# Patient Record
Sex: FEMALE | Race: WHITE | NOT HISPANIC OR LATINO | Employment: OTHER | URBAN - METROPOLITAN AREA
[De-identification: names, ages, dates, MRNs, and addresses within clinical notes are randomized per-mention and may not be internally consistent; named-entity substitution may affect disease eponyms.]

---

## 2017-06-14 ENCOUNTER — CONVERSION ENCOUNTER (OUTPATIENT)
Dept: FAMILY MEDICINE CLINIC | Facility: CLINIC | Age: 72
End: 2017-06-14

## 2017-06-21 ENCOUNTER — CONVERSION ENCOUNTER (OUTPATIENT)
Dept: FAMILY MEDICINE CLINIC | Facility: CLINIC | Age: 72
End: 2017-06-21

## 2017-06-28 ENCOUNTER — HOSPITAL ENCOUNTER (OUTPATIENT)
Dept: OTHER | Facility: HOSPITAL | Age: 72
Setting detail: SPECIMEN
Discharge: HOME | End: 2017-06-28
Attending: OBSTETRICS & GYNECOLOGY | Admitting: OBSTETRICS & GYNECOLOGY

## 2017-06-28 ENCOUNTER — HOSPITAL ENCOUNTER (OUTPATIENT)
Dept: PREADMISSION TESTING | Facility: HOSPITAL | Age: 72
Discharge: HOME | End: 2017-06-28
Attending: ORTHOPAEDIC SURGERY | Admitting: ORTHOPAEDIC SURGERY

## 2017-06-28 LAB
ALBUMIN SERPL-MCNC: 3.8 G/DL (ref 3.5–4.8)
ALBUMIN/GLOB SERPL: 1.3 {RATIO} (ref 1–1.7)
ALP SERPL-CCNC: 82 IU/L (ref 32–91)
ALT SERPL-CCNC: 22 IU/L (ref 14–54)
ANION GAP SERPL CALC-SCNC: 13.4 MMOL/L (ref 10–20)
AST SERPL-CCNC: 24 IU/L (ref 15–41)
BASOPHILS # BLD AUTO: 0.1 10*3/UL (ref 0–0.2)
BASOPHILS NFR BLD AUTO: 1 % (ref 0–2)
BILIRUB SERPL-MCNC: 0.8 MG/DL (ref 0.3–1.2)
BILIRUB UR QL STRIP: NEGATIVE MG/DL
BUN SERPL-MCNC: 26 MG/DL (ref 8–20)
BUN/CREAT SERPL: 23.6 (ref 5.4–26.2)
CALCIUM SERPL-MCNC: 10 MG/DL (ref 8.9–10.3)
CASTS URNS QL MICRO: NORMAL /[LPF]
CHLORIDE SERPL-SCNC: 102 MMOL/L (ref 101–111)
COLOR UR: YELLOW
CONV BACTERIA IN URINE MICRO: NEGATIVE
CONV CLARITY OF URINE: CLEAR
CONV CO2: 27 MMOL/L (ref 22–32)
CONV HYALINE CASTS IN URINE MICRO: 1 /[LPF] (ref 0–5)
CONV PROTEIN IN URINE BY AUTOMATED TEST STRIP: NEGATIVE MG/DL
CONV SMALL ROUND CELLS: NORMAL /[HPF]
CONV TOTAL PROTEIN: 6.8 G/DL (ref 6.1–7.9)
CONV UROBILINOGEN IN URINE BY AUTOMATED TEST STRIP: 0.2 MG/DL
CREAT UR-MCNC: 1.1 MG/DL (ref 0.4–1)
CULTURE INDICATED?: NORMAL
DIFFERENTIAL METHOD BLD: (no result)
EOSINOPHIL # BLD AUTO: 0.1 10*3/UL (ref 0–0.3)
EOSINOPHIL # BLD AUTO: 3 % (ref 0–3)
ERYTHROCYTE [DISTWIDTH] IN BLOOD BY AUTOMATED COUNT: 14 % (ref 11.5–14.5)
GLOBULIN UR ELPH-MCNC: 3 G/DL (ref 2.5–3.8)
GLUCOSE SERPL-MCNC: 101 MG/DL (ref 65–99)
GLUCOSE UR QL: NEGATIVE MG/DL
HCT VFR BLD AUTO: 36.2 % (ref 35–49)
HGB BLD-MCNC: 12.3 G/DL (ref 12–15)
HGB UR QL STRIP: NEGATIVE
KETONES UR QL STRIP: NEGATIVE MG/DL
LEUKOCYTE ESTERASE UR QL STRIP: NEGATIVE
LYMPHOCYTES # BLD AUTO: 1.7 10*3/UL (ref 0.8–4.8)
LYMPHOCYTES NFR BLD AUTO: 32 % (ref 18–42)
MCH RBC QN AUTO: 33.4 PG (ref 26–32)
MCHC RBC AUTO-ENTMCNC: 34 G/DL (ref 32–36)
MCV RBC AUTO: 98.2 FL (ref 80–94)
MONOCYTES # BLD AUTO: 0.4 10*3/UL (ref 0.1–1.3)
MONOCYTES NFR BLD AUTO: 8 % (ref 2–11)
NEUTROPHILS # BLD AUTO: 2.9 10*3/UL (ref 2.3–8.6)
NEUTROPHILS NFR BLD AUTO: 56 % (ref 50–75)
NITRITE UR QL STRIP: NEGATIVE
NRBC BLD AUTO-RTO: 0 /100{WBCS}
NRBC/RBC NFR BLD MANUAL: 0 10*3/UL
PH UR STRIP.AUTO: 5.5 [PH] (ref 4.5–8)
PLATELET # BLD AUTO: 217 10*3/UL (ref 150–450)
PMV BLD AUTO: 8 FL (ref 7.4–10.4)
POTASSIUM SERPL-SCNC: 4.4 MMOL/L (ref 3.6–5.1)
RBC # BLD AUTO: 3.68 10*6/UL (ref 4–5.4)
RBC #/AREA URNS HPF: 1 /[HPF] (ref 0–3)
SODIUM SERPL-SCNC: 138 MMOL/L (ref 136–144)
SP GR UR: 1.02 (ref 1–1.03)
SPERM URNS QL MICRO: NORMAL /[HPF]
SQUAMOUS SPT QL MICRO: 0 /[HPF] (ref 0–5)
UNIDENT CRYS URNS QL MICRO: NORMAL /[HPF]
WBC # BLD AUTO: 5.2 10*3/UL (ref 4.5–11.5)
WBC #/AREA URNS HPF: 1 /[HPF] (ref 0–5)
YEAST SPEC QL WET PREP: NORMAL /[HPF]

## 2017-08-23 ENCOUNTER — HOSPITAL ENCOUNTER (OUTPATIENT)
Dept: SLEEP MEDICINE | Facility: HOSPITAL | Age: 72
Discharge: HOME | End: 2017-08-23
Attending: INTERNAL MEDICINE | Admitting: INTERNAL MEDICINE

## 2017-12-21 ENCOUNTER — CONVERSION ENCOUNTER (OUTPATIENT)
Dept: FAMILY MEDICINE CLINIC | Facility: CLINIC | Age: 72
End: 2017-12-21

## 2017-12-21 ENCOUNTER — HOSPITAL ENCOUNTER (OUTPATIENT)
Dept: FAMILY MEDICINE CLINIC | Facility: CLINIC | Age: 72
Setting detail: SPECIMEN
Discharge: HOME | End: 2017-12-21
Attending: PREVENTIVE MEDICINE | Admitting: PREVENTIVE MEDICINE

## 2017-12-21 LAB
ALBUMIN SERPL-MCNC: 3.7 G/DL (ref 3.5–4.8)
ALBUMIN/GLOB SERPL: 1.4 {RATIO} (ref 1–1.7)
ALP SERPL-CCNC: 85 IU/L (ref 32–91)
ALT SERPL-CCNC: 19 IU/L (ref 14–54)
ANION GAP SERPL CALC-SCNC: 11.3 MMOL/L (ref 10–20)
AST SERPL-CCNC: 20 IU/L (ref 15–41)
BASOPHILS # BLD AUTO: 0.1 10*3/UL (ref 0–0.2)
BASOPHILS NFR BLD AUTO: 1 % (ref 0–2)
BILIRUB SERPL-MCNC: 0.5 MG/DL (ref 0.3–1.2)
BUN SERPL-MCNC: 20 MG/DL (ref 8–20)
BUN/CREAT SERPL: 18.2 (ref 5.4–26.2)
CALCIUM SERPL-MCNC: 9.5 MG/DL (ref 8.9–10.3)
CHLORIDE SERPL-SCNC: 102 MMOL/L (ref 101–111)
CHOLEST SERPL-MCNC: 170 MG/DL
CHOLEST/HDLC SERPL: 3.2 {RATIO}
CONV CO2: 28 MMOL/L (ref 22–32)
CONV LDL CHOLESTEROL DIRECT: 97 MG/DL (ref 0–100)
CONV TOTAL PROTEIN: 6.4 G/DL (ref 6.1–7.9)
CREAT UR-MCNC: 1.1 MG/DL (ref 0.4–1)
DIFFERENTIAL METHOD BLD: (no result)
EOSINOPHIL # BLD AUTO: 0.2 10*3/UL (ref 0–0.3)
EOSINOPHIL # BLD AUTO: 5 % (ref 0–3)
ERYTHROCYTE [DISTWIDTH] IN BLOOD BY AUTOMATED COUNT: 15.5 % (ref 11.5–14.5)
GLOBULIN UR ELPH-MCNC: 2.7 G/DL (ref 2.5–3.8)
GLUCOSE SERPL-MCNC: 85 MG/DL (ref 65–99)
HCT VFR BLD AUTO: 32.1 % (ref 35–49)
HDLC SERPL-MCNC: 53 MG/DL
HGB BLD-MCNC: 10.6 G/DL (ref 12–15)
IRON SATN MFR SERPL: 17 % (ref 15–50)
IRON SERPL-MCNC: 49 UG/DL (ref 28–170)
LDLC/HDLC SERPL: 1.8 {RATIO}
LIPID INTERPRETATION: NORMAL
LYMPHOCYTES # BLD AUTO: 1.4 10*3/UL (ref 0.8–4.8)
LYMPHOCYTES NFR BLD AUTO: 32 % (ref 18–42)
MAGNESIUM UR-MCNC: 1.75 % (ref 0.5–1.5)
MCH RBC QN AUTO: 32.2 PG (ref 26–32)
MCHC RBC AUTO-ENTMCNC: 33 G/DL (ref 32–36)
MCV RBC AUTO: 97.5 FL (ref 80–94)
MONOCYTES # BLD AUTO: 0.4 10*3/UL (ref 0.1–1.3)
MONOCYTES NFR BLD AUTO: 10 % (ref 2–11)
NEUTROPHILS # BLD AUTO: 2.3 10*3/UL (ref 2.3–8.6)
NEUTROPHILS NFR BLD AUTO: 52 % (ref 50–75)
NRBC BLD AUTO-RTO: 0 /100{WBCS}
NRBC/RBC NFR BLD MANUAL: 0 10*3/UL
PLATELET # BLD AUTO: 209 10*3/UL (ref 150–450)
PMV BLD AUTO: 7.9 FL (ref 7.4–10.4)
POTASSIUM SERPL-SCNC: 4.3 MMOL/L (ref 3.6–5.1)
RBC # BLD AUTO: 3.29 10*6/UL (ref 4–5.4)
RETICS/RBC NFR MANUAL: 0.06 10*6/UL
SODIUM SERPL-SCNC: 137 MMOL/L (ref 136–144)
TIBC SERPL-MCNC: 296 UG/DL (ref 228–428)
TRIGL SERPL-MCNC: 122 MG/DL
URATE SERPL-MCNC: 5.8 MG/DL (ref 2.6–8)
VLDLC SERPL CALC-MCNC: 19.8 MG/DL
WBC # BLD AUTO: 4.3 10*3/UL (ref 4.5–11.5)

## 2017-12-22 LAB
CONV HIV-1/ HIV-2: NORMAL
CONV HIV-1/ HIV-2: NORMAL
HCV AB SER DONR QL: NORMAL
HCV AB SER DONR QL: NORMAL

## 2018-01-22 ENCOUNTER — CONVERSION ENCOUNTER (OUTPATIENT)
Dept: FAMILY MEDICINE CLINIC | Facility: CLINIC | Age: 73
End: 2018-01-22

## 2018-01-22 ENCOUNTER — HOSPITAL ENCOUNTER (OUTPATIENT)
Dept: FAMILY MEDICINE CLINIC | Facility: CLINIC | Age: 73
Setting detail: SPECIMEN
Discharge: HOME | End: 2018-01-22
Attending: PREVENTIVE MEDICINE | Admitting: PREVENTIVE MEDICINE

## 2018-01-22 LAB
ALBUMIN SERPL-MCNC: 3.9 G/DL (ref 3.5–4.8)
ALBUMIN/GLOB SERPL: 1.3 {RATIO} (ref 1–1.7)
ALP SERPL-CCNC: 87 IU/L (ref 32–91)
ALT SERPL-CCNC: 18 IU/L (ref 14–54)
ANION GAP SERPL CALC-SCNC: 13.3 MMOL/L (ref 10–20)
AST SERPL-CCNC: 21 IU/L (ref 15–41)
BASOPHILS # BLD AUTO: 0 10*3/UL (ref 0–0.2)
BASOPHILS NFR BLD AUTO: 1 % (ref 0–2)
BILIRUB SERPL-MCNC: 0.6 MG/DL (ref 0.3–1.2)
BUN SERPL-MCNC: 22 MG/DL (ref 8–20)
BUN/CREAT SERPL: 22 (ref 5.4–26.2)
CALCIUM SERPL-MCNC: 9.9 MG/DL (ref 8.9–10.3)
CHLORIDE SERPL-SCNC: 101 MMOL/L (ref 101–111)
CONV CO2: 26 MMOL/L (ref 22–32)
CONV TOTAL PROTEIN: 6.9 G/DL (ref 6.1–7.9)
CREAT UR-MCNC: 1 MG/DL (ref 0.4–1)
DIFFERENTIAL METHOD BLD: (no result)
EOSINOPHIL # BLD AUTO: 0.2 10*3/UL (ref 0–0.3)
EOSINOPHIL # BLD AUTO: 4 % (ref 0–3)
ERYTHROCYTE [DISTWIDTH] IN BLOOD BY AUTOMATED COUNT: 14.9 % (ref 11.5–14.5)
GLOBULIN UR ELPH-MCNC: 3 G/DL (ref 2.5–3.8)
GLUCOSE SERPL-MCNC: 84 MG/DL (ref 65–99)
HCT VFR BLD AUTO: 32.7 % (ref 35–49)
HGB BLD-MCNC: 11 G/DL (ref 12–15)
LYMPHOCYTES # BLD AUTO: 1.7 10*3/UL (ref 0.8–4.8)
LYMPHOCYTES NFR BLD AUTO: 33 % (ref 18–42)
MCH RBC QN AUTO: 32.8 PG (ref 26–32)
MCHC RBC AUTO-ENTMCNC: 33.7 G/DL (ref 32–36)
MCV RBC AUTO: 97.3 FL (ref 80–94)
MONOCYTES # BLD AUTO: 0.4 10*3/UL (ref 0.1–1.3)
MONOCYTES NFR BLD AUTO: 8 % (ref 2–11)
NEUTROPHILS # BLD AUTO: 2.8 10*3/UL (ref 2.3–8.6)
NEUTROPHILS NFR BLD AUTO: 54 % (ref 50–75)
NRBC BLD AUTO-RTO: 0 /100{WBCS}
NRBC/RBC NFR BLD MANUAL: 0 10*3/UL
PLATELET # BLD AUTO: 241 10*3/UL (ref 150–450)
PMV BLD AUTO: 7.9 FL (ref 7.4–10.4)
POTASSIUM SERPL-SCNC: 4.3 MMOL/L (ref 3.6–5.1)
RBC # BLD AUTO: 3.36 10*6/UL (ref 4–5.4)
SODIUM SERPL-SCNC: 136 MMOL/L (ref 136–144)
WBC # BLD AUTO: 5.2 10*3/UL (ref 4.5–11.5)

## 2018-02-05 ENCOUNTER — CONVERSION ENCOUNTER (OUTPATIENT)
Dept: FAMILY MEDICINE CLINIC | Facility: CLINIC | Age: 73
End: 2018-02-05

## 2018-02-05 ENCOUNTER — HOSPITAL ENCOUNTER (OUTPATIENT)
Dept: FAMILY MEDICINE CLINIC | Facility: CLINIC | Age: 73
Setting detail: SPECIMEN
Discharge: HOME | End: 2018-02-05
Attending: PREVENTIVE MEDICINE | Admitting: PREVENTIVE MEDICINE

## 2018-02-05 LAB — POTASSIUM SERPL-SCNC: 4.9 MMOL/L (ref 3.6–5.1)

## 2018-02-13 ENCOUNTER — CONVERSION ENCOUNTER (OUTPATIENT)
Dept: FAMILY MEDICINE CLINIC | Facility: CLINIC | Age: 73
End: 2018-02-13

## 2018-02-14 ENCOUNTER — CONVERSION ENCOUNTER (OUTPATIENT)
Dept: FAMILY MEDICINE CLINIC | Facility: CLINIC | Age: 73
End: 2018-02-14

## 2018-02-23 ENCOUNTER — HOSPITAL ENCOUNTER (OUTPATIENT)
Dept: NUCLEAR MEDICINE | Facility: HOSPITAL | Age: 73
Discharge: HOME | End: 2018-02-23
Attending: INTERNAL MEDICINE | Admitting: INTERNAL MEDICINE

## 2018-02-27 ENCOUNTER — CONVERSION ENCOUNTER (OUTPATIENT)
Dept: FAMILY MEDICINE CLINIC | Facility: CLINIC | Age: 73
End: 2018-02-27

## 2018-03-14 ENCOUNTER — CONVERSION ENCOUNTER (OUTPATIENT)
Dept: FAMILY MEDICINE CLINIC | Facility: CLINIC | Age: 73
End: 2018-03-14

## 2018-03-30 ENCOUNTER — CONVERSION ENCOUNTER (OUTPATIENT)
Dept: FAMILY MEDICINE CLINIC | Facility: CLINIC | Age: 73
End: 2018-03-30

## 2018-03-30 ENCOUNTER — HOSPITAL ENCOUNTER (OUTPATIENT)
Dept: FAMILY MEDICINE CLINIC | Facility: CLINIC | Age: 73
Setting detail: SPECIMEN
Discharge: HOME | End: 2018-03-30
Attending: PREVENTIVE MEDICINE | Admitting: PREVENTIVE MEDICINE

## 2018-03-30 LAB
ALBUMIN SERPL-MCNC: 3.7 G/DL (ref 3.5–4.8)
ALBUMIN/GLOB SERPL: 1.2 {RATIO} (ref 1–1.7)
ALP SERPL-CCNC: 95 IU/L (ref 32–91)
ALT SERPL-CCNC: 21 IU/L (ref 14–54)
ANION GAP SERPL CALC-SCNC: 11 MMOL/L (ref 10–20)
AST SERPL-CCNC: 23 IU/L (ref 15–41)
BASOPHILS # BLD AUTO: 0.1 10*3/UL (ref 0–0.2)
BASOPHILS NFR BLD AUTO: 1 % (ref 0–2)
BILIRUB SERPL-MCNC: 0.4 MG/DL (ref 0.3–1.2)
BUN SERPL-MCNC: 20 MG/DL (ref 8–20)
BUN/CREAT SERPL: 18.2 (ref 5.4–26.2)
CALCIUM SERPL-MCNC: 9.9 MG/DL (ref 8.9–10.3)
CHLORIDE SERPL-SCNC: 103 MMOL/L (ref 101–111)
CHOLEST SERPL-MCNC: 184 MG/DL
CHOLEST/HDLC SERPL: 3.5 {RATIO}
CONV CO2: 28 MMOL/L (ref 22–32)
CONV LDL CHOLESTEROL DIRECT: 108 MG/DL (ref 0–100)
CONV TOTAL PROTEIN: 6.9 G/DL (ref 6.1–7.9)
CREAT UR-MCNC: 1.1 MG/DL (ref 0.4–1)
DIFFERENTIAL METHOD BLD: (no result)
EOSINOPHIL # BLD AUTO: 0.2 10*3/UL (ref 0–0.3)
EOSINOPHIL # BLD AUTO: 3 % (ref 0–3)
ERYTHROCYTE [DISTWIDTH] IN BLOOD BY AUTOMATED COUNT: 15.4 % (ref 11.5–14.5)
GLOBULIN UR ELPH-MCNC: 3.2 G/DL (ref 2.5–3.8)
GLUCOSE SERPL-MCNC: 112 MG/DL (ref 65–99)
HCT VFR BLD AUTO: 34.3 % (ref 35–49)
HDLC SERPL-MCNC: 52 MG/DL
HGB BLD-MCNC: 11.4 G/DL (ref 12–15)
LDLC/HDLC SERPL: 2.1 {RATIO}
LIPID INTERPRETATION: ABNORMAL
LYMPHOCYTES # BLD AUTO: 1.5 10*3/UL (ref 0.8–4.8)
LYMPHOCYTES NFR BLD AUTO: 31 % (ref 18–42)
MCH RBC QN AUTO: 32.8 PG (ref 26–32)
MCHC RBC AUTO-ENTMCNC: 33.3 G/DL (ref 32–36)
MCV RBC AUTO: 98.5 FL (ref 80–94)
MONOCYTES # BLD AUTO: 0.4 10*3/UL (ref 0.1–1.3)
MONOCYTES NFR BLD AUTO: 8 % (ref 2–11)
NEUTROPHILS # BLD AUTO: 2.7 10*3/UL (ref 2.3–8.6)
NEUTROPHILS NFR BLD AUTO: 57 % (ref 50–75)
NRBC BLD AUTO-RTO: 0 /100{WBCS}
NRBC/RBC NFR BLD MANUAL: 0 10*3/UL
PLATELET # BLD AUTO: 216 10*3/UL (ref 150–450)
PMV BLD AUTO: 7.5 FL (ref 7.4–10.4)
POTASSIUM SERPL-SCNC: 4 MMOL/L (ref 3.6–5.1)
RBC # BLD AUTO: 3.48 10*6/UL (ref 4–5.4)
SODIUM SERPL-SCNC: 138 MMOL/L (ref 136–144)
TRIGL SERPL-MCNC: 161 MG/DL
URATE SERPL-MCNC: 4.3 MG/DL (ref 2.6–8)
VLDLC SERPL CALC-MCNC: 23.9 MG/DL
WBC # BLD AUTO: 4.8 10*3/UL (ref 4.5–11.5)

## 2018-04-05 ENCOUNTER — HOSPITAL ENCOUNTER (OUTPATIENT)
Dept: OTHER | Facility: HOSPITAL | Age: 73
Discharge: HOME | End: 2018-04-05
Attending: INTERNAL MEDICINE | Admitting: INTERNAL MEDICINE

## 2018-04-05 LAB
ANION GAP SERPL CALC-SCNC: 10.3 MMOL/L (ref 10–20)
APTT BLD: 24.3 SEC (ref 24–31)
BASOPHILS # BLD AUTO: 0.1 10*3/UL (ref 0–0.2)
BASOPHILS NFR BLD AUTO: 1 % (ref 0–2)
BUN SERPL-MCNC: 19 MG/DL (ref 8–20)
BUN/CREAT SERPL: 19 (ref 5.4–26.2)
CALCIUM SERPL-MCNC: 9.6 MG/DL (ref 8.9–10.3)
CHLORIDE SERPL-SCNC: 103 MMOL/L (ref 101–111)
CONV CO2: 27 MMOL/L (ref 22–32)
CREAT UR-MCNC: 1 MG/DL (ref 0.4–1)
DIFFERENTIAL METHOD BLD: (no result)
EOSINOPHIL # BLD AUTO: 0.2 10*3/UL (ref 0–0.3)
EOSINOPHIL # BLD AUTO: 4 % (ref 0–3)
ERYTHROCYTE [DISTWIDTH] IN BLOOD BY AUTOMATED COUNT: 15.3 % (ref 11.5–14.5)
GLUCOSE SERPL-MCNC: 107 MG/DL (ref 65–99)
HCT VFR BLD AUTO: 34.8 % (ref 35–49)
HGB BLD-MCNC: 11.5 G/DL (ref 12–15)
INR PPP: 0.9
LYMPHOCYTES # BLD AUTO: 1.6 10*3/UL (ref 0.8–4.8)
LYMPHOCYTES NFR BLD AUTO: 31 % (ref 18–42)
MCH RBC QN AUTO: 32.6 PG (ref 26–32)
MCHC RBC AUTO-ENTMCNC: 33.2 G/DL (ref 32–36)
MCV RBC AUTO: 98.3 FL (ref 80–94)
MONOCYTES # BLD AUTO: 0.4 10*3/UL (ref 0.1–1.3)
MONOCYTES NFR BLD AUTO: 8 % (ref 2–11)
NEUTROPHILS # BLD AUTO: 2.9 10*3/UL (ref 2.3–8.6)
NEUTROPHILS NFR BLD AUTO: 56 % (ref 50–75)
NRBC BLD AUTO-RTO: 0 /100{WBCS}
NRBC/RBC NFR BLD MANUAL: 0 10*3/UL
PLATELET # BLD AUTO: 229 10*3/UL (ref 150–450)
PMV BLD AUTO: 7.7 FL (ref 7.4–10.4)
POTASSIUM SERPL-SCNC: 4.3 MMOL/L (ref 3.6–5.1)
PROTHROMBIN TIME: 10.1 SEC (ref 9.6–11.7)
RBC # BLD AUTO: 3.54 10*6/UL (ref 4–5.4)
SODIUM SERPL-SCNC: 136 MMOL/L (ref 136–144)
WBC # BLD AUTO: 5.2 10*3/UL (ref 4.5–11.5)

## 2018-04-25 ENCOUNTER — CONVERSION ENCOUNTER (OUTPATIENT)
Dept: FAMILY MEDICINE CLINIC | Facility: CLINIC | Age: 73
End: 2018-04-25

## 2018-05-23 ENCOUNTER — HOSPITAL ENCOUNTER (OUTPATIENT)
Dept: LAB | Facility: HOSPITAL | Age: 73
Discharge: HOME | End: 2018-05-23
Attending: OBSTETRICS & GYNECOLOGY | Admitting: OBSTETRICS & GYNECOLOGY

## 2018-05-23 LAB
ANION GAP SERPL CALC-SCNC: 11.9 MMOL/L (ref 10–20)
BUN SERPL-MCNC: 36 MG/DL (ref 8–20)
BUN/CREAT SERPL: 22.5 (ref 5.4–26.2)
CALCIUM SERPL-MCNC: 9.4 MG/DL (ref 8.9–10.3)
CHLORIDE SERPL-SCNC: 101 MMOL/L (ref 101–111)
CONV CO2: 27 MMOL/L (ref 22–32)
CREAT UR-MCNC: 1.6 MG/DL (ref 0.4–1)
GLUCOSE SERPL-MCNC: 85 MG/DL (ref 65–99)
POTASSIUM SERPL-SCNC: 4.9 MMOL/L (ref 3.6–5.1)
SODIUM SERPL-SCNC: 135 MMOL/L (ref 136–144)

## 2018-05-24 ENCOUNTER — HOSPITAL ENCOUNTER (OUTPATIENT)
Dept: PREOP | Facility: HOSPITAL | Age: 73
Setting detail: HOSPITAL OUTPATIENT SURGERY
Discharge: HOME | End: 2018-05-24
Attending: OBSTETRICS & GYNECOLOGY | Admitting: OBSTETRICS & GYNECOLOGY

## 2018-05-24 LAB
ANION GAP SERPL CALC-SCNC: 12.5 MMOL/L (ref 10–20)
BUN SERPL-MCNC: 28 MG/DL (ref 8–20)
BUN/CREAT SERPL: 28 (ref 5.4–26.2)
CALCIUM SERPL-MCNC: 9.6 MG/DL (ref 8.9–10.3)
CHLORIDE SERPL-SCNC: 107 MMOL/L (ref 101–111)
CONV CO2: 24 MMOL/L (ref 22–32)
CREAT UR-MCNC: 1 MG/DL (ref 0.4–1)
GLUCOSE SERPL-MCNC: 92 MG/DL (ref 65–99)
POTASSIUM SERPL-SCNC: 4.5 MMOL/L (ref 3.6–5.1)
SODIUM SERPL-SCNC: 139 MMOL/L (ref 136–144)

## 2018-06-19 ENCOUNTER — HOSPITAL ENCOUNTER (OUTPATIENT)
Dept: FAMILY MEDICINE CLINIC | Facility: CLINIC | Age: 73
Setting detail: SPECIMEN
Discharge: HOME | End: 2018-06-19
Attending: PREVENTIVE MEDICINE | Admitting: PREVENTIVE MEDICINE

## 2018-06-19 ENCOUNTER — CONVERSION ENCOUNTER (OUTPATIENT)
Dept: FAMILY MEDICINE CLINIC | Facility: CLINIC | Age: 73
End: 2018-06-19

## 2018-06-19 LAB — CREAT UR-MCNC: 1.1 MG/DL (ref 0.4–1)

## 2018-07-18 ENCOUNTER — HOSPITAL ENCOUNTER (OUTPATIENT)
Dept: ULTRASOUND IMAGING | Facility: HOSPITAL | Age: 73
Discharge: HOME | End: 2018-07-18
Attending: UROLOGY | Admitting: UROLOGY

## 2018-08-28 ENCOUNTER — HOSPITAL ENCOUNTER (OUTPATIENT)
Dept: URGENT CARE | Facility: CLINIC | Age: 73
Discharge: HOME | End: 2018-08-28
Attending: PREVENTIVE MEDICINE | Admitting: PREVENTIVE MEDICINE

## 2018-08-28 ENCOUNTER — CONVERSION ENCOUNTER (OUTPATIENT)
Dept: FAMILY MEDICINE CLINIC | Facility: CLINIC | Age: 73
End: 2018-08-28

## 2018-08-31 ENCOUNTER — HOSPITAL ENCOUNTER (OUTPATIENT)
Dept: CT IMAGING | Facility: HOSPITAL | Age: 73
Discharge: HOME | End: 2018-08-31
Attending: PREVENTIVE MEDICINE | Admitting: PREVENTIVE MEDICINE

## 2018-09-13 ENCOUNTER — CONVERSION ENCOUNTER (OUTPATIENT)
Dept: FAMILY MEDICINE CLINIC | Facility: CLINIC | Age: 73
End: 2018-09-13

## 2018-11-30 ENCOUNTER — CONVERSION ENCOUNTER (OUTPATIENT)
Dept: FAMILY MEDICINE CLINIC | Facility: CLINIC | Age: 73
End: 2018-11-30

## 2018-12-06 ENCOUNTER — HOSPITAL ENCOUNTER (OUTPATIENT)
Dept: CT IMAGING | Facility: HOSPITAL | Age: 73
Discharge: HOME | End: 2018-12-06
Attending: PREVENTIVE MEDICINE | Admitting: PREVENTIVE MEDICINE

## 2018-12-14 ENCOUNTER — CONVERSION ENCOUNTER (OUTPATIENT)
Dept: FAMILY MEDICINE CLINIC | Facility: CLINIC | Age: 73
End: 2018-12-14

## 2018-12-20 ENCOUNTER — HOSPITAL ENCOUNTER (OUTPATIENT)
Dept: LAB | Facility: HOSPITAL | Age: 73
Discharge: HOME | End: 2018-12-20
Attending: PREVENTIVE MEDICINE | Admitting: PREVENTIVE MEDICINE

## 2018-12-20 ENCOUNTER — CONVERSION ENCOUNTER (OUTPATIENT)
Dept: FAMILY MEDICINE CLINIC | Facility: CLINIC | Age: 73
End: 2018-12-20

## 2018-12-20 LAB
25(OH)D3 SERPL-MCNC: 34 NG/ML (ref 30–100)
ALBUMIN SERPL-MCNC: 3.8 G/DL (ref 3.5–4.8)
ALBUMIN/GLOB SERPL: 1.3 {RATIO} (ref 1–1.7)
ALP SERPL-CCNC: 113 IU/L (ref 32–91)
ALT SERPL-CCNC: 20 IU/L (ref 14–54)
ANION GAP SERPL CALC-SCNC: 14 MMOL/L (ref 10–20)
AST SERPL-CCNC: 24 IU/L (ref 15–41)
BASOPHILS # BLD AUTO: 0.1 10*3/UL (ref 0–0.2)
BASOPHILS NFR BLD AUTO: 1 % (ref 0–2)
BILIRUB SERPL-MCNC: 0.4 MG/DL (ref 0.3–1.2)
BUN SERPL-MCNC: 24 MG/DL (ref 8–20)
BUN/CREAT SERPL: 20 (ref 5.4–26.2)
CALCIUM SERPL-MCNC: 9.8 MG/DL (ref 8.9–10.3)
CHLORIDE SERPL-SCNC: 102 MMOL/L (ref 101–111)
CHOLEST SERPL-MCNC: 123 MG/DL
CHOLEST/HDLC SERPL: 2 {RATIO}
CONV CO2: 26 MMOL/L (ref 22–32)
CONV LDL CHOLESTEROL DIRECT: 54 MG/DL (ref 0–100)
CONV TOTAL PROTEIN: 6.8 G/DL (ref 6.1–7.9)
CREAT UR-MCNC: 1.2 MG/DL (ref 0.4–1)
DIFFERENTIAL METHOD BLD: (no result)
EOSINOPHIL # BLD AUTO: 0.2 10*3/UL (ref 0–0.3)
EOSINOPHIL # BLD AUTO: 4 % (ref 0–3)
ERYTHROCYTE [DISTWIDTH] IN BLOOD BY AUTOMATED COUNT: 14.9 % (ref 11.5–14.5)
GLOBULIN UR ELPH-MCNC: 3 G/DL (ref 2.5–3.8)
GLUCOSE SERPL-MCNC: 96 MG/DL (ref 65–99)
HCT VFR BLD AUTO: 35.4 % (ref 35–49)
HDLC SERPL-MCNC: 63 MG/DL
HGB BLD-MCNC: 11.3 G/DL (ref 12–15)
LDLC/HDLC SERPL: 0.9 {RATIO}
LIPID INTERPRETATION: NORMAL
LYMPHOCYTES # BLD AUTO: 1.5 10*3/UL (ref 0.8–4.8)
LYMPHOCYTES NFR BLD AUTO: 29 % (ref 18–42)
MCH RBC QN AUTO: 32.8 PG (ref 26–32)
MCHC RBC AUTO-ENTMCNC: 31.8 G/DL (ref 32–36)
MCV RBC AUTO: 103 FL (ref 80–94)
MONOCYTES # BLD AUTO: 0.4 10*3/UL (ref 0.1–1.3)
MONOCYTES NFR BLD AUTO: 8 % (ref 2–11)
NEUTROPHILS # BLD AUTO: 2.8 10*3/UL (ref 2.3–8.6)
NEUTROPHILS NFR BLD AUTO: 58 % (ref 50–75)
NRBC BLD AUTO-RTO: 0 /100{WBCS}
NRBC/RBC NFR BLD MANUAL: 0 10*3/UL
PLATELET # BLD AUTO: 230 10*3/UL (ref 150–450)
PMV BLD AUTO: 7.8 FL (ref 7.4–10.4)
POTASSIUM SERPL-SCNC: 4 MMOL/L (ref 3.6–5.1)
RBC # BLD AUTO: 3.43 10*6/UL (ref 4–5.4)
SODIUM SERPL-SCNC: 138 MMOL/L (ref 136–144)
TRIGL SERPL-MCNC: 98 MG/DL
TSH SERPL-ACNC: 4.39 UIU/ML (ref 0.34–5.6)
URATE SERPL-MCNC: 3.9 MG/DL (ref 2.6–8)
VIT B12 SERPL-MCNC: >1500 PG/ML (ref 180–914)
VLDLC SERPL CALC-MCNC: 6 MG/DL
WBC # BLD AUTO: 5 10*3/UL (ref 4.5–11.5)

## 2018-12-21 LAB — PTH-INTACT SERPL-MCNC: 72 PG/ML (ref 11–72)

## 2019-01-09 ENCOUNTER — CONVERSION ENCOUNTER (OUTPATIENT)
Dept: FAMILY MEDICINE CLINIC | Facility: CLINIC | Age: 74
End: 2019-01-09

## 2019-03-01 ENCOUNTER — CONVERSION ENCOUNTER (OUTPATIENT)
Dept: FAMILY MEDICINE CLINIC | Facility: CLINIC | Age: 74
End: 2019-03-01

## 2019-03-12 ENCOUNTER — CONVERSION ENCOUNTER (OUTPATIENT)
Dept: FAMILY MEDICINE CLINIC | Facility: CLINIC | Age: 74
End: 2019-03-12

## 2019-04-02 ENCOUNTER — CONVERSION ENCOUNTER (OUTPATIENT)
Dept: FAMILY MEDICINE CLINIC | Facility: CLINIC | Age: 74
End: 2019-04-02

## 2019-04-02 ENCOUNTER — HOSPITAL ENCOUNTER (OUTPATIENT)
Dept: FAMILY MEDICINE CLINIC | Facility: CLINIC | Age: 74
Setting detail: SPECIMEN
Discharge: HOME | End: 2019-04-02
Attending: PREVENTIVE MEDICINE | Admitting: PREVENTIVE MEDICINE

## 2019-04-02 LAB
ALBUMIN SERPL-MCNC: 3.6 G/DL (ref 3.5–4.8)
ALBUMIN/GLOB SERPL: 1.2 {RATIO} (ref 1–1.7)
ALP SERPL-CCNC: 103 IU/L (ref 32–91)
ALT SERPL-CCNC: 19 IU/L (ref 14–54)
ANION GAP SERPL CALC-SCNC: 12.3 MMOL/L (ref 10–20)
AST SERPL-CCNC: 24 IU/L (ref 15–41)
BASOPHILS # BLD AUTO: 0.1 10*3/UL (ref 0–0.2)
BASOPHILS NFR BLD AUTO: 1 % (ref 0–2)
BILIRUB SERPL-MCNC: 0.7 MG/DL (ref 0.3–1.2)
BUN SERPL-MCNC: 22 MG/DL (ref 8–20)
BUN/CREAT SERPL: 20 (ref 5.4–26.2)
CALCIUM SERPL-MCNC: 9.6 MG/DL (ref 8.9–10.3)
CHLORIDE SERPL-SCNC: 103 MMOL/L (ref 101–111)
CHOLEST SERPL-MCNC: 124 MG/DL
CHOLEST/HDLC SERPL: 2.3 {RATIO}
CONV CO2: 25 MMOL/L (ref 22–32)
CONV LDL CHOLESTEROL DIRECT: 55 MG/DL (ref 0–100)
CONV TOTAL PROTEIN: 6.6 G/DL (ref 6.1–7.9)
CREAT UR-MCNC: 1.1 MG/DL (ref 0.4–1)
DIFFERENTIAL METHOD BLD: (no result)
EOSINOPHIL # BLD AUTO: 0.3 10*3/UL (ref 0–0.3)
EOSINOPHIL # BLD AUTO: 5 % (ref 0–3)
ERYTHROCYTE [DISTWIDTH] IN BLOOD BY AUTOMATED COUNT: 15.3 % (ref 11.5–14.5)
GLOBULIN UR ELPH-MCNC: 3 G/DL (ref 2.5–3.8)
GLUCOSE SERPL-MCNC: 98 MG/DL (ref 65–99)
HCT VFR BLD AUTO: 33.2 % (ref 35–49)
HDLC SERPL-MCNC: 54 MG/DL
HGB BLD-MCNC: 11.1 G/DL (ref 12–15)
LDLC/HDLC SERPL: 1 {RATIO}
LIPID INTERPRETATION: NORMAL
LYMPHOCYTES # BLD AUTO: 1.5 10*3/UL (ref 0.8–4.8)
LYMPHOCYTES NFR BLD AUTO: 30 % (ref 18–42)
MCH RBC QN AUTO: 33.2 PG (ref 26–32)
MCHC RBC AUTO-ENTMCNC: 33.3 G/DL (ref 32–36)
MCV RBC AUTO: 99.7 FL (ref 80–94)
MONOCYTES # BLD AUTO: 0.4 10*3/UL (ref 0.1–1.3)
MONOCYTES NFR BLD AUTO: 8 % (ref 2–11)
NEUTROPHILS # BLD AUTO: 2.9 10*3/UL (ref 2.3–8.6)
NEUTROPHILS NFR BLD AUTO: 56 % (ref 50–75)
NRBC BLD AUTO-RTO: 0 /100{WBCS}
NRBC/RBC NFR BLD MANUAL: 0 10*3/UL
PLATELET # BLD AUTO: 211 10*3/UL (ref 150–450)
PMV BLD AUTO: 7.8 FL (ref 7.4–10.4)
POTASSIUM SERPL-SCNC: 4.3 MMOL/L (ref 3.6–5.1)
RBC # BLD AUTO: 3.33 10*6/UL (ref 4–5.4)
SODIUM SERPL-SCNC: 136 MMOL/L (ref 136–144)
TRIGL SERPL-MCNC: 90 MG/DL
VLDLC SERPL CALC-MCNC: 14.8 MG/DL
WBC # BLD AUTO: 5.2 10*3/UL (ref 4.5–11.5)

## 2019-04-06 LAB — VZV IGG SER IA-ACNC: NORMAL

## 2019-05-20 ENCOUNTER — ON CAMPUS - OUTPATIENT (AMBULATORY)
Dept: URBAN - METROPOLITAN AREA HOSPITAL 2 | Facility: HOSPITAL | Age: 74
End: 2019-05-20

## 2019-05-20 ENCOUNTER — OFFICE (AMBULATORY)
Dept: URBAN - METROPOLITAN AREA PATHOLOGY 4 | Facility: PATHOLOGY | Age: 74
End: 2019-05-20

## 2019-05-20 ENCOUNTER — HOSPITAL ENCOUNTER (OUTPATIENT)
Dept: OTHER | Facility: HOSPITAL | Age: 74
Setting detail: SPECIMEN
Discharge: HOME | End: 2019-05-20
Attending: INTERNAL MEDICINE | Admitting: INTERNAL MEDICINE

## 2019-05-20 VITALS
DIASTOLIC BLOOD PRESSURE: 63 MMHG | DIASTOLIC BLOOD PRESSURE: 76 MMHG | DIASTOLIC BLOOD PRESSURE: 57 MMHG | SYSTOLIC BLOOD PRESSURE: 144 MMHG | DIASTOLIC BLOOD PRESSURE: 95 MMHG | SYSTOLIC BLOOD PRESSURE: 135 MMHG | HEART RATE: 68 BPM | DIASTOLIC BLOOD PRESSURE: 73 MMHG | DIASTOLIC BLOOD PRESSURE: 78 MMHG | DIASTOLIC BLOOD PRESSURE: 72 MMHG | OXYGEN SATURATION: 100 % | SYSTOLIC BLOOD PRESSURE: 164 MMHG | HEART RATE: 67 BPM | SYSTOLIC BLOOD PRESSURE: 155 MMHG | WEIGHT: 275 LBS | SYSTOLIC BLOOD PRESSURE: 146 MMHG | HEART RATE: 53 BPM | OXYGEN SATURATION: 97 % | SYSTOLIC BLOOD PRESSURE: 161 MMHG | SYSTOLIC BLOOD PRESSURE: 141 MMHG | HEART RATE: 57 BPM | HEIGHT: 63 IN | OXYGEN SATURATION: 99 % | RESPIRATION RATE: 16 BRPM | HEART RATE: 62 BPM | TEMPERATURE: 97.6 F | DIASTOLIC BLOOD PRESSURE: 71 MMHG | SYSTOLIC BLOOD PRESSURE: 111 MMHG | HEART RATE: 60 BPM | SYSTOLIC BLOOD PRESSURE: 148 MMHG | SYSTOLIC BLOOD PRESSURE: 159 MMHG | DIASTOLIC BLOOD PRESSURE: 65 MMHG | HEART RATE: 65 BPM | DIASTOLIC BLOOD PRESSURE: 50 MMHG | HEART RATE: 61 BPM

## 2019-05-20 DIAGNOSIS — K62.1 RECTAL POLYP: ICD-10-CM

## 2019-05-20 DIAGNOSIS — K57.30 DIVERTICULOSIS OF LARGE INTESTINE WITHOUT PERFORATION OR ABS: ICD-10-CM

## 2019-05-20 DIAGNOSIS — D12.2 BENIGN NEOPLASM OF ASCENDING COLON: ICD-10-CM

## 2019-05-20 DIAGNOSIS — D12.5 BENIGN NEOPLASM OF SIGMOID COLON: ICD-10-CM

## 2019-05-20 DIAGNOSIS — D12.3 BENIGN NEOPLASM OF TRANSVERSE COLON: ICD-10-CM

## 2019-05-20 DIAGNOSIS — Z86.010 PERSONAL HISTORY OF COLONIC POLYPS: ICD-10-CM

## 2019-05-20 LAB
GI HISTOLOGY: A. UNSPECIFIED: (no result)
GI HISTOLOGY: B. UNSPECIFIED: (no result)
GI HISTOLOGY: C. UNSPECIFIED: (no result)
GI HISTOLOGY: D. UNSPECIFIED: (no result)
GI HISTOLOGY: PDF REPORT: (no result)

## 2019-05-20 PROCEDURE — 88305 TISSUE EXAM BY PATHOLOGIST: CPT | Mod: 26 | Performed by: INTERNAL MEDICINE

## 2019-05-20 PROCEDURE — 45385 COLONOSCOPY W/LESION REMOVAL: CPT | Mod: PT | Performed by: INTERNAL MEDICINE

## 2019-06-04 VITALS
HEART RATE: 62 BPM | DIASTOLIC BLOOD PRESSURE: 84 MMHG | DIASTOLIC BLOOD PRESSURE: 77 MMHG | HEART RATE: 59 BPM | HEIGHT: 63 IN | BODY MASS INDEX: 48.4 KG/M2 | SYSTOLIC BLOOD PRESSURE: 150 MMHG | HEIGHT: 63 IN | RESPIRATION RATE: 16 BRPM | WEIGHT: 275.8 LBS | OXYGEN SATURATION: 98 % | DIASTOLIC BLOOD PRESSURE: 86 MMHG | BODY MASS INDEX: 48.73 KG/M2 | OXYGEN SATURATION: 100 % | WEIGHT: 273.25 LBS | SYSTOLIC BLOOD PRESSURE: 135 MMHG | RESPIRATION RATE: 16 BRPM | SYSTOLIC BLOOD PRESSURE: 179 MMHG | SYSTOLIC BLOOD PRESSURE: 147 MMHG | HEIGHT: 63 IN | RESPIRATION RATE: 18 BRPM | HEART RATE: 92 BPM | RESPIRATION RATE: 18 BRPM | BODY MASS INDEX: 49.48 KG/M2 | BODY MASS INDEX: 49.79 KG/M2 | BODY MASS INDEX: 49.83 KG/M2 | OXYGEN SATURATION: 98 % | OXYGEN SATURATION: 97 % | RESPIRATION RATE: 16 BRPM | SYSTOLIC BLOOD PRESSURE: 148 MMHG | HEART RATE: 84 BPM | HEART RATE: 72 BPM | DIASTOLIC BLOOD PRESSURE: 60 MMHG | SYSTOLIC BLOOD PRESSURE: 107 MMHG | WEIGHT: 278.5 LBS | HEART RATE: 58 BPM | DIASTOLIC BLOOD PRESSURE: 77 MMHG | BODY MASS INDEX: 50.04 KG/M2 | HEIGHT: 63 IN | HEART RATE: 56 BPM | DIASTOLIC BLOOD PRESSURE: 71 MMHG | SYSTOLIC BLOOD PRESSURE: 145 MMHG | BODY MASS INDEX: 48.75 KG/M2 | DIASTOLIC BLOOD PRESSURE: 80 MMHG | WEIGHT: 274.38 LBS | BODY MASS INDEX: 51.3 KG/M2 | SYSTOLIC BLOOD PRESSURE: 173 MMHG | SYSTOLIC BLOOD PRESSURE: 130 MMHG | BODY MASS INDEX: 50.04 KG/M2 | OXYGEN SATURATION: 100 % | OXYGEN SATURATION: 100 % | BODY MASS INDEX: 49.43 KG/M2 | HEIGHT: 63 IN | DIASTOLIC BLOOD PRESSURE: 78 MMHG | HEIGHT: 63 IN | WEIGHT: 286.13 LBS | HEART RATE: 60 BPM | HEIGHT: 63 IN | BODY MASS INDEX: 48.2 KG/M2 | DIASTOLIC BLOOD PRESSURE: 63 MMHG | DIASTOLIC BLOOD PRESSURE: 90 MMHG | SYSTOLIC BLOOD PRESSURE: 106 MMHG | WEIGHT: 289.56 LBS | OXYGEN SATURATION: 100 % | HEART RATE: 58 BPM | WEIGHT: 274 LBS | HEIGHT: 63 IN | OXYGEN SATURATION: 99 % | DIASTOLIC BLOOD PRESSURE: 78 MMHG | WEIGHT: 277 LBS | SYSTOLIC BLOOD PRESSURE: 150 MMHG | OXYGEN SATURATION: 96 % | DIASTOLIC BLOOD PRESSURE: 82 MMHG | WEIGHT: 275 LBS | RESPIRATION RATE: 16 BRPM | HEART RATE: 90 BPM | HEIGHT: 63 IN | SYSTOLIC BLOOD PRESSURE: 155 MMHG | WEIGHT: 274 LBS | SYSTOLIC BLOOD PRESSURE: 144 MMHG | OXYGEN SATURATION: 97 % | DIASTOLIC BLOOD PRESSURE: 75 MMHG | OXYGEN SATURATION: 98 % | HEIGHT: 63 IN | SYSTOLIC BLOOD PRESSURE: 149 MMHG | WEIGHT: 269.5 LBS | HEIGHT: 63 IN | DIASTOLIC BLOOD PRESSURE: 83 MMHG | WEIGHT: 282.4 LBS | RESPIRATION RATE: 18 BRPM | DIASTOLIC BLOOD PRESSURE: 83 MMHG | HEART RATE: 54 BPM | HEIGHT: 63 IN | SYSTOLIC BLOOD PRESSURE: 146 MMHG | RESPIRATION RATE: 16 BRPM | OXYGEN SATURATION: 99 % | WEIGHT: 278 LBS | HEART RATE: 55 BPM | SYSTOLIC BLOOD PRESSURE: 135 MMHG | HEART RATE: 61 BPM | OXYGEN SATURATION: 96 % | DIASTOLIC BLOOD PRESSURE: 80 MMHG | HEART RATE: 59 BPM | HEART RATE: 62 BPM | HEIGHT: 63 IN | HEIGHT: 63 IN | WEIGHT: 281 LBS | BODY MASS INDEX: 47.75 KG/M2 | RESPIRATION RATE: 16 BRPM | SYSTOLIC BLOOD PRESSURE: 126 MMHG | HEART RATE: 74 BPM | SYSTOLIC BLOOD PRESSURE: 167 MMHG | HEART RATE: 73 BPM | BODY MASS INDEX: 49.4 KG/M2 | HEIGHT: 63 IN | SYSTOLIC BLOOD PRESSURE: 143 MMHG | WEIGHT: 282.4 LBS | RESPIRATION RATE: 18 BRPM | BODY MASS INDEX: 48.62 KG/M2 | HEIGHT: 63 IN | WEIGHT: 278.8 LBS | DIASTOLIC BLOOD PRESSURE: 87 MMHG | SYSTOLIC BLOOD PRESSURE: 160 MMHG | OXYGEN SATURATION: 99 % | HEIGHT: 63 IN | WEIGHT: 279.25 LBS | DIASTOLIC BLOOD PRESSURE: 77 MMHG | HEIGHT: 63 IN | HEART RATE: 77 BPM | DIASTOLIC BLOOD PRESSURE: 73 MMHG | RESPIRATION RATE: 16 BRPM | BODY MASS INDEX: 48.87 KG/M2 | WEIGHT: 281.25 LBS | WEIGHT: 279 LBS | HEART RATE: 55 BPM | OXYGEN SATURATION: 99 % | OXYGEN SATURATION: 98 % | OXYGEN SATURATION: 99 % | BODY MASS INDEX: 50.7 KG/M2 | BODY MASS INDEX: 49.08 KG/M2 | BODY MASS INDEX: 49.26 KG/M2 | BODY MASS INDEX: 48.55 KG/M2 | WEIGHT: 275.13 LBS | HEIGHT: 63 IN | OXYGEN SATURATION: 96 % | HEART RATE: 62 BPM | BODY MASS INDEX: 49.35 KG/M2 | RESPIRATION RATE: 16 BRPM | SYSTOLIC BLOOD PRESSURE: 134 MMHG | WEIGHT: 272 LBS | HEIGHT: 63 IN | DIASTOLIC BLOOD PRESSURE: 76 MMHG | HEART RATE: 64 BPM | BODY MASS INDEX: 48.55 KG/M2 | DIASTOLIC BLOOD PRESSURE: 78 MMHG

## 2019-06-19 RX ORDER — FUROSEMIDE 20 MG/1
1 TABLET ORAL DAILY
COMMUNITY
Start: 2018-04-16 | End: 2019-06-24 | Stop reason: SDUPTHER

## 2019-06-19 RX ORDER — ALLOPURINOL 300 MG/1
1 TABLET ORAL EVERY 24 HOURS
COMMUNITY
Start: 2018-01-15 | End: 2019-10-03 | Stop reason: SDUPTHER

## 2019-06-19 RX ORDER — ESCITALOPRAM OXALATE 20 MG/1
1 TABLET ORAL EVERY 24 HOURS
COMMUNITY
Start: 2017-10-14 | End: 2019-11-28 | Stop reason: SDUPTHER

## 2019-06-19 RX ORDER — CETIRIZINE HYDROCHLORIDE 10 MG/1
1 TABLET ORAL DAILY
COMMUNITY
Start: 2018-08-28

## 2019-06-19 RX ORDER — AMLODIPINE BESYLATE 5 MG/1
TABLET ORAL
Qty: 90 TABLET | Refills: 3 | Status: SHIPPED | OUTPATIENT
Start: 2019-06-19 | End: 2019-09-26 | Stop reason: SDUPTHER

## 2019-06-19 RX ORDER — LEVOTHYROXINE SODIUM 0.05 MG/1
TABLET ORAL
Qty: 90 TABLET | Refills: 3 | Status: SHIPPED | OUTPATIENT
Start: 2019-06-19 | End: 2019-10-01 | Stop reason: RX

## 2019-06-19 RX ORDER — ATORVASTATIN CALCIUM 20 MG/1
1 TABLET, FILM COATED ORAL EVERY 24 HOURS
COMMUNITY
Start: 2018-06-24 | End: 2020-08-31

## 2019-06-19 RX ORDER — MULTIVIT-MIN/FA/LYCOPEN/LUTEIN .4-300-25
1 TABLET ORAL DAILY
COMMUNITY
Start: 2016-06-16

## 2019-06-19 RX ORDER — MULTIVIT-MIN/IRON/FOLIC ACID/K 18-600-40
1 CAPSULE ORAL DAILY
COMMUNITY
Start: 2016-06-16

## 2019-06-19 RX ORDER — CHLORAL HYDRATE 500 MG
1 CAPSULE ORAL DAILY
COMMUNITY
Start: 2016-03-08 | End: 2022-08-12

## 2019-06-19 RX ORDER — AMLODIPINE BESYLATE 5 MG/1
5 TABLET ORAL EVERY 24 HOURS
Qty: 90 TABLET | Refills: 3 | Status: CANCELLED | OUTPATIENT
Start: 2019-06-19

## 2019-06-19 RX ORDER — AMLODIPINE BESYLATE 5 MG/1
1 TABLET ORAL EVERY 24 HOURS
COMMUNITY
Start: 2019-04-02 | End: 2020-06-16

## 2019-06-25 RX ORDER — FUROSEMIDE 20 MG/1
TABLET ORAL
Qty: 30 TABLET | Refills: 1 | Status: SHIPPED | OUTPATIENT
Start: 2019-06-25 | End: 2019-07-19 | Stop reason: SDUPTHER

## 2019-07-19 RX ORDER — FUROSEMIDE 20 MG/1
TABLET ORAL
Qty: 30 TABLET | Refills: 1 | Status: SHIPPED | OUTPATIENT
Start: 2019-07-19 | End: 2019-08-13 | Stop reason: SDUPTHER

## 2019-07-23 ENCOUNTER — TRANSCRIBE ORDERS (OUTPATIENT)
Dept: ADMINISTRATIVE | Facility: HOSPITAL | Age: 74
End: 2019-07-23

## 2019-07-23 DIAGNOSIS — R52 PAIN: Primary | ICD-10-CM

## 2019-07-24 ENCOUNTER — HOSPITAL ENCOUNTER (OUTPATIENT)
Dept: CT IMAGING | Facility: HOSPITAL | Age: 74
Discharge: HOME | End: 2019-07-24
Admitting: ORTHOPAEDIC SURGERY

## 2019-07-24 DIAGNOSIS — R52 PAIN: ICD-10-CM

## 2019-07-24 PROCEDURE — 73700 CT LOWER EXTREMITY W/O DYE: CPT

## 2019-08-13 RX ORDER — FUROSEMIDE 20 MG/1
TABLET ORAL
Qty: 30 TABLET | Refills: 3 | Status: SHIPPED | OUTPATIENT
Start: 2019-08-13 | End: 2020-04-01

## 2019-09-30 ENCOUNTER — OFFICE VISIT (OUTPATIENT)
Dept: FAMILY MEDICINE CLINIC | Facility: CLINIC | Age: 74
End: 2019-09-30

## 2019-09-30 VITALS
RESPIRATION RATE: 16 BRPM | HEART RATE: 96 BPM | OXYGEN SATURATION: 98 % | BODY MASS INDEX: 51.38 KG/M2 | DIASTOLIC BLOOD PRESSURE: 84 MMHG | HEIGHT: 62 IN | TEMPERATURE: 97.6 F | WEIGHT: 279.2 LBS | SYSTOLIC BLOOD PRESSURE: 122 MMHG

## 2019-09-30 DIAGNOSIS — K80.20 GALLSTONES: ICD-10-CM

## 2019-09-30 DIAGNOSIS — Z12.31 BREAST CANCER SCREENING BY MAMMOGRAM: ICD-10-CM

## 2019-09-30 DIAGNOSIS — M1A.9XX0 CHRONIC GOUT WITHOUT TOPHUS, UNSPECIFIED CAUSE, UNSPECIFIED SITE: ICD-10-CM

## 2019-09-30 DIAGNOSIS — E66.9 OBESITY WITH BODY MASS INDEX 30 OR GREATER: ICD-10-CM

## 2019-09-30 DIAGNOSIS — E55.9 VITAMIN D DEFICIENCY: ICD-10-CM

## 2019-09-30 DIAGNOSIS — F32.A DEPRESSION, UNSPECIFIED DEPRESSION TYPE: ICD-10-CM

## 2019-09-30 DIAGNOSIS — E03.9 HYPOTHYROIDISM, UNSPECIFIED TYPE: ICD-10-CM

## 2019-09-30 DIAGNOSIS — R79.89 ABNORMAL COMPLETE BLOOD COUNT: Primary | ICD-10-CM

## 2019-09-30 DIAGNOSIS — Z78.0 POSTMENOPAUSAL STATUS: ICD-10-CM

## 2019-09-30 DIAGNOSIS — R73.9 HYPERGLYCEMIA: ICD-10-CM

## 2019-09-30 DIAGNOSIS — E53.8 B12 DEFICIENCY: ICD-10-CM

## 2019-09-30 DIAGNOSIS — E78.5 HYPERLIPIDEMIA, UNSPECIFIED HYPERLIPIDEMIA TYPE: ICD-10-CM

## 2019-09-30 PROBLEM — M54.50 LOW BACK PAIN: Status: ACTIVE | Noted: 2018-01-22

## 2019-09-30 PROBLEM — Z74.09 MOBILITY POOR: Status: ACTIVE | Noted: 2019-03-01

## 2019-09-30 PROBLEM — E66.3 OVERWEIGHT: Status: ACTIVE | Noted: 2019-04-02

## 2019-09-30 PROBLEM — G47.30 SLEEP APNEA: Status: ACTIVE | Noted: 2019-09-30

## 2019-09-30 PROBLEM — M25.522 ARTHRALGIA OF LEFT ELBOW: Status: ACTIVE | Noted: 2019-03-01

## 2019-09-30 PROBLEM — I10 HYPERTENSION: Status: ACTIVE | Noted: 2019-09-30

## 2019-09-30 PROBLEM — I25.10 CORONARY ARTERY DISEASE: Status: ACTIVE | Noted: 2018-04-25

## 2019-09-30 PROBLEM — M19.90 ARTHRITIS: Status: ACTIVE | Noted: 2019-09-30

## 2019-09-30 LAB
ALBUMIN SERPL-MCNC: 3.7 G/DL (ref 3.5–4.8)
ALBUMIN/GLOB SERPL: 1.3 G/DL (ref 1–1.7)
ALP SERPL-CCNC: 96 U/L (ref 32–91)
ALT SERPL W P-5'-P-CCNC: 23 U/L (ref 14–54)
ANION GAP SERPL CALCULATED.3IONS-SCNC: 12.5 MMOL/L (ref 5–15)
ARTICHOKE IGE QN: 58 MG/DL (ref 0–100)
AST SERPL-CCNC: 24 U/L (ref 15–41)
BASOPHILS # BLD AUTO: 0.1 10*3/MM3 (ref 0–0.2)
BASOPHILS NFR BLD AUTO: 1 % (ref 0–1.5)
BILIRUB SERPL-MCNC: 0.8 MG/DL (ref 0.3–1.2)
BUN BLD-MCNC: 20 MG/DL (ref 8–20)
BUN/CREAT SERPL: 14.3 (ref 5.4–26.2)
CALCIUM SPEC-SCNC: 9.5 MG/DL (ref 8.9–10.3)
CHLORIDE SERPL-SCNC: 99 MMOL/L (ref 101–111)
CHOLEST SERPL-MCNC: 131 MG/DL
CO2 SERPL-SCNC: 29 MMOL/L (ref 22–32)
CREAT BLD-MCNC: 1.4 MG/DL (ref 0.4–1)
DEPRECATED RDW RBC AUTO: 53.4 FL (ref 37–54)
EOSINOPHIL # BLD AUTO: 0.2 10*3/MM3 (ref 0–0.4)
EOSINOPHIL NFR BLD AUTO: 3.8 % (ref 0.3–6.2)
ERYTHROCYTE [DISTWIDTH] IN BLOOD BY AUTOMATED COUNT: 15.3 % (ref 12.3–15.4)
GFR SERPL CREATININE-BSD FRML MDRD: 37 ML/MIN/1.73
GLOBULIN UR ELPH-MCNC: 2.8 GM/DL (ref 2.5–3.8)
GLUCOSE BLD-MCNC: 110 MG/DL (ref 65–99)
HCT VFR BLD AUTO: 37.1 % (ref 34–46.6)
HDLC SERPL QL: 2.26
HDLC SERPL-MCNC: 58 MG/DL
HGB BLD-MCNC: 12.6 G/DL (ref 12–15.9)
LDLC/HDLC SERPL: 0.84 {RATIO}
LYMPHOCYTES # BLD AUTO: 1.9 10*3/MM3 (ref 0.7–3.1)
LYMPHOCYTES NFR BLD AUTO: 30.5 % (ref 19.6–45.3)
MCH RBC QN AUTO: 33.9 PG (ref 26.6–33)
MCHC RBC AUTO-ENTMCNC: 33.8 G/DL (ref 31.5–35.7)
MCV RBC AUTO: 100.2 FL (ref 79–97)
MONOCYTES # BLD AUTO: 0.5 10*3/MM3 (ref 0.1–0.9)
MONOCYTES NFR BLD AUTO: 8 % (ref 5–12)
NEUTROPHILS # BLD AUTO: 3.6 10*3/MM3 (ref 1.7–7)
NEUTROPHILS NFR BLD AUTO: 56.7 % (ref 42.7–76)
NRBC BLD AUTO-RTO: 0 /100 WBC (ref 0–0.2)
PLATELET # BLD AUTO: 241 10*3/MM3 (ref 140–450)
PMV BLD AUTO: 7.4 FL (ref 6–12)
POTASSIUM BLD-SCNC: 4.5 MMOL/L (ref 3.6–5.1)
PROT SERPL-MCNC: 6.5 G/DL (ref 6.1–7.9)
RBC # BLD AUTO: 3.7 10*6/MM3 (ref 3.77–5.28)
SODIUM BLD-SCNC: 136 MMOL/L (ref 136–144)
TRIGL SERPL-MCNC: 121 MG/DL
TSH SERPL DL<=0.05 MIU/L-ACNC: 6.53 UIU/ML (ref 0.34–5.6)
URATE SERPL-MCNC: 5.1 MG/DL (ref 2.6–8)
VIT B12 BLD-MCNC: 1292 PG/ML (ref 180–914)
VLDLC SERPL-MCNC: 24.2 MG/DL
WBC NRBC COR # BLD: 6.3 10*3/MM3 (ref 3.4–10.8)

## 2019-09-30 PROCEDURE — 80053 COMPREHEN METABOLIC PANEL: CPT | Performed by: PREVENTIVE MEDICINE

## 2019-09-30 PROCEDURE — 80061 LIPID PANEL: CPT | Performed by: PREVENTIVE MEDICINE

## 2019-09-30 PROCEDURE — 99213 OFFICE O/P EST LOW 20 MIN: CPT | Performed by: PREVENTIVE MEDICINE

## 2019-09-30 PROCEDURE — 82306 VITAMIN D 25 HYDROXY: CPT | Performed by: PREVENTIVE MEDICINE

## 2019-09-30 PROCEDURE — 82607 VITAMIN B-12: CPT | Performed by: PREVENTIVE MEDICINE

## 2019-09-30 PROCEDURE — 85025 COMPLETE CBC W/AUTO DIFF WBC: CPT | Performed by: PREVENTIVE MEDICINE

## 2019-09-30 PROCEDURE — 84550 ASSAY OF BLOOD/URIC ACID: CPT | Performed by: PREVENTIVE MEDICINE

## 2019-09-30 PROCEDURE — 83036 HEMOGLOBIN GLYCOSYLATED A1C: CPT | Performed by: PREVENTIVE MEDICINE

## 2019-09-30 PROCEDURE — 84443 ASSAY THYROID STIM HORMONE: CPT | Performed by: PREVENTIVE MEDICINE

## 2019-09-30 NOTE — PROGRESS NOTES
"Subjective   Faby Le is a 74 y.o. female presents for   Chief Complaint   Patient presents with   • Hypertension     fasting    • Hyperlipidemia   L heel pain that didn't improve with injection out side.  Advise recheck ortho.  Weight up and can do pool exercise and will try calorie counting    Health Maintenance Due   Topic Date Due   • ZOSTER VACCINE (1 of 2) 01/23/1997   • MEDICARE ANNUAL WELLNESS  06/19/2019   • MAMMOGRAM  06/29/2019   • INFLUENZA VACCINE  08/01/2019   • DXA SCAN  09/30/2019       History of Present Illness     Vitals:    09/30/19 0812 09/30/19 0816   BP: 125/79 122/84   BP Location: Right arm Left arm   Patient Position: Sitting Sitting   Cuff Size: Thigh Adult Thigh Adult   Pulse: 96    Resp: 16    Temp: 97.6 °F (36.4 °C)    TempSrc: Oral    SpO2: 98%    Weight: 127 kg (279 lb 3.2 oz)    Height: 157.5 cm (62\")        Current Outpatient Medications on File Prior to Visit   Medication Sig Dispense Refill   • allopurinol (ZYLOPRIM) 300 MG tablet Take 1 tablet by mouth Daily.     • amLODIPine (NORVASC) 5 MG tablet Take 1 tablet by mouth Daily.     • APPLE CIDER VINEGAR PO Take 1 tablet by mouth 2 (Two) Times a Day. Take 2 tablets am and 1 pm     • atorvastatin (LIPITOR) 20 MG tablet Take 1 tablet by mouth Daily.     • cetirizine (zyrTEC) 10 MG tablet Take 1 tablet by mouth Daily.     • cyanocobalamin (VITAMIN B-12) 1000 MCG tablet Take 1 tablet by mouth Daily.     • escitalopram (LEXAPRO) 20 MG tablet Take 1 tablet by mouth Daily.     • furosemide (LASIX) 20 MG tablet TAKE 1 TABLET BY MOUTH AS NEEDED FOR SWELLING 30 tablet 3   • levothyroxine (SYNTHROID, LEVOTHROID) 50 MCG tablet TAKE 1 TABLET BY MOUTH DAILY 90 tablet 3   • metoprolol tartrate (LOPRESSOR) 25 MG tablet Take 1 tablet by mouth Daily.     • Multiple Vitamins-Minerals (CENTRUM SILVER ADULT 50+) tablet Take 1 tablet by mouth Daily.     • Omega-3 Fatty Acids (FISH OIL) 1000 MG capsule capsule Take 1 capsule by mouth Daily.     • " Vitamin D, Cholecalciferol, (CHOLECALCIFEROL) 400 units tablet Take 1 tablet by mouth Daily.       No current facility-administered medications on file prior to visit.        The following portions of the patient's history were reviewed and updated as appropriate: allergies, current medications, past family history, past medical history, past social history, past surgical history and problem list.    Review of Systems   Constitutional: Negative.    HENT: Negative.  Negative for sinus pressure and sore throat.    Eyes: Negative.    Respiratory: Negative.  Negative for cough.    Cardiovascular: Negative.    Gastrointestinal: Negative.    Endocrine: Negative.    Genitourinary: Negative.    Musculoskeletal: Negative.         L heel pain   Skin: Negative.    Allergic/Immunologic: Positive for environmental allergies.   Neurological: Negative.    Hematological: Negative.    Psychiatric/Behavioral: Negative.        Objective   Physical Exam   Constitutional: She is oriented to person, place, and time. She appears well-developed.   obese   HENT:   Head: Normocephalic and atraumatic.   Eyes: Conjunctivae and EOM are normal. Pupils are equal, round, and reactive to light.   Neck: Normal range of motion.   Cardiovascular: Normal rate and regular rhythm.   Pulmonary/Chest: Effort normal and breath sounds normal.   Abdominal: Soft. Bowel sounds are normal.   Musculoskeletal: Normal range of motion. She exhibits tenderness.   Plantar middle instep   Lymphadenopathy:     She has no cervical adenopathy.   Neurological: She is alert and oriented to person, place, and time.   Skin: Skin is warm and dry.   Psychiatric: She has a normal mood and affect.   Nursing note and vitals reviewed.    PHQ-9 Total Score: 0    Assessment/Plan   Faby was seen today for hypertension and hyperlipidemia.    Diagnoses and all orders for this visit:    Abnormal complete blood count  -     CBC Auto Differential    B12 deficiency  -     Vitamin  B12    Obesity with body mass index 30 or greater    Depression, unspecified depression type    Gallstones    Chronic gout without tophus, unspecified cause, unspecified site  -     Uric Acid    Hyperglycemia  -     Comprehensive Metabolic Panel  -     Hemoglobin A1c    Hyperlipidemia, unspecified hyperlipidemia type  -     Lipid Panel    Hypothyroidism, unspecified type  -     TSH    Postmenopausal status  -     DEXA Bone Density Axial; Future    Vitamin D deficiency  -     Vitamin D 25 Hydroxy    Breast cancer screening by mammogram  -     Mammo Screening Digital Tomosynthesis Bilateral With CAD; Future        There are no Patient Instructions on file for this visit.

## 2019-09-30 NOTE — PATIENT INSTRUCTIONS
Get high dose flu and 2nd SHINGRIX after 11/18/2019  Calorie Counting for Weight Loss  Calories are units of energy. Your body needs a certain amount of calories from food to keep you going throughout the day. When you eat more calories than your body needs, your body stores the extra calories as fat. When you eat fewer calories than your body needs, your body burns fat to get the energy it needs.  Calorie counting means keeping track of how many calories you eat and drink each day. Calorie counting can be helpful if you need to lose weight. If you make sure to eat fewer calories than your body needs, you should lose weight. Ask your health care provider what a healthy weight is for you.  For calorie counting to work, you will need to eat the right number of calories in a day in order to lose a healthy amount of weight per week. A dietitian can help you determine how many calories you need in a day and will give you suggestions on how to reach your calorie goal.  · A healthy amount of weight to lose per week is usually 1-2 lb (0.5-0.9 kg). This usually means that your daily calorie intake should be reduced by 500-750 calories.  · Eating 1,200 - 1,500 calories per day can help most women lose weight.  · Eating 1,500 - 1,800 calories per day can help most men lose weight.  What is my plan?  My goal is to have __________ calories per day.  If I have this many calories per day, I should lose around __________ pounds per week.  What do I need to know about calorie counting?  In order to meet your daily calorie goal, you will need to:  · Find out how many calories are in each food you would like to eat. Try to do this before you eat.  · Decide how much of the food you plan to eat.  · Write down what you ate and how many calories it had. Doing this is called keeping a food log.  To successfully lose weight, it is important to balance calorie counting with a healthy lifestyle that includes regular activity. Aim for 150  minutes of moderate exercise (such as walking) or 75 minutes of vigorous exercise (such as running) each week.  Where do I find calorie information?    The number of calories in a food can be found on a Nutrition Facts label. If a food does not have a Nutrition Facts label, try to look up the calories online or ask your dietitian for help.  Remember that calories are listed per serving. If you choose to have more than one serving of a food, you will have to multiply the calories per serving by the amount of servings you plan to eat. For example, the label on a package of bread might say that a serving size is 1 slice and that there are 90 calories in a serving. If you eat 1 slice, you will have eaten 90 calories. If you eat 2 slices, you will have eaten 180 calories.  How do I keep a food log?  Immediately after each meal, record the following information in your food log:  · What you ate. Don't forget to include toppings, sauces, and other extras on the food.  · How much you ate. This can be measured in cups, ounces, or number of items.  · How many calories each food and drink had.  · The total number of calories in the meal.  Keep your food log near you, such as in a small notebook in your pocket, or use a mobile mallorie or website. Some programs will calculate calories for you and show you how many calories you have left for the day to meet your goal.  What are some calorie counting tips?    · Use your calories on foods and drinks that will fill you up and not leave you hungry:  ? Some examples of foods that fill you up are nuts and nut butters, vegetables, lean proteins, and high-fiber foods like whole grains. High-fiber foods are foods with more than 5 g fiber per serving.  ? Drinks such as sodas, specialty coffee drinks, alcohol, and juices have a lot of calories, yet do not fill you up.  · Eat nutritious foods and avoid empty calories. Empty calories are calories you get from foods or beverages that do not have  "many vitamins or protein, such as candy, sweets, and soda. It is better to have a nutritious high-calorie food (such as an avocado) than a food with few nutrients (such as a bag of chips).  · Know how many calories are in the foods you eat most often. This will help you calculate calorie counts faster.  · Pay attention to calories in drinks. Low-calorie drinks include water and unsweetened drinks.  · Pay attention to nutrition labels for \"low fat\" or \"fat free\" foods. These foods sometimes have the same amount of calories or more calories than the full fat versions. They also often have added sugar, starch, or salt, to make up for flavor that was removed with the fat.  · Find a way of tracking calories that works for you. Get creative. Try different apps or programs if writing down calories does not work for you.  What are some portion control tips?  · Know how many calories are in a serving. This will help you know how many servings of a certain food you can have.  · Use a measuring cup to measure serving sizes. You could also try weighing out portions on a kitchen scale. With time, you will be able to estimate serving sizes for some foods.  · Take some time to put servings of different foods on your favorite plates, bowls, and cups so you know what a serving looks like.  · Try not to eat straight from a bag or box. Doing this can lead to overeating. Put the amount you would like to eat in a cup or on a plate to make sure you are eating the right portion.  · Use smaller plates, glasses, and bowls to prevent overeating.  · Try not to multitask (for example, watch TV or use your computer) while eating. If it is time to eat, sit down at a table and enjoy your food. This will help you to know when you are full. It will also help you to be aware of what you are eating and how much you are eating.  What are tips for following this plan?  Reading food labels  · Check the calorie count compared to the serving size. The " serving size may be smaller than what you are used to eating.  · Check the source of the calories. Make sure the food you are eating is high in vitamins and protein and low in saturated and trans fats.  Shopping  · Read nutrition labels while you shop. This will help you make healthy decisions before you decide to purchase your food.  · Make a grocery list and stick to it.  Cooking  · Try to cook your favorite foods in a healthier way. For example, try baking instead of frying.  · Use low-fat dairy products.  Meal planning  · Use more fruits and vegetables. Half of your plate should be fruits and vegetables.  · Include lean proteins like poultry and fish.  How do I count calories when eating out?  · Ask for smaller portion sizes.  · Consider sharing an entree and sides instead of getting your own entree.  · If you get your own entree, eat only half. Ask for a box at the beginning of your meal and put the rest of your entree in it so you are not tempted to eat it.  · If calories are listed on the menu, choose the lower calorie options.  · Choose dishes that include vegetables, fruits, whole grains, low-fat dairy products, and lean protein.  · Choose items that are boiled, broiled, grilled, or steamed. Stay away from items that are buttered, battered, fried, or served with cream sauce. Items labeled “crispy” are usually fried, unless stated otherwise.  · Choose water, low-fat milk, unsweetened iced tea, or other drinks without added sugar. If you want an alcoholic beverage, choose a lower calorie option such as a glass of wine or light beer.  · Ask for dressings, sauces, and syrups on the side. These are usually high in calories, so you should limit the amount you eat.  · If you want a salad, choose a garden salad and ask for grilled meats. Avoid extra toppings like carver, cheese, or fried items. Ask for the dressing on the side, or ask for olive oil and vinegar or lemon to use as dressing.  · Estimate how many  servings of a food you are given. For example, a serving of cooked rice is ½ cup or about the size of half a baseball. Knowing serving sizes will help you be aware of how much food you are eating at restaurants. The list below tells you how big or small some common portion sizes are based on everyday objects:  ? 1 oz--4 stacked dice.  ? 3 oz--1 deck of cards.  ? 1 tsp--1 die.  ? 1 Tbsp--½ a ping-pong ball.  ? 2 Tbsp--1 ping-pong ball.  ? ½ cup--½ baseball.  ? 1 cup--1 baseball.  Summary  · Calorie counting means keeping track of how many calories you eat and drink each day. If you eat fewer calories than your body needs, you should lose weight.  · A healthy amount of weight to lose per week is usually 1-2 lb (0.5-0.9 kg). This usually means reducing your daily calorie intake by 500-750 calories.  · The number of calories in a food can be found on a Nutrition Facts label. If a food does not have a Nutrition Facts label, try to look up the calories online or ask your dietitian for help.  · Use your calories on foods and drinks that will fill you up, and not on foods and drinks that will leave you hungry.  · Use smaller plates, glasses, and bowls to prevent overeating.  This information is not intended to replace advice given to you by your health care provider. Make sure you discuss any questions you have with your health care provider.  Document Released: 12/18/2006 Document Revised: 11/17/2017 Document Reviewed: 11/17/2017  [employer] Patient Education © 2019 [employer]    Calorie Counting for Weight Loss  Calories are units of energy. Your body needs a certain amount of calories from food to keep you going throughout the day. When you eat more calories than your body needs, your body stores the extra calories as fat. When you eat fewer calories than your body needs, your body burns fat to get the energy it needs.  Calorie counting means keeping track of how many calories you eat and drink each day.  Calorie counting can be helpful if you need to lose weight. If you make sure to eat fewer calories than your body needs, you should lose weight. Ask your health care provider what a healthy weight is for you.  For calorie counting to work, you will need to eat the right number of calories in a day in order to lose a healthy amount of weight per week. A dietitian can help you determine how many calories you need in a day and will give you suggestions on how to reach your calorie goal.  · A healthy amount of weight to lose per week is usually 1-2 lb (0.5-0.9 kg). This usually means that your daily calorie intake should be reduced by 500-750 calories.  · Eating 1,200 - 1,500 calories per day can help most women lose weight.  · Eating 1,500 - 1,800 calories per day can help most men lose weight.  What is my plan?  My goal is to have __________ calories per day.  If I have this many calories per day, I should lose around __________ pounds per week.  What do I need to know about calorie counting?  In order to meet your daily calorie goal, you will need to:  · Find out how many calories are in each food you would like to eat. Try to do this before you eat.  · Decide how much of the food you plan to eat.  · Write down what you ate and how many calories it had. Doing this is called keeping a food log.  To successfully lose weight, it is important to balance calorie counting with a healthy lifestyle that includes regular activity. Aim for 150 minutes of moderate exercise (such as walking) or 75 minutes of vigorous exercise (such as running) each week.  Where do I find calorie information?    The number of calories in a food can be found on a Nutrition Facts label. If a food does not have a Nutrition Facts label, try to look up the calories online or ask your dietitian for help.  Remember that calories are listed per serving. If you choose to have more than one serving of a food, you will have to multiply the calories per  "serving by the amount of servings you plan to eat. For example, the label on a package of bread might say that a serving size is 1 slice and that there are 90 calories in a serving. If you eat 1 slice, you will have eaten 90 calories. If you eat 2 slices, you will have eaten 180 calories.  How do I keep a food log?  Immediately after each meal, record the following information in your food log:  · What you ate. Don't forget to include toppings, sauces, and other extras on the food.  · How much you ate. This can be measured in cups, ounces, or number of items.  · How many calories each food and drink had.  · The total number of calories in the meal.  Keep your food log near you, such as in a small notebook in your pocket, or use a mobile mallorie or website. Some programs will calculate calories for you and show you how many calories you have left for the day to meet your goal.  What are some calorie counting tips?    · Use your calories on foods and drinks that will fill you up and not leave you hungry:  ? Some examples of foods that fill you up are nuts and nut butters, vegetables, lean proteins, and high-fiber foods like whole grains. High-fiber foods are foods with more than 5 g fiber per serving.  ? Drinks such as sodas, specialty coffee drinks, alcohol, and juices have a lot of calories, yet do not fill you up.  · Eat nutritious foods and avoid empty calories. Empty calories are calories you get from foods or beverages that do not have many vitamins or protein, such as candy, sweets, and soda. It is better to have a nutritious high-calorie food (such as an avocado) than a food with few nutrients (such as a bag of chips).  · Know how many calories are in the foods you eat most often. This will help you calculate calorie counts faster.  · Pay attention to calories in drinks. Low-calorie drinks include water and unsweetened drinks.  · Pay attention to nutrition labels for \"low fat\" or \"fat free\" foods. These foods " sometimes have the same amount of calories or more calories than the full fat versions. They also often have added sugar, starch, or salt, to make up for flavor that was removed with the fat.  · Find a way of tracking calories that works for you. Get creative. Try different apps or programs if writing down calories does not work for you.  What are some portion control tips?  · Know how many calories are in a serving. This will help you know how many servings of a certain food you can have.  · Use a measuring cup to measure serving sizes. You could also try weighing out portions on a kitchen scale. With time, you will be able to estimate serving sizes for some foods.  · Take some time to put servings of different foods on your favorite plates, bowls, and cups so you know what a serving looks like.  · Try not to eat straight from a bag or box. Doing this can lead to overeating. Put the amount you would like to eat in a cup or on a plate to make sure you are eating the right portion.  · Use smaller plates, glasses, and bowls to prevent overeating.  · Try not to multitask (for example, watch TV or use your computer) while eating. If it is time to eat, sit down at a table and enjoy your food. This will help you to know when you are full. It will also help you to be aware of what you are eating and how much you are eating.  What are tips for following this plan?  Reading food labels  · Check the calorie count compared to the serving size. The serving size may be smaller than what you are used to eating.  · Check the source of the calories. Make sure the food you are eating is high in vitamins and protein and low in saturated and trans fats.  Shopping  · Read nutrition labels while you shop. This will help you make healthy decisions before you decide to purchase your food.  · Make a grocery list and stick to it.  Cooking  · Try to cook your favorite foods in a healthier way. For example, try baking instead of  frying.  · Use low-fat dairy products.  Meal planning  · Use more fruits and vegetables. Half of your plate should be fruits and vegetables.  · Include lean proteins like poultry and fish.  How do I count calories when eating out?  · Ask for smaller portion sizes.  · Consider sharing an entree and sides instead of getting your own entree.  · If you get your own entree, eat only half. Ask for a box at the beginning of your meal and put the rest of your entree in it so you are not tempted to eat it.  · If calories are listed on the menu, choose the lower calorie options.  · Choose dishes that include vegetables, fruits, whole grains, low-fat dairy products, and lean protein.  · Choose items that are boiled, broiled, grilled, or steamed. Stay away from items that are buttered, battered, fried, or served with cream sauce. Items labeled “crispy” are usually fried, unless stated otherwise.  · Choose water, low-fat milk, unsweetened iced tea, or other drinks without added sugar. If you want an alcoholic beverage, choose a lower calorie option such as a glass of wine or light beer.  · Ask for dressings, sauces, and syrups on the side. These are usually high in calories, so you should limit the amount you eat.  · If you want a salad, choose a garden salad and ask for grilled meats. Avoid extra toppings like carver, cheese, or fried items. Ask for the dressing on the side, or ask for olive oil and vinegar or lemon to use as dressing.  · Estimate how many servings of a food you are given. For example, a serving of cooked rice is ½ cup or about the size of half a baseball. Knowing serving sizes will help you be aware of how much food you are eating at restaurants. The list below tells you how big or small some common portion sizes are based on everyday objects:  ? 1 oz--4 stacked dice.  ? 3 oz--1 deck of cards.  ? 1 tsp--1 die.  ? 1 Tbsp--½ a ping-pong ball.  ? 2 Tbsp--1 ping-pong ball.  ? ½ cup--½ baseball.  ? 1 cup--1  baseball.  Summary  · Calorie counting means keeping track of how many calories you eat and drink each day. If you eat fewer calories than your body needs, you should lose weight.  · A healthy amount of weight to lose per week is usually 1-2 lb (0.5-0.9 kg). This usually means reducing your daily calorie intake by 500-750 calories.  · The number of calories in a food can be found on a Nutrition Facts label. If a food does not have a Nutrition Facts label, try to look up the calories online or ask your dietitian for help.  · Use your calories on foods and drinks that will fill you up, and not on foods and drinks that will leave you hungry.  · Use smaller plates, glasses, and bowls to prevent overeating.  This information is not intended to replace advice given to you by your health care provider. Make sure you discuss any questions you have with your health care provider.  Document Released: 12/18/2006 Document Revised: 11/17/2017 Document Reviewed: 11/17/2017  [employer] Patient Education © 2019 [employer]    Advise pool exercise.

## 2019-10-01 DIAGNOSIS — E03.9 HYPOTHYROIDISM, UNSPECIFIED TYPE: Primary | ICD-10-CM

## 2019-10-01 DIAGNOSIS — N18.9 CHRONIC KIDNEY DISEASE, UNSPECIFIED CKD STAGE: ICD-10-CM

## 2019-10-01 LAB
25(OH)D3 SERPL-MCNC: 40.2 NG/ML (ref 30–100)
HBA1C MFR BLD: 5.3 % (ref 3.5–5.6)

## 2019-10-01 RX ORDER — LEVOTHYROXINE SODIUM 0.07 MG/1
75 TABLET ORAL DAILY
Qty: 90 TABLET | Refills: 3 | Status: SHIPPED | OUTPATIENT
Start: 2019-10-01 | End: 2020-09-18

## 2019-10-03 RX ORDER — ALLOPURINOL 300 MG/1
TABLET ORAL
Qty: 90 TABLET | Refills: 2 | Status: SHIPPED | OUTPATIENT
Start: 2019-10-03 | End: 2020-06-29

## 2019-10-23 ENCOUNTER — CLINICAL SUPPORT (OUTPATIENT)
Dept: FAMILY MEDICINE CLINIC | Facility: CLINIC | Age: 74
End: 2019-10-23

## 2019-10-23 DIAGNOSIS — E03.9 HYPOTHYROIDISM, UNSPECIFIED TYPE: ICD-10-CM

## 2019-10-23 DIAGNOSIS — N18.9 CHRONIC KIDNEY DISEASE, UNSPECIFIED CKD STAGE: ICD-10-CM

## 2019-10-23 PROCEDURE — 82565 ASSAY OF CREATININE: CPT | Performed by: PREVENTIVE MEDICINE

## 2019-10-23 PROCEDURE — 84443 ASSAY THYROID STIM HORMONE: CPT | Performed by: PREVENTIVE MEDICINE

## 2019-10-23 PROCEDURE — 36415 COLL VENOUS BLD VENIPUNCTURE: CPT | Performed by: PREVENTIVE MEDICINE

## 2019-10-24 LAB
CREAT BLD-MCNC: 1.22 MG/DL (ref 0.57–1)
GFR SERPL CREATININE-BSD FRML MDRD: 43 ML/MIN/1.73
TSH SERPL DL<=0.05 MIU/L-ACNC: 2.88 UIU/ML (ref 0.27–4.2)

## 2019-11-21 ENCOUNTER — OFFICE VISIT (OUTPATIENT)
Dept: FAMILY MEDICINE CLINIC | Facility: CLINIC | Age: 74
End: 2019-11-21

## 2019-11-21 VITALS
DIASTOLIC BLOOD PRESSURE: 75 MMHG | BODY MASS INDEX: 50.5 KG/M2 | RESPIRATION RATE: 16 BRPM | SYSTOLIC BLOOD PRESSURE: 136 MMHG | OXYGEN SATURATION: 98 % | HEIGHT: 62 IN | WEIGHT: 274.4 LBS | TEMPERATURE: 97.9 F | HEART RATE: 53 BPM

## 2019-11-21 DIAGNOSIS — Z00.01 ENCOUNTER FOR ANNUAL GENERAL MEDICAL EXAMINATION WITH ABNORMAL FINDINGS IN ADULT: Primary | ICD-10-CM

## 2019-11-21 DIAGNOSIS — R29.898 RIGHT ARM WEAKNESS: ICD-10-CM

## 2019-11-21 DIAGNOSIS — G89.29 CHRONIC LEFT SHOULDER PAIN: ICD-10-CM

## 2019-11-21 DIAGNOSIS — M25.512 CHRONIC LEFT SHOULDER PAIN: ICD-10-CM

## 2019-11-21 DIAGNOSIS — M54.2 NECK PAIN: ICD-10-CM

## 2019-11-21 PROCEDURE — G0439 PPPS, SUBSEQ VISIT: HCPCS | Performed by: PREVENTIVE MEDICINE

## 2019-11-21 PROCEDURE — 99213 OFFICE O/P EST LOW 20 MIN: CPT | Performed by: PREVENTIVE MEDICINE

## 2019-11-21 RX ORDER — ASPIRIN 81 MG/1
81 TABLET, CHEWABLE ORAL DAILY
COMMUNITY
End: 2020-01-16

## 2019-11-21 NOTE — PATIENT INSTRUCTIONS
[place] and wall walk exercises with 10 minutes ice 4X/day.  Get MRI c spine.  Get [employer] and high dose flu  Calorie Counting for Weight Loss  Calories are units of energy. Your body needs a certain amount of calories from food to keep you going throughout the day. When you eat more calories than your body needs, your body stores the extra calories as fat. When you eat fewer calories than your body needs, your body burns fat to get the energy it needs.  Calorie counting means keeping track of how many calories you eat and drink each day. Calorie counting can be helpful if you need to lose weight. If you make sure to eat fewer calories than your body needs, you should lose weight. Ask your health care provider what a healthy weight is for you.  For calorie counting to work, you will need to eat the right number of calories in a day in order to lose a healthy amount of weight per week. A dietitian can help you determine how many calories you need in a day and will give you suggestions on how to reach your calorie goal.  · A healthy amount of weight to lose per week is usually 1-2 lb (0.5-0.9 kg). This usually means that your daily calorie intake should be reduced by 500-750 calories.  · Eating 1,200 - 1,500 calories per day can help most women lose weight.  · Eating 1,500 - 1,800 calories per day can help most men lose weight.  What is my plan?  My goal is to have __________ calories per day.  If I have this many calories per day, I should lose around __________ pounds per week.  What do I need to know about calorie counting?  In order to meet your daily calorie goal, you will need to:  · Find out how many calories are in each food you would like to eat. Try to do this before you eat.  · Decide how much of the food you plan to eat.  · Write down what you ate and how many calories it had. Doing this is called keeping a food log.  To successfully lose weight, it is important to balance calorie counting with a  healthy lifestyle that includes regular activity. Aim for 150 minutes of moderate exercise (such as walking) or 75 minutes of vigorous exercise (such as running) each week.  Where do I find calorie information?    The number of calories in a food can be found on a Nutrition Facts label. If a food does not have a Nutrition Facts label, try to look up the calories online or ask your dietitian for help.  Remember that calories are listed per serving. If you choose to have more than one serving of a food, you will have to multiply the calories per serving by the amount of servings you plan to eat. For example, the label on a package of bread might say that a serving size is 1 slice and that there are 90 calories in a serving. If you eat 1 slice, you will have eaten 90 calories. If you eat 2 slices, you will have eaten 180 calories.  How do I keep a food log?  Immediately after each meal, record the following information in your food log:  · What you ate. Don't forget to include toppings, sauces, and other extras on the food.  · How much you ate. This can be measured in cups, ounces, or number of items.  · How many calories each food and drink had.  · The total number of calories in the meal.  Keep your food log near you, such as in a small notebook in your pocket, or use a mobile mallorie or website. Some programs will calculate calories for you and show you how many calories you have left for the day to meet your goal.  What are some calorie counting tips?    · Use your calories on foods and drinks that will fill you up and not leave you hungry:  ? Some examples of foods that fill you up are nuts and nut butters, vegetables, lean proteins, and high-fiber foods like whole grains. High-fiber foods are foods with more than 5 g fiber per serving.  ? Drinks such as sodas, specialty coffee drinks, alcohol, and juices have a lot of calories, yet do not fill you up.  · Eat nutritious foods and avoid empty calories. Empty calories  "are calories you get from foods or beverages that do not have many vitamins or protein, such as candy, sweets, and soda. It is better to have a nutritious high-calorie food (such as an avocado) than a food with few nutrients (such as a bag of chips).  · Know how many calories are in the foods you eat most often. This will help you calculate calorie counts faster.  · Pay attention to calories in drinks. Low-calorie drinks include water and unsweetened drinks.  · Pay attention to nutrition labels for \"low fat\" or \"fat free\" foods. These foods sometimes have the same amount of calories or more calories than the full fat versions. They also often have added sugar, starch, or salt, to make up for flavor that was removed with the fat.  · Find a way of tracking calories that works for you. Get creative. Try different apps or programs if writing down calories does not work for you.  What are some portion control tips?  · Know how many calories are in a serving. This will help you know how many servings of a certain food you can have.  · Use a measuring cup to measure serving sizes. You could also try weighing out portions on a kitchen scale. With time, you will be able to estimate serving sizes for some foods.  · Take some time to put servings of different foods on your favorite plates, bowls, and cups so you know what a serving looks like.  · Try not to eat straight from a bag or box. Doing this can lead to overeating. Put the amount you would like to eat in a cup or on a plate to make sure you are eating the right portion.  · Use smaller plates, glasses, and bowls to prevent overeating.  · Try not to multitask (for example, watch TV or use your computer) while eating. If it is time to eat, sit down at a table and enjoy your food. This will help you to know when you are full. It will also help you to be aware of what you are eating and how much you are eating.  What are tips for following this plan?  Reading food " "labels  · Check the calorie count compared to the serving size. The serving size may be smaller than what you are used to eating.  · Check the source of the calories. Make sure the food you are eating is high in vitamins and protein and low in saturated and trans fats.  Shopping  · Read nutrition labels while you shop. This will help you make healthy decisions before you decide to purchase your food.  · Make a grocery list and stick to it.  Cooking  · Try to cook your favorite foods in a healthier way. For example, try baking instead of frying.  · Use low-fat dairy products.  Meal planning  · Use more fruits and vegetables. Half of your plate should be fruits and vegetables.  · Include lean proteins like poultry and fish.  How do I count calories when eating out?  · Ask for smaller portion sizes.  · Consider sharing an entree and sides instead of getting your own entree.  · If you get your own entree, eat only half. Ask for a box at the beginning of your meal and put the rest of your entree in it so you are not tempted to eat it.  · If calories are listed on the menu, choose the lower calorie options.  · Choose dishes that include vegetables, fruits, whole grains, low-fat dairy products, and lean protein.  · Choose items that are boiled, broiled, grilled, or steamed. Stay away from items that are buttered, battered, fried, or served with cream sauce. Items labeled \"crispy\" are usually fried, unless stated otherwise.  · Choose water, low-fat milk, unsweetened iced tea, or other drinks without added sugar. If you want an alcoholic beverage, choose a lower calorie option such as a glass of wine or light beer.  · Ask for dressings, sauces, and syrups on the side. These are usually high in calories, so you should limit the amount you eat.  · If you want a salad, choose a garden salad and ask for grilled meats. Avoid extra toppings like carver, cheese, or fried items. Ask for the dressing on the side, or ask for olive oil " and vinegar or lemon to use as dressing.  · Estimate how many servings of a food you are given. For example, a serving of cooked rice is ½ cup or about the size of half a baseball. Knowing serving sizes will help you be aware of how much food you are eating at restaurants. The list below tells you how big or small some common portion sizes are based on everyday objects:  ? 1 oz--4 stacked dice.  ? 3 oz--1 deck of cards.  ? 1 tsp--1 die.  ? 1 Tbsp--½ a ping-pong ball.  ? 2 Tbsp--1 ping-pong ball.  ? ½ cup--½ baseball.  ? 1 cup--1 baseball.  Summary  · Calorie counting means keeping track of how many calories you eat and drink each day. If you eat fewer calories than your body needs, you should lose weight.  · A healthy amount of weight to lose per week is usually 1-2 lb (0.5-0.9 kg). This usually means reducing your daily calorie intake by 500-750 calories.  · The number of calories in a food can be found on a Nutrition Facts label. If a food does not have a Nutrition Facts label, try to look up the calories online or ask your dietitian for help.  · Use your calories on foods and drinks that will fill you up, and not on foods and drinks that will leave you hungry.  · Use smaller plates, glasses, and bowls to prevent overeating.  This information is not intended to replace advice given to you by your health care provider. Make sure you discuss any questions you have with your health care provider.  Document Released: 12/18/2006 Document Revised: 09/06/2019 Document Reviewed: 11/17/2017  [employer] Interactive Patient Education © 2019 [employer]      Patient to visit [employer] website (https://www.ibanet.org/) for information reguarding advanced directive and living will.

## 2019-11-21 NOTE — PROGRESS NOTES
The ABCs of the Annual Wellness Visit  Subsequent Medicare Wellness Visit    Chief Complaint   Patient presents with   • Medicare Wellness-subsequent       Subjective   History of Present Illness:  Faby Le is a 74 y.o. female who presents for a Subsequent Medicare Wellness Visit.    HEALTH RISK ASSESSMENT    Recent Hospitalizations:  No hospitalization(s) within the last year.    Current Medical Providers:  Patient Care Team:  Leonor Adam MD as PCP - General  Leonor Adam MD as PCP - Claims Attributed  Leonor Adam MD as PCP - Family Medicine    Smoking Status:  Social History     Tobacco Use   Smoking Status Never Smoker   Smokeless Tobacco Never Used       Alcohol Consumption:  Social History     Substance and Sexual Activity   Alcohol Use No   • Frequency: Never       Depression Screen:   PHQ-2/PHQ-9 Depression Screening 9/30/2019   Little interest or pleasure in doing things 0   Feeling down, depressed, or hopeless 0   Total Score 0       Fall Risk Screen:  SOFIYA Fall Risk Assessment was completed, and patient is at HIGH risk for falls. Assessment completed on:11/21/2019    Health Habits and Functional and Cognitive Screening:  Functional & Cognitive Status 11/21/2019   Do you have difficulty preparing food and eating? No   Do you have difficulty bathing yourself, getting dressed or grooming yourself? No   Do you have difficulty using the toilet? No   Do you have difficulty moving around from place to place? No   Do you have trouble with steps or getting out of a bed or a chair? No   Current Diet Well Balanced Diet   Dental Exam Up to date   Eye Exam Up to date   Exercise (times per week) 0 times per week   Current Exercise Activities Include None   Do you need help using the phone?  No   Are you deaf or do you have serious difficulty hearing?  No   Do you need help with transportation? No   Do you need help shopping? No   Do you need help preparing meals?  No   Do you need help  with housework?  No   Do you need help with laundry? No   Do you need help taking your medications? No   Do you need help managing money? No   Do you ever drive or ride in a car without wearing a seat belt? No   Have you felt unusual stress, anger or loneliness in the last month? No   Who do you live with? Alone   If you need help, do you have trouble finding someone available to you? No   Have you been bothered in the last four weeks by sexual problems? No   Do you have difficulty concentrating, remembering or making decisions? No         Does the patient have evidence of cognitive impairment? Yes    Asprin use counseling:Taking ASA appropriately as indicated    Age-appropriate Screening Schedule:  Refer to the list below for future screening recommendations based on patient's age, sex and/or medical conditions. Orders for these recommended tests are listed in the plan section. The patient has been provided with a written plan.    Health Maintenance   Topic Date Due   • ZOSTER VACCINE (1 of 2) 01/23/1997   • INFLUENZA VACCINE  08/01/2019   • DIABETIC FOOT EXAM  04/02/2020   • DXA SCAN  06/29/2020   • LIPID PANEL  09/30/2020   • MAMMOGRAM  10/07/2020   • COLONOSCOPY  05/20/2022   • TDAP/TD VACCINES (2 - Td) 01/23/2028   • PNEUMOCOCCAL VACCINES (65+ LOW/MEDIUM RISK)  Completed   • HEMOGLOBIN A1C  Discontinued   • DIABETIC EYE EXAM  Discontinued   • URINE MICROALBUMIN  Discontinued          The following portions of the patient's history were reviewed and updated as appropriate: allergies, current medications, past family history, past medical history, past social history, past surgical history and problem list.    Outpatient Medications Prior to Visit   Medication Sig Dispense Refill   • allopurinol (ZYLOPRIM) 300 MG tablet TAKE 1 TABLET BY MOUTH EVERY DAY 90 tablet 2   • amLODIPine (NORVASC) 5 MG tablet Take 1 tablet by mouth Daily.     • APPLE CIDER VINEGAR PO Take 1 tablet by mouth 2 (Two) Times a Day. Take 2  tablets am and 1 pm     • aspirin 81 MG chewable tablet Chew 81 mg Daily.     • atorvastatin (LIPITOR) 20 MG tablet Take 1 tablet by mouth Daily.     • cetirizine (zyrTEC) 10 MG tablet Take 1 tablet by mouth Daily.     • cyanocobalamin (VITAMIN B-12) 1000 MCG tablet Take 1 tablet by mouth Daily.     • escitalopram (LEXAPRO) 20 MG tablet Take 1 tablet by mouth Daily.     • furosemide (LASIX) 20 MG tablet TAKE 1 TABLET BY MOUTH AS NEEDED FOR SWELLING 30 tablet 3   • levothyroxine (SYNTHROID) 75 MCG tablet Take 1 tablet by mouth Daily. 90 tablet 3   • metoprolol tartrate (LOPRESSOR) 25 MG tablet Take 1 tablet by mouth Daily.     • Multiple Vitamins-Minerals (CENTRUM SILVER ADULT 50+) tablet Take 1 tablet by mouth Daily.     • Omega-3 Fatty Acids (FISH OIL) 1000 MG capsule capsule Take 1 capsule by mouth Daily.     • Vitamin D, Cholecalciferol, 25 MCG (1000 UT) tablet Take 1 tablet by mouth Daily.       No facility-administered medications prior to visit.        Patient Active Problem List   Diagnosis   • Abnormal complete blood count   • Ankle pain   • Arthralgia of left elbow   • Arthritis   • B12 deficiency   • Obesity with body mass index 30 or greater   • Coronary artery disease   • Depression   • Gallstones   • Gout   • Hyperglycemia   • Hyperlipidemia   • Hypertension   • Hypothyroidism   • Low back pain   • Mobility poor   • Nonspecific abnormal results of function study of kidney   • Overweight   • Postmenopausal status   • Sleep apnea   • Vitamin D deficiency       Advanced Care Planning:  Patient does not have an advance directive - information provided to the patient today    Review of Systems    Compared to one year ago, the patient feels her physical health is the same.  Compared to one year ago, the patient feels her mental health is the same.    Reviewed chart for potential of high risk medication in the elderly: yes  Reviewed chart for potential of harmful drug interactions in the  "elderly:yes    Objective         Vitals:    11/21/19 0956 11/21/19 1006   BP: 127/76 136/75   BP Location: Right arm Left arm   Patient Position: Sitting Sitting   Cuff Size: Thigh Adult Thigh Adult   Pulse: 53    Resp: 16    Temp: 97.9 °F (36.6 °C)    TempSrc: Oral    SpO2: 98%    Weight: 124 kg (274 lb 6.4 oz)    Height: 157.5 cm (62\")    PainSc:   5    PainLoc: Generalized        Body mass index is 50.19 kg/m².  Discussed the patient's BMI with her. The BMI is above average; BMI management plan is completed.    Physical Exam    Lab Results   Component Value Date    TRIG 121 09/30/2019    HDL 58 09/30/2019    LDL 58 09/30/2019    VLDL 24.2 09/30/2019    HGBA1C 5.3 09/30/2019        Assessment/Plan   Medicare Risks and Personalized Health Plan  CMS Preventative Services Quick Reference  Advance Directive Discussion  Obesity/Overweight     The above risks/problems have been discussed with the patient.  Pertinent information has been shared with the patient in the After Visit Summary.  Follow up plans and orders are seen below in the Assessment/Plan Section.    Diagnoses and all orders for this visit:    1. Encounter for annual general medical examination with abnormal findings in adult (Primary)    2. Right arm weakness    3. Chronic left shoulder pain      Follow Up:  Return for Next scheduled follow up.     An After Visit Summary and PPPS were given to the patient.             "

## 2019-11-21 NOTE — PROGRESS NOTES
"Subjective   Faby Le is a 74 y.o. female presents for   Chief Complaint   Patient presents with   • Medicare Wellness-subsequent   Patient having L shoulder pain and had injection 1 month ago and still pain at times.  Rue weak after CTS andCub tunnel release.  Some neck pain and constipation.  No LE symtpms.    Health Maintenance Due   Topic Date Due   • ZOSTER VACCINE (1 of 2) 01/23/1997   • MEDICARE ANNUAL WELLNESS  06/19/2019   • INFLUENZA VACCINE  08/01/2019       History of Present Illness     Vitals:    11/21/19 0956 11/21/19 1006   BP: 127/76 136/75   BP Location: Right arm Left arm   Patient Position: Sitting Sitting   Cuff Size: Thigh Adult Thigh Adult   Pulse: 53    Resp: 16    Temp: 97.9 °F (36.6 °C)    TempSrc: Oral    SpO2: 98%    Weight: 124 kg (274 lb 6.4 oz)    Height: 157.5 cm (62\")      Body mass index is 50.19 kg/m².    Current Outpatient Medications on File Prior to Visit   Medication Sig Dispense Refill   • allopurinol (ZYLOPRIM) 300 MG tablet TAKE 1 TABLET BY MOUTH EVERY DAY 90 tablet 2   • amLODIPine (NORVASC) 5 MG tablet Take 1 tablet by mouth Daily.     • APPLE CIDER VINEGAR PO Take 1 tablet by mouth 2 (Two) Times a Day. Take 2 tablets am and 1 pm     • aspirin 81 MG chewable tablet Chew 81 mg Daily.     • atorvastatin (LIPITOR) 20 MG tablet Take 1 tablet by mouth Daily.     • cetirizine (zyrTEC) 10 MG tablet Take 1 tablet by mouth Daily.     • cyanocobalamin (VITAMIN B-12) 1000 MCG tablet Take 1 tablet by mouth Daily.     • escitalopram (LEXAPRO) 20 MG tablet Take 1 tablet by mouth Daily.     • furosemide (LASIX) 20 MG tablet TAKE 1 TABLET BY MOUTH AS NEEDED FOR SWELLING 30 tablet 3   • levothyroxine (SYNTHROID) 75 MCG tablet Take 1 tablet by mouth Daily. 90 tablet 3   • metoprolol tartrate (LOPRESSOR) 25 MG tablet Take 1 tablet by mouth Daily.     • Multiple Vitamins-Minerals (CENTRUM SILVER ADULT 50+) tablet Take 1 tablet by mouth Daily.     • Omega-3 Fatty Acids (FISH OIL) 1000 MG " capsule capsule Take 1 capsule by mouth Daily.     • Vitamin D, Cholecalciferol, 25 MCG (1000 UT) tablet Take 1 tablet by mouth Daily.       No current facility-administered medications on file prior to visit.        The following portions of the patient's history were reviewed and updated as appropriate: allergies, current medications, past family history, past medical history, past social history, past surgical history and problem list.    Review of Systems   Constitutional: Negative.    HENT: Negative.  Negative for sinus pressure and sore throat.    Eyes: Negative.    Respiratory: Negative.  Negative for cough.    Cardiovascular: Negative.    Gastrointestinal: Negative.    Endocrine: Negative.    Genitourinary: Negative.    Musculoskeletal: Positive for arthralgias and neck pain.        RUE weakness     Skin: Negative.    Allergic/Immunologic: Positive for environmental allergies.   Neurological: Negative.    Hematological: Negative.    Psychiatric/Behavioral: Negative.        Objective   Physical Exam   Constitutional: She is oriented to person, place, and time. She appears well-developed.   HENT:   Head: Normocephalic and atraumatic.   Eyes: Conjunctivae and EOM are normal. Pupils are equal, round, and reactive to light.   Neck: Neck supple.   Spourling's negative.  85% ROM   Musculoskeletal:   85% ROM Lshoulder with pain at EROM IR ER.   strong R>L but is LHD.   Lymphadenopathy:     She has no cervical adenopathy.   Neurological: She is alert and oriented to person, place, and time.   Skin: Skin is warm and dry.   Psychiatric: She has a normal mood and affect.   Nursing note and vitals reviewed.    PHQ-9 Total Score:      Assessment/Plan   Faby was seen today for medicare wellness-subsequent.    Diagnoses and all orders for this visit:    Encounter for annual general medical examination with abnormal findings in adult    Right arm weakness  -     MRI Cervical Spine Without Contrast; Future    Chronic  left shoulder pain  -     MRI Cervical Spine Without Contrast; Future    Neck pain        Patient Instructions   Carter and wall walk exercises with 10 minutes ice 4X/day.  Get MRI c spine.  Get Second ShingRix and high dose flu  Calorie Counting for Weight Loss  Calories are units of energy. Your body needs a certain amount of calories from food to keep you going throughout the day. When you eat more calories than your body needs, your body stores the extra calories as fat. When you eat fewer calories than your body needs, your body burns fat to get the energy it needs.  Calorie counting means keeping track of how many calories you eat and drink each day. Calorie counting can be helpful if you need to lose weight. If you make sure to eat fewer calories than your body needs, you should lose weight. Ask your health care provider what a healthy weight is for you.  For calorie counting to work, you will need to eat the right number of calories in a day in order to lose a healthy amount of weight per week. A dietitian can help you determine how many calories you need in a day and will give you suggestions on how to reach your calorie goal.  · A healthy amount of weight to lose per week is usually 1-2 lb (0.5-0.9 kg). This usually means that your daily calorie intake should be reduced by 500-750 calories.  · Eating 1,200 - 1,500 calories per day can help most women lose weight.  · Eating 1,500 - 1,800 calories per day can help most men lose weight.  What is my plan?  My goal is to have __________ calories per day.  If I have this many calories per day, I should lose around __________ pounds per week.  What do I need to know about calorie counting?  In order to meet your daily calorie goal, you will need to:  · Find out how many calories are in each food you would like to eat. Try to do this before you eat.  · Decide how much of the food you plan to eat.  · Write down what you ate and how many calories it had. Doing this  is called keeping a food log.  To successfully lose weight, it is important to balance calorie counting with a healthy lifestyle that includes regular activity. Aim for 150 minutes of moderate exercise (such as walking) or 75 minutes of vigorous exercise (such as running) each week.  Where do I find calorie information?    The number of calories in a food can be found on a Nutrition Facts label. If a food does not have a Nutrition Facts label, try to look up the calories online or ask your dietitian for help.  Remember that calories are listed per serving. If you choose to have more than one serving of a food, you will have to multiply the calories per serving by the amount of servings you plan to eat. For example, the label on a package of bread might say that a serving size is 1 slice and that there are 90 calories in a serving. If you eat 1 slice, you will have eaten 90 calories. If you eat 2 slices, you will have eaten 180 calories.  How do I keep a food log?  Immediately after each meal, record the following information in your food log:  · What you ate. Don't forget to include toppings, sauces, and other extras on the food.  · How much you ate. This can be measured in cups, ounces, or number of items.  · How many calories each food and drink had.  · The total number of calories in the meal.  Keep your food log near you, such as in a small notebook in your pocket, or use a mobile mallorie or website. Some programs will calculate calories for you and show you how many calories you have left for the day to meet your goal.  What are some calorie counting tips?    · Use your calories on foods and drinks that will fill you up and not leave you hungry:  ? Some examples of foods that fill you up are nuts and nut butters, vegetables, lean proteins, and high-fiber foods like whole grains. High-fiber foods are foods with more than 5 g fiber per serving.  ? Drinks such as sodas, specialty coffee drinks, alcohol, and juices  "have a lot of calories, yet do not fill you up.  · Eat nutritious foods and avoid empty calories. Empty calories are calories you get from foods or beverages that do not have many vitamins or protein, such as candy, sweets, and soda. It is better to have a nutritious high-calorie food (such as an avocado) than a food with few nutrients (such as a bag of chips).  · Know how many calories are in the foods you eat most often. This will help you calculate calorie counts faster.  · Pay attention to calories in drinks. Low-calorie drinks include water and unsweetened drinks.  · Pay attention to nutrition labels for \"low fat\" or \"fat free\" foods. These foods sometimes have the same amount of calories or more calories than the full fat versions. They also often have added sugar, starch, or salt, to make up for flavor that was removed with the fat.  · Find a way of tracking calories that works for you. Get creative. Try different apps or programs if writing down calories does not work for you.  What are some portion control tips?  · Know how many calories are in a serving. This will help you know how many servings of a certain food you can have.  · Use a measuring cup to measure serving sizes. You could also try weighing out portions on a kitchen scale. With time, you will be able to estimate serving sizes for some foods.  · Take some time to put servings of different foods on your favorite plates, bowls, and cups so you know what a serving looks like.  · Try not to eat straight from a bag or box. Doing this can lead to overeating. Put the amount you would like to eat in a cup or on a plate to make sure you are eating the right portion.  · Use smaller plates, glasses, and bowls to prevent overeating.  · Try not to multitask (for example, watch TV or use your computer) while eating. If it is time to eat, sit down at a table and enjoy your food. This will help you to know when you are full. It will also help you to be aware of " "what you are eating and how much you are eating.  What are tips for following this plan?  Reading food labels  · Check the calorie count compared to the serving size. The serving size may be smaller than what you are used to eating.  · Check the source of the calories. Make sure the food you are eating is high in vitamins and protein and low in saturated and trans fats.  Shopping  · Read nutrition labels while you shop. This will help you make healthy decisions before you decide to purchase your food.  · Make a grocery list and stick to it.  Cooking  · Try to cook your favorite foods in a healthier way. For example, try baking instead of frying.  · Use low-fat dairy products.  Meal planning  · Use more fruits and vegetables. Half of your plate should be fruits and vegetables.  · Include lean proteins like poultry and fish.  How do I count calories when eating out?  · Ask for smaller portion sizes.  · Consider sharing an entree and sides instead of getting your own entree.  · If you get your own entree, eat only half. Ask for a box at the beginning of your meal and put the rest of your entree in it so you are not tempted to eat it.  · If calories are listed on the menu, choose the lower calorie options.  · Choose dishes that include vegetables, fruits, whole grains, low-fat dairy products, and lean protein.  · Choose items that are boiled, broiled, grilled, or steamed. Stay away from items that are buttered, battered, fried, or served with cream sauce. Items labeled \"crispy\" are usually fried, unless stated otherwise.  · Choose water, low-fat milk, unsweetened iced tea, or other drinks without added sugar. If you want an alcoholic beverage, choose a lower calorie option such as a glass of wine or light beer.  · Ask for dressings, sauces, and syrups on the side. These are usually high in calories, so you should limit the amount you eat.  · If you want a salad, choose a garden salad and ask for grilled meats. Avoid " extra toppings like carver, cheese, or fried items. Ask for the dressing on the side, or ask for olive oil and vinegar or lemon to use as dressing.  · Estimate how many servings of a food you are given. For example, a serving of cooked rice is ½ cup or about the size of half a baseball. Knowing serving sizes will help you be aware of how much food you are eating at restaurants. The list below tells you how big or small some common portion sizes are based on everyday objects:  ? 1 oz--4 stacked dice.  ? 3 oz--1 deck of cards.  ? 1 tsp--1 die.  ? 1 Tbsp--½ a ping-pong ball.  ? 2 Tbsp--1 ping-pong ball.  ? ½ cup--½ baseball.  ? 1 cup--1 baseball.  Summary  · Calorie counting means keeping track of how many calories you eat and drink each day. If you eat fewer calories than your body needs, you should lose weight.  · A healthy amount of weight to lose per week is usually 1-2 lb (0.5-0.9 kg). This usually means reducing your daily calorie intake by 500-750 calories.  · The number of calories in a food can be found on a Nutrition Facts label. If a food does not have a Nutrition Facts label, try to look up the calories online or ask your dietitian for help.  · Use your calories on foods and drinks that will fill you up, and not on foods and drinks that will leave you hungry.  · Use smaller plates, glasses, and bowls to prevent overeating.  This information is not intended to replace advice given to you by your health care provider. Make sure you discuss any questions you have with your health care provider.  Document Released: 12/18/2006 Document Revised: 09/06/2019 Document Reviewed: 11/17/2017  [employer] Patient Education © 2019 [employer]      Patient to visit [employer] website (https://www.ibanet.org/) for information reguarding advanced directive and living will.

## 2019-12-01 RX ORDER — ESCITALOPRAM OXALATE 20 MG/1
TABLET ORAL
Qty: 90 TABLET | Refills: 2 | Status: SHIPPED | OUTPATIENT
Start: 2019-12-01 | End: 2020-08-31

## 2019-12-03 ENCOUNTER — HOSPITAL ENCOUNTER (OUTPATIENT)
Dept: MRI IMAGING | Facility: HOSPITAL | Age: 74
Discharge: HOME | End: 2019-12-03
Admitting: PREVENTIVE MEDICINE

## 2019-12-03 DIAGNOSIS — G89.29 CHRONIC LEFT SHOULDER PAIN: ICD-10-CM

## 2019-12-03 DIAGNOSIS — M25.512 CHRONIC LEFT SHOULDER PAIN: ICD-10-CM

## 2019-12-03 DIAGNOSIS — R29.898 RIGHT ARM WEAKNESS: ICD-10-CM

## 2019-12-03 DIAGNOSIS — M48.02 CERVICAL STENOSIS OF SPINAL CANAL: Primary | ICD-10-CM

## 2019-12-03 PROCEDURE — 72141 MRI NECK SPINE W/O DYE: CPT

## 2020-01-16 ENCOUNTER — OFFICE VISIT (OUTPATIENT)
Dept: NEUROSURGERY | Facility: CLINIC | Age: 75
End: 2020-01-16

## 2020-01-16 VITALS
BODY MASS INDEX: 51.56 KG/M2 | DIASTOLIC BLOOD PRESSURE: 74 MMHG | HEART RATE: 58 BPM | HEIGHT: 62 IN | WEIGHT: 280.2 LBS | SYSTOLIC BLOOD PRESSURE: 134 MMHG

## 2020-01-16 DIAGNOSIS — M54.2 NECK PAIN: Primary | ICD-10-CM

## 2020-01-16 DIAGNOSIS — M47.812 CERVICAL SPONDYLOSIS WITHOUT MYELOPATHY: ICD-10-CM

## 2020-01-16 DIAGNOSIS — M48.02 SPINAL STENOSIS IN CERVICAL REGION: ICD-10-CM

## 2020-01-16 PROCEDURE — 99203 OFFICE O/P NEW LOW 30 MIN: CPT | Performed by: NEUROLOGICAL SURGERY

## 2020-01-16 RX ORDER — MELOXICAM 15 MG/1
15 TABLET ORAL DAILY
Qty: 60 TABLET | Refills: 2 | Status: SHIPPED | OUTPATIENT
Start: 2020-01-16 | End: 2020-02-10

## 2020-01-16 NOTE — PROGRESS NOTES
"Subjective   Faby Le is a 74 y.o. female.     Chief Complaint   Patient presents with   • Neck Pain     New Pt, neck and low back pain x 6- 8 wks, nki, ref by Dr Sykes     Visit Vitals  /74 (BP Location: Right arm, Patient Position: Sitting, Cuff Size: Large Adult)   Pulse 58   Ht 157.5 cm (62\")   Wt 127 kg (280 lb 3.2 oz)   LMP  (LMP Unknown)   BMI 51.25 kg/m²       History of Present Illness: Mrs. Le is a 74-year-old lady who presents today with a week history of neck pain with associated mild headache.  She denies any radicular pain.  Is predominately on the left side.  Is aggravated with excessive motion.  She denies any inciting event.  She denies any radicular pain, weakness, gait dysfunction, urinary urgency or incontinence.  She has not tried any physical therapy or oral pain management.  She states is gotten a little better on its own over time.  MRI the cervical spine was performed demonstrating severe spondylosis and stenosis and she is been sent here for evaluation.    The following portions of the patient's history were reviewed and updated as appropriate: allergies, current medications, past family history, past medical history, past social history, past surgical history and problem list.    Review of Systems   Constitutional: Negative.    HENT: Negative.    Eyes: Negative.    Respiratory: Negative.    Cardiovascular: Negative.    Gastrointestinal: Negative.    Genitourinary: Negative.  Negative for urgency and urinary incontinence.   Musculoskeletal: Positive for arthralgias, myalgias and neck pain. Negative for gait problem.   Skin: Negative.    Neurological: Positive for headache. Negative for numbness.   Psychiatric/Behavioral: Negative.            Past Surgical History:   Procedure Laterality Date   • CARDIAC CATHETERIZATION     • CARPAL TUNNEL RELEASE     • CATARACT EXTRACTION     • CUBITAL TUNNEL RELEASE     • HYSTERECTOMY     • ROTATOR CUFF REPAIR         Past Medical History: "   Diagnosis Date   • Ankle pain    • Arthritis    • Coronary artery disease    • Depression    • Gout    • Hyperglycemia    • Hyperlipidemia    • Low back pain    • Overweight    • Sleep apnea    • Vitamin B 12 deficiency    • Vitamin D deficiency      Social History     Social History Narrative   • Not on file     Objective   Physical Exam  Neurologic Exam  Ortho Exam  Alert and oriented by 3  Speech is intact and coherent articulate with good content and production  Cranial nerves III through XII are grossly intact with pupils symmetric and reactive and no gaze paresis or nystagmus  Sensation is intact to soft touch and pinprick  in both upper and lower extremities  Proprioception is intact in both upper and lower extremities to metric  Motor strength is 5/5 in both upper and lower extremities with no focal motor deficits  Reflexes are 2+ in both upper and lower extremities with no upper motor neuron signs  Gait is nonantalgic  Negative shoulder exam bilaterally  Cervical spine  +/-  palpable masses, trachea midline, no lesions, no deformity  mild TTP midline, paraspinal  Normal ROM,   - Spurling sign  -Lhermitte's sign    MRI CERVICAL SPINE WO CONTRAST-     Date of Exam: 12/3/2019 10:53 AM     Indication: R arm weakness and L shoulder pain; R29.898-Other symptoms  and signs involving the musculoskeletal system; M25.512-Pain in left  shoulder; G89.29-Other chronic pain  patient's a 74-year-old female with  upper back pain for one month. Neck and shoulder pain for one month.     Comparison: None available.     Technique: Multiplanar multisequence images of the cervical spine were  performed according to routine cervical spine MRI protocol.     FINDINGS:   Bone marrow signal intensity is normal.  Alignment is anatomic.     Paraspinal structures are normal.  Today's study demonstrates disc  desiccation at all cervical levels with moderate loss of disc height and  anterior spurring change particularly at the C4-5,  C5-6 and C6-7 levels  and a lesser extent at the C3-4 level. There appear to be multilevel  changes of spinal stenosis due to posterior spurring change. There is no  cord syrinx, mass or edema. There is no evidence of Arnold-Chiari  malformation.     C1-C2: Within normal limits.     C2-C3: No significant central canal or neural foraminal narrowing.  Uncovertebral joints are normal.     C3-C4: At the C3-4 level the AP diameter of the canal is approximately 8  mm due to spurring and/or hard disc change. There is only mild bilateral  neural foraminal narrowing.     C4-C5: The AP diameter of the canal at the C4-5 level is just under 8  mm, central canal stenosis this is due to large posterior spur and  degenerative changes of the facets and uncovertebral joints there  appears to be moderate to marked left-sided neural foraminal narrowing  and moderate right-sided neural foraminal narrowing at this level.     C5-C6: At the C5-6 level the AP diameter canal is approximately 7 mm  hard disc changes are seen posteriorly causing marked bilateral neural  foraminal narrowing. There appears to be effacement of the spinal cord  at this level due to posterior spurs      C6-C7: At the C6-7 level the AP diameter canal is approximately 8 mm,  central canal stenosis with moderate right-sided neural foraminal  narrowing and marked right-sided neural foraminal narrowing due to  degenerative change.     C7-T1: At the C7-T1 level the AP diameter canal is just above 10 mm,  there is no central canal stenosis there are bony degenerative changes  causing mild bilateral neural foraminal narrowing.     Today's study fails to demonstrate evidence of paraspinal mass it would  suggest adenopathy, infection or tumor.     IMPRESSION:     1. Abnormal MRI of the cervical spine  2. Multilevel central canal stenosis with moderate to marked neural  foraminal narrowing at multiple levels, changes are most significant  from the C3-4 through the C6-7  levels with changes of a lesser degree at  the C7-T1 level.  3. No evidence of abnormal cord signal that would suggest tumor, edema  or syrinx despite cord effacement at multiple levels.                  Assessment and Plan: Mrs. Le I feel suffers from cervical spondylosis.  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  On cervical flexion-extension films here today she moves approximately 3 mm at C3-4 but there is no abnormal motion in the other level.  She has decreased range of motion in the axial spine below the occipital cervical region.  The amount of stenosis on MRI at the C3-4 level was not significant.  She demonstrates no signs of upper motor neuron disease.  Though we did discuss that she might be at increased risk for spinal cord injury from more trivial insult I feel that surgical intervention at this time is not warranted.  I would like to obtain a CT scan of the cervical spine to evaluate level of autofusion and keep a close eye on the C3-4 level.  We will also start a course of meloxicam and physical therapy.  I will see her back in 1 month to see how she is doing and to review the CT scan.      Problems Addressed this Visit     None

## 2020-01-21 ENCOUNTER — TELEPHONE (OUTPATIENT)
Dept: FAMILY MEDICINE CLINIC | Facility: CLINIC | Age: 75
End: 2020-01-21

## 2020-01-21 NOTE — TELEPHONE ENCOUNTER
Patient called concerned because she seen Dr Velazco and he put her on Mobic. She states you took her off of it due to her kidney functions. He told her to take it for a month and than have the ct of her neck done. She follows up with him in February. She was concerned because of the kidney function. Please advise.

## 2020-01-23 ENCOUNTER — TELEPHONE (OUTPATIENT)
Dept: NEUROSURGERY | Facility: CLINIC | Age: 75
End: 2020-01-23

## 2020-02-04 ENCOUNTER — HOSPITAL ENCOUNTER (OUTPATIENT)
Dept: CT IMAGING | Facility: HOSPITAL | Age: 75
Discharge: HOME | End: 2020-02-04
Admitting: NEUROLOGICAL SURGERY

## 2020-02-04 DIAGNOSIS — M47.812 CERVICAL SPONDYLOSIS WITHOUT MYELOPATHY: ICD-10-CM

## 2020-02-04 DIAGNOSIS — M48.02 SPINAL STENOSIS IN CERVICAL REGION: ICD-10-CM

## 2020-02-04 PROCEDURE — 72125 CT NECK SPINE W/O DYE: CPT

## 2020-02-10 ENCOUNTER — OFFICE VISIT (OUTPATIENT)
Dept: NEUROSURGERY | Facility: CLINIC | Age: 75
End: 2020-02-10

## 2020-02-10 VITALS
SYSTOLIC BLOOD PRESSURE: 142 MMHG | BODY MASS INDEX: 51.41 KG/M2 | HEART RATE: 73 BPM | DIASTOLIC BLOOD PRESSURE: 71 MMHG | WEIGHT: 279.4 LBS | HEIGHT: 62 IN

## 2020-02-10 DIAGNOSIS — M47.812 CERVICAL SPONDYLOSIS WITHOUT MYELOPATHY: ICD-10-CM

## 2020-02-10 DIAGNOSIS — M48.02 SPINAL STENOSIS IN CERVICAL REGION: Primary | ICD-10-CM

## 2020-02-10 PROCEDURE — 99213 OFFICE O/P EST LOW 20 MIN: CPT | Performed by: NEUROLOGICAL SURGERY

## 2020-02-10 NOTE — PROGRESS NOTES
"Subjective   Faby Le is a 75 y.o. female.     Chief Complaint   Patient presents with   • Follow-up     CT Cspine     Visit Vitals  Ht 157.5 cm (62\")   LMP  (LMP Unknown)   BMI 51.25 kg/m²       History of Present Illness: Mrs. Le is here today for follow-up review of her CT scan of the neck.  She states she cannot take the anti-inflammatory secondary to her kidney disease.  She actually feels like that made her swell.  She states the pains a little bit better with the exercises.  Her CT scan of the neck demonstrates what appears to be an autofusion from C4-T1.  Further review of the flexion-extension films demonstrate no significant abnormal motion.  At this time I do not feel surgery is warranted and due to the fact she cannot tolerate the anti-inflammatories I will refer her to interventional pain management for dedicated facet blocks and possible rhizotomies.    The following portions of the patient's history were reviewed and updated as appropriate: allergies, current medications, past family history, past medical history, past social history, past surgical history and problem list.    Review of Systems      Past Surgical History:   Procedure Laterality Date   • CARDIAC CATHETERIZATION     • CARPAL TUNNEL RELEASE     • CATARACT EXTRACTION     • CUBITAL TUNNEL RELEASE     • HYSTERECTOMY     • ROTATOR CUFF REPAIR         Past Medical History:   Diagnosis Date   • Ankle pain    • Arthritis    • Coronary artery disease    • Depression    • Gout    • Hyperglycemia    • Hyperlipidemia    • Low back pain    • Overweight    • Sleep apnea    • Vitamin B 12 deficiency    • Vitamin D deficiency        Objective   Physical Exam  Neurologic Exam  Ortho Exam        Assessment and Plan: I spent 15 minutes with the patient in direct face-to-face time with greater than 50% of the time counseling and educating the patient.      Problems Addressed this Visit     None               "

## 2020-02-17 ENCOUNTER — OFFICE VISIT (OUTPATIENT)
Dept: FAMILY MEDICINE CLINIC | Facility: CLINIC | Age: 75
End: 2020-02-17

## 2020-02-17 VITALS
HEART RATE: 80 BPM | WEIGHT: 282.8 LBS | BODY MASS INDEX: 52.04 KG/M2 | HEIGHT: 62 IN | DIASTOLIC BLOOD PRESSURE: 68 MMHG | TEMPERATURE: 97.9 F | SYSTOLIC BLOOD PRESSURE: 131 MMHG | OXYGEN SATURATION: 96 % | RESPIRATION RATE: 16 BRPM

## 2020-02-17 DIAGNOSIS — B02.9 HERPES ZOSTER WITHOUT COMPLICATION: ICD-10-CM

## 2020-02-17 DIAGNOSIS — E66.01 MORBIDLY OBESE (HCC): ICD-10-CM

## 2020-02-17 DIAGNOSIS — M79.672 LEFT FOOT PAIN: Primary | ICD-10-CM

## 2020-02-17 PROCEDURE — 99214 OFFICE O/P EST MOD 30 MIN: CPT | Performed by: NURSE PRACTITIONER

## 2020-02-17 NOTE — PATIENT INSTRUCTIONS
Apply hydrocortisone cream to chin area to control itching, antibiotic cream to area to prevent secondary infection from itching    Foot pain-continue stretches, roll foot gently over a frozen water bottle 2-3 times daily.  Add shoe insert and monitor for improvement over 1-2 weeks.  Follow up with orthopedic surgeon if no improvement.    Shingles    Shingles is an infection. It gives you a painful skin rash and blisters that have fluid in them. Shingles is caused by the same germ (virus) that causes chickenpox.  Shingles only happens in people who:  · Have had chickenpox.  · Have been given a shot of medicine (vaccine) to protect against chickenpox. Shingles is rare in this group.  The first symptoms of shingles may be itching, tingling, or pain in an area on your skin. A rash will show on your skin a few days or weeks later. The rash is likely to be on one side of your body. The rash usually has a shape like a belt or a band. Over time, the rash turns into fluid-filled blisters. The blisters will break open, change into scabs, and dry up. Medicines may:  · Help with pain and itching.  · Help you get better sooner.  · Help to prevent long-term problems.  Follow these instructions at home:  Medicines  · Take over-the-counter and prescription medicines only as told by your doctor.  · Put on an anti-itch cream or numbing cream where you have a rash, blisters, or scabs. Do this as told by your doctor.  Helping with itching and discomfort    · Put cold, wet cloths (cold compresses) on the area of the rash or blisters as told by your doctor.  · Cool baths can help you feel better. Try adding baking soda or dry oatmeal to the water to lessen itching. Do not bathe in hot water.  Blister and rash care  · Keep your rash covered with a loose bandage (dressing).  · Wear loose clothing that does not rub on your rash.  · Keep your rash and blisters clean. To do this, wash the area with mild soap and cool water as told by your  doctor.  · Check your rash every day for signs of infection. Check for:  ? More redness, swelling, or pain.  ? Fluid or blood.  ? Warmth.  ? Pus or a bad smell.  · Do not scratch your rash. Do not pick at your blisters. To help you to not scratch:  ? Keep your fingernails clean and cut short.  ? Wear gloves or mittens when you sleep, if scratching is a problem.  General instructions  · Rest as told by your doctor.  · Keep all follow-up visits as told by your doctor. This is important.  · Wash your hands often with soap and water. If soap and water are not available, use hand . Doing this lowers your chance of getting a skin infection caused by germs (bacteria).  · Your infection can cause chickenpox in people who have never had chickenpox or never got a shot of chickenpox vaccine. If you have blisters that did not change into scabs yet, try not to touch other people or be around other people, especially:  ? Babies.  ? Pregnant women.  ? Children who have areas of red, itchy, or rough skin (eczema).  ? Very old people who have transplants.  ? People who have a long-term (chronic) sickness, like cancer or AIDS.  Contact a doctor if:  · Your pain does not get better with medicine.  · Your pain does not get better after the rash heals.  · You have any signs of infection in the rash area. These signs include:  ? More redness, swelling, or pain around the rash.  ? Fluid or blood coming from the rash.  ? The rash area feeling warm to the touch.  ? Pus or a bad smell coming from the rash.  Get help right away if:  · The rash is on your face or nose.  · You have pain in your face or pain by your eye.  · You lose feeling on one side of your face.  · You have trouble seeing.  · You have ear pain, or you have ringing in your ear.  · You have a loss of taste.  · Your condition gets worse.  Summary  · Shingles gives you a painful skin rash and blisters that have fluid in them.  · Shingles is an infection. It is caused by  the same germ (virus) that causes chickenpox.  · Keep your rash covered with a loose bandage (dressing). Wear loose clothing that does not rub on your rash.  · If you have blisters that did not change into scabs yet, try not to touch other people or be around people.  This information is not intended to replace advice given to you by your health care provider. Make sure you discuss any questions you have with your health care provider.  Document Released: 06/05/2009 Document Revised: 08/22/2018 Document Reviewed: 08/22/2018  [employer] Interactive Patient Education © 2019 [employer]    Plantar Fasciitis    Plantar fasciitis is a painful foot condition that affects the heel. It occurs when the band of tissue that connects the toes to the heel bone (plantar fascia) becomes irritated. This can happen as the result of exercising too much or doing other repetitive activities (overuse injury).  The pain from plantar fasciitis can range from mild irritation to severe pain that makes it difficult to walk or move. The pain is usually worse in the morning after sleeping, or after sitting or lying down for a while. Pain may also be worse after long periods of walking or standing.  What are the causes?  This condition may be caused by:  · Standing for long periods of time.  · Wearing shoes that do not have good arch support.  · Doing activities that put stress on joints (high-impact activities), including running, aerobics, and ballet.  · Being overweight.  · An abnormal way of walking (gait).  · Tight muscles in the back of your lower leg (calf).  · High arches in your feet.  · Starting a new athletic activity.  What are the signs or symptoms?  The main symptom of this condition is heel pain. Pain may:  · Be worse with first steps after a time of rest, especially in the morning after sleeping or after you have been sitting or lying down for a while.  · Be worse after long periods of standing still.  · Decrease after 30-45  minutes of activity, such as gentle walking.  How is this diagnosed?  This condition may be diagnosed based on your medical history and your symptoms. Your health care provider may ask questions about your activity level. Your health care provider will do a physical exam to check for:  · A tender area on the bottom of your foot.  · A high arch in your foot.  · Pain when you move your foot.  · Difficulty moving your foot.  You may have imaging tests to confirm the diagnosis, such as:  · X-rays.  · Ultrasound.  · MRI.  How is this treated?  Treatment for plantar fasciitis depends on how severe your condition is. Treatment may include:  · Rest, ice, applying pressure (compression), and raising the affected foot (elevation). This may be called RICE therapy. Your health care provider may recommend RICE therapy along with over-the-counter pain medicines to manage your pain.  · Exercises to stretch your calves and your plantar fascia.  · A splint that holds your foot in a stretched, upward position while you sleep (night splint).  · Physical therapy to relieve symptoms and prevent problems in the future.  · Injections of steroid medicine (cortisone) to relieve pain and inflammation.  · Stimulating your plantar fascia with electrical impulses (extracorporeal shock wave therapy). This is usually the last treatment option before surgery.  · Surgery, if other treatments have not worked after 12 months.  Follow these instructions at home:    Managing pain, stiffness, and swelling  · If directed, put ice on the painful area:  ? Put ice in a plastic bag, or use a frozen bottle of water.  ? Place a towel between your skin and the bag or bottle.  ? Roll the bottom of your foot over the bag or bottle.  ? Do this for 20 minutes, 2-3 times a day.  · Wear athletic shoes that have air-sole or gel-sole cushions, or try wearing soft shoe inserts that are designed for plantar fasciitis.  · Raise (elevate) your foot above the level of your  heart while you are sitting or lying down.  Activity  · Avoid activities that cause pain. Ask your health care provider what activities are safe for you.  · Do physical therapy exercises and stretches as told by your health care provider.  · Try activities and forms of exercise that are easier on your joints (low-impact). Examples include swimming, water aerobics, and biking.  General instructions  · Take over-the-counter and prescription medicines only as told by your health care provider.  · Wear a night splint while sleeping, if told by your health care provider. Loosen the splint if your toes tingle, become numb, or turn cold and blue.  · Maintain a healthy weight, or work with your health care provider to lose weight as needed.  · Keep all follow-up visits as told by your health care provider. This is important.  Contact a health care provider if you:  · Have symptoms that do not go away after caring for yourself at home.  · Have pain that gets worse.  · Have pain that affects your ability to move or do your daily activities.  Summary  · Plantar fasciitis is a painful foot condition that affects the heel. It occurs when the band of tissue that connects the toes to the heel bone (plantar fascia) becomes irritated.  · The main symptom of this condition is heel pain that may be worse after exercising too much or standing still for a long time.  · Treatment varies, but it usually starts with rest, ice, compression, and elevation (RICE therapy) and over-the-counter medicines to manage pain.  This information is not intended to replace advice given to you by your health care provider. Make sure you discuss any questions you have with your health care provider.  Document Released: 09/12/2002 Document Revised: 10/15/2018 Document Reviewed: 10/15/2018  [employer] Interactive Patient Education © 2019 [employer]

## 2020-02-17 NOTE — PROGRESS NOTES
"Subjective   Faby Le is a 75 y.o. female presents for   Chief Complaint   Patient presents with   • Mouth Lesions     about a week    • Foot Injury       Health Maintenance Due   Topic Date Due   • ZOSTER VACCINE (2 of 2) 01/31/2020       History of Present Illness   Patient reports rash on right side of lower lip for approximately 1 week.  Patient reports she has been applying Neosporin and Carmex to area with no worsening.  Patient states it does burn a little bit when Carmex is applied but denies any other problem.  Patient reports intermittent itching over area.  Patient denies fever or any other related symptoms.  Patient also reports left foot pain.  She had an ankle fusion in the past and has been following up with the orthopedic surgeon.  Patient recently had an MRI of her foot and the surgeon informed her there was no abnormalities on it, her fusion was intact and no loosening of the screws was present.  Patient reports recently her pain has increased the longer that she is ambulating, and it worsened after she bought shoes that were half a size larger.  Patient has had orthotics made in the past but has not worn them.  Vitals:    02/17/20 1454 02/17/20 1459   BP: 156/76 131/68   BP Location: Right arm Left arm   Patient Position: Sitting Sitting   Cuff Size: Large Adult Large Adult   Pulse: 82 80   Resp: 16    Temp: 97.9 °F (36.6 °C)    TempSrc: Oral    SpO2: 96%    Weight: 128 kg (282 lb 12.8 oz)    Height: 157.5 cm (62\")      Body mass index is 51.72 kg/m².    Current Outpatient Medications on File Prior to Visit   Medication Sig Dispense Refill   • allopurinol (ZYLOPRIM) 300 MG tablet TAKE 1 TABLET BY MOUTH EVERY DAY 90 tablet 2   • amLODIPine (NORVASC) 5 MG tablet Take 1 tablet by mouth Daily.     • APPLE CIDER VINEGAR PO Take 1 tablet by mouth 2 (Two) Times a Day. Take 2 tablets am and 1 pm     • atorvastatin (LIPITOR) 20 MG tablet Take 1 tablet by mouth Daily.     • cetirizine (zyrTEC) 10 MG " tablet Take 1 tablet by mouth Daily.     • cyanocobalamin (VITAMIN B-12) 1000 MCG tablet Take 1 tablet by mouth Daily.     • escitalopram (LEXAPRO) 20 MG tablet TAKE 1 TABLET BY MOUTH EVERY DAY 90 tablet 2   • furosemide (LASIX) 20 MG tablet TAKE 1 TABLET BY MOUTH AS NEEDED FOR SWELLING 30 tablet 3   • levothyroxine (SYNTHROID) 75 MCG tablet Take 1 tablet by mouth Daily. 90 tablet 3   • metoprolol tartrate (LOPRESSOR) 25 MG tablet TAKE 1 TABLET BY MOUTH DAILY AT BEDTIME 90 tablet 3   • Multiple Vitamins-Minerals (CENTRUM SILVER ADULT 50+) tablet Take 1 tablet by mouth Daily.     • Omega-3 Fatty Acids (FISH OIL) 1000 MG capsule capsule Take 1 capsule by mouth Daily.     • Vitamin D, Cholecalciferol, 25 MCG (1000 UT) tablet Take 1 tablet by mouth Daily.       No current facility-administered medications on file prior to visit.        The following portions of the patient's history were reviewed and updated as appropriate: allergies, current medications, past family history, past medical history, past social history, past surgical history and problem list.    Review of Systems   Constitutional: Negative for chills and fever.   HENT: Positive for mouth sores (outside, right chin). Negative for sinus pressure and sore throat.    Eyes: Negative for blurred vision.   Respiratory: Negative for cough and shortness of breath.    Cardiovascular: Negative for chest pain.   Gastrointestinal: Negative for abdominal pain.   Musculoskeletal: Positive for gait problem. Negative for arthralgias and joint swelling.        Left foot pain   Skin: Negative for color change.   Neurological: Negative for dizziness.   Psychiatric/Behavioral: Negative for behavioral problems.       Objective   Physical Exam   Constitutional: She is oriented to person, place, and time. She appears well-developed and well-nourished.   HENT:   Head: Normocephalic and atraumatic.   Right Ear: External ear normal.   Left Ear: External ear normal.   Nose: Nose  normal.   Eyes: Pupils are equal, round, and reactive to light. Conjunctivae and EOM are normal.   Neck: Normal range of motion.   Cardiovascular: Normal rate.   Pulmonary/Chest: Effort normal.   Abdominal: Soft.   Musculoskeletal: Normal range of motion.   Left foot charcot foot     Skin Integrity  - Her left foot skin is intact..     Neurological: She is alert and oriented to person, place, and time.   Skin: Skin is warm and dry. Rash (below right lower lip and chin) noted. Rash is vesicular.   Psychiatric: She has a normal mood and affect. Her behavior is normal. Judgment normal.   Nursing note and vitals reviewed.    PHQ-9 Total Score:      Assessment/Plan   Faby was seen today for mouth lesions and foot injury.    Diagnoses and all orders for this visit:    Left foot pain  Comments:  roll foot over frozen water bottle, gentle stretches daily as tolerated.  add shoe inserts previously made for pt.  follow up with orthopedic surgeon prn    Morbidly obese (CMS/HCC)  Comments:  weight loss encouraged    Herpes zoster without complication  Comments:  Hydrocortisone cream to area to limit itching and scratching.  it is too late for antiviral therpy        Patient Instructions   Apply hydrocortisone cream to chin area to control itching, antibiotic cream to area to prevent secondary infection from itching    Foot pain-continue stretches, roll foot gently over a frozen water bottle 2-3 times daily.  Add shoe insert and monitor for improvement over 1-2 weeks.  Follow up with orthopedic surgeon if no improvement.    Shingles    Shingles is an infection. It gives you a painful skin rash and blisters that have fluid in them. Shingles is caused by the same germ (virus) that causes chickenpox.  Shingles only happens in people who:  · Have had chickenpox.  · Have been given a shot of medicine (vaccine) to protect against chickenpox. Shingles is rare in this group.  The first symptoms of shingles may be itching, tingling, or  pain in an area on your skin. A rash will show on your skin a few days or weeks later. The rash is likely to be on one side of your body. The rash usually has a shape like a belt or a band. Over time, the rash turns into fluid-filled blisters. The blisters will break open, change into scabs, and dry up. Medicines may:  · Help with pain and itching.  · Help you get better sooner.  · Help to prevent long-term problems.  Follow these instructions at home:  Medicines  · Take over-the-counter and prescription medicines only as told by your doctor.  · Put on an anti-itch cream or numbing cream where you have a rash, blisters, or scabs. Do this as told by your doctor.  Helping with itching and discomfort    · Put cold, wet cloths (cold compresses) on the area of the rash or blisters as told by your doctor.  · Cool baths can help you feel better. Try adding baking soda or dry oatmeal to the water to lessen itching. Do not bathe in hot water.  Blister and rash care  · Keep your rash covered with a loose bandage (dressing).  · Wear loose clothing that does not rub on your rash.  · Keep your rash and blisters clean. To do this, wash the area with mild soap and cool water as told by your doctor.  · Check your rash every day for signs of infection. Check for:  ? More redness, swelling, or pain.  ? Fluid or blood.  ? Warmth.  ? Pus or a bad smell.  · Do not scratch your rash. Do not pick at your blisters. To help you to not scratch:  ? Keep your fingernails clean and cut short.  ? Wear gloves or mittens when you sleep, if scratching is a problem.  General instructions  · Rest as told by your doctor.  · Keep all follow-up visits as told by your doctor. This is important.  · Wash your hands often with soap and water. If soap and water are not available, use hand . Doing this lowers your chance of getting a skin infection caused by germs (bacteria).  · Your infection can cause chickenpox in people who have never had  chickenpox or never got a shot of chickenpox vaccine. If you have blisters that did not change into scabs yet, try not to touch other people or be around other people, especially:  ? Babies.  ? Pregnant women.  ? Children who have areas of red, itchy, or rough skin (eczema).  ? Very old people who have transplants.  ? People who have a long-term (chronic) sickness, like cancer or AIDS.  Contact a doctor if:  · Your pain does not get better with medicine.  · Your pain does not get better after the rash heals.  · You have any signs of infection in the rash area. These signs include:  ? More redness, swelling, or pain around the rash.  ? Fluid or blood coming from the rash.  ? The rash area feeling warm to the touch.  ? Pus or a bad smell coming from the rash.  Get help right away if:  · The rash is on your face or nose.  · You have pain in your face or pain by your eye.  · You lose feeling on one side of your face.  · You have trouble seeing.  · You have ear pain, or you have ringing in your ear.  · You have a loss of taste.  · Your condition gets worse.  Summary  · Shingles gives you a painful skin rash and blisters that have fluid in them.  · Shingles is an infection. It is caused by the same germ (virus) that causes chickenpox.  · Keep your rash covered with a loose bandage (dressing). Wear loose clothing that does not rub on your rash.  · If you have blisters that did not change into scabs yet, try not to touch other people or be around people.  This information is not intended to replace advice given to you by your health care provider. Make sure you discuss any questions you have with your health care provider.  Document Released: 06/05/2009 Document Revised: 08/22/2018 Document Reviewed: 08/22/2018  [employer] Interactive Patient Education © 2019 [employer]    Plantar Fasciitis    Plantar fasciitis is a painful foot condition that affects the heel. It occurs when the band of tissue that connects the toes to  the heel bone (plantar fascia) becomes irritated. This can happen as the result of exercising too much or doing other repetitive activities (overuse injury).  The pain from plantar fasciitis can range from mild irritation to severe pain that makes it difficult to walk or move. The pain is usually worse in the morning after sleeping, or after sitting or lying down for a while. Pain may also be worse after long periods of walking or standing.  What are the causes?  This condition may be caused by:  · Standing for long periods of time.  · Wearing shoes that do not have good arch support.  · Doing activities that put stress on joints (high-impact activities), including running, aerobics, and ballet.  · Being overweight.  · An abnormal way of walking (gait).  · Tight muscles in the back of your lower leg (calf).  · High arches in your feet.  · Starting a new athletic activity.  What are the signs or symptoms?  The main symptom of this condition is heel pain. Pain may:  · Be worse with first steps after a time of rest, especially in the morning after sleeping or after you have been sitting or lying down for a while.  · Be worse after long periods of standing still.  · Decrease after 30-45 minutes of activity, such as gentle walking.  How is this diagnosed?  This condition may be diagnosed based on your medical history and your symptoms. Your health care provider may ask questions about your activity level. Your health care provider will do a physical exam to check for:  · A tender area on the bottom of your foot.  · A high arch in your foot.  · Pain when you move your foot.  · Difficulty moving your foot.  You may have imaging tests to confirm the diagnosis, such as:  · X-rays.  · Ultrasound.  · MRI.  How is this treated?  Treatment for plantar fasciitis depends on how severe your condition is. Treatment may include:  · Rest, ice, applying pressure (compression), and raising the affected foot (elevation). This may be  called RICE therapy. Your health care provider may recommend RICE therapy along with over-the-counter pain medicines to manage your pain.  · Exercises to stretch your calves and your plantar fascia.  · A splint that holds your foot in a stretched, upward position while you sleep (night splint).  · Physical therapy to relieve symptoms and prevent problems in the future.  · Injections of steroid medicine (cortisone) to relieve pain and inflammation.  · Stimulating your plantar fascia with electrical impulses (extracorporeal shock wave therapy). This is usually the last treatment option before surgery.  · Surgery, if other treatments have not worked after 12 months.  Follow these instructions at home:    Managing pain, stiffness, and swelling  · If directed, put ice on the painful area:  ? Put ice in a plastic bag, or use a frozen bottle of water.  ? Place a towel between your skin and the bag or bottle.  ? Roll the bottom of your foot over the bag or bottle.  ? Do this for 20 minutes, 2-3 times a day.  · Wear athletic shoes that have air-sole or gel-sole cushions, or try wearing soft shoe inserts that are designed for plantar fasciitis.  · Raise (elevate) your foot above the level of your heart while you are sitting or lying down.  Activity  · Avoid activities that cause pain. Ask your health care provider what activities are safe for you.  · Do physical therapy exercises and stretches as told by your health care provider.  · Try activities and forms of exercise that are easier on your joints (low-impact). Examples include swimming, water aerobics, and biking.  General instructions  · Take over-the-counter and prescription medicines only as told by your health care provider.  · Wear a night splint while sleeping, if told by your health care provider. Loosen the splint if your toes tingle, become numb, or turn cold and blue.  · Maintain a healthy weight, or work with your health care provider to lose weight as  needed.  · Keep all follow-up visits as told by your health care provider. This is important.  Contact a health care provider if you:  · Have symptoms that do not go away after caring for yourself at home.  · Have pain that gets worse.  · Have pain that affects your ability to move or do your daily activities.  Summary  · Plantar fasciitis is a painful foot condition that affects the heel. It occurs when the band of tissue that connects the toes to the heel bone (plantar fascia) becomes irritated.  · The main symptom of this condition is heel pain that may be worse after exercising too much or standing still for a long time.  · Treatment varies, but it usually starts with rest, ice, compression, and elevation (RICE therapy) and over-the-counter medicines to manage pain.  This information is not intended to replace advice given to you by your health care provider. Make sure you discuss any questions you have with your health care provider.  Document Released: 09/12/2002 Document Revised: 10/15/2018 Document Reviewed: 10/15/2018  [employer] Interactive Patient Education © 2019 [employer]

## 2020-02-27 ENCOUNTER — HOSPITAL ENCOUNTER (OUTPATIENT)
Dept: GENERAL RADIOLOGY | Facility: HOSPITAL | Age: 75
Discharge: HOME | End: 2020-02-27
Admitting: ANESTHESIOLOGY

## 2020-02-27 ENCOUNTER — OFFICE VISIT (OUTPATIENT)
Dept: PAIN MEDICINE | Facility: CLINIC | Age: 75
End: 2020-02-27

## 2020-02-27 VITALS
WEIGHT: 277 LBS | OXYGEN SATURATION: 100 % | HEART RATE: 71 BPM | DIASTOLIC BLOOD PRESSURE: 68 MMHG | TEMPERATURE: 97.6 F | HEIGHT: 63 IN | BODY MASS INDEX: 49.08 KG/M2 | RESPIRATION RATE: 16 BRPM | SYSTOLIC BLOOD PRESSURE: 144 MMHG

## 2020-02-27 DIAGNOSIS — G89.4 CHRONIC PAIN SYNDROME: Primary | ICD-10-CM

## 2020-02-27 DIAGNOSIS — M47.812 CERVICAL SPONDYLOSIS WITHOUT MYELOPATHY: ICD-10-CM

## 2020-02-27 DIAGNOSIS — M47.817 LUMBOSACRAL SPONDYLOSIS WITHOUT MYELOPATHY: ICD-10-CM

## 2020-02-27 DIAGNOSIS — G44.86 CERVICOGENIC HEADACHE: ICD-10-CM

## 2020-02-27 DIAGNOSIS — M25.50 POLYARTHRALGIA: ICD-10-CM

## 2020-02-27 PROCEDURE — 99204 OFFICE O/P NEW MOD 45 MIN: CPT | Performed by: ANESTHESIOLOGY

## 2020-02-27 PROCEDURE — 72114 X-RAY EXAM L-S SPINE BENDING: CPT

## 2020-02-27 PROCEDURE — G0463 HOSPITAL OUTPT CLINIC VISIT: HCPCS | Performed by: ANESTHESIOLOGY

## 2020-02-27 NOTE — PROGRESS NOTES
Subjective    CC neck pain, headache, back pain, left hip pain  Faby Le is a 75 y.o. female with multiple comorbidities including CKD, chronic polyarthralgia S/p TKA , chronic back/ neck pain headache here for initial evaluation.  Referred by neurosurgery/Dr. Velazco. Complains of worsening low back pain mostly right-sided radiating to right hip worse with twisting bending, prolonged standing or prolonged sitting.  Denies radicular pain.  Complains of several weeks of worsening left neck pain rating to left shoulder and head associated with headaches constant worse with activity.  Denies balance issues, weakness, bladder bowel continence.  Continue chronic polyarthralgia, knee pain left foot pain.  Continued chronic axial back pain.  Chronic pain impairing daily activity and interfering with sleep.  Seen neurosurgery, no surgery recommended at this time.  Refer to try facet blocks/RFA..     C-spine CT 2020 multiple level degenerative changes facet arthropathy with osteophyte complex, mild to moderate.   The spine MRI 2019 Multilevel central canal stenosis with moderate to marked neural foraminal narrowing at multiple levels, changes are most significant from the C3-4 through the C6-7 levels with changes of a lesser degree at the C7-T1 level.  Normal cord    Pain Assessment   Location of Pain: Lower Back, neck pain, joints  Description of Pain: Dull/Aching, Throbbing, Stabbing  Previous Pain Rating :10  Current Pain Rating: 10  Aggravating Factors: Activity  Alleviating Factors: Rest, Medication    PEG Assessment   What number best describes your pain on average in the past week?10  What number best describes how, during the past week, pain has interfered with your enjoyment of life?10  What number best describes how, during the past week, pain has interfered with your general activity? 10     The following portions of the patient's history were reviewed and updated as appropriate: allergies, current medications,  past family history, past medical history, past social history, past surgical history and problem list.     has a past medical history of Ankle pain, Arthritis, Cancer (CMS/HCC), Coronary artery disease, Depression, Gout, Hyperglycemia, Hyperlipidemia, Low back pain, Overweight, Sleep apnea, Thyroid disease, Vitamin B 12 deficiency, and Vitamin D deficiency.   has a past surgical history that includes Hysterectomy; Rotator cuff repair; Cataract extraction; Carpal tunnel release; Cubital Tunnel Release; Cardiac catheterization; Replacement total knee; and Ankle Fusion.  family history includes Arthritis in her brother; Cancer in her brother; Hypertension in her mother; Stroke in her father.  Social History     Tobacco Use   • Smoking status: Never Smoker   • Smokeless tobacco: Never Used   Substance Use Topics   • Alcohol use: No     Frequency: Never     Review of Systems   Constitutional: Negative for chills, fatigue and fever.   HENT: Negative for swollen glands and trouble swallowing.    Respiratory: Negative.  Negative for shortness of breath.    Cardiovascular: Negative for chest pain, palpitations and leg swelling.   Gastrointestinal: Negative for nausea and vomiting.   Musculoskeletal: Positive for arthralgias, back pain, neck pain and neck stiffness. Negative for gait problem, joint swelling and myalgias.   Skin: Negative for color change, dry skin, rash and skin lesions.   Neurological: Positive for headache. Negative for dizziness, weakness and light-headedness.   Hematological: Negative for adenopathy. Does not bruise/bleed easily.   Psychiatric/Behavioral: Negative for behavioral problems, suicidal ideas and depressed mood.   All other systems reviewed and are negative.    Objective   Physical Exam   Constitutional: She is oriented to person, place, and time. She appears well-developed and well-nourished. No distress.   Eyes: Pupils are equal, round, and reactive to light. Conjunctivae are normal.   Neck:  "Muscular tenderness present. Decreased range of motion present. No Kernig's sign noted.   Cardiovascular: Normal heart sounds and intact distal pulses.   Pulmonary/Chest: Effort normal and breath sounds normal.   Musculoskeletal:        Left knee: Tenderness found.        Cervical back: She exhibits decreased range of motion, tenderness and spasm.        Lumbar back: She exhibits tenderness and spasm.     Vascular Status -  Her right foot exhibits no edema. Her left foot exhibits no edema.  Lymphadenopathy:     She has no cervical adenopathy.   Neurological: She is alert and oriented to person, place, and time. She has normal reflexes. No sensory deficit. Gait abnormal. Coordination normal.   Skin: Skin is warm and dry. Capillary refill takes less than 2 seconds.   Psychiatric: She has a normal mood and affect. Her behavior is normal.   Vitals reviewed.    /68   Pulse 71   Temp 97.6 °F (36.4 °C)   Resp 16   Ht 160 cm (63\")   Wt 126 kg (277 lb)   LMP  (LMP Unknown)   SpO2 100%   BMI 49.07 kg/m²      PHQ 9 on chart  Opioid risk tool low risk    Assessment/Plan   Faby was seen today for neck pain, headache, shoulder pain, foot pain, back pain, hip pain and initial evaluation.    Diagnoses and all orders for this visit:    Chronic pain syndrome    Cervical spondylosis without myelopathy  -     Ambulatory Referral to Physical Therapy Evaluate and treat; Strengthening, Stretching; Right, Left    Cervicogenic headache  -     Ambulatory Referral to Physical Therapy Evaluate and treat; Strengthening, Stretching; Right, Left    Lumbosacral spondylosis without myelopathy  -     XR Spine Lumbar Complete With Flex & Ext  -     Ambulatory Referral to Pain Management  -     Ambulatory Referral to Physical Therapy Evaluate and treat; Strengthening, Stretching; Right, Left    Polyarthralgia    Summary  Faby Le is a 75 y.o. female with multiple comorbidities including CKD, chronic polyarthralgia S/P TKA, chronic " back/neck pain headache here for initial evaluation.  Referred by neurosurgery/Dr. Velazco.  Worsening neck pain, headache from DDD spondylosis with cervicogenic headaches.  Impairing daily activity.  Marginal relief with physical therapy and medication.  Cannot take NSAIDs due to CKD.  Seen neurosurgery, no surgery recommended at this time.  Refer to try injections.  Also complaining of worsening axial back pain mostly right-sided without radicular pain.  Severely impairing her daily activity.  She requests injection for lower back before addressing cervical.    L-spine x-ray flexion-extension ordered for further evaluation.  We will schedule for bilateral lumbar facet injections.  Consider left  cervical MBB as needed.  Referral to physical therapy for cervical and lumbar spondylosis.    RTC for procedure

## 2020-03-09 ENCOUNTER — HOSPITAL ENCOUNTER (OUTPATIENT)
Dept: PAIN MEDICINE | Facility: HOSPITAL | Age: 75
Discharge: HOME | End: 2020-03-09

## 2020-03-09 ENCOUNTER — HOSPITAL ENCOUNTER (OUTPATIENT)
Dept: PAIN MEDICINE | Facility: HOSPITAL | Age: 75
Discharge: HOME | End: 2020-03-09
Admitting: ANESTHESIOLOGY

## 2020-03-09 VITALS
OXYGEN SATURATION: 100 % | DIASTOLIC BLOOD PRESSURE: 75 MMHG | TEMPERATURE: 97.5 F | BODY MASS INDEX: 49.96 KG/M2 | SYSTOLIC BLOOD PRESSURE: 129 MMHG | HEART RATE: 59 BPM | WEIGHT: 282 LBS | RESPIRATION RATE: 16 BRPM | HEIGHT: 63 IN

## 2020-03-09 DIAGNOSIS — M47.817 LUMBOSACRAL SPONDYLOSIS WITHOUT MYELOPATHY: Primary | ICD-10-CM

## 2020-03-09 DIAGNOSIS — M54.50 LOWER BACK PAIN: ICD-10-CM

## 2020-03-09 PROCEDURE — 64494 INJ PARAVERT F JNT L/S 2 LEV: CPT | Performed by: ANESTHESIOLOGY

## 2020-03-09 PROCEDURE — 64493 INJ PARAVERT F JNT L/S 1 LEV: CPT | Performed by: ANESTHESIOLOGY

## 2020-03-09 PROCEDURE — 77003 FLUOROGUIDE FOR SPINE INJECT: CPT

## 2020-03-09 PROCEDURE — 64495 INJ PARAVERT F JNT L/S 3 LEV: CPT | Performed by: ANESTHESIOLOGY

## 2020-03-09 PROCEDURE — 25010000002 METHYLPREDNISOLONE PER 40 MG

## 2020-03-09 RX ORDER — METHYLPREDNISOLONE ACETATE 40 MG/ML
INJECTION, SUSPENSION INTRA-ARTICULAR; INTRALESIONAL; INTRAMUSCULAR; SOFT TISSUE
Status: DISCONTINUED
Start: 2020-03-09 | End: 2020-03-10 | Stop reason: HOSPADM

## 2020-03-09 RX ORDER — BUPIVACAINE HYDROCHLORIDE 2.5 MG/ML
10 INJECTION, SOLUTION INFILTRATION; PERINEURAL ONCE
Status: COMPLETED | OUTPATIENT
Start: 2020-03-09 | End: 2020-03-09

## 2020-03-09 RX ORDER — BUPIVACAINE HYDROCHLORIDE 2.5 MG/ML
INJECTION, SOLUTION EPIDURAL; INFILTRATION; INTRACAUDAL
Status: DISCONTINUED
Start: 2020-03-09 | End: 2020-03-10 | Stop reason: HOSPADM

## 2020-03-09 RX ORDER — LIDOCAINE HYDROCHLORIDE 10 MG/ML
INJECTION, SOLUTION EPIDURAL; INFILTRATION; INTRACAUDAL; PERINEURAL
Status: DISCONTINUED
Start: 2020-03-09 | End: 2020-03-10 | Stop reason: HOSPADM

## 2020-03-09 RX ORDER — LIDOCAINE HYDROCHLORIDE 10 MG/ML
5 INJECTION, SOLUTION EPIDURAL; INFILTRATION; INTRACAUDAL; PERINEURAL ONCE
Status: COMPLETED | OUTPATIENT
Start: 2020-03-09 | End: 2020-03-09

## 2020-03-09 RX ORDER — METHYLPREDNISOLONE ACETATE 40 MG/ML
40 INJECTION, SUSPENSION INTRA-ARTICULAR; INTRALESIONAL; INTRAMUSCULAR; SOFT TISSUE ONCE
Status: COMPLETED | OUTPATIENT
Start: 2020-03-09 | End: 2020-03-09

## 2020-03-09 RX ADMIN — LIDOCAINE HYDROCHLORIDE 5 ML: 10 INJECTION, SOLUTION EPIDURAL; INFILTRATION; INTRACAUDAL; PERINEURAL at 13:37

## 2020-03-09 RX ADMIN — BUPIVACAINE HYDROCHLORIDE 10 ML: 2.5 INJECTION, SOLUTION INFILTRATION; PERINEURAL at 13:39

## 2020-03-09 RX ADMIN — METHYLPREDNISOLONE ACETATE 40 MG: 40 INJECTION, SUSPENSION INTRA-ARTICULAR; INTRALESIONAL; INTRAMUSCULAR; SOFT TISSUE at 13:39

## 2020-03-09 NOTE — PATIENT INSTRUCTIONS
Facet Joint Block, Care After  This sheet gives you information about how to care for yourself after your procedure. Your health care provider may also give you more specific instructions. If you have problems or questions, contact your health care provider.  What can I expect after the procedure?  After the procedure, it is common to have:  · Some tenderness over the injection sites for 2 days after the procedure.  · A temporary increase in blood sugar if you have diabetes.  Follow these instructions at home:  Medicines  · Take over-the-counter and prescription medicines only as told by your health care provider.  · You may need to limit pain medicine within the first 4-6 hours after the procedure.  Managing pain, stiffness, and swelling  If the injection site is tender, try putting ice on the area. To do this:  · Put ice in a plastic bag.  · Place a towel between your skin and the bag.  · Leave the ice on for 20 minutes, 2-3 times a day.    General instructions  · Keep track of the amount of pain relief you feel and how long it lasts.  · Remove your bandages (dressings) the morning after the procedure.  · Check your injection site every day for signs of infection. Check for:  ? Redness, swelling, or pain.  ? Fluid or blood.  ? Warmth.  ? Pus or a bad smell.  · For the first 24 hours after the procedure:  ? Do not apply heat near or over the injection sites.  ? Do not take a bath or soak in water, such as in a pool or lake.  ? Do not drive or use heavy machinery unless approved by your health care provider.  ? Avoid activities that need a lot of effort.  · Rest as told by your health care provider.  · Return to your normal activities as told by your health care provider. Ask your health care provider what activities are safe for you.  · If you have diabetes, monitor your blood sugar levels as told by your health care provider.  · Keep all follow-up visits as told by your health care provider. This is  important.  Contact a health care provider if:  · You have diabetes and your blood sugar is above 180 mg/dL.  · You have fluid or blood coming from your injection site.  · Your injection site feels warm to the touch.  Get help right away if:  · You have a fever or chills.  · You have worsening pain or swelling around an injection site.  · You have pus or a bad smell coming from your injection site.  · There are red streaks around your injection site.  · You have severe pain that is not controlled by your medicines.  · You have a headache, stiff neck, nausea, or vomiting.  · Your eyes become very sensitive to light.  · You have weakness, paralysis, or tingling in your arms or legs that was not there before the procedure.  · You have trouble urinating.  · You have trouble breathing.  These symptoms may represent a serious problem that is an emergency. Do not wait to see if the symptoms will go away. Get medical help right away. Call your local emergency services (911 in the [place] Do not drive yourself to the hospital.  Summary  · After this procedure, it is common to have tenderness over the injection site and a brief increase in blood sugar if you have diabetes.  · Take over-the-counter and prescription medicines only as told by your health care provider.  · Return to your normal activities as told by your health care provider. Ask your health care provider what activities are safe for you.  · Contact a health care provider if fluid is coming from an injection site or if there is significant bleeding or swelling at an injection site.  · Get help right away if you have weakness, paralysis, or tingling in your arms or legs that was not there before the procedure.  This information is not intended to replace advice given to you by your health care provider. Make sure you discuss any questions you have with your health care provider.  Document Released: 12/04/2013 Document Revised: 11/25/2019 Document Reviewed:  11/25/2019  Elsevier Interactive Patient Education © 2020 [employer]

## 2020-03-10 NOTE — PROCEDURES
"Subjective    CC back pain  Faby Le is a 75 y.o. female with lumbar spondylosis here for bilateral lumbar facet injections  No anticoagulation    Pain Assessment   Location of Pain: Lower Back, R Hip, L Hip,   Description of Pain: Dull/Aching, Throbbing, Stabbing  Previous Pain Rating :7  Current Pain Ratin   Aggravating Factors: Activity  Alleviating Factors: Rest, Medication    The following portions of the patient's history were reviewed and updated as appropriate: allergies, current medications, past family history, past medical history, past social history, past surgical history and problem list.      Review of Systems  As in HPI  Objective   Physical Exam   Constitutional: She is oriented to person, place, and time. She appears well-developed. No distress.   Cardiovascular: Normal rate.   Pulmonary/Chest: Effort normal.   Musculoskeletal:        Lumbar back: She exhibits decreased range of motion and tenderness.   Neurological: She is alert and oriented to person, place, and time.   Psychiatric: She has a normal mood and affect.     /75 (BP Location: Left arm, Patient Position: Sitting)   Pulse 59   Temp 97.5 °F (36.4 °C) (Oral)   Resp 16   Ht 160 cm (63\")   Wt 128 kg (282 lb)   LMP  (LMP Unknown)   SpO2 100%   BMI 49.95 kg/m²     Assessment/Plan    underwent sena lumbar facet injections    RTC 4-6 weeks or as needed for repeat    DATE OF PROCEDURE:  3/9/2020     PREOPERATIVE DIAGNOSIS: Lumbar spondylosis without myelopathy    POSTOPERATIVE DIAGNOSIS: same    PROCEDURE PERFORMED: Bilateral Lumbar Facet Block at L3/L4, L4/L5, L5/S1.     The patient presents with a history of lumbosacral spondylosis at level [ L3/L4] [ L4/L5 ] [ L5/S1].   The patient understands the risks and benefits of the procedure and wishes to proceed. The patient was seen in the preoperative area.  Patient's consent was obtained and updated.  Vitals were taken.  Patient was then brought to the procedure suite and " placed in a prone position. The appropriate anatomic area was widely prepped with Chloroprep and draped in a sterile fashion.  Noninvasive monitoring per routine anesthesia protocol was placed.  Under fluoroscopic guidance an AP view with caudad cephaled tilt was obtained. A 22 gauge curved tip spinal needle was passed through skin anesthetized with 1% Lidocaine without epinephrine. The needle tip was guided into the facet joint at [ L3/4 ] [ L4/5 ] [ L5/S1 ].  Next 0.25 mL of  preservative free contrast were injected.   At this point 0.5 mL of a solution  containing 1 mL of 40 mg Depo-Medrol and 4 mL of 0.25% bupivacaine was injected. A sterile dressing was placed over the puncture site. The patient tolerated the procedure with  no complications. They were then brought to the post procedure area where they recovered nicely.

## 2020-03-11 ENCOUNTER — TREATMENT (OUTPATIENT)
Dept: PHYSICAL THERAPY | Facility: CLINIC | Age: 75
End: 2020-03-11

## 2020-03-11 DIAGNOSIS — M47.817 SPONDYLOSIS OF LUMBOSACRAL SPINE WITHOUT MYELOPATHY: ICD-10-CM

## 2020-03-11 DIAGNOSIS — G44.86 CERVICOGENIC HEADACHE: Primary | ICD-10-CM

## 2020-03-11 DIAGNOSIS — M47.812 SPONDYLOSIS OF CERVICAL SPINE WITHOUT MYELOPATHY: ICD-10-CM

## 2020-03-11 PROCEDURE — 97110 THERAPEUTIC EXERCISES: CPT | Performed by: PHYSICAL THERAPIST

## 2020-03-11 PROCEDURE — 97140 MANUAL THERAPY 1/> REGIONS: CPT | Performed by: PHYSICAL THERAPIST

## 2020-03-11 PROCEDURE — 97162 PT EVAL MOD COMPLEX 30 MIN: CPT | Performed by: PHYSICAL THERAPIST

## 2020-03-11 NOTE — PROGRESS NOTES
"  Physical Therapy Initial Evaluation and Plan of Care    Patient: Faby Le   : 1945  Diagnosis/ICD-10 Code:  Cervicogenic headache [R51]  Referring practitioner: Maggi Horn MD  Date of Initial Visit: 3/11/2020  Today's Date: 3/11/2020  Patient seen for 1 sessions           Subjective Questionnaire: Oswestry: 40%; NDI: 28%       Subjective Evaluation    History of Present Illness  Mechanism of injury: Patient presents to physical therapy with chief complaint of neck pain.  Patient also reports lower back pain, however that she had injections in lumbar spine earlier this week and states that those have helped a lot.  Patient reports that her neck pain as been on and off for the past few months.  Reports that she has been diagnosed with arthritis.  Frequently feels \"grinding\" in her neck with movement.  Patient reports that primary limitation is pain and stiffness in cervical spine.  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      Pain  Current pain rating: 3  At worst pain ratin  Quality: dull ache  Relieving factors: rest and medications  Aggravating factors: standing, prolonged positioning, overhead activity, ambulation and movement  Progression: no change    Diagnostic Tests  X-ray: abnormal  MRI studies: abnormal    Treatments  Previous treatment: injection treatment  Patient Goals  Patient goals for therapy: decreased pain, increased motion, increased strength and independence with ADLs/IADLs             Objective       Neurological Testing     Additional Neurological Details  Dermatomes wnl   Dermatomes wnl     Active Range of Motion   Cervical/Thoracic Spine   Cervical    Flexion: 45 degrees   Extension: 17 degrees   Left lateral flexion: 20 degrees   Right lateral flexion: 10 degrees   Left rotation: 55 degrees   Right rotation: 45 degrees     Additional Active Range of Motion Details  Lumbar AROM:     Flexion  25% " limited  Extension  50% limited  R SB  25% limited  L SB  25% limited    Strength/Myotome Testing     Left Shoulder     Planes of Motion   Flexion: 4   Abduction: 5   External rotation at 0°: 5   Internal rotation at 0°: 5     Right Shoulder     Planes of Motion   Flexion: 5   Abduction: 4   External rotation at 0°: 5   Internal rotation at 0°: 5     Left Elbow   Flexion: 5  Extension: 5    Right Elbow   Flexion: 5  Extension: 5    Left Wrist/Hand   Wrist extension: 5  Wrist flexion: 5    Right Wrist/Hand   Wrist extension: 5  Wrist flexion: 5    Left Hip   Planes of Motion   Flexion: 5  Adduction: 5  External rotation: 5  Internal rotation: 5    Right Hip   Planes of Motion   Flexion: 5  Adduction: 5  External rotation: 5  Internal rotation: 5    Left Knee   Flexion: 5  Extension: 5    Right Knee   Flexion: 5  Extension: 5    Left Ankle/Foot   Dorsiflexion: 5    Right Ankle/Foot   Dorsiflexion: 5         Assessment & Plan     Assessment  Impairments: abnormal or restricted ROM, activity intolerance, impaired physical strength, lacks appropriate home exercise program and pain with function  Assessment details: Patient presents to physical therapy with s/s congruent with MD diagnosis of pain in cervical and lumbar spine.  Patient demonstrates restriction in AROM in both cervical and lumbar spine.  Patient also demonstrates weakness in BUE's.  TTP noted on both side of cervical spine at level of suboccipitals.  Patient would benefit from physical therapy intervention in order to address these deficits so that she may complete ADL's with less pain/limitation.   Prognosis: good  Functional Limitations: carrying objects, lifting, uncomfortable because of pain and reaching overhead  Goals  Plan Goals: In two weeks, patient will report at least 25% reduction in pain level.    In two weeks, patient will demonstrate at least 25% improvement in AROM in cervical spine and lumbar spine.     In four weeks, patient will  demonstrate at least 60 degrees of cervical rotation bilaterally.   In four weeks, patient will demonstrate lumbar AROM WFL without pain level over 2/10.  In four weeks, patient will demonstrate decreased perceived disability by decreasing score NDI and Oswestry by at least 12% each.    Plan  Therapy options: will be seen for skilled physical therapy services  Planned modality interventions: thermotherapy (hydrocollator packs), cryotherapy and traction  Planned therapy interventions: manual therapy, neuromuscular re-education, soft tissue mobilization, spinal/joint mobilization, strengthening, stretching, therapeutic activities, joint mobilization, home exercise program, functional ROM exercises and abdominal trunk stabilization  Plan details: 1-2 times per week, 30 visits or 90 day recertification        Manual Therapy:    15     mins  42474;  Therapeutic Exercise:    15     mins  52868;     Neuromuscular Mohan:        mins  39509;    Therapeutic Activity:          mins  65404;     Gait Training:           mins  36846;     Ultrasound:          mins  54434;    Electrical Stimulation:         mins  48359 ( );  Dry Needling          mins self-pay    Timed Treatment:   30   mins   Total Treatment:     55   mins    PT SIGNATURE: Octavio Hutchins PT   DATE TREATMENT INITIATED: 3/11/2020    Initial Certification  Certification Period: 6/9/2020  I certify that the therapy services are furnished while this patient is under my care.  The services outlined above are required by this patient, and will be reviewed every 90 days.     PHYSICIAN: Maggi Horn MD      DATE:     Please sign and return via fax to [phone].. Thank you, [facility].

## 2020-03-17 ENCOUNTER — TREATMENT (OUTPATIENT)
Dept: PHYSICAL THERAPY | Facility: CLINIC | Age: 75
End: 2020-03-17

## 2020-03-17 DIAGNOSIS — G44.86 CERVICOGENIC HEADACHE: Primary | ICD-10-CM

## 2020-03-17 PROCEDURE — 97140 MANUAL THERAPY 1/> REGIONS: CPT | Performed by: PHYSICAL THERAPIST

## 2020-03-17 PROCEDURE — 97110 THERAPEUTIC EXERCISES: CPT | Performed by: PHYSICAL THERAPIST

## 2020-03-17 NOTE — PROGRESS NOTES
Physical Therapy Daily Progress Note    Patient: Faby Le   : 1945  Diagnosis/ICD-10 Code:  Cervicogenic headache [R51]  Referring practitioner: Maggi Horn MD  Date of Initial Visit: Type: THERAPY  Noted: 3/11/2020  Today's Date: 3/17/2020  Patient seen for 2 sessions         Faby Le reports:  Continued soreness in cervical spine and lumbar spine.     Objective   See Exercise, Manual, and Modality Logs for complete treatment.       Assessment/Plan    Tolerates treatment well.  Added to HEP.  Patient advised that if she wishes, she may continue with I HEP due to coronavirus.  However will be up to patient's discretion as far as continuing physical therapy.      Progress per Plan of Care           Manual Therapy:    15     mins  34226;  Therapeutic Exercise:    15     mins  45146;     Neuromuscular Mohan:        mins  57740;    Therapeutic Activity:          mins  15341;     Gait Training:           mins  28842;     Ultrasound:          mins  00813;    Electrical Stimulation:         mins  22398 ( );  Dry Needling          mins self-pay    Timed Treatment:   30   mins   Total Treatment:     30   mins    Octavio Hutchins PT  Physical Therapist

## 2020-03-19 ENCOUNTER — TREATMENT (OUTPATIENT)
Dept: PHYSICAL THERAPY | Facility: CLINIC | Age: 75
End: 2020-03-19

## 2020-03-19 DIAGNOSIS — G44.86 CERVICOGENIC HEADACHE: Primary | ICD-10-CM

## 2020-03-19 PROCEDURE — 97140 MANUAL THERAPY 1/> REGIONS: CPT | Performed by: PHYSICAL THERAPIST

## 2020-04-01 RX ORDER — FUROSEMIDE 20 MG/1
TABLET ORAL
Qty: 30 TABLET | Refills: 1 | Status: SHIPPED | OUTPATIENT
Start: 2020-04-01 | End: 2020-05-20

## 2020-05-11 ENCOUNTER — OFFICE VISIT (OUTPATIENT)
Dept: PAIN MEDICINE | Facility: CLINIC | Age: 75
End: 2020-05-11

## 2020-05-11 VITALS — HEIGHT: 63 IN | WEIGHT: 275 LBS | BODY MASS INDEX: 48.73 KG/M2

## 2020-05-11 DIAGNOSIS — M47.812 CERVICAL SPONDYLOSIS WITHOUT MYELOPATHY: ICD-10-CM

## 2020-05-11 DIAGNOSIS — G89.4 CHRONIC PAIN SYNDROME: Primary | ICD-10-CM

## 2020-05-11 DIAGNOSIS — M25.50 POLYARTHRALGIA: ICD-10-CM

## 2020-05-11 DIAGNOSIS — M47.817 LUMBOSACRAL SPONDYLOSIS WITHOUT MYELOPATHY: ICD-10-CM

## 2020-05-11 PROCEDURE — 99443 PR PHYS/QHP TELEPHONE EVALUATION 21-30 MIN: CPT | Performed by: ANESTHESIOLOGY

## 2020-05-11 NOTE — PROGRESS NOTES
Subjective    CC neck pain, headache, back pain, left hip pain  Faby Le is a 75 y.o. female with multiple comorbidities including CKD, chronic polyarthralgia S/p TKA , chronic back/ neck pain headache here for follow-up.   Good relief with bilateral lumbar facet injections last visit lasted 2 to 3 weeks and symptoms have returned and interfering with ADL.  Chronic low back pain mostly right-sided radiating to right hip worse with twisting bending, prolonged standing or prolonged sitting.  Denies radicular pain.  Complains of several weeks of worsening left neck pain rating to left shoulder and head associated with headaches constant worse with activity.  Denies balance issues, weakness, bladder bowel continence.  Continue chronic polyarthralgia, knee pain left foot pain.  Continued chronic axial back pain.  Chronic pain impairing daily activity and interfering with sleep.  Seen neurosurgery, no surgery recommended at this time.  Refer to try facet blocks/RFA..     L-spine x-ray  advanced multilevel degenerative disc disease with disc space narrowing at all lumbar levels. There is grade 1 anterolisthesis of L4 on L5. There is marked reactive endplate sclerosis at all levels. There is advanced facet arthrosis throughout the lumbar spine. With flexion, there is 6 mm of anterolisthesis of L4 on 5. With extension, this measures approximately 5 mm   C-spine CT 2020 multiple level degenerative changes facet arthropathy with osteophyte complex, mild to moderate.   The spine MRI 2019 Multilevel central canal stenosis with moderate to marked neural foraminal narrowing at multiple levels, changes are most significant from the C3-4 through the C6-7 levels with changes of a lesser degree at the C7-T1 level.  Normal cord    Pain Assessment   Location of Pain: Lower Back, neck pain, joints  Description of Pain: Dull/Aching, Throbbing, Stabbing  Previous Pain Rating :10  Current Pain Ratin  Aggravating Factors:  Activity  Alleviating Factors: Rest, Medication    PEG Assessment   What number best describes your pain on average in the past week?4  What number best describes how, during the past week, pain has interfered with your enjoyment of life?8  What number best describes how, during the past week, pain has interfered with your general activity? 7     The following portions of the patient's history were reviewed and updated as appropriate: allergies, current medications, past family history, past medical history, past social history, past surgical history and problem list.     has a past medical history of Ankle pain, Arthritis, Cancer (CMS/HCC), Coronary artery disease, Depression, Gout, Hyperglycemia, Hyperlipidemia, Low back pain, Overweight, Sleep apnea, Thyroid disease, Vitamin B 12 deficiency, and Vitamin D deficiency.   has a past surgical history that includes Hysterectomy; Rotator cuff repair; Cataract extraction; Carpal tunnel release; Cubital Tunnel Release; Cardiac catheterization; Replacement total knee; and Ankle Fusion.  family history includes Arthritis in her brother; Cancer in her brother; Hypertension in her mother; Stroke in her father.  Social History     Tobacco Use   • Smoking status: Never Smoker   • Smokeless tobacco: Never Used   Substance Use Topics   • Alcohol use: No     Frequency: Never     Review of Systems   Constitutional: Negative for chills, fatigue and fever.   HENT: Negative for swollen glands and trouble swallowing.    Respiratory: Negative.  Negative for shortness of breath.    Cardiovascular: Negative for chest pain, palpitations and leg swelling.   Gastrointestinal: Negative for nausea and vomiting.   Musculoskeletal: Positive for arthralgias, back pain, neck pain and neck stiffness. Negative for gait problem, joint swelling and myalgias.   Skin: Negative for color change, dry skin, rash and skin lesions.   Neurological: Positive for headache. Negative for dizziness, weakness and  "light-headedness.   Hematological: Negative for adenopathy. Does not bruise/bleed easily.   Psychiatric/Behavioral: Negative for behavioral problems, suicidal ideas and depressed mood.   All other systems reviewed and are negative.    Objective   Physical Exam   Constitutional: She is oriented to person, place, and time. No distress.   Neurological: She is alert and oriented to person, place, and time.   Psychiatric: Her speech is normal and behavior is normal.     Ht 160 cm (63\")   Wt 125 kg (275 lb)   LMP  (LMP Unknown)   BMI 48.71 kg/m²      PHQ 9 on chart  Opioid risk tool low risk    Assessment/Plan   Faby was seen today for back pain.    Diagnoses and all orders for this visit:    Chronic pain syndrome    Lumbosacral spondylosis without myelopathy  -     Ambulatory Referral to Pain Management    Cervical spondylosis without myelopathy    Polyarthralgia    Summary  Faby Le is a 75 y.o. female with multiple comorbidities including CKD, chronic polyarthralgia S/P TKA, chronic back/neck pain headache here for follow-up by telephone.  Chronic neck pain, headache from DDD spondylosis with cervicogenic headaches.  Impairing daily activity.  Marginal relief with physical therapy and medication.  Cannot take NSAIDs due to CKD.  Seen neurosurgery, no surgery recommended at this time.  Refer to try injections.    Chronic axial back pain mostly right-sided without radicular pain.  Severely impairing her daily activity.  She requests injection for lower back before addressing cervical.  Good relief with bilateral lumbar facet injections last visit lasted 2 to 3 weeks and symptoms have returned and interfering with ADL.  Mild relief with PT/chiropractor.  We will schedule for repeat bilateral lumbar medial branch block.  If effective will plan RFA    Telemedicine t done to avoid coronavirus exposure.  Discussed CDC guidelines and precaution regarding current epidemic.  Unable to complete visit using a video " connection to the patient. A phone visit was used to complete visit. Total time of discussion was 21 minutes    RTC for procedure

## 2020-05-20 ENCOUNTER — TELEPHONE (OUTPATIENT)
Dept: PAIN MEDICINE | Facility: HOSPITAL | Age: 75
End: 2020-05-20

## 2020-05-20 ENCOUNTER — HOSPITAL ENCOUNTER (OUTPATIENT)
Dept: PAIN MEDICINE | Facility: HOSPITAL | Age: 75
Discharge: HOME | End: 2020-05-20

## 2020-05-20 ENCOUNTER — HOSPITAL ENCOUNTER (OUTPATIENT)
Dept: PAIN MEDICINE | Facility: HOSPITAL | Age: 75
Discharge: HOME | End: 2020-05-20
Admitting: ANESTHESIOLOGY

## 2020-05-20 VITALS
BODY MASS INDEX: 48.73 KG/M2 | RESPIRATION RATE: 16 BRPM | DIASTOLIC BLOOD PRESSURE: 77 MMHG | HEART RATE: 56 BPM | HEIGHT: 63 IN | OXYGEN SATURATION: 99 % | SYSTOLIC BLOOD PRESSURE: 122 MMHG | TEMPERATURE: 97.1 F | WEIGHT: 275 LBS

## 2020-05-20 DIAGNOSIS — M47.817 LUMBOSACRAL SPONDYLOSIS WITHOUT MYELOPATHY: Primary | ICD-10-CM

## 2020-05-20 DIAGNOSIS — M25.511 CHRONIC PAIN OF BOTH SHOULDERS: ICD-10-CM

## 2020-05-20 DIAGNOSIS — M47.812 CERVICAL SPONDYLOSIS WITHOUT MYELOPATHY: ICD-10-CM

## 2020-05-20 DIAGNOSIS — R52 PAIN: ICD-10-CM

## 2020-05-20 DIAGNOSIS — G89.29 CHRONIC PAIN OF BOTH SHOULDERS: ICD-10-CM

## 2020-05-20 DIAGNOSIS — M25.512 CHRONIC PAIN OF BOTH SHOULDERS: ICD-10-CM

## 2020-05-20 PROCEDURE — 0 IOPAMIDOL 41 % SOLUTION: Performed by: ANESTHESIOLOGY

## 2020-05-20 PROCEDURE — 64493 INJ PARAVERT F JNT L/S 1 LEV: CPT | Performed by: ANESTHESIOLOGY

## 2020-05-20 PROCEDURE — 77003 FLUOROGUIDE FOR SPINE INJECT: CPT

## 2020-05-20 PROCEDURE — 64495 INJ PARAVERT F JNT L/S 3 LEV: CPT | Performed by: ANESTHESIOLOGY

## 2020-05-20 PROCEDURE — 64494 INJ PARAVERT F JNT L/S 2 LEV: CPT | Performed by: ANESTHESIOLOGY

## 2020-05-20 PROCEDURE — 25010000002 METHYLPREDNISOLONE PER 40 MG

## 2020-05-20 RX ORDER — LIDOCAINE HYDROCHLORIDE 10 MG/ML
INJECTION, SOLUTION EPIDURAL; INFILTRATION; INTRACAUDAL; PERINEURAL
Status: DISPENSED
Start: 2020-05-20 | End: 2020-05-20

## 2020-05-20 RX ORDER — FUROSEMIDE 20 MG/1
TABLET ORAL
Qty: 30 TABLET | Refills: 1 | Status: SHIPPED | OUTPATIENT
Start: 2020-05-20 | End: 2020-06-12 | Stop reason: SDUPTHER

## 2020-05-20 RX ORDER — BUPIVACAINE HYDROCHLORIDE 2.5 MG/ML
10 INJECTION, SOLUTION INFILTRATION; PERINEURAL ONCE
Status: COMPLETED | OUTPATIENT
Start: 2020-05-20 | End: 2020-05-20

## 2020-05-20 RX ORDER — METHYLPREDNISOLONE ACETATE 40 MG/ML
INJECTION, SUSPENSION INTRA-ARTICULAR; INTRALESIONAL; INTRAMUSCULAR; SOFT TISSUE
Status: DISPENSED
Start: 2020-05-20 | End: 2020-05-20

## 2020-05-20 RX ORDER — METHYLPREDNISOLONE ACETATE 40 MG/ML
40 INJECTION, SUSPENSION INTRA-ARTICULAR; INTRALESIONAL; INTRAMUSCULAR; SOFT TISSUE ONCE
Status: COMPLETED | OUTPATIENT
Start: 2020-05-20 | End: 2020-05-20

## 2020-05-20 RX ORDER — LIDOCAINE HYDROCHLORIDE 10 MG/ML
5 INJECTION, SOLUTION EPIDURAL; INFILTRATION; INTRACAUDAL; PERINEURAL ONCE
Status: COMPLETED | OUTPATIENT
Start: 2020-05-20 | End: 2020-05-20

## 2020-05-20 RX ORDER — BUPIVACAINE HYDROCHLORIDE 2.5 MG/ML
INJECTION, SOLUTION EPIDURAL; INFILTRATION; INTRACAUDAL
Status: DISPENSED
Start: 2020-05-20 | End: 2020-05-20

## 2020-05-20 RX ADMIN — IOPAMIDOL 5 ML: 408 INJECTION, SOLUTION INTRATHECAL at 10:56

## 2020-05-20 RX ADMIN — METHYLPREDNISOLONE ACETATE 40 MG: 40 INJECTION, SUSPENSION INTRA-ARTICULAR; INTRALESIONAL; INTRAMUSCULAR; SOFT TISSUE at 10:57

## 2020-05-20 RX ADMIN — BUPIVACAINE HYDROCHLORIDE 10 ML: 2.5 INJECTION, SOLUTION INFILTRATION; PERINEURAL at 10:57

## 2020-05-20 RX ADMIN — LIDOCAINE HYDROCHLORIDE 5 ML: 10 INJECTION, SOLUTION EPIDURAL; INFILTRATION; INTRACAUDAL; PERINEURAL at 10:48

## 2020-05-20 NOTE — PROCEDURES
"Subjective    CC back pain  Faby Le is a 75 y.o. female with lumbar spondylosis here for bilateral lumbar medial branch block.  No anticoagulation    Pain Assessment   Location of Pain: Lower Back, R Hip, L Hip,   Description of Pain: Dull/Aching, Throbbing, Stabbing  Previous Pain Rating :7  Current Pain Ratin   Aggravating Factors: Activity  Alleviating Factors: Rest, Medication    The following portions of the patient's history were reviewed and updated as appropriate: allergies, current medications, past family history, past medical history, past social history, past surgical history and problem list.      Review of Systems  As in HPI  Objective   Physical Exam   Constitutional: She is oriented to person, place, and time. She appears well-developed. No distress.   Cardiovascular: Normal rate.   Pulmonary/Chest: Effort normal.   Musculoskeletal:        Lumbar back: She exhibits decreased range of motion and tenderness.   Neurological: She is alert and oriented to person, place, and time.   Psychiatric: She has a normal mood and affect.     /77 (BP Location: Right arm, Patient Position: Sitting)   Pulse 56   Temp 97.1 °F (36.2 °C) (Skin)   Resp 16   Ht 160 cm (63\")   Wt 125 kg (275 lb)   LMP  (LMP Unknown)   SpO2 99%   BMI 48.71 kg/m²     Assessment/Plan    underwent stan lumbar lumbar medial branch block reported 80% relief 15 minutes after the procedure.  Also had good relief with bilateral facet injection x2 weeks.    We will schedule for right and then left lumbar RFA without sedation.    She complains of worsening neck pain bilateral shoulder pain, referred to physical therapy.    RTC for procedure or as needed    DATE OF PROCEDURE: 2020    PREOPERATIVE DIAGNOSIS: Lumbar spondylosis without myelopathy    POSTOPERATIVE DIAGNOSIS: same    PROCEDURE PERFORMED: Stan Lumbar Medial Branch Block at L3/L4, L4/L5, L5/S1.     The patient presents with a history of lumbosacral spondylosis " bilaterally at level [ L3/L4] [ L4/L5 ] [ L5/S1].   The patient understands the risks and benefits of the procedure and wishes to proceed. The patient was seen in the preoperative area.  Patient's consent was obtained and updated.  Vitals were taken.  Patient was then brought to the procedure suite and placed in a prone position. The appropriate anatomic area was widely prepped with Chloroprep and draped in a sterile fashion.  Noninvasive monitoring per routine anesthesia protocol was placed.  Under fluoroscopic guidance an AP view with caudad cephaled tilt was obtained. A 22 gauge curved tip spinal needle was passed through skin anesthetized with 1% Lidocaine without epinephrine. The needle tip was guided into the superior medial aspect of the transverse process at  [ L3 ] [ L4 ] [ L5 ] [ sacral ala ].  Next 0.25 mL of  preservative free contrast were injected.   At this point 0.5 mL of a solution  containing 1 mL of 40 mg Depo-Medrol and 4 mL of 0.25% bupivacaine was injected. A sterile dressing was placed over the puncture site. The patient tolerated the procedure with  no complications. They were then brought to the post procedure area where they recovered nicely.

## 2020-05-20 NOTE — PATIENT INSTRUCTIONS
Medial Branch Nerve Block    Medial branch nerve block is a procedure to numb the nerves that supply the joints between your spinal bones (facet joints). The facet joints are located on the back of your spine. You may have the procedure on your neck or your upper, middle, or lower spine.  During this procedure, your health care provider will inject a numbing medicine (local anesthetic) around the medial nerves near the facet joint being treated. If more than one facet joint is causing pain, you may have more than one injection. In most cases, an anti-inflammatory medicine (steroid) will also be injected. You may need this procedure if:  · You have back pain from wear and tear (osteoarthritis) of your facet joint.  · You have an injury to a facet joint.  · Your health care provider wants to diagnose a facet joint as the cause of your pain. If the procedure relieves the pain, this indicates that the facet joint was the cause.  The local anesthetic will relieve pain for several days. The steroid may continue to relieve pain for several weeks. If your pain returns when the medicines wear off, this procedure may be repeated.  Tell a health care provider about:  · Any allergies you have.  · All medicines you are taking, including vitamins, herbs, eye drops, creams, and over-the-counter medicines.  · Any problems you or family members have had with anesthetic medicines.  · Any blood disorders you have.  · Any surgeries you have had.  · Any medical conditions you have.  · Whether you are pregnant or may be pregnant.  What are the risks?  Generally, this is a safe procedure. However, problems may occur, including:  · Infection.  · Bleeding.  · Allergic reactions to medicines or dyes.  · Damage to other structures or organs.  · Injection of the anesthetic into a blood vessel, which may decrease blood supply to your spinal cord and cause damage.  · Spread of the anesthetic to nearby nerves, which may cause temporary weakness  or numbness.  What happens before the procedure?  · Ask your health care provider about:  ? Changing or stopping your regular medicines. This is especially important if you are taking diabetes medicines or blood thinners.  ? Taking medicines such as aspirin and ibuprofen. These medicines can thin your blood. Do not take these medicines unless your health care provider tells you to take them.  ? Taking over-the-counter medicines, vitamins, herbs, and supplements.  · Plan to have someone take you home from the hospital or clinic.  · Follow instructions from your health care provider about any eating or drinking restrictions before the procedure.  · Ask your health care provider what steps will be taken to help prevent infection. These may include:  ? Removing hair at the injection site.  ? Washing skin with a germ-killing soap.  What happens during the procedure?  · An IV may be inserted into one of your veins.  · You will be given one or more of the following:  ? A medicine to help you relax (sedative).  ? A medicine to numb the area (local anesthetic). Your health care provider will feel for the facet joint or joints that are causing pain and inject a short-acting local anesthetic into the skin over the joint or joints.  · Your health care provider will then pass a needle into the area around the facet joint.  · Your health care provider may use a type of X-ray (fluoroscopy) to look at images of your spinal cord. If so, the health care provider will inject a small amount of dye into the facet joint area. The dye will show up on fluoroscopy and help locate the exact area to inject the long-acting anesthetic.  · The medicine will then be injected. Along with the long-acting anesthetic, a steroid medicine may also be injected.  · The needle will be removed, and a bandage will be placed over the injection site.  The procedure may vary among health care providers and hospitals.  What can I expect after the  procedure?  · Your blood pressure, heart rate, breathing rate, and blood oxygen level will be monitored until you leave the hospital or clinic.  · You should feel less pain in your back.  · You may have some soreness around the injection site.  Follow these instructions at home:  Injection site care  · Leave your bandage on for 24 hours.  · Do not take baths, swim, or use a hot tub until your health care provider approves.  · Check your injection site every day for signs of infection. Check for:  ? Redness, swelling, or pain.  ? Fluid or blood.  ? Warmth.  ? Pus or a bad smell.  · If directed, put ice on the injection area:  ? Put ice in a plastic bag.  ? Place a towel between your skin and the bag.  ? Leave the ice on for 20 minutes, 2-3 times a day.  General instructions  · Take over-the-counter and prescription medicines only as told by your health care provider.  · Do not drive for 24 hours if you were given a sedative during the procedure.  · Return to your normal activities as told by your health care provider. Ask your health care provider what activities are safe for you.  · Keep a log of your pain after the procedure. Keep track of how much pain you have and when you have it. This will help your health care provider plan your future treatment.  ? You should have relief of pain from the anesthetic for up to 3 days.  ? After that you may notice some pain again until the steroid starts to help. Pain relief from the steroid may last for a few weeks.  · Keep all follow-up visits as told by your health care provider. This is important.  Contact your health care provider if:  · Your pain is not relieved or gets worse at home.  · You have a fever or chills.  · You have any signs of infection.  · You develop any numbness or weakness.  Summary  · Medial branch nerve block is a procedure to numb the nerves that supply the joints between your spinal bones (facet joint).  · You may have the procedure on your neck or  your upper, middle, or lower spine.  · This procedure may be done both to diagnose and relieve facet joint pain.  · A long-acting local anesthetic is injected close to the nerve that supplies the facet joint. An anti-inflammatory medicine (steroid) will also be injected.  This information is not intended to replace advice given to you by your health care provider. Make sure you discuss any questions you have with your health care provider.  Document Released: 08/22/2019 Document Revised: 08/22/2019 Document Reviewed: 08/22/2019  [employer] Interactive Patient Education © 2020 [employer]

## 2020-06-12 RX ORDER — FUROSEMIDE 20 MG/1
20 TABLET ORAL DAILY
Qty: 90 TABLET | Refills: 1 | Status: SHIPPED | OUTPATIENT
Start: 2020-06-12 | End: 2020-10-28

## 2020-06-16 RX ORDER — AMLODIPINE BESYLATE 5 MG/1
TABLET ORAL
Qty: 90 TABLET | Refills: 3 | Status: SHIPPED | OUTPATIENT
Start: 2020-06-16 | End: 2020-10-20 | Stop reason: SINTOL

## 2020-06-29 RX ORDER — ALLOPURINOL 300 MG/1
TABLET ORAL
Qty: 90 TABLET | Refills: 2 | Status: SHIPPED | OUTPATIENT
Start: 2020-06-29 | End: 2021-03-31 | Stop reason: SDUPTHER

## 2020-07-02 ENCOUNTER — DOCUMENTATION (OUTPATIENT)
Dept: PHYSICAL THERAPY | Facility: CLINIC | Age: 75
End: 2020-07-02

## 2020-07-02 PROCEDURE — PTNOCHG PR CUSTOM PT NO CHARGE VISIT: Performed by: PHYSICAL THERAPIST

## 2020-07-02 NOTE — PROGRESS NOTES
Discharge Summary  Discharge Summary from Physical Therapy Report      Dates  PT visit: 3/11/20 - 3/19/20  Number of Visits: 3       Goals: Not Met    Discharge Plan: Future need for rehabilitation activities    Comments :  Patient d/c due to pandemic.       Octavio Hutchins, PT  Physical Therapist                       Patient notified via detailed message.

## 2020-07-06 ENCOUNTER — OFFICE VISIT (OUTPATIENT)
Dept: NEUROSURGERY | Facility: CLINIC | Age: 75
End: 2020-07-06

## 2020-07-06 DIAGNOSIS — M48.02 SPINAL STENOSIS IN CERVICAL REGION: Primary | ICD-10-CM

## 2020-07-06 DIAGNOSIS — M54.2 NECK PAIN: ICD-10-CM

## 2020-07-06 PROCEDURE — 99441 PR PHYS/QHP TELEPHONE EVALUATION 5-10 MIN: CPT | Performed by: NEUROLOGICAL SURGERY

## 2020-07-06 NOTE — PROGRESS NOTES
Subjective   Faby Le is a 75 y.o. female.     No chief complaint on file.    Visit Vitals  LMP  (LMP Unknown)     You have chosen to receive care through a telephone visit. Do you consent to use a telephone visit for your medical care today? Yes      History of Present Illness: Mrs. Le is here today for tele-visit.  She states that since starting the medication that actually helped a lot.  The facet blocks helped immensely and now she is preparing to undergo the rhizotomy.  She denies any new weakness or pain.  She actually feels like she is doing quite well.  Her CT scan does demonstrate that she appears to be autofused at the C4-6 segments.  Flexion-extension films do not demonstrate any significant motion at the C3-4 segment.  At this time we will just continue to follow her along on a 3-month as needed basis.        The following portions of the patient's history were reviewed and updated as appropriate: allergies, current medications, past family history, past medical history, past social history, past surgical history and problem list.    Review of Systems         Past Surgical History:   Procedure Laterality Date   • ANKLE FUSION      2017-left   • CARDIAC CATHETERIZATION     • CARPAL TUNNEL RELEASE     • CATARACT EXTRACTION     • CUBITAL TUNNEL RELEASE     • HYSTERECTOMY     • REPLACEMENT TOTAL KNEE      left 2008   • ROTATOR CUFF REPAIR         Past Medical History:   Diagnosis Date   • Ankle pain    • Arthritis    • Cancer (CMS/[facility])     uterine cancer 7/2018   • Coronary artery disease    • Depression    • Gout    • Hyperglycemia    • Hyperlipidemia    • Low back pain    • Overweight    • Sleep apnea    • Thyroid disease    • Vitamin B 12 deficiency    • Vitamin D deficiency      Social History     Socioeconomic History   • Marital status:      Spouse name: Not on file   • Number of children: Not on file   • Years of education: Not on file   • Highest education level: Not on file   Tobacco Use    • Smoking status: Never Smoker   • Smokeless tobacco: Never Used   Substance and Sexual Activity   • Alcohol use: No     Frequency: Never   • Drug use: No   • Sexual activity: Defer      Family History   Problem Relation Age of Onset   • Hypertension Mother    • Stroke Father    • Arthritis Brother    • Cancer Brother           Objective   Physical Exam  Neurologic Exam  Ortho Exam        Assessment and Plan: See above    I spent 5 minutes with the patient in direct communication obtaining history, reviewing the films, discussing the pathology and treatment plans.      Problems Addressed this Visit     None

## 2020-07-15 ENCOUNTER — HOSPITAL ENCOUNTER (OUTPATIENT)
Dept: PAIN MEDICINE | Facility: HOSPITAL | Age: 75
Discharge: HOME | End: 2020-07-15
Admitting: ANESTHESIOLOGY

## 2020-07-15 ENCOUNTER — HOSPITAL ENCOUNTER (OUTPATIENT)
Dept: PAIN MEDICINE | Facility: HOSPITAL | Age: 75
Discharge: HOME | End: 2020-07-15

## 2020-07-15 VITALS
HEART RATE: 54 BPM | TEMPERATURE: 98.6 F | WEIGHT: 275 LBS | HEIGHT: 63 IN | DIASTOLIC BLOOD PRESSURE: 74 MMHG | BODY MASS INDEX: 48.73 KG/M2 | OXYGEN SATURATION: 99 % | RESPIRATION RATE: 16 BRPM | SYSTOLIC BLOOD PRESSURE: 155 MMHG

## 2020-07-15 DIAGNOSIS — M54.50 LOWER BACK PAIN: ICD-10-CM

## 2020-07-15 DIAGNOSIS — M47.817 LUMBAR AND SACRAL ARTHRITIS: ICD-10-CM

## 2020-07-15 DIAGNOSIS — M47.817 LUMBOSACRAL SPONDYLOSIS WITHOUT MYELOPATHY: Primary | ICD-10-CM

## 2020-07-15 PROCEDURE — 25010000002 METHYLPREDNISOLONE PER 40 MG

## 2020-07-15 PROCEDURE — 77003 FLUOROGUIDE FOR SPINE INJECT: CPT

## 2020-07-15 PROCEDURE — 64636 DESTROY L/S FACET JNT ADDL: CPT | Performed by: ANESTHESIOLOGY

## 2020-07-15 PROCEDURE — 64635 DESTROY LUMB/SAC FACET JNT: CPT | Performed by: ANESTHESIOLOGY

## 2020-07-15 RX ORDER — METHYLPREDNISOLONE ACETATE 40 MG/ML
40 INJECTION, SUSPENSION INTRA-ARTICULAR; INTRALESIONAL; INTRAMUSCULAR; SOFT TISSUE ONCE
Status: COMPLETED | OUTPATIENT
Start: 2020-07-15 | End: 2020-07-15

## 2020-07-15 RX ORDER — LIDOCAINE HYDROCHLORIDE 10 MG/ML
5 INJECTION, SOLUTION EPIDURAL; INFILTRATION; INTRACAUDAL; PERINEURAL ONCE
Status: COMPLETED | OUTPATIENT
Start: 2020-07-15 | End: 2020-07-15

## 2020-07-15 RX ORDER — BUPIVACAINE HYDROCHLORIDE 2.5 MG/ML
10 INJECTION, SOLUTION INFILTRATION; PERINEURAL ONCE
Status: COMPLETED | OUTPATIENT
Start: 2020-07-15 | End: 2020-07-15

## 2020-07-15 RX ORDER — MESALAMINE 0.38 G/1
0.38 CAPSULE, EXTENDED RELEASE ORAL 4 TIMES DAILY
COMMUNITY
Start: 2020-05-26 | End: 2020-08-27

## 2020-07-15 RX ORDER — BUPIVACAINE HYDROCHLORIDE 2.5 MG/ML
INJECTION, SOLUTION EPIDURAL; INFILTRATION; INTRACAUDAL
Status: DISCONTINUED
Start: 2020-07-15 | End: 2020-07-16 | Stop reason: HOSPADM

## 2020-07-15 RX ORDER — METHYLPREDNISOLONE ACETATE 40 MG/ML
INJECTION, SUSPENSION INTRA-ARTICULAR; INTRALESIONAL; INTRAMUSCULAR; SOFT TISSUE
Status: DISCONTINUED
Start: 2020-07-15 | End: 2020-07-16 | Stop reason: HOSPADM

## 2020-07-15 RX ORDER — LIDOCAINE HYDROCHLORIDE 10 MG/ML
INJECTION, SOLUTION EPIDURAL; INFILTRATION; INTRACAUDAL; PERINEURAL
Status: DISCONTINUED
Start: 2020-07-15 | End: 2020-07-16 | Stop reason: HOSPADM

## 2020-07-15 RX ADMIN — BUPIVACAINE HYDROCHLORIDE 10 ML: 2.5 INJECTION, SOLUTION INFILTRATION; PERINEURAL at 14:16

## 2020-07-15 RX ADMIN — LIDOCAINE HYDROCHLORIDE 5 ML: 10 INJECTION, SOLUTION EPIDURAL; INFILTRATION; INTRACAUDAL; PERINEURAL at 13:58

## 2020-07-15 RX ADMIN — METHYLPREDNISOLONE ACETATE 40 MG: 40 INJECTION, SUSPENSION INTRA-ARTICULAR; INTRALESIONAL; INTRAMUSCULAR; SOFT TISSUE at 14:17

## 2020-07-15 RX ADMIN — LIDOCAINE HYDROCHLORIDE 5 ML: 10 INJECTION, SOLUTION EPIDURAL; INFILTRATION; INTRACAUDAL; PERINEURAL at 13:59

## 2020-07-20 NOTE — PROCEDURES
"Subjective    CC back pain  Faby Le is a 75 y.o. female with lumbar spondylosis here for a lumbar RFA.  No anticoagulation    Pain Assessment   Location of Pain: Lower Back, R Hip, L Hip,   Description of Pain: Dull/Aching, Throbbing, Stabbing  Previous Pain Rating :7  Current Pain Ratin   Aggravating Factors: Activity  Alleviating Factors: Rest, Medication    The following portions of the patient's history were reviewed and updated as appropriate: allergies, current medications, past family history, past medical history, past social history, past surgical history and problem list.      Review of Systems  As in HPI  Objective   Physical Exam   Constitutional: She is oriented to person, place, and time. She appears well-developed. No distress.   Cardiovascular: Normal rate.   Pulmonary/Chest: Effort normal.   Musculoskeletal:        Lumbar back: She exhibits decreased range of motion and tenderness.   Neurological: She is alert and oriented to person, place, and time.   Psychiatric: She has a normal mood and affect.     /74 (BP Location: Left arm, Patient Position: Sitting)   Pulse 54   Temp 98.6 °F (37 °C) (Skin)   Resp 16   Ht 160 cm (63\")   Wt 125 kg (275 lb)   LMP  (LMP Unknown)   SpO2 99%   BMI 48.71 kg/m²     Assessment/Plan    underwent sena lumbar lumbar medial branch block right lumbar RFA without sedation.    RTC for procedure or as needed    DATE OF PROCEDURE: 7/15/2020       PREOPERATIVE DIAGNOSIS:   Lumbar spondylosis without myelopathy    POSTOPERATIVE DIAGNOSIS: same    PROCEDURE PERFORMED:  Right  Lumbar Sacral RFTC at L3/L4, L4/L5, L5/S1.    The patient presents with a history of lumbosacral spondylosis on the right at level [  L3/L4 ][  L4/L5 ] [ L5/ S1 ].  The patient presents today for lumbosacral RFTC.  The patient understands the risks and benefits of the procedure and wishes to proceed.  The patient was seen in the preoperative area.  Patient's consent was obtained and " updated.  Vitals were taken.  Patient was then brought to the procedure suite and placed in a prone position.  The appropriate anatomic area was widely prepped with Chloraprep and draped in a sterile fashion.  Noninvasive monitoring per routine anesthesia protocol was placed.  Under fluoroscopic guidance an AP view with caudad cephaled tilt was obtained.  A 20 guage RFTC cannula was passed through skin anesthetized with 1% Lidocaine without epinephrine.  The needle tip was guided to the superior medial aspect of the transverse process at [  L3 ][  L4 ][  L5 and  sacral ala ].  Sensory stimulation was obtained at 0.4, 1.0, 0.4, 0.2 amps  respectively at 50 Htz.  Motor stimulation was undertaken at 2.0 mAmps at 2 Hertz. At no point was motor stimulation or sensory stimulation noted in a radicular fashion.  Impedence range 200's. Prior to ablation, each level was anesthetized with 2mL of 1% Lidocaine without epinephrine. The medial branch nerves were then ablated at 80* C for 90 seconds each .  Following ablation, each level was injected with 1 mL of  expiration containing 1 mL of 40 mg Depo-Medrol and 4 mL of 0.25% bupivacaine and the RFTC cannula removed.  A sterile dressing was placed over the puncture site.    The patient tolerated the procedure with no complications. They were then brought to the post procedure area where they recovered nicely.     Discharge  The patient will be discharged home in stable condition.  Patient understands to contact the Center with any post procedure questions or concerns.  Discharge instructions given by nursing staff.

## 2020-07-22 ENCOUNTER — HOSPITAL ENCOUNTER (OUTPATIENT)
Dept: PAIN MEDICINE | Facility: HOSPITAL | Age: 75
Discharge: HOME | End: 2020-07-22

## 2020-07-22 ENCOUNTER — HOSPITAL ENCOUNTER (OUTPATIENT)
Dept: PAIN MEDICINE | Facility: HOSPITAL | Age: 75
Discharge: HOME | End: 2020-07-22
Admitting: ANESTHESIOLOGY

## 2020-07-22 VITALS
OXYGEN SATURATION: 100 % | HEART RATE: 52 BPM | HEIGHT: 63 IN | SYSTOLIC BLOOD PRESSURE: 113 MMHG | TEMPERATURE: 98.3 F | DIASTOLIC BLOOD PRESSURE: 73 MMHG | WEIGHT: 273 LBS | BODY MASS INDEX: 48.37 KG/M2 | RESPIRATION RATE: 16 BRPM

## 2020-07-22 DIAGNOSIS — R52 PAIN: ICD-10-CM

## 2020-07-22 DIAGNOSIS — M47.817 LUMBOSACRAL SPONDYLOSIS WITHOUT MYELOPATHY: Primary | ICD-10-CM

## 2020-07-22 PROCEDURE — 64635 DESTROY LUMB/SAC FACET JNT: CPT | Performed by: ANESTHESIOLOGY

## 2020-07-22 PROCEDURE — 77003 FLUOROGUIDE FOR SPINE INJECT: CPT

## 2020-07-22 PROCEDURE — 64636 DESTROY L/S FACET JNT ADDL: CPT | Performed by: ANESTHESIOLOGY

## 2020-07-22 PROCEDURE — 25010000002 METHYLPREDNISOLONE PER 40 MG

## 2020-07-22 RX ORDER — METHYLPREDNISOLONE ACETATE 40 MG/ML
40 INJECTION, SUSPENSION INTRA-ARTICULAR; INTRALESIONAL; INTRAMUSCULAR; SOFT TISSUE ONCE
Status: COMPLETED | OUTPATIENT
Start: 2020-07-22 | End: 2020-07-22

## 2020-07-22 RX ORDER — METHYLPREDNISOLONE ACETATE 40 MG/ML
INJECTION, SUSPENSION INTRA-ARTICULAR; INTRALESIONAL; INTRAMUSCULAR; SOFT TISSUE
Status: DISCONTINUED
Start: 2020-07-22 | End: 2020-07-23 | Stop reason: HOSPADM

## 2020-07-22 RX ORDER — BUPIVACAINE HYDROCHLORIDE 2.5 MG/ML
10 INJECTION, SOLUTION INFILTRATION; PERINEURAL ONCE
Status: COMPLETED | OUTPATIENT
Start: 2020-07-22 | End: 2020-07-22

## 2020-07-22 RX ORDER — BUPIVACAINE HYDROCHLORIDE 2.5 MG/ML
INJECTION, SOLUTION EPIDURAL; INFILTRATION; INTRACAUDAL
Status: DISCONTINUED
Start: 2020-07-22 | End: 2020-07-23 | Stop reason: HOSPADM

## 2020-07-22 RX ORDER — LIDOCAINE HYDROCHLORIDE 10 MG/ML
INJECTION, SOLUTION EPIDURAL; INFILTRATION; INTRACAUDAL; PERINEURAL
Status: DISCONTINUED
Start: 2020-07-22 | End: 2020-07-23 | Stop reason: HOSPADM

## 2020-07-22 RX ORDER — LIDOCAINE HYDROCHLORIDE 10 MG/ML
5 INJECTION, SOLUTION EPIDURAL; INFILTRATION; INTRACAUDAL; PERINEURAL ONCE
Status: COMPLETED | OUTPATIENT
Start: 2020-07-22 | End: 2020-07-22

## 2020-07-22 RX ADMIN — METHYLPREDNISOLONE ACETATE 40 MG: 40 INJECTION, SUSPENSION INTRA-ARTICULAR; INTRALESIONAL; INTRAMUSCULAR; SOFT TISSUE at 14:23

## 2020-07-22 RX ADMIN — LIDOCAINE HYDROCHLORIDE 5 ML: 10 INJECTION, SOLUTION EPIDURAL; INFILTRATION; INTRACAUDAL; PERINEURAL at 14:17

## 2020-07-22 RX ADMIN — LIDOCAINE HYDROCHLORIDE 5 ML: 10 INJECTION, SOLUTION EPIDURAL; INFILTRATION; INTRACAUDAL; PERINEURAL at 14:08

## 2020-07-22 RX ADMIN — BUPIVACAINE HYDROCHLORIDE 10 ML: 2.5 INJECTION, SOLUTION INFILTRATION; PERINEURAL at 14:22

## 2020-07-23 NOTE — PROCEDURES
"Subjective    CC back pain  Faby Le is a 75 y.o. female with lumbar spondylosis here for left lumbar RFA.  No anticoagulation    Pain Assessment   Location of Pain: Lower Back, R Hip, L Hip,   Description of Pain: Dull/Aching, Throbbing, Stabbing  Previous Pain Rating :7  Current Pain Ratin   Aggravating Factors: Activity  Alleviating Factors: Rest, Medication    The following portions of the patient's history were reviewed and updated as appropriate: allergies, current medications, past family history, past medical history, past social history, past surgical history and problem list.      Review of Systems  As in HPI  Objective   Physical Exam   Constitutional: She is oriented to person, place, and time. She appears well-developed. No distress.   Cardiovascular: Normal rate.   Pulmonary/Chest: Effort normal.   Musculoskeletal:        Lumbar back: She exhibits decreased range of motion and tenderness.   Neurological: She is alert and oriented to person, place, and time.   Psychiatric: She has a normal mood and affect.     /73 (BP Location: Left arm, Patient Position: Sitting)   Pulse 52   Temp 98.3 °F (36.8 °C) (Oral)   Resp 16   Ht 160 cm (63\")   Wt 124 kg (273 lb)   LMP  (LMP Unknown)   SpO2 100%   BMI 48.36 kg/m²     Assessment/Plan    underwent left lumbar RFA without sedation.    RTC for procedure or as needed    DATE OF PROCEDURE: 2020       PREOPERATIVE DIAGNOSIS:   Lumbar spondylosis without myelopathy    POSTOPERATIVE DIAGNOSIS: same    PROCEDURE PERFORMED:  Left  Lumbar Sacral RFTC at L3/L4, L4/L5, L5/S1.    The patient presents with a history of lumbosacral spondylosis on the left at level [  L3/L4 ][  L4/L5 ] [ L5/ S1 ].  The patient presents today for lumbosacral RFTC.  The patient understands the risks and benefits of the procedure and wishes to proceed.  The patient was seen in the preoperative area.  Patient's consent was obtained and updated.  Vitals were taken.  Patient " was then brought to the procedure suite and placed in a prone position.  The appropriate anatomic area was widely prepped with Chloraprep and draped in a sterile fashion.  Noninvasive monitoring per routine anesthesia protocol was placed.  Under fluoroscopic guidance an AP view with caudad cephaled tilt was obtained.  A 20 guage RFTC cannula was passed through skin anesthetized with 1% Lidocaine without epinephrine.  The needle tip was guided to the superior medial aspect of the transverse process at [  L3 ][  L4 ][  L5 and  sacral ala ].    Motor stimulation was undertaken at 2.0 mAmps at 2 Hertz. At no point was motor stimulation or sensory stimulation noted in a radicular fashion.  Impedence range 200's. Prior to ablation, each level was anesthetized with 2mL of 1% Lidocaine without epinephrine. The medial branch nerves were then ablated at 80* C for 90 seconds each .  Following ablation, each level was injected with 1 mL of  expiration containing 1 mL of 40 mg Depo-Medrol and 4 mL of 0.25% bupivacaine and the RFTC cannula removed.  A sterile dressing was placed over the puncture site.    The patient tolerated the procedure with no complications. They were then brought to the post procedure area where they recovered nicely.     Discharge  The patient will be discharged home in stable condition.  Patient understands to contact the Center with any post procedure questions or concerns.  Discharge instructions given by nursing staff.

## 2020-08-19 ENCOUNTER — TELEPHONE (OUTPATIENT)
Dept: PAIN MEDICINE | Facility: HOSPITAL | Age: 75
End: 2020-08-19

## 2020-08-27 ENCOUNTER — OFFICE VISIT (OUTPATIENT)
Dept: FAMILY MEDICINE CLINIC | Facility: CLINIC | Age: 75
End: 2020-08-27

## 2020-08-27 VITALS
DIASTOLIC BLOOD PRESSURE: 67 MMHG | BODY MASS INDEX: 51.07 KG/M2 | HEIGHT: 63 IN | WEIGHT: 288.2 LBS | HEART RATE: 69 BPM | RESPIRATION RATE: 16 BRPM | OXYGEN SATURATION: 98 % | SYSTOLIC BLOOD PRESSURE: 114 MMHG | TEMPERATURE: 98.6 F

## 2020-08-27 DIAGNOSIS — M47.812 CERVICAL SPONDYLOSIS WITHOUT MYELOPATHY: ICD-10-CM

## 2020-08-27 DIAGNOSIS — N18.30 CHRONIC KIDNEY DISEASE, STAGE 3 (MODERATE): ICD-10-CM

## 2020-08-27 DIAGNOSIS — Z12.31 BREAST CANCER SCREENING BY MAMMOGRAM: ICD-10-CM

## 2020-08-27 DIAGNOSIS — R13.12 OROPHARYNGEAL DYSPHAGIA: ICD-10-CM

## 2020-08-27 DIAGNOSIS — R32 URINARY INCONTINENCE, UNSPECIFIED TYPE: ICD-10-CM

## 2020-08-27 DIAGNOSIS — E78.5 HYPERLIPIDEMIA, UNSPECIFIED HYPERLIPIDEMIA TYPE: ICD-10-CM

## 2020-08-27 DIAGNOSIS — E55.9 VITAMIN D DEFICIENCY: ICD-10-CM

## 2020-08-27 DIAGNOSIS — G47.30 SLEEP APNEA, UNSPECIFIED TYPE: ICD-10-CM

## 2020-08-27 DIAGNOSIS — R73.9 HYPERGLYCEMIA: ICD-10-CM

## 2020-08-27 DIAGNOSIS — I10 ESSENTIAL HYPERTENSION: Primary | ICD-10-CM

## 2020-08-27 DIAGNOSIS — G89.29 CHRONIC MIDLINE LOW BACK PAIN WITHOUT SCIATICA: ICD-10-CM

## 2020-08-27 DIAGNOSIS — E66.2 CLASS 3 OBESITY WITH ALVEOLAR HYPOVENTILATION, SERIOUS COMORBIDITY, AND BODY MASS INDEX (BMI) OF 50.0 TO 59.9 IN ADULT (HCC): ICD-10-CM

## 2020-08-27 DIAGNOSIS — R19.8 CHANGE IN BOWEL MOVEMENT: ICD-10-CM

## 2020-08-27 DIAGNOSIS — E03.9 HYPOTHYROIDISM, UNSPECIFIED TYPE: ICD-10-CM

## 2020-08-27 DIAGNOSIS — I25.10 CORONARY ARTERY DISEASE INVOLVING NATIVE HEART WITHOUT ANGINA PECTORIS, UNSPECIFIED VESSEL OR LESION TYPE: ICD-10-CM

## 2020-08-27 DIAGNOSIS — M1A.9XX0 CHRONIC GOUT WITHOUT TOPHUS, UNSPECIFIED CAUSE, UNSPECIFIED SITE: ICD-10-CM

## 2020-08-27 DIAGNOSIS — K80.20 GALLSTONES: ICD-10-CM

## 2020-08-27 DIAGNOSIS — F32.A DEPRESSION, UNSPECIFIED DEPRESSION TYPE: ICD-10-CM

## 2020-08-27 DIAGNOSIS — M54.50 CHRONIC MIDLINE LOW BACK PAIN WITHOUT SCIATICA: ICD-10-CM

## 2020-08-27 DIAGNOSIS — E53.8 B12 DEFICIENCY: ICD-10-CM

## 2020-08-27 DIAGNOSIS — C54.1 MALIGNANT NEOPLASM OF ENDOMETRIUM (HCC): ICD-10-CM

## 2020-08-27 PROBLEM — M25.552 PAIN IN LEFT HIP: Status: RESOLVED | Noted: 2018-01-22 | Resolved: 2020-08-27

## 2020-08-27 PROBLEM — C80.1 CANCER: Status: ACTIVE | Noted: 2020-08-27

## 2020-08-27 PROBLEM — M25.552 PAIN IN LEFT HIP: Status: ACTIVE | Noted: 2018-01-22

## 2020-08-27 PROBLEM — E66.813 CLASS 3 OBESITY WITH ALVEOLAR HYPOVENTILATION, SERIOUS COMORBIDITY, AND BODY MASS INDEX (BMI) OF 50.0 TO 59.9 IN ADULT: Status: ACTIVE | Noted: 2020-08-27

## 2020-08-27 PROCEDURE — 99214 OFFICE O/P EST MOD 30 MIN: CPT | Performed by: PREVENTIVE MEDICINE

## 2020-08-27 RX ORDER — MONTELUKAST SODIUM 10 MG/1
10 TABLET ORAL DAILY
COMMUNITY
Start: 2020-08-18

## 2020-08-27 NOTE — PROGRESS NOTES
Patient presents for follow-up on stable chronic medical problems including hypertension, hyperlipidemia, coronary artery disease, hypothyroidism, obstructive sleep apnea, depression, chronic back pain, arthritis and gout. Patient denies adverse effects of medications, headache, dizziness, chest pain, palpitations, nausea, vomiting, abdominal pain and fatigue. Patient complains of cough and dysphagia. Patient is here for monitoring of chronic issues due to need for monitoring of renal function, liver function, blood pressure effects of meds, adverse effects, side effects and interactions    Past Medical History:   Diagnosis Date   • Ankle pain    • Arthritis    • Coronary artery disease    • Depression    • Gout    • Hyperglycemia    • Hyperlipidemia    • Low back pain    • Sleep apnea    • Vitamin D deficiency      Past Surgical History:   Procedure Laterality Date   • ANKLE FUSION      2017-left   • CARDIAC CATHETERIZATION     • CARPAL TUNNEL RELEASE     • CATARACT EXTRACTION     • CUBITAL TUNNEL RELEASE     • HYSTERECTOMY     • REPLACEMENT TOTAL KNEE      left 2008   • ROTATOR CUFF REPAIR       Family History   Problem Relation Age of Onset   • Hypertension Mother    • Stroke Father    • Arthritis Brother    • Cancer Brother      Social History     Tobacco Use   • Smoking status: Never Smoker   • Smokeless tobacco: Never Used   Substance Use Topics   • Alcohol use: No     Frequency: Never       PHQ-2 Depression Screening     PHQ-9 Depression Screening       Review of Systems   Constitutional: Negative.    HENT: Positive for trouble swallowing. Negative for sinus pressure and sore throat.    Eyes: Negative.    Respiratory: Positive for cough.    Cardiovascular: Negative.    Gastrointestinal: Positive for constipation.        Swallowing problems-feels gets stuck/no choking   Endocrine: Negative.    Genitourinary: Negative.    Musculoskeletal: Positive for back pain, gait problem and joint swelling.   Skin:  "Negative.    Allergic/Immunologic: Positive for environmental allergies.   Hematological: Negative.    Psychiatric/Behavioral: Negative.  Positive for depressed mood.     Vitals:    08/27/20 1403 08/27/20 1413 08/27/20 1414   BP: 122/70 113/58 114/67   BP Location: Right arm Left arm Left arm   Patient Position: Sitting Sitting Standing   Cuff Size: Large Adult Large Adult Adult   Pulse: 63 63 69   Resp: 16     Temp: 98.6 °F (37 °C)     TempSrc: Infrared     SpO2: 98%     Weight: 131 kg (288 lb 3.2 oz)     Height: 160 cm (63\")       Body mass index is 51.05 kg/m².  Physical Exam   Constitutional: She is oriented to person, place, and time. She appears well-developed and well-nourished.   Grossly obese   HENT:   Head: Normocephalic.   Mouth/Throat: Oropharynx is clear and moist.   Eyes: Pupils are equal, round, and reactive to light. EOM are normal.   Neck: Neck supple.   Cardiovascular: Normal rate, regular rhythm and normal heart sounds.   Pulmonary/Chest: Effort normal and breath sounds normal.   Abdominal: Soft.   Musculoskeletal:   R ankle bracedd difficulty ambulating   Neurological: She is alert and oriented to person, place, and time.   Skin: Skin is warm.   Psychiatric: She has a normal mood and affect.   Vitals reviewed.    Lab Results   Component Value Date    HGBA1C 5.3 09/30/2019     Lab Results   Component Value Date    CHOL 131 09/30/2019    TRIG 121 09/30/2019    HDL 58 09/30/2019    LDL 58 09/30/2019         Diagnoses and all orders for this visit:    1. Essential hypertension (Primary)  Comments:  Controlled  Orders:  -     CBC Auto Differential    2. Breast cancer screening by mammogram  -     Mammo Screening Digital Tomosynthesis Bilateral With CAD; Future    3. Malignant neoplasm of endometrium (CMS/HCC)  Comments:  Video visit with GYN and will followup in 4 months    4. B12 deficiency  -     Vitamin B12    5. Coronary artery disease involving native heart without angina pectoris, unspecified " vessel or lesion type  Comments:  No chest pain or SOA but will refer cardiology for followup  Orders:  -     Ambulatory Referral to Cardiology    6. Depression, unspecified depression type  Comments:  mood controlled  Orders:  -     Uric Acid    7. Chronic gout without tophus, unspecified cause, unspecified site  Comments:  Controlled    8. Hyperglycemia  -     Comprehensive Metabolic Panel  -     Hemoglobin A1c    9. Hyperlipidemia, unspecified hyperlipidemia type  -     Lipid Panel    10. Hypothyroidism, unspecified type  -     TSH    11. Sleep apnea, unspecified type  Comments:  CPAP and recheck 2 weeks ago    12. Vitamin D deficiency  -     Vitamin D 25 Hydroxy    13. Cervical spondylosis without myelopathy  Comments:  Controlled    14. Chronic kidney disease, stage 3 (moderate) (CMS/HCC)    15. Urinary incontinence, unspecified type  Comments:  Has seen urologist  Control good    16. Gallstones  Comments:  No GI problems.    17. Chronic midline low back pain without sciatica  Comments:  Pain management anc dhiropractic.  Call if worsens and needs referral    18. Oropharyngeal dysphagia  -     Ambulatory Referral to Gastroenterology  -     Ambulatory referral for Screening EGD    19. Change in bowel movement  -     Ambulatory Referral to Gastroenterology    20. Class 3 obesity with alveolar hypoventilation, serious comorbidity, and body mass index (BMI) of 50.0 to 59.9 in adult (CMS/HCC)  Comments:  Referral to weight managment  Orders:  -     Ambulatory Referral to Weight Management Program

## 2020-08-27 NOTE — PATIENT INSTRUCTIONS
Consider flu and Shingrix shot as soon as available    Cardiology to call.  Patient to set up eye exam    12 hour fast for labs

## 2020-08-28 ENCOUNTER — CLINICAL SUPPORT (OUTPATIENT)
Dept: FAMILY MEDICINE CLINIC | Facility: CLINIC | Age: 75
End: 2020-08-28

## 2020-08-28 DIAGNOSIS — R73.9 HYPERGLYCEMIA: ICD-10-CM

## 2020-08-28 DIAGNOSIS — E53.8 B12 DEFICIENCY: ICD-10-CM

## 2020-08-28 DIAGNOSIS — E78.5 HYPERLIPIDEMIA, UNSPECIFIED HYPERLIPIDEMIA TYPE: ICD-10-CM

## 2020-08-28 DIAGNOSIS — I10 ESSENTIAL HYPERTENSION: ICD-10-CM

## 2020-08-28 DIAGNOSIS — E03.9 HYPOTHYROIDISM, UNSPECIFIED TYPE: ICD-10-CM

## 2020-08-28 LAB
25(OH)D3 SERPL-MCNC: 40.6 NG/ML (ref 30–100)
ALBUMIN SERPL-MCNC: 3.8 G/DL (ref 3.5–5.2)
ALBUMIN/GLOB SERPL: 1.4 G/DL
ALP SERPL-CCNC: 81 U/L (ref 39–117)
ALT SERPL W P-5'-P-CCNC: 23 U/L (ref 1–33)
ANION GAP SERPL CALCULATED.3IONS-SCNC: 6.9 MMOL/L (ref 5–15)
AST SERPL-CCNC: 18 U/L (ref 1–32)
BASOPHILS # BLD AUTO: 0.05 10*3/MM3 (ref 0–0.2)
BASOPHILS NFR BLD AUTO: 1 % (ref 0–1.5)
BILIRUB SERPL-MCNC: 0.6 MG/DL (ref 0–1.2)
BUN SERPL-MCNC: 30 MG/DL (ref 8–23)
BUN/CREAT SERPL: 25.4 (ref 7–25)
CALCIUM SPEC-SCNC: 9.9 MG/DL (ref 8.6–10.5)
CHLORIDE SERPL-SCNC: 103 MMOL/L (ref 98–107)
CHOLEST SERPL-MCNC: 129 MG/DL (ref 0–200)
CO2 SERPL-SCNC: 27.1 MMOL/L (ref 22–29)
CREAT SERPL-MCNC: 1.18 MG/DL (ref 0.57–1)
DEPRECATED RDW RBC AUTO: 48.7 FL (ref 37–54)
EOSINOPHIL # BLD AUTO: 0.11 10*3/MM3 (ref 0–0.4)
EOSINOPHIL NFR BLD AUTO: 2.1 % (ref 0.3–6.2)
ERYTHROCYTE [DISTWIDTH] IN BLOOD BY AUTOMATED COUNT: 13.1 % (ref 12.3–15.4)
GFR SERPL CREATININE-BSD FRML MDRD: 45 ML/MIN/1.73
GLOBULIN UR ELPH-MCNC: 2.8 GM/DL
GLUCOSE SERPL-MCNC: 90 MG/DL (ref 65–99)
HBA1C MFR BLD: 5.6 % (ref 3.5–5.6)
HCT VFR BLD AUTO: 33.8 % (ref 34–46.6)
HDLC SERPL-MCNC: 66 MG/DL (ref 40–60)
HGB BLD-MCNC: 11.4 G/DL (ref 12–15.9)
IMM GRANULOCYTES # BLD AUTO: 0.01 10*3/MM3 (ref 0–0.05)
IMM GRANULOCYTES NFR BLD AUTO: 0.2 % (ref 0–0.5)
LDLC SERPL CALC-MCNC: 49 MG/DL (ref 0–100)
LDLC/HDLC SERPL: 0.74 {RATIO}
LYMPHOCYTES # BLD AUTO: 1.75 10*3/MM3 (ref 0.7–3.1)
LYMPHOCYTES NFR BLD AUTO: 34 % (ref 19.6–45.3)
MCH RBC QN AUTO: 34 PG (ref 26.6–33)
MCHC RBC AUTO-ENTMCNC: 33.7 G/DL (ref 31.5–35.7)
MCV RBC AUTO: 100.9 FL (ref 79–97)
MONOCYTES # BLD AUTO: 0.5 10*3/MM3 (ref 0.1–0.9)
MONOCYTES NFR BLD AUTO: 9.7 % (ref 5–12)
NEUTROPHILS NFR BLD AUTO: 2.73 10*3/MM3 (ref 1.7–7)
NEUTROPHILS NFR BLD AUTO: 53 % (ref 42.7–76)
NRBC BLD AUTO-RTO: 0 /100 WBC (ref 0–0.2)
PLATELET # BLD AUTO: 209 10*3/MM3 (ref 140–450)
PMV BLD AUTO: 9.6 FL (ref 6–12)
POTASSIUM SERPL-SCNC: 4.2 MMOL/L (ref 3.5–5.2)
PROT SERPL-MCNC: 6.6 G/DL (ref 6–8.5)
RBC # BLD AUTO: 3.35 10*6/MM3 (ref 3.77–5.28)
SODIUM SERPL-SCNC: 137 MMOL/L (ref 136–145)
TRIGL SERPL-MCNC: 71 MG/DL (ref 0–150)
TSH SERPL DL<=0.05 MIU/L-ACNC: 4.08 UIU/ML (ref 0.27–4.2)
URATE SERPL-MCNC: 5.4 MG/DL (ref 2.4–5.7)
VIT B12 BLD-MCNC: 1353 PG/ML (ref 211–946)
VLDLC SERPL-MCNC: 14.2 MG/DL (ref 5–40)
WBC # BLD AUTO: 5.15 10*3/MM3 (ref 3.4–10.8)

## 2020-08-28 PROCEDURE — 84443 ASSAY THYROID STIM HORMONE: CPT | Performed by: PREVENTIVE MEDICINE

## 2020-08-28 PROCEDURE — 84550 ASSAY OF BLOOD/URIC ACID: CPT | Performed by: PREVENTIVE MEDICINE

## 2020-08-28 PROCEDURE — 80053 COMPREHEN METABOLIC PANEL: CPT | Performed by: PREVENTIVE MEDICINE

## 2020-08-28 PROCEDURE — 80061 LIPID PANEL: CPT | Performed by: PREVENTIVE MEDICINE

## 2020-08-28 PROCEDURE — 83036 HEMOGLOBIN GLYCOSYLATED A1C: CPT | Performed by: PREVENTIVE MEDICINE

## 2020-08-28 PROCEDURE — 82306 VITAMIN D 25 HYDROXY: CPT | Performed by: PREVENTIVE MEDICINE

## 2020-08-28 PROCEDURE — 85025 COMPLETE CBC W/AUTO DIFF WBC: CPT | Performed by: PREVENTIVE MEDICINE

## 2020-08-28 PROCEDURE — 36415 COLL VENOUS BLD VENIPUNCTURE: CPT | Performed by: PREVENTIVE MEDICINE

## 2020-08-28 PROCEDURE — 82607 VITAMIN B-12: CPT | Performed by: PREVENTIVE MEDICINE

## 2020-08-28 NOTE — PROGRESS NOTES
Venipuncture Blood Specimen Collection  Venipuncture performed in right arm by Leonor Adam MD with good hemostasis. Patient tolerated the procedure well without complications.   08/28/20   MD Leonor Angel MD

## 2020-08-30 NOTE — PROGRESS NOTES
Mild anemia again.  If she has needed more applecider vinegar to help with heartburn-should send 2 ifobs to make sure not bleeding.  If she notes blood in urine or elsewhere let us know.  Kidney function is slightly better.  B12 is elevated so can cut otc dose on week ends.

## 2020-08-31 RX ORDER — ATORVASTATIN CALCIUM 20 MG/1
TABLET, FILM COATED ORAL
Qty: 90 TABLET | Refills: 3 | Status: SHIPPED | OUTPATIENT
Start: 2020-08-31 | End: 2021-06-21

## 2020-08-31 RX ORDER — ESCITALOPRAM OXALATE 20 MG/1
TABLET ORAL
Qty: 90 TABLET | Refills: 2 | Status: SHIPPED | OUTPATIENT
Start: 2020-08-31 | End: 2021-06-04

## 2020-09-01 ENCOUNTER — OFFICE VISIT (OUTPATIENT)
Dept: PAIN MEDICINE | Facility: CLINIC | Age: 75
End: 2020-09-01

## 2020-09-01 VITALS — WEIGHT: 280 LBS | HEIGHT: 63 IN | BODY MASS INDEX: 49.61 KG/M2

## 2020-09-01 DIAGNOSIS — M54.16 LUMBAR RADICULITIS: ICD-10-CM

## 2020-09-01 DIAGNOSIS — G89.4 CHRONIC PAIN SYNDROME: Primary | ICD-10-CM

## 2020-09-01 DIAGNOSIS — Z79.899 HIGH RISK MEDICATION USE: ICD-10-CM

## 2020-09-01 DIAGNOSIS — M47.817 LUMBOSACRAL SPONDYLOSIS WITHOUT MYELOPATHY: ICD-10-CM

## 2020-09-01 PROCEDURE — 99443 PR PHYS/QHP TELEPHONE EVALUATION 21-30 MIN: CPT | Performed by: ANESTHESIOLOGY

## 2020-09-02 NOTE — PROGRESS NOTES
Subjective    CC neck pain, headache, back pain, left hip pain  Faby Le is a 75 y.o. female with multiple comorbidities including CKD, chronic polyarthralgia S/p TKA , chronic back/ neck pain headache here for follow-up telephone.   Lumbar RFA last visit mild relief of axial back pain.  Noted worsening right lower extremity radicular pain and back pain.  Denies bladder bowel incontinence or weakness.  Chronic low back pain mostly right-sided radiating to right hip worse with twisting bending, prolonged standing or prolonged sitting.  Denies radicular pain.  Chronic left neck pain rating to left shoulder and head associated with headaches constant worse with activity.  Denies balance issues, weakness, bladder bowel continence.  Chronic polyarthralgia, knee pain left foot pain.  Continued chronic axial back pain.  Chronic pain impairing daily activity and interfering with sleep.  Seen neurosurgery, no surgery recommended at this time.  Refer to try facet blocks/RFA..     L-spine x-ray 2020 advanced multilevel degenerative disc disease with disc space narrowing at all lumbar levels. There is grade 1 anterolisthesis of L4 on L5. There is marked reactive endplate sclerosis at all levels. There is advanced facet arthrosis throughout the lumbar spine. With flexion, there is 6 mm of anterolisthesis of L4 on 5. With extension, this measures approximately 5 mm   C-spine CT 2020 multiple level degenerative changes facet arthropathy with osteophyte complex, mild to moderate.   The spine MRI 2019 Multilevel central canal stenosis with moderate to marked neural foraminal narrowing at multiple levels, changes are most significant from the C3-4 through the C6-7 levels with changes of a lesser degree at the C7-T1 level.  Normal cord    Pain Assessment   Location of Pain: Lower Back, neck pain, joints  Description of Pain: Dull/Aching, Throbbing, Stabbing  Previous Pain Rating :4  Current Pain Ratin  Aggravating Factors:  Activity  Alleviating Factors: Rest, Medication    PEG Assessment   What number best describes your pain on average in the past week?4  What number best describes how, during the past week, pain has interfered with your enjoyment of life?8  What number best describes how, during the past week, pain has interfered with your general activity? 4     The following portions of the patient's history were reviewed and updated as appropriate: allergies, current medications, past family history, past medical history, past social history, past surgical history and problem list.     has a past medical history of Ankle pain, Arthritis, Coronary artery disease, Depression, Gout, Hyperglycemia, Hyperlipidemia, Low back pain, Sleep apnea, and Vitamin D deficiency.   has a past surgical history that includes Hysterectomy; Rotator cuff repair; Cataract extraction; Carpal tunnel release; Cubital Tunnel Release; Cardiac catheterization; Replacement total knee; and Ankle Fusion.  family history includes Arthritis in her brother; Cancer in her brother; Hypertension in her mother; Stroke in her father.  Social History     Tobacco Use   • Smoking status: Never Smoker   • Smokeless tobacco: Never Used   Substance Use Topics   • Alcohol use: No     Frequency: Never     Review of Systems   Constitutional: Negative for chills, fatigue and fever.   HENT: Negative for swollen glands and trouble swallowing.    Respiratory: Negative.  Negative for shortness of breath.    Cardiovascular: Negative for chest pain, palpitations and leg swelling.   Gastrointestinal: Negative for nausea and vomiting.   Musculoskeletal: Positive for arthralgias, back pain, neck pain and neck stiffness. Negative for gait problem, joint swelling and myalgias.   Skin: Negative for color change, dry skin, rash and skin lesions.   Neurological: Positive for headache. Negative for dizziness, weakness and light-headedness.   Hematological: Negative for adenopathy. Does not  "bruise/bleed easily.   Psychiatric/Behavioral: Negative for behavioral problems, suicidal ideas and depressed mood.   All other systems reviewed and are negative.    Objective   Physical Exam   Constitutional: No distress.   Psychiatric: Her speech is normal.     Ht 160 cm (63\")   Wt 127 kg (280 lb)   LMP  (LMP Unknown)   BMI 49.60 kg/m²      PHQ 9 on chart  Opioid risk tool low risk    Assessment/Plan   Faby was seen today for back pain, extremity pain and follow-up.    Diagnoses and all orders for this visit:    Chronic pain syndrome    Lumbosacral spondylosis without myelopathy    Lumbar radiculitis  -     Ambulatory Referral to Pain Management    High risk medication use    Summary  Faby Le is a 75 y.o. female with multiple comorbidities including CKD, chronic polyarthralgia S/P TKA, chronic back/neck pain headache here for follow-up .  Chronic neck pain, headache from DDD spondylosis with cervicogenic headaches.  Impairing daily activity.  Marginal relief with physical therapy and medication.  Cannot take NSAIDs due to CKD.  Seen neurosurgery, no surgery recommended at this time.  Refer to try injections.    Chronic axial back pain mostly right-sided without radicular pain.  Severely impairing her daily activity.  She requests injection for lower back before addressing cervical.  Good relief of lumbar RFA however continued back pain right lower extremity radicular pain.    Schedule for [facility].    Telemedicine t done to avoid coronavirus exposure.   A phone visit was used to complete visit. Total time of discussion was 21 minutes    RTC for procedure  "

## 2020-09-03 ENCOUNTER — TELEPHONE (OUTPATIENT)
Dept: BARIATRICS/WEIGHT MGMT | Facility: CLINIC | Age: 75
End: 2020-09-03

## 2020-09-04 ENCOUNTER — OFFICE (AMBULATORY)
Dept: URBAN - METROPOLITAN AREA CLINIC 64 | Facility: CLINIC | Age: 75
End: 2020-09-04

## 2020-09-04 VITALS
HEART RATE: 62 BPM | SYSTOLIC BLOOD PRESSURE: 134 MMHG | WEIGHT: 291 LBS | DIASTOLIC BLOOD PRESSURE: 64 MMHG | HEIGHT: 63 IN

## 2020-09-04 DIAGNOSIS — K59.00 CONSTIPATION, UNSPECIFIED: ICD-10-CM

## 2020-09-04 DIAGNOSIS — R13.10 DYSPHAGIA, UNSPECIFIED: ICD-10-CM

## 2020-09-04 PROCEDURE — 99214 OFFICE O/P EST MOD 30 MIN: CPT | Performed by: NURSE PRACTITIONER

## 2020-09-04 RX ORDER — LINACLOTIDE 72 UG/1
CAPSULE, GELATIN COATED ORAL
Qty: 90 | Refills: 3 | Status: COMPLETED
Start: 2020-09-04 | End: 2021-03-04

## 2020-09-08 ENCOUNTER — CLINICAL SUPPORT (OUTPATIENT)
Dept: FAMILY MEDICINE CLINIC | Facility: CLINIC | Age: 75
End: 2020-09-08

## 2020-09-08 DIAGNOSIS — D64.9 ANEMIA, UNSPECIFIED TYPE: ICD-10-CM

## 2020-09-08 DIAGNOSIS — R19.8 CHANGE IN BOWEL MOVEMENT: Primary | ICD-10-CM

## 2020-09-08 LAB
DEVELOPER EXPIRATION DATE: NORMAL
DEVELOPER LOT NUMBER: NORMAL
EXPIRATION DATE: NORMAL
FECAL OCCULT BLOOD SCREEN, POC: NEGATIVE
Lab: NORMAL
NEGATIVE CONTROL: NEGATIVE
POSITIVE CONTROL: POSITIVE

## 2020-09-08 PROCEDURE — 82270 OCCULT BLOOD FECES: CPT | Performed by: PREVENTIVE MEDICINE

## 2020-09-11 ENCOUNTER — CLINICAL SUPPORT (OUTPATIENT)
Dept: FAMILY MEDICINE CLINIC | Facility: CLINIC | Age: 75
End: 2020-09-11

## 2020-09-11 DIAGNOSIS — R19.8 CHANGE IN BOWEL MOVEMENT: ICD-10-CM

## 2020-09-11 DIAGNOSIS — D64.9 ANEMIA, UNSPECIFIED TYPE: Primary | ICD-10-CM

## 2020-09-11 DIAGNOSIS — K30 INDIGESTION: ICD-10-CM

## 2020-09-11 LAB
DEVELOPER EXPIRATION DATE: ABNORMAL
DEVELOPER LOT NUMBER: ABNORMAL
EXPIRATION DATE: ABNORMAL
FECAL OCCULT BLOOD SCREEN, POC: POSITIVE
Lab: ABNORMAL
NEGATIVE CONTROL: NEGATIVE
POSITIVE CONTROL: POSITIVE

## 2020-09-14 ENCOUNTER — OFFICE VISIT (OUTPATIENT)
Dept: FAMILY MEDICINE CLINIC | Facility: CLINIC | Age: 75
End: 2020-09-14

## 2020-09-14 VITALS
RESPIRATION RATE: 18 BRPM | HEART RATE: 79 BPM | DIASTOLIC BLOOD PRESSURE: 76 MMHG | WEIGHT: 290.2 LBS | OXYGEN SATURATION: 98 % | HEIGHT: 63 IN | BODY MASS INDEX: 51.42 KG/M2 | TEMPERATURE: 97.7 F | SYSTOLIC BLOOD PRESSURE: 129 MMHG

## 2020-09-14 DIAGNOSIS — L50.9 URTICARIA: ICD-10-CM

## 2020-09-14 DIAGNOSIS — L71.0 PERIORAL DERMATITIS: ICD-10-CM

## 2020-09-14 DIAGNOSIS — L30.9 DERMATITIS: Primary | ICD-10-CM

## 2020-09-14 PROCEDURE — 99213 OFFICE O/P EST LOW 20 MIN: CPT | Performed by: NURSE PRACTITIONER

## 2020-09-14 RX ORDER — LINACLOTIDE 72 UG/1
72 CAPSULE, GELATIN COATED ORAL DAILY
COMMUNITY
Start: 2020-09-04 | End: 2022-08-12

## 2020-09-14 NOTE — PATIENT INSTRUCTIONS
Benadryl at bedtime only      Eczema  Eczema is a broad term for a group of skin conditions that cause skin to become rough and inflamed. Each type of eczema has different triggers, symptoms, and treatments. Eczema of any type is usually itchy and symptoms range from mild to severe.  Eczema and its symptoms are not spread from person to person (are not contagious). It can appear on different parts of the body at different times. Your eczema may not look the same as someone else's eczema.  What are the types of eczema?  Atopic dermatitis  This is a long-term (chronic) skin disease that keeps coming back (recurring). Usual symptoms are dry skin and small, solid pimples that may swell and leak fluid (weep).  Contact dermatitis    This happens when something irritates the skin and causes a rash. The irritation can come from substances that you are allergic to (allergens), such as poison ivy, chemicals, or medicines that were applied to your skin.  Dyshidrotic eczema  This is a form of eczema on the hands and feet. It shows up as very itchy, fluid-filled blisters. It can affect people of any age, but is more common before age 40.  Hand eczema    This causes very itchy areas of skin on the palms and sides of the hands and fingers. This type of eczema is common in industrial jobs where you may be exposed to many different types of irritants.  Lichen simplex chronicus  This type of eczema occurs when a person constantly scratches one area of the body. Repeated scratching of the area leads to thickened skin (lichenification). Lichen simplex chronicus can occur along with other types of eczema. It is more common in adults, but may be seen in children as well.  Nummular eczema  This is a common type of eczema. It has no known cause. It typically causes a red, circular, crusty lesion (plaque) that may be itchy. Scratching may become a habit and can cause bleeding. Nummular eczema occurs most often in people of middle-age or  "older. It most often affects the hands.  Seborrheic dermatitis  This is a common skin disease that mainly affects the scalp. It may also affect any oily areas of the body, such as the face, sides of nose, eyebrows, ears, eyelids, and chest. It is marked by small scaling and redness of the skin (erythema). This can affect people of all ages. In infants, this condition is known as \"cradle cap.\"  Stasis dermatitis  This is a common skin disease that usually appears on the legs and feet. It most often occurs in people who have a condition that prevents blood from being pumped through the veins in the legs (chronic venous insufficiency). Stasis dermatitis is a chronic condition that needs long-term management.  How is eczema diagnosed?  Your health care provider will examine your skin and review your medical history. He or she may also give you skin patch tests. These tests involve taking patches that contain possible allergens and placing them on your back. He or she will then check in a few days to see if an allergic reaction occurred.  What are the common treatments?  Treatment for eczema is based on the type of eczema you have. Hydrocortisone steroid medicine can relieve itching quickly and help reduce inflammation. This medicine may be prescribed or obtained over-the-counter, depending on the strength of the medicine that is needed.  Follow these instructions at home:  · Take over-the-counter and prescription medicines only as told by your health care provider.  · Use creams or ointments to moisturize your skin. Do not use lotions.  · Learn what triggers or irritates your symptoms. Avoid these things.  · Treat symptom flare-ups quickly.  · Do not itch your skin. This can make your rash worse.  · Keep all follow-up visits as told by your health care provider. This is important.  Where to find more information  · The American Academy of Dermatology: www.aad.org  · The National Eczema Association: " www.nationaleczema.org  Contact a health care provider if:  · You have serious itching, even with treatment.  · You regularly scratch your skin until it bleeds.  · Your rash looks different than usual.  · Your skin is painful, swollen, or more red than usual.  · You have a fever.  Summary  · There are eight general types of eczema. Each type has different triggers.  · Eczema of any type causes itching that may range from mild to severe.  · Treatment varies based on the type of eczema you have. Hydrocortisone steroid medicine can help with itching and inflammation.  · Protecting your skin is the best way to prevent eczema. Use moisturizers and lotions. Avoid triggers and irritants, and treat flare-ups quickly.  This information is not intended to replace advice given to you by your health care provider. Make sure you discuss any questions you have with your health care provider.  Document Released: 05/03/2018 Document Revised: 11/30/2018 Document Reviewed: 05/03/2018  Elsevier Patient Education © 2020 [employer]

## 2020-09-14 NOTE — PROGRESS NOTES
"Subjective   Faby Le is a 75 y.o. female presents for   Chief Complaint   Patient presents with   • Rash     on face       Health Maintenance Due   Topic Date Due   • ZOSTER VACCINE (2 of 2) 01/31/2020   • DXA SCAN  06/29/2020   • INFLUENZA VACCINE  08/01/2020       History of Present Illness   Pt present for rash on face.  She states it has been present for approximately 3 days.  She has had in it in the past and is uncertain of the cause.  She states it is very itchy and has been applying hydrocortisone and neosporin to area with little relief of itching.  Pt denies any new products or foods.  She also wears a cpap machine but has not changed equipment either.  Pt also reports itchy place on right side.  It has been present x 2-3 days as well. She has not treated area on right side with anything.     Vitals:    09/14/20 0921   BP: 129/76   BP Location: Right arm   Patient Position: Sitting   Cuff Size: Large Adult   Pulse: 79   Resp: 18   Temp: 97.7 °F (36.5 °C)   TempSrc: Temporal   SpO2: 98%   Weight: 132 kg (290 lb 3.2 oz)   Height: 160 cm (63\")     Body mass index is 51.41 kg/m².    Current Outpatient Medications on File Prior to Visit   Medication Sig Dispense Refill   • allopurinol (ZYLOPRIM) 300 MG tablet TAKE 1 TABLET BY MOUTH EVERY DAY 90 tablet 2   • amLODIPine (NORVASC) 5 MG tablet TAKE 1 TABLET BY MOUTH EVERY DAY 90 tablet 3   • APPLE CIDER VINEGAR PO Take 1 tablet by mouth 2 (Two) Times a Day. Take 2 tablets am and 1 pm     • atorvastatin (LIPITOR) 20 MG tablet TAKE 1 TABLET BY MOUTH EVERYDAY AT BEDTIME 90 tablet 3   • cetirizine (zyrTEC) 10 MG tablet Take 1 tablet by mouth Daily.     • cyanocobalamin (VITAMIN B-12) 1000 MCG tablet Take 1 tablet by mouth Daily.     • escitalopram (LEXAPRO) 20 MG tablet TAKE 1 TABLET BY MOUTH EVERY DAY 90 tablet 2   • furosemide (LASIX) 20 MG tablet Take 1 tablet by mouth Daily. As needed for swelling 90 tablet 1   • levothyroxine (SYNTHROID) 75 MCG tablet Take 1 " tablet by mouth Daily. 90 tablet 3   • Linzess 72 MCG capsule capsule Take 72 mcg by mouth Daily.     • metoprolol tartrate (LOPRESSOR) 25 MG tablet TAKE 1 TABLET BY MOUTH DAILY AT BEDTIME 90 tablet 3   • montelukast (SINGULAIR) 10 MG tablet Take 10 mg by mouth Daily.     • Multiple Vitamins-Minerals (CENTRUM SILVER ADULT 50+) tablet Take 1 tablet by mouth Daily.     • Omega-3 Fatty Acids (FISH OIL) 1000 MG capsule capsule Take 1 capsule by mouth Daily.     • Vitamin D, Cholecalciferol, 25 MCG (1000 UT) tablet Take 1 tablet by mouth Daily.     • Enflurane (COMPOUND 347 IN)        No current facility-administered medications on file prior to visit.        The following portions of the patient's history were reviewed and updated as appropriate: allergies, current medications, past family history, past medical history, past social history, past surgical history and problem list.    Review of Systems   Constitutional: Negative for chills and fever.   HENT: Negative for sinus pressure and sore throat.    Eyes: Negative for blurred vision.   Respiratory: Negative for cough and shortness of breath.    Cardiovascular: Negative for chest pain.   Gastrointestinal: Negative for abdominal pain.   Endocrine: Negative.    Genitourinary: Negative.    Musculoskeletal: Negative for arthralgias and joint swelling.   Skin: Positive for rash (around face, right flank). Negative for color change.   Neurological: Negative for dizziness.   Psychiatric/Behavioral: Negative for behavioral problems.       Objective   Physical Exam  Vitals signs and nursing note reviewed.   Constitutional:       Appearance: She is well-developed.   HENT:      Head: Normocephalic and atraumatic.      Right Ear: External ear normal.      Left Ear: External ear normal.      Nose: Nose normal.   Eyes:      Extraocular Movements: Extraocular movements intact.      Conjunctiva/sclera: Conjunctivae normal.      Pupils: Pupils are equal, round, and reactive to light.    Neck:      Musculoskeletal: Normal range of motion.   Cardiovascular:      Rate and Rhythm: Normal rate.   Pulmonary:      Effort: Pulmonary effort is normal.   Abdominal:      Palpations: Abdomen is soft.   Genitourinary:     Vagina: Normal.   Musculoskeletal: Normal range of motion.   Skin:     General: Skin is warm and dry.      Findings: Rash (hives right flank, periorbital rahs) present. Rash is papular (periorbital) and urticarial (right flank).   Neurological:      Mental Status: She is alert and oriented to person, place, and time.   Psychiatric:         Behavior: Behavior normal.         Judgment: Judgment normal.       PHQ-9 Total Score:      Assessment/Plan   Faby was seen today for rash.    Diagnoses and all orders for this visit:    Dermatitis  -     Ambulatory Referral to Allergy    Perioral dermatitis  Comments:  stop using antibiotic ointment and steroid cream.  flagyl cream prescribed.  use gentyl cleanser and pat dry.  Orders:  -     metroNIDAZOLE (METROCREAM) 0.75 % cream; Apply  topically to the appropriate area as directed 2 (Two) Times a Day for 10 days.    Urticaria  Comments:  hydrocortisone prn, continue zyrtec daily, benadryl at hs prn.        Patient Instructions   Benadryl at bedtime only      Eczema  Eczema is a broad term for a group of skin conditions that cause skin to become rough and inflamed. Each type of eczema has different triggers, symptoms, and treatments. Eczema of any type is usually itchy and symptoms range from mild to severe.  Eczema and its symptoms are not spread from person to person (are not contagious). It can appear on different parts of the body at different times. Your eczema may not look the same as someone else's eczema.  What are the types of eczema?  Atopic dermatitis  This is a long-term (chronic) skin disease that keeps coming back (recurring). Usual symptoms are dry skin and small, solid pimples that may swell and leak fluid (weep).  Contact  "dermatitis    This happens when something irritates the skin and causes a rash. The irritation can come from substances that you are allergic to (allergens), such as poison ivy, chemicals, or medicines that were applied to your skin.  Dyshidrotic eczema  This is a form of eczema on the hands and feet. It shows up as very itchy, fluid-filled blisters. It can affect people of any age, but is more common before age 40.  Hand eczema    This causes very itchy areas of skin on the palms and sides of the hands and fingers. This type of eczema is common in industrial jobs where you may be exposed to many different types of irritants.  Lichen simplex chronicus  This type of eczema occurs when a person constantly scratches one area of the body. Repeated scratching of the area leads to thickened skin (lichenification). Lichen simplex chronicus can occur along with other types of eczema. It is more common in adults, but may be seen in children as well.  Nummular eczema  This is a common type of eczema. It has no known cause. It typically causes a red, circular, crusty lesion (plaque) that may be itchy. Scratching may become a habit and can cause bleeding. Nummular eczema occurs most often in people of middle-age or older. It most often affects the hands.  Seborrheic dermatitis  This is a common skin disease that mainly affects the scalp. It may also affect any oily areas of the body, such as the face, sides of nose, eyebrows, ears, eyelids, and chest. It is marked by small scaling and redness of the skin (erythema). This can affect people of all ages. In infants, this condition is known as \"cradle cap.\"  Stasis dermatitis  This is a common skin disease that usually appears on the legs and feet. It most often occurs in people who have a condition that prevents blood from being pumped through the veins in the legs (chronic venous insufficiency). Stasis dermatitis is a chronic condition that needs long-term management.  How is " eczema diagnosed?  Your health care provider will examine your skin and review your medical history. He or she may also give you skin patch tests. These tests involve taking patches that contain possible allergens and placing them on your back. He or she will then check in a few days to see if an allergic reaction occurred.  What are the common treatments?  Treatment for eczema is based on the type of eczema you have. Hydrocortisone steroid medicine can relieve itching quickly and help reduce inflammation. This medicine may be prescribed or obtained over-the-counter, depending on the strength of the medicine that is needed.  Follow these instructions at home:  · Take over-the-counter and prescription medicines only as told by your health care provider.  · Use creams or ointments to moisturize your skin. Do not use lotions.  · Learn what triggers or irritates your symptoms. Avoid these things.  · Treat symptom flare-ups quickly.  · Do not itch your skin. This can make your rash worse.  · Keep all follow-up visits as told by your health care provider. This is important.  Where to find more information  · The American Academy of Dermatology: www.aad.org  · The National Eczema Association: www.nationaleczema.org  Contact a health care provider if:  · You have serious itching, even with treatment.  · You regularly scratch your skin until it bleeds.  · Your rash looks different than usual.  · Your skin is painful, swollen, or more red than usual.  · You have a fever.  Summary  · There are eight general types of eczema. Each type has different triggers.  · Eczema of any type causes itching that may range from mild to severe.  · Treatment varies based on the type of eczema you have. Hydrocortisone steroid medicine can help with itching and inflammation.  · Protecting your skin is the best way to prevent eczema. Use moisturizers and lotions. Avoid triggers and irritants, and treat flare-ups quickly.  This information is not  intended to replace advice given to you by your health care provider. Make sure you discuss any questions you have with your health care provider.  Document Released: 05/03/2018 Document Revised: 11/30/2018 Document Reviewed: 05/03/2018  Elsevier Patient Education © 2020 Else[employer] Inc.

## 2020-09-16 ENCOUNTER — OFFICE (AMBULATORY)
Dept: URBAN - METROPOLITAN AREA PATHOLOGY 4 | Facility: PATHOLOGY | Age: 75
End: 2020-09-16

## 2020-09-16 ENCOUNTER — OFFICE (AMBULATORY)
Dept: URBAN - METROPOLITAN AREA PATHOLOGY 4 | Facility: PATHOLOGY | Age: 75
End: 2020-09-16
Payer: COMMERCIAL

## 2020-09-16 ENCOUNTER — ON CAMPUS - OUTPATIENT (AMBULATORY)
Dept: URBAN - METROPOLITAN AREA HOSPITAL 2 | Facility: HOSPITAL | Age: 75
End: 2020-09-16

## 2020-09-16 VITALS
HEART RATE: 64 BPM | SYSTOLIC BLOOD PRESSURE: 112 MMHG | DIASTOLIC BLOOD PRESSURE: 94 MMHG | TEMPERATURE: 96.8 F | HEART RATE: 68 BPM | DIASTOLIC BLOOD PRESSURE: 88 MMHG | RESPIRATION RATE: 18 BRPM | SYSTOLIC BLOOD PRESSURE: 179 MMHG | HEART RATE: 52 BPM | DIASTOLIC BLOOD PRESSURE: 65 MMHG | HEART RATE: 54 BPM | WEIGHT: 286 LBS | RESPIRATION RATE: 16 BRPM | SYSTOLIC BLOOD PRESSURE: 128 MMHG | OXYGEN SATURATION: 98 % | HEART RATE: 62 BPM | HEIGHT: 63 IN | DIASTOLIC BLOOD PRESSURE: 72 MMHG | SYSTOLIC BLOOD PRESSURE: 174 MMHG | OXYGEN SATURATION: 100 % | HEART RATE: 53 BPM | SYSTOLIC BLOOD PRESSURE: 123 MMHG | DIASTOLIC BLOOD PRESSURE: 68 MMHG

## 2020-09-16 DIAGNOSIS — K29.50 UNSPECIFIED CHRONIC GASTRITIS WITHOUT BLEEDING: ICD-10-CM

## 2020-09-16 DIAGNOSIS — R13.10 DYSPHAGIA, UNSPECIFIED: ICD-10-CM

## 2020-09-16 DIAGNOSIS — B96.81 HELICOBACTER PYLORI [H. PYLORI] AS THE CAUSE OF DISEASES CLA: ICD-10-CM

## 2020-09-16 DIAGNOSIS — K44.9 DIAPHRAGMATIC HERNIA WITHOUT OBSTRUCTION OR GANGRENE: ICD-10-CM

## 2020-09-16 PROBLEM — K31.89 OTHER DISEASES OF STOMACH AND DUODENUM: Status: ACTIVE | Noted: 2020-09-16

## 2020-09-16 LAB
GI HISTOLOGY: A. SELECT: (no result)
GI HISTOLOGY: PDF REPORT: (no result)

## 2020-09-16 PROCEDURE — 88305 TISSUE EXAM BY PATHOLOGIST: CPT | Mod: 26 | Performed by: INTERNAL MEDICINE

## 2020-09-16 PROCEDURE — 43450 DILATE ESOPHAGUS 1/MULT PASS: CPT | Performed by: INTERNAL MEDICINE

## 2020-09-16 PROCEDURE — 43239 EGD BIOPSY SINGLE/MULTIPLE: CPT | Performed by: INTERNAL MEDICINE

## 2020-09-16 RX ORDER — PANTOPRAZOLE SODIUM 20 MG/1
20 TABLET, DELAYED RELEASE ORAL
Qty: 90 | Refills: 3 | Status: COMPLETED
Start: 2020-09-16 | End: 2021-03-04

## 2020-09-18 RX ORDER — LEVOTHYROXINE SODIUM 0.07 MG/1
TABLET ORAL
Qty: 90 TABLET | Refills: 3 | Status: SHIPPED | OUTPATIENT
Start: 2020-09-18 | End: 2021-03-29 | Stop reason: DRUGHIGH

## 2020-09-21 ENCOUNTER — TELEPHONE (OUTPATIENT)
Dept: FAMILY MEDICINE CLINIC | Facility: CLINIC | Age: 75
End: 2020-09-21

## 2020-09-21 ENCOUNTER — HOSPITAL ENCOUNTER (OUTPATIENT)
Dept: PAIN MEDICINE | Facility: HOSPITAL | Age: 75
Discharge: HOME | End: 2020-09-21

## 2020-09-21 VITALS
WEIGHT: 280 LBS | BODY MASS INDEX: 49.61 KG/M2 | OXYGEN SATURATION: 99 % | TEMPERATURE: 98 F | DIASTOLIC BLOOD PRESSURE: 73 MMHG | SYSTOLIC BLOOD PRESSURE: 126 MMHG | HEIGHT: 63 IN | RESPIRATION RATE: 16 BRPM | HEART RATE: 57 BPM

## 2020-09-21 DIAGNOSIS — M46.1 SACROILIITIS (HCC): Primary | ICD-10-CM

## 2020-09-21 DIAGNOSIS — M54.50 LOWER BACK PAIN: ICD-10-CM

## 2020-09-21 PROCEDURE — 27096 INJECT SACROILIAC JOINT: CPT | Performed by: ANESTHESIOLOGY

## 2020-09-21 PROCEDURE — 25010000002 METHYLPREDNISOLONE PER 40 MG

## 2020-09-21 PROCEDURE — 77003 FLUOROGUIDE FOR SPINE INJECT: CPT

## 2020-09-21 PROCEDURE — 0 IOPAMIDOL 41 % SOLUTION: Performed by: ANESTHESIOLOGY

## 2020-09-21 RX ORDER — METHYLPREDNISOLONE ACETATE 40 MG/ML
INJECTION, SUSPENSION INTRA-ARTICULAR; INTRALESIONAL; INTRAMUSCULAR; SOFT TISSUE
Status: DISCONTINUED
Start: 2020-09-21 | End: 2020-09-22 | Stop reason: HOSPADM

## 2020-09-21 RX ORDER — METHYLPREDNISOLONE ACETATE 40 MG/ML
40 INJECTION, SUSPENSION INTRA-ARTICULAR; INTRALESIONAL; INTRAMUSCULAR; SOFT TISSUE ONCE
Status: COMPLETED | OUTPATIENT
Start: 2020-09-21 | End: 2020-09-21

## 2020-09-21 RX ORDER — BUPIVACAINE HYDROCHLORIDE 2.5 MG/ML
10 INJECTION, SOLUTION INFILTRATION; PERINEURAL ONCE
Status: COMPLETED | OUTPATIENT
Start: 2020-09-21 | End: 2020-09-21

## 2020-09-21 RX ORDER — BUPIVACAINE HYDROCHLORIDE 2.5 MG/ML
INJECTION, SOLUTION EPIDURAL; INFILTRATION; INTRACAUDAL
Status: DISCONTINUED
Start: 2020-09-21 | End: 2020-09-22 | Stop reason: HOSPADM

## 2020-09-21 RX ADMIN — BUPIVACAINE HYDROCHLORIDE 10 ML: 2.5 INJECTION, SOLUTION INFILTRATION; PERINEURAL at 11:32

## 2020-09-21 RX ADMIN — METHYLPREDNISOLONE ACETATE 40 MG: 40 INJECTION, SUSPENSION INTRA-ARTICULAR; INTRALESIONAL; INTRAMUSCULAR; SOFT TISSUE at 11:32

## 2020-09-21 RX ADMIN — IOPAMIDOL 3 ML: 408 INJECTION, SOLUTION INTRATHECAL at 11:31

## 2020-09-21 NOTE — PROCEDURES
"Subjective    CC back pain  Faby Le is a 75 y.o. female with sacroiliitis here for right SI injection.  No anticoagulation    Pain Assessment   Location of Pain: Lower Back, R Hip, L Hip,   Description of Pain: Dull/Aching, Throbbing, Stabbing  Previous Pain Rating :7  Current Pain Ratin   Aggravating Factors: Activity  Alleviating Factors: Rest, Medication    The following portions of the patient's history were reviewed and updated as appropriate: allergies, current medications, past family history, past medical history, past social history, past surgical history and problem list.      Review of Systems  As in HPI  Objective   Physical Exam   Constitutional: She is oriented to person, place, and time. She appears well-developed. No distress.   Cardiovascular: Normal rate.   Pulmonary/Chest: Effort normal.   Musculoskeletal:      Lumbar back: She exhibits decreased range of motion and tenderness.   Neurological: She is alert and oriented to person, place, and time.     /73 (BP Location: Right arm, Patient Position: Sitting)   Pulse 57   Temp 98 °F (36.7 °C) (Skin)   Resp 16   Ht 160 cm (63\")   Wt 127 kg (280 lb)   LMP  (LMP Unknown)   SpO2 99%   BMI 49.60 kg/m²     Assessment/Plan    underwent right SI injection.    RTC for procedure or as needed    DATE OF PROCEDURE:  2020    PREOPERATIVE DIAGNOSIS:  sacroiliitis    POSTOPERATIVE DIAGNOSIS: same    PROCEDURE PERFORMED:Right SACROILIAC JOINT INJECTION    The patient understands the risks and benefits of the procedure and wishes to proceed. The patient was seen in the preoperative area. Patient's consent was obtained and updated. Vitals were taken. Patient was then brought to the procedure suite and placed in prone position for bilateral sacroiliac joint injection. The appropriate anatomic area was widely prepped with Chloraprep and draped in a sterile fashion. Noninvasive monitoring per routine anesthesia protocol was placed. Under " fluoroscopic guidance using an AP view, a 22 gauge curved tip spinal needle was passed through skin anesthetized with 1% Lidocaine without epinephrine. The needle tip was guided to the lower pole of the joint using fluoroscopy. 1 mL of  preservative free contrast was injected into the joint to confirm location. A clear outline was obtained and 5 mL of steroid solution containing 4 mL 0.25% bupivacaine, and 1mL 40mg Depomedrol was injected. The patient tolerated with no lenny-procedural complications.  A sterile dressing was placed over the puncture sites.

## 2020-09-21 NOTE — TELEPHONE ENCOUNTER
----- Message from Leonor Adam MD sent at 9/18/2020  6:13 PM EDT -----  Make sure she contacts them on results of EGD

## 2020-09-21 NOTE — PATIENT INSTRUCTIONS
Sacroiliac Joint Injection    A sacroiliac (SI) joint injection is a procedure to inject a numbing medicine (anesthetic block)--and sometimes a strong anti-inflammatory medicine (steroid)--into the SI joint. The SI joint is the joint between two bones of the pelvis called the sacrum and the ilium. The sacrum is the bone at the base of the spine. The ilium is the large bone that forms the hip.  You may need this procedure if you have pain because of an inflamed or diseased SI joint. Various conditions can cause pain in the SI joint, including rheumatoid arthritis, gout, psoriatic arthritis, infection, or injury. SI joint pain is a common cause of low back pain. It may also cause pain in your buttock or leg.  SI joint injection may be done to:  · Find out if an anesthetic block relieves pain. This can confirm that the SI joint is the cause of pain (diagnostic use).  · Treat a painful SI joint with steroids, anesthetic medicine, or both (therapeutic use).  Tell a health care provider about:  · Any allergies you have.  · All medicines you are taking, including vitamins, herbs, eye drops, creams, and over-the-counter medicines.  · Any problems you or family members have had with anesthetic medicines.  · Any blood disorders you have.  · Any surgeries you have had.  · Any medical conditions you have.  · Whether you are pregnant or may be pregnant.  What are the risks?  Generally, this is a safe procedure. However, problems may occur, including:  · Infection.  · Bleeding.  · Nerve injury.  · Temporary increase in pain.  · Headache.  · Failure of the procedure to relieve pain.  · Bruising or soreness at the joint, in deep tissues, or at the injection site.  · Allergic reactions to medicines or dyes.  · Side effects from the steroid medicine. These may include facial flushing, increased appetite, diarrhea, and increased blood sugar.  What happens before the procedure?  · You may have a physical exam.  · You may have imaging  tests, such as an X-ray, CT scan, or MRI.  · Ask your health care provider about:  ? Changing or stopping your regular medicines. This is especially important if you are taking diabetes medicines or blood thinners.  ? Taking medicines such as aspirin and ibuprofen. These medicines can thin your blood. Do not take these medicines unless your health care provider tells you to take them.  ? Taking over-the-counter medicines, vitamins, herbs, and supplements.  · Plan to have someone take you home from the hospital or clinic.  What happens during the procedure?  · To lower your risk of infection:  ? Your health care team will wash or sanitize their hands.  ? Your skin will be washed with a germ-killing (antiseptic) solution.  · You may be given one or more of the following:  ? A medicine to help you relax (sedative).  ? A medicine to numb the area (local anesthetic). Your health care provider will inject a local anesthetic into the skin above your SI joint.  · You will be placed in the proper position on a procedure table to give the health care team the best access to your SI joint.  · An X-ray machine that produces moving X-ray images (fluoroscopy) will be placed above the procedure table.  · A long, thin needle will be inserted through your skin and down to your SI joint.  · The position of the needle will be checked with fluoroscopy imaging.  · An X-ray dye (contrast media) will be injected to make sure the needle enters the joint space. You may be asked if you feel any pain.  · Long-acting anesthetic medicine will be injected. Long-acting steroid medicine may also be injected.  · The needle will be removed, and a bandage will be placed over the injection site.  The procedure may vary among health care providers and hospitals.  What happens after the procedure?  · Your blood pressure, heart rate, breathing rate, and blood oxygen level will be monitored until the medicines you were given have worn off.  · If dye was  used, you will be told to drink plenty of water to wash (flush) the dye out of your body.  · You may be asked if you have pain relief from the injection.  · You will likely be able to go home shortly after the procedure.  · Your health care provider will give you instructions for taking care of yourself after the procedure. These may include instructions for doing physical therapy exercises.  · Do not drive for 24 hours if you were given a sedative during the procedure.  Summary  · A sacroiliac (SI) joint injection is an injection of a numbing medicine (anesthetic block)--and sometimes a strong anti-inflammatory medicine (steroid)--into the SI joint.  · You will be awake during the procedure, but the injection area will be made numb.  · If you were given a medicine to help you relax (sedative during the procedure, do not drive for at least 24 hours.  This information is not intended to replace advice given to you by your health care provider. Make sure you discuss any questions you have with your health care provider.  Document Released: 09/24/2018 Document Revised: 11/30/2018 Document Reviewed: 09/24/2018  Elsevier Patient Education © 2020 [employer]

## 2020-10-09 DIAGNOSIS — Z12.31 BREAST CANCER SCREENING BY MAMMOGRAM: ICD-10-CM

## 2020-10-12 ENCOUNTER — OFFICE VISIT (OUTPATIENT)
Dept: FAMILY MEDICINE CLINIC | Facility: CLINIC | Age: 75
End: 2020-10-12

## 2020-10-12 VITALS
TEMPERATURE: 98.4 F | SYSTOLIC BLOOD PRESSURE: 135 MMHG | DIASTOLIC BLOOD PRESSURE: 66 MMHG | WEIGHT: 293 LBS | BODY MASS INDEX: 51.91 KG/M2 | HEART RATE: 93 BPM | OXYGEN SATURATION: 98 % | RESPIRATION RATE: 16 BRPM | HEIGHT: 63 IN

## 2020-10-12 DIAGNOSIS — R60.9 EDEMA, UNSPECIFIED TYPE: Primary | ICD-10-CM

## 2020-10-12 DIAGNOSIS — N30.00 ACUTE CYSTITIS WITHOUT HEMATURIA: ICD-10-CM

## 2020-10-12 LAB
ALBUMIN SERPL-MCNC: 3.7 G/DL (ref 3.5–5.2)
ALBUMIN/GLOB SERPL: 1.2 G/DL
ALP SERPL-CCNC: 97 U/L (ref 39–117)
ALT SERPL W P-5'-P-CCNC: 23 U/L (ref 1–33)
ANION GAP SERPL CALCULATED.3IONS-SCNC: 7.7 MMOL/L (ref 5–15)
AST SERPL-CCNC: 21 U/L (ref 1–32)
BASOPHILS # BLD AUTO: 0.07 10*3/MM3 (ref 0–0.2)
BASOPHILS NFR BLD AUTO: 1.2 % (ref 0–1.5)
BILIRUB BLD-MCNC: NEGATIVE MG/DL
BILIRUB SERPL-MCNC: 0.3 MG/DL (ref 0–1.2)
BUN SERPL-MCNC: 22 MG/DL (ref 8–23)
BUN/CREAT SERPL: 20.8 (ref 7–25)
CALCIUM SPEC-SCNC: 9.8 MG/DL (ref 8.6–10.5)
CHLORIDE SERPL-SCNC: 103 MMOL/L (ref 98–107)
CLARITY, POC: CLEAR
CO2 SERPL-SCNC: 29.3 MMOL/L (ref 22–29)
COLOR UR: YELLOW
CREAT SERPL-MCNC: 1.06 MG/DL (ref 0.57–1)
DEPRECATED RDW RBC AUTO: 45.3 FL (ref 37–54)
EOSINOPHIL # BLD AUTO: 0.15 10*3/MM3 (ref 0–0.4)
EOSINOPHIL NFR BLD AUTO: 2.6 % (ref 0.3–6.2)
ERYTHROCYTE [DISTWIDTH] IN BLOOD BY AUTOMATED COUNT: 12.9 % (ref 12.3–15.4)
ERYTHROCYTE [SEDIMENTATION RATE] IN BLOOD: 39 MM/HR (ref 0–30)
GFR SERPL CREATININE-BSD FRML MDRD: 51 ML/MIN/1.73
GLOBULIN UR ELPH-MCNC: 3 GM/DL
GLUCOSE SERPL-MCNC: 110 MG/DL (ref 65–99)
GLUCOSE UR STRIP-MCNC: NEGATIVE MG/DL
HCT VFR BLD AUTO: 32.9 % (ref 34–46.6)
HGB BLD-MCNC: 11.3 G/DL (ref 12–15.9)
IMM GRANULOCYTES # BLD AUTO: 0.01 10*3/MM3 (ref 0–0.05)
IMM GRANULOCYTES NFR BLD AUTO: 0.2 % (ref 0–0.5)
KETONES UR QL: NEGATIVE
LEUKOCYTE EST, POC: ABNORMAL
LYMPHOCYTES # BLD AUTO: 1.45 10*3/MM3 (ref 0.7–3.1)
LYMPHOCYTES NFR BLD AUTO: 25.5 % (ref 19.6–45.3)
MCH RBC QN AUTO: 33.4 PG (ref 26.6–33)
MCHC RBC AUTO-ENTMCNC: 34.3 G/DL (ref 31.5–35.7)
MCV RBC AUTO: 97.3 FL (ref 79–97)
MONOCYTES # BLD AUTO: 0.56 10*3/MM3 (ref 0.1–0.9)
MONOCYTES NFR BLD AUTO: 9.8 % (ref 5–12)
NEUTROPHILS NFR BLD AUTO: 3.45 10*3/MM3 (ref 1.7–7)
NEUTROPHILS NFR BLD AUTO: 60.7 % (ref 42.7–76)
NITRITE UR-MCNC: NEGATIVE MG/ML
NRBC BLD AUTO-RTO: 0 /100 WBC (ref 0–0.2)
PH UR: 5 [PH] (ref 5–8)
PLATELET # BLD AUTO: 221 10*3/MM3 (ref 140–450)
PMV BLD AUTO: 9.8 FL (ref 6–12)
POTASSIUM SERPL-SCNC: 4.1 MMOL/L (ref 3.5–5.2)
PROT SERPL-MCNC: 6.7 G/DL (ref 6–8.5)
PROT UR STRIP-MCNC: NEGATIVE MG/DL
RBC # BLD AUTO: 3.38 10*6/MM3 (ref 3.77–5.28)
RBC # UR STRIP: NEGATIVE /UL
SODIUM SERPL-SCNC: 140 MMOL/L (ref 136–145)
SP GR UR: 1.02 (ref 1–1.03)
TSH SERPL DL<=0.05 MIU/L-ACNC: 3.56 UIU/ML (ref 0.27–4.2)
UROBILINOGEN UR QL: NORMAL
WBC # BLD AUTO: 5.69 10*3/MM3 (ref 3.4–10.8)

## 2020-10-12 PROCEDURE — 84443 ASSAY THYROID STIM HORMONE: CPT | Performed by: PREVENTIVE MEDICINE

## 2020-10-12 PROCEDURE — 99213 OFFICE O/P EST LOW 20 MIN: CPT | Performed by: PREVENTIVE MEDICINE

## 2020-10-12 PROCEDURE — 85025 COMPLETE CBC W/AUTO DIFF WBC: CPT | Performed by: PREVENTIVE MEDICINE

## 2020-10-12 PROCEDURE — 81003 URINALYSIS AUTO W/O SCOPE: CPT | Performed by: PREVENTIVE MEDICINE

## 2020-10-12 PROCEDURE — 87086 URINE CULTURE/COLONY COUNT: CPT | Performed by: PREVENTIVE MEDICINE

## 2020-10-12 PROCEDURE — 85652 RBC SED RATE AUTOMATED: CPT | Performed by: PREVENTIVE MEDICINE

## 2020-10-12 PROCEDURE — 80053 COMPREHEN METABOLIC PANEL: CPT | Performed by: PREVENTIVE MEDICINE

## 2020-10-12 RX ORDER — PANTOPRAZOLE SODIUM 40 MG/1
40 TABLET, DELAYED RELEASE ORAL 2 TIMES DAILY
COMMUNITY
Start: 2020-09-29 | End: 2021-01-06 | Stop reason: SDUPTHER

## 2020-10-12 NOTE — PROGRESS NOTES
"Subjective   Faby Le is a 75 y.o. female presents for   Chief Complaint   Patient presents with   • Leg Swelling     bilateral x 4 day       Health Maintenance Due   Topic Date Due   • DXA SCAN  06/29/2020       Both legs started swelling 4 days ago and could hardly get shoes on.  Started Pantoprazole and Pylera 9 days ago.  No fever, chest pain or dysuria.  Not eating more salt or other contact but weight increased 9#  Somr R flank pain     Leg Swelling  Pertinent negatives include no chest pain, coughing or sore throat.        Vitals:    10/12/20 1420 10/12/20 1428   BP: 137/79 135/66   BP Location: Right arm Left arm   Patient Position: Sitting Sitting   Cuff Size: Large Adult Large Adult   Pulse: 79 93   Resp: 16    Temp: 98.4 °F (36.9 °C)    TempSrc: Infrared    SpO2: 98%    Weight: 136 kg (299 lb 12.8 oz)    Height: 160 cm (63\")      Body mass index is 53.11 kg/m².    Current Outpatient Medications on File Prior to Visit   Medication Sig Dispense Refill   • allopurinol (ZYLOPRIM) 300 MG tablet TAKE 1 TABLET BY MOUTH EVERY DAY 90 tablet 2   • amLODIPine (NORVASC) 5 MG tablet TAKE 1 TABLET BY MOUTH EVERY DAY 90 tablet 3   • atorvastatin (LIPITOR) 20 MG tablet TAKE 1 TABLET BY MOUTH EVERYDAY AT BEDTIME 90 tablet 3   • cetirizine (zyrTEC) 10 MG tablet Take 1 tablet by mouth Daily.     • cyanocobalamin (VITAMIN B-12) 1000 MCG tablet Take 1 tablet by mouth Daily.     • Enflurane (COMPOUND 347 IN)      • escitalopram (LEXAPRO) 20 MG tablet TAKE 1 TABLET BY MOUTH EVERY DAY 90 tablet 2   • furosemide (LASIX) 20 MG tablet Take 1 tablet by mouth Daily. As needed for swelling 90 tablet 1   • levothyroxine (SYNTHROID, LEVOTHROID) 75 MCG tablet TAKE 1 TABLET BY MOUTH EVERY DAY 90 tablet 3   • Linzess 72 MCG capsule capsule Take 72 mcg by mouth Daily.     • metoprolol tartrate (LOPRESSOR) 25 MG tablet TAKE 1 TABLET BY MOUTH DAILY AT BEDTIME 90 tablet 3   • montelukast (SINGULAIR) 10 MG tablet Take 10 mg by mouth Daily.  "    • Multiple Vitamins-Minerals (CENTRUM SILVER ADULT 50+) tablet Take 1 tablet by mouth Daily.     • Omega-3 Fatty Acids (FISH OIL) 1000 MG capsule capsule Take 1 capsule by mouth Daily.     • pantoprazole (PROTONIX) 40 MG EC tablet Take 40 mg by mouth 2 (Two) Times a Day.     • Vitamin D, Cholecalciferol, 25 MCG (1000 UT) tablet Take 1 tablet by mouth Daily.     • APPLE CIDER VINEGAR PO Take 1 tablet by mouth 2 (Two) Times a Day. Take 2 tablets am and 1 pm       No current facility-administered medications on file prior to visit.        The following portions of the patient's history were reviewed and updated as appropriate: allergies, current medications, past family history, past medical history, past social history, past surgical history and problem list.    Review of Systems   Constitutional: Negative.    HENT: Negative.  Negative for sinus pressure and sore throat.    Eyes: Negative.    Respiratory: Negative.  Negative for cough and shortness of breath.    Cardiovascular: Positive for leg swelling. Negative for chest pain and palpitations.   Gastrointestinal: Negative.    Endocrine: Negative.    Genitourinary: Positive for flank pain.   Skin: Negative.    Allergic/Immunologic: Positive for environmental allergies.   Neurological: Negative.    Hematological: Negative.    Psychiatric/Behavioral: Negative.        Objective   Physical Exam  Vitals signs reviewed.   Constitutional:       General: She is not in acute distress.     Appearance: She is well-developed. She is obese. She is not ill-appearing or toxic-appearing.   HENT:      Head: Normocephalic and atraumatic.      Right Ear: Tympanic membrane, ear canal and external ear normal.      Left Ear: Tympanic membrane, ear canal and external ear normal.      Nose: Nose normal.   Eyes:      Extraocular Movements: Extraocular movements intact.      Conjunctiva/sclera: Conjunctivae normal.      Pupils: Pupils are equal, round, and reactive to light.   Neck:       Musculoskeletal: Neck supple.   Cardiovascular:      Rate and Rhythm: Normal rate and regular rhythm.      Heart sounds: Normal heart sounds. No murmur. No gallop.    Pulmonary:      Effort: Pulmonary effort is normal.      Breath sounds: Normal breath sounds.   Abdominal:      General: Bowel sounds are normal. There is no distension.      Palpations: Abdomen is soft. There is no mass.      Tenderness: There is no abdominal tenderness.   Musculoskeletal: Normal range of motion.      Right lower leg: Edema present.      Left lower leg: Edema present.      Comments: Negative Vesta's.  No ROSA   Skin:     General: Skin is warm.   Neurological:      General: No focal deficit present.      Mental Status: She is alert and oriented to person, place, and time.   Psychiatric:         Mood and Affect: Mood normal.         Behavior: Behavior normal.       PHQ-9 Total Score:      Assessment/Plan   Faby was seen today for leg swelling.    Diagnoses and all orders for this visit:    Edema, unspecified type  -     CBC Auto Differential  -     Comprehensive Metabolic Panel  -     Sedimentation Rate; Future  -     TSH  -     POCT urinalysis dipstick, automated  -     Sedimentation Rate    Acute cystitis without hematuria  -     Urine Culture - Urine, Urine, Clean Catch        Patient Instructions   Take additional Lasix tonight    Advise DXA scan    Weigh daily and callFriday with weights

## 2020-10-13 LAB — BACTERIA SPEC AEROBE CULT: NORMAL

## 2020-10-13 NOTE — PROGRESS NOTES
Urine wasn't infected and blood sugar slightly elevated at 110 and is slightly anemic still.  Sed rate which is inflammation marker is slightly elevated but nothing to explain swelling.  Take an extra lasix a total of 3 days and stay away from salt and if chest pain or shortness of breath call cardiologist or [facility].  Please contiunue to do daily weights and call Friday

## 2020-10-16 ENCOUNTER — OFFICE (AMBULATORY)
Dept: URBAN - METROPOLITAN AREA CLINIC 64 | Facility: CLINIC | Age: 75
End: 2020-10-16

## 2020-10-16 VITALS — HEIGHT: 63 IN

## 2020-10-16 DIAGNOSIS — B96.81 HELICOBACTER PYLORI [H. PYLORI] AS THE CAUSE OF DISEASES CLA: ICD-10-CM

## 2020-10-16 PROCEDURE — 99213 OFFICE O/P EST LOW 20 MIN: CPT | Mod: 95 | Performed by: NURSE PRACTITIONER

## 2020-10-19 ENCOUNTER — OFFICE VISIT (OUTPATIENT)
Dept: PAIN MEDICINE | Facility: CLINIC | Age: 75
End: 2020-10-19

## 2020-10-19 VITALS
TEMPERATURE: 97.3 F | RESPIRATION RATE: 16 BRPM | HEART RATE: 89 BPM | DIASTOLIC BLOOD PRESSURE: 70 MMHG | BODY MASS INDEX: 51.91 KG/M2 | WEIGHT: 293 LBS | HEIGHT: 63 IN | SYSTOLIC BLOOD PRESSURE: 143 MMHG

## 2020-10-19 DIAGNOSIS — G89.4 CHRONIC PAIN SYNDROME: Primary | ICD-10-CM

## 2020-10-19 DIAGNOSIS — M54.16 LUMBAR RADICULITIS: ICD-10-CM

## 2020-10-19 DIAGNOSIS — M46.1 SACROILIITIS (HCC): ICD-10-CM

## 2020-10-19 DIAGNOSIS — M47.817 LUMBOSACRAL SPONDYLOSIS WITHOUT MYELOPATHY: ICD-10-CM

## 2020-10-19 PROCEDURE — G0463 HOSPITAL OUTPT CLINIC VISIT: HCPCS | Performed by: ANESTHESIOLOGY

## 2020-10-19 PROCEDURE — 99214 OFFICE O/P EST MOD 30 MIN: CPT | Performed by: ANESTHESIOLOGY

## 2020-10-19 NOTE — PROGRESS NOTES
Subjective    CC neck pain, back pain, right hip pain  Faby Le is a 75 y.o. female with multiple comorbidities including CKD, chronic polyarthralgia S/p TKA , chronic back/ neck pain here for follow-up.  Had right SI injection last visit with reports of marginal relief.  Continued lower back pain radiating to right hip and leg worse with activity and worse in the morning.  Chronic low back pain mostly right-sided radiating to right hip worse with twisting bending, prolonged standing or prolonged sitting.  Denies radicular pain.  Chronic left neck pain rating to left shoulder and head associated with headaches constant worse with activity.  Denies balance issues, weakness, bladder bowel continence.  Chronic polyarthralgia, knee pain left foot pain.  Continued chronic axial back pain.  Chronic pain impairing daily activity and interfering with sleep.  Seen neurosurgery, no surgery recommended at this time.  Referred to try facet blocks/RFA..     L-spine x-ray  advanced multilevel degenerative disc disease with disc space narrowing at all lumbar levels. There is grade 1 anterolisthesis of L4 on L5. There is marked reactive endplate sclerosis at all levels. There is advanced facet arthrosis throughout the lumbar spine. With flexion, there is 6 mm of anterolisthesis of L4 on 5. With extension, this measures approximately 5 mm   C-spine CT 2020 multiple level degenerative changes facet arthropathy with osteophyte complex, mild to moderate.   The spine MRI 2019 Multilevel central canal stenosis with moderate to marked neural foraminal narrowing at multiple levels, changes are most significant from the C3-4 through the C6-7 levels with changes of a lesser degree at the C7-T1 level.  Normal cord    Pain Assessment   Location of Pain: Lower Back, neck pain, joints  Description of Pain: Dull/Aching, Throbbing, Stabbing  Previous Pain Rating :4  Current Pain Ratin  Aggravating Factors: Activity  Alleviating  Factors: Rest, Medication    PEG Assessment   What number best describes your pain on average in the past week?3  What number best describes how, during the past week, pain has interfered with your enjoyment of life?8  What number best describes how, during the past week, pain has interfered with your general activity? 4     The following portions of the patient's history were reviewed and updated as appropriate: allergies, current medications, past family history, past medical history, past social history, past surgical history and problem list.     has a past medical history of Ankle pain, Arthritis, Coronary artery disease, Depression, Gout, Hyperglycemia, Hyperlipidemia, Low back pain, Sleep apnea, and Vitamin D deficiency.   has a past surgical history that includes Hysterectomy; Rotator cuff repair; Cataract extraction; Carpal tunnel release; Cubital Tunnel Release; Cardiac catheterization; Replacement total knee; and Ankle Fusion.  family history includes Arthritis in her brother; Cancer in her brother; Hypertension in her mother; Stroke in her father.  Social History     Tobacco Use   • Smoking status: Never Smoker   • Smokeless tobacco: Never Used   Substance Use Topics   • Alcohol use: No     Frequency: Never     Review of Systems   Constitutional: Negative for chills, fatigue and fever.   HENT: Negative for swollen glands and trouble swallowing.    Respiratory: Negative.  Negative for shortness of breath.    Cardiovascular: Negative for chest pain, palpitations and leg swelling.   Gastrointestinal: Negative for nausea and vomiting.   Musculoskeletal: Positive for arthralgias, back pain, neck pain and neck stiffness. Negative for gait problem, joint swelling and myalgias.   Skin: Negative for color change, dry skin, rash and skin lesions.   Neurological: Positive for headache. Negative for dizziness, weakness and light-headedness.   Hematological: Negative for adenopathy. Does not bruise/bleed easily.  "  Psychiatric/Behavioral: Negative for behavioral problems, suicidal ideas and depressed mood.   All other systems reviewed and are negative.    Objective   Physical Exam   Constitutional: She is oriented to person, place, and time. She appears well-developed and well-nourished. No distress. She is obese.  Eyes: Pupils are equal, round, and reactive to light. Conjunctivae are normal.   Neck: Normal range of motion and full passive range of motion without pain. No Kernig's sign noted.   Cardiovascular: Normal heart sounds.   Pulmonary/Chest: Effort normal and breath sounds normal.   Musculoskeletal:      Lumbar back: She exhibits decreased range of motion and tenderness.      Comments: Back: Paraspinal tenderness, positive leg raise on the right.  Pain with extension/side rotation. Lumbar loading positive, pain on extension of low back past 5 degrees.  TTP on the lumbar facets noted.       Vascular Status -  Her right foot exhibits no edema. Her left foot exhibits no edema.  Lymphadenopathy:     She has no cervical adenopathy.   Neurological: She is alert and oriented to person, place, and time. She has normal reflexes. No sensory deficit. Gait abnormal. Coordination normal.   Antalgic gait   Skin: Skin is warm and dry. Capillary refill takes less than 2 seconds.   Psychiatric: Her speech is normal and behavior is normal.   Vitals reviewed.    /70   Pulse 89   Temp 97.3 °F (36.3 °C)   Resp 16   Ht 160 cm (63\")   Wt 136 kg (299 lb)   LMP  (LMP Unknown)   BMI 52.97 kg/m²      PHQ 9 on chart  Opioid risk tool low risk    Assessment/Plan   Diagnoses and all orders for this visit:    1. Chronic pain syndrome (Primary)    2. Lumbosacral spondylosis without myelopathy  -     MRI Lumbar Spine Without Contrast; Future    3. Lumbar radiculitis  -     MRI Lumbar Spine Without Contrast; Future  -     Ambulatory Referral to Pain Management    4. Sacroiliitis (CMS/[facility])    Summary  Faby Le is a 75 y.o. female with " multiple comorbidities including CKD, chronic polyarthralgia S/P TKA, chronic back/neck pain headache here for follow-up .  Chronic neck pain, headache from DDD spondylosis with cervicogenic headaches.  Impairing daily activity.  Marginal relief with physical therapy and medication.  Cannot take NSAIDs due to CKD.  Seen neurosurgery, no surgery recommended at this time.  Refer to try injections.    Chronic axial back pain mostly right-sided radiating to hip and leg.  Good relief of axial back pain with RFA however continues to have significant right hip and leg pain.  Spine x-ray with degenerative changes anterolisthesis L4-L5.    Interfering with ADLs.  L-spine MRI ordered for evaluation.  Schedule for LESI.    RTC for procedure

## 2020-10-20 ENCOUNTER — TELEPHONE (OUTPATIENT)
Dept: FAMILY MEDICINE CLINIC | Facility: CLINIC | Age: 75
End: 2020-10-20

## 2020-10-20 DIAGNOSIS — I10 ESSENTIAL HYPERTENSION: Primary | ICD-10-CM

## 2020-10-20 RX ORDER — DILTIAZEM HYDROCHLORIDE 120 MG/1
120 CAPSULE, EXTENDED RELEASE ORAL 2 TIMES DAILY
Qty: 60 CAPSULE | Refills: 3 | Status: SHIPPED | OUTPATIENT
Start: 2020-10-20 | End: 2020-12-09

## 2020-10-20 NOTE — TELEPHONE ENCOUNTER
Patient called in her weight since last Tuesday. Average of weight is 290    293  291  291  288  290  286    She states this morning her feet are still really swollen. She had difficulty putting her shoes on this morning.

## 2020-10-20 NOTE — TELEPHONE ENCOUNTER
Let's continue extra lasix daily and stop Amlodipine and start Diltiazem am and pm and keep followup with Dr. Preciado and see what he suggests next week.  If he doesn't order labs, I wshould get in 2 weeks to check kidney function

## 2020-10-21 ENCOUNTER — HOSPITAL ENCOUNTER (OUTPATIENT)
Dept: MRI IMAGING | Facility: HOSPITAL | Age: 75
Discharge: HOME | End: 2020-10-21
Admitting: ANESTHESIOLOGY

## 2020-10-21 DIAGNOSIS — M54.16 LUMBAR RADICULITIS: ICD-10-CM

## 2020-10-21 DIAGNOSIS — M47.817 LUMBOSACRAL SPONDYLOSIS WITHOUT MYELOPATHY: ICD-10-CM

## 2020-10-21 PROCEDURE — 72148 MRI LUMBAR SPINE W/O DYE: CPT

## 2020-10-28 ENCOUNTER — OFFICE VISIT (OUTPATIENT)
Dept: CARDIOLOGY | Facility: CLINIC | Age: 75
End: 2020-10-28

## 2020-10-28 VITALS
HEART RATE: 78 BPM | RESPIRATION RATE: 18 BRPM | WEIGHT: 293 LBS | SYSTOLIC BLOOD PRESSURE: 181 MMHG | BODY MASS INDEX: 51.91 KG/M2 | OXYGEN SATURATION: 98 % | HEIGHT: 63 IN | DIASTOLIC BLOOD PRESSURE: 81 MMHG

## 2020-10-28 DIAGNOSIS — M25.571 BILATERAL ANKLE PAIN, UNSPECIFIED CHRONICITY: ICD-10-CM

## 2020-10-28 DIAGNOSIS — E78.2 MIXED HYPERLIPIDEMIA: ICD-10-CM

## 2020-10-28 DIAGNOSIS — R60.0 EDEMA LEG: ICD-10-CM

## 2020-10-28 DIAGNOSIS — I10 ESSENTIAL HYPERTENSION: ICD-10-CM

## 2020-10-28 DIAGNOSIS — M25.572 BILATERAL ANKLE PAIN, UNSPECIFIED CHRONICITY: ICD-10-CM

## 2020-10-28 DIAGNOSIS — I25.10 CORONARY ARTERY DISEASE INVOLVING NATIVE CORONARY ARTERY OF NATIVE HEART WITHOUT ANGINA PECTORIS: Primary | ICD-10-CM

## 2020-10-28 DIAGNOSIS — I50.31 ACUTE DIASTOLIC CHF (CONGESTIVE HEART FAILURE) (HCC): ICD-10-CM

## 2020-10-28 DIAGNOSIS — G47.33 OBSTRUCTIVE SLEEP APNEA SYNDROME: ICD-10-CM

## 2020-10-28 PROCEDURE — 99214 OFFICE O/P EST MOD 30 MIN: CPT | Performed by: INTERNAL MEDICINE

## 2020-10-28 PROCEDURE — 93000 ELECTROCARDIOGRAM COMPLETE: CPT | Performed by: INTERNAL MEDICINE

## 2020-10-28 RX ORDER — BUMETANIDE 2 MG/1
2 TABLET ORAL DAILY
Qty: 30 TABLET | Refills: 2 | Status: SHIPPED | OUTPATIENT
Start: 2020-10-28 | End: 2020-12-09

## 2020-10-28 NOTE — PROGRESS NOTES
"Cardiology Office Visit      Encounter Date:  10/28/2020    Patient ID:   Faby Le is a 75 y.o. female.    Reason For Followup:  Lower extremity edema  Shortness of breath    Brief Clinical History:  Dear Dr. Adam, Leonor Ordaz MD    I had the pleasure of seeing Faby Le today. As you are well aware, this is a 75 y.o. female  presented to the office with symptoms of lower extremity edema and some shortness of breath        cardiac catheterization showed moderate disease involving the diagonal and mild-to-moderate disease involving circumflex      Patient is complaining of bilateral lower extremity edema which is progressively getting worse with no improvement even after increasing the dose of the Lasix by the primary care physician  Planing of some shortness of breath and dyspnea on exertion  Denies any chest pain  Denies any prior history of DVT or PE    Assessment & Plan    Impressions:  Morbid obesity  Bilateral lower extremity edema  Shortness of breath  Dyspnea on exertion  Obstructive sleep apnea  Coronary artery disease  Diastolic dysfunction  Hypertension    Recommendations:  Echocardiogram to assess the LV systolic function rule out any significant valve pathology or LV systolic dysfunction contributing to worsening lower extremity edema and shortness of breath  Discontinue Lasix  Start patient on Bumex 2 mg p.o. once a day  Check venous Doppler bilateral lower extremities to make sure there is no significant venous insufficiency or DVT  Low-salt diet  Consider stockings versus Ace wrap to help with the lower extremity edema  Follow-up in office in 1 month    Objective:    Vitals:  Vitals:    10/28/20 1454   BP: (!) 181/81   BP Location: Left arm   Pulse: 78   Resp: 18   SpO2: 98%   Weight: 136 kg (299 lb)   Height: 160 cm (63\")       Physical Exam:    General: Alert, cooperative, no distress, appears stated age/morbid obesity  Head:  Normocephalic, atraumatic, mucous membranes " moist  Eyes:  Conjunctiva/corneas clear, EOM's intact     Neck:  Supple,  no adenopathy;      Lungs: Clear to auscultation bilaterally, no wheezes rhonchi rales are noted  Chest wall: No tenderness  Heart::  Regular rate and rhythm, S1 and S2 normal, no murmur, rub or gallop  Abdomen: Soft, non-tender, nondistended bowel sounds active  Extremities: No cyanosis, clubbing, or 2+ pitting edema   Pulses: 2+ and symmetric all extremities  Skin:  No rashes or lesions  Neuro/psych: A&O x3. CN II through XII are grossly intact with appropriate affect      Allergies:  Allergies   Allergen Reactions   • Celecoxib Itching     swelling       Medication Review:     Current Outpatient Medications:   •  allopurinol (ZYLOPRIM) 300 MG tablet, TAKE 1 TABLET BY MOUTH EVERY DAY, Disp: 90 tablet, Rfl: 2  •  APPLE CIDER VINEGAR PO, Take 1 tablet by mouth 2 (Two) Times a Day. Take 2 tablets am and 1 pm, Disp: , Rfl:   •  atorvastatin (LIPITOR) 20 MG tablet, TAKE 1 TABLET BY MOUTH EVERYDAY AT BEDTIME, Disp: 90 tablet, Rfl: 3  •  cetirizine (zyrTEC) 10 MG tablet, Take 1 tablet by mouth Daily., Disp: , Rfl:   •  cyanocobalamin (VITAMIN B-12) 1000 MCG tablet, Take 1 tablet by mouth Daily., Disp: , Rfl:   •  dilTIAZem SR (CARDIZEM SR) 120 MG 12 hr capsule, Take 1 capsule by mouth 2 (Two) Times a Day., Disp: 60 capsule, Rfl: 3  •  Enflurane (COMPOUND 347 IN), , Disp: , Rfl:   •  escitalopram (LEXAPRO) 20 MG tablet, TAKE 1 TABLET BY MOUTH EVERY DAY, Disp: 90 tablet, Rfl: 2  •  furosemide (LASIX) 20 MG tablet, Take 1 tablet by mouth Daily. As needed for swelling (Patient taking differently: Take 20 mg by mouth 2 (Two) Times a Day.), Disp: 90 tablet, Rfl: 1  •  hydrocortisone 2.5 % ointment, APPLY A THIN LAYER TO THE AFFECTED AREA(S) BY TOPICAL ROUTE 2 TIMES PER DAY UNTIL HEALED., Disp: , Rfl:   •  levothyroxine (SYNTHROID, LEVOTHROID) 75 MCG tablet, TAKE 1 TABLET BY MOUTH EVERY DAY, Disp: 90 tablet, Rfl: 3  •  Linzess 72 MCG capsule capsule, Take  72 mcg by mouth Daily., Disp: , Rfl:   •  metoprolol tartrate (LOPRESSOR) 25 MG tablet, TAKE 1 TABLET BY MOUTH DAILY AT BEDTIME, Disp: 90 tablet, Rfl: 3  •  montelukast (SINGULAIR) 10 MG tablet, Take 10 mg by mouth Daily., Disp: , Rfl:   •  Multiple Vitamins-Minerals (CENTRUM SILVER ADULT 50+) tablet, Take 1 tablet by mouth Daily., Disp: , Rfl:   •  Omega-3 Fatty Acids (FISH OIL) 1000 MG capsule capsule, Take 1 capsule by mouth Daily., Disp: , Rfl:   •  pantoprazole (PROTONIX) 40 MG EC tablet, Take 40 mg by mouth 2 (Two) Times a Day., Disp: , Rfl:   •  Vitamin D, Cholecalciferol, 25 MCG (1000 UT) tablet, Take 1 tablet by mouth Daily., Disp: , Rfl:     Family History:  Family History   Problem Relation Age of Onset   • Hypertension Mother    • Stroke Father    • Arthritis Brother    • Cancer Brother        Past Medical History:  Past Medical History:   Diagnosis Date   • Ankle pain    • Arthritis    • Coronary artery disease    • Depression    • Gout    • Hyperglycemia    • Hyperlipidemia    • Low back pain    • Sleep apnea    • Vitamin D deficiency        Past surgical History:  Past Surgical History:   Procedure Laterality Date   • ANKLE FUSION      2017-left   • CARDIAC CATHETERIZATION     • CARPAL TUNNEL RELEASE     • CATARACT EXTRACTION     • CUBITAL TUNNEL RELEASE     • HYSTERECTOMY     • REPLACEMENT TOTAL KNEE      left 2008   • ROTATOR CUFF REPAIR         Social History:  Social History     Socioeconomic History   • Marital status:      Spouse name: Not on file   • Number of children: Not on file   • Years of education: Not on file   • Highest education level: Not on file   Tobacco Use   • Smoking status: Never Smoker   • Smokeless tobacco: Never Used   Substance and Sexual Activity   • Alcohol use: No     Frequency: Never   • Drug use: No   • Sexual activity: Not Currently       Review of Systems:  The following systems were reviewed as they relate to the cardiovascular system: Constitutional, Eyes,  ENT, Cardiovascular, Respiratory, Gastrointestinal, Integumentary, Neurological, Psychiatric, Hematologic, Endocrine, Musculoskeletal, and Genitourinary. The pertinent cardiovascular findings are reported above with all other cardiovascular points within those systems being negative.    Diagnostic Study Review:     Current Electrocardiogram:    ECG 12 Lead    Date/Time: 10/28/2020 3:58 PM  Performed by: Ratna Zavala MD  Authorized by: Ratna Zavala MD   Comparison: compared with previous ECG   Similar to previous ECG  Rhythm: sinus rhythm  Rate: normal  BPM: 78  Conduction: conduction normal  QRS axis: normal    Clinical impression: normal ECG              NOTE: The following portions of the patient's history were reviewed and updated this visit as appropriate: allergies, current medications, past family history, past medical history, past social history, past surgical history and problem list.

## 2020-11-02 ENCOUNTER — HOSPITAL ENCOUNTER (OUTPATIENT)
Dept: PAIN MEDICINE | Facility: HOSPITAL | Age: 75
Discharge: HOME | End: 2020-11-02

## 2020-11-02 VITALS
OXYGEN SATURATION: 99 % | HEART RATE: 76 BPM | BODY MASS INDEX: 49.61 KG/M2 | TEMPERATURE: 97.7 F | SYSTOLIC BLOOD PRESSURE: 160 MMHG | WEIGHT: 280 LBS | RESPIRATION RATE: 16 BRPM | HEIGHT: 63 IN | DIASTOLIC BLOOD PRESSURE: 83 MMHG

## 2020-11-02 DIAGNOSIS — R52 PAIN: ICD-10-CM

## 2020-11-02 DIAGNOSIS — M54.16 LUMBAR RADICULITIS: Primary | ICD-10-CM

## 2020-11-02 PROCEDURE — 62323 NJX INTERLAMINAR LMBR/SAC: CPT | Performed by: ANESTHESIOLOGY

## 2020-11-02 PROCEDURE — 0 IOPAMIDOL 41 % SOLUTION: Performed by: ANESTHESIOLOGY

## 2020-11-02 PROCEDURE — 25010000002 METHYLPREDNISOLONE PER 40 MG

## 2020-11-02 PROCEDURE — 77003 FLUOROGUIDE FOR SPINE INJECT: CPT

## 2020-11-02 RX ORDER — METHYLPREDNISOLONE ACETATE 40 MG/ML
INJECTION, SUSPENSION INTRA-ARTICULAR; INTRALESIONAL; INTRAMUSCULAR; SOFT TISSUE
Status: DISCONTINUED
Start: 2020-11-02 | End: 2020-11-03 | Stop reason: HOSPADM

## 2020-11-02 RX ORDER — METHYLPREDNISOLONE ACETATE 40 MG/ML
40 INJECTION, SUSPENSION INTRA-ARTICULAR; INTRALESIONAL; INTRAMUSCULAR; SOFT TISSUE ONCE
Status: COMPLETED | OUTPATIENT
Start: 2020-11-02 | End: 2020-11-02

## 2020-11-02 RX ADMIN — IOPAMIDOL 3 ML: 408 INJECTION, SOLUTION INTRATHECAL at 11:57

## 2020-11-02 RX ADMIN — METHYLPREDNISOLONE ACETATE 40 MG: 40 INJECTION, SUSPENSION INTRA-ARTICULAR; INTRALESIONAL; INTRAMUSCULAR; SOFT TISSUE at 11:57

## 2020-11-02 NOTE — PROCEDURES
"Subjective    CC back pain  Faby Le is a 75 y.o. female with lumbar radiculitis here for LESI no anticoagulation.  L-spine MRI reviewed showing multiple level degenerative changes with canal foraminal narrowing.   retrolisthesis of L4 on L5 L5 on S1.    Pain Assessment   Location of Pain: Lower Back, R Hip, L Hip,   Description of Pain: Dull/Aching, Throbbing, Stabbing  Previous Pain Rating :7  Current Pain Ratin   Aggravating Factors: Activity  Alleviating Factors: Rest, Medication    The following portions of the patient's history were reviewed and updated as appropriate: allergies, current medications, past family history, past medical history, past social history, past surgical history and problem list.      Review of Systems  As in HPI  Objective   Physical Exam   Constitutional: She is oriented to person, place, and time. She appears well-developed. No distress.   Cardiovascular: Normal rate.   Pulmonary/Chest: Effort normal.   Musculoskeletal:      Lumbar back: She exhibits decreased range of motion and tenderness.   Neurological: She is alert and oriented to person, place, and time.     /79 (BP Location: Left arm, Patient Position: Sitting)   Pulse 80   Temp 97.7 °F (36.5 °C) (Core)   Resp 16   Ht 160 cm (63\")   Wt 127 kg (280 lb)   LMP  (LMP Unknown)   SpO2 97%   BMI 49.60 kg/m²     Assessment/Plan    underwent LESI    RTC for procedure or as needed    DATE OF PROCEDURE: 2020    PREOPERATIVE DIAGNOSIS: lumbar DDD/radiculitis    POSTOPERATIVE DIAGNOSIS: same    PROCEDURE PERFORMED:  lumbar Epidural Steroid Injection    The patient presents with a history of   lumbar degenerative disc disease with  radiculitis. The patient presents today for a  lumbar epidural steroid injection at level  L5/S1.   The patient was seen in the preoperative area.  Patient's consent was obtained and updated.  Vitals were taken.  Patient was then brought to the procedure suite and placed in a prone " position. The appropriate anatomic area was widely prepped with Chloraprep and draped in a sterile fashion. Noninvasive monitoring per routine anesthesia protocol was placed.  Under fluoroscopic guidance using  AP view a 20 gauge styleted tuohy needle was passed through skin anesthetized with 1% Lidocaine without epinephrine.  The needle was advanced using the continuous loss of resistance to saline technique into the L5 epidural space. Needle tip placement in the epidural space was confirmed by loss of resistance and injection of 1 mL of  preservative free contrast.  Following this 8 mL of a solution containing  1 mL of 40 mg Depo-Medrol and 7 mL of preservative-free saline was carefully administered in the epidural space.  A sterile dressing was placed over the puncture site.    The patient tolerated the procedure with no complications . They were then brought to the post procedure area where they recovered nicely.

## 2020-11-09 ENCOUNTER — HOSPITAL ENCOUNTER (OUTPATIENT)
Dept: CARDIOLOGY | Facility: HOSPITAL | Age: 75
Discharge: HOME | End: 2020-11-09

## 2020-11-09 DIAGNOSIS — M25.572 BILATERAL ANKLE PAIN, UNSPECIFIED CHRONICITY: ICD-10-CM

## 2020-11-09 DIAGNOSIS — I25.10 CORONARY ARTERY DISEASE INVOLVING NATIVE CORONARY ARTERY OF NATIVE HEART WITHOUT ANGINA PECTORIS: ICD-10-CM

## 2020-11-09 DIAGNOSIS — I50.31 ACUTE DIASTOLIC CHF (CONGESTIVE HEART FAILURE) (HCC): ICD-10-CM

## 2020-11-09 DIAGNOSIS — R60.0 EDEMA LEG: ICD-10-CM

## 2020-11-09 DIAGNOSIS — M25.571 BILATERAL ANKLE PAIN, UNSPECIFIED CHRONICITY: ICD-10-CM

## 2020-11-09 DIAGNOSIS — I10 ESSENTIAL HYPERTENSION: ICD-10-CM

## 2020-11-09 LAB

## 2020-11-09 PROCEDURE — 93306 TTE W/DOPPLER COMPLETE: CPT

## 2020-11-09 PROCEDURE — 93306 TTE W/DOPPLER COMPLETE: CPT | Performed by: INTERNAL MEDICINE

## 2020-11-09 PROCEDURE — 93970 EXTREMITY STUDY: CPT

## 2020-11-10 LAB
BH CV ECHO MEAS - ACS: 2.2 CM
BH CV ECHO MEAS - AO MAX PG (FULL): 4.8 MMHG
BH CV ECHO MEAS - AO MAX PG: 7.5 MMHG
BH CV ECHO MEAS - AO MEAN PG (FULL): 2.1 MMHG
BH CV ECHO MEAS - AO MEAN PG: 3.5 MMHG
BH CV ECHO MEAS - AO ROOT AREA (BSA CORRECTED): 1.5
BH CV ECHO MEAS - AO ROOT AREA: 9 CM^2
BH CV ECHO MEAS - AO ROOT DIAM: 3.4 CM
BH CV ECHO MEAS - AO V2 MAX: 136.9 CM/SEC
BH CV ECHO MEAS - AO V2 MEAN: 89.2 CM/SEC
BH CV ECHO MEAS - AO V2 VTI: 28.5 CM
BH CV ECHO MEAS - AORTIC HR: 71 BPM
BH CV ECHO MEAS - AORTIC R-R: 0.84 SEC
BH CV ECHO MEAS - AVA(I,A): 1.7 CM^2
BH CV ECHO MEAS - AVA(I,D): 1.7 CM^2
BH CV ECHO MEAS - AVA(V,A): 1.7 CM^2
BH CV ECHO MEAS - AVA(V,D): 1.7 CM^2
BH CV ECHO MEAS - BSA(HAYCOCK): 2.5 M^2
BH CV ECHO MEAS - BSA: 2.2 M^2
BH CV ECHO MEAS - BZI_BMI: 49.6 KILOGRAMS/M^2
BH CV ECHO MEAS - BZI_METRIC_HEIGHT: 160 CM
BH CV ECHO MEAS - BZI_METRIC_WEIGHT: 127 KG
BH CV ECHO MEAS - CI(AO): 8.1 L/MIN/M^2
BH CV ECHO MEAS - CI(LVOT): 1.5 L/MIN/M^2
BH CV ECHO MEAS - CO(AO): 18.2 L/MIN
BH CV ECHO MEAS - CO(LVOT): 3.4 L/MIN
BH CV ECHO MEAS - EDV(CUBED): 103.5 ML
BH CV ECHO MEAS - EDV(MOD-SP4): 50.6 ML
BH CV ECHO MEAS - EDV(TEICH): 102.1 ML
BH CV ECHO MEAS - EF(CUBED): 58.4 %
BH CV ECHO MEAS - EF(MOD-BP): 38 %
BH CV ECHO MEAS - EF(MOD-SP4): 38 %
BH CV ECHO MEAS - EF(TEICH): 50 %
BH CV ECHO MEAS - ESV(CUBED): 43 ML
BH CV ECHO MEAS - ESV(MOD-SP4): 31.4 ML
BH CV ECHO MEAS - ESV(TEICH): 51 ML
BH CV ECHO MEAS - FS: 25.3 %
BH CV ECHO MEAS - IVS/LVPW: 0.95
BH CV ECHO MEAS - IVSD: 1.1 CM
BH CV ECHO MEAS - LA DIMENSION(2D): 4.8 CM
BH CV ECHO MEAS - LV DIASTOLIC VOL/BSA (35-75): 22.7 ML/M^2
BH CV ECHO MEAS - LV MASS(C)D: 190.1 GRAMS
BH CV ECHO MEAS - LV MASS(C)DI: 85.2 GRAMS/M^2
BH CV ECHO MEAS - LV MAX PG: 2.6 MMHG
BH CV ECHO MEAS - LV MEAN PG: 1.5 MMHG
BH CV ECHO MEAS - LV SYSTOLIC VOL/BSA (12-30): 14.1 ML/M^2
BH CV ECHO MEAS - LV V1 MAX: 81.4 CM/SEC
BH CV ECHO MEAS - LV V1 MEAN: 59.2 CM/SEC
BH CV ECHO MEAS - LV V1 VTI: 17.1 CM
BH CV ECHO MEAS - LVIDD: 4.7 CM
BH CV ECHO MEAS - LVIDS: 3.5 CM
BH CV ECHO MEAS - LVOT AREA: 2.8 CM^2
BH CV ECHO MEAS - LVOT DIAM: 1.9 CM
BH CV ECHO MEAS - LVPWD: 1.1 CM
BH CV ECHO MEAS - MR MAX PG: 60.5 MMHG
BH CV ECHO MEAS - MR MAX VEL: 388.4 CM/SEC
BH CV ECHO MEAS - MV DEC TIME: 0.16 SEC
BH CV ECHO MEAS - MV E MAX VEL: 100.8 CM/SEC
BH CV ECHO MEAS - MV MAX PG: 6.2 MMHG
BH CV ECHO MEAS - MV MEAN PG: 3.1 MMHG
BH CV ECHO MEAS - MV V2 MAX: 124.8 CM/SEC
BH CV ECHO MEAS - MV V2 MEAN: 81.4 CM/SEC
BH CV ECHO MEAS - MV V2 VTI: 16.1 CM
BH CV ECHO MEAS - MVA(VTI): 3 CM^2
BH CV ECHO MEAS - PA MAX PG (FULL): 1.4 MMHG
BH CV ECHO MEAS - PA MAX PG: 3.7 MMHG
BH CV ECHO MEAS - PA V2 MAX: 96.4 CM/SEC
BH CV ECHO MEAS - PVA(V,A): 2.3 CM^2
BH CV ECHO MEAS - PVA(V,D): 2.3 CM^2
BH CV ECHO MEAS - QP/QS: 0.97
BH CV ECHO MEAS - RV MAX PG: 2.4 MMHG
BH CV ECHO MEAS - RV MEAN PG: 1.2 MMHG
BH CV ECHO MEAS - RV V1 MAX: 77 CM/SEC
BH CV ECHO MEAS - RV V1 MEAN: 51.6 CM/SEC
BH CV ECHO MEAS - RV V1 VTI: 16.3 CM
BH CV ECHO MEAS - RVDD: 2.9 CM
BH CV ECHO MEAS - RVOT AREA: 2.8 CM^2
BH CV ECHO MEAS - RVOT DIAM: 1.9 CM
BH CV ECHO MEAS - SI(AO): 114.6 ML/M^2
BH CV ECHO MEAS - SI(CUBED): 27.1 ML/M^2
BH CV ECHO MEAS - SI(LVOT): 21.4 ML/M^2
BH CV ECHO MEAS - SI(MOD-SP4): 8.6 ML/M^2
BH CV ECHO MEAS - SI(TEICH): 22.9 ML/M^2
BH CV ECHO MEAS - SV(AO): 255.8 ML
BH CV ECHO MEAS - SV(CUBED): 60.4 ML
BH CV ECHO MEAS - SV(LVOT): 47.7 ML
BH CV ECHO MEAS - SV(MOD-SP4): 19.2 ML
BH CV ECHO MEAS - SV(RVOT): 46.2 ML
BH CV ECHO MEAS - SV(TEICH): 51 ML
LV EF 2D ECHO EST: 40 %

## 2020-11-11 ENCOUNTER — TELEPHONE (OUTPATIENT)
Dept: CARDIOLOGY | Facility: CLINIC | Age: 75
End: 2020-11-11

## 2020-11-11 NOTE — TELEPHONE ENCOUNTER
Called and informed the Pt per Dr. Aguirre that her echo was abnormal and showed LV dysfunction and to follow up as scheduled. Pt stated understanding and is agreeable to keeping her follow up.

## 2020-11-30 ENCOUNTER — HOSPITAL ENCOUNTER (OUTPATIENT)
Dept: PAIN MEDICINE | Facility: HOSPITAL | Age: 75
Discharge: HOME | End: 2020-11-30

## 2020-11-30 VITALS
DIASTOLIC BLOOD PRESSURE: 71 MMHG | OXYGEN SATURATION: 99 % | HEIGHT: 63 IN | TEMPERATURE: 96.8 F | RESPIRATION RATE: 16 BRPM | HEART RATE: 77 BPM | SYSTOLIC BLOOD PRESSURE: 123 MMHG | WEIGHT: 280 LBS | BODY MASS INDEX: 49.61 KG/M2

## 2020-11-30 DIAGNOSIS — R52 PAIN: ICD-10-CM

## 2020-11-30 DIAGNOSIS — M54.16 LUMBAR RADICULITIS: Primary | ICD-10-CM

## 2020-11-30 PROCEDURE — 62323 NJX INTERLAMINAR LMBR/SAC: CPT | Performed by: ANESTHESIOLOGY

## 2020-11-30 PROCEDURE — 77003 FLUOROGUIDE FOR SPINE INJECT: CPT

## 2020-11-30 PROCEDURE — 25010000002 METHYLPREDNISOLONE PER 40 MG

## 2020-11-30 PROCEDURE — 0 IOPAMIDOL 41 % SOLUTION: Performed by: ANESTHESIOLOGY

## 2020-11-30 RX ORDER — METHYLPREDNISOLONE ACETATE 40 MG/ML
40 INJECTION, SUSPENSION INTRA-ARTICULAR; INTRALESIONAL; INTRAMUSCULAR; SOFT TISSUE ONCE
Status: COMPLETED | OUTPATIENT
Start: 2020-11-30 | End: 2020-11-30

## 2020-11-30 RX ORDER — METHYLPREDNISOLONE ACETATE 40 MG/ML
INJECTION, SUSPENSION INTRA-ARTICULAR; INTRALESIONAL; INTRAMUSCULAR; SOFT TISSUE
Status: DISCONTINUED
Start: 2020-11-30 | End: 2020-12-01 | Stop reason: HOSPADM

## 2020-11-30 RX ADMIN — METHYLPREDNISOLONE ACETATE 40 MG: 40 INJECTION, SUSPENSION INTRA-ARTICULAR; INTRALESIONAL; INTRAMUSCULAR; SOFT TISSUE at 11:32

## 2020-11-30 RX ADMIN — IOPAMIDOL 1 ML: 408 INJECTION, SOLUTION INTRATHECAL at 11:32

## 2020-11-30 NOTE — DISCHARGE INSTRUCTIONS
Epidural Steroid Injection, Care After  Refer to this sheet in the next few weeks. These instructions provide you with information about caring for yourself after your procedure. Your health care provider may also give you more specific instructions. Your treatment has been planned according to current medical practices, but problems sometimes occur. Call your health care provider if you have any problems or questions after your procedure.  What can I expect after the procedure?  After your procedure, it is common to feel a little discomfort at the injection site.  Follow these instructions at home:  · For 24 hours after the procedure:  ? Avoid using heat on the injection site.  ? Do not take a tub bath, and do not soak in water.  ? Do not drive if you received a medicine to help you relax (sedative).  · If directed, put ice on the injection site:  ? Put ice in a plastic bag.  ? Place a towel between your skin and the bag.  ? Leave the ice on for 20 minutes, 2-3 times a day.  · Return to your normal activities as told by your health care provider. Ask your health care provider what activities are safe for you.  · You may remove the bandage (dressing) after 24 hours.  · Take over-the-counter and prescription medicines only as told by your health care provider.  · Keep all follow-up visits as told by your health care provider. This is important.  Contact a health care provider if:  · You have a fever.  · You continue to have pain and soreness around the injection site, even after taking over-the-counter pain medicine.  · You have severe, sudden, or lasting nausea or vomiting.  Get help right away if:  · You have severe pain at the injection site that is not relieved by medicines.  · You develop a severe headache or a stiff neck.  · You become sensitive to light.  · You have any new numbness or weakness in your legs or arms.  · You lose control of your bladder or bowel movements.  · You have trouble breathing.  This  information is not intended to replace advice given to you by your health care provider. Make sure you discuss any questions you have with your health care provider.  Document Revised: 11/30/2018 Document Reviewed: 04/04/2017  Elsevier Patient Education © 2020 [employer]

## 2020-11-30 NOTE — PROCEDURES
"Subjective    CC back pain  Faby Le is a 75 y.o. female with lumbar radiculitis here for repeat  LESI.  no anticoagulation.    Pain Assessment   Location of Pain: Lower Back, R Hip, L Hip,   Description of Pain: Dull/Aching, Throbbing, Stabbing  Previous Pain Rating :7  Current Pain Ratin   Aggravating Factors: Activity  Alleviating Factors: Rest, Medication    The following portions of the patient's history were reviewed and updated as appropriate: allergies, current medications, past family history, past medical history, past social history, past surgical history and problem list.    Review of Systems  As in HPI  Objective   Physical Exam   Constitutional: She is oriented to person, place, and time. She appears well-developed. No distress.   Cardiovascular: Normal rate.   Pulmonary/Chest: Effort normal.   Musculoskeletal:      Lumbar back: She exhibits decreased range of motion and tenderness.   Neurological: She is alert and oriented to person, place, and time.     /69 (BP Location: Left arm, Patient Position: Sitting)   Pulse 80   Temp 96.8 °F (36 °C) (Skin)   Resp 18   Ht 160 cm (63\")   Wt 127 kg (280 lb)   LMP  (LMP Unknown)   SpO2 94%   BMI 49.60 kg/m²     Assessment/Plan    underwent repeat LESI    RTC for procedure or as needed    DATE OF PROCEDURE: 2020    PREOPERATIVE DIAGNOSIS: lumbar DDD/radiculitis    POSTOPERATIVE DIAGNOSIS: same    PROCEDURE PERFORMED:  lumbar Epidural Steroid Injection    The patient presents with a history of   lumbar degenerative disc disease with  radiculitis. The patient presents today for a  lumbar epidural steroid injection at level  L5/S1.   The patient was seen in the preoperative area.  Patient's consent was obtained and updated.  Vitals were taken.  Patient was then brought to the procedure suite and placed in a prone position. The appropriate anatomic area was widely prepped with Chloraprep and draped in a sterile fashion. Noninvasive " monitoring per routine anesthesia protocol was placed.  Under fluoroscopic guidance using  AP view a 20 gauge styleted tuohy needle was passed through skin anesthetized with 1% Lidocaine without epinephrine.  The needle was advanced using the continuous loss of resistance to saline technique into the L5 epidural space. Needle tip placement in the epidural space was confirmed by loss of resistance and injection of 1 mL of  preservative free contrast.  Following this 8 mL of a solution containing  1 mL of 40 mg Depo-Medrol and 7 mL of preservative-free saline was carefully administered in the epidural space.  A sterile dressing was placed over the puncture site.    The patient tolerated the procedure with no complications . They were then brought to the post procedure area where they recovered nicely.

## 2020-12-01 ENCOUNTER — OFFICE VISIT (OUTPATIENT)
Dept: FAMILY MEDICINE CLINIC | Facility: CLINIC | Age: 75
End: 2020-12-01

## 2020-12-01 VITALS
DIASTOLIC BLOOD PRESSURE: 85 MMHG | HEIGHT: 63 IN | OXYGEN SATURATION: 97 % | RESPIRATION RATE: 16 BRPM | WEIGHT: 282.6 LBS | TEMPERATURE: 97.8 F | BODY MASS INDEX: 50.07 KG/M2 | SYSTOLIC BLOOD PRESSURE: 128 MMHG | HEART RATE: 89 BPM

## 2020-12-01 DIAGNOSIS — E66.2 CLASS 3 OBESITY WITH ALVEOLAR HYPOVENTILATION, SERIOUS COMORBIDITY, AND BODY MASS INDEX (BMI) OF 50.0 TO 59.9 IN ADULT (HCC): ICD-10-CM

## 2020-12-01 DIAGNOSIS — E55.9 VITAMIN D DEFICIENCY: ICD-10-CM

## 2020-12-01 DIAGNOSIS — G47.33 OBSTRUCTIVE SLEEP APNEA SYNDROME: ICD-10-CM

## 2020-12-01 DIAGNOSIS — R13.12 OROPHARYNGEAL DYSPHAGIA: ICD-10-CM

## 2020-12-01 DIAGNOSIS — F32.A DEPRESSION, UNSPECIFIED DEPRESSION TYPE: ICD-10-CM

## 2020-12-01 DIAGNOSIS — M1A.9XX0 CHRONIC GOUT WITHOUT TOPHUS, UNSPECIFIED CAUSE, UNSPECIFIED SITE: ICD-10-CM

## 2020-12-01 DIAGNOSIS — I10 ESSENTIAL HYPERTENSION: ICD-10-CM

## 2020-12-01 DIAGNOSIS — R73.9 HYPERGLYCEMIA: ICD-10-CM

## 2020-12-01 DIAGNOSIS — E03.9 HYPOTHYROIDISM, UNSPECIFIED TYPE: ICD-10-CM

## 2020-12-01 DIAGNOSIS — C54.1 MALIGNANT NEOPLASM OF ENDOMETRIUM (HCC): ICD-10-CM

## 2020-12-01 DIAGNOSIS — Z78.0 POST-MENOPAUSAL: ICD-10-CM

## 2020-12-01 DIAGNOSIS — E53.8 B12 DEFICIENCY: ICD-10-CM

## 2020-12-01 DIAGNOSIS — E78.2 MIXED HYPERLIPIDEMIA: ICD-10-CM

## 2020-12-01 DIAGNOSIS — R19.8 CHANGE IN BOWEL MOVEMENT: ICD-10-CM

## 2020-12-01 DIAGNOSIS — Z00.01 ENCOUNTER FOR ANNUAL GENERAL MEDICAL EXAMINATION WITH ABNORMAL FINDINGS IN ADULT: Primary | ICD-10-CM

## 2020-12-01 DIAGNOSIS — I25.10 CORONARY ARTERY DISEASE INVOLVING NATIVE HEART WITHOUT ANGINA PECTORIS, UNSPECIFIED VESSEL OR LESION TYPE: ICD-10-CM

## 2020-12-01 LAB
25(OH)D3 SERPL-MCNC: 39.1 NG/ML (ref 30–100)
ALBUMIN SERPL-MCNC: 4.3 G/DL (ref 3.5–5.2)
ALBUMIN/GLOB SERPL: 1.3 G/DL
ALP SERPL-CCNC: 128 U/L (ref 39–117)
ALT SERPL W P-5'-P-CCNC: 24 U/L (ref 1–33)
ANION GAP SERPL CALCULATED.3IONS-SCNC: 12.3 MMOL/L (ref 5–15)
AST SERPL-CCNC: 22 U/L (ref 1–32)
BASOPHILS # BLD AUTO: 0.05 10*3/MM3 (ref 0–0.2)
BASOPHILS NFR BLD AUTO: 0.6 % (ref 0–1.5)
BILIRUB SERPL-MCNC: 0.3 MG/DL (ref 0–1.2)
BUN SERPL-MCNC: 20 MG/DL (ref 8–23)
BUN/CREAT SERPL: 18.2 (ref 7–25)
CALCIUM SPEC-SCNC: 10.2 MG/DL (ref 8.6–10.5)
CHLORIDE SERPL-SCNC: 99 MMOL/L (ref 98–107)
CHOLEST SERPL-MCNC: 146 MG/DL (ref 0–200)
CO2 SERPL-SCNC: 28.7 MMOL/L (ref 22–29)
CREAT SERPL-MCNC: 1.1 MG/DL (ref 0.57–1)
DEPRECATED RDW RBC AUTO: 47.4 FL (ref 37–54)
EOSINOPHIL # BLD AUTO: 0.05 10*3/MM3 (ref 0–0.4)
EOSINOPHIL NFR BLD AUTO: 0.6 % (ref 0.3–6.2)
ERYTHROCYTE [DISTWIDTH] IN BLOOD BY AUTOMATED COUNT: 13 % (ref 12.3–15.4)
GFR SERPL CREATININE-BSD FRML MDRD: 49 ML/MIN/1.73
GLOBULIN UR ELPH-MCNC: 3.3 GM/DL
GLUCOSE SERPL-MCNC: 121 MG/DL (ref 65–99)
HBA1C MFR BLD: 5.8 % (ref 3.5–5.6)
HCT VFR BLD AUTO: 37.2 % (ref 34–46.6)
HDLC SERPL-MCNC: 65 MG/DL (ref 40–60)
HGB BLD-MCNC: 12.9 G/DL (ref 12–15.9)
IMM GRANULOCYTES # BLD AUTO: 0.02 10*3/MM3 (ref 0–0.05)
IMM GRANULOCYTES NFR BLD AUTO: 0.2 % (ref 0–0.5)
LDLC SERPL CALC-MCNC: 64 MG/DL (ref 0–100)
LDLC/HDLC SERPL: 0.97 {RATIO}
LYMPHOCYTES # BLD AUTO: 1.41 10*3/MM3 (ref 0.7–3.1)
LYMPHOCYTES NFR BLD AUTO: 17.6 % (ref 19.6–45.3)
MCH RBC QN AUTO: 34.7 PG (ref 26.6–33)
MCHC RBC AUTO-ENTMCNC: 34.7 G/DL (ref 31.5–35.7)
MCV RBC AUTO: 100 FL (ref 79–97)
MONOCYTES # BLD AUTO: 0.53 10*3/MM3 (ref 0.1–0.9)
MONOCYTES NFR BLD AUTO: 6.6 % (ref 5–12)
NEUTROPHILS NFR BLD AUTO: 5.95 10*3/MM3 (ref 1.7–7)
NEUTROPHILS NFR BLD AUTO: 74.4 % (ref 42.7–76)
NRBC BLD AUTO-RTO: 0 /100 WBC (ref 0–0.2)
PLATELET # BLD AUTO: 269 10*3/MM3 (ref 140–450)
PMV BLD AUTO: 9.7 FL (ref 6–12)
POTASSIUM SERPL-SCNC: 4 MMOL/L (ref 3.5–5.2)
PROT SERPL-MCNC: 7.6 G/DL (ref 6–8.5)
RBC # BLD AUTO: 3.72 10*6/MM3 (ref 3.77–5.28)
SODIUM SERPL-SCNC: 140 MMOL/L (ref 136–145)
TRIGL SERPL-MCNC: 91 MG/DL (ref 0–150)
TSH SERPL DL<=0.05 MIU/L-ACNC: 2.78 UIU/ML (ref 0.27–4.2)
URATE SERPL-MCNC: 6.4 MG/DL (ref 2.4–5.7)
VIT B12 BLD-MCNC: 1129 PG/ML (ref 211–946)
VLDLC SERPL-MCNC: 17 MG/DL (ref 5–40)
WBC # BLD AUTO: 8.01 10*3/MM3 (ref 3.4–10.8)

## 2020-12-01 PROCEDURE — 80061 LIPID PANEL: CPT | Performed by: PREVENTIVE MEDICINE

## 2020-12-01 PROCEDURE — G0439 PPPS, SUBSEQ VISIT: HCPCS | Performed by: PREVENTIVE MEDICINE

## 2020-12-01 PROCEDURE — 82306 VITAMIN D 25 HYDROXY: CPT | Performed by: PREVENTIVE MEDICINE

## 2020-12-01 PROCEDURE — 82607 VITAMIN B-12: CPT | Performed by: PREVENTIVE MEDICINE

## 2020-12-01 PROCEDURE — 84443 ASSAY THYROID STIM HORMONE: CPT | Performed by: PREVENTIVE MEDICINE

## 2020-12-01 PROCEDURE — 85025 COMPLETE CBC W/AUTO DIFF WBC: CPT | Performed by: PREVENTIVE MEDICINE

## 2020-12-01 PROCEDURE — 99214 OFFICE O/P EST MOD 30 MIN: CPT | Performed by: PREVENTIVE MEDICINE

## 2020-12-01 PROCEDURE — 80053 COMPREHEN METABOLIC PANEL: CPT | Performed by: PREVENTIVE MEDICINE

## 2020-12-01 PROCEDURE — 84550 ASSAY OF BLOOD/URIC ACID: CPT | Performed by: PREVENTIVE MEDICINE

## 2020-12-01 PROCEDURE — 83036 HEMOGLOBIN GLYCOSYLATED A1C: CPT | Performed by: PREVENTIVE MEDICINE

## 2020-12-01 NOTE — PROGRESS NOTES
The ABCs of the Annual Wellness Visit  Subsequent Medicare Wellness Visit    Chief Complaint   Patient presents with   • Medicare Wellness-subsequent     fasting   • Hyperlipidemia   • Hypertension   • Hypothyroidism       Subjective   History of Present Illness:  Faby Le is a 75 y.o. female who presents for a Subsequent Medicare Wellness Visit.    HEALTH RISK ASSESSMENT    Recent Hospitalizations:  No hospitalization(s) within the last year.    Current Medical Providers:  Patient Care Team:  Leonor Adam MD as PCP - General  Maggi Horn MD as Consulting Physician (Pain Medicine)  Ratna Zavala MD as Consulting Physician (Cardiology)    Smoking Status:  Social History     Tobacco Use   Smoking Status Never Smoker   Smokeless Tobacco Never Used       Alcohol Consumption:  Social History     Substance and Sexual Activity   Alcohol Use No   • Frequency: Never       Depression Screen:   PHQ-2/PHQ-9 Depression Screening 2/27/2020   Little interest or pleasure in doing things 1   Feeling down, depressed, or hopeless 1   Trouble falling or staying asleep, or sleeping too much 1   Feeling tired or having little energy 1   Poor appetite or overeating 1   Feeling bad about yourself - or that you are a failure or have let yourself or your family down 1   Trouble concentrating on things, such as reading the newspaper or watching television 0   Moving or speaking so slowly that other people could have noticed. Or the opposite - being so fidgety or restless that you have been moving around a lot more than usual 1   Thoughts that you would be better off dead, or of hurting yourself in some way 0   Total Score 7   If you checked off any problems, how difficult have these problems made it for you to do your work, take care of things at home, or get along with other people? Not difficult at all       Fall Risk Screen:  STEADI Fall Risk Assessment was completed, and patient is at MODERATE risk for  falls. Assessment completed on:12/1/2020    Health Habits and Functional and Cognitive Screening:  Functional & Cognitive Status 12/1/2020   Do you have difficulty preparing food and eating? No   Do you have difficulty bathing yourself, getting dressed or grooming yourself? No   Do you have difficulty using the toilet? No   Do you have difficulty moving around from place to place? Yes   Do you have trouble with steps or getting out of a bed or a chair? Yes   Current Diet Unhealthy Diet   Dental Exam Up to date   Eye Exam Up to date   Exercise (times per week) 0 times per week   Current Exercise Activities Include None   Do you need help using the phone?  No   Are you deaf or do you have serious difficulty hearing?  No   Do you need help with transportation? No   Do you need help shopping? No   Do you need help preparing meals?  No   Do you need help with housework?  No   Do you need help with laundry? No   Do you need help taking your medications? No   Do you need help managing money? No   Do you ever drive or ride in a car without wearing a seat belt? No   Have you felt unusual stress, anger or loneliness in the last month? No   Who do you live with? Alone   If you need help, do you have trouble finding someone available to you? No   Have you been bothered in the last four weeks by sexual problems? No   Do you have difficulty concentrating, remembering or making decisions? No         Does the patient have evidence of cognitive impairment? No    Asprin use counseling:Does not need ASA (and currently is not on it)    Age-appropriate Screening Schedule:  Refer to the list below for future screening recommendations based on patient's age, sex and/or medical conditions. Orders for these recommended tests are listed in the plan section. The patient has been provided with a written plan.    Health Maintenance   Topic Date Due   • DXA SCAN  06/29/2020   • LIPID PANEL  08/28/2021   • MAMMOGRAM  10/09/2021   • COLONOSCOPY   05/20/2022   • TDAP/TD VACCINES (2 - Td) 01/23/2028   • INFLUENZA VACCINE  Completed   • ZOSTER VACCINE  Completed          The following portions of the patient's history were reviewed and updated as appropriate: allergies, current medications, past family history, past medical history, past social history, past surgical history and problem list.    Outpatient Medications Prior to Visit   Medication Sig Dispense Refill   • allopurinol (ZYLOPRIM) 300 MG tablet TAKE 1 TABLET BY MOUTH EVERY DAY 90 tablet 2   • atorvastatin (LIPITOR) 20 MG tablet TAKE 1 TABLET BY MOUTH EVERYDAY AT BEDTIME 90 tablet 3   • bumetanide (BUMEX) 2 MG tablet Take 1 tablet by mouth Daily. 30 tablet 2   • cyanocobalamin (VITAMIN B-12) 1000 MCG tablet Take 1 tablet by mouth Daily.     • dilTIAZem SR (CARDIZEM SR) 120 MG 12 hr capsule Take 1 capsule by mouth 2 (Two) Times a Day. 60 capsule 3   • Enflurane (COMPOUND 347 IN)      • escitalopram (LEXAPRO) 20 MG tablet TAKE 1 TABLET BY MOUTH EVERY DAY 90 tablet 2   • hydrocortisone 2.5 % ointment APPLY A THIN LAYER TO THE AFFECTED AREA(S) BY TOPICAL ROUTE 2 TIMES PER DAY UNTIL HEALED.     • levothyroxine (SYNTHROID, LEVOTHROID) 75 MCG tablet TAKE 1 TABLET BY MOUTH EVERY DAY 90 tablet 3   • Linzess 72 MCG capsule capsule Take 72 mcg by mouth Daily.     • metoprolol tartrate (LOPRESSOR) 25 MG tablet TAKE 1 TABLET BY MOUTH DAILY AT BEDTIME 90 tablet 3   • Multiple Vitamins-Minerals (CENTRUM SILVER ADULT 50+) tablet Take 1 tablet by mouth Daily.     • NON FORMULARY Compounding cream     • pantoprazole (PROTONIX) 40 MG EC tablet Take 40 mg by mouth 2 (Two) Times a Day.     • Vitamin D, Cholecalciferol, 25 MCG (1000 UT) tablet Take 1 tablet by mouth Daily.     • APPLE CIDER VINEGAR PO Take 1 tablet by mouth 2 (Two) Times a Day. Take 2 tablets am and 1 pm     • cetirizine (zyrTEC) 10 MG tablet Take 1 tablet by mouth Daily.     • montelukast (SINGULAIR) 10 MG tablet Take 10 mg by mouth Daily.     • Omega-3  Fatty Acids (FISH OIL) 1000 MG capsule capsule Take 1 capsule by mouth Daily.       No facility-administered medications prior to visit.        Patient Active Problem List   Diagnosis   • Abnormal complete blood count   • Ankle pain   • Arthralgia of left elbow   • Arthritis   • B12 deficiency   • Coronary artery disease   • Depression   • Gallstones   • Gout   • Hyperglycemia   • Hyperlipidemia   • Hypertension   • Hypothyroidism   • Low back pain   • Mobility poor   • Postmenopausal status   • Sleep apnea   • Vitamin D deficiency   • Chronic kidney disease, stage 3 (moderate)   • Malignant neoplasm of endometrium (CMS/[facility])   • Oropharyngeal dysphagia   • Change in bowel movement   • Class 3 obesity with alveolar hypoventilation, serious comorbidity, and body mass index (BMI) of 50.0 to 59.9 in adult (CMS/[facility])       Advanced Care Planning:  ACP discussion was held with the patient during this visit. Patient does not have an advance directive, information provided.    Review of Systems   Constitutional: Positive for activity change.   HENT: Positive for tinnitus.    Eyes: Negative.    Respiratory: Negative.    Cardiovascular: Negative.  Negative for chest pain, palpitations and leg swelling.   Gastrointestinal: Positive for diarrhea.   Endocrine: Negative.    Genitourinary: Negative.    Musculoskeletal: Positive for arthralgias, gait problem, joint swelling and myalgias.   Skin: Negative.    Allergic/Immunologic: Negative.    Hematological: Negative.    Psychiatric/Behavioral: Positive for dysphoric mood.       Compared to one year ago, the patient feels her physical health is the same.  Compared to one year ago, the patient feels her mental health is the same.    Reviewed chart for potential of high risk medication in the elderly: yes  Reviewed chart for potential of harmful drug interactions in the elderly:yes    Objective         Vitals:    12/01/20 0859 12/01/20 0916   BP: 142/68 128/85   BP Location: Right arm  "Left arm   Patient Position: Sitting Sitting   Cuff Size: Large Adult Large Adult   Pulse: 78 89   Resp: 16    Temp: 97.8 °F (36.6 °C)    TempSrc: Infrared    SpO2: 97%    Weight: 128 kg (282 lb 9.6 oz)    Height: 160 cm (63\")    PainSc: 0-No pain        Body mass index is 50.06 kg/m².  Discussed the patient's BMI with her. The BMI is above average; BMI management plan is completed.    Physical Exam  Vitals signs reviewed.   Constitutional:       General: She is not in acute distress.     Appearance: She is well-developed. She is obese. She is not ill-appearing or toxic-appearing.   HENT:      Head: Normocephalic and atraumatic.      Right Ear: Tympanic membrane, ear canal and external ear normal.      Left Ear: Tympanic membrane, ear canal and external ear normal.      Nose: Nose normal.   Eyes:      Extraocular Movements: Extraocular movements intact.      Conjunctiva/sclera: Conjunctivae normal.      Pupils: Pupils are equal, round, and reactive to light.   Neck:      Musculoskeletal: Neck supple.   Cardiovascular:      Rate and Rhythm: Normal rate and regular rhythm.      Pulses:           Dorsalis pedis pulses are 1+ on the right side and 1+ on the left side.        Posterior tibial pulses are 1+ on the right side and 1+ on the left side.      Heart sounds: Normal heart sounds.   Pulmonary:      Effort: Pulmonary effort is normal.      Breath sounds: Normal breath sounds.   Abdominal:      General: Bowel sounds are normal. There is no distension.      Palpations: Abdomen is soft. There is no mass.      Tenderness: There is no abdominal tenderness.   Musculoskeletal:         General: Swelling and tenderness present.      Left foot: Bunion present.      Comments: Ankle pain with standing   Feet:      Right foot:      Protective Sensation: 10 sites tested. 8 sites sensed.      Skin integrity: Callus present.      Toenail Condition: Right toenails are normal.      Left foot:      Protective Sensation: 10 sites " tested. 9 sites sensed.      Skin integrity: Callus present.      Toenail Condition: Left toenails are normal.      Comments: Diabetic Foot Exam Performed and Monofilament Test Performed    Skin:     General: Skin is warm.   Neurological:      General: No focal deficit present.      Mental Status: She is alert and oriented to person, place, and time.   Psychiatric:         Mood and Affect: Mood normal.         Behavior: Behavior normal.               Assessment/Plan   Medicare Risks and Personalized Health Plan  CMS Preventative Services Quick Reference  Advance Directive Discussion  Obesity/Overweight     The above risks/problems have been discussed with the patient.  Pertinent information has been shared with the patient in the After Visit Summary.  Follow up plans and orders are seen below in the Assessment/Plan Section.    Diagnoses and all orders for this visit:    1. Encounter for annual general medical examination with abnormal findings in adult (Primary)    2. Post-menopausal  -     DEXA Bone Density Axial; Future    3. B12 deficiency  -     CBC Auto Differential  -     Vitamin B12    4. Coronary artery disease involving native heart without angina pectoris, unspecified vessel or lesion type  Comments:  Seeing cardiology 12/09/2020    5. Depression, unspecified depression type  Comments:  Controlled    6. Chronic gout without tophus, unspecified cause, unspecified site  Comments:  No attacks  Orders:  -     Uric Acid    7. Hyperglycemia  Comments:  watching diet somewhat  Orders:  -     Cancel: Comprehensive Metabolic Panel  -     Hemoglobin A1c    8. Mixed hyperlipidemia  Comments:  Trying to watch sat fats  Orders:  -     Lipid Panel    9. Essential hypertension  Comments:  Blood pressures good at PM and will home moniter  Orders:  -     Comprehensive Metabolic Panel    10. Hypothyroidism, unspecified type  Comments:  No more hair loss  Orders:  -     TSH    11. Obstructive sleep apnea  syndrome  Comments:  Wears CPAP    12. Vitamin D deficiency  Comments:  remembers to take most of the time  Orders:  -     Vitamin D 25 Hydroxy    13. Malignant neoplasm of endometrium (CMS/HCC)  Comments:  seeing GYN in 1/2021-no problems presently    14. Oropharyngeal dysphagia  Comments:  OK since GSI dilated    15. Change in bowel movement  Comments:  Ok with Lizenss    16. Class 3 obesity with alveolar hypoventilation, serious comorbidity, and body mass index (BMI) of 50.0 to 59.9 in adult (CMS/HCC)  Comments:  Advised to watch portion size and types f food    17. Essential hypertension  -     Comprehensive Metabolic Panel      Follow Up:  Return in about 3 months (around 3/1/2021).     An After Visit Summary and PPPS were given to the patient.

## 2020-12-02 ENCOUNTER — TELEPHONE (OUTPATIENT)
Dept: FAMILY MEDICINE CLINIC | Facility: CLINIC | Age: 75
End: 2020-12-02

## 2020-12-02 NOTE — TELEPHONE ENCOUNTER
Left message to return call    HUB TO READ:    Glucose 121 so watch carbs and walk as she is at excess risk for DM.  Kidney function is still low so avoid NSAIDS and limit xray dyes.  May be aggravated by Pantoprazole but she needs this to control GERD.  B12 is elevated so she can cut dose otc.  Uric acid is elevated slightly but hate to increase allopurinol if no symptoms as that may worsen kidney function

## 2020-12-02 NOTE — PROGRESS NOTES
Glucose 121 so watch carbs and walk as she is at excess risk for DM.  Kidney function is still low so avoid NSAIDS and limit xray dyes.  May be aggravated by Pantoprazole but she needs this to control GERD.  B12 is elevated so she can cut dose otc.  Uric acid is elevated slightly but hate to increase allopurinol if no symptoms as that may worsen kidney function

## 2020-12-03 NOTE — TELEPHONE ENCOUNTER
PT WOULD LIKE A CALL BACK FROM [facility] DIRECTLY REGARDING LAB RESULTS    PLEASE CALL BACK AND ADVISE    CB#: [phone]

## 2020-12-09 ENCOUNTER — OFFICE VISIT (OUTPATIENT)
Dept: CARDIOLOGY | Facility: CLINIC | Age: 75
End: 2020-12-09

## 2020-12-09 VITALS
WEIGHT: 282 LBS | SYSTOLIC BLOOD PRESSURE: 115 MMHG | BODY MASS INDEX: 49.96 KG/M2 | RESPIRATION RATE: 18 BRPM | HEART RATE: 63 BPM | DIASTOLIC BLOOD PRESSURE: 66 MMHG | HEIGHT: 63 IN | OXYGEN SATURATION: 97 %

## 2020-12-09 DIAGNOSIS — E78.2 MIXED HYPERLIPIDEMIA: ICD-10-CM

## 2020-12-09 DIAGNOSIS — I10 ESSENTIAL HYPERTENSION: ICD-10-CM

## 2020-12-09 DIAGNOSIS — I50.31 ACUTE DIASTOLIC CHF (CONGESTIVE HEART FAILURE) (HCC): ICD-10-CM

## 2020-12-09 DIAGNOSIS — I42.0 CARDIOMYOPATHY, DILATED (HCC): Primary | ICD-10-CM

## 2020-12-09 DIAGNOSIS — I25.10 CORONARY ARTERY DISEASE INVOLVING NATIVE CORONARY ARTERY OF NATIVE HEART WITHOUT ANGINA PECTORIS: ICD-10-CM

## 2020-12-09 PROCEDURE — 99214 OFFICE O/P EST MOD 30 MIN: CPT | Performed by: INTERNAL MEDICINE

## 2020-12-09 RX ORDER — LOSARTAN POTASSIUM 25 MG/1
25 TABLET ORAL DAILY
Qty: 30 TABLET | Refills: 2 | Status: SHIPPED | OUTPATIENT
Start: 2020-12-09 | End: 2021-03-09

## 2020-12-09 RX ORDER — SPIRONOLACTONE 25 MG/1
12.5 TABLET ORAL DAILY
Qty: 30 TABLET | Refills: 2 | Status: SHIPPED | OUTPATIENT
Start: 2020-12-09 | End: 2021-06-07

## 2020-12-09 RX ORDER — BUMETANIDE 2 MG/1
1 TABLET ORAL DAILY
Qty: 30 TABLET | Refills: 2 | Status: SHIPPED | OUTPATIENT
Start: 2020-12-09 | End: 2021-07-26

## 2020-12-09 NOTE — PROGRESS NOTES
Cardiology Office Visit      Encounter Date:  12/09/2020    Patient ID:   Faby Le is a 75 y.o. female.    Reason For Followup:  Lower extremity edema  Shortness of breath    Brief Clinical History:  Dear Dr. Adam, Loenor Ordaz MD    I had the pleasure of seeing Faby Le today. As you are well aware, this is a 75 y.o. female  presented to the office with symptoms of lower extremity edema and some shortness of breath        cardiac catheterization showed moderate disease involving the diagonal and mild-to-moderate disease involving circumflex      Patient is complaining of bilateral lower extremity edema which is progressively getting worse with no improvement even after increasing the dose of the Lasix by the primary care physician  Planing of some shortness of breath and dyspnea on exertion  Denies any chest pain  Denies any prior history of DVT or PE    Assessment & Plan    Impressions:  Morbid obesity  Bilateral lower extremity edema  Shortness of breath  Dyspnea on exertion  Obstructive sleep apnea  Coronary artery disease  Diastolic dysfunction  Hypertension  Newly diagnosed cardiomyopathy with LV ejection fraction of 40%  Normal LV systolic function in 2018  Moderate obstructive coronary artery disease involving the diagonal branch of the LAD and mild to moderate obstructive coronary artery disease involving the left circumflex artery in 2018      Recommendations:  Venous Doppler with no significant pathology  Echocardiogram with newly diagnosed cardiomyopathy with LV ejection fraction of 40%  Plan to manage male patient medically and see if these improve the cardiomyopathy  If there is no significant improvement in the cardiomyopathy in the next 3 months we might need to redo cardiac catheterization to rule out obstructive coronary artery disease  Last cath report from 2018 reviewed and discussed the patient  Decrease Bumex to 1 mg p.o. once a day  Continue Cardizem  Start Patient on losartan 25 mg  "p.o. once a day  Start patient on Aldactone 12.5 mg p.o. once a day  Recheck BMP in 2 weeks  Recheck echocardiogram to assess LV systolic function before the next office visit  Low-salt diet  Follow-up in office in 3 months    Objective:    Vitals:  Vitals:    12/09/20 1402   BP: 115/66   BP Location: Left arm   Pulse: 63   Resp: 18   SpO2: 97%   Weight: 128 kg (282 lb)   Height: 160 cm (63\")       Physical Exam:    General: Alert, cooperative, no distress, appears stated age  Head:  Normocephalic, atraumatic, mucous membranes moist  Eyes:  Conjunctiva/corneas clear, EOM's intact     Neck:  Supple,  no adenopathy;      Lungs: Clear to auscultation bilaterally, no wheezes rhonchi rales are noted  Chest wall: No tenderness  Heart::  Regular rate and rhythm, S1 and S2 normal, displaced LV apical impulse 2 x 6 ejection systolic murmur  Abdomen: Soft, non-tender, nondistended bowel sounds active  Extremities: No cyanosis, clubbing, or edema  Pulses: 2+ and symmetric all extremities  Skin:  No rashes or lesions  Neuro/psych: A&O x3. CN II through XII are grossly intact with appropriate affect      Allergies:  Allergies   Allergen Reactions   • Celecoxib Itching     swelling       Medication Review:     Current Outpatient Medications:   •  allopurinol (ZYLOPRIM) 300 MG tablet, TAKE 1 TABLET BY MOUTH EVERY DAY, Disp: 90 tablet, Rfl: 2  •  APPLE CIDER VINEGAR PO, Take 1 tablet by mouth 2 (Two) Times a Day. Take 2 tablets am and 1 pm, Disp: , Rfl:   •  atorvastatin (LIPITOR) 20 MG tablet, TAKE 1 TABLET BY MOUTH EVERYDAY AT BEDTIME, Disp: 90 tablet, Rfl: 3  •  bumetanide (BUMEX) 2 MG tablet, Take 1 tablet by mouth Daily., Disp: 30 tablet, Rfl: 2  •  cetirizine (zyrTEC) 10 MG tablet, Take 1 tablet by mouth Daily., Disp: , Rfl:   •  cyanocobalamin (VITAMIN B-12) 1000 MCG tablet, Take 1 tablet by mouth Daily., Disp: , Rfl:   •  dilTIAZem SR (CARDIZEM SR) 120 MG 12 hr capsule, Take 1 capsule by mouth 2 (Two) Times a Day., Disp: 60 " capsule, Rfl: 3  •  Enflurane (COMPOUND 347 IN), , Disp: , Rfl:   •  escitalopram (LEXAPRO) 20 MG tablet, TAKE 1 TABLET BY MOUTH EVERY DAY, Disp: 90 tablet, Rfl: 2  •  hydrocortisone 2.5 % ointment, APPLY A THIN LAYER TO THE AFFECTED AREA(S) BY TOPICAL ROUTE 2 TIMES PER DAY UNTIL HEALED., Disp: , Rfl:   •  levothyroxine (SYNTHROID, LEVOTHROID) 75 MCG tablet, TAKE 1 TABLET BY MOUTH EVERY DAY, Disp: 90 tablet, Rfl: 3  •  Linzess 72 MCG capsule capsule, Take 72 mcg by mouth Daily., Disp: , Rfl:   •  metoprolol tartrate (LOPRESSOR) 25 MG tablet, TAKE 1 TABLET BY MOUTH DAILY AT BEDTIME, Disp: 90 tablet, Rfl: 3  •  montelukast (SINGULAIR) 10 MG tablet, Take 10 mg by mouth Daily., Disp: , Rfl:   •  Multiple Vitamins-Minerals (CENTRUM SILVER ADULT 50+) tablet, Take 1 tablet by mouth Daily., Disp: , Rfl:   •  NON FORMULARY, Compounding cream, Disp: , Rfl:   •  Omega-3 Fatty Acids (FISH OIL) 1000 MG capsule capsule, Take 1 capsule by mouth Daily., Disp: , Rfl:   •  pantoprazole (PROTONIX) 40 MG EC tablet, Take 40 mg by mouth 2 (Two) Times a Day., Disp: , Rfl:   •  Vitamin D, Cholecalciferol, 25 MCG (1000 UT) tablet, Take 1 tablet by mouth Daily., Disp: , Rfl:     Family History:  Family History   Problem Relation Age of Onset   • Hypertension Mother    • Stroke Father    • Arthritis Brother    • Cancer Brother        Past Medical History:  Past Medical History:   Diagnosis Date   • Ankle pain    • Arthritis    • Coronary artery disease    • Depression    • Gout    • Hyperglycemia    • Hyperlipidemia    • Low back pain    • Sleep apnea    • Vitamin D deficiency        Past surgical History:  Past Surgical History:   Procedure Laterality Date   • ANKLE FUSION      2017-left   • CARDIAC CATHETERIZATION     • CARPAL TUNNEL RELEASE     • CATARACT EXTRACTION     • CUBITAL TUNNEL RELEASE     • HYSTERECTOMY     • REPLACEMENT TOTAL KNEE      left 2008   • ROTATOR CUFF REPAIR         Social History:  Social History     Socioeconomic  History   • Marital status:      Spouse name: Not on file   • Number of children: Not on file   • Years of education: Not on file   • Highest education level: Not on file   Tobacco Use   • Smoking status: Never Smoker   • Smokeless tobacco: Never Used   Substance and Sexual Activity   • Alcohol use: No     Frequency: Never   • Drug use: No   • Sexual activity: Not Currently       Review of Systems:  The following systems were reviewed as they relate to the cardiovascular system: Constitutional, Eyes, ENT, Cardiovascular, Respiratory, Gastrointestinal, Integumentary, Neurological, Psychiatric, Hematologic, Endocrine, Musculoskeletal, and Genitourinary. The pertinent cardiovascular findings are reported above with all other cardiovascular points within those systems being negative.    Diagnostic Study Review:     Current Electrocardiogram:  Procedures      NOTE: The following portions of the patient's history were reviewed and updated this visit as appropriate: allergies, current medications, past family history, past medical history, past social history, past surgical history and problem list.

## 2020-12-22 ENCOUNTER — CLINICAL SUPPORT (OUTPATIENT)
Dept: FAMILY MEDICINE CLINIC | Facility: CLINIC | Age: 75
End: 2020-12-22

## 2020-12-22 DIAGNOSIS — I50.31 ACUTE DIASTOLIC CHF (CONGESTIVE HEART FAILURE) (HCC): ICD-10-CM

## 2020-12-22 DIAGNOSIS — I25.10 CORONARY ARTERY DISEASE INVOLVING NATIVE CORONARY ARTERY OF NATIVE HEART WITHOUT ANGINA PECTORIS: ICD-10-CM

## 2020-12-22 DIAGNOSIS — I42.0 CARDIOMYOPATHY, DILATED (HCC): ICD-10-CM

## 2020-12-22 LAB
ANION GAP SERPL CALCULATED.3IONS-SCNC: 7.9 MMOL/L (ref 5–15)
BUN SERPL-MCNC: 26 MG/DL (ref 8–23)
BUN/CREAT SERPL: 20.6 (ref 7–25)
CALCIUM SPEC-SCNC: 9.9 MG/DL (ref 8.6–10.5)
CHLORIDE SERPL-SCNC: 99 MMOL/L (ref 98–107)
CO2 SERPL-SCNC: 29.1 MMOL/L (ref 22–29)
CREAT SERPL-MCNC: 1.26 MG/DL (ref 0.57–1)
GFR SERPL CREATININE-BSD FRML MDRD: 42 ML/MIN/1.73
GLUCOSE SERPL-MCNC: 104 MG/DL (ref 65–99)
POTASSIUM SERPL-SCNC: 3.8 MMOL/L (ref 3.5–5.2)
SODIUM SERPL-SCNC: 136 MMOL/L (ref 136–145)

## 2020-12-22 PROCEDURE — 36415 COLL VENOUS BLD VENIPUNCTURE: CPT | Performed by: PREVENTIVE MEDICINE

## 2020-12-22 PROCEDURE — 80048 BASIC METABOLIC PNL TOTAL CA: CPT | Performed by: INTERNAL MEDICINE

## 2020-12-22 NOTE — PROGRESS NOTES
Venipuncture Blood Specimen Collection  Venipuncture performed in right arm by Leonor Adam MD with good hemostasis. Patient tolerated the procedure well without complications.   12/22/20   MD Leonor Angel MD

## 2021-01-04 ENCOUNTER — APPOINTMENT (OUTPATIENT)
Dept: CARDIOLOGY | Facility: HOSPITAL | Age: 76
End: 2021-01-04

## 2021-01-06 ENCOUNTER — OFFICE VISIT (OUTPATIENT)
Dept: PAIN MEDICINE | Facility: CLINIC | Age: 76
End: 2021-01-06

## 2021-01-06 VITALS
SYSTOLIC BLOOD PRESSURE: 123 MMHG | HEART RATE: 82 BPM | WEIGHT: 282 LBS | BODY MASS INDEX: 49.96 KG/M2 | RESPIRATION RATE: 16 BRPM | DIASTOLIC BLOOD PRESSURE: 55 MMHG | OXYGEN SATURATION: 98 % | HEIGHT: 63 IN | TEMPERATURE: 97.3 F

## 2021-01-06 DIAGNOSIS — G89.4 CHRONIC PAIN SYNDROME: Primary | ICD-10-CM

## 2021-01-06 DIAGNOSIS — M47.817 LUMBOSACRAL SPONDYLOSIS WITHOUT MYELOPATHY: ICD-10-CM

## 2021-01-06 DIAGNOSIS — M46.1 SACROILIITIS (HCC): ICD-10-CM

## 2021-01-06 DIAGNOSIS — M25.50 POLYARTHRALGIA: ICD-10-CM

## 2021-01-06 DIAGNOSIS — Z79.899 HIGH RISK MEDICATION USE: ICD-10-CM

## 2021-01-06 PROCEDURE — 99214 OFFICE O/P EST MOD 30 MIN: CPT | Performed by: ANESTHESIOLOGY

## 2021-01-06 RX ORDER — AMLODIPINE BESYLATE 5 MG/1
5 TABLET ORAL DAILY
COMMUNITY
Start: 2020-12-12 | End: 2021-06-11

## 2021-01-06 RX ORDER — PANTOPRAZOLE SODIUM 20 MG/1
TABLET, DELAYED RELEASE ORAL
COMMUNITY
Start: 2020-12-12 | End: 2022-08-12

## 2021-01-06 NOTE — PROGRESS NOTES
Subjective    CC neck pain, back pain, right hip pain  Faby Le is a 75 y.o. female with multiple comorbidities including CKD, chronic polyarthralgia S/p TKA , chronic back/ neck pain here for follow-up.  Continued right SI tenderness.  Mild relief with first right SI injection.  Chronic low back pain mostly right-sided radiating to right hip worse with twisting bending, prolonged standing or prolonged sitting.  Denies radicular pain.  Chronic left neck pain rating to left shoulder and head associated with headaches constant worse with activity.  Denies balance issues, weakness, bladder bowel continence.  Chronic polyarthralgia, knee pain left foot pain.  Continued chronic axial back pain.  Chronic pain impairing daily activity and interfering with sleep.  Seen neurosurgery, no surgery recommended at this time.  Referred to try facet blocks/RFA..     L-spine MRI 2020 Degenerative changes. Grade 1 anterior spondylolisthesis of L4 on L5 and L5 on S1. also slight retrolisthesis of L1 on L2 and L2 on L3. This is felt to be secondary to facet degenerative changes.  Varying degrees of spinal stenosis and neural foraminal narrowing  L-spine x-ray 2020 advanced multilevel degenerative disc disease with disc space narrowing at all lumbar levels. There is grade 1 anterolisthesis of L4 on L5. There is marked reactive endplate sclerosis at all levels. There is advanced facet arthrosis throughout the lumbar spine. With flexion, there is 6 mm of anterolisthesis of L4 on 5. With extension, this measures approximately 5 mm   C-spine CT 2020 multiple level degenerative changes facet arthropathy with osteophyte complex, mild to moderate.   The spine MRI 2019 Multilevel central canal stenosis with moderate to marked neural foraminal narrowing at multiple levels, changes are most significant from the C3-4 through the C6-7 levels with changes of a lesser degree at the C7-T1 level.  Normal cord    Pain Assessment   Location of Pain:  Lower Back, neck pain, joints  Description of Pain: Dull/Aching, Throbbing, Stabbing  Previous Pain Rating :4  Current Pain Ratin  Aggravating Factors: Activity  Alleviating Factors: Rest, Medication    PEG Assessment   What number best describes your pain on average in the past week?5  What number best describes how, during the past week, pain has interfered with your enjoyment of life?8  What number best describes how, during the past week, pain has interfered with your general activity? 5     The following portions of the patient's history were reviewed and updated as appropriate: allergies, current medications, past family history, past medical history, past social history, past surgical history and problem list.     has a past medical history of Ankle pain, Arthritis, Coronary artery disease, Depression, Gout, Hyperglycemia, Hyperlipidemia, Low back pain, Sleep apnea, and Vitamin D deficiency.   has a past surgical history that includes Hysterectomy; Rotator cuff repair; Cataract extraction; Carpal tunnel release; Cubital Tunnel Release; Cardiac catheterization; Replacement total knee; and Ankle Fusion.  family history includes Arthritis in her brother; Cancer in her brother; Hypertension in her mother; Stroke in her father.  Social History     Tobacco Use   • Smoking status: Never Smoker   • Smokeless tobacco: Never Used   Substance Use Topics   • Alcohol use: No     Frequency: Never     Review of Systems   Constitutional: Negative for chills, fatigue and fever.   HENT: Negative for swollen glands and trouble swallowing.    Respiratory: Negative.  Negative for shortness of breath.    Cardiovascular: Negative for chest pain, palpitations and leg swelling.   Gastrointestinal: Negative for nausea and vomiting.   Musculoskeletal: Positive for arthralgias, back pain, neck pain and neck stiffness. Negative for gait problem, joint swelling and myalgias.   Skin: Negative for color change, dry skin, rash and skin  "lesions.   Neurological: Positive for headache. Negative for dizziness, weakness and light-headedness.   Hematological: Negative for adenopathy. Does not bruise/bleed easily.   Psychiatric/Behavioral: Negative for behavioral problems, suicidal ideas and depressed mood.   All other systems reviewed and are negative.    Objective   Physical Exam   Constitutional: No distress. She is obese.  Pulmonary/Chest: Effort normal.   Musculoskeletal:      Lumbar back: She exhibits decreased range of motion, tenderness and spasm.      Comments: Lumbar loading positive, pain on extension of low back past 5 degrees.  TTP on the lumbar facets noted.  Right SI tenderness, positive Rebeca   Neurological: She is alert.   Psychiatric: Her behavior is normal.     /55   Pulse 82   Temp 97.3 °F (36.3 °C)   Resp 16   Ht 160 cm (63\")   Wt 128 kg (282 lb)   LMP  (LMP Unknown)   SpO2 98%   BMI 49.95 kg/m²      PHQ 9 on chart  Opioid risk tool low risk    Assessment/Plan   Diagnoses and all orders for this visit:    1. Chronic pain syndrome (Primary)    2. Lumbosacral spondylosis without myelopathy    3. Sacroiliitis (CMS/HCC)  -     Ambulatory Referral to Pain Management    4. Polyarthralgia    5. High risk medication use    Summary  Faby Le is a 75 y.o. female with multiple comorbidities including CKD, chronic polyarthralgia S/P TKA, chronic back/neck pain headache here for follow-up .  Chronic neck pain, headache from DDD spondylosis with cervicogenic headaches.  Impairing daily activity.  Marginal relief with physical therapy and medication.  Cannot take NSAIDs due to CKD.  Seen neurosurgery, no surgery recommended at this time.  Refer to try injections.    Good relief of radicular pain with LESI.  L-spine MRI reviewed showing varying degrees of foraminal narrowing and facet degenerative changes.    Continued right SI tenderness.  Mild relief with first right SI injection.  We will schedule for repeat right SI " injection.    Inspect reviewed.    RTC for procedure

## 2021-01-14 ENCOUNTER — APPOINTMENT (OUTPATIENT)
Dept: CARDIOLOGY | Facility: HOSPITAL | Age: 76
End: 2021-01-14

## 2021-01-19 ENCOUNTER — HOSPITAL ENCOUNTER (OUTPATIENT)
Dept: CARDIOLOGY | Facility: HOSPITAL | Age: 76
Discharge: HOME | End: 2021-01-19
Admitting: INTERNAL MEDICINE

## 2021-01-19 VITALS
WEIGHT: 282.19 LBS | BODY MASS INDEX: 50 KG/M2 | DIASTOLIC BLOOD PRESSURE: 60 MMHG | HEIGHT: 63 IN | SYSTOLIC BLOOD PRESSURE: 163 MMHG

## 2021-01-19 DIAGNOSIS — I25.10 CORONARY ARTERY DISEASE INVOLVING NATIVE CORONARY ARTERY OF NATIVE HEART WITHOUT ANGINA PECTORIS: ICD-10-CM

## 2021-01-19 DIAGNOSIS — I42.0 CARDIOMYOPATHY, DILATED (HCC): ICD-10-CM

## 2021-01-19 DIAGNOSIS — I50.31 ACUTE DIASTOLIC CHF (CONGESTIVE HEART FAILURE) (HCC): ICD-10-CM

## 2021-01-19 PROCEDURE — 93306 TTE W/DOPPLER COMPLETE: CPT

## 2021-01-19 PROCEDURE — 93306 TTE W/DOPPLER COMPLETE: CPT | Performed by: INTERNAL MEDICINE

## 2021-01-24 LAB
BH CV ECHO MEAS - ACS: 1.9 CM
BH CV ECHO MEAS - AO MAX PG (FULL): 3.6 MMHG
BH CV ECHO MEAS - AO MAX PG: 10.7 MMHG
BH CV ECHO MEAS - AO MEAN PG (FULL): 1.9 MMHG
BH CV ECHO MEAS - AO MEAN PG: 5.2 MMHG
BH CV ECHO MEAS - AO ROOT AREA (BSA CORRECTED): 1.4
BH CV ECHO MEAS - AO ROOT AREA: 7.3 CM^2
BH CV ECHO MEAS - AO ROOT DIAM: 3.1 CM
BH CV ECHO MEAS - AO V2 MAX: 163.7 CM/SEC
BH CV ECHO MEAS - AO V2 MEAN: 104.2 CM/SEC
BH CV ECHO MEAS - AO V2 VTI: 39.3 CM
BH CV ECHO MEAS - ASC AORTA: 3.5 CM
BH CV ECHO MEAS - AVA(I,A): 2.6 CM^2
BH CV ECHO MEAS - AVA(I,D): 2.6 CM^2
BH CV ECHO MEAS - AVA(V,A): 2.5 CM^2
BH CV ECHO MEAS - AVA(V,D): 2.5 CM^2
BH CV ECHO MEAS - BSA(HAYCOCK): 2.5 M^2
BH CV ECHO MEAS - BSA: 2.2 M^2
BH CV ECHO MEAS - BZI_BMI: 50 KILOGRAMS/M^2
BH CV ECHO MEAS - BZI_METRIC_HEIGHT: 160 CM
BH CV ECHO MEAS - BZI_METRIC_WEIGHT: 127.9 KG
BH CV ECHO MEAS - EDV(CUBED): 224.9 ML
BH CV ECHO MEAS - EDV(MOD-SP4): 53.1 ML
BH CV ECHO MEAS - EDV(TEICH): 185.6 ML
BH CV ECHO MEAS - EF(CUBED): 60.2 %
BH CV ECHO MEAS - EF(MOD-BP): 50 %
BH CV ECHO MEAS - EF(MOD-SP4): 50.1 %
BH CV ECHO MEAS - EF(TEICH): 50.9 %
BH CV ECHO MEAS - ESV(CUBED): 89.5 ML
BH CV ECHO MEAS - ESV(MOD-SP4): 26.5 ML
BH CV ECHO MEAS - ESV(TEICH): 91.1 ML
BH CV ECHO MEAS - FS: 26.4 %
BH CV ECHO MEAS - IVS/LVPW: 1
BH CV ECHO MEAS - IVSD: 1.1 CM
BH CV ECHO MEAS - LA DIMENSION: 4.4 CM
BH CV ECHO MEAS - LA/AO: 1.5
BH CV ECHO MEAS - LV DIASTOLIC VOL/BSA (35-75): 23.7 ML/M^2
BH CV ECHO MEAS - LV MASS(C)D: 286.1 GRAMS
BH CV ECHO MEAS - LV MASS(C)DI: 127.9 GRAMS/M^2
BH CV ECHO MEAS - LV MAX PG: 7.1 MMHG
BH CV ECHO MEAS - LV MEAN PG: 3.3 MMHG
BH CV ECHO MEAS - LV SYSTOLIC VOL/BSA (12-30): 11.8 ML/M^2
BH CV ECHO MEAS - LV V1 MAX: 133.6 CM/SEC
BH CV ECHO MEAS - LV V1 MEAN: 82.1 CM/SEC
BH CV ECHO MEAS - LV V1 VTI: 32.8 CM
BH CV ECHO MEAS - LVIDD: 6.1 CM
BH CV ECHO MEAS - LVIDS: 4.5 CM
BH CV ECHO MEAS - LVOT AREA: 3.1 CM^2
BH CV ECHO MEAS - LVOT DIAM: 2 CM
BH CV ECHO MEAS - LVPWD: 1.1 CM
BH CV ECHO MEAS - MR MAX PG: 89.3 MMHG
BH CV ECHO MEAS - MR MAX VEL: 472.3 CM/SEC
BH CV ECHO MEAS - MV A MAX VEL: 66.9 CM/SEC
BH CV ECHO MEAS - MV DEC SLOPE: 560.8 CM/SEC^2
BH CV ECHO MEAS - MV DEC TIME: 0.21 SEC
BH CV ECHO MEAS - MV E MAX VEL: 119.4 CM/SEC
BH CV ECHO MEAS - MV E/A: 1.8
BH CV ECHO MEAS - MV MAX PG: 6.5 MMHG
BH CV ECHO MEAS - MV MEAN PG: 1.9 MMHG
BH CV ECHO MEAS - MV V2 MAX: 127.1 CM/SEC
BH CV ECHO MEAS - MV V2 MEAN: 62.2 CM/SEC
BH CV ECHO MEAS - MV V2 VTI: 39.2 CM
BH CV ECHO MEAS - MVA(VTI): 2.6 CM^2
BH CV ECHO MEAS - PA ACC TIME: 0.13 SEC
BH CV ECHO MEAS - PA MAX PG: 4.7 MMHG
BH CV ECHO MEAS - PA PR(ACCEL): 22.5 MMHG
BH CV ECHO MEAS - PA V2 MAX: 108.1 CM/SEC
BH CV ECHO MEAS - PI END-D VEL: 151.9 CM/SEC
BH CV ECHO MEAS - PULM A REVS DUR: 0.11 SEC
BH CV ECHO MEAS - PULM A REVS VEL: 24.4 CM/SEC
BH CV ECHO MEAS - PULM DIAS VEL: 31.1 CM/SEC
BH CV ECHO MEAS - PULM S/D: 1.8
BH CV ECHO MEAS - PULM SYS VEL: 55.9 CM/SEC
BH CV ECHO MEAS - RAP SYSTOLE: 10 MMHG
BH CV ECHO MEAS - RVSP: 47.8 MMHG
BH CV ECHO MEAS - SI(AO): 128.6 ML/M^2
BH CV ECHO MEAS - SI(CUBED): 60.5 ML/M^2
BH CV ECHO MEAS - SI(LVOT): 45.4 ML/M^2
BH CV ECHO MEAS - SI(MOD-SP4): 11.9 ML/M^2
BH CV ECHO MEAS - SI(TEICH): 42.2 ML/M^2
BH CV ECHO MEAS - SV(AO): 287.9 ML
BH CV ECHO MEAS - SV(CUBED): 135.4 ML
BH CV ECHO MEAS - SV(LVOT): 101.6 ML
BH CV ECHO MEAS - SV(MOD-SP4): 26.6 ML
BH CV ECHO MEAS - SV(TEICH): 94.5 ML
BH CV ECHO MEAS - TAPSE (>1.6): 2.3 CM
BH CV ECHO MEAS - TR MAX VEL: 301.7 CM/SEC
BH CV XLRA - RV BASE: 5.6 CM
BH CV XLRA - RV MID: 3.2 CM
LV EF 2D ECHO EST: 50 %
MAXIMAL PREDICTED HEART RATE: 147 BPM
STRESS TARGET HR: 125 BPM

## 2021-01-25 ENCOUNTER — TELEPHONE (OUTPATIENT)
Dept: CARDIOLOGY | Facility: CLINIC | Age: 76
End: 2021-01-25

## 2021-01-27 ENCOUNTER — APPOINTMENT (OUTPATIENT)
Dept: PAIN MEDICINE | Facility: HOSPITAL | Age: 76
End: 2021-01-27

## 2021-01-28 ENCOUNTER — TELEPHONE (OUTPATIENT)
Dept: FAMILY MEDICINE CLINIC | Facility: CLINIC | Age: 76
End: 2021-01-28

## 2021-01-28 NOTE — TELEPHONE ENCOUNTER
PATIENT CALLED AND STATES THAT SHE GOT HER FIRST COVID SHOT ON January 18 AND IS DUE TO GET THE SECOND ONE ON February 8. SHE NOW HAS COVID AND WANTS TO KNOW IF THAT IS TO CLOSE TO GET SECOND SHOT.

## 2021-02-01 ENCOUNTER — TELEPHONE (OUTPATIENT)
Dept: CARDIOLOGY | Facility: CLINIC | Age: 76
End: 2021-02-01

## 2021-02-01 NOTE — TELEPHONE ENCOUNTER
Called and informed the Pt per Dr. Aguirre that her echo showed that her LV function has improved compared to her prior echo. The Pt stated understanding. Pt was advised to keep her follow up with Dr. Aguirre on 3/17/21.

## 2021-02-22 RX ORDER — DILTIAZEM HYDROCHLORIDE 120 MG/1
CAPSULE, EXTENDED RELEASE ORAL
Qty: 180 CAPSULE | Refills: 1 | OUTPATIENT
Start: 2021-02-22

## 2021-03-04 ENCOUNTER — OFFICE (AMBULATORY)
Dept: URBAN - METROPOLITAN AREA CLINIC 64 | Facility: CLINIC | Age: 76
End: 2021-03-04

## 2021-03-04 VITALS
WEIGHT: 289 LBS | HEART RATE: 76 BPM | HEIGHT: 63 IN | DIASTOLIC BLOOD PRESSURE: 58 MMHG | SYSTOLIC BLOOD PRESSURE: 127 MMHG

## 2021-03-04 DIAGNOSIS — B96.81 HELICOBACTER PYLORI [H. PYLORI] AS THE CAUSE OF DISEASES CLA: ICD-10-CM

## 2021-03-04 DIAGNOSIS — K29.50 UNSPECIFIED CHRONIC GASTRITIS WITHOUT BLEEDING: ICD-10-CM

## 2021-03-04 PROCEDURE — 99212 OFFICE O/P EST SF 10 MIN: CPT | Performed by: NURSE PRACTITIONER

## 2021-03-09 RX ORDER — LOSARTAN POTASSIUM 25 MG/1
TABLET ORAL
Qty: 30 TABLET | Refills: 6 | Status: SHIPPED | OUTPATIENT
Start: 2021-03-09 | End: 2021-09-07

## 2021-03-17 ENCOUNTER — OFFICE VISIT (OUTPATIENT)
Dept: CARDIOLOGY | Facility: CLINIC | Age: 76
End: 2021-03-17

## 2021-03-17 VITALS
BODY MASS INDEX: 49.96 KG/M2 | RESPIRATION RATE: 18 BRPM | HEART RATE: 77 BPM | DIASTOLIC BLOOD PRESSURE: 72 MMHG | OXYGEN SATURATION: 99 % | SYSTOLIC BLOOD PRESSURE: 145 MMHG | WEIGHT: 282 LBS | HEIGHT: 63 IN

## 2021-03-17 DIAGNOSIS — I25.10 CORONARY ARTERY DISEASE INVOLVING NATIVE HEART WITHOUT ANGINA PECTORIS, UNSPECIFIED VESSEL OR LESION TYPE: Primary | ICD-10-CM

## 2021-03-17 DIAGNOSIS — I10 ESSENTIAL HYPERTENSION: ICD-10-CM

## 2021-03-17 DIAGNOSIS — E78.2 MIXED HYPERLIPIDEMIA: ICD-10-CM

## 2021-03-17 PROCEDURE — 99214 OFFICE O/P EST MOD 30 MIN: CPT | Performed by: INTERNAL MEDICINE

## 2021-03-17 NOTE — PROGRESS NOTES
Cardiology Office Visit      Encounter Date:  03/17/2021    Patient ID:   Faby Le is a 76 y.o. female.      Reason For Followup:  Lower extremity edema  Shortness of breath    Brief Clinical History:  Dear Dr. Adam, Leonor Ordaz MD    I had the pleasure of seeing Faby Le today. As you are well aware, this is a 76 y.o. female  presented to the office with symptoms of lower extremity edema and some shortness of breath        cardiac catheterization showed moderate disease involving the diagonal and mild-to-moderate disease involving circumflex      Patient is complaining of bilateral lower extremity edema which is progressively getting worse with no improvement even after increasing the dose of the Lasix by the primary care physician  Planing of some shortness of breath and dyspnea on exertion  Denies any chest pain  Denies any prior history of DVT or PE    Assessment & Plan    Impressions:  Morbid obesity  Bilateral lower extremity edema  Shortness of breath  Dyspnea on exertion  Obstructive sleep apnea  Coronary artery disease  Diastolic dysfunction  Hypertension  Newly diagnosed cardiomyopathy with LV ejection fraction of 40%/repeat echocardiogram now with LV ejection fraction of 50%  Normal LV systolic function in 2018  Moderate obstructive coronary artery disease involving the diagonal branch of the LAD and mild to moderate obstructive coronary artery disease involving the left circumflex artery in 2018      Recommendations:  Venous Doppler with no significant pathology  Echocardiogram with newly diagnosed cardiomyopathy with LV ejection fraction of 40%  Repeat echocardiogram with LV ejection fraction of 50%  Continue Bumex to 1 mg p.o. once a day  Continue losartan 25 mg p.o. once a day  Continue Aldactone 12.5 mg p.o. once a day  Labs with primary care physician office  Continue current medical management for cardiomyopathy  Need for regular exercise weight loss reviewed and discussed with  "patient  Low-salt diet  Follow-up in office in 6 months  Objective:    Vitals:  Vitals:    03/17/21 1327   BP: 145/72   BP Location: Left arm   Pulse: 77   Resp: 18   SpO2: 99%   Weight: 128 kg (282 lb)   Height: 160 cm (63\")       Physical Exam:    General: Alert, cooperative, no distress, appears stated age  Head:  Normocephalic, atraumatic, mucous membranes moist  Eyes:  Conjunctiva/corneas clear, EOM's intact     Neck:  Supple,  no adenopathy;      Lungs: Clear to auscultation bilaterally, no wheezes rhonchi rales are noted  Chest wall: No tenderness  Heart::  Regular rate and rhythm, S1 and S2 normal, no murmur, rub or gallop  Abdomen: Soft, non-tender, nondistended bowel sounds active  Extremities: No cyanosis, clubbing, or edema  Pulses: 2+ and symmetric all extremities  Skin:  No rashes or lesions  Neuro/psych: A&O x3. CN II through XII are grossly intact with appropriate affect      Allergies:  Allergies   Allergen Reactions   • Celecoxib Itching     swelling       Medication Review:     Current Outpatient Medications:   •  allopurinol (ZYLOPRIM) 300 MG tablet, TAKE 1 TABLET BY MOUTH EVERY DAY, Disp: 90 tablet, Rfl: 2  •  amLODIPine (NORVASC) 5 MG tablet, Take 5 mg by mouth Daily., Disp: , Rfl:   •  APPLE CIDER VINEGAR PO, Take 1 tablet by mouth 2 (Two) Times a Day. Take 2 tablets am and 1 pm, Disp: , Rfl:   •  atorvastatin (LIPITOR) 20 MG tablet, TAKE 1 TABLET BY MOUTH EVERYDAY AT BEDTIME, Disp: 90 tablet, Rfl: 3  •  bumetanide (BUMEX) 2 MG tablet, Take 0.5 tablets by mouth Daily., Disp: 30 tablet, Rfl: 2  •  cetirizine (zyrTEC) 10 MG tablet, Take 1 tablet by mouth Daily., Disp: , Rfl:   •  cyanocobalamin (VITAMIN B-12) 1000 MCG tablet, Take 1 tablet by mouth Daily., Disp: , Rfl:   •  Enflurane (COMPOUND 347 IN), , Disp: , Rfl:   •  escitalopram (LEXAPRO) 20 MG tablet, TAKE 1 TABLET BY MOUTH EVERY DAY, Disp: 90 tablet, Rfl: 2  •  hydrocortisone 2.5 % ointment, APPLY A THIN LAYER TO THE AFFECTED AREA(S) BY " TOPICAL ROUTE 2 TIMES PER DAY UNTIL HEALED., Disp: , Rfl:   •  levothyroxine (SYNTHROID, LEVOTHROID) 75 MCG tablet, TAKE 1 TABLET BY MOUTH EVERY DAY, Disp: 90 tablet, Rfl: 3  •  Linzess 72 MCG capsule capsule, Take 72 mcg by mouth Daily., Disp: , Rfl:   •  losartan (COZAAR) 25 MG tablet, TAKE 1 TABLET BY MOUTH EVERY DAY, Disp: 30 tablet, Rfl: 6  •  metoprolol tartrate (LOPRESSOR) 25 MG tablet, TAKE 1 TABLET BY MOUTH EVERYDAY AT BEDTIME, Disp: 90 tablet, Rfl: 3  •  montelukast (SINGULAIR) 10 MG tablet, Take 10 mg by mouth Daily., Disp: , Rfl:   •  Multiple Vitamins-Minerals (CENTRUM SILVER ADULT 50+) tablet, Take 1 tablet by mouth Daily., Disp: , Rfl:   •  NON FORMULARY, Compounding cream, Disp: , Rfl:   •  Omega-3 Fatty Acids (FISH OIL) 1000 MG capsule capsule, Take 1 capsule by mouth Daily., Disp: , Rfl:   •  pantoprazole (PROTONIX) 20 MG EC tablet, TAKE 1 TABLET BY MOUTH EVERY DAY IN THE MORNING 30 MINUTES BEFORE A MEAL, Disp: , Rfl:   •  spironolactone (ALDACTONE) 25 MG tablet, Take 0.5 tablets by mouth Daily., Disp: 30 tablet, Rfl: 2  •  Vitamin D, Cholecalciferol, 25 MCG (1000 UT) tablet, Take 1 tablet by mouth Daily., Disp: , Rfl:     Family History:  Family History   Problem Relation Age of Onset   • Hypertension Mother    • Stroke Father    • Arthritis Brother    • Cancer Brother        Past Medical History:  Past Medical History:   Diagnosis Date   • Ankle pain    • Arthritis    • Coronary artery disease    • Depression    • Gout    • Hyperglycemia    • Hyperlipidemia    • Low back pain    • Sleep apnea    • Vitamin D deficiency        Past surgical History:  Past Surgical History:   Procedure Laterality Date   • ANKLE FUSION      2017-left   • CARDIAC CATHETERIZATION     • CARPAL TUNNEL RELEASE     • CATARACT EXTRACTION     • CUBITAL TUNNEL RELEASE     • HYSTERECTOMY     • REPLACEMENT TOTAL KNEE      left 2008   • ROTATOR CUFF REPAIR         Social History:  Social History     Socioeconomic History   •  Marital status:      Spouse name: Not on file   • Number of children: Not on file   • Years of education: Not on file   • Highest education level: Not on file   Tobacco Use   • Smoking status: Never Smoker   • Smokeless tobacco: Never Used   Vaping Use   • Vaping Use: Never used   Substance and Sexual Activity   • Alcohol use: No   • Drug use: No   • Sexual activity: Not Currently       Review of Systems:  The following systems were reviewed as they relate to the cardiovascular system: Constitutional, Eyes, ENT, Cardiovascular, Respiratory, Gastrointestinal, Integumentary, Neurological, Psychiatric, Hematologic, Endocrine, Musculoskeletal, and Genitourinary. The pertinent cardiovascular findings are reported above with all other cardiovascular points within those systems being negative.    Diagnostic Study Review:     Current Electrocardiogram:  Procedures      NOTE: The following portions of the patient's history were reviewed and updated this visit as appropriate: allergies, current medications, past family history, past medical history, past social history, past surgical history and problem list.

## 2021-03-25 ENCOUNTER — OFFICE VISIT (OUTPATIENT)
Dept: FAMILY MEDICINE CLINIC | Facility: CLINIC | Age: 76
End: 2021-03-25

## 2021-03-25 VITALS
HEART RATE: 67 BPM | BODY MASS INDEX: 51.03 KG/M2 | OXYGEN SATURATION: 96 % | WEIGHT: 288 LBS | TEMPERATURE: 97.1 F | DIASTOLIC BLOOD PRESSURE: 58 MMHG | SYSTOLIC BLOOD PRESSURE: 130 MMHG | HEIGHT: 63 IN

## 2021-03-25 DIAGNOSIS — R73.9 HYPERGLYCEMIA: ICD-10-CM

## 2021-03-25 DIAGNOSIS — C54.1 MALIGNANT NEOPLASM OF ENDOMETRIUM (HCC): ICD-10-CM

## 2021-03-25 DIAGNOSIS — I10 ESSENTIAL HYPERTENSION: ICD-10-CM

## 2021-03-25 DIAGNOSIS — R19.8 CHANGE IN BOWEL MOVEMENT: ICD-10-CM

## 2021-03-25 DIAGNOSIS — Z78.0 POSTMENOPAUSAL STATUS: ICD-10-CM

## 2021-03-25 DIAGNOSIS — E78.2 MIXED HYPERLIPIDEMIA: ICD-10-CM

## 2021-03-25 DIAGNOSIS — E66.2 CLASS 3 OBESITY WITH ALVEOLAR HYPOVENTILATION, SERIOUS COMORBIDITY, AND BODY MASS INDEX (BMI) OF 50.0 TO 59.9 IN ADULT (HCC): ICD-10-CM

## 2021-03-25 DIAGNOSIS — I25.10 CORONARY ARTERY DISEASE INVOLVING NATIVE HEART WITHOUT ANGINA PECTORIS, UNSPECIFIED VESSEL OR LESION TYPE: Primary | ICD-10-CM

## 2021-03-25 DIAGNOSIS — D64.9 ANEMIA, UNSPECIFIED TYPE: ICD-10-CM

## 2021-03-25 DIAGNOSIS — M1A.9XX0 CHRONIC GOUT WITHOUT TOPHUS, UNSPECIFIED CAUSE, UNSPECIFIED SITE: ICD-10-CM

## 2021-03-25 DIAGNOSIS — E53.8 B12 DEFICIENCY: ICD-10-CM

## 2021-03-25 DIAGNOSIS — G47.33 OBSTRUCTIVE SLEEP APNEA SYNDROME: ICD-10-CM

## 2021-03-25 DIAGNOSIS — E03.9 HYPOTHYROIDISM, UNSPECIFIED TYPE: ICD-10-CM

## 2021-03-25 DIAGNOSIS — F32.A DEPRESSION, UNSPECIFIED DEPRESSION TYPE: ICD-10-CM

## 2021-03-25 PROCEDURE — 99213 OFFICE O/P EST LOW 20 MIN: CPT | Performed by: PREVENTIVE MEDICINE

## 2021-03-25 PROCEDURE — 85025 COMPLETE CBC W/AUTO DIFF WBC: CPT | Performed by: PREVENTIVE MEDICINE

## 2021-03-25 PROCEDURE — 80053 COMPREHEN METABOLIC PANEL: CPT | Performed by: PREVENTIVE MEDICINE

## 2021-03-25 PROCEDURE — 84443 ASSAY THYROID STIM HORMONE: CPT | Performed by: PREVENTIVE MEDICINE

## 2021-03-25 PROCEDURE — 84550 ASSAY OF BLOOD/URIC ACID: CPT | Performed by: PREVENTIVE MEDICINE

## 2021-03-25 PROCEDURE — 82607 VITAMIN B-12: CPT | Performed by: PREVENTIVE MEDICINE

## 2021-03-25 PROCEDURE — 83036 HEMOGLOBIN GLYCOSYLATED A1C: CPT | Performed by: PREVENTIVE MEDICINE

## 2021-03-25 PROCEDURE — 82746 ASSAY OF FOLIC ACID SERUM: CPT | Performed by: PREVENTIVE MEDICINE

## 2021-03-25 PROCEDURE — 80061 LIPID PANEL: CPT | Performed by: PREVENTIVE MEDICINE

## 2021-03-25 PROCEDURE — 83735 ASSAY OF MAGNESIUM: CPT | Performed by: PREVENTIVE MEDICINE

## 2021-03-25 NOTE — PROGRESS NOTES
"Subjective   Faby Le is a 76 y.o. female presents for   Chief Complaint   Patient presents with   • Hypertension     3 mth FU/ fasting        Health Maintenance Due   Topic Date Due   • DXA SCAN  06/29/2020       76-year-old white female presents for follow-up of multiple chronic health conditions all of which are stable.  We spent some time discussing how she could lose weight by cutting her portion size and making wiser choices and vegetables presentations.  Patient has had her Covid vaccination.  Patient has been to see the orthopedist and the foot doctor and is entertaining having fixation of her other foot and ankle done.       Vitals:    03/25/21 0911 03/25/21 0912 03/25/21 0914 03/25/21 0929   BP: 117/79 144/76 107/70 130/58   BP Location: Right arm Left arm Right arm Left arm   Patient Position: Sitting Sitting Standing Sitting   Cuff Size: Adult Adult Adult Large Adult   Pulse: 67      Temp: 97.1 °F (36.2 °C)      TempSrc: Temporal      SpO2: 96%      Weight: 131 kg (288 lb)      Height: 160 cm (62.99\")        Body mass index is 51.03 kg/m².    Current Outpatient Medications on File Prior to Visit   Medication Sig Dispense Refill   • allopurinol (ZYLOPRIM) 300 MG tablet TAKE 1 TABLET BY MOUTH EVERY DAY 90 tablet 2   • amLODIPine (NORVASC) 5 MG tablet Take 5 mg by mouth Daily.     • APPLE CIDER VINEGAR PO Take 1 tablet by mouth 2 (Two) Times a Day. Take 2 tablets am and 1 pm     • atorvastatin (LIPITOR) 20 MG tablet TAKE 1 TABLET BY MOUTH EVERYDAY AT BEDTIME 90 tablet 3   • bumetanide (BUMEX) 2 MG tablet Take 0.5 tablets by mouth Daily. 30 tablet 2   • cetirizine (zyrTEC) 10 MG tablet Take 1 tablet by mouth Daily.     • cyanocobalamin (VITAMIN B-12) 1000 MCG tablet Take 1 tablet by mouth Daily.     • Enflurane (COMPOUND 347 IN)      • escitalopram (LEXAPRO) 20 MG tablet TAKE 1 TABLET BY MOUTH EVERY DAY 90 tablet 2   • hydrocortisone 2.5 % ointment APPLY A THIN LAYER TO THE AFFECTED AREA(S) BY TOPICAL " ROUTE 2 TIMES PER DAY UNTIL HEALED.     • levothyroxine (SYNTHROID, LEVOTHROID) 75 MCG tablet TAKE 1 TABLET BY MOUTH EVERY DAY 90 tablet 3   • Linzess 72 MCG capsule capsule Take 72 mcg by mouth Daily.     • losartan (COZAAR) 25 MG tablet TAKE 1 TABLET BY MOUTH EVERY DAY 30 tablet 6   • metoprolol tartrate (LOPRESSOR) 25 MG tablet TAKE 1 TABLET BY MOUTH EVERYDAY AT BEDTIME 90 tablet 3   • montelukast (SINGULAIR) 10 MG tablet Take 10 mg by mouth Daily.     • Multiple Vitamins-Minerals (CENTRUM SILVER ADULT 50+) tablet Take 1 tablet by mouth Daily.     • NON FORMULARY Compounding cream     • Omega-3 Fatty Acids (FISH OIL) 1000 MG capsule capsule Take 1 capsule by mouth Daily.     • pantoprazole (PROTONIX) 20 MG EC tablet TAKE 1 TABLET BY MOUTH EVERY DAY IN THE MORNING 30 MINUTES BEFORE A MEAL     • spironolactone (ALDACTONE) 25 MG tablet Take 0.5 tablets by mouth Daily. 30 tablet 2   • Vitamin D, Cholecalciferol, 25 MCG (1000 UT) tablet Take 1 tablet by mouth Daily.     • [DISCONTINUED] metoprolol tartrate (LOPRESSOR) 25 MG tablet TAKE 1 TABLET BY MOUTH DAILY AT BEDTIME 90 tablet 3     No current facility-administered medications on file prior to visit.       The following portions of the patient's history were reviewed and updated as appropriate: allergies, current medications, past family history, past medical history, past social history, past surgical history and problem list.    Review of Systems   Musculoskeletal: Positive for arthralgias and gait problem.       Objective   Physical Exam  Vitals reviewed.   Constitutional:       General: She is not in acute distress.     Appearance: She is well-developed. She is obese. She is not ill-appearing or toxic-appearing.   HENT:      Head: Normocephalic and atraumatic.      Right Ear: Tympanic membrane, ear canal and external ear normal.      Left Ear: Tympanic membrane, ear canal and external ear normal.      Nose: Nose normal.   Eyes:      Extraocular Movements:  Extraocular movements intact.      Conjunctiva/sclera: Conjunctivae normal.      Pupils: Pupils are equal, round, and reactive to light.   Cardiovascular:      Rate and Rhythm: Normal rate and regular rhythm.      Heart sounds: Normal heart sounds.   Pulmonary:      Effort: Pulmonary effort is normal.      Breath sounds: Normal breath sounds.   Abdominal:      General: Bowel sounds are normal. There is no distension.      Palpations: Abdomen is soft. There is no mass.      Tenderness: There is no abdominal tenderness.   Musculoskeletal:         General: Swelling and tenderness present.      Cervical back: Neck supple.   Skin:     General: Skin is warm.   Neurological:      General: No focal deficit present.      Mental Status: She is alert and oriented to person, place, and time.      Gait: Gait abnormal.   Psychiatric:         Mood and Affect: Mood normal.         Behavior: Behavior normal.       PHQ-9 Total Score:      Assessment/Plan   Diagnoses and all orders for this visit:    1. Coronary artery disease involving native heart without angina pectoris, unspecified vessel or lesion type (Primary)  Comments:  No recent chest pain or shortness of breath did see cardiologist a couple of weeks ago.    2. B12 deficiency  Comments:  Patient takes B12 daily  Orders:  -     Vitamin B12    3. Depression, unspecified depression type  Comments:  Depression is controlled no HI or SI.  Orders:  -     Magnesium    4. Chronic gout without tophus, unspecified cause, unspecified site  Comments:  No gouty arthritis or kidney stones.  Orders:  -     Uric Acid    5. Hyperglycemia  Comments:  Patient trying to eat less carbohydrates.  Diet reviewed and discussed  Orders:  -     Comprehensive Metabolic Panel  -     Hemoglobin A1c    6. Mixed hyperlipidemia  Comments:  Patient trying to eat less saturated fat diet discussed  Orders:  -     Lipid Panel    7. Essential hypertension  Comments:  Blood pressure controlled no headache or  chest pain  Orders:  -     CBC Auto Differential    8. Hypothyroidism, unspecified type  -     TSH    9. Postmenopausal status  Comments:  Advise DXA    10. Obstructive sleep apnea syndrome  Comments:  Wears every night.  Dr. Walton 1/2021    11. 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)  Comments:  Sees Surgeon 8/2021    12. Change in bowel movement  Comments:  Stayin constipated 2-3X/week- Colonoscopy due next year-unchanged since last    13. Class 3 obesity with alveolar hypoventilation, serious comorbidity, and body mass index (BMI) of 50.0 to 59.9 in adult (CMS/HCC)  Comments:  Portion size discussed and making better choices in frying and carbs discussed        Patient Instructions     Health Maintenance Due   Topic Date Due   • DXA SCAN  06/29/2020

## 2021-03-26 DIAGNOSIS — D64.9 ANEMIA, UNSPECIFIED TYPE: Primary | ICD-10-CM

## 2021-03-26 LAB
ALBUMIN SERPL-MCNC: 4.1 G/DL (ref 3.5–5.2)
ALBUMIN/GLOB SERPL: 1.5 G/DL
ALP SERPL-CCNC: 118 U/L (ref 39–117)
ALT SERPL W P-5'-P-CCNC: 24 U/L (ref 1–33)
ANION GAP SERPL CALCULATED.3IONS-SCNC: 11.2 MMOL/L (ref 5–15)
AST SERPL-CCNC: 24 U/L (ref 1–32)
BASOPHILS # BLD AUTO: 0.06 10*3/MM3 (ref 0–0.2)
BASOPHILS NFR BLD AUTO: 1 % (ref 0–1.5)
BILIRUB SERPL-MCNC: 0.6 MG/DL (ref 0–1.2)
BUN SERPL-MCNC: 26 MG/DL (ref 8–23)
BUN/CREAT SERPL: 22 (ref 7–25)
CALCIUM SPEC-SCNC: 10.4 MG/DL (ref 8.6–10.5)
CHLORIDE SERPL-SCNC: 102 MMOL/L (ref 98–107)
CHOLEST SERPL-MCNC: 127 MG/DL (ref 0–200)
CO2 SERPL-SCNC: 26.8 MMOL/L (ref 22–29)
CREAT SERPL-MCNC: 1.18 MG/DL (ref 0.57–1)
DEPRECATED RDW RBC AUTO: 48 FL (ref 37–54)
EOSINOPHIL # BLD AUTO: 0.25 10*3/MM3 (ref 0–0.4)
EOSINOPHIL NFR BLD AUTO: 4 % (ref 0.3–6.2)
ERYTHROCYTE [DISTWIDTH] IN BLOOD BY AUTOMATED COUNT: 13.1 % (ref 12.3–15.4)
FOLATE SERPL-MCNC: >20 NG/ML (ref 4.78–24.2)
GFR SERPL CREATININE-BSD FRML MDRD: 45 ML/MIN/1.73
GLOBULIN UR ELPH-MCNC: 2.8 GM/DL
GLUCOSE SERPL-MCNC: 110 MG/DL (ref 65–99)
HBA1C MFR BLD: 5.7 % (ref 3.5–5.6)
HCT VFR BLD AUTO: 33.1 % (ref 34–46.6)
HDLC SERPL-MCNC: 53 MG/DL (ref 40–60)
HGB BLD-MCNC: 11.1 G/DL (ref 12–15.9)
IMM GRANULOCYTES # BLD AUTO: 0.03 10*3/MM3 (ref 0–0.05)
IMM GRANULOCYTES NFR BLD AUTO: 0.5 % (ref 0–0.5)
LDLC SERPL CALC-MCNC: 51 MG/DL (ref 0–100)
LDLC/HDLC SERPL: 0.9 {RATIO}
LYMPHOCYTES # BLD AUTO: 1.66 10*3/MM3 (ref 0.7–3.1)
LYMPHOCYTES NFR BLD AUTO: 26.7 % (ref 19.6–45.3)
MAGNESIUM SERPL-MCNC: 1.9 MG/DL (ref 1.6–2.4)
MCH RBC QN AUTO: 33.5 PG (ref 26.6–33)
MCHC RBC AUTO-ENTMCNC: 33.5 G/DL (ref 31.5–35.7)
MCV RBC AUTO: 100 FL (ref 79–97)
MONOCYTES # BLD AUTO: 0.63 10*3/MM3 (ref 0.1–0.9)
MONOCYTES NFR BLD AUTO: 10.1 % (ref 5–12)
NEUTROPHILS NFR BLD AUTO: 3.59 10*3/MM3 (ref 1.7–7)
NEUTROPHILS NFR BLD AUTO: 57.7 % (ref 42.7–76)
NRBC BLD AUTO-RTO: 0 /100 WBC (ref 0–0.2)
PLATELET # BLD AUTO: 259 10*3/MM3 (ref 140–450)
PMV BLD AUTO: 9.8 FL (ref 6–12)
POTASSIUM SERPL-SCNC: 4 MMOL/L (ref 3.5–5.2)
PROT SERPL-MCNC: 6.9 G/DL (ref 6–8.5)
RBC # BLD AUTO: 3.31 10*6/MM3 (ref 3.77–5.28)
SODIUM SERPL-SCNC: 140 MMOL/L (ref 136–145)
TRIGL SERPL-MCNC: 131 MG/DL (ref 0–150)
TSH SERPL DL<=0.05 MIU/L-ACNC: 5.35 UIU/ML (ref 0.27–4.2)
URATE SERPL-MCNC: 6.6 MG/DL (ref 2.4–5.7)
VIT B12 BLD-MCNC: 729 PG/ML (ref 211–946)
VLDLC SERPL-MCNC: 23 MG/DL (ref 5–40)
WBC # BLD AUTO: 6.22 10*3/MM3 (ref 3.4–10.8)

## 2021-03-26 NOTE — PROGRESS NOTES
Thyroid is slightly low if she has been taking regularly on empty stomach-we should increase dose-let me know.  Glucose 110 and A1C=5.7 so still increased risk for DM so watch carbs and exercise upper body if can't walk.Kidney function is still decreased but improving so avoid NSAIDS and limit xray dyes.  Slight anemia so staff will send 2 IFOBS for her to send back-has she noted any increase in heartburn or other bleeding?

## 2021-03-29 ENCOUNTER — TELEPHONE (OUTPATIENT)
Dept: FAMILY MEDICINE CLINIC | Facility: CLINIC | Age: 76
End: 2021-03-29

## 2021-03-29 DIAGNOSIS — E03.9 HYPOTHYROIDISM, UNSPECIFIED TYPE: Primary | ICD-10-CM

## 2021-03-29 RX ORDER — LEVOTHYROXINE SODIUM 88 UG/1
88 TABLET ORAL DAILY
Qty: 90 TABLET | Refills: 3 | Status: SHIPPED | OUTPATIENT
Start: 2021-03-29 | End: 2022-02-15

## 2021-03-29 NOTE — TELEPHONE ENCOUNTER
HUB TO READ    Thyroid is slightly low if she has been taking regularly on empty stomach-we should increase dose-let me know.  Glucose 110 and A1C=5.7 so still increased risk for DM so watch carbs and exercise upper body if can't walk.Kidney function is still decreased but improving so avoid NSAIDS and limit xray dyes.  Slight anemia so staff will send 2 IFOBS for her to send back-has she noted any increase in heartburn or other bleeding?

## 2021-03-31 RX ORDER — ALLOPURINOL 300 MG/1
TABLET ORAL
Qty: 90 TABLET | Refills: 2 | Status: SHIPPED | OUTPATIENT
Start: 2021-03-31 | End: 2021-10-10

## 2021-05-07 ENCOUNTER — CLINICAL SUPPORT (OUTPATIENT)
Dept: FAMILY MEDICINE CLINIC | Facility: CLINIC | Age: 76
End: 2021-05-07

## 2021-05-07 DIAGNOSIS — E03.9 HYPOTHYROIDISM, UNSPECIFIED TYPE: ICD-10-CM

## 2021-05-07 LAB — TSH SERPL DL<=0.05 MIU/L-ACNC: 1.19 UIU/ML (ref 0.27–4.2)

## 2021-05-07 PROCEDURE — 84443 ASSAY THYROID STIM HORMONE: CPT | Performed by: PREVENTIVE MEDICINE

## 2021-05-07 PROCEDURE — 36415 COLL VENOUS BLD VENIPUNCTURE: CPT | Performed by: PREVENTIVE MEDICINE

## 2021-05-07 NOTE — PROGRESS NOTES
Venipuncture Blood Specimen Collection  Venipuncture performed in right arm by SMA Katherin with good hemostasis. Patient tolerated the procedure well without complications.   05/07/21   SCOOTER Skelton MD

## 2021-05-10 ENCOUNTER — TELEPHONE (OUTPATIENT)
Dept: FAMILY MEDICINE CLINIC | Facility: CLINIC | Age: 76
End: 2021-05-10

## 2021-05-10 NOTE — TELEPHONE ENCOUNTER
----- Message from Leonor Adam MD sent at 5/9/2021 11:17 AM EDT -----  Thyroid function is now normal-continue same dose till recheck

## 2021-06-04 RX ORDER — ESCITALOPRAM OXALATE 20 MG/1
TABLET ORAL
Qty: 90 TABLET | Refills: 2 | Status: SHIPPED | OUTPATIENT
Start: 2021-06-04 | End: 2022-03-01

## 2021-06-07 RX ORDER — SPIRONOLACTONE 25 MG/1
TABLET ORAL
Qty: 45 TABLET | Refills: 1 | Status: SHIPPED | OUTPATIENT
Start: 2021-06-07 | End: 2021-11-29

## 2021-06-11 RX ORDER — AMLODIPINE BESYLATE 5 MG/1
TABLET ORAL
Qty: 90 TABLET | Refills: 3 | Status: SHIPPED | OUTPATIENT
Start: 2021-06-11 | End: 2021-07-02 | Stop reason: DRUGHIGH

## 2021-06-14 ENCOUNTER — TELEPHONE (OUTPATIENT)
Dept: FAMILY MEDICINE CLINIC | Facility: CLINIC | Age: 76
End: 2021-06-14

## 2021-06-14 NOTE — TELEPHONE ENCOUNTER
HUB TO READ  ----- Message from Leonor Adam MD sent at 6/14/2021  5:02 PM EDT -----  DXA was normal

## 2021-06-21 RX ORDER — ATORVASTATIN CALCIUM 20 MG/1
TABLET, FILM COATED ORAL
Qty: 90 TABLET | Refills: 3 | Status: SHIPPED | OUTPATIENT
Start: 2021-06-21 | End: 2022-07-25

## 2021-07-01 NOTE — PATIENT INSTRUCTIONS
There are no preventive care reminders to display for this patient.    Cut Amlodipine in 1/2.  Take Metoprolol 12.5 am and pm.    Call 2 weeks with 10 BP and pulse and let us know about dizziness.

## 2021-07-02 ENCOUNTER — OFFICE VISIT (OUTPATIENT)
Dept: FAMILY MEDICINE CLINIC | Facility: CLINIC | Age: 76
End: 2021-07-02

## 2021-07-02 VITALS
BODY MASS INDEX: 48.2 KG/M2 | TEMPERATURE: 97.8 F | OXYGEN SATURATION: 97 % | DIASTOLIC BLOOD PRESSURE: 68 MMHG | SYSTOLIC BLOOD PRESSURE: 107 MMHG | HEIGHT: 63 IN | WEIGHT: 272 LBS

## 2021-07-02 DIAGNOSIS — E53.8 B12 DEFICIENCY: ICD-10-CM

## 2021-07-02 DIAGNOSIS — C54.1 MALIGNANT NEOPLASM OF ENDOMETRIUM (HCC): ICD-10-CM

## 2021-07-02 DIAGNOSIS — G47.33 OBSTRUCTIVE SLEEP APNEA SYNDROME: ICD-10-CM

## 2021-07-02 DIAGNOSIS — E66.01 CLASS 3 SEVERE OBESITY DUE TO EXCESS CALORIES WITH SERIOUS COMORBIDITY AND BODY MASS INDEX (BMI) OF 45.0 TO 49.9 IN ADULT (HCC): ICD-10-CM

## 2021-07-02 DIAGNOSIS — E78.2 MIXED HYPERLIPIDEMIA: ICD-10-CM

## 2021-07-02 DIAGNOSIS — M1A.9XX0 CHRONIC GOUT WITHOUT TOPHUS, UNSPECIFIED CAUSE, UNSPECIFIED SITE: ICD-10-CM

## 2021-07-02 DIAGNOSIS — R42 LIGHT HEADEDNESS: ICD-10-CM

## 2021-07-02 DIAGNOSIS — I25.10 CORONARY ARTERY DISEASE INVOLVING NATIVE HEART WITHOUT ANGINA PECTORIS, UNSPECIFIED VESSEL OR LESION TYPE: ICD-10-CM

## 2021-07-02 DIAGNOSIS — F32.A DEPRESSION, UNSPECIFIED DEPRESSION TYPE: ICD-10-CM

## 2021-07-02 DIAGNOSIS — M25.561 CHRONIC PAIN OF RIGHT KNEE: ICD-10-CM

## 2021-07-02 DIAGNOSIS — R19.8 CHANGE IN BOWEL MOVEMENT: ICD-10-CM

## 2021-07-02 DIAGNOSIS — G89.29 CHRONIC PAIN OF RIGHT KNEE: ICD-10-CM

## 2021-07-02 DIAGNOSIS — E03.9 HYPOTHYROIDISM, UNSPECIFIED TYPE: ICD-10-CM

## 2021-07-02 DIAGNOSIS — R73.9 HYPERGLYCEMIA: Primary | ICD-10-CM

## 2021-07-02 DIAGNOSIS — I10 ESSENTIAL HYPERTENSION: ICD-10-CM

## 2021-07-02 LAB
ALBUMIN SERPL-MCNC: 4.2 G/DL (ref 3.5–5.2)
ALBUMIN/GLOB SERPL: 1.4 G/DL
ALP SERPL-CCNC: 129 U/L (ref 39–117)
ALT SERPL W P-5'-P-CCNC: 17 U/L (ref 1–33)
ANION GAP SERPL CALCULATED.3IONS-SCNC: 10.3 MMOL/L (ref 5–15)
AST SERPL-CCNC: 19 U/L (ref 1–32)
BASOPHILS # BLD AUTO: 0.05 10*3/MM3 (ref 0–0.2)
BASOPHILS NFR BLD AUTO: 0.9 % (ref 0–1.5)
BILIRUB SERPL-MCNC: 0.6 MG/DL (ref 0–1.2)
BUN SERPL-MCNC: 34 MG/DL (ref 8–23)
BUN/CREAT SERPL: 26.2 (ref 7–25)
CALCIUM SPEC-SCNC: 10.1 MG/DL (ref 8.6–10.5)
CHLORIDE SERPL-SCNC: 101 MMOL/L (ref 98–107)
CHOLEST SERPL-MCNC: 108 MG/DL (ref 0–200)
CO2 SERPL-SCNC: 25.7 MMOL/L (ref 22–29)
CREAT SERPL-MCNC: 1.3 MG/DL (ref 0.57–1)
DEPRECATED RDW RBC AUTO: 45.8 FL (ref 37–54)
EOSINOPHIL # BLD AUTO: 0.2 10*3/MM3 (ref 0–0.4)
EOSINOPHIL NFR BLD AUTO: 3.5 % (ref 0.3–6.2)
ERYTHROCYTE [DISTWIDTH] IN BLOOD BY AUTOMATED COUNT: 13 % (ref 12.3–15.4)
GFR SERPL CREATININE-BSD FRML MDRD: 40 ML/MIN/1.73
GLOBULIN UR ELPH-MCNC: 3.1 GM/DL
GLUCOSE SERPL-MCNC: 102 MG/DL (ref 65–99)
HBA1C MFR BLD: 5.9 % (ref 3.5–5.6)
HCT VFR BLD AUTO: 34.3 % (ref 34–46.6)
HDLC SERPL-MCNC: 52 MG/DL (ref 40–60)
HGB BLD-MCNC: 11.6 G/DL (ref 12–15.9)
IMM GRANULOCYTES # BLD AUTO: 0.03 10*3/MM3 (ref 0–0.05)
IMM GRANULOCYTES NFR BLD AUTO: 0.5 % (ref 0–0.5)
LDLC SERPL CALC-MCNC: 36 MG/DL (ref 0–100)
LDLC/HDLC SERPL: 0.65 {RATIO}
LYMPHOCYTES # BLD AUTO: 1.74 10*3/MM3 (ref 0.7–3.1)
LYMPHOCYTES NFR BLD AUTO: 30.8 % (ref 19.6–45.3)
MAGNESIUM SERPL-MCNC: 1.9 MG/DL (ref 1.6–2.4)
MCH RBC QN AUTO: 32.9 PG (ref 26.6–33)
MCHC RBC AUTO-ENTMCNC: 33.8 G/DL (ref 31.5–35.7)
MCV RBC AUTO: 97.2 FL (ref 79–97)
MONOCYTES # BLD AUTO: 0.39 10*3/MM3 (ref 0.1–0.9)
MONOCYTES NFR BLD AUTO: 6.9 % (ref 5–12)
NEUTROPHILS NFR BLD AUTO: 3.24 10*3/MM3 (ref 1.7–7)
NEUTROPHILS NFR BLD AUTO: 57.4 % (ref 42.7–76)
NRBC BLD AUTO-RTO: 0 /100 WBC (ref 0–0.2)
PLATELET # BLD AUTO: 233 10*3/MM3 (ref 140–450)
PMV BLD AUTO: 10 FL (ref 6–12)
POTASSIUM SERPL-SCNC: 4.2 MMOL/L (ref 3.5–5.2)
PROT SERPL-MCNC: 7.3 G/DL (ref 6–8.5)
RBC # BLD AUTO: 3.53 10*6/MM3 (ref 3.77–5.28)
SODIUM SERPL-SCNC: 137 MMOL/L (ref 136–145)
TRIGL SERPL-MCNC: 111 MG/DL (ref 0–150)
TSH SERPL DL<=0.05 MIU/L-ACNC: 2.32 UIU/ML (ref 0.27–4.2)
URATE SERPL-MCNC: 5.3 MG/DL (ref 2.4–5.7)
VIT B12 BLD-MCNC: 761 PG/ML (ref 211–946)
VLDLC SERPL-MCNC: 20 MG/DL (ref 5–40)
WBC # BLD AUTO: 5.65 10*3/MM3 (ref 3.4–10.8)

## 2021-07-02 PROCEDURE — 83036 HEMOGLOBIN GLYCOSYLATED A1C: CPT | Performed by: PREVENTIVE MEDICINE

## 2021-07-02 PROCEDURE — 83735 ASSAY OF MAGNESIUM: CPT | Performed by: PREVENTIVE MEDICINE

## 2021-07-02 PROCEDURE — 80061 LIPID PANEL: CPT | Performed by: PREVENTIVE MEDICINE

## 2021-07-02 PROCEDURE — 99214 OFFICE O/P EST MOD 30 MIN: CPT | Performed by: PREVENTIVE MEDICINE

## 2021-07-02 PROCEDURE — 80050 GENERAL HEALTH PANEL: CPT | Performed by: PREVENTIVE MEDICINE

## 2021-07-02 PROCEDURE — 82607 VITAMIN B-12: CPT | Performed by: PREVENTIVE MEDICINE

## 2021-07-02 PROCEDURE — 84550 ASSAY OF BLOOD/URIC ACID: CPT | Performed by: PREVENTIVE MEDICINE

## 2021-07-02 RX ORDER — AMLODIPINE BESYLATE 5 MG/1
5 TABLET ORAL DAILY
Qty: 90 TABLET | Refills: 3 | Status: SHIPPED | OUTPATIENT
Start: 2021-07-02 | End: 2022-08-15 | Stop reason: HOSPADM

## 2021-07-02 NOTE — PROGRESS NOTES
"Subjective   Faby Le is a 76 y.o. female presents for   Chief Complaint   Patient presents with   • Hypertension     3 month fu   • Hyperlipidemia   76-year-old white female is rechecking today multiple chronic health conditions.  She expresses the fact that she has been having episodes of feeling lightheaded and noticed that her pulse and blood pressure drop at these times.  She has had no chest pain shortness of breath or diaphoresis.  We did consult with Dr. Aguirre who agreed that we should split her metoprolol so that she is taking 12 point 5 AM and p.m. cut back on her Norvasc to 5 mg and keep a recheck with him. Patient has had her Covid vaccination her depression is controlled.  She did have her right knee evaluated by the orthopedist which neck and he does not feel as though she should have a replacement until she is able to lose more weight.  She has been consciously trying and has actually lost 10 pounds since her last visit.    There are no preventive care reminders to display for this patient.    History of Present Illness     Vitals:    07/02/21 0848 07/02/21 0852 07/02/21 0857   BP: 129/79 107/71 107/68   BP Location: Right arm Left arm Right arm   Patient Position: Sitting Sitting Sitting   Cuff Size: Adult Adult Adult   Temp: 97.8 °F (36.6 °C)     SpO2: 97%     Weight: 123 kg (272 lb)     Height: 160 cm (62.99\")       Body mass index is 48.19 kg/m².    Current Outpatient Medications on File Prior to Visit   Medication Sig Dispense Refill   • allopurinol (ZYLOPRIM) 300 MG tablet TAKE 1 TABLET BY MOUTH EVERY DAY 90 tablet 2   • atorvastatin (LIPITOR) 20 MG tablet TAKE 1 TABLET BY MOUTH EVERYDAY AT BEDTIME 90 tablet 3   • bumetanide (BUMEX) 2 MG tablet Take 0.5 tablets by mouth Daily. 30 tablet 2   • cetirizine (zyrTEC) 10 MG tablet Take 1 tablet by mouth Daily.     • cyanocobalamin (VITAMIN B-12) 1000 MCG tablet Take 1 tablet by mouth Daily.     • escitalopram (LEXAPRO) 20 MG tablet TAKE 1 TABLET BY " MOUTH EVERY DAY 90 tablet 2   • hydrocortisone 2.5 % ointment APPLY A THIN LAYER TO THE AFFECTED AREA(S) BY TOPICAL ROUTE 2 TIMES PER DAY UNTIL HEALED.     • levothyroxine (Synthroid) 88 MCG tablet Take 1 tablet by mouth Daily. 90 tablet 3   • Linzess 72 MCG capsule capsule Take 72 mcg by mouth Daily.     • losartan (COZAAR) 25 MG tablet TAKE 1 TABLET BY MOUTH EVERY DAY 30 tablet 6   • montelukast (SINGULAIR) 10 MG tablet Take 10 mg by mouth Daily.     • Multiple Vitamins-Minerals (CENTRUM SILVER ADULT 50+) tablet Take 1 tablet by mouth Daily.     • Omega-3 Fatty Acids (FISH OIL) 1000 MG capsule capsule Take 1 capsule by mouth Daily.     • pantoprazole (PROTONIX) 20 MG EC tablet TAKE 1 TABLET BY MOUTH EVERY DAY IN THE MORNING 30 MINUTES BEFORE A MEAL     • spironolactone (ALDACTONE) 25 MG tablet TAKE 1/2 TABLET BY MOUTH EVERY DAY 45 tablet 1   • Vitamin D, Cholecalciferol, 25 MCG (1000 UT) tablet Take 1 tablet by mouth Daily.     • [DISCONTINUED] amLODIPine (NORVASC) 5 MG tablet TAKE 1 TABLET BY MOUTH EVERY DAY 90 tablet 3   • [DISCONTINUED] metoprolol tartrate (LOPRESSOR) 25 MG tablet TAKE 1 TABLET BY MOUTH EVERYDAY AT BEDTIME 90 tablet 3   • APPLE CIDER VINEGAR PO Take 1 tablet by mouth 2 (Two) Times a Day. Take 2 tablets am and 1 pm     • Enflurane (COMPOUND 347 IN)      • NON FORMULARY Compounding cream     • [DISCONTINUED] allopurinol (ZYLOPRIM) 300 MG tablet TAKE 1 TABLET BY MOUTH EVERY DAY 90 tablet 2   • [DISCONTINUED] metoprolol tartrate (LOPRESSOR) 25 MG tablet TAKE 1 TABLET BY MOUTH DAILY AT BEDTIME 90 tablet 3     No current facility-administered medications on file prior to visit.       The following portions of the patient's history were reviewed and updated as appropriate: allergies, current medications, past family history, past medical history, past social history, past surgical history and problem list.    Review of Systems   Neurological: Positive for light-headedness.   Psychiatric/Behavioral:  Positive for dysphoric mood.       Objective   Physical Exam  Vitals reviewed.   Constitutional:       General: She is not in acute distress.     Appearance: She is well-developed. She is obese. She is not ill-appearing or toxic-appearing.   HENT:      Head: Normocephalic and atraumatic.      Right Ear: Tympanic membrane, ear canal and external ear normal.      Left Ear: Tympanic membrane, ear canal and external ear normal.      Nose: Nose normal.   Eyes:      Extraocular Movements: Extraocular movements intact.      Conjunctiva/sclera: Conjunctivae normal.      Pupils: Pupils are equal, round, and reactive to light.   Cardiovascular:      Rate and Rhythm: Normal rate and regular rhythm.      Pulses:           Dorsalis pedis pulses are 1+ on the right side and 1+ on the left side.        Posterior tibial pulses are 1+ on the right side and 1+ on the left side.      Heart sounds: Normal heart sounds.   Pulmonary:      Effort: Pulmonary effort is normal.      Breath sounds: Normal breath sounds.   Abdominal:      General: Bowel sounds are normal. There is no distension.      Palpations: Abdomen is soft. There is no mass.      Tenderness: There is no abdominal tenderness.   Musculoskeletal:         General: Normal range of motion.      Cervical back: Neck supple.   Feet:      Right foot:      Skin integrity: Skin integrity normal.      Toenail Condition: Right toenails are normal.      Left foot:      Skin integrity: Skin integrity normal.      Toenail Condition: Left toenails are normal.      Comments: Diabetic Foot Exam Performed    Skin:     General: Skin is warm.   Neurological:      General: No focal deficit present.      Mental Status: She is alert and oriented to person, place, and time.   Psychiatric:         Mood and Affect: Mood normal.         Behavior: Behavior normal.       PHQ-9 Total Score:      Assessment/Plan   Diagnoses and all orders for this visit:    1. Hyperglycemia (Primary)  Comments:  Trying to  eat less carbs  Orders:  -     Comprehensive Metabolic Panel  -     Hemoglobin A1c    2. Chronic gout without tophus, unspecified cause, unspecified site  Comments:  No problems  Orders:  -     Uric Acid    3. Depression, unspecified depression type  Comments:  Controlled noSI orHI    4. Coronary artery disease involving native heart without angina pectoris, unspecified vessel or lesion type  Comments:  No chest pain or SoB    5. B12 deficiency  -     Vitamin B12    6. Mixed hyperlipidemia  Comments:  Trying to eat less sat fats  Orders:  -     Lipid Panel    7. Essential hypertension  Comments:  Controlled here-low athome  Orders:  -     CBC Auto Differential    8. Hypothyroidism, unspecified type  Comments:  No increasein hair loss or dry skin  Orders:  -     TSH    9. Obstructive sleep apnea syndrome  Comments:  Uses CPAP    10. Malignant neoplasm of endometrium (CMS/[facility])  Comments:  Seeing  7/26/2021    11. Change in bowel movement  Comments:  Helped with change in eating  Orders:  -     Magnesium    12. Chronic pain of right knee  Comments:  saw Surgeon-no surgery till loses weight    13. Light headedness  Comments:  Cardiologist consulted and he advises we split Metroprolol to take 12 point 5 AM and p.m. decrease the dose of Norvasc and keep follow-up with him.    14. Class 3 severe obesity due to excess calories with serious comorbidity and body mass index (BMI) of 45.0 to 49.9 in adult (CMS/[facility])    Other orders  -     metoprolol tartrate (LOPRESSOR) 25 MG tablet; Taker 1/2 am and pm  Dispense: 90 tablet; Refill: 3  -     amLODIPine (Norvasc) 5 MG tablet; Take 1 tablet by mouth Daily.  Dispense: 90 tablet; Refill: 3        Patient Instructions   There are no preventive care reminders to display for this patient.    Cut Amlodipine in 1/2.  Take Metoprolol 12.5 am and pm.    Call 2 weeks with 10 BP and pulse and let us know about dizziness.

## 2021-07-02 NOTE — PROGRESS NOTES
Venipuncture Blood Specimen Collection  Venipuncture performed in right arm by Zoie Hoskins MA with good hemostasis. Patient tolerated the procedure well without complications.   07/02/21   Zoie Hoskins MA

## 2021-07-04 NOTE — PROGRESS NOTES
Kidney function has worsened again-make sure no NSAIDS and limits xray dyes.  Glucose 102 and A1C shows still excess risk for DM so watch carbs and walk.  Anemia is still present but improving-followup as planned unless notes blood in urine, bowels or elsewhere or increase in heart burn-let us know.

## 2021-07-06 ENCOUNTER — TELEPHONE (OUTPATIENT)
Dept: FAMILY MEDICINE CLINIC | Facility: CLINIC | Age: 76
End: 2021-07-06

## 2021-07-06 NOTE — TELEPHONE ENCOUNTER
HUB TO READ      ----- Message from Leonor Adam MD sent at 7/4/2021  2:22 PM EDT -----  Kidney function has worsened again-make sure no NSAIDS and limits xray dyes.  Glucose 102 and A1C shows still excess risk for DM so watch carbs and walk.  Anemia is still present but improving-followup as planned unless notes blood in urine, bowels or elsewhere or increase in heart burn-let us know.

## 2021-07-15 ENCOUNTER — TELEPHONE (OUTPATIENT)
Dept: FAMILY MEDICINE CLINIC | Facility: CLINIC | Age: 76
End: 2021-07-15

## 2021-07-15 NOTE — TELEPHONE ENCOUNTER
THE PATIENT WAS ASKED TO TAKE HER BLOOD PRESSURE READINGS FOR 10 DAYS. HER RESULTS WERE:     121/68, PULSE 58    96/53, PULSE 81    106/66, PULSE 54    90/59/, PULSE 72    110/62, PULSE 73    107/55. PULSE 54    86/54/, PULSE 74    108/62, PULSE 77    116/68, PULSE 77    117/67, PULSE 57    PLEASE ADVISE, IF NEEDED, BY CALLING [phone].

## 2021-07-16 NOTE — TELEPHONE ENCOUNTER
These blood pressures are ok by me but if feels dizzy or lightheaded with them, can cut Amlodipine in 1/2 and take 1/2 daily-let me know what she decides

## 2021-07-26 RX ORDER — BUMETANIDE 2 MG/1
TABLET ORAL
Qty: 45 TABLET | Refills: 1 | Status: SHIPPED | OUTPATIENT
Start: 2021-07-26 | End: 2022-01-21

## 2021-09-01 ENCOUNTER — OFFICE VISIT (OUTPATIENT)
Dept: CARDIOLOGY | Facility: CLINIC | Age: 76
End: 2021-09-01

## 2021-09-01 VITALS
HEART RATE: 81 BPM | DIASTOLIC BLOOD PRESSURE: 69 MMHG | OXYGEN SATURATION: 99 % | SYSTOLIC BLOOD PRESSURE: 121 MMHG | WEIGHT: 268 LBS | BODY MASS INDEX: 47.49 KG/M2

## 2021-09-01 DIAGNOSIS — I25.10 CORONARY ARTERY DISEASE INVOLVING NATIVE HEART WITHOUT ANGINA PECTORIS, UNSPECIFIED VESSEL OR LESION TYPE: Primary | ICD-10-CM

## 2021-09-01 DIAGNOSIS — E78.2 MIXED HYPERLIPIDEMIA: ICD-10-CM

## 2021-09-01 DIAGNOSIS — I10 ESSENTIAL HYPERTENSION: ICD-10-CM

## 2021-09-01 DIAGNOSIS — I42.0 DILATED CARDIOMYOPATHY (HCC): ICD-10-CM

## 2021-09-01 PROCEDURE — 99214 OFFICE O/P EST MOD 30 MIN: CPT | Performed by: INTERNAL MEDICINE

## 2021-09-01 PROCEDURE — 93000 ELECTROCARDIOGRAM COMPLETE: CPT | Performed by: INTERNAL MEDICINE

## 2021-09-01 NOTE — PROGRESS NOTES
Cardiology Office Visit      Encounter Date:  09/01/2021    Patient ID:   Faby Le is a 76 y.o. female.    Reason For Followup:  Lower extremity edema  Shortness of breath    Brief Clinical History:  Dear Dr. Adam, Leonor Ordaz MD    I had the pleasure of seeing Faby Le today. As you are well aware, this is a 76 y.o. female  presented to the office with symptoms of lower extremity edema and some shortness of breath        cardiac catheterization showed moderate disease involving the diagonal and mild-to-moderate disease involving circumflex      Patient is complaining of bilateral lower extremity edema which is progressively getting worse with no improvement even after increasing the dose of the Lasix by the primary care physician  Planing of some shortness of breath and dyspnea on exertion  Denies any chest pain  Denies any prior history of DVT or PE    Assessment & Plan    Impressions:  Morbid obesity  Bilateral lower extremity edema  Shortness of breath  Dyspnea on exertion  Obstructive sleep apnea  Coronary artery disease  Diastolic dysfunction  Hypertension  Newly diagnosed cardiomyopathy with LV ejection fraction of 40%/repeat echocardiogram now with LV ejection fraction of 50%  Normal LV systolic function in 2018  Moderate obstructive coronary artery disease involving the diagonal branch of the LAD and mild to moderate obstructive coronary artery disease involving the left circumflex artery in 2018      Recommendations:  Venous Doppler with no significant pathology  Echocardiogram with newly diagnosed cardiomyopathy with LV ejection fraction of 40%  Repeat echocardiogram with LV ejection fraction of 50%  Continue Bumex to 1 mg p.o. once a day  Continue losartan 25 mg p.o. once a day  Continue Aldactone 12.5 mg p.o. once a day  Labs reviewed and discussed with patient  Patient lost 20 pounds  Continue current medical management for cardiomyopathy  Need for regular exercise weight loss reviewed and  discussed with patient  Low-salt diet  Follow-up in office in 6 months    Objective:    Vitals:  Vitals:    09/01/21 1311   BP: 121/69   Pulse: 81   SpO2: 99%   Weight: 122 kg (268 lb)       Physical Exam:    General: Alert, cooperative, no distress, appears stated age  Head:  Normocephalic, atraumatic, mucous membranes moist  Eyes:  Conjunctiva/corneas clear, EOM's intact     Neck:  Supple,  no adenopathy;      Lungs: Clear to auscultation bilaterally, no wheezes rhonchi rales are noted  Chest wall: No tenderness  Heart::  Regular rate and rhythm, S1 and S2 normal, no murmur, rub or gallop  Abdomen: Soft, non-tender, nondistended bowel sounds active  Extremities: No cyanosis, clubbing, or edema  Pulses: 2+ and symmetric all extremities  Skin:  No rashes or lesions  Neuro/psych: A&O x3. CN II through XII are grossly intact with appropriate affect      Allergies:  Allergies   Allergen Reactions   • Celecoxib Itching     swelling       Medication Review:     Current Outpatient Medications:   •  allopurinol (ZYLOPRIM) 300 MG tablet, TAKE 1 TABLET BY MOUTH EVERY DAY, Disp: 90 tablet, Rfl: 2  •  amLODIPine (Norvasc) 5 MG tablet, Take 1 tablet by mouth Daily., Disp: 90 tablet, Rfl: 3  •  APPLE CIDER VINEGAR PO, Take 1 tablet by mouth 2 (Two) Times a Day. Take 2 tablets am and 1 pm, Disp: , Rfl:   •  atorvastatin (LIPITOR) 20 MG tablet, TAKE 1 TABLET BY MOUTH EVERYDAY AT BEDTIME, Disp: 90 tablet, Rfl: 3  •  bumetanide (BUMEX) 2 MG tablet, TAKE 1/2 TABLET BY MOUTH EVERY DAY, Disp: 45 tablet, Rfl: 1  •  cetirizine (zyrTEC) 10 MG tablet, Take 1 tablet by mouth Daily., Disp: , Rfl:   •  cyanocobalamin (VITAMIN B-12) 1000 MCG tablet, Take 1 tablet by mouth Daily., Disp: , Rfl:   •  Enflurane (COMPOUND 347 IN), , Disp: , Rfl:   •  escitalopram (LEXAPRO) 20 MG tablet, TAKE 1 TABLET BY MOUTH EVERY DAY, Disp: 90 tablet, Rfl: 2  •  hydrocortisone 2.5 % ointment, APPLY A THIN LAYER TO THE AFFECTED AREA(S) BY TOPICAL ROUTE 2 TIMES PER  DAY UNTIL HEALED., Disp: , Rfl:   •  levothyroxine (Synthroid) 88 MCG tablet, Take 1 tablet by mouth Daily., Disp: 90 tablet, Rfl: 3  •  Linzess 72 MCG capsule capsule, Take 72 mcg by mouth Daily., Disp: , Rfl:   •  losartan (COZAAR) 25 MG tablet, TAKE 1 TABLET BY MOUTH EVERY DAY, Disp: 30 tablet, Rfl: 6  •  metoprolol tartrate (LOPRESSOR) 25 MG tablet, Taker 1/2 am and pm, Disp: 90 tablet, Rfl: 3  •  montelukast (SINGULAIR) 10 MG tablet, Take 10 mg by mouth Daily., Disp: , Rfl:   •  Multiple Vitamins-Minerals (CENTRUM SILVER ADULT 50+) tablet, Take 1 tablet by mouth Daily., Disp: , Rfl:   •  NON FORMULARY, Compounding cream, Disp: , Rfl:   •  Omega-3 Fatty Acids (FISH OIL) 1000 MG capsule capsule, Take 1 capsule by mouth Daily., Disp: , Rfl:   •  pantoprazole (PROTONIX) 20 MG EC tablet, TAKE 1 TABLET BY MOUTH EVERY DAY IN THE MORNING 30 MINUTES BEFORE A MEAL, Disp: , Rfl:   •  spironolactone (ALDACTONE) 25 MG tablet, TAKE 1/2 TABLET BY MOUTH EVERY DAY, Disp: 45 tablet, Rfl: 1  •  Vitamin D, Cholecalciferol, 25 MCG (1000 UT) tablet, Take 1 tablet by mouth Daily., Disp: , Rfl:     Family History:  Family History   Problem Relation Age of Onset   • Hypertension Mother    • Stroke Father    • Arthritis Brother    • Cancer Brother        Past Medical History:  Past Medical History:   Diagnosis Date   • Ankle pain    • Arthritis    • Coronary artery disease    • Depression    • Gout    • Hyperglycemia    • Hyperlipidemia    • Low back pain    • Sleep apnea    • Vitamin D deficiency        Past surgical History:  Past Surgical History:   Procedure Laterality Date   • ANKLE FUSION      2017-left   • CARDIAC CATHETERIZATION     • CARPAL TUNNEL RELEASE     • CATARACT EXTRACTION     • CUBITAL TUNNEL RELEASE     • HYSTERECTOMY     • REPLACEMENT TOTAL KNEE      left 2008   • ROTATOR CUFF REPAIR         Social History:  Social History     Socioeconomic History   • Marital status:      Spouse name: Not on file   • Number  of children: Not on file   • Years of education: Not on file   • Highest education level: Not on file   Tobacco Use   • Smoking status: Never Smoker   • Smokeless tobacco: Never Used   Vaping Use   • Vaping Use: Never used   Substance and Sexual Activity   • Alcohol use: No   • Drug use: No   • Sexual activity: Not Currently       Review of Systems:  The following systems were reviewed as they relate to the cardiovascular system: Constitutional, Eyes, ENT, Cardiovascular, Respiratory, Gastrointestinal, Integumentary, Neurological, Psychiatric, Hematologic, Endocrine, Musculoskeletal, and Genitourinary. The pertinent cardiovascular findings are reported above with all other cardiovascular points within those systems being negative.    Diagnostic Study Review:     Current Electrocardiogram:    ECG 12 Lead    Date/Time: 9/1/2021 1:32 PM  Performed by: Ratna Zavala MD  Authorized by: Ratna Zavala MD   Comparison: compared with previous ECG   Similar to previous ECG  Rhythm: sinus rhythm  Rate: normal  BPM: 80  Conduction: conduction normal  ST Segments: ST segments normal  T Waves: T waves normal  QRS axis: normal    Clinical impression: normal ECG              NOTE: The following portions of the patient's history were reviewed and updated this visit as appropriate: allergies, current medications, past family history, past medical history, past social history, past surgical history and problem list.

## 2021-09-07 RX ORDER — LOSARTAN POTASSIUM 25 MG/1
TABLET ORAL
Qty: 90 TABLET | Refills: 2 | Status: SHIPPED | OUTPATIENT
Start: 2021-09-07 | End: 2022-05-03

## 2021-10-08 ENCOUNTER — OFFICE VISIT (OUTPATIENT)
Dept: FAMILY MEDICINE CLINIC | Facility: CLINIC | Age: 76
End: 2021-10-08

## 2021-10-08 VITALS
HEART RATE: 75 BPM | BODY MASS INDEX: 47.38 KG/M2 | SYSTOLIC BLOOD PRESSURE: 108 MMHG | WEIGHT: 267.4 LBS | OXYGEN SATURATION: 95 % | DIASTOLIC BLOOD PRESSURE: 65 MMHG | HEIGHT: 63 IN | TEMPERATURE: 97.3 F

## 2021-10-08 DIAGNOSIS — I10 PRIMARY HYPERTENSION: Primary | ICD-10-CM

## 2021-10-08 DIAGNOSIS — R73.9 HYPERGLYCEMIA: ICD-10-CM

## 2021-10-08 DIAGNOSIS — E03.9 HYPOTHYROIDISM, UNSPECIFIED TYPE: ICD-10-CM

## 2021-10-08 DIAGNOSIS — C54.1 MALIGNANT NEOPLASM OF ENDOMETRIUM (HCC): ICD-10-CM

## 2021-10-08 DIAGNOSIS — F32.A DEPRESSION, UNSPECIFIED DEPRESSION TYPE: ICD-10-CM

## 2021-10-08 DIAGNOSIS — M1A.9XX0 CHRONIC GOUT WITHOUT TOPHUS, UNSPECIFIED CAUSE, UNSPECIFIED SITE: ICD-10-CM

## 2021-10-08 DIAGNOSIS — H83.2X3 VESTIBULAR DYSFUNCTION OF BOTH EARS: ICD-10-CM

## 2021-10-08 DIAGNOSIS — E53.8 B12 DEFICIENCY: ICD-10-CM

## 2021-10-08 DIAGNOSIS — E78.2 MIXED HYPERLIPIDEMIA: ICD-10-CM

## 2021-10-08 DIAGNOSIS — R19.8 CHANGE IN BOWEL MOVEMENT: ICD-10-CM

## 2021-10-08 DIAGNOSIS — G47.33 OBSTRUCTIVE SLEEP APNEA SYNDROME: ICD-10-CM

## 2021-10-08 DIAGNOSIS — I25.10 CORONARY ARTERY DISEASE INVOLVING NATIVE HEART WITHOUT ANGINA PECTORIS, UNSPECIFIED VESSEL OR LESION TYPE: ICD-10-CM

## 2021-10-08 DIAGNOSIS — E66.01 CLASS 3 SEVERE OBESITY DUE TO EXCESS CALORIES WITH SERIOUS COMORBIDITY AND BODY MASS INDEX (BMI) OF 45.0 TO 49.9 IN ADULT (HCC): ICD-10-CM

## 2021-10-08 LAB
ALBUMIN SERPL-MCNC: 4.2 G/DL (ref 3.5–5.2)
ALBUMIN/GLOB SERPL: 1.5 G/DL
ALP SERPL-CCNC: 124 U/L (ref 39–117)
ALT SERPL W P-5'-P-CCNC: 15 U/L (ref 1–33)
ANION GAP SERPL CALCULATED.3IONS-SCNC: 11.1 MMOL/L (ref 5–15)
AST SERPL-CCNC: 20 U/L (ref 1–32)
BASOPHILS # BLD AUTO: 0.06 10*3/MM3 (ref 0–0.2)
BASOPHILS NFR BLD AUTO: 1.1 % (ref 0–1.5)
BILIRUB SERPL-MCNC: 0.6 MG/DL (ref 0–1.2)
BUN SERPL-MCNC: 26 MG/DL (ref 8–23)
BUN/CREAT SERPL: 24.1 (ref 7–25)
CALCIUM SPEC-SCNC: 10.2 MG/DL (ref 8.6–10.5)
CHLORIDE SERPL-SCNC: 104 MMOL/L (ref 98–107)
CHOLEST SERPL-MCNC: 114 MG/DL (ref 0–200)
CO2 SERPL-SCNC: 25.9 MMOL/L (ref 22–29)
CREAT SERPL-MCNC: 1.08 MG/DL (ref 0.57–1)
DEPRECATED RDW RBC AUTO: 49.2 FL (ref 37–54)
EOSINOPHIL # BLD AUTO: 0.23 10*3/MM3 (ref 0–0.4)
EOSINOPHIL NFR BLD AUTO: 4 % (ref 0.3–6.2)
ERYTHROCYTE [DISTWIDTH] IN BLOOD BY AUTOMATED COUNT: 13.2 % (ref 12.3–15.4)
GFR SERPL CREATININE-BSD FRML MDRD: 50 ML/MIN/1.73
GLOBULIN UR ELPH-MCNC: 2.8 GM/DL
GLUCOSE SERPL-MCNC: 95 MG/DL (ref 65–99)
HBA1C MFR BLD: 5.5 % (ref 3.5–5.6)
HCT VFR BLD AUTO: 34 % (ref 34–46.6)
HDLC SERPL-MCNC: 51 MG/DL (ref 40–60)
HGB BLD-MCNC: 11.4 G/DL (ref 12–15.9)
IMM GRANULOCYTES # BLD AUTO: 0.02 10*3/MM3 (ref 0–0.05)
IMM GRANULOCYTES NFR BLD AUTO: 0.4 % (ref 0–0.5)
LDLC SERPL CALC-MCNC: 45 MG/DL (ref 0–100)
LDLC/HDLC SERPL: 0.87 {RATIO}
LYMPHOCYTES # BLD AUTO: 1.52 10*3/MM3 (ref 0.7–3.1)
LYMPHOCYTES NFR BLD AUTO: 26.6 % (ref 19.6–45.3)
MAGNESIUM SERPL-MCNC: 1.9 MG/DL (ref 1.6–2.4)
MCH RBC QN AUTO: 34 PG (ref 26.6–33)
MCHC RBC AUTO-ENTMCNC: 33.5 G/DL (ref 31.5–35.7)
MCV RBC AUTO: 101.5 FL (ref 79–97)
MONOCYTES # BLD AUTO: 0.51 10*3/MM3 (ref 0.1–0.9)
MONOCYTES NFR BLD AUTO: 8.9 % (ref 5–12)
NEUTROPHILS NFR BLD AUTO: 3.37 10*3/MM3 (ref 1.7–7)
NEUTROPHILS NFR BLD AUTO: 59 % (ref 42.7–76)
NRBC BLD AUTO-RTO: 0 /100 WBC (ref 0–0.2)
PLATELET # BLD AUTO: 207 10*3/MM3 (ref 140–450)
PMV BLD AUTO: 10 FL (ref 6–12)
POTASSIUM SERPL-SCNC: 4.1 MMOL/L (ref 3.5–5.2)
PROT SERPL-MCNC: 7 G/DL (ref 6–8.5)
RBC # BLD AUTO: 3.35 10*6/MM3 (ref 3.77–5.28)
SODIUM SERPL-SCNC: 141 MMOL/L (ref 136–145)
TRIGL SERPL-MCNC: 94 MG/DL (ref 0–150)
TSH SERPL DL<=0.05 MIU/L-ACNC: 0.98 UIU/ML (ref 0.27–4.2)
URATE SERPL-MCNC: 4.7 MG/DL (ref 2.4–5.7)
VIT B12 BLD-MCNC: 980 PG/ML (ref 211–946)
VLDLC SERPL-MCNC: 18 MG/DL (ref 5–40)
WBC # BLD AUTO: 5.71 10*3/MM3 (ref 3.4–10.8)

## 2021-10-08 PROCEDURE — 82746 ASSAY OF FOLIC ACID SERUM: CPT | Performed by: PREVENTIVE MEDICINE

## 2021-10-08 PROCEDURE — 90662 IIV NO PRSV INCREASED AG IM: CPT | Performed by: PREVENTIVE MEDICINE

## 2021-10-08 PROCEDURE — 82607 VITAMIN B-12: CPT | Performed by: PREVENTIVE MEDICINE

## 2021-10-08 PROCEDURE — 36415 COLL VENOUS BLD VENIPUNCTURE: CPT | Performed by: PREVENTIVE MEDICINE

## 2021-10-08 PROCEDURE — 80053 COMPREHEN METABOLIC PANEL: CPT | Performed by: PREVENTIVE MEDICINE

## 2021-10-08 PROCEDURE — 83735 ASSAY OF MAGNESIUM: CPT | Performed by: PREVENTIVE MEDICINE

## 2021-10-08 PROCEDURE — 80061 LIPID PANEL: CPT | Performed by: PREVENTIVE MEDICINE

## 2021-10-08 PROCEDURE — 83036 HEMOGLOBIN GLYCOSYLATED A1C: CPT | Performed by: PREVENTIVE MEDICINE

## 2021-10-08 PROCEDURE — 99214 OFFICE O/P EST MOD 30 MIN: CPT | Performed by: PREVENTIVE MEDICINE

## 2021-10-08 PROCEDURE — 84443 ASSAY THYROID STIM HORMONE: CPT | Performed by: PREVENTIVE MEDICINE

## 2021-10-08 PROCEDURE — 85025 COMPLETE CBC W/AUTO DIFF WBC: CPT | Performed by: PREVENTIVE MEDICINE

## 2021-10-08 PROCEDURE — 84550 ASSAY OF BLOOD/URIC ACID: CPT | Performed by: PREVENTIVE MEDICINE

## 2021-10-08 PROCEDURE — G0008 ADMIN INFLUENZA VIRUS VAC: HCPCS | Performed by: PREVENTIVE MEDICINE

## 2021-10-08 NOTE — PROGRESS NOTES
Venipuncture Blood Specimen Collection  Venipuncture performed in right arm by Aurelia Mcmahon MA with good hemostasis. Patient tolerated the procedure well without complications.   10/08/21   Aurelia Mcmahon MA

## 2021-10-08 NOTE — PROGRESS NOTES
"Subjective   Faby Le is a 76 y.o. female presents for   Chief Complaint   Patient presents with   • Hypertension   • Hyperlipidemia     Patient presents today for follow-up of multiple chronic health conditions most of which are stable.  She is concerned with dizziness.  She states that it occurs when she gets up and down when she rolls over in worsening for the last month.  Today patient did show rotatory or vertical nystagmus with left gaze.  She will be sent to vestibular rehab for evaluation.  No change in hearing has been noted.  No imaging was obtained today.  Patient has lost 15 pounds over the last couple of visits and we have encouraged her to go down another 5 before we see her again next time.  She will do this by watching portion sizes and trying to increase her walking.  There are no preventive care reminders to display for this patient.    History of Present Illness     Vitals:    10/08/21 0925 10/08/21 0927 10/08/21 0928   BP: 118/70 118/68 108/65   BP Location: Right arm Left arm Left arm   Patient Position: Sitting Sitting Standing   Cuff Size: Adult Adult Adult   Pulse: 75     Temp: 97.3 °F (36.3 °C)     SpO2: 95%     Weight: 121 kg (267 lb 6.4 oz)     Height: 160 cm (62.99\")       Body mass index is 47.38 kg/m².    Current Outpatient Medications on File Prior to Visit   Medication Sig Dispense Refill   • allopurinol (ZYLOPRIM) 300 MG tablet TAKE 1 TABLET BY MOUTH EVERY DAY 90 tablet 2   • amLODIPine (Norvasc) 5 MG tablet Take 1 tablet by mouth Daily. 90 tablet 3   • APPLE CIDER VINEGAR PO Take 1 tablet by mouth 2 (Two) Times a Day. Take 2 tablets am and 1 pm     • atorvastatin (LIPITOR) 20 MG tablet TAKE 1 TABLET BY MOUTH EVERYDAY AT BEDTIME 90 tablet 3   • bumetanide (BUMEX) 2 MG tablet TAKE 1/2 TABLET BY MOUTH EVERY DAY 45 tablet 1   • cetirizine (zyrTEC) 10 MG tablet Take 1 tablet by mouth Daily.     • cyanocobalamin (VITAMIN B-12) 1000 MCG tablet Take 1 tablet by mouth Daily.     • " Enflurane (COMPOUND 347 IN)      • escitalopram (LEXAPRO) 20 MG tablet TAKE 1 TABLET BY MOUTH EVERY DAY 90 tablet 2   • hydrocortisone 2.5 % ointment APPLY A THIN LAYER TO THE AFFECTED AREA(S) BY TOPICAL ROUTE 2 TIMES PER DAY UNTIL HEALED.     • levothyroxine (Synthroid) 88 MCG tablet Take 1 tablet by mouth Daily. 90 tablet 3   • Linzess 72 MCG capsule capsule Take 72 mcg by mouth Daily.     • losartan (COZAAR) 25 MG tablet TAKE 1 TABLET BY MOUTH EVERY DAY 90 tablet 2   • metoprolol tartrate (LOPRESSOR) 25 MG tablet Taker 1/2 am and pm 90 tablet 3   • montelukast (SINGULAIR) 10 MG tablet Take 10 mg by mouth Daily.     • Multiple Vitamins-Minerals (CENTRUM SILVER ADULT 50+) tablet Take 1 tablet by mouth Daily.     • NON FORMULARY Compounding cream     • Omega-3 Fatty Acids (FISH OIL) 1000 MG capsule capsule Take 1 capsule by mouth Daily.     • pantoprazole (PROTONIX) 20 MG EC tablet TAKE 1 TABLET BY MOUTH EVERY DAY IN THE MORNING 30 MINUTES BEFORE A MEAL     • spironolactone (ALDACTONE) 25 MG tablet TAKE 1/2 TABLET BY MOUTH EVERY DAY 45 tablet 1   • Vitamin D, Cholecalciferol, 25 MCG (1000 UT) tablet Take 1 tablet by mouth Daily.     • [DISCONTINUED] allopurinol (ZYLOPRIM) 300 MG tablet TAKE 1 TABLET BY MOUTH EVERY DAY 90 tablet 2   • [DISCONTINUED] metoprolol tartrate (LOPRESSOR) 25 MG tablet TAKE 1 TABLET BY MOUTH DAILY AT BEDTIME 90 tablet 3     No current facility-administered medications on file prior to visit.       The following portions of the patient's history were reviewed and updated as appropriate: allergies, current medications, past family history, past medical history, past social history, past surgical history and problem list.    Review of Systems   Cardiovascular: Positive for leg swelling.   Musculoskeletal: Positive for gait problem, joint swelling and myalgias.   Neurological: Positive for dizziness.       Objective   Physical Exam  Vitals reviewed.   Constitutional:       General: She is not in  acute distress.     Appearance: She is well-developed. She is obese. She is not ill-appearing or toxic-appearing.   HENT:      Head: Normocephalic and atraumatic.      Right Ear: Tympanic membrane, ear canal and external ear normal.      Left Ear: Tympanic membrane, ear canal and external ear normal.      Nose: Nose normal.   Eyes:      Extraocular Movements: Extraocular movements intact.      Conjunctiva/sclera: Conjunctivae normal.      Pupils: Pupils are equal, round, and reactive to light.      Comments: Vertical versus rotatory nystagmus on the left lateral gaze   Cardiovascular:      Rate and Rhythm: Normal rate and regular rhythm.      Heart sounds: Normal heart sounds.   Pulmonary:      Effort: Pulmonary effort is normal.      Comments: Decreased breath sounds bilaterally  Abdominal:      General: Bowel sounds are normal. There is no distension.      Palpations: Abdomen is soft. There is no mass.      Tenderness: There is no abdominal tenderness.   Musculoskeletal:         General: Normal range of motion.      Cervical back: Neck supple.      Right lower leg: Edema present.      Left lower leg: Edema present.   Skin:     General: Skin is warm.   Neurological:      General: No focal deficit present.      Mental Status: She is alert and oriented to person, place, and time.   Psychiatric:         Mood and Affect: Mood normal.         Behavior: Behavior normal.       PHQ-9 Total Score:      Assessment/Plan   Diagnoses and all orders for this visit:    1. Primary hypertension (Primary)  Comments:  Controlled.  Orders:  -     CBC Auto Differential  -     Comprehensive Metabolic Panel    2. Hypothyroidism, unspecified type  Comments:  No increase in hair loss or dry skin  Orders:  -     TSH    3. Mixed hyperlipidemia  Comments:  Trying to eat less saturated fats  Orders:  -     Lipid Panel    4. Hyperglycemia  Comments:  Trying to eat less carbohydrates  Orders:  -     Hemoglobin A1c    5. Obstructive sleep apnea  syndrome  Comments:  Using CPAP    6. Vestibular dysfunction of both ears  Comments:  PT for vestibular rehab.  Rotatory vertical nystagmus with head tilt to the left.  Orders:  -     Ambulatory Referral to Physical Therapy Vestibular    7. Class 3 severe obesity due to excess calories with serious comorbidity and body mass index (BMI) of 45.0 to 49.9 in adult (HCC)  Comments:  5# more weightloss    8. Change in bowel movement  Comments:  Bowel movement is now normal for the patient.    9. 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)  Comments:  Seeing oncologist 7/2021    10. Chronic gout without tophus, unspecified cause, unspecified site  Comments:  No kidney stones regarding arthritis  Orders:  -     Uric Acid    11. Depression, unspecified depression type  Comments:  Improved  Orders:  -     Magnesium    12. Coronary artery disease involving native heart without angina pectoris, unspecified vessel or lesion type  Comments:  No increase in chest pain or shortness of breath    13. B12 deficiency  Comments:  Takes B12 regularly.  Orders:  -     Vitamin B12    Other orders  -     Fluzone High-Dose 65+yrs ([id number])        Patient Instructions     Health Maintenance Due   Topic Date Due   • INFLUENZA VACCINE  08/01/2021

## 2021-10-09 DIAGNOSIS — D64.9 ANEMIA, UNSPECIFIED TYPE: Primary | ICD-10-CM

## 2021-10-09 LAB — FOLATE SERPL-MCNC: 13.3 NG/ML (ref 4.78–24.2)

## 2021-10-10 ENCOUNTER — TELEPHONE (OUTPATIENT)
Dept: FAMILY MEDICINE CLINIC | Facility: CLINIC | Age: 76
End: 2021-10-10

## 2021-10-10 RX ORDER — ALLOPURINOL 300 MG/1
TABLET ORAL
Qty: 90 TABLET | Refills: 2 | Status: SHIPPED | OUTPATIENT
Start: 2021-10-10 | End: 2022-09-19

## 2021-10-10 NOTE — TELEPHONE ENCOUNTER
Hub to read  ----- Message from Leonor Adam MD sent at 10/10/2021  8:28 AM EDT -----  B12 is elevated so can cut otc dose 2 days/week.  Kidney function still decreased but improving-avoid NSAIDS and limit xray dyes.  Mild anemia so will continue to follow-has she noted any blood in stool?  Staff to send 2 IFOBS.

## 2021-10-10 NOTE — PROGRESS NOTES
B12 is elevated so can cut otc dose 2 days/week.  Kidney function still decreased but improving-avoid NSAIDS and limit xray dyes.  Mild anemia so will continue to follow-has she noted any blood in stool?  Staff to send 2 IFOBS.

## 2021-10-18 ENCOUNTER — TELEPHONE (OUTPATIENT)
Dept: FAMILY MEDICINE CLINIC | Facility: CLINIC | Age: 76
End: 2021-10-18

## 2021-10-18 DIAGNOSIS — Z12.31 SCREENING MAMMOGRAM, ENCOUNTER FOR: ICD-10-CM

## 2021-10-18 DIAGNOSIS — Z12.39 ENCOUNTER FOR BREAST CANCER SCREENING OTHER THAN MAMMOGRAM: Primary | ICD-10-CM

## 2021-10-18 NOTE — TELEPHONE ENCOUNTER
Caller: Faby Le    Relationship: Self    Best call back number: [phone]     What orders are you requesting (i.e. lab or imaging): MAMMOGRAM    In what timeframe would the patient need to come in: ASAP    Where will you receive your lab/imaging services: [facility]     Additional notes: PATIENT IS REQUESTING ORDERS FOR HER YEARLY MAMMOGRAM

## 2021-10-26 ENCOUNTER — TELEPHONE (OUTPATIENT)
Dept: FAMILY MEDICINE CLINIC | Facility: CLINIC | Age: 76
End: 2021-10-26

## 2021-10-26 NOTE — TELEPHONE ENCOUNTER
HUB TO READ:     Called patient to see if we could schedule her for an Annual Medicare Wellness exam next week with Sandra.

## 2021-11-02 ENCOUNTER — OFFICE VISIT (OUTPATIENT)
Dept: FAMILY MEDICINE CLINIC | Facility: CLINIC | Age: 76
End: 2021-11-02

## 2021-11-02 VITALS
RESPIRATION RATE: 16 BRPM | WEIGHT: 266 LBS | OXYGEN SATURATION: 97 % | HEIGHT: 63 IN | SYSTOLIC BLOOD PRESSURE: 106 MMHG | BODY MASS INDEX: 47.13 KG/M2 | DIASTOLIC BLOOD PRESSURE: 71 MMHG | TEMPERATURE: 97.8 F | HEART RATE: 74 BPM

## 2021-11-02 DIAGNOSIS — L98.9 SKIN LESION OF CHEST WALL: Primary | ICD-10-CM

## 2021-11-02 PROCEDURE — 1159F MED LIST DOCD IN RCRD: CPT | Performed by: NURSE PRACTITIONER

## 2021-11-02 PROCEDURE — G0439 PPPS, SUBSEQ VISIT: HCPCS | Performed by: NURSE PRACTITIONER

## 2021-11-02 PROCEDURE — 99212 OFFICE O/P EST SF 10 MIN: CPT | Performed by: NURSE PRACTITIONER

## 2021-11-02 RX ORDER — AZELASTINE 1 MG/ML
SPRAY, METERED NASAL
Status: ON HOLD | COMMUNITY
Start: 2021-10-07 | End: 2022-08-13

## 2021-11-02 NOTE — PROGRESS NOTES
The ABCs of the Annual Wellness Visit  Subsequent Medicare Wellness Visit    Chief Complaint   Patient presents with   • Medicare Wellness-subsequent      Subjective    History of Present Illness:  Faby Le is a 76 y.o. female who presents for a Subsequent Medicare Wellness Visit.    The following portions of the patient's history were reviewed and   updated as appropriate: allergies, current medications, past family history, past medical history, past social history, past surgical history and problem list.    Compared to one year ago, the patient feels her physical   health is the same.    Compared to one year ago, the patient feels her mental   health is the same.    Recent Hospitalizations:  She was not admitted to the hospital during the last year.       Current Medical Providers:  Patient Care Team:  Leonor Adam MD as PCP - General  Maggi Horn MD as Consulting Physician (Pain Medicine)  Ratna Zavala MD as Consulting Physician (Cardiology)  Nora Foley APRN as Nurse Practitioner (Gastroenterology)  Gianluca Walton MD as Consulting Physician (Pulmonary Disease)  Nathanael Murillo MD as Consulting Physician (Allergy and Immunology)    Outpatient Medications Prior to Visit   Medication Sig Dispense Refill   • allopurinol (ZYLOPRIM) 300 MG tablet TAKE 1 TABLET BY MOUTH EVERY DAY 90 tablet 2   • amLODIPine (Norvasc) 5 MG tablet Take 1 tablet by mouth Daily. 90 tablet 3   • APPLE CIDER VINEGAR PO Take 1 tablet by mouth 2 (Two) Times a Day. Take 2 tablets am and 1 pm     • atorvastatin (LIPITOR) 20 MG tablet TAKE 1 TABLET BY MOUTH EVERYDAY AT BEDTIME 90 tablet 3   • azelastine (ASTELIN) 0.1 % nasal spray      • bumetanide (BUMEX) 2 MG tablet TAKE 1/2 TABLET BY MOUTH EVERY DAY 45 tablet 1   • cetirizine (zyrTEC) 10 MG tablet Take 1 tablet by mouth Daily.     • cyanocobalamin (VITAMIN B-12) 1000 MCG tablet Take 1 tablet by mouth Daily.     • Enflurane (COMPOUND 347 IN)      •  escitalopram (LEXAPRO) 20 MG tablet TAKE 1 TABLET BY MOUTH EVERY DAY 90 tablet 2   • hydrocortisone 2.5 % ointment APPLY A THIN LAYER TO THE AFFECTED AREA(S) BY TOPICAL ROUTE 2 TIMES PER DAY UNTIL HEALED.     • levothyroxine (Synthroid) 88 MCG tablet Take 1 tablet by mouth Daily. 90 tablet 3   • Linzess 72 MCG capsule capsule Take 72 mcg by mouth Daily.     • losartan (COZAAR) 25 MG tablet TAKE 1 TABLET BY MOUTH EVERY DAY 90 tablet 2   • metoprolol tartrate (LOPRESSOR) 25 MG tablet Taker 1/2 am and pm 90 tablet 3   • montelukast (SINGULAIR) 10 MG tablet Take 10 mg by mouth Daily.     • Multiple Vitamins-Minerals (CENTRUM SILVER ADULT 50+) tablet Take 1 tablet by mouth Daily.     • NON FORMULARY Compounding cream     • Omega-3 Fatty Acids (FISH OIL) 1000 MG capsule capsule Take 1 capsule by mouth Daily.     • pantoprazole (PROTONIX) 20 MG EC tablet TAKE 1 TABLET BY MOUTH EVERY DAY IN THE MORNING 30 MINUTES BEFORE A MEAL     • spironolactone (ALDACTONE) 25 MG tablet TAKE 1/2 TABLET BY MOUTH EVERY DAY 45 tablet 1   • Vitamin D, Cholecalciferol, 25 MCG (1000 UT) tablet Take 1 tablet by mouth Daily.       No facility-administered medications prior to visit.       No opioid medication identified on active medication list. I have reviewed chart for other potential  high risk medication/s and harmful drug interactions in the elderly.          Aspirin is not on active medication list.  Aspirin use is not indicated based on review of current medical condition/s. Risk of harm outweighs potential benefits.  .    Patient Active Problem List   Diagnosis   • Abnormal complete blood count   • Ankle pain   • Arthralgia of left elbow   • Arthritis   • B12 deficiency   • Coronary artery disease   • Depression   • Gallstones   • Gout   • Hyperglycemia   • Hyperlipidemia   • Hypertension   • Hypothyroidism   • Low back pain   • Mobility poor   • Postmenopausal status   • Sleep apnea   • Vitamin D deficiency   • Chronic kidney disease,  "stage 3 (moderate)   • Malignant neoplasm of endometrium (HCC)   • Oropharyngeal dysphagia   • Class 3 severe obesity due to excess calories with serious comorbidity and body mass index (BMI) of 45.0 to 49.9 in adult (HCC)   • Chronic pain of right knee   • Light headedness     Advance Care Planning  Advance Directive is not on file.  ACP discussion was held with the patient during this visit. Patient does not have an advance directive, information provided.          Objective    Vitals:    11/02/21 1037   BP: 134/98   BP Location: Left arm   Patient Position: Sitting   Cuff Size: Large Adult   Pulse: 74   Resp: 16   Temp: 97.8 °F (36.6 °C)   TempSrc: Oral   SpO2: 97%   Weight: 121 kg (266 lb)   Height: 160 cm (63\")     BMI Readings from Last 1 Encounters:   11/02/21 47.12 kg/m²   BMI is above normal parameters. Recommendations include: educational material    Does the patient have evidence of cognitive impairment? No    Physical Exam  Lab Results   Component Value Date    TRIG 94 10/08/2021    HDL 51 10/08/2021    LDL 45 10/08/2021    VLDL 18 10/08/2021    HGBA1C 5.5 10/08/2021            HEALTH RISK ASSESSMENT    Smoking Status:  Social History     Tobacco Use   Smoking Status Never Smoker   Smokeless Tobacco Never Used     Alcohol Consumption:  Social History     Substance and Sexual Activity   Alcohol Use No     Fall Risk Screen:    SOFIYA Fall Risk Assessment was completed, and patient is at HIGH risk for falls. Assessment completed on:11/2/2021    Depression Screening:  PHQ-2/PHQ-9 Depression Screening 11/2/2021   Little interest or pleasure in doing things 0   Feeling down, depressed, or hopeless 0   Trouble falling or staying asleep, or sleeping too much -   Feeling tired or having little energy -   Poor appetite or overeating -   Feeling bad about yourself - or that you are a failure or have let yourself or your family down -   Trouble concentrating on things, such as reading the newspaper or watching " television -   Moving or speaking so slowly that other people could have noticed. Or the opposite - being so fidgety or restless that you have been moving around a lot more than usual -   Thoughts that you would be better off dead, or of hurting yourself in some way -   Total Score 0   If you checked off any problems, how difficult have these problems made it for you to do your work, take care of things at home, or get along with other people? -       Health Habits and Functional and Cognitive Screening:  Functional & Cognitive Status 11/2/2021   Do you have difficulty preparing food and eating? No   Do you have difficulty bathing yourself, getting dressed or grooming yourself? No   Do you have difficulty using the toilet? No   Do you have difficulty moving around from place to place? No   Do you have trouble with steps or getting out of a bed or a chair? Yes   Current Diet Well Balanced Diet   Dental Exam Up to date   Eye Exam Up to date   Exercise (times per week) 5 times per week   Current Exercises Include Walking   Current Exercise Activities Include -   Do you need help using the phone?  No   Are you deaf or do you have serious difficulty hearing?  No   Do you need help with transportation? No   Do you need help shopping? No   Do you need help preparing meals?  No   Do you need help with housework?  No   Do you need help with laundry? No   Do you need help taking your medications? No   Do you need help managing money? No   Do you ever drive or ride in a car without wearing a seat belt? Yes   Have you felt unusual stress, anger or loneliness in the last month? No   Who do you live with? Alone   If you need help, do you have trouble finding someone available to you? No   Have you been bothered in the last four weeks by sexual problems? No   Do you have difficulty concentrating, remembering or making decisions? No       Age-appropriate Screening Schedule:  Refer to the list below for future screening  recommendations based on patient's age, sex and/or medical conditions. Orders for these recommended tests are listed in the plan section. The patient has been provided with a written plan.    Health Maintenance   Topic Date Due   • MAMMOGRAM  10/09/2021   • LIPID PANEL  10/08/2022   • DXA SCAN  06/14/2023   • TDAP/TD VACCINES (2 - Td or Tdap) 01/23/2028   • INFLUENZA VACCINE  Completed   • ZOSTER VACCINE  Completed              Assessment/Plan   CMS Preventative Services Quick Reference  Risk Factors Identified During Encounter  Cardiovascular Disease  Immunizations Discussed/Encouraged (specific Immunizations; COVID19  Obesity/Overweight   The above risks/problems have been discussed with the patient.  Follow up actions/plans if indicated are seen below in the Assessment/Plan Section.  Pertinent information has been shared with the patient in the After Visit Summary.    Diagnoses and all orders for this visit:    1. Skin lesion of chest wall (Primary)        Follow Up:   No follow-ups on file.     An After Visit Summary and PPPS were made available to the patient.

## 2021-11-02 NOTE — PROGRESS NOTES
"Subjective   Faby Le is a 76 y.o. female presents for   Chief Complaint   Patient presents with   • Medicare Wellness-subsequent       Health Maintenance Due   Topic Date Due   • COVID-19 Vaccine (3 - Pfizer booster) 08/08/2021   • MAMMOGRAM  10/09/2021       History of Present Illness   Pt present for medicare wellness exam.  She also reports concern for a skin lesion on her chest.  It is mobile, and pt reports concern that it will get caught on her clothing or something will pull on it.  She denies recent change in color or size of lesion.   Discussed elevated BP with pt and recheck wnl.  Pt intermittently checks it at home and states it is normal then.      Vitals:    11/02/21 1037 11/02/21 1059   BP: 134/98 106/71   BP Location: Left arm Left arm   Patient Position: Sitting    Cuff Size: Large Adult    Pulse: 74    Resp: 16    Temp: 97.8 °F (36.6 °C)    TempSrc: Oral    SpO2: 97%    Weight: 121 kg (266 lb)    Height: 160 cm (63\")      Body mass index is 47.12 kg/m².    Current Outpatient Medications on File Prior to Visit   Medication Sig Dispense Refill   • allopurinol (ZYLOPRIM) 300 MG tablet TAKE 1 TABLET BY MOUTH EVERY DAY 90 tablet 2   • amLODIPine (Norvasc) 5 MG tablet Take 1 tablet by mouth Daily. 90 tablet 3   • APPLE CIDER VINEGAR PO Take 1 tablet by mouth 2 (Two) Times a Day. Take 2 tablets am and 1 pm     • atorvastatin (LIPITOR) 20 MG tablet TAKE 1 TABLET BY MOUTH EVERYDAY AT BEDTIME 90 tablet 3   • azelastine (ASTELIN) 0.1 % nasal spray      • bumetanide (BUMEX) 2 MG tablet TAKE 1/2 TABLET BY MOUTH EVERY DAY 45 tablet 1   • cetirizine (zyrTEC) 10 MG tablet Take 1 tablet by mouth Daily.     • cyanocobalamin (VITAMIN B-12) 1000 MCG tablet Take 1 tablet by mouth Daily.     • Enflurane (COMPOUND 347 IN)      • escitalopram (LEXAPRO) 20 MG tablet TAKE 1 TABLET BY MOUTH EVERY DAY 90 tablet 2   • hydrocortisone 2.5 % ointment APPLY A THIN LAYER TO THE AFFECTED AREA(S) BY TOPICAL ROUTE 2 TIMES PER DAY " UNTIL HEALED.     • levothyroxine (Synthroid) 88 MCG tablet Take 1 tablet by mouth Daily. 90 tablet 3   • Linzess 72 MCG capsule capsule Take 72 mcg by mouth Daily.     • losartan (COZAAR) 25 MG tablet TAKE 1 TABLET BY MOUTH EVERY DAY 90 tablet 2   • metoprolol tartrate (LOPRESSOR) 25 MG tablet Taker 1/2 am and pm 90 tablet 3   • montelukast (SINGULAIR) 10 MG tablet Take 10 mg by mouth Daily.     • Multiple Vitamins-Minerals (CENTRUM SILVER ADULT 50+) tablet Take 1 tablet by mouth Daily.     • NON FORMULARY Compounding cream     • Omega-3 Fatty Acids (FISH OIL) 1000 MG capsule capsule Take 1 capsule by mouth Daily.     • pantoprazole (PROTONIX) 20 MG EC tablet TAKE 1 TABLET BY MOUTH EVERY DAY IN THE MORNING 30 MINUTES BEFORE A MEAL     • spironolactone (ALDACTONE) 25 MG tablet TAKE 1/2 TABLET BY MOUTH EVERY DAY 45 tablet 1   • Vitamin D, Cholecalciferol, 25 MCG (1000 UT) tablet Take 1 tablet by mouth Daily.     • [DISCONTINUED] allopurinol (ZYLOPRIM) 300 MG tablet TAKE 1 TABLET BY MOUTH EVERY DAY 90 tablet 2   • [DISCONTINUED] metoprolol tartrate (LOPRESSOR) 25 MG tablet TAKE 1 TABLET BY MOUTH DAILY AT BEDTIME 90 tablet 3     No current facility-administered medications on file prior to visit.       The following portions of the patient's history were reviewed and updated as appropriate: allergies, current medications, past family history, past medical history, past social history, past surgical history, and problem list.    Review of Systems   Constitutional: Negative for chills and fever.   HENT: Negative for sinus pressure and sore throat.    Eyes: Negative for blurred vision.   Respiratory: Negative for cough and shortness of breath.    Cardiovascular: Negative for chest pain.   Gastrointestinal: Negative for abdominal pain.   Endocrine: Negative.    Genitourinary: Negative.    Musculoskeletal: Negative for arthralgias and joint swelling.   Skin: Positive for skin lesions (chest). Negative for color change.    Allergic/Immunologic: Negative.    Neurological: Negative for dizziness.   Psychiatric/Behavioral: Negative for behavioral problems.       Objective   Physical Exam  Vitals and nursing note reviewed.   Constitutional:       Appearance: Normal appearance. She is well-developed. She is obese.   HENT:      Head: Normocephalic and atraumatic.      Right Ear: External ear normal.      Left Ear: External ear normal.      Nose: Nose normal.   Eyes:      Extraocular Movements: Extraocular movements intact.      Pupils: Pupils are equal, round, and reactive to light.   Cardiovascular:      Rate and Rhythm: Normal rate and regular rhythm.      Heart sounds: Normal heart sounds.   Pulmonary:      Effort: Pulmonary effort is normal.      Breath sounds: Normal breath sounds.   Abdominal:      General: Bowel sounds are normal.      Palpations: Abdomen is soft.   Genitourinary:     Vagina: Normal.   Musculoskeletal:         General: Normal range of motion.      Cervical back: Normal range of motion and neck supple.   Skin:     General: Skin is warm and dry.      Findings: Lesion ( chest wall) present.   Neurological:      General: No focal deficit present.      Mental Status: She is alert and oriented to person, place, and time.   Psychiatric:         Mood and Affect: Mood normal.         Behavior: Behavior normal.         Judgment: Judgment normal.       PHQ-9 Total Score: 0    Assessment/Plan   Diagnoses and all orders for this visit:    1. Skin lesion of chest wall (Primary)  -     Ambulatory Referral to Dermatology        There are no Patient Instructions on file for this visit.

## 2021-11-04 ENCOUNTER — TREATMENT (OUTPATIENT)
Dept: PHYSICAL THERAPY | Facility: CLINIC | Age: 76
End: 2021-11-04

## 2021-11-04 DIAGNOSIS — H81.11 BENIGN PAROXYSMAL POSITIONAL VERTIGO OF RIGHT EAR: Primary | ICD-10-CM

## 2021-11-04 DIAGNOSIS — R42 VERTIGO: ICD-10-CM

## 2021-11-04 PROCEDURE — 97530 THERAPEUTIC ACTIVITIES: CPT | Performed by: PHYSICAL THERAPIST

## 2021-11-04 PROCEDURE — 95992 CANALITH REPOSITIONING PROC: CPT | Performed by: PHYSICAL THERAPIST

## 2021-11-04 PROCEDURE — 97161 PT EVAL LOW COMPLEX 20 MIN: CPT | Performed by: PHYSICAL THERAPIST

## 2021-11-04 NOTE — PROGRESS NOTES
Physical Therapy Initial Evaluation and Plan of Care    Patient: Faby Le   : 1945  Diagnosis/ICD-10 Code:  Benign paroxysmal positional vertigo of right ear [H81.11]  Referring practitioner: Leonor Adam MD  Date of Initial Visit: 2021  Today's Date: 2021  Patient seen for 1 sessions           Subjective Questionnaire: DHI: 10% impairment      Subjective Evaluation    History of Present Illness  Mechanism of injury: Pt reports having vertigo for at least 1.5 mo. Has dizziness with supine <> sit. Laying on R side is worse than laying on L. Difficulty rolling over in bed due to arthritic joints. Occasional dizziness with bending over. States she got her feet tangled up and hit top of her head on door facing shortly before onset of dizziness. States she is unsteady on her feet in general. Has had L TKR 2018 and states she needs R knee replaced also. Had L ankle fused 2017. Has chronic low back pain and sciatic pain. 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      Patient Occupation: retired Quality of life: good    Social Support  Lives in: apartment  Lives with: alone    Patient Goals  Patient goals for therapy: improved balance, increased motion, increased strength and independence with ADLs/IADLs             Objective          Strength/Myotome Testing     Left Hip   Planes of Motion   Flexion: 4  Abduction: 4    Right Hip   Planes of Motion   Flexion: 4  Abduction: 4    Left Knee   Flexion: 4+  Extension: 4+    Right Knee   Flexion: 4+  Extension: 4+    Right Ankle/Foot   Dorsiflexion: 4      Gait: abnormal gait pattern with short step length, decreased heel strike and toe off B, trunk lean over stance leg B.    Symptoms worsened with supine<>sit.  Symptoms improved with sitting still.  Associated symptoms: near falls.  DHI indicates 10% impairment with limitations in supine<>sit.  Cervical AROM is WNL without symptom aggravation.  Cervical  instability negative.    Negative central vestibular signs (smooth pursuit, saccades, resting nystagmus).  Positive peripheral vestibular signs (VOR, head thrust).  Modified CTSIB = 2/4 (30,5,30,4) indicating decreased sensory integration in balance with vestibular weakness and visual dependence.    Modified DGI= 5/12 (2,1, 1, 1) indicating fall risk.    SLS = not assessed this date    BPPV testing: Fiona Halpike L neg, R POS with nystagmus; Roll test - not assessed this date  Performed CRT Epley JACKELINE    Instructed pt on movements to avoid for the next 4 hr and encouraged to sleep on 2 pillows tonight. Educated pt on potential causes of BPPV and correction of.    Assessment & Plan     Assessment  Impairments: abnormal coordination, abnormal gait, activity intolerance, impaired balance, lacks appropriate home exercise program and safety issue  Assessment details: Pt is a 76 y.o. female with c/o unsteadiness and dizziness. Pt with positive R Fiona Hallpike. Pt with impaired sensory integration as evidenced by MCTSIB of 2/4. Pt is having difficulty with quick head movements. Difficulty with ambulation. Difficulty with bed mobility. DHI indicates 10% impairment.    Patient presents with the impairments listed above and based on the objective findings and the physical therapy evaluation, the patient’s condition has the potential to improve in response to therapy.   The patient’s condition and/or services required are at a level of complexity that necessitates the skill & supervision of a physical therapist.    Prognosis: good  Functional Limitations: moving in bed and stooping  Goals  Plan Goals: STG:  - Pt to report 50% improvement in frequency of dizziness in 2 weeks.  - Pt to be independent with HEP in 2 weeks.  LTG:  - Improve DHI to 0% or less impairment by discharge.  - Improve sensory integration as evidenced by MCTSIB of 3/4 by discharge.  - Improve DGI to 9/12 for improved stability with ambulation by discharge.  - Pt  to report no dizziness with activity by discharge.    Plan  Therapy options: will be seen for skilled physical therapy services  Planned therapy interventions: balance/weight-bearing training, body mechanics training, gait training, home exercise program, manual therapy, neuromuscular re-education, postural training, strengthening, therapeutic activities, transfer training and flexibility  Other planned therapy interventions: canalith repositioning  Frequency: 1x week  Duration in weeks: 6  Treatment plan discussed with: patient     :     Timed:         Manual Therapy:         mins  86388;     Therapeutic Exercise:         mins  50555;     Neuromuscular Mohan:        mins  18459;    Therapeutic Activity:    10      mins  29132;     Gait Training:           mins  61862;     Ultrasound:          mins  51858;    Ionto                                   mins   53096  Can Repos     15     mins 78361    Un-Timed:  Electrical Stimulation:         mins  84303 ( );  Dry Needling          mins self-pay  Traction          mins 36318  Low Eval     20     Mins  35672  Mod Eval          Mins  11587  High Eval                            Mins  82056  Self - Care                          mins  92263        Timed Treatment:   25   mins   Total Treatment:     45   mins    PT SIGNATURE: Aysha Duckworth, PT   DATE TREATMENT INITIATED: 11/4/2021    Medicare Initial Certification  Certification Period: 2/2/2022  I certify that the therapy services are furnished while this patient is under my care.  The services outlined above are required by this patient, and will be reviewed every 90 days.     PHYSICIAN: Leonor Adam MD ________________________________________________________________     DATE:  _______________________________________    Please sign and return via fax to [phone].. Thank you, [facility].

## 2021-11-08 ENCOUNTER — TREATMENT (OUTPATIENT)
Dept: PHYSICAL THERAPY | Facility: CLINIC | Age: 76
End: 2021-11-08

## 2021-11-08 ENCOUNTER — CLINICAL SUPPORT (OUTPATIENT)
Dept: FAMILY MEDICINE CLINIC | Facility: CLINIC | Age: 76
End: 2021-11-08

## 2021-11-08 DIAGNOSIS — R42 VERTIGO: ICD-10-CM

## 2021-11-08 DIAGNOSIS — H81.11 BENIGN PAROXYSMAL POSITIONAL VERTIGO OF RIGHT EAR: Primary | ICD-10-CM

## 2021-11-08 PROCEDURE — 95992 CANALITH REPOSITIONING PROC: CPT | Performed by: PHYSICAL THERAPIST

## 2021-11-08 PROCEDURE — 97530 THERAPEUTIC ACTIVITIES: CPT | Performed by: PHYSICAL THERAPIST

## 2021-11-08 NOTE — PROGRESS NOTES
Physical Therapy Daily Progress Note    Patient: Faby Le   : 1945  Diagnosis/ICD-10 Code:  Benign paroxysmal positional vertigo of right ear [H81.11]  Referring practitioner: Leonor Adam MD  Date of Initial Visit: Type: THERAPY  Noted: 2021  Today's Date: 2021  Patient seen for 2 sessions             Subjective Patient reports no dizziness since last visit.      Objective   See Exercise, Manual, and Modality Logs for complete treatment. Repeated R Epley with no c/o dizziness.  Only dizziness was with dynamic gait and looking up.  Patient was able to self-correct.  Progressed VOR to standing without difficulty.      Assessment/Plan  STG:  - Pt to report 50% improvement in frequency of dizziness in 2 weeks. - MET  - Pt to be independent with HEP in 2 weeks. - NOT MET  LTG:  - Improve DHI to 0% or less impairment by discharge. - PROGRESSING  - Improve sensory integration as evidenced by MCTSIB of 3/4 by discharge. - PROGRESSING  - Improve DGI to 9/12 for improved stability with ambulation by discharge. - PROGRESSING  - Pt to report no dizziness with activity by discharge - NOT MET    Progress per Plan of Care up to 6 visits if needed           Timed:         Manual Therapy:         mins  32136;     Therapeutic Exercise:         mins  22159;     Neuromuscular Mohan:        mins  06225;    Therapeutic Activity:     15     mins  61058;     Gait Training:           mins  70557;     Ultrasound:          mins  91509;    Ionto                                   mins   07339  Self Care                            mins   65500      Un-Timed:  Electrical Stimulation:         mins  82084 ( );  Dry Needling          mins self-pay  Traction          mins 33913  Low Eval          Mins  73710  Mod Eval          Mins  28572  High Eval                            Mins  10096  Canalith Repos               10    mins  63504    Timed Treatment:   15   mins   Total Treatment:     25   mins    Michael DURBIN  Sprigler, PT  Physical Therapist

## 2021-11-15 ENCOUNTER — TREATMENT (OUTPATIENT)
Dept: PHYSICAL THERAPY | Facility: CLINIC | Age: 76
End: 2021-11-15

## 2021-11-15 DIAGNOSIS — R42 VERTIGO: ICD-10-CM

## 2021-11-15 DIAGNOSIS — H81.11 BENIGN PAROXYSMAL POSITIONAL VERTIGO OF RIGHT EAR: Primary | ICD-10-CM

## 2021-11-15 PROCEDURE — 97530 THERAPEUTIC ACTIVITIES: CPT | Performed by: PHYSICAL THERAPIST

## 2021-11-15 NOTE — PROGRESS NOTES
Physical Therapy Daily Progress Note    Patient: Faby Le   : 1945  Diagnosis/ICD-10 Code:  Benign paroxysmal positional vertigo of right ear [H81.11]  Referring practitioner: Leonor Adam MD  Date of Initial Visit: Type: THERAPY  Noted: 2021  Today's Date: 11/15/2021  Patient seen for 3 sessions             Subjective No c/o dizziness since last treatment on  except this morning standing by table.    Objective   See Exercise, Manual, and Modality Logs for complete treatment. Patient tolerated vestibular rehab well today.  Gait with head turns is most difficult, especially horizontal.      Assessment/Plan  STG:  - Pt to report 50% improvement in frequency of dizziness in 2 weeks. - MET  - Pt to be independent with HEP in 2 weeks. - NOT MET  LTG:  - Improve DHI to 0% or less impairment by discharge. - PROGRESSING  - Improve sensory integration as evidenced by MCTSIB of 3/4 by discharge. - PROGRESSING  - Improve DGI to 9/12 for improved stability with ambulation by discharge. - PROGRESSING  - Pt to report no dizziness with activity by discharge - NOT MET     Progress per Plan of Care up to 6 visits if needed           Timed:         Manual Therapy:         mins  00580;     Therapeutic Exercise:         mins  37072;     Neuromuscular Mohan:        mins  35790;    Therapeutic Activity:     15     mins  93646;     Gait Training:           mins  22183;     Ultrasound:          mins  29857;    Ionto                                   mins   98656  Self Care                            mins   70198      Un-Timed:  Electrical Stimulation:         mins  21867 ( );  Dry Needling          mins self-pay  Traction          mins 93294  Low Eval          Mins  58559  Mod Eval          Mins  35172  High Eval                            Mins  02794  Canalith Repos                   mins  55443    Timed Treatment:   15   mins   Total Treatment:     15   mins    Robin A Sprigler, PT  Physical  Therapist

## 2021-11-22 ENCOUNTER — TREATMENT (OUTPATIENT)
Dept: PHYSICAL THERAPY | Facility: CLINIC | Age: 76
End: 2021-11-22

## 2021-11-22 DIAGNOSIS — H81.11 BENIGN PAROXYSMAL POSITIONAL VERTIGO OF RIGHT EAR: Primary | ICD-10-CM

## 2021-11-22 DIAGNOSIS — R42 VERTIGO: ICD-10-CM

## 2021-11-22 PROCEDURE — 97530 THERAPEUTIC ACTIVITIES: CPT | Performed by: PHYSICAL THERAPIST

## 2021-11-22 PROCEDURE — 95992 CANALITH REPOSITIONING PROC: CPT | Performed by: PHYSICAL THERAPIST

## 2021-11-22 NOTE — PROGRESS NOTES
Physical Therapy Daily Progress Note    Patient: Faby Le   : 1945  Diagnosis/ICD-10 Code:  Benign paroxysmal positional vertigo of right ear [H81.11]  Referring practitioner: Leonor Adam MD  Date of Initial Visit: Type: THERAPY  Noted: 2021  Today's Date: 2021  Patient seen for 4 sessions             Subjective Patient reports no significant change in symptoms, not sure if it's helping or not.  Discussed benefits of scooter for travel or longer walking for [employer].    Objective   See Exercise, Manual, and Modality Logs for complete treatment. Still significant difficulty with dynamic gait with head turns.  Repeated R Epley with no symptoms.  Performed R Appiani with no symptoms.  Assess response next visit.      Assessment/Plan  STG:  - Pt to report 50% improvement in frequency of dizziness in 2 weeks. - MET  - Pt to be independent with HEP in 2 weeks. - NOT MET  LTG:  - Improve DHI to 0% or less impairment by discharge. - PROGRESSING  - Improve sensory integration as evidenced by MCTSIB of 3/4 by discharge. - PROGRESSING  - Improve DGI to 9/12 for improved stability with ambulation by discharge. - PROGRESSING  - Pt to report no dizziness with activity by discharge - NOT MET    Progress per Plan of Care up to 21           Timed:         Manual Therapy:         mins  39868;     Therapeutic Exercise:         mins  52264;     Neuromuscular Mohan:        mins  37559;    Therapeutic Activity:     15     mins  82605;     Gait Training:           mins  31456;     Ultrasound:          mins  08632;    Ionto                                   mins   23632  Self Care                            mins   09915      Un-Timed:  Electrical Stimulation:         mins  29859 ( );  Dry Needling          mins self-pay  Traction          mins 37900  Low Eval          Mins  06311  Mod Eval          Mins  82651  High Eval                            Mins  04923  [facility]                15    mins  95739    Timed Treatment:  15    mins   Total Treatment:    30    mins    Robin A Sprigler, PT  Physical Therapist

## 2021-11-29 ENCOUNTER — TREATMENT (OUTPATIENT)
Dept: PHYSICAL THERAPY | Facility: CLINIC | Age: 76
End: 2021-11-29

## 2021-11-29 DIAGNOSIS — H81.11 BENIGN PAROXYSMAL POSITIONAL VERTIGO OF RIGHT EAR: Primary | ICD-10-CM

## 2021-11-29 DIAGNOSIS — R42 VERTIGO: ICD-10-CM

## 2021-11-29 PROCEDURE — 97530 THERAPEUTIC ACTIVITIES: CPT | Performed by: PHYSICAL THERAPIST

## 2021-11-29 PROCEDURE — 95992 CANALITH REPOSITIONING PROC: CPT | Performed by: PHYSICAL THERAPIST

## 2021-11-29 RX ORDER — SPIRONOLACTONE 25 MG/1
TABLET ORAL
Qty: 45 TABLET | Refills: 4 | Status: SHIPPED | OUTPATIENT
Start: 2021-11-29 | End: 2022-12-09

## 2021-11-29 NOTE — PROGRESS NOTES
Discharge Summary  Discharge Summary from Physical Therapy Report          Goals: Partially Met    Discharge Plan: Continue with current home exercise program as instructed    Comments See note below.  Patient voices readiness for discharge.    Date of Discharge 21        Robin A Sprigler, PT  Physical Therapist           Physical Therapy Daily Progress Note    Patient: Faby Le   : 1945  Diagnosis/ICD-10 Code:  Benign paroxysmal positional vertigo of right ear [H81.11]  Referring practitioner: Leonor Adam MD  Date of Initial Visit: Type: THERAPY  Noted: 2021  Today's Date: 2021  Patient seen for 5 sessions             Subjective Ms. Le reports she feels good today, had difficulty with balance yesterday but doesn't describe room spinning dizziness just unable to focus.    Objective   See Exercise, Manual, and Modality Logs for complete treatment. Ms. Le has completed 5 sessions of PT with excellent attendance and compliance.  She is limited by her lack of mobility but severe BPPV symptoms have subsided.  She continues to have occasional LOB with head turns.  Vestibular hypofunction continues to remain a problem.        Assessment/Plan  STG:  - Pt to report 50% improvement in frequency of dizziness in 2 weeks. - MET  - Pt to be independent with HEP in 2 weeks. - MET  LTG:  - Improve DHI to 0% or less impairment by discharge. - PROGRESSING  - Improve sensory integration as evidenced by MCTSIB of 3/4 by discharge. - MET  - Improve DGI to 9/12 for improved stability with ambulation by discharge. - MET  - Pt to report no dizziness with activity by discharge - MET    Discharge to HEP and self-management.  Recommended returning to MD for further testing, rule out Meniere's disease.           Timed:         Manual Therapy:         mins  95041;     Therapeutic Exercise:         mins  68770;     Neuromuscular Mohan:        mins  62855;    Therapeutic Activity:     15     mins  06102;      Gait Training:           mins  06467;     Ultrasound:          mins  50547;    Ionto                                   mins   11185  Self Care                            mins   33073      Un-Timed:  Electrical Stimulation:         mins  64359 ( );  Dry Needling          mins self-pay  Traction          mins 27321  Low Eval          Mins  41850  Mod Eval          Mins  81782  High Eval                            Mins  75688  Canalith Repos               15    mins  84099    Timed Treatment:   15   mins   Total Treatment:     30   mins    Robin A Sprigler, PT  Physical Therapist

## 2022-01-21 RX ORDER — BUMETANIDE 2 MG/1
TABLET ORAL
Qty: 45 TABLET | Refills: 2 | Status: SHIPPED | OUTPATIENT
Start: 2022-01-21 | End: 2022-09-19

## 2022-02-04 NOTE — PATIENT INSTRUCTIONS
There are no preventive care reminders to display for this patient.    Check blood pressure cuff for accuracy and send 10 blood pressures over 2 weeks.  Watch sodium, alcohol and weight

## 2022-02-07 ENCOUNTER — OFFICE VISIT (OUTPATIENT)
Dept: FAMILY MEDICINE CLINIC | Facility: CLINIC | Age: 77
End: 2022-02-07

## 2022-02-07 VITALS
SYSTOLIC BLOOD PRESSURE: 145 MMHG | OXYGEN SATURATION: 97 % | HEART RATE: 75 BPM | TEMPERATURE: 97.5 F | DIASTOLIC BLOOD PRESSURE: 83 MMHG | HEIGHT: 63 IN | BODY MASS INDEX: 47.73 KG/M2 | WEIGHT: 269.4 LBS

## 2022-02-07 DIAGNOSIS — M1A.9XX0 CHRONIC GOUT WITHOUT TOPHUS, UNSPECIFIED CAUSE, UNSPECIFIED SITE: ICD-10-CM

## 2022-02-07 DIAGNOSIS — E66.01 CLASS 3 SEVERE OBESITY DUE TO EXCESS CALORIES WITH SERIOUS COMORBIDITY AND BODY MASS INDEX (BMI) OF 45.0 TO 49.9 IN ADULT: ICD-10-CM

## 2022-02-07 DIAGNOSIS — M25.571 RIGHT ANKLE PAIN, UNSPECIFIED CHRONICITY: ICD-10-CM

## 2022-02-07 DIAGNOSIS — G47.33 OBSTRUCTIVE SLEEP APNEA SYNDROME: ICD-10-CM

## 2022-02-07 DIAGNOSIS — E03.9 HYPOTHYROIDISM, UNSPECIFIED TYPE: ICD-10-CM

## 2022-02-07 DIAGNOSIS — N18.32 STAGE 3B CHRONIC KIDNEY DISEASE: ICD-10-CM

## 2022-02-07 DIAGNOSIS — R13.12 OROPHARYNGEAL DYSPHAGIA: ICD-10-CM

## 2022-02-07 DIAGNOSIS — R73.9 HYPERGLYCEMIA: ICD-10-CM

## 2022-02-07 DIAGNOSIS — I25.10 CORONARY ARTERY DISEASE INVOLVING NATIVE HEART WITHOUT ANGINA PECTORIS, UNSPECIFIED VESSEL OR LESION TYPE: Primary | ICD-10-CM

## 2022-02-07 DIAGNOSIS — Z74.09 MOBILITY POOR: ICD-10-CM

## 2022-02-07 DIAGNOSIS — R42 LIGHT HEADEDNESS: ICD-10-CM

## 2022-02-07 DIAGNOSIS — E55.9 VITAMIN D DEFICIENCY: ICD-10-CM

## 2022-02-07 DIAGNOSIS — E53.8 B12 DEFICIENCY: ICD-10-CM

## 2022-02-07 DIAGNOSIS — I10 PRIMARY HYPERTENSION: ICD-10-CM

## 2022-02-07 DIAGNOSIS — C54.1 MALIGNANT NEOPLASM OF ENDOMETRIUM: ICD-10-CM

## 2022-02-07 DIAGNOSIS — E78.2 MIXED HYPERLIPIDEMIA: ICD-10-CM

## 2022-02-07 DIAGNOSIS — F32.A DEPRESSION, UNSPECIFIED DEPRESSION TYPE: ICD-10-CM

## 2022-02-07 LAB
25(OH)D3 SERPL-MCNC: 33.7 NG/ML
BASOPHILS # BLD AUTO: 0.06 10*3/MM3 (ref 0–0.2)
BASOPHILS NFR BLD AUTO: 1 % (ref 0–1.5)
CHOLEST SERPL-MCNC: 123 MG/DL (ref 0–200)
DEPRECATED RDW RBC AUTO: 46 FL (ref 37–54)
EOSINOPHIL # BLD AUTO: 0.19 10*3/MM3 (ref 0–0.4)
EOSINOPHIL NFR BLD AUTO: 3.2 % (ref 0.3–6.2)
ERYTHROCYTE [DISTWIDTH] IN BLOOD BY AUTOMATED COUNT: 12.8 % (ref 12.3–15.4)
HBA1C MFR BLD: 5.4 % (ref 3.5–5.6)
HCT VFR BLD AUTO: 33.6 % (ref 34–46.6)
HDLC SERPL-MCNC: 60 MG/DL (ref 40–60)
HGB BLD-MCNC: 11.5 G/DL (ref 12–15.9)
IMM GRANULOCYTES # BLD AUTO: 0.02 10*3/MM3 (ref 0–0.05)
IMM GRANULOCYTES NFR BLD AUTO: 0.3 % (ref 0–0.5)
LDLC SERPL CALC-MCNC: 43 MG/DL (ref 0–100)
LDLC/HDLC SERPL: 0.69 {RATIO}
LYMPHOCYTES # BLD AUTO: 1.61 10*3/MM3 (ref 0.7–3.1)
LYMPHOCYTES NFR BLD AUTO: 27.5 % (ref 19.6–45.3)
MAGNESIUM SERPL-MCNC: 2.1 MG/DL (ref 1.6–2.4)
MCH RBC QN AUTO: 34.1 PG (ref 26.6–33)
MCHC RBC AUTO-ENTMCNC: 34.2 G/DL (ref 31.5–35.7)
MCV RBC AUTO: 99.7 FL (ref 79–97)
MONOCYTES # BLD AUTO: 0.49 10*3/MM3 (ref 0.1–0.9)
MONOCYTES NFR BLD AUTO: 8.4 % (ref 5–12)
NEUTROPHILS NFR BLD AUTO: 3.49 10*3/MM3 (ref 1.7–7)
NEUTROPHILS NFR BLD AUTO: 59.6 % (ref 42.7–76)
NRBC BLD AUTO-RTO: 0 /100 WBC (ref 0–0.2)
PLATELET # BLD AUTO: 219 10*3/MM3 (ref 140–450)
PMV BLD AUTO: 9.9 FL (ref 6–12)
RBC # BLD AUTO: 3.37 10*6/MM3 (ref 3.77–5.28)
TRIGL SERPL-MCNC: 109 MG/DL (ref 0–150)
TSH SERPL DL<=0.05 MIU/L-ACNC: 4.29 UIU/ML (ref 0.27–4.2)
URATE SERPL-MCNC: 5.8 MG/DL (ref 2.4–5.7)
VIT B12 BLD-MCNC: 720 PG/ML (ref 211–946)
VLDLC SERPL-MCNC: 20 MG/DL (ref 5–40)
WBC NRBC COR # BLD: 5.86 10*3/MM3 (ref 3.4–10.8)

## 2022-02-07 PROCEDURE — 83735 ASSAY OF MAGNESIUM: CPT | Performed by: PREVENTIVE MEDICINE

## 2022-02-07 PROCEDURE — 84550 ASSAY OF BLOOD/URIC ACID: CPT | Performed by: PREVENTIVE MEDICINE

## 2022-02-07 PROCEDURE — 80061 LIPID PANEL: CPT | Performed by: PREVENTIVE MEDICINE

## 2022-02-07 PROCEDURE — 36415 COLL VENOUS BLD VENIPUNCTURE: CPT | Performed by: PREVENTIVE MEDICINE

## 2022-02-07 PROCEDURE — 83036 HEMOGLOBIN GLYCOSYLATED A1C: CPT | Performed by: PREVENTIVE MEDICINE

## 2022-02-07 PROCEDURE — 82607 VITAMIN B-12: CPT | Performed by: PREVENTIVE MEDICINE

## 2022-02-07 PROCEDURE — 84443 ASSAY THYROID STIM HORMONE: CPT | Performed by: PREVENTIVE MEDICINE

## 2022-02-07 PROCEDURE — 82306 VITAMIN D 25 HYDROXY: CPT | Performed by: PREVENTIVE MEDICINE

## 2022-02-07 PROCEDURE — 85025 COMPLETE CBC W/AUTO DIFF WBC: CPT | Performed by: PREVENTIVE MEDICINE

## 2022-02-07 PROCEDURE — 99214 OFFICE O/P EST MOD 30 MIN: CPT | Performed by: PREVENTIVE MEDICINE

## 2022-02-07 RX ORDER — HYALURONATE SODIUM 16.8MG/2ML
SYRINGE (ML) INTRAARTICULAR
COMMUNITY
Start: 2022-02-01 | End: 2022-08-12

## 2022-02-07 NOTE — PROGRESS NOTES
Venipuncture Blood Specimen Collection  Venipuncture performed in right arm by Jennifer Vaughn MA with good hemostasis. Patient tolerated the procedure well without complications.   02/07/22   Jennifer Vaughn MA

## 2022-02-07 NOTE — PROGRESS NOTES
"Subjective   Faby Le is a 77 y.o. female presents for   Chief Complaint   Patient presents with   • Follow-up   • Hypertension     Patient presents today for follow-up of multiple chronic health conditions.  Her blood pressure was elevated today she will bring her cuff in to check its calibration and home monitor she also is having more pain with her right ankle and does wish a second opinion from Dr. Kelley referral was placed.  She is not having any trouble with lightheadedness or dizziness since she has gone through vestibular rehab and does understand how to reposition the labyrinth.  Mobility is poor and she is asking for scooter.  We have referred her [facility] rehab center and then we will see her back for a face-to-face in order to order the scooter.  Should be noted that she is having severe left shoulder arthritis and is torn rotator cuff on the right so motorized wheelchair would not be very beneficial.  There are no preventive care reminders to display for this patient.    History of Present Illness     Vitals:    02/07/22 0959 02/07/22 1000   BP: 138/78 145/83   BP Location: Right arm Left arm   Patient Position: Sitting Sitting   Cuff Size: Adult Adult   Pulse: 75    Temp: 97.5 °F (36.4 °C)    TempSrc: Temporal    SpO2: 97%    Weight: 122 kg (269 lb 6.4 oz)    Height: 160 cm (63\")      Body mass index is 47.72 kg/m².    Current Outpatient Medications on File Prior to Visit   Medication Sig Dispense Refill   • allopurinol (ZYLOPRIM) 300 MG tablet TAKE 1 TABLET BY MOUTH EVERY DAY 90 tablet 2   • amLODIPine (Norvasc) 5 MG tablet Take 1 tablet by mouth Daily. 90 tablet 3   • APPLE CIDER VINEGAR PO Take 1 tablet by mouth 2 (Two) Times a Day. Take 2 tablets am and 1 pm     • atorvastatin (LIPITOR) 20 MG tablet TAKE 1 TABLET BY MOUTH EVERYDAY AT BEDTIME 90 tablet 3   • azelastine (ASTELIN) 0.1 % nasal spray      • bumetanide (BUMEX) 2 MG tablet TAKE 1/2 TABLET BY MOUTH EVERY DAY 45 tablet 2   • " cetirizine (zyrTEC) 10 MG tablet Take 1 tablet by mouth Daily.     • cyanocobalamin (VITAMIN B-12) 1000 MCG tablet Take 1 tablet by mouth Daily.     • Enflurane (COMPOUND 347 IN)      • escitalopram (LEXAPRO) 20 MG tablet TAKE 1 TABLET BY MOUTH EVERY DAY 90 tablet 2   • hydrocortisone 2.5 % ointment APPLY A THIN LAYER TO THE AFFECTED AREA(S) BY TOPICAL ROUTE 2 TIMES PER DAY UNTIL HEALED.     • levothyroxine (Synthroid) 88 MCG tablet Take 1 tablet by mouth Daily. 90 tablet 3   • Linzess 72 MCG capsule capsule Take 72 mcg by mouth Daily.     • losartan (COZAAR) 25 MG tablet TAKE 1 TABLET BY MOUTH EVERY DAY 90 tablet 2   • metoprolol tartrate (LOPRESSOR) 25 MG tablet Taker 1/2 am and pm 90 tablet 3   • montelukast (SINGULAIR) 10 MG tablet Take 10 mg by mouth Daily.     • Omega-3 Fatty Acids (FISH OIL) 1000 MG capsule capsule Take 1 capsule by mouth Daily.     • pantoprazole (PROTONIX) 20 MG EC tablet TAKE 1 TABLET BY MOUTH EVERY DAY IN THE MORNING 30 MINUTES BEFORE A MEAL     • Vitamin D, Cholecalciferol, 25 MCG (1000 UT) tablet Take 1 tablet by mouth Daily.     • Gelsyn-3 16.8 MG/2ML solution prefilled syringe injection      • Multiple Vitamins-Minerals (CENTRUM SILVER ADULT 50+) tablet Take 1 tablet by mouth Daily.     • spironolactone (ALDACTONE) 25 MG tablet TAKE 1/2 TABLET BY MOUTH EVERY DAY 45 tablet 4   • [DISCONTINUED] NON FORMULARY Compounding cream       No current facility-administered medications on file prior to visit.       The following portions of the patient's history were reviewed and updated as appropriate: allergies, current medications, past family history, past medical history, past social history, past surgical history and problem list.    Review of Systems   Gastrointestinal: Positive for constipation.   Musculoskeletal: Positive for arthralgias, gait problem, joint swelling and myalgias.   Neurological: Positive for dizziness.   Psychiatric/Behavioral: Positive for sleep disturbance and  depressed mood.       Objective   Physical Exam  Vitals reviewed.   Constitutional:       General: She is not in acute distress.     Appearance: She is well-developed. She is obese. She is not ill-appearing or toxic-appearing.   HENT:      Head: Normocephalic and atraumatic.      Right Ear: Tympanic membrane, ear canal and external ear normal.      Left Ear: Tympanic membrane, ear canal and external ear normal.      Nose: Nose normal.      Mouth/Throat:      Mouth: Mucous membranes are moist.      Pharynx: No oropharyngeal exudate or posterior oropharyngeal erythema.   Eyes:      Extraocular Movements: Extraocular movements intact.      Conjunctiva/sclera: Conjunctivae normal.      Pupils: Pupils are equal, round, and reactive to light.      Comments: No nystagmus   Cardiovascular:      Rate and Rhythm: Normal rate and regular rhythm.      Heart sounds: Normal heart sounds.   Pulmonary:      Effort: Pulmonary effort is normal.      Comments: Decreased breath sounds bilaterally  Abdominal:      General: Bowel sounds are normal. There is no distension.      Palpations: Abdomen is soft. There is no mass.      Tenderness: There is no abdominal tenderness.   Musculoskeletal:         General: Swelling, tenderness and deformity present.      Cervical back: Neck supple.   Skin:     General: Skin is warm.   Neurological:      General: No focal deficit present.      Mental Status: She is alert and oriented to person, place, and time.   Psychiatric:         Mood and Affect: Mood normal.         Behavior: Behavior normal.       PHQ-9 Total Score: 0    Assessment/Plan   Diagnoses and all orders for this visit:    1. Coronary artery disease involving native heart without angina pectoris, unspecified vessel or lesion type (Primary)  Comments:  To see Dr. Elliott next week no chest pain or palpitations.    2. B12 deficiency  Comments:  =Dose due to elevated level last check.  Orders:  -     Vitamin B12    3. Depression, unspecified  depression type  Comments:  Lexapro controls  Orders:  -     Magnesium    4. Chronic gout without tophus, unspecified cause, unspecified site  Comments:  No recent arthritis or kidney stones.  Orders:  -     Uric Acid    5. Hyperglycemia  Comments:  Trying to eat less carbs  Orders:  -     Comprehensive Metabolic Panel  -     Hemoglobin A1c    6. Mixed hyperlipidemia  Comments:  Trying to eat less saturated fats  Orders:  -     Lipid Panel    7. Primary hypertension  Comments:  Elevated today.  Will bring in cuff to check and monitor at home and send us 10 readings.  Orders:  -     CBC Auto Differential    8. Hypothyroidism, unspecified type  Comments:  No increase in hair loss or dry skin  Orders:  -     TSH    9. Obstructive sleep apnea syndrome  Comments:  Uses cpap regularly    10. Stage 3b chronic kidney disease (HCC)  Comments:  Avoids NSAIDs and x-ray dyes.    11. Vitamin D deficiency  Comments:  Takes regularly  Orders:  -     Vitamin D 25 Hydroxy    12. Malignant neoplasm of endometrium (HCC)  Comments:  Did phone visit last week and eval in 6 months    13. Oropharyngeal dysphagia  Comments:  Resolved.    14. Light headedness  Comments:  Improved and had vestibular rehab and is better    15. Right ankle pain, unspecified chronicity  Comments:  Left ankle is fused and she is having pain in the right ankle wishes second opinion from Dr. Kelley.  Orders:  -     Ambulatory Referral to Podiatry    16. Mobility poor  Comments:  Will refer to [facility] and patient will reappoint when she is done for her mobility face-to-face.  Left shoulder pain is persistent and has torn her right rotator c    17. Class 3 severe obesity due to excess calories with serious comorbidity and body mass index (BMI) of 45.0 to 49.9 in adult ([facility])        Patient Instructions   There are no preventive care reminders to display for this patient.    Check blood pressure cuff for accuracy and send 10 blood pressures over 2 weeks.  Watch sodium,  alcohol and weight

## 2022-02-08 ENCOUNTER — TELEPHONE (OUTPATIENT)
Dept: FAMILY MEDICINE CLINIC | Facility: CLINIC | Age: 77
End: 2022-02-08

## 2022-02-08 NOTE — PROGRESS NOTES
Patient still has mild anemia which has improved slightly.  Continue to eat foods that are rich in iron and continue the same level of vitamin B12.  Thyroid is slightly decreased so we will check again in 3 months and make sure that we do not need to increase that dose.

## 2022-02-08 NOTE — TELEPHONE ENCOUNTER
HUB TO READ  ----- Message from Leonor Adam MD sent at 2/8/2022  7:31 AM EST -----  Patient still has mild anemia which has improved slightly.  Continue to eat foods that are rich in iron and continue the same level of vitamin B12.  Thyroid is slightly decreased so we will check again in 3 months and make sure that we do not need to increase that dose.

## 2022-02-14 ENCOUNTER — OFFICE VISIT (OUTPATIENT)
Dept: PODIATRY | Facility: CLINIC | Age: 77
End: 2022-02-14

## 2022-02-14 VITALS
BODY MASS INDEX: 47.66 KG/M2 | DIASTOLIC BLOOD PRESSURE: 59 MMHG | HEIGHT: 63 IN | HEART RATE: 97 BPM | WEIGHT: 269 LBS | SYSTOLIC BLOOD PRESSURE: 93 MMHG

## 2022-02-14 DIAGNOSIS — M19.071 ARTHRITIS OF RIGHT ANKLE: ICD-10-CM

## 2022-02-14 DIAGNOSIS — G89.29 CHRONIC PAIN OF RIGHT ANKLE: Primary | ICD-10-CM

## 2022-02-14 DIAGNOSIS — M25.571 CHRONIC PAIN OF RIGHT ANKLE: Primary | ICD-10-CM

## 2022-02-14 PROCEDURE — 99202 OFFICE O/P NEW SF 15 MIN: CPT

## 2022-02-15 ENCOUNTER — TELEPHONE (OUTPATIENT)
Dept: FAMILY MEDICINE CLINIC | Facility: CLINIC | Age: 77
End: 2022-02-15

## 2022-02-15 DIAGNOSIS — E03.9 HYPOTHYROIDISM, UNSPECIFIED TYPE: ICD-10-CM

## 2022-02-15 DIAGNOSIS — Z74.09 MOBILITY IMPAIRED: Primary | ICD-10-CM

## 2022-02-15 RX ORDER — LEVOTHYROXINE SODIUM 88 UG/1
TABLET ORAL
Qty: 90 TABLET | Refills: 3 | Status: SHIPPED | OUTPATIENT
Start: 2022-02-15 | End: 2022-12-08

## 2022-02-15 NOTE — PROGRESS NOTES
02/14/2022  Foot and Ankle Surgery - New Patient   Provider: CAROL Estrella   Location: [facility]    Subjective:  Faby Le is a 77 y.o. female.     Chief Complaint   Patient presents with   • Right Ankle - Pain   • Initial Evaluation     last pcp appt with adwoa vick md   2/7/2022       HPI: Patient presents to office today with complaints of pain to right ankle.  Patient states that she has had ankle pain for years and that it has just progressively gotten worse.  Patient rates pain at approximately a 6 out of 10 when she is walking and reports that her pain significantly affects her ability to the active.  She reports a history of having her left ankle fused secondary to arthritis in 2017.  She is unable to tolerate NSAIDs secondary to kidney function and says that every once in a while she will take a Tylenol for pain relief.  Patient has been wearing a ankle brace, similar to an Ace wrap, which provides some relief as well.  Patient reports that she has gotten steroid injections in the past and states that they help a short time but then pain returns.  Patient is inquiring about ankle replacement or surgery.    Allergies   Allergen Reactions   • Celecoxib Itching     swelling       Past Medical History:   Diagnosis Date   • Ankle pain    • Arthritis    • Cancer (HCC)     uterine, had hysterectomy 2018   • Coronary artery disease    • Depression    • Gout    • Hyperglycemia    • Hyperlipidemia    • Low back pain    • Sleep apnea    • Vitamin D deficiency        Past Surgical History:   Procedure Laterality Date   • ANKLE FUSION      2017-left   • CARDIAC CATHETERIZATION     • CARPAL TUNNEL RELEASE     • CATARACT EXTRACTION     • CUBITAL TUNNEL RELEASE     • HYSTERECTOMY     • REPLACEMENT TOTAL KNEE      left 2008   • ROTATOR CUFF REPAIR         Family History   Problem Relation Age of Onset   • Hypertension Mother    • Stroke Father    • Arthritis Brother    • Cancer Brother         Social History     Socioeconomic History   • Marital status:    Tobacco Use   • Smoking status: Never Smoker   • Smokeless tobacco: Never Used   Vaping Use   • Vaping Use: Never used   Substance and Sexual Activity   • Alcohol use: No   • Drug use: No   • Sexual activity: Not Currently        Current Outpatient Medications on File Prior to Visit   Medication Sig Dispense Refill   • allopurinol (ZYLOPRIM) 300 MG tablet TAKE 1 TABLET BY MOUTH EVERY DAY 90 tablet 2   • amLODIPine (Norvasc) 5 MG tablet Take 1 tablet by mouth Daily. 90 tablet 3   • APPLE CIDER VINEGAR PO Take 1 tablet by mouth 2 (Two) Times a Day. Take 2 tablets am and 1 pm     • atorvastatin (LIPITOR) 20 MG tablet TAKE 1 TABLET BY MOUTH EVERYDAY AT BEDTIME 90 tablet 3   • azelastine (ASTELIN) 0.1 % nasal spray      • bumetanide (BUMEX) 2 MG tablet TAKE 1/2 TABLET BY MOUTH EVERY DAY 45 tablet 2   • cetirizine (zyrTEC) 10 MG tablet Take 1 tablet by mouth Daily.     • cyanocobalamin (VITAMIN B-12) 1000 MCG tablet Take 1 tablet by mouth Daily.     • Enflurane (COMPOUND 347 IN)      • escitalopram (LEXAPRO) 20 MG tablet TAKE 1 TABLET BY MOUTH EVERY DAY 90 tablet 2   • Gelsyn-3 16.8 MG/2ML solution prefilled syringe injection      • hydrocortisone 2.5 % ointment APPLY A THIN LAYER TO THE AFFECTED AREA(S) BY TOPICAL ROUTE 2 TIMES PER DAY UNTIL HEALED.     • levothyroxine (Synthroid) 88 MCG tablet Take 1 tablet by mouth Daily. 90 tablet 3   • Linzess 72 MCG capsule capsule Take 72 mcg by mouth Daily.     • losartan (COZAAR) 25 MG tablet TAKE 1 TABLET BY MOUTH EVERY DAY 90 tablet 2   • metoprolol tartrate (LOPRESSOR) 25 MG tablet Taker 1/2 am and pm 90 tablet 3   • montelukast (SINGULAIR) 10 MG tablet Take 10 mg by mouth Daily.     • Multiple Vitamins-Minerals (CENTRUM SILVER ADULT 50+) tablet Take 1 tablet by mouth Daily.     • Omega-3 Fatty Acids (FISH OIL) 1000 MG capsule capsule Take 1 capsule by mouth Daily.     • pantoprazole (PROTONIX) 20 MG  "EC tablet TAKE 1 TABLET BY MOUTH EVERY DAY IN THE MORNING 30 MINUTES BEFORE A MEAL     • spironolactone (ALDACTONE) 25 MG tablet TAKE 1/2 TABLET BY MOUTH EVERY DAY 45 tablet 4   • Vitamin D, Cholecalciferol, 25 MCG (1000 UT) tablet Take 1 tablet by mouth Daily.       No current facility-administered medications on file prior to visit.       Review of Systems:  General: Denies fever, chills, fatigue, and weakness.  Eyes: Denies vision loss, blurry vision, and excessive redness.  ENT: Denies hearing issues and difficulty swallowing.  Cardiovascular: Denies palpitations, chest pain, or syncopal episodes.  Respiratory: Denies shortness of breath, wheezing, and coughing.  GI: Denies abdominal pain, nausea, and vomiting.   : Denies frequency, hematuria, and urgency.  Musculoskeletal: Denies muscle cramps, joint pains, and stiffness.  Derm: Denies rash, open wounds, or suspicious lesions.  Neuro: Denies headaches, numbness, loss of coordination, and tremors.  Psych: Denies anxiety and depression.  Endocrine: Denies temperature intolerance and changes in appetite.  Heme: Denies bleeding disorders or abnormal bruising.     Objective   BP 93/59   Pulse 97   Ht 160 cm (63\")   Wt 122 kg (269 lb)   LMP  (LMP Unknown)   BMI 47.65 kg/m²     Foot/Ankle Exam:       General:   Appearance: appears stated age and healthy    Orientation: AAOx3    Affect: appropriate    Gait: antalgic    Assistance: independent    Shoe Gear:  Casual shoes and socks    VASCULAR      Right Foot Vascularity   Dorsalis pedis:  2+  Skin Temperature: warm    Edema Grading:  None  CFT:  < 3 seconds      NEUROLOGIC     Right Foot Neurologic   Light touch sensation:  Normal     MUSCULOSKELETAL      Right Foot Musculoskeletal   Ecchymosis:  None  Tenderness: medial malleolus       DERMATOLOGIC     Right Foot Dermatologic   Skin: skin intact        Assessment/Plan   Diagnoses and all orders for this visit:    1. Chronic pain of right ankle (Primary)  -     " XR Ankle 3+ View Right    2. Arthritis of right ankle      X-ray image was obtained today of right ankle and independently reviewed and discussed with patient.  Patient does appear to have arthritis to ankle.  At the physiology was discussed with patient.  We did discuss conservative treatment options.  would like for patient to proceed with lace up ankle brace and follow-up with Dr. Kelley in 2 weeks as patient is interested in surgical options.    Orders Placed This Encounter   Procedures   • XR Ankle 3+ View Right     Scheduling Instructions:      Room 13 wb     Order Specific Question:   Reason for Exam:     Answer:   right ankle pain     Order Specific Question:   Does this patient have a diabetic monitoring/medication delivering device on?     Answer:   No     Order Specific Question:   Release to patient     Answer:   Immediate        Note is dictated utilizing voice recognition software. Unfortunately this leads to occasional typographical errors. I apologize in advance if the situation occurs. If questions occur please do not hesitate to call our office.

## 2022-02-16 ENCOUNTER — OFFICE VISIT (OUTPATIENT)
Dept: CARDIOLOGY | Facility: CLINIC | Age: 77
End: 2022-02-16

## 2022-02-16 VITALS
BODY MASS INDEX: 48.55 KG/M2 | SYSTOLIC BLOOD PRESSURE: 108 MMHG | HEART RATE: 67 BPM | HEIGHT: 63 IN | OXYGEN SATURATION: 100 % | DIASTOLIC BLOOD PRESSURE: 67 MMHG | RESPIRATION RATE: 18 BRPM | WEIGHT: 274 LBS

## 2022-02-16 DIAGNOSIS — E78.2 MIXED HYPERLIPIDEMIA: ICD-10-CM

## 2022-02-16 DIAGNOSIS — N18.32 STAGE 3B CHRONIC KIDNEY DISEASE: ICD-10-CM

## 2022-02-16 DIAGNOSIS — I10 PRIMARY HYPERTENSION: ICD-10-CM

## 2022-02-16 DIAGNOSIS — I25.10 CORONARY ARTERY DISEASE INVOLVING NATIVE HEART WITHOUT ANGINA PECTORIS, UNSPECIFIED VESSEL OR LESION TYPE: Primary | ICD-10-CM

## 2022-02-16 DIAGNOSIS — E66.01 CLASS 3 SEVERE OBESITY DUE TO EXCESS CALORIES WITH SERIOUS COMORBIDITY AND BODY MASS INDEX (BMI) OF 45.0 TO 49.9 IN ADULT: ICD-10-CM

## 2022-02-16 PROCEDURE — 99214 OFFICE O/P EST MOD 30 MIN: CPT | Performed by: INTERNAL MEDICINE

## 2022-02-16 PROCEDURE — 93000 ELECTROCARDIOGRAM COMPLETE: CPT | Performed by: INTERNAL MEDICINE

## 2022-02-16 NOTE — PROGRESS NOTES
Cardiology Office Visit      Encounter Date:  02/16/2022    Patient ID:   Faby Le is a 77 y.o. female.    Reason For Followup:  Coronary artery disease  Shortness of breath    Brief Clinical History:  Dear Dr. Adam, Leonor Ordaz MD    I had the pleasure of seeing Faby Le today. As you are well aware, this is a 77 y.o. female  presented to the office with symptoms of lower extremity edema and some shortness of breath        cardiac catheterization showed moderate disease involving the diagonal and mild-to-moderate disease involving circumflex      Shortness of breath unchanged from baseline but otherwise no new complaints  Compliant with medical therapy  Denies any chest pain  Denies any prior history of DVT or PE    Assessment & Plan    Impressions:  Morbid obesity/Body mass index is 48.54 kg/m².  Bilateral lower extremity edema  Shortness of breath  Dyspnea on exertion  Obstructive sleep apnea  Coronary artery disease  Diastolic dysfunction  Hypertension  Newly diagnosed cardiomyopathy with LV ejection fraction of 40%/repeat echocardiogram now with LV ejection fraction of 50%  Normal LV systolic function in 2018  Moderate obstructive coronary artery disease involving the diagonal branch of the LAD and mild to moderate obstructive coronary artery disease involving the left circumflex artery in 2018      Recommendations:  Venous Doppler with no significant pathology  Echocardiogram with newly diagnosed cardiomyopathy with LV ejection fraction of 40%  Repeat echocardiogram with LV ejection fraction of 50%  Continue Bumex to 1 mg p.o. once a day  Continue losartan 25 mg p.o. once a day  Continue Aldactone 12.5 mg p.o. once a day  Labs reviewed and discussed with patient  Discussed with patient about need for regular exercise weight loss and continued aggressive risk factor modification  Continue current medical management for cardiomyopathy  Need for regular exercise weight loss reviewed and discussed with  "patient  Labs and medications reviewed and discussed with the patient  Low-salt diet  Follow-up in office in 6 months    Objective:    Vitals:  Vitals:    02/16/22 1511   BP: 108/67   BP Location: Left arm   Patient Position: Sitting   Cuff Size: Large Adult   Pulse: 67   Resp: 18   SpO2: 100%   Weight: 124 kg (274 lb)   Height: 160 cm (63\")       Physical Exam:    General: Alert, cooperative, no distress, appears stated age  Head:  Normocephalic, atraumatic, mucous membranes moist  Eyes:  Conjunctiva/corneas clear, EOM's intact     Neck:  Supple,  no adenopathy;      Lungs: Clear to auscultation bilaterally, no wheezes rhonchi rales are noted  Chest wall: No tenderness  Heart::  Regular rate and rhythm, S1 and S2 normal, displaced LV apical  Abdomen: Soft, non-tender, nondistended bowel sounds active  Extremities: No cyanosis, clubbing, or edema  Pulses: 2+ and symmetric all extremities  Skin:  No rashes or lesions  Neuro/psych: A&O x3. CN II through XII are grossly intact with appropriate affect      Allergies:  Allergies   Allergen Reactions   • Celecoxib Itching     swelling       Medication Review:     Current Outpatient Medications:   •  allopurinol (ZYLOPRIM) 300 MG tablet, TAKE 1 TABLET BY MOUTH EVERY DAY, Disp: 90 tablet, Rfl: 2  •  amLODIPine (Norvasc) 5 MG tablet, Take 1 tablet by mouth Daily., Disp: 90 tablet, Rfl: 3  •  APPLE CIDER VINEGAR PO, Take 1 tablet by mouth 2 (Two) Times a Day. Take 2 tablets am and 1 pm, Disp: , Rfl:   •  atorvastatin (LIPITOR) 20 MG tablet, TAKE 1 TABLET BY MOUTH EVERYDAY AT BEDTIME, Disp: 90 tablet, Rfl: 3  •  azelastine (ASTELIN) 0.1 % nasal spray, , Disp: , Rfl:   •  bumetanide (BUMEX) 2 MG tablet, TAKE 1/2 TABLET BY MOUTH EVERY DAY, Disp: 45 tablet, Rfl: 2  •  cetirizine (zyrTEC) 10 MG tablet, Take 1 tablet by mouth Daily., Disp: , Rfl:   •  cyanocobalamin (VITAMIN B-12) 1000 MCG tablet, Take 1 tablet by mouth Daily., Disp: , Rfl:   •  Enflurane (COMPOUND 347 IN), , Disp: " , Rfl:   •  escitalopram (LEXAPRO) 20 MG tablet, TAKE 1 TABLET BY MOUTH EVERY DAY, Disp: 90 tablet, Rfl: 2  •  Gelsyn-3 16.8 MG/2ML solution prefilled syringe injection, , Disp: , Rfl:   •  hydrocortisone 2.5 % ointment, APPLY A THIN LAYER TO THE AFFECTED AREA(S) BY TOPICAL ROUTE 2 TIMES PER DAY UNTIL HEALED., Disp: , Rfl:   •  levothyroxine (SYNTHROID, LEVOTHROID) 88 MCG tablet, TAKE 1 TABLET BY MOUTH EVERY DAY, Disp: 90 tablet, Rfl: 3  •  Linzess 72 MCG capsule capsule, Take 72 mcg by mouth Daily., Disp: , Rfl:   •  losartan (COZAAR) 25 MG tablet, TAKE 1 TABLET BY MOUTH EVERY DAY, Disp: 90 tablet, Rfl: 2  •  metoprolol tartrate (LOPRESSOR) 25 MG tablet, TAKE 1 TABLET BY MOUTH EVERYDAY AT BEDTIME, Disp: 90 tablet, Rfl: 3  •  montelukast (SINGULAIR) 10 MG tablet, Take 10 mg by mouth Daily., Disp: , Rfl:   •  Multiple Vitamins-Minerals (CENTRUM SILVER ADULT 50+) tablet, Take 1 tablet by mouth Daily., Disp: , Rfl:   •  Omega-3 Fatty Acids (FISH OIL) 1000 MG capsule capsule, Take 1 capsule by mouth Daily., Disp: , Rfl:   •  pantoprazole (PROTONIX) 20 MG EC tablet, TAKE 1 TABLET BY MOUTH EVERY DAY IN THE MORNING 30 MINUTES BEFORE A MEAL, Disp: , Rfl:   •  spironolactone (ALDACTONE) 25 MG tablet, TAKE 1/2 TABLET BY MOUTH EVERY DAY, Disp: 45 tablet, Rfl: 4  •  Vitamin D, Cholecalciferol, 25 MCG (1000 UT) tablet, Take 1 tablet by mouth Daily., Disp: , Rfl:     Family History:  Family History   Problem Relation Age of Onset   • Hypertension Mother    • Stroke Father    • Arthritis Brother    • Cancer Brother        Past Medical History:  Past Medical History:   Diagnosis Date   • Ankle pain    • Arthritis    • Cancer (HCC)     uterine, had hysterectomy 2018   • Coronary artery disease    • Depression    • Gout    • Hyperglycemia    • Hyperlipidemia    • Low back pain    • Sleep apnea    • Vitamin D deficiency        Past surgical History:  Past Surgical History:   Procedure Laterality Date   • ANKLE FUSION      2017-left    • CARDIAC CATHETERIZATION     • CARPAL TUNNEL RELEASE     • CATARACT EXTRACTION     • CUBITAL TUNNEL RELEASE     • HYSTERECTOMY     • REPLACEMENT TOTAL KNEE      left 2008   • ROTATOR CUFF REPAIR         Social History:  Social History     Socioeconomic History   • Marital status:    Tobacco Use   • Smoking status: Never Smoker   • Smokeless tobacco: Never Used   Vaping Use   • Vaping Use: Never used   Substance and Sexual Activity   • Alcohol use: No   • Drug use: No   • Sexual activity: Not Currently       Review of Systems:  The following systems were reviewed as they relate to the cardiovascular system: Constitutional, Eyes, ENT, Cardiovascular, Respiratory, Gastrointestinal, Integumentary, Neurological, Psychiatric, Hematologic, Endocrine, Musculoskeletal, and Genitourinary. The pertinent cardiovascular findings are reported above with all other cardiovascular points within those systems being negative.    Diagnostic Study Review:     Current Electrocardiogram:    ECG 12 Lead    Date/Time: 2/16/2022 5:36 PM  Performed by: Ratna Zavala MD  Authorized by: Ratna Zavala MD   Comparison: compared with previous ECG   Similar to previous ECG  Rhythm: sinus rhythm  Rate: normal  BPM: 69  Conduction: conduction normal  ST Segments: ST segments normal  T Waves: T waves normal  QRS axis: normal    Clinical impression: normal ECG              NOTE: The following portions of the patient's history were reviewed and updated this visit as appropriate: allergies, current medications, past family history, past medical history, past social history, past surgical history and problem list.

## 2022-02-25 ENCOUNTER — TELEPHONE (OUTPATIENT)
Dept: FAMILY MEDICINE CLINIC | Facility: CLINIC | Age: 77
End: 2022-02-25

## 2022-02-25 NOTE — TELEPHONE ENCOUNTER
PATIENT SAID SHE WAS SUPPOSED TO SEND IN 10 BLOOD PRESSURE READINGS. SHE SAID SHE STARTED ON THE 7TH BUT MISSED A COUPLE DAYS HERE AND THERE.     2-7-22: 130/60    128/54    119/61    89/48    106/59    109/63    128/66    96/56    114/68    113/66

## 2022-02-25 NOTE — TELEPHONE ENCOUNTER
HUB RELAYED MESSAGE TO PATIENT. PATIENT VOICED UNDERSTANDING. PATIENT HAD NO FURTHER QUESTIONS AT THE MOMENT

## 2022-03-01 ENCOUNTER — OFFICE VISIT (OUTPATIENT)
Dept: FAMILY MEDICINE CLINIC | Facility: CLINIC | Age: 77
End: 2022-03-01

## 2022-03-01 VITALS
TEMPERATURE: 97.8 F | OXYGEN SATURATION: 98 % | HEIGHT: 63 IN | WEIGHT: 272.2 LBS | SYSTOLIC BLOOD PRESSURE: 117 MMHG | HEART RATE: 86 BPM | DIASTOLIC BLOOD PRESSURE: 69 MMHG | BODY MASS INDEX: 48.23 KG/M2

## 2022-03-01 DIAGNOSIS — I10 PRIMARY HYPERTENSION: ICD-10-CM

## 2022-03-01 DIAGNOSIS — Z74.09 MOBILITY IMPAIRED: Primary | ICD-10-CM

## 2022-03-01 DIAGNOSIS — E66.01 CLASS 3 SEVERE OBESITY DUE TO EXCESS CALORIES WITH SERIOUS COMORBIDITY AND BODY MASS INDEX (BMI) OF 45.0 TO 49.9 IN ADULT: ICD-10-CM

## 2022-03-01 PROCEDURE — 99212 OFFICE O/P EST SF 10 MIN: CPT | Performed by: PREVENTIVE MEDICINE

## 2022-03-01 RX ORDER — ESCITALOPRAM OXALATE 20 MG/1
TABLET ORAL
Qty: 90 TABLET | Refills: 2 | Status: SHIPPED | OUTPATIENT
Start: 2022-03-01 | End: 2022-09-19

## 2022-03-01 NOTE — PROGRESS NOTES
Subjective   Faby Le is a 77 y.o. female presents for   Chief Complaint   Patient presents with   • Follow-up     power wheel chair face to face   Patient presents today for follow-up for power wheelchair she has undergone evaluation at [facility] and due to her lack of arm strength from prior shoulder injuries and poor mobility due to ankle fixation ankle pain and knee pain it is felt that a power wheelchair would be in her best interest.  She has difficulty walking to the toilet getting to the grocery store cooking and cleaning and the power wheelchair will help facilitate that patient is quite obese and has 1 ankle fixation as well as pain in the other ankle and knee and she would not be safe and have sufficient mobility with a cane or walker she has problems getting in between rooms to the bathroom and to fix her food.  Also has trouble answering the door and moving about in order to leave her home safely.  Her home is set up for wheelchair access she does have a ramp in order to get in and out of the house and she has been advised as to what sort of trailer or bumper attachment would allow her to use her motor wheelchair out of her home patient does have the mental and physical abilities to operate the mobility prescription equipment prescribed patient cannot use a scooter in the home because she has problems leaning forward and using her shoulders so I do not feel as though a scooter will allow her to safely maintain proper posture and stability.  Patient can safely transfer on and off of the power wheelchair the power wheelchair will allow her greater ability to participate in her usual home daily activities such as bathing cooking cleaning and toileting patient has approximately 75% upper extremity range of motion and strength is approximately 3-4 out of 5 in arms and legs.  Patient has left ankle fixation and right ankle pain along with right knee pain pain levels are greatest for  "the knee is presently patient has a difficult time with balance.    There are no preventive care reminders to display for this patient.    History of Present Illness     Vitals:    03/01/22 1502 03/01/22 1504   BP: 123/71 117/69   BP Location: Right arm Left arm   Patient Position: Sitting Sitting   Cuff Size: Large Adult Large Adult   Pulse: 86    Temp: 97.8 °F (36.6 °C)    TempSrc: Temporal    SpO2: 98%    Weight: 123 kg (272 lb 3.2 oz)    Height: 160 cm (63\")      Body mass index is 48.22 kg/m².    Current Outpatient Medications on File Prior to Visit   Medication Sig Dispense Refill   • allopurinol (ZYLOPRIM) 300 MG tablet TAKE 1 TABLET BY MOUTH EVERY DAY 90 tablet 2   • amLODIPine (Norvasc) 5 MG tablet Take 1 tablet by mouth Daily. 90 tablet 3   • APPLE CIDER VINEGAR PO Take 1 tablet by mouth 2 (Two) Times a Day. Take 2 tablets am and 1 pm     • atorvastatin (LIPITOR) 20 MG tablet TAKE 1 TABLET BY MOUTH EVERYDAY AT BEDTIME 90 tablet 3   • azelastine (ASTELIN) 0.1 % nasal spray      • bumetanide (BUMEX) 2 MG tablet TAKE 1/2 TABLET BY MOUTH EVERY DAY 45 tablet 2   • cetirizine (zyrTEC) 10 MG tablet Take 1 tablet by mouth Daily.     • cyanocobalamin (VITAMIN B-12) 1000 MCG tablet Take 1 tablet by mouth Daily.     • Enflurane (COMPOUND 347 IN)      • Gelsyn-3 16.8 MG/2ML solution prefilled syringe injection      • hydrocortisone 2.5 % ointment APPLY A THIN LAYER TO THE AFFECTED AREA(S) BY TOPICAL ROUTE 2 TIMES PER DAY UNTIL HEALED.     • levothyroxine (SYNTHROID, LEVOTHROID) 88 MCG tablet TAKE 1 TABLET BY MOUTH EVERY DAY 90 tablet 3   • Linzess 72 MCG capsule capsule Take 72 mcg by mouth Daily.     • losartan (COZAAR) 25 MG tablet TAKE 1 TABLET BY MOUTH EVERY DAY 90 tablet 2   • metoprolol tartrate (LOPRESSOR) 25 MG tablet TAKE 1 TABLET BY MOUTH EVERYDAY AT BEDTIME 90 tablet 3   • montelukast (SINGULAIR) 10 MG tablet Take 10 mg by mouth Daily.     • Multiple Vitamins-Minerals (CENTRUM SILVER ADULT 50+) tablet Take " 1 tablet by mouth Daily.     • Omega-3 Fatty Acids (FISH OIL) 1000 MG capsule capsule Take 1 capsule by mouth Daily.     • spironolactone (ALDACTONE) 25 MG tablet TAKE 1/2 TABLET BY MOUTH EVERY DAY 45 tablet 4   • Vitamin D, Cholecalciferol, 25 MCG (1000 UT) tablet Take 1 tablet by mouth Daily.     • pantoprazole (PROTONIX) 20 MG EC tablet TAKE 1 TABLET BY MOUTH EVERY DAY IN THE MORNING 30 MINUTES BEFORE A MEAL     • [DISCONTINUED] escitalopram (LEXAPRO) 20 MG tablet TAKE 1 TABLET BY MOUTH EVERY DAY 90 tablet 2     No current facility-administered medications on file prior to visit.       The following portions of the patient's history were reviewed and updated as appropriate: allergies, current medications, past family history, past medical history, past social history, past surgical history and problem list.    Review of Systems   Respiratory: Positive for shortness of breath.    Musculoskeletal: Positive for arthralgias, gait problem, joint swelling and myalgias.       Objective   Physical Exam  Vitals reviewed.   Constitutional:       General: She is not in acute distress.     Appearance: She is well-developed. She is obese. She is not ill-appearing or toxic-appearing.   HENT:      Head: Normocephalic and atraumatic.      Nose: Nose normal.   Eyes:      Extraocular Movements: Extraocular movements intact.      Conjunctiva/sclera: Conjunctivae normal.      Pupils: Pupils are equal, round, and reactive to light.   Cardiovascular:      Rate and Rhythm: Normal rate and regular rhythm.      Heart sounds: Normal heart sounds.   Pulmonary:      Effort: Pulmonary effort is normal.      Breath sounds: Normal breath sounds.   Abdominal:      General: Bowel sounds are normal. There is no distension.      Palpations: Abdomen is soft. There is no mass.      Tenderness: There is no abdominal tenderness.   Musculoskeletal:         General: Swelling, tenderness, deformity and signs of injury present.      Cervical back: Neck  supple.   Skin:     General: Skin is warm.   Neurological:      Mental Status: She is alert and oriented to person, place, and time.      Sensory: Sensory deficit present.      Motor: Weakness present.      Gait: Gait abnormal.   Psychiatric:         Mood and Affect: Mood normal.         Behavior: Behavior normal.       PHQ-9 Total Score:      Assessment/Plan   Diagnoses and all orders for this visit:    1. Mobility impaired (Primary)  Comments:  Patient has undergone mobility evaluation in a power wheelchair is felt to be in her best interest.    2. Class 3 severe obesity due to excess calories with serious comorbidity and body mass index (BMI) of 45.0 to 49.9 in adult (HCC)    3. Primary hypertension  Comments:  Blood pressure was under good control today.        There are no Patient Instructions on file for this visit.

## 2022-03-02 ENCOUNTER — OFFICE VISIT (OUTPATIENT)
Dept: PODIATRY | Facility: CLINIC | Age: 77
End: 2022-03-02

## 2022-03-02 VITALS
DIASTOLIC BLOOD PRESSURE: 79 MMHG | SYSTOLIC BLOOD PRESSURE: 137 MMHG | BODY MASS INDEX: 48.23 KG/M2 | HEART RATE: 74 BPM | HEIGHT: 63 IN | WEIGHT: 272.2 LBS

## 2022-03-02 DIAGNOSIS — G89.29 CHRONIC PAIN OF RIGHT ANKLE: Primary | ICD-10-CM

## 2022-03-02 DIAGNOSIS — M19.071 ARTHRITIS OF RIGHT ANKLE: ICD-10-CM

## 2022-03-02 DIAGNOSIS — M25.572 CHRONIC PAIN OF LEFT ANKLE: ICD-10-CM

## 2022-03-02 DIAGNOSIS — G89.29 CHRONIC PAIN OF LEFT ANKLE: ICD-10-CM

## 2022-03-02 DIAGNOSIS — M25.571 CHRONIC PAIN OF RIGHT ANKLE: Primary | ICD-10-CM

## 2022-03-02 PROCEDURE — 99203 OFFICE O/P NEW LOW 30 MIN: CPT | Performed by: PODIATRIST

## 2022-03-03 NOTE — PROGRESS NOTES
03/02/2022  Foot and Ankle Surgery - New Patient   Provider: Dr. Donta Kelley DPM  Location: [facility]    Subjective:  Faby Le is a 77 y.o. female.     Chief Complaint   Patient presents with   • Right Ankle - Pain   • Establish Care     Last pcp appt with DEBORA Adam MD 3/1/2022       HPI: Patient is a 77-year-old female that presents for issues involving her right ankle.  She complains of longstanding ankle pain and limitation.  She also complains of issues involving her right knee.  Patient does see Dr. Thorpe regarding her right knee and has started viscoelastic type injections.  She states that these issues have been present for years.  She has had similar problems involving her left lower extremity which did require knee replacement and fusion involving her rear foot and ankle.  The rear foot and ankle surgery was apparently performed in 2017.  She states that she is able to perform her normal daily activities just with moderate limitation.  She is unaware of any recent injuries but is concerned that the symptoms will progress and cause further limitation.  She has been wearing a lace up ankle brace to her right ankle at this time.    Allergies   Allergen Reactions   • Celecoxib Itching     swelling       Past Medical History:   Diagnosis Date   • Ankle pain    • Arthritis    • Cancer (HCC)     uterine, had hysterectomy 2018   • Coronary artery disease    • Depression    • Gout    • Hyperglycemia    • Hyperlipidemia    • Low back pain    • Sleep apnea    • Vitamin D deficiency        Past Surgical History:   Procedure Laterality Date   • ANKLE FUSION      2017-left   • CARDIAC CATHETERIZATION     • CARPAL TUNNEL RELEASE     • CATARACT EXTRACTION     • CUBITAL TUNNEL RELEASE     • HYSTERECTOMY     • REPLACEMENT TOTAL KNEE      left 2008   • ROTATOR CUFF REPAIR         Family History   Problem Relation Age of Onset   • Hypertension Mother    • Stroke Father    • Arthritis Brother    • Cancer  Brother        Social History     Socioeconomic History   • Marital status:    Tobacco Use   • Smoking status: Never Smoker   • Smokeless tobacco: Never Used   Vaping Use   • Vaping Use: Never used   Substance and Sexual Activity   • Alcohol use: No   • Drug use: No   • Sexual activity: Not Currently        Current Outpatient Medications on File Prior to Visit   Medication Sig Dispense Refill   • allopurinol (ZYLOPRIM) 300 MG tablet TAKE 1 TABLET BY MOUTH EVERY DAY 90 tablet 2   • amLODIPine (Norvasc) 5 MG tablet Take 1 tablet by mouth Daily. 90 tablet 3   • APPLE CIDER VINEGAR PO Take 1 tablet by mouth 2 (Two) Times a Day. Take 2 tablets am and 1 pm     • atorvastatin (LIPITOR) 20 MG tablet TAKE 1 TABLET BY MOUTH EVERYDAY AT BEDTIME 90 tablet 3   • azelastine (ASTELIN) 0.1 % nasal spray      • bumetanide (BUMEX) 2 MG tablet TAKE 1/2 TABLET BY MOUTH EVERY DAY 45 tablet 2   • cetirizine (zyrTEC) 10 MG tablet Take 1 tablet by mouth Daily.     • cyanocobalamin (VITAMIN B-12) 1000 MCG tablet Take 1 tablet by mouth Daily.     • Enflurane (COMPOUND 347 IN)      • escitalopram (LEXAPRO) 20 MG tablet TAKE 1 TABLET BY MOUTH EVERY DAY 90 tablet 2   • Gelsyn-3 16.8 MG/2ML solution prefilled syringe injection      • hydrocortisone 2.5 % ointment APPLY A THIN LAYER TO THE AFFECTED AREA(S) BY TOPICAL ROUTE 2 TIMES PER DAY UNTIL HEALED.     • levothyroxine (SYNTHROID, LEVOTHROID) 88 MCG tablet TAKE 1 TABLET BY MOUTH EVERY DAY 90 tablet 3   • Linzess 72 MCG capsule capsule Take 72 mcg by mouth Daily.     • losartan (COZAAR) 25 MG tablet TAKE 1 TABLET BY MOUTH EVERY DAY 90 tablet 2   • metoprolol tartrate (LOPRESSOR) 25 MG tablet TAKE 1 TABLET BY MOUTH EVERYDAY AT BEDTIME 90 tablet 3   • montelukast (SINGULAIR) 10 MG tablet Take 10 mg by mouth Daily.     • Multiple Vitamins-Minerals (CENTRUM SILVER ADULT 50+) tablet Take 1 tablet by mouth Daily.     • Omega-3 Fatty Acids (FISH OIL) 1000 MG capsule capsule Take 1 capsule by  "mouth Daily.     • pantoprazole (PROTONIX) 20 MG EC tablet TAKE 1 TABLET BY MOUTH EVERY DAY IN THE MORNING 30 MINUTES BEFORE A MEAL     • spironolactone (ALDACTONE) 25 MG tablet TAKE 1/2 TABLET BY MOUTH EVERY DAY 45 tablet 4   • Vitamin D, Cholecalciferol, 25 MCG (1000 UT) tablet Take 1 tablet by mouth Daily.       No current facility-administered medications on file prior to visit.       Review of Systems:  General: Denies fever, chills, fatigue, and weakness.  Eyes: Denies vision loss, blurry vision, and excessive redness.  ENT: Denies hearing issues and difficulty swallowing.  Cardiovascular: Denies palpitations, chest pain, or syncopal episodes.  Respiratory: Denies shortness of breath, wheezing, and coughing.  GI: Denies abdominal pain, nausea, and vomiting.   : Denies frequency, hematuria, and urgency.  Musculoskeletal: + Right ankle pain  Derm: Denies rash, open wounds, or suspicious lesions.  Neuro: Denies headaches, numbness, loss of coordination, and tremors.  Psych: Denies anxiety and depression.  Endocrine: Denies temperature intolerance and changes in appetite.  Heme: Denies bleeding disorders or abnormal bruising.     Objective   /79   Pulse 74   Ht 160 cm (63\")   Wt 123 kg (272 lb 3.2 oz)   LMP  (LMP Unknown)   BMI 48.22 kg/m²     Foot/Ankle Exam:       General:   Appearance: appears stated age and healthy and obesity    Orientation: AAOx3    Affect: appropriate    Gait: antalgic      VASCULAR      Right Foot Vascularity   Normal vascular exam    Dorsalis pedis:  2+  Posterior tibial:  2+  Skin Temperature: warm    Edema Gradin+  CFT:  < 3 seconds  Pedal Hair Growth:  Present  Varicosities: none        NEUROLOGIC     Right Foot Neurologic   Light touch sensation:  Normal  Hot/Cold sensation: normal    Achilles reflex:  2+     MUSCULOSKELETAL      Right Foot Musculoskeletal   Ecchymosis:  None  Tenderness: medial malleolus, lateral malleolus, posterior tibial tendon and anterior " tibiotalar joint line    Arch:  Pes planus     MUSCLE STRENGTH     Right Foot Muscle Strength   Normal strength    Foot dorsiflexion:  5  Foot plantar flexion:  5  Foot inversion:  5  Foot eversion:  5     DERMATOLOGIC     Right Foot Dermatologic   Skin: skin intact       TESTS     Right Foot Tests   Anterior drawer: negative    Varus tilt: negative        Right Foot Additional Comments Significant rigidity and crepitus with attempted range of motion of the right ankle joint as well as rear foot.  Significant genu valgum involving the right knee.      Assessment/Plan   Diagnoses and all orders for this visit:    1. Chronic pain of right ankle (Primary)  -     XR Ankle 3+ View Left    2. Arthritis of right ankle    3. Chronic pain of left ankle      Patient presents with issues involving her right lower extremity.  She is seeing me regarding her right ankle pain as she has had longstanding issues.  She has been told that she has significant arthritis involving the ankle.  She did have similar issues involving her left lower extremity which ultimately required left knee replacement as well as apparent tibial talocalcaneal fusion.  Imaging was obtained and an inflow reviewed from both lower extremities.  Findings do show stable internal fixation across the left lower extremity fusion sites.  She does have moderate degenerative changes involving the midfoot.  As for her right ankle, she has end-stage degenerative changes to the tibiotalar joint along with moderate degenerative changes involving the subtalar joint.  I did discuss the imaging, diagnoses, and treatment options at length.  She does have significant issues involving the right lower extremity.  She has performed conservative therapies for the right ankle with only minimal improvement.  In her current situation, I feel that she would only be a candidate for tibiotalo calcaneal fusion.  I feel that the most appropriate option would be to address her knee prior  to performing any type of surgery involving her right ankle.  I have discussed this with her at length.  I would like her to mention this to Dr. Thorpe.  At this time, I have asked that she remain in the lace up ankle brace.  We did discuss appropriate activity, RICE therapy, and appropriate use of OTC anti-inflammatories.  She is to call with any progressive issues or concerns.  I will see her in 3 months for reevaluation.  Greater than 30 minutes was spent before, during, and after evaluation for patient care.    Orders Placed This Encounter   Procedures   • XR Ankle 3+ View Left     Order Specific Question:   Reason for Exam:     Answer:   s/p left ankle fusion 2017, room 13 wb. may leave after xray     Order Specific Question:   Does this patient have a diabetic monitoring/medication delivering device on?     Answer:   No     Order Specific Question:   Release to patient     Answer:   Immediate        Note is dictated utilizing voice recognition software. Unfortunately this leads to occasional typographical errors. I apologize in advance if the situation occurs. If questions occur please do not hesitate to call our office.

## 2022-03-03 NOTE — PATIENT INSTRUCTIONS
Ankle Pain  The ankle joint holds your body weight and allows you to move around. Ankle pain can occur on either side or the back of one ankle or both ankles. Ankle pain may be sharp and burning or dull and aching. There may be tenderness, stiffness, redness, or warmth around the ankle. Many things can cause ankle pain, including an injury to the area and overuse of the ankle.  Follow these instructions at home:  Activity  · Rest your ankle as told by your health care provider. Avoid any activities that cause ankle pain.  · Do not use the injured limb to support your body weight until your health care provider says that you can. Use crutches as told by your health care provider.  · Do exercises as told by your health care provider.  · Ask your health care provider when it is safe to drive if you have a brace on your ankle.  If you have a brace:  · Wear the brace as told by your health care provider. Remove it only as told by your health care provider.  · Loosen the brace if your toes tingle, become numb, or turn cold and blue.  · Keep the brace clean.  · If the brace is not waterproof:  ? Do not let it get wet.  ? Cover it with a watertight covering when you take a bath or shower.  If you were given an elastic bandage:    · Remove it when you take a bath or a shower.  · Try not to move your ankle very much, but wiggle your toes from time to time. This helps to prevent swelling.  · Adjust the bandage to make it more comfortable if it feels too tight.  · Loosen the bandage if you have numbness or tingling in your foot or if your foot turns cold and blue.    Managing pain, stiffness, and swelling    · If directed, put ice on the painful area.  ? If you have a removable brace or elastic bandage, remove it as told by your health care provider.  ? Put ice in a plastic bag.  ? Place a towel between your skin and the bag.  ? Leave the ice on for 20 minutes, 2-3 times a day.  · Move your toes often to avoid stiffness and to  lessen swelling.  · Raise (elevate) your ankle above the level of your heart while you are sitting or lying down.    General instructions  · Record information about your pain. Writing down the following may be helpful for you and your health care provider:  ? How often you have ankle pain.  ? Where the pain is located.  ? What the pain feels like.  · If treatment involves wearing a prescribed shoe or insole, make sure you wear it correctly and for as long as told by your health care provider.  · Take over-the-counter and prescription medicines only as told by your health care provider.  · Keep all follow-up visits as told by your health care provider. This is important.  Contact a health care provider if:  · Your pain gets worse.  · Your pain is not relieved with medicines.  · You have a fever or chills.  · You are having more trouble with walking.  · You have new symptoms.  Get help right away if:  · Your foot, leg, toes, or ankle:  ? Tingles or becomes numb.  ? Becomes swollen.  ? Turns pale or blue.  Summary  · Ankle pain can occur on either side or the back of one ankle or both ankles.  · Ankle pain may be sharp and burning or dull and aching.  · Rest your ankle as told by your health care provider. If told, apply ice to the area.  · Take over-the-counter and prescription medicines only as told by your health care provider.  This information is not intended to replace advice given to you by your health care provider. Make sure you discuss any questions you have with your health care provider.  Document Revised: 04/07/2020 Document Reviewed: 06/26/2019  Elsevier Patient Education © 2021 [employer]

## 2022-04-25 ENCOUNTER — TELEPHONE (OUTPATIENT)
Dept: FAMILY MEDICINE CLINIC | Facility: CLINIC | Age: 77
End: 2022-04-25

## 2022-04-25 NOTE — TELEPHONE ENCOUNTER
If that is the company she was ordering from-may give them a call and see if they have corrected.  If we missed signing something-have them send back to us and we'll sign

## 2022-04-25 NOTE — TELEPHONE ENCOUNTER
PT RECEIVED a letter dated 4/13/22 saying request for chair has been denied, and needing signed documentation from [employer]. Please advise.

## 2022-04-26 ENCOUNTER — TELEPHONE (OUTPATIENT)
Dept: FAMILY MEDICINE CLINIC | Facility: CLINIC | Age: 77
End: 2022-04-26

## 2022-04-26 NOTE — TELEPHONE ENCOUNTER
Spoke with patient looks like was faxed in on 3/18 and 3/25.  Will refax and patient is going to call [employer] and follow up as well.

## 2022-04-26 NOTE — TELEPHONE ENCOUNTER
THE PATIENT STATES THAT IN ORDER TO RECEIVE HER WHEELCHAIR, HER DOCTOR MUST CALL THE [facility] PRIOR-AUTHORIZATION LINE AT  [phone].

## 2022-04-27 NOTE — TELEPHONE ENCOUNTER
Unable to reach at phone number provided.  Filled Out and submitted PA form for [facility] and faxed.

## 2022-04-29 ENCOUNTER — TELEPHONE (OUTPATIENT)
Dept: FAMILY MEDICINE CLINIC | Facility: CLINIC | Age: 77
End: 2022-04-29

## 2022-04-29 NOTE — TELEPHONE ENCOUNTER
He may want to see her to make sure not infected or other problem-call his office now-If can't get through may want to go to ER as we have no xray here

## 2022-04-29 NOTE — TELEPHONE ENCOUNTER
Caller: Faby Le    Relationship to patient: Self    Best call back number: [phone]    Patient is needing: PATIENT STATES HER RIGHT KNEE IS IN A LOT OF PAIN. SHE STATES SHE HAS BEEN GETTING GEL INJECTIONS TO HELP THE PAIN AT DR. UQINTERO OFFICE. PATIENT STATES SHE DOESN'T GO BACK TO SEE HIM UNTIL 6-1-22 AND SHE STATES SHE CANT STAND THE PAIN UNTIL THEN. PATIENT IS ASKING WHAT SHE SHOULD DO ABOUT THE PAIN. PLEASE ADVISE.

## 2022-05-03 RX ORDER — LOSARTAN POTASSIUM 25 MG/1
TABLET ORAL
Qty: 90 TABLET | Refills: 2 | Status: SHIPPED | OUTPATIENT
Start: 2022-05-03 | End: 2022-08-12

## 2022-05-13 ENCOUNTER — TELEPHONE (OUTPATIENT)
Dept: FAMILY MEDICINE CLINIC | Facility: CLINIC | Age: 77
End: 2022-05-13

## 2022-05-13 NOTE — TELEPHONE ENCOUNTER
Hub staff attempted to follow warm transfer process and was unsuccessful     Caller: Faby Le S    Relationship to patient: Self    Best call back number: [phone] (H)    Patient is needing: SOME OF THE PAPERWORK FOR POWER WHEELCHAIR IS NOT FILLED OUT COMPLETELY AND NEEDS TO BE FILLED OUT SO THAT THEY CAN GET HER WHEELCHAIR APPROVED BY HER MEDICAID.         THANKS

## 2022-06-01 ENCOUNTER — CLINICAL SUPPORT (OUTPATIENT)
Dept: FAMILY MEDICINE CLINIC | Facility: CLINIC | Age: 77
End: 2022-06-01

## 2022-06-01 DIAGNOSIS — E55.9 VITAMIN D DEFICIENCY: ICD-10-CM

## 2022-06-01 DIAGNOSIS — M1A.9XX0 CHRONIC GOUT WITHOUT TOPHUS, UNSPECIFIED CAUSE, UNSPECIFIED SITE: ICD-10-CM

## 2022-06-01 DIAGNOSIS — M25.571 RIGHT ANKLE PAIN, UNSPECIFIED CHRONICITY: ICD-10-CM

## 2022-06-01 DIAGNOSIS — N18.32 STAGE 3B CHRONIC KIDNEY DISEASE: ICD-10-CM

## 2022-06-01 DIAGNOSIS — E53.8 B12 DEFICIENCY: ICD-10-CM

## 2022-06-01 DIAGNOSIS — R42 LIGHT HEADEDNESS: ICD-10-CM

## 2022-06-01 DIAGNOSIS — Z74.09 MOBILITY POOR: ICD-10-CM

## 2022-06-01 DIAGNOSIS — I10 PRIMARY HYPERTENSION: ICD-10-CM

## 2022-06-01 DIAGNOSIS — C54.1 MALIGNANT NEOPLASM OF ENDOMETRIUM: ICD-10-CM

## 2022-06-01 DIAGNOSIS — G47.33 OBSTRUCTIVE SLEEP APNEA SYNDROME: ICD-10-CM

## 2022-06-01 DIAGNOSIS — R13.12 OROPHARYNGEAL DYSPHAGIA: ICD-10-CM

## 2022-06-01 DIAGNOSIS — I25.10 CORONARY ARTERY DISEASE INVOLVING NATIVE HEART WITHOUT ANGINA PECTORIS, UNSPECIFIED VESSEL OR LESION TYPE: ICD-10-CM

## 2022-06-01 DIAGNOSIS — E78.2 MIXED HYPERLIPIDEMIA: ICD-10-CM

## 2022-06-01 DIAGNOSIS — R73.9 HYPERGLYCEMIA: ICD-10-CM

## 2022-06-01 DIAGNOSIS — E03.9 HYPOTHYROIDISM, UNSPECIFIED TYPE: ICD-10-CM

## 2022-06-01 DIAGNOSIS — F32.A DEPRESSION, UNSPECIFIED DEPRESSION TYPE: ICD-10-CM

## 2022-06-01 DIAGNOSIS — E66.01 CLASS 3 SEVERE OBESITY DUE TO EXCESS CALORIES WITH SERIOUS COMORBIDITY AND BODY MASS INDEX (BMI) OF 45.0 TO 49.9 IN ADULT: ICD-10-CM

## 2022-06-01 LAB
ALBUMIN SERPL-MCNC: 4.3 G/DL (ref 3.5–5.2)
ALBUMIN/GLOB SERPL: 1.5 G/DL
ALP SERPL-CCNC: 126 U/L (ref 39–117)
ALT SERPL W P-5'-P-CCNC: 12 U/L (ref 1–33)
ANION GAP SERPL CALCULATED.3IONS-SCNC: 11 MMOL/L (ref 5–15)
AST SERPL-CCNC: 22 U/L (ref 1–32)
BILIRUB SERPL-MCNC: 0.5 MG/DL (ref 0–1.2)
BUN SERPL-MCNC: 31 MG/DL (ref 8–23)
BUN/CREAT SERPL: 28.4 (ref 7–25)
CALCIUM SPEC-SCNC: 10.3 MG/DL (ref 8.6–10.5)
CHLORIDE SERPL-SCNC: 102 MMOL/L (ref 98–107)
CO2 SERPL-SCNC: 24 MMOL/L (ref 22–29)
CREAT SERPL-MCNC: 1.09 MG/DL (ref 0.57–1)
EGFRCR SERPLBLD CKD-EPI 2021: 53.1 ML/MIN/1.73
GLOBULIN UR ELPH-MCNC: 2.8 GM/DL
GLUCOSE SERPL-MCNC: 93 MG/DL (ref 65–99)
POTASSIUM SERPL-SCNC: 4.5 MMOL/L (ref 3.5–5.2)
PROT SERPL-MCNC: 7.1 G/DL (ref 6–8.5)
SODIUM SERPL-SCNC: 137 MMOL/L (ref 136–145)

## 2022-06-01 PROCEDURE — 80053 COMPREHEN METABOLIC PANEL: CPT | Performed by: PREVENTIVE MEDICINE

## 2022-06-01 PROCEDURE — 36415 COLL VENOUS BLD VENIPUNCTURE: CPT | Performed by: PREVENTIVE MEDICINE

## 2022-06-01 NOTE — PROGRESS NOTES
Venipuncture Blood Specimen Collection  Venipuncture performed in right arm by Jennifer Vaughn MA with good hemostasis. Patient tolerated the procedure well without complications.   06/01/22   Jennifer Vaughn MA

## 2022-06-02 ENCOUNTER — OFFICE VISIT (OUTPATIENT)
Dept: PODIATRY | Facility: CLINIC | Age: 77
End: 2022-06-02

## 2022-06-02 ENCOUNTER — TELEPHONE (OUTPATIENT)
Dept: FAMILY MEDICINE CLINIC | Facility: CLINIC | Age: 77
End: 2022-06-02

## 2022-06-02 VITALS
HEART RATE: 83 BPM | SYSTOLIC BLOOD PRESSURE: 131 MMHG | DIASTOLIC BLOOD PRESSURE: 71 MMHG | WEIGHT: 272 LBS | BODY MASS INDEX: 48.2 KG/M2 | HEIGHT: 63 IN

## 2022-06-02 DIAGNOSIS — M25.571 CHRONIC PAIN OF RIGHT ANKLE: Primary | ICD-10-CM

## 2022-06-02 DIAGNOSIS — G89.29 CHRONIC PAIN OF RIGHT ANKLE: Primary | ICD-10-CM

## 2022-06-02 DIAGNOSIS — M19.071 ARTHRITIS OF RIGHT ANKLE: ICD-10-CM

## 2022-06-02 PROCEDURE — 99213 OFFICE O/P EST LOW 20 MIN: CPT | Performed by: PODIATRIST

## 2022-06-02 NOTE — TELEPHONE ENCOUNTER
Patient notified and voiced comprehension and understanding.    Jennifer Vaughn MA III    HUB TO READ:  ----- Message from Leonor Adam MD sent at 6/2/2022  9:02 AM EDT -----  Kidney function still decreased so make sure avoids NSAIDS and limits xray dyes.

## 2022-06-02 NOTE — PROGRESS NOTES
06/02/2022  Foot and Ankle Surgery - Established Patient/Follow-up  Provider: Dr. Donta Kelley DPM  Location: [facility]    Subjective:  Faby Le is a 77 y.o. female.     Chief Complaint   Patient presents with   • Right Ankle - Pain   • Follow-up     DEBORA Sykes 3/1/2022       HPI: Patient returns today for follow-up regarding her right ankle.     The patient reports that she is doing better while using the right ankle brace. She has been wearing her ankle brace daily. She states that her left ankle will turn while standing. The patient reports that she has an appointment with Dr. Thorpe next week. She previously had injections into her bilateral knees.     Allergies   Allergen Reactions   • Celecoxib Itching     swelling       Current Outpatient Medications on File Prior to Visit   Medication Sig Dispense Refill   • allopurinol (ZYLOPRIM) 300 MG tablet TAKE 1 TABLET BY MOUTH EVERY DAY 90 tablet 2   • amLODIPine (Norvasc) 5 MG tablet Take 1 tablet by mouth Daily. 90 tablet 3   • APPLE CIDER VINEGAR PO Take 1 tablet by mouth 2 (Two) Times a Day. Take 2 tablets am and 1 pm     • atorvastatin (LIPITOR) 20 MG tablet TAKE 1 TABLET BY MOUTH EVERYDAY AT BEDTIME 90 tablet 3   • azelastine (ASTELIN) 0.1 % nasal spray      • bumetanide (BUMEX) 2 MG tablet TAKE 1/2 TABLET BY MOUTH EVERY DAY 45 tablet 2   • cetirizine (zyrTEC) 10 MG tablet Take 1 tablet by mouth Daily.     • cyanocobalamin (VITAMIN B-12) 1000 MCG tablet Take 1 tablet by mouth Daily.     • Enflurane (COMPOUND 347 IN)      • escitalopram (LEXAPRO) 20 MG tablet TAKE 1 TABLET BY MOUTH EVERY DAY 90 tablet 2   • Gelsyn-3 16.8 MG/2ML solution prefilled syringe injection      • hydrocortisone 2.5 % ointment APPLY A THIN LAYER TO THE AFFECTED AREA(S) BY TOPICAL ROUTE 2 TIMES PER DAY UNTIL HEALED.     • levothyroxine (SYNTHROID, LEVOTHROID) 88 MCG tablet TAKE 1 TABLET BY MOUTH EVERY DAY 90 tablet 3   • Linzess 72 MCG capsule capsule Take 72 mcg by mouth  "Daily.     • losartan (COZAAR) 25 MG tablet TAKE 1 TABLET BY MOUTH EVERY DAY 90 tablet 2   • metoprolol tartrate (LOPRESSOR) 25 MG tablet TAKE 1 TABLET BY MOUTH EVERYDAY AT BEDTIME 90 tablet 3   • montelukast (SINGULAIR) 10 MG tablet Take 10 mg by mouth Daily.     • Multiple Vitamins-Minerals (CENTRUM SILVER ADULT 50+) tablet Take 1 tablet by mouth Daily.     • Omega-3 Fatty Acids (FISH OIL) 1000 MG capsule capsule Take 1 capsule by mouth Daily.     • pantoprazole (PROTONIX) 20 MG EC tablet TAKE 1 TABLET BY MOUTH EVERY DAY IN THE MORNING 30 MINUTES BEFORE A MEAL     • spironolactone (ALDACTONE) 25 MG tablet TAKE 1/2 TABLET BY MOUTH EVERY DAY 45 tablet 4   • Vitamin D, Cholecalciferol, 25 MCG (1000 UT) tablet Take 1 tablet by mouth Daily.       No current facility-administered medications on file prior to visit.       Objective   /71   Pulse 83   Ht 160 cm (63\")   Wt 123 kg (272 lb)   LMP  (LMP Unknown)   BMI 48.18 kg/m²     Foot/Ankle Exam:       General:   Appearance: appears stated age and healthy and obesity    Orientation: AAOx3    Affect: appropriate    Gait: antalgic      VASCULAR      Right Foot Vascularity   Normal vascular exam    Dorsalis pedis:  2+  Posterior tibial:  2+  Skin Temperature: warm    Edema Gradin+  CFT:  < 3 seconds  Pedal Hair Growth:  Present  Varicosities: none        NEUROLOGIC     Right Foot Neurologic   Light touch sensation:  Normal  Hot/Cold sensation: normal    Achilles reflex:  2+     MUSCULOSKELETAL      Right Foot Musculoskeletal   Ecchymosis:  None  Tenderness: medial malleolus, lateral malleolus, posterior tibial tendon and anterior tibiotalar joint line    Arch:  Pes planus     MUSCLE STRENGTH     Right Foot Muscle Strength   Normal strength    Foot dorsiflexion:  5  Foot plantar flexion:  5  Foot inversion:  5  Foot eversion:  5     DERMATOLOGIC     Right Foot Dermatologic   Skin: skin intact       TESTS     Right Foot Tests   Anterior drawer: negative  "   Varus tilt: negative        Right Foot Additional Comments Significant rigidity and crepitus with attempted range of motion of the right ankle joint as well as rear foot.  Significant genu valgum involving the right knee.      Assessment & Plan   Diagnoses and all orders for this visit:    1. Chronic pain of right ankle (Primary)    2. Arthritis of right ankle       Patient returns for evaluation of her right lower extremity.  She states that she has been doing quite well wearing the lace up ankle brace on a daily basis.  She does try to wear appropriate shoes and states that she has been able to manage her discomfort.  She is not have any prominent pain or limitation at this time.  I did review rice therapy and proper use of OTC anti-inflammatories if necessary.  We did discuss the possibility of a custom AFO.  Patient does not want to consider at this time.  I have dispensed an additional lace up ankle brace.  Patient is to monitor her feet closely and call with any additional issues or concerns.  She has opted to return in 6 months for reevaluation.    Greater than 20 minutes spent before, coming, after evaluation for patient care.    No orders of the defined types were placed in this encounter.         Note is dictated utilizing voice recognition software. Unfortunately this leads to occasional typographical errors. I apologize in advance if the situation occurs. If questions occur please do not hesitate to call our office.    Transcribed from ambient dictation for STEPHANIE Kelley DPM by Madison Toledo.  06/02/22   13:31 EDT    Patient verbalized consent to the visit recording.  I have personally performed the services described in this document as transcribed by the above individual, and it is both accurate and complete.  Madison Toledo.   6/2/2022  13:31 EDT

## 2022-07-25 RX ORDER — ATORVASTATIN CALCIUM 20 MG/1
TABLET, FILM COATED ORAL
Qty: 90 TABLET | Refills: 3 | Status: SHIPPED | OUTPATIENT
Start: 2022-07-25

## 2022-07-26 ENCOUNTER — OFFICE (AMBULATORY)
Dept: URBAN - METROPOLITAN AREA PATHOLOGY 4 | Facility: PATHOLOGY | Age: 77
End: 2022-07-26
Payer: COMMERCIAL

## 2022-07-26 ENCOUNTER — ON CAMPUS - OUTPATIENT (AMBULATORY)
Dept: URBAN - METROPOLITAN AREA HOSPITAL 2 | Facility: HOSPITAL | Age: 77
End: 2022-07-26
Payer: COMMERCIAL

## 2022-07-26 VITALS
DIASTOLIC BLOOD PRESSURE: 64 MMHG | DIASTOLIC BLOOD PRESSURE: 71 MMHG | SYSTOLIC BLOOD PRESSURE: 152 MMHG | HEART RATE: 70 BPM | WEIGHT: 267 LBS | HEART RATE: 66 BPM | HEART RATE: 73 BPM | RESPIRATION RATE: 18 BRPM | DIASTOLIC BLOOD PRESSURE: 73 MMHG | DIASTOLIC BLOOD PRESSURE: 69 MMHG | HEART RATE: 81 BPM | HEIGHT: 63 IN | SYSTOLIC BLOOD PRESSURE: 167 MMHG | OXYGEN SATURATION: 99 % | DIASTOLIC BLOOD PRESSURE: 75 MMHG | SYSTOLIC BLOOD PRESSURE: 137 MMHG | DIASTOLIC BLOOD PRESSURE: 83 MMHG | TEMPERATURE: 97.3 F | HEART RATE: 74 BPM | HEART RATE: 61 BPM | OXYGEN SATURATION: 100 % | RESPIRATION RATE: 16 BRPM | HEART RATE: 64 BPM | SYSTOLIC BLOOD PRESSURE: 146 MMHG | DIASTOLIC BLOOD PRESSURE: 72 MMHG | SYSTOLIC BLOOD PRESSURE: 145 MMHG | SYSTOLIC BLOOD PRESSURE: 139 MMHG | HEART RATE: 62 BPM

## 2022-07-26 DIAGNOSIS — D12.5 BENIGN NEOPLASM OF SIGMOID COLON: ICD-10-CM

## 2022-07-26 DIAGNOSIS — K57.30 DIVERTICULOSIS OF LARGE INTESTINE WITHOUT PERFORATION OR ABS: ICD-10-CM

## 2022-07-26 DIAGNOSIS — D12.3 BENIGN NEOPLASM OF TRANSVERSE COLON: ICD-10-CM

## 2022-07-26 DIAGNOSIS — Z86.010 PERSONAL HISTORY OF COLONIC POLYPS: ICD-10-CM

## 2022-07-26 DIAGNOSIS — K64.0 FIRST DEGREE HEMORRHOIDS: ICD-10-CM

## 2022-07-26 PROBLEM — K63.5 POLYP OF COLON: Status: ACTIVE | Noted: 2022-07-26

## 2022-07-26 LAB
GI HISTOLOGY: A. UNSPECIFIED: (no result)
GI HISTOLOGY: B. UNSPECIFIED: (no result)
GI HISTOLOGY: PDF REPORT: (no result)

## 2022-07-26 PROCEDURE — 88305 TISSUE EXAM BY PATHOLOGIST: CPT | Mod: 26 | Performed by: INTERNAL MEDICINE

## 2022-07-26 PROCEDURE — 45385 COLONOSCOPY W/LESION REMOVAL: CPT | Mod: PT | Performed by: INTERNAL MEDICINE

## 2022-08-12 ENCOUNTER — APPOINTMENT (OUTPATIENT)
Dept: CT IMAGING | Facility: HOSPITAL | Age: 77
End: 2022-08-12

## 2022-08-12 ENCOUNTER — APPOINTMENT (OUTPATIENT)
Dept: GENERAL RADIOLOGY | Facility: HOSPITAL | Age: 77
End: 2022-08-12

## 2022-08-12 ENCOUNTER — HOSPITAL ENCOUNTER (OUTPATIENT)
Facility: HOSPITAL | Age: 77
Setting detail: OBSERVATION
LOS: 3 days | Discharge: HOME | End: 2022-08-15
Attending: EMERGENCY MEDICINE | Admitting: INTERNAL MEDICINE

## 2022-08-12 ENCOUNTER — OFFICE VISIT (OUTPATIENT)
Dept: FAMILY MEDICINE CLINIC | Facility: CLINIC | Age: 77
End: 2022-08-12

## 2022-08-12 VITALS
HEIGHT: 63 IN | HEART RATE: 99 BPM | DIASTOLIC BLOOD PRESSURE: 51 MMHG | SYSTOLIC BLOOD PRESSURE: 88 MMHG | TEMPERATURE: 97.8 F | BODY MASS INDEX: 48.2 KG/M2 | OXYGEN SATURATION: 94 % | WEIGHT: 272 LBS

## 2022-08-12 DIAGNOSIS — R42 LIGHT HEADEDNESS: Primary | ICD-10-CM

## 2022-08-12 DIAGNOSIS — I95.9 HYPOTENSION, UNSPECIFIED HYPOTENSION TYPE: ICD-10-CM

## 2022-08-12 DIAGNOSIS — I48.0 PAROXYSMAL ATRIAL FIBRILLATION: ICD-10-CM

## 2022-08-12 DIAGNOSIS — E66.01 CLASS 3 SEVERE OBESITY DUE TO EXCESS CALORIES WITH SERIOUS COMORBIDITY AND BODY MASS INDEX (BMI) OF 45.0 TO 49.9 IN ADULT: ICD-10-CM

## 2022-08-12 DIAGNOSIS — I48.91 ATRIAL FIBRILLATION, UNSPECIFIED TYPE: Primary | ICD-10-CM

## 2022-08-12 LAB
ALBUMIN SERPL-MCNC: 4.1 G/DL (ref 3.5–5.2)
ALBUMIN/GLOB SERPL: 1.4 G/DL
ALP SERPL-CCNC: 138 U/L (ref 39–117)
ALT SERPL W P-5'-P-CCNC: 15 U/L (ref 1–33)
ANION GAP SERPL CALCULATED.3IONS-SCNC: 10 MMOL/L (ref 5–15)
APTT PPP: 29.3 SECONDS (ref 61–76.5)
AST SERPL-CCNC: 22 U/L (ref 1–32)
BASOPHILS # BLD AUTO: 0.1 10*3/MM3 (ref 0–0.2)
BASOPHILS NFR BLD AUTO: 1.1 % (ref 0–1.5)
BILIRUB SERPL-MCNC: 0.3 MG/DL (ref 0–1.2)
BUN SERPL-MCNC: 34 MG/DL (ref 8–23)
BUN/CREAT SERPL: 25.8 (ref 7–25)
CALCIUM SPEC-SCNC: 9.7 MG/DL (ref 8.6–10.5)
CHLORIDE SERPL-SCNC: 105 MMOL/L (ref 98–107)
CO2 SERPL-SCNC: 26 MMOL/L (ref 22–29)
CREAT SERPL-MCNC: 1.32 MG/DL (ref 0.57–1)
DEPRECATED RDW RBC AUTO: 54.3 FL (ref 37–54)
EGFRCR SERPLBLD CKD-EPI 2021: 42.2 ML/MIN/1.73
EOSINOPHIL # BLD AUTO: 0.2 10*3/MM3 (ref 0–0.4)
EOSINOPHIL NFR BLD AUTO: 2.9 % (ref 0.3–6.2)
ERYTHROCYTE [DISTWIDTH] IN BLOOD BY AUTOMATED COUNT: 15.6 % (ref 12.3–15.4)
GLOBULIN UR ELPH-MCNC: 2.9 GM/DL
GLUCOSE SERPL-MCNC: 106 MG/DL (ref 65–99)
HCT VFR BLD AUTO: 35.2 % (ref 34–46.6)
HGB BLD-MCNC: 11.9 G/DL (ref 12–15.9)
INR PPP: 1.02 (ref 0.93–1.1)
LYMPHOCYTES # BLD AUTO: 2.3 10*3/MM3 (ref 0.7–3.1)
LYMPHOCYTES NFR BLD AUTO: 31.1 % (ref 19.6–45.3)
MAGNESIUM SERPL-MCNC: 1.9 MG/DL (ref 1.6–2.4)
MCH RBC QN AUTO: 33.6 PG (ref 26.6–33)
MCHC RBC AUTO-ENTMCNC: 33.8 G/DL (ref 31.5–35.7)
MCV RBC AUTO: 99.6 FL (ref 79–97)
MONOCYTES # BLD AUTO: 0.5 10*3/MM3 (ref 0.1–0.9)
MONOCYTES NFR BLD AUTO: 7.1 % (ref 5–12)
NEUTROPHILS NFR BLD AUTO: 4.3 10*3/MM3 (ref 1.7–7)
NEUTROPHILS NFR BLD AUTO: 57.8 % (ref 42.7–76)
NRBC BLD AUTO-RTO: 0.1 /100 WBC (ref 0–0.2)
NT-PROBNP SERPL-MCNC: 743.4 PG/ML (ref 0–1800)
PLATELET # BLD AUTO: 227 10*3/MM3 (ref 140–450)
PMV BLD AUTO: 7.6 FL (ref 6–12)
POTASSIUM SERPL-SCNC: 4.6 MMOL/L (ref 3.5–5.2)
PROT SERPL-MCNC: 7 G/DL (ref 6–8.5)
PROTHROMBIN TIME: 10.5 SECONDS (ref 9.6–11.7)
RBC # BLD AUTO: 3.54 10*6/MM3 (ref 3.77–5.28)
SARS-COV-2 RNA PNL SPEC NAA+PROBE: NOT DETECTED
SODIUM SERPL-SCNC: 141 MMOL/L (ref 136–145)
TROPONIN T SERPL-MCNC: <0.01 NG/ML (ref 0–0.03)
TSH SERPL DL<=0.05 MIU/L-ACNC: 3.45 UIU/ML (ref 0.27–4.2)
WBC NRBC COR # BLD: 7.5 10*3/MM3 (ref 3.4–10.8)

## 2022-08-12 PROCEDURE — 99284 EMERGENCY DEPT VISIT MOD MDM: CPT

## 2022-08-12 PROCEDURE — 93000 ELECTROCARDIOGRAM COMPLETE: CPT | Performed by: PREVENTIVE MEDICINE

## 2022-08-12 PROCEDURE — C9803 HOPD COVID-19 SPEC COLLECT: HCPCS

## 2022-08-12 PROCEDURE — 70450 CT HEAD/BRAIN W/O DYE: CPT

## 2022-08-12 PROCEDURE — 83880 ASSAY OF NATRIURETIC PEPTIDE: CPT | Performed by: EMERGENCY MEDICINE

## 2022-08-12 PROCEDURE — 84443 ASSAY THYROID STIM HORMONE: CPT | Performed by: EMERGENCY MEDICINE

## 2022-08-12 PROCEDURE — 99222 1ST HOSP IP/OBS MODERATE 55: CPT | Performed by: HOSPITALIST

## 2022-08-12 PROCEDURE — 85730 THROMBOPLASTIN TIME PARTIAL: CPT | Performed by: EMERGENCY MEDICINE

## 2022-08-12 PROCEDURE — 93005 ELECTROCARDIOGRAM TRACING: CPT | Performed by: EMERGENCY MEDICINE

## 2022-08-12 PROCEDURE — 85610 PROTHROMBIN TIME: CPT | Performed by: EMERGENCY MEDICINE

## 2022-08-12 PROCEDURE — 71045 X-RAY EXAM CHEST 1 VIEW: CPT

## 2022-08-12 PROCEDURE — 93005 ELECTROCARDIOGRAM TRACING: CPT

## 2022-08-12 PROCEDURE — 36415 COLL VENOUS BLD VENIPUNCTURE: CPT | Performed by: EMERGENCY MEDICINE

## 2022-08-12 PROCEDURE — G0378 HOSPITAL OBSERVATION PER HR: HCPCS

## 2022-08-12 PROCEDURE — 99214 OFFICE O/P EST MOD 30 MIN: CPT | Performed by: PREVENTIVE MEDICINE

## 2022-08-12 PROCEDURE — 83735 ASSAY OF MAGNESIUM: CPT | Performed by: EMERGENCY MEDICINE

## 2022-08-12 PROCEDURE — 87635 SARS-COV-2 COVID-19 AMP PRB: CPT | Performed by: EMERGENCY MEDICINE

## 2022-08-12 PROCEDURE — 85025 COMPLETE CBC W/AUTO DIFF WBC: CPT | Performed by: EMERGENCY MEDICINE

## 2022-08-12 PROCEDURE — 82607 VITAMIN B-12: CPT | Performed by: NURSE PRACTITIONER

## 2022-08-12 PROCEDURE — 80053 COMPREHEN METABOLIC PANEL: CPT | Performed by: EMERGENCY MEDICINE

## 2022-08-12 PROCEDURE — 84484 ASSAY OF TROPONIN QUANT: CPT | Performed by: EMERGENCY MEDICINE

## 2022-08-12 RX ORDER — ACETAMINOPHEN 325 MG/1
650 TABLET ORAL EVERY 4 HOURS PRN
Status: DISCONTINUED | OUTPATIENT
Start: 2022-08-12 | End: 2022-08-15 | Stop reason: HOSPADM

## 2022-08-12 RX ORDER — SODIUM CHLORIDE 0.9 % (FLUSH) 0.9 %
10 SYRINGE (ML) INJECTION AS NEEDED
Status: DISCONTINUED | OUTPATIENT
Start: 2022-08-12 | End: 2022-08-15 | Stop reason: HOSPADM

## 2022-08-12 RX ORDER — ACETAMINOPHEN 650 MG/1
650 SUPPOSITORY RECTAL EVERY 4 HOURS PRN
Status: DISCONTINUED | OUTPATIENT
Start: 2022-08-12 | End: 2022-08-15 | Stop reason: HOSPADM

## 2022-08-12 RX ORDER — SODIUM CHLORIDE 0.9 % (FLUSH) 0.9 %
10 SYRINGE (ML) INJECTION EVERY 12 HOURS SCHEDULED
Status: DISCONTINUED | OUTPATIENT
Start: 2022-08-12 | End: 2022-08-15 | Stop reason: HOSPADM

## 2022-08-12 RX ORDER — ACETAMINOPHEN 160 MG/5ML
650 SOLUTION ORAL EVERY 4 HOURS PRN
Status: DISCONTINUED | OUTPATIENT
Start: 2022-08-12 | End: 2022-08-15 | Stop reason: HOSPADM

## 2022-08-12 RX ORDER — ONDANSETRON 4 MG/1
4 TABLET, FILM COATED ORAL EVERY 6 HOURS PRN
Status: DISCONTINUED | OUTPATIENT
Start: 2022-08-12 | End: 2022-08-15 | Stop reason: HOSPADM

## 2022-08-12 RX ORDER — ONDANSETRON 2 MG/ML
4 INJECTION INTRAMUSCULAR; INTRAVENOUS EVERY 6 HOURS PRN
Status: DISCONTINUED | OUTPATIENT
Start: 2022-08-12 | End: 2022-08-15 | Stop reason: HOSPADM

## 2022-08-12 RX ORDER — NITROGLYCERIN 0.4 MG/1
0.4 TABLET SUBLINGUAL
Status: DISCONTINUED | OUTPATIENT
Start: 2022-08-12 | End: 2022-08-15 | Stop reason: HOSPADM

## 2022-08-12 RX ORDER — HEPARIN SODIUM 5000 [USP'U]/ML
5000 INJECTION, SOLUTION INTRAVENOUS; SUBCUTANEOUS EVERY 12 HOURS SCHEDULED
Status: DISCONTINUED | OUTPATIENT
Start: 2022-08-13 | End: 2022-08-13

## 2022-08-12 RX ORDER — CHOLECALCIFEROL (VITAMIN D3) 125 MCG
5 CAPSULE ORAL NIGHTLY PRN
Status: DISCONTINUED | OUTPATIENT
Start: 2022-08-12 | End: 2022-08-15 | Stop reason: HOSPADM

## 2022-08-12 RX ADMIN — SODIUM CHLORIDE 500 ML: 9 INJECTION, SOLUTION INTRAVENOUS at 20:51

## 2022-08-12 NOTE — PATIENT INSTRUCTIONS
Health Maintenance Due   Topic Date Due    COVID-19 Vaccine (4 - Booster for Pfizer series) 04/29/2022    Advise Covid booster when improved.  Go to [facility] now. Go to ambulance bay and go in with Wheel chair

## 2022-08-12 NOTE — ED NOTES
Pt arrives via private car with c/o dizziness states she was seen by her pcp and told to come to ED for further evaluation

## 2022-08-12 NOTE — PROGRESS NOTES
"Subjective   Faby Le is a 77 y.o. female presents for   Chief Complaint   Patient presents with   • Dizziness     Low Blood Pressure, Patient is not fasting     Patient was brought in by her daughter today after experiencing an increasing dizziness lightheadedness without chest pain or shortness of breath.  She appeared to the daughters as being somewhat pale and having brain fog.  EKG today showed no atrial fibrillation and patient did not get her thyroid function repeated in June as advised.  She states that she has been taking the bumetanide 1 mg daily and the spironolactone 12.5 mg daily along with amlodipine 5 mgAnd metoprolol 25 mg.  She states that she has not been out in the heat any more so than usual feels as though she drinks a fair amount of water and has been urinating regularly.  It should be noted that her weight is the same today as it has always been no fever or chills dysuria diarrhea headache stiff neck cough or abdominal pain  Health Maintenance Due   Topic Date Due   • COVID-19 Vaccine (4 - Booster for Pfizer series) 04/29/2022       History of Present Illness     Vitals:    08/12/22 1324 08/12/22 1326 08/12/22 1327   BP: 117/69 145/74 (!) 88/51   BP Location: Right arm Left arm Left arm   Patient Position: Sitting Sitting Standing   Cuff Size: Adult Adult Adult   Pulse: 99     Temp: 97.8 °F (36.6 °C)     TempSrc: Temporal     SpO2: 94%     Weight: 123 kg (272 lb)     Height: 160 cm (62.99\")       Body mass index is 48.19 kg/m².    Current Outpatient Medications on File Prior to Visit   Medication Sig Dispense Refill   • allopurinol (ZYLOPRIM) 300 MG tablet TAKE 1 TABLET BY MOUTH EVERY DAY 90 tablet 2   • amLODIPine (Norvasc) 5 MG tablet Take 1 tablet by mouth Daily. 90 tablet 3   • atorvastatin (LIPITOR) 20 MG tablet TAKE 1 TABLET BY MOUTH EVERYDAY AT BEDTIME 90 tablet 3   • azelastine (ASTELIN) 0.1 % nasal spray      • bumetanide (BUMEX) 2 MG tablet TAKE 1/2 TABLET BY MOUTH EVERY DAY 45 " tablet 2   • cetirizine (zyrTEC) 10 MG tablet Take 1 tablet by mouth Daily.     • cyanocobalamin (VITAMIN B-12) 1000 MCG tablet Take 1 tablet by mouth Daily.     • escitalopram (LEXAPRO) 20 MG tablet TAKE 1 TABLET BY MOUTH EVERY DAY 90 tablet 2   • hydrocortisone 2.5 % ointment APPLY A THIN LAYER TO THE AFFECTED AREA(S) BY TOPICAL ROUTE 2 TIMES PER DAY UNTIL HEALED.     • levothyroxine (SYNTHROID, LEVOTHROID) 88 MCG tablet TAKE 1 TABLET BY MOUTH EVERY DAY 90 tablet 3   • metoprolol tartrate (LOPRESSOR) 25 MG tablet TAKE 1 TABLET BY MOUTH EVERYDAY AT BEDTIME 90 tablet 3   • montelukast (SINGULAIR) 10 MG tablet Take 10 mg by mouth Daily.     • Multiple Vitamins-Minerals (CENTRUM SILVER ADULT 50+) tablet Take 1 tablet by mouth Daily.     • spironolactone (ALDACTONE) 25 MG tablet TAKE 1/2 TABLET BY MOUTH EVERY DAY 45 tablet 4   • Vitamin D, Cholecalciferol, 25 MCG (1000 UT) tablet Take 1 tablet by mouth Daily.     • [DISCONTINUED] APPLE CIDER VINEGAR PO Take 1 tablet by mouth 2 (Two) Times a Day. Take 2 tablets am and 1 pm     • [DISCONTINUED] Gelsyn-3 16.8 MG/2ML solution prefilled syringe injection      • [DISCONTINUED] Omega-3 Fatty Acids (FISH OIL) 1000 MG capsule capsule Take 1 capsule by mouth Daily.     • [DISCONTINUED] Enflurane (COMPOUND 347 IN)      • [DISCONTINUED] Linzess 72 MCG capsule capsule Take 72 mcg by mouth Daily.     • [DISCONTINUED] losartan (COZAAR) 25 MG tablet TAKE 1 TABLET BY MOUTH EVERY DAY 90 tablet 2   • [DISCONTINUED] pantoprazole (PROTONIX) 20 MG EC tablet TAKE 1 TABLET BY MOUTH EVERY DAY IN THE MORNING 30 MINUTES BEFORE A MEAL       No current facility-administered medications on file prior to visit.       The following portions of the patient's history were reviewed and updated as appropriate: allergies, current medications, past family history, past medical history, past social history, past surgical history and problem list.    Review of Systems   Constitutional: Negative for chills,  diaphoresis, fatigue and fever.   HENT: Negative for congestion and sore throat.    Respiratory: Negative for cough, chest tightness and shortness of breath.    Cardiovascular: Negative for chest pain, palpitations and leg swelling.   Gastrointestinal: Negative for abdominal pain, diarrhea, nausea and vomiting.   Genitourinary: Negative for difficulty urinating.   Neurological: Positive for weakness. Negative for speech difficulty, numbness and headache.   Psychiatric/Behavioral: Negative for depressed mood.       Objective   Physical Exam  Vitals reviewed.   Constitutional:       General: She is not in acute distress.     Appearance: She is well-developed. She is obese. She is not ill-appearing or toxic-appearing.   HENT:      Head: Normocephalic and atraumatic.      Right Ear: Tympanic membrane, ear canal and external ear normal.      Left Ear: Tympanic membrane, ear canal and external ear normal.      Nose: Nose normal.      Mouth/Throat:      Mouth: Mucous membranes are moist.      Pharynx: No posterior oropharyngeal erythema.   Eyes:      Extraocular Movements: Extraocular movements intact.      Conjunctiva/sclera: Conjunctivae normal.      Pupils: Pupils are equal, round, and reactive to light.   Cardiovascular:      Rate and Rhythm: Tachycardia present. Rhythm irregular.      Heart sounds: Normal heart sounds.   Pulmonary:      Effort: Pulmonary effort is normal. No respiratory distress.      Breath sounds: Normal breath sounds. No wheezing or rhonchi.   Abdominal:      General: Bowel sounds are normal. There is no distension.      Palpations: Abdomen is soft. There is no mass.      Tenderness: There is no abdominal tenderness.   Musculoskeletal:      Cervical back: Neck supple.   Skin:     General: Skin is warm.   Neurological:      General: No focal deficit present.      Mental Status: She is alert and oriented to person, place, and time.      Gait: Gait abnormal.   Psychiatric:         Mood and Affect:  Mood normal.         Behavior: Behavior normal.       PHQ-9 Total Score:      Assessment & Plan   Diagnoses and all orders for this visit:    1. Light headedness (Primary)  Comments:  Intermittent lightheadedness but severe today.  Almost feels as though she is having brain fogginess.  No fever dysuria chest pain shortness of breath  Orders:  -     ECG 12 Lead    2. Hypotension, unspecified hypotension type  Comments:  Relatives took blood pressures and found that she was low.  This increased with standing.  Patient states not out in the heat and has been drinking and urinatin    3. Paroxysmal atrial fibrillation (HCC)  Comments:  EKG today showed new atrial fibrillation patient is having no chest pain shortness of breath we have advised that she get evaluated in the emergency room now sh  Orders:  -     ECG 12 Lead    4. Class 3 severe obesity due to excess calories with serious comorbidity and body mass index (BMI) of 45.0 to 49.9 in adult (HCC)  Comments:  Again patient advised to watch portion size and increase walking when able.    Other orders  -     SCANNED EKG        Patient Instructions     Health Maintenance Due   Topic Date Due   • COVID-19 Vaccine (4 - Booster for Pfizer series) 04/29/2022    Advise Covid booster when improved.  Go to [facility] now. Go to ambulance bay and go in with Wheel chair

## 2022-08-12 NOTE — PROGRESS NOTES
Procedure     ECG 12 Lead    Date/Time: 8/12/2022 2:29 PM  Performed by: Leonor Adam MD  Authorized by: Leonor Adam MD   Comparison: compared with previous ECG from 2/16/2022  Comparison to previous ECG: New atrial fibrillation  Rhythm: atrial fibrillation  Other: no other findings    Clinical impression: abnormal EKG

## 2022-08-12 NOTE — ED PROVIDER NOTES
Subjective   Chief complaint: Patient is a pleasant 77-year-old.  She was sent in by her primary physician for new onset A. fib.  She comes in rate controlled.  She was rate controlled in the office.  She has never had a.  She woke up this morning she was dizzy.  She is dizzy when she stands.  Her blood pressure actually dropped into the 70 systolic at home and it dropped the primary physician's office as well she was sent here.  Her dizziness is not a spinning.  Some lightheaded sensation.  No chest pain.  No difficulty thinking or speaking.  No shortness of breath.  She has had no swelling.  No weakness or numbness.  She has not had A. fib before.  She is not on any blood thinners.    Context: As above    Duration: Woke up dizzy this morning    Timing: Symptoms come and go wax and wane when she stands    Severity: No pain    Associated Symptoms: As above        PCP:  LMP:          Review of Systems   Constitutional: Negative.    HENT: Negative.    Respiratory: Negative.    Cardiovascular: Negative.    Gastrointestinal: Negative for abdominal pain.   Genitourinary: Negative.    Musculoskeletal: Negative.    Skin: Negative.    Neurological: Positive for dizziness and light-headedness.   Psychiatric/Behavioral: Negative for behavioral problems.       Past Medical History:   Diagnosis Date   • Ankle pain    • Arthritis    • Cancer (HCC)     uterine, had hysterectomy 2018   • Coronary artery disease    • Depression    • Gout    • Hyperglycemia    • Hyperlipidemia    • Low back pain    • Sleep apnea    • Vitamin D deficiency        Allergies   Allergen Reactions   • Celecoxib Itching     swelling       Past Surgical History:   Procedure Laterality Date   • ANKLE FUSION      2017-left   • CARDIAC CATHETERIZATION     • CARPAL TUNNEL RELEASE     • CATARACT EXTRACTION     • CUBITAL TUNNEL RELEASE     • HYSTERECTOMY     • REPLACEMENT TOTAL KNEE      left 2008   • ROTATOR CUFF REPAIR         Family History   Problem Relation  Age of Onset   • Hypertension Mother    • Stroke Father    • Arthritis Brother    • Cancer Brother        Social History     Socioeconomic History   • Marital status:    Tobacco Use   • Smoking status: Never Smoker   • Smokeless tobacco: Never Used   Vaping Use   • Vaping Use: Never used   Substance and Sexual Activity   • Alcohol use: No   • Drug use: No   • Sexual activity: Not Currently           Objective   Physical Exam  Vitals and nursing note reviewed.   Constitutional:       Appearance: Normal appearance.   HENT:      Head: Normocephalic and atraumatic.   Eyes:      Extraocular Movements: Extraocular movements intact.      Pupils: Pupils are equal, round, and reactive to light.   Cardiovascular:      Rate and Rhythm: Normal rate. Rhythm irregular.      Pulses: Normal pulses.   Pulmonary:      Effort: Pulmonary effort is normal.   Abdominal:      Tenderness: There is no abdominal tenderness.   Musculoskeletal:      Cervical back: Neck supple.   Skin:     General: Skin is warm and dry.   Neurological:      General: No focal deficit present.      Mental Status: She is alert and oriented to person, place, and time.   Psychiatric:         Mood and Affect: Mood normal.         Behavior: Behavior normal.         Thought Content: Thought content normal.         Judgment: Judgment normal.         Procedures           ED Course           Results for orders placed or performed during the hospital encounter of 08/12/22   Comprehensive Metabolic Panel    Specimen: Arm, Left; Blood   Result Value Ref Range    Glucose 106 (H) 65 - 99 mg/dL    BUN 34 (H) 8 - 23 mg/dL    Creatinine 1.32 (H) 0.57 - 1.00 mg/dL    Sodium 141 136 - 145 mmol/L    Potassium 4.6 3.5 - 5.2 mmol/L    Chloride 105 98 - 107 mmol/L    CO2 26.0 22.0 - 29.0 mmol/L    Calcium 9.7 8.6 - 10.5 mg/dL    Total Protein 7.0 6.0 - 8.5 g/dL    Albumin 4.10 3.50 - 5.20 g/dL    ALT (SGPT) 15 1 - 33 U/L    AST (SGOT) 22 1 - 32 U/L    Alkaline Phosphatase 138  (H) 39 - 117 U/L    Total Bilirubin 0.3 0.0 - 1.2 mg/dL    Globulin 2.9 gm/dL    A/G Ratio 1.4 g/dL    BUN/Creatinine Ratio 25.8 (H) 7.0 - 25.0    Anion Gap 10.0 5.0 - 15.0 mmol/L    eGFR 42.2 (L) >60.0 mL/min/1.73   Protime-INR    Specimen: Blood   Result Value Ref Range    Protime 10.5 9.6 - 11.7 Seconds    INR 1.02 0.93 - 1.10   aPTT    Specimen: Blood   Result Value Ref Range    PTT 29.3 (L) 61.0 - 76.5 seconds   Troponin    Specimen: Arm, Left; Blood   Result Value Ref Range    Troponin T <0.010 0.000 - 0.030 ng/mL   BNP    Specimen: Blood   Result Value Ref Range    proBNP 743.4 0.0 - 1,800.0 pg/mL   Magnesium    Specimen: Blood   Result Value Ref Range    Magnesium 1.9 1.6 - 2.4 mg/dL   TSH    Specimen: Blood   Result Value Ref Range    TSH 3.450 0.270 - 4.200 uIU/mL   CBC Auto Differential    Specimen: Blood   Result Value Ref Range    WBC 7.50 3.40 - 10.80 10*3/mm3    RBC 3.54 (L) 3.77 - 5.28 10*6/mm3    Hemoglobin 11.9 (L) 12.0 - 15.9 g/dL    Hematocrit 35.2 34.0 - 46.6 %    MCV 99.6 (H) 79.0 - 97.0 fL    MCH 33.6 (H) 26.6 - 33.0 pg    MCHC 33.8 31.5 - 35.7 g/dL    RDW 15.6 (H) 12.3 - 15.4 %    RDW-SD 54.3 (H) 37.0 - 54.0 fl    MPV 7.6 6.0 - 12.0 fL    Platelets 227 140 - 450 10*3/mm3    Neutrophil % 57.8 42.7 - 76.0 %    Lymphocyte % 31.1 19.6 - 45.3 %    Monocyte % 7.1 5.0 - 12.0 %    Eosinophil % 2.9 0.3 - 6.2 %    Basophil % 1.1 0.0 - 1.5 %    Neutrophils, Absolute 4.30 1.70 - 7.00 10*3/mm3    Lymphocytes, Absolute 2.30 0.70 - 3.10 10*3/mm3    Monocytes, Absolute 0.50 0.10 - 0.90 10*3/mm3    Eosinophils, Absolute 0.20 0.00 - 0.40 10*3/mm3    Basophils, Absolute 0.10 0.00 - 0.20 10*3/mm3    nRBC 0.1 0.0 - 0.2 /100 WBC   ECG 12 Lead   Result Value Ref Range    QT Interval 354 ms          CT Head Without Contrast    Result Date: 8/12/2022  No acute intracranial abnormality.  Chronic white matter changes compatible small vessel ischemic disease in this age group  Electronically Signed By-Tobi Tariq  On:8/12/2022 7:18 PM This report was finalized on [id number] by  Tobi Tariq, .    XR Chest 1 View    Result Date: 8/12/2022  IMPRESSION : Hazy and linear opacities at the right lung base which are likely chronic in nature. Acute superimposed upon chronic change cannot be excluded.  Electronically Signed By-Tobi Tariq On:8/12/2022 6:56 PM This report was finalized on [id number] by  Tobi Tariq, .    EKG shows A. fib rate of 102                           MDM  Number of Diagnoses or Management Options  Diagnosis management comments: Patient does seem to be worse on her feet with her dizziness.  When she stands.  She is orthostatic as well.  The A. fib is new onset.  We will admit her to the hospital for further cardiology evaluation.  Patient verbalized understanding is okay with this.  No mass lesion in her brain.  No abnormality on CT scan.  Likely dizziness not central but related to orthostasis.  Patient not having any active chest pain or shortness of breath.  Do not think PE.  Patient verbalized understanding of admission.       Amount and/or Complexity of Data Reviewed  Clinical lab tests: reviewed  Tests in the radiology section of CPT®: reviewed  Tests in the medicine section of CPT®: reviewed  Discussion of test results with the performing providers: yes  Discuss the patient with other providers: yes  Independent visualization of images, tracings, or specimens: yes    Patient Progress  Patient progress: stable      Final diagnoses:   None     Atrial fibrillation  Dizziness  Orthostasis  ED Disposition  ED Disposition     None          No follow-up provider specified.       Medication List      No changes were made to your prescriptions during this visit.          Marshall Stark DO  08/12/22 2027       Marshall Stark DO  08/12/22 2040

## 2022-08-13 ENCOUNTER — APPOINTMENT (OUTPATIENT)
Dept: CARDIOLOGY | Facility: HOSPITAL | Age: 77
End: 2022-08-13

## 2022-08-13 LAB
BH CV ECHO MEAS - ACS: 2.02 CM
BH CV ECHO MEAS - AO MAX PG: 5.8 MMHG
BH CV ECHO MEAS - AO MEAN PG: 3.3 MMHG
BH CV ECHO MEAS - AO ROOT DIAM: 3.4 CM
BH CV ECHO MEAS - AO V2 MAX: 120.6 CM/SEC
BH CV ECHO MEAS - AO V2 VTI: 31.6 CM
BH CV ECHO MEAS - AVA(I,D): 2.5 CM2
BH CV ECHO MEAS - EDV(CUBED): 116 ML
BH CV ECHO MEAS - EDV(MOD-SP4): 69.8 ML
BH CV ECHO MEAS - EF(MOD-BP): 51 %
BH CV ECHO MEAS - EF(MOD-SP4): 51.1 %
BH CV ECHO MEAS - ESV(CUBED): 43.3 ML
BH CV ECHO MEAS - ESV(MOD-SP4): 34.1 ML
BH CV ECHO MEAS - FS: 28 %
BH CV ECHO MEAS - IVS/LVPW: 1.07 CM
BH CV ECHO MEAS - IVSD: 1.08 CM
BH CV ECHO MEAS - LA DIMENSION: 4 CM
BH CV ECHO MEAS - LV DIASTOLIC VOL/BSA (35-75): 31.7 CM2
BH CV ECHO MEAS - LV MASS(C)D: 185 GRAMS
BH CV ECHO MEAS - LV MAX PG: 4.1 MMHG
BH CV ECHO MEAS - LV MEAN PG: 2.26 MMHG
BH CV ECHO MEAS - LV SYSTOLIC VOL/BSA (12-30): 15.5 CM2
BH CV ECHO MEAS - LV V1 MAX: 101.3 CM/SEC
BH CV ECHO MEAS - LV V1 VTI: 25.8 CM
BH CV ECHO MEAS - LVIDD: 4.9 CM
BH CV ECHO MEAS - LVIDS: 3.5 CM
BH CV ECHO MEAS - LVOT AREA: 3.1 CM2
BH CV ECHO MEAS - LVOT DIAM: 1.99 CM
BH CV ECHO MEAS - LVPWD: 1.01 CM
BH CV ECHO MEAS - MR MAX PG: 74.1 MMHG
BH CV ECHO MEAS - MR MAX VEL: 429.9 CM/SEC
BH CV ECHO MEAS - MV A MAX VEL: 68.3 CM/SEC
BH CV ECHO MEAS - MV DEC SLOPE: 442.6 CM/SEC2
BH CV ECHO MEAS - MV DEC TIME: 0.18 MSEC
BH CV ECHO MEAS - MV E MAX VEL: 81.2 CM/SEC
BH CV ECHO MEAS - MV E/A: 1.19
BH CV ECHO MEAS - MV MAX PG: 3.5 MMHG
BH CV ECHO MEAS - MV MEAN PG: 1.47 MMHG
BH CV ECHO MEAS - MV V2 VTI: 21.6 CM
BH CV ECHO MEAS - MVA(VTI): 3.7 CM2
BH CV ECHO MEAS - PA ACC TIME: 0.14 SEC
BH CV ECHO MEAS - PA PR(ACCEL): 15.6 MMHG
BH CV ECHO MEAS - PA V2 MAX: 92.1 CM/SEC
BH CV ECHO MEAS - PULM A REVS DUR: 0.13 SEC
BH CV ECHO MEAS - PULM A REVS VEL: 24 CM/SEC
BH CV ECHO MEAS - PULM DIAS VEL: 51.2 CM/SEC
BH CV ECHO MEAS - PULM S/D: 0.73
BH CV ECHO MEAS - PULM SYS VEL: 37.5 CM/SEC
BH CV ECHO MEAS - RV MAX PG: 1.26 MMHG
BH CV ECHO MEAS - RV V1 MAX: 56.2 CM/SEC
BH CV ECHO MEAS - RV V1 VTI: 14.7 CM
BH CV ECHO MEAS - RVDD: 3.1 CM
BH CV ECHO MEAS - SI(MOD-SP4): 16.2 ML/M2
BH CV ECHO MEAS - SV(LVOT): 79.9 ML
BH CV ECHO MEAS - SV(MOD-SP4): 35.7 ML
BH CV ECHO MEAS - TR MAX PG: 21.4 MMHG
BH CV ECHO MEAS - TR MAX VEL: 231.2 CM/SEC
MAXIMAL PREDICTED HEART RATE: 145 BPM
QT INTERVAL: 354 MS
STRESS TARGET HR: 123 BPM

## 2022-08-13 PROCEDURE — 93306 TTE W/DOPPLER COMPLETE: CPT

## 2022-08-13 PROCEDURE — 99232 SBSQ HOSP IP/OBS MODERATE 35: CPT | Performed by: INTERNAL MEDICINE

## 2022-08-13 PROCEDURE — 99214 OFFICE O/P EST MOD 30 MIN: CPT | Performed by: INTERNAL MEDICINE

## 2022-08-13 PROCEDURE — 93306 TTE W/DOPPLER COMPLETE: CPT | Performed by: INTERNAL MEDICINE

## 2022-08-13 PROCEDURE — 94799 UNLISTED PULMONARY SVC/PX: CPT

## 2022-08-13 PROCEDURE — 25010000002 HEPARIN (PORCINE) PER 1000 UNITS: Performed by: HOSPITALIST

## 2022-08-13 PROCEDURE — G0378 HOSPITAL OBSERVATION PER HR: HCPCS

## 2022-08-13 PROCEDURE — 96372 THER/PROPH/DIAG INJ SC/IM: CPT

## 2022-08-13 RX ORDER — DILTIAZEM HYDROCHLORIDE 120 MG/1
120 CAPSULE, COATED, EXTENDED RELEASE ORAL
Status: DISCONTINUED | OUTPATIENT
Start: 2022-08-14 | End: 2022-08-15 | Stop reason: HOSPADM

## 2022-08-13 RX ORDER — SPIRONOLACTONE 25 MG/1
12.5 TABLET ORAL DAILY
Status: DISCONTINUED | OUTPATIENT
Start: 2022-08-13 | End: 2022-08-15 | Stop reason: HOSPADM

## 2022-08-13 RX ORDER — MONTELUKAST SODIUM 10 MG/1
10 TABLET ORAL NIGHTLY
Status: DISCONTINUED | OUTPATIENT
Start: 2022-08-13 | End: 2022-08-15 | Stop reason: HOSPADM

## 2022-08-13 RX ORDER — ATORVASTATIN CALCIUM 20 MG/1
20 TABLET, FILM COATED ORAL NIGHTLY
Status: DISCONTINUED | OUTPATIENT
Start: 2022-08-13 | End: 2022-08-15 | Stop reason: HOSPADM

## 2022-08-13 RX ORDER — POTASSIUM CHLORIDE 20 MEQ/1
20 TABLET, EXTENDED RELEASE ORAL DAILY
COMMUNITY
End: 2022-08-15 | Stop reason: HOSPADM

## 2022-08-13 RX ORDER — MECLIZINE HYDROCHLORIDE 25 MG/1
12.5 TABLET ORAL 3 TIMES DAILY PRN
Status: DISCONTINUED | OUTPATIENT
Start: 2022-08-13 | End: 2022-08-15 | Stop reason: HOSPADM

## 2022-08-13 RX ORDER — LANOLIN ALCOHOL/MO/W.PET/CERES
1000 CREAM (GRAM) TOPICAL DAILY
Status: DISCONTINUED | OUTPATIENT
Start: 2022-08-13 | End: 2022-08-15 | Stop reason: HOSPADM

## 2022-08-13 RX ORDER — LEVOTHYROXINE SODIUM 88 UG/1
88 TABLET ORAL
Status: DISCONTINUED | OUTPATIENT
Start: 2022-08-13 | End: 2022-08-15 | Stop reason: HOSPADM

## 2022-08-13 RX ORDER — CETIRIZINE HYDROCHLORIDE 10 MG/1
10 TABLET ORAL DAILY
Status: DISCONTINUED | OUTPATIENT
Start: 2022-08-13 | End: 2022-08-15 | Stop reason: HOSPADM

## 2022-08-13 RX ORDER — ESCITALOPRAM OXALATE 10 MG/1
20 TABLET ORAL DAILY
Status: DISCONTINUED | OUTPATIENT
Start: 2022-08-13 | End: 2022-08-15 | Stop reason: HOSPADM

## 2022-08-13 RX ORDER — DILTIAZEM HYDROCHLORIDE 180 MG/1
180 CAPSULE, COATED, EXTENDED RELEASE ORAL
Status: DISCONTINUED | OUTPATIENT
Start: 2022-08-14 | End: 2022-08-13

## 2022-08-13 RX ORDER — BUMETANIDE 1 MG/1
1 TABLET ORAL DAILY
Status: DISCONTINUED | OUTPATIENT
Start: 2022-08-13 | End: 2022-08-13

## 2022-08-13 RX ORDER — ALLOPURINOL 300 MG/1
300 TABLET ORAL DAILY
Status: DISCONTINUED | OUTPATIENT
Start: 2022-08-13 | End: 2022-08-15 | Stop reason: HOSPADM

## 2022-08-13 RX ORDER — BUMETANIDE 1 MG/1
0.5 TABLET ORAL DAILY
Status: DISCONTINUED | OUTPATIENT
Start: 2022-08-14 | End: 2022-08-15 | Stop reason: HOSPADM

## 2022-08-13 RX ORDER — AMLODIPINE BESYLATE 5 MG/1
5 TABLET ORAL DAILY
Status: DISCONTINUED | OUTPATIENT
Start: 2022-08-13 | End: 2022-08-13

## 2022-08-13 RX ORDER — METOPROLOL SUCCINATE 25 MG/1
25 TABLET, EXTENDED RELEASE ORAL NIGHTLY
Status: DISCONTINUED | OUTPATIENT
Start: 2022-08-13 | End: 2022-08-15 | Stop reason: HOSPADM

## 2022-08-13 RX ADMIN — Medication 10 ML: at 20:28

## 2022-08-13 RX ADMIN — ATORVASTATIN CALCIUM 20 MG: 20 TABLET, FILM COATED ORAL at 20:28

## 2022-08-13 RX ADMIN — HEPARIN SODIUM 5000 UNITS: 5000 INJECTION INTRAVENOUS; SUBCUTANEOUS at 20:28

## 2022-08-13 RX ADMIN — ESCITALOPRAM OXALATE 20 MG: 10 TABLET ORAL at 09:04

## 2022-08-13 RX ADMIN — CETIRIZINE HYDROCHLORIDE TABLETS 10 MG: 10 TABLET, FILM COATED ORAL at 09:04

## 2022-08-13 RX ADMIN — Medication 10 ML: at 09:05

## 2022-08-13 RX ADMIN — LEVOTHYROXINE SODIUM 88 MCG: 0.09 TABLET ORAL at 05:25

## 2022-08-13 RX ADMIN — AMLODIPINE BESYLATE 5 MG: 5 TABLET ORAL at 09:04

## 2022-08-13 RX ADMIN — CYANOCOBALAMIN TAB 1000 MCG 1000 MCG: 1000 TAB at 09:04

## 2022-08-13 RX ADMIN — METOPROLOL SUCCINATE 25 MG: 25 TABLET, EXTENDED RELEASE ORAL at 20:28

## 2022-08-13 RX ADMIN — MONTELUKAST 10 MG: 10 TABLET, FILM COATED ORAL at 20:28

## 2022-08-13 RX ADMIN — HEPARIN SODIUM 5000 UNITS: 5000 INJECTION INTRAVENOUS; SUBCUTANEOUS at 09:05

## 2022-08-13 RX ADMIN — ALLOPURINOL 300 MG: 300 TABLET ORAL at 09:05

## 2022-08-13 RX ADMIN — BUMETANIDE 1 MG: 1 TABLET ORAL at 09:04

## 2022-08-13 RX ADMIN — SPIRONOLACTONE 12.5 MG: 25 TABLET ORAL at 09:04

## 2022-08-13 NOTE — PROGRESS NOTES
[facility] Medicine Services Daily Progress Note    Patient Name: Faby Le  : 1945  MRN: [id number]  Primary Care Physician:  Leonor Adam MD  Date of admission: 2022    Subjective      Chief Complaint: dizziness    History of Present Illness: Faby Le is a 77 y.o. female with a medical history notable for morbid obesity, ROSA, hypertension, hyperlipidemia, hypothyroidism, and other comorbidities who presented to [facility] on 2022 complaining of dizziness.  Earlier this morning, patient developed sudden onset of dizziness with continued dizzy spells throughout the day.  When the episodes occurred, she would have to hold onto something to maintain her balance.  Later on the day, she went to her daughter's restaurant where she experienced an episode of dizziness.  Her daughter decided to take her blood pressure at that time.  On multiple readings, her systolic blood pressure was less than 100, which was not typical for her.  Her daughter called her primary care provider, who told her to come in for evaluation in the clinic today.  While in the clinic, an EKG was performed and the patient was noted to have new onset atrial fibrillation.  She was then recommended to come to the emergency department for further evaluation and care    22 patient seen and examined in bed no acute distress, dizziness improved.  Added meclizine.    ROS *Constitutional: Negative for chills, decreased appetite, diaphoresis, fever and malaise/fatigue.   HENT: Negative.    Eyes: Negative.    Cardiovascular: Positive for irregular heartbeat and palpitations. Negative for dyspnea on exertion and leg swelling.   Respiratory: Negative for cough.    Endocrine: Negative.    Skin: Negative.    Musculoskeletal: Positive for arthritis.   Neurological: Positive for dizziness. Negative for focal weakness.   Psychiatric/Behavioral: Negative.    Objective      Vitals:   Temp:  [97.5 °F  (36.4 °C)-97.8 °F (36.6 °C)] 97.5 °F (36.4 °C)  Heart Rate:  [72-99] 87  Resp:  [13-17] 16  BP: ()/(52-74) 121/73    Physical Exam General:  Morbid obesity, Appears in no acute distress, non-toxic appearing  HEENT:  Normocephalic. Normal conjunctiva, EOM intact bilaterally, pupils equal and reactive to light. No mucosal pallor or cyanosis. No oral lesions.   Respiratory: Respirations regular and unlabored at rest. Bilateral breath sounds with good air entry in all fields. No crackles, rubs or wheezes auscultated  Cardiovascular: S1S2 Regular rate and irregular rhythm. No murmur, rub or gallop. No pretibial pitting edema  Gastrointestinal: Obese Abdomen with large pannus, soft, non tender. Bowel sounds present. No rebound or guarding.   Musculoskeletal: Moves all extremities voluntarily. No abnormal movements  Extremities: No digital clubbing or cyanosis  Skin: Color pink. Skin warm and dry to touch. No rashes    Neuro: AAO x3, CN II-XII grossly intact, comprehension intact, follows commands appropriately  Psych: Mood and affect normal, pleasant and cooperative    Result Review    Result Review:  I have personally reviewed the results from the time of this admission to 8/13/2022 17:46 EDT and agree with these findings:  []  Laboratory  []  Microbiology  []  Radiology  []  EKG/Telemetry   []  Cardiology/Vascular   []  Pathology  []  Old records  []  Other:  Most notable findings include:   CMP:        Lab 08/12/22  1934 08/12/22  1758   SODIUM 141  --    POTASSIUM 4.6  --    CHLORIDE 105  --    CO2 26.0  --    ANION GAP 10.0  --    BUN 34*  --    CREATININE 1.32*  --    EGFR 42.2*  --    GLUCOSE 106*  --    CALCIUM 9.7  --    MAGNESIUM  --  1.9   TOTAL PROTEIN 7.0  --    ALBUMIN 4.10  --    GLOBULIN 2.9  --    ALT (SGPT) 15  --    AST (SGOT) 22  --    BILIRUBIN 0.3  --    ALK PHOS 138*  --      CBC:      Lab 08/12/22  1758   WBC 7.50   HEMOGLOBIN 11.9*   HEMATOCRIT 35.2   PLATELETS 227   NEUTROS ABS 4.30    LYMPHS ABS 2.30   MONOS ABS 0.50   EOS ABS 0.20   MCV 99.6*         Assessment & Plan      Brief Patient Summary:  Faby Le is a 77 y.o. female who presented to [facility] on 8/12/2022 complaining of dizziness, found to have new onset atrial fibrillation.  Patient is hemodynamically stable with a controlled heart rate.  Exam unremarkable for signs of heart failure.  CT head negative for acute intracranial pathology as source dizziness      allopurinol, 300 mg, Oral, Daily  amLODIPine, 5 mg, Oral, Daily  atorvastatin, 20 mg, Oral, Nightly  bumetanide, 1 mg, Oral, Daily  cetirizine, 10 mg, Oral, Daily  escitalopram, 20 mg, Oral, Daily  heparin (porcine), 5,000 Units, Subcutaneous, Q12H  levothyroxine, 88 mcg, Oral, Q AM  metoprolol succinate XL, 25 mg, Oral, Nightly  montelukast, 10 mg, Oral, Nightly  sodium chloride, 10 mL, Intravenous, Q12H  spironolactone, 12.5 mg, Oral, Daily  cyanocobalamin, 1,000 mcg, Oral, Daily             Active Hospital Problems:  Active Hospital Problems    Diagnosis    • Atrial fibrillation, unspecified type (HCC)      New onset atrial fibrillation  Likely etiology for acute dizzy spells. CHADSVasc score at least 4, indicating patient likely to benefit from chronic AC for stroke prevention  - discussed with patient risk vs. Benefit of anticoagulation therapy   - obtain TTE to r/o valvular disease to assess if patient is a candidate for DOAC  - cardiology consult. Pt is followed as an outpatient by Dr. Zavala  -Continue home regimen of metoprolol succinate 25 mg nightly     Hypothyroidism  TSH within normal  - Continue home regimen continue home regimen of levothyroxine 88 mcg daily     ROSA  - Continue home CPAP at night, which is present at bedside     Hypertension  - Continue home regimen of amlodipine 5 mg daily     Hyperlipidemia  -Atorvastatin 20 mg nightly     Gout  - Allopurinol 300 mg daily     CKD stage III  -Continue home regimen of Bumex and spironolactone  daily  -Recommend outpatient referral to nephrology    Dizziness likely benign positional vertigo.  -Meclizine added.      DVT prophylaxis:  Medical DVT prophylaxis orders are present.    CODE STATUS:         Disposition:  I expect patient to be discharged home.    This patient has been examined wearing appropriate Personal Protective Equipment and discussed with rn. 08/13/22      Electronically signed by Dionisio Guerrero MD, 08/13/22, 17:46 EDT.  [facility] Hospitalist Team

## 2022-08-13 NOTE — H&P
[facility] Medicine Services      Patient Name: Faby Le  : 1945  MRN: [id number]  Primary Care Physician:  Leonor Adam MD  Date of admission: 2022      Subjective      Chief Complaint: dizziness    History of Present Illness: Faby Le is a 77 y.o. female with a medical history notable for morbid obesity, ROSA, hypertension, hyperlipidemia, hypothyroidism, and other comorbidities who presented to [facility] on 2022 complaining of dizziness.    Earlier this morning, patient developed sudden onset of dizziness with continued dizzy spells throughout the day.  When the episodes occurred, she would have to hold onto something to maintain her balance.  Later on the day, she went to her daughter's restaurant where she experienced an episode of dizziness.  Her daughter decided to take her blood pressure at that time.  On multiple readings, her systolic blood pressure was less than 100, which was not typical for her.  Her daughter called her primary care provider, who told her to come in for evaluation in the clinic today.  While in the clinic, an EKG was performed and the patient was noted to have new onset atrial fibrillation.  She was then recommended to come to the emergency department for further evaluation and care.      Review of Systems   Constitutional: Negative for chills, decreased appetite, diaphoresis, fever and malaise/fatigue.   HENT: Negative.    Eyes: Negative.    Cardiovascular: Positive for irregular heartbeat and palpitations. Negative for dyspnea on exertion and leg swelling.   Respiratory: Negative for cough.    Endocrine: Negative.    Skin: Negative.    Musculoskeletal: Positive for arthritis.   Neurological: Positive for dizziness. Negative for focal weakness.   Psychiatric/Behavioral: Negative.        Personal History     Past Medical History:   Diagnosis Date   • Ankle pain    • Arthritis    • Cancer (HCC)     uterine, had  hysterectomy 2018   • Coronary artery disease    • Depression    • Gout    • Hyperglycemia    • Hyperlipidemia    • Low back pain    • Morbid obesity (HCC)    • Sleep apnea    • Vitamin D deficiency        Past Surgical History:   Procedure Laterality Date   • ANKLE FUSION      2017-left   • CARDIAC CATHETERIZATION     • CARPAL TUNNEL RELEASE     • CATARACT EXTRACTION     • CUBITAL TUNNEL RELEASE     • HYSTERECTOMY     • REPLACEMENT TOTAL KNEE      left 2008   • ROTATOR CUFF REPAIR         Family History: family history includes Arthritis in her brother; Cancer in her brother; Hypertension in her mother; Stroke in her father.     Social History:  reports that she has never smoked. She has never used smokeless tobacco. She reports that she does not drink alcohol and does not use drugs.    Home Medications:  Prior to Admission Medications     Prescriptions Last Dose Informant Patient Reported? Taking?    allopurinol (ZYLOPRIM) 300 MG tablet   No No    TAKE 1 TABLET BY MOUTH EVERY DAY    amLODIPine (Norvasc) 5 MG tablet   No No    Take 1 tablet by mouth Daily.    atorvastatin (LIPITOR) 20 MG tablet   No No    TAKE 1 TABLET BY MOUTH EVERYDAY AT BEDTIME    azelastine (ASTELIN) 0.1 % nasal spray   Yes No    bumetanide (BUMEX) 2 MG tablet   No No    TAKE 1/2 TABLET BY MOUTH EVERY DAY    cetirizine (zyrTEC) 10 MG tablet   Yes No    Take 1 tablet by mouth Daily.    cyanocobalamin (VITAMIN B-12) 1000 MCG tablet   Yes No    Take 1 tablet by mouth Daily.    escitalopram (LEXAPRO) 20 MG tablet   No No    TAKE 1 TABLET BY MOUTH EVERY DAY    hydrocortisone 2.5 % ointment   Yes No    APPLY A THIN LAYER TO THE AFFECTED AREA(S) BY TOPICAL ROUTE 2 TIMES PER DAY UNTIL HEALED.    levothyroxine (SYNTHROID, LEVOTHROID) 88 MCG tablet   No No    TAKE 1 TABLET BY MOUTH EVERY DAY    metoprolol tartrate (LOPRESSOR) 25 MG tablet   No No    TAKE 1 TABLET BY MOUTH EVERYDAY AT BEDTIME    montelukast (SINGULAIR) 10 MG tablet   Yes No    Take 10 mg  by mouth Daily.    Multiple Vitamins-Minerals (CENTRUM SILVER ADULT 50+) tablet   Yes No    Take 1 tablet by mouth Daily.    spironolactone (ALDACTONE) 25 MG tablet   No No    TAKE 1/2 TABLET BY MOUTH EVERY DAY    Vitamin D, Cholecalciferol, 25 MCG (1000 UT) tablet   Yes No    Take 1 tablet by mouth Daily.            Allergies:  Allergies   Allergen Reactions   • Celecoxib Itching     swelling       Objective      Vitals:   Temp:  [97.8 °F (36.6 °C)-98 °F (36.7 °C)] 97.8 °F (36.6 °C)  Heart Rate:  [] 77  Resp:  [13-20] 16  BP: ()/(51-81) 116/55    Physical Exam   General:  Morbid obesity, Appears in no acute distress, non-toxic appearing  HEENT:  Normocephalic. Normal conjunctiva, EOM intact bilaterally, pupils equal and reactive to light. No mucosal pallor or cyanosis. No oral lesions.   Respiratory: Respirations regular and unlabored at rest. Bilateral breath sounds with good air entry in all fields. No crackles, rubs or wheezes auscultated  Cardiovascular: S1S2 Regular rate and irregular rhythm. No murmur, rub or gallop. No pretibial pitting edema  Gastrointestinal: Obese Abdomen with large pannus, soft, non tender. Bowel sounds present. No rebound or guarding.   Musculoskeletal: Moves all extremities voluntarily. No abnormal movements  Extremities: No digital clubbing or cyanosis  Skin: Color pink. Skin warm and dry to touch. No rashes    Neuro: AAO x3, CN II-XII grossly intact, comprehension intact, follows commands appropriately  Psych: Mood and affect normal, pleasant and cooperative      Result Review    Result Review:  I have personally reviewed the results from the time of this admission to 8/13/2022 02:47 EDT and agree with these findings:  [x]  Laboratory  []  Microbiology  [x]  Radiology  [x]  EKG/Telemetry   []  Cardiology/Vascular   []  Pathology  []  Old records  []  Other:    LAB RESULTS:      Lab 08/12/22  1758   WBC 7.50   HEMOGLOBIN 11.9*   HEMATOCRIT 35.2   PLATELETS 227   NEUTROS  ABS 4.30   LYMPHS ABS 2.30   MONOS ABS 0.50   EOS ABS 0.20   MCV 99.6*   PROTIME 10.5   APTT 29.3*         Lab 08/12/22 1934 08/12/22 1758   SODIUM 141  --    POTASSIUM 4.6  --    CHLORIDE 105  --    CO2 26.0  --    ANION GAP 10.0  --    BUN 34*  --    CREATININE 1.32*  --    EGFR 42.2*  --    GLUCOSE 106*  --    CALCIUM 9.7  --    MAGNESIUM  --  1.9   TSH  --  3.450         Lab 08/12/22 1934   TOTAL PROTEIN 7.0   ALBUMIN 4.10   GLOBULIN 2.9   ALT (SGPT) 15   AST (SGOT) 22   BILIRUBIN 0.3   ALK PHOS 138*         Lab 08/12/22 1934 08/12/22  1758   PROBNP  --  743.4   TROPONIN T <0.010  --    PROTIME  --  10.5   INR  --  1.02                 Brief Urine Lab Results     None        Microbiology Results (last 10 days)     Procedure Component Value - Date/Time    COVID PRE-OP / PRE-PROCEDURE SCREENING ORDER (NO ISOLATION) - Swab, Nasopharynx [[id number]]  (Normal) Collected: 08/12/22 2134    Lab Status: Final result Specimen: Swab from Nasopharynx Updated: 08/12/22 2216    Narrative:      The following orders were created for panel order COVID PRE-OP / PRE-PROCEDURE SCREENING ORDER (NO ISOLATION) - Swab, Nasopharynx.  Procedure                               Abnormality         Status                     ---------                               -----------         ------                     COVID-19,CEPHEID/ANA,CO...[[id number]]  Normal              Final result                 Please view results for these tests on the individual orders.    COVID-19,CEPHEID/ANA,COR/LAKESHA/PAD/MICHAEL IN-HOUSE(OR EMERGENT/ADD-ON),NP SWAB IN TRANSPORT MEDIA 3-4 HR TAT, RT-PCR - Swab, Nasopharynx [[id number]]  (Normal) Collected: 08/12/22 2134    Lab Status: Final result Specimen: Swab from Nasopharynx Updated: 08/12/22 2216     COVID19 Not Detected    Narrative:      Fact sheet for providers: https://www.fda.gov/media/609882/download     Fact sheet for patients: https://www.fda.gov/media/022483/download  Fact sheet for providers:  https://www.fda.gov/media/860337/download    Fact sheet for patients: https://www.fda.gov/media/636444/download    Test performed by PCR.          CT HEAD WO CONTRAST-   Date of Exam: 8/12/2022 6:17 PM         FINDINGS:   Brain/Ventricles: There is no acute intracranial hemorrhage, mass effect   or midline shift. No abnormal extra-axial fluid collection.  The   gray-white differentiation is maintained without evidence of an acute   infarct.  There is no evidence of hydrocephalus mild white matter   changes compatible small vessel ischemic disease noted.       Orbits: The visualized portion of the orbits demonstrate no acute   abnormality.       Sinuses:Minimal mastoid effusions and mucosal thickening of the   paranasal sinuses.       Soft Tissues/Skull: No acute abnormality of the visualized skull or soft   tissues.       Impression:     No acute intracranial abnormality.   Chronic white matter changes compatible small vessel ischemic disease in   this age group        DATE OF EXAM: 8/12/2022 6:31 PM   PROCEDURE: XR CHEST 1 VW-         FINDINGS:   Study is limited by overlying support and monitoring apparatus.       The heart size is within normal limits. Increased hazy opacity is noted   overlying chronic changes at the right lung base. Lungs are otherwise   grossly clear. Osseous structures are unremarkable       Impression:     IMPRESSION :   Hazy and linear opacities at the right lung base which are likely   chronic in nature. Acute superimposed upon chronic change cannot be excluded.          Assessment & Plan      Faby Le is a 77 y.o. female with a medical history notable for morbid obesity, ROSA, hypertension, hyperlipidemia, hypothyroidism, and other comorbidities who presented to [facility] on 8/12/2022 complaining of dizziness, found to have new onset atrial fibrillation.  Patient is hemodynamically stable with a controlled heart rate.  Exam unremarkable for signs of heart failure.  CT head  negative for acute intracranial pathology as source dizziness.    Plan:   New onset atrial fibrillation  Likely etiology for acute dizzy spells. CHADSVasc score at least 4, indicating patient likely to benefit from chronic AC for stroke prevention  - discussed with patient risk vs. Benefit of anticoagulation therapy   - obtain TTE to r/o valvular disease to assess if patient is a candidate for DOAC  - cardiology consult. Pt is followed as an outpatient by Dr. Zavala  -Continue home regimen of metoprolol succinate 25 mg nightly    Hypothyroidism  TSH within normal  - Continue home regimen continue home regimen of levothyroxine 88 mcg daily    ROSA  - Continue home CPAP at night, which is present at bedside    Hypertension  - Continue home regimen of amlodipine 5 mg daily    Hyperlipidemia  -Atorvastatin 20 mg nightly    Gout  - Allopurinol 300 mg daily    CKD stage III  -Continue home regimen of Bumex and spironolactone daily  -Recommend outpatient referral to nephrology    DVT prophylaxis:  Medical DVT prophylaxis orders are present.    CODE STATUS:  Full code     Admission Status:  I believe this patient meets inpatient observation status. Anticipate discharge home within the next 24 hours on anticoagulation therapy as long as patient remains hemodynamically stable.    I discussed the patient's findings and my recommendations with patient and nursing staff.    Signature: Electronically signed by Leola Bronson MD, 08/13/22, 2:50 AM EDT.

## 2022-08-13 NOTE — PLAN OF CARE
Continue to monitor and assess pain.  Pt is resting but awake when I entered the room.  She is complaining of no pain and is resting easy.   Problem: Dysrhythmia  Goal: Normalized Cardiac Rhythm  Outcome: Ongoing, Progressing     Problem: Adult Inpatient Plan of Care  Goal: Plan of Care Review  Outcome: Ongoing, Progressing  Flowsheets (Taken 8/13/2022 0717)  Progress: no change  Plan of Care Reviewed With: patient  Goal: Patient-Specific Goal (Individualized)  Outcome: Ongoing, Progressing  Goal: Absence of Hospital-Acquired Illness or Injury  Outcome: Ongoing, Progressing  Intervention: Identify and Manage Fall Risk  Flowsheets (Taken 8/13/2022 0717)  Safety Promotion/Fall Prevention:   assistive device/personal items within reach   clutter free environment maintained   safety round/check completed  Intervention: Prevent Skin Injury  Flowsheets (Taken 8/13/2022 0500 by Lyudmila Reis, PCT)  Body Position: position changed independently  Intervention: Prevent and Manage VTE (Venous Thromboembolism) Risk  Flowsheets (Taken 8/13/2022 0213 by Do Galvin, RN)  Activity Management:   activity adjusted per tolerance   activity encouraged   up ad silverio  Intervention: Prevent Infection  Flowsheets (Taken 8/13/2022 0717)  Infection Prevention:   hand hygiene promoted   personal protective equipment utilized   rest/sleep promoted   single patient room provided  Goal: Optimal Comfort and Wellbeing  Outcome: Ongoing, Progressing  Intervention: Monitor Pain and Promote Comfort  Flowsheets (Taken 8/13/2022 0213 by Do Galvin, RN)  Pain Management Interventions:   no interventions per patient request   care clustered  Intervention: Provide Person-Centered Care  Flowsheets (Taken 8/13/2022 0213 by Do Galvin, RN)  Trust Relationship/Rapport:   care explained   questions answered   thoughts/feelings acknowledged  Goal: Readiness for Transition of Care  Outcome: Ongoing, Progressing  Intervention: Mutually Develop Transition  Plan  Flowsheets  Taken 8/13/2022 0208 by Do Galvin, RN  Transportation Anticipated:   family or friend will provide   car, drives self  Patient/Family Anticipated Services at Transition: none  Patient/Family Anticipates Transition to: home with family  Taken 8/13/2022 0206 by Do Galvin, RN  Equipment Currently Used at Home:   shower chair   walker, rolling   cpap     Problem: Fall Injury Risk  Goal: Absence of Fall and Fall-Related Injury  Outcome: Ongoing, Progressing  Intervention: Identify and Manage Contributors  Flowsheets  Taken 8/13/2022 0402 by Do Galvin RN  Medication Review/Management: medications reviewed  Taken 8/13/2022 0213 by Do Galvin, RN  Self-Care Promotion:   independence encouraged   BADL personal objects within reach   BADL personal routines maintained  Intervention: Promote Injury-Free Environment  Flowsheets (Taken 8/13/2022 0717)  Safety Promotion/Fall Prevention:   assistive device/personal items within reach   clutter free environment maintained   safety round/check completed     Problem: Obstructive Sleep Apnea Risk or Actual Comorbidity Management  Goal: Unobstructed Breathing During Sleep  Outcome: Ongoing, Progressing   Goal Outcome Evaluation:  Plan of Care Reviewed With: patient        Progress: no change

## 2022-08-13 NOTE — PLAN OF CARE
Goal Outcome Evaluation:                   Patient admitted from ED for dizziness, new onset A. Fib and hypotension.

## 2022-08-14 ENCOUNTER — APPOINTMENT (OUTPATIENT)
Dept: CARDIOLOGY | Facility: HOSPITAL | Age: 77
End: 2022-08-14

## 2022-08-14 ENCOUNTER — APPOINTMENT (OUTPATIENT)
Dept: NUCLEAR MEDICINE | Facility: HOSPITAL | Age: 77
End: 2022-08-14

## 2022-08-14 LAB
BH CV REST NUCLEAR ISOTOPE DOSE: 10.9 MCI
BH CV STRESS BP STAGE 1: NORMAL
BH CV STRESS BP STAGE 2: NORMAL
BH CV STRESS COMMENTS STAGE 1: NORMAL
BH CV STRESS COMMENTS STAGE 2: NORMAL
BH CV STRESS DOSE REGADENOSON STAGE 1: 0.4
BH CV STRESS DURATION MIN STAGE 1: 0
BH CV STRESS DURATION MIN STAGE 2: 4
BH CV STRESS DURATION SEC STAGE 1: 10
BH CV STRESS DURATION SEC STAGE 2: 0
BH CV STRESS HR STAGE 1: 88
BH CV STRESS HR STAGE 2: 83
BH CV STRESS NUCLEAR ISOTOPE DOSE: 31.2 MCI
BH CV STRESS PROTOCOL 1: NORMAL
BH CV STRESS RECOVERY BP: NORMAL MMHG
BH CV STRESS RECOVERY HR: 83 BPM
BH CV STRESS STAGE 1: 1
BH CV STRESS STAGE 2: 2
BH CV XLRA MEAS LEFT DIST CCA EDV: -21 CM/SEC
BH CV XLRA MEAS LEFT DIST CCA PSV: -66.6 CM/SEC
BH CV XLRA MEAS LEFT DIST ICA EDV: -25.8 CM/SEC
BH CV XLRA MEAS LEFT DIST ICA PSV: -72.2 CM/SEC
BH CV XLRA MEAS LEFT ICA/CCA RATIO: 0.9
BH CV XLRA MEAS LEFT PROX CCA EDV: -15.4 CM/SEC
BH CV XLRA MEAS LEFT PROX CCA PSV: -79.9 CM/SEC
BH CV XLRA MEAS LEFT PROX ECA PSV: -63.8 CM/SEC
BH CV XLRA MEAS LEFT PROX ICA EDV: 17.9 CM/SEC
BH CV XLRA MEAS LEFT PROX ICA PSV: 44.8 CM/SEC
BH CV XLRA MEAS LEFT PROX SCLA PSV: 153 CM/SEC
BH CV XLRA MEAS LEFT VERTEBRAL A PSV: -77.9 CM/SEC
BH CV XLRA MEAS RIGHT DIST CCA EDV: -14.9 CM/SEC
BH CV XLRA MEAS RIGHT DIST CCA PSV: -50.9 CM/SEC
BH CV XLRA MEAS RIGHT DIST ICA EDV: -18.4 CM/SEC
BH CV XLRA MEAS RIGHT DIST ICA PSV: -71.1 CM/SEC
BH CV XLRA MEAS RIGHT ICA/CCA RATIO: -0.93
BH CV XLRA MEAS RIGHT PROX CCA EDV: 18.6 CM/SEC
BH CV XLRA MEAS RIGHT PROX CCA PSV: 80.8 CM/SEC
BH CV XLRA MEAS RIGHT PROX ECA PSV: -71.4 CM/SEC
BH CV XLRA MEAS RIGHT PROX ICA EDV: -25.9 CM/SEC
BH CV XLRA MEAS RIGHT PROX ICA PSV: -75.5 CM/SEC
BH CV XLRA MEAS RIGHT PROX SCLA PSV: 100 CM/SEC
BH CV XLRA MEAS RIGHT VERTEBRAL A PSV: -68.9 CM/SEC
CHOLEST SERPL-MCNC: 119 MG/DL (ref 0–200)
HDLC SERPL-MCNC: 59 MG/DL (ref 40–60)
LDLC SERPL CALC-MCNC: 44 MG/DL (ref 0–100)
LDLC/HDLC SERPL: 0.75 {RATIO}
LEFT ARM BP: NORMAL MMHG
LV EF NUC BP: 68 %
MAXIMAL PREDICTED HEART RATE: 145 BPM
MAXIMAL PREDICTED HEART RATE: 145 BPM
PERCENT MAX PREDICTED HR: 60.69 %
STRESS BASELINE BP: NORMAL MMHG
STRESS BASELINE HR: 72 BPM
STRESS PERCENT HR: 71 %
STRESS POST PEAK BP: NORMAL MMHG
STRESS POST PEAK HR: 88 BPM
STRESS TARGET HR: 123 BPM
STRESS TARGET HR: 123 BPM
TRIGL SERPL-MCNC: 78 MG/DL (ref 0–150)
TROPONIN T SERPL-MCNC: <0.01 NG/ML (ref 0–0.03)
VIT B12 BLD-MCNC: 1068 PG/ML (ref 211–946)
VLDLC SERPL-MCNC: 16 MG/DL (ref 5–40)

## 2022-08-14 PROCEDURE — 78452 HT MUSCLE IMAGE SPECT MULT: CPT

## 2022-08-14 PROCEDURE — 93017 CV STRESS TEST TRACING ONLY: CPT

## 2022-08-14 PROCEDURE — 78452 HT MUSCLE IMAGE SPECT MULT: CPT | Performed by: INTERNAL MEDICINE

## 2022-08-14 PROCEDURE — 0 TECHNETIUM TETROFOSMIN KIT: Performed by: INTERNAL MEDICINE

## 2022-08-14 PROCEDURE — G0378 HOSPITAL OBSERVATION PER HR: HCPCS

## 2022-08-14 PROCEDURE — 99222 1ST HOSP IP/OBS MODERATE 55: CPT | Performed by: NURSE PRACTITIONER

## 2022-08-14 PROCEDURE — 84484 ASSAY OF TROPONIN QUANT: CPT | Performed by: INTERNAL MEDICINE

## 2022-08-14 PROCEDURE — 25010000002 REGADENOSON 0.4 MG/5ML SOLUTION: Performed by: INTERNAL MEDICINE

## 2022-08-14 PROCEDURE — 83036 HEMOGLOBIN GLYCOSYLATED A1C: CPT | Performed by: NURSE PRACTITIONER

## 2022-08-14 PROCEDURE — 80061 LIPID PANEL: CPT | Performed by: NURSE PRACTITIONER

## 2022-08-14 PROCEDURE — A9502 TC99M TETROFOSMIN: HCPCS | Performed by: INTERNAL MEDICINE

## 2022-08-14 PROCEDURE — 93880 EXTRACRANIAL BILAT STUDY: CPT

## 2022-08-14 PROCEDURE — 93016 CV STRESS TEST SUPVJ ONLY: CPT | Performed by: NURSE PRACTITIONER

## 2022-08-14 PROCEDURE — 93018 CV STRESS TEST I&R ONLY: CPT | Performed by: INTERNAL MEDICINE

## 2022-08-14 PROCEDURE — 99232 SBSQ HOSP IP/OBS MODERATE 35: CPT | Performed by: INTERNAL MEDICINE

## 2022-08-14 RX ADMIN — CYANOCOBALAMIN TAB 1000 MCG 1000 MCG: 1000 TAB at 08:06

## 2022-08-14 RX ADMIN — RIVAROXABAN 15 MG: 15 TABLET, FILM COATED ORAL at 17:09

## 2022-08-14 RX ADMIN — SPIRONOLACTONE 12.5 MG: 25 TABLET ORAL at 08:05

## 2022-08-14 RX ADMIN — Medication 10 ML: at 22:14

## 2022-08-14 RX ADMIN — CETIRIZINE HYDROCHLORIDE TABLETS 10 MG: 10 TABLET, FILM COATED ORAL at 08:05

## 2022-08-14 RX ADMIN — MONTELUKAST 10 MG: 10 TABLET, FILM COATED ORAL at 22:13

## 2022-08-14 RX ADMIN — Medication 10 ML: at 08:07

## 2022-08-14 RX ADMIN — REGADENOSON 0.4 MG: 0.08 INJECTION, SOLUTION INTRAVENOUS at 12:42

## 2022-08-14 RX ADMIN — DILTIAZEM HYDROCHLORIDE 120 MG: 120 CAPSULE, COATED, EXTENDED RELEASE ORAL at 08:05

## 2022-08-14 RX ADMIN — ATORVASTATIN CALCIUM 20 MG: 20 TABLET, FILM COATED ORAL at 22:14

## 2022-08-14 RX ADMIN — BUMETANIDE 0.5 MG: 1 TABLET ORAL at 08:05

## 2022-08-14 RX ADMIN — METOPROLOL SUCCINATE 25 MG: 25 TABLET, EXTENDED RELEASE ORAL at 22:13

## 2022-08-14 RX ADMIN — LEVOTHYROXINE SODIUM 88 MCG: 0.09 TABLET ORAL at 05:20

## 2022-08-14 RX ADMIN — ALLOPURINOL 300 MG: 300 TABLET ORAL at 08:05

## 2022-08-14 RX ADMIN — TETROFOSMIN 1 DOSE: 1.38 INJECTION, POWDER, LYOPHILIZED, FOR SOLUTION INTRAVENOUS at 10:45

## 2022-08-14 RX ADMIN — ESCITALOPRAM OXALATE 20 MG: 10 TABLET ORAL at 08:06

## 2022-08-14 RX ADMIN — TETROFOSMIN 1 DOSE: 1.38 INJECTION, POWDER, LYOPHILIZED, FOR SOLUTION INTRAVENOUS at 12:42

## 2022-08-14 NOTE — PROGRESS NOTES
Cardiology [place]        LOS:  LOS: 2 days   Patient Name: Faby Le  Age/Sex: 77 y.o. female  : 1945  MRN: [id number]    Day of Service: 22   Length of Stay: 2  Encounter Provider: CAROL Juarez  Place of Service: [facility] CARDIOLOGY  Patient Care Team:  Leonor Adam MD as PCP - General  Maggi Horn MD as Consulting Physician (Pain Medicine)  Ratna Zavala MD as Consulting Physician (Cardiology)  Nora Foley APRN as Nurse Practitioner (Gastroenterology)  Gianluca Walton MD as Consulting Physician (Pulmonary Disease)  Nathanael Murillo MD as Consulting Physician (Allergy and Immunology)  Delfin Thorpe MD as Surgeon (Orthopedic Surgery)  Josh Loomis MD as Consulting Physician (Gastroenterology)    Subjective:     Chief Complaint: f/u Afib with RVR, palpitations, chest pain    Subjective: no acute events. Patient remains in SR. Patient seen and examined while down for stress test    Current Medications:   Scheduled Meds:allopurinol, 300 mg, Oral, Daily  atorvastatin, 20 mg, Oral, Nightly  bumetanide, 0.5 mg, Oral, Daily  cetirizine, 10 mg, Oral, Daily  dilTIAZem CD, 120 mg, Oral, Q24H  escitalopram, 20 mg, Oral, Daily  levothyroxine, 88 mcg, Oral, Q AM  metoprolol succinate XL, 25 mg, Oral, Nightly  montelukast, 10 mg, Oral, Nightly  rivaroxaban, 15 mg, Oral, Daily With Dinner  sodium chloride, 10 mL, Intravenous, Q12H  spironolactone, 12.5 mg, Oral, Daily  cyanocobalamin, 1,000 mcg, Oral, Daily      Continuous Infusions:     Allergies:  Allergies   Allergen Reactions   • Celecoxib Itching     swelling       Review of Systems   Constitutional: Negative for chills, diaphoresis and malaise/fatigue.   Cardiovascular: Negative for chest pain, dyspnea on exertion, irregular heartbeat, leg swelling, near-syncope, orthopnea, palpitations, paroxysmal nocturnal dyspnea and syncope.   Respiratory: Negative for cough,  shortness of breath, sleep disturbances due to breathing and sputum production.    Gastrointestinal: Negative for change in bowel habit.   Genitourinary: Negative for urgency.   Neurological: Negative for dizziness and headaches.   Psychiatric/Behavioral: Negative for altered mental status.         Objective:     Temp:  [97.8 °F (36.6 °C)-98.3 °F (36.8 °C)] 98.3 °F (36.8 °C)  Heart Rate:  [58-79] 79  Resp:  [16] 16  BP: (112-127)/(67-74) 127/74     Intake/Output Summary (Last 24 hours) at 8/14/2022 1309  Last data filed at 8/13/2022 1700  Gross per 24 hour   Intake 500 ml   Output --   Net 500 ml     Body mass index is 48.01 kg/m².      08/12/22  1504 08/13/22  1438   Weight: 123 kg (271 lb 2.7 oz) 123 kg (271 lb)         General Appearance:    Alert, cooperative, in no acute distress                                Head: Atraumatic, normocephalic, PERRLA               Neck:   supple, trachea midline, no thyromegaly, no carotid bruit, no JVD   Lungs:     Clear to auscultation,respirations regular, even and               unlabored    Heart:    Regular rhythm and normal rate, normal S1 and S2   Abdomen:     Normal bowel sounds, no masses, no organomegaly, soft  non-tender, non-distended, no guarding, no rebound  tenderness   Extremities:   Moves all extremities well, no edema, no cyanosis, no  redness   Pulses:   Pulses palpable and equal bilaterally   Skin:   No bleeding, bruising or rash   Neurologic:   Awake, alert, oriented x3         Lab Review:   Results from last 7 days   Lab Units 08/12/22  1934   SODIUM mmol/L 141   POTASSIUM mmol/L 4.6   CHLORIDE mmol/L 105   CO2 mmol/L 26.0   BUN mg/dL 34*   CREATININE mg/dL 1.32*   GLUCOSE mg/dL 106*   CALCIUM mg/dL 9.7   AST (SGOT) U/L 22   ALT (SGPT) U/L 15     Results from last 7 days   Lab Units 08/14/22  0757 08/12/22  1934   TROPONIN T ng/mL <0.010 <0.010     Results from last 7 days   Lab Units 08/12/22  1758   WBC 10*3/mm3 7.50   HEMOGLOBIN g/dL 11.9*   HEMATOCRIT %  35.2   PLATELETS 10*3/mm3 227     Results from last 7 days   Lab Units 08/12/22  1758   INR  1.02   APTT seconds 29.3*     Results from last 7 days   Lab Units 08/12/22  1758   MAGNESIUM mg/dL 1.9           Invalid input(s): LDLCALC  Results from last 7 days   Lab Units 08/12/22  1758   PROBNP pg/mL 743.4     Results from last 7 days   Lab Units 08/12/22  1758   TSH uIU/mL 3.450       Recent Radiology:  Imaging Results (Most Recent)     Procedure Component Value Units Date/Time    CT Head Without Contrast [[id number]] Collected: 08/12/22 1915     Updated: 08/12/22 1920    Narrative:      CT HEAD WO CONTRAST-     Date of Exam: 8/12/2022 6:17 PM     Indication: dizziness.     Comparison: None available.     Technique:  Contiguous axial CT images of the head were obtained without  contrast from skull base to vertex.  Coronal and sagittal  reconstructions were performed.  Automated exposure control and  iterative reconstruction methods were used.       FINDINGS:  Brain/Ventricles: There is no acute intracranial hemorrhage, mass effect  or midline shift. No abnormal extra-axial fluid collection.  The  gray-white differentiation is maintained without evidence of an acute  infarct.  There is no evidence of hydrocephalus mild white matter  changes compatible small vessel ischemic disease noted.     Orbits: The visualized portion of the orbits demonstrate no acute  abnormality.     Sinuses:Minimal mastoid effusions and mucosal thickening of the  paranasal sinuses.     Soft Tissues/Skull: No acute abnormality of the visualized skull or soft  tissues.       Impression:      No acute intracranial abnormality.     Chronic white matter changes compatible small vessel ischemic disease in  this age group     Electronically Signed By-Tobi Tariq On:8/12/2022 7:18 PM  This report was finalized on [id number] by  Tobi Tariq, .    XR Chest 1 View [[id number]] Collected: 08/12/22 1854     Updated: 08/12/22 1858    Narrative:          DATE OF EXAM:   8/12/2022 6:31 PM     PROCEDURE:   XR CHEST 1 VW-     INDICATIONS:   dizziness     COMPARISON:  8/28/2018 and prior     TECHNIQUE:   Portable Chest     FINDINGS:      Study is limited by overlying support and monitoring apparatus.     The heart size is within normal limits. Increased hazy opacity is noted  overlying chronic changes at the right lung base. Lungs are otherwise  grossly clear. Osseous structures are unremarkable       Impression:      IMPRESSION :   Hazy and linear opacities at the right lung base which are likely  chronic in nature. Acute superimposed upon chronic change cannot be  excluded.     Electronically Signed By-Tobi Tariq On:8/12/2022 6:56 PM  This report was finalized on [id number] by  Tobi Tariq, .          ECHOCARDIOGRAM:    Results for orders placed during the hospital encounter of 08/12/22    Adult Transthoracic Echo Complete W/ Cont if Necessary Per Protocol    Interpretation Summary  · Estimated left ventricular EF was in agreement with the calculated left ventricular EF. Left ventricular ejection fraction appears to be 51 - 55%. Left ventricular systolic function is low normal.  · Left ventricular diastolic function is consistent with (grade Ia w/high LAP) impaired relaxation.  · Estimated right ventricular systolic pressure from tricuspid regurgitation is normal (<35 mmHg).        I reviewed the patient's new clinical results.    EKG:      Assessment:       Atrial fibrillation, unspecified type (HCC)    1. New onset atrial fibrillation with RVR  - converted, now on Toprol XL  - changed to cardizem   - on Xarelto for a/c- CHADS Vasc at least 3  - stress pending    2. H/o NICM, systolic and diastolic HF, chronic  - LVEF 51-55%, grade 1 DD    3. HTN    4. Obesity    5. ROSA    Plan:   Patient will continue on metoprolol extended release with heart rates in the 60s to 70s  diltiazem 120 daily added, stopped amlodipine  Continue Aldactone with  history of cardiomyopathy  Not on losartan presently on her home medicines, her baseline creatinine is 1.3  Obstructive sleep apnea, recommended CPAP  Obesity diet weight loss recommended  Does not appear volume overloaded, normal IVC by echo, right atrial pressure 0-5  She is on 1 mg of Bumex daily without any edema     Further recommendation follow findings- stress pending     Continue to follow, Dr. Aguirre can see her on Monday if she remains in the hospital otherwise will discharge with heart monitor with outpatient follow-up to Dr. Aguirre within the next 2 weeks        CAROL Juarez  08/14/22  13:09 EDT

## 2022-08-14 NOTE — PLAN OF CARE
Goal Outcome Evaluation:  Plan of Care Reviewed With: patient        Progress: no change  Outcome Evaluation: Patient rested well over night. Myoview ordered for today.

## 2022-08-14 NOTE — CONSULTS
Primary Care Provider: Leonor Adam MD     Consult requested by:  Dr. Guerrero     Reason for Consultation: Neurological evaluation, dizziness     History taken from: patient chart RN    Chief complaint: lightheadedness        SUBJECTIVE:    History of present illness: Background per H&P:  Faby Le is a 77 y.o. female with a medical history notable for morbid obesity, ROSA, hypertension, hyperlipidemia, hypothyroidism, and other comorbidities who presented to [facility] on 8/12/2022 complaining of dizziness.     Earlier this morning, patient developed sudden onset of dizziness with continued dizzy spells throughout the day.  When the episodes occurred, she would have to hold onto something to maintain her balance.  Later on the day, she went to her daughter's restaurant where she experienced an episode of dizziness.  Her daughter decided to take her blood pressure at that time.  On multiple readings, her systolic blood pressure was less than 100, which was not typical for her.  Her daughter called her primary care provider, who told her to come in for evaluation in the clinic today.  While in the clinic, an EKG was performed and the patient was noted to have new onset atrial fibrillation.  She was then recommended to come to the emergency department for further evaluation and care.    - Portions of the above HPI were copied from previous encounters and edited as appropriate. PMH as detailed below.     Patient states she was at her daughter's family restaurant when she began noticing feeling lightheaded and feeling flushed.  Her family had her sit down and they took her blood pressure to find that it was less than 100 systolic on both arms.  There is no syncopal episode, no facial symmetry, speech changes, focal deficits.  Patient also states that this episode was not vertigo or any room spinning sensation but the past 2 or 3 months she was diagnosed with vertigo.  She has been going to  vestibular rehab outpatient.     Review of Systems   Constitutional: Negative.    HENT: Negative.    Eyes: Negative for visual disturbance.   Respiratory: Negative.    Cardiovascular: Negative.    Gastrointestinal: Negative for diarrhea, nausea and vomiting.   Endocrine: Negative.    Genitourinary: Negative.    Musculoskeletal: Negative.    Skin: Negative.    Allergic/Immunologic: Negative.    Neurological: Positive for dizziness and light-headedness. Negative for tremors, seizures, syncope, facial asymmetry, speech difficulty, weakness, numbness and headaches.   Hematological: Negative.    Psychiatric/Behavioral: Negative for confusion (  ).        PATIENT HISTORY:  Past Medical History:   Diagnosis Date   • Ankle pain    • Arthritis    • Cancer (HCC)     uterine, had hysterectomy 2018   • Coronary artery disease    • Depression    • Gout    • Hyperglycemia    • Hyperlipidemia    • Low back pain    • Morbid obesity (HCC)    • Sleep apnea    • Vitamin D deficiency    ,   Past Surgical History:   Procedure Laterality Date   • ANKLE FUSION      2017-left   • CARDIAC CATHETERIZATION     • CARPAL TUNNEL RELEASE     • CATARACT EXTRACTION     • CUBITAL TUNNEL RELEASE     • HYSTERECTOMY     • REPLACEMENT TOTAL KNEE      left 2008   • ROTATOR CUFF REPAIR     ,   Family History   Problem Relation Age of Onset   • Hypertension Mother    • Stroke Father    • Arthritis Brother    • Cancer Brother    ,   Social History     Tobacco Use   • Smoking status: Never Smoker   • Smokeless tobacco: Never Used   Vaping Use   • Vaping Use: Never used   Substance Use Topics   • Alcohol use: No   • Drug use: No   ,   Prior to Admission medications    Medication Sig Start Date End Date Taking? Authorizing Provider   allopurinol (ZYLOPRIM) 300 MG tablet TAKE 1 TABLET BY MOUTH EVERY DAY 10/10/21  Yes Leonor Adam MD   amLODIPine (Norvasc) 5 MG tablet Take 1 tablet by mouth Daily. 7/2/21  Yes Leonor Adam MD   atorvastatin  (LIPITOR) 20 MG tablet TAKE 1 TABLET BY MOUTH EVERYDAY AT BEDTIME 7/25/22  Yes Leonor Adam MD   bumetanide (BUMEX) 2 MG tablet TAKE 1/2 TABLET BY MOUTH EVERY DAY 1/21/22  Yes Ratna Zavala MD   cetirizine (zyrTEC) 10 MG tablet Take 1 tablet by mouth Daily. 8/28/18  Yes Franklin Cid MD   cyanocobalamin (VITAMIN B-12) 1000 MCG tablet Take 1 tablet by mouth Daily. 12/20/18  Yes Franklin Cid MD   escitalopram (LEXAPRO) 20 MG tablet TAKE 1 TABLET BY MOUTH EVERY DAY 3/1/22  Yes Leonor Adam MD   levothyroxine (SYNTHROID, LEVOTHROID) 88 MCG tablet TAKE 1 TABLET BY MOUTH EVERY DAY 2/15/22  Yes Leonor Adam MD   metoprolol tartrate (LOPRESSOR) 25 MG tablet TAKE 1 TABLET BY MOUTH EVERYDAY AT BEDTIME 2/15/22  Yes Leonor Adam MD   montelukast (SINGULAIR) 10 MG tablet Take 10 mg by mouth Daily. 8/18/20  Yes Franklin Cid MD   Multiple Vitamins-Minerals (CENTRUM SILVER ADULT 50+) tablet Take 1 tablet by mouth Daily. 6/16/16  Yes Franklin Cid MD   potassium chloride (K-DUR,KLOR-CON) 20 MEQ CR tablet Take 20 mEq by mouth Daily.   Yes Franklin Cid MD   spironolactone (ALDACTONE) 25 MG tablet TAKE 1/2 TABLET BY MOUTH EVERY DAY 11/29/21  Yes Ratna Zavala MD   Vitamin D, Cholecalciferol, 25 MCG (1000 UT) tablet Take 1 tablet by mouth Daily. 6/16/16  Yes Franklin Cid MD    Allergies:  Celecoxib    Current Facility-Administered Medications   Medication Dose Route Frequency Provider Last Rate Last Admin   • acetaminophen (TYLENOL) tablet 650 mg  650 mg Oral Q4H PRN Leola Bronson MD        Or   • acetaminophen (TYLENOL) 160 MG/5ML solution 650 mg  650 mg Oral Q4H PRN Leola Bronson MD        Or   • acetaminophen (TYLENOL) suppository 650 mg  650 mg Rectal Q4H PRN Leola Bronson MD       • allopurinol (ZYLOPRIM) tablet 300 mg  300 mg Oral Daily Leola Bronson MD   300 mg at 08/14/22 0805   • atorvastatin (LIPITOR) tablet  20 mg  20 mg Oral Nightly Leola Bronson MD   20 mg at 08/13/22 2028   • bumetanide (BUMEX) tablet 0.5 mg  0.5 mg Oral Daily Josh Galvin MD   0.5 mg at 08/14/22 0805   • cetirizine (zyrTEC) tablet 10 mg  10 mg Oral Daily Leola Bronson MD   10 mg at 08/14/22 0805   • dilTIAZem CD (CARDIZEM CD) 24 hr capsule 120 mg  120 mg Oral Q24H Josh Galvin MD   120 mg at 08/14/22 0805   • escitalopram (LEXAPRO) tablet 20 mg  20 mg Oral Daily Leola Bronson MD   20 mg at 08/14/22 0806   • levothyroxine (SYNTHROID, LEVOTHROID) tablet 88 mcg  88 mcg Oral Q AM Leola Bronson MD   88 mcg at 08/14/22 0520   • meclizine (ANTIVERT) tablet 12.5 mg  12.5 mg Oral TID PRN Dionisio Guerrero MD       • melatonin tablet 5 mg  5 mg Oral Nightly PRN Leola Bronson MD       • metoprolol succinate XL (TOPROL-XL) 24 hr tablet 25 mg  25 mg Oral Nightly Leola Bronson MD   25 mg at 08/13/22 2028   • montelukast (SINGULAIR) tablet 10 mg  10 mg Oral Nightly Leola Bronson MD   10 mg at 08/13/22 2028   • nitroglycerin (NITROSTAT) SL tablet 0.4 mg  0.4 mg Sublingual Q5 Min PRN Leola Bronson MD       • ondansetron (ZOFRAN) tablet 4 mg  4 mg Oral Q6H PRN Leola Bronson MD        Or   • ondansetron (ZOFRAN) injection 4 mg  4 mg Intravenous Q6H PRN Leola Bronson MD       • rivaroxaban (XARELTO) tablet 15 mg  15 mg Oral Daily With Dinner Josh Galvin MD       • sodium chloride 0.9 % flush 10 mL  10 mL Intravenous PRN Marshall Stark DO       • sodium chloride 0.9 % flush 10 mL  10 mL Intravenous Q12H Leola Bronson MD   10 mL at 08/14/22 0807   • sodium chloride 0.9 % flush 10 mL  10 mL Intravenous PRN Leola Bronson MD       • spironolactone (ALDACTONE) tablet 12.5 mg  12.5 mg Oral Daily Leola Bronson MD   12.5 mg at 08/14/22 0805   • vitamin B-12 (CYANOCOBALAMIN) tablet 1,000 mcg  1,000 mcg Oral Daily Leola Bronson MD   1,000 mcg at 08/14/22 0806         ________________________________________________________        OBJECTIVE:    PHYSICAL EXAM:    Constitutional: The patient is in no apparent distress, bright awake and alert. There is no shortness of breath.   PSYCHIATRIC: Mood/affect normal, judgement normal, appropriate  HEENT:. Normocephalic, atraumatic.   Chest: Breathing unlabored  Cardiac: Regular rate and rhythm.   Extremities:  No clubbing, cyanosis or edema.    NEUROLOGICAL:    Cognition:   Fully oriented.  Fund of knowledge decent.  Concentration and attention normal.   Language normal with normal comprehension, fluent speech, intact repetition and naming.      Cranial nerves;    II - pupils bilaterally equal reacting to light,  No new Visual field deficits;  Fundoscopic exam- Not able to be done, non-dilated exam  III,IV,VI: EOMI with no diplopia  V: Normal facial sensations  VII: No facial asymmetry,  VIII: No New hearing abnormality  IX, X, XI: normal gag and shoulder shrug;  XII: tongue is in the midline.    Sensory:  Intact to light touch in all extremities.     Motor: Strength 5/5 bilaterally upper and lower extremities. No involuntary movements present. Normal tone and bulk.     Cerebellar: Finger to nose and mirror movements normal bilaterally.    Gait and balance: Deferred.     Physical exam performed by JENN Ortez.  ________________________________________________________   RESULTS REVIEW:    VITAL SIGNS:   Temp:  [97.5 °F (36.4 °C)-98.3 °F (36.8 °C)] 98.3 °F (36.8 °C)  Heart Rate:  [58-87] 79  Resp:  [16] 16  BP: (112-127)/(67-74) 127/74     LABS:      Lab 08/12/22  1758   WBC 7.50   HEMOGLOBIN 11.9*   HEMATOCRIT 35.2   PLATELETS 227   NEUTROS ABS 4.30   LYMPHS ABS 2.30   MONOS ABS 0.50   EOS ABS 0.20   MCV 99.6*   PROTIME 10.5   APTT 29.3*         Lab 08/12/22  1934 08/12/22  1758   SODIUM 141  --    POTASSIUM 4.6  --    CHLORIDE 105  --    CO2 26.0  --    ANION GAP 10.0  --    BUN 34*  --    CREATININE 1.32*  --    EGFR 42.2*  --     GLUCOSE 106*  --    CALCIUM 9.7  --    MAGNESIUM  --  1.9   TSH  --  3.450         Lab 08/12/22 1934   TOTAL PROTEIN 7.0   ALBUMIN 4.10   GLOBULIN 2.9   ALT (SGPT) 15   AST (SGOT) 22   BILIRUBIN 0.3   ALK PHOS 138*         Lab 08/14/22  0757 08/12/22  1934 08/12/22  1758   PROBNP  --   --  743.4   TROPONIN T <0.010 <0.010  --    PROTIME  --   --  10.5   INR  --   --  1.02                     Lab Results   Component Value Date    TSH 3.450 08/12/2022    LDL 43 02/07/2022    HGBA1C 5.4 02/07/2022    [id number] 720 02/07/2022       IMAGING STUDIES:  CT Head Without Contrast    Result Date: 8/12/2022  No acute intracranial abnormality.  Chronic white matter changes compatible small vessel ischemic disease in this age group  Electronically Signed By-Tobi Tariq On:8/12/2022 7:18 PM This report was finalized on [id number] by  Tobi Tariq, .    XR Chest 1 View    Result Date: 8/12/2022  IMPRESSION : Hazy and linear opacities at the right lung base which are likely chronic in nature. Acute superimposed upon chronic change cannot be excluded.  Electronically Signed By-Tobi Tariq On:8/12/2022 6:56 PM This report was finalized on [id number] by  Tobi Tariq, .      I reviewed the patient's new clinical results.    ________________________________________________________     PROBLEM LIST:    Atrial fibrillation, unspecified type (HCC)            ASSESSMENT/PLAN:  1.  Dizziness/lightheadedness 2/2 hypotension and new onset atrial fibrillation   - CT head reviewed, no acute findings.   - Check carotid duplex    - Echo: EF is 51-55%  - EKG: Atrial fibrillation rate 102  - Labs: A1C: P, B12: P, LDL:  P, TSH: 3.450   -Myoview stress test today per cardiology   - Not primary neurologic, nothing further to add     2. New onset proxysmal atrial fibrillation  - Mgmt per Cardiology, pt started on Xarelto   - Follow up outpatient with Cardiology     3. Chronic kidney disease stage III  -Outpatient management  per nephrology    4. Hyperlipidemia  - Statin & dietary modifications    5. Hypothyroidism  -TSH normal, continue levothyroxine and follow-up outpatient for management    Stroke risk factors:   - Blood pressure should be less than 130/80 outpatient, HbA1c less than 6.5, LDL less than 70; b12>500 and smoking cessation if applicable. We would be grateful if the primary team / primary care physician would keep a close watch on the above targets.      There are no further recommendations. Will sign off, please call with any questions or concerns. I discussed the patient's findings and my recommendations with patient    CAROL ePñaloza  08/14/22  12:05 EDT

## 2022-08-14 NOTE — PROGRESS NOTES
[facility] Medicine Services Daily Progress Note    Patient Name: Faby Le  : 1945  MRN: [id number]  Primary Care Physician:  Leonor Adam MD  Date of admission: 2022    Subjective      Chief Complaint: dizziness    History of Present Illness: Faby Le is a 77 y.o. female with a medical history notable for morbid obesity, ROSA, hypertension, hyperlipidemia, hypothyroidism, and other comorbidities who presented to [facility] on 2022 complaining of dizziness.  Earlier this morning, patient developed sudden onset of dizziness with continued dizzy spells throughout the day.  When the episodes occurred, she would have to hold onto something to maintain her balance.  Later on the day, she went to her daughter's restaurant where she experienced an episode of dizziness.  Her daughter decided to take her blood pressure at that time.  On multiple readings, her systolic blood pressure was less than 100, which was not typical for her.  Her daughter called her primary care provider, who told her to come in for evaluation in the clinic today.  While in the clinic, an EKG was performed and the patient was noted to have new onset atrial fibrillation.  She was then recommended to come to the emergency department for further evaluation and care    22 patient seen and examined in bed no acute distress, dizziness improved.  Added meclizine.  22 patient seen and examined in bed no acute distress, no new complaints, vital signs stable, heart rate 57.  Underwent stress,  Discussed with RN.      ROS *Constitutional: Negative for chills, decreased appetite, diaphoresis, fever and malaise/fatigue.   HENT: Negative.    Eyes: Negative.    Cardiovascular: Positive for irregular heartbeat and palpitations. Negative for dyspnea on exertion and leg swelling.   Respiratory: Negative for cough.    Endocrine: Negative.    Skin: Negative.    Musculoskeletal: Positive for  arthritis.   Neurological: Positive for dizziness. Negative for focal weakness.   Psychiatric/Behavioral: Negative.    Objective      Vitals:   Temp:  [97.6 °F (36.4 °C)-98.3 °F (36.8 °C)] 97.6 °F (36.4 °C)  Heart Rate:  [57-79] 57  Resp:  [16-18] 18  BP: (112-136)/(65-74) 136/65    Physical Exam General:  Morbid obesity, Appears in no acute distress, non-toxic appearing  HEENT:  Normocephalic. Normal conjunctiva, EOM intact bilaterally, pupils equal and reactive to light. No mucosal pallor or cyanosis. No oral lesions.   Respiratory: Respirations regular and unlabored at rest. Bilateral breath sounds with good air entry in all fields. No crackles, rubs or wheezes auscultated  Cardiovascular: S1S2 Regular rate and irregular rhythm. No murmur, rub or gallop. No pretibial pitting edema  Gastrointestinal: Obese Abdomen with large pannus, soft, non tender. Bowel sounds present. No rebound or guarding.   Musculoskeletal: Moves all extremities voluntarily. No abnormal movements  Extremities: No digital clubbing or cyanosis  Skin: Color pink. Skin warm and dry to touch. No rashes    Neuro: AAO x3, CN II-XII grossly intact, comprehension intact, follows commands appropriately  Psych: Mood and affect normal, pleasant and cooperative    Result Review    Result Review:  I have personally reviewed the results from the time of this admission to 8/14/2022 16:31 EDT and agree with these findings:  []  Laboratory  []  Microbiology  []  Radiology  []  EKG/Telemetry   []  Cardiology/Vascular   []  Pathology  []  Old records  []  Other:  Most notable findings include:   CMP:        Lab 08/12/22  1934 08/12/22  1758   SODIUM 141  --    POTASSIUM 4.6  --    CHLORIDE 105  --    CO2 26.0  --    ANION GAP 10.0  --    BUN 34*  --    CREATININE 1.32*  --    EGFR 42.2*  --    GLUCOSE 106*  --    CALCIUM 9.7  --    MAGNESIUM  --  1.9   TOTAL PROTEIN 7.0  --    ALBUMIN 4.10  --    GLOBULIN 2.9  --    ALT (SGPT) 15  --    AST (SGOT) 22  --     BILIRUBIN 0.3  --    ALK PHOS 138*  --      CBC:      Lab 08/12/22  1758   WBC 7.50   HEMOGLOBIN 11.9*   HEMATOCRIT 35.2   PLATELETS 227   NEUTROS ABS 4.30   LYMPHS ABS 2.30   MONOS ABS 0.50   EOS ABS 0.20   MCV 99.6*         Assessment & Plan      Brief Patient Summary:  Faby Le is a 77 y.o. female who presented to [facility] on 8/12/2022 complaining of dizziness, found to have new onset atrial fibrillation.  Patient is hemodynamically stable with a controlled heart rate.  Exam unremarkable for signs of heart failure.  CT head negative for acute intracranial pathology as source dizziness      allopurinol, 300 mg, Oral, Daily  atorvastatin, 20 mg, Oral, Nightly  bumetanide, 0.5 mg, Oral, Daily  cetirizine, 10 mg, Oral, Daily  dilTIAZem CD, 120 mg, Oral, Q24H  escitalopram, 20 mg, Oral, Daily  levothyroxine, 88 mcg, Oral, Q AM  metoprolol succinate XL, 25 mg, Oral, Nightly  montelukast, 10 mg, Oral, Nightly  rivaroxaban, 15 mg, Oral, Daily With Dinner  sodium chloride, 10 mL, Intravenous, Q12H  spironolactone, 12.5 mg, Oral, Daily  cyanocobalamin, 1,000 mcg, Oral, Daily             Active Hospital Problems:  Active Hospital Problems    Diagnosis    • Atrial fibrillation, unspecified type (HCC)      New onset atrial fibrillation  Likely etiology for acute dizzy spells. CHADSVasc score at least 4, indicating patient likely to benefit from chronic AC for stroke prevention  - discussed with patient risk vs. Benefit of anticoagulation therapy   - obtain TTE to r/o valvular disease to assess if patient is a candidate for DOAC  - cardiology consult. Pt is followed as an outpatient by Dr. Zavala  -Continue home regimen of metoprolol succinate 25 mg nightly  -stress test>Left ventricular ejection fraction is normal. (Calculated EF = 68%).  · Findings consistent with an equivocal ECG stress test.      Hypothyroidism  TSH within normal  - Continue home regimen continue home regimen of levothyroxine 88 mcg  daily     ROSA  - Continue home CPAP at night, which is present at bedside     Hypertension  - Continue home regimen of amlodipine 5 mg daily     Hyperlipidemia  -Atorvastatin 20 mg nightly     Gout  - Allopurinol 300 mg daily     CKD stage III  -Continue home regimen of Bumex and spironolactone daily  -Recommend outpatient referral to nephrology    Dizziness likely benign positional vertigo.  -Meclizine added.      DVT prophylaxis:  Medical DVT prophylaxis orders are present.    CODE STATUS:         Disposition:  I expect patient to be discharged home.    This patient has been examined wearing appropriate Personal Protective Equipment and discussed with rn. 08/14/22      Electronically signed by Dionisio Guerrero MD, 08/14/22, 16:31 EDT.  [facility] Hospitalist Team

## 2022-08-14 NOTE — CONSULTS
Cardiology consult note  Josh Galvin MD, PhD      Patient Care Team:  Leonor Adam MD as PCP - General  Maggi Horn MD as Consulting Physician (Pain Medicine)  Ratna Zavala MD as Consulting Physician (Cardiology)  Nora Foley APRN as Nurse Practitioner (Gastroenterology)  Gianluca Walton MD as Consulting Physician (Pulmonary Disease)  Nathanael Murillo MD as Consulting Physician (Allergy and Immunology)  Delfin Thorpe MD as Surgeon (Orthopedic Surgery)  Josh Loomis MD as Consulting Physician (Gastroenterology)    CHIEF COMPLAINT: Palpitations, new onset A. fib RVR    HISTORY OF PRESENT ILLNESS:    At the pleasure to see this 77-year-old female who has been established with Dr. Aguirre, she had a cardiomyopathy with LVEF of 40 to 50%, moderate but nonobstructive CAD involving the diagonal branch of the LAD, mild to moderate disease of the circumflex back in 2018, no history of MI, he saw her for lower extremity edema, systolic diastolic CHF, obstructive sleep apnea dyspnea exertion and shortness of breath.  She was last seen in February of this year.  She had venous Dopplers which were unremarkable, she was placed on heart failure therapies with Bumex 1 mg a day and losartan and Aldactone with further recommendations for titration upwards.  She presented to the hospital yesterday with complaints of dizziness of sudden onset and recurrent throughout the day.  She had some hypotension as measured at home with systolics less than 100 EKG was performed she had new onset A. fib atrial fibrillation.  Her [id number] score is 4.  On my encounter she is back to sinus rhythm and regular denying any current symptoms.    Review of systems otherwise negative x14 point review of systems except was mentioned above  Historical data copied forward from previous encounters in EMR is unchanged    2D echo reviewed and interpreted me demonstrates low normal LV systolic function 50  to 55% with normal global and regional thickening  Normal RV size and function  Normal atrial sizes grossly  Trace to mild valvular regurgitation as documented with no valvular stenosis  No masses or effusions  Grade 1-2 diastolic dysfunction by criteria  Mild left atrial enlargement      Telemetry reviewed by me demonstrates sinus rhythm presently    EKG reviewed and interpreted me demonstrates A. fib    Past Medical History:   Diagnosis Date   • Ankle pain    • Arthritis    • Cancer (HCC)     uterine, had hysterectomy 2018   • Coronary artery disease    • Depression    • Gout    • Hyperglycemia    • Hyperlipidemia    • Low back pain    • Morbid obesity (HCC)    • Sleep apnea    • Vitamin D deficiency      Past Surgical History:   Procedure Laterality Date   • ANKLE FUSION      2017-left   • CARDIAC CATHETERIZATION     • CARPAL TUNNEL RELEASE     • CATARACT EXTRACTION     • CUBITAL TUNNEL RELEASE     • HYSTERECTOMY     • REPLACEMENT TOTAL KNEE      left 2008   • ROTATOR CUFF REPAIR       Family History   Problem Relation Age of Onset   • Hypertension Mother    • Stroke Father    • Arthritis Brother    • Cancer Brother      Social History     Tobacco Use   • Smoking status: Never Smoker   • Smokeless tobacco: Never Used   Vaping Use   • Vaping Use: Never used   Substance Use Topics   • Alcohol use: No   • Drug use: No     Medications Prior to Admission   Medication Sig Dispense Refill Last Dose   • allopurinol (ZYLOPRIM) 300 MG tablet TAKE 1 TABLET BY MOUTH EVERY DAY 90 tablet 2 8/12/2022 at Unknown time   • amLODIPine (Norvasc) 5 MG tablet Take 1 tablet by mouth Daily. 90 tablet 3 8/12/2022 at Unknown time   • atorvastatin (LIPITOR) 20 MG tablet TAKE 1 TABLET BY MOUTH EVERYDAY AT BEDTIME 90 tablet 3 8/12/2022 at Unknown time   • bumetanide (BUMEX) 2 MG tablet TAKE 1/2 TABLET BY MOUTH EVERY DAY 45 tablet 2 8/12/2022 at Unknown time   • cetirizine (zyrTEC) 10 MG tablet Take 1 tablet by mouth Daily.   8/12/2022 at  "Unknown time   • cyanocobalamin (VITAMIN B-12) 1000 MCG tablet Take 1 tablet by mouth Daily.   8/12/2022 at Unknown time   • escitalopram (LEXAPRO) 20 MG tablet TAKE 1 TABLET BY MOUTH EVERY DAY 90 tablet 2 8/12/2022 at Unknown time   • levothyroxine (SYNTHROID, LEVOTHROID) 88 MCG tablet TAKE 1 TABLET BY MOUTH EVERY DAY 90 tablet 3 8/12/2022 at Unknown time   • metoprolol tartrate (LOPRESSOR) 25 MG tablet TAKE 1 TABLET BY MOUTH EVERYDAY AT BEDTIME 90 tablet 3 8/12/2022 at Unknown time   • montelukast (SINGULAIR) 10 MG tablet Take 10 mg by mouth Daily.   8/12/2022 at Unknown time   • Multiple Vitamins-Minerals (CENTRUM SILVER ADULT 50+) tablet Take 1 tablet by mouth Daily.   8/12/2022 at Unknown time   • potassium chloride (K-DUR,KLOR-CON) 20 MEQ CR tablet Take 20 mEq by mouth Daily.   8/12/2022 at Unknown time   • spironolactone (ALDACTONE) 25 MG tablet TAKE 1/2 TABLET BY MOUTH EVERY DAY 45 tablet 4 8/12/2022 at Unknown time   • Vitamin D, Cholecalciferol, 25 MCG (1000 UT) tablet Take 1 tablet by mouth Daily.   8/12/2022 at Unknown time     Allergies:  Celecoxib    REVIEW OF SYSTEMS:  Please see the above history of present illness for pertinent positives and negatives.  The remainder of the patient's systems have been reviewed and are negative.     Vital Signs  Temp:  [97.5 °F (36.4 °C)-97.8 °F (36.6 °C)] 97.8 °F (36.6 °C)  Heart Rate:  [58-87] 58  Resp:  [16] 16  BP: (112-124)/(55-73) 112/68    Flowsheet Rows    Flowsheet Row First Filed Value   Admission Height 160 cm (63\") Documented at 08/12/2022 1504   Admission Weight 123 kg (271 lb 2.7 oz) Documented at 08/12/2022 1504           Physical Exam:  Physical Exam   Constitutional: Patient appears well-developed and well-nourished and in no acute distress   HEENT:   Head: Normocephalic and atraumatic.   Eyes:  Pupils are equal, round, and reactive to light. EOM are intact. Sclerae are anicteric and noninjected.  Mouth and Throat: Patient has moist mucous membranes. " Oropharynx is clear of any erythema or exudate.     Neck: Neck supple. No JVD present. No thyromegaly present. No lymphadenopathy present.  Cardiovascular: Regular rate, regular rhythm, S1 normal and S2 normal.  Exam reveals no gallop and no friction rub.  No murmur heard.  Pulmonary/Chest: Lungs are clear to auscultation bilaterally. No respiratory distress. No wheezes. No rhonchi. No rales.   Abdominal: Soft. Bowel sounds are normal. No distension and no mass. There is no hepatosplenomegaly. There is no tenderness.   Musculoskeletal: Normal muscle tone  Extremities: No edema. Pulses are palpable in all 4 extremities.  Neurological: Patient is alert and oriented to person, place, and time. Cranial nerves II-XII are grossly intact with no focal deficits.  Skin: Skin is warm. No rash noted. Nails show no clubbing.  No cyanosis or erythema.     Results Review:    I reviewed the patient's new clinical results.  Lab Results (most recent)     Procedure Component Value Units Date/Time    COVID PRE-OP / PRE-PROCEDURE SCREENING ORDER (NO ISOLATION) - Swab, Nasopharynx [[id number]]  (Normal) Collected: 08/12/22 2134    Specimen: Swab from Nasopharynx Updated: 08/12/22 2216    Narrative:      The following orders were created for panel order COVID PRE-OP / PRE-PROCEDURE SCREENING ORDER (NO ISOLATION) - Swab, Nasopharynx.  Procedure                               Abnormality         Status                     ---------                               -----------         ------                     COVID-19,CEPHEID/ANA,CO...[[id number]]  Normal              Final result                 Please view results for these tests on the individual orders.    COVID-19,CEPHEID/ANA,COR/LAKESHA/PAD/MICHAEL IN-HOUSE(OR EMERGENT/ADD-ON),NP SWAB IN TRANSPORT MEDIA 3-4 HR TAT, RT-PCR - Swab, Nasopharynx [[id number]]  (Normal) Collected: 08/12/22 2134    Specimen: Swab from Nasopharynx Updated: 08/12/22 2216     COVID19 Not Detected    Narrative:      Fact  sheet for providers: https://www.fda.gov/media/306418/download     Fact sheet for patients: https://www.fda.gov/media/536065/download  Fact sheet for providers: https://www.fda.gov/media/483246/download    Fact sheet for patients: https://www.fda.gov/media/803223/download    Test performed by PCR.    Extra Tubes [[id number]] Collected: 08/12/22 2143    Specimen: Blood, Venous Line Updated: 08/12/22 2144    Narrative:      The following orders were created for panel order Extra Tubes.  Procedure                               Abnormality         Status                     ---------                               -----------         ------                     Gold Top - SST[[id number]]                                   Final result                 Please view results for these tests on the individual orders.    Gold Top - SST [[id number]] Collected: 08/12/22 2143    Specimen: Blood Updated: 08/12/22 2144    Comprehensive Metabolic Panel [[id number]]  (Abnormal) Collected: 08/12/22 1934    Specimen: Blood from Arm, Left Updated: 08/12/22 2002     Glucose 106 mg/dL      BUN 34 mg/dL      Creatinine 1.32 mg/dL      Sodium 141 mmol/L      Potassium 4.6 mmol/L      Comment: Slight hemolysis detected by analyzer. Results may be affected.        Chloride 105 mmol/L      CO2 26.0 mmol/L      Calcium 9.7 mg/dL      Total Protein 7.0 g/dL      Albumin 4.10 g/dL      ALT (SGPT) 15 U/L      AST (SGOT) 22 U/L      Alkaline Phosphatase 138 U/L      Total Bilirubin 0.3 mg/dL      Globulin 2.9 gm/dL      A/G Ratio 1.4 g/dL      BUN/Creatinine Ratio 25.8     Anion Gap 10.0 mmol/L      eGFR 42.2 mL/min/1.73      Comment: National Kidney Foundation and American Society of Nephrology (ASN) Task Force recommended calculation based on the Chronic Kidney Disease Epidemiology Collaboration (CKD-EPI) equation refit without adjustment for race.       Narrative:      GFR Normal >60  Chronic Kidney Disease <60  Kidney Failure <15      Troponin  [[id number]]  (Normal) Collected: 08/12/22 1934    Specimen: Blood from Arm, Left Updated: 08/12/22 2002     Troponin T <0.010 ng/mL     Narrative:      Troponin T Reference Range:  <= 0.03 ng/mL-   Negative for AMI  >0.03 ng/mL-     Abnormal for myocardial necrosis.  Clinicians would have to utilize clinical acumen, EKG, Troponin and serial changes to determine if it is an Acute Myocardial Infarction or myocardial injury due to an underlying chronic condition.       Results may be falsely decreased if patient taking Biotin.      BNP [[id number]]  (Normal) Collected: 08/12/22 1758    Specimen: Blood Updated: 08/12/22 1840     proBNP 743.4 pg/mL     Narrative:      Among patients with dyspnea, NT-proBNP is highly sensitive for the detection of acute congestive heart failure. In addition NT-proBNP of <300 pg/ml effectively rules out acute congestive heart failure with 99% negative predictive value.    Results may be falsely decreased if patient taking Biotin.      TSH [[id number]]  (Normal) Collected: 08/12/22 1758    Specimen: Blood Updated: 08/12/22 1840     TSH 3.450 uIU/mL     Magnesium [[id number]]  (Normal) Collected: 08/12/22 1758    Specimen: Blood Updated: 08/12/22 1836     Magnesium 1.9 mg/dL     Protime-INR [[id number]]  (Normal) Collected: 08/12/22 1758    Specimen: Blood Updated: 08/12/22 1815     Protime 10.5 Seconds      INR 1.02    aPTT [[id number]]  (Abnormal) Collected: 08/12/22 1758    Specimen: Blood Updated: 08/12/22 1815     PTT 29.3 seconds     CBC & Differential [[id number]]  (Abnormal) Collected: 08/12/22 1758    Specimen: Blood Updated: 08/12/22 1805    Narrative:      The following orders were created for panel order CBC & Differential.  Procedure                               Abnormality         Status                     ---------                               -----------         ------                     CBC Auto Differential[[id number]]        Abnormal            Final result                  Please view results for these tests on the individual orders.    CBC Auto Differential [[id number]]  (Abnormal) Collected: 08/12/22 1758    Specimen: Blood Updated: 08/12/22 1805     WBC 7.50 10*3/mm3      RBC 3.54 10*6/mm3      Hemoglobin 11.9 g/dL      Hematocrit 35.2 %      MCV 99.6 fL      MCH 33.6 pg      MCHC 33.8 g/dL      RDW 15.6 %      RDW-SD 54.3 fl      MPV 7.6 fL      Platelets 227 10*3/mm3      Neutrophil % 57.8 %      Lymphocyte % 31.1 %      Monocyte % 7.1 %      Eosinophil % 2.9 %      Basophil % 1.1 %      Neutrophils, Absolute 4.30 10*3/mm3      Lymphocytes, Absolute 2.30 10*3/mm3      Monocytes, Absolute 0.50 10*3/mm3      Eosinophils, Absolute 0.20 10*3/mm3      Basophils, Absolute 0.10 10*3/mm3      nRBC 0.1 /100 WBC           Imaging Results (Most Recent)     Procedure Component Value Units Date/Time    CT Head Without Contrast [[id number]] Collected: 08/12/22 1915     Updated: 08/12/22 1920    Narrative:      CT HEAD WO CONTRAST-     Date of Exam: 8/12/2022 6:17 PM     Indication: dizziness.     Comparison: None available.     Technique:  Contiguous axial CT images of the head were obtained without  contrast from skull base to vertex.  Coronal and sagittal  reconstructions were performed.  Automated exposure control and  iterative reconstruction methods were used.       FINDINGS:  Brain/Ventricles: There is no acute intracranial hemorrhage, mass effect  or midline shift. No abnormal extra-axial fluid collection.  The  gray-white differentiation is maintained without evidence of an acute  infarct.  There is no evidence of hydrocephalus mild white matter  changes compatible small vessel ischemic disease noted.     Orbits: The visualized portion of the orbits demonstrate no acute  abnormality.     Sinuses:Minimal mastoid effusions and mucosal thickening of the  paranasal sinuses.     Soft Tissues/Skull: No acute abnormality of the visualized skull or soft  tissues.       Impression:      No  acute intracranial abnormality.     Chronic white matter changes compatible small vessel ischemic disease in  this age group     Electronically Signed By-Tobi Tariq On:8/12/2022 7:18 PM  This report was finalized on [id number] by  Tobi Tariq, .    XR Chest 1 View [[id number]] Collected: 08/12/22 1854     Updated: 08/12/22 1858    Narrative:         DATE OF EXAM:   8/12/2022 6:31 PM     PROCEDURE:   XR CHEST 1 VW-     INDICATIONS:   dizziness     COMPARISON:  8/28/2018 and prior     TECHNIQUE:   Portable Chest     FINDINGS:      Study is limited by overlying support and monitoring apparatus.     The heart size is within normal limits. Increased hazy opacity is noted  overlying chronic changes at the right lung base. Lungs are otherwise  grossly clear. Osseous structures are unremarkable       Impression:      IMPRESSION :   Hazy and linear opacities at the right lung base which are likely  chronic in nature. Acute superimposed upon chronic change cannot be  excluded.     Electronically Signed By-Tobi Tariq On:8/12/2022 6:56 PM  This report was finalized on [id number] by  Tobi Tariq, .        reviewed    ECG/EMG Results (most recent)     Procedure Component Value Units Date/Time    ECG 12 Lead [[id number]] Collected: 08/12/22 1523     Updated: 08/13/22 1234     QT Interval 354 ms     Narrative:      HEART RATE= 102  bpm  RR Interval= 588  ms  HI Interval=   ms  P Horizontal Axis=   deg  P Front Axis=   deg  QRSD Interval= 105  ms  QT Interval= 354  ms  QRS Axis= -15  deg  T Wave Axis= 66  deg  - ABNORMAL ECG -  Atrial fibrillation  When compared with ECG of 05-Apr-2018 10:47:56,  Significant change in rhythm  Electronically Signed By: Vern Gandhi (LAKESHA) 13-Aug-2022 12:33:59  Date and Time of Study: 2022-08-12 15:23:42    Adult Transthoracic Echo Complete W/ Cont if Necessary Per Protocol [[id number]] Resulted: 08/13/22 1439     Updated: 08/13/22 1440     Target HR (85%) 123 bpm       Max. Pred. HR (100%) 145 bpm      ACS 2.02 cm      Ao root diam 3.4 cm      Ao pk hussain 120.6 cm/sec      Ao V2 VTI 31.6 cm      LANDEN(I,D) 2.5 cm2      EDV(cubed) 116.0 ml      EDV(MOD-sp4) 69.8 ml      EF(MOD-bp) 51.0 %      EF(MOD-sp4) 51.1 %      ESV(cubed) 43.3 ml      ESV(MOD-sp4) 34.1 ml      IVS/LVPW 1.07 cm      LV mass(C)d 185.0 grams      LV V1 max PG 4.1 mmHg      LV V1 mean PG 2.26 mmHg      LV V1 max 101.3 cm/sec      LVPWd 1.01 cm      MR max PG 74.1 mmHg      MV dec slope 442.6 cm/sec2      MV dec time 0.18 msec      MV V2 VTI 21.6 cm      MVA(VTI) 3.7 cm2      PA acc time 0.14 sec      PA pr(Accel) 15.6 mmHg      PA V2 max 92.1 cm/sec      Pulm A Revs Hussain 24.0 cm/sec      RV V1 max PG 1.26 mmHg      RV V1 max 56.2 cm/sec      RV V1 VTI 14.7 cm      RVIDd 3.1 cm      SI(MOD-sp4) 16.2 ml/m2      SV(LVOT) 79.9 ml      SV(MOD-sp4) 35.7 ml      TR max PG 21.4 mmHg      Ao max PG 5.8 mmHg      Ao mean PG 3.3 mmHg      FS 28.0 %      IVSd 1.08 cm      LA dimension (2D)  4.0 cm      LV V1 VTI 25.8 cm      LVIDd 4.9 cm      LVIDs 3.5 cm      LVOT area 3.1 cm2      LVOT diam 1.99 cm      MV E/A 1.19     MV max PG 3.5 mmHg      MV mean PG 1.47 mmHg      Pulm S/D 0.73     MR max hussain 429.9 cm/sec      MV A max hussain 68.3 cm/sec      MV E max hussain 81.2 cm/sec      Pulm A Revs Dur 0.13 sec      Pulm Kumar Hussain 51.2 cm/sec      Pulm Sys Hussain 37.5 cm/sec      TR max hussain 231.2 cm/sec      LV Kumar Vol (BSA corrected) 31.7 cm2      LV Sys Vol (BSA corrected) 15.5 cm2         reviewed    Assessment & Plan      History nonischemic cardiomyopathy  Essential hypertension  New onset paroxysmal A. fib  Obesity  Obstructive sleep apnea  History of systolic and diastolic CHF      New onset paroxysmal A. fib  Anticoagulation be warranted with [id number] of scope for  Normal LVEF 50 to 55%  Mild left atrial enlargement  No masses or effusion seen    Patient will continue on metoprolol extended release with heart rates in the 60s to  70s  Will consider changing Norvasc to diltiazem 120 daily  Continue Aldactone with history of cardiomyopathy  Not on losartan presently on her home medicines, her baseline creatinine is 1.3  Obstructive sleep apnea, recommended CPAP  Obesity diet weight loss recommended  Does not appear volume overloaded, normal IVC by echo, right atrial pressure 0-5  She is on 1 mg of Bumex daily without any edema    Further recommendation follow findings    Continue to follow, Dr. Aguirre can see her on Monday if she remains in the hospital otherwise will discharge with heart monitor with outpatient follow-up to Dr. Aguirre within the next 2 weeks    I discussed the patient's findings and my recommendations with patient and staff    Josh Galvin MD  08/13/22  21:36 EDT

## 2022-08-14 NOTE — PLAN OF CARE
Continue to monitor and assess for pain.  Pt is calm and complains of no pain.   Problem: Dysrhythmia  Goal: Normalized Cardiac Rhythm  Outcome: Ongoing, Progressing  Intervention: Monitor and Manage Cardiac Rhythm Effect  Recent Flowsheet Documentation  Taken 8/14/2022 0808 by Gabriella Alarcon RN  VTE Prevention/Management: (see MAR) other (see comments)     Problem: Adult Inpatient Plan of Care  Goal: Plan of Care Review  Outcome: Ongoing, Progressing  Flowsheets (Taken 8/14/2022 0408 by Amanda Rosas RN)  Progress: no change  Plan of Care Reviewed With: patient  Outcome Evaluation: Patient rested well over night. Myoview ordered for today.  Goal: Patient-Specific Goal (Individualized)  Outcome: Ongoing, Progressing  Goal: Absence of Hospital-Acquired Illness or Injury  Outcome: Ongoing, Progressing  Intervention: Identify and Manage Fall Risk  Flowsheets (Taken 8/14/2022 0808)  Safety Promotion/Fall Prevention:   safety round/check completed   clutter free environment maintained   assistive device/personal items within reach  Intervention: Prevent Skin Injury  Flowsheets  Taken 8/14/2022 0808 by Gabriella Alarcon RN  Skin Protection:   adhesive use limited   skin-to-skin areas padded  Taken 8/13/2022 2030 by Amanda Rosas RN  Body Position:   supine   position changed independently  Intervention: Prevent and Manage VTE (Venous Thromboembolism) Risk  Flowsheets  Taken 8/14/2022 0808 by Gabriella Alarcon RN  VTE Prevention/Management: (see MAR) other (see comments)  Taken 8/13/2022 2030 by Amanda Rosas RN  Activity Management: activity adjusted per tolerance  Intervention: Prevent Infection  Flowsheets (Taken 8/14/2022 0808)  Infection Prevention:   rest/sleep promoted   personal protective equipment utilized   hand hygiene promoted  Goal: Optimal Comfort and Wellbeing  Outcome: Ongoing, Progressing  Intervention: Monitor Pain and Promote Comfort  Flowsheets (Taken 8/13/2022 0213 by Do Galvin, CHRISTOPHE)  Pain  Management Interventions:   no interventions per patient request   care clustered  Intervention: Provide Person-Centered Care  Flowsheets (Taken 8/13/2022 2030 by Amanda Rosas RN)  Trust Relationship/Rapport:   care explained   questions answered   reassurance provided   questions encouraged  Goal: Readiness for Transition of Care  Outcome: Ongoing, Progressing  Intervention: Mutually Develop Transition Plan  Flowsheets  Taken 8/13/2022 0208 by Do Galvin, RN  Transportation Anticipated:   family or friend will provide   car, drives self  Patient/Family Anticipated Services at Transition: none  Patient/Family Anticipates Transition to: home with family  Taken 8/13/2022 0206 by Do Galvin, RN  Equipment Currently Used at Home:   shower chair   walker, rolling   cpap     Problem: Fall Injury Risk  Goal: Absence of Fall and Fall-Related Injury  Outcome: Ongoing, Progressing  Intervention: Identify and Manage Contributors  Flowsheets  Taken 8/14/2022 0808 by Gabriella Alarcon, RN  Medication Review/Management: medications reviewed  Taken 8/13/2022 0213 by Do Galvin, RN  Self-Care Promotion:   independence encouraged   BADL personal objects within reach   BADL personal routines maintained  Intervention: Promote Injury-Free Environment  Flowsheets (Taken 8/14/2022 0808)  Safety Promotion/Fall Prevention:   safety round/check completed   clutter free environment maintained   assistive device/personal items within reach     Problem: Obstructive Sleep Apnea Risk or Actual Comorbidity Management  Goal: Unobstructed Breathing During Sleep  Outcome: Ongoing, Progressing   Goal Outcome Evaluation:  Plan of Care Reviewed With: patient        Progress: no change

## 2022-08-15 ENCOUNTER — READMISSION MANAGEMENT (OUTPATIENT)
Dept: CALL CENTER | Facility: HOSPITAL | Age: 77
End: 2022-08-15

## 2022-08-15 ENCOUNTER — APPOINTMENT (OUTPATIENT)
Dept: RESPIRATORY THERAPY | Facility: HOSPITAL | Age: 77
End: 2022-08-15

## 2022-08-15 VITALS
HEART RATE: 69 BPM | RESPIRATION RATE: 16 BRPM | DIASTOLIC BLOOD PRESSURE: 74 MMHG | TEMPERATURE: 97.8 F | OXYGEN SATURATION: 98 % | SYSTOLIC BLOOD PRESSURE: 124 MMHG | WEIGHT: 250 LBS | HEIGHT: 63 IN | BODY MASS INDEX: 44.3 KG/M2

## 2022-08-15 DIAGNOSIS — I25.10 CORONARY ARTERY DISEASE INVOLVING NATIVE HEART WITHOUT ANGINA PECTORIS, UNSPECIFIED VESSEL OR LESION TYPE: Primary | ICD-10-CM

## 2022-08-15 DIAGNOSIS — R94.30 ABNORMAL RESULTS OF CARDIOVASCULAR FUNCTION STUDIES: ICD-10-CM

## 2022-08-15 LAB — HBA1C MFR BLD: 5.7 % (ref 3.5–5.6)

## 2022-08-15 PROCEDURE — 99239 HOSP IP/OBS DSCHRG MGMT >30: CPT | Performed by: INTERNAL MEDICINE

## 2022-08-15 PROCEDURE — G0378 HOSPITAL OBSERVATION PER HR: HCPCS

## 2022-08-15 PROCEDURE — 99214 OFFICE O/P EST MOD 30 MIN: CPT | Performed by: INTERNAL MEDICINE

## 2022-08-15 RX ORDER — METOPROLOL SUCCINATE 25 MG/1
25 TABLET, EXTENDED RELEASE ORAL NIGHTLY
Qty: 30 TABLET | Refills: 0 | Status: SHIPPED | OUTPATIENT
Start: 2022-08-15 | End: 2023-02-10 | Stop reason: SDUPTHER

## 2022-08-15 RX ADMIN — SPIRONOLACTONE 12.5 MG: 25 TABLET ORAL at 09:10

## 2022-08-15 RX ADMIN — CETIRIZINE HYDROCHLORIDE TABLETS 10 MG: 10 TABLET, FILM COATED ORAL at 09:10

## 2022-08-15 RX ADMIN — CYANOCOBALAMIN TAB 1000 MCG 1000 MCG: 1000 TAB at 09:10

## 2022-08-15 RX ADMIN — LEVOTHYROXINE SODIUM 88 MCG: 0.09 TABLET ORAL at 06:32

## 2022-08-15 RX ADMIN — ALLOPURINOL 300 MG: 300 TABLET ORAL at 09:10

## 2022-08-15 RX ADMIN — DILTIAZEM HYDROCHLORIDE 120 MG: 120 CAPSULE, COATED, EXTENDED RELEASE ORAL at 09:10

## 2022-08-15 RX ADMIN — ESCITALOPRAM OXALATE 20 MG: 10 TABLET ORAL at 09:10

## 2022-08-15 RX ADMIN — BUMETANIDE 0.5 MG: 1 TABLET ORAL at 09:10

## 2022-08-15 RX ADMIN — Medication 10 ML: at 09:10

## 2022-08-15 NOTE — DISCHARGE PLACEMENT REQUEST
"Mara Le (77 y.o. Female)             Date of Birth   1945    Social Security Number       Address   [address] [place] [address]    Home Phone   [phone]    MRN   [id number]       Jew   [employer]ism    Marital Status                               Admission Date   8/12/22    Admission Type   Emergency    Admitting Provider   Leola Bronson MD    Attending Provider   Dionisio Guerrero MD    Department, Room/Bed   [facility] 3C MEDICAL INPATIENT, 379/1       Discharge Date       Discharge Disposition   Home or Self Care    Discharge Destination                               Attending Provider: Dionisio Guerrero MD    Allergies: Celecoxib    Isolation: None   Infection: None   Code Status: Not on file   Advance Care Planning Activity    Ht: 160 cm (63\")   Wt: 113 kg (250 lb)    Admission Cmt: None   Principal Problem: None                Active Insurance as of 8/12/2022     Primary Coverage     Payor Plan Insurance Group Employer/Plan Group    [employer] MEDICARE REPLACEMENT [employer] OPTIONS MEDICARE REPLACEMENT [id number]     Payor Plan Address Payor Plan Phone Number Payor Plan Fax Number Effective Dates    [facility] [phone]  9/1/2021 - None Entered    [address]       [facility] [address]       Subscriber Name Subscriber Birth Date Member ID       MARA LE 1945 [id number]           Secondary Coverage     Payor Plan Insurance Group Employer/Plan Group    [place]      Payor Plan Address Payor Plan Phone Number Payor Plan Fax Number Effective Dates    [address]   7/24/2019 - None Entered    [place] [address]       Subscriber Name Subscriber Birth Date Member ID       MARA LE 1945 [id number]                 Emergency Contacts      (Rel.) Home Phone Work Phone Mobile Phone    FRANCK MARTINEZ (Daughter) [phone] -- [phone]    JANICE VALERIO (Relative) " [phone] -- [phone]    ROSA MARRERO (Daughter) [phone] -- [phone]

## 2022-08-15 NOTE — CASE MANAGEMENT/SOCIAL WORK
Discharge Planning Assessment  [facility]     Patient Name: Faby Le  MRN: [id number]  Today's Date: 8/15/2022    Admit Date: 8/12/2022     Discharge Needs Assessment     Row Name 08/15/22 1313       Living Environment    People in Home alone    Current Living Arrangements home    Primary Care Provided by self    Provides Primary Care For no one    Family Caregiver if Needed child(marlon), adult    Family Caregiver Names Yvette Mcmullen    Quality of Family Relationships unable to assess  No family present during CM rounds    Able to Return to Prior Arrangements yes       Resource/Environmental Concerns    Resource/Environmental Concerns none    Transportation Concerns none       Transition Planning    Patient/Family Anticipates Transition to home    Patient/Family Anticipated Services at Transition none    Transportation Anticipated family or friend will provide       Discharge Needs Assessment    Readmission Within the Last 30 Days no previous admission in last 30 days    Equipment Currently Used at Home shower chair;cane, straight;cpap    Concerns to be Addressed no discharge needs identified;denies needs/concerns at this time    Anticipated Changes Related to Illness none    Equipment Needed After Discharge none    Provided Post Acute Provider List? N/A               Discharge Plan     Row Name 08/15/22 1322       Plan    Plan D/C plan: Anticipate routine home, current with [facility], DEJAN order requested 8/15 from Dr. Guerrero    Patient/Family in Agreement with Plan yes    Provided Post Acute Provider List? N/A    N/A Provider List Comment Pt current with [facility]    Plan Comments Met with patient at bedside. Pt lives at home alone, is mostly IADL including driving. Pt states her daughter Kemi will transport her at time of d/c. Pt has a shower chair, cane, and cpap she uses at home. PCP and pharmacy confirmed. No financial barriers to medications. Pt states she currently has services with [facility], they  come 6 days per week to help with cleaning and bathing and such. CM contacted [facility] at [phone] and confirmed pt is current (HH and waiver). CM requested DEJAN order from Dr. Guerrero today 8/15.                   Expected Discharge Date and Time     Expected Discharge Date Expected Discharge Time    Aug 15, 2022          Demographic Summary     Row Name 08/15/22 1312       General Information    Admission Type inpatient    Arrived From emergency department    Required Notices Provided Important Message from Medicare    Referral Source admission list    Reason for Consult discharge planning    Preferred Language English               Functional Status     Row Name 08/15/22 1313       Functional Status    Usual Activity Tolerance good    Current Activity Tolerance good       Functional Status, IADL    Medications independent    Meal Preparation independent    Housekeeping independent    Laundry independent    Shopping independent                      Patient Forms     Row Name 08/15/22 1312       Patient Forms    Important Message from Medicare (IMM) Delivered  IMM 8/15    Delivered to Patient    Method of delivery In person              Met with patient in room wearing PPE: mask  Maintained distance greater than six feet and spent less than 15 minutes in the room.          NORMAN JACKSON RN

## 2022-08-15 NOTE — CASE MANAGEMENT/SOCIAL WORK
Case Management Discharge Note      Final Note: Home, current with [facility]    Provided Post Acute Provider List?: N/A  N/A Provider List Comment: Pt current with [facility]    Selected Continued Care - Discharged on 8/15/2022 Admission date: 8/12/2022 - Discharge disposition: Home or Self Care        Home Medical Care Coordination complete.    Service Provider Selected Services Address Phone Fax Patient Preferred    [facility]  Home Health Services [address] [place] [address] [phone] -- --                  Transportation Services  Private: Car    Final Discharge Disposition Code: 06 - home with home health care

## 2022-08-15 NOTE — H&P (VIEW-ONLY)
Cardiology Progress  Note      Patient Care Team:  Leonor Adam MD as PCP - General  [facility] MD as Consulting Physician (Pain Medicine)  Ratna Zavala MD as Consulting Physician (Cardiology)  Nora Foley APRN as Nurse Practitioner (Gastroenterology)  Gianluca Walton MD as Consulting Physician (Pulmonary Disease)  Nathanael Murillo MD as Consulting Physician (Allergy and Immunology)  Delfin Thorpe MD as Surgeon (Orthopedic Surgery)  Josh Loomis MD as Consulting Physician (Gastroenterology)    PATIENT IDENTIFICATION  Name: Faby Le  Age: 77 y.o.  Sex: female  :  1945  MRN: [id number]               Cardiology assessment and plan    Newly diagnosed atrial fibrillation with a controlled ventricular response  Elevated Og vascular score  Coagulopathy currently on Xarelto  History of nonischemic cardiomyopathy  Hypertension  Hyperlipidemia    Normal LV systolic function diastolic dysfunction  Abnormal stress test  Plan to schedule patient for outpatient extended cardiac catheterization  Risk benefits and alternatives reviewed and discussed with patient  Further recommendations based on patient course        REASON FOR FOLLOW-UP:  77-year-old female with known history of cardiomyopathy with EF 40-50%, moderate nonobstructive CAD involving diagonal branch of the LAD and mild-mod circumflex per heart cath 2018.  History of systolic heart failure, obstructive sleep apnea, chronic renal insufficiency.    TTE with EF 50-55%, grade 1-2 DD    New onset atrial fibrillation    SUBJECTIVE    Patient seen and examined, chart and labs reviewed.  Patient denies any complaints of chest discomfort, palpitations or shortness of breath.  Sitting in bedside chair eating breakfast.  Rhythm atrial fibrillation controlled at 61.      REVIEW OF SYSTEMS:  Pertinent items are noted in HPI, all other systems reviewed and negative    OBJECTIVE   Diagnostics  "reviewed    ASSESSMENT  New onset A. 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  Nuclear stress testing  Nuclear stress testing with ischemia in the mid to apical inferior wall, normal LV systolic function.  There was significant GI and liver uptake-cannot rule out diaphragmatic attenuation.  Patient has been without symptoms of chest discomfort.  Discussed stress findings with the patient and options for follow-up outpatient to further evaluate need for additional ischemic work-up.  Patient prefers to follow-up outpatient.  Continue BB, Xarelto, statin and Aldactone  Vital signs stable  Follow-up in our office 3-4 weeks        Vital Signs  Visit Vitals  /72 (BP Location: Left arm, Patient Position: Lying)   Pulse 58   Temp 97.9 °F (36.6 °C) (Oral)   Resp 18   Ht 160 cm (63\")   Wt 113 kg (250 lb)   LMP  (LMP Unknown)   SpO2 97%   BMI 44.29 kg/m²     Oxygen Therapy  SpO2: 97 %  Pulse Oximetry Type: Intermittent  Device (Oxygen Therapy): room air  Flowsheet Rows    Flowsheet Row First Filed Value   Admission Height 160 cm (63\") Documented at 08/12/2022 1504   Admission Weight 123 kg (271 lb 2.7 oz) Documented at 08/12/2022 1504        Intake & Output (last 3 days)       08/12 0701  08/13 0700 08/13 0701  08/14 0700 08/14 0701  08/15 0700 08/15 0701  08/16 0700    P.O.  740 360     IV Piggyback 500       Total Intake(mL/kg) 500 (4.1) 740 (6) 360 (3.2)     Net +500 +740 +360             Urine Unmeasured Occurrence 1 x  6 x     Stool Unmeasured Occurrence 0 x  0 x         Lines, Drains & Airways     Active LDAs     Name Placement date Placement time Site Days    Peripheral IV 08/12/22 1757 Right Antecubital 08/12/22 1757  Antecubital  2                       /72 (BP " "Location: Left arm, Patient Position: Lying)   Pulse 58   Temp 97.9 °F (36.6 °C) (Oral)   Resp 18   Ht 160 cm (63\")   Wt 113 kg (250 lb)   LMP  (LMP Unknown)   SpO2 97%   BMI 44.29 kg/m²   Intake/Output last 3 shifts:  I/O last 3 completed shifts:  In: 360 [P.O.:360]  Out: -   Intake/Output this shift:  No intake/output data recorded.    PHYSICAL EXAM:    General: Alert, cooperative, no distress, appears stated age  Head:  Normocephalic, atraumatic, mucous membranes moist  Eyes:  Conjunctivae/corneas clear, EOM's intact     Neck:  Supple,  no adenopathy; no JVD or bruit  Lungs:   Clear to auscultation bilaterally, no wheezes, rhonchi or rales are noted  Chest wall: No tenderness  Heart::  Irregularly irregular rate and rhythm, S1 and S2 normal, no murmur, rub or gallop  Abdomen: Soft, nontender, nondistended, bowel sounds active  Extremities: No cyanosis, clubbing, or edema   Pulses: 2+ and symmetric all extremities  Skin:  No rashes or lesions  Neuro/psych: A&O x3. CN II through XII are grossly intact with appropriate affect      Scheduled Meds:      allopurinol, 300 mg, Oral, Daily  atorvastatin, 20 mg, Oral, Nightly  bumetanide, 0.5 mg, Oral, Daily  cetirizine, 10 mg, Oral, Daily  dilTIAZem CD, 120 mg, Oral, Q24H  escitalopram, 20 mg, Oral, Daily  levothyroxine, 88 mcg, Oral, Q AM  metoprolol succinate XL, 25 mg, Oral, Nightly  montelukast, 10 mg, Oral, Nightly  rivaroxaban, 15 mg, Oral, Daily With Dinner  sodium chloride, 10 mL, Intravenous, Q12H  spironolactone, 12.5 mg, Oral, Daily  cyanocobalamin, 1,000 mcg, Oral, Daily        Continuous Infusions:         PRN Meds:    •  acetaminophen **OR** acetaminophen **OR** acetaminophen  •  meclizine  •  melatonin  •  nitroglycerin  •  ondansetron **OR** ondansetron  •  [COMPLETED] Insert peripheral IV **AND** sodium chloride  •  sodium chloride        Results Review:     I reviewed the patient's new clinical results.    CBC    Results from last 7 days   Lab " Units 08/12/22  1758   WBC 10*3/mm3 7.50   HEMOGLOBIN g/dL 11.9*   PLATELETS 10*3/mm3 227     Cr Clearance Estimated Creatinine Clearance: 44.5 mL/min (A) (by C-G formula based on SCr of 1.32 mg/dL (H)).  Coag   Results from last 7 days   Lab Units 08/12/22  1758   INR  1.02   APTT seconds 29.3*     HbA1C   Lab Results   Component Value Date    HGBA1C 5.4 02/07/2022    HGBA1C 5.5 10/08/2021    HGBA1C 5.9 (H) 07/02/2021     Blood Glucose No results found for: POCGLU  Infection     CMP   Results from last 7 days   Lab Units 08/12/22 1934   SODIUM mmol/L 141   POTASSIUM mmol/L 4.6   CHLORIDE mmol/L 105   CO2 mmol/L 26.0   BUN mg/dL 34*   CREATININE mg/dL 1.32*   GLUCOSE mg/dL 106*   ALBUMIN g/dL 4.10   BILIRUBIN mg/dL 0.3   ALK PHOS U/L 138*   AST (SGOT) U/L 22   ALT (SGPT) U/L 15     ABG      UA      MIGUEL  No results found for: POCMETH, POCAMPHET, POCBARBITUR, POCBENZO, POCCOCAINE, POCOPIATES, POCOXYCODO, POCPHENCYC, POCPROPOXY, POCTHC, POCTRICYC  Lysis Labs   Results from last 7 days   Lab Units 08/12/22 1934 08/12/22 1758   INR   --  1.02   APTT seconds  --  29.3*   HEMOGLOBIN g/dL  --  11.9*   PLATELETS 10*3/mm3  --  227   CREATININE mg/dL 1.32*  --      Radiology(recent) No radiology results for the last day      Results from last 7 days   Lab Units 08/14/22  0757   TROPONIN T ng/mL <0.010       Xrays, labs reviewed personally by physician.    ECG/EMG Results (most recent)     Procedure Component Value Units Date/Time    ECG 12 Lead [[id number]] Collected: 08/12/22 1523     Updated: 08/13/22 1234     QT Interval 354 ms     Narrative:      HEART RATE= 102  bpm  RR Interval= 588  ms  CA Interval=   ms  P Horizontal Axis=   deg  P Front Axis=   deg  QRSD Interval= 105  ms  QT Interval= 354  ms  QRS Axis= -15  deg  T Wave Axis= 66  deg  - ABNORMAL ECG -  Atrial fibrillation  When compared with ECG of 05-Apr-2018 10:47:56,  Significant change in rhythm  Electronically Signed By: Vern Gandhi (LAKESHA) 13-Aug-2022  12:33:59  Date and Time of Study: 2022-08-12 15:23:42    Adult Transthoracic Echo Complete W/ Cont if Necessary Per Protocol [[id number]] Resulted: 08/13/22 2140     Updated: 08/13/22 2147     Target HR (85%) 123 bpm      Max. Pred. HR (100%) 145 bpm      ACS 2.02 cm      Ao root diam 3.4 cm      Ao pk hussain 120.6 cm/sec      Ao V2 VTI 31.6 cm      LANDEN(I,D) 2.5 cm2      EDV(cubed) 116.0 ml      EDV(MOD-sp4) 69.8 ml      EF(MOD-bp) 51.0 %      EF(MOD-sp4) 51.1 %      ESV(cubed) 43.3 ml      ESV(MOD-sp4) 34.1 ml      IVS/LVPW 1.07 cm      LV mass(C)d 185.0 grams      LV V1 max PG 4.1 mmHg      LV V1 mean PG 2.26 mmHg      LV V1 max 101.3 cm/sec      LVPWd 1.01 cm      MR max PG 74.1 mmHg      MV dec slope 442.6 cm/sec2      MV dec time 0.18 msec      MV V2 VTI 21.6 cm      MVA(VTI) 3.7 cm2      PA acc time 0.14 sec      PA pr(Accel) 15.6 mmHg      PA V2 max 92.1 cm/sec      Pulm A Revs Hussain 24.0 cm/sec      RV V1 max PG 1.26 mmHg      RV V1 max 56.2 cm/sec      RV V1 VTI 14.7 cm      RVIDd 3.1 cm      SI(MOD-sp4) 16.2 ml/m2      SV(LVOT) 79.9 ml      SV(MOD-sp4) 35.7 ml      TR max PG 21.4 mmHg      Ao max PG 5.8 mmHg      Ao mean PG 3.3 mmHg      FS 28.0 %      IVSd 1.08 cm      LA dimension (2D)  4.0 cm      LV V1 VTI 25.8 cm      LVIDd 4.9 cm      LVIDs 3.5 cm      LVOT area 3.1 cm2      LVOT diam 1.99 cm      MV E/A 1.19     MV max PG 3.5 mmHg      MV mean PG 1.47 mmHg      Pulm S/D 0.73     MR max hussain 429.9 cm/sec      MV A max hussain 68.3 cm/sec      MV E max hussain 81.2 cm/sec      Pulm A Revs Dur 0.13 sec      Pulm Kumar Hussain 51.2 cm/sec      Pulm Sys Hussain 37.5 cm/sec      TR max hussain 231.2 cm/sec      LV Kumar Vol (BSA corrected) 31.7 cm2      LV Sys Vol (BSA corrected) 15.5 cm2     Narrative:      · Estimated left ventricular EF was in agreement with the calculated left   ventricular EF. Left ventricular ejection fraction appears to be 51 - 55%.   Left ventricular systolic function is low normal.  · Left ventricular  diastolic function is consistent with (grade Ia w/high   LAP) impaired relaxation.  · Estimated right ventricular systolic pressure from tricuspid   regurgitation is normal (<35 mmHg).               Medication Review:   I have reviewed the patient's current medication list  Scheduled Meds:allopurinol, 300 mg, Oral, Daily  atorvastatin, 20 mg, Oral, Nightly  bumetanide, 0.5 mg, Oral, Daily  cetirizine, 10 mg, Oral, Daily  dilTIAZem CD, 120 mg, Oral, Q24H  escitalopram, 20 mg, Oral, Daily  levothyroxine, 88 mcg, Oral, Q AM  metoprolol succinate XL, 25 mg, Oral, Nightly  montelukast, 10 mg, Oral, Nightly  rivaroxaban, 15 mg, Oral, Daily With Dinner  sodium chloride, 10 mL, Intravenous, Q12H  spironolactone, 12.5 mg, Oral, Daily  cyanocobalamin, 1,000 mcg, Oral, Daily      Continuous Infusions:   PRN Meds:.•  acetaminophen **OR** acetaminophen **OR** acetaminophen  •  meclizine  •  melatonin  •  nitroglycerin  •  ondansetron **OR** ondansetron  •  [COMPLETED] Insert peripheral IV **AND** sodium chloride  •  sodium chloride    Imaging:  Imaging Results (Last 72 Hours)     Procedure Component Value Units Date/Time    CT Head Without Contrast [[id number]] Collected: 08/12/22 1915     Updated: 08/12/22 1920    Narrative:      CT HEAD WO CONTRAST-     Date of Exam: 8/12/2022 6:17 PM     Indication: dizziness.     Comparison: None available.     Technique:  Contiguous axial CT images of the head were obtained without  contrast from skull base to vertex.  Coronal and sagittal  reconstructions were performed.  Automated exposure control and  iterative reconstruction methods were used.       FINDINGS:  Brain/Ventricles: There is no acute intracranial hemorrhage, mass effect  or midline shift. No abnormal extra-axial fluid collection.  The  gray-white differentiation is maintained without evidence of an acute  infarct.  There is no evidence of hydrocephalus mild white matter  changes compatible small vessel ischemic disease  "noted.     Orbits: The visualized portion of the orbits demonstrate no acute  abnormality.     Sinuses:Minimal mastoid effusions and mucosal thickening of the  paranasal sinuses.     Soft Tissues/Skull: No acute abnormality of the visualized skull or soft  tissues.       Impression:      No acute intracranial abnormality.     Chronic white matter changes compatible small vessel ischemic disease in  this age group     Electronically Signed ByJanneth Tariq On:8/12/2022 7:18 PM  This report was finalized on [id number] by  Tobi Tariq, .    XR Chest 1 View [[id number]] Collected: 08/12/22 1854     Updated: 08/12/22 1858    Narrative:         DATE OF EXAM:   8/12/2022 6:31 PM     PROCEDURE:   XR CHEST 1 VW-     INDICATIONS:   dizziness     COMPARISON:  8/28/2018 and prior     TECHNIQUE:   Portable Chest     FINDINGS:      Study is limited by overlying support and monitoring apparatus.     The heart size is within normal limits. Increased hazy opacity is noted  overlying chronic changes at the right lung base. Lungs are otherwise  grossly clear. Osseous structures are unremarkable       Impression:      IMPRESSION :   Hazy and linear opacities at the right lung base which are likely  chronic in nature. Acute superimposed upon chronic change cannot be  excluded.     Electronically Signed ByJanneth Tariq On:8/12/2022 6:56 PM  This report was finalized on [id number] by  Tobi Tariq, .            CAROL Jimenez  08/15/22  08:05 EDT       EMR Dragon/Transcription:   \"Dictated utilizing Dragon dictation\".       Electronically signed by CAROL Jimenez, 08/15/22, 8:05 AM EDT.    "

## 2022-08-15 NOTE — PLAN OF CARE
Goal Outcome Evaluation:  Plan of Care Reviewed With: patient   Pt continues with plan of care and may possibly D/C on 8/15/22 with Holter monitor.

## 2022-08-15 NOTE — PHARMACY RECOMMENDATION
"Transitions-of-Care ([facility]) Pharmacy Future COST Assessment:     /Nurse requesting test claim: N/A - this patient is on the high-cost drug report list    Pharmacy ran test claim for the following:     Drug Sig Covered/PA required Patient Copay per month   Xarelto (rivaroxaban) 15 mg QD  Covered without PA $ 0     Patient Insurance Type: Medicaid + Medicare - this means they are NOT eligible for a monthly discount card in the future (see \"free month\" note below)    Deductible/Medicare Gap Issue? Unknown    Is patient signed up for M2B service? No    Is above drug(s) eligible for 1 month free through M2B service? Yes - free coupon is available   If eligible,  or [facility] pharmacy can provide coupon upon request.           For billing questions, reach out to [facility] at [phone]  For M2B questions, reach out to Retail Pharmacy at [phone]    Alea Gutierrez PharmD   8/15/2022 12:12 EDT    "

## 2022-08-15 NOTE — PROGRESS NOTES
Cardiology Progress  Note      Patient Care Team:  Leonor Adam MD as PCP - General  [facility] as Consulting Physician (Pain Medicine)  Ratna Zavala MD as Consulting Physician (Cardiology)  Nora Foley APRN as Nurse Practitioner (Gastroenterology)  Gianluca Walton MD as Consulting Physician (Pulmonary Disease)  Nathanael Murillo MD as Consulting Physician (Allergy and Immunology)  Delfin Thorpe MD as Surgeon (Orthopedic Surgery)  Josh Loomis MD as Consulting Physician (Gastroenterology)    PATIENT IDENTIFICATION  Name: Faby Le  Age: 77 y.o.  Sex: female  :  1945  MRN: [id number]               Cardiology assessment and plan    Newly diagnosed atrial fibrillation with a controlled ventricular response  Elevated Og vascular score  Coagulopathy currently on Xarelto  History of nonischemic cardiomyopathy  Hypertension  Hyperlipidemia    Normal LV systolic function diastolic dysfunction  Abnormal stress test  Plan to schedule patient for outpatient extended cardiac catheterization  Risk benefits and alternatives reviewed and discussed with patient  Further recommendations based on patient course        REASON FOR FOLLOW-UP:  77-year-old female with known history of cardiomyopathy with EF 40-50%, moderate nonobstructive CAD involving diagonal branch of the LAD and mild-mod circumflex per heart cath 2018.  History of systolic heart failure, obstructive sleep apnea, chronic renal insufficiency.    TTE with EF 50-55%, grade 1-2 DD    New onset atrial fibrillation    SUBJECTIVE    Patient seen and examined, chart and labs reviewed.  Patient denies any complaints of chest discomfort, palpitations or shortness of breath.  Sitting in bedside chair eating breakfast.  Rhythm atrial fibrillation controlled at 61.      REVIEW OF SYSTEMS:  Pertinent items are noted in HPI, all other systems reviewed and negative    OBJECTIVE   Diagnostics  "reviewed    ASSESSMENT  New onset A. 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  Nuclear stress testing  Nuclear stress testing with ischemia in the mid to apical inferior wall, normal LV systolic function.  There was significant GI and liver uptake-cannot rule out diaphragmatic attenuation.  Patient has been without symptoms of chest discomfort.  Discussed stress findings with the patient and options for follow-up outpatient to further evaluate need for additional ischemic work-up.  Patient prefers to follow-up outpatient.  Continue BB, Xarelto, statin and Aldactone  Vital signs stable  Follow-up in our office 3-4 weeks        Vital Signs  Visit Vitals  /72 (BP Location: Left arm, Patient Position: Lying)   Pulse 58   Temp 97.9 °F (36.6 °C) (Oral)   Resp 18   Ht 160 cm (63\")   Wt 113 kg (250 lb)   LMP  (LMP Unknown)   SpO2 97%   BMI 44.29 kg/m²     Oxygen Therapy  SpO2: 97 %  Pulse Oximetry Type: Intermittent  Device (Oxygen Therapy): room air  Flowsheet Rows    Flowsheet Row First Filed Value   Admission Height 160 cm (63\") Documented at 08/12/2022 1504   Admission Weight 123 kg (271 lb 2.7 oz) Documented at 08/12/2022 1504        Intake & Output (last 3 days)       08/12 0701  08/13 0700 08/13 0701  08/14 0700 08/14 0701  08/15 0700 08/15 0701  08/16 0700    P.O.  740 360     IV Piggyback 500       Total Intake(mL/kg) 500 (4.1) 740 (6) 360 (3.2)     Net +500 +740 +360             Urine Unmeasured Occurrence 1 x  6 x     Stool Unmeasured Occurrence 0 x  0 x         Lines, Drains & Airways     Active LDAs     Name Placement date Placement time Site Days    Peripheral IV 08/12/22 1757 Right Antecubital 08/12/22 1757  Antecubital  2                       /72 (BP " "Location: Left arm, Patient Position: Lying)   Pulse 58   Temp 97.9 °F (36.6 °C) (Oral)   Resp 18   Ht 160 cm (63\")   Wt 113 kg (250 lb)   LMP  (LMP Unknown)   SpO2 97%   BMI 44.29 kg/m²   Intake/Output last 3 shifts:  I/O last 3 completed shifts:  In: 360 [P.O.:360]  Out: -   Intake/Output this shift:  No intake/output data recorded.    PHYSICAL EXAM:    General: Alert, cooperative, no distress, appears stated age  Head:  Normocephalic, atraumatic, mucous membranes moist  Eyes:  Conjunctivae/corneas clear, EOM's intact     Neck:  Supple,  no adenopathy; no JVD or bruit  Lungs:   Clear to auscultation bilaterally, no wheezes, rhonchi or rales are noted  Chest wall: No tenderness  Heart::  Irregularly irregular rate and rhythm, S1 and S2 normal, no murmur, rub or gallop  Abdomen: Soft, nontender, nondistended, bowel sounds active  Extremities: No cyanosis, clubbing, or edema   Pulses: 2+ and symmetric all extremities  Skin:  No rashes or lesions  Neuro/psych: A&O x3. CN II through XII are grossly intact with appropriate affect      Scheduled Meds:      allopurinol, 300 mg, Oral, Daily  atorvastatin, 20 mg, Oral, Nightly  bumetanide, 0.5 mg, Oral, Daily  cetirizine, 10 mg, Oral, Daily  dilTIAZem CD, 120 mg, Oral, Q24H  escitalopram, 20 mg, Oral, Daily  levothyroxine, 88 mcg, Oral, Q AM  metoprolol succinate XL, 25 mg, Oral, Nightly  montelukast, 10 mg, Oral, Nightly  rivaroxaban, 15 mg, Oral, Daily With Dinner  sodium chloride, 10 mL, Intravenous, Q12H  spironolactone, 12.5 mg, Oral, Daily  cyanocobalamin, 1,000 mcg, Oral, Daily        Continuous Infusions:         PRN Meds:    •  acetaminophen **OR** acetaminophen **OR** acetaminophen  •  meclizine  •  melatonin  •  nitroglycerin  •  ondansetron **OR** ondansetron  •  [COMPLETED] Insert peripheral IV **AND** sodium chloride  •  sodium chloride        Results Review:     I reviewed the patient's new clinical results.    CBC    Results from last 7 days   Lab " Units 08/12/22  1758   WBC 10*3/mm3 7.50   HEMOGLOBIN g/dL 11.9*   PLATELETS 10*3/mm3 227     Cr Clearance Estimated Creatinine Clearance: 44.5 mL/min (A) (by C-G formula based on SCr of 1.32 mg/dL (H)).  Coag   Results from last 7 days   Lab Units 08/12/22  1758   INR  1.02   APTT seconds 29.3*     HbA1C   Lab Results   Component Value Date    HGBA1C 5.4 02/07/2022    HGBA1C 5.5 10/08/2021    HGBA1C 5.9 (H) 07/02/2021     Blood Glucose No results found for: POCGLU  Infection     CMP   Results from last 7 days   Lab Units 08/12/22 1934   SODIUM mmol/L 141   POTASSIUM mmol/L 4.6   CHLORIDE mmol/L 105   CO2 mmol/L 26.0   BUN mg/dL 34*   CREATININE mg/dL 1.32*   GLUCOSE mg/dL 106*   ALBUMIN g/dL 4.10   BILIRUBIN mg/dL 0.3   ALK PHOS U/L 138*   AST (SGOT) U/L 22   ALT (SGPT) U/L 15     ABG      UA      MIGUEL  No results found for: POCMETH, POCAMPHET, POCBARBITUR, POCBENZO, POCCOCAINE, POCOPIATES, POCOXYCODO, POCPHENCYC, POCPROPOXY, POCTHC, POCTRICYC  Lysis Labs   Results from last 7 days   Lab Units 08/12/22 1934 08/12/22 1758   INR   --  1.02   APTT seconds  --  29.3*   HEMOGLOBIN g/dL  --  11.9*   PLATELETS 10*3/mm3  --  227   CREATININE mg/dL 1.32*  --      Radiology(recent) No radiology results for the last day      Results from last 7 days   Lab Units 08/14/22  0757   TROPONIN T ng/mL <0.010       Xrays, labs reviewed personally by physician.    ECG/EMG Results (most recent)     Procedure Component Value Units Date/Time    ECG 12 Lead [[id number]] Collected: 08/12/22 1523     Updated: 08/13/22 1234     QT Interval 354 ms     Narrative:      HEART RATE= 102  bpm  RR Interval= 588  ms  ID Interval=   ms  P Horizontal Axis=   deg  P Front Axis=   deg  QRSD Interval= 105  ms  QT Interval= 354  ms  QRS Axis= -15  deg  T Wave Axis= 66  deg  - ABNORMAL ECG -  Atrial fibrillation  When compared with ECG of 05-Apr-2018 10:47:56,  Significant change in rhythm  Electronically Signed By: Vern Gandhi (LAKESHA) 13-Aug-2022  12:33:59  Date and Time of Study: 2022-08-12 15:23:42    Adult Transthoracic Echo Complete W/ Cont if Necessary Per Protocol [[id number]] Resulted: 08/13/22 2140     Updated: 08/13/22 2147     Target HR (85%) 123 bpm      Max. Pred. HR (100%) 145 bpm      ACS 2.02 cm      Ao root diam 3.4 cm      Ao pk hussain 120.6 cm/sec      Ao V2 VTI 31.6 cm      LANDEN(I,D) 2.5 cm2      EDV(cubed) 116.0 ml      EDV(MOD-sp4) 69.8 ml      EF(MOD-bp) 51.0 %      EF(MOD-sp4) 51.1 %      ESV(cubed) 43.3 ml      ESV(MOD-sp4) 34.1 ml      IVS/LVPW 1.07 cm      LV mass(C)d 185.0 grams      LV V1 max PG 4.1 mmHg      LV V1 mean PG 2.26 mmHg      LV V1 max 101.3 cm/sec      LVPWd 1.01 cm      MR max PG 74.1 mmHg      MV dec slope 442.6 cm/sec2      MV dec time 0.18 msec      MV V2 VTI 21.6 cm      MVA(VTI) 3.7 cm2      PA acc time 0.14 sec      PA pr(Accel) 15.6 mmHg      PA V2 max 92.1 cm/sec      Pulm A Revs Hussain 24.0 cm/sec      RV V1 max PG 1.26 mmHg      RV V1 max 56.2 cm/sec      RV V1 VTI 14.7 cm      RVIDd 3.1 cm      SI(MOD-sp4) 16.2 ml/m2      SV(LVOT) 79.9 ml      SV(MOD-sp4) 35.7 ml      TR max PG 21.4 mmHg      Ao max PG 5.8 mmHg      Ao mean PG 3.3 mmHg      FS 28.0 %      IVSd 1.08 cm      LA dimension (2D)  4.0 cm      LV V1 VTI 25.8 cm      LVIDd 4.9 cm      LVIDs 3.5 cm      LVOT area 3.1 cm2      LVOT diam 1.99 cm      MV E/A 1.19     MV max PG 3.5 mmHg      MV mean PG 1.47 mmHg      Pulm S/D 0.73     MR max hussain 429.9 cm/sec      MV A max hussain 68.3 cm/sec      MV E max hussain 81.2 cm/sec      Pulm A Revs Dur 0.13 sec      Pulm Kumar Hussain 51.2 cm/sec      Pulm Sys Hussain 37.5 cm/sec      TR max hussain 231.2 cm/sec      LV Kumar Vol (BSA corrected) 31.7 cm2      LV Sys Vol (BSA corrected) 15.5 cm2     Narrative:      · Estimated left ventricular EF was in agreement with the calculated left   ventricular EF. Left ventricular ejection fraction appears to be 51 - 55%.   Left ventricular systolic function is low normal.  · Left ventricular  diastolic function is consistent with (grade Ia w/high   LAP) impaired relaxation.  · Estimated right ventricular systolic pressure from tricuspid   regurgitation is normal (<35 mmHg).               Medication Review:   I have reviewed the patient's current medication list  Scheduled Meds:allopurinol, 300 mg, Oral, Daily  atorvastatin, 20 mg, Oral, Nightly  bumetanide, 0.5 mg, Oral, Daily  cetirizine, 10 mg, Oral, Daily  dilTIAZem CD, 120 mg, Oral, Q24H  escitalopram, 20 mg, Oral, Daily  levothyroxine, 88 mcg, Oral, Q AM  metoprolol succinate XL, 25 mg, Oral, Nightly  montelukast, 10 mg, Oral, Nightly  rivaroxaban, 15 mg, Oral, Daily With Dinner  sodium chloride, 10 mL, Intravenous, Q12H  spironolactone, 12.5 mg, Oral, Daily  cyanocobalamin, 1,000 mcg, Oral, Daily      Continuous Infusions:   PRN Meds:.•  acetaminophen **OR** acetaminophen **OR** acetaminophen  •  meclizine  •  melatonin  •  nitroglycerin  •  ondansetron **OR** ondansetron  •  [COMPLETED] Insert peripheral IV **AND** sodium chloride  •  sodium chloride    Imaging:  Imaging Results (Last 72 Hours)     Procedure Component Value Units Date/Time    CT Head Without Contrast [[id number]] Collected: 08/12/22 1915     Updated: 08/12/22 1920    Narrative:      CT HEAD WO CONTRAST-     Date of Exam: 8/12/2022 6:17 PM     Indication: dizziness.     Comparison: None available.     Technique:  Contiguous axial CT images of the head were obtained without  contrast from skull base to vertex.  Coronal and sagittal  reconstructions were performed.  Automated exposure control and  iterative reconstruction methods were used.       FINDINGS:  Brain/Ventricles: There is no acute intracranial hemorrhage, mass effect  or midline shift. No abnormal extra-axial fluid collection.  The  gray-white differentiation is maintained without evidence of an acute  infarct.  There is no evidence of hydrocephalus mild white matter  changes compatible small vessel ischemic disease  "noted.     Orbits: The visualized portion of the orbits demonstrate no acute  abnormality.     Sinuses:Minimal mastoid effusions and mucosal thickening of the  paranasal sinuses.     Soft Tissues/Skull: No acute abnormality of the visualized skull or soft  tissues.       Impression:      No acute intracranial abnormality.     Chronic white matter changes compatible small vessel ischemic disease in  this age group     Electronically Signed ByJanneth Tariq On:8/12/2022 7:18 PM  This report was finalized on [id number] by  Tobi Tariq, .    XR Chest 1 View [[id number]] Collected: 08/12/22 1854     Updated: 08/12/22 1858    Narrative:         DATE OF EXAM:   8/12/2022 6:31 PM     PROCEDURE:   XR CHEST 1 VW-     INDICATIONS:   dizziness     COMPARISON:  8/28/2018 and prior     TECHNIQUE:   Portable Chest     FINDINGS:      Study is limited by overlying support and monitoring apparatus.     The heart size is within normal limits. Increased hazy opacity is noted  overlying chronic changes at the right lung base. Lungs are otherwise  grossly clear. Osseous structures are unremarkable       Impression:      IMPRESSION :   Hazy and linear opacities at the right lung base which are likely  chronic in nature. Acute superimposed upon chronic change cannot be  excluded.     Electronically Signed ByJanneth Tariq On:8/12/2022 6:56 PM  This report was finalized on [id number] by  Tobi Tariq, .            CAROL Jimenez  08/15/22  08:05 EDT       EMR Dragon/Transcription:   \"Dictated utilizing Dragon dictation\".       Electronically signed by CAROL Jimenez, 08/15/22, 8:05 AM EDT.    "

## 2022-08-15 NOTE — DISCHARGE SUMMARY
[facility]  DISCHARGE SUMMARY    Patient Name: Faby Le  : 1945  MRN: [id number]    Date of Admission: 2022  Discharge Diagnosis: AF  Date of Discharge:  8/15/22  Primary Care Physician: Leonor Adam MD      Presenting Problem:   Hypotension, unspecified hypotension type [I95.9]  Atrial fibrillation, unspecified type (HCC) [I48.91]    Active and Resolved Hospital Problems:  Active Hospital Problems    Diagnosis POA   • Atrial fibrillation, unspecified type (HCC) [I48.91] Yes   • Hypertension [I10] Yes   • Sleep apnea [G47.30] Yes   • Mobility poor [Z74.09] Yes   • B12 deficiency [E53.8] Yes   • Chronic kidney disease, stage 3 (moderate) [N18.30] Yes   • Coronary artery disease [I25.10] Yes   • Low back pain [M54.50] Yes   • Gout [M10.9] Yes   • Hyperlipidemia [E78.5] Yes   • Vitamin D deficiency [E55.9] Yes   • Depression [F32.A] Yes   • Hypothyroidism [E03.9] Yes      Resolved Hospital Problems   No resolved problems to display.     New onset atrial fibrillation  Likely etiology for acute dizzy spells. CHADSVasc score at least 4, indicating patient likely to benefit from chronic AC for stroke prevention  - discussed with patient risk vs. Benefit of anticoagulation therapy   - obtain TTE to r/o valvular disease to assess if patient is a candidate for DOAC-xarelto added  - cardiology consult. Pt is followed as an outpatient by Dr. Zavala  -Continue home regimen of metoprolol succinate 25 mg nightly  -stress test>Left ventricular ejection fraction is normal. (Calculated EF = 68%).  · Findings consistent with an equivocal ECG stress test.  ·         Hypothyroidism  TSH within normal  - Continue home regimen continue home regimen of levothyroxine 88 mcg daily     ROSA  - Continue home CPAP at night, which is present at bedside     Hypertension  - Continue home regimen of amlodipine 5 mg daily     Hyperlipidemia  -Atorvastatin 20 mg  nightly     Gout  - Allopurinol 300 mg daily     CKD stage III  -Continue home regimen of Bumex and spironolactone daily  -Recommend outpatient referral to nephrology     Dizziness likely benign positional vertigo.  -Meclizine added.    Hospital Course     Hospital Course:  Faby Le is a 77 y.o. female with a medical history notable for morbid obesity, ROSA, hypertension, hyperlipidemia, hypothyroidism, and other comorbidities who presented to [facility] on 8/12/2022 complaining of dizziness.  Earlier this morning, patient developed sudden onset of dizziness with continued dizzy spells throughout the day.  When the episodes occurred, she would have to hold onto something to maintain her balance.  Later on the day, she went to her daughter's restaurant where she experienced an episode of dizziness.  Her daughter decided to take her blood pressure at that time.  On multiple readings, her systolic blood pressure was less than 100, which was not typical for her.  Her daughter called her primary care provider, who told her to come in for evaluation in the clinic today.  While in the clinic, an EKG was performed and the patient was noted to have new onset atrial fibrillation.  She was then recommended to come to the emergency department for further evaluation and care     8/13/22 patient seen and examined in bed no acute distress, dizziness improved.  Added meclizine.  8/14/22 patient seen and examined in bed no acute distress, no new complaints, vital signs stable, heart rate 57.  Underwent stress,  Discussed with RN.    DISCHARGE Follow Up Recommendations for labs and diagnostics: follow with pcp in one week  follow with cardiology 1 month      Reasons For Change In Medications and Indications for New Medications:    xarelto  Day of Discharge     Vital Signs:  Temp:  [97.6 °F (36.4 °C)-97.9 °F (36.6 °C)] 97.8 °F (36.6 °C)  Heart Rate:  [57-69] 69  Resp:  [16-18] 16  BP: (109-136)/(65-76) 124/74    Physical  Exam General:  Morbid obesity, Appears in no acute distress, non-toxic appearing  HEENT:  Normocephalic. Normal conjunctiva, EOM intact bilaterally, pupils equal and reactive to light. No mucosal pallor or cyanosis. No oral lesions.   Respiratory: Respirations regular and unlabored at rest. Bilateral breath sounds with good air entry in all fields. No crackles, rubs or wheezes auscultated  Cardiovascular: S1S2 irregular rhythm. No murmur, rub or gallop. No pretibial pitting edema  Gastrointestinal: Obese Abdomen with large pannus, soft, non tender. Bowel sounds present. No rebound or guarding.   Musculoskeletal: Moves all extremities voluntarily. No abnormal movements  Extremities: No digital clubbing or cyanosis  Skin: Color pink. Skin warm and dry to touch. No rashes    Neuro: AAO x3, CN II-XII grossly intact, comprehension intact, follows commands appropriately  Psych: Mood and affect normal, pleasant and cooperative     Pertinent  and/or Most Recent Results     LAB RESULTS:      Lab 08/12/22 1758   WBC 7.50   HEMOGLOBIN 11.9*   HEMATOCRIT 35.2   PLATELETS 227   NEUTROS ABS 4.30   LYMPHS ABS 2.30   MONOS ABS 0.50   EOS ABS 0.20   MCV 99.6*   PROTIME 10.5   APTT 29.3*         Lab 08/14/22  2311 08/12/22 1934 08/12/22 1758   SODIUM  --  141  --    POTASSIUM  --  4.6  --    CHLORIDE  --  105  --    CO2  --  26.0  --    ANION GAP  --  10.0  --    BUN  --  34*  --    CREATININE  --  1.32*  --    EGFR  --  42.2*  --    GLUCOSE  --  106*  --    CALCIUM  --  9.7  --    MAGNESIUM  --   --  1.9   HEMOGLOBIN A1C 5.7*  --   --    TSH  --   --  3.450         Lab 08/12/22 1934   TOTAL PROTEIN 7.0   ALBUMIN 4.10   GLOBULIN 2.9   ALT (SGPT) 15   AST (SGOT) 22   BILIRUBIN 0.3   ALK PHOS 138*         Lab 08/14/22  0757 08/12/22 1934 08/12/22 1758   PROBNP  --   --  743.4   TROPONIN T <0.010 <0.010  --    PROTIME  --   --  10.5   INR  --   --  1.02         Lab 08/14/22 0757   CHOLESTEROL 119   LDL CHOL 44   HDL CHOL 59    TRIGLYCERIDES 78         Lab 08/12/22 2143   VITAMIN B 12 1,068*         Brief Urine Lab Results     None        Microbiology Results (last 10 days)     Procedure Component Value - Date/Time    COVID PRE-OP / PRE-PROCEDURE SCREENING ORDER (NO ISOLATION) - Swab, Nasopharynx [[id number]]  (Normal) Collected: 08/12/22 2134    Lab Status: Final result Specimen: Swab from Nasopharynx Updated: 08/12/22 2216    Narrative:      The following orders were created for panel order COVID PRE-OP / PRE-PROCEDURE SCREENING ORDER (NO ISOLATION) - Swab, Nasopharynx.  Procedure                               Abnormality         Status                     ---------                               -----------         ------                     COVID-19,CEPHEID/ANA,CO...[[id number]]  Normal              Final result                 Please view results for these tests on the individual orders.    COVID-19,CEPHEID/ANA,COR/[facility]/PAD/MICHAEL IN-HOUSE(OR EMERGENT/ADD-ON),NP SWAB IN TRANSPORT MEDIA 3-4 HR TAT, RT-PCR - Swab, Nasopharynx [[id number]]  (Normal) Collected: 08/12/22 2134    Lab Status: Final result Specimen: Swab from Nasopharynx Updated: 08/12/22 2216     COVID19 Not Detected    Narrative:      Fact sheet for providers: https://www.fda.gov/media/770571/download     Fact sheet for patients: https://www.fda.gov/media/472926/download  Fact sheet for providers: https://www.fda.gov/media/681842/download    Fact sheet for patients: https://www.fda.gov/media/889145/download    Test performed by PCR.          CT Head Without Contrast    Result Date: 8/12/2022  Impression: No acute intracranial abnormality.  Chronic white matter changes compatible small vessel ischemic disease in this age group  Electronically Signed By-Tobi Tariq On:8/12/2022 7:18 PM This report was finalized on [id number] by  Tobi Tariq, .    XR Chest 1 View    Result Date: 8/12/2022  Impression: IMPRESSION : Hazy and linear opacities at the right lung base  which are likely chronic in nature. Acute superimposed upon chronic change cannot be excluded.  Electronically Signed By-Tobi Tariq On:8/12/2022 6:56 PM This report was finalized on [id number] by  Tobi Tariq, .      Results for orders placed during the hospital encounter of 08/12/22    Duplex Carotid Ultrasound CAR    Interpretation Summary  · Proximal right internal carotid artery mild stenosis.  · Proximal left internal carotid artery mild stenosis.      Results for orders placed during the hospital encounter of 08/12/22    Duplex Carotid Ultrasound CAR    Interpretation Summary  · Proximal right internal carotid artery mild stenosis.  · Proximal left internal carotid artery mild stenosis.      Results for orders placed during the hospital encounter of 08/12/22    Adult Transthoracic Echo Complete W/ Cont if Necessary Per Protocol    Interpretation Summary  · Estimated left ventricular EF was in agreement with the calculated left ventricular EF. Left ventricular ejection fraction appears to be 51 - 55%. Left ventricular systolic function is low normal.  · Left ventricular diastolic function is consistent with (grade Ia w/high LAP) impaired relaxation.  · Estimated right ventricular systolic pressure from tricuspid regurgitation is normal (<35 mmHg).      Labs Pending at Discharge:      Procedures Performed           Consults:   Consults     Date and Time Order Name Status Description    8/13/2022  1:08 PM Inpatient Neurology Consult General Completed     8/13/2022  2:43 AM Inpatient Cardiology Consult Completed     8/12/2022  8:27 PM Hospitalist (on-call MD unless specified)               Assessment:        Atrial fibrillation, unspecified type (HCC)     1. New onset atrial fibrillation with RVR  - converted, now on Toprol XL  - changed to cardizem   - on Xarelto for a/c- CHADS Vasc at least 3  - stress pending     2. H/o NICM, systolic and diastolic HF, chronic  - LVEF 51-55%, grade 1  DD     3. HTN     4. Obesity     5. ROSA     Plan:   Patient will continue on metoprolol extended release with heart rates in the 60s to 70s  diltiazem 120 daily added, stopped amlodipine  Continue Aldactone with history of cardiomyopathy  Not on losartan presently on her home medicines, her baseline creatinine is 1.3  Obstructive sleep apnea, recommended CPAP  Obesity diet weight loss recommended  Does not appear volume overloaded, normal IVC by echo, right atrial pressure 0-5  She is on 1 mg of Bumex daily without any edema     Further recommendation follow findings- stress pending     Continue to follow, Dr. Aguirre can see her on Monday if she remains in the hospital otherwise will discharge with heart monitor with outpatient follow-up to Dr. Aguirre within the next 2 weeks         Nay CAROL Fajardo  08/14/22  13:09 EDT      Discharge Details        Discharge Medications      New Medications      Instructions Start Date   metoprolol succinate XL 25 MG 24 hr tablet  Commonly known as: TOPROL-XL   25 mg, Oral, Nightly      rivaroxaban 15 MG tablet  Commonly known as: XARELTO   15 mg, Oral, Daily With Dinner         Continue These Medications      Instructions Start Date   allopurinol 300 MG tablet  Commonly known as: ZYLOPRIM   TAKE 1 TABLET BY MOUTH EVERY DAY      atorvastatin 20 MG tablet  Commonly known as: LIPITOR   TAKE 1 TABLET BY MOUTH EVERYDAY AT BEDTIME      bumetanide 2 MG tablet  Commonly known as: BUMEX   TAKE 1/2 TABLET BY MOUTH EVERY DAY      Centrum Silver Adult 50+ tablet tablet  Generic drug: multivitamin with minerals   1 tablet, Oral, Daily      cetirizine 10 MG tablet  Commonly known as: zyrTEC   1 tablet, Oral, Daily      cholecalciferol 25 MCG (1000 UT) tablet  Commonly known as: VITAMIN D3   1 tablet, Oral, Daily      cyanocobalamin 1000 MCG tablet  Commonly known as: VITAMIN B-12   1 tablet, Oral, Daily      escitalopram 20 MG tablet  Commonly known as: LEXAPRO   TAKE 1 TABLET BY MOUTH EVERY DAY       levothyroxine 88 MCG tablet  Commonly known as: SYNTHROID, LEVOTHROID   TAKE 1 TABLET BY MOUTH EVERY DAY      montelukast 10 MG tablet  Commonly known as: SINGULAIR   10 mg, Oral, Daily      spironolactone 25 MG tablet  Commonly known as: ALDACTONE   TAKE 1/2 TABLET BY MOUTH EVERY DAY         Stop These Medications    amLODIPine 5 MG tablet  Commonly known as: Norvasc     metoprolol tartrate 25 MG tablet  Commonly known as: LOPRESSOR     potassium chloride 20 MEQ CR tablet  Commonly known as: K-DUR,KLOR-CON            Allergies   Allergen Reactions   • Celecoxib Itching     swelling         Discharge Disposition:   Home or Self Care    Diet:  Hospital:  Diet Order   Procedures   • Diet Regular         Discharge Activity:   Activity Instructions     Gradually Increase Activity Until at Pre-Hospitalization Level              CODE STATUS:  There are no questions and answers to display.         Future Appointments   Date Time Provider Department Center   8/15/2022  3:15 PM Leonor Adam MD [facility] [place]   9/28/2022  1:30 PM Ratna Zavala MD [facility] [place]   12/5/2022 10:00 AM STEPHANIE Kelley DPM [facility]       Additional Instructions for the Follow-ups that You Need to Schedule     Discharge Follow-up with PCP   As directed       Currently Documented PCP:    Leonor Adam MD    PCP Phone Number:    [phone]     Follow Up Details: 1 week               Time spent on Discharge including face to face service:  34 minutes    This patient has been examined wearing appropriate Personal Protective Equipment and discussed with rn. 08/15/22      Signature: Electronically signed by Dionisio Guerrero MD, 08/15/22, 11:44 AM EDT.

## 2022-08-16 ENCOUNTER — TRANSITIONAL CARE MANAGEMENT TELEPHONE ENCOUNTER (OUTPATIENT)
Dept: CALL CENTER | Facility: HOSPITAL | Age: 77
End: 2022-08-16

## 2022-08-16 NOTE — OUTREACH NOTE
Call Center TCM Note    Flowsheet Row Responses   [facility] patient discharged from? [facility]   Does the patient have one of the following disease processes/diagnoses(primary or secondary)? Other   TCM attempt successful? Yes   Call start time 1654   Call end time 1701   Discharge diagnosis new onset A-fib, Hypothyroidism, CKD, dizziness   Meds reviewed with patient/caregiver? Yes   Is the patient having any side effects they believe may be caused by any medication additions or changes? No   Does the patient have all medications ordered at discharge? Yes   Is the patient taking all medications as directed (includes completed medication regime)? Yes   Does the patient have a primary care provider?  Yes   Does the patient have an appointment with their PCP within 7 days of discharge? Yes   Comments regarding PCP Patient declines a PCP appt at this time. She has a followup with cardiology on 9/28/2022   Has the patient kept scheduled appointments due by today? N/A   What is the Home health agency[facility]   Has home health visited the patient within 72 hours of discharge? Call prior to 72 hours   Psychosocial issues? No   Did the patient receive a copy of their discharge instructions? Yes   Nursing interventions Reviewed instructions with patient   What is the patient's perception of their health status since discharge? Improving   Is the patient/caregiver able to teach back signs and symptoms related to disease process for when to call PCP? Yes   Is the patient/caregiver able to teach back signs and symptoms related to disease process for when to call 911? Yes   Is the patient/caregiver able to teach back the hierarchy of who to call/visit for symptoms/problems? PCP, Specialist, Home health nurse, Urgent Care, ED, 911 Yes   If the patient is a current smoker, are they able to teach back resources for cessation? Not a smoker   TCM call completed? Yes          Dustin Leiva RN    8/16/2022, 17:01  EDT

## 2022-08-16 NOTE — OUTREACH NOTE
Prep Survey    Flowsheet Row Responses   [facility] patient discharged from? [facility]   Is LACE score < 7 ? No   Emergency Room discharge w/ pulse ox? No   Eligibility TCM   Hospital [facility]   Date of Admission 08/12/22   Date of Discharge 08/15/22   Discharge Disposition Home or Self Care   Discharge diagnosis new onset A-fib, Hypothyroidism, CKD, dizziness   Does the patient have one of the following disease processes/diagnoses(primary or secondary)? Other   Does the patient have Home health ordered? Yes   What is the Home health agency[facility]   Is there a DME ordered? No   Prep survey completed? Yes          DIMA GOMEZ - Registered Nurse

## 2022-08-18 PROBLEM — R94.30 ABNORMAL RESULTS OF CARDIOVASCULAR FUNCTION STUDIES: Status: ACTIVE | Noted: 2022-08-18

## 2022-08-23 ENCOUNTER — CLINICAL SUPPORT (OUTPATIENT)
Dept: FAMILY MEDICINE CLINIC | Facility: CLINIC | Age: 77
End: 2022-08-23

## 2022-08-23 DIAGNOSIS — I10 PRIMARY HYPERTENSION: Primary | ICD-10-CM

## 2022-08-23 DIAGNOSIS — I25.10 CORONARY ARTERY DISEASE INVOLVING NATIVE HEART WITHOUT ANGINA PECTORIS, UNSPECIFIED VESSEL OR LESION TYPE: ICD-10-CM

## 2022-08-23 DIAGNOSIS — N18.32 STAGE 3B CHRONIC KIDNEY DISEASE: ICD-10-CM

## 2022-08-23 PROCEDURE — 80053 COMPREHEN METABOLIC PANEL: CPT | Performed by: PREVENTIVE MEDICINE

## 2022-08-23 RX ORDER — ALLOPURINOL 300 MG/1
TABLET ORAL
Qty: 90 TABLET | Refills: 2 | OUTPATIENT
Start: 2022-08-23

## 2022-08-23 NOTE — PROGRESS NOTES
Venipuncture Blood Specimen Collection  Venipuncture performed in left arm by Jennifer Vaughn MA with good hemostasis. Patient tolerated the procedure well without complications.   08/23/22   SCOOTER Jacobs MD

## 2022-08-24 ENCOUNTER — TELEPHONE (OUTPATIENT)
Dept: FAMILY MEDICINE CLINIC | Facility: CLINIC | Age: 77
End: 2022-08-24

## 2022-08-24 LAB
ALBUMIN SERPL-MCNC: 4 G/DL (ref 3.5–5.2)
ALBUMIN/GLOB SERPL: 1.5 G/DL
ALP SERPL-CCNC: 123 U/L (ref 39–117)
ALT SERPL W P-5'-P-CCNC: 15 U/L (ref 1–33)
ANION GAP SERPL CALCULATED.3IONS-SCNC: 14.4 MMOL/L (ref 5–15)
AST SERPL-CCNC: 20 U/L (ref 1–32)
BILIRUB SERPL-MCNC: 0.4 MG/DL (ref 0–1.2)
BUN SERPL-MCNC: 30 MG/DL (ref 8–23)
BUN/CREAT SERPL: 24.6 (ref 7–25)
CALCIUM SPEC-SCNC: 9.6 MG/DL (ref 8.6–10.5)
CHLORIDE SERPL-SCNC: 98 MMOL/L (ref 98–107)
CO2 SERPL-SCNC: 25.6 MMOL/L (ref 22–29)
CREAT SERPL-MCNC: 1.22 MG/DL (ref 0.57–1)
EGFRCR SERPLBLD CKD-EPI 2021: 46.4 ML/MIN/1.73
GLOBULIN UR ELPH-MCNC: 2.7 GM/DL
GLUCOSE SERPL-MCNC: 102 MG/DL (ref 65–99)
POTASSIUM SERPL-SCNC: 4.2 MMOL/L (ref 3.5–5.2)
PROT SERPL-MCNC: 6.7 G/DL (ref 6–8.5)
SODIUM SERPL-SCNC: 138 MMOL/L (ref 136–145)

## 2022-08-24 NOTE — TELEPHONE ENCOUNTER
HUB TO READ:  ----- Message from Leonor Adam MD sent at 8/24/2022 10:48 AM EDT -----  Glucose was 102 so watch carbs and try to exercise when possible.  Kidney function is somewhat improved but is still abnormal so avoid ibuprofen Aleve Motrin and limit x-ray dyes.

## 2022-08-24 NOTE — PROGRESS NOTES
Glucose was 102 so watch carbs and try to exercise when possible.  Kidney function is somewhat improved but is still abnormal so avoid ibuprofen Aleve Motrin and limit x-ray dyes.

## 2022-08-25 ENCOUNTER — READMISSION MANAGEMENT (OUTPATIENT)
Dept: CALL CENTER | Facility: HOSPITAL | Age: 77
End: 2022-08-25

## 2022-08-25 NOTE — OUTREACH NOTE
Medical Week 2 Survey    Flowsheet Row Responses   [facility] patient discharged from? [facility]   Does the patient have one of the following disease processes/diagnoses(primary or secondary)? Other   Week 2 attempt successful? Yes   Call start time 1207   Discharge diagnosis new onset A-fib, Hypothyroidism, CKD, dizziness   Call end time 1209   Meds reviewed with patient/caregiver? Yes   Is the patient having any side effects they believe may be caused by any medication additions or changes? No   Does the patient have all medications ordered at discharge? Yes   Is the patient taking all medications as directed (includes completed medication regime)? Yes   Does the patient have a primary care provider?  Yes   Does the patient have an appointment with their PCP within 7 days of discharge? Yes   Has the patient kept scheduled appointments due by today? Yes   What is the [facility] health agency[facility]   Has home health visited the patient within 72 hours of discharge? Yes   Psychosocial issues? No   Did the patient receive a copy of their discharge instructions? Yes   Nursing interventions Reviewed instructions with patient   What is the patient's perception of their health status since discharge? Improving   Is the patient/caregiver able to teach back signs and symptoms related to disease process for when to call PCP? Yes   Is the patient/caregiver able to teach back signs and symptoms related to disease process for when to call 911? Yes   Is the patient/caregiver able to teach back the hierarchy of who to call/visit for symptoms/problems? PCP, Specialist, Home health nurse, Urgent Care, ED, 911 Yes   If the patient is a current smoker, are they able to teach back resources for cessation? Not a smoker   Week 2 Call Completed? Yes          ADELA GOMEZ - Licensed Nurse

## 2022-08-26 ENCOUNTER — LAB (OUTPATIENT)
Dept: LAB | Facility: HOSPITAL | Age: 77
End: 2022-08-26

## 2022-08-26 DIAGNOSIS — R94.30 ABNORMAL RESULTS OF CARDIOVASCULAR FUNCTION STUDIES: ICD-10-CM

## 2022-08-26 DIAGNOSIS — I25.10 CORONARY ARTERY DISEASE INVOLVING NATIVE HEART WITHOUT ANGINA PECTORIS, UNSPECIFIED VESSEL OR LESION TYPE: ICD-10-CM

## 2022-08-26 LAB
ANION GAP SERPL CALCULATED.3IONS-SCNC: 11 MMOL/L (ref 5–15)
BASOPHILS # BLD AUTO: 0.04 10*3/MM3 (ref 0–0.2)
BASOPHILS NFR BLD AUTO: 0.6 % (ref 0–1.5)
BUN SERPL-MCNC: 28 MG/DL (ref 8–23)
BUN/CREAT SERPL: 20.9 (ref 7–25)
CALCIUM SPEC-SCNC: 9.3 MG/DL (ref 8.6–10.5)
CHLORIDE SERPL-SCNC: 100 MMOL/L (ref 98–107)
CO2 SERPL-SCNC: 28 MMOL/L (ref 22–29)
CREAT SERPL-MCNC: 1.34 MG/DL (ref 0.57–1)
DEPRECATED RDW RBC AUTO: 48.3 FL (ref 37–54)
EGFRCR SERPLBLD CKD-EPI 2021: 41.4 ML/MIN/1.73
EOSINOPHIL # BLD AUTO: 0.17 10*3/MM3 (ref 0–0.4)
EOSINOPHIL NFR BLD AUTO: 2.4 % (ref 0.3–6.2)
ERYTHROCYTE [DISTWIDTH] IN BLOOD BY AUTOMATED COUNT: 13.5 % (ref 12.3–15.4)
GLUCOSE SERPL-MCNC: 81 MG/DL (ref 65–99)
HCT VFR BLD AUTO: 34.3 % (ref 34–46.6)
HGB BLD-MCNC: 11.5 G/DL (ref 12–15.9)
IMM GRANULOCYTES # BLD AUTO: 0.01 10*3/MM3 (ref 0–0.05)
IMM GRANULOCYTES NFR BLD AUTO: 0.1 % (ref 0–0.5)
LYMPHOCYTES # BLD AUTO: 1.99 10*3/MM3 (ref 0.7–3.1)
LYMPHOCYTES NFR BLD AUTO: 27.9 % (ref 19.6–45.3)
MCH RBC QN AUTO: 33.1 PG (ref 26.6–33)
MCHC RBC AUTO-ENTMCNC: 33.5 G/DL (ref 31.5–35.7)
MCV RBC AUTO: 98.8 FL (ref 79–97)
MONOCYTES # BLD AUTO: 0.44 10*3/MM3 (ref 0.1–0.9)
MONOCYTES NFR BLD AUTO: 6.2 % (ref 5–12)
NEUTROPHILS NFR BLD AUTO: 4.47 10*3/MM3 (ref 1.7–7)
NEUTROPHILS NFR BLD AUTO: 62.8 % (ref 42.7–76)
NRBC BLD AUTO-RTO: 0 /100 WBC (ref 0–0.2)
PLATELET # BLD AUTO: 211 10*3/MM3 (ref 140–450)
PMV BLD AUTO: 9.8 FL (ref 6–12)
POTASSIUM SERPL-SCNC: 4.6 MMOL/L (ref 3.5–5.2)
RBC # BLD AUTO: 3.47 10*6/MM3 (ref 3.77–5.28)
SARS-COV-2 ORF1AB RESP QL NAA+PROBE: NOT DETECTED
SODIUM SERPL-SCNC: 139 MMOL/L (ref 136–145)
WBC NRBC COR # BLD: 7.12 10*3/MM3 (ref 3.4–10.8)

## 2022-08-26 PROCEDURE — 85025 COMPLETE CBC W/AUTO DIFF WBC: CPT

## 2022-08-26 PROCEDURE — U0004 COV-19 TEST NON-CDC HGH THRU: HCPCS

## 2022-08-26 PROCEDURE — 80048 BASIC METABOLIC PNL TOTAL CA: CPT

## 2022-08-26 PROCEDURE — C9803 HOPD COVID-19 SPEC COLLECT: HCPCS

## 2022-08-26 PROCEDURE — 36415 COLL VENOUS BLD VENIPUNCTURE: CPT

## 2022-08-29 ENCOUNTER — READMISSION MANAGEMENT (OUTPATIENT)
Dept: CALL CENTER | Facility: HOSPITAL | Age: 77
End: 2022-08-29

## 2022-08-29 ENCOUNTER — HOSPITAL ENCOUNTER (OUTPATIENT)
Facility: HOSPITAL | Age: 77
Setting detail: HOSPITAL OUTPATIENT SURGERY
Discharge: HOME | End: 2022-08-29
Attending: INTERNAL MEDICINE | Admitting: INTERNAL MEDICINE

## 2022-08-29 VITALS
OXYGEN SATURATION: 100 % | HEART RATE: 91 BPM | DIASTOLIC BLOOD PRESSURE: 96 MMHG | TEMPERATURE: 97.7 F | SYSTOLIC BLOOD PRESSURE: 141 MMHG | BODY MASS INDEX: 49.58 KG/M2 | WEIGHT: 269.4 LBS | HEIGHT: 62 IN | RESPIRATION RATE: 15 BRPM

## 2022-08-29 DIAGNOSIS — R94.30 ABNORMAL RESULTS OF CARDIOVASCULAR FUNCTION STUDIES: ICD-10-CM

## 2022-08-29 DIAGNOSIS — I25.10 CORONARY ARTERY DISEASE INVOLVING NATIVE HEART WITHOUT ANGINA PECTORIS, UNSPECIFIED VESSEL OR LESION TYPE: ICD-10-CM

## 2022-08-29 PROCEDURE — 25010000002 MIDAZOLAM PER 1 MG: Performed by: INTERNAL MEDICINE

## 2022-08-29 PROCEDURE — 0 IOPAMIDOL PER 1 ML: Performed by: INTERNAL MEDICINE

## 2022-08-29 PROCEDURE — 93458 L HRT ARTERY/VENTRICLE ANGIO: CPT | Performed by: INTERNAL MEDICINE

## 2022-08-29 PROCEDURE — C1894 INTRO/SHEATH, NON-LASER: HCPCS | Performed by: INTERNAL MEDICINE

## 2022-08-29 PROCEDURE — 25010000002 FENTANYL CITRATE (PF) 100 MCG/2ML SOLUTION: Performed by: INTERNAL MEDICINE

## 2022-08-29 PROCEDURE — C1769 GUIDE WIRE: HCPCS | Performed by: INTERNAL MEDICINE

## 2022-08-29 PROCEDURE — 99152 MOD SED SAME PHYS/QHP 5/>YRS: CPT | Performed by: INTERNAL MEDICINE

## 2022-08-29 RX ORDER — MIDAZOLAM HYDROCHLORIDE 1 MG/ML
INJECTION INTRAMUSCULAR; INTRAVENOUS AS NEEDED
Status: DISCONTINUED | OUTPATIENT
Start: 2022-08-29 | End: 2022-08-29 | Stop reason: HOSPADM

## 2022-08-29 RX ORDER — LIDOCAINE HYDROCHLORIDE 20 MG/ML
INJECTION, SOLUTION INFILTRATION; PERINEURAL AS NEEDED
Status: DISCONTINUED | OUTPATIENT
Start: 2022-08-29 | End: 2022-08-29 | Stop reason: HOSPADM

## 2022-08-29 RX ORDER — ONDANSETRON 4 MG/1
4 TABLET, FILM COATED ORAL EVERY 6 HOURS PRN
Status: DISCONTINUED | OUTPATIENT
Start: 2022-08-29 | End: 2022-08-29 | Stop reason: HOSPADM

## 2022-08-29 RX ORDER — FENTANYL CITRATE 50 UG/ML
INJECTION, SOLUTION INTRAMUSCULAR; INTRAVENOUS AS NEEDED
Status: DISCONTINUED | OUTPATIENT
Start: 2022-08-29 | End: 2022-08-29 | Stop reason: HOSPADM

## 2022-08-29 RX ORDER — SODIUM CHLORIDE 9 MG/ML
250 INJECTION, SOLUTION INTRAVENOUS ONCE AS NEEDED
Status: DISCONTINUED | OUTPATIENT
Start: 2022-08-29 | End: 2022-08-29 | Stop reason: HOSPADM

## 2022-08-29 RX ORDER — ACETAMINOPHEN 325 MG/1
650 TABLET ORAL EVERY 4 HOURS PRN
Status: DISCONTINUED | OUTPATIENT
Start: 2022-08-29 | End: 2022-08-29 | Stop reason: HOSPADM

## 2022-08-29 RX ORDER — ONDANSETRON 2 MG/ML
4 INJECTION INTRAMUSCULAR; INTRAVENOUS EVERY 6 HOURS PRN
Status: DISCONTINUED | OUTPATIENT
Start: 2022-08-29 | End: 2022-08-29 | Stop reason: HOSPADM

## 2022-08-29 RX ORDER — SODIUM CHLORIDE 9 MG/ML
100 INJECTION, SOLUTION INTRAVENOUS CONTINUOUS
Status: DISCONTINUED | OUTPATIENT
Start: 2022-08-29 | End: 2022-08-29 | Stop reason: HOSPADM

## 2022-08-29 RX ADMIN — Medication 1200 MG: at 08:58

## 2022-08-29 RX ADMIN — SODIUM CHLORIDE 100 ML/HR: 9 INJECTION, SOLUTION INTRAVENOUS at 08:59

## 2022-08-29 NOTE — DISCHARGE INSTRUCTIONS
Post Cath Instructions    Drink plenty of fluids for the next 24 hours.  This helps to eliminate the dye used in your procedure through urination.  You may resume a normal diet; however, try to avoid foods that would cause gas or constipation.    Sedative medication given to you during your catheterization may decrease your judgement and reaction time for up to 24-48 hours.  Therefore:  DO NOT drive or operate hazardous machinery (48 hours)  DO NOT consume alcoholic beverages  DO NOT make any important/legal decisions  Have someone stay with you for at least 24 hours    To allow proper healing and prevent bleeding, the following activities are to be strictly avoided for the next 24-48 hours:  Excessive bending at wound site  Straining (anything that would tense up muscles around the affected puncture site)  Lifting objects greater than 5 pounds, pushing, or pulling for 5 days  For Groin Cases:  Refrain from sexual activity  Refrain from running or vigorous walking  No prolonged sitting or standing  Limit stair climbing as much as possible    Keep the puncture site clean and dry.  You may remove the dressing tomorrow and replace it with a band-aid for at least one additional day.  Gently clean the site with mild soap and water.  No scrubbing/rubbing and lightly pat the area dry.  Showers are acceptable; however, avoid submerging in water (tub baths, hot tubs, swimming pools, dishwater, etc…) for at least one week.  The site should be completely healed before resuming these activities to reduce the risk of infection.  Check the site often.  Watch for signs and symptoms of infection and notify your physician if any of the following occur:  Bleeding or an increase in swelling at the puncture site  Fever  Increased soreness around puncture site  Foul odor or significant drainage from the puncture site  Swelling, redness, or warmth at the puncture site    **A bruise or small “pea sized” lump under the skin at the puncture  site is not unusual.  This should disappear within 3-4 weeks.**  CONTACT YOUR PHYSICIAN OR CALL 911 IF YOU EXPERIENCE ANY OF THE FOLLOWING:  Increased angina (chest pain) or frequent sensations of pressure, burning, pain, or other discomfort in the chest, arm, jaws, or stomach  Lightheadedness, dizziness, faint feeling, sweating, or difficulty breathing  Odd sensation changes like numbness, tingling, coldness, or pain in the arm or leg in which the catheter was inserted  Limb in which the catheter was inserted becomes pale/bluish in color    IMPORTANT:  Although this occurs very rarely, if you should develop bright red or excessive bleeding, feel a “pop” inside at the insertion site, or notice a sudden increase in swelling larger than a walnut, you should call 911.  Hold continuous firm pressure to the access site until emergency personnel arrive.  It is best if someone else can do this for you.

## 2022-08-29 NOTE — SIGNIFICANT NOTE
Dr. Zavala was notified and came to bedside to see patient. Will continue to monitor at this time.

## 2022-08-29 NOTE — OUTREACH NOTE
Medical Week 3 Survey    Flowsheet Row Responses   [facility] facility patient discharged from? [facility]   Does the patient have one of the following disease processes/diagnoses(primary or secondary)? Other   Week 3 attempt successful? No   Revoke Readmitted          ALDAIR CRUM - Registered Nurse

## 2022-08-31 LAB
Lab: 13
TOAL ENROLLMENT DAYS: 14

## 2022-08-31 PROCEDURE — 93228 REMOTE 30 DAY ECG REV/REPORT: CPT | Performed by: INTERNAL MEDICINE

## 2022-09-12 RX ORDER — LOSARTAN POTASSIUM 25 MG/1
TABLET ORAL
COMMUNITY
Start: 2022-08-24 | End: 2023-02-24

## 2022-09-12 RX ORDER — SORBITOL SOLUTION 70 %
SOLUTION, ORAL MISCELLANEOUS SEE ADMIN INSTRUCTIONS
COMMUNITY
Start: 2022-07-23 | End: 2023-03-02

## 2022-09-12 NOTE — PATIENT INSTRUCTIONS
Health Maintenance Due   Topic Date Due    COVID-19 Vaccine (4 - Booster for Pfizer series) 04/29/2022

## 2022-09-13 ENCOUNTER — OFFICE VISIT (OUTPATIENT)
Dept: FAMILY MEDICINE CLINIC | Facility: CLINIC | Age: 77
End: 2022-09-13

## 2022-09-13 VITALS
HEIGHT: 62 IN | WEIGHT: 267 LBS | DIASTOLIC BLOOD PRESSURE: 72 MMHG | HEART RATE: 72 BPM | BODY MASS INDEX: 49.13 KG/M2 | OXYGEN SATURATION: 96 % | TEMPERATURE: 97.5 F | SYSTOLIC BLOOD PRESSURE: 108 MMHG

## 2022-09-13 DIAGNOSIS — G47.33 OBSTRUCTIVE SLEEP APNEA SYNDROME: ICD-10-CM

## 2022-09-13 DIAGNOSIS — E03.9 HYPOTHYROIDISM, UNSPECIFIED TYPE: ICD-10-CM

## 2022-09-13 DIAGNOSIS — R73.9 HYPERGLYCEMIA: ICD-10-CM

## 2022-09-13 DIAGNOSIS — F32.A DEPRESSION, UNSPECIFIED DEPRESSION TYPE: ICD-10-CM

## 2022-09-13 DIAGNOSIS — I48.0 PAROXYSMAL ATRIAL FIBRILLATION: ICD-10-CM

## 2022-09-13 DIAGNOSIS — I10 PRIMARY HYPERTENSION: ICD-10-CM

## 2022-09-13 DIAGNOSIS — E78.2 MIXED HYPERLIPIDEMIA: ICD-10-CM

## 2022-09-13 DIAGNOSIS — E66.01 CLASS 3 SEVERE OBESITY DUE TO EXCESS CALORIES WITH SERIOUS COMORBIDITY AND BODY MASS INDEX (BMI) OF 45.0 TO 49.9 IN ADULT: ICD-10-CM

## 2022-09-13 DIAGNOSIS — R42 LIGHT HEADEDNESS: ICD-10-CM

## 2022-09-13 DIAGNOSIS — I25.10 CORONARY ARTERY DISEASE INVOLVING NATIVE HEART WITHOUT ANGINA PECTORIS, UNSPECIFIED VESSEL OR LESION TYPE: Primary | ICD-10-CM

## 2022-09-13 PROCEDURE — 99212 OFFICE O/P EST SF 10 MIN: CPT | Performed by: PREVENTIVE MEDICINE

## 2022-09-13 NOTE — PROGRESS NOTES
"Subjective   Faby Le is a 77 y.o. female presents for   Chief Complaint   Patient presents with   • Hospital Follow Up Visit     A-Fib     Patient hospitalized for near syncope and A. fib.  Heart cath did not reveal any serious coronary blockage.  She will be following up with a cardiologist.  She has had 1 episode of lightheadedness since she has been home and again has been cautioned to make sure that she either uses a walker or assistive device so she does not fall.  Depression seems to be under good control with her Lexapro she is watching her carbs and saturated fats and blood pressure was under good control    Health Maintenance Due   Topic Date Due   • COVID-19 Vaccine (4 - Booster for Pfizer series) 04/29/2022       History of Present Illness     Vitals:    09/13/22 1031 09/13/22 1032 09/13/22 1033   BP: 112/69 129/77 108/72   BP Location: Right arm Left arm Right arm   Patient Position: Sitting Sitting Standing   Cuff Size: Adult Adult Adult   Pulse: 72     Temp: 97.5 °F (36.4 °C)     TempSrc: Temporal     SpO2: 96%     Weight: 121 kg (267 lb)     Height: 157.5 cm (62\")       Body mass index is 48.83 kg/m².    Current Outpatient Medications on File Prior to Visit   Medication Sig Dispense Refill   • allopurinol (ZYLOPRIM) 300 MG tablet TAKE 1 TABLET BY MOUTH EVERY DAY 90 tablet 2   • atorvastatin (LIPITOR) 20 MG tablet TAKE 1 TABLET BY MOUTH EVERYDAY AT BEDTIME 90 tablet 3   • bumetanide (BUMEX) 2 MG tablet TAKE 1/2 TABLET BY MOUTH EVERY DAY 45 tablet 2   • cetirizine (zyrTEC) 10 MG tablet Take 1 tablet by mouth Daily.     • cyanocobalamin (VITAMIN B-12) 1000 MCG tablet Take 1 tablet by mouth Daily.     • escitalopram (LEXAPRO) 20 MG tablet TAKE 1 TABLET BY MOUTH EVERY DAY 90 tablet 2   • levothyroxine (SYNTHROID, LEVOTHROID) 88 MCG tablet TAKE 1 TABLET BY MOUTH EVERY DAY 90 tablet 3   • metoprolol succinate XL (TOPROL-XL) 25 MG 24 hr tablet Take 1 tablet by mouth Every Night. 30 tablet 0   • " montelukast (SINGULAIR) 10 MG tablet Take 10 mg by mouth Daily.     • Multiple Vitamins-Minerals (CENTRUM SILVER ADULT 50+) tablet Take 1 tablet by mouth Daily.     • rivaroxaban (XARELTO) 15 MG tablet Take 1 tablet by mouth Daily With Dinner. Indications: Atrial Fibrillation 42 tablet 2   • spironolactone (ALDACTONE) 25 MG tablet TAKE 1/2 TABLET BY MOUTH EVERY DAY 45 tablet 4   • Vitamin D, Cholecalciferol, 25 MCG (1000 UT) tablet Take 1 tablet by mouth Daily.     • losartan (COZAAR) 25 MG tablet      • sorbitol 70 % solution solution See Admin Instructions.       No current facility-administered medications on file prior to visit.       The following portions of the patient's history were reviewed and updated as appropriate: allergies, current medications, past family history, past medical history, past social history, past surgical history and problem list.    Review of Systems   Cardiovascular: Negative for chest pain and palpitations.   Neurological: Positive for light-headedness.       Objective   Physical Exam  Vitals reviewed.   Constitutional:       General: She is not in acute distress.     Appearance: She is well-developed. She is obese. She is not ill-appearing or toxic-appearing.   HENT:      Head: Normocephalic and atraumatic.      Right Ear: Tympanic membrane, ear canal and external ear normal.      Left Ear: Tympanic membrane, ear canal and external ear normal.      Nose: Nose normal.      Mouth/Throat:      Mouth: Mucous membranes are moist.      Pharynx: No posterior oropharyngeal erythema.   Eyes:      Extraocular Movements: Extraocular movements intact.      Conjunctiva/sclera: Conjunctivae normal.      Pupils: Pupils are equal, round, and reactive to light.   Cardiovascular:      Rate and Rhythm: Normal rate and regular rhythm.      Heart sounds: Normal heart sounds.   Pulmonary:      Effort: Pulmonary effort is normal.      Comments: Decreased breath sounds bilaterally  Abdominal:      General:  Bowel sounds are normal. There is no distension.      Palpations: Abdomen is soft. There is no mass.      Tenderness: There is no abdominal tenderness.   Musculoskeletal:         General: Swelling, deformity and signs of injury present.      Cervical back: Neck supple.      Comments: Right ankle fixation in place.  Mild antalgia.   Skin:     General: Skin is warm.   Neurological:      General: No focal deficit present.      Mental Status: She is alert and oriented to person, place, and time.   Psychiatric:         Mood and Affect: Mood normal.         Behavior: Behavior normal.       PHQ-9 Total Score:      Assessment & Plan   Diagnoses and all orders for this visit:    1. Coronary artery disease involving native heart without angina pectoris, unspecified vessel or lesion type (Primary)  Comments:  Minimal blockages on heart cath.    2. Depression, unspecified depression type  Comments:  Controlled with Lexapro and no HI or SI    3. Hyperglycemia  Comments:  Watching carbs    4. Mixed hyperlipidemia  Comments:  Watching sat fats    5. Primary hypertension  Comments:  Under good control.    6. Light headedness  Comments:  1 episode since she got home we will alert cardiologist    7. Hypothyroidism, unspecified type  Comments:  No increase in dry skin or hair loss    8. Paroxysmal atrial fibrillation (HCC)  Comments:  Patient has not noted palpitations.    9. Obstructive sleep apnea syndrome  Comments:  Uses nightly and will see sleep doctor next week.    10. Class 3 severe obesity due to excess calories with serious comorbidity and body mass index (BMI) of 45.0 to 49.9 in adult (HCC)        Patient Instructions     Health Maintenance Due   Topic Date Due   • COVID-19 Vaccine (4 - Booster for Pfizer series) 04/29/2022

## 2022-09-19 RX ORDER — ESCITALOPRAM OXALATE 20 MG/1
TABLET ORAL
Qty: 90 TABLET | Refills: 2 | Status: SHIPPED | OUTPATIENT
Start: 2022-09-19

## 2022-09-19 RX ORDER — BUMETANIDE 2 MG/1
TABLET ORAL
Qty: 45 TABLET | Refills: 2 | Status: SHIPPED | OUTPATIENT
Start: 2022-09-19

## 2022-09-19 RX ORDER — ALLOPURINOL 300 MG/1
TABLET ORAL
Qty: 90 TABLET | Refills: 2 | Status: SHIPPED | OUTPATIENT
Start: 2022-09-19

## 2022-09-19 RX ORDER — AMLODIPINE BESYLATE 5 MG/1
TABLET ORAL
Qty: 90 TABLET | Refills: 3 | Status: SHIPPED | OUTPATIENT
Start: 2022-09-19

## 2022-09-19 NOTE — TELEPHONE ENCOUNTER
Please call patient and advise that the amlodipine was stopped according to our records and that the allopurinol should be stopped due to her kidney function.  I have refilled the Lexapro.  Call if any other questions or concerns.

## 2022-09-23 ENCOUNTER — TELEPHONE (OUTPATIENT)
Dept: FAMILY MEDICINE CLINIC | Facility: CLINIC | Age: 77
End: 2022-09-23

## 2022-09-28 ENCOUNTER — OFFICE VISIT (OUTPATIENT)
Dept: CARDIOLOGY | Facility: CLINIC | Age: 77
End: 2022-09-28

## 2022-09-28 VITALS
SYSTOLIC BLOOD PRESSURE: 105 MMHG | DIASTOLIC BLOOD PRESSURE: 68 MMHG | RESPIRATION RATE: 18 BRPM | HEIGHT: 62 IN | HEART RATE: 90 BPM | WEIGHT: 267 LBS | BODY MASS INDEX: 49.13 KG/M2 | OXYGEN SATURATION: 97 %

## 2022-09-28 DIAGNOSIS — E78.2 MIXED HYPERLIPIDEMIA: ICD-10-CM

## 2022-09-28 DIAGNOSIS — I48.0 PAROXYSMAL ATRIAL FIBRILLATION: ICD-10-CM

## 2022-09-28 DIAGNOSIS — I10 PRIMARY HYPERTENSION: ICD-10-CM

## 2022-09-28 DIAGNOSIS — I48.91 ATRIAL FIBRILLATION, UNSPECIFIED TYPE: ICD-10-CM

## 2022-09-28 DIAGNOSIS — R94.30 ABNORMAL RESULTS OF CARDIOVASCULAR FUNCTION STUDIES: ICD-10-CM

## 2022-09-28 DIAGNOSIS — I25.10 CORONARY ARTERY DISEASE INVOLVING NATIVE HEART WITHOUT ANGINA PECTORIS, UNSPECIFIED VESSEL OR LESION TYPE: Primary | ICD-10-CM

## 2022-09-28 PROCEDURE — 99214 OFFICE O/P EST MOD 30 MIN: CPT | Performed by: INTERNAL MEDICINE

## 2022-09-28 PROCEDURE — 93000 ELECTROCARDIOGRAM COMPLETE: CPT | Performed by: INTERNAL MEDICINE

## 2022-09-28 RX ORDER — CHLORHEXIDINE GLUCONATE 0.12 MG/ML
RINSE ORAL
COMMUNITY
Start: 2022-09-13 | End: 2023-03-02

## 2022-12-05 ENCOUNTER — OFFICE VISIT (OUTPATIENT)
Dept: PODIATRY | Facility: CLINIC | Age: 77
End: 2022-12-05

## 2022-12-05 VITALS — RESPIRATION RATE: 20 BRPM | WEIGHT: 267 LBS | BODY MASS INDEX: 49.13 KG/M2 | HEIGHT: 62 IN

## 2022-12-05 DIAGNOSIS — M19.071 ARTHRITIS OF RIGHT ANKLE: ICD-10-CM

## 2022-12-05 DIAGNOSIS — G89.29 CHRONIC PAIN OF RIGHT ANKLE: Primary | ICD-10-CM

## 2022-12-05 DIAGNOSIS — M25.571 CHRONIC PAIN OF RIGHT ANKLE: Primary | ICD-10-CM

## 2022-12-05 PROCEDURE — 99212 OFFICE O/P EST SF 10 MIN: CPT | Performed by: PODIATRIST

## 2022-12-05 RX ORDER — AZELASTINE HYDROCHLORIDE 137 UG/1
SPRAY, METERED NASAL
Status: ON HOLD | COMMUNITY
Start: 2022-10-05 | End: 2023-02-17

## 2022-12-05 NOTE — PROGRESS NOTES
12/05/2022  Foot and Ankle Surgery - Established Patient/Follow-up  Provider: Dr. Donta Kelley DPM  Location: [facility]    Subjective:  Faby Le is a 77 y.o. female.     Chief Complaint   Patient presents with   • Right Ankle - Follow-up, Pain   • Follow-up     DEBORA Sykes md   3/1/2022       HPI: The patient is a 77-year-old female who returns for right ankle pain.      The patient reports her right ankle is improving slowly. She does have a lace-up brace she wears but states she never received her custom brace. She states she has been wearing her brace daily and has noticed improvements. The patient states she is better than she was 6 months ago. She states she has improved by 70 percent. She states she has 2 lace-up braces.     Additionally, she complains of an ingrown toenail on the hallux and fifth metatarsal. She also states she is being followed by an orthopedic surgeon and is considering undergoing a right knee arthroplasty surgery. She states she cannot walk very far. She states her right knee pain is aggravated when moving to stand up.      Allergies   Allergen Reactions   • Celecoxib Itching     swelling       Current Outpatient Medications on File Prior to Visit   Medication Sig Dispense Refill   • allopurinol (ZYLOPRIM) 300 MG tablet TAKE 1 TABLET BY MOUTH EVERY DAY 90 tablet 2   • amLODIPine (NORVASC) 5 MG tablet TAKE 1 TABLET BY MOUTH EVERY DAY 90 tablet 3   • atorvastatin (LIPITOR) 20 MG tablet TAKE 1 TABLET BY MOUTH EVERYDAY AT BEDTIME 90 tablet 3   • Azelastine HCl 137 MCG/SPRAY solution SPRAY 2 SPRAYS (274 MCG) IN EACH NOSTRIL BY INTRANASAL ROUTE 2 TIMES PER DAY FOR 30 DAYS     • bumetanide (BUMEX) 2 MG tablet TAKE 1/2 TABLET BY MOUTH EVERY DAY 45 tablet 2   • cetirizine (zyrTEC) 10 MG tablet Take 1 tablet by mouth Daily.     • chlorhexidine (PERIDEX) 0.12 % solution SWISH 1/2 OZ FOR 30 SECONDS ONE TIME A DAY THEN SPIT     • cyanocobalamin (VITAMIN B-12) 1000 MCG tablet Take 1  "tablet by mouth Daily.     • escitalopram (LEXAPRO) 20 MG tablet TAKE 1 TABLET BY MOUTH EVERY DAY 90 tablet 2   • levothyroxine (SYNTHROID, LEVOTHROID) 88 MCG tablet TAKE 1 TABLET BY MOUTH EVERY DAY 90 tablet 3   • losartan (COZAAR) 25 MG tablet      • metoprolol succinate XL (TOPROL-XL) 25 MG 24 hr tablet Take 1 tablet by mouth Every Night. 30 tablet 0   • montelukast (SINGULAIR) 10 MG tablet Take 10 mg by mouth Daily.     • Multiple Vitamins-Minerals (CENTRUM SILVER ADULT 50+) tablet Take 1 tablet by mouth Daily.     • rivaroxaban (XARELTO) 15 MG tablet Take 1 tablet by mouth Daily With Dinner. Indications: Atrial Fibrillation 42 tablet 2   • sorbitol 70 % solution solution See Admin Instructions.     • spironolactone (ALDACTONE) 25 MG tablet TAKE 1/2 TABLET BY MOUTH EVERY DAY 45 tablet 4   • Vitamin D, Cholecalciferol, 25 MCG (1000 UT) tablet Take 1 tablet by mouth Daily.       No current facility-administered medications on file prior to visit.       Objective   Resp 20   Ht 157.5 cm (62\")   Wt 121 kg (267 lb)   LMP  (LMP Unknown)   BMI 48.83 kg/m²     Foot/Ankle Exam:       General:   Appearance: appears stated age and healthy and obesity    Orientation: AAOx3    Affect: appropriate    Gait: antalgic      VASCULAR      Right Foot Vascularity   Normal vascular exam    Dorsalis pedis:  2+  Posterior tibial:  2+  Skin Temperature: warm    Edema Gradin+  CFT:  < 3 seconds  Pedal Hair Growth:  Present  Varicosities: none        NEUROLOGIC     Right Foot Neurologic   Light touch sensation:  Normal  Hot/Cold sensation: normal    Achilles reflex:  2+     MUSCULOSKELETAL      Right Foot Musculoskeletal   Ecchymosis:  None  Tenderness: medial malleolus, lateral malleolus, posterior tibial tendon and anterior tibiotalar joint line    Arch:  Pes planus     MUSCLE STRENGTH     Right Foot Muscle Strength   Normal strength    Foot dorsiflexion:  5  Foot plantar flexion:  5  Foot inversion:  5  Foot eversion:  5     " DERMATOLOGIC     Right Foot Dermatologic   Skin: skin intact       TESTS     Right Foot Tests   Anterior drawer: negative    Varus tilt: negative        Right Foot Additional Comments Significant rigidity and crepitus with attempted range of motion of the right ankle joint as well as rear foot.  Significant genu valgum involving the right knee.    12/05/2022  No significant changes to the ankle. No pain with palpation today.      Assessment & Plan   Diagnoses and all orders for this visit:    1. Chronic pain of right ankle (Primary)    2. Arthritis of right ankle        Patient presents for right ankle pain. We discussed the diagnosis and treatment options at length. We discussed the etiology and biomechanics involved with her right foot and ankle pain. She has improved by 70 percent and is doing well with her lace-up brace. Since she is doing well, I do not think a custom brace is necessary. However, purchasing a third and fourth lace-up is recommended as these types of braces tend to break down.  Additionally in regard to her hallux and fifth metatarsal symptoms, she does not have an ingrown toenail, just a thicker toenail. Filing the nail down with an emery board would be her best treatment option at this time. These issues are most likely functional because of her ankle symptoms. Daily Epsom salt soaks have been recommended. She can also consider having a routine pedicure done. If her issues begin to worsen, she has been instructed to contact this office.  As she is considering undergoing a right knee arthroplasty, we discussed the possibility of it increasing or decreasing her ankle pain.    Greater than 20 minutes spent before coming during coming after evaluation for patient care.       No orders of the defined types were placed in this encounter.         Note is dictated utilizing voice recognition software. Unfortunately this leads to occasional typographical errors. I apologize in advance if the situation  occurs. If questions occur please do not hesitate to call our office.    Transcribed from ambient dictation for STEPHANIE Kelley DPM by Faviola Livingston.  12/05/22   13:45 EST    Patient or patient representative verbalized consent to the visit recording.  I have personally performed the services described in this document as transcribed by the above individual, and it is both accurate and complete.

## 2022-12-08 DIAGNOSIS — E03.9 HYPOTHYROIDISM, UNSPECIFIED TYPE: ICD-10-CM

## 2022-12-08 RX ORDER — LEVOTHYROXINE SODIUM 88 UG/1
TABLET ORAL
Qty: 90 TABLET | Refills: 3 | Status: SHIPPED | OUTPATIENT
Start: 2022-12-08

## 2022-12-09 RX ORDER — SPIRONOLACTONE 25 MG/1
TABLET ORAL
Qty: 45 TABLET | Refills: 4 | Status: SHIPPED | OUTPATIENT
Start: 2022-12-09 | End: 2023-02-10 | Stop reason: SDUPTHER

## 2022-12-12 NOTE — TELEPHONE ENCOUNTER
Caller: Rey Faby S    Relationship: Self    Best call back number: [phone]    Requested Prescriptions:   Requested Prescriptions     Pending Prescriptions Disp Refills   • rivaroxaban (XARELTO) 15 MG tablet 42 tablet 2     Sig: Take 1 tablet by mouth Daily With Dinner. Indications: Atrial Fibrillation        Pharmacy where request should be sent: [id number] - [place], [place] - [address] [facility] [phone] [facility] [phone] [facility]     Additional details provided by patient: PATIENT STATES SHE HAS 1 TABLET LEFT OF MEDICATION. PATIENT STATES THIS MEDICATION WAS PRESCRIBED BY ANOTHER PROVIDER     Does the patient have less than a 3 day supply:  [x] Yes  [] No     Would you like a call back once the refill request has been completed: [x] Yes [] No    If the office needs to give you a call back, can they leave a voicemail: [x] Yes [] No    Sofi George Rep   12/12/22 14:08 EST

## 2022-12-12 NOTE — TELEPHONE ENCOUNTER
Rx Refill Note  Requested Prescriptions     Pending Prescriptions Disp Refills   • rivaroxaban (XARELTO) 15 MG tablet 42 tablet 2     Sig: Take 1 tablet by mouth Daily With Dinner. Indications: Atrial Fibrillation      Last office visit with prescribing clinician: 9/13/2022      Next office visit with prescribing clinician: 12/16/2022   3}  Tonia Landry  12/12/22, 14:32 EST

## 2022-12-15 NOTE — PATIENT INSTRUCTIONS
Health Maintenance Due   Topic Date Due    COVID-19 Vaccine (4 - Booster for Pfizer series) 02/23/2022    ANNUAL WELLNESS VISIT  11/02/2022    Patient to visit [employer] website ([email or web address] for information reguarding advanced directive and living will.  Advance Directive       Advance directives are legal documents that let you make choices ahead of time about your health care and medical treatment in case you become unable to communicate for yourself. Advance directives are a way for you to communicate your wishes to family, friends, and health care providers. This can help convey your decisions about end-of-life care if you become unable to communicate.  Discussing and writing advance directives should happen over time rather than all at once. Advance directives can be changed depending on your situation and what you want, even after you have signed the advance directives.  If you do not have an advance directive, some states assign family decision makers to act on your behalf based on how closely you are related to them. Each state has its own laws regarding advance directives. You may want to check with your health care provider, , or state representative about the laws in your state. There are different types of advance directives, such as:  Medical power of .  Living will.  Do not resuscitate (DNR) or do not attempt resuscitation (DNAR) order.  Health care proxy and medical power of   A health care proxy, also called a health care agent, is a person who is appointed to make medical decisions for you in cases in which you are unable to make the decisions yourself. Generally, people choose someone they know well and trust to represent their preferences. Make sure to ask this person for an agreement to act as your proxy. A proxy may have to exercise judgment in the event of a medical decision for which your wishes are not known.  A medical power of  is a  legal document that names your health care proxy. Depending on the laws in your state, after the document is written, it may also need to be:  Signed.  Notarized.  Dated.  Copied.  Witnessed.  Incorporated into your medical record.  You may also want to appoint someone to manage your financial affairs in a situation in which you are unable to do so. This is called a durable power of  for finances. It is a separate legal document from the durable power of  for health care. You may choose the same person or someone different from your health care proxy to act as your agent in financial matters.  If you do not appoint a proxy, or if there is a concern that the proxy is not acting in your best interests, a court-appointed guardian may be designated to act on your behalf.  Living will  A living will is a set of instructions documenting your wishes about medical care when you cannot express them yourself. Health care providers should keep a copy of your living will in your medical record. You may want to give a copy to family members or friends. To alert caregivers in case of an emergency, you can place a card in your wallet to let them know that you have a living will and where they can find it. A living will is used if you become:  Terminally ill.  Incapacitated.  Unable to communicate or make decisions.  Items to consider in your living will include:  The use or non-use of life-sustaining equipment, such as dialysis machines and breathing machines (ventilators).  A DNR or DNAR order, which is the instruction not to use cardiopulmonary resuscitation (CPR) if breathing or heartbeat stops.  The use or non-use of tube feeding.  Withholding of food and fluids.  Comfort (palliative) care when the goal becomes comfort rather than a cure.  Organ and tissue donation.  A living will does not give instructions for distributing your money and property if you should pass away. It is recommended that you seek the  advice of a  when writing a will. Decisions about taxes, beneficiaries, and asset distribution will be legally binding. This process can relieve your family and friends of any concerns surrounding disputes or questions that may come up about the distribution of your assets.  DNR or DNAR  A DNR or DNAR order is a request not to have CPR in the event that your heart stops beating or you stop breathing. If a DNR or DNAR order has not been made and shared, a health care provider will try to help any patient whose heart has stopped or who has stopped breathing. If you plan to have surgery, talk with your health care provider about how your DNR or DNAR order will be followed if problems occur.  Summary  Advance directives are the legal documents that allow you to make choices ahead of time about your health care and medical treatment in case you become unable to communicate for yourself.  The process of discussing and writing advance directives should happen over time. You can change the advance directives, even after you have signed them.  Advance directives include DNR or DNAR orders, living aguayo, and designating an agent as your medical power of .  This information is not intended to replace advice given to you by your health care provider. Make sure you discuss any questions you have with your health care provider.  Document Released: 03/26/2009 Document Revised: 11/06/2017 Document Reviewed: 11/06/2017  [employer] Patient Education © 2019 [employer]    Calorie Counting for Weight Loss  Calories are units of energy. Your body needs a certain amount of calories from food to keep you going throughout the day. When you eat more calories than your body needs, your body stores the extra calories as fat. When you eat fewer calories than your body needs, your body burns fat to get the energy it needs.  Calorie counting means keeping track of how many calories you eat and drink each day. Calorie  counting can be helpful if you need to lose weight. If you make sure to eat fewer calories than your body needs, you should lose weight. Ask your health care provider what a healthy weight is for you.  For calorie counting to work, you will need to eat the right number of calories in a day in order to lose a healthy amount of weight per week. A dietitian can help you determine how many calories you need in a day and will give you suggestions on how to reach your calorie goal.  A healthy amount of weight to lose per week is usually 1-2 lb (0.5-0.9 kg). This usually means that your daily calorie intake should be reduced by 500-750 calories.  Eating 1,200 - 1,500 calories per day can help most women lose weight.  Eating 1,500 - 1,800 calories per day can help most men lose weight.  What is my plan?  My goal is to have __________ calories per day.  If I have this many calories per day, I should lose around __________ pounds per week.  What do I need to know about calorie counting?  In order to meet your daily calorie goal, you will need to:  Find out how many calories are in each food you would like to eat. Try to do this before you eat.  Decide how much of the food you plan to eat.  Write down what you ate and how many calories it had. Doing this is called keeping a food log.  To successfully lose weight, it is important to balance calorie counting with a healthy lifestyle that includes regular activity. Aim for 150 minutes of moderate exercise (such as walking) or 75 minutes of vigorous exercise (such as running) each week.  Where do I find calorie information?      The number of calories in a food can be found on a Nutrition Facts label. If a food does not have a Nutrition Facts label, try to look up the calories online or ask your dietitian for help.  Remember that calories are listed per serving. If you choose to have more than one serving of a food, you will have to multiply the calories per serving by the amount  "of servings you plan to eat. For example, the label on a package of bread might say that a serving size is 1 slice and that there are 90 calories in a serving. If you eat 1 slice, you will have eaten 90 calories. If you eat 2 slices, you will have eaten 180 calories.  How do I keep a food log?  Immediately after each meal, record the following information in your food log:  What you ate. Don't forget to include toppings, sauces, and other extras on the food.  How much you ate. This can be measured in cups, ounces, or number of items.  How many calories each food and drink had.  The total number of calories in the meal.  Keep your food log near you, such as in a small notebook in your pocket, or use a mobile mallorie or website. Some programs will calculate calories for you and show you how many calories you have left for the day to meet your goal.  What are some calorie counting tips?      Use your calories on foods and drinks that will fill you up and not leave you hungry:  Some examples of foods that fill you up are nuts and nut butters, vegetables, lean proteins, and high-fiber foods like whole grains. High-fiber foods are foods with more than 5 g fiber per serving.  Drinks such as sodas, specialty coffee drinks, alcohol, and juices have a lot of calories, yet do not fill you up.  Eat nutritious foods and avoid empty calories. Empty calories are calories you get from foods or beverages that do not have many vitamins or protein, such as candy, sweets, and soda. It is better to have a nutritious high-calorie food (such as an avocado) than a food with few nutrients (such as a bag of chips).  Know how many calories are in the foods you eat most often. This will help you calculate calorie counts faster.  Pay attention to calories in drinks. Low-calorie drinks include water and unsweetened drinks.  Pay attention to nutrition labels for \"low fat\" or \"fat free\" foods. These foods sometimes have the same amount of calories or " more calories than the full fat versions. They also often have added sugar, starch, or salt, to make up for flavor that was removed with the fat.  Find a way of tracking calories that works for you. Get creative. Try different apps or programs if writing down calories does not work for you.  What are some portion control tips?  Know how many calories are in a serving. This will help you know how many servings of a certain food you can have.  Use a measuring cup to measure serving sizes. You could also try weighing out portions on a kitchen scale. With time, you will be able to estimate serving sizes for some foods.  Take some time to put servings of different foods on your favorite plates, bowls, and cups so you know what a serving looks like.  Try not to eat straight from a bag or box. Doing this can lead to overeating. Put the amount you would like to eat in a cup or on a plate to make sure you are eating the right portion.  Use smaller plates, glasses, and bowls to prevent overeating.  Try not to multitask (for example, watch TV or use your computer) while eating. If it is time to eat, sit down at a table and enjoy your food. This will help you to know when you are full. It will also help you to be aware of what you are eating and how much you are eating.  What are tips for following this plan?  Reading food labels  Check the calorie count compared to the serving size. The serving size may be smaller than what you are used to eating.  Check the source of the calories. Make sure the food you are eating is high in vitamins and protein and low in saturated and trans fats.  Shopping  Read nutrition labels while you shop. This will help you make healthy decisions before you decide to purchase your food.  Make a grocery list and stick to it.  Cooking  Try to cook your favorite foods in a healthier way. For example, try baking instead of frying.  Use low-fat dairy products.  Meal planning  Use more fruits and  "vegetables. Half of your plate should be fruits and vegetables.  Include lean proteins like poultry and fish.  How do I count calories when eating out?  Ask for smaller portion sizes.  Consider sharing an entree and sides instead of getting your own entree.  If you get your own entree, eat only half. Ask for a box at the beginning of your meal and put the rest of your entree in it so you are not tempted to eat it.  If calories are listed on the menu, choose the lower calorie options.  Choose dishes that include vegetables, fruits, whole grains, low-fat dairy products, and lean protein.  Choose items that are boiled, broiled, grilled, or steamed. Stay away from items that are buttered, battered, fried, or served with cream sauce. Items labeled \"crispy\" are usually fried, unless stated otherwise.  Choose water, low-fat milk, unsweetened iced tea, or other drinks without added sugar. If you want an alcoholic beverage, choose a lower calorie option such as a glass of wine or light beer.  Ask for dressings, sauces, and syrups on the side. These are usually high in calories, so you should limit the amount you eat.  If you want a salad, choose a garden salad and ask for grilled meats. Avoid extra toppings like carver, cheese, or fried items. Ask for the dressing on the side, or ask for olive oil and vinegar or lemon to use as dressing.  Estimate how many servings of a food you are given. For example, a serving of cooked rice is ½ cup or about the size of half a baseball. Knowing serving sizes will help you be aware of how much food you are eating at restaurants. The list below tells you how big or small some common portion sizes are based on everyday objects:  1 oz--4 stacked dice.  3 oz--1 deck of cards.  1 tsp--1 die.  1 Tbsp--½ a ping-pong ball.  2 Tbsp--1 ping-pong ball.  ½ cup--½ baseball.  1 cup--1 baseball.  Summary  Calorie counting means keeping track of how many calories you eat and drink each day. If you eat " fewer calories than your body needs, you should lose weight.  A healthy amount of weight to lose per week is usually 1-2 lb (0.5-0.9 kg). This usually means reducing your daily calorie intake by 500-750 calories.  The number of calories in a food can be found on a Nutrition Facts label. If a food does not have a Nutrition Facts label, try to look up the calories online or ask your dietitian for help.  Use your calories on foods and drinks that will fill you up, and not on foods and drinks that will leave you hungry.  Use smaller plates, glasses, and bowls to prevent overeating.  This information is not intended to replace advice given to you by your health care provider. Make sure you discuss any questions you have with your health care provider.  Document Released: 12/18/2006 Document Revised: 09/06/2019 Document Reviewed: 11/17/2017  [employer] Patient Education © 2019 [employer]       Patient to ask Dr. Matos if needs to continue PAPS    Patient to call Dr. Preciado and get in to see him after Holter    [place] and wall walk exercises with 10 minutes ice 4X/day      Cut Metoprolol 25 in half and take nightly.  mONITOR PULSE AND bp AT HOME AND TAKE TO VISIT WITH dR. pEN

## 2022-12-15 NOTE — PROGRESS NOTES
The ABCs of the Annual Wellness Visit  Subsequent Medicare Wellness Visit    Subjective    History of Present Illness:  Faby Le is a 77 y.o. female who presents for a Subsequent Medicare Wellness Visit.    The following portions of the patient's history were reviewed and   updated as appropriate: allergies, current medications, past family history, past medical history, past social history, past surgical history and problem list.    Compared to one year ago, the patient feels her physical   health is the same.    Compared to one year ago, the patient feels her mental   health is the same.    Recent Hospitalizations:  This patient has had a [facility] admission record on file within the last 365 days.    Current Medical Providers:  Patient Care Team:  Leonor Adam MD as PCP - General  Maggi Horn MD as Consulting Physician (Pain Medicine)  Ratna Zavala MD as Consulting Physician (Cardiology)  Nora Foley APRN as Nurse Practitioner (Gastroenterology)  Gianluca Walton MD as Consulting Physician (Pulmonary Disease)  Nathanael Murillo MD as Consulting Physician (Allergy and Immunology)  Delfin Thorpe MD as Surgeon (Orthopedic Surgery)  Josh Loomis MD as Consulting Physician (Gastroenterology)    Outpatient Medications Prior to Visit   Medication Sig Dispense Refill   • allopurinol (ZYLOPRIM) 300 MG tablet TAKE 1 TABLET BY MOUTH EVERY DAY 90 tablet 2   • amLODIPine (NORVASC) 5 MG tablet TAKE 1 TABLET BY MOUTH EVERY DAY 90 tablet 3   • atorvastatin (LIPITOR) 20 MG tablet TAKE 1 TABLET BY MOUTH EVERYDAY AT BEDTIME 90 tablet 3   • Azelastine HCl 137 MCG/SPRAY solution SPRAY 2 SPRAYS (274 MCG) IN EACH NOSTRIL BY INTRANASAL ROUTE 2 TIMES PER DAY FOR 30 DAYS     • bumetanide (BUMEX) 2 MG tablet TAKE 1/2 TABLET BY MOUTH EVERY DAY 45 tablet 2   • cetirizine (zyrTEC) 10 MG tablet Take 1 tablet by mouth Daily.     • chlorhexidine (PERIDEX) 0.12 % solution SWISH 1/2 OZ  FOR 30 SECONDS ONE TIME A DAY THEN SPIT     • cyanocobalamin (VITAMIN B-12) 1000 MCG tablet Take 1 tablet by mouth Daily.     • escitalopram (LEXAPRO) 20 MG tablet TAKE 1 TABLET BY MOUTH EVERY DAY 90 tablet 2   • levothyroxine (SYNTHROID, LEVOTHROID) 88 MCG tablet TAKE 1 TABLET BY MOUTH EVERY DAY 90 tablet 3   • losartan (COZAAR) 25 MG tablet      • metoprolol succinate XL (TOPROL-XL) 25 MG 24 hr tablet Take 1 tablet by mouth Every Night. 30 tablet 0   • montelukast (SINGULAIR) 10 MG tablet Take 10 mg by mouth Daily.     • Multiple Vitamins-Minerals (CENTRUM SILVER ADULT 50+) tablet Take 1 tablet by mouth Daily.     • rivaroxaban (XARELTO) 15 MG tablet Take 1 tablet by mouth Daily With Dinner. Indications: Atrial Fibrillation 90 tablet 2   • sorbitol 70 % solution solution See Admin Instructions.     • spironolactone (ALDACTONE) 25 MG tablet TAKE 1/2 TABLET BY MOUTH EVERY DAY 45 tablet 4   • Vitamin D, Cholecalciferol, 25 MCG (1000 UT) tablet Take 1 tablet by mouth Daily.       No facility-administered medications prior to visit.       No opioid medication identified on active medication list. I have reviewed chart for other potential  high risk medication/s and harmful drug interactions in the elderly.          Aspirin is not on active medication list.  Aspirin use is not indicated based on review of current medical condition/s. Risk of harm outweighs potential benefits.  .  Patient Active Problem List   Diagnosis   • Abnormal complete blood count   • Ankle pain   • Arthralgia of left elbow   • Arthritis   • B12 deficiency   • Coronary artery disease   • Depression   • Gallstones   • Gout   • Hyperglycemia   • Hyperlipidemia   • Hypertension   • Hypothyroidism   • Low back pain   • Mobility poor   • Postmenopausal status   • Sleep apnea   • Vitamin D deficiency   • Chronic kidney disease, stage 3 (moderate)   • Malignant neoplasm of endometrium (HCC)   • Oropharyngeal dysphagia   • Class 3 severe obesity due to  "excess calories with serious comorbidity and body mass index (BMI) of 45.0 to 49.9 in adult (HCC)   • Chronic pain of right knee   • Light headedness   • Paroxysmal atrial fibrillation (HCC)   • Atrial fibrillation, unspecified type (HCC)   • Abnormal results of cardiovascular function studies   • Chronic left shoulder pain     Advance Care Planning  Advance Directive is not on file.  ACP discussion was held with the patient during this visit. Patient does not have an advance directive, information provided.     Objective    Vitals:    12/16/22 0827 12/16/22 0832 12/16/22 0833   BP: 145/74 149/76 132/74   BP Location: Right arm Left arm Left arm   Patient Position: Sitting Sitting Standing   Cuff Size: Adult Adult Adult   Pulse: 61     Temp: 96.4 °F (35.8 °C)     TempSrc: Temporal     SpO2: 99%     Weight: 121 kg (266 lb 12.8 oz)     Height: 157.5 cm (62\")       Estimated body mass index is 48.8 kg/m² as calculated from the following:    Height as of this encounter: 157.5 cm (62\").    Weight as of this encounter: 121 kg (266 lb 12.8 oz).    Class 3 Severe Obesity (BMI >=40). Obesity-related health conditions include the following: hypertension, dyslipidemias and osteoarthritis. Obesity is unchanged. BMI is is above average; BMI management plan is completed. We discussed portion control and increasing exercise.      Does the patient have evidence of cognitive impairment? No          HEALTH RISK ASSESSMENT    Smoking Status:  Social History     Tobacco Use   Smoking Status Never   Smokeless Tobacco Never     Alcohol Consumption:  Social History     Substance and Sexual Activity   Alcohol Use No     Fall Risk Screen:    STEADI Fall Risk Assessment was completed, and patient is at LOW risk for falls.Assessment completed on:12/16/2022    Depression Screening:  PHQ-2/PHQ-9 Depression Screening 12/16/2022   Retired PHQ-9 Total Score -   Retired Total Score -   Little Interest or Pleasure in Doing Things 0-->not at all "   Feeling Down, Depressed or Hopeless 0-->not at all   PHQ-9: Brief Depression Severity Measure Score 0       Health Habits and Functional and Cognitive Screening:  Functional & Cognitive Status 12/16/2022   Do you have difficulty preparing food and eating? No   Do you have difficulty bathing yourself, getting dressed or grooming yourself? No   Do you have difficulty using the toilet? No   Do you have difficulty moving around from place to place? No   Do you have trouble with steps or getting out of a bed or a chair? Yes   Current Diet Well Balanced Diet   Dental Exam Not up to date   Eye Exam Not up to date   Exercise (times per week) 0 times per week   Current Exercises Include No Regular Exercise   Current Exercise Activities Include -   Do you need help using the phone?  No   Are you deaf or do you have serious difficulty hearing?  No   Do you need help with transportation? No   Do you need help shopping? No   Do you need help preparing meals?  No   Do you need help with housework?  No   Do you need help with laundry? No   Do you need help taking your medications? No   Do you need help managing money? No   Do you ever drive or ride in a car without wearing a seat belt? Yes   Have you felt unusual stress, anger or loneliness in the last month? No   Who do you live with? Alone   If you need help, do you have trouble finding someone available to you? Yes   Have you been bothered in the last four weeks by sexual problems? No   Do you have difficulty concentrating, remembering or making decisions? No       Age-appropriate Screening Schedule:  Refer to the list below for future screening recommendations based on patient's age, sex and/or medical conditions. Orders for these recommended tests are listed in the plan section. The patient has been provided with a written plan.    Health Maintenance   Topic Date Due   • MAMMOGRAM  12/16/2022   • DXA SCAN  06/14/2023   • LIPID PANEL  08/14/2023   • TDAP/TD VACCINES (2 - Td  or Tdap) 01/23/2028   • INFLUENZA VACCINE  Completed   • ZOSTER VACCINE  Completed                CMS Preventative Services Quick Reference  Risk Factors Identified During Encounter  None Identified  The above risks/problems have been discussed with the patient.  Follow up actions/plans if indicated are seen below in the Assessment/Plan Section.  Pertinent information has been shared with the patient in the After Visit Summary.  An After Visit Summary and PPPS were made available to the patient.    Follow Up:   Next Medicare Wellness visit to be scheduled in 1 year.         Additional E&M Note during same encounter follows:  Patient has multiple medical problems which are significant and separately identifiable that require additional work above and beyond the Medicare Wellness Visit.        Chief Complaint  Medicare Wellness-subsequent (No concerns/Patient is fasting)  Patient is also here to check on multiple chronic health conditions.  She states that she is having decreased range of motion and pain in her left shoulder she depends upon her left arm to hold her cane to support her self and this is becoming quite sore to do things like brush her hair we will refer her to orthopedist and start her in [place] exercises with ice 10 minutes 4 times a day finally she admits that 2 weeks ago she was driving and pulled into the grocery parking lot and got a couple minutes of dizziness that went away after 2 minutes of leaning her head back.  She did not recall any headache chest pain weakness numbness or tingling has never had an episode like this before and has been recently diagnosed with atrial fibs.  EKG will be obtained today and then we will follow-up with Holter monitor carotid Doppler and CT of the head.  She will follow-up then with her cardiologist over the next couple of weeks if she notices any more of these spells she will call 911 or get to [facility].  Patient is also here for an age-specific physical and has  "been advised to wear sunscreen and a seatbelt.    Subjective        HPI  Faby Le also presents   Chief Complaint   Patient presents with   • Medicare Wellness-subsequent     No concerns  Patient is fasting   .    Review of Systems   Cardiovascular: Negative for chest pain and palpitations.   Musculoskeletal: Positive for arthralgias.   Neurological: Positive for dizziness and light-headedness.       Objective   Vital Signs:  /74 (BP Location: Left arm, Patient Position: Standing, Cuff Size: Adult)   Pulse 61   Temp 96.4 °F (35.8 °C) (Temporal)   Ht 157.5 cm (62\")   Wt 121 kg (266 lb 12.8 oz)   SpO2 99%   BMI 48.80 kg/m²     Physical Exam  Vitals reviewed.   Constitutional:       General: She is not in acute distress.     Appearance: She is well-developed. She is obese. She is not ill-appearing or toxic-appearing.   HENT:      Head: Normocephalic and atraumatic.      Right Ear: Tympanic membrane, ear canal and external ear normal.      Left Ear: Tympanic membrane, ear canal and external ear normal.      Nose: Nose normal.      Mouth/Throat:      Mouth: Mucous membranes are moist.      Pharynx: No posterior oropharyngeal erythema.   Eyes:      Extraocular Movements: Extraocular movements intact.      Conjunctiva/sclera: Conjunctivae normal.      Pupils: Pupils are equal, round, and reactive to light.   Cardiovascular:      Rate and Rhythm: Normal rate and regular rhythm.      Heart sounds: Normal heart sounds.   Pulmonary:      Effort: Pulmonary effort is normal.      Breath sounds: Normal breath sounds.   Abdominal:      General: Bowel sounds are normal. There is no distension.      Palpations: Abdomen is soft. There is no mass.      Tenderness: There is no abdominal tenderness.   Musculoskeletal:         General: Tenderness present.      Cervical back: Neck supple.   Skin:     General: Skin is warm.   Neurological:      General: No focal deficit present.      Mental Status: She is alert and oriented " to person, place, and time.   Psychiatric:         Mood and Affect: Mood normal.         Behavior: Behavior normal.                         Assessment and Plan   Diagnoses and all orders for this visit:    1. Encounter for annual general medical examination with abnormal findings in adult (Primary)    2. B12 deficiency  -     CBC Auto Differential; Future  -     Vitamin B12; Future  -     CBC Auto Differential  -     Vitamin B12    3. Coronary artery disease involving native heart without angina pectoris, unspecified vessel or lesion type  Comments:  No pain-one dizzy spell-see below    4. Depression, unspecified depression type  Comments:  Controlled    5. Chronic gout without tophus, unspecified cause, unspecified site  Comments:  No inflammatory arthritis or kidney stones  Orders:  -     Uric Acid; Future  -     Uric Acid    6. Hyperglycemia  -     Comprehensive Metabolic Panel; Future  -     Hemoglobin A1c; Future  -     Comprehensive Metabolic Panel  -     Hemoglobin A1c    7. Mixed hyperlipidemia  -     Lipid Panel; Future  -     Lipid Panel    8. Primary hypertension  Comments:  fair control.    9. Hypothyroidism, unspecified type  Comments:  Increase in hair loss and dry skin  Orders:  -     TSH; Future  -     TSH    10. Malignant neoplasm of endometrium (HCC)  Comments:  To see Dr. Martinez 1/23 [facility] final check    11. Oropharyngeal dysphagia  Comments:  Good    12. Paroxysmal atrial fibrillation (HCC)  -     Magnesium; Future  -     Magnesium    13. Chronic left shoulder pain  -     Ambulatory Referral to Orthopedic Surgery    14. Dizziness  -     CT Head Without Contrast; Future  -     Duplex Carotid Ultrasound CAR; Future  -     Holter Monitor - 72 Hour Up To 15 Days; Future  -     ECG 12 Lead    15. Class 3 severe obesity due to excess calories with serious comorbidity and body mass index (BMI) of 45.0 to 49.9 in adult (HCC)             Follow Up   Return in about 3 months (around 3/16/2023).  Patient  was given instructions and counseling regarding her condition or for health maintenance advice. Please see specific information pulled into the AVS if appropriate.

## 2022-12-16 ENCOUNTER — OFFICE VISIT (OUTPATIENT)
Dept: FAMILY MEDICINE CLINIC | Facility: CLINIC | Age: 77
End: 2022-12-16

## 2022-12-16 VITALS
SYSTOLIC BLOOD PRESSURE: 132 MMHG | TEMPERATURE: 96.4 F | BODY MASS INDEX: 49.1 KG/M2 | DIASTOLIC BLOOD PRESSURE: 74 MMHG | HEART RATE: 61 BPM | HEIGHT: 62 IN | OXYGEN SATURATION: 99 % | WEIGHT: 266.8 LBS

## 2022-12-16 DIAGNOSIS — E03.9 HYPOTHYROIDISM, UNSPECIFIED TYPE: ICD-10-CM

## 2022-12-16 DIAGNOSIS — I10 PRIMARY HYPERTENSION: ICD-10-CM

## 2022-12-16 DIAGNOSIS — R73.9 HYPERGLYCEMIA: ICD-10-CM

## 2022-12-16 DIAGNOSIS — I48.0 PAROXYSMAL ATRIAL FIBRILLATION: ICD-10-CM

## 2022-12-16 DIAGNOSIS — M25.512 CHRONIC LEFT SHOULDER PAIN: ICD-10-CM

## 2022-12-16 DIAGNOSIS — G89.29 CHRONIC LEFT SHOULDER PAIN: ICD-10-CM

## 2022-12-16 DIAGNOSIS — C54.1 MALIGNANT NEOPLASM OF ENDOMETRIUM: ICD-10-CM

## 2022-12-16 DIAGNOSIS — M1A.9XX0 CHRONIC GOUT WITHOUT TOPHUS, UNSPECIFIED CAUSE, UNSPECIFIED SITE: ICD-10-CM

## 2022-12-16 DIAGNOSIS — R13.12 OROPHARYNGEAL DYSPHAGIA: ICD-10-CM

## 2022-12-16 DIAGNOSIS — Z00.01 ENCOUNTER FOR ANNUAL GENERAL MEDICAL EXAMINATION WITH ABNORMAL FINDINGS IN ADULT: Primary | ICD-10-CM

## 2022-12-16 DIAGNOSIS — E66.01 CLASS 3 SEVERE OBESITY DUE TO EXCESS CALORIES WITH SERIOUS COMORBIDITY AND BODY MASS INDEX (BMI) OF 45.0 TO 49.9 IN ADULT: ICD-10-CM

## 2022-12-16 DIAGNOSIS — E78.2 MIXED HYPERLIPIDEMIA: ICD-10-CM

## 2022-12-16 DIAGNOSIS — R42 DIZZINESS: ICD-10-CM

## 2022-12-16 DIAGNOSIS — F32.A DEPRESSION, UNSPECIFIED DEPRESSION TYPE: ICD-10-CM

## 2022-12-16 DIAGNOSIS — I25.10 CORONARY ARTERY DISEASE INVOLVING NATIVE HEART WITHOUT ANGINA PECTORIS, UNSPECIFIED VESSEL OR LESION TYPE: ICD-10-CM

## 2022-12-16 DIAGNOSIS — E53.8 B12 DEFICIENCY: ICD-10-CM

## 2022-12-16 LAB
ALBUMIN SERPL-MCNC: 4.3 G/DL (ref 3.5–5.2)
ALBUMIN/GLOB SERPL: 1.3 G/DL
ALP SERPL-CCNC: 167 U/L (ref 39–117)
ALT SERPL W P-5'-P-CCNC: 18 U/L (ref 1–33)
ANION GAP SERPL CALCULATED.3IONS-SCNC: 7.1 MMOL/L (ref 5–15)
AST SERPL-CCNC: 19 U/L (ref 1–32)
BASOPHILS # BLD AUTO: 0.08 10*3/MM3 (ref 0–0.2)
BASOPHILS NFR BLD AUTO: 1.2 % (ref 0–1.5)
BILIRUB SERPL-MCNC: 0.6 MG/DL (ref 0–1.2)
BUN SERPL-MCNC: 29 MG/DL (ref 8–23)
BUN/CREAT SERPL: 25.2 (ref 7–25)
CALCIUM SPEC-SCNC: 10.2 MG/DL (ref 8.6–10.5)
CHLORIDE SERPL-SCNC: 100 MMOL/L (ref 98–107)
CHOLEST SERPL-MCNC: 130 MG/DL (ref 0–200)
CO2 SERPL-SCNC: 28.9 MMOL/L (ref 22–29)
CREAT SERPL-MCNC: 1.15 MG/DL (ref 0.57–1)
DEPRECATED RDW RBC AUTO: 44.7 FL (ref 37–54)
EGFRCR SERPLBLD CKD-EPI 2021: 49.8 ML/MIN/1.73
EOSINOPHIL # BLD AUTO: 0.22 10*3/MM3 (ref 0–0.4)
EOSINOPHIL NFR BLD AUTO: 3.4 % (ref 0.3–6.2)
ERYTHROCYTE [DISTWIDTH] IN BLOOD BY AUTOMATED COUNT: 12.7 % (ref 12.3–15.4)
GLOBULIN UR ELPH-MCNC: 3.2 GM/DL
GLUCOSE SERPL-MCNC: 103 MG/DL (ref 65–99)
HBA1C MFR BLD: 5.5 % (ref 3.5–5.6)
HCT VFR BLD AUTO: 35 % (ref 34–46.6)
HDLC SERPL-MCNC: 59 MG/DL (ref 40–60)
HGB BLD-MCNC: 11.8 G/DL (ref 12–15.9)
IMM GRANULOCYTES # BLD AUTO: 0.02 10*3/MM3 (ref 0–0.05)
IMM GRANULOCYTES NFR BLD AUTO: 0.3 % (ref 0–0.5)
LDLC SERPL CALC-MCNC: 52 MG/DL (ref 0–100)
LDLC/HDLC SERPL: 0.85 {RATIO}
LYMPHOCYTES # BLD AUTO: 2.18 10*3/MM3 (ref 0.7–3.1)
LYMPHOCYTES NFR BLD AUTO: 33.5 % (ref 19.6–45.3)
MAGNESIUM SERPL-MCNC: 1.9 MG/DL (ref 1.6–2.4)
MCH RBC QN AUTO: 32.1 PG (ref 26.6–33)
MCHC RBC AUTO-ENTMCNC: 33.7 G/DL (ref 31.5–35.7)
MCV RBC AUTO: 95.1 FL (ref 79–97)
MONOCYTES # BLD AUTO: 0.53 10*3/MM3 (ref 0.1–0.9)
MONOCYTES NFR BLD AUTO: 8.2 % (ref 5–12)
NEUTROPHILS NFR BLD AUTO: 3.47 10*3/MM3 (ref 1.7–7)
NEUTROPHILS NFR BLD AUTO: 53.4 % (ref 42.7–76)
NRBC BLD AUTO-RTO: 0 /100 WBC (ref 0–0.2)
PLATELET # BLD AUTO: 240 10*3/MM3 (ref 140–450)
PMV BLD AUTO: 10.3 FL (ref 6–12)
POTASSIUM SERPL-SCNC: 4.1 MMOL/L (ref 3.5–5.2)
PROT SERPL-MCNC: 7.5 G/DL (ref 6–8.5)
RBC # BLD AUTO: 3.68 10*6/MM3 (ref 3.77–5.28)
SODIUM SERPL-SCNC: 136 MMOL/L (ref 136–145)
TRIGL SERPL-MCNC: 104 MG/DL (ref 0–150)
TSH SERPL DL<=0.05 MIU/L-ACNC: 1.3 UIU/ML (ref 0.27–4.2)
URATE SERPL-MCNC: 4.1 MG/DL (ref 2.4–5.7)
VIT B12 BLD-MCNC: 916 PG/ML (ref 211–946)
VLDLC SERPL-MCNC: 19 MG/DL (ref 5–40)
WBC NRBC COR # BLD: 6.5 10*3/MM3 (ref 3.4–10.8)

## 2022-12-16 PROCEDURE — 84443 ASSAY THYROID STIM HORMONE: CPT | Performed by: PREVENTIVE MEDICINE

## 2022-12-16 PROCEDURE — 80053 COMPREHEN METABOLIC PANEL: CPT | Performed by: PREVENTIVE MEDICINE

## 2022-12-16 PROCEDURE — 1170F FXNL STATUS ASSESSED: CPT | Performed by: PREVENTIVE MEDICINE

## 2022-12-16 PROCEDURE — 82607 VITAMIN B-12: CPT | Performed by: PREVENTIVE MEDICINE

## 2022-12-16 PROCEDURE — 85025 COMPLETE CBC W/AUTO DIFF WBC: CPT | Performed by: PREVENTIVE MEDICINE

## 2022-12-16 PROCEDURE — 1159F MED LIST DOCD IN RCRD: CPT | Performed by: PREVENTIVE MEDICINE

## 2022-12-16 PROCEDURE — 83036 HEMOGLOBIN GLYCOSYLATED A1C: CPT | Performed by: PREVENTIVE MEDICINE

## 2022-12-16 PROCEDURE — 80061 LIPID PANEL: CPT | Performed by: PREVENTIVE MEDICINE

## 2022-12-16 PROCEDURE — 99397 PER PM REEVAL EST PAT 65+ YR: CPT | Performed by: PREVENTIVE MEDICINE

## 2022-12-16 PROCEDURE — 36415 COLL VENOUS BLD VENIPUNCTURE: CPT | Performed by: PREVENTIVE MEDICINE

## 2022-12-16 PROCEDURE — 83735 ASSAY OF MAGNESIUM: CPT | Performed by: PREVENTIVE MEDICINE

## 2022-12-16 PROCEDURE — 99213 OFFICE O/P EST LOW 20 MIN: CPT | Performed by: PREVENTIVE MEDICINE

## 2022-12-16 PROCEDURE — 84550 ASSAY OF BLOOD/URIC ACID: CPT | Performed by: PREVENTIVE MEDICINE

## 2022-12-16 PROCEDURE — 93000 ELECTROCARDIOGRAM COMPLETE: CPT | Performed by: PREVENTIVE MEDICINE

## 2022-12-16 PROCEDURE — G0439 PPPS, SUBSEQ VISIT: HCPCS | Performed by: PREVENTIVE MEDICINE

## 2022-12-16 NOTE — PROGRESS NOTES
Venipuncture Blood Specimen Collection  Venipuncture performed in left arm by Jennifer Vaughn MA with good hemostasis. Patient tolerated the procedure well without complications.     EKG Performed and results provided to ordering provider.   12/16/22   Jennifer Vaughn MA

## 2022-12-16 NOTE — PROGRESS NOTES
Procedure     ECG 12 Lead    Date/Time: 12/16/2022 9:07 AM  Performed by: Leonor Adam MD  Authorized by: Leonor Adam MD   Comparison: compared with previous ECG from 9/28/2022  Rhythm: sinus rhythm  QRS axis: left    Clinical impression: non-specific ECG

## 2022-12-17 NOTE — PROGRESS NOTES
Anemia is im[proving.  A1C is 5.5 and glucose 103 which is improved so continue to watch carbs.  Kidney function still decreased but improved so avoid ibuprofen, Aleve and Motrin and limit xray dyes.

## 2022-12-19 ENCOUNTER — TELEPHONE (OUTPATIENT)
Dept: FAMILY MEDICINE CLINIC | Facility: CLINIC | Age: 77
End: 2022-12-19

## 2022-12-19 NOTE — TELEPHONE ENCOUNTER
HUB TO READ:  ----- Message from Leonor Adam MD sent at 12/17/2022 10:02 AM EST -----  Anemia is im[proving.  A1C is 5.5 and glucose 103 which is improved so continue to watch carbs.  Kidney function still decreased but improved so avoid ibuprofen, Aleve and Motrin and limit xray dyes.

## 2022-12-21 ENCOUNTER — OFFICE VISIT (OUTPATIENT)
Dept: ORTHOPEDIC SURGERY | Facility: CLINIC | Age: 77
End: 2022-12-21

## 2022-12-21 VITALS — BODY MASS INDEX: 48.95 KG/M2 | HEART RATE: 81 BPM | HEIGHT: 62 IN | WEIGHT: 266 LBS | OXYGEN SATURATION: 98 %

## 2022-12-21 DIAGNOSIS — G89.29 CHRONIC LEFT SHOULDER PAIN: Primary | ICD-10-CM

## 2022-12-21 DIAGNOSIS — M25.512 CHRONIC LEFT SHOULDER PAIN: Primary | ICD-10-CM

## 2022-12-21 PROCEDURE — 20611 DRAIN/INJ JOINT/BURSA W/US: CPT | Performed by: FAMILY MEDICINE

## 2022-12-21 PROCEDURE — 99213 OFFICE O/P EST LOW 20 MIN: CPT | Performed by: FAMILY MEDICINE

## 2022-12-21 RX ADMIN — TRIAMCINOLONE ACETONIDE 80 MG: 40 INJECTION, SUSPENSION INTRA-ARTICULAR; INTRAMUSCULAR at 15:42

## 2022-12-21 NOTE — PROGRESS NOTES
Primary Care Sports Medicine Office Visit Note     Patient ID: Faby Le is a 77 y.o. female.    Chief Complaint:  Chief Complaint   Patient presents with   • Left Shoulder - Pain, Initial Evaluation     HPI:    Ms. Faby Le is a 77 y.o. female. The patient presents to the clinic today for left shoulder evaluation. She is accompanied by her daughter.    The patient reports that she is experiencing left shoulder stiffness and a limited range of motion. She adds that she is unable to brush her hair. She states that her shoulder grinds and is painful. She denies experiencing any previous injury to her left shoulder. She reports that she has used BenGay and it helped slightly. She states that she is not experiencing any issues with her bowels or dyspnea. She denies being ill or having a fever. She states that she received a left knee replacement, by Dr. Thorpe in 2008, and is in need of a right knee replacement currently. She adds that Dr. Thorpe advised her that he would not perform her right knee replacement due to her being overweight. She reports that she has previously received gel injections and they were not very helpful.     She states that she was recently diagnosed with atrial fibrillation.    Past Medical History:   Diagnosis Date   • Ankle pain    • Arthritis    • Cancer (HCC)     uterine, had hysterectomy 2018   • Coronary artery disease    • Depression    • Gout    • Hyperglycemia    • Hyperlipidemia    • Low back pain    • Morbid obesity (HCC)    • Sleep apnea    • Vitamin D deficiency        Past Surgical History:   Procedure Laterality Date   • ANKLE FUSION      2017-left   • CARDIAC CATHETERIZATION     • CARDIAC CATHETERIZATION Right 8/29/2022    Procedure: Left Heart Cath;  Surgeon: Ratna Zavala MD;  Location: [facility] CATH INVASIVE LOC[facility];  Service: Cardiovascular;  Laterality: Right;   • CARPAL TUNNEL RELEASE     • CATARACT EXTRACTION     • CUBITAL TUNNEL RELEASE     • HYSTERECTOMY    "  • REPLACEMENT TOTAL KNEE      left 2008   • ROTATOR CUFF REPAIR         Family History   Problem Relation Age of Onset   • Hypertension Mother    • Stroke Father    • Arthritis Brother    • Cancer Brother      Social History     Occupational History   • Not on file   Tobacco Use   • Smoking status: Never   • Smokeless tobacco: Never   Vaping Use   • Vaping Use: Never used   Substance and Sexual Activity   • Alcohol use: No   • Drug use: No   • Sexual activity: Not Currently      Review of Systems   Constitutional: Negative for activity change and fever.   Respiratory: Negative for cough and shortness of breath.    Cardiovascular: Negative for chest pain.   Gastrointestinal: Negative for constipation, diarrhea, nausea and vomiting.   Musculoskeletal: Positive for arthralgias.   Skin: Negative for color change and rash.   Neurological: Negative for weakness.   Hematological: Does not bruise/bleed easily.     Objective:    Pulse 81   Ht 157.5 cm (62\")   Wt 121 kg (266 lb)   LMP  (LMP Unknown)   SpO2 98%   BMI 48.65 kg/m²     Physical Examination:  Physical Exam  Vitals and nursing note reviewed.   Constitutional:       General: She is not in acute distress.     Appearance: She is well-developed. She is not diaphoretic.   HENT:      Head: Normocephalic and atraumatic.   Eyes:      Conjunctiva/sclera: Conjunctivae normal.   Pulmonary:      Effort: Pulmonary effort is normal. No respiratory distress.   Skin:     General: Skin is warm.      Capillary Refill: Capillary refill takes less than 2 seconds.   Neurological:      Mental Status: She is alert.       Left Shoulder Exam     Comments:   Left shoulder examination reveals moderately decreased range of motion actively to about 110 degrees lateral, 90 degrees of forward flexion. Emily test is frankly positive. Resisted external rotation and belly press tests are negative. Speed's and Yergason's are negative. There is cogwheel crepitus with passive range of motion " greater than 90 degrees of forward flexion, lateral abduction, both of which are painful.        Imaging and other tests:  Three view x-ray of the left shoulder today reveals severe joint space loss and osteoarthritis of the left shoulder. There is also loss rotator cuff interval superiorly with approximation of the acromion and the humeral head.    Assessment and Plan:    1. Chronic left shoulder pain  - XR Shoulder 2+ View Left    2. Primary osteoarthritis of the left shoulder    3. Complete supraspinatus tear of the left shoulder    I discussed the chronicity of her pathology with the patient and her daughter today. She has considerable pain and we discussed that likely with any or all treatment options, her range of motion will not improve due to bony limitation. With her amount of pain, she did elect to proceed with corticosteroid injection today. We did this under ultrasound guidance, and she tolerated it well without complaint or problems. I would like to see her back in 3 months for repeat evaluation, and could always consider repeat injection at that time if warranted.    Transcribed from ambient dictation for Woody Aranda II, DO by Laine Guzman.  12/21/22   13:05 EST    Patient or patient representative verbalized consent to the visit recording.  I have personally performed the services described in this document as transcribed by the above individual, and it is both accurate and complete.    Disclaimer: Please note that areas of this note were completed with computer voice recognition software.  Quite often unanticipated grammatical, syntax, homophones, and other interpretive errors are inadvertently transcribed by the computer software. Please excuse any errors that have escaped final proofreading.

## 2022-12-23 ENCOUNTER — HOSPITAL ENCOUNTER (OUTPATIENT)
Dept: RESPIRATORY THERAPY | Facility: HOSPITAL | Age: 77
End: 2022-12-23

## 2022-12-23 RX ORDER — TRIAMCINOLONE ACETONIDE 40 MG/ML
80 INJECTION, SUSPENSION INTRA-ARTICULAR; INTRAMUSCULAR
Status: COMPLETED | OUTPATIENT
Start: 2022-12-21 | End: 2022-12-21

## 2022-12-23 NOTE — PROGRESS NOTES
Procedure   Large Joint Arthrocentesis: L glenohumeral  Date/Time: 12/21/2022 3:42 PM  Consent given by: patient  Site marked: site marked  Timeout: Immediately prior to procedure a time out was called to verify the correct patient, procedure, equipment, support staff and site/side marked as required   Supporting Documentation  Indications: pain   Procedure Details  Location: shoulder - L glenohumeral  Preparation: Patient was prepped and draped in the usual sterile fashion  Needle size: 25 G  Approach: posterior (Ultrasound guidance was used to ensure intrarticular needle placement. Please see ultrasound machine for saved images.)  Medications administered: 80 mg triamcinolone acetonide 40 MG/ML  Patient tolerance: patient tolerated the procedure well with no immediate complications

## 2022-12-28 ENCOUNTER — HOSPITAL ENCOUNTER (OUTPATIENT)
Dept: RESPIRATORY THERAPY | Facility: HOSPITAL | Age: 77
Discharge: HOME | End: 2022-12-28
Admitting: PREVENTIVE MEDICINE

## 2022-12-28 DIAGNOSIS — R42 DIZZINESS: ICD-10-CM

## 2022-12-28 PROCEDURE — 93246 EXT ECG>7D<15D RECORDING: CPT

## 2023-01-09 ENCOUNTER — HOSPITAL ENCOUNTER (OUTPATIENT)
Dept: CARDIOLOGY | Facility: HOSPITAL | Age: 78
Discharge: HOME | End: 2023-01-09
Payer: MEDICARE

## 2023-01-09 ENCOUNTER — HOSPITAL ENCOUNTER (OUTPATIENT)
Dept: CT IMAGING | Facility: HOSPITAL | Age: 78
Discharge: HOME | End: 2023-01-09
Payer: MEDICARE

## 2023-01-09 DIAGNOSIS — R42 DIZZINESS: ICD-10-CM

## 2023-01-09 LAB
BH CV XLRA MEAS LEFT DIST CCA EDV: -15.2 CM/SEC
BH CV XLRA MEAS LEFT DIST CCA PSV: -58.5 CM/SEC
BH CV XLRA MEAS LEFT DIST ICA EDV: 23.6 CM/SEC
BH CV XLRA MEAS LEFT DIST ICA PSV: 79.6 CM/SEC
BH CV XLRA MEAS LEFT ICA/CCA RATIO: 0.7
BH CV XLRA MEAS LEFT PROX CCA EDV: 21.7 CM/SEC
BH CV XLRA MEAS LEFT PROX CCA PSV: 114 CM/SEC
BH CV XLRA MEAS LEFT PROX ECA PSV: 101 CM/SEC
BH CV XLRA MEAS LEFT PROX ICA EDV: 19.2 CM/SEC
BH CV XLRA MEAS LEFT PROX ICA PSV: 56.1 CM/SEC
BH CV XLRA MEAS LEFT PROX SCLA PSV: 155 CM/SEC
BH CV XLRA MEAS LEFT VERTEBRAL A PSV: 51.4 CM/SEC
BH CV XLRA MEAS RIGHT DIST CCA EDV: -11.2 CM/SEC
BH CV XLRA MEAS RIGHT DIST CCA PSV: -75.2 CM/SEC
BH CV XLRA MEAS RIGHT DIST ICA EDV: -26.7 CM/SEC
BH CV XLRA MEAS RIGHT DIST ICA PSV: -82.6 CM/SEC
BH CV XLRA MEAS RIGHT ICA/CCA RATIO: -0.9
BH CV XLRA MEAS RIGHT PROX CCA EDV: 18.6 CM/SEC
BH CV XLRA MEAS RIGHT PROX CCA PSV: 91.3 CM/SEC
BH CV XLRA MEAS RIGHT PROX ECA PSV: -137 CM/SEC
BH CV XLRA MEAS RIGHT PROX ICA EDV: -18.7 CM/SEC
BH CV XLRA MEAS RIGHT PROX ICA PSV: -63.9 CM/SEC
BH CV XLRA MEAS RIGHT PROX SCLA PSV: 144 CM/SEC
BH CV XLRA MEAS RIGHT VERTEBRAL A PSV: -59.5 CM/SEC
MAXIMAL PREDICTED HEART RATE: 145 BPM
STRESS TARGET HR: 123 BPM

## 2023-01-09 PROCEDURE — 93880 EXTRACRANIAL BILAT STUDY: CPT

## 2023-01-09 PROCEDURE — 70450 CT HEAD/BRAIN W/O DYE: CPT

## 2023-01-10 ENCOUNTER — TELEPHONE (OUTPATIENT)
Dept: FAMILY MEDICINE CLINIC | Facility: CLINIC | Age: 78
End: 2023-01-10
Payer: MEDICARE

## 2023-01-10 NOTE — TELEPHONE ENCOUNTER
CT of the head is normal if she is still having dizziness we can put in a request for MRI please let me know.

## 2023-01-14 ENCOUNTER — TELEPHONE (OUTPATIENT)
Dept: FAMILY MEDICINE CLINIC | Facility: CLINIC | Age: 78
End: 2023-01-14
Payer: MEDICARE

## 2023-01-14 NOTE — TELEPHONE ENCOUNTER
Please call patient and make sure that she is doing better after her fall at the restaurant that caused her to go to [facility].  If she is still having any dizziness confusion or any other concerns we should see her at the office

## 2023-02-08 ENCOUNTER — TELEPHONE (OUTPATIENT)
Dept: FAMILY MEDICINE CLINIC | Facility: CLINIC | Age: 78
End: 2023-02-08
Payer: MEDICARE

## 2023-02-08 PROCEDURE — 93248 EXT ECG>7D<15D REV&INTERPJ: CPT | Performed by: INTERNAL MEDICINE

## 2023-02-08 NOTE — TELEPHONE ENCOUNTER
HUB TO READ:  ----- Message from Leonor Adam MD sent at 2/8/2023  1:20 PM EST -----  Dr. Preciado will call patient  about Heart Monitor and if she has not heard from his office by Friday-please call us back

## 2023-02-10 ENCOUNTER — OFFICE VISIT (OUTPATIENT)
Dept: CARDIOLOGY | Facility: CLINIC | Age: 78
End: 2023-02-10
Payer: MEDICARE

## 2023-02-10 VITALS
HEIGHT: 62 IN | RESPIRATION RATE: 18 BRPM | WEIGHT: 272 LBS | OXYGEN SATURATION: 98 % | HEART RATE: 68 BPM | BODY MASS INDEX: 50.05 KG/M2 | SYSTOLIC BLOOD PRESSURE: 139 MMHG | DIASTOLIC BLOOD PRESSURE: 74 MMHG

## 2023-02-10 DIAGNOSIS — E78.2 MIXED HYPERLIPIDEMIA: ICD-10-CM

## 2023-02-10 DIAGNOSIS — I10 PRIMARY HYPERTENSION: ICD-10-CM

## 2023-02-10 DIAGNOSIS — I48.0 PAROXYSMAL ATRIAL FIBRILLATION: ICD-10-CM

## 2023-02-10 DIAGNOSIS — I25.10 CORONARY ARTERY DISEASE INVOLVING NATIVE HEART WITHOUT ANGINA PECTORIS, UNSPECIFIED VESSEL OR LESION TYPE: Primary | ICD-10-CM

## 2023-02-10 DIAGNOSIS — I48.91 ATRIAL FIBRILLATION, UNSPECIFIED TYPE: ICD-10-CM

## 2023-02-10 DIAGNOSIS — R42 DIZZINESS: ICD-10-CM

## 2023-02-10 PROCEDURE — 99214 OFFICE O/P EST MOD 30 MIN: CPT | Performed by: INTERNAL MEDICINE

## 2023-02-10 RX ORDER — METOPROLOL SUCCINATE 25 MG/1
25 TABLET, EXTENDED RELEASE ORAL NIGHTLY
Qty: 90 TABLET | Refills: 3 | Status: SHIPPED | OUTPATIENT
Start: 2023-02-10

## 2023-02-10 RX ORDER — SPIRONOLACTONE 25 MG/1
12.5 TABLET ORAL DAILY
Qty: 45 TABLET | Refills: 3 | Status: SHIPPED | OUTPATIENT
Start: 2023-02-10

## 2023-02-13 ENCOUNTER — OFFICE VISIT (OUTPATIENT)
Dept: PODIATRY | Facility: CLINIC | Age: 78
End: 2023-02-13
Payer: MEDICARE

## 2023-02-13 VITALS — WEIGHT: 272 LBS | RESPIRATION RATE: 20 BRPM | BODY MASS INDEX: 50.05 KG/M2 | HEIGHT: 62 IN

## 2023-02-13 DIAGNOSIS — Z98.1 HISTORY OF ANKLE FUSION: ICD-10-CM

## 2023-02-13 DIAGNOSIS — M21.42 ACQUIRED PES PLANUS OF LEFT FOOT: ICD-10-CM

## 2023-02-13 DIAGNOSIS — M77.42 METATARSALGIA, LEFT FOOT: ICD-10-CM

## 2023-02-13 DIAGNOSIS — M19.072 ARTHRITIS OF LEFT FOOT: ICD-10-CM

## 2023-02-13 DIAGNOSIS — M79.672 LEFT FOOT PAIN: Primary | ICD-10-CM

## 2023-02-13 PROCEDURE — 99213 OFFICE O/P EST LOW 20 MIN: CPT | Performed by: PODIATRIST

## 2023-02-13 NOTE — PROGRESS NOTES
"02/13/2023  Foot and Ankle Surgery - Established Patient/Follow-up  Provider: Dr. Donta Kelley DPM  Location: [facility]    Subjective:  Mara Le is a 78 y.o. female.     Chief Complaint   Patient presents with   • Left Foot - Follow-up, Pain     Feels like Im walking on a ball or rock   Been going on for 3 weeks now    • Follow-up     DEBORA Adam MD 12/16/2022        HPI: MARA LE is a 78-year-old female who presents to the office today for a new problem involving her left foot.    The patient was last seen in 12/2022 for her right foot. She reports experiencing pain in her left foot with ambulation. She states it feels as if she is stepping on a rock or a tennis ball. The patient notes occasionally when she steps a certain way, it feels as if someone is \"sticking a knife\" in her left foot. The patient denies taking x-rays today. She adds that she waited for approximately 1 week and believed she may have bruised her left foot. When she is \"up on the ground\" she experiences pain. The patient has arthritis \"everywhere\". She denies tenderness currently. The patient reports she has always been \"flat footed\". She states she removes her shoes when she is at home. She denies performing any stretching exercises when she can.     The patient reports Dr. Kyree Bill performed her left ankle surgery.    Allergies   Allergen Reactions   • Celecoxib Itching     swelling       Current Outpatient Medications on File Prior to Visit   Medication Sig Dispense Refill   • allopurinol (ZYLOPRIM) 300 MG tablet TAKE 1 TABLET BY MOUTH EVERY DAY 90 tablet 2   • amLODIPine (NORVASC) 5 MG tablet TAKE 1 TABLET BY MOUTH EVERY DAY 90 tablet 3   • atorvastatin (LIPITOR) 20 MG tablet TAKE 1 TABLET BY MOUTH EVERYDAY AT BEDTIME 90 tablet 3   • Azelastine HCl 137 MCG/SPRAY solution SPRAY 2 SPRAYS (274 MCG) IN EACH NOSTRIL BY INTRANASAL ROUTE 2 TIMES PER DAY FOR 30 DAYS     • bumetanide (BUMEX) 2 MG tablet TAKE 1/2 TABLET BY MOUTH " "EVERY DAY 45 tablet 2   • cetirizine (zyrTEC) 10 MG tablet Take 1 tablet by mouth Daily.     • chlorhexidine (PERIDEX) 0.12 % solution SWISH 1/2 OZ FOR 30 SECONDS ONE TIME A DAY THEN SPIT     • cyanocobalamin (VITAMIN B-12) 1000 MCG tablet Take 1 tablet by mouth Daily.     • escitalopram (LEXAPRO) 20 MG tablet TAKE 1 TABLET BY MOUTH EVERY DAY 90 tablet 2   • levothyroxine (SYNTHROID, LEVOTHROID) 88 MCG tablet TAKE 1 TABLET BY MOUTH EVERY DAY 90 tablet 3   • losartan (COZAAR) 25 MG tablet      • metoprolol succinate XL (TOPROL-XL) 25 MG 24 hr tablet Take 1 tablet by mouth Every Night. 90 tablet 3   • montelukast (SINGULAIR) 10 MG tablet Take 10 mg by mouth Daily.     • Multiple Vitamins-Minerals (CENTRUM SILVER ADULT 50+) tablet Take 1 tablet by mouth Daily.     • rivaroxaban (XARELTO) 15 MG tablet Take 1 tablet by mouth Daily With Dinner. Indications: Atrial Fibrillation 90 tablet 2   • sorbitol 70 % solution solution See Admin Instructions.     • spironolactone (ALDACTONE) 25 MG tablet Take 0.5 tablets by mouth Daily. 45 tablet 3   • Vitamin D, Cholecalciferol, 25 MCG (1000 UT) tablet Take 1 tablet by mouth Daily.       No current facility-administered medications on file prior to visit.       Objective   Resp 20   Ht 157.5 cm (62\")   Wt 123 kg (272 lb)   LMP  (LMP Unknown)   BMI 49.75 kg/m²     Foot/Ankle Exam:       General:   Appearance: appears stated age and healthy and obesity    Orientation: AAOx3    Affect: appropriate      VASCULAR      Right Foot Vascularity   Normal vascular exam    Dorsalis pedis:  2+  Posterior tibial:  2+  Skin Temperature: warm    Edema Grading:  None  CFT:  < 3 seconds  Pedal Hair Growth:  Present  Varicosities: none       Left Foot Vascularity   Normal vascular exam    Dorsalis pedis:  2+  Posterior tibial:  2+  Skin Temperature: warm    Edema Grading:  None  CFT:  < 3 seconds  Pedal Hair Growth:  Present  Varicosities: none        NEUROLOGIC     Right Foot Neurologic   Light " touch sensation:  Normal  Hot/Cold sensation: normal    Achilles reflex:  2+     Left Foot Neurologic   Light touch sensation:  Normal  Hot/cold sensation: normal    Achilles reflex:  2+     MUSCULOSKELETAL      Right Foot Musculoskeletal   Ecchymosis:  None  Arch:  Normal     Left Foot Musculoskeletal   Ecchymosis:  None  Arch:  Normal     MUSCLE STRENGTH     Right Foot Muscle Strength   Normal strength    Foot dorsiflexion:  5  Foot plantar flexion:  5  Foot inversion:  5  Foot eversion:  5     Left Foot Muscle Strength   Normal strength    Foot dorsiflexion:  5  Foot plantar flexion:  5  Foot inversion:  5  Foot eversion:  5     DERMATOLOGIC     Right Foot Dermatologic   Skin: skin intact    Nails comment:  Nails 1-5     Left Foot Dermatologic   Skin: skin intact    Nails comment:  Nails 1-5     TESTS     Right Foot Tests   Anterior drawer: negative    Varus tilt: negative       Left Foot Tests   Anterior drawer: negative    Varus tilt: negative        Left Foot Additional Comments: Prominent rigidity noted to the left rear foot secondary to previous tibiotalar calcaneal fusion. Moderate hypermobility involving the midfoot with mild crepitus. No isolated discomfort with palpation involving the forefoot or midfoot today.      Assessment & Plan   Diagnoses and all orders for this visit:    1. Left foot pain (Primary)    2. Arthritis of left foot    3. Metatarsalgia, left foot    4. Acquired pes planus of left foot    5. History of ankle fusion        The patient presents for a new problem involving her left foot. We discussed imaging, diagnosis, and treatment options at length.  We did review the biomechanics and etiology.  I have asked that she try a pair of inserts along with her shoes to add more support to her foot. The patient should avoid ambulating barefoot, wearing flip flops or sandals. I have asked her to utilize a massage ball to work out the soft tissues. The patient will return to the office in 4 weeks  for reevaluation and repeat x-rays.    Greater than 20 minutes was spent before, during, and after evaluation for patient care.    No orders of the defined types were placed in this encounter.         Note is dictated utilizing voice recognition software. Unfortunately this leads to occasional typographical errors. I apologize in advance if the situation occurs. If questions occur please do not hesitate to call our office.    Transcribed from ambient dictation for STEPHANIE Kelley DPM by Portia Strickland.  02/13/23   10:34 EST    Patient or patient representative verbalized consent to the visit recording.  I have personally performed the services described in this document as transcribed by the above individual, and it is both accurate and complete.

## 2023-02-16 ENCOUNTER — HOSPITAL ENCOUNTER (OUTPATIENT)
Facility: HOSPITAL | Age: 78
Setting detail: OBSERVATION
Discharge: HOME HEALTH | End: 2023-02-17
Attending: EMERGENCY MEDICINE | Admitting: ORTHOPAEDIC SURGERY
Payer: MEDICARE

## 2023-02-16 DIAGNOSIS — M25.561 ACUTE PAIN OF RIGHT KNEE: Primary | ICD-10-CM

## 2023-02-16 DIAGNOSIS — R13.12 OROPHARYNGEAL DYSPHAGIA: ICD-10-CM

## 2023-02-16 DIAGNOSIS — M25.469 SUPRAPATELLAR EFFUSION OF KNEE: ICD-10-CM

## 2023-02-16 DIAGNOSIS — Z74.09 MOBILITY POOR: ICD-10-CM

## 2023-02-16 PROBLEM — M25.569 KNEE PAIN: Status: ACTIVE | Noted: 2023-02-16

## 2023-02-16 PROCEDURE — 96374 THER/PROPH/DIAG INJ IV PUSH: CPT

## 2023-02-16 PROCEDURE — 25010000002 MORPHINE PER 10 MG: Performed by: PHYSICIAN ASSISTANT

## 2023-02-16 PROCEDURE — 25010000002 ONDANSETRON PER 1 MG: Performed by: PHYSICIAN ASSISTANT

## 2023-02-16 PROCEDURE — G0378 HOSPITAL OBSERVATION PER HR: HCPCS

## 2023-02-16 PROCEDURE — 99284 EMERGENCY DEPT VISIT MOD MDM: CPT

## 2023-02-16 PROCEDURE — 96375 TX/PRO/DX INJ NEW DRUG ADDON: CPT

## 2023-02-16 RX ORDER — ONDANSETRON 2 MG/ML
4 INJECTION INTRAMUSCULAR; INTRAVENOUS ONCE
Status: COMPLETED | OUTPATIENT
Start: 2023-02-16 | End: 2023-02-16

## 2023-02-16 RX ORDER — SODIUM CHLORIDE 0.9 % (FLUSH) 0.9 %
10 SYRINGE (ML) INJECTION AS NEEDED
Status: DISCONTINUED | OUTPATIENT
Start: 2023-02-16 | End: 2023-02-17 | Stop reason: HOSPADM

## 2023-02-16 RX ORDER — MORPHINE SULFATE 2 MG/ML
2 INJECTION, SOLUTION INTRAMUSCULAR; INTRAVENOUS ONCE
Status: COMPLETED | OUTPATIENT
Start: 2023-02-16 | End: 2023-02-16

## 2023-02-16 RX ADMIN — MORPHINE SULFATE 2 MG: 2 INJECTION, SOLUTION INTRAMUSCULAR; INTRAVENOUS at 22:57

## 2023-02-16 RX ADMIN — ONDANSETRON 4 MG: 2 INJECTION INTRAMUSCULAR; INTRAVENOUS at 22:58

## 2023-02-17 ENCOUNTER — READMISSION MANAGEMENT (OUTPATIENT)
Dept: CALL CENTER | Facility: HOSPITAL | Age: 78
End: 2023-02-17
Payer: MEDICARE

## 2023-02-17 ENCOUNTER — APPOINTMENT (OUTPATIENT)
Dept: MRI IMAGING | Facility: HOSPITAL | Age: 78
End: 2023-02-17
Payer: MEDICARE

## 2023-02-17 ENCOUNTER — APPOINTMENT (OUTPATIENT)
Dept: GENERAL RADIOLOGY | Facility: HOSPITAL | Age: 78
End: 2023-02-17
Payer: MEDICARE

## 2023-02-17 VITALS
HEIGHT: 63 IN | TEMPERATURE: 98.6 F | BODY MASS INDEX: 46.95 KG/M2 | DIASTOLIC BLOOD PRESSURE: 67 MMHG | WEIGHT: 265 LBS | OXYGEN SATURATION: 95 % | RESPIRATION RATE: 18 BRPM | HEART RATE: 87 BPM | SYSTOLIC BLOOD PRESSURE: 128 MMHG

## 2023-02-17 LAB
ANION GAP SERPL CALCULATED.3IONS-SCNC: 11 MMOL/L (ref 5–15)
BASOPHILS # BLD AUTO: 0 10*3/MM3 (ref 0–0.2)
BASOPHILS NFR BLD AUTO: 0.3 % (ref 0–1.5)
BUN SERPL-MCNC: 26 MG/DL (ref 8–23)
BUN/CREAT SERPL: 24.8 (ref 7–25)
CALCIUM SPEC-SCNC: 9.8 MG/DL (ref 8.6–10.5)
CHLORIDE SERPL-SCNC: 102 MMOL/L (ref 98–107)
CO2 SERPL-SCNC: 24 MMOL/L (ref 22–29)
CREAT SERPL-MCNC: 1.05 MG/DL (ref 0.57–1)
CRP SERPL-MCNC: 0.62 MG/DL (ref 0–0.5)
DEPRECATED RDW RBC AUTO: 55.1 FL (ref 37–54)
EGFRCR SERPLBLD CKD-EPI 2021: 55.2 ML/MIN/1.73
EOSINOPHIL # BLD AUTO: 0 10*3/MM3 (ref 0–0.4)
EOSINOPHIL NFR BLD AUTO: 0 % (ref 0.3–6.2)
ERYTHROCYTE [DISTWIDTH] IN BLOOD BY AUTOMATED COUNT: 15.8 % (ref 12.3–15.4)
GLUCOSE SERPL-MCNC: 148 MG/DL (ref 65–99)
HCT VFR BLD AUTO: 34.6 % (ref 34–46.6)
HGB BLD-MCNC: 11.3 G/DL (ref 12–15.9)
LYMPHOCYTES # BLD AUTO: 0.7 10*3/MM3 (ref 0.7–3.1)
LYMPHOCYTES NFR BLD AUTO: 9.3 % (ref 19.6–45.3)
MCH RBC QN AUTO: 32.7 PG (ref 26.6–33)
MCHC RBC AUTO-ENTMCNC: 32.6 G/DL (ref 31.5–35.7)
MCV RBC AUTO: 100.3 FL (ref 79–97)
MONOCYTES # BLD AUTO: 0.1 10*3/MM3 (ref 0.1–0.9)
MONOCYTES NFR BLD AUTO: 0.7 % (ref 5–12)
NEUTROPHILS NFR BLD AUTO: 6.9 10*3/MM3 (ref 1.7–7)
NEUTROPHILS NFR BLD AUTO: 89.7 % (ref 42.7–76)
NRBC BLD AUTO-RTO: 0.1 /100 WBC (ref 0–0.2)
NT-PROBNP SERPL-MCNC: 309.3 PG/ML (ref 0–1800)
PLATELET # BLD AUTO: 227 10*3/MM3 (ref 140–450)
PMV BLD AUTO: 7.5 FL (ref 6–12)
POTASSIUM SERPL-SCNC: 4.4 MMOL/L (ref 3.5–5.2)
RBC # BLD AUTO: 3.45 10*6/MM3 (ref 3.77–5.28)
SODIUM SERPL-SCNC: 137 MMOL/L (ref 136–145)
WBC NRBC COR # BLD: 7.7 10*3/MM3 (ref 3.4–10.8)

## 2023-02-17 PROCEDURE — 73560 X-RAY EXAM OF KNEE 1 OR 2: CPT

## 2023-02-17 PROCEDURE — 73721 MRI JNT OF LWR EXTRE W/O DYE: CPT

## 2023-02-17 PROCEDURE — 80048 BASIC METABOLIC PNL TOTAL CA: CPT | Performed by: PHYSICIAN ASSISTANT

## 2023-02-17 PROCEDURE — G0378 HOSPITAL OBSERVATION PER HR: HCPCS

## 2023-02-17 PROCEDURE — 96375 TX/PRO/DX INJ NEW DRUG ADDON: CPT

## 2023-02-17 PROCEDURE — 25010000002 TRIAMCINOLONE PER 10 MG: Performed by: ORTHOPAEDIC SURGERY

## 2023-02-17 PROCEDURE — 86140 C-REACTIVE PROTEIN: CPT | Performed by: NURSE PRACTITIONER

## 2023-02-17 PROCEDURE — 83880 ASSAY OF NATRIURETIC PEPTIDE: CPT | Performed by: NURSE PRACTITIONER

## 2023-02-17 PROCEDURE — 97161 PT EVAL LOW COMPLEX 20 MIN: CPT

## 2023-02-17 PROCEDURE — 25010000002 DEXAMETHASONE PER 1 MG: Performed by: NURSE PRACTITIONER

## 2023-02-17 PROCEDURE — 85025 COMPLETE CBC W/AUTO DIFF WBC: CPT | Performed by: PHYSICIAN ASSISTANT

## 2023-02-17 RX ORDER — SODIUM CHLORIDE 0.9 % (FLUSH) 0.9 %
10 SYRINGE (ML) INJECTION EVERY 12 HOURS SCHEDULED
Status: DISCONTINUED | OUTPATIENT
Start: 2023-02-17 | End: 2023-02-17 | Stop reason: HOSPADM

## 2023-02-17 RX ORDER — SODIUM CHLORIDE 0.9 % (FLUSH) 0.9 %
10 SYRINGE (ML) INJECTION AS NEEDED
Status: DISCONTINUED | OUTPATIENT
Start: 2023-02-17 | End: 2023-02-17 | Stop reason: HOSPADM

## 2023-02-17 RX ORDER — METOPROLOL SUCCINATE 25 MG/1
25 TABLET, EXTENDED RELEASE ORAL NIGHTLY
Status: DISCONTINUED | OUTPATIENT
Start: 2023-02-17 | End: 2023-02-17 | Stop reason: HOSPADM

## 2023-02-17 RX ORDER — ENOXAPARIN SODIUM 100 MG/ML
40 INJECTION SUBCUTANEOUS EVERY 24 HOURS
Status: DISCONTINUED | OUTPATIENT
Start: 2023-02-17 | End: 2023-02-17

## 2023-02-17 RX ORDER — ALLOPURINOL 300 MG/1
300 TABLET ORAL DAILY
Status: DISCONTINUED | OUTPATIENT
Start: 2023-02-17 | End: 2023-02-17 | Stop reason: HOSPADM

## 2023-02-17 RX ORDER — ATORVASTATIN CALCIUM 20 MG/1
20 TABLET, FILM COATED ORAL DAILY
Status: DISCONTINUED | OUTPATIENT
Start: 2023-02-17 | End: 2023-02-17 | Stop reason: HOSPADM

## 2023-02-17 RX ORDER — DEXAMETHASONE SODIUM PHOSPHATE 4 MG/ML
4 INJECTION, SOLUTION INTRA-ARTICULAR; INTRALESIONAL; INTRAMUSCULAR; INTRAVENOUS; SOFT TISSUE DAILY
Status: DISCONTINUED | OUTPATIENT
Start: 2023-02-17 | End: 2023-02-17 | Stop reason: HOSPADM

## 2023-02-17 RX ORDER — ONDANSETRON 2 MG/ML
4 INJECTION INTRAMUSCULAR; INTRAVENOUS EVERY 6 HOURS PRN
Status: DISCONTINUED | OUTPATIENT
Start: 2023-02-17 | End: 2023-02-17

## 2023-02-17 RX ORDER — POLYETHYLENE GLYCOL 3350 17 G/17G
17 POWDER, FOR SOLUTION ORAL DAILY PRN
Status: DISCONTINUED | OUTPATIENT
Start: 2023-02-17 | End: 2023-02-17 | Stop reason: HOSPADM

## 2023-02-17 RX ORDER — ONDANSETRON 2 MG/ML
4 INJECTION INTRAMUSCULAR; INTRAVENOUS EVERY 6 HOURS PRN
Status: DISCONTINUED | OUTPATIENT
Start: 2023-02-17 | End: 2023-02-17 | Stop reason: HOSPADM

## 2023-02-17 RX ORDER — BISACODYL 10 MG
10 SUPPOSITORY, RECTAL RECTAL DAILY PRN
Status: DISCONTINUED | OUTPATIENT
Start: 2023-02-17 | End: 2023-02-17 | Stop reason: HOSPADM

## 2023-02-17 RX ORDER — SODIUM CHLORIDE 9 MG/ML
40 INJECTION, SOLUTION INTRAVENOUS AS NEEDED
Status: DISCONTINUED | OUTPATIENT
Start: 2023-02-17 | End: 2023-02-17 | Stop reason: HOSPADM

## 2023-02-17 RX ORDER — AMLODIPINE BESYLATE 5 MG/1
5 TABLET ORAL DAILY
Status: DISCONTINUED | OUTPATIENT
Start: 2023-02-17 | End: 2023-02-17 | Stop reason: HOSPADM

## 2023-02-17 RX ORDER — ACETAMINOPHEN 325 MG/1
650 TABLET ORAL EVERY 4 HOURS PRN
Status: DISCONTINUED | OUTPATIENT
Start: 2023-02-17 | End: 2023-02-17 | Stop reason: HOSPADM

## 2023-02-17 RX ORDER — MORPHINE SULFATE 2 MG/ML
2 INJECTION, SOLUTION INTRAMUSCULAR; INTRAVENOUS EVERY 4 HOURS PRN
Status: DISCONTINUED | OUTPATIENT
Start: 2023-02-17 | End: 2023-02-17 | Stop reason: HOSPADM

## 2023-02-17 RX ORDER — BISACODYL 5 MG/1
5 TABLET, DELAYED RELEASE ORAL DAILY PRN
Status: DISCONTINUED | OUTPATIENT
Start: 2023-02-17 | End: 2023-02-17 | Stop reason: HOSPADM

## 2023-02-17 RX ORDER — MONTELUKAST SODIUM 10 MG/1
10 TABLET ORAL DAILY
Status: DISCONTINUED | OUTPATIENT
Start: 2023-02-17 | End: 2023-02-17 | Stop reason: HOSPADM

## 2023-02-17 RX ORDER — METHOCARBAMOL 750 MG/1
750 TABLET, FILM COATED ORAL 4 TIMES DAILY
Qty: 30 TABLET | Refills: 0 | Status: SHIPPED | OUTPATIENT
Start: 2023-02-17

## 2023-02-17 RX ORDER — ONDANSETRON 4 MG/1
4 TABLET, FILM COATED ORAL EVERY 6 HOURS PRN
Status: DISCONTINUED | OUTPATIENT
Start: 2023-02-17 | End: 2023-02-17 | Stop reason: HOSPADM

## 2023-02-17 RX ORDER — CHOLECALCIFEROL (VITAMIN D3) 125 MCG
5 CAPSULE ORAL NIGHTLY PRN
Status: DISCONTINUED | OUTPATIENT
Start: 2023-02-17 | End: 2023-02-17 | Stop reason: HOSPADM

## 2023-02-17 RX ORDER — LEVOTHYROXINE SODIUM 88 UG/1
88 TABLET ORAL
Status: DISCONTINUED | OUTPATIENT
Start: 2023-02-17 | End: 2023-02-17 | Stop reason: HOSPADM

## 2023-02-17 RX ORDER — BUMETANIDE 1 MG/1
1 TABLET ORAL DAILY
Status: DISCONTINUED | OUTPATIENT
Start: 2023-02-17 | End: 2023-02-17 | Stop reason: HOSPADM

## 2023-02-17 RX ORDER — CETIRIZINE HYDROCHLORIDE 10 MG/1
10 TABLET ORAL DAILY
Status: DISCONTINUED | OUTPATIENT
Start: 2023-02-17 | End: 2023-02-17 | Stop reason: HOSPADM

## 2023-02-17 RX ORDER — METHOCARBAMOL 500 MG/1
750 TABLET, FILM COATED ORAL 4 TIMES DAILY
Status: DISCONTINUED | OUTPATIENT
Start: 2023-02-17 | End: 2023-02-17 | Stop reason: HOSPADM

## 2023-02-17 RX ORDER — ESCITALOPRAM OXALATE 10 MG/1
20 TABLET ORAL DAILY
Status: DISCONTINUED | OUTPATIENT
Start: 2023-02-17 | End: 2023-02-17 | Stop reason: HOSPADM

## 2023-02-17 RX ADMIN — CETIRIZINE HYDROCHLORIDE 10 MG: 10 TABLET, FILM COATED ORAL at 09:24

## 2023-02-17 RX ADMIN — MONTELUKAST 10 MG: 10 TABLET, FILM COATED ORAL at 09:24

## 2023-02-17 RX ADMIN — ESCITALOPRAM OXALATE 20 MG: 10 TABLET, FILM COATED ORAL at 09:24

## 2023-02-17 RX ADMIN — Medication 10 ML: at 09:25

## 2023-02-17 RX ADMIN — DEXAMETHASONE SODIUM PHOSPHATE 4 MG: 4 INJECTION, SOLUTION INTRAMUSCULAR; INTRAVENOUS at 09:24

## 2023-02-17 RX ADMIN — LEVOTHYROXINE SODIUM 88 MCG: 0.09 TABLET ORAL at 09:24

## 2023-02-17 RX ADMIN — ATORVASTATIN CALCIUM 20 MG: 20 TABLET, FILM COATED ORAL at 09:24

## 2023-02-17 RX ADMIN — BUMETANIDE 1 MG: 1 TABLET ORAL at 09:24

## 2023-02-17 RX ADMIN — Medication 10 ML: at 10:10

## 2023-02-17 RX ADMIN — TRIAMCINOLONE ACETONIDE: 40 INJECTION, SUSPENSION INTRA-ARTICULAR; INTRAMUSCULAR at 12:18

## 2023-02-17 RX ADMIN — AMLODIPINE BESYLATE 5 MG: 5 TABLET ORAL at 09:24

## 2023-02-17 RX ADMIN — ALLOPURINOL 300 MG: 300 TABLET ORAL at 10:10

## 2023-02-17 RX ADMIN — METHOCARBAMOL 750 MG: 500 TABLET ORAL at 12:15

## 2023-02-17 RX ADMIN — METOPROLOL SUCCINATE 25 MG: 25 TABLET, EXTENDED RELEASE ORAL at 00:43

## 2023-02-17 NOTE — CASE MANAGEMENT/SOCIAL WORK
Discharge Planning Assessment  [facility]     Patient Name: Faby Le  MRN: [id number]  Today's Date: 2/17/2023    Admit Date: 2/16/2023    Plan: Home with daughter, pending PT evaluation.   Discharge Needs Assessment     Row Name 02/17/23 1304       Living Environment    People in Home alone  Will be staying with daughter    Current Living Arrangements home    Primary Care Provided by self    Provides Primary Care For no one    Family Caregiver if Needed child(marlon), adult    Family Caregiver Names Chandu-Kemi    Quality of Family Relationships helpful;involved;supportive    Able to Return to Prior Arrangements yes       Resource/Environmental Concerns    Resource/Environmental Concerns none    Transportation Concerns none       Transition Planning    Patient/Family Anticipates Transition to inpatient rehabilitation facility    Patient/Family Anticipated Services at Transition skilled nursing    Transportation Anticipated family or friend will provide       Discharge Needs Assessment    Readmission Within the Last 30 Days no previous admission in last 30 days    Equipment Currently Used at Home cane, straight;walker, rolling;wheelchair, motorized;wheelchair;shower chair;commode;cpap    Concerns to be Addressed discharge planning    Anticipated Changes Related to Illness none    Equipment Needed After Discharge none               Discharge Plan     Row Name 02/17/23 130       Plan    Plan Home with daughter, pending PT evaluation.    Patient/Family in Agreement with Plan yes    Plan Comments Patient lives at home alone, but will be staying with daughter. Patient drives, patient's daughter will provide transportation at discharge. Patient performs ADL. PCP and pharmacy confirmed. Denies financial concerns for medication and/or food. Patient aware rehab might be suggested, pending PT evaluation. If OP PT is suggested, patient would like to go to [facility] in [place].               Demographic Summary     Row Name 02/17/23  1304       General Information    Admission Type observation    Arrived From emergency department    Required Notices Provided Observation Status Notice    Referral Source admission list    Reason for Consult discharge planning    Preferred Language English               Functional Status     Row Name 02/17/23 1304       Functional Status    Usual Activity Tolerance good    Current Activity Tolerance fair       Functional Status, IADL    Medications independent    Meal Preparation independent    Housekeeping independent    Laundry independent    Shopping independent            Met with patient in room wearing PPE: mask.      Maintained distance greater than six feet and spent less than 15 minutes in the room.          Mandi Paulino RN

## 2023-02-17 NOTE — NURSING NOTE
triamcinolone acetonide (KENALOG-40) 80 mg, bupivacaine (MARCAINE) 4 mL injection: Administered by Dr. Thorpe at bedside.

## 2023-02-17 NOTE — CONSULTS
Referring Provider: Emergency room  Reason for Consultation: Right knee pain    Patient Care Team:  Leonor Adam MD as PCP - General  [facility] as [facility]ing Physician (Pain Medicine)  Ratna Zavala MD as Consulting Physician (Cardiology)  Nora Foley APRN as Nurse Practitioner (Gastroenterology)  Gianluca Walton MD as Consulting Physician (Pulmonary Disease)  Nathanael Murillo MD as Consulting Physician (Allergy and Immunology)  Delfin Thorpe MD as Surgeon (Orthopedic Surgery)  Josh Loomis MD as Consulting Physician (Gastroenterology)    Chief complaint pain in right knee    Subjective .     History of present illness: This patient is a 78-year-old female who is well-known to me.  She has had right knee pain for several years on and off.  However yesterday after she came home from the grocery store she developed severe pain was unable to bear weight.  Her daughter took her to the emergency room in [place] where she was evaluated and discharged.  Her daughter then called me and we had her come down to the emergency room here at [place] where she was evaluated and admitted because of severe pain in her right knee with inability to ambulate.  No recent injuries.      History  Past Medical History:   Diagnosis Date   • Ankle pain    • Arthritis    • Cancer (HCC)     uterine, had hysterectomy 2018   • Coronary artery disease    • Depression    • Gout    • Hyperglycemia    • Hyperlipidemia    • Low back pain    • Morbid obesity (HCC)    • Sleep apnea    • Vitamin D deficiency        Past Surgical History:   Procedure Laterality Date   • ANKLE FUSION      2017-left   • CARDIAC CATHETERIZATION     • CARDIAC CATHETERIZATION Right 8/29/2022    Procedure: Left Heart Cath;  Surgeon: Ratna Zavala MD;  Location: [facility] INVASIVE LOCATION;  Service: Cardiovascular;  Laterality: Right;   • CARPAL TUNNEL RELEASE     • CATARACT EXTRACTION     • CUBITAL TUNNEL  "RELEASE     • HYSTERECTOMY     • REPLACEMENT TOTAL KNEE      left 2008   • ROTATOR CUFF REPAIR            Scheduled Meds:    allopurinol, 300 mg, Oral, Daily  amLODIPine, 5 mg, Oral, Daily  atorvastatin, 20 mg, Oral, Daily  bumetanide, 1 mg, Oral, Daily  cetirizine, 10 mg, Oral, Daily  dexamethasone, 4 mg, Intravenous, Daily  enoxaparin, 40 mg, Subcutaneous, Q24H  escitalopram, 20 mg, Oral, Daily  levothyroxine, 88 mcg, Oral, Q AM  methocarbamol, 750 mg, Oral, 4x Daily  metoprolol succinate XL, 25 mg, Oral, Nightly  montelukast, 10 mg, Oral, Daily  sodium chloride, 10 mL, Intravenous, Q12H  sodium chloride, 10 mL, Intravenous, Q12H         PRN Meds:   •  acetaminophen  •  polyethylene glycol **AND** bisacodyl **AND** bisacodyl  •  melatonin  •  Morphine  •  ondansetron **OR** ondansetron  •  [COMPLETED] Insert Peripheral IV **AND** sodium chloride  •  sodium chloride  •  sodium chloride  •  sodium chloride  •  sodium chloride      Allergies:    Celecoxib        Objective     Vital Signs   /69 (BP Location: Left arm, Patient Position: Lying)   Pulse 84   Temp 97.6 °F (36.4 °C) (Axillary)   Resp 17   Ht 160 cm (63\")   Wt 120 kg (265 lb)   LMP  (LMP Unknown)   SpO2 96%   BMI 46.94 kg/m²     Physical Exam:     General Appearance:   She is by her self.  Supine in bed.  Oriented to time place and person.   Extremities:  Right leg she has no effusion in her right knee although her leg is very large.  There is no sign of infection no deformity.  Her active range of motion is 0/40.  She can straight leg raise to about 30 degrees.   Pulses:  Deferred   Skin:  Intact   Neurologic:  5/5 plantarflexion and dorsiflexion       Results Review:  CBC    Results from last 7 days   Lab Units 02/17/23  0547   WBC 10*3/mm3 7.70   HEMOGLOBIN g/dL 11.3*   PLATELETS 10*3/mm3 227     BMP   Results from last 7 days   Lab Units 02/17/23  0547   SODIUM mmol/L 137   POTASSIUM mmol/L 4.4   CHLORIDE mmol/L 102   CO2 mmol/L 24.0   BUN " mg/dL 26*   CREATININE mg/dL 1.05*   GLUCOSE mg/dL 148*     Infection     Radiology (recent) MRI Knee Right Without Contrast    Result Date: 2/17/2023  Impression: 1. Severe tricompartmental osteoarthritis. 2. Complete tear of the anterior cruciate ligament. 3. Complex tearing of the entire medial meniscus with extrusion. 4. Degeneration of the lateral meniscus with small complex tear of the body and the posterior horn. 5. Large joint effusion with a very large partially ruptured Baker's cyst. Electronically Signed: Elizabeth Patel  2/17/2023 9:17 AM EST  Workstation ID: [id number]        I reviewed the patient's new clinical results.  I reviewed the patient's new imaging results and agree with the interpretation.      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    Recommendations number 1 injection right knee #2 physical therapy #3 no surgery at this time.    I discussed the patient's findings and my recommendations with patient    Delfin Thorpe MD  02/17/23  09:38 EST

## 2023-02-17 NOTE — THERAPY EVALUATION
Patient Name: Faby Le  : 1945    MRN: [id number]                              Today's Date: 2023       Admit Date: 2023    Visit Dx:     ICD-10-CM ICD-9-CM   1. Acute pain of right knee  M25.561 719.46   2. Suprapatellar effusion of knee  M25.469 719.06     Patient Active Problem List   Diagnosis   • Abnormal complete blood count   • Ankle pain   • Arthralgia of left elbow   • Arthritis   • B12 deficiency   • Coronary artery disease   • Depression   • Gallstones   • Gout   • Hyperglycemia   • Hyperlipidemia   • Hypertension   • Hypothyroidism   • Low back pain   • Mobility poor   • Postmenopausal status   • Sleep apnea   • Vitamin D deficiency   • Chronic kidney disease, stage 3 (moderate)   • Malignant neoplasm of endometrium (HCC)   • Oropharyngeal dysphagia   • Class 3 severe obesity due to excess calories with serious comorbidity and body mass index (BMI) of 45.0 to 49.9 in adult (HCC)   • Chronic pain of right knee   • Light headedness   • Paroxysmal atrial fibrillation (HCC)   • Atrial fibrillation, unspecified type (HCC)   • Abnormal results of cardiovascular function studies   • Chronic left shoulder pain   • Knee pain     Past Medical History:   Diagnosis Date   • Ankle pain    • Arthritis    • Cancer (HCC)     uterine, had hysterectomy    • Coronary artery disease    • Depression    • Gout    • Hyperglycemia    • Hyperlipidemia    • Low back pain    • Morbid obesity (HCC)    • Sleep apnea    • Vitamin D deficiency      Past Surgical History:   Procedure Laterality Date   • ANKLE FUSION      2017-left   • CARDIAC CATHETERIZATION     • CARDIAC CATHETERIZATION Right 2022    Procedure: Left Heart Cath;  Surgeon: Ratna Zavala MD;  Location: [facility] INVASIVE LOCATION;  Service: Cardiovascular;  Laterality: Right;   • CARPAL TUNNEL RELEASE     • CATARACT EXTRACTION     • CUBITAL TUNNEL RELEASE     • HYSTERECTOMY     • REPLACEMENT TOTAL KNEE      left    • ROTATOR  CUFF REPAIR        General Information     Row Name 02/17/23 1441          Physical Therapy Time and Intention    Document Type evaluation  -     Mode of Treatment physical therapy  -     Row Name 02/17/23 1441          General Information    Prior Level of Function independent:;community mobility;gait;transfer;bed mobility;ADL's  Utilized SPC for community ambulation and no AD w/ household ambulation; has power w/c she utilized for community/family involvement at [facility]; DME: rollator, power w/c; Will be able to use power w/c at [facility]'s home  -     Existing Precautions/Restrictions other (see comments)  BLE WBAT for gait training and bed to chair transfers; AROM and PROM with no restrictions  -     Barriers to Rehab none identified  -     Row Name 02/17/23 1441          Living Environment    People in Home alone;other (see comments)  Will be staying with [facility]  -[facility] Name 02/17/23 1441          Home Main Entrance    Number of Stairs, Main Entrance none  -[facility] Name 02/17/23 1441          Stairs Within Home, Primary    Number of Stairs, Within Home, Primary none  -[facility] Name 02/17/23 1441          Cognition    Orientation Status (Cognition) oriented x 4  -[facility] Name 02/17/23 1441          Safety Issues, Functional Mobility    Impairments Affecting Function (Mobility) balance;endurance/activity tolerance;range of motion (ROM);pain;strength  -           User Key  (r) = Recorded By, (t) = Taken By, (c) = Cosigned By    Initials Name Provider Type    MAX Patito Yang PT Physical Therapist               Mobility     Row Name 02/17/23 1444          Bed Mobility    Bed Mobility supine-sit;sit-supine  -     Supine-Sit [place] (Bed Mobility) minimum assist (75% patient effort);verbal cues  -     Sit-Supine [place] (Bed Mobility) minimum assist (75% patient effort);verbal cues  -     Assistive Device (Bed Mobility) bed rails  -     Comment, (Bed Mobility) Pt requires only min  A at RLE upon returning to bed; min A at trunk for supine>sit  -[facility] Name 02/17/23 1444          Sit-Stand Transfer    Sit-Stand [place] (Transfers) contact guard  -     Assistive Device (Sit-Stand Transfers) walker, front-wheeled  -MAX     Comment, (Sit-Stand Transfer) STS from EOB, initially requiring min A, but improved to CGA upon second attempt  -[facility] Name 02/17/23 1444          Gait/Stairs (Locomotion)    [place] Level (Gait) contact guard  -     Assistive Device (Gait) walker, front-wheeled  -MAX     Distance in Feet (Gait) 35ft x2  -     Deviations/Abnormal Patterns (Gait) right sided deviations;antalgic;weight shifting decreased;gait speed decreased  -     Comment, (Gait/Stairs) Pt with no LOB or R knee buckle. Pt steady with turns. Decreased weight shift to the R  -MAX           User Key  (r) = Recorded By, (t) = Taken By, (c) = Cosigned By    Initials Name Provider Type    Patito Diaz PT Physical Therapist               Obj/Interventions     [facility] Name 02/17/23 1449          Range of Motion Comprehensive    Comment, General Range of Motion R knee pain with AROM, painful with PROM past ~75deg flex  -[facility] Name 02/17/23 1449          Strength Comprehensive (MMT)    General Manual Muscle Testing (MMT) Assessment lower extremity strength deficits identified  -     Comment, General Manual Muscle Testing (MMT) Assessment R knee ext 3/5, flex 3+/5, LLE grossly 4/5  -[facility] Name 02/17/23 1449          Balance    Balance Assessment sitting static balance;standing static balance  -     Static Sitting Balance independent  -     Static Standing Balance standby assist  -[facility] Name 02/17/23 1449          Sensory Assessment (Somatosensory)    Sensory Assessment (Somatosensory) sensation intact  -           User Key  (r) = Recorded By, (t) = Taken By, (c) = Cosigned By    Initials Name Provider Type    Patito Diaz, PT Physical Therapist                Goals/Plan     [facility] Name 02/17/23 1452          Bed Mobility Goal 1 (PT)    Activity/Assistive Device (Bed Mobility Goal 1, PT) bed mobility activities, all  -MAX     [place] Level/Cues Needed (Bed Mobility Goal 1, PT) independent  -MAX     Time Frame (Bed Mobility Goal 1, PT) long term goal (LTG);2 weeks  -[facility] Name 02/17/23 1452          Transfer Goal 1 (PT)    Activity/Assistive Device (Transfer Goal 1, PT) sit-to-stand/stand-to-sit;bed-to-chair/chair-to-bed  -MAX     [place] Level/Cues Needed (Transfer Goal 1, PT) modified independence  -MAX     Time Frame (Transfer Goal 1, PT) long term goal (LTG);2 weeks  -[facility] Name 02/17/23 1452          Gait Training Goal 1 (PT)    Activity/Assistive Device (Gait Training Goal 1, PT) gait (walking locomotion)  -     [place] Level (Gait Training Goal 1, PT) modified independence  -MAX     Distance (Gait Training Goal 1, PT) 50ft  -MAX     Time Frame (Gait Training Goal 1, PT) long term goal (LTG);2 weeks  -[facility] Name 02/17/23 1452          Therapy Assessment/Plan (PT)    Planned Therapy Interventions (PT) balance training;bed mobility training;gait training;home exercise program;patient/family education;ROM (range of motion);strengthening;transfer training  -           User Key  (r) = Recorded By, (t) = Taken By, (c) = Cosigned By    Initials Name Provider Type    Patito Diaz, PT Physical Therapist               Clinical Impression     Row Name 02/17/23 1451          Pain    Pretreatment Pain Rating 0/10 - no pain  -     Posttreatment Pain Rating 7/10  -     Pain Location - Side/Orientation Right  -     Pain Location - knee  -     Pre/Posttreatment Pain Comment Pt reports no pain in supine  -     Pain Intervention(s) Repositioned  -     Row Name 02/17/23 1451          Plan of Care Review    Plan of Care Reviewed With patient  -     Outcome Evaluation Pt is a 77 y/o female who presents to [facility] with reports of R knee pain.  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 Pt is s/p R knee injection 2/17. BLE WBAT. Pt reports no recent falls and that her R knee pain has just progressively worsened in the past few days. At [facility] pt lives alone, but is planing to return home with her dght. Her dght lives in a [place] and has no steps to enter her home. Pt was independent, previously and did not utilize an AD for household ambulation. DME available: Power w/c, rollator, cane. At this time pt requires min A for bed mobility, CGA for transfers, and CGA for ambulation of 35ft x2 w/ RW. Pt with no R knee buckle and steady with turns. She presents with functional deficits in gait w/ decreased ability for R weight shift, decreased standing balance, and decreased activity tolerance due to increased R knee pain. Discussed benefits of RW vs rollator and recommended use of RW upon return home with pt in agreeance. Pt will need RW at discharge. Dght will be home this weekend and pt reporting she has supportive family/friends. Recommend return home with 24/7 supervision, and HHPT, initially, progressing to OP PT as appropriate.  -     Row Name 02/17/23 1451          Therapy Assessment/Plan (PT)    Criteria for Skilled Interventions Met (PT) yes;skilled treatment is necessary  -     Therapy Frequency (PT) 5 times/wk  -     Predicted Duration of Therapy Intervention (PT) Until d/c  -     Row Name 02/17/23 1451          Vital Signs    O2 Delivery Pre Treatment room air  -     O2 Delivery Intra Treatment room air  -     O2 Delivery Post Treatment room air  -     Row Name 02/17/23 1451          Positioning and Restraints    Pre-Treatment Position in bed  -     Post Treatment Position bed  -MAX     In Bed notified nsg;exit alarm on;with family/caregiver;call light within reach;encouraged to call for assist  -MAX           User Key  (r) = Recorded By, (t) = Taken By,  (c) = Cosigned By    Initials Name Provider Type    Patito Diaz, PT Physical Therapist               Outcome Measures     Row Name 02/17/23 1453 02/17/23 0735       How much help from another person do you currently need...    Turning from your back to your side while in flat bed without using bedrails? 4  -MAX 3  -GK    Moving from lying on back to sitting on the side of a flat bed without bedrails? 3  -MAX 3  -GK    Moving to and from a bed to a chair (including a wheelchair)? 3  -MAX 2  -GK    Standing up from a chair using your arms (e.g., wheelchair, bedside chair)? 3  -MAX 2  -GK    Climbing 3-5 steps with a railing? 2  -MAX 2  -GK    To walk in hospital room? 3  -MAX 2  -GK    AM-PAC 6 Clicks Score (PT) 18  -MAX 14  -GK    Highest level of mobility 6 --> Walked 10 steps or more  -MAX 4 --> Transferred to chair/commode  -    Row Name 02/17/23 1453          Functional Assessment    Outcome Measure Options AM-PAC 6 Clicks Basic Mobility (PT)  -MAX           User Key  (r) = Recorded By, (t) = Taken By, (c) = Cosigned By    Initials Name Provider Type    Anny Santillan, RN Registered Nurse    Patito Diaz, MATEUS Physical Therapist                             Physical Therapy Education     Title: PT OT SLP Therapies (Done)     Topic: Physical Therapy (Done)     Point: Mobility training (Done)     Learning Progress Summary           Patient Acceptance, E,TB, VU by MAX at 2/17/2023 1453                   Point: Home exercise program (Done)     Learning Progress Summary           Patient Acceptance, E,TB, VU by MAX at 2/17/2023 1453                   Point: Body mechanics (Done)     Learning Progress Summary           Patient Acceptance, E,TB, VU by MAX at 2/17/2023 1453                   Point: Precautions (Done)     Learning Progress Summary           Patient Acceptance, E,TB, VU by MAX at 2/17/2023 1453                               User Key     Initials Effective Dates Name Provider Type Monica SANTANA  08/23/21 -  Patito Yang, PT Physical Therapist PT              PT Recommendation and Plan  Planned Therapy Interventions (PT): balance training, bed mobility training, gait training, home exercise program, patient/family education, ROM (range of motion), strengthening, transfer training  Plan of Care Reviewed With: patient  Outcome Evaluation: Pt is a 79 y/o female who presents to [facility] with reports of R knee pain. 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 Pt is s/p R knee injection 2/17. BLE WBAT. Pt reports no recent falls and that her R knee pain has just progressively worsened in the past few days. At [facility] pt lives alone, but is planing to return home with her dght. Her dght lives in a H and has no steps to enter her home. Pt was independent, previously and did not utilize an AD for household ambulation. DME available: Power w/c, rollator, cane. At this time pt requires min A for bed mobility, CGA for transfers, and CGA for ambulation of 35ft x2 w/ RW. Pt with no R knee buckle and steady with turns. She presents with functional deficits in gait w/ decreased ability for R weight shift, decreased standing balance, and decreased activity tolerance due to increased R knee pain. Discussed benefits of RW vs rollator and recommended use of RW upon return home with pt in agreeance. Pt will need RW at discharge. Dght will be home this weekend and pt reporting she has supportive family/friends. Recommend return home with 24/7 supervision, and HHPT, initially, progressing to OP PT as appropriate.     Time Calculation:    PT Charges     Row Name 02/17/23 7640             Time Calculation    Start Time 1345  -MAX      Stop Time 1417  -MAX      Time Calculation (min) 32 min  -MAX      PT Received On 02/17/23  -MAX      PT - Next Appointment 02/19/23  -MAX      PT Goal Re-Cert Due Date 03/03/23  -MAX            User Key  (r) = Recorded By,  (t) = Taken By, (c) = Cosigned By    Initials Name Provider Type    Patito Diaz, PT Physical Therapist              Therapy Charges for Today     Code Description Service Date Service Provider Modifiers Qty    [id number] HC PT EVAL LOW COMPLEXITY 4 2/17/2023 Patito Yang, PT GP 1          PT G-Codes  Outcome Measure Options: AM-PAC 6 Clicks Basic Mobility (PT)  AM-PAC 6 Clicks Score (PT): 18  PT Discharge Summary  Anticipated Discharge Disposition (PT): home with 24/7 care, home with home health (HHPT transitioning into OP PT as appropriate)    Patito Yang, PT  2/17/2023

## 2023-02-17 NOTE — DISCHARGE SUMMARY
[facility]    Leonor Adam MD    CHIEF COMPLAINT:     Knee Pain     HISTORY OF PRESENT ILLNESS:    HPI    Patient is a 78 year older female PMH significant for obesity, arthritis, CAD, depression, hyperlipidemia who presents to the ED with complaints of acute severe right knee pain.  Patient states the pain started around 1700 today when getting up from her chair she felt a sharp pain.  Patient states she cannot ambulate now secondary to the pain.  Patient states she typically ambulates unassisted but does use a cane every now and then.  Patient states she was reported for a right total knee replacement but was told she needed to lose weight prior to surgery.  Patient was seen at [facility] in [address] earlier today had x-ray obtained which showed a knee effusion but no fracture or dislocation.  Either family or facility contacted patient's orthopedist Dr. Thorpe who recommended the patient come to this facility so he could evaluate her if her pain continued.  Patient states her pain is continued and she has not been able to bear weight on her right leg ever since.  She states the pain is nonradiating from her knee.  She denies any significant chest pain or shortness of breath.  No redness or warmth of her right lower extremity no fever.    Past Medical History:   Diagnosis Date   • Ankle pain    • Arthritis    • Cancer (HCC)     uterine, had hysterectomy 2018   • Coronary artery disease    • Depression    • Gout    • Hyperglycemia    • Hyperlipidemia    • Low back pain    • Morbid obesity (HCC)    • Sleep apnea    • Vitamin D deficiency      Past Surgical History:   Procedure Laterality Date   • ANKLE FUSION      2017-left   • CARDIAC CATHETERIZATION     • CARDIAC CATHETERIZATION Right 8/29/2022    Procedure: Left Heart Cath;  Surgeon: Ratna Zavala MD;  Location:  INVASIVE LOCATION;  Service: Cardiovascular;  Laterality: Right;   • CARPAL TUNNEL RELEASE      • CATARACT EXTRACTION     • CUBITAL TUNNEL RELEASE     • HYSTERECTOMY     • REPLACEMENT TOTAL KNEE      left 2008   • ROTATOR CUFF REPAIR       Family History   Problem Relation Age of Onset   • Hypertension Mother    • Stroke Father    • Arthritis Brother    • Cancer Brother      Social History     Tobacco Use   • Smoking status: Never   • Smokeless tobacco: Never   Vaping Use   • Vaping Use: Never used   Substance Use Topics   • Alcohol use: No   • Drug use: No     Medications Prior to Admission   Medication Sig Dispense Refill Last Dose   • allopurinol (ZYLOPRIM) 300 MG tablet TAKE 1 TABLET BY MOUTH EVERY DAY 90 tablet 2 2/16/2023   • amLODIPine (NORVASC) 5 MG tablet TAKE 1 TABLET BY MOUTH EVERY DAY 90 tablet 3 2/16/2023   • atorvastatin (LIPITOR) 20 MG tablet TAKE 1 TABLET BY MOUTH EVERYDAY AT BEDTIME 90 tablet 3 2/16/2023   • bumetanide (BUMEX) 2 MG tablet TAKE 1/2 TABLET BY MOUTH EVERY DAY 45 tablet 2 2/16/2023   • cetirizine (zyrTEC) 10 MG tablet Take 1 tablet by mouth Daily.   2/16/2023   • chlorhexidine (PERIDEX) 0.12 % solution SWISH 1/2 OZ FOR 30 SECONDS ONE TIME A DAY THEN SPIT   2/16/2023   • cyanocobalamin (VITAMIN B-12) 1000 MCG tablet Take 1 tablet by mouth Daily.   2/16/2023   • escitalopram (LEXAPRO) 20 MG tablet TAKE 1 TABLET BY MOUTH EVERY DAY 90 tablet 2 2/16/2023   • levothyroxine (SYNTHROID, LEVOTHROID) 88 MCG tablet TAKE 1 TABLET BY MOUTH EVERY DAY 90 tablet 3 2/16/2023   • metoprolol succinate XL (TOPROL-XL) 25 MG 24 hr tablet Take 1 tablet by mouth Every Night. 90 tablet 3 2/16/2023   • montelukast (SINGULAIR) 10 MG tablet Take 10 mg by mouth Daily.   2/16/2023   • Multiple Vitamins-Minerals (CENTRUM SILVER ADULT 50+) tablet Take 1 tablet by mouth Daily.   2/16/2023   • rivaroxaban (XARELTO) 15 MG tablet Take 1 tablet by mouth Daily With Dinner. Indications: Atrial Fibrillation 90 tablet 2 2/16/2023   • sorbitol 70 % solution solution See Admin Instructions.   2/16/2023   • spironolactone  (ALDACTONE) 25 MG tablet Take 0.5 tablets by mouth Daily. 45 tablet 3 2/16/2023   • Vitamin D, Cholecalciferol, 25 MCG (1000 UT) tablet Take 1 tablet by mouth Daily.   2/16/2023   • losartan (COZAAR) 25 MG tablet  (Patient not taking: Reported on 2/17/2023)   Not Taking     Allergies:  Celecoxib    Immunization History   Administered Date(s) Administered   • COVID-19 (PFIZER) PURPLE CAP 01/18/2021, 02/08/2021, 12/29/2021   • FLUAD TRI 65YR+ 11/07/2013   • Flu Vaccine Intradermal Quad 18-64YR 11/07/2013   • Fluad Quad 65+ 09/17/2020, 11/18/2022   • Fluzone High Dose =>65 Years ([facility] ONLY) 01/13/2016, 10/17/2016, 11/21/2017, 09/13/2018, 12/06/2019   • Fluzone High-Dose 65+yrs 10/08/2021   • Fluzone Quad >6mos (Multi-dose) 12/02/2014   • Influenza Quad Vaccine (Inpatient) 12/02/2014   • Influenza, Unspecified 11/07/2013   • Pneumococcal Conjugate 13-Valent (PCV13) 06/16/2016   • Pneumococcal Polysaccharide (PPSV23) 01/23/2018   • Shingrix 09/18/2019, 12/06/2019   • Tdap 01/23/2018, 01/23/2018           REVIEW OF SYSTEMS:    Review of Systems   Constitutional: Positive for malaise/fatigue.   HENT: Negative.    Eyes: Negative.    Cardiovascular: Negative.    Respiratory: Negative.    Endocrine: Negative.    Hematologic/Lymphatic: Negative.    Skin: Negative.    Musculoskeletal: Positive for arthritis, joint pain and joint swelling.   Gastrointestinal: Negative.    Genitourinary: Negative.    Neurological: Negative.    Psychiatric/Behavioral: Negative.    Allergic/Immunologic: Negative.        Vital Signs  Temp:  [97.6 °F (36.4 °C)-99 °F (37.2 °C)] 98.6 °F (37 °C)  Heart Rate:  [75-87] 87  Resp:  [16-20] 18  BP: (119-160)/(54-79) 128/67          Physical Exam:  Physical Exam  Vitals and nursing note reviewed.   Constitutional:       Appearance: Normal appearance. She is obese.   HENT:      Head: Normocephalic and atraumatic.      Right Ear: External ear normal.      Left Ear: External ear normal.      Nose: Nose  normal.      Mouth/Throat:      Mouth: Mucous membranes are moist.      Pharynx: Oropharynx is clear.   Eyes:      Extraocular Movements: Extraocular movements intact.      Conjunctiva/sclera: Conjunctivae normal.      Pupils: Pupils are equal, round, and reactive to light.   Cardiovascular:      Rate and Rhythm: Normal rate and regular rhythm.      Pulses: Normal pulses.   Pulmonary:      Effort: Pulmonary effort is normal.      Breath sounds: Normal breath sounds.   Abdominal:      General: Bowel sounds are normal.      Palpations: Abdomen is soft.   Musculoskeletal:         General: Swelling and tenderness present.      Cervical back: Normal range of motion.   Skin:     General: Skin is warm.      Capillary Refill: Capillary refill takes less than 2 seconds.   Neurological:      Mental Status: She is alert and oriented to person, place, and time.      Motor: Weakness present.      Gait: Gait abnormal.   Psychiatric:         Mood and Affect: Mood normal.         Behavior: Behavior normal.         Thought Content: Thought content normal.         Judgment: Judgment normal.         Emotional Behavior:    WNL   Debilities:   walker  Results Review:    I reviewed the patient's new clinical results.  Lab Results (most recent)     Procedure Component Value Units Date/Time    C-reactive Protein [[id number]]  (Abnormal) Collected: 02/17/23 0547    Specimen: Blood Updated: 02/17/23 0828     C-Reactive Protein 0.62 mg/dL     BNP [[id number]]  (Normal) Collected: 02/17/23 0547    Specimen: Blood Updated: 02/17/23 0751     proBNP 309.3 pg/mL     Narrative:      Among patients with dyspnea, NT-proBNP is highly sensitive for the detection of acute congestive heart failure. In addition NT-proBNP of <300 pg/ml effectively rules out acute congestive heart failure with 99% negative predictive value.    Results may be falsely decreased if patient taking Biotin.      Basic Metabolic Panel [[id number]]  (Abnormal) Collected: 02/17/23 0547     Specimen: Blood Updated: 02/17/23 0703     Glucose 148 mg/dL      BUN 26 mg/dL      Creatinine 1.05 mg/dL      Sodium 137 mmol/L      Potassium 4.4 mmol/L      Chloride 102 mmol/L      CO2 24.0 mmol/L      Calcium 9.8 mg/dL      BUN/Creatinine Ratio 24.8     Anion Gap 11.0 mmol/L      eGFR 55.2 mL/min/1.73     Narrative:      GFR Normal >60  Chronic Kidney Disease <60  Kidney Failure <15    The GFR formula is only valid for adults with stable renal function between ages 18 and 70.    CBC Auto Differential [[id number]]  (Abnormal) Collected: 02/17/23 0547    Specimen: Blood Updated: 02/17/23 0641     WBC 7.70 10*3/mm3      RBC 3.45 10*6/mm3      Hemoglobin 11.3 g/dL      Hematocrit 34.6 %      .3 fL      MCH 32.7 pg      MCHC 32.6 g/dL      RDW 15.8 %      RDW-SD 55.1 fl      MPV 7.5 fL      Platelets 227 10*3/mm3      Neutrophil % 89.7 %      Lymphocyte % 9.3 %      Monocyte % 0.7 %      Eosinophil % 0.0 %      Basophil % 0.3 %      Neutrophils, Absolute 6.90 10*3/mm3      Lymphocytes, Absolute 0.70 10*3/mm3      Monocytes, Absolute 0.10 10*3/mm3      Eosinophils, Absolute 0.00 10*3/mm3      Basophils, Absolute 0.00 10*3/mm3      nRBC 0.1 /100 WBC           Imaging Results (Most Recent)     Procedure Component Value Units Date/Time    XR Knee 1 or 2 View Right [[id number]] Collected: 02/17/23 1047     Updated: 02/17/23 1054    Narrative:      XR KNEE 1 OR 2 VW RIGHT    Date of Exam: 2/17/2023 9:00 AM EST    Indication: acute pain.    Comparison: None available.    Findings:  There is severe tricompartmental arthritic change. There are calcific changes of both the medial and lateral menisci. Marginal osteophytes are seen of the medial and lateral femoral condyles and the medial and lateral portions of the tibial plateau.   There is a moderate-sized suprapatellar joint effusion. 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      Impression:      Impression:  Severe  arthritic change of the knee. No acute bony abnormality. Calcific changes of the bilateral menisci is likely secondary to CPPD arthropathy. 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    Electronically Signed: Pancho Jeffrey    2/17/2023 10:52 AM EST    Workstation ID: [id number]    MRI Knee Right Without Contrast [[id number]] Collected: 02/17/23 0914     Updated: 02/17/23 0919    Narrative:      MRI KNEE RIGHT  WO CONTRAST    Date of Exam: 2/17/2023 7:58 AM EST    Indication: Knee pain, chronic, degenerative disease on xray (Age >= 5y).     Comparison: 2/17/2023    Technique:  Routine multiplanar/multisequence images of the right knee were obtained without contrast administration.    Findings:  Soft Tissue:   There is no significant soft tissue swelling.    Effusion:  There is a large joint effusion.  There is a very large partially ruptured Baker's cyst.    Ligaments:  The iliotibial band, lateral collateral ligament, posterior cruciate ligament, and tendons of the extensor mechanism appear grossly intact. There is thickening of the morphology of the proximal medial collateral ligament consistent with remote   trauma. There is a complete tear of the anterior cruciate ligament.    Menisci:  There is complex tearing of the entire medial meniscus with a macerated appearance with extrusion. There is degeneration of the lateral meniscus with a complex tear of the posterior horn and body with oblique and horizontal components present. No   significant intra-articular body identified.    Cartilage:  Severe tricompartmental osteoarthritic changes are present. Diffuse full-thickness cartilage loss is present within the medial compartment with reactive edema seen within the medial tibial plateau and the medial femoral condyle. Patchy edema is present   within the lateral femoral condyle with mild flattening and irregularity of the articular surface (series 9  image 10). Full-thickness cartilage loss is seen along the lateral facet and medial facet of the patellar cartilage extending along the trochlea.    Bone:   Bone marrow signal intensity is otherwise within normal limits for age.     Muscles:  Muscles demonstrate normal morphology and signal intensity.        Impression:      Impression:    1. Severe tricompartmental osteoarthritis.  2. Complete tear of the anterior cruciate ligament.  3. Complex tearing of the entire medial meniscus with extrusion.  4. Degeneration of the lateral meniscus with small complex tear of the body and the posterior horn.  5. Large joint effusion with a very large partially ruptured Baker's cyst.    Electronically Signed: Elizabeth Patel    2/17/2023 9:17 AM EST    Workstation ID: [id number]        reviewed    ECG/EMG Results (most recent)     None        reviewed    Results for orders placed during the hospital encounter of 01/09/23    Duplex Carotid Ultrasound CAR    Interpretation Summary  •  Proximal right internal carotid artery plaque without significant stenosis.  •  Proximal left internal carotid artery plaque without significant stenosis.      Results for orders placed during the hospital encounter of 08/12/22    Adult Transthoracic Echo Complete W/ Cont if Necessary Per Protocol    Interpretation Summary  · Estimated left ventricular EF was in agreement with the calculated left ventricular EF. Left ventricular ejection fraction appears to be 51 - 55%. Left ventricular systolic function is low normal.  · Left ventricular diastolic function is consistent with (grade Ia w/high LAP) impaired relaxation.  · Estimated right ventricular systolic pressure from tricuspid regurgitation is normal (<35 mmHg).      Microbiology Results (last 10 days)     ** No results found for the last 240 hours. **          Assessment & Plan     Knee pain     Knee Pain   -X-ray of right knee obtained at outlying facility showed knee effusion but no fracture  dislocation  -Orthopedic consult  -Kenalog injection to right knee at bedside  -Physical therapy consult evaluation    Hypertension   -Continue amlodipine, Bumex, metoprolol    Generalized anxiety disorder  -Continue Lexapro    Dyslipidemia  -Continue atorvastatin    Hypothyroidism  -Continue levothyroxine    I discussed the patients findings and my recommendations with patient and family.     Discharge Diagnosis:      Knee pain      Hospital Course  Patient is a 78 y.o. female presented with right knee pain.  Patient stated after coming to the grocery store she developed increased right knee pain.  Patient has been seen by Dr. Thorpe outpatient and he advised patient to come to ED for evaluation.  X-ray outlying facility of right knee showed knee effusion but no fracture or dislocation.  Patient evaluated by Dr. Thorpe who administered Kenalog injection to right knee at bedside.  Patient given analgesics and muscle relaxers as needed for pain.  Patient evaluated by physical therapy and able to ambulate with walker.  Home health physical therapy initially and transition to outpatient physical therapy advised.  Patient to ice and elevate extremity.  Patient to take medication as prescribed.  Patient to follow-up with Dr. Thorpe in 6 weeks.  Patient to follow PCP in 1 week for continued care management.  Tests and recommendations reviewed with patient and family and they agree with treatment plan.  If symptoms worsen patient call 911 or go to nearest ED.    Past Medical History:     Past Medical History:   Diagnosis Date   • Ankle pain    • Arthritis    • Cancer (HCC)     uterine, had hysterectomy 2018   • Coronary artery disease    • Depression    • Gout    • Hyperglycemia    • Hyperlipidemia    • Low back pain    • Morbid obesity (HCC)    • Sleep apnea    • Vitamin D deficiency        Past Surgical History:     Past Surgical History:   Procedure Laterality Date   • ANKLE FUSION      2017-left   • CARDIAC CATHETERIZATION      • CARDIAC CATHETERIZATION Right 8/29/2022    Procedure: Left Heart Cath;  Surgeon: Ratna Zavala MD;  Location: [facility] CATH INVASIVE LOCATION;  Service: Cardiovascular;  Laterality: Right;   • CARPAL TUNNEL RELEASE     • CATARACT EXTRACTION     • CUBITAL TUNNEL RELEASE     • HYSTERECTOMY     • REPLACEMENT TOTAL KNEE      left 2008   • ROTATOR CUFF REPAIR         Social History:   Social History     Socioeconomic History   • Marital status:    Tobacco Use   • Smoking status: Never   • Smokeless tobacco: Never   Vaping Use   • Vaping Use: Never used   Substance and Sexual Activity   • Alcohol use: No   • Drug use: No   • Sexual activity: Not Currently       Procedures Performed         Consults:   Consults     Date and Time Order Name Status Description    2/17/2023 12:34 AM Inpatient Orthopedic Surgery Consult Completed     2/17/2023 12:34 AM Ortho (on-call MD unless specified)            Condition on Discharge:     Stable    Discharge Disposition  [facility]-Health [facility]    Discharge Medications     Discharge Medications      New Medications      Instructions Start Date   methocarbamol 750 MG tablet  Commonly known as: ROBAXIN   750 mg, Oral, 4 Times Daily         Continue These Medications      Instructions Start Date   allopurinol 300 MG tablet  Commonly known as: ZYLOPRIM   TAKE 1 TABLET BY MOUTH EVERY DAY      amLODIPine 5 MG tablet  Commonly known as: NORVASC   TAKE 1 TABLET BY MOUTH EVERY DAY      atorvastatin 20 MG tablet  Commonly known as: LIPITOR   TAKE 1 TABLET BY MOUTH EVERYDAY AT BEDTIME      bumetanide 2 MG tablet  Commonly known as: BUMEX   TAKE 1/2 TABLET BY MOUTH EVERY DAY      Centrum Silver Adult 50+ tablet tablet  Generic drug: multivitamin with minerals   1 tablet, Oral, Daily      cetirizine 10 MG tablet  Commonly known as: zyrTEC   1 tablet, Oral, Daily      chlorhexidine 0.12 % solution  Commonly known as: PERIDEX   SWISH 1/2 OZ FOR 30 SECONDS ONE TIME A DAY THEN SPIT       cholecalciferol 25 MCG (1000 UT) tablet  Commonly known as: VITAMIN D3   1 tablet, Oral, Daily      cyanocobalamin 1000 MCG tablet  Commonly known as: VITAMIN B-12   1 tablet, Oral, Daily      escitalopram 20 MG tablet  Commonly known as: LEXAPRO   TAKE 1 TABLET BY MOUTH EVERY DAY      levothyroxine 88 MCG tablet  Commonly known as: SYNTHROID, LEVOTHROID   TAKE 1 TABLET BY MOUTH EVERY DAY      metoprolol succinate XL 25 MG 24 hr tablet  Commonly known as: TOPROL-XL   25 mg, Oral, Nightly      montelukast 10 MG tablet  Commonly known as: SINGULAIR   10 mg, Oral, Daily      rivaroxaban 15 MG tablet  Commonly known as: XARELTO   15 mg, Oral, Daily With Dinner      sorbitol 70 % solution solution   See Admin Instructions      spironolactone 25 MG tablet  Commonly known as: ALDACTONE   12.5 mg, Oral, Daily         ASK your doctor about these medications      Instructions Start Date   losartan 25 MG tablet  Commonly known as: COZAAR   No dose, route, or frequency recorded.             Discharge Diet:   Diet Instructions     Diet: Cardiac      Discharge Diet: Cardiac          Activity at Discharge:   Activity Instructions     Activity as Tolerated      Measure Blood Pressure            Follow-up Appointments  Future Appointments   Date Time Provider Department Center   3/2/2023 11:00 AM [facility]   3/13/2023  9:15 AM STEPHANIE Kelley DPM [facility]   3/17/2023 10:30 AM Leonor Michel MD [facility]   4/12/2023  2:00 PM Ratna Zavala MD [facility]     Additional Instructions for the Follow-ups that You Need to Schedule     Ambulatory Referral to Home Health   As directed      Face to Face Visit Date: 2/17/2023    Follow-up provider for Plan of Care?: I treated the patient in an acute care facility and will not continue treatment after discharge.    Follow-up provider: LEONOR MICHEL [[id number]]    Reason/Clinical Findings: Right knee mobility and injury,  osteoarthritis    Describe mobility limitations that make leaving home difficult: right knee mobility    Nursing/Therapeutic Services Requested: Physical Therapy    PT orders: Strengthening    Frequency: 1 Week 1         Ambulatory Referral to Physical Therapy Evaluate and treat, Ortho   As directed      Specialty needed: Evaluate and treat Ortho    Follow-up needed: Yes         Discharge Follow-up with PCP   As directed       Currently Documented PCP:    Leonor Adam MD    PCP Phone Number:    [phone]     Follow Up Details: 7-10 days               Test Results Pending at Discharge       Risk for Readmission (LACE) Score: 5 (2/17/2023  6:00 AM)          CAROL Ann  02/17/23  15:26 EST

## 2023-02-17 NOTE — PLAN OF CARE
Goal Outcome Evaluation:     Pt is a 79 y/o female who presents to [facility] with reports of R knee pain. 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 Pt is s/p R knee injection 2/17. BLE WBAT. Pt reports no recent falls and that her R knee pain has just progressively worsened in the past few days. At [facility] pt lives alone, but is planing to return home with her dght. Her dght lives in a H and has no steps to enter her home. Pt was independent, previously and did not utilize an AD for household ambulation. DME available: Power w/c, rollator, cane. At this time pt requires min A for bed mobility, CGA for transfers, and CGA for ambulation of 35ft x2 w/ RW. Pt with no R knee buckle and steady with turns. She presents with functional deficits in gait w/ decreased ability for R weight shift, decreased standing balance, and decreased activity tolerance due to increased R knee pain. Discussed benefits of RW vs rollator and recommended use of RW upon return home with pt in agreeance. Pt will need RW at discharge. Dght will be home this weekend and pt reporting she has supportive family/friends. Recommend return home with 24/7 supervision, and HHPT, initially, progressing to OP PT as appropriate.

## 2023-02-17 NOTE — CASE MANAGEMENT/SOCIAL WORK
Continued Stay Note   Geovanni     Patient Name: Faby Le  MRN: [id number]  Today's Date: 2/17/2023    Admit Date: 2/16/2023    Plan: Home with daughter. [facility] pending acceptance, order placed. Rolling walker delivered via bedside from [facility].   Discharge Plan     Row Name 02/17/23 1604       Plan    Plan Home with daughter. [facility] pending acceptance, order placed. Rolling walker delivered via bedside from [facility].    Plan Comments CM asked ck April for update, per liaison, running patient's insurance. Liaison aware HH order in. CM will follow up Monday to make sure HH accepted. Rolling walker delivered to patient's bedside from [facility].    Row Name 02/17/23 1440       Plan    Plan Home with daughter. [facility] pending acceptance. [facility] referral for RW pending.    Provided Post Acute Provider List? Yes    Post Acute Provider List Home Health    Delivered To Patient;Support Person    Method of Delivery Telephone    Plan Comments Patient will need rolling walker. CM sent referral to [facility], message sent to rep Ochoa, pending order from NP. Per PT, recommending HH. CM asked patient who she would like, patient stated [facility]. CM sent referral and spoke to Sonja who stated they are not taking new referrals. CM called patient to inform, patient asked for CM to call daughter to discuss. CM called diana Chaidez, per daughter would like to try [facility]. CM sent referral, message sent to ck Darby, per liaison patient's insurance O[employer]. CM informed daughter, per daughter, CM can send referral to any HH agency. CM sent referral to [facility], message sent to ck Obrien, pending acceptance.              Phone communication or documentation only - no physical contact with patient or family.        Mandi Paulino, CHRISTOPHE

## 2023-02-17 NOTE — OP NOTE
Procedure Note    Faby Le  Preoperative diagnosis degenerative arthritis of right knee with inflammation  Postoperative diagnosis: The same plus  R knee pain, acute    Anesthesia: Bed side procedure with local Lidocaine infiltration.     Staff: Delfin Thorpe MD    Specimens:                None  Complications drains transfusions none.  Estimated blood loss.  0.        Procedure:   Verbal consent was obtained. Allergies verified.   Right knee was prepped and  using aseptic precautions, 0.25% Marcaine was infiltrated into the skin and subcutaneous tissue and capsule  to achieve local analgesia.  Kenalog 80 mg   was injected intraarticular.   Tolerated the procedure well, no complications.     Recommendations #1 physical therapy #2 we will see in the office in 6 weeks.    Delfin Thorpe MD     Date: 2/17/2023  Time: 12:24 EST

## 2023-02-17 NOTE — ED PROVIDER NOTES
Subjective   History of Present Illness  Patient is a 78 year older female PMH significant for obesity, arthritis, CAD, depression, hyperlipidemia who presents to the ED with complaints of acute severe right knee pain.  Patient states the pain started around 1700 today when getting up from her chair she felt a sharp pain.  Patient states she cannot ambulate now secondary to the pain.  Patient states she typically ambulates unassisted but does use a cane every now and then.  Patient states she was reported for a right total knee replacement but was told she needed to lose weight prior to surgery.  Patient was seen at [facility] in [place] earlier today had x-ray obtained which showed a knee effusion but no fracture or dislocation.  Either family or facility contacted patient's orthopedist Dr. Thorpe who recommended the patient come to this facility so he could evaluate her if her pain continued.  Patient states her pain is continued and she has not been able to bear weight on her right leg ever since.  She states the pain is nonradiating from her knee.  She denies any significant chest pain or shortness of breath.  No redness or warmth of her right lower extremity no fever.        Review of Systems   Constitutional: Negative.    Respiratory: Negative.    Cardiovascular: Negative.    Gastrointestinal: Negative for abdominal pain, nausea and vomiting.   Musculoskeletal: Positive for arthralgias, gait problem and joint swelling.   Skin: Negative.    Neurological: Negative for dizziness, seizures, syncope, light-headedness and numbness.       Past Medical History:   Diagnosis Date   • Ankle pain    • Arthritis    • Cancer (HCC)     uterine, had hysterectomy 2018   • Coronary artery disease    • Depression    • Gout    • Hyperglycemia    • Hyperlipidemia    • Low back pain    • Morbid obesity (HCC)    • Sleep apnea    • Vitamin D deficiency        Allergies   Allergen Reactions   • Celecoxib Itching     swelling        Past Surgical History:   Procedure Laterality Date   • ANKLE FUSION      2017-left   • CARDIAC CATHETERIZATION     • CARDIAC CATHETERIZATION Right 8/29/2022    Procedure: Left Heart Cath;  Surgeon: Ratna Zavala MD;  Location: [facility] CATH INVASIVE LOCATION;  Service: Cardiovascular;  Laterality: Right;   • CARPAL TUNNEL RELEASE     • CATARACT EXTRACTION     • CUBITAL TUNNEL RELEASE     • HYSTERECTOMY     • REPLACEMENT TOTAL KNEE      left 2008   • ROTATOR CUFF REPAIR         Family History   Problem Relation Age of Onset   • Hypertension Mother    • Stroke Father    • Arthritis Brother    • Cancer Brother        Social History     Socioeconomic History   • Marital status:    Tobacco Use   • Smoking status: Never   • Smokeless tobacco: Never   Vaping Use   • Vaping Use: Never used   Substance and Sexual Activity   • Alcohol use: No   • Drug use: No   • Sexual activity: Not Currently           Objective   Physical Exam  Vitals and nursing note reviewed.   Constitutional:       General: She is not in acute distress.     Appearance: She is well-developed. She is obese. She is not ill-appearing, toxic-appearing or diaphoretic.   HENT:      Head: Normocephalic and atraumatic.      Mouth/Throat:      Mouth: Mucous membranes are moist.      Pharynx: Oropharynx is clear.   Eyes:      General: No scleral icterus.     Extraocular Movements: Extraocular movements intact.      Pupils: Pupils are equal, round, and reactive to light.   Cardiovascular:      Rate and Rhythm: Normal rate and regular rhythm.      Heart sounds: No murmur heard.    No friction rub. No gallop.   Pulmonary:      Effort: Pulmonary effort is normal. No respiratory distress.      Breath sounds: Normal breath sounds. No stridor. No wheezing, rhonchi or rales.   Chest:      Chest wall: No tenderness.   Musculoskeletal:      Right hip: No tenderness or bony tenderness.      Right upper leg: No tenderness or bony tenderness.      Right  "knee: Swelling and bony tenderness present. No deformity, effusion, erythema or ecchymosis. Decreased range of motion. Tenderness present over the medial joint line.      Right lower leg: No tenderness or bony tenderness.      Right foot: Normal capillary refill. No tenderness. Normal pulse.      Comments: Peripheral pulses are intact compartments are soft of bilateral lower extremities.  Significant decreased range of motion about the right knee with flexion and extension secondary to pain.  Normal joint laxity with varus valgus stress.  Tenderness along the anterior and medial aspect of the right knee.  Patient unable to do straight leg raise secondary to knee pain   Skin:     General: Skin is warm.      Capillary Refill: Capillary refill takes less than 2 seconds.      Coloration: Skin is not cyanotic, jaundiced or pale.      Findings: No rash.   Neurological:      General: No focal deficit present.      Mental Status: She is alert and oriented to person, place, and time.   Psychiatric:         Mood and Affect: Mood normal.         Behavior: Behavior normal.         Procedures           ED Course      /59   Pulse 83   Temp 98.5 °F (36.9 °C)   Resp 18   Ht 160 cm (63\")   Wt 120 kg (265 lb)   LMP  (LMP Unknown)   SpO2 96%   BMI 46.94 kg/m²   Medications   sodium chloride 0.9 % flush 10 mL (has no administration in time range)   morphine injection 2 mg (2 mg Intravenous Given 2/16/23 2257)   ondansetron (ZOFRAN) injection 4 mg (4 mg Intravenous Given 2/16/23 2258)     Labs Reviewed - No data to display  No radiology results for the last day                                       Medical Decision Making  Chart Review: Patient's ED visit reviewed from [facility] earlier today for reports of right knee pain    Comorbidity: As per past medical history  Differentials: Osteoarthritis, joint effusion, septic arthritis, fracture, dislocation, sprain, strain     ;this list is not all inclusive and does not " constitute the entirety of considered causes    Disposition/Treatment:  Appropriate PPE was worn during exam and throughout all encounters with the patient.  While in the ED patient was given morphine and Zofran for her pain records were obtained from patient's ED visit at [facility] patient did have x-ray at [facility] which showed moderate effusion and osteoarthritis.  No signs of acute fracture or dislocation.  My colleague, GRAHAM Eid in the ED here spoke to orthopedist Dr. Thorpe today who recommended the patient come into the hospital so he can further evaluate her right knee pain tomorrow.  Upon reassessment patient is resting quietly does report improvement of her pain after morphine but states she is still not able to ambulate.  On physical exam there are no signs of secondary infection including cellulitis or signs of septic arthritis.  Due to patient not being able to ambulate she will be placed in observation unit for orthopedist evaluation tomorrow.  Patient and family were in agreement with plan.  Case was also discussed with attending    Acute pain of right knee: acute illness or injury  Suprapatellar effusion of knee: complicated acute illness or injury  Amount and/or Complexity of Data Reviewed  External Data Reviewed: radiology and notes.      Risk  Prescription drug management.  Decision regarding hospitalization.          Final diagnoses:   Acute pain of right knee   Suprapatellar effusion of knee       ED Disposition  ED Disposition     ED Disposition   Decision to Admit    Condition   --    Comment   --             No follow-up provider specified.       Medication List      No changes were made to your prescriptions during this visit.          Emiliano Gaspar PA  02/17/23 0003

## 2023-02-17 NOTE — DISCHARGE PLACEMENT REQUEST
"Mara Le (78 y.o. Female)     Date of Birth   1945    Social Security Number       Address   [address] [place] [address]    Home Phone   [phone]    MRN   [id number]       Sabianism   Gnosticist    Marital Status                               Admission Date   2/16/23    Admission Type   Emergency    Admitting Provider   Pancho Junior MD    Attending Provider   Pancho Junior MD    Department, Room/Bed   [facility] OBSERVATION, 109/1       Discharge Date       Discharge Disposition       Discharge Destination                               Attending Provider: Pancho Junior MD    Allergies: Celecoxib    Isolation: None   Infection: None   Code Status: CPR    Ht: 160 cm (63\")   Wt: 120 kg (265 lb)    Admission Cmt: None   Principal Problem: Knee pain [M25.569]                 Active Insurance as of 2/16/2023     Primary Coverage     Payor Plan Insurance Group Employer/Plan Group    [facility] MEDICARE REPLACEMENT [employer] OPTIONS MEDICARE REPLACEMENT [id number]     Payor Plan Address Payor Plan Phone Number Payor Plan Fax Number Effective Dates    [facility] [phone]  9/1/2021 - None Entered    [address]       [facility] [address]       Subscriber Name Subscriber Birth Date Member ID       MARA LE 1945 [id number]           Secondary Coverage     Payor Plan Insurance Group Employer/Plan Group    [place]      Payor Plan Address Payor Plan Phone Number Payor Plan Fax Number Effective Dates    [address]   7/24/2019 - None Entered    [place] [address]       Subscriber Name Subscriber Birth Date Member ID       MARA LE 1945 [id number]                 Emergency Contacts      (Rel.) Home Phone Work Phone Mobile Phone    FRANCK MARTINEZ (Daughter) [phone] -- [phone]    JANICE VALERIO (Relative) [phone] -- [phone]    ELIDAROSA (Daughter) [phone] -- [phone] "

## 2023-02-17 NOTE — DISCHARGE PLACEMENT REQUEST
"Mara Le (78 y.o. Female)     Date of Birth   1945    Social Security Number       Address   [address] [place] [address]    Home Phone   [phone]    MRN   [id number]       Pentecostal   [employer]    Marital Status                               Admission Date   2/16/23    Admission Type   Emergency    Admitting Provider   Pancho Junior MD    Attending Provider   Pancho Junior MD    Department, Room/Bed   [facility] OBSERVATION, 109/1       Discharge Date       Discharge Disposition       Discharge Destination                               Attending Provider: Pancho Junior MD    Allergies: Celecoxib    Isolation: None   Infection: None   Code Status: CPR    Ht: 160 cm (63\")   Wt: 120 kg (265 lb)    Admission Cmt: None   Principal Problem: Knee pain [M25.569]                 Active Insurance as of 2/16/2023     Primary Coverage     Payor Plan Insurance Group Employer/Plan Group    [facility] MEDICARE REPLACEMENT AARP HEALTH CARE OPTIONS MEDICARE REPLACEMENT [id number]     Payor Plan Address Payor Plan Phone Number Payor Plan Fax Number Effective Dates    [facility] [phone]  9/1/2021 - None Entered    [address]       [facility] [address]       Subscriber Name Subscriber Birth Date Member ID       MARA LE 1945 [id number]           Secondary Coverage     Payor Plan Insurance Group Employer/Plan Group    [place]      Payor Plan Address Payor Plan Phone Number Payor Plan Fax Number Effective Dates    [address]   7/24/2019 - None Entered    [place] [address]       Subscriber Name Subscriber Birth Date Member ID       MARA LE 1945 [id number]                 Emergency Contacts      (Rel.) Home Phone Work Phone Mobile Phone    FRANCK MARTINEZ (Daughter) [phone] -- [phone]    JANICE VALERIO (Relative) [phone] -- [phone]    ELIDAROSA (Daughter) [phone] -- [phone] "               Consult Notes (most recent note)      Delfin Thorpe MD at 02/17/23 0938      Consult Orders    1. Inpatient Orthopedic Surgery Consult [[id number]] ordered by Emiliano Gaspar PA at 02/17/23 0008                   Referring Provider: Emergency room  Reason for Consultation: Right knee pain    Patient Care Team:  Leonor Adam MD as PCP - General  Maggi Horn MD as Consulting Physician (Pain Medicine)  Ratna Zavala MD as Consulting Physician (Cardiology)  Nora Foley APRN as Nurse Practitioner (Gastroenterology)  Gianluca Walton MD as Consulting Physician (Pulmonary Disease)  Nathanael Murillo MD as Consulting Physician (Allergy and Immunology)  Delfin Thorpe MD as Surgeon (Orthopedic Surgery)  Josh Loomis MD as Consulting Physician (Gastroenterology)    Chief complaint pain in right knee    Subjective .     History of present illness: This patient is a 78-year-old female who is well-known to me.  She has had right knee pain for several years on and off.  However yesterday after she came home from the grocery store she developed severe pain was unable to bear weight.  Her daughter took her to the emergency room in [place] where she was evaluated and discharged.  Her daughter then called me and we had her come down to the emergency room here at [place] where she was evaluated and admitted because of severe pain in her right knee with inability to ambulate.  No recent injuries.      History  Past Medical History:   Diagnosis Date   • Ankle pain    • Arthritis    • Cancer (HCC)     uterine, had hysterectomy 2018   • Coronary artery disease    • Depression    • Gout    • Hyperglycemia    • Hyperlipidemia    • Low back pain    • Morbid obesity (HCC)    • Sleep apnea    • Vitamin D deficiency        Past Surgical History:   Procedure Laterality Date   • ANKLE FUSION      2017-left   • CARDIAC CATHETERIZATION     • CARDIAC CATHETERIZATION Right 8/29/2022     "Procedure: Left Heart Cath;  Surgeon: Ratna Zavala MD;  Location: [facility] CATH INVASIVE LOCATION;  Service: Cardiovascular;  Laterality: Right;   • CARPAL TUNNEL RELEASE     • CATARACT EXTRACTION     • CUBITAL TUNNEL RELEASE     • HYSTERECTOMY     • REPLACEMENT TOTAL KNEE      left 2008   • ROTATOR CUFF REPAIR            Scheduled Meds:    allopurinol, 300 mg, Oral, Daily  amLODIPine, 5 mg, Oral, Daily  atorvastatin, 20 mg, Oral, Daily  bumetanide, 1 mg, Oral, Daily  cetirizine, 10 mg, Oral, Daily  dexamethasone, 4 mg, Intravenous, Daily  enoxaparin, 40 mg, Subcutaneous, Q24H  escitalopram, 20 mg, Oral, Daily  levothyroxine, 88 mcg, Oral, Q AM  methocarbamol, 750 mg, Oral, 4x Daily  metoprolol succinate XL, 25 mg, Oral, Nightly  montelukast, 10 mg, Oral, Daily  sodium chloride, 10 mL, Intravenous, Q12H  sodium chloride, 10 mL, Intravenous, Q12H         PRN Meds:   •  acetaminophen  •  polyethylene glycol **AND** bisacodyl **AND** bisacodyl  •  melatonin  •  Morphine  •  ondansetron **OR** ondansetron  •  [COMPLETED] Insert Peripheral IV **AND** sodium chloride  •  sodium chloride  •  sodium chloride  •  sodium chloride  •  sodium chloride      Allergies:    Celecoxib        Objective     Vital Signs   /69 (BP Location: Left arm, Patient Position: Lying)   Pulse 84   Temp 97.6 °F (36.4 °C) (Axillary)   Resp 17   Ht 160 cm (63\")   Wt 120 kg (265 lb)   LMP  (LMP Unknown)   SpO2 96%   BMI 46.94 kg/m²     Physical Exam:     General Appearance:   She is by her self.  Supine in bed.  Oriented to time place and person.   Extremities:  Right leg she has no effusion in her right knee although her leg is very large.  There is no sign of infection no deformity.  Her active range of motion is 0/40.  She can straight leg raise to about 30 degrees.   Pulses:  Deferred   Skin:  Intact   Neurologic:  5/5 plantarflexion and dorsiflexion       Results Review:  CBC    Results from last 7 days   Lab Units " 23  0547   WBC 10*3/mm3 7.70   HEMOGLOBIN g/dL 11.3*   PLATELETS 10*3/mm3 227     BMP   Results from last 7 days   Lab Units 23  0547   SODIUM mmol/L 137   POTASSIUM mmol/L 4.4   CHLORIDE mmol/L 102   CO2 mmol/L 24.0   BUN mg/dL 26*   CREATININE mg/dL 1.05*   GLUCOSE mg/dL 148*     Infection     Radiology (recent) MRI Knee Right Without Contrast    Result Date: 2023  Impression: 1. Severe tricompartmental osteoarthritis. 2. Complete tear of the anterior cruciate ligament. 3. Complex tearing of the entire medial meniscus with extrusion. 4. Degeneration of the lateral meniscus with small complex tear of the body and the posterior horn. 5. Large joint effusion with a very large partially ruptured Baker's cyst. Electronically Signed: Elizabeth Patel  2023 9:17 AM [facility]  Workstation ID: [id number]        I reviewed the patient's new clinical results.  I reviewed the patient's new imaging results and agree with the interpretation.      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    Recommendations number 1 injection right knee #2 physical therapy #3 no surgery at this time.    I discussed the patient's findings and my recommendations with patient    Delfin Thorpe MD  23  09:38 [facility] OBSERVATION  [address] [facility] [place] [address]  Phone:  [phone]  Fax:  [phone] Date: 2023      Ambulatory Referral to Physical Therapy Evaluate and treat, Ortho     Patient:  Faby Le MRN:  [id number]   [address]  [place] [address] :  1945  SSN:    Phone: [phone] Sex:  F      INSURANCE PAYOR PLAN GROUP # SUBSCRIBER ID   Primary:  Secondary:    [facility] MEDICARE REPLACEMENT  [place] [id number]  [id number]    [id number]  [id number]      Referring Provider Information:  ZHAO WISEMAN Phone: [phone] Fax: [phone]        Referral Information:   # Visits:  1 Referral Type: Physical Therapy [AE1]   Urgency:  Routine Referral Reason: Patient Preference   Start Date: Feb 17, 2023 End Date:  To be determined by Insurer   Diagnosis: Acute pain of right knee (M25.561 [ICD-10-CM] 719.46 [ICD-9-CM])      Refer to Dept:   Refer to Provider:   Refer to Provider Phone:   Refer to Facility:       Specialty needed: Evaluate and treat  Specialty needed: Ortho  Follow-up needed: Yes     This document serves as a request of services and does not constitute Insurance authorization or approval of services.  To determine eligibility, please contact the members Insurance carrier to verify and review coverage.     If you have medical questions regarding this request for services. Please contact [facility] at [phone] during normal business hours.        Verbal Order Mode: Verbal with readback   Authorizing Provider: Betty Gaspar APRN  Authorizing Provider's NPI: [id number]     Order Entered By: Mandi Paulino RN 2/17/2023 12:19 PM     Electronically signed by:  Betty Gaspar APRN

## 2023-02-18 NOTE — OUTREACH NOTE
Prep Survey    Flowsheet Row Responses   [facility] facility patient discharged from? [facility]   Is LACE score < 7 ? Yes   Eligibility [facility]   Hospital [facility]   Date of Admission 02/16/23   Date of Discharge 02/17/23   Discharge Disposition Home-Health Care Sv   Discharge diagnosis knee pain   Does the patient have one of the following disease processes/diagnoses(primary or secondary)? Other   Does the patient have Home health ordered? Yes   What is the Home health agency?  [facility]   Is there a DME ordered? Yes   What DME was ordered? rolling walker from [facility]   Prep survey completed? Yes          Chapis GOMEZ - Registered Nurse

## 2023-02-20 ENCOUNTER — TRANSITIONAL CARE MANAGEMENT TELEPHONE ENCOUNTER (OUTPATIENT)
Dept: CALL CENTER | Facility: HOSPITAL | Age: 78
End: 2023-02-20
Payer: MEDICARE

## 2023-02-20 NOTE — OUTREACH NOTE
Call Center TCM Note    Flowsheet Row Responses   [facility] patient discharged from? [facility]   Does the patient have one of the following disease processes/diagnoses(primary or secondary)? Other   TCM attempt successful? Yes   Call start time 1347   Call end time 1353   Discharge diagnosis knee pain   Person spoke with today (if not patient) and relationship patient   Meds reviewed with patient/caregiver? Yes  [New: methocarbamol]   Does the patient have all medications ordered at discharge? Yes   Is the patient taking all medications as directed (includes completed medication regime)? Yes   Comments PCP Leonor Adam MD HOSPITAL FOLLOW UP scheduled for 2/24/2023 11:30 AM   Does the patient have an appointment with their PCP within 7 days of discharge? No   Nursing Interventions Assisted patient with making appointment per protocol   What is the Home health agency?  [facility]   Has home health visited the patient within 72 hours of discharge? No  [patient reports that she is planning to do outpt PT instead of having HH. ]   What DME was ordered? rolling walker from [facility]   Has all DME been delivered? Yes   Psychosocial issues? No   Did the patient receive a copy of their discharge instructions? Yes   Nursing interventions Reviewed instructions with patient   What is the patient's perception of their health status since discharge? Improving   Is the patient/caregiver able to teach back signs and symptoms related to disease process for when to call PCP? Yes   Is the patient/caregiver able to teach back the hierarchy of who to call/visit for symptoms/problems? PCP, Specialist, Home health nurse, Urgent Care, ED, 911 Yes   If the patient is a current smoker, are they able to teach back resources for cessation? Not a smoker   TCM call completed? Yes   Call end time 1353   Would this patient benefit from a Referral to Amb Social Work? No   Is the patient interested in additional calls from an ambulatory  ?  NOTE:  applies to high risk patients requiring additional follow-up. No          Linda Arreguin RN    2/20/2023, 13:54 EST

## 2023-02-20 NOTE — CASE MANAGEMENT/SOCIAL WORK
Continued Stay Note  [facility]     Patient Name: Faby Le  MRN: [id number]  Today's Date: 2/20/2023    Admit Date: 2/16/2023    Plan: Home with daughter.   Discharge Plan     Row Name 02/20/23 1424       Plan    Plan Home with daughter.    Plan Comments Per liaison April with [facility], patient unable to pay copay for  services. Liaison stated daughter is going to speak to PCP about rehab at [facility]. Patient discharge home with daughter. Walker delivered via bedside.    Final Discharge Disposition Code 01 - home or self-care    Final Note Home              Phone communication or documentation only - no physical contact with patient or family.      Mandi Paulino RN      ==================================================================    Case Management Discharge Note      Final Note: Home    Provided Post Acute Provider List?: Yes  Post Acute Provider List: Home Health  Delivered To: Patient, Support Person  Method of Delivery: Telephone     Durable Medical Equipment Coordination complete.    Service Provider Selected Services Address Phone Fax Patient Preferred    [facility] [employer] Medical [facility] [address] [address] [phone] --              Transportation Services  Private: Car    Final Discharge Disposition Code: 01 - home or self-care

## 2023-02-24 ENCOUNTER — OFFICE VISIT (OUTPATIENT)
Dept: FAMILY MEDICINE CLINIC | Facility: CLINIC | Age: 78
End: 2023-02-24
Payer: MEDICARE

## 2023-02-24 ENCOUNTER — HOME HEALTH ADMISSION (OUTPATIENT)
Dept: HOME HEALTH SERVICES | Facility: HOME HEALTHCARE | Age: 78
End: 2023-02-24
Payer: MEDICARE

## 2023-02-24 VITALS
HEART RATE: 78 BPM | HEIGHT: 63 IN | WEIGHT: 268.8 LBS | BODY MASS INDEX: 47.63 KG/M2 | OXYGEN SATURATION: 98 % | SYSTOLIC BLOOD PRESSURE: 118 MMHG | TEMPERATURE: 97.1 F | DIASTOLIC BLOOD PRESSURE: 75 MMHG

## 2023-02-24 DIAGNOSIS — Z12.31 ENCOUNTER FOR SCREENING MAMMOGRAM FOR MALIGNANT NEOPLASM OF BREAST: ICD-10-CM

## 2023-02-24 DIAGNOSIS — I10 PRIMARY HYPERTENSION: ICD-10-CM

## 2023-02-24 DIAGNOSIS — G89.29 CHRONIC PAIN OF RIGHT KNEE: Primary | ICD-10-CM

## 2023-02-24 DIAGNOSIS — F32.A DEPRESSION, UNSPECIFIED DEPRESSION TYPE: ICD-10-CM

## 2023-02-24 DIAGNOSIS — E66.01 CLASS 3 SEVERE OBESITY DUE TO EXCESS CALORIES WITH SERIOUS COMORBIDITY AND BODY MASS INDEX (BMI) OF 45.0 TO 49.9 IN ADULT: ICD-10-CM

## 2023-02-24 DIAGNOSIS — M25.561 CHRONIC PAIN OF RIGHT KNEE: Primary | ICD-10-CM

## 2023-02-24 PROCEDURE — 99495 TRANSJ CARE MGMT MOD F2F 14D: CPT | Performed by: PREVENTIVE MEDICINE

## 2023-02-24 PROCEDURE — 1111F DSCHRG MED/CURRENT MED MERGE: CPT | Performed by: PREVENTIVE MEDICINE

## 2023-02-24 RX ORDER — DAPAGLIFLOZIN 5 MG/1
5 TABLET, FILM COATED ORAL DAILY
Qty: 30 TABLET | Refills: 1 | Status: SHIPPED | OUTPATIENT
Start: 2023-02-24

## 2023-02-24 NOTE — PROGRESS NOTES
"Transitional Care Follow Up Visit  Subjective     Faby Le is a 78 y.o. female who presents for a transitional care management visit.    Within 48 business hours after discharge our office contacted her via telephone to coordinate her care and needs.      I reviewed and discussed the details of that call along with the discharge summary, hospital problems, inpatient lab results, inpatient diagnostic studies, and consultation reports with Faby.     Vitals:    02/24/23 1126 02/24/23 1132   BP: 136/83 118/75   BP Location: Right arm Left arm   Patient Position: Sitting Sitting   Cuff Size: Large Adult Large Adult   Pulse: 78    Temp: 97.1 °F (36.2 °C)    TempSrc: Tympanic    SpO2: 98%    Weight: 122 kg (268 lb 12.8 oz)    Height: 160 cm (62.99\")      Body mass index is 47.63 kg/m².    Current outpatient and discharge medications have been reconciled for the patient.    Date of TCM Phone Call 8/15/2022 2/17/2023   [facility]   Date of Admission 8/12/2022 2/16/2023   Date of Discharge 8/15/2022 2/17/2023   Discharge Disposition Home or Self Care Home-Health Care [facility]     Risk for Readmission (LACE) Score: 5 (2/17/2023  6:00 AM)      History of Present Illness   Course During Hospital Stay:  [facility]  Patient developed severe knee pain on the right side without any reason or cause.  She went to [facility] and they told her that she should just go into a nursing home.  She went instead to [facility] and was seen by Dr. Thorpe who admitted her for knee pain and did an MRI of the knee and then injected the knee she is able to bear weight on it using a walker and we will follow-up with him in 6 weeks.  Apparently the MRI showed that the knee was in need of replacement but she will need to lose several pounds prior to his doing surgery on her.  She would like to try Ozempic and once we make sure that she is stable we would consider prescribing that for her.   The following portions of the patient's history were " reviewed and updated as appropriate: allergies, current medications, past family history, past medical history, past social history, past surgical history and problem list.    Review of Systems   Musculoskeletal: Positive for arthralgias, gait problem and joint swelling.   Psychiatric/Behavioral: Positive for dysphoric mood.       Objective   Physical Exam  Vitals reviewed.   Constitutional:       General: She is not in acute distress.     Appearance: She is well-developed. She is obese. She is not ill-appearing or toxic-appearing.   HENT:      Head: Normocephalic and atraumatic.      Right Ear: Tympanic membrane, ear canal and external ear normal.      Left Ear: Tympanic membrane, ear canal and external ear normal.      Nose: Nose normal.      Mouth/Throat:      Mouth: Mucous membranes are moist.      Pharynx: No posterior oropharyngeal erythema.   Eyes:      Extraocular Movements: Extraocular movements intact.      Conjunctiva/sclera: Conjunctivae normal.      Pupils: Pupils are equal, round, and reactive to light.   Cardiovascular:      Rate and Rhythm: Normal rate and regular rhythm.      Heart sounds: Normal heart sounds.   Pulmonary:      Effort: Pulmonary effort is normal.      Comments: Breath sounds decreased bilaterally  Abdominal:      General: Bowel sounds are normal. There is no distension.      Palpations: Abdomen is soft. There is no mass.      Tenderness: There is no abdominal tenderness.   Musculoskeletal:         General: Swelling and tenderness present.      Cervical back: Neck supple.   Skin:     General: Skin is warm.   Neurological:      General: No focal deficit present.      Mental Status: She is alert and oriented to person, place, and time.   Psychiatric:         Mood and Affect: Mood normal.         Behavior: Behavior normal.         Assessment & Plan   Diagnoses and all orders for this visit:    1. Chronic pain of right knee (Primary)  Comments:  Recent hospitalization for right knee  pain.  No known injury patient just developed severe pain in the knee.    2. Depression, unspecified depression type  Comments:  No HI or SI depression seems to be under good control    3. Primary hypertension  Comments:  Controlled.    4. Encounter for screening mammogram for malignant neoplasm of breast  Comments:  Patient was encouraged to get her mammogram.  Orders:  -     Mammo Screening Digital Tomosynthesis Bilateral With CAD; Future    5. Class 3 severe obesity due to excess calories with serious comorbidity and body mass index (BMI) of 45.0 to 49.9 in adult (HCC)    Other orders  -     dapagliflozin (Farxiga) 5 MG tablet tablet; Take 1 tablet by mouth Daily.  Dispense: 30 tablet; Refill: 1        Patient Instructions     Health Maintenance Due   Topic Date Due   • COVID-19 Vaccine (4 - Booster for Pfizer series) 02/23/2022   • MAMMOGRAM  12/16/2022    PATIENT TO SEE Dr. Thorpe in 5 weeks from today

## 2023-02-24 NOTE — PATIENT INSTRUCTIONS
Health Maintenance Due   Topic Date Due    COVID-19 Vaccine (4 - Booster for Pfizer series) 02/23/2022    MAMMOGRAM  12/16/2022    PATIENT TO SEE Dr. Thorpe in 5 weeks from today

## 2023-03-02 ENCOUNTER — HOSPITAL ENCOUNTER (OUTPATIENT)
Dept: CARDIOLOGY | Facility: HOSPITAL | Age: 78
Discharge: HOME | End: 2023-03-02
Admitting: INTERNAL MEDICINE
Payer: MEDICARE

## 2023-03-02 ENCOUNTER — TELEPHONE (OUTPATIENT)
Dept: FAMILY MEDICINE CLINIC | Facility: CLINIC | Age: 78
End: 2023-03-02
Payer: MEDICARE

## 2023-03-02 VITALS
HEIGHT: 63 IN | BODY MASS INDEX: 46.99 KG/M2 | SYSTOLIC BLOOD PRESSURE: 130 MMHG | TEMPERATURE: 98.5 F | WEIGHT: 265.21 LBS | HEART RATE: 97 BPM | OXYGEN SATURATION: 100 % | RESPIRATION RATE: 18 BRPM | DIASTOLIC BLOOD PRESSURE: 65 MMHG

## 2023-03-02 DIAGNOSIS — I25.10 CORONARY ARTERY DISEASE INVOLVING NATIVE HEART WITHOUT ANGINA PECTORIS, UNSPECIFIED VESSEL OR LESION TYPE: ICD-10-CM

## 2023-03-02 DIAGNOSIS — R42 DIZZINESS: ICD-10-CM

## 2023-03-02 PROCEDURE — 33285 INSJ SUBQ CAR RHYTHM MNTR: CPT

## 2023-03-02 PROCEDURE — 33285 INSJ SUBQ CAR RHYTHM MNTR: CPT | Performed by: INTERNAL MEDICINE

## 2023-03-02 PROCEDURE — C1764 EVENT RECORDER, CARDIAC: HCPCS

## 2023-03-02 RX ORDER — LIDOCAINE HYDROCHLORIDE AND EPINEPHRINE BITARTRATE 20; .01 MG/ML; MG/ML
INJECTION, SOLUTION SUBCUTANEOUS
Status: COMPLETED | OUTPATIENT
Start: 2023-03-02 | End: 2023-03-02

## 2023-03-02 RX ADMIN — LIDOCAINE HYDROCHLORIDE,EPINEPHRINE BITARTRATE 10 ML: 20; .01 INJECTION, SOLUTION INFILTRATION; PERINEURAL at 11:54

## 2023-03-02 NOTE — TELEPHONE ENCOUNTER
HUB TO READ:  ----- Message from Leonor Adam MD sent at 3/2/2023  7:14 AM EST -----  mammogram normal.  Ordered for one year unless change in symptoms or physical

## 2023-03-02 NOTE — DISCHARGE INSTRUCTIONS
Post Loop Recorder implant instructions    Leave dressing in place for 3 days. While the dressing is in place, do not shower. Watch for signs of infection. If signs of infection are noticed, contact Dr. Zavala's office at [phone]. Follow up with Dr. Zavala at scheduled April 12th appointment. Make sure that loop recorder monitor is plugged in once you arrive home.

## 2023-03-03 LAB
MAXIMAL PREDICTED HEART RATE: 144 BPM
STRESS TARGET HR: 122 BPM

## 2023-03-13 ENCOUNTER — OFFICE VISIT (OUTPATIENT)
Dept: PODIATRY | Facility: CLINIC | Age: 78
End: 2023-03-13
Payer: MEDICARE

## 2023-03-13 VITALS — OXYGEN SATURATION: 98 % | WEIGHT: 265.21 LBS | HEIGHT: 63 IN | BODY MASS INDEX: 46.99 KG/M2

## 2023-03-13 DIAGNOSIS — M77.42 METATARSALGIA, LEFT FOOT: ICD-10-CM

## 2023-03-13 DIAGNOSIS — E66.01 MORBID OBESITY WITH BMI OF 45.0-49.9, ADULT: ICD-10-CM

## 2023-03-13 DIAGNOSIS — Z98.1 HISTORY OF ANKLE FUSION: ICD-10-CM

## 2023-03-13 DIAGNOSIS — M19.072 ARTHRITIS OF LEFT FOOT: ICD-10-CM

## 2023-03-13 DIAGNOSIS — M79.672 LEFT FOOT PAIN: Primary | ICD-10-CM

## 2023-03-13 PROCEDURE — 1159F MED LIST DOCD IN RCRD: CPT | Performed by: PODIATRIST

## 2023-03-13 PROCEDURE — 1160F RVW MEDS BY RX/DR IN RCRD: CPT | Performed by: PODIATRIST

## 2023-03-13 PROCEDURE — 99213 OFFICE O/P EST LOW 20 MIN: CPT | Performed by: PODIATRIST

## 2023-03-13 NOTE — PROGRESS NOTES
03/13/2023  Foot and Ankle Surgery - Established Patient/Follow-up  Provider: Dr. Donta Kelley DPM  Location: [facility]    Subjective:  Faby Le is a 78 y.o. female.     Chief Complaint   Patient presents with   • Left Foot - Pain     Pain 6/10   • Follow-up     LEE ANN Adam MD  02/24/2023       HPI: The patient presents to the office today for a follow-up regarding her left foot. She is accompanied by an adult female.    The patient reports mild improvement in her left foot pain since her previous visit in 02/2023. She notes continued pain in the middle of her left foot that radiates to her toes. She wears shoes and inserts when she is out of the house. She states she goes barefoot at home sometimes. The patient states she notices pain in her left foot all the time. The adult female states the patient usually goes out of the house for approximately 9:00 AM until 5:00 PM. The adult female states the patient's pain has improved, noting that the patient could barely ambulate previously. She denies performing any exercises at the [facility].    Allergies   Allergen Reactions   • Celecoxib Itching and Swelling     swelling       Current Outpatient Medications on File Prior to Visit   Medication Sig Dispense Refill   • allopurinol (ZYLOPRIM) 300 MG tablet TAKE 1 TABLET BY MOUTH EVERY DAY 90 tablet 2   • amLODIPine (NORVASC) 5 MG tablet TAKE 1 TABLET BY MOUTH EVERY DAY 90 tablet 3   • atorvastatin (LIPITOR) 20 MG tablet TAKE 1 TABLET BY MOUTH EVERYDAY AT BEDTIME 90 tablet 3   • bumetanide (BUMEX) 2 MG tablet TAKE 1/2 TABLET BY MOUTH EVERY DAY 45 tablet 2   • cetirizine (zyrTEC) 10 MG tablet Take 1 tablet by mouth Daily.     • cyanocobalamin (VITAMIN B-12) 1000 MCG tablet Take 1 tablet by mouth Daily.     • dapagliflozin (Farxiga) 5 MG tablet tablet Take 1 tablet by mouth Daily. 30 tablet 1   • escitalopram (LEXAPRO) 20 MG tablet TAKE 1 TABLET BY MOUTH EVERY DAY 90 tablet 2   • levothyroxine (SYNTHROID,  "LEVOTHROID) 88 MCG tablet TAKE 1 TABLET BY MOUTH EVERY DAY 90 tablet 3   • methocarbamol (ROBAXIN) 750 MG tablet Take 1 tablet by mouth 4 (Four) Times a Day. 30 tablet 0   • metoprolol succinate XL (TOPROL-XL) 25 MG 24 hr tablet Take 1 tablet by mouth Every Night. 90 tablet 3   • montelukast (SINGULAIR) 10 MG tablet Take 1 tablet by mouth Daily.     • Multiple Vitamins-Minerals (CENTRUM SILVER ADULT 50+) tablet Take 1 tablet by mouth Daily.     • rivaroxaban (XARELTO) 15 MG tablet Take 1 tablet by mouth Daily With Dinner. Indications: Atrial Fibrillation 90 tablet 2   • spironolactone (ALDACTONE) 25 MG tablet Take 0.5 tablets by mouth Daily. 45 tablet 3   • Vitamin D, Cholecalciferol, 25 MCG (1000 UT) tablet Take 1 tablet by mouth Daily.       No current facility-administered medications on file prior to visit.       Objective   Ht 158.8 cm (62.5\")   Wt 120 kg (265 lb 3.4 oz)   LMP  (LMP Unknown)   SpO2 98%   BMI 47.73 kg/m²     Foot/Ankle Exam:       General:   Appearance: appears stated age and healthy and obesity    Orientation: AAOx3    Affect: appropriate      VASCULAR      Right Foot Vascularity   Normal vascular exam    Dorsalis pedis:  2+  Posterior tibial:  2+  Skin Temperature: warm    Edema Grading:  None  CFT:  < 3 seconds  Pedal Hair Growth:  Present  Varicosities: none       Left Foot Vascularity   Normal vascular exam    Dorsalis pedis:  2+  Posterior tibial:  2+  Skin Temperature: warm    Edema Grading:  None  CFT:  < 3 seconds  Pedal Hair Growth:  Present  Varicosities: none        NEUROLOGIC     Right Foot Neurologic   Light touch sensation:  Normal  Hot/Cold sensation: normal    Achilles reflex:  2+     Left Foot Neurologic   Light touch sensation:  Normal  Hot/cold sensation: normal    Achilles reflex:  2+     MUSCULOSKELETAL      Right Foot Musculoskeletal   Ecchymosis:  None  Arch:  Normal     Left Foot Musculoskeletal   Ecchymosis:  None  Arch:  Normal     MUSCLE STRENGTH     Right Foot " Muscle Strength   Normal strength    Foot dorsiflexion:  5  Foot plantar flexion:  5  Foot inversion:  5  Foot eversion:  5     Left Foot Muscle Strength   Normal strength    Foot dorsiflexion:  5  Foot plantar flexion:  5  Foot inversion:  5  Foot eversion:  5     DERMATOLOGIC     Right Foot Dermatologic   Skin: skin intact    Nails comment:  Nails 1-5     Left Foot Dermatologic   Skin: skin intact    Nails comment:  Nails 1-5     TESTS     Right Foot Tests   Anterior drawer: negative    Varus tilt: negative       Left Foot Tests   Anterior drawer: negative    Varus tilt: negative        Left Foot Additional Comments: Prominent rigidity noted to the left rear foot secondary to previous tibiotalar calcaneal fusion. Moderate hypermobility involving the midfoot with mild crepitus. No isolated discomfort with palpation involving the forefoot or midfoot today.    03/13/2023  Continued discomfort involving the plantar lateral aspect of the forefoot. Moderate pes planus foot structure with soft tissue rigidity. Range of motion is limited to the rear foot secondary to previous fusion.      Assessment & Plan   Diagnoses and all orders for this visit:    1. Left foot pain (Primary)  -     XR Foot 3+ View Left    2. Metatarsalgia, left foot  -     XR Foot 3+ View Left    3. Arthritis of left foot    4. History of ankle fusion  -     XR Foot 3+ View Left    5. Morbid obesity with BMI of 45.0-49.9, adult (HCC)      Patient returns for follow-up regarding her left foot. Imaging was independently reviewed showing flatness to her foot. I discussed with the patient that the hardware in her ankle and the posterior aspect of her foot is rigid. She is developing arthritis in the midfoot due to stiffness. We discussed conservative treatment options such as wearing a brace in her shoe daily to offload any weight from the left foot. The patient was advised, however, that a brace is not the best option. I discussed with the patient that  the next step would be steroid injections and surgical intervention. The patient was educated that the steroid injection will not help anything long term and that it is not the best area for an injection, as it is an area where she is putting a lot of stress. Regular exercise at the [facility] or [employer]neakers for water aerobics, swimming, and low impact exercises to help strengthen the muscles and reduce a little bit of body weight had been recommended. The patient would like to proceed with a steroid injection today. She will return to the clinic in 2 to 3 months for reevaluation. If her symptoms worsen and persist, the patient has been instructed to call my office sooner.    Greater than 20 minutes was spent before, during, and after evaluation for patient care.      Orders Placed This Encounter   Procedures   • XR Foot 3+ View Left     Order Specific Question:   Reason for Exam:     Answer:   left foot pain plantar area rm 13 wb pt has walker     Order Specific Question:   Does this patient have a diabetic monitoring/medication delivering device on?     Answer:   No     Order Specific Question:   Release to patient     Answer:   Routine Release          Note is dictated utilizing voice recognition software. Unfortunately this leads to occasional typographical errors. I apologize in advance if the situation occurs. If questions occur please do not hesitate to call our office.    Transcribed from ambient dictation for STEPHANIE Kelley DPM by Faviola Livingston.  03/13/23   12:14 EDT    Patient or patient representative verbalized consent to the visit recording.  I have personally performed the services described in this document as transcribed by the above individual, and it is both accurate and complete.

## 2023-03-16 PROBLEM — Z85.42 HISTORY OF MALIGNANT NEOPLASM OF ENDOMETRIUM: Status: ACTIVE | Noted: 2020-08-27

## 2023-03-16 NOTE — PATIENT INSTRUCTIONS
Health Maintenance Due   Topic Date Due    COVID-19 Vaccine (4 - Booster for Pfizer series) 02/23/2022    Consider Ozempic, Rybelsus, Mounjaro, Trulicity

## 2023-03-17 ENCOUNTER — OFFICE VISIT (OUTPATIENT)
Dept: FAMILY MEDICINE CLINIC | Facility: CLINIC | Age: 78
End: 2023-03-17
Payer: MEDICARE

## 2023-03-17 VITALS
OXYGEN SATURATION: 96 % | HEART RATE: 84 BPM | BODY MASS INDEX: 47.59 KG/M2 | SYSTOLIC BLOOD PRESSURE: 114 MMHG | TEMPERATURE: 97.3 F | WEIGHT: 268.6 LBS | HEIGHT: 63 IN | DIASTOLIC BLOOD PRESSURE: 76 MMHG

## 2023-03-17 DIAGNOSIS — E78.2 MIXED HYPERLIPIDEMIA: ICD-10-CM

## 2023-03-17 DIAGNOSIS — F32.A DEPRESSION, UNSPECIFIED DEPRESSION TYPE: ICD-10-CM

## 2023-03-17 DIAGNOSIS — Z85.42 HISTORY OF MALIGNANT NEOPLASM OF ENDOMETRIUM: ICD-10-CM

## 2023-03-17 DIAGNOSIS — M1A.9XX0 CHRONIC GOUT WITHOUT TOPHUS, UNSPECIFIED CAUSE, UNSPECIFIED SITE: ICD-10-CM

## 2023-03-17 DIAGNOSIS — R73.9 HYPERGLYCEMIA: ICD-10-CM

## 2023-03-17 DIAGNOSIS — I25.10 CORONARY ARTERY DISEASE INVOLVING NATIVE HEART WITHOUT ANGINA PECTORIS, UNSPECIFIED VESSEL OR LESION TYPE: Primary | ICD-10-CM

## 2023-03-17 DIAGNOSIS — I10 PRIMARY HYPERTENSION: ICD-10-CM

## 2023-03-17 DIAGNOSIS — I48.91 ATRIAL FIBRILLATION, UNSPECIFIED TYPE: ICD-10-CM

## 2023-03-17 DIAGNOSIS — E66.01 CLASS 3 SEVERE OBESITY DUE TO EXCESS CALORIES WITH SERIOUS COMORBIDITY AND BODY MASS INDEX (BMI) OF 45.0 TO 49.9 IN ADULT: ICD-10-CM

## 2023-03-17 DIAGNOSIS — E03.9 HYPOTHYROIDISM, UNSPECIFIED TYPE: ICD-10-CM

## 2023-03-17 DIAGNOSIS — E55.9 VITAMIN D DEFICIENCY: ICD-10-CM

## 2023-03-17 LAB
BASOPHILS # BLD AUTO: 0.04 10*3/MM3 (ref 0–0.2)
BASOPHILS NFR BLD AUTO: 0.8 % (ref 0–1.5)
DEPRECATED RDW RBC AUTO: 51.3 FL (ref 37–54)
EOSINOPHIL # BLD AUTO: 0.12 10*3/MM3 (ref 0–0.4)
EOSINOPHIL NFR BLD AUTO: 2.4 % (ref 0.3–6.2)
ERYTHROCYTE [DISTWIDTH] IN BLOOD BY AUTOMATED COUNT: 14.1 % (ref 12.3–15.4)
HCT VFR BLD AUTO: 33.6 % (ref 34–46.6)
HGB BLD-MCNC: 11.3 G/DL (ref 12–15.9)
IMM GRANULOCYTES # BLD AUTO: 0 10*3/MM3 (ref 0–0.05)
IMM GRANULOCYTES NFR BLD AUTO: 0 % (ref 0–0.5)
LYMPHOCYTES # BLD AUTO: 1.64 10*3/MM3 (ref 0.7–3.1)
LYMPHOCYTES NFR BLD AUTO: 32.7 % (ref 19.6–45.3)
MCH RBC QN AUTO: 33.5 PG (ref 26.6–33)
MCHC RBC AUTO-ENTMCNC: 33.6 G/DL (ref 31.5–35.7)
MCV RBC AUTO: 99.7 FL (ref 79–97)
MONOCYTES # BLD AUTO: 0.52 10*3/MM3 (ref 0.1–0.9)
MONOCYTES NFR BLD AUTO: 10.4 % (ref 5–12)
NEUTROPHILS NFR BLD AUTO: 2.69 10*3/MM3 (ref 1.7–7)
NEUTROPHILS NFR BLD AUTO: 53.7 % (ref 42.7–76)
NRBC BLD AUTO-RTO: 0 /100 WBC (ref 0–0.2)
PLATELET # BLD AUTO: 284 10*3/MM3 (ref 140–450)
PMV BLD AUTO: 9.7 FL (ref 6–12)
RBC # BLD AUTO: 3.37 10*6/MM3 (ref 3.77–5.28)
WBC NRBC COR # BLD: 5.01 10*3/MM3 (ref 3.4–10.8)

## 2023-03-17 PROCEDURE — 80061 LIPID PANEL: CPT | Performed by: PREVENTIVE MEDICINE

## 2023-03-17 PROCEDURE — 83735 ASSAY OF MAGNESIUM: CPT | Performed by: PREVENTIVE MEDICINE

## 2023-03-17 PROCEDURE — 80053 COMPREHEN METABOLIC PANEL: CPT | Performed by: PREVENTIVE MEDICINE

## 2023-03-17 PROCEDURE — 99214 OFFICE O/P EST MOD 30 MIN: CPT | Performed by: PREVENTIVE MEDICINE

## 2023-03-17 PROCEDURE — 3074F SYST BP LT 130 MM HG: CPT | Performed by: PREVENTIVE MEDICINE

## 2023-03-17 PROCEDURE — 84550 ASSAY OF BLOOD/URIC ACID: CPT | Performed by: PREVENTIVE MEDICINE

## 2023-03-17 PROCEDURE — 82306 VITAMIN D 25 HYDROXY: CPT | Performed by: PREVENTIVE MEDICINE

## 2023-03-17 PROCEDURE — 84443 ASSAY THYROID STIM HORMONE: CPT | Performed by: PREVENTIVE MEDICINE

## 2023-03-17 PROCEDURE — 1159F MED LIST DOCD IN RCRD: CPT | Performed by: PREVENTIVE MEDICINE

## 2023-03-17 PROCEDURE — 3078F DIAST BP <80 MM HG: CPT | Performed by: PREVENTIVE MEDICINE

## 2023-03-17 PROCEDURE — 85025 COMPLETE CBC W/AUTO DIFF WBC: CPT | Performed by: PREVENTIVE MEDICINE

## 2023-03-17 PROCEDURE — 1160F RVW MEDS BY RX/DR IN RCRD: CPT | Performed by: PREVENTIVE MEDICINE

## 2023-03-17 PROCEDURE — 83036 HEMOGLOBIN GLYCOSYLATED A1C: CPT | Performed by: PREVENTIVE MEDICINE

## 2023-03-17 PROCEDURE — 82607 VITAMIN B-12: CPT | Performed by: PREVENTIVE MEDICINE

## 2023-03-17 NOTE — ASSESSMENT & PLAN NOTE
Ms. Le was encouraged to use support when walking to avoid falls. She was advised to contact her insurance company to determine what her out of pocket cost would be for weight loss medication.

## 2023-03-17 NOTE — PROGRESS NOTES
Subjective   Faby Le is a 78 y.o. female presents for   Chief Complaint   Patient presents with   • Follow-up     3 month  Fasting       Health Maintenance Due   Topic Date Due   • COVID-19 Vaccine (4 - Booster for Pfizer series) 02/23/2022       Faby Le is a 78-year-old female who presents today for follow up on coronary artery disease, depression, chronic gout, hyperglycemia, hyperlipidemia, primary hypertension, hypothyroidism, history of malignant neoplasm of the endometrium, vitamin D deficiency, atrial fibrillation, and class III obesity. She is accompanied by her daughter.    She consents to the use of CORY.    The patient recently saw Dr. Thorpe, who injected her knee. He told her he will not perform surgery on her knee. Yesterday she saw another doctor for a second opinion. Dr. Rodrigo Herrera will perform an arthroplasty in 05/2023 at [facility]. She will get clearance from her cardiologist prior to surgery.    She denies chest pressure or pain.    While at her urologist's office last week, there was sugar in her urine.    The patient was recently released from the physician she was following with for 5 years status post uterine cancer removal.    The patient's daughter asks if the patient is eligible for one of the injectable weight loss medications.    The patient's mood has been better. She denies homicidal or suicidal ideations. She has not had gout symptoms. The patient reports she has been watching her carbohydrates and saturated fats. She reports that the skin on her ankles and legs is always dry. She is taking vitamin D regularly. The patient has not experienced any more fluttering of her heart. She is due for an eye exam. She has no trouble with digestion. Occasionally she takes something for hard stools. She feels cold a lot.    She is fasting for lab specimens today.    She is allergic to CELEBREX.    Vitals:    03/17/23 1032 03/17/23 1038   BP: 121/65 114/76   BP Location: Right arm  "Left arm   Patient Position: Sitting Sitting   Cuff Size: Large Adult Large Adult   Pulse: 84    Temp: 97.3 °F (36.3 °C)    TempSrc: Tympanic    SpO2: 96%    Weight: 122 kg (268 lb 9.6 oz)    Height: 158.8 cm (62.52\")      Body mass index is 48.31 kg/m².    Current Outpatient Medications on File Prior to Visit   Medication Sig Dispense Refill   • allopurinol (ZYLOPRIM) 300 MG tablet TAKE 1 TABLET BY MOUTH EVERY DAY 90 tablet 2   • amLODIPine (NORVASC) 5 MG tablet TAKE 1 TABLET BY MOUTH EVERY DAY 90 tablet 3   • atorvastatin (LIPITOR) 20 MG tablet TAKE 1 TABLET BY MOUTH EVERYDAY AT BEDTIME 90 tablet 3   • bumetanide (BUMEX) 2 MG tablet TAKE 1/2 TABLET BY MOUTH EVERY DAY 45 tablet 2   • cetirizine (zyrTEC) 10 MG tablet Take 1 tablet by mouth Daily.     • cyanocobalamin (VITAMIN B-12) 1000 MCG tablet Take 1 tablet by mouth Daily.     • dapagliflozin (Farxiga) 5 MG tablet tablet Take 1 tablet by mouth Daily. 30 tablet 1   • escitalopram (LEXAPRO) 20 MG tablet TAKE 1 TABLET BY MOUTH EVERY DAY 90 tablet 2   • levothyroxine (SYNTHROID, LEVOTHROID) 88 MCG tablet TAKE 1 TABLET BY MOUTH EVERY DAY 90 tablet 3   • methocarbamol (ROBAXIN) 750 MG tablet Take 1 tablet by mouth 4 (Four) Times a Day. 30 tablet 0   • metoprolol succinate XL (TOPROL-XL) 25 MG 24 hr tablet Take 1 tablet by mouth Every Night. 90 tablet 3   • montelukast (SINGULAIR) 10 MG tablet Take 1 tablet by mouth Daily.     • Multiple Vitamins-Minerals (CENTRUM SILVER ADULT 50+) tablet Take 1 tablet by mouth Daily.     • rivaroxaban (XARELTO) 15 MG tablet Take 1 tablet by mouth Daily With Dinner. Indications: Atrial Fibrillation 90 tablet 2   • spironolactone (ALDACTONE) 25 MG tablet Take 0.5 tablets by mouth Daily. 45 tablet 3   • Vitamin D, Cholecalciferol, 25 MCG (1000 UT) tablet Take 1 tablet by mouth Daily.       No current facility-administered medications on file prior to visit.       The following portions of the patient's history were reviewed and updated " as appropriate: allergies, current medications, past family history, past medical history, past social history, past surgical history and problem list.    Review of Systems   Constitutional: Negative for fever.        Denies night sweats.   HENT: Negative for hearing loss and tinnitus.    Cardiovascular: Negative for chest pain and palpitations.        No chest pressure.   Gastrointestinal: Negative for abdominal pain, nausea, vomiting, GERD and indigestion.   Endocrine: Positive for cold intolerance.   Genitourinary: Negative for dysuria, hematuria and vaginal discharge.        Occasional hard stools.   Musculoskeletal: Positive for arthralgias.        Denies gout symptoms.   Skin: Positive for dry skin.   Psychiatric/Behavioral: Negative for suicidal ideas.        Denies homicidal ideations.       Objective   Physical Exam  Constitutional:       Appearance: She is obese.   HENT:      Ears:      Comments: Eardrums normal and intact.     Mouth/Throat:      Comments: Normal.  Eyes:      Pupils: Pupils are equal, round, and reactive to light.   Neck:      Comments: Carotids are free of bruit.  Cardiovascular:      Comments: Regular rate and rhythm.  No murmurs.  Pulmonary:      Comments: Lung sounds are equal in all 6 fields.  Abdominal:      Comments: Bowel sounds normal, active.  No masses or organomegaly.   Musculoskeletal:      Comments: No pretibial edema.   Neurological:      Mental Status: She is alert.         PHQ-9 Total Score:      Assessment & Plan   Diagnoses and all orders for this visit:    1. Coronary artery disease involving native heart without angina pectoris, unspecified vessel or lesion type (Primary)    2. Depression, unspecified depression type  -     Vitamin B12; Future  -     Vitamin B12    3. Chronic gout without tophus, unspecified cause, unspecified site  -     Uric Acid; Future  -     Uric Acid    4. Hyperglycemia  -     Comprehensive Metabolic Panel; Future  -     Hemoglobin A1c; Future  -      Comprehensive Metabolic Panel  -     Hemoglobin A1c    5. Mixed hyperlipidemia  -     Lipid Panel; Future  -     Lipid Panel    6. Primary hypertension  -     CBC Auto Differential; Future  -     CBC Auto Differential    7. Hypothyroidism, unspecified type  -     TSH; Future  -     TSH    8. History of malignant neoplasm of endometrium    9. Vitamin D deficiency  -     Vitamin D,25-Hydroxy; Future  -     Vitamin D,25-Hydroxy    10. Atrial fibrillation, unspecified type (HCC)  -     Magnesium; Future  -     Magnesium    11. Class 3 severe obesity due to excess calories with serious comorbidity and body mass index (BMI) of 45.0 to 49.9 in adult ([facility])  Assessment & Plan:  Ms. Le was encouraged to use support when walking to avoid falls. She was advised to contact her insurance company to determine what her out of pocket cost would be for weight loss medication.       With regard to:    Mild constipation  Recommended MiraLAX for occasional mild constipation.    Patient Instructions     Health Maintenance Due   Topic Date Due   • COVID-19 Vaccine (4 - Booster for Pfizer series) 02/23/2022    Consider Ozempic, Rybelsus, Mounjaro, Trulicity            Transcribed from ambient dictation for Leonor Adam MD by Meri Rajan.  03/17/23   13:02 EDT    Patient or patient representative verbalized consent to the visit recording.  I have personally performed the services described in this document as transcribed by the above individual, and it is both accurate and complete.

## 2023-03-18 DIAGNOSIS — R74.8 BLOOD ALKALINE PHOSPHATASE INCREASED COMPARED WITH PRIOR MEASUREMENT: Primary | ICD-10-CM

## 2023-03-18 LAB
25(OH)D3 SERPL-MCNC: 33 NG/ML (ref 30–100)
ALBUMIN SERPL-MCNC: 3.9 G/DL (ref 3.5–5.2)
ALBUMIN/GLOB SERPL: 1.3 G/DL
ALP SERPL-CCNC: 231 U/L (ref 39–117)
ALT SERPL W P-5'-P-CCNC: 19 U/L (ref 1–33)
ANION GAP SERPL CALCULATED.3IONS-SCNC: 10 MMOL/L (ref 5–15)
AST SERPL-CCNC: 23 U/L (ref 1–32)
BILIRUB SERPL-MCNC: 0.7 MG/DL (ref 0–1.2)
BUN SERPL-MCNC: 29 MG/DL (ref 8–23)
BUN/CREAT SERPL: 26.6 (ref 7–25)
CALCIUM SPEC-SCNC: 9.9 MG/DL (ref 8.6–10.5)
CHLORIDE SERPL-SCNC: 104 MMOL/L (ref 98–107)
CHOLEST SERPL-MCNC: 125 MG/DL (ref 0–200)
CO2 SERPL-SCNC: 26 MMOL/L (ref 22–29)
CREAT SERPL-MCNC: 1.09 MG/DL (ref 0.57–1)
EGFRCR SERPLBLD CKD-EPI 2021: 52.8 ML/MIN/1.73
GLOBULIN UR ELPH-MCNC: 3.1 GM/DL
GLUCOSE SERPL-MCNC: 95 MG/DL (ref 65–99)
HBA1C MFR BLD: 5.7 % (ref 4.8–5.6)
HDLC SERPL-MCNC: 58 MG/DL (ref 40–60)
LDLC SERPL CALC-MCNC: 49 MG/DL (ref 0–100)
LDLC/HDLC SERPL: 0.83 {RATIO}
MAGNESIUM SERPL-MCNC: 2.3 MG/DL (ref 1.6–2.4)
POTASSIUM SERPL-SCNC: 4.5 MMOL/L (ref 3.5–5.2)
PROT SERPL-MCNC: 7 G/DL (ref 6–8.5)
SODIUM SERPL-SCNC: 140 MMOL/L (ref 136–145)
TRIGL SERPL-MCNC: 93 MG/DL (ref 0–150)
TSH SERPL DL<=0.05 MIU/L-ACNC: 0.3 UIU/ML (ref 0.27–4.2)
URATE SERPL-MCNC: 4.3 MG/DL (ref 2.4–5.7)
VIT B12 BLD-MCNC: 626 PG/ML (ref 211–946)
VLDLC SERPL-MCNC: 18 MG/DL (ref 5–40)

## 2023-03-18 NOTE — PROGRESS NOTES
Kidney function has decreased slightly so make sure you avoid ibuprofen Aleve Motrin and limit x-ray dyes alkaline phos which is a liver bone blood enzyme has increased.  Are you having any increase in right upper quadrant pain or tenderness please let us know if you have been and we should repeat this lab again in 4 weeks with a 12-hour fast blood sugar was normal but A1c is up to 5.7 showing excess risk for diabetes so watch her carbohydrates and increase your exercise.  Anemia is about the same to make sure you are getting enough vitamin C and iron in your diet call if any other questions or concerns

## 2023-03-20 ENCOUNTER — TELEPHONE (OUTPATIENT)
Dept: FAMILY MEDICINE CLINIC | Facility: CLINIC | Age: 78
End: 2023-03-20
Payer: MEDICARE

## 2023-03-20 RX ORDER — SEMAGLUTIDE 1.34 MG/ML
0.25 INJECTION, SOLUTION SUBCUTANEOUS WEEKLY
Qty: 1.5 ML | Refills: 0 | Status: SHIPPED | OUTPATIENT
Start: 2023-03-20

## 2023-03-20 RX ORDER — SEMAGLUTIDE 1.34 MG/ML
1 INJECTION, SOLUTION SUBCUTANEOUS
Qty: 3 ML | Refills: 0 | Status: SHIPPED | OUTPATIENT
Start: 2023-05-15 | End: 2023-03-21 | Stop reason: SDUPTHER

## 2023-03-20 RX ORDER — SEMAGLUTIDE 0.68 MG/ML
0.5 INJECTION, SOLUTION SUBCUTANEOUS
Qty: 3 ML | Refills: 0 | Status: SHIPPED | OUTPATIENT
Start: 2023-04-17 | End: 2023-05-09

## 2023-03-20 NOTE — TELEPHONE ENCOUNTER
Caller: JANICE VALERIO    Relationship: Emergency Contact    Best call back number: [phone]    What medication are you requesting: OZEMPIC     If a prescription is needed, what is your preferred pharmacy and phone number: [id number] - [place], [place] - [address] - [phone] [facility] [phone]      Additional notes: INSURANCE STATES THE OZEMPIC MEDICATION WOULD  BE COVERED 100%    PLEASE CALL PATIENT WHEN PRESCRIPTION IS SENT

## 2023-03-21 RX ORDER — SEMAGLUTIDE 1.34 MG/ML
1 INJECTION, SOLUTION SUBCUTANEOUS
Qty: 3 ML | Refills: 0 | Status: SHIPPED | OUTPATIENT
Start: 2023-05-15 | End: 2023-06-06

## 2023-03-21 NOTE — TELEPHONE ENCOUNTER
[facility] is requesting an alternative to the Ozempic 0.25-0.5 MG/dose pen.  They state that it is no longer manufactured and would like you to send dose in 3 ML.

## 2023-04-03 PROCEDURE — G2066 INTER DEVC REMOTE 30D: HCPCS | Performed by: INTERNAL MEDICINE

## 2023-04-03 PROCEDURE — 93298 REM INTERROG DEV EVAL SCRMS: CPT | Performed by: INTERNAL MEDICINE

## 2023-04-12 ENCOUNTER — OFFICE VISIT (OUTPATIENT)
Dept: CARDIOLOGY | Facility: CLINIC | Age: 78
End: 2023-04-12
Payer: MEDICARE

## 2023-04-12 VITALS
DIASTOLIC BLOOD PRESSURE: 62 MMHG | BODY MASS INDEX: 47.48 KG/M2 | HEIGHT: 63 IN | HEART RATE: 102 BPM | SYSTOLIC BLOOD PRESSURE: 105 MMHG | RESPIRATION RATE: 18 BRPM | OXYGEN SATURATION: 96 % | WEIGHT: 268 LBS

## 2023-04-12 DIAGNOSIS — E78.2 MIXED HYPERLIPIDEMIA: ICD-10-CM

## 2023-04-12 DIAGNOSIS — I48.0 PAROXYSMAL ATRIAL FIBRILLATION: ICD-10-CM

## 2023-04-12 DIAGNOSIS — I25.10 CORONARY ARTERY DISEASE INVOLVING NATIVE HEART WITHOUT ANGINA PECTORIS, UNSPECIFIED VESSEL OR LESION TYPE: Primary | ICD-10-CM

## 2023-04-12 DIAGNOSIS — Z01.810 PREOP CARDIOVASCULAR EXAM: ICD-10-CM

## 2023-04-12 DIAGNOSIS — I10 PRIMARY HYPERTENSION: ICD-10-CM

## 2023-04-12 NOTE — PROGRESS NOTES
Cardiology Office Visit      Encounter Date:  04/12/2023    Patient ID:   Faby Le is a 78 y.o. female.      Reason For Followup:  Coronary artery disease  Shortness of breath  Preop evaluation for knee surgery    Brief Clinical History:  Dear Dr. Adam, Leonor Ordaz MD    I had the pleasure of seeing Faby Le today. As you are well aware, this is a 78 y.o. female  presented to the office with symptoms of lower extremity edema and some shortness of breath        cardiac catheterization showed moderate disease involving the diagonal and mild-to-moderate disease involving circumflex      Intermittent dizziness  No syncope  Compliant with medical therapy  Denies any chest pain  Denies any prior history of DVT or PE    Assessment & Plan    Impressions:  Morbid obesity/Body mass index is 48.54 kg/m².  Bilateral lower extremity edema  Shortness of breath  Dyspnea on exertion  Obstructive sleep apnea  Coronary artery disease  Diastolic dysfunction  Hypertension  Normalization of LV systolic function  Normal LV systolic function in 2018  Moderate obstructive coronary artery disease involving the diagonal branch of the LAD and mild to moderate obstructive coronary artery disease involving the left circumflex artery in 2018  Newly diagnosed atrial fibrillation  Patient is currently in sinus rhythm with intermittent PAC burden  Elevated chads vascular score-4  Coagulopathy on Xarelto  History of nonischemic cardiomyopathy  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  Primary hypertension  Obstructive sleep apnea  Recent cardiac catheterization in 2022 with a mild obstructive disease involving the LAD and a moderate obstructive disease involving the ostium of the diagonal branch with normal LV systolic function  Recent carotid Doppler with mild obstructive carotid stenosis  Cardiomyopathy with recovery of LV systolic function  Chronic renal insufficiency with a creatinine of 1.3  Sick sinus syndrome  "with pauses up to 3.3 seconds  No clear correlation of patient's symptoms of dizziness with these episodes of bradycardia or pauses  No clear syncope  Poor historian  Status post loop recorder placement  Preop evaluation for knee surgery    Recommendations:  Venous Doppler with no significant pathology  Recent echocardiogram with normal LV systolic function  Continue current medical therapy with Norvasc 5 mg p.o. once a day Bumex 2 mg p.o. once a day Lipitor 20 mg grams p.o. once a day Toprol-XL 25 mg p.o. once a day spironolactone 25 mg p.o. once a day losartan 25 mg p.o. once a day  Labs reviewed and discussed with patient  Discussed with patient about need for regular exercise weight loss and continued aggressive risk factor modification  Continue current medical management for cardiomyopathy  Need for regular exercise weight loss reviewed and discussed with patient  Labs and medications reviewed and discussed with the patient  Low-salt diet  Medical management for obstructive coronary artery disease  Recent cath findings and medical records and labs reviewed and discussed with patient  Plan to proceed with a loop recorder placement for the sick sinus syndrome  Risk benefits and alternatives reviewed and discussed with the patient  Further recommendations based on the results of the loop recorder  Recent interrogation from loop with no significant pathology  Continue current medical therapy  From cardiac standpoint patient is low risk to undergo the planned knee surgery  Okay to hold anticoagulation therapy 2 to 3 days before the planned knee surgery  Follow-up in office in 6 months      Objective:    Vitals:  Vitals:    04/12/23 1354   BP: 105/62   BP Location: Left arm   Patient Position: Sitting   Cuff Size: Large Adult   Pulse: 102   Resp: 18   SpO2: 96%   Weight: 122 kg (268 lb)   Height: 158.8 cm (62.5\")       Physical Exam:    General: Alert, cooperative, no distress, appears stated " age  Head:  Normocephalic, atraumatic, mucous membranes moist  Eyes:  Conjunctiva/corneas clear, EOM's intact     Neck:  Supple,  no adenopathy;      Lungs: Clear to auscultation bilaterally, no wheezes rhonchi rales are noted  Chest wall: No tenderness  Heart::  Regular rate and rhythm, S1 and S2 normal, no murmur, rub or gallop  Abdomen: Soft, non-tender, nondistended bowel sounds active  Extremities: No cyanosis, clubbing, or edema  Pulses: 2+ and symmetric all extremities  Skin:  No rashes or lesions  Neuro/psych: A&O x3. CN II through XII are grossly intact with appropriate affect      Allergies:  Allergies   Allergen Reactions   • Celecoxib Itching and Swelling     swelling       Medication Review:     Current Outpatient Medications:   •  allopurinol (ZYLOPRIM) 300 MG tablet, TAKE 1 TABLET BY MOUTH EVERY DAY, Disp: 90 tablet, Rfl: 2  •  amLODIPine (NORVASC) 5 MG tablet, TAKE 1 TABLET BY MOUTH EVERY DAY, Disp: 90 tablet, Rfl: 3  •  atorvastatin (LIPITOR) 20 MG tablet, TAKE 1 TABLET BY MOUTH EVERYDAY AT BEDTIME, Disp: 90 tablet, Rfl: 3  •  bumetanide (BUMEX) 2 MG tablet, TAKE 1/2 TABLET BY MOUTH EVERY DAY, Disp: 45 tablet, Rfl: 2  •  cetirizine (zyrTEC) 10 MG tablet, Take 1 tablet by mouth Daily., Disp: , Rfl:   •  cyanocobalamin (VITAMIN B-12) 1000 MCG tablet, Take 1 tablet by mouth Daily., Disp: , Rfl:   •  dapagliflozin (Farxiga) 5 MG tablet tablet, Take 1 tablet by mouth Daily., Disp: 30 tablet, Rfl: 1  •  escitalopram (LEXAPRO) 20 MG tablet, TAKE 1 TABLET BY MOUTH EVERY DAY, Disp: 90 tablet, Rfl: 2  •  hydrocortisone 2.5 % ointment, APPLY A THIN LAYER TO AFFECTED AREAS BY TOPICAL ROUTE TWICE DAILY. SAFE TO USE ON FACE., Disp: , Rfl:   •  levothyroxine (SYNTHROID, LEVOTHROID) 88 MCG tablet, TAKE 1 TABLET BY MOUTH EVERY DAY, Disp: 90 tablet, Rfl: 3  •  methocarbamol (ROBAXIN) 750 MG tablet, Take 1 tablet by mouth 4 (Four) Times a Day., Disp: 30 tablet, Rfl: 0  •  metoprolol succinate XL (TOPROL-XL) 25 MG 24  hr tablet, Take 1 tablet by mouth Every Night., Disp: 90 tablet, Rfl: 3  •  montelukast (SINGULAIR) 10 MG tablet, Take 1 tablet by mouth Daily., Disp: , Rfl:   •  Multiple Vitamins-Minerals (CENTRUM SILVER ADULT 50+) tablet, Take 1 tablet by mouth Daily., Disp: , Rfl:   •  rivaroxaban (XARELTO) 15 MG tablet, Take 1 tablet by mouth Daily With Dinner. Indications: Atrial Fibrillation, Disp: 90 tablet, Rfl: 2  •  [START ON 5/15/2023] Semaglutide, 1 MG/DOSE, (Ozempic, 1 MG/DOSE,) 4 MG/3ML solution pen-injector, Inject 1 mg under the skin into the appropriate area as directed Every 7 (Seven) Days for 4 doses. Continue for 4 weeks, Disp: 3 mL, Rfl: 0  •  [START ON 6/12/2023] Semaglutide, 2 MG/DOSE, 8 MG/3ML solution pen-injector, Inject 2 mg under the skin into the appropriate area as directed Every 7 (Seven) Days for 4 doses. Continue for 4 weeks, Disp: 3 mL, Rfl: 0  •  Semaglutide,0.25 or 0.5MG/DOS, (Ozempic, 0.25 or 0.5 MG/DOSE,) 2 MG/1.5ML solution pen-injector, Inject 0.25 mg under the skin into the appropriate area as directed 1 (One) Time Per Week. For four weeks, Disp: 1.5 mL, Rfl: 0  •  [START ON 4/17/2023] Semaglutide,0.25 or 0.5MG/DOS, (Ozempic, 0.25 or 0.5 MG/DOSE,) 2 MG/3ML solution pen-injector, Inject 0.5 mg under the skin into the appropriate area as directed Every 7 (Seven) Days for 4 doses. Continue 4 weeks, Disp: 3 mL, Rfl: 0  •  spironolactone (ALDACTONE) 25 MG tablet, Take 0.5 tablets by mouth Daily., Disp: 45 tablet, Rfl: 3  •  Vitamin D, Cholecalciferol, 25 MCG (1000 UT) tablet, Take 1 tablet by mouth Daily., Disp: , Rfl:     Family History:  Family History   Problem Relation Age of Onset   • Hypertension Mother    • Stroke Father    • Arthritis Brother    • Cancer Brother        Past Medical History:  Past Medical History:   Diagnosis Date   • Ankle pain    • Arthritis    • Coronary artery disease    • Depression    • Gout    • Hyperglycemia    • Hyperlipidemia    • Low back pain    • Morbid  obesity    • Sleep apnea    • Vitamin D deficiency        Past surgical History:  Past Surgical History:   Procedure Laterality Date   • ANKLE FUSION      2017-left   • CARDIAC CATHETERIZATION     • CARDIAC CATHETERIZATION Right 8/29/2022    Procedure: Left Heart Cath;  Surgeon: Ratna Zavala MD;  Location: [facility] INVASIVE LOC[facility];  Service: Cardiovascular;  Laterality: Right;   • CARPAL TUNNEL RELEASE     • CATARACT EXTRACTION     • CUBITAL TUNNEL RELEASE     • HYSTERECTOMY     • REPLACEMENT TOTAL KNEE      left 2008   • ROTATOR CUFF REPAIR         Social History:  Social History     Socioeconomic History   • Marital status:    Tobacco Use   • Smoking status: Never   • Smokeless tobacco: Never   Vaping Use   • Vaping Use: Never used   Substance and Sexual Activity   • Alcohol use: No   • Drug use: No   • Sexual activity: Not Currently       Review of Systems:  The following systems were reviewed as they relate to the cardiovascular system: Constitutional, Eyes, ENT, Cardiovascular, Respiratory, Gastrointestinal, Integumentary, Neurological, Psychiatric, Hematologic, Endocrine, Musculoskeletal, and Genitourinary. The pertinent cardiovascular findings are reported above with all other cardiovascular points within those systems being negative.    Diagnostic Study Review:     Current Electrocardiogram:    ECG 12 Lead    Date/Time: 4/12/2023 4:45 PM  Performed by: Ratna Zavala MD  Authorized by: Ratna Zavlaa MD   Comparison: compared with previous ECG   Similar to previous ECG  Rhythm: sinus rhythm  Rate: normal  BPM: 102  Conduction: conduction normal  QRS axis: normal  Other findings: non-specific ST-T wave changes    Clinical impression: abnormal EKG              NOTE: The following portions of the patient's history were reviewed and updated this visit as appropriate: allergies, current medications, past family history, past medical history, past social history, past  surgical history and problem list.

## 2023-04-15 RX ORDER — DAPAGLIFLOZIN 5 MG/1
TABLET, FILM COATED ORAL
Qty: 90 TABLET | Refills: 1 | Status: SHIPPED | OUTPATIENT
Start: 2023-04-15

## 2023-04-18 RX ORDER — SEMAGLUTIDE 2.68 MG/ML
INJECTION, SOLUTION SUBCUTANEOUS
Qty: 3 ML | Refills: 0 | Status: SHIPPED | OUTPATIENT
Start: 2023-04-18

## 2023-04-21 NOTE — TELEPHONE ENCOUNTER
Rx Refill Note  Requested Prescriptions     Pending Prescriptions Disp Refills   • allopurinol (ZYLOPRIM) 300 MG tablet [Pharmacy Med Name: ALLOPURINOL 300 MG TABLET] 90 tablet 2     Sig: TAKE 1 TABLET BY MOUTH EVERY DAY   • metoprolol tartrate (LOPRESSOR) 25 MG tablet [Pharmacy Med Name: METOPROLOL TARTRATE 25 MG TAB] 90 tablet 3     Sig: TAKE 1 TABLET BY MOUTH EVERYDAY AT BEDTIME      Last office visit with prescribing clinician: 3/17/2023      Next office visit with prescribing clinician: 6/23/2023   3}  Tonia Landry  04/21/23, 08:21 EDT

## 2023-04-25 ENCOUNTER — HOSPITAL ENCOUNTER (OUTPATIENT)
Dept: GENERAL RADIOLOGY | Facility: HOSPITAL | Age: 78
Discharge: HOME | End: 2023-04-25
Payer: MEDICARE

## 2023-04-25 ENCOUNTER — HOSPITAL ENCOUNTER (OUTPATIENT)
Dept: CARDIOLOGY | Facility: HOSPITAL | Age: 78
Discharge: HOME | End: 2023-04-25
Payer: MEDICARE

## 2023-04-25 ENCOUNTER — LAB (OUTPATIENT)
Dept: LAB | Facility: HOSPITAL | Age: 78
End: 2023-04-25
Payer: MEDICARE

## 2023-04-25 ENCOUNTER — PRE-ADMISSION TESTING (OUTPATIENT)
Dept: PREADMISSION TESTING | Facility: HOSPITAL | Age: 78
End: 2023-04-25
Payer: MEDICARE

## 2023-04-25 VITALS
OXYGEN SATURATION: 99 % | DIASTOLIC BLOOD PRESSURE: 76 MMHG | BODY MASS INDEX: 46.25 KG/M2 | SYSTOLIC BLOOD PRESSURE: 123 MMHG | HEIGHT: 63 IN | WEIGHT: 261 LBS | TEMPERATURE: 97.5 F | RESPIRATION RATE: 20 BRPM | HEART RATE: 69 BPM

## 2023-04-25 DIAGNOSIS — R74.8 BLOOD ALKALINE PHOSPHATASE INCREASED COMPARED WITH PRIOR MEASUREMENT: ICD-10-CM

## 2023-04-25 LAB
ABO GROUP BLD: NORMAL
ALBUMIN SERPL-MCNC: 3.9 G/DL (ref 3.5–5.2)
ALBUMIN/GLOB SERPL: 1.4 G/DL
ALP SERPL-CCNC: 120 U/L (ref 39–117)
ALT SERPL W P-5'-P-CCNC: 15 U/L (ref 1–33)
ANION GAP SERPL CALCULATED.3IONS-SCNC: 8.1 MMOL/L (ref 5–15)
APTT PPP: 38.7 SECONDS (ref 24–31)
AST SERPL-CCNC: 16 U/L (ref 1–32)
BACTERIA UR QL AUTO: ABNORMAL /HPF
BASOPHILS # BLD AUTO: 0.1 10*3/MM3 (ref 0–0.2)
BASOPHILS NFR BLD AUTO: 1 % (ref 0–1.5)
BILIRUB SERPL-MCNC: 0.5 MG/DL (ref 0–1.2)
BILIRUB UR QL STRIP: NEGATIVE
BLD GP AB SCN SERPL QL: NEGATIVE
BUN SERPL-MCNC: 20 MG/DL (ref 8–23)
BUN/CREAT SERPL: 14.3 (ref 7–25)
CALCIUM SPEC-SCNC: 10.4 MG/DL (ref 8.6–10.5)
CHLORIDE SERPL-SCNC: 104 MMOL/L (ref 98–107)
CLARITY UR: CLEAR
CO2 SERPL-SCNC: 25.9 MMOL/L (ref 22–29)
COLOR UR: YELLOW
CREAT SERPL-MCNC: 1.4 MG/DL (ref 0.57–1)
DEPRECATED RDW RBC AUTO: 52.5 FL (ref 37–54)
EGFRCR SERPLBLD CKD-EPI 2021: 39.1 ML/MIN/1.73
EOSINOPHIL # BLD AUTO: 0.2 10*3/MM3 (ref 0–0.4)
EOSINOPHIL NFR BLD AUTO: 2.3 % (ref 0.3–6.2)
ERYTHROCYTE [DISTWIDTH] IN BLOOD BY AUTOMATED COUNT: 15 % (ref 12.3–15.4)
GLOBULIN UR ELPH-MCNC: 2.7 GM/DL
GLUCOSE SERPL-MCNC: 89 MG/DL (ref 65–99)
GLUCOSE UR STRIP-MCNC: ABNORMAL MG/DL
HBA1C MFR BLD: 5.2 % (ref 4.8–5.6)
HCT VFR BLD AUTO: 34.6 % (ref 34–46.6)
HGB BLD-MCNC: 11.2 G/DL (ref 12–15.9)
HGB UR QL STRIP.AUTO: NEGATIVE
HYALINE CASTS UR QL AUTO: ABNORMAL /LPF
INR PPP: 1.16 (ref 0.93–1.1)
KETONES UR QL STRIP: NEGATIVE
LEUKOCYTE ESTERASE UR QL STRIP.AUTO: ABNORMAL
LYMPHOCYTES # BLD AUTO: 1.5 10*3/MM3 (ref 0.7–3.1)
LYMPHOCYTES NFR BLD AUTO: 22.2 % (ref 19.6–45.3)
MCH RBC QN AUTO: 32.6 PG (ref 26.6–33)
MCHC RBC AUTO-ENTMCNC: 32.3 G/DL (ref 31.5–35.7)
MCV RBC AUTO: 101 FL (ref 79–97)
MONOCYTES # BLD AUTO: 0.4 10*3/MM3 (ref 0.1–0.9)
MONOCYTES NFR BLD AUTO: 6.1 % (ref 5–12)
MRSA DNA SPEC QL NAA+PROBE: ABNORMAL
NEUTROPHILS NFR BLD AUTO: 4.6 10*3/MM3 (ref 1.7–7)
NEUTROPHILS NFR BLD AUTO: 68.4 % (ref 42.7–76)
NITRITE UR QL STRIP: NEGATIVE
NRBC BLD AUTO-RTO: 0 /100 WBC (ref 0–0.2)
PH UR STRIP.AUTO: 6 [PH] (ref 5–8)
PLATELET # BLD AUTO: 274 10*3/MM3 (ref 140–450)
PMV BLD AUTO: 7.7 FL (ref 6–12)
POTASSIUM SERPL-SCNC: 4.4 MMOL/L (ref 3.5–5.2)
PROT SERPL-MCNC: 6.6 G/DL (ref 6–8.5)
PROT UR QL STRIP: NEGATIVE
PROTHROMBIN TIME: 12.3 SECONDS (ref 9.6–11.7)
RBC # BLD AUTO: 3.42 10*6/MM3 (ref 3.77–5.28)
RBC # UR STRIP: ABNORMAL /HPF
REF LAB TEST METHOD: ABNORMAL
RH BLD: NEGATIVE
SODIUM SERPL-SCNC: 138 MMOL/L (ref 136–145)
SP GR UR STRIP: 1.01 (ref 1–1.03)
SQUAMOUS #/AREA URNS HPF: ABNORMAL /HPF
T&S EXPIRATION DATE: NORMAL
TRANS CELLS #/AREA URNS HPF: ABNORMAL /HPF
UROBILINOGEN UR QL STRIP: ABNORMAL
WBC # UR STRIP: ABNORMAL /HPF
WBC NRBC COR # BLD: 6.7 10*3/MM3 (ref 3.4–10.8)
YEAST URNS QL MICRO: ABNORMAL /HPF

## 2023-04-25 PROCEDURE — 71046 X-RAY EXAM CHEST 2 VIEWS: CPT

## 2023-04-25 PROCEDURE — 36415 COLL VENOUS BLD VENIPUNCTURE: CPT

## 2023-04-25 PROCEDURE — 87641 MR-STAPH DNA AMP PROBE: CPT

## 2023-04-25 PROCEDURE — 85610 PROTHROMBIN TIME: CPT

## 2023-04-25 PROCEDURE — 93005 ELECTROCARDIOGRAM TRACING: CPT | Performed by: ORTHOPAEDIC SURGERY

## 2023-04-25 PROCEDURE — 87086 URINE CULTURE/COLONY COUNT: CPT

## 2023-04-25 PROCEDURE — 83036 HEMOGLOBIN GLYCOSYLATED A1C: CPT

## 2023-04-25 PROCEDURE — 85025 COMPLETE CBC W/AUTO DIFF WBC: CPT

## 2023-04-25 PROCEDURE — 80053 COMPREHEN METABOLIC PANEL: CPT

## 2023-04-25 PROCEDURE — 86900 BLOOD TYPING SEROLOGIC ABO: CPT

## 2023-04-25 PROCEDURE — 97161 PT EVAL LOW COMPLEX 20 MIN: CPT

## 2023-04-25 PROCEDURE — 86850 RBC ANTIBODY SCREEN: CPT

## 2023-04-25 PROCEDURE — 85730 THROMBOPLASTIN TIME PARTIAL: CPT

## 2023-04-25 PROCEDURE — 86901 BLOOD TYPING SEROLOGIC RH(D): CPT

## 2023-04-25 PROCEDURE — 81001 URINALYSIS AUTO W/SCOPE: CPT

## 2023-04-25 NOTE — THERAPY EVALUATION
Patient Name: Faby Le  : 1945    MRN: [id number]                              Today's Date: 2023       Admit Date: (Not on file)    Visit Dx: No diagnosis found.  Patient Active Problem List   Diagnosis   • Abnormal complete blood count   • Ankle pain   • Arthralgia of left elbow   • Arthritis   • B12 deficiency   • Coronary artery disease   • Depression   • Gallstones   • Gout   • Hyperglycemia   • Hyperlipidemia   • Hypertension   • Hypothyroidism   • Low back pain   • Mobility poor   • Postmenopausal status   • Sleep apnea   • Vitamin D deficiency   • Chronic kidney disease, stage 3 (moderate)   • History of malignant neoplasm of endometrium   • Oropharyngeal dysphagia   • Class 3 severe obesity due to excess calories with serious comorbidity and body mass index (BMI) of 45.0 to 49.9 in adult   • Chronic pain of right knee   • Light headedness   • Paroxysmal atrial fibrillation   • Atrial fibrillation, unspecified type   • Abnormal results of cardiovascular function studies   • Chronic left shoulder pain     Past Medical History:   Diagnosis Date   • Afib    • Ankle pain    • Arthritis    • Coronary artery disease    • Depression    • Gout    • History of loop recorder 2023    current   • Hyperglycemia    • Hyperlipidemia    • Hypertension    • Low back pain    • Morbid obesity    • Sleep apnea    • Vitamin D deficiency      Past Surgical History:   Procedure Laterality Date   • ANKLE FUSION      2017-left   • CARDIAC CATHETERIZATION     • CARDIAC CATHETERIZATION Right 2022    Procedure: Left Heart Cath;  Surgeon: Ratna Zavala MD;  Location: [facility] INVASIVE LOCATION;  Service: Cardiovascular;  Laterality: Right;   • CARPAL TUNNEL RELEASE     • CATARACT EXTRACTION     • CUBITAL TUNNEL RELEASE     • HYSTERECTOMY     • REPLACEMENT TOTAL KNEE      left    • ROTATOR CUFF REPAIR        General Information     Row Name 23 1121          Physical Therapy Time and  Intention    Document Type evaluation  -CR     Mode of Treatment physical therapy  -CR     Row Name 04/25/23 1121          General Information    Patient Profile Reviewed yes  -CR     Prior Level of Function independent:;all household mobility  using cane , has wc for long distance gait  -CR     Barriers to Rehab --  -CR     Row Name 04/25/23 1121          Living Environment    People in Home alone  currently staying with daughter but normally resides in senior apartment  -CR     Row Name 04/25/23 1121          Home Main Entrance    Number of Stairs, Main Entrance none  -CR     Row Name 04/25/23 1121          Stairs Within Home, Primary    Number of Stairs, Within Home, Primary none  -CR     Row Name 04/25/23 1121          Cognition    Orientation Status (Cognition) oriented x 4  -CR     Row Name 04/25/23 1121          Safety Issues, Functional Mobility    Impairments Affecting Function (Mobility) pain;range of motion (ROM)  -CR           User Key  (r) = Recorded By, (t) = Taken By, (c) = Cosigned By    Initials Name Provider Type    CR Reyes, Carmela, MATEUS Physical Therapist               Mobility     Row Name 04/25/23 1124          Sit-Stand Transfer    Sit-Stand Grant (Transfers) minimum assist (75% patient effort)  -CR     Row Name 04/25/23 1124          Gait/Stairs (Locomotion)    Deviations/Abnormal Patterns (Gait) base of support, wide;stride length decreased;antalgic  -CR     Left Sided Gait Deviations weight shift ability decreased  -CR           User Key  (r) = Recorded By, (t) = Taken By, (c) = Cosigned By    Initials Name Provider Type    CR Reyes, Carmela, PT Physical Therapist               Obj/Interventions     Row Name 04/25/23 1125          Range of Motion Comprehensive    Comment, General Range of Motion AROM L knee 0-110 degrees, R knee -15 to 90 degrees in sitting with pain at both end range  -CR     Row Name 04/25/23 1125          Strength Comprehensive (MMT)    General Manual Muscle  Testing (MMT) Assessment no strength deficits identified  -CR     Row Name 04/25/23 1125          Balance    Balance Assessment sit to stand dynamic balance;standing static balance;standing dynamic balance  -CR     Sit to Stand Dynamic Balance minimal assist  -CR     Static Standing Balance modified independence  -CR     Dynamic Standing Balance standby assist  -CR     Position/Device Used, Standing Balance cane, straight  -CR     Row Name 04/25/23 1125          Sensory Assessment (Somatosensory)    Sensory Assessment (Somatosensory) sensation intact  -CR           User Key  (r) = Recorded By, (t) = Taken By, (c) = Cosigned By    Initials Name Provider Type    CR Reyes, Carmela, PT Physical Therapist               Goals/Plan     Row Name 04/25/23 1131          Transfer Goal 1 (PT)    Activity/Assistive Device (Transfer Goal 1, PT) sit-to-stand/stand-to-sit  -CR     [place] Level/Cues Needed (Transfer Goal 1, PT) standby assist  -CR     Time Frame (Transfer Goal 1, PT) long term goal (LTG);1 week  -CR     Row Name 04/25/23 1131          Gait Training Goal 1 (PT)    Activity/Assistive Device (Gait Training Goal 1, PT) gait (walking locomotion);assistive device use;diminish gait deviation;increase endurance/gait distance;walker, rolling  -CR     [place] Level (Gait Training Goal 1, PT) standby assist  -CR     Distance (Gait Training Goal 1, PT) 100 ft  -CR     Time Frame (Gait Training Goal 1, PT) long term goal (LTG);1 week  -CR     Row Name 04/25/23 1131          ROM Goal 1 (PT)    ROM Goal 1 (PT) AROM R knee 0-90 degrees  -CR     Time Frame (ROM Goal 1, PT) long-term goal (LTG);1 week  -CR     Row Name 04/25/23 1131          Patient Education Goal (PT)    Activity (Patient Education Goal, PT) HEP  -CR     [place]/Cues/Accuracy (Memory Goal 2, PT) verbalizes understanding  -CR     Progress/Outcome (Patient Education Goal, PT) goal met  -CR     Row Name 04/25/23 1131          Therapy Assessment/Plan  (PT)    Planned Therapy Interventions (PT) gait training;home exercise program;transfer training;patient/family education;ROM (range of motion)  -CR           User Key  (r) = Recorded By, (t) = Taken By, (c) = Cosigned By    Initials Name Provider Type    CR Reyes, Carmela, PT Physical Therapist               Clinical Impression     Row Name 04/25/23 1126          Pain    Posttreatment Pain Rating 5/10  -CR     Pain Location - Side/Orientation Right  -CR     Pain Location - knee  -CR     Row Name 04/25/23 1126          Plan of Care Review    Plan of Care Reviewed With patient;daughter  -CR     Outcome Evaluation 77 y/o female with end stage arthritis to R knee scheduled for R TKR on 5/4/23. PMH includes L TKR in 2008, obesity, depression, htn. Patient currently residing with daughter in [facility] but patient normally resides alone in a senior apartment. Patient currently using cane for short distance ambulation with marked limp, wide base of support and decreased pace. AROM R knee -15 to 90 degrees and L knee 0-110 degrees in sitting. Reports of pain at both end range R knee. Patient educated on HEP, use of a.d, icing, plan of care post op. Patient is wanting SNF at discharge following surgery.  -CR     Row Name 04/25/23 1126          Therapy Assessment/Plan (PT)    Patient/Family Therapy Goals Statement (PT) SNF after sx  -CR     Rehab Potential (PT) good, to achieve stated therapy goals  -CR     Criteria for Skilled Interventions Met (PT) yes;skilled treatment is necessary  -CR     Therapy Frequency (PT) daily  -CR     Predicted Duration of Therapy Intervention (PT) dc  -CR           User Key  (r) = Recorded By, (t) = Taken By, (c) = Cosigned By    Initials Name Provider Type    CR Reyes, Carmela, PT Physical Therapist               Outcome Measures    No documentation.                              Physical Therapy Education     Title: PT OT SLP Therapies (In Progress)     Topic: Physical Therapy (In Progress)      Point: Mobility training (Not Started)     Learner Progress:  Not documented in this visit.          Point: Home exercise program (Done)     Learning Progress Summary           Patient Acceptance, E,D,H, VU by CR at 4/25/2023 1132                               User Key     Initials Effective Dates Name Provider Type Discipline    CR 06/16/21 -  Reyes, Carmela, PT Physical Therapist PT              PT Recommendation and Plan  Planned Therapy Interventions (PT): gait training, home exercise program, transfer training, patient/family education, ROM (range of motion)  Plan of Care Reviewed With: patient, daughter  Outcome Evaluation: 79 y/o female with end stage arthritis to R knee scheduled for R TKR on 5/4/23. PMH includes L TKR in 2008, obesity, depression, htn. Patient currently residing with daughter in [facility] but patient normally resides alone in a senior apartment. Patient currently using cane for short distance ambulation with marked limp, wide base of support and decreased pace. AROM R knee -15 to 90 degrees and L knee 0-110 degrees in sitting. Reports of pain at both end range R knee. Patient educated on HEP, use of a.d, icing, plan of care post op. Patient is wanting SNF at discharge following surgery.     Time Calculation:    PT Charges     Row Name 04/25/23 1132             Time Calculation    Start Time 1050  -CR      Stop Time 1110  -CR      Time Calculation (min) 20 min  -CR      PT Received On 04/25/23  -CR      PT - Next Appointment 05/04/23  -CR         Time Calculation- PT    Total Timed Code Minutes- PT 0 minute(s)  -CR            User Key  (r) = Recorded By, (t) = Taken By, (c) = Cosigned By    Initials Name Provider Type    CR Reyes, Carmela, PT Physical Therapist              Therapy Charges for Today     Code Description Service Date Service Provider Modifiers Qty    52208079006 HC PT EVAL LOW COMPLEXITY 4 4/25/2023 Reyes, Carmela, PT GP 1             PT Discharge Summary  Anticipated Discharge  Disposition (PT): skilled nursing facility    Carmela Reyes, PT  4/25/2023

## 2023-04-25 NOTE — DISCHARGE PLACEMENT REQUEST
"Mara Le (78 y.o. Female)     Date of Birth   1945    Social Security Number       Address   [address] [place] [address]    Home Phone   [phone]    MRN   [id number]       Hinduism   Buddhist    Marital Status                               Admission Date       Admission Type   Elective    Admitting Provider   Chino Herrera II, MD    Attending Provider   Chino Herrera II, MD    Department, Room/Bed   [facility] MAIN OR, --/--       Discharge Date       Discharge Disposition       Discharge Destination                               Attending Provider: Chino Herrera II, MD    Allergies: Celecoxib    Isolation: None   Infection: MRSA (04/25/23)   Code Status: Prior    Ht: 160 cm (63\")   Wt: 118 kg (261 lb)    Admission Cmt: None   Principal Problem: None                Active Insurance as of 5/4/2023     Primary Coverage     Payor Plan Insurance Group Employer/Plan Group    [place] [employer] system MEDICARE REPLACEMENT [employer] MEDICARE REPLACEMENT [id number]     Payor Plan Address Payor Plan Phone Number Payor Plan Fax Number Effective Dates    [facility] [phone]  9/1/2021 - None Entered    [address]       [facility] [address]       Subscriber Name Subscriber Birth Date Member ID       MARA LE 1945 [id number]           Secondary Coverage     Payor Plan Insurance Group Employer/Plan Group    [place]      Payor Plan Address Payor Plan Phone Number Payor Plan Fax Number Effective Dates    [address]   7/24/2019 - None Entered    [place] [address]       Subscriber Name Subscriber Birth Date Member ID       MARA LE 1945 [id number]                 Emergency Contacts      (Rel.) Home Phone Work Phone Mobile Phone    FRANCK MARTINEZ (Daughter) [phone] -- [phone]    JANICE VALERIO (Relative) [phone] -- [phone]    ROSA MARRERO (Daughter) " [phone] -- [phone]

## 2023-04-25 NOTE — PLAN OF CARE
Goal Outcome Evaluation:  Plan of Care Reviewed With: patient, daughter           Outcome Evaluation: 79 y/o female with end stage arthritis to R knee scheduled for R TKR on 5/4/23. PMH includes L TKR in 2008, obesity, depression, htn. Patient currently residing with daughter in [facility] but patient normally resides alone in a senior apartment. Patient currently using cane for short distance ambulation with marked limp, wide base of support and decreased pace. AROM R knee -15 to 90 degrees and L knee 0-110 degrees in sitting. Reports of pain at both end range R knee. Patient educated on HEP, use of a.d, icing, plan of care post op. Patient is wanting SNF at discharge following surgery.

## 2023-04-25 NOTE — PAT
Patient denies symptoms of UTI, notified of +MRSA, and glucose in urine, instructed to shower 4 days prior to procedure and day of procedure, verbalized understanding.

## 2023-04-26 LAB — BACTERIA SPEC AEROBE CULT: NORMAL

## 2023-04-26 NOTE — PAT
Message to Dr. Herrera about PTT = 38.7.  Awaiting response.    He said ok to proceed with surgery on 5/4/2023.

## 2023-04-27 ENCOUNTER — TELEPHONE (OUTPATIENT)
Dept: FAMILY MEDICINE CLINIC | Facility: CLINIC | Age: 78
End: 2023-04-27
Payer: MEDICARE

## 2023-04-27 RX ORDER — METOPROLOL SUCCINATE 25 MG/1
25 TABLET, EXTENDED RELEASE ORAL NIGHTLY
Qty: 90 TABLET | Refills: 3 | Status: SHIPPED | OUTPATIENT
Start: 2023-04-27

## 2023-04-27 RX ORDER — ALLOPURINOL 300 MG/1
TABLET ORAL
Qty: 90 TABLET | Refills: 2 | OUTPATIENT
Start: 2023-04-27

## 2023-04-27 NOTE — TELEPHONE ENCOUNTER
HUB TO READ:  ----- Message from Leonor Adam MD sent at 4/27/2023  7:01 AM EDT -----  Kidney function has worsened so I would consider stopping the allopurinol and consulting one of the nephrologists before proceeding with your knee replacement.  Please let us know if you agree and we will set that appointment up

## 2023-04-27 NOTE — TELEPHONE ENCOUNTER
Called [facility] of [facility] to see if they can see PT sooner than 07/13 (original appt date) for presurgical eval and they dont have any openings until 06/01 at the [facility] location. I called PT and she said her surg is 05/04. Neph office said they can clear her for surg w/o seeing her if her knee surgeon wants to send her something for medical clearance.

## 2023-04-27 NOTE — PROGRESS NOTES
Kidney function has worsened so I would consider stopping the allopurinol and consulting one of the nephrologists before proceeding with your knee replacement.  Please let us know if you agree and we will set that appointment up

## 2023-04-28 LAB — QT INTERVAL: 397 MS

## 2023-05-04 ENCOUNTER — HOSPITAL ENCOUNTER (OUTPATIENT)
Facility: HOSPITAL | Age: 78
Setting detail: OBSERVATION
Discharge: NURSING, REHAB OR LONG-TERM CARE | End: 2023-05-05
Attending: ORTHOPAEDIC SURGERY | Admitting: ORTHOPAEDIC SURGERY
Payer: MEDICARE

## 2023-05-04 ENCOUNTER — ANESTHESIA EVENT (OUTPATIENT)
Dept: PERIOP | Facility: HOSPITAL | Age: 78
End: 2023-05-04
Payer: MEDICARE

## 2023-05-04 ENCOUNTER — ANESTHESIA (OUTPATIENT)
Dept: PERIOP | Facility: HOSPITAL | Age: 78
End: 2023-05-04
Payer: MEDICARE

## 2023-05-04 ENCOUNTER — APPOINTMENT (OUTPATIENT)
Dept: GENERAL RADIOLOGY | Facility: HOSPITAL | Age: 78
End: 2023-05-04
Payer: MEDICARE

## 2023-05-04 PROBLEM — Z96.659 STATUS POST KNEE REPLACEMENT: Status: ACTIVE | Noted: 2023-05-04

## 2023-05-04 PROBLEM — I10 HYPERTENSION: Status: ACTIVE | Noted: 2023-05-04

## 2023-05-04 PROCEDURE — G0378 HOSPITAL OBSERVATION PER HR: HCPCS

## 2023-05-04 PROCEDURE — A9270 NON-COVERED ITEM OR SERVICE: HCPCS | Performed by: ORTHOPAEDIC SURGERY

## 2023-05-04 PROCEDURE — 25010000002 DEXAMETHASONE PER 1 MG: Performed by: NURSE ANESTHETIST, CERTIFIED REGISTERED

## 2023-05-04 PROCEDURE — 25010000002 PROPOFOL 200 MG/20ML EMULSION: Performed by: NURSE ANESTHETIST, CERTIFIED REGISTERED

## 2023-05-04 PROCEDURE — 97164 PT RE-EVAL EST PLAN CARE: CPT

## 2023-05-04 PROCEDURE — C1776 JOINT DEVICE (IMPLANTABLE): HCPCS | Performed by: ORTHOPAEDIC SURGERY

## 2023-05-04 PROCEDURE — 73560 X-RAY EXAM OF KNEE 1 OR 2: CPT

## 2023-05-04 PROCEDURE — 63710000001 METHOCARBAMOL 750 MG TABLET: Performed by: ORTHOPAEDIC SURGERY

## 2023-05-04 PROCEDURE — 25010000002 EPINEPHRINE 1 MG/ML SOLUTION: Performed by: ORTHOPAEDIC SURGERY

## 2023-05-04 PROCEDURE — A9270 NON-COVERED ITEM OR SERVICE: HCPCS | Performed by: NURSE ANESTHETIST, CERTIFIED REGISTERED

## 2023-05-04 PROCEDURE — 25010000002 CLONIDINE PER 1 MG: Performed by: ORTHOPAEDIC SURGERY

## 2023-05-04 PROCEDURE — 63710000001 ESCITALOPRAM 10 MG TABLET: Performed by: ORTHOPAEDIC SURGERY

## 2023-05-04 PROCEDURE — C1713 ANCHOR/SCREW BN/BN,TIS/BN: HCPCS | Performed by: ORTHOPAEDIC SURGERY

## 2023-05-04 PROCEDURE — 63710000001 OXYCODONE 5 MG TABLET: Performed by: NURSE ANESTHETIST, CERTIFIED REGISTERED

## 2023-05-04 PROCEDURE — 63710000001 MELOXICAM 15 MG TABLET: Performed by: ORTHOPAEDIC SURGERY

## 2023-05-04 PROCEDURE — 25010000002 SUCCINYLCHOLINE PER 20 MG: Performed by: NURSE ANESTHETIST, CERTIFIED REGISTERED

## 2023-05-04 PROCEDURE — 25010000002 ROPIVACAINE PER 1 MG: Performed by: ORTHOPAEDIC SURGERY

## 2023-05-04 PROCEDURE — 25010000002 PHENYLEPHRINE 10 MG/ML SOLUTION 5 ML VIAL: Performed by: NURSE ANESTHETIST, CERTIFIED REGISTERED

## 2023-05-04 PROCEDURE — 93298 REM INTERROG DEV EVAL SCRMS: CPT | Performed by: INTERNAL MEDICINE

## 2023-05-04 PROCEDURE — 25010000002 HYDROMORPHONE 1 MG/ML SOLUTION: Performed by: NURSE ANESTHETIST, CERTIFIED REGISTERED

## 2023-05-04 PROCEDURE — 25010000002 SUGAMMADEX 200 MG/2ML SOLUTION: Performed by: NURSE ANESTHETIST, CERTIFIED REGISTERED

## 2023-05-04 PROCEDURE — 25010000002 FENTANYL CITRATE (PF) 100 MCG/2ML SOLUTION: Performed by: NURSE ANESTHETIST, CERTIFIED REGISTERED

## 2023-05-04 PROCEDURE — G2066 INTER DEVC REMOTE 30D: HCPCS | Performed by: INTERNAL MEDICINE

## 2023-05-04 PROCEDURE — 97166 OT EVAL MOD COMPLEX 45 MIN: CPT

## 2023-05-04 PROCEDURE — 25010000002 CEFAZOLIN PER 500 MG: Performed by: ORTHOPAEDIC SURGERY

## 2023-05-04 PROCEDURE — 27447 TOTAL KNEE ARTHROPLASTY: CPT | Performed by: SPECIALIST/TECHNOLOGIST, OTHER

## 2023-05-04 PROCEDURE — 25010000002 KETOROLAC TROMETHAMINE PER 15 MG: Performed by: ORTHOPAEDIC SURGERY

## 2023-05-04 PROCEDURE — 63710000001 LEVOTHYROXINE 88 MCG TABLET: Performed by: ORTHOPAEDIC SURGERY

## 2023-05-04 PROCEDURE — 25010000002 ONDANSETRON PER 1 MG: Performed by: NURSE ANESTHETIST, CERTIFIED REGISTERED

## 2023-05-04 DEVICE — IMPLANTABLE DEVICE: Type: IMPLANTABLE DEVICE | Site: KNEE | Status: FUNCTIONAL

## 2023-05-04 DEVICE — JOURNEY II BCS XLPE ARTICULAR                                    INSERT SZ 3-4 RIGHT 12MM
Type: IMPLANTABLE DEVICE | Site: KNEE | Status: FUNCTIONAL
Brand: JOURNEY

## 2023-05-04 DEVICE — JOURNEY TIBIAL BASEPLATE NONPOROUS                                    RIGHT SIZE 3
Type: IMPLANTABLE DEVICE | Site: KNEE | Status: FUNCTIONAL
Brand: JOURNEY

## 2023-05-04 DEVICE — JOURNEY II BCS FEMORAL OXINIUM                                    RIGHT SIZE 4
Type: IMPLANTABLE DEVICE | Site: KNEE | Status: FUNCTIONAL
Brand: JOURNEY

## 2023-05-04 DEVICE — JOURNEY 7.5 ROUND RESURF PAT 32MM STANDARD
Type: IMPLANTABLE DEVICE | Site: KNEE | Status: FUNCTIONAL
Brand: JOURNEY

## 2023-05-04 DEVICE — DEV CONTRL TISS STRATAFIX SPIRAL MNCRYL UD 3/0 PLS 30CM: Type: IMPLANTABLE DEVICE | Site: KNEE | Status: FUNCTIONAL

## 2023-05-04 DEVICE — DEV WND/CLS CONTRL TISS STRATAFIX SYMM PDS PLS CTX 60CM VIL: Type: IMPLANTABLE DEVICE | Site: KNEE | Status: FUNCTIONAL

## 2023-05-04 DEVICE — CMT BONE PALACOS R HI/VISC 1X40: Type: IMPLANTABLE DEVICE | Site: KNEE | Status: FUNCTIONAL

## 2023-05-04 RX ORDER — ONDANSETRON 2 MG/ML
INJECTION INTRAMUSCULAR; INTRAVENOUS AS NEEDED
Status: DISCONTINUED | OUTPATIENT
Start: 2023-05-04 | End: 2023-05-04 | Stop reason: SURG

## 2023-05-04 RX ORDER — ACETAMINOPHEN 500 MG
TABLET ORAL AS NEEDED
Status: DISCONTINUED | OUTPATIENT
Start: 2023-05-04 | End: 2023-05-04 | Stop reason: SURG

## 2023-05-04 RX ORDER — PHENYLEPHRINE HCL IN 0.9% NACL 1 MG/10 ML
SYRINGE (ML) INTRAVENOUS AS NEEDED
Status: DISCONTINUED | OUTPATIENT
Start: 2023-05-04 | End: 2023-05-04 | Stop reason: SURG

## 2023-05-04 RX ORDER — OXYCODONE HYDROCHLORIDE 5 MG/1
5 TABLET ORAL EVERY 4 HOURS PRN
Status: DISCONTINUED | OUTPATIENT
Start: 2023-05-04 | End: 2023-05-05 | Stop reason: HOSPADM

## 2023-05-04 RX ORDER — ESCITALOPRAM OXALATE 10 MG/1
20 TABLET ORAL DAILY
Status: DISCONTINUED | OUTPATIENT
Start: 2023-05-04 | End: 2023-05-05 | Stop reason: HOSPADM

## 2023-05-04 RX ORDER — LIDOCAINE HYDROCHLORIDE 10 MG/ML
INJECTION, SOLUTION EPIDURAL; INFILTRATION; INTRACAUDAL; PERINEURAL AS NEEDED
Status: DISCONTINUED | OUTPATIENT
Start: 2023-05-04 | End: 2023-05-04 | Stop reason: SURG

## 2023-05-04 RX ORDER — ACETAMINOPHEN 325 MG/1
650 TABLET ORAL EVERY 6 HOURS PRN
Status: DISCONTINUED | OUTPATIENT
Start: 2023-05-04 | End: 2023-05-05 | Stop reason: HOSPADM

## 2023-05-04 RX ORDER — OXYCODONE HYDROCHLORIDE 5 MG/1
5 TABLET ORAL ONCE AS NEEDED
Status: COMPLETED | OUTPATIENT
Start: 2023-05-04 | End: 2023-05-04

## 2023-05-04 RX ORDER — SODIUM CHLORIDE, SODIUM LACTATE, POTASSIUM CHLORIDE, CALCIUM CHLORIDE 600; 310; 30; 20 MG/100ML; MG/100ML; MG/100ML; MG/100ML
1000 INJECTION, SOLUTION INTRAVENOUS CONTINUOUS
Status: DISCONTINUED | OUTPATIENT
Start: 2023-05-04 | End: 2023-05-05 | Stop reason: HOSPADM

## 2023-05-04 RX ORDER — DEXAMETHASONE SODIUM PHOSPHATE 4 MG/ML
INJECTION, SOLUTION INTRA-ARTICULAR; INTRALESIONAL; INTRAMUSCULAR; INTRAVENOUS; SOFT TISSUE AS NEEDED
Status: DISCONTINUED | OUTPATIENT
Start: 2023-05-04 | End: 2023-05-04 | Stop reason: SURG

## 2023-05-04 RX ORDER — LIDOCAINE HYDROCHLORIDE 10 MG/ML
0.5 INJECTION, SOLUTION INFILTRATION; PERINEURAL ONCE AS NEEDED
Status: DISCONTINUED | OUTPATIENT
Start: 2023-05-04 | End: 2023-05-04 | Stop reason: HOSPADM

## 2023-05-04 RX ORDER — GABAPENTIN 300 MG/1
CAPSULE ORAL AS NEEDED
Status: DISCONTINUED | OUTPATIENT
Start: 2023-05-04 | End: 2023-05-04 | Stop reason: SURG

## 2023-05-04 RX ORDER — CEFAZOLIN SODIUM IN 0.9 % NACL 3 G/100 ML
3 INTRAVENOUS SOLUTION, PIGGYBACK (ML) INTRAVENOUS
Status: DISCONTINUED | OUTPATIENT
Start: 2023-05-05 | End: 2023-05-04

## 2023-05-04 RX ORDER — ROCURONIUM BROMIDE 10 MG/ML
INJECTION, SOLUTION INTRAVENOUS AS NEEDED
Status: DISCONTINUED | OUTPATIENT
Start: 2023-05-04 | End: 2023-05-04 | Stop reason: SURG

## 2023-05-04 RX ORDER — PROPOFOL 10 MG/ML
INJECTION, EMULSION INTRAVENOUS CONTINUOUS PRN
Status: DISCONTINUED | OUTPATIENT
Start: 2023-05-04 | End: 2023-05-04 | Stop reason: SURG

## 2023-05-04 RX ORDER — ONDANSETRON 4 MG/1
4 TABLET, FILM COATED ORAL EVERY 6 HOURS PRN
Status: DISCONTINUED | OUTPATIENT
Start: 2023-05-04 | End: 2023-05-05 | Stop reason: HOSPADM

## 2023-05-04 RX ORDER — DIPHENHYDRAMINE HYDROCHLORIDE 50 MG/ML
12.5 INJECTION INTRAMUSCULAR; INTRAVENOUS
Status: DISCONTINUED | OUTPATIENT
Start: 2023-05-04 | End: 2023-05-04 | Stop reason: HOSPADM

## 2023-05-04 RX ORDER — NALOXONE HCL 0.4 MG/ML
0.4 VIAL (ML) INJECTION AS NEEDED
Status: DISCONTINUED | OUTPATIENT
Start: 2023-05-04 | End: 2023-05-04 | Stop reason: HOSPADM

## 2023-05-04 RX ORDER — LABETALOL HYDROCHLORIDE 5 MG/ML
INJECTION, SOLUTION INTRAVENOUS AS NEEDED
Status: DISCONTINUED | OUTPATIENT
Start: 2023-05-04 | End: 2023-05-04 | Stop reason: SURG

## 2023-05-04 RX ORDER — AMLODIPINE BESYLATE 5 MG/1
5 TABLET ORAL DAILY
Status: DISCONTINUED | OUTPATIENT
Start: 2023-05-05 | End: 2023-05-04

## 2023-05-04 RX ORDER — TRANEXAMIC ACID 10 MG/ML
1000 INJECTION, SOLUTION INTRAVENOUS ONCE
Status: COMPLETED | OUTPATIENT
Start: 2023-05-04 | End: 2023-05-04

## 2023-05-04 RX ORDER — LABETALOL HYDROCHLORIDE 5 MG/ML
5 INJECTION, SOLUTION INTRAVENOUS
Status: DISCONTINUED | OUTPATIENT
Start: 2023-05-04 | End: 2023-05-04 | Stop reason: HOSPADM

## 2023-05-04 RX ORDER — SODIUM CHLORIDE 0.9 % (FLUSH) 0.9 %
10 SYRINGE (ML) INJECTION AS NEEDED
Status: DISCONTINUED | OUTPATIENT
Start: 2023-05-04 | End: 2023-05-04 | Stop reason: HOSPADM

## 2023-05-04 RX ORDER — ONDANSETRON 2 MG/ML
4 INJECTION INTRAMUSCULAR; INTRAVENOUS ONCE AS NEEDED
Status: DISCONTINUED | OUTPATIENT
Start: 2023-05-04 | End: 2023-05-04 | Stop reason: HOSPADM

## 2023-05-04 RX ORDER — PROPOFOL 10 MG/ML
INJECTION, EMULSION INTRAVENOUS AS NEEDED
Status: DISCONTINUED | OUTPATIENT
Start: 2023-05-04 | End: 2023-05-04 | Stop reason: SURG

## 2023-05-04 RX ORDER — SUCCINYLCHOLINE CHLORIDE 20 MG/ML
INJECTION INTRAMUSCULAR; INTRAVENOUS AS NEEDED
Status: DISCONTINUED | OUTPATIENT
Start: 2023-05-04 | End: 2023-05-04 | Stop reason: SURG

## 2023-05-04 RX ORDER — HYDRALAZINE HYDROCHLORIDE 20 MG/ML
5 INJECTION INTRAMUSCULAR; INTRAVENOUS
Status: DISCONTINUED | OUTPATIENT
Start: 2023-05-04 | End: 2023-05-04 | Stop reason: HOSPADM

## 2023-05-04 RX ORDER — FENTANYL CITRATE 50 UG/ML
INJECTION, SOLUTION INTRAMUSCULAR; INTRAVENOUS AS NEEDED
Status: DISCONTINUED | OUTPATIENT
Start: 2023-05-04 | End: 2023-05-04 | Stop reason: SURG

## 2023-05-04 RX ORDER — DIPHENHYDRAMINE HYDROCHLORIDE 50 MG/ML
12.5 INJECTION INTRAMUSCULAR; INTRAVENOUS ONCE AS NEEDED
Status: DISCONTINUED | OUTPATIENT
Start: 2023-05-04 | End: 2023-05-04 | Stop reason: HOSPADM

## 2023-05-04 RX ORDER — NALOXONE HCL 0.4 MG/ML
0.1 VIAL (ML) INJECTION
Status: DISCONTINUED | OUTPATIENT
Start: 2023-05-04 | End: 2023-05-05 | Stop reason: HOSPADM

## 2023-05-04 RX ORDER — MELOXICAM 15 MG/1
15 TABLET ORAL DAILY
Status: DISCONTINUED | OUTPATIENT
Start: 2023-05-04 | End: 2023-05-05

## 2023-05-04 RX ORDER — ONDANSETRON 2 MG/ML
4 INJECTION INTRAMUSCULAR; INTRAVENOUS EVERY 6 HOURS PRN
Status: DISCONTINUED | OUTPATIENT
Start: 2023-05-04 | End: 2023-05-05 | Stop reason: HOSPADM

## 2023-05-04 RX ORDER — METHOCARBAMOL 750 MG/1
750 TABLET, FILM COATED ORAL 4 TIMES DAILY
Status: DISCONTINUED | OUTPATIENT
Start: 2023-05-04 | End: 2023-05-05 | Stop reason: HOSPADM

## 2023-05-04 RX ORDER — EPHEDRINE SULFATE 5 MG/ML
5 INJECTION INTRAVENOUS ONCE AS NEEDED
Status: DISCONTINUED | OUTPATIENT
Start: 2023-05-04 | End: 2023-05-04 | Stop reason: HOSPADM

## 2023-05-04 RX ORDER — METOPROLOL TARTRATE 5 MG/5ML
INJECTION INTRAVENOUS AS NEEDED
Status: DISCONTINUED | OUTPATIENT
Start: 2023-05-04 | End: 2023-05-04 | Stop reason: SURG

## 2023-05-04 RX ORDER — DOCUSATE SODIUM 100 MG/1
100 CAPSULE, LIQUID FILLED ORAL 2 TIMES DAILY PRN
Status: DISCONTINUED | OUTPATIENT
Start: 2023-05-04 | End: 2023-05-05 | Stop reason: HOSPADM

## 2023-05-04 RX ORDER — LEVOTHYROXINE SODIUM 88 UG/1
88 TABLET ORAL DAILY
Status: DISCONTINUED | OUTPATIENT
Start: 2023-05-04 | End: 2023-05-05 | Stop reason: HOSPADM

## 2023-05-04 RX ORDER — ESMOLOL HYDROCHLORIDE 10 MG/ML
INJECTION INTRAVENOUS AS NEEDED
Status: DISCONTINUED | OUTPATIENT
Start: 2023-05-04 | End: 2023-05-04 | Stop reason: SURG

## 2023-05-04 RX ORDER — IPRATROPIUM BROMIDE AND ALBUTEROL SULFATE 2.5; .5 MG/3ML; MG/3ML
3 SOLUTION RESPIRATORY (INHALATION) ONCE AS NEEDED
Status: DISCONTINUED | OUTPATIENT
Start: 2023-05-04 | End: 2023-05-04 | Stop reason: HOSPADM

## 2023-05-04 RX ORDER — METOPROLOL SUCCINATE 25 MG/1
25 TABLET, EXTENDED RELEASE ORAL NIGHTLY
Status: DISCONTINUED | OUTPATIENT
Start: 2023-05-04 | End: 2023-05-04

## 2023-05-04 RX ORDER — OXYCODONE HYDROCHLORIDE 5 MG/1
10 TABLET ORAL EVERY 4 HOURS PRN
Status: DISCONTINUED | OUTPATIENT
Start: 2023-05-04 | End: 2023-05-04 | Stop reason: HOSPADM

## 2023-05-04 RX ORDER — SPIRONOLACTONE 25 MG/1
12.5 TABLET ORAL DAILY
Status: DISCONTINUED | OUTPATIENT
Start: 2023-05-04 | End: 2023-05-05 | Stop reason: HOSPADM

## 2023-05-04 RX ORDER — FLUMAZENIL 0.1 MG/ML
0.2 INJECTION INTRAVENOUS AS NEEDED
Status: DISCONTINUED | OUTPATIENT
Start: 2023-05-04 | End: 2023-05-04 | Stop reason: HOSPADM

## 2023-05-04 RX ORDER — SODIUM CHLORIDE 9 MG/ML
75 INJECTION, SOLUTION INTRAVENOUS CONTINUOUS
Status: DISCONTINUED | OUTPATIENT
Start: 2023-05-04 | End: 2023-05-05

## 2023-05-04 RX ORDER — CEFAZOLIN SODIUM IN 0.9 % NACL 3 G/100 ML
3 INTRAVENOUS SOLUTION, PIGGYBACK (ML) INTRAVENOUS EVERY 8 HOURS
Status: DISCONTINUED | OUTPATIENT
Start: 2023-05-04 | End: 2023-05-04

## 2023-05-04 RX ORDER — OXYCODONE HYDROCHLORIDE 5 MG/1
10 TABLET ORAL EVERY 4 HOURS PRN
Status: DISCONTINUED | OUTPATIENT
Start: 2023-05-04 | End: 2023-05-05 | Stop reason: HOSPADM

## 2023-05-04 RX ADMIN — LABETALOL HYDROCHLORIDE 2.5 MG: 5 INJECTION, SOLUTION INTRAVENOUS at 08:39

## 2023-05-04 RX ADMIN — PHENYLEPHRINE HYDROCHLORIDE 0.2 MCG/KG/MIN: 10 INJECTION INTRAVENOUS at 07:38

## 2023-05-04 RX ADMIN — METOPROLOL TARTRATE 1 MG: 1 INJECTION, SOLUTION INTRAVENOUS at 08:24

## 2023-05-04 RX ADMIN — ESMOLOL HYDROCHLORIDE 5 MG: 100 INJECTION, SOLUTION INTRAVENOUS at 08:00

## 2023-05-04 RX ADMIN — FENTANYL CITRATE 25 MCG: 50 INJECTION, SOLUTION INTRAMUSCULAR; INTRAVENOUS at 07:55

## 2023-05-04 RX ADMIN — OXYCODONE 5 MG: 5 TABLET ORAL at 09:38

## 2023-05-04 RX ADMIN — Medication 3 G: at 07:43

## 2023-05-04 RX ADMIN — FENTANYL CITRATE 50 MCG: 50 INJECTION, SOLUTION INTRAMUSCULAR; INTRAVENOUS at 07:50

## 2023-05-04 RX ADMIN — HYDROMORPHONE HYDROCHLORIDE 0.5 MG: 1 INJECTION, SOLUTION INTRAMUSCULAR; INTRAVENOUS; SUBCUTANEOUS at 10:24

## 2023-05-04 RX ADMIN — METHOCARBAMOL 750 MG: 750 TABLET, FILM COATED ORAL at 13:06

## 2023-05-04 RX ADMIN — ACETAMINOPHEN 1000 MG: 500 TABLET, FILM COATED ORAL at 07:00

## 2023-05-04 RX ADMIN — HYDROMORPHONE HYDROCHLORIDE 0.5 MG: 1 INJECTION, SOLUTION INTRAMUSCULAR; INTRAVENOUS; SUBCUTANEOUS at 08:02

## 2023-05-04 RX ADMIN — ESMOLOL HYDROCHLORIDE 10 MG: 100 INJECTION, SOLUTION INTRAVENOUS at 08:05

## 2023-05-04 RX ADMIN — SODIUM CHLORIDE 250 ML: 9 INJECTION, SOLUTION INTRAVENOUS at 14:06

## 2023-05-04 RX ADMIN — ROCURONIUM BROMIDE 45 MG: 10 INJECTION, SOLUTION INTRAVENOUS at 07:41

## 2023-05-04 RX ADMIN — GABAPENTIN 200 MG: 300 CAPSULE ORAL at 07:00

## 2023-05-04 RX ADMIN — FENTANYL CITRATE 25 MCG: 50 INJECTION, SOLUTION INTRAMUSCULAR; INTRAVENOUS at 07:37

## 2023-05-04 RX ADMIN — LABETALOL HYDROCHLORIDE 2.5 MG: 5 INJECTION, SOLUTION INTRAVENOUS at 08:35

## 2023-05-04 RX ADMIN — METOPROLOL TARTRATE 2 MG: 1 INJECTION, SOLUTION INTRAVENOUS at 08:31

## 2023-05-04 RX ADMIN — METOPROLOL TARTRATE 2 MG: 1 INJECTION, SOLUTION INTRAVENOUS at 08:28

## 2023-05-04 RX ADMIN — ESCITALOPRAM OXALATE 20 MG: 10 TABLET ORAL at 13:06

## 2023-05-04 RX ADMIN — ONDANSETRON 4 MG: 2 INJECTION INTRAMUSCULAR; INTRAVENOUS at 08:39

## 2023-05-04 RX ADMIN — Medication 100 MCG: at 07:40

## 2023-05-04 RX ADMIN — MELOXICAM 15 MG: 15 TABLET ORAL at 13:09

## 2023-05-04 RX ADMIN — TRANEXAMIC ACID 1000 MG: 10 INJECTION, SOLUTION INTRAVENOUS at 08:28

## 2023-05-04 RX ADMIN — SUGAMMADEX 225 MG: 100 INJECTION, SOLUTION INTRAVENOUS at 08:41

## 2023-05-04 RX ADMIN — PROPOFOL 125 MCG/KG/MIN: 10 INJECTION, EMULSION INTRAVENOUS at 07:37

## 2023-05-04 RX ADMIN — CEFAZOLIN 2 G: 2 INJECTION, POWDER, FOR SOLUTION INTRAMUSCULAR; INTRAVENOUS at 15:48

## 2023-05-04 RX ADMIN — OXYCODONE 5 MG: 5 TABLET ORAL at 07:00

## 2023-05-04 RX ADMIN — ESMOLOL HYDROCHLORIDE 5 MG: 100 INJECTION, SOLUTION INTRAVENOUS at 07:57

## 2023-05-04 RX ADMIN — DEXAMETHASONE SODIUM PHOSPHATE 4 MG: 4 INJECTION, SOLUTION INTRAMUSCULAR; INTRAVENOUS at 08:39

## 2023-05-04 RX ADMIN — SUCCINYLCHOLINE CHLORIDE 120 MG: 20 INJECTION, SOLUTION INTRAMUSCULAR; INTRAVENOUS at 07:37

## 2023-05-04 RX ADMIN — TRANEXAMIC ACID 1000 MG: 10 INJECTION, SOLUTION INTRAVENOUS at 07:51

## 2023-05-04 RX ADMIN — METHOCARBAMOL 750 MG: 750 TABLET, FILM COATED ORAL at 18:12

## 2023-05-04 RX ADMIN — SODIUM CHLORIDE, POTASSIUM CHLORIDE, SODIUM LACTATE AND CALCIUM CHLORIDE 1000 ML: 600; 310; 30; 20 INJECTION, SOLUTION INTRAVENOUS at 06:57

## 2023-05-04 RX ADMIN — HYDROMORPHONE HYDROCHLORIDE 0.5 MG: 1 INJECTION, SOLUTION INTRAMUSCULAR; INTRAVENOUS; SUBCUTANEOUS at 08:30

## 2023-05-04 RX ADMIN — LIDOCAINE HYDROCHLORIDE 50 MG: 10 INJECTION, SOLUTION EPIDURAL; INFILTRATION; INTRACAUDAL; PERINEURAL at 07:37

## 2023-05-04 RX ADMIN — PROPOFOL 120 MG: 10 INJECTION, EMULSION INTRAVENOUS at 07:37

## 2023-05-04 RX ADMIN — SODIUM CHLORIDE 75 ML/HR: 9 INJECTION, SOLUTION INTRAVENOUS at 15:50

## 2023-05-04 RX ADMIN — METHOCARBAMOL 750 MG: 750 TABLET, FILM COATED ORAL at 20:03

## 2023-05-04 RX ADMIN — LEVOTHYROXINE SODIUM 88 MCG: 0.09 TABLET ORAL at 13:05

## 2023-05-04 RX ADMIN — ROCURONIUM BROMIDE 5 MG: 10 INJECTION, SOLUTION INTRAVENOUS at 07:37

## 2023-05-04 NOTE — H&P
Orthopaedic Surgery  History & Physical For Elective Total Knee  Dr. JACKELINE Herrera II  [phone]    HPI:  Patient is a 78 y.o. Not  or  female who presents with End-stage arthritis of the right knee. They failed conservative treatment of their knee pain and a thorough discussion of the risks and benefits of surgery was had. The patient wishes to continue with elective total knee replacement, they were scheduled and are here for surgery. They did get medical clearance as well as a thorough preoperative workup.    MEDICAL HISTORY  Past Medical History:   Diagnosis Date   • Afib    • Ankle pain    • Arthritis    • Coronary artery disease    • Depression    • Gout    • History of loop recorder 04/25/2023    current   • Hyperglycemia    • Hyperlipidemia    • Hypertension    • Low back pain    • Morbid obesity    • Sleep apnea    • Vitamin D deficiency    ·   Past Surgical History:   Procedure Laterality Date   • ANKLE FUSION      2017-left   • CARDIAC CATHETERIZATION     • CARDIAC CATHETERIZATION Right 8/29/2022    Procedure: Left Heart Cath;  Surgeon: Ratna Zavala MD;  Location: [facility] CATH INVASIVE LOCATION;  Service: Cardiovascular;  Laterality: Right;   • CARPAL TUNNEL RELEASE     • CATARACT EXTRACTION     • CUBITAL TUNNEL RELEASE     • HYSTERECTOMY     • REPLACEMENT TOTAL KNEE      left 2008   • ROTATOR CUFF REPAIR     ·   Prior to Admission medications    Medication Sig Start Date End Date Taking? Authorizing Provider   allopurinol (ZYLOPRIM) 300 MG tablet TAKE 1 TABLET BY MOUTH EVERY DAY  Patient taking differently: Take 1 tablet by mouth Daily. Hold day of surgery 9/19/22   Leonor Adam MD   amLODIPine (NORVASC) 5 MG tablet TAKE 1 TABLET BY MOUTH EVERY DAY  Patient taking differently: Take 1 tablet by mouth Daily. 9/19/22   Leonor Adam MD   atorvastatin (LIPITOR) 20 MG tablet TAKE 1 TABLET BY MOUTH EVERYDAY AT BEDTIME  Patient taking differently: Take 1 tablet by  mouth Every Night. 7/25/22   Leonor Adam MD   bumetanide (BUMEX) 2 MG tablet TAKE 1/2 TABLET BY MOUTH EVERY DAY  Patient taking differently: Take 1 mg by mouth Daily. HOLD day of surgery 9/19/22   Ratna Zavala MD   cetirizine (zyrTEC) 10 MG tablet Take 1 tablet by mouth Daily. 8/28/18   Franklin Cid MD   cyanocobalamin (VITAMIN B-12) 1000 MCG tablet Take 1 tablet by mouth Daily. 12/20/18   Franklin Cid MD   escitalopram (LEXAPRO) 20 MG tablet TAKE 1 TABLET BY MOUTH EVERY DAY 9/19/22   Leonor Adam MD   Farxiga 5 MG tablet tablet TAKE 1 TABLET BY MOUTH EVERY DAY  Patient taking differently: Take 1 tablet by mouth Daily. HOLD Day of surgery 4/15/23   Leonor Adam MD   hydrocortisone 2.5 % ointment APPLY A THIN LAYER TO AFFECTED AREAS BY TOPICAL ROUTE TWICE DAILY. SAFE TO USE ON FACE. 3/9/23   Franklin Cid MD   levothyroxine (SYNTHROID, LEVOTHROID) 88 MCG tablet TAKE 1 TABLET BY MOUTH EVERY DAY  Patient taking differently: Take 1 tablet by mouth Daily. 12/8/22   Leonor Adam MD   methocarbamol (ROBAXIN) 750 MG tablet Take 1 tablet by mouth 4 (Four) Times a Day. 2/17/23   Betty Gaspar APRN   metoprolol succinate XL (TOPROL-XL) 25 MG 24 hr tablet Take 1 tablet by mouth Every Night. 4/27/23   Leonor Adam MD   montelukast (SINGULAIR) 10 MG tablet Take 1 tablet by mouth Every Night. 8/18/20   Franklin Cid MD   Multiple Vitamins-Minerals (CENTRUM SILVER ADULT 50+) tablet Take 1 tablet by mouth Daily. HOLD NOW 6/16/16   Franklin Cid MD   Ozempic, 2 MG/DOSE, 8 MG/3ML solution pen-injector INJECT 2 MG UNDER THE SKIN INTO THE APPROPRIATE AREA AS DIRECTED EVERY 7 (SEVEN) DAYS FOR 4 DOSES. CONTINUE FOR 4 WEEKS 4/18/23   Leonor Adam MD   rivaroxaban (XARELTO) 15 MG tablet Take 1 tablet by mouth Daily With Dinner. Indications: Atrial Fibrillation  Patient taking differently: Take 1 tablet by mouth Daily With Dinner.  HOLD 2-3 days per Dr. Preciado  Indications: Atrial Fibrillation 12/13/22   Ratna Zavala MD   Semaglutide, 1 MG/DOSE, (Ozempic, 1 MG/DOSE,) 4 MG/3ML solution pen-injector Inject 1 mg under the skin into the appropriate area as directed Every 7 (Seven) Days for 4 doses. Continue for 4 weeks 5/15/23 6/6/23  Leonor Adam MD   Semaglutide,0.25 or 0.5MG/DOS, (Ozempic, 0.25 or 0.5 MG/DOSE,) 2 MG/1.5ML solution pen-injector Inject 0.25 mg under the skin into the appropriate area as directed 1 (One) Time Per Week. For four weeks 3/20/23   Leonor Adam MD   Semaglutide,0.25 or 0.5MG/DOS, (Ozempic, 0.25 or 0.5 MG/DOSE,) 2 MG/3ML solution pen-injector Inject 0.5 mg under the skin into the appropriate area as directed Every 7 (Seven) Days for 4 doses. Continue 4 weeks 4/17/23 5/9/23  Leonor Adam MD   spironolactone (ALDACTONE) 25 MG tablet Take 0.5 tablets by mouth Daily.  Patient taking differently: Take 12.5 mg by mouth Daily. HOLD Day of surgery 2/10/23   Ratna Zavala MD   Vitamin D, Cholecalciferol, 25 MCG (1000 UT) tablet Take 1 tablet by mouth Daily. 6/16/16   Provider, MD Franklin   ·   Allergies   Allergen Reactions   • Celecoxib Itching and Swelling     swelling   ·   Most Recent Immunizations   Administered Date(s) Administered   • COVID-19 (PFIZER) Purple Cap Monovalent 12/29/2021   • FLUAD TRI 65YR+ 11/07/2013   • Flu Vaccine Intradermal Quad 18-64YR 11/07/2013   • Fluad Quad 65+ 11/18/2022   • Fluzone High Dose =>65 Years ([employer] ONLY) 12/06/2019   • Fluzone High-Dose 65+yrs 10/08/2021   • Fluzone Quad >6mos (Multi-dose) 12/02/2014   • Influenza Quad Vaccine (Inpatient) 12/02/2014   • Influenza, Unspecified 11/07/2013   • Pneumococcal Conjugate 13-Valent (PCV13) 06/16/2016   • Pneumococcal Polysaccharide (PPSV23) 01/23/2018   • Shingrix 12/06/2019   • Tdap 01/23/2018, 01/23/2018   ·   Social History     Tobacco Use   • Smoking status: Never   • Smokeless tobacco:  Never   Substance Use Topics   • Alcohol use: No   ·    Social History     Substance and Sexual Activity   Drug Use No   ·     REVIEW OF SYSTEMS:  · Head: negative for headache  · Respiratory: negative for shortness of breath.   · Cardiovascular: negative for chest pain.   · Gastrointestinal: negative abdominal pain.   · Neurological: negative for LOC  · Psychiatric/Behavioral: negative for memory loss.   · All other systems reviewed and are negative    VITALS: LMP  (LMP Unknown)  There is no height or weight on file to calculate BMI.    PHYSICAL EXAM:   · CONSTITUTIONAL: A&Ox3, No acute distress  · LUNGS: Equal chest rise, no shortness of air  · CARDIOVASCULAR: palpable peripheral pulses  · SKIN: no skin lesions in the area examined  · LYMPH: no lymphadenopathy in the area examined  · EXTREMITY: Knee  · Pulses:  Brisk Capillary Refill  · Sensation: Intact to Saphenous, Sural, Deep Peroneal, Superficial Peroneal, and Tibial Nerves and grossly throughout extremity  · Motor: 5/5 EHL/FHL/TA/GS motor complexes    RADIOLOGY REVIEW:   No radiology results for the last 7 days    LABS:   Results for the past 24 hours: No results found for this or any previous visit (from the past 24 hour(s)).    IMPRESSION:  Patient is a 78 y.o. Not  or  female with end-stage arthritis of the right knee    PLAN:   · Surgery: Elective total knee arthroplasty  · Consent: The risks and benefits of operative versus nonoperative treatment were discussed. The patient elected to undergo operative treatment of their knee arthritis. The risks discussed included but were not limited to blood clots, MI, stroke, other medical complications, infection, damage to neurovascular structures, continued pain, hardware prominence, loss of range of motion, need for further procedures, and and risk of anesthesia..  No guarantees were made   · Disposition: Elective right Total Knee Arthroplasty today.    Chino Herrera II, MD  Orthopaedic  Surgery  [facility]

## 2023-05-04 NOTE — ANESTHESIA PROCEDURE NOTES
Airway  Urgency: elective    Date/Time: 5/4/2023 7:38 AM  Airway not difficult    General Information and Staff    Patient location during procedure: OR  Anesthesiologist: Angela Moralez MD  CRNA/CAA: Camille Matos CRNA    Indications and Patient Condition  Indications for airway management: airway protection    Preoxygenated: yes  MILS not maintained throughout  Mask difficulty assessment: 2 - vent by mask + OA or adjuvant +/- NMBA    Final Airway Details  Final airway type: endotracheal airway      Successful airway: ETT  Cuffed: yes   Successful intubation technique: direct laryngoscopy  Facilitating devices/methods: intubating stylet  Endotracheal tube insertion site: oral  Blade: Diez  Blade size: 2  ETT size (mm): 7.0  Cormack-Lehane Classification: grade I - full view of glottis  Placement verified by: capnometry   Measured from: lips  ETT/EBT  to lips (cm): 21  Number of attempts at approach: 1  Assessment: lips, teeth, and gum same as pre-op and atraumatic intubation

## 2023-05-04 NOTE — CONSULTS
[facility] Medicine Services   Consult Note    Patient Name: Faby Le  : 1945  MRN: [id number]  Primary Care Physician:  Leonor Adam MD  Referring Physician: Chino Riley*  Date of admission: 2023  Date and Time of Care: 23 at 1200    Inpatient Hospitalist Consult  Consult performed by: Rach Reis APRN  Consult ordered by: Chino Riley II, MD  Assessment/Recommendations: Consulted for medical management .           Subjective      Reason for Consult/ Chief Complaint:  Knee pain     Consult Requested By:  Dr riley  History of Present Illness: Faby Le is a 78 y.o. female  To [facility] for elective total knee arthroplasty     Review of Systems   Constitutional: Negative for chills, decreased appetite, malaise/fatigue and night sweats.   HENT: Negative for sore throat.    Eyes: Negative for visual disturbance.   Cardiovascular: Negative for chest pain and dyspnea on exertion.   Respiratory: Negative for cough.    Musculoskeletal: Positive for arthritis (no pain at present - hx OA pain ).   Gastrointestinal: Negative for abdominal pain, diarrhea and vomiting.   Genitourinary: Negative for dysuria.   Neurological: Negative for headaches.   Psychiatric/Behavioral: Negative for altered mental status and depression.        Personal History     Past Medical History:   Diagnosis Date   • Afib    • Ankle pain    • Arthritis    • Coronary artery disease    • Depression    • Gout    • History of loop recorder 2023    current   • Hyperglycemia    • Hyperlipidemia    • Hypertension    • Low back pain    • Morbid obesity    • Sleep apnea    • Vitamin D deficiency        Past Surgical History:   Procedure Laterality Date   • ANKLE FUSION      2017-left   • CARDIAC CATHETERIZATION     • CARDIAC CATHETERIZATION Right 2022    Procedure: Left Heart Cath;  Surgeon: Ratna Zavala MD;  Location: [facility] CATH INVASIVE LOC[facility];  Service:  Cardiovascular;  Laterality: Right;   • CARPAL TUNNEL RELEASE     • CATARACT EXTRACTION     • CUBITAL TUNNEL RELEASE     • HYSTERECTOMY     • REPLACEMENT TOTAL KNEE      left 2008   • ROTATOR CUFF REPAIR         Family History: family history includes Arthritis in her brother; Cancer in her brother; Hypertension in her mother; Stroke in her father. Otherwise pertinent FHx was reviewed and not pertinent to current issue.    Social History:  reports that she has never smoked. She has never used smokeless tobacco. She reports that she does not drink alcohol and does not use drugs.    Home Medications:   Semaglutide (1 MG/DOSE), Semaglutide (2 MG/DOSE), Semaglutide(0.25 or 0.5MG/DOS), allopurinol, amLODIPine, atorvastatin, bumetanide, cetirizine, cholecalciferol, cyanocobalamin, dapagliflozin, escitalopram, hydrocortisone, levothyroxine, methocarbamol, metoprolol succinate XL, montelukast, multivitamin with minerals, rivaroxaban, and spironolactone    Allergies:  Allergies   Allergen Reactions   • Celecoxib Itching and Swelling     swelling         Objective      Vitals:  Temp:  [97.2 °F (36.2 °C)-98.3 °F (36.8 °C)] 97.3 °F (36.3 °C)  Heart Rate:  [] 105  Resp:  [12-18] 12  BP: ()/(33-63) 94/62  Flow (L/min):  [6] 6    Physical Exam  Constitutional:       Appearance: She is obese.   HENT:      Head: Normocephalic.      Right Ear: External ear normal.      Left Ear: External ear normal.      Mouth/Throat:      Pharynx: Oropharynx is clear.   Eyes:      Conjunctiva/sclera: Conjunctivae normal.   Cardiovascular:      Rate and Rhythm: Normal rate. Rhythm irregular.      Pulses: Normal pulses.      Heart sounds: Normal heart sounds.   Pulmonary:      Effort: Pulmonary effort is normal.      Breath sounds: Normal breath sounds.   Abdominal:      Palpations: Abdomen is soft.      Comments: obese   Musculoskeletal:      Cervical back: Neck supple.      Comments: R knee with dressing in place - n/v status intact  distal    Skin:     General: Skin is warm and dry.   Neurological:      General: No focal deficit present.      Mental Status: She is alert and oriented to person, place, and time.   Psychiatric:         Mood and Affect: Mood normal.         Thought Content: Thought content normal.         Judgment: Judgment normal.          Result Review    Result Review:  I have personally reviewed the results from the time of this admission to 5/4/2023 12:27 EDT and agree with these findings:  [x]  Laboratory  []  Microbiology  [x]  Radiology  []  EKG/Telemetry   []  Cardiology/Vascular   []  Pathology  []  Old records  []  Other:  Most notable findings include:   Xray R knee:Satisfactory postoperative appearance status post right knee replacement.       Assessment & Plan        Active Hospital Problems:  Active Hospital Problems    Diagnosis    • **Status post knee replacement    • Hypertension    • Atrial fibrillation    • Chronic kidney disease, stage 3 (moderate)    • Coronary artery disease    • Depression    • Hypothyroid      Plan:     S/P- knee replacement - OR today . IVF infusing. Pt feels ok. No pain at present.     Afib - continue xarelto, rate stable    HTN- bp low at present- hold norvasc for now. Will continue metoprolol for now with Afib hx and monitor bp.     Depression - continue lexapro    Hypothyroidism - continue replacement dose.     Cad- continue metoprolol- last echo aug- 2022 EF 51-55%. Pt did see her primary cardiologist prior to this surgery and was cleared for OR.    CKD stage 3- last cr  1.019- this am 1.4. will continue IVF and check BMP in the am- decrease IVF to 75 cc hr- pt is on diuretics with hx diastolic dysfunction. She  will be taking po today .           This patient has been examined wearing appropriate Personal Protective Equipment and discussed with hospital infection control department. 05/04/23      Signature: Electronically signed by CAROL Talbot, 05/04/23, 12:27 EDT.  [facility]ist [facility]

## 2023-05-04 NOTE — ANESTHESIA PREPROCEDURE EVALUATION
Anesthesia Evaluation     Patient summary reviewed and Nursing notes reviewed   no history of anesthetic complications:  NPO Solid Status: > 8 hours  NPO Liquid Status: > 8 hours           Airway   Mallampati: II  TM distance: >3 FB  Neck ROM: full  Dental    (+) upper dentures and partials    Pulmonary - normal exam   (+) sleep apnea on CPAP,   (-) not a smoker  Cardiovascular - normal exam  Exercise tolerance: poor (<4 METS)    ECG reviewed    (+) hypertension, dysrhythmias Atrial Fib, hyperlipidemia,   (-) angina, CHF    ROS comment: Cath 2022 showed mild non obstructive disease.  Normal EF    Cards note:  Continue current medical therapy  From cardiac standpoint patient is low risk to undergo the planned knee surgery  Okay to hold anticoagulation therapy 2 to 3 days before the planned knee surgery  Follow-up in office in 6 months      Neuro/Psych  (+) psychiatric history Depression,    (-) CVA  GI/Hepatic/Renal/Endo    (+) morbid obesity,  renal disease CRI, thyroid problem hypothyroidism    Musculoskeletal     Abdominal    Substance History      OB/GYN          Other   arthritis,                      Anesthesia Plan    ASA 3     general and ERAS Protocol   total IV anesthesia  intravenous induction     Anesthetic plan, risks, benefits, and alternatives have been provided, discussed and informed consent has been obtained with: patient.    Plan discussed with CRNA.        CODE STATUS:    Level Of Support Discussed With: Patient  Code Status (Patient has no pulse and is not breathing): CPR (Attempt to Resuscitate)  Medical Interventions (Patient has pulse or is breathing): Full

## 2023-05-04 NOTE — THERAPY EVALUATION
Patient Name: Faby Le  : 1945    MRN: [id number]                              Today's Date: 2023       Admit Date: 2023    Visit Dx: No diagnosis found.  Patient Active Problem List   Diagnosis   • Abnormal complete blood count   • Ankle pain   • Arthralgia of left elbow   • Arthritis   • B12 deficiency   • Coronary artery disease   • Depression   • Gallstones   • Gout   • Hyperglycemia   • Hyperlipidemia   • Hypertension   • Hypothyroid   • Low back pain   • Mobility poor   • Postmenopausal status   • Sleep apnea   • Vitamin D deficiency   • Chronic kidney disease, stage 3 (moderate)   • History of malignant neoplasm of endometrium   • Oropharyngeal dysphagia   • Class 3 severe obesity due to excess calories with serious comorbidity and body mass index (BMI) of 45.0 to 49.9 in adult   • Chronic pain of right knee   • Light headedness   • Paroxysmal atrial fibrillation   • Atrial fibrillation   • Abnormal results of cardiovascular function studies   • Chronic left shoulder pain   • Status post knee replacement   • Hypertension     Past Medical History:   Diagnosis Date   • Afib    • Ankle pain    • Arthritis    • Coronary artery disease    • Depression    • Gout    • History of loop recorder 2023    current   • Hyperglycemia    • Hyperlipidemia    • Hypertension    • Low back pain    • Morbid obesity    • Sleep apnea    • Vitamin D deficiency      Past Surgical History:   Procedure Laterality Date   • ANKLE FUSION      2017-left   • CARDIAC CATHETERIZATION     • CARDIAC CATHETERIZATION Right 2022    Procedure: Left Heart Cath;  Surgeon: Ratna Zavala MD;  Location: [facility] INVASIVE LOCATION;  Service: Cardiovascular;  Laterality: Right;   • CARPAL TUNNEL RELEASE     • CATARACT EXTRACTION     • CUBITAL TUNNEL RELEASE     • HYSTERECTOMY     • REPLACEMENT TOTAL KNEE      left    • ROTATOR CUFF REPAIR        General Information     Row Name 23 1638          OT Time  and Intention    Document Type evaluation  -ES     Row Name 05/04/23 1638          General Information    Patient Profile Reviewed yes  -ES     Row Name 05/04/23 1638          Occupational Profile    Reason for Services/Referral (Occupational Profile) Pt is a 79 y/o female who is being seen for re-evaluation s/p elective R TKA. At baseline, pt lives alone in a single level home and typically ambulates with use of cane, using a w/c for longer distances  -ES     Row Name 05/04/23 1638          Living Environment    People in Home alone  -ES     Row Name 05/04/23 1638          Home Main Entrance    Number of Stairs, Main Entrance none  -ES     Row Name 05/04/23 1638          Cognition    Orientation Status (Cognition) oriented x 4  -ES     Row Name 05/04/23 1638          Safety Issues, Functional Mobility    Impairments Affecting Function (Mobility) balance;endurance/activity tolerance;pain;range of motion (ROM);strength;coordination;postural/trunk control;sensation/sensory awareness  -ES           User Key  (r) = Recorded By, (t) = Taken By, (c) = Cosigned By    Initials Name Provider Type    ES Erin Aguirre OT Occupational Therapist                 Mobility/ADL's     Row Name 05/04/23 1639          Bed Mobility    Bed Mobility supine-sit  -ES     Supine-Sit Jay (Bed Mobility) standby assist  -ES     Assistive Device (Bed Mobility) bed rails;head of bed elevated  -ES     Row Name 05/04/23 1639          Sit-Stand Transfer    Sit-Stand Jay (Transfers) contact guard  -ES     Assistive Device (Sit-Stand Transfers) walker, front-wheeled  -ES     Row Name 05/04/23 1639          Activities of Daily Living    BADL Assessment/Intervention upper body dressing;lower body dressing;toileting  -ES     Row Name 05/04/23 1639          Mobility    Extremity Weight-bearing Status right lower extremity  -ES     Right Lower Extremity (Weight-bearing Status) weight-bearing as tolerated (WBAT)  -ES     Row Name  05/04/23 1639          Upper Body Dressing Assessment/Training    [place] Level (Upper Body Dressing) set up  -ES     Row Name 05/04/23 1639          Lower Body Dressing Assessment/Training    [place] Level (Lower Body Dressing) maximum assist (25% patient effort)  -ES     Row Name 05/04/23 1639          Toileting Assessment/Training    [place] Level (Toileting) moderate assist (50% patient effort)  -ES           User Key  (r) = Recorded By, (t) = Taken By, (c) = Cosigned By    Initials Name Provider Type    ES Erin Aguirre OT Occupational Therapist               Obj/Interventions     Row Name 05/04/23 1640          Sensory Assessment (Somatosensory)    Sensory Assessment (Somatosensory) sensation intact  -ES     Row Name 05/04/23 1640          Vision Assessment/Intervention    Visual Impairment/Limitations corrective lenses for reading  -ES     Row Name 05/04/23 1643          Range of Motion Comprehensive    General Range of Motion bilateral upper extremity ROM WNL  -ES     Comment, General Range of Motion LUE shoulder ROM impaired 0-100*  -ES     Row Name 05/04/23 1643 05/04/23 1640       Strength Comprehensive (MMT)    General Manual Muscle Testing (MMT) Assessment -- no strength deficits identified  -ES    Comment, General Manual Muscle Testing (MMT) Assessment LUE shoulder impairment. Otherwise, 4+/5  -ES --    Row Name 05/04/23 1640          Balance    Balance Assessment sitting static balance;sitting dynamic balance;standing static balance  -ES     Static Sitting Balance independent  -ES     Dynamic Sitting Balance independent  -ES     Static Standing Balance contact guard  -ES     Dynamic Standing Balance minimal assist  -ES     Position/Device Used, Standing Balance walker, rolling  -ES     Balance Interventions sitting;standing;static  -ES           User Key  (r) = Recorded By, (t) = Taken By, (c) = Cosigned By    Initials Name Provider Type    ES Erin Aguirre OT Occupational  Therapist               Goals/Plan     Row Name 05/04/23 1642          Bathing Goal 1 (OT)    Activity/Device (Bathing Goal 1, OT) bathing skills, all  -ES     Issue Level/Cues Needed (Bathing Goal 1, OT) modified independence  -ES     Time Frame (Bathing Goal 1, OT) 2 weeks  -ES     Row Name 05/04/23 1642          Dressing Goal 1 (OT)    Activity/Device (Dressing Goal 1, OT) dressing skills, all  -ES     Issue/Cues Needed (Dressing Goal 1, OT) modified independence  -ES     Time Frame (Dressing Goal 1, OT) 2 weeks  -ES     Row Name 05/04/23 1642          Toileting Goal 1 (OT)    Activity/Device (Toileting Goal 1, OT) toileting skills, all  -ES     Issue Level/Cues Needed (Toileting Goal 1, OT) modified independence  -ES     Time Frame (Toileting Goal 1, OT) 2 weeks  -ES           User Key  (r) = Recorded By, (t) = Taken By, (c) = Cosigned By    Initials Name Provider Type    ES Erin Aguirre OT Occupational Therapist               Clinical Impression     Row Name 05/04/23 1640          Pain Assessment    Pretreatment Pain Rating 0/10 - no pain  -ES     Posttreatment Pain Rating 0/10 - no pain  -ES     Row Name 05/04/23 1640          Plan of Care Review    Plan of Care Reviewed With patient;daughter  -ES     Outcome Evaluation Pt is a 77 y/o female who is being seen for re-evaluation s/p elective R TKA. At baseline, pt lives alone in a single level home and typically ambulates with use of cane, using a w/c for longer distances.  Patient supine upon OT arrival. Oriented x4 and scores 4/28 on SBT, indicating normal cognition. Bed mobility with SBA. Transfers CGA, but requires Max A for LB ADLs due to impared ROM and limited balance. Patient requires increased time for all mobility tasks, and is limited to 5 feet with functional mobility. She is far below stated baseline and will require SNF rehab at discharge.  -ES     Row Name 05/04/23 1640          Therapy Assessment/Plan (OT)    Rehab  Potential (OT) good, to achieve stated therapy goals  -ES     Criteria for Skilled Therapeutic Interventions Met (OT) yes;skilled treatment is necessary  -ES     Therapy Frequency (OT) 3 times/wk  -ES     Predicted Duration of Therapy Intervention (OT) until dc  -ES     Row Name 05/04/23 1640          Therapy Plan Review/Discharge Plan (OT)    Anticipated Discharge Disposition (OT) skilled nursing facility  -ES     Row Name 05/04/23 1640          Vital Signs    O2 Delivery Pre Treatment room air  -ES     O2 Delivery Intra Treatment room air  -ES     O2 Delivery Post Treatment room air  -ES     Pre Patient Position Supine  -ES     Intra Patient Position Standing  -ES     Post Patient Position Sitting  -ES     Row Name 05/04/23 1640          Positioning and Restraints    Pre-Treatment Position in bed  -ES     Post Treatment Position chair  -ES     In Chair notified nsg;with PT  -ES           User Key  (r) = Recorded By, (t) = Taken By, (c) = Cosigned By    Initials Name Provider Type    ES Erin Aguirre, OT Occupational Therapist               Outcome Measures     Row Name 05/04/23 1642          How much help from another is currently needed...    Putting on and taking off regular lower body clothing? 2  -ES     Bathing (including washing, rinsing, and drying) 2  -ES     Toileting (which includes using toilet bed pan or urinal) 2  -ES     Putting on and taking off regular upper body clothing 3  -ES     Taking care of personal grooming (such as brushing teeth) 4  -ES     Eating meals 4  -ES     AM-PAC 6 Clicks Score (OT) 17  -ES     Row Name 05/04/23 1357 05/04/23 1154       How much help from another person do you currently need...    Turning from your back to your side while in flat bed without using bedrails? 3  -NS 3  -AY    Moving from lying on back to sitting on the side of a flat bed without bedrails? 3  -NS 3  -AY    Moving to and from a bed to a chair (including a wheelchair)? 3  -NS 3  -AY    Standing up  from a chair using your arms (e.g., wheelchair, bedside chair)? 3  -NS 3  -AY    Climbing 3-5 steps with a railing? 2  -NS 3  -AY    To walk in hospital room? 3  -NS 3  -AY    AM-PAC 6 Clicks Score (PT) 17  -NS 18  -AY    Highest level of mobility 5 --> Static standing  -NS 6 --> Walked 10 steps or more  -AY    Row Name 05/04/23 1642 05/04/23 1357       Functional Assessment    Outcome Measure Options AM-PAC 6 Clicks Daily Activity (OT)  -ES AM-PAC 6 Clicks Basic Mobility (PT)  -NS          User Key  (r) = Recorded By, (t) = Taken By, (c) = Cosigned By    Initials Name Provider Type    Erin Simons, OT Occupational Therapist    Sharon Corley, RN Registered Nurse    Alondra Oscar, PT Physical Therapist                  OT Recommendation and Plan  Therapy Frequency (OT): 3 times/wk  Plan of Care Review  Plan of Care Reviewed With: patient, daughter  Outcome Evaluation: Pt is a 77 y/o female who is being seen for re-evaluation s/p elective R TKA. At baseline, pt lives alone in a single level home and typically ambulates with use of cane, using a w/c for longer distances.  Patient supine upon OT arrival. Oriented x4 and scores 4/28 on SBT, indicating normal cognition. Bed mobility with SBA. Transfers CGA, but requires Max A for LB ADLs due to impared ROM and limited balance. Patient requires increased time for all mobility tasks, and is limited to 5 feet with functional mobility. She is far below stated baseline and will require SNF rehab at discharge.     Time Calculation:    Time Calculation- OT     Row Name 05/04/23 1643             Time Calculation- OT    OT Start Time 1300  -ES      OT Stop Time 1330  -ES      OT Time Calculation (min) 30 min  -ES      Total Timed Code Minutes- OT 0 minute(s)  -ES      OT Received On 05/04/23  -ES      OT - Next Appointment 05/07/23  -ES      OT Goal Re-Cert Due Date 05/18/23  -ES            User Key  (r) = Recorded By, (t) = Taken By, (c) = Cosigned By     Initials Name Provider Type    ES Erin Aguirre, OT Occupational Therapist                       Erin Aguirre OT  5/4/2023

## 2023-05-04 NOTE — PLAN OF CARE
Goal Outcome Evaluation:  Plan of Care Reviewed With: patient, daughter           Outcome Evaluation: Pt is a 77 y/o female who is being seen for re-evaluation s/p elective R TKA. At baseline, pt lives alone in a single level home and typically ambulates with use of cane, using a w/c for longer distances.  Patient supine upon OT arrival. Oriented x4 and scores 4/28 on SBT, indicating normal cognition. Bed mobility with SBA. Transfers CGA, but requires Max A for LB ADLs due to impared ROM and limited balance. Patient requires increased time for all mobility tasks, and is limited to 5 feet with functional mobility. She is far below stated baseline and will require SNF rehab at discharge.

## 2023-05-04 NOTE — THERAPY RE-EVALUATION
Patient Name: Faby Le  : 1945    MRN: [id number]                              Today's Date: 2023       Admit Date: 2023    Visit Dx: No diagnosis found.  Patient Active Problem List   Diagnosis   • Abnormal complete blood count   • Ankle pain   • Arthralgia of left elbow   • Arthritis   • B12 deficiency   • Coronary artery disease   • Depression   • Gallstones   • Gout   • Hyperglycemia   • Hyperlipidemia   • Hypertension   • Hypothyroid   • Low back pain   • Mobility poor   • Postmenopausal status   • Sleep apnea   • Vitamin D deficiency   • Chronic kidney disease, stage 3 (moderate)   • History of malignant neoplasm of endometrium   • Oropharyngeal dysphagia   • Class 3 severe obesity due to excess calories with serious comorbidity and body mass index (BMI) of 45.0 to 49.9 in adult   • Chronic pain of right knee   • Light headedness   • Paroxysmal atrial fibrillation   • Atrial fibrillation   • Abnormal results of cardiovascular function studies   • Chronic left shoulder pain   • Status post knee replacement   • Hypertension     Past Medical History:   Diagnosis Date   • Afib    • Ankle pain    • Arthritis    • Coronary artery disease    • Depression    • Gout    • History of loop recorder 2023    current   • Hyperglycemia    • Hyperlipidemia    • Hypertension    • Low back pain    • Morbid obesity    • Sleep apnea    • Vitamin D deficiency      Past Surgical History:   Procedure Laterality Date   • ANKLE FUSION      2017-left   • CARDIAC CATHETERIZATION     • CARDIAC CATHETERIZATION Right 2022    Procedure: Left Heart Cath;  Surgeon: Ratna Zavala MD;  Location: [facility] INVASIVE LOCATION;  Service: Cardiovascular;  Laterality: Right;   • CARPAL TUNNEL RELEASE     • CATARACT EXTRACTION     • CUBITAL TUNNEL RELEASE     • HYSTERECTOMY     • REPLACEMENT TOTAL KNEE      left    • ROTATOR CUFF REPAIR        General Information     Row Name 23 1327           Physical Therapy Time and Intention    Document Type re-evaluation  -NS     Mode of Treatment co-treatment;physical therapy  -NS     [facility] Name 05/04/23 1320          General Information    Patient Profile Reviewed yes  -NS     Prior Level of Function independent:  using cane, has w/c for long distance mobility  -NS     Row Name 05/04/23 1320          Living Environment    People in Home alone  -NS     [facility] Name 05/04/23 1320          Home Main Entrance    Number of Stairs, Main Entrance none  -NS     [facility] Name 05/04/23 1320          Stairs Within Home, Primary    Number of Stairs, Within Home, Primary none  -NS     [facility] Name 05/04/23 1320          Cognition    Orientation Status (Cognition) oriented x 4  -NS     [facility] Name 05/04/23 1320          Safety Issues, Functional Mobility    Impairments Affecting Function (Mobility) balance;endurance/activity tolerance;pain;range of motion (ROM);strength  -NS           User Key  (r) = Recorded By, (t) = Taken By, (c) = Cosigned By    Initials Name Provider Type    NS Alondra Gary, PT Physical Therapist               Mobility     Row Name 05/04/23 1320          Bed Mobility    Bed Mobility supine-sit  -NS     Supine-Sit [place] (Bed Mobility) standby assist  -NS     Assistive Device (Bed Mobility) bed rails;head of bed elevated  -NS     [facility] Name 05/04/23 1320          Sit-Stand Transfer    Sit-Stand [place] (Transfers) contact guard  -NS     Assistive Device (Sit-Stand Transfers) walker, front-wheeled  -NS     [facility] Name 05/04/23 1320          Gait/Stairs (Locomotion)    [place] Level (Gait) contact guard  -NS     Assistive Device (Gait) walker, front-wheeled  -NS     Distance in Feet (Gait) 5  -NS     Deviations/Abnormal Patterns (Gait) antalgic;rajeev decreased;gait speed decreased  -NS     Right Sided Gait Deviations weight shift ability decreased  -NS     Row Name 05/04/23 1320          Mobility    Extremity Weight-bearing Status right lower extremity  -NS      Right Lower Extremity (Weight-bearing Status) weight-bearing as tolerated (WBAT)  -NS           User Key  (r) = Recorded By, (t) = Taken By, (c) = Cosigned By    Initials Name Provider Type    Alondra Oscar PT Physical Therapist               Obj/Interventions     Row Name 05/04/23 1321          Range of Motion Comprehensive    Comment, General Range of Motion R knee -5 to 100 degrees in sitting  -NS     Row Name 05/04/23 1321          Strength Comprehensive (MMT)    Comment, General Manual Muscle Testing (MMT) Assessment BLE grossly 4/5  -NS     Row Name 05/04/23 1321          Balance    Balance Assessment sitting dynamic balance;sit to stand dynamic balance;sitting static balance;standing static balance;standing dynamic balance  -NS     Static Sitting Balance independent  -NS     Dynamic Sitting Balance independent  -NS     Position, Sitting Balance unsupported;sitting edge of bed  -NS     Sit to Stand Dynamic Balance contact guard  -NS     Static Standing Balance contact guard  -NS     Dynamic Standing Balance contact guard  -NS     Position/Device Used, Standing Balance walker, rolling;supported  -NS           User Key  (r) = Recorded By, (t) = Taken By, (c) = Cosigned By    Initials Name Provider Type    Alondra Oscar PT Physical Therapist               Goals/Plan     Row Name 05/04/23 1356          Bed Mobility Goal 1 (PT)    Activity/Assistive Device (Bed Mobility Goal 1, PT) bed mobility activities, all  -NS     [place] Level/Cues Needed (Bed Mobility Goal 1, PT) modified independence  -NS     Time Frame (Bed Mobility Goal 1, PT) long term goal (LTG);2 weeks  -NS     Row Name 05/04/23 1359          Transfer Goal 1 (PT)    Activity/Assistive Device (Transfer Goal 1, PT) transfers, all;walker, rolling  -NS     [place] Level/Cues Needed (Transfer Goal 1, PT) modified independence  -NS     Time Frame (Transfer Goal 1, PT) long term goal (LTG);2 weeks  -NS     Row Name 05/04/23 0101           Gait Training Goal 1 (PT)    Activity/Assistive Device (Gait Training Goal 1, PT) gait (walking locomotion);walker, rolling  -NS     [place] Level (Gait Training Goal 1, PT) modified independence  -NS     Distance (Gait Training Goal 1, PT) 50  -NS     Time Frame (Gait Training Goal 1, PT) long term goal (LTG);2 weeks  -NS     Row Name 05/04/23 8096          Therapy Assessment/Plan (PT)    Planned Therapy Interventions (PT) balance training;bed mobility training;gait training;home exercise program;patient/family education;ROM (range of motion);strengthening;transfer training  -NS           User Key  (r) = Recorded By, (t) = Taken By, (c) = Cosigned By    Initials Name Provider Type    Alondra Oscar, PT Physical Therapist               Clinical Impression     Row Name 05/04/23 1322          Pain    Pretreatment Pain Rating 0/10 - no pain  -NS     Posttreatment Pain Rating 0/10 - no pain  -NS     Row Name 05/04/23 1322          Plan of Care Review    Plan of Care Reviewed With patient;daughter  -NS     Outcome Evaluation Pt is a 79 y/o female who is being seen for re-evaluation s/p elective R TKA. At baseline, pt lives alone in a single level home and typically ambulates with use of cane, using a w/c for longer distances. As of recently, pt's mobility has declined due to pain/decreased ROM in R knee. Pt currently without complaints of pain. She participates in bed mobility with SBA. She performs transfers with CGA and use of RW and ambulates approx 5 ft with RW and CGA. Pt demonstrating antalgic gait with slow rajeev. In sitting, AROM of R knee -5 to 100 degrees. Pt with impairments in ROM, strength, activity tolerance and balance. She is not currently safe for d/c home alone and will require SNF placement to maximize safety and independence.  -NS     Row Name 05/04/23 1322          Therapy Assessment/Plan (PT)    Criteria for Skilled Interventions Met (PT) yes;meets criteria;skilled treatment is  necessary  -NS     Therapy Frequency (PT) daily  -NS     Predicted Duration of Therapy Intervention (PT) until d/c  -NS     Row Name 05/04/23 1322          Vital Signs    Pre Patient Position Supine  -NS     Post Patient Position Sitting  -NS     Row Name 05/04/23 1322          Positioning and Restraints    Pre-Treatment Position in bed  -NS     Post Treatment Position chair  -NS     In Chair reclined;with nsg;sitting;legs elevated;call light within reach;encouraged to call for assist;exit alarm on;with family/caregiver  -NS           User Key  (r) = Recorded By, (t) = Taken By, (c) = Cosigned By    Initials Name Provider Type    Alondra Oscar PT Physical Therapist               Outcome Measures     Row Name 05/04/23 1357          How much help from another person do you currently need...    Turning from your back to your side while in flat bed without using bedrails? 3  -NS     Moving from lying on back to sitting on the side of a flat bed without bedrails? 3  -NS     Moving to and from a bed to a chair (including a wheelchair)? 3  -NS     Standing up from a chair using your arms (e.g., wheelchair, bedside chair)? 3  -NS     Climbing 3-5 steps with a railing? 2  -NS     To walk in hospital room? 3  -NS     AM-PAC 6 Clicks Score (PT) 17  -NS     Highest level of mobility 5 --> Static standing  -NS     Row Name 05/04/23 1357          Functional Assessment    Outcome Measure Options AM-PAC 6 Clicks Basic Mobility (PT)  -NS           User Key  (r) = Recorded By, (t) = Taken By, (c) = Cosigned By    Initials Name Provider Type    Alondra Oscar PT Physical Therapist                             Physical Therapy Education     Title: PT OT SLP Therapies (In Progress)     Topic: Physical Therapy (In Progress)     Point: Mobility training (Done)     Learning Progress Summary           Patient Acceptance, E, VU by NS at 5/4/2023 1357                   Point: Home exercise program (Done)     Learning Progress  Summary           Patient Acceptance, E, VU by NS at 5/4/2023 1357    Acceptance, E,D,H, VU by CR at 4/25/2023 1132                   Point: Body mechanics (Done)     Learning Progress Summary           Patient Acceptance, E, VU by NS at 5/4/2023 1357                   Point: Precautions (Not Started)     Learner Progress:  Not documented in this visit.                      User Key     Initials Effective Dates Name Provider Type Discipline    CR 06/16/21 -  Reyes, Carmela, PT Physical Therapist PT    NS 06/30/22 -  Alondra Gary PT Physical Therapist PT              PT Recommendation and Plan  Planned Therapy Interventions (PT): balance training, bed mobility training, gait training, home exercise program, patient/family education, ROM (range of motion), strengthening, transfer training  Plan of Care Reviewed With: patient, daughter  Outcome Evaluation: Pt is a 77 y/o female who is being seen for re-evaluation s/p elective R TKA. At baseline, pt lives alone in a single level home and typically ambulates with use of cane, using a w/c for longer distances. As of recently, pt's mobility has declined due to pain/decreased ROM in R knee. Pt currently without complaints of pain. She participates in bed mobility with SBA. She performs transfers with CGA and use of RW and ambulates approx 5 ft with RW and CGA. Pt demonstrating antalgic gait with slow rajeev. In sitting, AROM of R knee -5 to 100 degrees. Pt with impairments in ROM, strength, activity tolerance and balance. She is not currently safe for d/c home alone and will require SNF placement to maximize safety and independence.     Time Calculation:    PT Charges     Row Name 05/04/23 4888             Time Calculation    Start Time 1253  -NS      Stop Time 1316  -NS      Time Calculation (min) 23 min  -NS      PT Received On 05/04/23  -NS      PT - Next Appointment 05/05/23  -NS      PT Goal Re-Cert Due Date 05/18/23  -NS         Time Calculation- PT    Total  Timed Code Minutes- PT 0 minute(s)  -NS            User Key  (r) = Recorded By, (t) = Taken By, (c) = Cosigned By    Initials Name Provider Type    Alondra Oscar, PT Physical Therapist              Therapy Charges for Today     Code Description Service Date Service Provider Modifiers Qty    [id number]  PT RE-EVAL ESTABLISHED PLAN 2 5/4/2023 Alondra Gary PT GP 1          PT G-Codes  Outcome Measure Options: AM-PAC 6 Clicks Basic Mobility (PT)  AM-PAC 6 Clicks Score (PT): 17  PT Discharge Summary  Anticipated Discharge Disposition (PT): skilled nursing facility    Alondra Gary PT  5/4/2023

## 2023-05-04 NOTE — PLAN OF CARE
Goal Outcome Evaluation:  Plan of Care Reviewed With: patient, daughter           Outcome Evaluation: Pt is a 77 y/o female who is being seen for re-evaluation s/p elective R TKA. At baseline, pt lives alone in a single level home and typically ambulates with use of cane, using a w/c for longer distances. As of recently, pt's mobility has declined due to pain/decreased ROM in R knee. Pt currently without complaints of pain. She participates in bed mobility with SBA. She performs transfers with CGA and use of RW and ambulates approx 5 ft with RW and CGA. Pt demonstrating antalgic gait with slow rajeev. In sitting, AROM of R knee -5 to 100 degrees. Pt with impairments in ROM, strength, activity tolerance and balance. She is not currently safe for d/c home alone and will require SNF placement to maximize safety and independence.

## 2023-05-04 NOTE — PLAN OF CARE
Goal Outcome Evaluation:  Plan of Care Reviewed With: patient        Progress: no change  Outcome Evaluation: Pt admitted from PACU to floor, oriented to room, assessed, family at bedside. VSS, will continue to monitor.

## 2023-05-05 VITALS
RESPIRATION RATE: 15 BRPM | OXYGEN SATURATION: 98 % | WEIGHT: 253 LBS | HEIGHT: 63 IN | BODY MASS INDEX: 44.83 KG/M2 | HEART RATE: 86 BPM | SYSTOLIC BLOOD PRESSURE: 136 MMHG | TEMPERATURE: 98.3 F | DIASTOLIC BLOOD PRESSURE: 61 MMHG

## 2023-05-05 LAB
ANION GAP SERPL CALCULATED.3IONS-SCNC: 10 MMOL/L (ref 5–15)
BUN SERPL-MCNC: 29 MG/DL (ref 8–23)
BUN/CREAT SERPL: 19.3 (ref 7–25)
CALCIUM SPEC-SCNC: 9 MG/DL (ref 8.6–10.5)
CHLORIDE SERPL-SCNC: 102 MMOL/L (ref 98–107)
CO2 SERPL-SCNC: 22 MMOL/L (ref 22–29)
CREAT SERPL-MCNC: 1.5 MG/DL (ref 0.57–1)
DEPRECATED RDW RBC AUTO: 54.3 FL (ref 37–54)
EGFRCR SERPLBLD CKD-EPI 2021: 36 ML/MIN/1.73
ERYTHROCYTE [DISTWIDTH] IN BLOOD BY AUTOMATED COUNT: 15.3 % (ref 12.3–15.4)
GLUCOSE SERPL-MCNC: 118 MG/DL (ref 65–99)
HCT VFR BLD AUTO: 28.7 % (ref 34–46.6)
HGB BLD-MCNC: 9.2 G/DL (ref 12–15.9)
MCH RBC QN AUTO: 32.1 PG (ref 26.6–33)
MCHC RBC AUTO-ENTMCNC: 31.9 G/DL (ref 31.5–35.7)
MCV RBC AUTO: 100.6 FL (ref 79–97)
PLATELET # BLD AUTO: 199 10*3/MM3 (ref 140–450)
PMV BLD AUTO: 7.6 FL (ref 6–12)
POTASSIUM SERPL-SCNC: 4.2 MMOL/L (ref 3.5–5.2)
RBC # BLD AUTO: 2.85 10*6/MM3 (ref 3.77–5.28)
SODIUM SERPL-SCNC: 134 MMOL/L (ref 136–145)
WBC NRBC COR # BLD: 11.9 10*3/MM3 (ref 3.4–10.8)

## 2023-05-05 PROCEDURE — 97116 GAIT TRAINING THERAPY: CPT

## 2023-05-05 PROCEDURE — A9270 NON-COVERED ITEM OR SERVICE: HCPCS | Performed by: ORTHOPAEDIC SURGERY

## 2023-05-05 PROCEDURE — 63710000001 METHOCARBAMOL 750 MG TABLET: Performed by: ORTHOPAEDIC SURGERY

## 2023-05-05 PROCEDURE — 85027 COMPLETE CBC AUTOMATED: CPT | Performed by: ORTHOPAEDIC SURGERY

## 2023-05-05 PROCEDURE — A9270 NON-COVERED ITEM OR SERVICE: HCPCS | Performed by: NURSE PRACTITIONER

## 2023-05-05 PROCEDURE — G0378 HOSPITAL OBSERVATION PER HR: HCPCS

## 2023-05-05 PROCEDURE — 63710000001 ESCITALOPRAM 10 MG TABLET: Performed by: ORTHOPAEDIC SURGERY

## 2023-05-05 PROCEDURE — 63710000001 MIDODRINE 5 MG TABLET: Performed by: NURSE PRACTITIONER

## 2023-05-05 PROCEDURE — 63710000001 MELOXICAM 15 MG TABLET: Performed by: ORTHOPAEDIC SURGERY

## 2023-05-05 PROCEDURE — 25010000002 CEFAZOLIN PER 500 MG: Performed by: ORTHOPAEDIC SURGERY

## 2023-05-05 PROCEDURE — 80048 BASIC METABOLIC PNL TOTAL CA: CPT | Performed by: ORTHOPAEDIC SURGERY

## 2023-05-05 PROCEDURE — 63710000001 RIVAROXABAN 15 MG TABLET: Performed by: ORTHOPAEDIC SURGERY

## 2023-05-05 PROCEDURE — 25010000002 HYDROMORPHONE 1 MG/ML SOLUTION: Performed by: ORTHOPAEDIC SURGERY

## 2023-05-05 PROCEDURE — 63710000001 LEVOTHYROXINE 88 MCG TABLET: Performed by: ORTHOPAEDIC SURGERY

## 2023-05-05 PROCEDURE — 63710000001 ACETAMINOPHEN 325 MG TABLET: Performed by: NURSE PRACTITIONER

## 2023-05-05 PROCEDURE — 63710000001 SPIRONOLACTONE 25 MG TABLET: Performed by: ORTHOPAEDIC SURGERY

## 2023-05-05 PROCEDURE — 97150 GROUP THERAPEUTIC PROCEDURES: CPT

## 2023-05-05 PROCEDURE — A9270 NON-COVERED ITEM OR SERVICE: HCPCS | Performed by: STUDENT IN AN ORGANIZED HEALTH CARE EDUCATION/TRAINING PROGRAM

## 2023-05-05 PROCEDURE — 63710000001 METOPROLOL SUCCINATE XL 25 MG TABLET SUSTAINED-RELEASE 24 HOUR: Performed by: STUDENT IN AN ORGANIZED HEALTH CARE EDUCATION/TRAINING PROGRAM

## 2023-05-05 PROCEDURE — 63710000001 OXYCODONE 5 MG TABLET: Performed by: ORTHOPAEDIC SURGERY

## 2023-05-05 RX ORDER — ONDANSETRON 4 MG/1
4 TABLET, FILM COATED ORAL EVERY 6 HOURS PRN
Qty: 30 TABLET | Refills: 0 | Status: SHIPPED | OUTPATIENT
Start: 2023-05-05

## 2023-05-05 RX ORDER — OXYCODONE HYDROCHLORIDE AND ACETAMINOPHEN 5; 325 MG/1; MG/1
1 TABLET ORAL EVERY 4 HOURS PRN
Qty: 50 TABLET | Refills: 0 | Status: SHIPPED | OUTPATIENT
Start: 2023-05-05

## 2023-05-05 RX ORDER — MIDODRINE HYDROCHLORIDE 5 MG/1
5 TABLET ORAL ONCE
Status: COMPLETED | OUTPATIENT
Start: 2023-05-05 | End: 2023-05-05

## 2023-05-05 RX ORDER — METOPROLOL SUCCINATE 25 MG/1
25 TABLET, EXTENDED RELEASE ORAL
Status: DISCONTINUED | OUTPATIENT
Start: 2023-05-05 | End: 2023-05-05 | Stop reason: HOSPADM

## 2023-05-05 RX ADMIN — OXYCODONE 5 MG: 5 TABLET ORAL at 15:38

## 2023-05-05 RX ADMIN — LEVOTHYROXINE SODIUM 88 MCG: 0.09 TABLET ORAL at 05:55

## 2023-05-05 RX ADMIN — CEFAZOLIN 2 G: 2 INJECTION, POWDER, FOR SOLUTION INTRAMUSCULAR; INTRAVENOUS at 00:02

## 2023-05-05 RX ADMIN — ESCITALOPRAM OXALATE 20 MG: 10 TABLET ORAL at 09:56

## 2023-05-05 RX ADMIN — OXYCODONE 5 MG: 5 TABLET ORAL at 07:37

## 2023-05-05 RX ADMIN — MIDODRINE HYDROCHLORIDE 5 MG: 5 TABLET ORAL at 01:18

## 2023-05-05 RX ADMIN — METOPROLOL SUCCINATE 25 MG: 25 TABLET, EXTENDED RELEASE ORAL at 15:38

## 2023-05-05 RX ADMIN — ACETAMINOPHEN 650 MG: 325 TABLET, FILM COATED ORAL at 18:21

## 2023-05-05 RX ADMIN — RIVAROXABAN 15 MG: 15 TABLET, FILM COATED ORAL at 17:54

## 2023-05-05 RX ADMIN — MELOXICAM 15 MG: 15 TABLET ORAL at 09:57

## 2023-05-05 RX ADMIN — OXYCODONE 5 MG: 5 TABLET ORAL at 18:21

## 2023-05-05 RX ADMIN — METHOCARBAMOL 750 MG: 750 TABLET, FILM COATED ORAL at 12:52

## 2023-05-05 RX ADMIN — METHOCARBAMOL 750 MG: 750 TABLET, FILM COATED ORAL at 07:37

## 2023-05-05 RX ADMIN — SPIRONOLACTONE 12.5 MG: 25 TABLET ORAL at 09:57

## 2023-05-05 RX ADMIN — METHOCARBAMOL 750 MG: 750 TABLET, FILM COATED ORAL at 17:54

## 2023-05-05 RX ADMIN — HYDROMORPHONE HYDROCHLORIDE 1 MG: 1 INJECTION, SOLUTION INTRAMUSCULAR; INTRAVENOUS; SUBCUTANEOUS at 10:05

## 2023-05-05 NOTE — PLAN OF CARE
"Assessment: Faby Le presents with functional mobility impairments which indicate the need for skilled intervention. Pt's pain is well managed. Pt needs assist to lift RLE into the bed. Pt progressing well with mobility and tolerating the TKA protocol exercises very well. Pt plans to go to rehab at hospital D/C. Tolerating session today without incident. Will continue to follow and progress as tolerated.     Plan/Recommendations:   Moderate Intensity Therapy recommended post-acute care. This is recommended as therapy feels the patient would require 3-4 days per week and wouldn't tolerate \"3 hour daily\" rehab intensity. SNF would be the preferred choice. If the patient does not agree to SNF, arrange HH or OP depending on home bound status. If patient is medically complex, consider LTACH.. Pt requires no DME at discharge.     Pt desires Skilled Rehab placement at discharge. Pt cooperative; agreeable to therapeutic recommendations and plan of care.                    "

## 2023-05-05 NOTE — CASE MANAGEMENT/SOCIAL WORK
Discharge Planning Assessment  [facility]     Patient Name: Faby Le  MRN: [id number]  Today's Date: 5/5/2023    Admit Date: 5/4/2023    Plan: Precert approved for [facility], good from 05/05/23-05/09/2023.  Bed ready today 05/05/23   Discharge Needs Assessment     Row Name 05/05/23 1626       Living Environment    People in Home alone    Current Living Arrangements home    Primary Care Provided by self    Provides Primary Care For no one    Family Caregiver if Needed child(marlon), adult    Family Caregiver Names chiqui, Kemi    Quality of Family Relationships supportive;involved;helpful    Able to Return to Prior Arrangements yes       Resource/Environmental Concerns    Resource/Environmental Concerns none    Transportation Concerns none       Transition Planning    Patient/Family Anticipated Services at Transition skilled nursing    Transportation Anticipated family or friend will provide       Discharge Needs Assessment    Readmission Within the Last 30 Days no previous admission in last 30 days    Equipment Currently Used at Home cpap;walker, rolling;wheelchair;power chair,(recliner lift);rollator    Concerns to be Addressed care coordination/care conferences;discharge planning    Anticipated Changes Related to Illness none    Equipment Needed After Discharge none    Outpatient/Agency/Support Group Needs skilled nursing facility    Discharge Facility/Level of Care Needs nursing facility, skilled    Provided Post Acute Provider List? N/A    N/A Provider List Comment patient declined snf and requested [facility]               Discharge Plan     Row Name 05/05/23 1634       Plan    Plan Precert approved for [facility], good from 05/05/23-05/09/2023.  Bed ready today 05/05/23    Patient/Family in Agreement with Plan yes    Plan Comments Met with patient at bedside.  Confirmed pcp and pharmacy.  Lives at home alone.  Has rolling walker and wheelchair.  Will need snf at [place].  [facility],  They accepted and bed available today.  Denies other dc needs              Continued Care and Services - Admitted Since 5/4/2023     Destination Coordination complete.    Service Provider Request Status Selected Services Address Phone Fax Patient Preferred    [facility]  Selected Skilled Nursing [address] [place] [address] [place] [address] [phone] --                    Expected Discharge Date and Time     Expected Discharge Date Expected Discharge Time    May 6, 2023          Demographic Summary     Row Name 05/05/23 1626       General Information    Admission Type observation    Arrived From home    Referral Source admission list    Reason for Consult discharge planning    Preferred Language English       Contact Information    Permission Granted to Share Info With                Functional Status     Row Name 05/05/23 1626       Functional Status    Usual Activity Tolerance moderate    Current Activity Tolerance fair       Functional Status, IADL    Medications independent    Meal Preparation independent    Housekeeping independent    Laundry independent    Shopping independent       Mental Status    General Appearance WDL WDL       Mental Status Summary    Recent Changes in Mental Status/Cognitive Functioning no changes              rEin Velazquez RN   Met with patient in room wearing PPE: mask, face shield/goggles, gloves, gown.      Maintained distance greater than six feet and spent less than 15 minutes in the room.

## 2023-05-05 NOTE — SIGNIFICANT NOTE
Case Management/Social Work    Patient Name:  Faby Le  YOB: 1945  MRN: [id number]  Admit Date:  5/4/2023 05/05/23 1342   Post Acute Pre-Cert Documentation   Verification from Payer Yes   Date Post Acute Pre-Cert Completed 05/05/23   Response Accepted   Post Acute Pre-Cert Initiated Comment MARY JANE verified SNF precert approval via [employer] portal. Plan Auth ID [id number]. Valid 5/5-5/9. CM made aware.           Electronically signed by:  Dimitry León CMA  05/05/23 13:43 EDT    Dimitry León  Case Management Associate  [address] [address]  P: [phone]  F: [phone]

## 2023-05-05 NOTE — PLAN OF CARE
Goal Outcome Evaluation:               Patient alert and able to make needs known. Pt continues to be on maintenance fluids and was ordered a one time dose of midodrine d/t decreasing B/P this shift. Plan of care ongoing.

## 2023-05-05 NOTE — THERAPY TREATMENT NOTE
"Subjective: Pt agreeable to therapeutic plan of care.    Objective:     Bed mobility - Min-A to lift RLE in to the bed  Transfers - CGA and Min-A  Ambulation - 85 feet CGA and with rolling walker    Session completed in group setting,  WB'ing status: Weight Bearing As Tolerated    Therapeutic Exercise:     Completed with R Lower Extremity.    Total Knee Program  Quad sets/Glut sets (15 reps, hold 3 seconds)  Ankle Pumps (15 reps with theraband)  Gastroc stretch (10 reps, 10 sec holds)  AAROM heel slide (10 reps, 10 sec holds)  AROM heel slide (15 reps)  AROM SAQ (15 reps)  AROM Hip abduction (15 reps)  Remove board - SLR (15 reps)  Sit up - LAQ (15 reps)  Scoot out - knee flexion in sitting (10 reps, 10 sec holds)  Extension stretch (10 reps, 10 sec holds)    AROM right knee 0 to 85 degrees    Precautions: None    Vitals: WNL    Pain: 4 VAS   Location: right knee  Intervention for pain: Repositioned and RN provided medication    Education: Provided education on the importance of mobility in the acute care setting, Verbal/Tactile Cues, Transfer Training, Gait Training and Post-Op Precautions    Assessment: Faby Le presents with functional mobility impairments which indicate the need for skilled intervention. Pt's pain is well managed. Pt needs assist to lift RLE into the bed. Pt progressing well with mobility and tolerating the TKA protocol exercises very well. Pt plans to go to rehab at hospital D/C. Tolerating session today without incident. Will continue to follow and progress as tolerated.     Plan/Recommendations:   Moderate Intensity Therapy recommended post-acute care. This is recommended as therapy feels the patient would require 3-4 days per week and wouldn't tolerate \"3 hour daily\" rehab intensity. SNF would be the preferred choice. If the patient does not agree to SNF, arrange HH or OP depending on home bound status. If patient is medically complex, consider LTACH.. Pt requires no DME at discharge. "     Pt desires Skilled Rehab placement at discharge. Pt cooperative; agreeable to therapeutic recommendations and plan of care.         Basic Mobility 6-click:  Rollin = Total, A lot = 2, A little = 3; 4 = None  Supine>Sit:   1 = Total, A lot = 2, A little = 3; 4 = None   Sit>Stand with arms:  1 = Total, A lot = 2, A little = 3; 4 = None  Bed>Chair:   1 = Total, A lot = 2, A little = 3; 4 = None  Ambulate in room:  1 = Total, A lot = 2, A little = 3; 4 = None  3-5 Steps with railin = Total, A lot = 2, A little = 3; 4 = None  Score: 17    Modified [place]: N/A = No pre-op stroke/TIA    Post-Tx Position: Supine with HOB Elevated and Call light and personal items within reach  PPE: gloves

## 2023-05-05 NOTE — PROGRESS NOTES
[facility]     Progress Note    Patient Name: Faby Le  : 1945  MRN: [id number]  Primary Care Physician:  Leonor Adam MD  Date of admission: 2023  Service date and time: 23 13:14 EDT  Subjective   Subjective     Chief Complaint: Knee replacement    HPI:  Faby Le is a 78 y.o. female  To [facility] for elective total knee arthroplasty   Patient has history for A-fib on Xarelto.      Objective    Patient seen and examined this morning sitting on the chair comfortably patient has no complaint today.  Blood pressure is reasonable at this morning, will continue  Metoprolol and spironolactone, will hold amlodipine for now may restart tomorrow if blood pressure tolerated.    Denies chest pain, shortness of breath, nausea and vomiting.    Disposition awaiting placement:     Objective     Vitals:   Temp:  [97.6 °F (36.4 °C)-98.2 °F (36.8 °C)] 98.1 °F (36.7 °C)  Heart Rate:  [] 84  Resp:  [14-16] 15  BP: ()/(29-64) 136/64  Physical Exam   Constitutional: Negative for chills, decreased appetite, malaise/fatigue and night sweats.   HENT: Negative for sore throat.    Eyes: Negative for visual disturbance.   Cardiovascular: Negative for chest pain and dyspnea on exertion.   Respiratory: Negative for cough.    Musculoskeletal: Positive for arthritis (no pain at present - hx OA pain ).   Gastrointestinal: Negative for abdominal pain, diarrhea and vomiting.   Genitourinary: Negative for dysuria.   Neurological: Negative for headaches.   Psychiatric/Behavioral: Negative for altered mental status and depression.      Result Review    Result Review:  I have personally reviewed the results from the time of this admission to 2023 13:14 EDT and agree with these findings:  [x]  Laboratory list / accordion  []  Microbiology  [x]  Radiology  []  EKG/Telemetry   []  Cardiology/Vascular   []  Pathology  []  Old records  []  Other:  Most notable findings include:   Xray R knee:Satisfactory  postoperative appearance status post right knee replacement.        Assessment & Plan   Assessment / Plan       Active Hospital Problems:  Active Hospital Problems    Diagnosis    • **Status post knee replacement    • Hypertension    • Atrial fibrillation    • Chronic kidney disease, stage 3 (moderate)    • Coronary artery disease    • Depression    • Hypothyroid      Plan:     -S/P- knee replacement -follow Ortho recommendation      -HTN- bp low at present- hold norvasc for now. Will continue metoprolol and spironolactone for now with Afib hx and monitor bp.      -Depression - continue lexapro     -Hypothyroidism - continue replacement dose.      -Cad- continue metoprolol- last echo aug- 2022 EF 51-55%. Pt did see her primary cardiologist prior to this surgery and was cleared for OR.     -CKD stage 3-creatinine is elevated today, will hold Mobic, continue to monitor BMP        DVT prophylaxis:  Medical and mechanical DVT prophylaxis orders are present.    CODE STATUS:   Level Of Support Discussed With: Patient  Code Status (Patient has no pulse and is not breathing): CPR (Attempt to Resuscitate)  Medical Interventions (Patient has pulse or is breathing): Full    Disposition: Awaiting placement    Duran Bravo MD

## 2023-05-05 NOTE — DISCHARGE INSTRUCTIONS
Total Knee  Discharge Instructions  Dr. JACKELINE Pena” Javier II  [phone]    INCISION CARE  Wash your hands prior to dressing changes  JENSEN Wound VAC: Postoperatively you had a JENSEN Wound Vac placed on the incision. This was placed under sterile conditions in the operating room. It remains in place for 7 days postoperatively. After 7 days, the entire dressing must be removed, including all of the sticky adhesive. The dressing and battery pack provide gentle suction to the incision and provide several benefits over a traditional dressing:  It maintains the sterile environment of the OR and reduces the risk of infection  The suction removes unwanted buildup of blood/hematoma under the skin to reduce swelling  The suction also promotes fresh blood supply to the skin and soft tissue to speed up healing  The postoperative scar is reduced in size  Showering is permitted immediately after surgery, but the battery pack must be protected or removed during the shower.   After 7 days the JENSEN Wound Vac is removed. If there is no drainage, no dressing is required. If there is some scant drainage a dry bandage can be applied and changed daily until seen in the office or until the drainage stops.   No creams or ointments to the incisions until 4 weeks post op.  Do not touch or pick at the incision  Check incision every day and notify surgeon immediately if any of the following signs or symptoms are seen:  Increase in redness  Increase in swelling around the incision and of the entire extremity  Increase in pain  NEW drainage or oozing from the incision  Pulling apart of the edges of the incision  Increase in overall body temperature (greater than 100.4 degrees)  Zip-Line: your incision was closed with a state of the art device.   Is a non-invasive and easy to use wound closure device that replaces sutures, staples and glue for surgical incisions  It minimizes scarring and eliminating “railroad” marks that come with staples or  sutures  It makes removal as atraumatic as peeling off a bandage  Can be removed at home or by a physical therapist or nurse at 14 days postoperatively    ACTIVITIES  Exercises:  Physical therapy will begin immediately while in the hospital. Patients going to a nursing home will get therapy as part of their care at the SNU/SNF facility. Patients going home may also have a therapist come to the house to help them mobilize until they can safely get to an outpatient therapy facility.  Elevate the affected leg most of the day during the first week post operatively. Caution must be taken to avoid pillow placement directly under the heel of the leg, as this can cause pressure ulcers even with a soft pillow. All pillows and blankets should be placed underneath of the thigh and calf so that the heel is free-floating.  Use cold packs for 20-30 minutes approximately 5 times per day.  You should perform the daily stretching and strengthening exercises as taught by the therapist as often as possible. This can be done many times a day.  Full weight bearing is allowed after surgery. It will be sore/painful to put weight on the leg, but this will help the bone to heal and prevent complications such as pneumonia, bed sores and blood clots. Mobilization is vital to the recovery process.  Activities of Daily Living:  No tub baths, hot tubs, or swimming pools for 4 weeks.  May shower and let water run over the incision immediately after surgery. The battery pack of the JENSEN Wound Vac must be protected or removed while in the shower. After the JENSEN is removed 7 days after surgery showering is permitted as long as there is no drainage from the wound.     Restrictions  Weight: It is ok to allow full weight bearing after surgery. Weight on the leg actually quickens the recovery process. While it will be sore/painful to put weight on the leg, it is safe to do so. Hip replacement after hip fracture has a much slower recover process. It can  take months to heal fully from a hip fracture and patients even make some slow benefits up to a year afterwards.   Driving: Many patients have questions about when it is safe to return to driving. The answer is that this is extremely variable. It depends on the extent of the surgery, as well as how quickly you heal. Certainly left leg surgeries make returning to driving easier while right leg surgeries require more extensive rehabilitation before driving can be safe. Until you can press down on the brake hard, and are off of all narcotics, driving is not permitted. Your surgeon cannot “clear” you to return to driving, only you can make the decision when you feel it is safe.    Medications  Anticoagulants: After upper extremity surgery most patients do not require an anticoagulant unless you have another injury that will be keeping you from mobilizing. Lower extremity surgery typically does require use of an anticoagulation medicine.   IF YOU HAD LOWER EXTREMITY SURGERY AND ARE NOT DISCHARGED HOME WITH ANY ANTICOAGULANT MEDICINE YOU SHOULD TAKE ASPIRIN 325mg DAILY FOR 30 DAYS POSTOPERATIVELY.  If you are discharged home with an anticoagulant such as Aspirin, Xarelto, Eliquis, Coumadin, or Lovenox, follow these simple instructions:   Notify surgeon immediately if any yovana bleeding is noted in the urine, stool, emesis, or from the nose or the incision. Blood in the stool will often appear as black rather than red. Blood in urine may appear as pink. Blood in emesis may appear as brown/black like coffee grounds.  You will need to apply pressure for longer periods of time to any cuts or abrasions to stop bleeding  Avoid alcohol while taking anticoagulants  Most anticoagulants are to be taken for 30 days postoperatively. After this time, you may stop using them unless instructed otherwise.   If you were already taking an anticoagulant (commonly Aspirin, Coumadin, or Plavix) you will likely be resuming your normal dose  postoperatively and will be continuing that medication at the discretion of the prescribing physician.  Stool Softeners: You will be at greater risk of constipation after surgery due to being less mobile and the pain medications.  Take stool softeners as needed. Over the counter Colace 100 mg 1-2 capsules twice daily can be taken.  If stools become too loose or too frequent, please decreases the dosage or stop the stool softener.  If constipation occurs despite use of stool softeners, you are to continue the stool softeners and add a laxative (Milk of Magnesia 1 ounce daily as needed)  Drink plenty of fluids, and eat fruits and vegetables during your recovery time. Getting up and mobilizing will help the bowels to recover their regular function, as will weaning off of all narcotics when the pain becomes tolerable.  Pain Medications: Utilized after surgery are narcotics. This is some general information about these medications.  CLASSIFICATION: Pain medications are called Opioids and are narcotics  LEGALITIES: It is illegal to share narcotics with others  DRIVING: it is illegal to drive while under the influence of narcotics. Doing so is a DUI.  POTENTIAL SIDE EFFECTS: nausea, vomiting, itching, dizziness, drowsiness, dry mouth, constipation, and difficulty urinating.  POTENTIAL ADVERSE EFFECTS:  Opioid tolerance can develop with use of pain medications and this simply means that it requires more and more of the medication to control pain. However, this is seen more in patients that use opioids for longer periods of time.  Opioid dependence can develop with use of Opioids. People with opioid dependence will experience withdrawal symptoms upon cessation of the medication.  Opioid addiction can develop with use of Opioids. The incidence of this is very unlikely in patients who take the medications as ordered and stop the medications as instructed.  Opioid overdose can be dangerous, but is unlikely when the medication  is taken as ordered and stopped when ordered. It is important not to mix opioids with alcohol as this can lead to over sedation and respiratory difficulty.  DOSAGE:  After the initial surgical pain begins to resolve, you may begin to decrease the pain medication. By the end of a few weeks, you should be off of pain medications.  Refills will not be given by the office during evening hours, on weekends, or after 6 weeks post-op. You are responsible for weaning off of pain medication. You can increase the time between narcotic pills, taking one every 4 then 6 then 8 hours and so on.  To seek refills on pain medications during the initial 6-week post-operative period, you must call the office to request the refill. The office will then notify you when to  the prescription. DO NOT wait until you are out of the medication to request a refill. Prescriptions will not be filled over the weekend and depending on the schedule, it may take a couple days for the prescription to be available. Someone will have to pick the prescription in person at the office.    FOLLOW-UP VISITS  You will need to follow up in the office with your surgeon in 3 weeks, or as instructed elsewhere in your discharge paperwork. Please call this number [phone] to schedule this appointment. If you are going to an SNF/SNU facility, they will arrange for you to follow up in the office.  If you have any concerns or suspected complications prior to your follow up visit, please call the office. Do not wait until your appointment time if you suspect complications. These will need to be addressed in the office promptly.      Chino Herrera II, MD  Orthopaedic Surgery  [facility]

## 2023-05-05 NOTE — PLAN OF CARE
Goal Outcome Evaluation:               VSS on room air. Pain treated per PRN PO/IM orders. Pt ambulating x 1 assist with walker. Awaiting precert. Plan ongoing.

## 2023-05-05 NOTE — DISCHARGE PLACEMENT REQUEST
"Mara Le (78 y.o. Female)     Date of Birth   1945    Social Security Number       Address   [address] [place] [address]    Home Phone   [phone]    MRN   [id number]       Jew   Yazidism    Marital Status                               Admission Date   5/4/23    Admission Type   Elective    Admitting Provider   Chino Herrera II, MD    Attending Provider   Chino Herrera II, MD    Department, Room/Bed   [facility] SURGICAL INPATIENT, 4117/1       Discharge Date       Discharge Disposition       Discharge Destination                               Attending Provider: Chino Herrera II, MD    Allergies: Celecoxib    Isolation: None   Infection: MRSA No Isolation this Admit (05/04/23)   Code Status: CPR    Ht: 160 cm (63\")   Wt: 115 kg (253 lb)    Admission Cmt: None   Principal Problem: Status post knee replacement [Z96.659]                 Active Insurance as of 5/4/2023     Primary Coverage     Payor Plan Insurance Group Employer/Plan Group    [facility] MEDICARE REPLACEMENT [employer] OPTIONS MEDICARE REPLACEMENT [id number]     Payor Plan Address Payor Plan Phone Number Payor Plan Fax Number Effective Dates    [facility] [phone]  9/1/2021 - None Entered    [address]       [facility] [address]       Subscriber Name Subscriber Birth Date Member ID       MARA LE 1945 [id number]           Secondary Coverage     Payor Plan Insurance Group Employer/Plan Group    [place]      Payor Plan Address Payor Plan Phone Number Payor Plan Fax Number Effective Dates    [address]   7/24/2019 - None Entered    [place] [address]       Subscriber Name Subscriber Birth Date Member ID       MARA LE 1945 [id number]                 Emergency Contacts      (Rel.) Home Phone Work Phone Mobile Phone    FRANCK MARTINEZ (Daughter) [phone] -- [phone]    " IMANJANICE (Daughter) [phone] -- [phone]    ROSA MARRERO (Daughter) [phone] -- [phone]    RAMÓN HUANG (Daughter) -- -- [phone]               History & Physical      Chino Herrera II, MD at 05/04/23 0614            Orthopaedic Surgery  History & Physical For Elective Total Knee  Dr. JACKELINE Herrera II  [phone]    HPI:  Patient is a 78 y.o. Not  or  female who presents with End-stage arthritis of the right knee. They failed conservative treatment of their knee pain and a thorough discussion of the risks and benefits of surgery was had. The patient wishes to continue with elective total knee replacement, they were scheduled and are here for surgery. They did get medical clearance as well as a thorough preoperative workup.    MEDICAL HISTORY  Past Medical History:   Diagnosis Date   • Afib    • Ankle pain    • Arthritis    • Coronary artery disease    • Depression    • Gout    • History of loop recorder 04/25/2023    current   • Hyperglycemia    • Hyperlipidemia    • Hypertension    • Low back pain    • Morbid obesity    • Sleep apnea    • Vitamin D deficiency    ·   Past Surgical History:   Procedure Laterality Date   • ANKLE FUSION      2017-left   • CARDIAC CATHETERIZATION     • CARDIAC CATHETERIZATION Right 8/29/2022    Procedure: Left Heart Cath;  Surgeon: Ratna Zavala MD;  Location: [facility] CATH INVASIVE LOCATION;  Service: Cardiovascular;  Laterality: Right;   • CARPAL TUNNEL RELEASE     • CATARACT EXTRACTION     • CUBITAL TUNNEL RELEASE     • HYSTERECTOMY     • REPLACEMENT TOTAL KNEE      left 2008   • ROTATOR CUFF REPAIR     ·   Prior to Admission medications    Medication Sig Start Date End Date Taking? Authorizing Provider   allopurinol (ZYLOPRIM) 300 MG tablet TAKE 1 TABLET BY MOUTH EVERY DAY  Patient taking differently: Take 1 tablet by mouth Daily. Hold day of surgery 9/19/22   Leonor Adam MD   amLODIPine (NORVASC) 5 MG tablet TAKE 1  TABLET BY MOUTH EVERY DAY  Patient taking differently: Take 1 tablet by mouth Daily. 9/19/22   Leonor Adam MD   atorvastatin (LIPITOR) 20 MG tablet TAKE 1 TABLET BY MOUTH EVERYDAY AT BEDTIME  Patient taking differently: Take 1 tablet by mouth Every Night. 7/25/22   Leonor Adam MD   bumetanide (BUMEX) 2 MG tablet TAKE 1/2 TABLET BY MOUTH EVERY DAY  Patient taking differently: Take 1 mg by mouth Daily. HOLD day of surgery 9/19/22   Ratna Zavala MD   cetirizine (zyrTEC) 10 MG tablet Take 1 tablet by mouth Daily. 8/28/18   Franklin Cid MD   cyanocobalamin (VITAMIN B-12) 1000 MCG tablet Take 1 tablet by mouth Daily. 12/20/18   Franklin Cid MD   escitalopram (LEXAPRO) 20 MG tablet TAKE 1 TABLET BY MOUTH EVERY DAY 9/19/22   Leonor Adam MD   Farxiga 5 MG tablet tablet TAKE 1 TABLET BY MOUTH EVERY DAY  Patient taking differently: Take 1 tablet by mouth Daily. HOLD Day of surgery 4/15/23   Leonor Adam MD   hydrocortisone 2.5 % ointment APPLY A THIN LAYER TO AFFECTED AREAS BY TOPICAL ROUTE TWICE DAILY. SAFE TO USE ON FACE. 3/9/23   Franklin Cid MD   levothyroxine (SYNTHROID, LEVOTHROID) 88 MCG tablet TAKE 1 TABLET BY MOUTH EVERY DAY  Patient taking differently: Take 1 tablet by mouth Daily. 12/8/22   Leonor Adam MD   methocarbamol (ROBAXIN) 750 MG tablet Take 1 tablet by mouth 4 (Four) Times a Day. 2/17/23   Betty Gaspar APRN   metoprolol succinate XL (TOPROL-XL) 25 MG 24 hr tablet Take 1 tablet by mouth Every Night. 4/27/23   Leonor Adam MD   montelukast (SINGULAIR) 10 MG tablet Take 1 tablet by mouth Every Night. 8/18/20   Franklin Cid MD   Multiple Vitamins-Minerals (CENTRUM SILVER ADULT 50+) tablet Take 1 tablet by mouth Daily. HOLD NOW 6/16/16   Franklin Cid MD   Ozempic, 2 MG/DOSE, 8 MG/3ML solution pen-injector INJECT 2 MG UNDER THE SKIN INTO THE APPROPRIATE AREA AS DIRECTED EVERY 7 (SEVEN) DAYS FOR 4  DOSES. CONTINUE FOR 4 WEEKS 4/18/23   Leonor Adam MD   rivaroxaban (XARELTO) 15 MG tablet Take 1 tablet by mouth Daily With Dinner. Indications: Atrial Fibrillation  Patient taking differently: Take 1 tablet by mouth Daily With Dinner. HOLD 2-3 days per Dr. Preciado  Indications: Atrial Fibrillation 12/13/22   Ratna Zavala MD   Semaglutide, 1 MG/DOSE, (Ozempic, 1 MG/DOSE,) 4 MG/3ML solution pen-injector Inject 1 mg under the skin into the appropriate area as directed Every 7 (Seven) Days for 4 doses. Continue for 4 weeks 5/15/23 6/6/23  Leonor Adam MD   Semaglutide,0.25 or 0.5MG/DOS, (Ozempic, 0.25 or 0.5 MG/DOSE,) 2 MG/1.5ML solution pen-injector Inject 0.25 mg under the skin into the appropriate area as directed 1 (One) Time Per Week. For four weeks 3/20/23   Leonor Adam MD   Semaglutide,0.25 or 0.5MG/DOS, (Ozempic, 0.25 or 0.5 MG/DOSE,) 2 MG/3ML solution pen-injector Inject 0.5 mg under the skin into the appropriate area as directed Every 7 (Seven) Days for 4 doses. Continue 4 weeks 4/17/23 5/9/23  Leonor Adam MD   spironolactone (ALDACTONE) 25 MG tablet Take 0.5 tablets by mouth Daily.  Patient taking differently: Take 12.5 mg by mouth Daily. HOLD Day of surgery 2/10/23   Ratna Zavala MD   Vitamin D, Cholecalciferol, 25 MCG (1000 UT) tablet Take 1 tablet by mouth Daily. 6/16/16   Provider, MD Franklin   ·   Allergies   Allergen Reactions   • Celecoxib Itching and Swelling     swelling   ·   Most Recent Immunizations   Administered Date(s) Administered   • COVID-19 (PFIZER) Purple Cap Monovalent 12/29/2021   • FLUAD TRI 65YR+ 11/07/2013   • Flu Vaccine Intradermal Quad 18-64YR 11/07/2013   • Fluad Quad 65+ 11/18/2022   • Fluzone High Dose =>65 Years ([employer] ONLY) 12/06/2019   • Fluzone High-Dose 65+yrs 10/08/2021   • Fluzone Quad >6mos (Multi-dose) 12/02/2014   • Influenza Quad Vaccine (Inpatient) 12/02/2014   • Influenza, Unspecified 11/07/2013   •  Pneumococcal Conjugate 13-Valent (PCV13) 06/16/2016   • Pneumococcal Polysaccharide (PPSV23) 01/23/2018   • Shingrix 12/06/2019   • Tdap 01/23/2018, 01/23/2018   ·   Social History     Tobacco Use   • Smoking status: Never   • Smokeless tobacco: Never   Substance Use Topics   • Alcohol use: No   ·    Social History     Substance and Sexual Activity   Drug Use No   ·     REVIEW OF SYSTEMS:  · Head: negative for headache  · Respiratory: negative for shortness of breath.   · Cardiovascular: negative for chest pain.   · Gastrointestinal: negative abdominal pain.   · Neurological: negative for LOC  · Psychiatric/Behavioral: negative for memory loss.   · All other systems reviewed and are negative    VITALS: LMP  (LMP Unknown)  There is no height or weight on file to calculate BMI.    PHYSICAL EXAM:   · CONSTITUTIONAL: A&Ox3, No acute distress  · LUNGS: Equal chest rise, no shortness of air  · CARDIOVASCULAR: palpable peripheral pulses  · SKIN: no skin lesions in the area examined  · LYMPH: no lymphadenopathy in the area examined  · EXTREMITY: Knee  · Pulses:  Brisk Capillary Refill  · Sensation: Intact to Saphenous, Sural, Deep Peroneal, Superficial Peroneal, and Tibial Nerves and grossly throughout extremity  · Motor: 5/5 EHL/FHL/TA/GS motor complexes    RADIOLOGY REVIEW:   No radiology results for the last 7 days    LABS:   Results for the past 24 hours: No results found for this or any previous visit (from the past 24 hour(s)).    IMPRESSION:  Patient is a 78 y.o. Not  or  female with end-stage arthritis of the right knee    PLAN:   · Surgery: Elective total knee arthroplasty  · Consent: The risks and benefits of operative versus nonoperative treatment were discussed. The patient elected to undergo operative treatment of their knee arthritis. The risks discussed included but were not limited to blood clots, MI, stroke, other medical complications, infection, damage to neurovascular structures, continued  pain, hardware prominence, loss of range of motion, need for further procedures, and and risk of anesthesia..  No guarantees were made   · Disposition: Elective right Total Knee Arthroplasty today.    Chino Herrera II, MD  Orthopaedic Surgery  [facility]      Electronically signed by Chino Herrera II, MD at 23 0614          Operative/Procedure Notes (all)      Chino Herrera II, MD at 23 0751  Version 1 of 1         ROBOTIC Total Knee Replacement Operative Note  Dr. VIVEROS “Rodrigo” Javier LOCK  [phone]    PATIENT NAME: Faby Le  MRN: [id number]  : 1945 AGE: 78 y.o. GENDER: female  DATE OF OPERATION: 2023  PREOPERATIVE DIAGNOSIS: End Stage Arthritis  POSTOPERATIVE DIAGNOSIS: Same  OPERATION PERFORMED: Right Robotic Assisted Total Knee Arthroplasty  SURGEON: Chino Herrera MD  Circulator: Ana Shields RN; Madison Marsh RN  Scrub Person: Ward Proctor  Vendor Representative: Kyree Rosas  Assistant: Kaushal Morris CSA  ANESTHESIA: General  ASSISTANT: Kaushal Morris. This case would not have been possible without another set of skilled surgical hands for retraction, bone reduction, use of instrumentation, assistance with implants, and closure.  This assistance was vital to the success of the case.   ESTIMATED BLOOD LOSS: 100cc  SPONGE AND NEEDLE COUNT: Correct  INDICATIONS:   A discussion of operative versus nonoperative treatment was had with the patient and they failed conservative management. They elected to undergo total knee arthroplasty. The risks of surgery were discussed and included the risk of anesthesia, infection, damage to neurovascular structures, implant loosening/failure, fracture, hardware prominence, continued pain, early failure, the need for further procedures, medical complications, and others. No guarantees were made. The patient wished to proceed with surgery and a surgical consent was signed.    COMPONENTS:   · Journey II BCS  Oxinium Femoral Component: Size 4  · Journey II Tibial Baseplate: 3   · Posterior Stabilized Insert: 12  · Patella: 32mm      PERTINENT FINDINGS: Degenerative Arthritis    DETAILS OF PROCEDURE:  The patient was met in the preoperative area. The site was marked. The consent and H&P were reviewed. The patient was then wheeled back to the operative suite and transferred to the operative table. The patient underwent anesthesia. A tourniquet was placed on the upper thigh. Surgical alcohol was used to thoroughly clean the entire operative extremity.     The leg was then prepped in the normal sterile fashion and surgical space suits were used for the entire operative team. New outer gloves were used by all sterile surgical team members after final draping. After a surgical timeout, the tourniquet was inflated.     I began by inserting 2 pins into the tibial and femoral shafts and attaching the tibial robotic .    In flexion, a midline knee incision was utilized centered on the patella and ending medial to the tibial tubercle. Dissection was carried down to the knee capsule. A medial parapatellar ararthrotomy was completed. The patellar fat pad was excised. The MCL was minimally elevated to gain adequate exposure to the knee.  The suprapatellar fat pad was also excised.  The patella was subluxed laterally. The patella was held vertical using 2 clamps, and was then cut using a saw. The patella was then sized, and the lug holes were drilled. Excess patellar bone was removed using a saw. The patella was then protected during this case using the metal patella shield.    The tibial and femoral guides were then attached to the pins.  I did utilize a femoral button but not a tibial button.    The ACL, meniscus, and PCL were then resected.  Next I proceeded with a standard mapping of the knee including all relevant anatomic positions, range of motion, and stressing of ligaments.  I then planned out the knee including  implant sizes, rotation, and bony resection to appropriately balance the knee as needed.      After the mapping and planning was complete, I turned my attention to the distal femur.  Using the bur, I was able to remove the distal femoral bone and create the initial lug holes.  I then used the punch to create the pinholes for the 5-in-1 cutting block.  The 5-in-1 cutting block was then snapped into place and pinned.  I used a saw to resect the anterior posterior and chamfer cuts.  These were all removed using a rongeur.    I then turned my attention to the tibia.  Retractors were placed appropriately.  Using the robot for guidance, a cutting jig was attached to the tibia using 2 pins.  This was done just above the level of measured resection.  I then used a saw to cut the tibia and remove this bone.  At that point, I was able to use the bur to resect down to the actual intended target tibial resection line.  This was verified to be accurate afterwards on the robot screen.    At that point, the remaining meniscus and osteophytes were removed.  I then attached the femoral component which was well-seated. The femoral BCS box was then prepared for.  I then trialed the knee and was noted to be in excellent balance with good range of motion.    We next turned our attention back to the tibia to finish the tibial preparation. The tibia was measured and sized. The tibial plate was aligned with the rotation from the trialing process and verified to be positioned near the medial third of the tibial tubercle. The tibial surface was then prepared for the keel.     The knee was thoroughly irrigated with sterile saline using a pulse-lavage system while the final tibial baseplate, femoral component and patellar component were opened. Cement was prepared and mixed using standard techniques. Outer gloves were changed before implant handling to ensure no soft tissue or oily material was exposed to the surfaces of the final implants.  "The bony knee surfaces were dried and the implants were cemented in place, starting with the tibia, then the femur and finally the patella. Excess cement was removed at each step. A trial poly was utilized during cementation for compression. The tourniquet was taken down and adequate hemostasis was achieved. The knee was thoroughly irrigated once again.     The soft tissues about the knee were then injected with an anesthetic cocktail. Care was taken to avoid the peroneal nerve and the neurovascular bundle posteriorly. The cement was allowed to harden.  While the cement was hardening, I did remove all 4 pins and the and femoral button.  The tibial and femoral pin sites were closed.    After the cement was fully set, the knee was ranged with various thickness of polyethylene trials to ensure that the balance was appropriate after robotic resection. The knee was inspected for excess cement, which was removed. The real poly, of corresponding thickness was then opened and inserted into the knee. One final range of motion and stability test showed the knee to be in good condition with a well tracking patellar component.    The knee capsule was then closed with a running barbed suture as well as interrupted Ethibond sutures. The knee was then closed in layers.  A sterile dressing was applied.    The patient was awoken from anesthesia, moved to the [facility] and taken to the recovery room in stable condition. Sponge and needle count were correct. There were no complications. Patient tolerated the procedure well.    R \"Rodrigo\" Javier LOCK MD  Orthopaedic Surgery  [facility]  [phone]                    Electronically signed by Chino Herrera II, MD at 05/04/23 1036          Physician Progress Notes (all)      Do Green APRN at 05/05/23 0842          ..    Orthopaedic Surgery   Daily Progress Note  CAROL Herron  [phone]  DEMOGRAPHICS:   · Faby GOMEZ Rey   · Age:78 y.o. "   · MRN:[id number]  · Admitted: 5/4/2023    PROCEDURE: 1 Day Post-Op s/p Procedure(s):  TOTAL KNEE ARTHROPLASTY WITH CORI ROBOT     HOSPITAL PROGRESS  · Patient Issues: Patient states she is doing well postoperatively.  She states her pain has been controlled overnight.  · Ambulation/Activity: Minimal activity since surgery     VITALS:  Vitals:    05/04/23 2300 05/05/23 0054 05/05/23 0420 05/05/23 0730   BP:  93/41 102/64 120/61   BP Location:  Left leg Left leg Left arm   Patient Position:  Lying Lying Sitting   Pulse: 105 90 83 93   Resp: 14  16 16   Temp: 97.8 °F (36.6 °C)  98.1 °F (36.7 °C) 98.2 °F (36.8 °C)   TempSrc: Oral  Oral Oral   SpO2: 96%  96% 96%   Weight:       Height:           PHYSICAL EXAM:  · LUNGS: Equal chest rise, no shortness of air  · CARDIOVASCULAR: brisk capillary refill intact  · WOUND: JENSEN Wound Vac System working in good condition, minimal drainage  · EXTREMITY: Operative Leg  · Pulses: brisk capillary refill intact  · Sensation: Sensation intact to light touch to the saphenous/sural/tibial/deep peroneal/superficial peroneal nerves, and grossly throughout the extremity.  · Motor: 5/5 EHL/FHL/TA/GS motor complexes    LABS:   Lab Results   Component Value Date    HGB 9.2 (L) 05/05/2023     Lab Results   Component Value Date    WBC 11.90 (H) 05/05/2023     Lab Results   Component Value Date    GLUCOSE 118 (H) 05/05/2023    CALCIUM 9.0 05/05/2023     (L) 05/05/2023    K 4.2 05/05/2023    CO2 22.0 05/05/2023     05/05/2023    BUN 29 (H) 05/05/2023    CREATININE 1.50 (H) 05/05/2023    EGFR 36.0 (L) 05/05/2023    BCR 19.3 05/05/2023    ANIONGAP 10.0 05/05/2023       ASSESSMENT: Patient is a 78 y.o. female who is 1 Day Post-Op s/p Procedure(s):  TOTAL KNEE ARTHROPLASTY WITH CORI ROBOT     PLAN:   · Weight Bearing: Weight Bearing As Tolerated  · Labs: Above lab values review. Plan: No intervention necessary at this time.   · PT/OT: To Mobilize  · DVT PPX: Patient will continue Xarelto  as ordered  · Post-Op Xray: TKA implants in good alignment.   · Follow-Up: In office at 3 weeks postop  · Dispo: Patient is doing well postoperatively.  Plan is to discharge to a skilled nursing facility postoperatively.    CAROL Herron  Orthopaedic Surgery  [facility]  [phone] - [place] [facility]  [phone] - [place] [facility]      Electronically signed by Do Green APRN at 23 1391     Progress Notes signed by [employer] On[employer], [place] at 23 1309       [Media Unavailable] Scan on 2023 by [employer], [place]: RIGHT KNEE PAIN VISIT, [facility], 2023          Electronically signed by [employer] Onbase, [place] at 23 1308          Consult Notes (all)      Rach Reis APRN at 23 1215      Consult Orders    1. Inpatient Hospitalist Consult [[id number]] ordered by Chino Herrera II, MD          Attestation signed by Duran Bravo MD at 23 1820    I have reviewed this documentation and agree.    I have evaluated the patient independently.  Blood pressure is low, will give half a liter bolus I agree with 75 mill per hours IV fluid maintenance after the bolus.  We will hold all blood pressure medication today.  Per orthopedic surgeon okay to resume Xarelto tomorrow evening.    Additional physical exam    Patient sitting in the bed comfortably, awake alert oriented x3, lung clear to auscultation bilateral, heart IRR, S1/S2+ , abdomen: Soft , NT, ND , BS+                       [facility] Medicine Services   Consult Note    Patient Name: Faby Le  : 1945  MRN: [id number]  Primary Care Physician:  Leonro Adam MD  Referring Physician: Chino Herrera*  Date of admission: 2023  Date and Time of Care: 23 at 1200    Inpatient Hospitalist Consult  Consult performed by: Rach Reis APRN  Consult ordered by: Chino Herrera II, MD  Assessment/Recommendations: Consulted for  medical management .           Subjective       Reason for Consult/ Chief Complaint:  Knee pain     Consult Requested By:  Dr riley  History of Present Illness: Faby Le is a 78 y.o. female  To [facility] for elective total knee arthroplasty     Review of Systems   Constitutional: Negative for chills, decreased appetite, malaise/fatigue and night sweats.   HENT: Negative for sore throat.    Eyes: Negative for visual disturbance.   Cardiovascular: Negative for chest pain and dyspnea on exertion.   Respiratory: Negative for cough.    Musculoskeletal: Positive for arthritis (no pain at present - hx OA pain ).   Gastrointestinal: Negative for abdominal pain, diarrhea and vomiting.   Genitourinary: Negative for dysuria.   Neurological: Negative for headaches.   Psychiatric/Behavioral: Negative for altered mental status and depression.        Personal History     Past Medical History:   Diagnosis Date   • Afib    • Ankle pain    • Arthritis    • Coronary artery disease    • Depression    • Gout    • History of loop recorder 04/25/2023    current   • Hyperglycemia    • Hyperlipidemia    • Hypertension    • Low back pain    • Morbid obesity    • Sleep apnea    • Vitamin D deficiency        Past Surgical History:   Procedure Laterality Date   • ANKLE FUSION      2017-left   • CARDIAC CATHETERIZATION     • CARDIAC CATHETERIZATION Right 8/29/2022    Procedure: Left Heart Cath;  Surgeon: Ratna Zavala MD;  Location: [facility] CATH INVASIVE LOCATION;  Service: Cardiovascular;  Laterality: Right;   • CARPAL TUNNEL RELEASE     • CATARACT EXTRACTION     • CUBITAL TUNNEL RELEASE     • HYSTERECTOMY     • REPLACEMENT TOTAL KNEE      left 2008   • ROTATOR CUFF REPAIR         Family History: family history includes Arthritis in her brother; Cancer in her brother; Hypertension in her mother; Stroke in her father. Otherwise pertinent FHx was reviewed and not pertinent to current issue.    Social History:  reports that she has never  smoked. She has never used smokeless tobacco. She reports that she does not drink alcohol and does not use drugs.    Home Medications:   Semaglutide (1 MG/DOSE), Semaglutide (2 MG/DOSE), Semaglutide(0.25 or 0.5MG/DOS), allopurinol, amLODIPine, atorvastatin, bumetanide, cetirizine, cholecalciferol, cyanocobalamin, dapagliflozin, escitalopram, hydrocortisone, levothyroxine, methocarbamol, metoprolol succinate XL, montelukast, multivitamin with minerals, rivaroxaban, and spironolactone    Allergies:  Allergies   Allergen Reactions   • Celecoxib Itching and Swelling     swelling         Objective       Vitals:  Temp:  [97.2 °F (36.2 °C)-98.3 °F (36.8 °C)] 97.3 °F (36.3 °C)  Heart Rate:  [] 105  Resp:  [12-18] 12  BP: ()/(33-63) 94/62  Flow (L/min):  [6] 6    Physical Exam  Constitutional:       Appearance: She is obese.   HENT:      Head: Normocephalic.      Right Ear: External ear normal.      Left Ear: External ear normal.      Mouth/Throat:      Pharynx: Oropharynx is clear.   Eyes:      Conjunctiva/sclera: Conjunctivae normal.   Cardiovascular:      Rate and Rhythm: Normal rate. Rhythm irregular.      Pulses: Normal pulses.      Heart sounds: Normal heart sounds.   Pulmonary:      Effort: Pulmonary effort is normal.      Breath sounds: Normal breath sounds.   Abdominal:      Palpations: Abdomen is soft.      Comments: obese   Musculoskeletal:      Cervical back: Neck supple.      Comments: R knee with dressing in place - n/v status intact distal    Skin:     General: Skin is warm and dry.   Neurological:      General: No focal deficit present.      Mental Status: She is alert and oriented to person, place, and time.   Psychiatric:         Mood and Affect: Mood normal.         Thought Content: Thought content normal.         Judgment: Judgment normal.          Result Review    Result Review:  I have personally reviewed the results from the time of this admission to 5/4/2023 12:27 EDT and agree with these  findings:  [x]  Laboratory  []  Microbiology  [x]  Radiology  []  EKG/Telemetry   []  Cardiology/Vascular   []  Pathology  []  Old records  []  Other:  Most notable findings include:   Xray R knee:Satisfactory postoperative appearance status post right knee replacement.       Assessment & Plan        Active Hospital Problems:  Active Hospital Problems    Diagnosis    • **Status post knee replacement    • Hypertension    • Atrial fibrillation    • Chronic kidney disease, stage 3 (moderate)    • Coronary artery disease    • Depression    • Hypothyroid      Plan:     S/P- knee replacement - OR today . IVF infusing. Pt feels ok. No pain at present.     Afib - continue xarelto, rate stable    HTN- bp low at present- hold norvasc for now. Will continue metoprolol for now with Afib hx and monitor bp.     Depression - continue lexapro    Hypothyroidism - continue replacement dose.     Cad- continue metoprolol- last echo aug- 2022 EF 51-55%. Pt did see her primary cardiologist prior to this surgery and was cleared for OR.    CKD stage 3- last cr  1.019- this am 1.4. will continue IVF and check BMP in the am- decrease IVF to 75 cc hr- pt is on diuretics with hx diastolic dysfunction. She  will be taking po today .           This patient has been examined wearing appropriate Personal Protective Equipment and discussed with hospital infection control department. 05/04/23      Signature: Electronically signed by CAROL Talbot, 05/04/23, 12:27 EDT.  [facility] Hospitalist Team    Electronically signed by Duran Bravo MD at 05/04/23 1820          Physical Therapy Notes (all)      Reyes, Carmela, PT at 04/25/23 1132  Version 1 of 1       Goal Outcome Evaluation:  Plan of Care Reviewed With: patient, daughter           Outcome Evaluation: 79 y/o female with end stage arthritis to R knee scheduled for R TKR on 5/4/23. PMH includes L TKR in 2008, obesity, depression, htn. Patient currently residing with daughter in [facility]  but patient normally resides alone in a senior apartment. Patient currently using cane for short distance ambulation with marked limp, wide base of support and decreased pace. AROM R knee -15 to 90 degrees and L knee 0-110 degrees in sitting. Reports of pain at both end range R knee. Patient educated on HEP, use of a.d, icing, plan of care post op. Patient is wanting SNF at discharge following surgery.    Electronically signed by Reyes, Carmela, PT at 23 113     Reyes, Carmela, PT at 23 1133  Version 1 of 1         Patient Name: Faby Le  : 1945    MRN: [id number]                              Today's Date: 2023       Admit Date: (Not on file)    Visit Dx: No diagnosis found.  Patient Active Problem List   Diagnosis   • Abnormal complete blood count   • Ankle pain   • Arthralgia of left elbow   • Arthritis   • B12 deficiency   • Coronary artery disease   • Depression   • Gallstones   • Gout   • Hyperglycemia   • Hyperlipidemia   • Hypertension   • Hypothyroidism   • Low back pain   • Mobility poor   • Postmenopausal status   • Sleep apnea   • Vitamin D deficiency   • Chronic kidney disease, stage 3 (moderate)   • History of malignant neoplasm of endometrium   • Oropharyngeal dysphagia   • Class 3 severe obesity due to excess calories with serious comorbidity and body mass index (BMI) of 45.0 to 49.9 in adult   • Chronic pain of right knee   • Light headedness   • Paroxysmal atrial fibrillation   • Atrial fibrillation, unspecified type   • Abnormal results of cardiovascular function studies   • Chronic left shoulder pain     Past Medical History:   Diagnosis Date   • Afib    • Ankle pain    • Arthritis    • Coronary artery disease    • Depression    • Gout    • History of loop recorder 2023    current   • Hyperglycemia    • Hyperlipidemia    • Hypertension    • Low back pain    • Morbid obesity    • Sleep apnea    • Vitamin D deficiency      Past Surgical History:   Procedure  Laterality Date   • ANKLE FUSION      2017-left   • CARDIAC CATHETERIZATION     • CARDIAC CATHETERIZATION Right 8/29/2022    Procedure: Left Heart Cath;  Surgeon: Ratna Zavala MD;  Location: [facility] CATH INVASIVE LOCATION;  Service: Cardiovascular;  Laterality: Right;   • CARPAL TUNNEL RELEASE     • CATARACT EXTRACTION     • CUBITAL TUNNEL RELEASE     • HYSTERECTOMY     • REPLACEMENT TOTAL KNEE      left 2008   • ROTATOR CUFF REPAIR        General Information     Row Name 04/25/23 1121          Physical Therapy Time and Intention    Document Type evaluation  -CR     Mode of Treatment physical therapy  -CR     Row Name 04/25/23 1121          General Information    Patient Profile Reviewed yes  -CR     Prior Level of Function independent:;all household mobility  using cane , has wc for long distance gait  -CR     Barriers to Rehab --  -CR     Row Name 04/25/23 1121          Living Environment    People in Home alone  currently staying with daughter but normally resides in senior apartment  -CR     Row Name 04/25/23 1121          Home Main Entrance    Number of Stairs, Main Entrance none  -CR     Row Name 04/25/23 1121          Stairs Within Home, Primary    Number of Stairs, Within Home, Primary none  -CR     Row Name 04/25/23 1121          Cognition    Orientation Status (Cognition) oriented x 4  -CR     Row Name 04/25/23 1121          Safety Issues, Functional Mobility    Impairments Affecting Function (Mobility) pain;range of motion (ROM)  -CR           User Key  (r) = Recorded By, (t) = Taken By, (c) = Cosigned By    Initials Name Provider Type    CR Reyes, Carmela, PT Physical Therapist               Mobility     Row Name 04/25/23 1124          Sit-Stand Transfer    Sit-Stand [place] (Transfers) minimum assist (75% patient effort)  -CR     Row Name 04/25/23 1124          Gait/Stairs (Locomotion)    Deviations/Abnormal Patterns (Gait) base of support, wide;stride length decreased;antalgic  -CR      Left Sided Gait Deviations weight shift ability decreased  -CR           User Key  (r) = Recorded By, (t) = Taken By, (c) = Cosigned By    Initials Name Provider Type    CR Reyes, Carmela, PT Physical Therapist               Obj/Interventions     Row Name 04/25/23 1125          Range of Motion Comprehensive    Comment, General Range of Motion AROM L knee 0-110 degrees, R knee -15 to 90 degrees in sitting with pain at both end range  -CR     Row Name 04/25/23 1125          Strength Comprehensive (MMT)    General Manual Muscle Testing (MMT) Assessment no strength deficits identified  -CR     Row Name 04/25/23 1125          Balance    Balance Assessment sit to stand dynamic balance;standing static balance;standing dynamic balance  -CR     Sit to Stand Dynamic Balance minimal assist  -CR     Static Standing Balance modified independence  -CR     Dynamic Standing Balance standby assist  -CR     Position/Device Used, Standing Balance cane, straight  -CR     Row Name 04/25/23 1125          Sensory Assessment (Somatosensory)    Sensory Assessment (Somatosensory) sensation intact  -CR           User Key  (r) = Recorded By, (t) = Taken By, (c) = Cosigned By    Initials Name Provider Type    CR Reyes, Carmela, PT Physical Therapist               Goals/Plan     Row Name 04/25/23 1131          Transfer Goal 1 (PT)    Activity/Assistive Device (Transfer Goal 1, PT) sit-to-stand/stand-to-sit  -CR     [place] Level/Cues Needed (Transfer Goal 1, PT) standby assist  -CR     Time Frame (Transfer Goal 1, PT) long term goal (LTG);1 week  -CR     Row Name 04/25/23 1131          Gait Training Goal 1 (PT)    Activity/Assistive Device (Gait Training Goal 1, PT) gait (walking locomotion);assistive device use;diminish gait deviation;increase endurance/gait distance;walker, rolling  -CR     [place] Level (Gait Training Goal 1, PT) standby assist  -CR     Distance (Gait Training Goal 1, PT) 100 ft  -CR     Time Frame (Gait Training  Goal 1, PT) long term goal (LTG);1 week  -CR     Row Name 04/25/23 1131          ROM Goal 1 (PT)    ROM Goal 1 (PT) AROM R knee 0-90 degrees  -CR     Time Frame (ROM Goal 1, PT) long-term goal (LTG);1 week  -CR     Row Name 04/25/23 1131          Patient Education Goal (PT)    Activity (Patient Education Goal, PT) HEP  -CR     [place]/Cues/Accuracy (Memory Goal 2, PT) verbalizes understanding  -CR     Progress/Outcome (Patient Education Goal, PT) goal met  -CR     Row Name 04/25/23 1131          Therapy Assessment/Plan (PT)    Planned Therapy Interventions (PT) gait training;home exercise program;transfer training;patient/family education;ROM (range of motion)  -CR           User Key  (r) = Recorded By, (t) = Taken By, (c) = Cosigned By    Initials Name Provider Type    CR Reyes, Carmela, PT Physical Therapist               Clinical Impression     Row Name 04/25/23 1126          Pain    Posttreatment Pain Rating 5/10  -CR     Pain Location - Side/Orientation Right  -CR     Pain Location - knee  -CR     Row Name 04/25/23 1126          Plan of Care Review    Plan of Care Reviewed With patient;daughter  -CR     Outcome Evaluation 77 y/o female with end stage arthritis to R knee scheduled for R TKR on 5/4/23. PMH includes L TKR in 2008, obesity, depression, htn. Patient currently residing with daughter in [facility] but patient normally resides alone in a senior apartment. Patient currently using cane for short distance ambulation with marked limp, wide base of support and decreased pace. AROM R knee -15 to 90 degrees and L knee 0-110 degrees in sitting. Reports of pain at both end range R knee. Patient educated on HEP, use of a.d, icing, plan of care post op. Patient is wanting SNF at discharge following surgery.  -CR     Row Name 04/25/23 1126          Therapy Assessment/Plan (PT)    Patient/Family Therapy Goals Statement (PT) SNF after sx  -CR     Rehab Potential (PT) good, to achieve stated therapy goals  -CR      Criteria for Skilled Interventions Met (PT) yes;skilled treatment is necessary  -CR     Therapy Frequency (PT) daily  -CR     Predicted Duration of Therapy Intervention (PT) dc  -CR           User Key  (r) = Recorded By, (t) = Taken By, (c) = Cosigned By    Initials Name Provider Type    CR Reyes, Carmela, PT Physical Therapist               Outcome Measures    No documentation.                              Physical Therapy Education     Title: PT OT SLP Therapies (In Progress)     Topic: Physical Therapy (In Progress)     Point: Mobility training (Not Started)     Learner Progress:  Not documented in this visit.          Point: Home exercise program (Done)     Learning Progress Summary           Patient Acceptance, E,D,H, VU by CR at 4/25/2023 1132                               User Key     Initials Effective Dates Name Provider Type Discipline    CR 06/16/21 -  Reyes, Carmela, PT Physical Therapist PT              PT Recommendation and Plan  Planned Therapy Interventions (PT): gait training, home exercise program, transfer training, patient/family education, ROM (range of motion)  Plan of Care Reviewed With: patient, daughter  Outcome Evaluation: 79 y/o female with end stage arthritis to R knee scheduled for R TKR on 5/4/23. PMH includes L TKR in 2008, obesity, depression, htn. Patient currently residing with daughter in [facility] but patient normally resides alone in a senior apartment. Patient currently using cane for short distance ambulation with marked limp, wide base of support and decreased pace. AROM R knee -15 to 90 degrees and L knee 0-110 degrees in sitting. Reports of pain at both end range R knee. Patient educated on HEP, use of a.d, icing, plan of care post op. Patient is wanting SNF at discharge following surgery.     Time Calculation:    PT Charges     Row Name 04/25/23 1132             Time Calculation    Start Time 1050  -CR      Stop Time 1110  -CR      Time Calculation (min) 20 min  -CR      PT  Received On 23  -CR      PT - Next Appointment 23  -CR         Time Calculation- PT    Total Timed Code Minutes- PT 0 minute(s)  -CR            User Key  (r) = Recorded By, (t) = Taken By, (c) = Cosigned By    Initials Name Provider Type    CR Reyes, Carmela, PT Physical Therapist              Therapy Charges for Today     Code Description Service Date Service Provider Modifiers Qty    62238638417 HC PT EVAL LOW COMPLEXITY 4 2023 Reyes, Carmela, PT GP 1             PT Discharge Summary  Anticipated Discharge Disposition (PT): skilled nursing facility    Carmela Reyes, PT  2023      Electronically signed by Reyes, Carmela, PT at 23 1133     Alondra Gary PT at 23 131  Version 1 of        Goal Outcome Evaluation:  Plan of Care Reviewed With: patient, daughter           Outcome Evaluation: Pt is a 79 y/o female who is being seen for re-evaluation s/p elective R TKA. At baseline, pt lives alone in a single level home and typically ambulates with use of cane, using a w/c for longer distances. As of recently, pt's mobility has declined due to pain/decreased ROM in R knee. Pt currently without complaints of pain. She participates in bed mobility with SBA. She performs transfers with CGA and use of RW and ambulates approx 5 ft with RW and CGA. Pt demonstrating antalgic gait with slow rajeev. In sitting, AROM of R knee -5 to 100 degrees. Pt with impairments in ROM, strength, activity tolerance and balance. She is not currently safe for d/c home alone and will require SNF placement to maximize safety and independence.    Electronically signed by Alondra Gary PT at 23 [phone]     Alondra Gary PT at 23 131  Version 1 of          Patient Name: Faby Le  : 1945    MRN: [id number]                              Today's Date: 2023       Admit Date: 2023    Visit Dx: No diagnosis found.  Patient Active Problem List   Diagnosis   • Abnormal complete  blood count   • Ankle pain   • Arthralgia of left elbow   • Arthritis   • B12 deficiency   • Coronary artery disease   • Depression   • Gallstones   • Gout   • Hyperglycemia   • Hyperlipidemia   • Hypertension   • Hypothyroid   • Low back pain   • Mobility poor   • Postmenopausal status   • Sleep apnea   • Vitamin D deficiency   • Chronic kidney disease, stage 3 (moderate)   • History of malignant neoplasm of endometrium   • Oropharyngeal dysphagia   • Class 3 severe obesity due to excess calories with serious comorbidity and body mass index (BMI) of 45.0 to 49.9 in adult   • Chronic pain of right knee   • Light headedness   • Paroxysmal atrial fibrillation   • Atrial fibrillation   • Abnormal results of cardiovascular function studies   • Chronic left shoulder pain   • Status post knee replacement   • Hypertension     Past Medical History:   Diagnosis Date   • Afib    • Ankle pain    • Arthritis    • Coronary artery disease    • Depression    • Gout    • History of loop recorder 04/25/2023    current   • Hyperglycemia    • Hyperlipidemia    • Hypertension    • Low back pain    • Morbid obesity    • Sleep apnea    • Vitamin D deficiency      Past Surgical History:   Procedure Laterality Date   • ANKLE FUSION      2017-left   • CARDIAC CATHETERIZATION     • CARDIAC CATHETERIZATION Right 8/29/2022    Procedure: Left Heart Cath;  Surgeon: Ratna Zavala MD;  Location: [facility] INVASIVE LOCATION;  Service: Cardiovascular;  Laterality: Right;   • CARPAL TUNNEL RELEASE     • CATARACT EXTRACTION     • CUBITAL TUNNEL RELEASE     • HYSTERECTOMY     • REPLACEMENT TOTAL KNEE      left 2008   • ROTATOR CUFF REPAIR        General Information     Row Name 05/04/23 1320          Physical Therapy Time and Intention    Document Type re-evaluation  -NS     Mode of Treatment co-treatment;physical therapy  -NS     Row Name 05/04/23 1320          General Information    Patient Profile Reviewed yes  -NS     Prior Level of  Function independent:  using cane, has w/c for long distance mobility  -NS     [facility] Name 05/04/23 1320          Living Environment    People in Home alone  -NS     [facility] Name 05/04/23 1320          Home Main Entrance    Number of Stairs, Main Entrance none  -NS     Row Name 05/04/23 1320          Stairs Within Home, Primary    Number of Stairs, Within Home, Primary none  -NS     Row Name 05/04/23 1320          Cognition    Orientation Status (Cognition) oriented x 4  -NS     Row Name 05/04/23 1320          Safety Issues, Functional Mobility    Impairments Affecting Function (Mobility) balance;endurance/activity tolerance;pain;range of motion (ROM);strength  -NS           User Key  (r) = Recorded By, (t) = Taken By, (c) = Cosigned By    Initials Name Provider Type    Alondra Oscar PT Physical Therapist               Mobility     Row Name 05/04/23 1320          Bed Mobility    Bed Mobility supine-sit  -NS     Supine-Sit [place] (Bed Mobility) standby assist  -NS     Assistive Device (Bed Mobility) bed rails;head of bed elevated  -NS     Row Name 05/04/23 1320          Sit-Stand Transfer    Sit-Stand [place] (Transfers) contact guard  -NS     Assistive Device (Sit-Stand Transfers) walker, front-wheeled  -NS     Row Name 05/04/23 1320          Gait/Stairs (Locomotion)    [place] Level (Gait) contact guard  -NS     Assistive Device (Gait) walker, front-wheeled  -NS     Distance in Feet (Gait) 5  -NS     Deviations/Abnormal Patterns (Gait) antalgic;rajeev decreased;gait speed decreased  -NS     Right Sided Gait Deviations weight shift ability decreased  -NS     Row Name 05/04/23 1320          Mobility    Extremity Weight-bearing Status right lower extremity  -NS     Right Lower Extremity (Weight-bearing Status) weight-bearing as tolerated (WBAT)  -NS           User Key  (r) = Recorded By, (t) = Taken By, (c) = Cosigned By    Initials Name Provider Type    Alondra Oscar PT Physical Therapist                Obj/Interventions     Row Name 05/04/23 1321          Range of Motion Comprehensive    Comment, General Range of Motion R knee -5 to 100 degrees in sitting  -NS     Row Name 05/04/23 1321          Strength Comprehensive (MMT)    Comment, General Manual Muscle Testing (MMT) Assessment BLE grossly 4/5  -NS     Row Name 05/04/23 1321          Balance    Balance Assessment sitting dynamic balance;sit to stand dynamic balance;sitting static balance;standing static balance;standing dynamic balance  -NS     Static Sitting Balance independent  -NS     Dynamic Sitting Balance independent  -NS     Position, Sitting Balance unsupported;sitting edge of bed  -NS     Sit to Stand Dynamic Balance contact guard  -NS     Static Standing Balance contact guard  -NS     Dynamic Standing Balance contact guard  -NS     Position/Device Used, Standing Balance walker, rolling;supported  -NS           User Key  (r) = Recorded By, (t) = Taken By, (c) = Cosigned By    Initials Name Provider Type    Alondra Oscar, PT Physical Therapist               Goals/Plan     Row Name 05/04/23 1356          Bed Mobility Goal 1 (PT)    Activity/Assistive Device (Bed Mobility Goal 1, PT) bed mobility activities, all  -NS     [place] Level/Cues Needed (Bed Mobility Goal 1, PT) modified independence  -NS     Time Frame (Bed Mobility Goal 1, PT) long term goal (LTG);2 weeks  -NS     Row Name 05/04/23 1356          Transfer Goal 1 (PT)    Activity/Assistive Device (Transfer Goal 1, PT) transfers, all;walker, rolling  -NS     [place] Level/Cues Needed (Transfer Goal 1, PT) modified independence  -NS     Time Frame (Transfer Goal 1, PT) long term goal (LTG);2 weeks  -NS     [facility] Name 05/04/23 1356          Gait Training Goal 1 (PT)    Activity/Assistive Device (Gait Training Goal 1, PT) gait (walking locomotion);walker, rolling  -NS     [place] Level (Gait Training Goal 1, PT) modified independence  -NS     Distance (Gait Training  Goal 1, PT) 50  -NS     Time Frame (Gait Training Goal 1, PT) long term goal (LTG);2 weeks  -NS     Row Name 05/04/23 3046          Therapy Assessment/Plan (PT)    Planned Therapy Interventions (PT) balance training;bed mobility training;gait training;home exercise program;patient/family education;ROM (range of motion);strengthening;transfer training  -NS           User Key  (r) = Recorded By, (t) = Taken By, (c) = Cosigned By    Initials Name Provider Type    Alondra Oscar, PT Physical Therapist               Clinical Impression     Row Name 05/04/23 1322          Pain    Pretreatment Pain Rating 0/10 - no pain  -NS     Posttreatment Pain Rating 0/10 - no pain  -NS     Row Name 05/04/23 1322          Plan of Care Review    Plan of Care Reviewed With patient;daughter  -NS     Outcome Evaluation Pt is a 79 y/o female who is being seen for re-evaluation s/p elective R TKA. At baseline, pt lives alone in a single level home and typically ambulates with use of cane, using a w/c for longer distances. As of recently, pt's mobility has declined due to pain/decreased ROM in R knee. Pt currently without complaints of pain. She participates in bed mobility with SBA. She performs transfers with CGA and use of RW and ambulates approx 5 ft with RW and CGA. Pt demonstrating antalgic gait with slow rajeev. In sitting, AROM of R knee -5 to 100 degrees. Pt with impairments in ROM, strength, activity tolerance and balance. She is not currently safe for d/c home alone and will require SNF placement to maximize safety and independence.  -NS     Row Name 05/04/23 1322          Therapy Assessment/Plan (PT)    Criteria for Skilled Interventions Met (PT) yes;meets criteria;skilled treatment is necessary  -NS     Therapy Frequency (PT) daily  -NS     Predicted Duration of Therapy Intervention (PT) until d/c  -NS     Row Name 05/04/23 1322          Vital Signs    Pre Patient Position Supine  -NS     Post Patient Position Sitting  -NS      Row Name 05/04/23 1322          Positioning and Restraints    Pre-Treatment Position in bed  -NS     Post Treatment Position chair  -NS     In Chair reclined;with nsg;sitting;legs elevated;call light within reach;encouraged to call for assist;exit alarm on;with family/caregiver  -NS           User Key  (r) = Recorded By, (t) = Taken By, (c) = Cosigned By    Initials Name Provider Type    Alondra Oscar PT Physical Therapist               Outcome Measures     Row Name 05/04/23 1357          How much help from another person do you currently need...    Turning from your back to your side while in flat bed without using bedrails? 3  -NS     Moving from lying on back to sitting on the side of a flat bed without bedrails? 3  -NS     Moving to and from a bed to a chair (including a wheelchair)? 3  -NS     Standing up from a chair using your arms (e.g., wheelchair, bedside chair)? 3  -NS     Climbing 3-5 steps with a railing? 2  -NS     To walk in hospital room? 3  -NS     AM-PAC 6 Clicks Score (PT) 17  -NS     Highest level of mobility 5 --> Static standing  -NS     Row Name 05/04/23 1357          Functional Assessment    Outcome Measure Options AM-PAC 6 Clicks Basic Mobility (PT)  -NS           User Key  (r) = Recorded By, (t) = Taken By, (c) = Cosigned By    Initials Name Provider Type    Alondra Oscar PT Physical Therapist                             Physical Therapy Education     Title: PT OT SLP Therapies (In Progress)     Topic: Physical Therapy (In Progress)     Point: Mobility training (Done)     Learning Progress Summary           Patient Acceptance, E, VU by NS at 5/4/2023 1357                   Point: Home exercise program (Done)     Learning Progress Summary           Patient Acceptance, E, VU by NS at 5/4/2023 1357    Acceptance, E,D,H, VU by MELANIE at 4/25/2023 1132                   Point: Body mechanics (Done)     Learning Progress Summary           Patient Acceptance, E, VU by NS at  5/4/2023 1357                   Point: Precautions (Not Started)     Learner Progress:  Not documented in this visit.                      User Key     Initials Effective Dates Name Provider Type Discipline    CR 06/16/21 -  Reyes, Carmela, PT Physical Therapist PT    NS 06/30/22 -  Alondra Gary PT Physical Therapist PT              PT Recommendation and Plan  Planned Therapy Interventions (PT): balance training, bed mobility training, gait training, home exercise program, patient/family education, ROM (range of motion), strengthening, transfer training  Plan of Care Reviewed With: patient, daughter  Outcome Evaluation: Pt is a 77 y/o female who is being seen for re-evaluation s/p elective R TKA. At baseline, pt lives alone in a single level home and typically ambulates with use of cane, using a w/c for longer distances. As of recently, pt's mobility has declined due to pain/decreased ROM in R knee. Pt currently without complaints of pain. She participates in bed mobility with SBA. She performs transfers with CGA and use of RW and ambulates approx 5 ft with RW and CGA. Pt demonstrating antalgic gait with slow rajeev. In sitting, AROM of R knee -5 to 100 degrees. Pt with impairments in ROM, strength, activity tolerance and balance. She is not currently safe for d/c home alone and will require SNF placement to maximize safety and independence.     Time Calculation:    PT Charges     Row Name 05/04/23 1358             Time Calculation    Start Time 1253  -NS      Stop Time 1316  -NS      Time Calculation (min) 23 min  -NS      PT Received On 05/04/23  -NS      PT - Next Appointment 05/05/23  -NS      PT Goal Re-Cert Due Date 05/18/23  -NS         Time Calculation- PT    Total Timed Code Minutes- PT 0 minute(s)  -NS            User Key  (r) = Recorded By, (t) = Taken By, (c) = Cosigned By    Initials Name Provider Type    NS Alondra Gary PT Physical Therapist              Therapy Charges for Today     Code  Description Service Date Service Provider Modifiers Qty    [id number] HC PT RE-EVAL ESTABLISHED PLAN 2 2023 Alondra Gary, PT GP 1          PT G-Codes  Outcome Measure Options: AM-PAC 6 Clicks Basic Mobility (PT)  AM-PAC 6 Clicks Score (PT): 17  PT Discharge Summary  Anticipated Discharge Disposition (PT): skilled nursing facility    Alondra Gary, PT  2023      Electronically signed by Alondra Gary, PT at 23 0181          Occupational Therapy Notes (all)      Erin Aguirre OT at 23 1014          Patient Name: Faby Le  : 1945    MRN: [id number]                              Today's Date: 2023       Admit Date: 2023    Visit Dx: No diagnosis found.  Patient Active Problem List   Diagnosis   • Abnormal complete blood count   • Ankle pain   • Arthralgia of left elbow   • Arthritis   • B12 deficiency   • Coronary artery disease   • Depression   • Gallstones   • Gout   • Hyperglycemia   • Hyperlipidemia   • Hypertension   • Hypothyroid   • Low back pain   • Mobility poor   • Postmenopausal status   • Sleep apnea   • Vitamin D deficiency   • Chronic kidney disease, stage 3 (moderate)   • History of malignant neoplasm of endometrium   • Oropharyngeal dysphagia   • Class 3 severe obesity due to excess calories with serious comorbidity and body mass index (BMI) of 45.0 to 49.9 in adult   • Chronic pain of right knee   • Light headedness   • Paroxysmal atrial fibrillation   • Atrial fibrillation   • Abnormal results of cardiovascular function studies   • Chronic left shoulder pain   • Status post knee replacement   • Hypertension     Past Medical History:   Diagnosis Date   • Afib    • Ankle pain    • Arthritis    • Coronary artery disease    • Depression    • Gout    • History of loop recorder 2023    current   • Hyperglycemia    • Hyperlipidemia    • Hypertension    • Low back pain    • Morbid obesity    • Sleep apnea    • Vitamin D deficiency      Past  Surgical History:   Procedure Laterality Date   • ANKLE FUSION      2017-left   • CARDIAC CATHETERIZATION     • CARDIAC CATHETERIZATION Right 8/29/2022    Procedure: Left Heart Cath;  Surgeon: Ratna Zavala MD;  Location: [facility] CATH INVASIVE LOC[facility];  Service: Cardiovascular;  Laterality: Right;   • CARPAL TUNNEL RELEASE     • CATARACT EXTRACTION     • CUBITAL TUNNEL RELEASE     • HYSTERECTOMY     • REPLACEMENT TOTAL KNEE      left 2008   • ROTATOR CUFF REPAIR        General Information     Row Name 05/04/23 1638          OT Time and Intention    Document Type evaluation  -ES     Row Name 05/04/23 1638          General Information    Patient Profile Reviewed yes  -ES     Row Name 05/04/23 1638          Occupational Profile    Reason for Services/Referral (Occupational Profile) Pt is a 79 y/o female who is being seen for re-evaluation s/p elective R TKA. At baseline, pt lives alone in a single level home and typically ambulates with use of cane, using a w/c for longer distances  -ES     Row Name 05/04/23 1638          Living Environment    People in Home alone  -ES     Row Name 05/04/23 1638          Home Main Entrance    Number of Stairs, Main Entrance none  -ES     Row Name 05/04/23 1638          Cognition    Orientation Status (Cognition) oriented x 4  -ES     Row Name 05/04/23 1638          Safety Issues, Functional Mobility    Impairments Affecting Function (Mobility) balance;endurance/activity tolerance;pain;range of motion (ROM);strength;coordination;postural/trunk control;sensation/sensory awareness  -ES           User Key  (r) = Recorded By, (t) = Taken By, (c) = Cosigned By    Initials Name Provider Type    ES Erin Aguirre OT Occupational Therapist                 Mobility/ADL's     Row Name 05/04/23 1639          Bed Mobility    Bed Mobility supine-sit  -ES     Supine-Sit Boyd (Bed Mobility) standby assist  -ES     Assistive Device (Bed Mobility) bed rails;head of bed elevated   -ES     [facility] Name 05/04/23 1639          Sit-Stand Transfer    Sit-Stand Clay (Transfers) contact guard  -ES     Assistive Device (Sit-Stand Transfers) walker, front-wheeled  -ES     Row Name 05/04/23 1639          Activities of Daily Living    BADL Assessment/Intervention upper body dressing;lower body dressing;toileting  -ES     [facility] Name 05/04/23 1639          Mobility    Extremity Weight-bearing Status right lower extremity  -ES     Right Lower Extremity (Weight-bearing Status) weight-bearing as tolerated (WBAT)  -ES     [facility] Name 05/04/23 1639          Upper Body Dressing Assessment/Training    Clay Level (Upper Body Dressing) set up  -ES     [facility] Name 05/04/23 1639          Lower Body Dressing Assessment/Training    Clay Level (Lower Body Dressing) maximum assist (25% patient effort)  -ES     [facility] Name 05/04/23 1639          Toileting Assessment/Training    Clay Level (Toileting) moderate assist (50% patient effort)  -ES           User Key  (r) = Recorded By, (t) = Taken By, (c) = Cosigned By    Initials Name Provider Type    ES Erin Aguirre OT Occupational Therapist               Obj/Interventions     [facility] Name 05/04/23 1640          Sensory Assessment (Somatosensory)    Sensory Assessment (Somatosensory) sensation intact  -ES     [facility] Name 05/04/23 1640          Vision Assessment/Intervention    Visual Impairment/Limitations corrective lenses for reading  -ES     [facility] Name 05/04/23 1643          Range of Motion Comprehensive    General Range of Motion bilateral upper extremity ROM WNL  -ES     Comment, General Range of Motion LUE shoulder ROM impaired 0-100*  -ES     [facility] Name 05/04/23 1643 05/04/23 1640       Strength Comprehensive (MMT)    General Manual Muscle Testing (MMT) Assessment -- no strength deficits identified  -ES    Comment, General Manual Muscle Testing (MMT) Assessment LUE shoulder impairment. Otherwise, 4+/5  -ES --    [facility] Name 05/04/23 1640          Balance     Balance Assessment sitting static balance;sitting dynamic balance;standing static balance  -ES     Static Sitting Balance independent  -ES     Dynamic Sitting Balance independent  -ES     Static Standing Balance contact guard  -ES     Dynamic Standing Balance minimal assist  -ES     Position/Device Used, Standing Balance walker, rolling  -ES     Balance Interventions sitting;standing;static  -ES           User Key  (r) = Recorded By, (t) = Taken By, (c) = Cosigned By    Initials Name Provider Type    ES Erin Aguirre OT Occupational Therapist               Goals/Plan     Row Name 05/04/23 1642          Bathing Goal 1 (OT)    Activity/Device (Bathing Goal 1, OT) bathing skills, all  -ES     [place] Level/Cues Needed (Bathing Goal 1, OT) modified independence  -ES     Time Frame (Bathing Goal 1, OT) 2 weeks  -ES     Row Name 05/04/23 1642          Dressing Goal 1 (OT)    Activity/Device (Dressing Goal 1, OT) dressing skills, all  -ES     [place]/Cues Needed (Dressing Goal 1, OT) modified independence  -ES     Time Frame (Dressing Goal 1, OT) 2 weeks  -ES     Row Name 05/04/23 1642          Toileting Goal 1 (OT)    Activity/Device (Toileting Goal 1, OT) toileting skills, all  -ES     [place] Level/Cues Needed (Toileting Goal 1, OT) modified independence  -ES     Time Frame (Toileting Goal 1, OT) 2 weeks  -ES           User Key  (r) = Recorded By, (t) = Taken By, (c) = Cosigned By    Initials Name Provider Type    ES Erin Aguirre OT Occupational Therapist               Clinical Impression     Row Name 05/04/23 [facility]          Pain Assessment    Pretreatment Pain Rating 0/10 - no pain  -ES     Posttreatment Pain Rating 0/10 - no pain  -ES     Row Name 05/04/23 1640          Plan of Care Review    Plan of Care Reviewed With patient;daughter  -ES     Outcome Evaluation Pt is a 79 y/o female who is being seen for re-evaluation s/p elective R TKA. At baseline, pt lives alone in a single level home  and typically ambulates with use of cane, using a w/c for longer distances.  Patient supine upon OT arrival. Oriented x4 and scores 4/28 on SBT, indicating normal cognition. Bed mobility with SBA. Transfers CGA, but requires Max A for LB ADLs due to impared ROM and limited balance. Patient requires increased time for all mobility tasks, and is limited to 5 feet with functional mobility. She is far below stated baseline and will require SNF rehab at discharge.  -ES     Row Name 05/04/23 1640          Therapy Assessment/Plan (OT)    Rehab Potential (OT) good, to achieve stated therapy goals  -ES     Criteria for Skilled Therapeutic Interventions Met (OT) yes;skilled treatment is necessary  -ES     Therapy Frequency (OT) 3 times/wk  -ES     Predicted Duration of Therapy Intervention (OT) until dc  -ES     Row Name 05/04/23 1640          Therapy Plan Review/Discharge Plan (OT)    Anticipated Discharge Disposition (OT) skilled nursing facility  -ES     Row Name 05/04/23 1640          Vital Signs    O2 Delivery Pre Treatment room air  -ES     O2 Delivery Intra Treatment room air  -ES     O2 Delivery Post Treatment room air  -ES     Pre Patient Position Supine  -ES     Intra Patient Position Standing  -ES     Post Patient Position Sitting  -ES     Row Name 05/04/23 1640          Positioning and Restraints    Pre-Treatment Position in bed  -ES     Post Treatment Position chair  -ES     In Chair notified nsg;with PT  -ES           User Key  (r) = Recorded By, (t) = Taken By, (c) = Cosigned By    Initials Name Provider Type    Erin Simons, OT Occupational Therapist               Outcome Measures     Row Name 05/04/23 1646          How much help from another is currently needed...    Putting on and taking off regular lower body clothing? 2  -ES     Bathing (including washing, rinsing, and drying) 2  -ES     Toileting (which includes using toilet bed pan or urinal) 2  -ES     Putting on and taking off regular upper  body clothing 3  -ES     Taking care of personal grooming (such as brushing teeth) 4  -ES     Eating meals 4  -ES     AM-PAC 6 Clicks Score (OT) 17  -ES     Row Name 05/04/23 1357 05/04/23 1154       How much help from another person do you currently need...    Turning from your back to your side while in flat bed without using bedrails? 3  -NS 3  -AY    Moving from lying on back to sitting on the side of a flat bed without bedrails? 3  -NS 3  -AY    Moving to and from a bed to a chair (including a wheelchair)? 3  -NS 3  -AY    Standing up from a chair using your arms (e.g., wheelchair, bedside chair)? 3  -NS 3  -AY    Climbing 3-5 steps with a railing? 2  -NS 3  -AY    To walk in hospital room? 3  -NS 3  -AY    AM-PAC 6 Clicks Score (PT) 17  -NS 18  -AY    Highest level of mobility 5 --> Static standing  -NS 6 --> Walked 10 steps or more  -AY    Row Name 05/04/23 1642 05/04/23 1357       Functional Assessment    Outcome Measure Options AM-PAC 6 Clicks Daily Activity (OT)  -ES AM-PAC 6 Clicks Basic Mobility (PT)  -NS          User Key  (r) = Recorded By, (t) = Taken By, (c) = Cosigned By    Initials Name Provider Type    Erin Simons OT Occupational Therapist    Sharon Corley, RN Registered Nurse    Alondra Oscar, PT Physical Therapist                  OT Recommendation and Plan  Therapy Frequency (OT): 3 times/wk  Plan of Care Review  Plan of Care Reviewed With: patient, daughter  Outcome Evaluation: Pt is a 79 y/o female who is being seen for re-evaluation s/p elective R TKA. At baseline, pt lives alone in a single level home and typically ambulates with use of cane, using a w/c for longer distances.  Patient supine upon OT arrival. Oriented x4 and scores 4/28 on SBT, indicating normal cognition. Bed mobility with SBA. Transfers CGA, but requires Max A for LB ADLs due to impared ROM and limited balance. Patient requires increased time for all mobility tasks, and is limited to 5 feet with  functional mobility. She is far below stated baseline and will require SNF rehab at discharge.     Time Calculation:    Time Calculation- OT     Row Name 05/04/23 1643             Time Calculation- OT    OT Start Time 1300  -ES      OT Stop Time 1330  -ES      OT Time Calculation (min) 30 min  -ES      Total Timed Code Minutes- OT 0 minute(s)  -ES      OT Received On 05/04/23  -ES      OT - Next Appointment 05/07/23  -ES      OT Goal Re-Cert Due Date 05/18/23  -ES            User Key  (r) = Recorded By, (t) = Taken By, (c) = Cosigned By    Initials Name Provider Type    Erin Simons OT Occupational Therapist                       Erin Aguirre OT  5/4/2023    Electronically signed by Erin Aguirre OT at 05/04/23 1644     Erin Aguirre OT at 05/04/23 1644        Goal Outcome Evaluation:  Plan of Care Reviewed With: patient, daughter           Outcome Evaluation: Pt is a 77 y/o female who is being seen for re-evaluation s/p elective R TKA. At baseline, pt lives alone in a single level home and typically ambulates with use of cane, using a w/c for longer distances.  Patient supine upon OT arrival. Oriented x4 and scores 4/28 on SBT, indicating normal cognition. Bed mobility with SBA. Transfers CGA, but requires Max A for LB ADLs due to impared ROM and limited balance. Patient requires increased time for all mobility tasks, and is limited to 5 feet with functional mobility. She is far below stated baseline and will require SNF rehab at discharge.    Electronically signed by Erin Aguirre OT at 05/04/23 7966       Speech Language Pathology Notes (all)    No notes exist for this encounter.

## 2023-05-05 NOTE — DISCHARGE SUMMARY
Orthopaedic Discharge Summary  Dr. JACKELINE Pena” Corona II  [phone]    NAME: Faby Le PCP: Leonor Adam MD   :  MRN: [id number]  [id number] LOS:  ADMIT: 0 days  2023   AGE/SEX: 78 y.o. female DC:  today             · Admitting Diagnosis: Osteoarthritis of knee, unspecified [M17.9]  · Status post knee replacement [Z96.659]    · Surgery Performed: [place] ARTHRP KNE CONDYLE&PLATU MEDIAL&LAT COMPARTMENTS [86687] (TOTAL KNEE ARTHROPLASTY WITH CORI ROBOT)    · Discharge Medications:         Discharge Medications      New Medications      Instructions Start Date   ondansetron 4 MG tablet  Commonly known as: Zofran   4 mg, Oral, Every 6 Hours PRN      oxyCODONE-acetaminophen 5-325 MG per tablet  Commonly known as: PERCOCET   1 tablet, Oral, Every 4 Hours PRN         Changes to Medications      Instructions Start Date   allopurinol 300 MG tablet  Commonly known as: ZYLOPRIM  What changed: additional instructions   TAKE 1 TABLET BY MOUTH EVERY DAY      atorvastatin 20 MG tablet  Commonly known as: LIPITOR  What changed: when to take this   TAKE 1 TABLET BY MOUTH EVERYDAY AT BEDTIME      bumetanide 2 MG tablet  Commonly known as: BUMEX  What changed: additional instructions   TAKE 1/2 TABLET BY MOUTH EVERY DAY      Farxiga 5 MG tablet tablet  Generic drug: dapagliflozin  What changed:   · how much to take  · additional instructions   TAKE 1 TABLET BY MOUTH EVERY DAY         Continue These Medications      Instructions Start Date   amLODIPine 5 MG tablet  Commonly known as: NORVASC   TAKE 1 TABLET BY MOUTH EVERY DAY      Centrum Silver Adult 50+ tablet tablet  Generic drug: multivitamin with minerals   1 tablet, Oral, Daily, HOLD NOW      cetirizine 10 MG tablet  Commonly known as: zyrTEC   1 tablet, Oral, Daily      cholecalciferol 25 MCG (1000 UT) tablet  Commonly known as: VITAMIN D3   1 tablet, Oral, Daily      cyanocobalamin 1000 MCG tablet  Commonly known as: VITAMIN B-12   1 tablet, Oral, Daily       escitalopram 20 MG tablet  Commonly known as: LEXAPRO   TAKE 1 TABLET BY MOUTH EVERY DAY      hydrocortisone 2.5 % ointment   APPLY A THIN LAYER TO AFFECTED AREAS BY TOPICAL ROUTE TWICE DAILY. SAFE TO USE ON FACE.      levothyroxine 88 MCG tablet  Commonly known as: SYNTHROID, LEVOTHROID   TAKE 1 TABLET BY MOUTH EVERY DAY      methocarbamol 750 MG tablet  Commonly known as: ROBAXIN   750 mg, Oral, 4 Times Daily      metoprolol succinate XL 25 MG 24 hr tablet  Commonly known as: TOPROL-XL   25 mg, Oral, Nightly      montelukast 10 MG tablet  Commonly known as: SINGULAIR   10 mg, Oral, Nightly      Ozempic (0.25 or 0.5 MG/DOSE) 2 MG/1.5ML solution pen-injector  Generic drug: Semaglutide(0.25 or 0.5MG/DOS)   0.25 mg, Subcutaneous, Weekly, For four weeks      Ozempic (0.25 or 0.5 MG/DOSE) 2 MG/3ML solution pen-injector  Generic drug: Semaglutide(0.25 or 0.5MG/DOS)   0.5 mg, Subcutaneous, Every 7 Days, Continue 4 weeks      Ozempic (2 MG/DOSE) 8 MG/3ML solution pen-injector  Generic drug: Semaglutide (2 MG/DOSE)   INJECT 2 MG UNDER THE SKIN INTO THE APPROPRIATE AREA AS DIRECTED EVERY 7 (SEVEN) DAYS FOR 4 DOSES. CONTINUE FOR 4 WEEKS      Ozempic (1 MG/DOSE) 4 MG/3ML solution pen-injector  Generic drug: Semaglutide (1 MG/DOSE)   1 mg, Subcutaneous, Every 7 Days, Continue for 4 weeks   Start Date: May 15, 2023        ASK your doctor about these medications      Instructions Start Date   rivaroxaban 15 MG tablet  Commonly known as: XARELTO   15 mg, Oral, Daily With Dinner      spironolactone 25 MG tablet  Commonly known as: ALDACTONE   12.5 mg, Oral, Daily             · Vitals:     Vitals:    05/05/23 0420 05/05/23 0730 05/05/23 1100 05/05/23 1600   BP: 102/64 120/61 136/64 136/61   BP Location: Left leg Left arm Left arm Left arm   Patient Position: Lying Sitting Lying Lying   Pulse: 83 93 84 86   Resp: 16 16 15 15   Temp: 98.1 °F (36.7 °C) 98.2 °F (36.8 °C) 98.1 °F (36.7 °C) 98.3 °F (36.8 °C)   TempSrc: Oral Oral Oral  Oral   SpO2: 96% 96% 97% 98%   Weight:       Height:           · Labs:      Admission on 05/04/2023   Component Date Value Ref Range Status   • Glucose 05/05/2023 118 (H)  65 - 99 mg/dL Final   • BUN 05/05/2023 29 (H)  8 - 23 mg/dL Final   • Creatinine 05/05/2023 1.50 (H)  0.57 - 1.00 mg/dL Final   • Sodium 05/05/2023 134 (L)  136 - 145 mmol/L Final   • Potassium 05/05/2023 4.2  3.5 - 5.2 mmol/L Final   • Chloride 05/05/2023 102  98 - 107 mmol/L Final   • CO2 05/05/2023 22.0  22.0 - 29.0 mmol/L Final   • Calcium 05/05/2023 9.0  8.6 - 10.5 mg/dL Final   • BUN/Creatinine Ratio 05/05/2023 19.3  7.0 - 25.0 Final   • Anion Gap 05/05/2023 10.0  5.0 - 15.0 mmol/L Final   • eGFR 05/05/2023 36.0 (L)  >60.0 mL/min/1.73 Final   • WBC 05/05/2023 11.90 (H)  3.40 - 10.80 10*3/mm3 Final   • RBC 05/05/2023 2.85 (L)  3.77 - 5.28 10*6/mm3 Final   • Hemoglobin 05/05/2023 9.2 (L)  12.0 - 15.9 g/dL Final   • Hematocrit 05/05/2023 28.7 (L)  34.0 - 46.6 % Final   • MCV 05/05/2023 100.6 (H)  79.0 - 97.0 fL Final   • MCH 05/05/2023 32.1  26.6 - 33.0 pg Final   • MCHC 05/05/2023 31.9  31.5 - 35.7 g/dL Final   • RDW 05/05/2023 15.3  12.3 - 15.4 % Final   • RDW-SD 05/05/2023 54.3 (H)  37.0 - 54.0 fl Final   • MPV 05/05/2023 7.6  6.0 - 12.0 fL Final   • Platelets 05/05/2023 199  140 - 450 10*3/mm3 Final        No results found.    · Hospital Course:   78 y.o. female was admitted to [facility] to services of Chino Herrera II, MD with Osteoarthritis of knee, unspecified [M17.9]  Status post knee replacement [Z96.659] on 5/4/2023 and underwent [place] ARTHRP KNE CONDYLE&PLATU MEDIAL&LAT COMPARTMENTS [02454] (TOTAL KNEE ARTHROPLASTY WITH CORI ROBOT). Post-operatively the patient transferred to the floor where the patient underwent mobilization therapy. Opioids were titrated to achieve appropriate pain management to allow for participation in mobilization exercises. Vital signs and laboratory values are now within safe parameters for  "discharge. The dressings and/or incision is intact without signs or symptoms of active infection. Operative extremity neurovascular status remains intact as compared to the preoperative exam. Appropriate education re: incision care, activity levels, medications, and follow up visits was completed and all questions were answered. The patient is now deemed stable for discharge.    SNF: The patient had a difficult time mobilizing sufficiently with physical therapy. Further rehabilitation was recommended in a SNF facility. Once a bed was available, and the patient was cleared, there were discharged to the SNF in good condition}.       R \"Rodrigo\" Javier LOCK MD  Orthopaedic Surgery  [facility]  [phone]                                               "

## 2023-05-06 NOTE — PLAN OF CARE
Problem: Adult Inpatient Plan of Care  Goal: Plan of Care Review  Outcome: Met  Goal: Patient-Specific Goal (Individualized)  Outcome: Met  Goal: Absence of Hospital-Acquired Illness or Injury  Outcome: Met  Goal: Optimal Comfort and Wellbeing  Outcome: Met  Goal: Readiness for Transition of Care  Outcome: Met     Problem: Fall Injury Risk  Goal: Absence of Fall and Fall-Related Injury  Outcome: Met     Problem: Hypertension Comorbidity  Goal: Blood Pressure in Desired Range  Outcome: Met     Problem: Obstructive Sleep Apnea Risk or Actual Comorbidity Management  Goal: Unobstructed Breathing During Sleep  Outcome: Met   Goal Outcome Evaluation:

## 2023-05-06 NOTE — PLAN OF CARE
Goal Outcome Evaluation:      Patient stable. VSS. Room air. Patients family transferred patient to rehab facility.

## 2023-05-08 ENCOUNTER — TELEPHONE (OUTPATIENT)
Dept: FAMILY MEDICINE CLINIC | Facility: CLINIC | Age: 78
End: 2023-05-08
Payer: MEDICARE

## 2023-05-08 NOTE — TELEPHONE ENCOUNTER
Caller: FRANCK MARTINEZ    Relationship: Emergency Contact    Best call back number: [phone]    What medication are you requesting: DEPRESSION MEDICATION     What are your current symptoms: DEPRESSION     Have you had these symptoms before:    [x] Yes  [] No    Have you been treated for these symptoms before:   [x] Yes  [] No    If a prescription is needed, what is your preferred pharmacy and phone number: [facility] #[id number] - [place], [place] - [address] [facility] [phone] [facility] [phone] FX     Additional notes: PATIENT HAS BEEN MOVED INTO A NURSING HOME AND PT'S FAMILY STATES THEY CAN NOTICE THAT SHE HAS BEEN MORE DEPRESSED LATELY. PATIENT IS STAYING AT [facility] AND WASN'T SURE IF A PRESCRIPTION NEEDED TO BE SENT THERE INSTEAD     PLEASE CALL PATIENT WHEN PRESCRIPTION IS SENT

## 2023-05-08 NOTE — CASE MANAGEMENT/SOCIAL WORK
Case Management Discharge Note      Final Note: [facility]    Provided Post Acute Provider List?: N/A  N/A Provider List Comment: patient declined snf and requested [facility]    Selected Continued Care - Discharged on 5/5/2023 Admission date: 5/4/2023 - Discharge disposition: Skilled Nursing Facility (DC - External)    Destination Coordination complete.    Service Provider Selected Services Address Phone Fax Patient Preferred    [facility] [address] [place] [address] [place] [id number] [phone] --                            Transportation Services  Private: Car    Final Discharge Disposition Code: 03 - skilled nursing facility (SNF)

## 2023-05-15 RX ORDER — SEMAGLUTIDE 2.68 MG/ML
INJECTION, SOLUTION SUBCUTANEOUS
Qty: 3 ML | Refills: 0 | Status: SHIPPED | OUTPATIENT
Start: 2023-05-15

## 2023-06-05 ENCOUNTER — OFFICE VISIT (OUTPATIENT)
Dept: FAMILY MEDICINE CLINIC | Facility: CLINIC | Age: 78
End: 2023-06-05
Payer: MEDICARE

## 2023-06-05 VITALS
DIASTOLIC BLOOD PRESSURE: 72 MMHG | TEMPERATURE: 96.4 F | WEIGHT: 251 LBS | HEART RATE: 101 BPM | OXYGEN SATURATION: 96 % | HEIGHT: 63 IN | BODY MASS INDEX: 44.47 KG/M2 | SYSTOLIC BLOOD PRESSURE: 113 MMHG

## 2023-06-05 DIAGNOSIS — R42 DIZZINESS: Primary | ICD-10-CM

## 2023-06-05 DIAGNOSIS — E55.9 VITAMIN D DEFICIENCY: ICD-10-CM

## 2023-06-05 DIAGNOSIS — R53.83 FATIGUE, UNSPECIFIED TYPE: ICD-10-CM

## 2023-06-05 DIAGNOSIS — N18.32 STAGE 3B CHRONIC KIDNEY DISEASE: ICD-10-CM

## 2023-06-05 DIAGNOSIS — E03.9 HYPOTHYROIDISM, UNSPECIFIED TYPE: ICD-10-CM

## 2023-06-05 DIAGNOSIS — I10 PRIMARY HYPERTENSION: ICD-10-CM

## 2023-06-05 DIAGNOSIS — E53.8 B12 DEFICIENCY: ICD-10-CM

## 2023-06-05 RX ORDER — AMLODIPINE BESYLATE 2.5 MG/1
2.5 TABLET ORAL DAILY
Qty: 30 TABLET | Refills: 3 | Status: SHIPPED | OUTPATIENT
Start: 2023-06-05

## 2023-06-05 NOTE — PROGRESS NOTES
Subjective   Faby Le is a 78 y.o. female presents for   Chief Complaint   Patient presents with    Dizziness     Had surgery a month ago and has had dizziness since.    Anemia     Daughter in law is an ER nurse she looked in her eye and said she is anemic.       Health Maintenance Due   Topic Date Due    COVID-19 Vaccine (4 - Pfizer series) 02/23/2022     HPI    The patient is a 78-year-old female who presents for concerns for dizziness and anemia. She is accompanied by an adult female who contributes to patient history and care.     The patient reports that she had a knee replacement approximately one month ago and since that procedure she has been experiencing dizziness upon standing. She also reports that her daughter in law who works as a nurse looked in her eyes over the weekend and told her that she is anemic. She adds that she has a history of anemia. She is wondering if that could be contributing to her dizziness. The patients laboratory report from 05/05/2023, revealed some mild anemia at 9.2 g/dL. Her blood pressure is quite a bit lower at this visit than in the past. She is currently taking Norvasc 5 mg and metoprolol 25 mg daily. She adds that she monitors her blood pressure at home and has recently gotten a diastolic of 41 mmHg with a systolic of 95 mmHg. She is also prescribed Bumex. She has not been drinking as much as she used to throughout the day.      The adult female reports that the patient is taking Ozempic injections and has not been eating as much and she is wondering if that could be inhibiting her regaining her strength since her surgery. She has lost some weight since beginning the medication, but now the adult female is concerned that she is not getting enough to eat. She began Ozempic in 04/2023, and has lost 17 pounds. She has not noticed any negative side effects with the medication. She is regularly constipated which was present before beginning the medication.     The patient  "reports that she is still unable to return to her regular activity level since her knee surgery. She saw her orthopedic surgeon today who ordered outpatient physical therapy which she will be starting soon. She has not noticed much swelling in her lower extremity.     Vitals:    06/05/23 1601   BP: 113/72   BP Location: Right arm   Patient Position: Sitting   Cuff Size: Adult   Pulse: 101   Temp: 96.4 °F (35.8 °C)   TempSrc: Temporal   SpO2: 96%   Weight: 114 kg (251 lb)   Height: 160 cm (63\")     Body mass index is 44.46 kg/m².    Current Outpatient Medications on File Prior to Visit   Medication Sig Dispense Refill    allopurinol (ZYLOPRIM) 300 MG tablet TAKE 1 TABLET BY MOUTH EVERY DAY (Patient taking differently: Take 1 tablet by mouth Daily. Hold day of surgery) 90 tablet 2    atorvastatin (LIPITOR) 20 MG tablet TAKE 1 TABLET BY MOUTH EVERYDAY AT BEDTIME (Patient taking differently: Take 1 tablet by mouth Every Night.) 90 tablet 3    bumetanide (BUMEX) 2 MG tablet TAKE 1/2 TABLET BY MOUTH EVERY DAY (Patient taking differently: Take 1 mg by mouth Daily. HOLD day of surgery) 45 tablet 2    cetirizine (zyrTEC) 10 MG tablet Take 1 tablet by mouth Daily.      cyanocobalamin (VITAMIN B-12) 1000 MCG tablet Take 1 tablet by mouth Daily.      escitalopram (LEXAPRO) 20 MG tablet TAKE 1 TABLET BY MOUTH EVERY DAY 90 tablet 2    Farxiga 5 MG tablet tablet TAKE 1 TABLET BY MOUTH EVERY DAY (Patient taking differently: Take 1 tablet by mouth Daily. HOLD Day of surgery) 90 tablet 1    hydrocortisone 2.5 % ointment APPLY A THIN LAYER TO AFFECTED AREAS BY TOPICAL ROUTE TWICE DAILY. SAFE TO USE ON FACE.      levothyroxine (SYNTHROID, LEVOTHROID) 88 MCG tablet TAKE 1 TABLET BY MOUTH EVERY DAY (Patient taking differently: Take 1 tablet by mouth Daily.) 90 tablet 3    methocarbamol (ROBAXIN) 750 MG tablet Take 1 tablet by mouth 4 (Four) Times a Day. 30 tablet 0    metoprolol succinate XL (TOPROL-XL) 25 MG 24 hr tablet Take 1 tablet " by mouth Every Night. 90 tablet 3    montelukast (SINGULAIR) 10 MG tablet Take 1 tablet by mouth Every Night.      Multiple Vitamins-Minerals (CENTRUM SILVER ADULT 50+) tablet Take 1 tablet by mouth Daily. HOLD NOW      ondansetron (Zofran) 4 MG tablet Take 1 tablet by mouth Every 6 (Six) Hours As Needed for Nausea or Vomiting. 30 tablet 0    Ozempic, 2 MG/DOSE, 8 MG/3ML solution pen-injector INJECT 2 MG UNDER THE SKIN INTO THE APPROPRIATE AREA AS DIRECTED EVERY 7 (SEVEN) DAYS FOR 4 DOSES. CONTINUE FOR 4 WEEKS 3 mL 0    rivaroxaban (XARELTO) 15 MG tablet Take 1 tablet by mouth Daily With Dinner. Indications: Atrial Fibrillation (Patient taking differently: Take 1 tablet by mouth Daily With Dinner. HOLD 2-3 days per Dr. Preciado  Indications: Atrial Fibrillation) 90 tablet 2    Semaglutide,0.25 or 0.5MG/DOS, (Ozempic, 0.25 or 0.5 MG/DOSE,) 2 MG/1.5ML solution pen-injector Inject 0.25 mg under the skin into the appropriate area as directed 1 (One) Time Per Week. For four weeks 1.5 mL 0    spironolactone (ALDACTONE) 25 MG tablet Take 0.5 tablets by mouth Daily. (Patient taking differently: Take 12.5 mg by mouth Daily. HOLD Day of surgery) 45 tablet 3    Vitamin D, Cholecalciferol, 25 MCG (1000 UT) tablet Take 1 tablet by mouth Daily.      oxyCODONE-acetaminophen (PERCOCET) 5-325 MG per tablet Take 1 tablet by mouth Every 4 (Four) Hours As Needed for Severe Pain. (Patient not taking: Reported on 6/5/2023) 50 tablet 0    Semaglutide, 1 MG/DOSE, (Ozempic, 1 MG/DOSE,) 4 MG/3ML solution pen-injector Inject 1 mg under the skin into the appropriate area as directed Every 7 (Seven) Days for 4 doses. Continue for 4 weeks 3 mL 0     No current facility-administered medications on file prior to visit.       The following portions of the patient's history were reviewed and updated as appropriate: allergies, current medications, past family history, past medical history, past social history, past surgical history, and problem  list.    Review of Systems   Constitutional:  Positive for activity change and fatigue. Negative for chills and fever.   HENT:  Negative for sinus pressure and sore throat.    Eyes:  Negative for blurred vision.   Respiratory:  Negative for cough and shortness of breath.    Cardiovascular:  Negative for chest pain.   Gastrointestinal:  Positive for constipation. Negative for abdominal pain.   Musculoskeletal:  Positive for arthralgias. Negative for joint swelling.   Skin:  Negative for color change.   Neurological:  Positive for dizziness.   Psychiatric/Behavioral:  Negative for behavioral problems.      Objective   Physical Exam  Vitals and nursing note reviewed.   Constitutional:       Appearance: Normal appearance. She is well-developed. She is obese.   HENT:      Head: Normocephalic and atraumatic.      Right Ear: External ear normal.      Left Ear: External ear normal.      Nose: Nose normal.   Eyes:      Extraocular Movements: Extraocular movements intact.      Pupils: Pupils are equal, round, and reactive to light.   Cardiovascular:      Rate and Rhythm: Normal rate and regular rhythm.      Pulses: Normal pulses.      Heart sounds: Normal heart sounds.   Pulmonary:      Effort: Pulmonary effort is normal.      Breath sounds: Normal breath sounds.   Abdominal:      General: Bowel sounds are normal.      Palpations: Abdomen is soft.   Genitourinary:     Vagina: Normal.   Musculoskeletal:         General: Normal range of motion.      Cervical back: Normal range of motion and neck supple.   Skin:     General: Skin is warm and dry.      Coloration: Skin is pale.   Neurological:      General: No focal deficit present.      Mental Status: She is alert and oriented to person, place, and time.   Psychiatric:         Mood and Affect: Mood normal.         Behavior: Behavior normal.         Judgment: Judgment normal.   PHQ-9 Total Score:      Assessment & Plan   Diagnoses and all orders for this visit:    1. Dizziness  (Primary)  Comments:  Likely orthostatic hypotension-patient encouraged to drink plenty of fluids and call for worsening.  Orders:  -     CBC Auto Differential; Future  -     Comprehensive Metabolic Panel; Future    2. Fatigue, unspecified type  -     Ferritin; Future  -     Iron Profile; Future  -     Reticulocytes; Future  -     Folate; Future    3. Primary hypertension  Comments:  Continue to monitor blood pressure and call office with readings in 1 to 2 weeks  Orders:  -     amLODIPine (NORVASC) 2.5 MG tablet; Take 1 tablet by mouth Daily.  Dispense: 30 tablet; Refill: 3    4. B12 deficiency  -     Vitamin B12; Future    5. Vitamin D deficiency  -     Vitamin D,25-Hydroxy; Future    6. Hypothyroidism, unspecified type  -     TSH; Future    7. Stage 3b chronic kidney disease  -     Ferritin; Future  -     Iron Profile; Future  -     Reticulocytes; Future  -     Folate; Future    1. Fatigue  - Future laboratory tests ordered for patient to complete.    2. Hypertension  - Decrease amlodipine to 2.5 mg 1 tablet daily.   - Continue metoprolol and Bumex.  - Advised patient to increase her daily fluid intake.     3. Dizziness  - Future laboratory tests ordered for patient to complete.   - Advised patient to increase her daily fluid and food intake.     She is scheduled to see Dr. Adam on 06/23/2023.        There are no Patient Instructions on file for this visit.       Transcribed from ambient dictation for CAROL Ibrahim by Camilla Davis.  06/05/23   19:12 EDT    Patient or patient representative verbalized consent to the visit recording.  I have personally performed the services described in this document as transcribed by the above individual, and it is both accurate and complete.

## 2023-06-08 DIAGNOSIS — N18.32 STAGE 3B CHRONIC KIDNEY DISEASE: ICD-10-CM

## 2023-06-08 DIAGNOSIS — R53.83 FATIGUE, UNSPECIFIED TYPE: ICD-10-CM

## 2023-06-08 DIAGNOSIS — R42 DIZZINESS: ICD-10-CM

## 2023-06-08 DIAGNOSIS — E55.9 VITAMIN D DEFICIENCY: ICD-10-CM

## 2023-06-08 DIAGNOSIS — E03.9 HYPOTHYROIDISM, UNSPECIFIED TYPE: ICD-10-CM

## 2023-06-08 DIAGNOSIS — E53.8 B12 DEFICIENCY: ICD-10-CM

## 2023-06-13 ENCOUNTER — OFFICE VISIT (OUTPATIENT)
Dept: PODIATRY | Facility: CLINIC | Age: 78
End: 2023-06-13
Payer: MEDICARE

## 2023-06-13 VITALS — HEIGHT: 63 IN | RESPIRATION RATE: 20 BRPM | BODY MASS INDEX: 44.47 KG/M2 | WEIGHT: 251 LBS

## 2023-06-13 DIAGNOSIS — Z98.1 HISTORY OF ANKLE FUSION: ICD-10-CM

## 2023-06-13 DIAGNOSIS — M21.42 ACQUIRED PES PLANUS OF LEFT FOOT: ICD-10-CM

## 2023-06-13 DIAGNOSIS — E66.01 MORBID OBESITY WITH BMI OF 45.0-49.9, ADULT: ICD-10-CM

## 2023-06-13 DIAGNOSIS — M77.42 METATARSALGIA, LEFT FOOT: ICD-10-CM

## 2023-06-13 DIAGNOSIS — M79.672 LEFT FOOT PAIN: Primary | ICD-10-CM

## 2023-06-13 DIAGNOSIS — M19.072 ARTHRITIS OF LEFT FOOT: ICD-10-CM

## 2023-06-13 NOTE — PROGRESS NOTES
06/13/2023  Foot and Ankle Surgery - Established Patient/Follow-up  Provider: Dr. Donta Kelley DPM  Location: [facility]    Subjective:  Faby Le is a 78 y.o. female.     Chief Complaint   Patient presents with    Left Foot - Follow-up, Pain    Follow-up     DEBORA Sykes md 3/17/2023       HPI: Patient is a 78-year-old female who presents to the office today for a follow-up regarding her right ankle and left foot. She is accompanied by an adult female today.    The patient was last seen in 03/2023. She reports doing well. She has been wearing her shoes and inserts. The has not been using the bracing since undergoing a right knee arthroplasty on 05/04/2023. She adds there was a wall there after her surgery. She is still attending physical therapy. The patient states that she needs to obtain a new pair of shoes. She reports that she still feels as if she is walking on rocks or gravel.     The adult female states that the patient has lost 20 pounds.    The patient reports ingrown toenails on her bilateral great toes. She had them surgically repaired years ago.    Allergies   Allergen Reactions    Celecoxib Itching and Swelling     swelling       Current Outpatient Medications on File Prior to Visit   Medication Sig Dispense Refill    allopurinol (ZYLOPRIM) 300 MG tablet TAKE 1 TABLET BY MOUTH EVERY DAY (Patient taking differently: Take 1 tablet by mouth Daily. Hold day of surgery) 90 tablet 2    amLODIPine (NORVASC) 2.5 MG tablet Take 1 tablet by mouth Daily. 30 tablet 3    atorvastatin (LIPITOR) 20 MG tablet TAKE 1 TABLET BY MOUTH EVERYDAY AT BEDTIME (Patient taking differently: Take 1 tablet by mouth Every Night.) 90 tablet 3    bumetanide (BUMEX) 2 MG tablet TAKE 1/2 TABLET BY MOUTH EVERY DAY (Patient taking differently: Take 1 mg by mouth Daily. HOLD day of surgery) 45 tablet 2    cetirizine (zyrTEC) 10 MG tablet Take 1 tablet by mouth Daily.      cyanocobalamin (VITAMIN B-12) 1000 MCG tablet Take  1 tablet by mouth Daily.      escitalopram (LEXAPRO) 20 MG tablet TAKE 1 TABLET BY MOUTH EVERY DAY 90 tablet 2    Farxiga 5 MG tablet tablet TAKE 1 TABLET BY MOUTH EVERY DAY (Patient taking differently: Take 1 tablet by mouth Daily. HOLD Day of surgery) 90 tablet 1    hydrocortisone 2.5 % ointment APPLY A THIN LAYER TO AFFECTED AREAS BY TOPICAL ROUTE TWICE DAILY. SAFE TO USE ON FACE.      levothyroxine (SYNTHROID, LEVOTHROID) 88 MCG tablet TAKE 1 TABLET BY MOUTH EVERY DAY (Patient taking differently: Take 1 tablet by mouth Daily.) 90 tablet 3    methocarbamol (ROBAXIN) 750 MG tablet Take 1 tablet by mouth 4 (Four) Times a Day. 30 tablet 0    metoprolol succinate XL (TOPROL-XL) 25 MG 24 hr tablet Take 1 tablet by mouth Every Night. 90 tablet 3    montelukast (SINGULAIR) 10 MG tablet Take 1 tablet by mouth Every Night.      Multiple Vitamins-Minerals (CENTRUM SILVER ADULT 50+) tablet Take 1 tablet by mouth Daily. HOLD NOW      ondansetron (Zofran) 4 MG tablet Take 1 tablet by mouth Every 6 (Six) Hours As Needed for Nausea or Vomiting. 30 tablet 0    oxyCODONE-acetaminophen (PERCOCET) 5-325 MG per tablet Take 1 tablet by mouth Every 4 (Four) Hours As Needed for Severe Pain. 50 tablet 0    Ozempic, 2 MG/DOSE, 8 MG/3ML solution pen-injector INJECT 2 MG UNDER THE SKIN INTO THE APPROPRIATE AREA AS DIRECTED EVERY 7 (SEVEN) DAYS FOR 4 DOSES. CONTINUE FOR 4 WEEKS 3 mL 0    rivaroxaban (XARELTO) 15 MG tablet Take 1 tablet by mouth Daily With Dinner. Indications: Atrial Fibrillation (Patient taking differently: Take 1 tablet by mouth Daily With Dinner. HOLD 2-3 days per Dr. Preciado  Indications: Atrial Fibrillation) 90 tablet 2    Semaglutide,0.25 or 0.5MG/DOS, (Ozempic, 0.25 or 0.5 MG/DOSE,) 2 MG/1.5ML solution pen-injector Inject 0.25 mg under the skin into the appropriate area as directed 1 (One) Time Per Week. For four weeks 1.5 mL 0    spironolactone (ALDACTONE) 25 MG tablet Take 0.5 tablets by mouth Daily. (Patient taking  "differently: Take 12.5 mg by mouth Daily. HOLD Day of surgery) 45 tablet 3    Vitamin D, Cholecalciferol, 25 MCG (1000 UT) tablet Take 1 tablet by mouth Daily.       No current facility-administered medications on file prior to visit.       Objective   Resp 20   Ht 160 cm (63\")   Wt 114 kg (251 lb)   LMP  (LMP Unknown)   BMI 44.46 kg/m²     Foot/Ankle Exam    GENERAL  Appearance:  obese  Orientation:  AAOx3  Affect:  appropriate    VASCULAR     Right Foot Vascularity   Normal vascular exam    Dorsalis pedis:  2+  Posterior tibial:  2+  Skin temperature:  warm  Edema grading:  None  CFT:  < 3 seconds  Pedal hair growth:  Present  Varicosities:  none     Left Foot Vascularity   Normal vascular exam    Dorsalis pedis:  2+  Posterior tibial:  2+  Skin temperature:  warm  Edema grading:  None  CFT:  < 3 seconds  Pedal hair growth:  Present  Varicosities:  none     NEUROLOGIC     Right Foot Neurologic   Light touch sensation: normal  Hot/Cold sensation: normal  Achilles reflex:  2+     Left Foot Neurologic   Light touch sensation: normal  Hot/Cold sensation:  normal  Achilles reflex:  2+    MUSCULOSKELETAL     Right Foot Musculoskeletal   Arch:  Normal     Left Foot Musculoskeletal   Arch:  Normal    MUSCLE STRENGTH     Right Foot Muscle Strength   Normal strength    Foot dorsiflexion:  5  Foot plantar flexion:  5  Foot inversion:  5  Foot eversion:  5     Left Foot Muscle Strength   Normal strength    Foot dorsiflexion:  5  Foot plantar flexion:  5  Foot inversion:  5  Foot eversion:  5    DERMATOLOGIC      Right Foot Dermatologic   Skin  Right foot skin is intact.      Left Foot Dermatologic   Skin  Left foot skin is intact.     TESTS     Right Foot Tests   Anterior drawer: negative  Varus tilt: negative     Left Foot Tests   Anterior drawer: negative  Varus tilt: negative     Left foot additional comments: Prominent rigidity noted to the left rear foot secondary to previous tibiotalar calcaneal fusion. Moderate " hypermobility involving the midfoot with mild crepitus. No isolated discomfort with palpation involving the forefoot or midfoot today.    03/13/2023  Continued discomfort involving the plantar lateral aspect of the forefoot. Moderate pes planus foot structure with soft tissue rigidity. Range of motion is limited to the rear foot secondary to previous fusion.    06/13/2023  No progressive deformity or instability. Continued discomfort involving the medial aspect of the foot and ankles. Normal range of motion, muscle strength testing.    Assessment & Plan   Diagnoses and all orders for this visit:    1. Left foot pain (Primary)    2. Metatarsalgia, left foot    3. Arthritis of left foot    4. History of ankle fusion    5. Morbid obesity with BMI of 45.0-49.9, adult    6. Acquired pes planus of left foot        The patient is a 78-year-old female who returns for follow-up regarding her right ankle and left foot. She underwent a right knee arthroplasty on 05/04/2023. She continues to attend physical therapy. Her foot and ankle issues are biomechanics and how her body is compensating for the rest of her body. Gravity, weight, and activity are also contributing to stress to her knees, hips, and back. Her feet flatten out and change accordingly. I recommend obtaining a new pair of shoes and inserts for 4 to 6 months. The brace is a good option to give her more support for the foot and ankle. She was encouraged to continue her physical therapy exercises after completing formal physical therapy. We discussed avoidance of ambulating while barefoot, as well as avoidance of wearing flip flops or sandals. I recommend low-impact exercises such as aqua therapy or water aerobics. Regarding her ingrown great toenails, I explained that this is cosmetic and there is nothing that is worrisome unless the pain increases. I recommend wearing a toe spacer between the 1st and 2nd toes to help the sensation of an ingrown toenail. If her pain  persists, we can consider surgical intervention. She was congratulated on her weight loss and encouraged to continue. The patient will return to the office as needed. Greater than 20 minutes was spent before, during, and after evaluation for patient care.    No orders of the defined types were placed in this encounter.         Note is dictated utilizing voice recognition software. Unfortunately this leads to occasional typographical errors. I apologize in advance if the situation occurs. If questions occur please do not hesitate to call our office.    Transcribed from ambient dictation for STEPHANIE Kelley DPM by Meri Rajan.  06/13/23   11:09 EDT    Patient or patient representative verbalized consent to the visit recording.  I have personally performed the services described in this document as transcribed by the above individual, and it is both accurate and complete.

## 2023-06-19 RX ORDER — BUMETANIDE 2 MG/1
TABLET ORAL
Qty: 45 TABLET | Refills: 2 | Status: SHIPPED | OUTPATIENT
Start: 2023-06-19

## 2023-06-23 PROBLEM — E66.1 CLASS 3 DRUG-INDUCED OBESITY WITH SERIOUS COMORBIDITY AND BODY MASS INDEX (BMI) OF 40.0 TO 44.9 IN ADULT: Status: ACTIVE | Noted: 2020-08-27

## 2023-06-23 PROBLEM — Z00.01 ENCOUNTER FOR ANNUAL GENERAL MEDICAL EXAMINATION WITH ABNORMAL FINDINGS IN ADULT: Status: ACTIVE | Noted: 2023-06-23

## 2023-06-23 PROBLEM — Z85.42 HISTORY OF MALIGNANT NEOPLASM OF ENDOMETRIUM: Status: RESOLVED | Noted: 2020-08-27 | Resolved: 2023-06-23

## 2023-06-30 PROBLEM — Z98.890 HISTORY OF LOOP RECORDER: Status: ACTIVE | Noted: 2023-06-30

## 2023-06-30 PROBLEM — I49.5 SICK SINUS SYNDROME: Status: ACTIVE | Noted: 2023-06-30

## 2023-06-30 PROBLEM — R55 SYNCOPE: Status: ACTIVE | Noted: 2023-06-30

## 2023-07-05 PROBLEM — Z95.0 PRESENCE OF CARDIAC PACEMAKER: Status: ACTIVE | Noted: 2023-07-05

## 2023-07-12 PROBLEM — I50.32 CHRONIC DIASTOLIC HEART FAILURE: Status: ACTIVE | Noted: 2023-07-12

## 2023-07-27 DIAGNOSIS — I10 PRIMARY HYPERTENSION: ICD-10-CM

## 2023-07-27 NOTE — TELEPHONE ENCOUNTER
Rx Refill Note  Requested Prescriptions     Pending Prescriptions Disp Refills    amLODIPine (NORVASC) 2.5 MG tablet [Pharmacy Med Name: AMLODIPINE BESYLATE 2.5 MG TAB] 30 tablet 0     Sig: TAKE 1 TABLET BY MOUTH EVERY DAY      Last office visit with prescribing clinician: 6/27/2023   Last telemedicine visit with prescribing clinician: Visit date not found   Next office visit with prescribing clinician: Visit date not found                         Would you like a call back once the refill request has been completed: [] Yes [] No    If the office needs to give you a call back, can they leave a voicemail: [] Yes [] No    Jennifer Vaughn MA  07/27/23, 14:22 EDT

## 2023-07-27 NOTE — TELEPHONE ENCOUNTER
Please call patient and see if she is still taking amlodipine 2.5 and I have that that medicine was stopped the last time she was in the hospital please let me know

## 2023-07-28 RX ORDER — AMLODIPINE BESYLATE 2.5 MG/1
TABLET ORAL
Qty: 30 TABLET | Refills: 0 | Status: SHIPPED | OUTPATIENT
Start: 2023-07-28

## 2023-07-28 NOTE — TELEPHONE ENCOUNTER
These are the meds stopped at discharge:  Stop These Medications    amLODIPine 2.5 MG tablet  Commonly known as: NORVASC      atorvastatin 20 MG tablet  Commonly known as: LIPITOR      bumetanide 2 MG tablet  Commonly known as: BUMEX      rivaroxaban 15 MG tablet  Commonly known as: XARELTO      spironolactone 25 MG tablet  Commonly known as: ALDACTONE

## 2023-07-28 NOTE — TELEPHONE ENCOUNTER
Please call patient and make sure that this medicine was not restarted by one of the specialist that she has been seeing.  I did note that this medicine was stopped after she got out of the hospital but a lot of times it will be restarted again by a specialist please call the patient and see if it has been restarted.  If it has been restarted who restarted it.

## 2023-07-28 NOTE — TELEPHONE ENCOUNTER
When I spoke to the patient earlier this morning she stated that several were stopped in the hospital but she wasn't for sure which ones. She has been taking all of her medicine, including the amlodipine 2.5mg. since she got out of the hospital. She does not remember if they told her to start this one back or not but she has been taking it.

## 2023-08-09 ENCOUNTER — TELEPHONE (OUTPATIENT)
Dept: FAMILY MEDICINE CLINIC | Facility: CLINIC | Age: 78
End: 2023-08-09
Payer: MEDICARE

## 2023-08-09 RX ORDER — SEMAGLUTIDE 2.68 MG/ML
INJECTION, SOLUTION SUBCUTANEOUS
Qty: 3 ML | Refills: 0 | Status: CANCELLED | OUTPATIENT
Start: 2023-08-09

## 2023-08-09 NOTE — TELEPHONE ENCOUNTER
Caller: Faby Le    Relationship: Self    Best call back number: [phone]    What is the best time to reach you: ANYTIME     Who are you requesting to speak with (clinical staff, provider,  specific staff member): CLINICAL STAFF     Do you know the name of the person who called: SELF     What was the call regarding: PATIENT CALLED AND STATED TOP PLEASE SEND IN THE A 5 MG TO [facility]. THANKS      Is it okay if the provider responds through MyChart: SURE

## 2023-08-09 NOTE — TELEPHONE ENCOUNTER
Rx Refill Note  Requested Prescriptions     Pending Prescriptions Disp Refills    Semaglutide, 2 MG/DOSE, (Ozempic, 2 MG/DOSE,) 8 MG/3ML solution pen-injector 3 mL 0      Last office visit with prescribing clinician: 6/23/2023   Last telemedicine visit with prescribing clinician: Visit date not found   Next office visit with prescribing clinician: 10/2/2023                         Would you like a call back once the refill request has been completed: [] Yes [] No    If the office needs to give you a call back, can they leave a voicemail: [] Yes [] No    Cindy Del Angel MA  08/09/23, 14:17 EDT

## 2023-08-11 ENCOUNTER — OFFICE VISIT (OUTPATIENT)
Dept: CARDIOLOGY | Facility: CLINIC | Age: 78
End: 2023-08-11
Payer: MEDICARE

## 2023-08-11 VITALS — DIASTOLIC BLOOD PRESSURE: 68 MMHG | SYSTOLIC BLOOD PRESSURE: 115 MMHG

## 2023-08-11 DIAGNOSIS — Z95.0 PRESENCE OF CARDIAC PACEMAKER: Primary | ICD-10-CM

## 2023-08-11 RX ORDER — BUMETANIDE 2 MG/1
1 TABLET ORAL DAILY PRN
Qty: 15 TABLET | Refills: 1 | Status: SHIPPED | OUTPATIENT
Start: 2023-08-11

## 2023-08-11 NOTE — PROGRESS NOTES
Pacemaker site is clean and pacemaker function is normal interrogation attached to chart  Complains of orthostatic symptoms patient told to take Bumex only as needed basis.  Amlodipine to be stopped which she is not taking.  Plenty of oral hydration educated.  Follow-up in 3 months      Electronically signed by Cr Herrera MD, 08/11/23, 3:30 PM EDT.

## 2023-08-30 ENCOUNTER — EXTERNAL PBMM DATA (OUTPATIENT)
Dept: PHARMACY | Facility: OTHER | Age: 78
End: 2023-08-30
Payer: MEDICARE

## 2023-09-12 NOTE — TELEPHONE ENCOUNTER
Rx Refill Note  Requested Prescriptions     Pending Prescriptions Disp Refills    Semaglutide, 2 MG/DOSE, (OZEMPIC) 8 MG/3ML solution pen-injector 3 mL 0     Sig: Inject 2 mg under the skin into the appropriate area as directed Every 7 (Seven) Days. Continue for 4 weeks      Last office visit with prescribing clinician: 6/23/2023   Last telemedicine visit with prescribing clinician: Visit date not found   Next office visit with prescribing clinician: 10/2/2023                         Would you like a call back once the refill request has been completed: [] Yes [] No    If the office needs to give you a call back, can they leave a voicemail: [] Yes [] No    Jennifer Vaughn MA  09/12/23, 11:47 EDT

## 2023-09-12 NOTE — TELEPHONE ENCOUNTER
Caller: Faby Le    Relationship: Self    Best call back number: [phone]     Requested Prescriptions:   Requested Prescriptions     Pending Prescriptions Disp Refills    Semaglutide, 2 MG/DOSE, (OZEMPIC) 8 MG/3ML solution pen-injector 3 mL 0     Sig: Inject 2 mg under the skin into the appropriate area as directed Every 7 (Seven) Days. Continue for 4 weeks        Pharmacy where request should be sent: [facility] #[id number] - [place], [place] - [address] [facility] [phone] [facility] [phone] FX     Last office visit with prescribing clinician: 6/23/2023   Last telemedicine visit with prescribing clinician: Visit date not found   Next office visit with prescribing clinician: 10/2/2023     Additional details provided by patient: PATIENT STATES THE DOSAGE NEEDS TO BE UPPED. PATIENT IS OUT OF MEDICATION & WILL NEED HER NEXT INJECTION SUNDAY.     Does the patient have less than a 3 day supply:  [x] Yes  [] No    Would you like a call back once the refill request has been completed: [] Yes [x] No      Sofi Miles Rep   09/12/23 11:26 EDT

## 2023-09-20 ENCOUNTER — TELEPHONE (OUTPATIENT)
Dept: FAMILY MEDICINE CLINIC | Facility: CLINIC | Age: 78
End: 2023-09-20
Payer: MEDICARE

## 2023-09-20 NOTE — TELEPHONE ENCOUNTER
Caller: IKER    Relationship: Other    Best call back number: [phone]     What form or medical record are you requesting: FACE TO FACE VISIT NOTES    Who is requesting this form or medical record from you:     How would you like to receive the form or medical records (pick-up, mail, fax):   If fax, what is the fax number:   If mail, what is the address:   If pick-up, provide patient with address and location details    Timeframe paperwork needed: AS SOON AS POSSIBLE    Additional notes: IKER WITH [facility] SAYS THAT SHE IS FAXING A FORM OVER TODAY 9/20/23 REQUESTING PATIENT'S FACE TO FACE VISIT NOTES BETWEEN 5/26/23 AND 9/23/23 THAT NEEDS TO BE COMPLETED BY DR. SHAWN MICHEL AND RETURNED.

## 2023-09-20 NOTE — TELEPHONE ENCOUNTER
Caller: IKER/[facility]    Relationship: Other    Best call back number: [phone]     What is the best time to reach you: ANY    Who are you requesting to speak with (clinical staff, provider,  specific staff member): CLINICAL    Do you know the name of the person who called:     What was the call regarding: THEY NEED THE VISIT NOTES WITH PHYSICIANS SIGNATURE FROM 6/27/23-FAXED -[phone]    Is it okay if the provider responds through MyChart:

## 2023-09-29 RX ORDER — DAPAGLIFLOZIN 5 MG/1
TABLET, FILM COATED ORAL
Qty: 90 TABLET | Refills: 1 | Status: SHIPPED | OUTPATIENT
Start: 2023-09-29

## 2023-10-02 ENCOUNTER — OFFICE VISIT (OUTPATIENT)
Dept: FAMILY MEDICINE CLINIC | Facility: CLINIC | Age: 78
End: 2023-10-02
Payer: MEDICARE

## 2023-10-02 VITALS
TEMPERATURE: 97.8 F | SYSTOLIC BLOOD PRESSURE: 121 MMHG | BODY MASS INDEX: 43.34 KG/M2 | HEIGHT: 62 IN | DIASTOLIC BLOOD PRESSURE: 70 MMHG | WEIGHT: 235.5 LBS | HEART RATE: 65 BPM | OXYGEN SATURATION: 100 %

## 2023-10-02 DIAGNOSIS — F32.A DEPRESSION, UNSPECIFIED DEPRESSION TYPE: ICD-10-CM

## 2023-10-02 DIAGNOSIS — E53.8 B12 DEFICIENCY: ICD-10-CM

## 2023-10-02 DIAGNOSIS — E78.2 MIXED HYPERLIPIDEMIA: ICD-10-CM

## 2023-10-02 DIAGNOSIS — R20.2 PARESTHESIA OF LEFT FOOT: ICD-10-CM

## 2023-10-02 DIAGNOSIS — M1A.9XX0 CHRONIC GOUT WITHOUT TOPHUS, UNSPECIFIED CAUSE, UNSPECIFIED SITE: ICD-10-CM

## 2023-10-02 DIAGNOSIS — R13.12 OROPHARYNGEAL DYSPHAGIA: ICD-10-CM

## 2023-10-02 DIAGNOSIS — E66.1 CLASS 3 DRUG-INDUCED OBESITY WITH SERIOUS COMORBIDITY AND BODY MASS INDEX (BMI) OF 40.0 TO 44.9 IN ADULT: ICD-10-CM

## 2023-10-02 DIAGNOSIS — I10 PRIMARY HYPERTENSION: ICD-10-CM

## 2023-10-02 DIAGNOSIS — E03.9 HYPOTHYROIDISM, UNSPECIFIED TYPE: ICD-10-CM

## 2023-10-02 DIAGNOSIS — I25.10 CORONARY ARTERY DISEASE INVOLVING NATIVE HEART WITHOUT ANGINA PECTORIS, UNSPECIFIED VESSEL OR LESION TYPE: ICD-10-CM

## 2023-10-02 DIAGNOSIS — I48.0 PAROXYSMAL ATRIAL FIBRILLATION: ICD-10-CM

## 2023-10-02 DIAGNOSIS — G47.33 OBSTRUCTIVE SLEEP APNEA SYNDROME: ICD-10-CM

## 2023-10-02 DIAGNOSIS — R42 LIGHT HEADEDNESS: ICD-10-CM

## 2023-10-02 DIAGNOSIS — R73.9 HYPERGLYCEMIA: ICD-10-CM

## 2023-10-02 DIAGNOSIS — Z78.0 POST-MENOPAUSAL: Primary | ICD-10-CM

## 2023-10-02 LAB
ALBUMIN SERPL-MCNC: 3.8 G/DL (ref 3.5–5.2)
ALBUMIN/GLOB SERPL: 1.4 G/DL
ALP SERPL-CCNC: 116 U/L (ref 39–117)
ALT SERPL W P-5'-P-CCNC: 17 U/L (ref 1–33)
ANION GAP SERPL CALCULATED.3IONS-SCNC: 12 MMOL/L (ref 5–15)
AST SERPL-CCNC: 20 U/L (ref 1–32)
BASOPHILS # BLD AUTO: 0.06 10*3/MM3 (ref 0–0.2)
BASOPHILS NFR BLD AUTO: 1 % (ref 0–1.5)
BILIRUB SERPL-MCNC: 0.5 MG/DL (ref 0–1.2)
BUN SERPL-MCNC: 23 MG/DL (ref 8–23)
BUN/CREAT SERPL: 21.1 (ref 7–25)
CALCIUM SPEC-SCNC: 10.1 MG/DL (ref 8.6–10.5)
CHLORIDE SERPL-SCNC: 102 MMOL/L (ref 98–107)
CHOLEST SERPL-MCNC: 165 MG/DL (ref 0–200)
CO2 SERPL-SCNC: 23 MMOL/L (ref 22–29)
CREAT SERPL-MCNC: 1.09 MG/DL (ref 0.57–1)
DEPRECATED RDW RBC AUTO: 52 FL (ref 37–54)
EGFRCR SERPLBLD CKD-EPI 2021: 52.8 ML/MIN/1.73
EOSINOPHIL # BLD AUTO: 0.12 10*3/MM3 (ref 0–0.4)
EOSINOPHIL NFR BLD AUTO: 2.1 % (ref 0.3–6.2)
ERYTHROCYTE [DISTWIDTH] IN BLOOD BY AUTOMATED COUNT: 14.8 % (ref 12.3–15.4)
GLOBULIN UR ELPH-MCNC: 2.8 GM/DL
GLUCOSE SERPL-MCNC: 86 MG/DL (ref 65–99)
HBA1C MFR BLD: 5.1 % (ref 4.8–5.6)
HCT VFR BLD AUTO: 33.6 % (ref 34–46.6)
HDLC SERPL-MCNC: 55 MG/DL (ref 40–60)
HGB BLD-MCNC: 11.3 G/DL (ref 12–15.9)
IMM GRANULOCYTES # BLD AUTO: 0.01 10*3/MM3 (ref 0–0.05)
IMM GRANULOCYTES NFR BLD AUTO: 0.2 % (ref 0–0.5)
LDLC SERPL CALC-MCNC: 94 MG/DL (ref 0–100)
LDLC/HDLC SERPL: 1.68 {RATIO}
LYMPHOCYTES # BLD AUTO: 1.49 10*3/MM3 (ref 0.7–3.1)
LYMPHOCYTES NFR BLD AUTO: 25.5 % (ref 19.6–45.3)
MAGNESIUM SERPL-MCNC: 2.1 MG/DL (ref 1.6–2.4)
MCH RBC QN AUTO: 32.6 PG (ref 26.6–33)
MCHC RBC AUTO-ENTMCNC: 33.6 G/DL (ref 31.5–35.7)
MCV RBC AUTO: 96.8 FL (ref 79–97)
MONOCYTES # BLD AUTO: 0.4 10*3/MM3 (ref 0.1–0.9)
MONOCYTES NFR BLD AUTO: 6.8 % (ref 5–12)
NEUTROPHILS NFR BLD AUTO: 3.76 10*3/MM3 (ref 1.7–7)
NEUTROPHILS NFR BLD AUTO: 64.4 % (ref 42.7–76)
NRBC BLD AUTO-RTO: 0 /100 WBC (ref 0–0.2)
PLATELET # BLD AUTO: 217 10*3/MM3 (ref 140–450)
PMV BLD AUTO: 10.4 FL (ref 6–12)
POTASSIUM SERPL-SCNC: 4.1 MMOL/L (ref 3.5–5.2)
PROT SERPL-MCNC: 6.6 G/DL (ref 6–8.5)
RBC # BLD AUTO: 3.47 10*6/MM3 (ref 3.77–5.28)
SODIUM SERPL-SCNC: 137 MMOL/L (ref 136–145)
TRIGL SERPL-MCNC: 88 MG/DL (ref 0–150)
TSH SERPL DL<=0.05 MIU/L-ACNC: 2.99 UIU/ML (ref 0.27–4.2)
URATE SERPL-MCNC: 3.9 MG/DL (ref 2.4–5.7)
VIT B12 BLD-MCNC: 699 PG/ML (ref 211–946)
VLDLC SERPL-MCNC: 16 MG/DL (ref 5–40)
WBC NRBC COR # BLD: 5.84 10*3/MM3 (ref 3.4–10.8)

## 2023-10-02 PROCEDURE — 82607 VITAMIN B-12: CPT | Performed by: PREVENTIVE MEDICINE

## 2023-10-02 PROCEDURE — 83735 ASSAY OF MAGNESIUM: CPT | Performed by: PREVENTIVE MEDICINE

## 2023-10-02 PROCEDURE — 85025 COMPLETE CBC W/AUTO DIFF WBC: CPT | Performed by: PREVENTIVE MEDICINE

## 2023-10-02 PROCEDURE — 80061 LIPID PANEL: CPT | Performed by: PREVENTIVE MEDICINE

## 2023-10-02 PROCEDURE — 80053 COMPREHEN METABOLIC PANEL: CPT | Performed by: PREVENTIVE MEDICINE

## 2023-10-02 PROCEDURE — 83036 HEMOGLOBIN GLYCOSYLATED A1C: CPT | Performed by: PREVENTIVE MEDICINE

## 2023-10-02 PROCEDURE — 84443 ASSAY THYROID STIM HORMONE: CPT | Performed by: PREVENTIVE MEDICINE

## 2023-10-02 PROCEDURE — 84550 ASSAY OF BLOOD/URIC ACID: CPT | Performed by: PREVENTIVE MEDICINE

## 2023-10-02 RX ORDER — SPIRONOLACTONE 25 MG/1
12.5 TABLET ORAL DAILY
COMMUNITY

## 2023-10-02 NOTE — PROGRESS NOTES
Venipuncture performed on Right Arm by Tonia Landry  with good hemostasis. Patient tolerated well. 10/02/23   Tonia Landry  Injection  Injection performed in left  Deltoid by Tonia Landry. Patient tolerated the procedure well without complications.  10/02/23   Tonia Landry   Injection  Injection performed in left  Deltoid by Tonia Landry. Patient tolerated the procedure well without complications.  10/02/23   Tonia Landry

## 2023-10-02 NOTE — PATIENT INSTRUCTIONS
Health Maintenance Due   Topic Date Due    DXA SCAN  06/14/2023    INFLUENZA VACCINE  08/01/2023    COVID-19 Vaccine (4 - 2023-24 season) 09/01/2023    Try to have 3 daily meals on time and if light headedness returns call

## 2023-10-02 NOTE — PROGRESS NOTES
"Subjective   Faby Le is a 78 y.o. female presents for   Chief Complaint   Patient presents with    Hyperlipidemia     3 month     Hypertension     3 month     Foot Injury     Just feels like she is walking on something all the time- left foot mostly- sometimes the right foot as well    nerve pain     Sciatic nerve       Health Maintenance Due   Topic Date Due    DXA SCAN  06/14/2023    COVID-19 Vaccine (4 - 2023-24 season) 09/01/2023     Faby Le presents for follow up on her postmenopausal status, ROSA, paroxysmal atrial fib, dysphagia, hypothyroidism, lightheadedness, hypertension, hyperlipidemia, hyperglycemia, chronic gout without tophus, depression, coronary artery disease, B12 deficiency and class III, severe obesity with serious comorbidities. She has agreed to utilize [facility].     The patient denies trouble swallowing.     The patient denies an increase in dry skin or hair loss. She states she has always had dry skin ir hair loss.     The patient denies palpitations.     The patient's obstructive sleep apnea is currently well controlled.     The patient reports occasional lightheadedness and dizziness.     The patient denies gout symptoms.     The patient's depression is well controlled with her current medication regimen.     The patient denies myocardial infarction symptoms.     The patient is compliant with taking vitamin B12.     The patient states she feels as if she is \"walking on sharp stuff all the time\". She adds this occurs in her left foot. She notes symptoms began 6 months ago.     The patient reports back pain. She correlates this to sciatica.     The patient is UTD on retinal and dental examinations.     Vitals:    10/02/23 1024 10/02/23 1026   BP: 131/81 121/70   BP Location: Right arm Left arm   Patient Position: Sitting Sitting   Cuff Size: Adult Adult   Pulse: 65    Temp: 97.8 °F (36.6 °C)    TempSrc: Temporal    SpO2: 100%    Weight: 107 kg (235 lb 8 oz)    Height: 157.5 cm (62\")  "     Body mass index is 43.07 kg/m².    Current Outpatient Medications on File Prior to Visit   Medication Sig Dispense Refill    allopurinol (ZYLOPRIM) 300 MG tablet TAKE 1 TABLET BY MOUTH EVERY DAY 90 tablet 2    atorvastatin (LIPITOR) 20 MG tablet Take 1 tablet by mouth Daily. 30 tablet 11    Azelastine HCl 137 MCG/SPRAY solution 2 sprays by Alternating Nares route Daily As Needed.      bumetanide (Bumex) 2 MG tablet Take 0.5 tablets by mouth Daily As Needed (edema). 15 tablet 1    cetirizine (zyrTEC) 10 MG tablet Take 1 tablet by mouth Daily.      cyanocobalamin (VITAMIN B-12) 1000 MCG tablet Take 1 tablet by mouth Daily.      escitalopram (LEXAPRO) 20 MG tablet TAKE 1 TABLET BY MOUTH EVERY DAY 90 tablet 2    Farxiga 5 MG tablet tablet TAKE 1 TABLET BY MOUTH EVERY DAY 90 tablet 1    levothyroxine (SYNTHROID, LEVOTHROID) 88 MCG tablet TAKE 1 TABLET BY MOUTH EVERY DAY (Patient taking differently: Take 1 tablet by mouth Daily.) 90 tablet 3    metoprolol succinate XL (TOPROL-XL) 25 MG 24 hr tablet Take 1 tablet by mouth Every Night. 90 tablet 3    montelukast (SINGULAIR) 10 MG tablet Take 1 tablet by mouth Every Night.      Multiple Vitamins-Minerals (CENTRUM SILVER ADULT 50+) tablet Take 1 tablet by mouth Daily. HOLD NOW      rivaroxaban (XARELTO) 15 MG tablet Take 1 tablet by mouth Daily. 42 tablet     Semaglutide, 2 MG/DOSE, (OZEMPIC) 8 MG/3ML solution pen-injector Inject 2 mg under the skin into the appropriate area as directed Every 7 (Seven) Days. Continue for 4 weeks 3 mL 0    spironolactone (ALDACTONE) 25 MG tablet Take 0.5 tablets by mouth Daily. Patient reports as needed.      Vitamin D, Cholecalciferol, 25 MCG (1000 UT) tablet Take 1 tablet by mouth Daily.      [DISCONTINUED] Ozempic, 2 MG/DOSE, 8 MG/3ML solution pen-injector INJECT 2 MG UNDER THE SKIN INTO THE APPROPRIATE AREA AS DIRECTED EVERY 7 (SEVEN) DAYS FOR 4 DOSES. CONTINUE FOR 4 WEEKS 3 mL 0    [DISCONTINUED] hydrocortisone 2.5 % ointment APPLY A  THIN LAYER TO AFFECTED AREAS BY TOPICAL ROUTE TWICE DAILY. SAFE TO USE ON FACE.       No current facility-administered medications on file prior to visit.       The following portions of the patient's history were reviewed and updated as appropriate: allergies, current medications, past family history, past medical history, past social history, past surgical history, and problem list.    Review of Systems   Constitutional:  Negative for chills, diaphoresis, fever and unexpected weight loss.   HENT:  Negative for hearing loss, tinnitus and trouble swallowing.    Respiratory:  Negative for cough, chest tightness, shortness of breath and wheezing.    Cardiovascular:  Negative for chest pain and palpitations.   Gastrointestinal:  Negative for blood in stool, constipation, diarrhea, nausea, vomiting, GERD and indigestion.   Genitourinary:  Negative for dysuria, frequency and vaginal discharge.   Musculoskeletal:  Positive for back pain.   Skin:  Positive for dry skin.   Neurological:  Positive for dizziness and light-headedness.   Psychiatric/Behavioral:  Negative for self-injury, suicidal ideas and depressed mood. The patient is not nervous/anxious.      Objective   Physical Exam  HENT:      Right Ear: Tympanic membrane normal.      Left Ear: Tympanic membrane normal.      Mouth/Throat:      Mouth: Mucous membranes are moist.      Pharynx: Oropharynx is clear.   Eyes:      Pupils: Pupils are equal, round, and reactive to light.   Neck:      Thyroid: No thyroid mass, thyromegaly or thyroid tenderness.      Vascular: No carotid bruit.   Cardiovascular:      Rate and Rhythm: Normal rate and regular rhythm.      Chest Wall: No thrill.      Heart sounds: No murmur heard.    No gallop.   Pulmonary:      Breath sounds: Normal breath sounds.   Abdominal:      General: Bowel sounds are normal.      Palpations: There is no hepatomegaly, splenomegaly or mass.      Tenderness: There is no abdominal tenderness.   Musculoskeletal:       Cervical back: No tenderness.      Right lower leg: No edema.      Left lower leg: No edema.   Lymphadenopathy:      Cervical: No cervical adenopathy.     PHQ-9 Total Score:      Assessment & Plan   Diagnoses and all orders for this visit:    1. Post-menopausal (Primary)    2. Obstructive sleep apnea syndrome    3. Paroxysmal atrial fibrillation    4. Oropharyngeal dysphagia    5. Hypothyroidism, unspecified type  -     TSH; Future  -     TSH    6. Light headedness    7. Primary hypertension  -     CBC Auto Differential; Future  -     Comprehensive Metabolic Panel; Future  -     CBC Auto Differential  -     Comprehensive Metabolic Panel    8. Mixed hyperlipidemia  -     Lipid Panel; Future  -     Lipid Panel    9. Hyperglycemia  -     Hemoglobin A1c; Future  -     Hemoglobin A1c    10. Chronic gout without tophus, unspecified cause, unspecified site  -     Uric Acid; Future  -     Uric Acid    11. Depression, unspecified depression type  -     Magnesium; Future  -     Magnesium    12. Coronary artery disease involving native heart without angina pectoris, unspecified vessel or lesion type    13. B12 deficiency  -     Vitamin B12; Future  -     Vitamin B12    14. Class 3 drug-induced obesity with serious comorbidity and body mass index (BMI) of 40.0 to 44.9 in adult    15. Paresthesia of left foot  -     Ambulatory Referral to Podiatry    Other orders  -     Fluzone High-Dose 65+yrs ([id number])  -     Pneumococcal Conjugate Vaccine 20-Valent (PCV20)            Patient Instructions     Health Maintenance Due   Topic Date Due    DXA SCAN  06/14/2023    INFLUENZA VACCINE  08/01/2023    COVID-19 Vaccine (4 - 2023-24 season) 09/01/2023    Try to have 3 daily meals on time and if light headedness returns call- Patient's allergy and medication list was reviewed. Patient will have labs performed. Patient counseled on vaccinations. Advised patient to obtain 20 minutes of weightbearing exercise per day. Advised patient to  eat more regular, and let me know if dizziness/lightheadedness persists. Advised patient to monitor portion size and increase walking. Advised to monitor saturated fat and carbohydrate intake. I will refer the patient to podiatry.        Transcribed from ambient dictation for Leonor Adam MD by Laine Damon.  10/02/23   11:32 EDT    Patient or patient representative verbalized consent to the visit recording.  I have personally performed the services described in this document as transcribed by the above individual, and it is both accurate and complete.

## 2023-10-03 ENCOUNTER — TELEPHONE (OUTPATIENT)
Dept: FAMILY MEDICINE CLINIC | Facility: CLINIC | Age: 78
End: 2023-10-03
Payer: MEDICARE

## 2023-10-03 RX ORDER — ATORVASTATIN CALCIUM 20 MG/1
20 TABLET, FILM COATED ORAL DAILY
Qty: 30 TABLET | Refills: 11 | Status: SHIPPED | OUTPATIENT
Start: 2023-10-03

## 2023-10-03 NOTE — PROGRESS NOTES
Still mildly anemic but marked improvement since last checked 3 months ago continue the same diet.  Kidney function is still slightly decreased but has improved since the last check so avoid ibuprofen Aleve Motrin and limit x-ray dyes.  Finally the bad cholesterol is 96 and goal is below 70 so watch your saturated fats and increase your walking-if you have done all you can with diet and exercise we can increase the atorvastatin if you would like please let us know about the above and call if any other questions or concerns

## 2023-10-03 NOTE — TELEPHONE ENCOUNTER
HUB TO RELAY:  ----- Message from Leonor Adam MD sent at 10/3/2023  7:39 AM EDT -----  Still mildly anemic but marked improvement since last checked 3 months ago continue the same diet.  Kidney function is still slightly decreased but has improved since the last check so avoid ibuprofen Aleve Motrin and limit x-ray dyes.  Finally the bad cholesterol is 96 and goal is below 70 so watch your saturated fats and increase your walking-if you have done all you can with diet and exercise we can increase the atorvastatin if you would like please let us know about the above and call if any other questions or concerns

## 2023-10-11 RX ORDER — SEMAGLUTIDE 2.68 MG/ML
INJECTION, SOLUTION SUBCUTANEOUS
Qty: 3 ML | Refills: 0 | Status: SHIPPED | OUTPATIENT
Start: 2023-10-11

## 2023-10-25 ENCOUNTER — OFFICE VISIT (OUTPATIENT)
Dept: CARDIOLOGY | Facility: CLINIC | Age: 78
End: 2023-10-25
Payer: MEDICARE

## 2023-10-25 VITALS
HEART RATE: 69 BPM | OXYGEN SATURATION: 96 % | DIASTOLIC BLOOD PRESSURE: 72 MMHG | SYSTOLIC BLOOD PRESSURE: 140 MMHG | BODY MASS INDEX: 43.24 KG/M2 | HEIGHT: 62 IN | WEIGHT: 235 LBS

## 2023-10-25 DIAGNOSIS — Z95.0 PRESENCE OF CARDIAC PACEMAKER: ICD-10-CM

## 2023-10-25 DIAGNOSIS — I48.0 PAROXYSMAL ATRIAL FIBRILLATION: ICD-10-CM

## 2023-10-25 DIAGNOSIS — E78.2 MIXED HYPERLIPIDEMIA: ICD-10-CM

## 2023-10-25 DIAGNOSIS — I10 PRIMARY HYPERTENSION: ICD-10-CM

## 2023-10-25 DIAGNOSIS — I25.10 CORONARY ARTERY DISEASE INVOLVING NATIVE HEART WITHOUT ANGINA PECTORIS, UNSPECIFIED VESSEL OR LESION TYPE: ICD-10-CM

## 2023-10-25 DIAGNOSIS — I49.5 SICK SINUS SYNDROME: Primary | ICD-10-CM

## 2023-10-25 PROBLEM — R94.30 ABNORMAL RESULTS OF CARDIOVASCULAR FUNCTION STUDIES: Status: RESOLVED | Noted: 2022-08-18 | Resolved: 2023-10-25

## 2023-10-25 PROBLEM — D22.9 ATYPICAL MOLE: Status: ACTIVE | Noted: 2023-10-25

## 2023-10-25 PROBLEM — Z98.890 HISTORY OF LOOP RECORDER: Status: RESOLVED | Noted: 2023-06-30 | Resolved: 2023-10-25

## 2023-10-25 PROCEDURE — 1160F RVW MEDS BY RX/DR IN RCRD: CPT | Performed by: INTERNAL MEDICINE

## 2023-10-25 PROCEDURE — 93000 ELECTROCARDIOGRAM COMPLETE: CPT | Performed by: INTERNAL MEDICINE

## 2023-10-25 PROCEDURE — 3078F DIAST BP <80 MM HG: CPT | Performed by: INTERNAL MEDICINE

## 2023-10-25 PROCEDURE — 99214 OFFICE O/P EST MOD 30 MIN: CPT | Performed by: INTERNAL MEDICINE

## 2023-10-25 PROCEDURE — 3077F SYST BP >= 140 MM HG: CPT | Performed by: INTERNAL MEDICINE

## 2023-10-25 PROCEDURE — 1159F MED LIST DOCD IN RCRD: CPT | Performed by: INTERNAL MEDICINE

## 2023-10-25 NOTE — PROGRESS NOTES
Cardiology Office Visit      Encounter Date:  10/25/2023    Patient ID:   Faby Le is a 78 y.o. female.    Reason For Followup:  Coronary artery disease  Shortness of breath  Sick sinus syndrome    Brief Clinical History:  Dear Dr. Adam, Leonor Ordaz MD    I had the pleasure of seeing Faby Le today. As you are well aware, this is a 78 y.o. female  presented to the office with symptoms of lower extremity edema and some shortness of breath        cardiac catheterization showed moderate disease involving the diagonal and mild-to-moderate disease involving circumflex      Denies any further episodes of dizziness syncope presyncope  No exertional chest pain  Shortness of breath is improved  No new cardiac symptoms      Assessment & Plan    Impressions:  Morbid obesity/Body mass index is 48.54 kg/m².  Bilateral lower extremity edema  Shortness of breath  Dyspnea on exertion  Obstructive sleep apnea  Coronary artery disease  Diastolic dysfunction  Hypertension  Normalization of LV systolic function  Normal LV systolic function in 2018  Moderate obstructive coronary artery disease involving the diagonal branch of the LAD and mild to moderate obstructive coronary artery disease involving the left circumflex artery in 2018  Paroxysmal atrial fibrillation  Patient is currently in sinus rhythm with intermittent PAC burden  Elevated chads vascular score-4  Coagulopathy on Xarelto  History of nonischemic cardiomyopathy  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  Primary hypertension  Obstructive sleep apnea  Recent cardiac catheterization in 2022 with a mild obstructive disease involving the LAD and a moderate obstructive disease involving the ostium of the diagonal branch with normal LV systolic function  Recent carotid Doppler with mild obstructive carotid stenosis  Cardiomyopathy with recovery of LV systolic function  Chronic renal insufficiency   Sick sinus syndrome status post permanent pacemaker  placement  No clear correlation of patient's symptoms of dizziness with these episodes of bradycardia or pauses  Syncope and loss of consciousness  Loop recorder interrogation with episodes of pauses that were more than 4 seconds  Tachybradycardia syndrome  Status post permanent pacemaker placement    Recommendations:  Recent emergency room visit medical records reviewed and discussed with patient  Loop recorder data that is available for review reviewed and discussed with patient  Likely will benefit from a permanent pacemaker for recurrent episodes of loss of consciousness and dizziness with the process  Risk benefits and alternatives for permanent pacemaker placement reviewed and discussed with patient  Schedule for EP evaluation  Continue current medical therapy with rivaroxaban 15 mg p.o. once a day Toprol-XL 25 mg p.o. once a day Lipitor 20 mg p.o. once a day  Recent medical records reviewed and discussed with patient  Recommend continued weight loss  Close monitoring of renal function and hemoglobin A1c with primary care physician  Follow-up in office in 6 months              Lab Results   Component Value Date    GLUCOSE 86 10/02/2023    BUN 23 10/02/2023    CREATININE 1.09 (H) 10/02/2023    EGFR 52.8 (L) 10/02/2023    BCR 21.1 10/02/2023    K 4.1 10/02/2023    CO2 23.0 10/02/2023    CALCIUM 10.1 10/02/2023    ALBUMIN 3.8 10/02/2023    BILITOT 0.5 10/02/2023    AST 20 10/02/2023    ALT 17 10/02/2023     Results for orders placed during the hospital encounter of 07/03/23    Adult Transthoracic Echo Limited W/ Cont if Necessary Per Protocol    Interpretation Summary    Left ventricular systolic function is normal. Estimated left ventricular EF = 60%    Left ventricular wall thickness is consistent with mild concentric hypertrophy.    The left atrial cavity is mild to moderately dilated.    There is a small (<1cm) pericardial effusion.    Transthoracic echocardiography for follow-up of pericardial effusion--  Limited echo was done.  Trivial to small effusion noted and much smaller compared to prior echo.  LVEF is normal.      Electronically signed by Cr Herrera MD, 07/05/23, 11:31 AM EDT.     Results for orders placed during the hospital encounter of 08/29/22    Cardiac Catheterization/Vascular Study    Narrative  Table formatting from the original result was not included.  Cardiac Catheterization Operative Report    Faby Le  [id number]  8/29/2022  @PCP@    She underwent cardiac catheterization.    Indications for the procedure include: abnormal stress test.    Procedure Details:  The risks, benefits, complications, treatment options, and expected outcomes were discussed with the patient. The patient and/or family concurred with the proposed plan, giving informed consent.    After informed consent the patient was brought to the cath lab after appropriate IV hydration was begun and oral premedication was given. She was further sedated with fentanyl. She was prepped and draped in the usual manner. Using the modified Seldinger access technique, a 6 Welsh sheath was placed in the femoral artery. A left heart catheterization with coronary arteriography was performed. Findings are discussed below.    After the procedure was completed, sedation was stopped and the sheaths and catheters were all removed. Hemostasis was achieved per established hospital protocols.    Conscious sedation:  Conscious sedation was performed according to protocol.  I supervised and directed an independent trained observer with the assistance of monitoring the patient's level of consciousness and physiologic status throughout the procedure.  Intraoperative service time was 60 minutes.    Findings:    Hemodynamics Central arctic pressure systolic 150 and diastolic 56 with a mean pressure of 82 mmHg  LV end-diastolic pressure of 12 mmHg  There was no gradient across the aortic valve on the pullback of the pigtail catheter  Left Main  left  main is a large-caliber vessel angiographically normal bifurcates into left into descending and left circumflex arteries  RCA  large-caliber dominant vessel  Right coronary artery is angiographically normal right coronary artery bifurcates into a large caliber PDA branch and a moderate caliber PLV branch that are angiographically normal  LAD  large-caliber vessel  LAD has mid focal angiographic 30% stenosis that is heavily calcified at the bifurcation with the diagonal and septal branch  Moderate size diagonal branch that has ostial angiographic of 40% stenosis  Circ  large-caliber vessel angiographically normal  First marginal branch of the circumflex has proximal angiographic 30 to 40% stenosis    LV  normal LV systolic function normal wall motion estimate LV ejection fraction of 55%  Coronary Dominance  right coronary artery    Estimated Blood Loss:  Minimal    Specimens: None    Complications:  None; patient tolerated the procedure well.    Disposition: PACU - hemodynamically stable.    Condition: stable    Impressions:  Mild obstructive coronary artery disease involving the LAD mild to moderate obstructive coronary artery disease involving the ostium of the diagonal branch mild obstructive coronary artery disease involving the proximal first marginal branch  Normal LV systolic function  Normal wall motion  Estimate LV ejection fraction of 55%  Normal left-sided filling pressures    Recommendations:  Medical management for obstructive coronary artery disease  Test results reviewed and discussed with patient and family     Lab Results   Component Value Date    CHOL 165 10/02/2023    TRIG 88 10/02/2023    HDL 55 10/02/2023    LDL 94 10/02/2023      Results for orders placed during the hospital encounter of 08/12/22    Stress Test With Myocardial Perfusion One Day    Interpretation Summary  · Left ventricular ejection fraction is normal. (Calculated EF = 68%).  · Findings consistent with an equivocal ECG stress  "test.    Abnormal stress  Submaximal study, no changes at submaximal stress on stress ECG  No arrhythmias seen  Nuclear imaging demonstrates decreased counts at rest in the mid inferior to apical wall involving the apex, summed rest score of 16  Stress imaging demonstrates significant worsening perfusion in the mid to apical inferior wall, EF 68%  There is significant GI and liver uptake cannot rule out diaphragmatic attenuation    Abnormal study  Correlate with clinical risk factors  Suggestive of inferior reversibility and ischemia   Results for orders placed during the hospital encounter of 03/02/23    Loop Recorder Implant - Treatment Room    Interpretation Summary    Conclusion: Successful implantation.           Objective:    Vitals:  Vitals:    10/25/23 1320   BP: 140/72   BP Location: Left arm   Patient Position: Sitting   Pulse: 69   SpO2: 96%   Weight: 107 kg (235 lb)   Height: 157.5 cm (62\")       Physical Exam:    General: Alert, cooperative, no distress, appears stated age  Head:  Normocephalic, atraumatic, mucous membranes moist  Eyes:  Conjunctiva/corneas clear, EOM's intact     Neck:  Supple,  no adenopathy;      Lungs: Clear to auscultation bilaterally, no wheezes rhonchi rales are noted  Chest wall: No tenderness  Heart::  Regular rate and rhythm, S1 and S2 normal, no murmur, rub or gallop  Abdomen: Soft, non-tender, nondistended bowel sounds active  Extremities: No cyanosis, clubbing, or edema  Pulses: 2+ and symmetric all extremities  Skin:  No rashes or lesions  Neuro/psych: A&O x3. CN II through XII are grossly intact with appropriate affect      Allergies:  Allergies   Allergen Reactions    Celecoxib Itching and Swelling     swelling       Medication Review:     Current Outpatient Medications:     allopurinol (ZYLOPRIM) 300 MG tablet, TAKE 1 TABLET BY MOUTH EVERY DAY, Disp: 90 tablet, Rfl: 2    atorvastatin (LIPITOR) 20 MG tablet, Take 1 tablet by mouth Daily., Disp: 30 tablet, Rfl: 11    " Azelastine HCl 137 MCG/SPRAY solution, 2 sprays by Alternating Nares route Daily As Needed., Disp: , Rfl:     cetirizine (zyrTEC) 10 MG tablet, Take 1 tablet by mouth Daily., Disp: , Rfl:     cyanocobalamin (VITAMIN B-12) 1000 MCG tablet, Take 1 tablet by mouth Daily., Disp: , Rfl:     escitalopram (LEXAPRO) 20 MG tablet, TAKE 1 TABLET BY MOUTH EVERY DAY, Disp: 90 tablet, Rfl: 2    levothyroxine (SYNTHROID, LEVOTHROID) 88 MCG tablet, TAKE 1 TABLET BY MOUTH EVERY DAY (Patient taking differently: Take 1 tablet by mouth Daily.), Disp: 90 tablet, Rfl: 3    metoprolol succinate XL (TOPROL-XL) 25 MG 24 hr tablet, Take 1 tablet by mouth Every Night., Disp: 90 tablet, Rfl: 3    montelukast (SINGULAIR) 10 MG tablet, Take 1 tablet by mouth Every Night., Disp: , Rfl:     Multiple Vitamins-Minerals (CENTRUM SILVER ADULT 50+) tablet, Take 1 tablet by mouth Daily. HOLD NOW, Disp: , Rfl:     rivaroxaban (XARELTO) 15 MG tablet, Take 1 tablet by mouth Daily., Disp: 42 tablet, Rfl:     Vitamin D, Cholecalciferol, 25 MCG (1000 UT) tablet, Take 1 tablet by mouth Daily., Disp: , Rfl:     bumetanide (Bumex) 2 MG tablet, Take 0.5 tablets by mouth Daily As Needed (edema). (Patient not taking: Reported on 10/25/2023), Disp: 15 tablet, Rfl: 1    Semaglutide, 2 MG/DOSE, (Ozempic, 2 MG/DOSE,) 8 MG/3ML solution pen-injector, INJECT 2 MG UNDER THE SKIN INTO THE APPROPRIATE AREA AS DIRECTED EVERY 7 (SEVEN) DAYS. CONTINUE FOR 4 WEEKS (Patient not taking: Reported on 10/25/2023), Disp: 3 mL, Rfl: 0    Family History:  Family History   Problem Relation Age of Onset    Hypertension Mother     Stroke Father     Arthritis Brother     Cancer Brother        Past Medical History:  Past Medical History:   Diagnosis Date    Afib     Ankle pain     Arthritis     Chronic kidney disease     Coronary artery disease     Depression     Gout     History of loop recorder 04/25/2023    current    Hyperglycemia     Hyperlipidemia     Hypertension     Low back pain      Morbid obesity     Sleep apnea     Vitamin D deficiency        Past surgical History:  Past Surgical History:   Procedure Laterality Date    ANKLE FUSION      2017-left    CARDIAC CATHETERIZATION      CARDIAC CATHETERIZATION Right 8/29/2022    Procedure: Left Heart Cath;  Surgeon: Ratna Zavala MD;  Location: [facility] CATH INVASIVE LOCATION;  Service: Cardiovascular;  Laterality: Right;    CARDIAC ELECTROPHYSIOLOGY PROCEDURE Left 7/3/2023    Procedure: Pacemaker DC new [place] Notified;  Surgeon: Cr Herrera MD;  Location: [facility] CATH INVASIVE LOCATION;  Service: Cardiovascular;  Laterality: Left;    CARPAL TUNNEL RELEASE      CATARACT EXTRACTION      CUBITAL TUNNEL RELEASE      HYSTERECTOMY      REPLACEMENT TOTAL KNEE      left 2008    ROTATOR CUFF REPAIR      TOTAL KNEE ARTHROPLASTY Right 5/4/2023    Procedure: TOTAL KNEE ARTHROPLASTY WITH CORI ROBOT;  Surgeon: Chino Herrera II, MD;  Location: [facility] MAIN OR;  Service: Robotics - Ortho;  Laterality: Right;       Social History:  Social History     Socioeconomic History    Marital status:    Tobacco Use    Smoking status: Never    Smokeless tobacco: Never   Vaping Use    Vaping Use: Never used   Substance and Sexual Activity    Alcohol use: No    Drug use: No    Sexual activity: Not Currently       Review of Systems:  The following systems were reviewed as they relate to the cardiovascular system: Constitutional, Eyes, ENT, Cardiovascular, Respiratory, Gastrointestinal, Integumentary, Neurological, Psychiatric, Hematologic, Endocrine, Musculoskeletal, and Genitourinary. The pertinent cardiovascular findings are reported above with all other cardiovascular points within those systems being negative.    Diagnostic Study Review:     Current Electrocardiogram:    ECG 12 Lead    Date/Time: 10/25/2023 2:02 PM  Performed by: Ratna Zavala MD    Authorized by: Ratna Zavala MD  Comparison: compared with previous ECG    Similar to previous ECG  Rhythm: sinus rhythm  Rate: normal  BPM: 69  Conduction: conduction normal  QRS axis: normal  Other findings: non-specific ST-T wave changes    Clinical impression: abnormal EKG            Advance Care Planning   ACP discussion was held with the patient during this visit. Patient has an advance directive in EMR which is still valid.         NOTE: The following portions of the patient's history were reviewed and updated this visit as appropriate: allergies, current medications, past family history, past medical history, past social history, past surgical history and problem list.

## 2023-10-25 NOTE — PATIENT INSTRUCTIONS
Health Maintenance Due   Topic Date Due    DXA SCAN  06/14/2023    COVID-19 Vaccine (4 - 2023-24 season) 09/01/2023    Discontinue centrum silver while on   Doxycycline.    Warm soaks 10 minutes 4 X/dy and call 10 days to report progress

## 2023-10-26 ENCOUNTER — OFFICE VISIT (OUTPATIENT)
Dept: FAMILY MEDICINE CLINIC | Facility: CLINIC | Age: 78
End: 2023-10-26
Payer: MEDICARE

## 2023-10-26 VITALS
DIASTOLIC BLOOD PRESSURE: 75 MMHG | HEART RATE: 60 BPM | HEIGHT: 62 IN | OXYGEN SATURATION: 99 % | TEMPERATURE: 97.8 F | BODY MASS INDEX: 43.61 KG/M2 | SYSTOLIC BLOOD PRESSURE: 130 MMHG | WEIGHT: 237 LBS

## 2023-10-26 DIAGNOSIS — E66.1 CLASS 3 DRUG-INDUCED OBESITY WITH SERIOUS COMORBIDITY AND BODY MASS INDEX (BMI) OF 40.0 TO 44.9 IN ADULT: ICD-10-CM

## 2023-10-26 DIAGNOSIS — L08.9 SKIN INFECTION: ICD-10-CM

## 2023-10-26 DIAGNOSIS — Z78.0 POST-MENOPAUSAL: Primary | ICD-10-CM

## 2023-10-26 DIAGNOSIS — I10 PRIMARY HYPERTENSION: ICD-10-CM

## 2023-10-26 RX ORDER — SEMAGLUTIDE 2.68 MG/ML
2 INJECTION, SOLUTION SUBCUTANEOUS WEEKLY
Qty: 3 ML | Refills: 0 | Status: SHIPPED | OUTPATIENT
Start: 2023-10-26

## 2023-10-26 RX ORDER — DOXYCYCLINE 100 MG/1
100 CAPSULE ORAL 2 TIMES DAILY
Qty: 20 CAPSULE | Refills: 0 | Status: SHIPPED | OUTPATIENT
Start: 2023-10-26

## 2023-10-26 NOTE — PROGRESS NOTES
"Subjective   Faby Le is a 78 y.o. female presents for   Chief Complaint   Patient presents with    Mass     2 spots under left arm - started about a week ago     Med Refill     Ozympic        Health Maintenance Due   Topic Date Due    DXA SCAN  06/14/2023    COVID-19 Vaccine (4 - 2023-24 season) 09/01/2023       History of Present Illness     The patient consented to the use of CORY.    The patient presents to the clinic for follow-up on postmenopausal status, hypertension, atypical moles, class III severe obesity with serious comorbidities and a body mass index of 40-44.    Atypical moles.   Patient noticed 2 spots under her left arm last week.  Spots are tender to touch. She has not noticed lesions on other parts of her body such as her groin .Denies any other lesions in the past.    Hyperglycemia.   Patient is taking Ozempic 2 mg once a week. She is no longer taking Farxiga. Denies any episodes of hypoglycemia. Hemoglobin A1c last 10/02/2023 was 5.1 percent.     Presence of cardiac pacemaker.   Patient follows with cardiac electrophysiologist, Dr. Herrera. She was started on bumetanide as needed for her lower extremity edema. She also complains of dizziness, which her cardiologist is aware of.     Obesity.   Patient tries to do 20 minutes of weightbearing exercises daily.     Postmenopausal.   Patient is due for her DEXA scan.     Allergies.   Patient is allergic to Celebrex.       Vitals:    10/26/23 0954 10/26/23 0955   BP: 141/72 130/75   BP Location: Right arm Left arm   Patient Position: Sitting Sitting   Cuff Size: Adult Adult   Pulse: 60    Temp: 97.8 °F (36.6 °C)    TempSrc: Temporal    SpO2: 99%    Weight: 108 kg (237 lb)    Height: 157.5 cm (62\")      Body mass index is 43.35 kg/m².    Current Outpatient Medications on File Prior to Visit   Medication Sig Dispense Refill    allopurinol (ZYLOPRIM) 300 MG tablet TAKE 1 TABLET BY MOUTH EVERY DAY 90 tablet 2    atorvastatin (LIPITOR) 20 MG tablet Take " 1 tablet by mouth Daily. 30 tablet 11    Azelastine HCl 137 MCG/SPRAY solution 2 sprays by Alternating Nares route Daily As Needed.      cetirizine (zyrTEC) 10 MG tablet Take 1 tablet by mouth Daily.      cyanocobalamin (VITAMIN B-12) 1000 MCG tablet Take 1 tablet by mouth Daily.      escitalopram (LEXAPRO) 20 MG tablet TAKE 1 TABLET BY MOUTH EVERY DAY 90 tablet 2    levothyroxine (SYNTHROID, LEVOTHROID) 88 MCG tablet TAKE 1 TABLET BY MOUTH EVERY DAY (Patient taking differently: Take 1 tablet by mouth Daily.) 90 tablet 3    metoprolol succinate XL (TOPROL-XL) 25 MG 24 hr tablet Take 1 tablet by mouth Every Night. 90 tablet 3    montelukast (SINGULAIR) 10 MG tablet Take 1 tablet by mouth Every Night.      rivaroxaban (XARELTO) 15 MG tablet Take 1 tablet by mouth Daily. 42 tablet     Vitamin D, Cholecalciferol, 25 MCG (1000 UT) tablet Take 1 tablet by mouth Daily.      [DISCONTINUED] Multiple Vitamins-Minerals (CENTRUM SILVER ADULT 50+) tablet Take 1 tablet by mouth Daily. HOLD NOW      [DISCONTINUED] Semaglutide, 2 MG/DOSE, (Ozempic, 2 MG/DOSE,) 8 MG/3ML solution pen-injector INJECT 2 MG UNDER THE SKIN INTO THE APPROPRIATE AREA AS DIRECTED EVERY 7 (SEVEN) DAYS. CONTINUE FOR 4 WEEKS 3 mL 0    [DISCONTINUED] bumetanide (Bumex) 2 MG tablet Take 0.5 tablets by mouth Daily As Needed (edema). 15 tablet 1    [DISCONTINUED] Farxiga 5 MG tablet tablet TAKE 1 TABLET BY MOUTH EVERY DAY (Patient not taking: Reported on 10/25/2023) 90 tablet 1    [DISCONTINUED] spironolactone (ALDACTONE) 25 MG tablet Take 0.5 tablets by mouth Daily. Patient reports as needed. (Patient not taking: Reported on 10/25/2023)       No current facility-administered medications on file prior to visit.       The following portions of the patient's history were reviewed and updated as appropriate: allergies, current medications, past family history, past medical history, past social history, past surgical history, and problem list.    Review of  Systems    Objective   Physical Exam  HENT:      Ears:      Comments: Tympanic membranes are normal.      Mouth/Throat:      Comments: Throat is normal.   Eyes:      Comments: Pupils are equal and reactive to light,   Neck:      Comments: No thyromegaly, thyroid masses, cervical or suboccipital adenopathy. Carotids are free of bruit.   Cardiovascular:      Comments: Heart rate is normal, normal rhythm, no murmurs  Pulmonary:      Comments: Lungs are clear and normal in all six lung fields   Abdominal:      Comments: Bowel sounds were normal active and there were no masses, tenderness or organomegaly.        PHQ-9 Total Score:      Assessment & Plan   Diagnoses and all orders for this visit:    1. Post-menopausal (Primary)    2. Primary hypertension    3. Skin infection    4. Class 3 drug-induced obesity with serious comorbidity and body mass index (BMI) of 40.0 to 44.9 in adult    Other orders  -     Semaglutide, 2 MG/DOSE, (Ozempic, 2 MG/DOSE,) 8 MG/3ML solution pen-injector; Inject 2 mg under the skin into the appropriate area as directed 1 (One) Time Per Week.  Dispense: 3 mL; Refill: 0  -     doxycycline (MONODOX) 100 MG capsule; Take 1 capsule by mouth 2 (Two) Times a Day.  Dispense: 20 capsule; Refill: 0          Primary hypertension.   Blood pressure has been stable. She will continue with her current medication.     Skin infection.   Advised patient to apply a clean warm washcloth on the skin lesions 10 minutes 4 times a day. She will be started on doxycycline 100 mg 2 times a day for 10 days. Advised patient to continue Centrum silver while on the doxycycline. p    Class 3 drug-induced obesity with serious comorbidity and body mass index (BMI) of 40.0 to 44.9 in adult.  Advised patient to monitor food portion size and increase walking. Patient will continue with Ozempic once a week.         Patient Instructions     Health Maintenance Due   Topic Date Due    DXA SCAN  06/14/2023    COVID-19 Vaccine (4 -  2023-24 season) 09/01/2023    Discontinue centrum silver while on   Doxycycline.    Warm soaks 10 minutes 4 X/dy and call 10 days to report progress           Transcribed from ambient dictation for Leonor Adam MD by Nena Issa.  10/26/23   11:56 EDT    Patient or patient representative verbalized consent to the visit recording.  I have personally performed the services described in this document as transcribed by the above individual, and it is both accurate and complete.

## 2023-11-08 ENCOUNTER — OFFICE VISIT (OUTPATIENT)
Dept: PODIATRY | Facility: CLINIC | Age: 78
End: 2023-11-08
Payer: MEDICARE

## 2023-11-08 VITALS — WEIGHT: 237 LBS | RESPIRATION RATE: 20 BRPM | HEIGHT: 62 IN | BODY MASS INDEX: 43.61 KG/M2

## 2023-11-08 DIAGNOSIS — Z98.1 HISTORY OF ANKLE FUSION: ICD-10-CM

## 2023-11-08 DIAGNOSIS — E66.01 MORBID OBESITY WITH BMI OF 45.0-49.9, ADULT: ICD-10-CM

## 2023-11-08 DIAGNOSIS — M19.072 ARTHRITIS OF LEFT FOOT: ICD-10-CM

## 2023-11-08 DIAGNOSIS — M79.672 BILATERAL FOOT PAIN: Primary | ICD-10-CM

## 2023-11-08 DIAGNOSIS — M19.071 ARTHRITIS OF RIGHT ANKLE: ICD-10-CM

## 2023-11-08 DIAGNOSIS — M77.42 METATARSALGIA, LEFT FOOT: ICD-10-CM

## 2023-11-08 DIAGNOSIS — M79.671 BILATERAL FOOT PAIN: Primary | ICD-10-CM

## 2023-11-08 NOTE — PROGRESS NOTES
11/08/2023  Foot and Ankle Surgery - Established Patient/Follow-up  Provider: Dr. Donta Kelley DPM  Location: [facility]    Subjective:  Faby Le is a 78 y.o. female.     Chief Complaint   Patient presents with    Left Foot - Pain    Right Foot - Pain    Follow-up     LEE ANN Adam MD 10/26/2023       HPI: Patient is a 78 year-old female. She is seeing me for new issues involving her feet.    The patient was last seen in 06/2023 for the same issues. She feels like she is walking on tamia. She notes her left foot is worse then the right foot. She states the pebble sensation is all over her left foot mostly in her arch area. She reports she continues with the symptoms even when wearing tennis shoes and inserts. She notes she has had those inserts for awhile. She states she has lost some weight.    The patient reports she had a right knee replacement in 05/2023.    Allergies   Allergen Reactions    Celecoxib Itching and Swelling     swelling       Current Outpatient Medications on File Prior to Visit   Medication Sig Dispense Refill    allopurinol (ZYLOPRIM) 300 MG tablet TAKE 1 TABLET BY MOUTH EVERY DAY 90 tablet 2    atorvastatin (LIPITOR) 20 MG tablet Take 1 tablet by mouth Daily. 30 tablet 11    Azelastine HCl 137 MCG/SPRAY solution 2 sprays by Alternating Nares route Daily As Needed.      cetirizine (zyrTEC) 10 MG tablet Take 1 tablet by mouth Daily.      cyanocobalamin (VITAMIN B-12) 1000 MCG tablet Take 1 tablet by mouth Daily.      doxycycline (MONODOX) 100 MG capsule Take 1 capsule by mouth 2 (Two) Times a Day. 20 capsule 0    escitalopram (LEXAPRO) 20 MG tablet TAKE 1 TABLET BY MOUTH EVERY DAY 90 tablet 2    levothyroxine (SYNTHROID, LEVOTHROID) 88 MCG tablet TAKE 1 TABLET BY MOUTH EVERY DAY (Patient taking differently: Take 1 tablet by mouth Daily.) 90 tablet 3    metoprolol succinate XL (TOPROL-XL) 25 MG 24 hr tablet Take 1 tablet by mouth Every Night. 90 tablet 3    montelukast (SINGULAIR)  "10 MG tablet Take 1 tablet by mouth Every Night.      rivaroxaban (XARELTO) 15 MG tablet Take 1 tablet by mouth Daily. 42 tablet     Semaglutide, 2 MG/DOSE, (Ozempic, 2 MG/DOSE,) 8 MG/3ML solution pen-injector Inject 2 mg under the skin into the appropriate area as directed 1 (One) Time Per Week. 3 mL 0    Vitamin D, Cholecalciferol, 25 MCG (1000 UT) tablet Take 1 tablet by mouth Daily.       No current facility-administered medications on file prior to visit.       Objective   Resp 20   Ht 157.5 cm (62\")   Wt 108 kg (237 lb)   LMP  (LMP Unknown)   BMI 43.35 kg/m²     Foot/Ankle Exam    GENERAL  Appearance:  obese  Orientation:  AAOx3  Affect:  appropriate    VASCULAR     Right Foot Vascularity   Normal vascular exam    Dorsalis pedis:  2+  Posterior tibial:  2+  Skin temperature:  warm  Edema grading:  None  CFT:  < 3 seconds  Pedal hair growth:  Present  Varicosities:  none     Left Foot Vascularity   Normal vascular exam    Dorsalis pedis:  2+  Posterior tibial:  2+  Skin temperature:  warm  Edema grading:  None  CFT:  < 3 seconds  Pedal hair growth:  Present  Varicosities:  none     NEUROLOGIC     Right Foot Neurologic   Light touch sensation: normal  Hot/Cold sensation: normal  Achilles reflex:  2+     Left Foot Neurologic   Light touch sensation: normal  Hot/Cold sensation:  normal  Achilles reflex:  2+    MUSCULOSKELETAL     Right Foot Musculoskeletal   Arch:  Normal     Left Foot Musculoskeletal   Arch:  Normal    MUSCLE STRENGTH     Right Foot Muscle Strength   Normal strength    Foot dorsiflexion:  5  Foot plantar flexion:  5  Foot inversion:  5  Foot eversion:  5     Left Foot Muscle Strength   Normal strength    Foot dorsiflexion:  5  Foot plantar flexion:  5  Foot inversion:  5  Foot eversion:  5    DERMATOLOGIC      Right Foot Dermatologic   Skin  Right foot skin is intact.      Left Foot Dermatologic   Skin  Left foot skin is intact.     TESTS     Right Foot Tests   Anterior drawer: " negative  Varus tilt: negative     Left Foot Tests   Anterior drawer: negative  Varus tilt: negative     Right foot additional comments: 11/08/2023  Continued rigidity involving ankles and feet. Discomfort with palpation to the arch and forefoot regions of both feet, left greater than right.     Left foot additional comments: Prominent rigidity noted to the left rear foot secondary to previous tibiotalar calcaneal fusion. Moderate hypermobility involving the midfoot with mild crepitus. No isolated discomfort with palpation involving the forefoot or midfoot today.    03/13/2023  Continued discomfort involving the plantar lateral aspect of the forefoot. Moderate pes planus foot structure with soft tissue rigidity. Range of motion is limited to the rear foot secondary to previous fusion.    06/13/2023  No progressive deformity or instability. Continued discomfort involving the medial aspect of the foot and ankles. Normal range of motion, muscle strength testing.    11/08/2023  Continued rigidity involving ankles and feet. Discomfort with palpation to the arch and forefoot regions of both feet, left greater than right.      Assessment & Plan   Diagnoses and all orders for this visit:    1. Bilateral foot pain (Primary)  -     XR Foot 3+ View Bilateral    2. Arthritis of left foot    3. Metatarsalgia, left foot  -     Ambulatory Referral to Physical Therapy Evaluate and treat, Aquatics    4. Arthritis of right ankle    5. History of ankle fusion    6. Morbid obesity with BMI of 45.0-49.9, adult      Patient is here today for a follow up regarding her bilateral foot pain. We discussed the etiology and biomechanics involved with her bilateral feet pain. Images were independently reviewed. I explained since she had a couple primary joints fused up in the back of her ankle, she cannot flex it. I explained since she cannot flex her ankle that creates excessive amounts of tightness in the back of her leg. I explained the more  she is up and on her feet, the more she is stressing the soft tissues, creating pressure on the nerves, and those nerves are relaying back burning, numbness, tingling sensations, and altered sensations. I recommend new inserts. I explained they need to be changed every 6 months. I recommend massaging the bottom of her feet with a tennis ball a couple of times a day. I recommend low impact exercises such as biking, elliptical, swimming, and water aerobics. I recommend weight loss. I explained that physical therapy is an option. Follow up in 3 months for reevaluation. Greater than 20 minutes spent before coming during, coming after evaluation for patient care.    Orders Placed This Encounter   Procedures    XR Foot 3+ View Bilateral     Weight Bearing     Order Specific Question:   Reason for Exam:     Answer:   sena foot pain rm 9 wb     Order Specific Question:   Release to patient     Answer:   Routine Release [[id number]]    Ambulatory Referral to Physical Therapy Evaluate and treat, Aquatics     Referral Priority:   Routine     Referral Type:   Physical Therapy     Referral Reason:   Specialty Services Required     Requested Specialty:   Physical Therapy     Number of Visits Requested:   1          Note is dictated utilizing voice recognition software. Unfortunately this leads to occasional typographical errors. I apologize in advance if the situation occurs. If questions occur please do not hesitate to call our office.    Transcribed from ambient dictation for STEPHANIE Kelley DPM by Bin Santizo.  11/08/23   11:11 EST    Patient or patient representative verbalized consent to the visit recording.  I have personally performed the services described in this document as transcribed by the above individual, and it is both accurate and complete.

## 2023-11-10 ENCOUNTER — TELEPHONE (OUTPATIENT)
Dept: FAMILY MEDICINE CLINIC | Facility: CLINIC | Age: 78
End: 2023-11-10
Payer: MEDICARE

## 2023-11-10 NOTE — TELEPHONE ENCOUNTER
RELAY----- Message from Leonor Adam MD sent at 11/10/2023  4:42 PM EST -----  DEXA scan showed osteopenia or thinning of the bones.  The last calcium and vitamin D levels look normal so make sure that you are trying to get 20 minutes of weightbearing exercise daily.  If you have not had any blood clots you could consider a medication called Evista or raloxifene 60 mg once daily to help prevent osteo wu from developing into osteoporosis please let us know call if any other questions or concerns

## 2023-11-17 ENCOUNTER — OFFICE VISIT (OUTPATIENT)
Dept: CARDIOLOGY | Facility: CLINIC | Age: 78
End: 2023-11-17
Payer: MEDICARE

## 2023-11-17 VITALS
BODY MASS INDEX: 43.61 KG/M2 | DIASTOLIC BLOOD PRESSURE: 52 MMHG | SYSTOLIC BLOOD PRESSURE: 124 MMHG | WEIGHT: 237 LBS | HEART RATE: 73 BPM | OXYGEN SATURATION: 100 % | HEIGHT: 62 IN

## 2023-11-17 DIAGNOSIS — Z95.0 PRESENCE OF CARDIAC PACEMAKER: ICD-10-CM

## 2023-11-17 DIAGNOSIS — I49.5 SICK SINUS SYNDROME: Primary | ICD-10-CM

## 2023-11-17 DIAGNOSIS — D50.8 OTHER IRON DEFICIENCY ANEMIA: ICD-10-CM

## 2023-11-17 DIAGNOSIS — E78.2 MIXED HYPERLIPIDEMIA: ICD-10-CM

## 2023-11-17 DIAGNOSIS — I48.0 PAROXYSMAL ATRIAL FIBRILLATION: ICD-10-CM

## 2023-11-17 DIAGNOSIS — I25.10 CORONARY ARTERY DISEASE INVOLVING NATIVE HEART WITHOUT ANGINA PECTORIS, UNSPECIFIED VESSEL OR LESION TYPE: ICD-10-CM

## 2023-11-17 DIAGNOSIS — I50.32 CHRONIC DIASTOLIC HEART FAILURE: ICD-10-CM

## 2023-11-17 DIAGNOSIS — I10 PRIMARY HYPERTENSION: ICD-10-CM

## 2023-11-17 RX ORDER — DIPHENOXYLATE HYDROCHLORIDE AND ATROPINE SULFATE 2.5; .025 MG/1; MG/1
TABLET ORAL DAILY
COMMUNITY

## 2023-11-17 NOTE — LETTER
November 17, 2023       No Recipients    Patient: Faby Le   YOB: 1945   Date of Visit: 11/17/2023     Dear Leonor Adam MD:       Thank you for referring Faby Le to me for evaluation. Below are the relevant portions of my assessment and plan of care.    If you have questions, please do not hesitate to call me. I look forward to following Faby along with you.         Sincerely,        Cr Herrera MD        CC:   No Recipients    Cr Herrera MD  11/17/23 1208  Sign when Signing Visit  CC--sick sinus syndrome and pacemaker in situ      Sub  78-year-old pleasant patient has tachybradycardia syndrome with symptoms of dizziness and near syncope and underwent a pacemaker implantation done in July and comes in for follow-up.  She had a loop recorder in situ which has been removed.  Loop recorder removed during the time of pacemaker implantation.  Patient had intermittent atrial fibrillation and has normal LV systolic function with history of hypertension, obstructive sleep apnea and bilateral lower extremity edema.  She had moderate coronary artery disease involving the diagonal branch of the LAD and mild to moderate obstructive disease involving circumflex artery followed by interventional cardiology.  Cardiac catheterization performed in 2022 revealed mild obstructive disease left to medical management.  She had normal LV systolic function.  Patient was noted to be anemic and work-up revealed iron deficiency with normal B12 and patient was treated with iron supplementation      Past Medical History:   Diagnosis Date   • Afib    • Ankle pain    • Arthritis    • Chronic kidney disease    • Coronary artery disease    • Depression    • Gout    • History of loop recorder 04/25/2023    current   • Hyperglycemia    • Hyperlipidemia    • Hypertension    • Low back pain    • Morbid obesity    • Sleep apnea    • Vitamin D deficiency      Past Surgical History:   Procedure Laterality  Date   • ANKLE FUSION      2017-left   • CARDIAC CATHETERIZATION     • CARDIAC CATHETERIZATION Right 8/29/2022    Procedure: Left Heart Cath;  Surgeon: Ratna Zavala MD;  Location: [facility] CATH INVASIVE LOCATION;  Service: Cardiovascular;  Laterality: Right;   • CARDIAC ELECTROPHYSIOLOGY PROCEDURE Left 7/3/2023    Procedure: Pacemaker DC new [place] Notified;  Surgeon: Cr Herrera MD;  Location: [facility] CATH INVASIVE LOCATION;  Service: Cardiovascular;  Laterality: Left;   • CARPAL TUNNEL RELEASE     • CATARACT EXTRACTION     • CUBITAL TUNNEL RELEASE     • HYSTERECTOMY     • REPLACEMENT TOTAL KNEE      left 2008   • ROTATOR CUFF REPAIR     • TOTAL KNEE ARTHROPLASTY Right 5/4/2023    Procedure: TOTAL KNEE ARTHROPLASTY WITH CORI ROBOT;  Surgeon: Chino Herrera II, MD;  Location: [facility] MAIN OR;  Service: Robotics - Ortho;  Laterality: Right;         Physical Exam    General:      well developed, well nourished, in no acute distress.    Head:      normocephalic and atraumatic.    Eyes:      PERRL/EOM intact, conjunctivae and sclerae clear without nystagmus.    Neck:      no  thyromegaly, trachea central with normal respiratory effort  Lungs:      clear bilaterally to auscultation.    Heart:       regular rate and rhythm, S1, S2 without murmurs, rubs, or gallops  Skin:      intact without lesions or rashes.    Psych:      alert and cooperative; normal mood and affect; normal attention span and concentration.    Pacemaker site is clean      Assessment plan    Latest hemoglobin 11.3 with normal platelets  LDL of 94.  Potassium normal creatinine 1.09  Patient has iron deficiency anemia and started on iron supplementation with improvement of hemoglobin.  Patient educated about anemia and to be referred to gastroenterologist to exclude for any occult bleeding and referral made  Paroxysmal atrial fibrillation with 1% AF burden on pacemaker interrogation currently on rivaroxaban  Hypothyroidism  supplemented with levothyroxine and recent TSH is normal  Sick sinus syndrome with dual-chamber pacemaker in situ and pacemaker interrogation and reprogramming done attached to chart  Hyperlipidemia on atorvastatin  Coronary artery disease stable followed by interventional cardiology  Patient is on Ozempic followed by primary care  Medications reviewed and follow-up appointments made      Electronically signed by Cr Herrera MD, 11/17/23, 12:07 PM EST.

## 2023-11-17 NOTE — PROGRESS NOTES
CC--sick sinus syndrome and pacemaker in situ      Sub  78-year-old pleasant patient has tachybradycardia syndrome with symptoms of dizziness and near syncope and underwent a pacemaker implantation done in July and comes in for follow-up.  She had a loop recorder in situ which has been removed.  Loop recorder removed during the time of pacemaker implantation.  Patient had intermittent atrial fibrillation and has normal LV systolic function with history of hypertension, obstructive sleep apnea and bilateral lower extremity edema.  She had moderate coronary artery disease involving the diagonal branch of the LAD and mild to moderate obstructive disease involving circumflex artery followed by interventional cardiology.  Cardiac catheterization performed in 2022 revealed mild obstructive disease left to medical management.  She had normal LV systolic function.  Patient was noted to be anemic and work-up revealed iron deficiency with normal B12 and patient was treated with iron supplementation      Past Medical History:   Diagnosis Date    Afib     Ankle pain     Arthritis     Chronic kidney disease     Coronary artery disease     Depression     Gout     History of loop recorder 04/25/2023    current    Hyperglycemia     Hyperlipidemia     Hypertension     Low back pain     Morbid obesity     Sleep apnea     Vitamin D deficiency      Past Surgical History:   Procedure Laterality Date    ANKLE FUSION      2017-left    CARDIAC CATHETERIZATION      CARDIAC CATHETERIZATION Right 8/29/2022    Procedure: Left Heart Cath;  Surgeon: Ratna Zavala MD;  Location: [facility] CATH INVASIVE LOCATION;  Service: Cardiovascular;  Laterality: Right;    CARDIAC ELECTROPHYSIOLOGY PROCEDURE Left 7/3/2023    Procedure: Pacemaker DC new [place] Notified;  Surgeon: Cr Herrera MD;  Location: [facility] CATH INVASIVE LOCATION;  Service: Cardiovascular;  Laterality: Left;    CARPAL TUNNEL RELEASE      CATARACT EXTRACTION      CUBITAL  TUNNEL RELEASE      HYSTERECTOMY      REPLACEMENT TOTAL KNEE      left 2008    ROTATOR CUFF REPAIR      TOTAL KNEE ARTHROPLASTY Right 5/4/2023    Procedure: TOTAL KNEE ARTHROPLASTY WITH CORI ROBOT;  Surgeon: Chino Herrera II, MD;  Location: [facility];  Service: Robotics - Ortho;  Laterality: Right;         Physical Exam    General:      well developed, well nourished, in no acute distress.    Head:      normocephalic and atraumatic.    Eyes:      PERRL/EOM intact, conjunctivae and sclerae clear without nystagmus.    Neck:      no  thyromegaly, trachea central with normal respiratory effort  Lungs:      clear bilaterally to auscultation.    Heart:       regular rate and rhythm, S1, S2 without murmurs, rubs, or gallops  Skin:      intact without lesions or rashes.    Psych:      alert and cooperative; normal mood and affect; normal attention span and concentration.    Pacemaker site is clean      Assessment plan    Latest hemoglobin 11.3 with normal platelets  LDL of 94.  Potassium normal creatinine 1.09  Patient has iron deficiency anemia and started on iron supplementation with improvement of hemoglobin.  Patient educated about anemia and to be referred to gastroenterologist to exclude for any occult bleeding and referral made  Paroxysmal atrial fibrillation with 1% AF burden on pacemaker interrogation currently on rivaroxaban  Hypothyroidism supplemented with levothyroxine and recent TSH is normal  Sick sinus syndrome with dual-chamber pacemaker in situ and pacemaker interrogation and reprogramming done attached to chart  Hyperlipidemia on atorvastatin  Coronary artery disease stable followed by interventional cardiology  Patient is on Ozempic followed by primary care  Medications reviewed and follow-up appointments made      Electronically signed by Cr Herrera MD, 11/17/23, 12:07 PM EST.

## 2023-11-27 ENCOUNTER — TREATMENT (OUTPATIENT)
Dept: PHYSICAL THERAPY | Facility: CLINIC | Age: 78
End: 2023-11-27
Payer: MEDICARE

## 2023-11-27 ENCOUNTER — EXTERNAL PBMM DATA (OUTPATIENT)
Dept: PHARMACY | Facility: OTHER | Age: 78
End: 2023-11-27
Payer: MEDICARE

## 2023-11-27 DIAGNOSIS — G89.29 CHRONIC PAIN OF BOTH SHOULDERS: ICD-10-CM

## 2023-11-27 DIAGNOSIS — M77.42 METATARSALGIA, LEFT FOOT: Primary | ICD-10-CM

## 2023-11-27 DIAGNOSIS — R26.2 DIFFICULTY WALKING: ICD-10-CM

## 2023-11-27 DIAGNOSIS — M25.511 CHRONIC PAIN OF BOTH SHOULDERS: ICD-10-CM

## 2023-11-27 DIAGNOSIS — M25.512 CHRONIC PAIN OF BOTH SHOULDERS: ICD-10-CM

## 2023-11-27 PROCEDURE — 97162 PT EVAL MOD COMPLEX 30 MIN: CPT

## 2023-11-27 NOTE — PROGRESS NOTES
"Physical Therapy Initial Evaluation and Plan of Care      [address] [place]   [address]    Patient: Faby Le   : 1945  Diagnosis/ICD-10 Code:  Metatarsalgia, left foot [M77.42]d; difficulty in walking R26.2; chronic pain in (B) shuolders M25.511 and M25.512  Referring practitioner: STEPHANIE Kelley DPM  Date of Initial Visit: 2023  Today's Date: 2023  Patient seen for 1 sessions           Subjective Questionnaire: FAAM: indicates 60% functional impairment via scoring       Subjective Evaluation    History of Present Illness  Mechanism of injury: - Pt reported having (R) TKR in , pt reported it did not go as well as (L) TKR in .    - Pt reported she has been using cane since 2023.  Pt was using RWX after TKR until this time  - Pt reported it is difficult to reach up to put items into upper cabinets/shelving; limited in ambulation tolerance.   Patient reported she is able to ambulate for short duration of time in grocery/retail store  - Pt presents for therapy with referral for (L) foot pain but c/o pain at shoulders, back  and (R) knee.  \" All over\" noted on intake form.  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 Pain  Current pain ratin  At best pain rating: 3  At worst pain ratin  Location: \"ALL OVER\" per intake response  Relieving factors: relaxation and rest (sleeping on right side)  Aggravating factors: ambulation  Progression: no change    Social Support  Lives in: apartment (1st floor)  Lives with: alone (has 6 daughters that are able to assist)    Treatments  Previous treatment: injection treatment and physical therapy ((R) posterior hip)  Patient Goals  Patient goals for therapy: decreased edema and increased strength  Patient goal: increased physical activity         PMHx: a-fib; + PACEMAKER; CAD; CKD; arthritis; HTN; h/o low back pain; sleep apnea; morbid obesity " ; carpal tunnel release (R) ; cubital tunnel release (R); (B) TKR - left in 2007 and right in 2023    Objective          Observations     Additional Ankle/Foot Observation Details  Pt denied open wounds; bowel and  bladder issues    Neurological Testing     Sensation     Ankle/Foot   Left Ankle/Foot   Intact: light touch    Right Ankle/Foot   Intact: light touch     Active Range of Motion     Additional Active Range of Motion Details  Limited (L)  functional shoulder ER- can place hand to side of head only  Limited (L) shoulder flexion AROM by 25%    Strength/Myotome Testing     Left Shoulder     Planes of Motion   Flexion: 3+   Adduction: 3-   External rotation at 0°: 3-   Internal rotation at 0°: 5     Right Shoulder     Planes of Motion   Flexion: 4   Adduction: 3+   External rotation at 0°: 4-   Internal rotation at 0°: 5     Left Elbow   Flexion: 4  Extension: 4    Right Elbow   Flexion: 5  Extension: 5    Left Hip   Planes of Motion   Flexion: 5  Abduction: 3+    Right Hip   Planes of Motion   Flexion: 5  Abduction: 3+    Left Knee   Flexion: 5  Extension: 5    Right Knee   Flexion: 5  Extension: 5    Left Ankle/Foot   Dorsiflexion: 5  Inversion: 5  Eversion: 4    Right Ankle/Foot   Dorsiflexion: 5  Inversion: 5  Eversion: 5    Tests     Additional Tests Details  -Severely impaired (L) gastroc/soleus flexibility  - Mild impairment (B) hamstring flexibility       Ambulation     Ambulation: Level Surfaces   Ambulation with assistive device: independent    Additional Level Surfaces Ambulation Details  Using SPC on left side     Observational Gait   Decreased right stance time and left step length.   Left foot contact pattern: foot flat  Right foot contact pattern: foot flat    Comments   2 Minute walk test: 234 feet with (L) SPC           Assessment & Plan       Assessment  Impairments: abnormal coordination, abnormal gait, abnormal muscle firing, abnormal muscle tone, abnormal or restricted ROM, activity  intolerance, impaired balance, impaired physical strength, lacks appropriate home exercise program, pain with function and weight-bearing intolerance   Functional limitations: carrying objects, lifting, sleeping, walking, pulling, pushing, uncomfortable because of pain, moving in bed, standing, stooping and reaching overhead   Assessment details: Patient presents with significant impairments, including pain; decreased mobility; impaired gait; decreased flexibility,  ROM and strength. Impaired strength and function of upper and lower extremities    Due to pain and impairments, pt has difficulty ambulating community distances; navigating stairs and curbs, lifting objects into upper cabinets and shelving and performing household tasks.     Based on the above impairments and the examination, this patient has the potential to benefit from Physical Therapy. Will initiate principles of aquatic therapy to offload spine/joints, thermal effects to reduce pain, and hydrostatic pressure to facilitate exercise with transition to land as tolerated.    Barriers to therapy: chronic pain; OA; multi joint pain; limited activity tolerance  Prognosis: fair    Goals  Plan Goals: STG's (3 weeks)  1. Tolerate 45 minutes aquatic therapy with reduction in pain.   2. Able to ambulate x 10 min on Aquatic TM with improved gait pattern  for carry over to land based therapy  3. Able to tolerate initial HEP w/ progression for self management of impairments    LTG's (by d/c)- 6 weeks   1. Independent with HEP and able to manage condition independently.  2. Pt will demonstrate improved (L) elbow strength = 5/5 and (L) shoulder flexion and abd = 4-/5 or better for improved ability to lift objects into upper cabinets/shelving  3. FAAM will indicate improved function via scoring suggesting 25% impaired or less  4.  Pt will have ability to ambulate 20 minutes or greater without provocation of increased pain for improved ability to perform community  ambulation.   5.  Pt will demonstrate improved (L) ankle eversion strength = 5/5 and (B) hip flex/abd strength = 5/5 for improved postural support with standing and ambulatory based activities.       Plan  Therapy options: will be seen for skilled therapy services  Planned modality interventions: electrical stimulation/Russian stimulation, cryotherapy, thermotherapy (hydrocollator packs), ultrasound and TENS  Planned therapy interventions: joint mobilization, home exercise program, gait training, functional ROM exercises, flexibility, manual therapy, neuromuscular re-education, postural training, soft tissue mobilization, spinal/joint mobilization, strengthening, stretching, therapeutic activities, abdominal trunk stabilization and body mechanics training  Other planned therapy interventions: aquatic therapy  Frequency: 2x week  Duration in visits: 12  Duration in weeks: 12  Treatment plan discussed with: patient        History # of Personal Factors and/or Comorbidities: HIGH (3+)  Examination of Body System(s): # of elements: MODERATE (3)  Clinical Presentation: EVOLVING  Clinical Decision Making: MODERATE      Timed:         Manual Therapy:    0     mins  02621;     Therapeutic Exercise:    0     mins  58132;     Neuromuscular Mohan:    0    mins  82201;    Therapeutic Activity:     0     mins  64459;     Gait Trainin     mins  45873;     Ultrasound:     0     mins  91312;    Ionto                               0    mins   46712  Self Care                       0     mins   82905  Aquatic                          0     mins 45458      Un-Timed:  Electrical Stimulation:    0     mins  04443 ( );  Dry Needling     0     mins self-pay  Traction     0     mins 22553  Low Eval     0     Mins  77965  Mod Eval     40     Mins  04783  High Eval                       0     Mins  88167  Re-Eval                           0    mins  68497        Timed Treatment:   0   mins   Total Treatment:     40   mins    PT  SIGNATURE: Loni Seay PT   [place] License#: [id number]      Electronically signed by Loni Seay PT, 11/27/23, 10:04 AM EST      Medicare Initial Certification  Certification Period:  11/27/2023 thru 2/24/2024  I certify that the therapy services are furnished while this patient is under my care.  The services outlined above are required by this patient, and will be reviewed every 90 days.       Physician Signature:__________________________________________________    PHYSICIAN: STEPHANIE Kelley DPM      DATE:     Please sign and return via fax to [phone].. Thank you, [facility].

## 2023-11-29 ENCOUNTER — TREATMENT (OUTPATIENT)
Dept: PHYSICAL THERAPY | Facility: CLINIC | Age: 78
End: 2023-11-29
Payer: MEDICARE

## 2023-11-29 DIAGNOSIS — M25.512 CHRONIC PAIN OF BOTH SHOULDERS: ICD-10-CM

## 2023-11-29 DIAGNOSIS — G89.29 CHRONIC PAIN OF BOTH SHOULDERS: ICD-10-CM

## 2023-11-29 DIAGNOSIS — M77.42 METATARSALGIA, LEFT FOOT: Primary | ICD-10-CM

## 2023-11-29 DIAGNOSIS — M25.511 CHRONIC PAIN OF BOTH SHOULDERS: ICD-10-CM

## 2023-11-29 DIAGNOSIS — R26.2 DIFFICULTY WALKING: ICD-10-CM

## 2023-11-29 NOTE — PROGRESS NOTES
Physical Therapy Treatment  Note  [address] [facility] [place]   [address]     Diagnosis Plan   1. Metatarsalgia, left foot        2. Chronic pain of both shoulders        3. Difficulty walking            VISIT#: 2    Subjective   Faby Rey reports: 5/10 at knee upon arrival to therapy.       Objective   - Entered and exited the pool via chair   See Exercise, Manual, and Modality Logs for complete treatment.     Patient Education:    Goals  Plan Goals: STG's (3 weeks)  1. Tolerate 45 minutes aquatic therapy with reduction in pain.   2. Able to ambulate x 10 min on Aquatic TM with improved gait pattern  for carry over to land based therapy  3. Able to tolerate initial HEP w/ progression for self management of impairments     LTG's (by d/c)- 6 weeks   1. Independent with HEP and able to manage condition independently.  2. Pt will demonstrate improved (L) elbow strength = 5/5 and (L) shoulder flexion and abd = 4-/5 or better for improved ability to lift objects into upper cabinets/shelving  3. FAAM will indicate improved function via scoring suggesting 25% impaired or less  4.  Pt will have ability to ambulate 20 minutes or greater without provocation of increased pain for improved ability to perform community ambulation.   5.  Pt will demonstrate improved (L) ankle eversion strength = 5/5 and (B) hip flex/abd strength = 5/5 for improved postural support with standing and ambulatory based activities.     Assessment/Plan  - pt ambulates with cane.  Supervision in pool area and shower for safety  - pt reported mild fatigue at conclusion of treatment  - Treatment  incorporating  principles of aquatic therapy to offload spine/joints, thermal effects to reduce pain, and hydrostatic pressure to facilitate exercise with transition to land as tolerated.   Progress per Plan of Care and Progress strengthening /stabilization /functional activity            Timed:         Manual Therapy:    0     mins  71263;     Therapeutic  Exercise:    0     mins  88480;     Neuromuscular Mohan:    0    mins  87840;    Therapeutic Activity:     0     mins  11171;     Gait Trainin     mins  66396;     Ultrasound:     0     mins  06405;    Ionto                               0    mins   17160  Self Care                       0     mins   05526  Canalith Repos               0    mins  4209  Aquatic                          55     mins 89618    Un-Timed:  Electrical Stimulation:    0     mins  40806 ( );  Dry Needling     0     mins self-pay  Traction     0     mins 59657  Low Eval     0     Mins  24665  Mod Eval     0     Mins  96526  High Eval                       0     Mins  17429  Re-Eval                           0    mins  10638    Timed Treatment:   55   mins   Total Treatment:     55   mins    Loni Seay, PT PT, DPT, [id number]

## 2023-12-04 ENCOUNTER — TREATMENT (OUTPATIENT)
Dept: PHYSICAL THERAPY | Facility: CLINIC | Age: 78
End: 2023-12-04
Payer: MEDICARE

## 2023-12-04 DIAGNOSIS — M25.512 CHRONIC PAIN OF BOTH SHOULDERS: ICD-10-CM

## 2023-12-04 DIAGNOSIS — G89.29 CHRONIC PAIN OF BOTH SHOULDERS: ICD-10-CM

## 2023-12-04 DIAGNOSIS — M25.511 CHRONIC PAIN OF BOTH SHOULDERS: ICD-10-CM

## 2023-12-04 DIAGNOSIS — M77.42 METATARSALGIA, LEFT FOOT: Primary | ICD-10-CM

## 2023-12-04 DIAGNOSIS — R26.2 DIFFICULTY WALKING: ICD-10-CM

## 2023-12-04 PROCEDURE — 97113 AQUATIC THERAPY/EXERCISES: CPT

## 2023-12-04 NOTE — PROGRESS NOTES
"Physical Therapy Treatment  Note  [address] [place]   [address]     Diagnosis Plan   1. Metatarsalgia, left foot        2. Chronic pain of both shoulders        3. Difficulty walking            VISIT#: 3    Subjective   Faby Rey reports: 5/10 at (L) ankle and (R) knee; worse than opposite side. Patient reported (L) shoulder soreness following previous session but did not believe she did too much. Pt reported it is no more sore than usual upon arrival to therapy     Objective   - Entered and exited the pool via chair   See Exercise, Manual, and Modality Logs for complete treatment.     Patient Education:    Goals  Plan Goals: STG's (3 weeks)  1. Tolerate 45 minutes aquatic therapy with reduction in pain.   2. Able to ambulate x 10 min on Aquatic TM with improved gait pattern  for carry over to land based therapy  3. Able to tolerate initial HEP w/ progression for self management of impairments     LTG's (by d/c)- 6 weeks   1. Independent with HEP and able to manage condition independently.  2. Pt will demonstrate improved (L) elbow strength = 5/5 and (L) shoulder flexion and abd = 4-/5 or better for improved ability to lift objects into upper cabinets/shelving  3. FAAM will indicate improved function via scoring suggesting 25% impaired or less  4.  Pt will have ability to ambulate 20 minutes or greater without provocation of increased pain for improved ability to perform community ambulation.   5.  Pt will demonstrate improved (L) ankle eversion strength = 5/5 and (B) hip flex/abd strength = 5/5 for improved postural support with standing and ambulatory based activities.     Assessment/Plan  - CAYDEN  in pool area and shower for safety as pt's gait is unsteady and without AD this date.   - pt reported mild fatigue at conclusion of treatment  - Pt reported mild shoulder pain at end of session but \" muscle pain\".  Did not believe UE exercises needed to be reduced.   - Treatment  incorporating  principles " of aquatic therapy to offload spine/joints, thermal effects to reduce pain, and hydrostatic pressure to facilitate exercise with transition to land as tolerated.   Progress per Plan of Care and Progress strengthening /stabilization /functional activity       Addendum:  billing correct to 4 units of aquatic therapy     Timed:         Manual Therapy:    0     mins  52362;     Therapeutic Exercise:    0     mins  10692;     Neuromuscular Mohan:    0    mins  19543;    Therapeutic Activity:     0     mins  36474;     Gait Trainin     mins  55699;     Ultrasound:     0     mins  21200;    Ionto                               0    mins   39886  Self Care                       0     mins   92837  Canalith Repos               0    mins  4209  Aquatic                          53     mins 09424    Un-Timed:  Electrical Stimulation:    0     mins  99771 ( );  Dry Needling     0     mins self-pay  Traction     0     mins 35583  Low Eval     0     Mins  68459  Mod Eval     0     Mins  19089  High Eval                       0     Mins  02301  Re-Eval                           0    mins  33835    Timed Treatment:   53   mins   Total Treatment:     53  mins    Loni Seay, PT PT, DPT, [id number]

## 2023-12-05 ENCOUNTER — TELEPHONE (OUTPATIENT)
Dept: CARDIOLOGY | Facility: CLINIC | Age: 78
End: 2023-12-05
Payer: MEDICARE

## 2023-12-06 ENCOUNTER — TREATMENT (OUTPATIENT)
Dept: PHYSICAL THERAPY | Facility: CLINIC | Age: 78
End: 2023-12-06
Payer: MEDICARE

## 2023-12-06 DIAGNOSIS — M25.512 CHRONIC PAIN OF BOTH SHOULDERS: ICD-10-CM

## 2023-12-06 DIAGNOSIS — G89.29 CHRONIC PAIN OF BOTH SHOULDERS: ICD-10-CM

## 2023-12-06 DIAGNOSIS — M25.511 CHRONIC PAIN OF BOTH SHOULDERS: ICD-10-CM

## 2023-12-06 DIAGNOSIS — M77.42 METATARSALGIA, LEFT FOOT: Primary | ICD-10-CM

## 2023-12-06 DIAGNOSIS — R26.2 DIFFICULTY WALKING: ICD-10-CM

## 2023-12-06 NOTE — PROGRESS NOTES
Physical Therapy Treatment  Note  [address] [place]   [address]     Diagnosis Plan   1. Metatarsalgia, left foot        2. Chronic pain of both shoulders        3. Difficulty walking            VISIT#: 4    Subjective   Faby Le reports: 5/10 at (L) ankle and (R) knee; worse than opposite side. Patient reported (L) shoulder soreness following previous session but did not believe she did too much.  Patient reported no significant change in pain levels.     Objective   - Entered and exited the pool via chair   See Exercise, Manual, and Modality Logs for complete treatment.     Patient Education:    Goals  Plan Goals: STG's (3 weeks)  1. Tolerate 45 minutes aquatic therapy with reduction in pain.   2. Able to ambulate x 10 min on Aquatic TM with improved gait pattern  for carry over to land based therapy  3. Able to tolerate initial HEP w/ progression for self management of impairments     LTG's (by d/c)- 6 weeks   1. Independent with HEP and able to manage condition independently.  2. Pt will demonstrate improved (L) elbow strength = 5/5 and (L) shoulder flexion and abd = 4-/5 or better for improved ability to lift objects into upper cabinets/shelving  3. FAAM will indicate improved function via scoring suggesting 25% impaired or less  4.  Pt will have ability to ambulate 20 minutes or greater without provocation of increased pain for improved ability to perform community ambulation.   5.  Pt will demonstrate improved (L) ankle eversion strength = 5/5 and (B) hip flex/abd strength = 5/5 for improved postural support with standing and ambulatory based activities.     Assessment/Plan  - CAYDEN  in pool area and shower for safety as pt's gait is unsteady    - pt reported mild fatigue at conclusion of treatment.  No pain reported while in water    - Treatment  incorporating  principles of aquatic therapy to offload spine/joints, thermal effects to reduce pain, and hydrostatic pressure to facilitate exercise  with transition to land as tolerated.     Progress per Plan of Care and Progress strengthening /stabilization /functional activity         Timed:         Manual Therapy:    0     mins  63461;     Therapeutic Exercise:    0     mins  46257;     Neuromuscular Mohan:    0    mins  50007;    Therapeutic Activity:     0     mins  15621;     Gait Trainin     mins  61673;     Ultrasound:     0     mins  14603;    Ionto                               0    mins   82162  Self Care                       0     mins   44831  Canalith Repos               0    mins  4209  Aquatic                            25   mins 69441    Un-Timed:  Electrical Stimulation:    0     mins  27218 ( );  Dry Needling     0     mins self-pay  Traction     0     mins 61992  Low Eval     0     Mins  99454  Mod Eval     0     Mins  43149  High Eval                       0     Mins  06342  Re-Eval                           0    mins  25337    Timed Treatment: 25     mins   Total Treatment:    25   mins    Loni Seay, PT PT, DPT, [id number]

## 2023-12-11 ENCOUNTER — TREATMENT (OUTPATIENT)
Dept: PHYSICAL THERAPY | Facility: CLINIC | Age: 78
End: 2023-12-11
Payer: MEDICARE

## 2023-12-11 DIAGNOSIS — M25.512 CHRONIC PAIN OF BOTH SHOULDERS: ICD-10-CM

## 2023-12-11 DIAGNOSIS — R26.2 DIFFICULTY WALKING: ICD-10-CM

## 2023-12-11 DIAGNOSIS — M77.42 METATARSALGIA, LEFT FOOT: Primary | ICD-10-CM

## 2023-12-11 DIAGNOSIS — G89.29 CHRONIC PAIN OF BOTH SHOULDERS: ICD-10-CM

## 2023-12-11 DIAGNOSIS — M25.511 CHRONIC PAIN OF BOTH SHOULDERS: ICD-10-CM

## 2023-12-11 NOTE — PROGRESS NOTES
"Physical Therapy Treatment  Note  [address] [place]   [address]     Diagnosis Plan   1. Metatarsalgia, left foot        2. Difficulty walking        3. Chronic pain of both shoulders            VISIT#: 5    Subjective   Faby Le reports: \" not too bad\" when asked regarding pain.  Patient c/o pain at (R) knee and (L) shoulder.     Objective   - Entered and exited the pool via chair   See Exercise, Manual, and Modality Logs for complete treatment.     Patient Education:    Goals  Plan Goals: STG's (3 weeks)  1. Tolerate 45 minutes aquatic therapy with reduction in pain.   2. Able to ambulate x 10 min on Aquatic TM with improved gait pattern  for carry over to land based therapy  3. Able to tolerate initial HEP w/ progression for self management of impairments     LTG's (by d/c)- 6 weeks   1. Independent with HEP and able to manage condition independently.  2. Pt will demonstrate improved (L) elbow strength = 5/5 and (L) shoulder flexion and abd = 4-/5 or better for improved ability to lift objects into upper cabinets/shelving  3. FAAM will indicate improved function via scoring suggesting 25% impaired or less  4.  Pt will have ability to ambulate 20 minutes or greater without provocation of increased pain for improved ability to perform community ambulation.   5.  Pt will demonstrate improved (L) ankle eversion strength = 5/5 and (B) hip flex/abd strength = 5/5 for improved postural support with standing and ambulatory based activities.     Assessment/Plan  - Pt was approx 15 minutes late for treatment session due to traffic. Session abbreviated as a result    - CAYDEN  in pool area and shower for safety as pt's gait is unsteady        - Treatment  incorporating  principles of aquatic therapy to offload spine/joints, thermal effects to reduce pain, and hydrostatic pressure to facilitate exercise with transition to land as tolerated.     Progress per Plan of Care and Progress strengthening /stabilization " /functional activity         Timed:         Manual Therapy:    0     mins  74208;     Therapeutic Exercise:    0     mins  28013;     Neuromuscular Mohan:    0    mins  58724;    Therapeutic Activity:     0     mins  50061;     Gait Trainin     mins  43839;     Ultrasound:     0     mins  49168;    Ionto                               0    mins   51307  Self Care                       0     mins   48389  Canalith Repos               0    mins  4209  Aquatic                            45  mins 42977    Un-Timed:  Electrical Stimulation:    0     mins  34482 ( );  Dry Needling     0     mins self-pay  Traction     0     mins 19217  Low Eval     0     Mins  29095  Mod Eval     0     Mins  39293  High Eval                       0     Mins  42797  Re-Eval                           0    mins  80175    Timed Treatment: 45     mins   Total Treatment:    45  mins    Loni Seay, PT PT, DPT, [id number]

## 2023-12-13 ENCOUNTER — TREATMENT (OUTPATIENT)
Dept: PHYSICAL THERAPY | Facility: CLINIC | Age: 78
End: 2023-12-13
Payer: MEDICARE

## 2023-12-13 DIAGNOSIS — G89.29 CHRONIC PAIN OF BOTH SHOULDERS: ICD-10-CM

## 2023-12-13 DIAGNOSIS — M77.42 METATARSALGIA, LEFT FOOT: Primary | ICD-10-CM

## 2023-12-13 DIAGNOSIS — M25.512 CHRONIC PAIN OF BOTH SHOULDERS: ICD-10-CM

## 2023-12-13 DIAGNOSIS — R26.2 DIFFICULTY WALKING: ICD-10-CM

## 2023-12-13 DIAGNOSIS — M25.511 CHRONIC PAIN OF BOTH SHOULDERS: ICD-10-CM

## 2023-12-13 PROCEDURE — 97113 AQUATIC THERAPY/EXERCISES: CPT | Performed by: PHYSICAL THERAPIST

## 2023-12-13 NOTE — PROGRESS NOTES
Physical Therapy Treatment  Note  [address] [place]   [address]     Diagnosis Plan   1. Metatarsalgia, left foot        2. Difficulty walking        3. Chronic pain of both shoulders            VISIT#: 6    Subjective   Faby Le reports: difficulty quantifying progress. Nothing new to report.     Objective   - Entered and exited the pool via chair   See Exercise, Manual, and Modality Logs for complete treatment.     Patient Education:    Goals  Plan Goals: STG's (3 weeks)  1. Tolerate 45 minutes aquatic therapy with reduction in pain.   2. Able to ambulate x 10 min on Aquatic TM with improved gait pattern  for carry over to land based therapy  3. Able to tolerate initial HEP w/ progression for self management of impairments     LTG's (by d/c)- 6 weeks   1. Independent with HEP and able to manage condition independently.  2. Pt will demonstrate improved (L) elbow strength = 5/5 and (L) shoulder flexion and abd = 4-/5 or better for improved ability to lift objects into upper cabinets/shelving  3. FAAM will indicate improved function via scoring suggesting 25% impaired or less  4.  Pt will have ability to ambulate 20 minutes or greater without provocation of increased pain for improved ability to perform community ambulation.   5.  Pt will demonstrate improved (L) ankle eversion strength = 5/5 and (B) hip flex/abd strength = 5/5 for improved postural support with standing and ambulatory based activities.     Assessment/Plan  - Able to progress and resume prior treatment - no pain in pool.    - CAYDEN  in pool area and shower for safety as pt's gait is unsteady      - patient alone in pool    - Treatment  incorporating  principles of aquatic therapy to offload spine/joints, thermal effects to reduce pain, and hydrostatic pressure to facilitate exercise with transition to land as tolerated.     Progress per Plan of Care and Progress strengthening /stabilization /functional activity         Timed:          Manual Therapy:    0     mins  44824;     Therapeutic Exercise:    0     mins  53926;     Neuromuscular Mohan:    0    mins  01265;    Therapeutic Activity:     0     mins  29635;     Gait Trainin     mins  92194;     Ultrasound:     0     mins  38248;    Ionto                               0    mins   51795  Self Care                       0     mins   94446  Canalith Repos               0    mins  4209  Aquatic                            57  mins 42453    Un-Timed:  Electrical Stimulation:    0     mins  14426 ( );  Dry Needling     0     mins self-pay  Traction     0     mins 53675  Low Eval     0     Mins  41888  Mod Eval     0     Mins  69264  High Eval                       0     Mins  63357  Re-Eval                           0    mins  74351    Timed Treatment: 57     mins   Total Treatment:    57  mins    Chao Ray PT PT, DPT,   [place] License [id number]

## 2023-12-18 ENCOUNTER — HOSPITAL ENCOUNTER (EMERGENCY)
Facility: HOSPITAL | Age: 78
Discharge: HOME | End: 2023-12-18
Attending: EMERGENCY MEDICINE | Admitting: EMERGENCY MEDICINE
Payer: MEDICARE

## 2023-12-18 ENCOUNTER — APPOINTMENT (OUTPATIENT)
Dept: CT IMAGING | Facility: HOSPITAL | Age: 78
End: 2023-12-18
Payer: MEDICARE

## 2023-12-18 ENCOUNTER — TREATMENT (OUTPATIENT)
Dept: PHYSICAL THERAPY | Facility: CLINIC | Age: 78
End: 2023-12-18
Payer: MEDICARE

## 2023-12-18 VITALS
SYSTOLIC BLOOD PRESSURE: 142 MMHG | HEART RATE: 70 BPM | TEMPERATURE: 97.8 F | HEIGHT: 62 IN | WEIGHT: 240 LBS | RESPIRATION RATE: 17 BRPM | OXYGEN SATURATION: 99 % | DIASTOLIC BLOOD PRESSURE: 49 MMHG | BODY MASS INDEX: 44.16 KG/M2

## 2023-12-18 DIAGNOSIS — S09.90XA MINOR CLOSED HEAD INJURY: ICD-10-CM

## 2023-12-18 DIAGNOSIS — M25.511 CHRONIC PAIN OF BOTH SHOULDERS: ICD-10-CM

## 2023-12-18 DIAGNOSIS — S00.01XA ABRASION OF SCALP, INITIAL ENCOUNTER: ICD-10-CM

## 2023-12-18 DIAGNOSIS — G89.29 CHRONIC PAIN OF BOTH SHOULDERS: ICD-10-CM

## 2023-12-18 DIAGNOSIS — S00.03XA HEMATOMA OF SCALP, INITIAL ENCOUNTER: ICD-10-CM
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
## 2023-12-18 DIAGNOSIS — M25.512 CHRONIC PAIN OF BOTH SHOULDERS: ICD-10-CM

## 2023-12-18 DIAGNOSIS — R26.2 DIFFICULTY WALKING: ICD-10-CM

## 2023-12-18 DIAGNOSIS — M77.42 METATARSALGIA, LEFT FOOT: Primary | ICD-10-CM

## 2023-12-18 PROCEDURE — 99284 EMERGENCY DEPT VISIT MOD MDM: CPT

## 2023-12-18 PROCEDURE — 70450 CT HEAD/BRAIN W/O DYE: CPT

## 2023-12-18 PROCEDURE — 72125 CT NECK SPINE W/O DYE: CPT

## 2023-12-18 RX ORDER — DIAPER,BRIEF,INFANT-TODD,DISP
1 EACH MISCELLANEOUS EVERY 12 HOURS SCHEDULED
Status: DISCONTINUED | OUTPATIENT
Start: 2023-12-18 | End: 2023-12-19 | Stop reason: HOSPADM

## 2023-12-18 RX ADMIN — BACITRACIN 0.9 G: 500 OINTMENT TOPICAL at 23:04

## 2023-12-18 NOTE — PROGRESS NOTES
"Physical Therapy Treatment  Note  [address] [facility] [place]   [address]     Diagnosis Plan   1. Metatarsalgia, left foot        2. Difficulty walking        3. Chronic pain of both shoulders            VISIT#: 7    Subjective   Faby Le reports: \" doing pretty good today\"     Objective   - Entered and exited the pool via chair   See Exercise, Manual, and Modality Logs for complete treatment.     Patient Education:    Goals  Plan Goals: STG's (3 weeks)  1. Tolerate 45 minutes aquatic therapy with reduction in pain.   2. Able to ambulate x 10 min on Aquatic TM with improved gait pattern  for carry over to land based therapy  3. Able to tolerate initial HEP w/ progression for self management of impairments     LTG's (by d/c)- 6 weeks   1. Independent with HEP and able to manage condition independently.  2. Pt will demonstrate improved (L) elbow strength = 5/5 and (L) shoulder flexion and abd = 4-/5 or better for improved ability to lift objects into upper cabinets/shelving  3. FAAM will indicate improved function via scoring suggesting 25% impaired or less  4.  Pt will have ability to ambulate 20 minutes or greater without provocation of increased pain for improved ability to perform community ambulation.   5.  Pt will demonstrate improved (L) ankle eversion strength = 5/5 and (B) hip flex/abd strength = 5/5 for improved postural support with standing and ambulatory based activities.     Assessment/Plan  - Pt tolerated treatment well without complaint.     - CAYDEN  in pool area and shower for safety as pt's gait is unsteady        - Treatment  incorporating  principles of aquatic therapy to offload spine/joints, thermal effects to reduce pain, and hydrostatic pressure to facilitate exercise with transition to land as tolerated.     Progress per Plan of Care and Progress strengthening /stabilization /functional activity         Timed:         Manual Therapy:    0     mins  15740;     Therapeutic Exercise:    0   "   mins  59841;     Neuromuscular Mohan:    0    mins  12773;    Therapeutic Activity:     0     mins  72075;     Gait Trainin     mins  84976;     Ultrasound:     0     mins  19049;    Ionto                               0    mins   42830  Self Care                       0     mins   86829  Canalith Repos               0    mins  4209  Aquatic                            30  mins 27036    Un-Timed:  Electrical Stimulation:    0     mins  17230 ( );  Dry Needling     0     mins self-pay  Traction     0     mins 39782  Low Eval     0     Mins  27241  Mod Eval     0     Mins  46357  High Eval                       0     Mins  79120  Re-Eval                           0    mins  46908    Timed Treatment:  30    mins   Total Treatment:    30  mins    Loni Seay, PT PT, DPT, [phone]

## 2023-12-19 ENCOUNTER — TELEPHONE (OUTPATIENT)
Dept: FAMILY MEDICINE CLINIC | Facility: CLINIC | Age: 78
End: 2023-12-19
Payer: MEDICARE

## 2023-12-19 NOTE — ED PROVIDER NOTES
Subjective   History of Present Illness  Patient is a 78-year-old morbidly obese female who comes in after having a mechanical fall at home where she hit her forehead she had no nausea no vomiting she can recall the situation she gives good history she denies pain anywhere else      Review of Systems   Constitutional:  Negative for chills, fatigue and fever.   HENT:  Negative for congestion, tinnitus and trouble swallowing.    Eyes:  Negative for photophobia, discharge and redness.   Respiratory:  Negative for cough and shortness of breath.    Cardiovascular:  Negative for chest pain and palpitations.   Gastrointestinal:  Negative for abdominal pain, diarrhea, nausea and vomiting.   Genitourinary:  Negative for dysuria, frequency and urgency.   Musculoskeletal:  Negative for back pain, joint swelling and myalgias.   Skin:  Negative for rash.        Abrasion to the forehead   Neurological:  Negative for dizziness and headaches.   Psychiatric/Behavioral:  Negative for confusion.    All other systems reviewed and are negative.      Past Medical History:   Diagnosis Date    Afib     Ankle pain     Arthritis     Chronic kidney disease     Coronary artery disease     Depression     Gout     History of loop recorder 04/25/2023    current    Hyperglycemia     Hyperlipidemia     Hypertension     Low back pain     Morbid obesity     Sleep apnea     Vitamin D deficiency        Allergies   Allergen Reactions    Celecoxib Itching and Swelling     swelling       Past Surgical History:   Procedure Laterality Date    ANKLE FUSION      2017-left    CARDIAC CATHETERIZATION      CARDIAC CATHETERIZATION Right 8/29/2022    Procedure: Left Heart Cath;  Surgeon: Ratna Zavala MD;  Location: [facility] INVASIVE LOCATION;  Service: Cardiovascular;  Laterality: Right;    CARDIAC ELECTROPHYSIOLOGY PROCEDURE Left 7/3/2023    Procedure: Pacemaker DC new [place] Notified;  Surgeon: Cr Herrera MD;  Location: [facility]  INVASIVE LOCATION;  Service: Cardiovascular;  Laterality: Left;    CARPAL TUNNEL RELEASE      CATARACT EXTRACTION      CUBITAL TUNNEL RELEASE      HYSTERECTOMY      REPLACEMENT TOTAL KNEE      left 2008    ROTATOR CUFF REPAIR      TOTAL KNEE ARTHROPLASTY Right 5/4/2023    Procedure: TOTAL KNEE ARTHROPLASTY WITH CORI ROBOT;  Surgeon: Chino Herrera II, MD;  Location: [facility] MAIN OR;  Service: Robotics - Ortho;  Laterality: Right;       Family History   Problem Relation Age of Onset    Hypertension Mother     Stroke Father     Arthritis Brother     Cancer Brother        Social History     Socioeconomic History    Marital status:    Tobacco Use    Smoking status: Never    Smokeless tobacco: Never   Vaping Use    Vaping Use: Never used   Substance and Sexual Activity    Alcohol use: No    Drug use: No    Sexual activity: Not Currently           Objective   Physical Exam  Vitals reviewed.   Constitutional:       Appearance: She is well-developed. She is obese.   HENT:      Head: Normocephalic.      Jaw: No tenderness.        Comments: Abrasion to the forehead hematoma no bleeding     Right Ear: Tympanic membrane and external ear normal.      Left Ear: Tympanic membrane and external ear normal.   Eyes:      Conjunctiva/sclera: Conjunctivae normal.      Pupils: Pupils are equal, round, and reactive to light.   Cardiovascular:      Rate and Rhythm: Normal rate and regular rhythm.      Heart sounds: Normal heart sounds.   Pulmonary:      Effort: Pulmonary effort is normal. No respiratory distress.      Breath sounds: Normal breath sounds. No wheezing.   Abdominal:      General: Bowel sounds are normal. There is no distension.      Palpations: Abdomen is soft. There is no mass.      Tenderness: There is no abdominal tenderness. There is no guarding or rebound.   Musculoskeletal:         General: No deformity. Normal range of motion.      Cervical back: Normal range of motion and neck supple.   Skin:      "General: Skin is warm and dry.      Capillary Refill: Capillary refill takes less than 2 seconds.   Neurological:      General: No focal deficit present.      Mental Status: She is alert and oriented to person, place, and time.      GCS: GCS eye subscore is 4. GCS verbal subscore is 5. GCS motor subscore is 6.      Cranial Nerves: No cranial nerve deficit.      Sensory: No sensory deficit.      Deep Tendon Reflexes: Reflexes normal.   Psychiatric:         Mood and Affect: Mood normal.         Behavior: Behavior normal.         Procedures       Abrasion cleaned bacitracin applied    ED Course  ED Course as of 12/18/23 2249   Mon Dec 18, 2023   2135 Marked ready for CAT scan   [KW]      ED Course User Index  [KW] Farhad Marivel D, APRN      /49   Pulse 69   Temp 97.8 °F (36.6 °C) (Oral)   Resp 18   Ht 157.5 cm (62\")   Wt 109 kg (240 lb)   LMP  (LMP Unknown)   SpO2 99%   BMI 43.90 kg/m²   Labs Reviewed - No data to display  Medications   bacitracin ointment 0.9 g (has no administration in time range)     CT Cervical Spine Without Contrast    Result Date: 12/18/2023  Impression: No evidence of acute fracture or traumatic subluxation of the cervical spine. Advanced multilevel degenerative changes. Electronically Signed: Demetri Vaughn MD  12/18/2023 10:04 PM EST  Workstation ID: [id number]    CT Head Without Contrast    Result Date: 12/18/2023  Impression: No acute intracranial process evident. Left anterior scalp hematoma measures 1.1 cm in thickness. Electronically Signed: Demetri Vaughn MD  12/18/2023 10:01 PM EST  Workstation ID: [id number]                                          Medical Decision Making  Patient is a 78-year-old female who had a mechanical fall at home concerning for intercranial abnormality scalp abrasion skull fracture neck fracture this is not an all-inclusive list.  Patient had above exam and CT of the head and neck were interpreted by myself as well as radiologist as having no " acute findings.  The wound on the head was cleaned bacitracin was applied the patient was given head injury precautions she was walked by the nursing staff and will be taken home by her daughters at bedside.  She verbalized understood the need to follow-up with primary care follow the head injury precautions and return if worse    Problems Addressed:  Abrasion of scalp, initial encounter: complicated acute illness or injury  Fall, initial encounter: complicated acute illness or injury  Hematoma of scalp, initial encounter: complicated acute illness or injury  Minor closed head injury: complicated acute illness or injury    Amount and/or Complexity of Data Reviewed  Radiology: ordered and independent interpretation performed. Decision-making details documented in ED Course.  ECG/medicine tests: ordered and independent interpretation performed. Decision-making details documented in ED Course.    Risk  OTC drugs.        Final diagnoses:   Fall, initial encounter   Abrasion of scalp, initial encounter   Hematoma of scalp, initial encounter   Minor closed head injury       ED Disposition  ED Disposition       ED Disposition   Discharge    Condition   Stable    Comment   --               Leonor Adam MD  [address] [facility] [place] [address]  [phone]    In 3 days  If symptoms worsen, As needed         Medication List      No changes were made to your prescriptions during this visit.            Marivel Llamas, APRN  12/18/23 1782

## 2023-12-19 NOTE — TELEPHONE ENCOUNTER
Please call patient to make sure she is doing okay from the fall that caused her to go to [facility] emergency room.  We should check her if she is having problems still

## 2023-12-19 NOTE — ED NOTES
Pt reports falling this evening while getting something out of her car. Hit her head on the pavement but no LOC or blood thinners. Was on the ground for 20 min in the cold before someone got to her.

## 2023-12-19 NOTE — DISCHARGE INSTRUCTIONS
Follow head injury precautions    Keep the area clean dry and covered    Watch for signs of infection if you see any signs follow-up with primary care or return immediately    Return the patient to the emergency room for any change in behavior profuse vomiting

## 2023-12-20 NOTE — TELEPHONE ENCOUNTER
PATIENT FEELS FINE AFTER FALL, DOES HAVE A BLACK EYE BUT FEELS SHE DOES NOT NEED TO BE SEEN BEFORE HER ALREADY SCHEDULED 1/4/24 APPT.

## 2023-12-27 ENCOUNTER — TREATMENT (OUTPATIENT)
Dept: PHYSICAL THERAPY | Facility: CLINIC | Age: 78
End: 2023-12-27
Payer: MEDICARE

## 2023-12-27 DIAGNOSIS — R26.2 DIFFICULTY WALKING: ICD-10-CM

## 2023-12-27 DIAGNOSIS — M77.42 METATARSALGIA, LEFT FOOT: Primary | ICD-10-CM

## 2023-12-27 NOTE — LETTER
"Progress Note  2023  STEPHANIE Kelley DPM    Re: Faby Le  ________________________________________________________________    Physical Therapy  Progress Note/Reassessment  [address] [address], Phone: [phone]    Patient: Faby Le   : 1945  Diagnosis/ICD-10 Code:  Metatarsalgia, left foot [M77.42]  Referring practitioner: STEPHANIE Kelley DPM  Date of Initial Visit: Type: THERAPY  Noted: 2023  Today's Date: 2023  Patient seen for 8 sessions      Subjective:   Visit Diagnosis:    ICD-10-CM ICD-9-CM   1. Metatarsalgia, left foot  M77.42 726.70   2. Difficulty walking  R26.2 719.7       Faby Le reports: recent fall.  See notes below  Subjective Questionnaire: FAAM:  not re-assessed this date   (at initial evaluation :  FAAM: indicates 60% functional impairment )  Clinical Progress: slight improvement from 9/10 to 7/10 highest level of pain.  Continues to use lift chair to get in and out of pool to avoid stair negotiation.     Home Program Compliance: N/A  Treatment has included:  aquatic therapy    Subjective   Pt reported a fall following previous therapy session.  Pt reported she was standing  outside car reaching in back seat and fell when she turned around.  Patient presents with significant bruising at face and forehead.  Was assessed at hospital via CT scan.  Per EMR: No acute intracranial process evident. Left anterior scalp hematoma measures 1.1 cm in thickness.      Pain   Current pain ratin-7  (knees and left shoulder)   At best pain rating: 3  At worst pain ratin (9 at initial evaluation    Location: \" every joint.  I have arthritis everywhere\"    Objective   - Entered and exited the pool via chair   See Exercise, Manual, and Modality Logs for complete treatment.         Strength/Myotome Testing      Left Shoulder      Planes of Motion   Flexion: 4-  (3+/5)   Adduction: 3-   External rotation at 0°: 3-   Internal rotation at 0°: " 5      Right Shoulder      Planes of Motion   Flexion: 4   Adduction: 3+   External rotation at 0°: 4-   Internal rotation at 0°: 5      Left Elbow   Flexion: 5  Extension: 5     Right Elbow   Flexion: 5  Extension: 5       Assessment/Plan  - Continued impaired gait mechanics and imbalance observed.  Recent fall noted.     - Improved UE strength noted in some planes upon MMT    - Treatment has focused on  incorporating  principles of aquatic therapy to offload spine/joints, thermal effects to reduce pain, and hydrostatic pressure to facilitate exercise with transition to land as tolerated.     Progress toward previous goals: Not Met    Goals  Plan Goals: STG's (3 weeks)  1. Tolerate 45 minutes aquatic therapy with reduction in pain.  MET  2. Able to ambulate x 10 min on Aquatic TM with improved gait pattern  for carry over to land based therapy  PROGRESSING  3. Able to tolerate initial HEP w/ progression for self management of impairments     LTG's (by d/c)- 6 weeks   1. Independent with HEP and able to manage condition independently.: WILL ISSUE UPDATED HEP prior to D/C.  2. Pt will demonstrate improved (L) elbow strength = 5/5 and (L) shoulder flexion and abd = 4-/5 or better for improved ability to lift objects into upper cabinets/shelving: PROGRESSING  3. FAAM will indicate improved function via scoring suggesting 25% impaired or less  4.  Pt will have ability to ambulate 20 minutes or greater without provocation of increased pain for improved ability to perform community ambulation.   5.  Pt will demonstrate improved (L) ankle eversion strength = 5/5 and (B) hip flex/abd strength = 5/5 for improved postural support with standing and ambulatory based activities.      Short-term goals (STG):    1/ 3  Long-term goals (LTG):     / 5      Recommendations: Continue as planned -12 visits recommended on initial POC  Timeframe: 2 weeks  Prognosis to achieve goals: fair    Timed:         Manual Therapy:    0     mins   41692;     Therapeutic Exercise:    0     mins  15631;     Neuromuscular Mohan:    0    mins  35688;    Therapeutic Activity:     0     mins  69712;     Gait Trainin     mins  67206;     Ultrasound:     0     mins  54332;    Ionto                               0    mins   84182  Self Care                       0     mins   43345  Aquatic                          57     mins 14833      Un-Timed:  Electrical Stimulation:    0     mins  12578 ( );  Dry Needling     0     mins self-pay  Traction     0     mins 26793  Low Eval     0     Mins  25940  Mod Eval     0     Mins  59329  High Eval                       0     Mins  52563  Re-Eval                           0    mins  98140      Timed Treatment:   57   mins   Total Treatment:     57   mins      PT Signature: Loni Seay, PT  [place] License #: [id number]     Thank you for this referral.

## 2023-12-27 NOTE — PROGRESS NOTES
"Physical Therapy  Progress Note/Reassessment  [address] [place], [place] [address], Phone: [phone]    Patient: Faby Le   : 1945  Diagnosis/ICD-10 Code:  Metatarsalgia, left foot [M77.42]  Referring practitioner: STEPHANIE Kelley DPM  Date of Initial Visit: Type: THERAPY  Noted: 2023  Today's Date: 2023  Patient seen for 8 sessions      Subjective:   Visit Diagnosis:    ICD-10-CM ICD-9-CM   1. Metatarsalgia, left foot  M77.42 726.70   2. Difficulty walking  R26.2 719.7       Faby Le reports: recent fall.  See notes below  Subjective Questionnaire: FAAM:  not re-assessed this date   (at initial evaluation :  FAAM: indicates 60% functional impairment )  Clinical Progress: slight improvement from 9/10 to 7/10 highest level of pain.  Continues to use lift chair to get in and out of pool to avoid stair negotiation.     Home Program Compliance: N/A  Treatment has included:  aquatic therapy    Subjective   Pt reported a fall following previous therapy session.  Pt reported she was standing  outside car reaching in back seat and fell when she turned around.  Patient presents with significant bruising at face and forehead.  Was assessed at hospital via CT scan.  Per EMR: No acute intracranial process evident. Left anterior scalp hematoma measures 1.1 cm in thickness.      Pain   Current pain ratin-7  (knees and left shoulder)   At best pain rating: 3  At worst pain ratin (9 at initial evaluation    Location: \" every joint.  I have arthritis everywhere\"    Objective   - Entered and exited the pool via chair   See Exercise, Manual, and Modality Logs for complete treatment.         Strength/Myotome Testing      Left Shoulder      Planes of Motion   Flexion: 4-  (3+/5)   Adduction: 3-   External rotation at 0°: 3-   Internal rotation at 0°: 5      Right Shoulder      Planes of Motion   Flexion: 4   Adduction: 3+   External rotation at 0°: 4-   Internal rotation at 0°: 5    "   Left Elbow   Flexion: 5  Extension: 5     Right Elbow   Flexion: 5  Extension: 5       Assessment/Plan  - Continued impaired gait mechanics and imbalance observed.  Recent fall noted.     - Improved UE strength noted in some planes upon MMT    - Treatment has focused on  incorporating  principles of aquatic therapy to offload spine/joints, thermal effects to reduce pain, and hydrostatic pressure to facilitate exercise with transition to land as tolerated.     Progress toward previous goals: Not Met    Goals  Plan Goals: STG's (3 weeks)  1. Tolerate 45 minutes aquatic therapy with reduction in pain.  MET  2. Able to ambulate x 10 min on Aquatic TM with improved gait pattern  for carry over to land based therapy  PROGRESSING  3. Able to tolerate initial HEP w/ progression for self management of impairments     LTG's (by d/c)- 6 weeks   1. Independent with HEP and able to manage condition independently.: WILL ISSUE UPDATED HEP prior to D/C.  2. Pt will demonstrate improved (L) elbow strength = 5/5 and (L) shoulder flexion and abd = 4-/5 or better for improved ability to lift objects into upper cabinets/shelving: PROGRESSING  3. FAAM will indicate improved function via scoring suggesting 25% impaired or less  4.  Pt will have ability to ambulate 20 minutes or greater without provocation of increased pain for improved ability to perform community ambulation.   5.  Pt will demonstrate improved (L) ankle eversion strength = 5/5 and (B) hip flex/abd strength = 5/5 for improved postural support with standing and ambulatory based activities.      Short-term goals (STG):    1/ 3  Long-term goals (LTG):     / 5      Recommendations: Continue as planned -12 visits recommended on initial POC  Timeframe: 2 weeks  Prognosis to achieve goals: fair    Timed:         Manual Therapy:    0     mins  66084;     Therapeutic Exercise:    0     mins  02791;     Neuromuscular Mohan:    0    mins  02150;    Therapeutic Activity:     0      mins  59357;     Gait Trainin     mins  91439;     Ultrasound:     0     mins  36680;    Ionto                               0    mins   48920  Self Care                       0     mins   68560  Aquatic                          57     mins 95745      Un-Timed:  Electrical Stimulation:    0     mins  75606 ( );  Dry Needling     0     mins self-pay  Traction     0     mins 31152  Low Eval     0     Mins  17470  Mod Eval     0     Mins  80991  High Eval                       0     Mins  45486  Re-Eval                           0    mins  35141      Timed Treatment:   57   mins   Total Treatment:     57   mins      PT Signature: Loni Seay, PT  [place] License #: [id number]

## 2024-01-01 NOTE — PATIENT INSTRUCTIONS
Health Maintenance Due   Topic Date Due    COVID-19 Vaccine (4 - 2023-24 season) 09/01/2023    Check blood pressure cuff for accuracy and send 10 blood pressures over 2 weeks.  Watch sodium, alcohol and weight     Try Miralax and call if questions.    Call if wants to see another doctor about right knee

## 2024-01-03 ENCOUNTER — TREATMENT (OUTPATIENT)
Dept: PHYSICAL THERAPY | Facility: CLINIC | Age: 79
End: 2024-01-03
Payer: MEDICARE

## 2024-01-03 DIAGNOSIS — M77.42 METATARSALGIA, LEFT FOOT: Primary | ICD-10-CM

## 2024-01-03 DIAGNOSIS — M25.511 CHRONIC PAIN OF BOTH SHOULDERS: ICD-10-CM

## 2024-01-03 DIAGNOSIS — R26.2 DIFFICULTY WALKING: ICD-10-CM

## 2024-01-03 DIAGNOSIS — G89.29 CHRONIC PAIN OF BOTH SHOULDERS: ICD-10-CM

## 2024-01-03 DIAGNOSIS — M25.512 CHRONIC PAIN OF BOTH SHOULDERS: ICD-10-CM

## 2024-01-03 NOTE — PROGRESS NOTES
"Physical Therapy Treatment  Note  [address] [facility] [place]   [address]     Diagnosis Plan   1. Metatarsalgia, left foot        2. Chronic pain of both shoulders        3. Difficulty walking            VISIT#: 9    Subjective   Faby Le reports: \" feeling pretty good today\" .       Objective   - Entered and exited the pool via chair   See Exercise, Manual, and Modality Logs for complete treatment.     Patient Education:    Goals- Progress note completed on 12/27  Plan Goals: STG's (3 weeks)  1. Tolerate 45 minutes aquatic therapy with reduction in pain.  MET  2. Able to ambulate x 10 min on Aquatic TM with improved gait pattern  for carry over to land based therapy  PROGRESSING  3. Able to tolerate initial HEP w/ progression for self management of impairments     LTG's (by d/c)- 6 weeks   1. Independent with HEP and able to manage condition independently.: WILL ISSUE UPDATED HEP prior to D/C.  2. Pt will demonstrate improved (L) elbow strength = 5/5 and (L) shoulder flexion and abd = 4-/5 or better for improved ability to lift objects into upper cabinets/shelving: PROGRESSING  3. FAAM will indicate improved function via scoring suggesting 25% impaired or less  4.  Pt will have ability to ambulate 20 minutes or greater without provocation of increased pain for improved ability to perform community ambulation.   5.  Pt will demonstrate improved (L) ankle eversion strength = 5/5 and (B) hip flex/abd strength = 5/5 for improved postural support with standing and ambulatory based activities.   Assessment/Plan  - No complaints at conclusion of treatment.  Patient reported no issues/no pain while performing exercise in pool  - Cane and supervision for ambulating from changing room to pool and back   - Treatment  incorporating  principles of aquatic therapy to offload spine/joints, thermal effects to reduce pain, and hydrostatic pressure to facilitate exercise with transition to land as tolerated.       Progress " per Plan of Care            Timed:         Manual Therapy:    0     mins  73912;     Therapeutic Exercise:    0     mins  06089;     Neuromuscular Mohan:    0    mins  53656;    Therapeutic Activity:     0     mins  08594;     Gait Trainin     mins  23753;     Ultrasound:     0     mins  23743;    Ionto                               0    mins   05849  Self Care                       0     mins   23848  Canalith Repos               0    mins  4209  Aquatic                          35     mins 17885    Un-Timed:  Electrical Stimulation:    0     mins  58413 ( );  Dry Needling     0     mins self-pay  Traction     0     mins 50902  Low Eval     0     Mins  42723  Mod Eval     0     Mins  14452  High Eval                       0     Mins  12566  Re-Eval                           0    mins  49286    Timed Treatment:   35   mins   Total Treatment:     35   mins    Loni Seay, PT PT, DPT, [id number]

## 2024-01-04 ENCOUNTER — OFFICE VISIT (OUTPATIENT)
Dept: FAMILY MEDICINE CLINIC | Facility: CLINIC | Age: 79
End: 2024-01-04
Payer: MEDICAID

## 2024-01-04 ENCOUNTER — TELEPHONE (OUTPATIENT)
Dept: FAMILY MEDICINE CLINIC | Facility: CLINIC | Age: 79
End: 2024-01-04

## 2024-01-04 VITALS
HEIGHT: 62 IN | OXYGEN SATURATION: 98 % | HEART RATE: 62 BPM | BODY MASS INDEX: 45.45 KG/M2 | TEMPERATURE: 97.7 F | SYSTOLIC BLOOD PRESSURE: 116 MMHG | WEIGHT: 247 LBS | DIASTOLIC BLOOD PRESSURE: 73 MMHG

## 2024-01-04 DIAGNOSIS — I48.0 PAROXYSMAL ATRIAL FIBRILLATION: ICD-10-CM

## 2024-01-04 DIAGNOSIS — E03.9 HYPOTHYROIDISM, UNSPECIFIED TYPE: ICD-10-CM

## 2024-01-04 DIAGNOSIS — R55 SYNCOPE, UNSPECIFIED SYNCOPE TYPE: ICD-10-CM

## 2024-01-04 DIAGNOSIS — D22.9 ATYPICAL MOLE: ICD-10-CM

## 2024-01-04 DIAGNOSIS — E78.2 MIXED HYPERLIPIDEMIA: ICD-10-CM

## 2024-01-04 DIAGNOSIS — R73.9 HYPERGLYCEMIA: ICD-10-CM

## 2024-01-04 DIAGNOSIS — G89.29 CHRONIC LEFT SHOULDER PAIN: ICD-10-CM

## 2024-01-04 DIAGNOSIS — F32.A DEPRESSION, UNSPECIFIED DEPRESSION TYPE: ICD-10-CM

## 2024-01-04 DIAGNOSIS — Z78.0 POST-MENOPAUSAL: ICD-10-CM

## 2024-01-04 DIAGNOSIS — K80.20 GALLSTONES: ICD-10-CM

## 2024-01-04 DIAGNOSIS — E66.1 CLASS 3 DRUG-INDUCED OBESITY WITH SERIOUS COMORBIDITY AND BODY MASS INDEX (BMI) OF 40.0 TO 44.9 IN ADULT: ICD-10-CM

## 2024-01-04 DIAGNOSIS — M25.512 CHRONIC LEFT SHOULDER PAIN: ICD-10-CM

## 2024-01-04 DIAGNOSIS — M1A.9XX0 CHRONIC GOUT WITHOUT TOPHUS, UNSPECIFIED CAUSE, UNSPECIFIED SITE: ICD-10-CM

## 2024-01-04 DIAGNOSIS — R13.12 OROPHARYNGEAL DYSPHAGIA: ICD-10-CM

## 2024-01-04 DIAGNOSIS — E53.8 B12 DEFICIENCY: Primary | ICD-10-CM

## 2024-01-04 DIAGNOSIS — G47.33 OBSTRUCTIVE SLEEP APNEA SYNDROME: ICD-10-CM

## 2024-01-04 DIAGNOSIS — I25.10 CORONARY ARTERY DISEASE INVOLVING NATIVE HEART WITHOUT ANGINA PECTORIS, UNSPECIFIED VESSEL OR LESION TYPE: ICD-10-CM

## 2024-01-04 DIAGNOSIS — E61.1 LOW IRON: ICD-10-CM

## 2024-01-04 DIAGNOSIS — I10 PRIMARY HYPERTENSION: ICD-10-CM

## 2024-01-04 LAB
ALBUMIN SERPL-MCNC: 3.9 G/DL (ref 3.5–5.2)
ALBUMIN/GLOB SERPL: 1.3 G/DL
ALP SERPL-CCNC: 120 U/L (ref 39–117)
ALT SERPL W P-5'-P-CCNC: 17 U/L (ref 1–33)
ANION GAP SERPL CALCULATED.3IONS-SCNC: 10.6 MMOL/L (ref 5–15)
AST SERPL-CCNC: 19 U/L (ref 1–32)
BASOPHILS # BLD AUTO: 0.05 10*3/MM3 (ref 0–0.2)
BASOPHILS NFR BLD AUTO: 0.8 % (ref 0–1.5)
BILIRUB SERPL-MCNC: 0.5 MG/DL (ref 0–1.2)
BUN SERPL-MCNC: 24 MG/DL (ref 8–23)
BUN/CREAT SERPL: 20.9 (ref 7–25)
CALCIUM SPEC-SCNC: 10 MG/DL (ref 8.6–10.5)
CHLORIDE SERPL-SCNC: 104 MMOL/L (ref 98–107)
CHOLEST SERPL-MCNC: 126 MG/DL (ref 0–200)
CO2 SERPL-SCNC: 23.4 MMOL/L (ref 22–29)
CREAT SERPL-MCNC: 1.15 MG/DL (ref 0.57–1)
DEPRECATED RDW RBC AUTO: 46.1 FL (ref 37–54)
EGFRCR SERPLBLD CKD-EPI 2021: 49.5 ML/MIN/1.73
EOSINOPHIL # BLD AUTO: 0.16 10*3/MM3 (ref 0–0.4)
EOSINOPHIL NFR BLD AUTO: 2.4 % (ref 0.3–6.2)
ERYTHROCYTE [DISTWIDTH] IN BLOOD BY AUTOMATED COUNT: 13 % (ref 12.3–15.4)
GLOBULIN UR ELPH-MCNC: 2.9 GM/DL
GLUCOSE SERPL-MCNC: 88 MG/DL (ref 65–99)
HBA1C MFR BLD: 5.3 % (ref 4.8–5.6)
HCT VFR BLD AUTO: 34.4 % (ref 34–46.6)
HDLC SERPL-MCNC: 65 MG/DL (ref 40–60)
HGB BLD-MCNC: 11.1 G/DL (ref 12–15.9)
IMM GRANULOCYTES # BLD AUTO: 0.02 10*3/MM3 (ref 0–0.05)
IMM GRANULOCYTES NFR BLD AUTO: 0.3 % (ref 0–0.5)
IRON 24H UR-MRATE: 74 MCG/DL (ref 37–145)
IRON SATN MFR SERPL: 25 % (ref 20–50)
LDLC SERPL CALC-MCNC: 46 MG/DL (ref 0–100)
LDLC/HDLC SERPL: 0.71 {RATIO}
LYMPHOCYTES # BLD AUTO: 1.53 10*3/MM3 (ref 0.7–3.1)
LYMPHOCYTES NFR BLD AUTO: 23.2 % (ref 19.6–45.3)
MAGNESIUM SERPL-MCNC: 1.8 MG/DL (ref 1.6–2.4)
MCH RBC QN AUTO: 31.6 PG (ref 26.6–33)
MCHC RBC AUTO-ENTMCNC: 32.3 G/DL (ref 31.5–35.7)
MCV RBC AUTO: 98 FL (ref 79–97)
MONOCYTES # BLD AUTO: 0.5 10*3/MM3 (ref 0.1–0.9)
MONOCYTES NFR BLD AUTO: 7.6 % (ref 5–12)
NEUTROPHILS NFR BLD AUTO: 4.33 10*3/MM3 (ref 1.7–7)
NEUTROPHILS NFR BLD AUTO: 65.7 % (ref 42.7–76)
NRBC BLD AUTO-RTO: 0.2 /100 WBC (ref 0–0.2)
PLATELET # BLD AUTO: 219 10*3/MM3 (ref 140–450)
PMV BLD AUTO: 10.4 FL (ref 6–12)
POTASSIUM SERPL-SCNC: 4.3 MMOL/L (ref 3.5–5.2)
PROT SERPL-MCNC: 6.8 G/DL (ref 6–8.5)
RBC # BLD AUTO: 3.51 10*6/MM3 (ref 3.77–5.28)
SODIUM SERPL-SCNC: 138 MMOL/L (ref 136–145)
TIBC SERPL-MCNC: 301 MCG/DL (ref 298–536)
TRANSFERRIN SERPL-MCNC: 202 MG/DL (ref 200–360)
TRIGL SERPL-MCNC: 73 MG/DL (ref 0–150)
TSH SERPL DL<=0.05 MIU/L-ACNC: 3.45 UIU/ML (ref 0.27–4.2)
URATE SERPL-MCNC: 4.8 MG/DL (ref 2.4–5.7)
VIT B12 BLD-MCNC: 939 PG/ML (ref 211–946)
VLDLC SERPL-MCNC: 15 MG/DL (ref 5–40)
WBC NRBC COR # BLD AUTO: 6.59 10*3/MM3 (ref 3.4–10.8)

## 2024-01-04 PROCEDURE — 84550 ASSAY OF BLOOD/URIC ACID: CPT | Performed by: PREVENTIVE MEDICINE

## 2024-01-04 PROCEDURE — 83036 HEMOGLOBIN GLYCOSYLATED A1C: CPT | Performed by: PREVENTIVE MEDICINE

## 2024-01-04 PROCEDURE — 80061 LIPID PANEL: CPT | Performed by: PREVENTIVE MEDICINE

## 2024-01-04 PROCEDURE — 83540 ASSAY OF IRON: CPT | Performed by: PREVENTIVE MEDICINE

## 2024-01-04 PROCEDURE — 82607 VITAMIN B-12: CPT | Performed by: PREVENTIVE MEDICINE

## 2024-01-04 PROCEDURE — 84466 ASSAY OF TRANSFERRIN: CPT | Performed by: PREVENTIVE MEDICINE

## 2024-01-04 PROCEDURE — 80050 GENERAL HEALTH PANEL: CPT | Performed by: PREVENTIVE MEDICINE

## 2024-01-04 PROCEDURE — 83735 ASSAY OF MAGNESIUM: CPT | Performed by: PREVENTIVE MEDICINE

## 2024-01-04 RX ORDER — SEMAGLUTIDE 2.68 MG/ML
2 INJECTION, SOLUTION SUBCUTANEOUS WEEKLY
Qty: 3 ML | Refills: 0 | Status: SHIPPED | OUTPATIENT
Start: 2024-01-04

## 2024-01-04 NOTE — TELEPHONE ENCOUNTER
Pharmacy Name:  [facility]      Pharmacy representative phone number: [phone]    What medication are you calling in regards to: Semaglutide, 2 MG/DOSE, (Ozempic, 2 MG/DOSE,) 8 MG/3ML solution pen-injector     What question does the pharmacy have: THIS IS FOR THE 2 MG DOSE AND THEY NEED TO VERIFY THAT SHE HAS ALREADY HAD THE 1MG DOSE    Who is the provider that prescribed the medication: ANAND

## 2024-01-04 NOTE — PROGRESS NOTES
Subjective   Faby Le is a 78 y.o. female presents for   Chief Complaint   Patient presents with    Hypertension    Hyperlipidemia     Fasting        Health Maintenance Due   Topic Date Due    COVID-19 Vaccine (4 - 2023-24 season) 09/01/2023     The patient is a 78-year-old female who is here today for B12 deficiency, coronary artery disease without angina, depression, chronic gout, hyperglycemia, hyperlipidemia, hypertension, hypothyroidism, postmenopausal, obstructive sleep apnea, oropharyngeal dysphagia, atrial fibrillation, syncope, atypical mole, gallstone, class 3 severe obesity with serious comorbidities, and a body mass index of 43.9.    She hit the pavement 2 weeks ago last Monday. She stumbled over a rug. She did not lose consciousness. She went to [facility] and had a CT scan. She denies pain in her neck or head. She always has pain in her right side of her back. This has not changed since her fall. She denies nausea or vomiting. She was not given a tetanus vaccine. Radiology did order an ECG. She is clumsy and out of balance, but she attributes this to old age. She did not feel as though her blood glucose was low when she tripped over her feet. She had been to the dentist and had been to water aerobics. She was starting to take a step and her foot left like it did not want to move. She got caught on her other foot and was unable to catch herself and fell. She had her Life Alert on. She has chronic left shoulder pain. It bothers her. She did not have an x-ray after her fall.    She denies any chest pressure or heart flutters. She has an appointment with her cardiologist next month. The last time she saw her cardiologist, he told her that her arm was still down and he wanted her to see a gastroenterologist. He placed a referral, but she is waiting for a call. She sees Dr. Joe for her colonoscopies. She is on iron pills. She denies any increase in dry skin or hair loss. She denies any  "trouble swallowing. She is still using her CPAP. She is getting exercise in. She denies any passing out. She denies any change in her vision. She denies any coughing up blood, sputum, or wheezing. She does self-breast exams. She denies any issues with urination. She denies any unusual vaginal discharge. She denies any fever, chills, or night sweats.    Her depression has been alright. She denies feeling homicidal or suicidal.    She denies any gout. She has not been watching her carbs and saturated fats. She has been off Ozempic for 2.5 months. She is taking vitamin B12.    She does not check her blood pressure at home.    Her gallstones have been bothering her. She still has her gallbladder. She has a hard time when she has a bowel movement. She takes a laxative. She has not tried MiraLAX. She has been constipated her whole life.    She had to cancel her last appointment with the dermatologist for an atypical mole under her arm. She is unsure if it is still there.    She had her knee replaced in 05/2023. It is still sore. She had physical therapy in the nursing home and then in the hospital. She is now in water aerobics. Her knee feels warmer than the other knee. She denies any infection. It hurts when she drives long distances.    She is allergic to CELEBREX.    She has received her influenza vaccine. She received her RSV vaccine in 12/2023.    Vitals:    01/04/24 0859 01/04/24 0902 01/04/24 0903   BP: 129/73 143/70 116/73   BP Location: Left arm Right arm Right arm   Patient Position: Sitting Sitting Standing   Cuff Size: Adult Adult Adult   Pulse: 62     Temp: 97.7 °F (36.5 °C)     TempSrc: Tympanic     SpO2: 98%     Weight: 112 kg (247 lb)     Height: 157 cm (61.81\")       Body mass index is 45.45 kg/m².    Current Outpatient Medications on File Prior to Visit   Medication Sig Dispense Refill    allopurinol (ZYLOPRIM) 300 MG tablet TAKE 1 TABLET BY MOUTH EVERY DAY 90 tablet 2    atorvastatin (LIPITOR) 20 MG " tablet Take 1 tablet by mouth Daily. 30 tablet 11    Azelastine HCl 137 MCG/SPRAY solution 2 sprays by Alternating Nares route Daily As Needed.      cetirizine (zyrTEC) 10 MG tablet Take 1 tablet by mouth Daily.      cyanocobalamin (VITAMIN B-12) 1000 MCG tablet Take 1 tablet by mouth Daily.      IRON CR PO Take  by mouth.      levothyroxine (SYNTHROID, LEVOTHROID) 88 MCG tablet TAKE 1 TABLET BY MOUTH EVERY DAY (Patient taking differently: Take 1 tablet by mouth Daily.) 90 tablet 3    metoprolol succinate XL (TOPROL-XL) 25 MG 24 hr tablet Take 1 tablet by mouth Every Night. 90 tablet 3    montelukast (SINGULAIR) 10 MG tablet Take 1 tablet by mouth Every Night.      multivitamin (MULTI VITAMIN PO) Take  by mouth Daily.      rivaroxaban (XARELTO) 15 MG tablet Take 1 tablet by mouth Daily. 42 tablet     Vitamin D, Cholecalciferol, 25 MCG (1000 UT) tablet Take 1 tablet by mouth Daily.      [DISCONTINUED] Semaglutide, 2 MG/DOSE, (Ozempic, 2 MG/DOSE,) 8 MG/3ML solution pen-injector Inject 2 mg under the skin into the appropriate area as directed 1 (One) Time Per Week. 3 mL 0     No current facility-administered medications on file prior to visit.       The following portions of the patient's history were reviewed and updated as appropriate: allergies, current medications, past family history, past medical history, past social history, past surgical history, and problem list.    Review of Systems   Constitutional: Negative.  Negative for chills, diaphoresis and fever.   HENT: Negative.  Negative for trouble swallowing.    Eyes: Negative.  Negative for visual disturbance.   Respiratory:  Negative for cough, chest tightness and wheezing.    Cardiovascular: Negative.  Negative for chest pain and palpitations.   Gastrointestinal:  Positive for constipation. Negative for nausea and vomiting.   Endocrine: Negative.    Genitourinary: Negative.  Negative for dysuria and vaginal discharge.   Musculoskeletal:  Positive for  arthralgias and back pain. Negative for neck pain.   Skin:  Positive for bruise. Negative for dry skin.   Allergic/Immunologic: Negative.    Neurological: Negative.  Negative for syncope.   Hematological: Negative.    Psychiatric/Behavioral: Negative.  Positive for depressed mood. Negative for suicidal ideas.        Objective   Physical Exam  Vitals reviewed.   Constitutional:       General: She is not in acute distress.     Appearance: Normal appearance. She is well-developed. She is not ill-appearing or toxic-appearing.   HENT:      Head: Normocephalic and atraumatic.      Right Ear: Tympanic membrane, ear canal and external ear normal.      Left Ear: Tympanic membrane, ear canal and external ear normal.      Nose: Nose normal.   Eyes:      Extraocular Movements: Extraocular movements intact.      Conjunctiva/sclera: Conjunctivae normal.      Pupils: Pupils are equal, round, and reactive to light.   Cardiovascular:      Rate and Rhythm: Normal rate and regular rhythm.      Heart sounds: Normal heart sounds.   Pulmonary:      Effort: Pulmonary effort is normal.      Breath sounds: Normal breath sounds.   Abdominal:      General: Bowel sounds are normal. There is no distension.      Palpations: Abdomen is soft. There is no mass.      Tenderness: There is no abdominal tenderness.   Musculoskeletal:         General: Normal range of motion.      Cervical back: Neck supple.   Skin:     General: Skin is warm.      Findings: Bruising present.      Comments: She does have bruising over her left cheek and down under the left side of the chin and she is missing several teeth across the front, so her partial was bent during the fall.   Neurological:      General: No focal deficit present.      Mental Status: She is alert and oriented to person, place, and time.   Psychiatric:         Mood and Affect: Mood normal.         Behavior: Behavior normal.       PHQ-9 Total Score: 0    Assessment & Plan   Diagnoses and all orders for  this visit:    1. B12 deficiency (Primary)  -     CBC Auto Differential  -     Vitamin B12    2. Coronary artery disease involving native heart without angina pectoris, unspecified vessel or lesion type    3. Depression, unspecified depression type  -     Magnesium    4. Chronic gout without tophus, unspecified cause, unspecified site  -     Uric Acid    5. Hyperglycemia  -     Comprehensive Metabolic Panel  -     Hemoglobin A1c    6. Mixed hyperlipidemia  -     Lipid Panel    7. Primary hypertension    8. Hypothyroidism, unspecified type  -     TSH    9. Post-menopausal    10. Obstructive sleep apnea syndrome    11. Oropharyngeal dysphagia    12. Paroxysmal atrial fibrillation    13. Syncope, unspecified syncope type    14. Atypical mole    15. Gallstones    16. Class 3 drug-induced obesity with serious comorbidity and body mass index (BMI) of 40.0 to 44.9 in adult    17. Chronic left shoulder pain  -     Ambulatory Referral to Orthopedic Surgery    18. Low iron  -     Iron Profile    Other orders  -     Semaglutide, 2 MG/DOSE, (Ozempic, 2 MG/DOSE,) 8 MG/3ML solution pen-injector; Inject 2 mg under the skin into the appropriate area as directed 1 (One) Time Per Week.  Dispense: 3 mL; Refill: 0      1. B12 deficiency.  I will check her vitamin B12 level.    2. Coronary artery disease without angina.  Her blood pressure today is slightly elevated. She will check her blood pressure every other day for a couple of weeks and send me the readings.    3. Depression.  Her mood is stable.     4. Chronic gout.    5. Hyperglycemia.  She did not feel her blood glucose was low when she tripped over her feet. I will check her hemoglobin A1c. She will let us know if she is unable to get her Ozempic from the pharmacy.    6. Hyperlipidemia.  Her cholesterol levels are normal. She was advised to watch her carbs and saturated fats. I will check her lipid panel.    7. Hypertension.  Her blood pressure is well controlled at home. She  will check her blood pressure every other day for a couple of weeks and send me the readings.    8. Hypothyroidism.  I will check her TSH.    9. Postmenopausal.  Her mood is stable.    10. Atrial fibrillation.  She denies any chest pressure or heart flutters.    11. Constipation.   I advised the patient to try MiraLAX.     Patient Instructions     Health Maintenance Due   Topic Date Due    COVID-19 Vaccine (4 - 2023-24 season) 09/01/2023    Check blood pressure cuff for accuracy and send 10 blood pressures over 2 weeks.  Watch sodium, alcohol and weight     Try Miralax and call if questions.    Call if wants to see another doctor about right knee       Transcribed from ambient dictation for Leonor Adam MD by Laine Damon.  01/04/24   10:02 EST    Patient or patient representative verbalized consent to the visit recording.  I have personally performed the services described in this document as transcribed by the above individual, and it is both accurate and complete.

## 2024-01-04 NOTE — PROGRESS NOTES
Venipuncture performed on Right Arm by Tonia Landry  with good hemostasis. Patient tolerated well. 01/04/24   Tonia Landry

## 2024-01-05 ENCOUNTER — TELEPHONE (OUTPATIENT)
Dept: FAMILY MEDICINE CLINIC | Facility: CLINIC | Age: 79
End: 2024-01-05
Payer: MEDICARE

## 2024-01-05 NOTE — TELEPHONE ENCOUNTER
"Relay     \"Mild anemia is again present so please perform the 2 eye fobs that were given to you at the visit recently.  If you did not get those please let us know ASAP.  Your kidney function has decreased slightly so avoid ibuprofen Aleve Motrin and limit x-ray dyes.  Iron levels are now in the normal limit so as soon as we get get the eye fobs back we will make a decision about what to do next.  Call if any other questions or concerns and let us know about the above\"                "

## 2024-01-05 NOTE — PROGRESS NOTES
Mild anemia is again present so please perform the 2 eye fobs that were given to you at the visit recently.  If you did not get those please let us know ASAP.  Your kidney function has decreased slightly so avoid ibuprofen Aleve Motrin and limit x-ray dyes.  Iron levels are now in the normal limit so as soon as we get get the eye fobs back we will make a decision about what to do next.  Call if any other questions or concerns and let us know about the above

## 2024-01-08 ENCOUNTER — TREATMENT (OUTPATIENT)
Dept: PHYSICAL THERAPY | Facility: CLINIC | Age: 79
End: 2024-01-08
Payer: MEDICARE

## 2024-01-08 DIAGNOSIS — M25.511 CHRONIC PAIN OF BOTH SHOULDERS: ICD-10-CM

## 2024-01-08 DIAGNOSIS — M25.512 CHRONIC PAIN OF BOTH SHOULDERS: ICD-10-CM

## 2024-01-08 DIAGNOSIS — M77.42 METATARSALGIA, LEFT FOOT: Primary | ICD-10-CM

## 2024-01-08 DIAGNOSIS — G89.29 CHRONIC PAIN OF BOTH SHOULDERS: ICD-10-CM

## 2024-01-08 DIAGNOSIS — R26.2 DIFFICULTY WALKING: ICD-10-CM

## 2024-01-08 NOTE — PROGRESS NOTES
"Physical Therapy Treatment  Note  [address] [place]   [address]     Diagnosis Plan   1. Metatarsalgia, left foot        2. Chronic pain of both shoulders        3. Difficulty walking            VISIT#: 10    Harman Le reports: \" feeling pretty good today\" .  Rated pain level as 6-7/10 upon arrival to therapy.  Patient described pain as \" all my joints\"       Objective   - Entered and exited the pool via chair   See Exercise, Manual, and Modality Logs for complete treatment.     Patient Education:    Goals- Progress note completed on 12/27  Plan Goals: STG's (3 weeks)  1. Tolerate 45 minutes aquatic therapy with reduction in pain.  MET  2. Able to ambulate x 10 min on Aquatic TM with improved gait pattern  for carry over to land based therapy  PROGRESSING  3. Able to tolerate initial HEP w/ progression for self management of impairments     LTG's (by d/c)- 6 weeks   1. Independent with HEP and able to manage condition independently.: WILL ISSUE UPDATED HEP prior to D/C.  2. Pt will demonstrate improved (L) elbow strength = 5/5 and (L) shoulder flexion and abd = 4-/5 or better for improved ability to lift objects into upper cabinets/shelving: PROGRESSING  3. FAAM will indicate improved function via scoring suggesting 25% impaired or less  4.  Pt will have ability to ambulate 20 minutes or greater without provocation of increased pain for improved ability to perform community ambulation.   5.  Pt will demonstrate improved (L) ankle eversion strength = 5/5 and (B) hip flex/abd strength = 5/5 for improved postural support with standing and ambulatory based activities.   Assessment/Plan  - No complaints at conclusion of treatment.  Patient reported no issues/no pain while performing exercise in pool.  Activities easier and less painful than activity or mobility on land.   - Cane and supervision for ambulating from changing room to pool and back for safety  - Treatment  incorporating  " principles of aquatic therapy to offload spine/joints, thermal effects to reduce pain, and hydrostatic pressure to facilitate exercise with transition to land as tolerated.       Progress per Plan of Care            Timed:         Manual Therapy:    0     mins  46533;     Therapeutic Exercise:    0     mins  26945;     Neuromuscular Mohan:    0    mins  93733;    Therapeutic Activity:     0     mins  61691;     Gait Trainin     mins  69183;     Ultrasound:     0     mins  46442;    Ionto                               0    mins   57262  Self Care                       0     mins   62104  Canalith Repos               0    mins  4209  Aquatic                            53   mins 78760    Un-Timed:  Electrical Stimulation:    0     mins  17077 ( );  Dry Needling     0     mins self-pay  Traction     0     mins 69519  Low Eval     0     Mins  10665  Mod Eval     0     Mins  72973  High Eval                       0     Mins  40980  Re-Eval                           0    mins  28355    Timed Treatment:    53  mins   Total Treatment:      53  mins    Loni Seay, PT PT, DPT, [id number]

## 2024-01-10 ENCOUNTER — TREATMENT (OUTPATIENT)
Dept: PHYSICAL THERAPY | Facility: CLINIC | Age: 79
End: 2024-01-10
Payer: MEDICARE

## 2024-01-10 DIAGNOSIS — M25.512 CHRONIC PAIN OF BOTH SHOULDERS: ICD-10-CM

## 2024-01-10 DIAGNOSIS — M25.511 CHRONIC PAIN OF BOTH SHOULDERS: ICD-10-CM

## 2024-01-10 DIAGNOSIS — G89.29 CHRONIC PAIN OF BOTH SHOULDERS: ICD-10-CM

## 2024-01-10 DIAGNOSIS — R26.2 DIFFICULTY WALKING: ICD-10-CM

## 2024-01-10 DIAGNOSIS — M77.42 METATARSALGIA, LEFT FOOT: Primary | ICD-10-CM

## 2024-01-10 NOTE — PROGRESS NOTES
"Physical Therapy Treatment  Note  [address] [facility] [place]   [address]     Diagnosis Plan   1. Metatarsalgia, left foot        2. Chronic pain of both shoulders        3. Difficulty walking            VISIT#: 11    Subjective   Faby Le reports: \" feeling pretty good today\" . No new complaints. Global joint pain reported.     FAAM: not re-assessed this date    Objective   - Entered and exited the pool via chair   See Exercise, Manual, and Modality Logs for complete treatment.     Patient Education:    Goals- Progress note completed on 12/27  Plan Goals: STG's (3 weeks)  1. Tolerate 45 minutes aquatic therapy with reduction in pain.  MET  2. Able to ambulate x 10 min on Aquatic TM with improved gait pattern  for carry over to land based therapy  PROGRESSING  3. Able to tolerate initial HEP w/ progression for self management of impairments     LTG's (by d/c)- 6 weeks   1. Independent with HEP and able to manage condition independently.: WILL ISSUE UPDATED HEP prior to D/C.  2. Pt will demonstrate improved (L) elbow strength = 5/5 and (L) shoulder flexion and abd = 4-/5 or better for improved ability to lift objects into upper cabinets/shelving: PROGRESSING  3. FAAM will indicate improved function via scoring suggesting 25% impaired or less  4.  Pt will have ability to ambulate 20 minutes or greater without provocation of increased pain for improved ability to perform community ambulation.   5.  Pt will demonstrate improved (L) ankle eversion strength = 5/5 and (B) hip flex/abd strength = 5/5 for improved postural support with standing and ambulatory based activities.   Assessment/Plan  - No complaints at conclusion of treatment.  Patient reported no issues/no pain while performing exercise in pool.  Activities easier and less painful than activity or mobility on land.   - Cane and supervision for ambulating from changing room to pool and back for safety  - Treatment  incorporating  principles of aquatic " therapy to offload spine/joints, thermal effects to reduce pain, and hydrostatic pressure to facilitate exercise with transition to land as tolerated.   - Pt did not state that she would like to continue aquatic therapy at this time .  Will D/C at this time    Progress per Plan of Care            Timed:         Manual Therapy:    0     mins  82653;     Therapeutic Exercise:    0     mins  21158;     Neuromuscular Mohan:    0    mins  55677;    Therapeutic Activity:     0     mins  94050;     Gait Trainin     mins  05360;     Ultrasound:     0     mins  33563;    Ionto                               0    mins   21891  Self Care                       0     mins   05176  Canalith Repos               0    mins  4209  Aquatic                            30   mins 92337    Un-Timed:  Electrical Stimulation:    0     mins  72545 ( );  Dry Needling     0     mins self-pay  Traction     0     mins 35068  Low Eval     0     Mins  78110  Mod Eval     0     Mins  46663  High Eval                       0     Mins  06917  Re-Eval                           0    mins  50169    Timed Treatment:    30  mins   Total Treatment:      30  mins    Loni Seay, PT PT, DPT, [id number]

## 2024-01-12 ENCOUNTER — OFFICE VISIT (OUTPATIENT)
Dept: ORTHOPEDIC SURGERY | Facility: CLINIC | Age: 79
End: 2024-01-12
Payer: MEDICARE

## 2024-01-12 VITALS — HEART RATE: 61 BPM | BODY MASS INDEX: 45.45 KG/M2 | WEIGHT: 247 LBS | HEIGHT: 62 IN

## 2024-01-12 DIAGNOSIS — M19.012 PRIMARY OSTEOARTHRITIS OF LEFT SHOULDER: ICD-10-CM

## 2024-01-12 DIAGNOSIS — M25.512 ACUTE PAIN OF LEFT SHOULDER: Primary | ICD-10-CM

## 2024-01-12 RX ORDER — TRIAMCINOLONE ACETONIDE 40 MG/ML
80 INJECTION, SUSPENSION INTRA-ARTICULAR; INTRAMUSCULAR
Status: COMPLETED | OUTPATIENT
Start: 2024-01-12 | End: 2024-01-12

## 2024-01-12 RX ADMIN — TRIAMCINOLONE ACETONIDE 80 MG: 40 INJECTION, SUSPENSION INTRA-ARTICULAR; INTRAMUSCULAR at 15:35

## 2024-01-12 NOTE — PROGRESS NOTES
Procedure   Large Joint Arthrocentesis: L glenohumeral  Date/Time: 1/12/2024 3:35 PM  Consent given by: patient  Site marked: site marked  Timeout: Immediately prior to procedure a time out was called to verify the correct patient, procedure, equipment, support staff and site/side marked as required   Supporting Documentation  Indications: pain   Procedure Details  Location: shoulder - L glenohumeral  Preparation: Patient was prepped and draped in the usual sterile fashion  Needle size: 25 G  Approach: posterior  Medications administered: 80 mg triamcinolone acetonide 40 MG/ML  Patient tolerance: patient tolerated the procedure well with no immediate complications

## 2024-01-12 NOTE — PROGRESS NOTES
Primary Care Sports Medicine Office Visit Note     Patient ID: Faby Le is a 78 y.o. female.    Chief Complaint:  Chief Complaint   Patient presents with    Left Shoulder - Follow-up, Pain     Pain 7-8  DOI 12/11/2023     HPI:    Ms. Faby Le is a 78 y.o. female. The patient presents to the clinic today for follow up evaluation of left shoulder pain. She is accompanied by an adult male.    The patient was last seen 1 year ago. She received an injection in 12/2023. She fell 1 month ago and landed on her left shoulder. She went to her family doctor the other day, and she requested an x-ray of her head, spine, or neck. She was told to seek further evaluation. She knows she has arthritis in her bilateral shoulders. Her range of motion is about the same before she fell. Her left arm is weaker now.    Past Medical History:   Diagnosis Date    Afib     Ankle pain     Arthritis     Chronic kidney disease     Coronary artery disease     Depression     Gout     History of loop recorder 04/25/2023    current    Hyperglycemia     Hyperlipidemia     Hypertension     Low back pain     Morbid obesity     Sleep apnea     Vitamin D deficiency        Past Surgical History:   Procedure Laterality Date    ANKLE FUSION      2017-left    CARDIAC CATHETERIZATION      CARDIAC CATHETERIZATION Right 8/29/2022    Procedure: Left Heart Cath;  Surgeon: Ratna Zavala MD;  Location: [facility] CATH INVASIVE LOCATION;  Service: Cardiovascular;  Laterality: Right;    CARDIAC ELECTROPHYSIOLOGY PROCEDURE Left 7/3/2023    Procedure: Pacemaker DC new [place] Notified;  Surgeon: Cr Herrera MD;  Location: [facility] CATH INVASIVE LOCATION;  Service: Cardiovascular;  Laterality: Left;    CARPAL TUNNEL RELEASE      CATARACT EXTRACTION      CUBITAL TUNNEL RELEASE      HYSTERECTOMY      REPLACEMENT TOTAL KNEE      left 2008    ROTATOR CUFF REPAIR      TOTAL KNEE ARTHROPLASTY Right 5/4/2023    Procedure: TOTAL KNEE ARTHROPLASTY WITH CORI  "ROBOT;  Surgeon: Chino Herrera II, MD;  Location: [facility] OR;  Service: Robotics - Ortho;  Laterality: Right;       Family History   Problem Relation Age of Onset    Hypertension Mother     Stroke Father     Arthritis Brother     Cancer Brother      Social History     Occupational History    Not on file   Tobacco Use    Smoking status: Never    Smokeless tobacco: Never   Vaping Use    Vaping Use: Never used   Substance and Sexual Activity    Alcohol use: No    Drug use: No    Sexual activity: Not Currently      Review of Systems   Constitutional:  Negative for activity change and fever.   Musculoskeletal:  Positive for arthralgias.   Skin:  Negative for color change and rash.   Neurological:  Negative for weakness.     Objective:    Pulse 61   Ht 157 cm (61.81\")   Wt 112 kg (247 lb)   LMP  (LMP Unknown)   BMI 45.46 kg/m²     Physical Examination:  Physical Exam  Vitals and nursing note reviewed.   Constitutional:       General: She is not in acute distress.     Appearance: She is well-developed. She is not diaphoretic.   HENT:      Head: Normocephalic and atraumatic.   Eyes:      Conjunctiva/sclera: Conjunctivae normal.   Pulmonary:      Effort: Pulmonary effort is normal. No respiratory distress.   Skin:     General: Skin is warm.      Capillary Refill: Capillary refill takes less than 2 seconds.   Neurological:      Mental Status: She is alert.       Left Shoulder Exam     Range of Motion   Active abduction:  90 abnormal   External rotation:  normal   Forward flexion:  90 abnormal   Internal rotation 0 degrees:  normal   Internal rotation 90 degrees:  normal      Comments:  Mild resorptive ecchymosis to the left side of the forehead and face.  Emily is positive for pain. Resisted external rotation and belly press are negative; however, Speed's and Yergason's are negative.          Imaging and other tests:  Repeat three view x-rays of the left shoulder today reveal severe complete joint space " degradation of the glenohumeral joint itself with inferior spurring activity, and superior translation of the humeral head within the glenoid, to form a pseudo joint with the inferior aspects of the acromioclavicular joint. This supports likely rotator cuff pathology.    Assessment and Plan:    1. Acute pain of left shoulder  - XR Shoulder 2+ View Left    2.  Primary osteoarthritis of the left shoulder    I discussed pathology and treatment options with the patient today. We discussed treatment options and new injury/fall. Unfortunately, it does not seem she has done any new damage to the shoulder, above and beyond previous pathology that we already knew. At this point, I simply recommend intra-articular corticosteroid injection, which she elects to proceed with today. After discussing risks and benefits, she tolerated this injection well without complaints or problems. Return to clinic in 2 to 3 months for repeat evaluation.    Transcribed from ambient dictation for Woody Aranda II,  by Ewa Loaiza.  01/12/24   11:57 EST    Patient or patient representative verbalized consent to the visit recording.  I have personally performed the services described in this document as transcribed by the above individual, and it is both accurate and complete.    Disclaimer: Please note that areas of this note were completed with computer voice recognition software.  Quite often unanticipated grammatical, syntax, homophones, and other interpretive errors are inadvertently transcribed by the computer software. Please excuse any errors that have escaped final proofreading.

## 2024-01-16 ENCOUNTER — CLINICAL SUPPORT (OUTPATIENT)
Dept: FAMILY MEDICINE CLINIC | Facility: CLINIC | Age: 79
End: 2024-01-16
Payer: MEDICARE

## 2024-01-16 DIAGNOSIS — K92.1 BLOOD IN STOOL: Primary | ICD-10-CM

## 2024-01-16 LAB
DEVELOPER EXPIRATION DATE: NORMAL
DEVELOPER EXPIRATION DATE: NORMAL
DEVELOPER LOT NUMBER: NORMAL
DEVELOPER LOT NUMBER: NORMAL
EXPIRATION DATE: NORMAL
EXPIRATION DATE: NORMAL
FECAL OCCULT BLOOD SCREEN, POC: NEGATIVE
FECAL OCCULT BLOOD SCREEN, POC: NEGATIVE
Lab: NORMAL
Lab: NORMAL
NEGATIVE CONTROL: NEGATIVE
NEGATIVE CONTROL: NEGATIVE
POSITIVE CONTROL: POSITIVE
POSITIVE CONTROL: POSITIVE

## 2024-01-16 PROCEDURE — 82270 OCCULT BLOOD FECES: CPT | Performed by: PREVENTIVE MEDICINE

## 2024-01-16 NOTE — PROGRESS NOTES
No blood in the stool so we will continue to monitor with the next set of labs and if hemoglobin keeps decreasing we will refer to one of the hematologist.  Let us know if you have any other concerns   CC: uncontrolled DM 2 with hyperglycemia exacerbated by steroids     HPI: endocrine hx: see complete consult, patient was on basal insulin along with oral DM regimen at home with controlled a1c. Interval hx: Here on high dose IV steroids exacerbating diabetes with hyperglycemia. Spoke to patient's daughter, Leena () today, explained that her father will need basal/bolus insulin for discharge for duration of steroids for optimal glycemic control, she is in agreement. she was unaware of any known reason why the Lovaza dose was not higher (goal of 2 grams BID). Fasting lipid panel with triglycerides of 196 this AM.     PAST MEDICAL & SURGICAL HISTORY:    Weight loss    Smoking history    Urinary calculus    BPH (benign prostatic hypertrophy)    Type 2 diabetes mellitus    Hyperlipidemia    CAD (coronary artery disease)    Hypertension    Benign parotid tumor  s/p excision x 2  and  (left/right)    Renal calculus  s/p laser litho    Stented coronary artery   - BMS to RCA    History of tonsillectomy        FAMILY HISTORY:  FH: liver cancer    FH: HTN (hypertension)      Social History:  Smokinish pack years, quit 1.5 years ago  Alcohol: none  Recreational Drugs: none      MEDICATIONS  (STANDING):  aspirin enteric coated 81 milliGRAM(s) Oral daily  atorvastatin 40 milliGRAM(s) Oral at bedtime  budesonide 160 MICROgram(s)/formoterol 4.5 MICROgram(s) Inhaler 2 Puff(s) Inhalation two times a day  clobetasol 0.05% Ointment 1 Application(s) Topical two times a day  dextrose 5%. 1000 milliLiter(s) (50 mL/Hr) IV Continuous <Continuous>  dextrose 50% Injectable 12.5 Gram(s) IV Push once  dextrose 50% Injectable 25 Gram(s) IV Push once  dextrose 50% Injectable 25 Gram(s) IV Push once  enoxaparin Injectable 40 milliGRAM(s) SubCutaneous daily  famotidine    Tablet 20 milliGRAM(s) Oral two times a day  insulin glargine Injectable (LANTUS) 24 Unit(s) SubCutaneous at bedtime  insulin lispro (ADMELOG) corrective regimen sliding scale   SubCutaneous three times a day before meals  insulin lispro (ADMELOG) corrective regimen sliding scale   SubCutaneous at bedtime  insulin lispro Injectable (ADMELOG) 12 Unit(s) SubCutaneous three times a day before meals  magnesium oxide 400 milliGRAM(s) Oral three times a day with meals  methylPREDNISolone sodium succinate Injectable 75 milliGRAM(s) IV Push daily  metoprolol succinate ER 50 milliGRAM(s) Oral daily  niacin SR 1000 milliGRAM(s) Oral at bedtime  omega-3-Acid Ethyl Esters 1 Gram(s) Oral daily  trimethoprim  160 mG/sulfamethoxazole 800 mG 1 Tablet(s) Oral <User Schedule>            No Known Allergies          Review of Systems:  Constitutional: No fever  Eyes: No blurry vision  Neuro: No tremors  HEENT: No pain  Cardiovascular: No chest pain, palpitations  Respiratory: no SOB  GI: No nausea, vomiting, abdominal pain  : No dysuria  Skin: no rash  Psych: no depression  Endocrine: no polyuria, polydipsia      PHYSICAL EXAM:  VITALS: T(C): 36.8 (10-29-20 @ 11:56)  T(F): 98.3 (10-29-20 @ 11:56), Max: 98.3 (10-29-20 @ 11:56)  HR: 73 (10-29-20 @ 11:56) (60 - 73)  BP: 118/54 (10-29-20 @ 11:56) (108/61 - 129/68)  RR:  (18 - 18)  SpO2:  (93% - 98%)  Wt(kg): --  GENERAL: NAD, sitting up in chair   EYES: No proptosis, no lid lag, anicteric  HEENT:  Atraumatic, Normocephalic, moist mucous membranes  RESPIRATORY: non labored respirations   GI: Soft, nontender, non distended  PSYCH: Alert and oriented x 3, normal affect, normal mood      CAPILLARY BLOOD GLUCOSE      POCT Blood Glucose.: 368 mg/dL (30 Oct 2020 11:46)  POCT Blood Glucose.: 225 mg/dL (30 Oct 2020 07:26)  POCT Blood Glucose.: 221 mg/dL (29 Oct 2020 21:04)  POCT Blood Glucose.: 324 mg/dL (29 Oct 2020 17:06)      10-30    137  |  102  |  15  ----------------------------<  211<H>  3.8   |  24  |  0.76    Ca    9.2      30 Oct 2020 04:30  Phos  4.0     10-  Mg     1.6     10-30      10-30 Chol 158  HDL 39 Trig 196<H>    A1C with Estimated Average Glucose: 6.9 % (10-22-20 @ 05:30)  Hemoglobin A1C, Whole Blood: 9.4 % (19 @ 16:00)    Diet, Regular:   Consistent Carbohydrate No Snacks (CSTCHO)  DASH/TLC Sodium & Cholesterol Restricted (DASH) (10-23-20 @ 16:50)             CC: uncontrolled DM 2 with hyperglycemia exacerbated by steroids     HPI: endocrine hx: see complete consult, patient was on basal insulin along with oral DM regimen at home with controlled a1c. Interval hx: Here on high dose IV steroids exacerbating diabetes with hyperglycemia. Spoke to patient's daughter, Leena () today, explained that her father will need basal/bolus insulin for discharge for duration of steroids for optimal glycemic control, she is in agreement. Regarding lipid medication regimen: she was unaware of any known reason why the Lovaza dose was not higher (goal of 2 grams BID). Fasting lipid panel with triglycerides of 196 this AM.     PAST MEDICAL & SURGICAL HISTORY:    Weight loss    Smoking history    Urinary calculus    BPH (benign prostatic hypertrophy)    Type 2 diabetes mellitus    Hyperlipidemia    CAD (coronary artery disease)    Hypertension    Benign parotid tumor  s/p excision x 2  and  (left/right)    Renal calculus  s/p laser litho    Stented coronary artery   - BMS to RCA    History of tonsillectomy        FAMILY HISTORY:  FH: liver cancer    FH: HTN (hypertension)      Social History:  Smokinish pack years, quit 1.5 years ago  Alcohol: none  Recreational Drugs: none      MEDICATIONS  (STANDING):  aspirin enteric coated 81 milliGRAM(s) Oral daily  atorvastatin 40 milliGRAM(s) Oral at bedtime  budesonide 160 MICROgram(s)/formoterol 4.5 MICROgram(s) Inhaler 2 Puff(s) Inhalation two times a day  clobetasol 0.05% Ointment 1 Application(s) Topical two times a day  dextrose 5%. 1000 milliLiter(s) (50 mL/Hr) IV Continuous <Continuous>  dextrose 50% Injectable 12.5 Gram(s) IV Push once  dextrose 50% Injectable 25 Gram(s) IV Push once  dextrose 50% Injectable 25 Gram(s) IV Push once  enoxaparin Injectable 40 milliGRAM(s) SubCutaneous daily  famotidine    Tablet 20 milliGRAM(s) Oral two times a day  insulin glargine Injectable (LANTUS) 24 Unit(s) SubCutaneous at bedtime  insulin lispro (ADMELOG) corrective regimen sliding scale   SubCutaneous three times a day before meals  insulin lispro (ADMELOG) corrective regimen sliding scale   SubCutaneous at bedtime  insulin lispro Injectable (ADMELOG) 12 Unit(s) SubCutaneous three times a day before meals  magnesium oxide 400 milliGRAM(s) Oral three times a day with meals  methylPREDNISolone sodium succinate Injectable 75 milliGRAM(s) IV Push daily  metoprolol succinate ER 50 milliGRAM(s) Oral daily  niacin SR 1000 milliGRAM(s) Oral at bedtime  omega-3-Acid Ethyl Esters 1 Gram(s) Oral daily  trimethoprim  160 mG/sulfamethoxazole 800 mG 1 Tablet(s) Oral <User Schedule>            No Known Allergies          Review of Systems:  Constitutional: No fever  Eyes: No blurry vision  Neuro: No tremors  HEENT: No pain  Cardiovascular: No chest pain, palpitations  Respiratory: no SOB  GI: No nausea, vomiting, abdominal pain  : No dysuria  Skin: no rash  Psych: no depression  Endocrine: no polyuria, polydipsia      PHYSICAL EXAM:  VITALS: T(C): 36.8 (10-29-20 @ 11:56)  T(F): 98.3 (10-29-20 @ 11:56), Max: 98.3 (10-29-20 @ 11:56)  HR: 73 (10-29-20 @ 11:56) (60 - 73)  BP: 118/54 (10-29-20 @ 11:56) (108/61 - 129/68)  RR:  (18 - 18)  SpO2:  (93% - 98%)  Wt(kg): --  GENERAL: NAD, sitting up in chair   EYES: No proptosis, no lid lag, anicteric  HEENT:  Atraumatic, Normocephalic, moist mucous membranes  RESPIRATORY: non labored respirations   GI: Soft, nontender, non distended  PSYCH: Alert and oriented x 3, normal affect, normal mood      CAPILLARY BLOOD GLUCOSE      POCT Blood Glucose.: 368 mg/dL (30 Oct 2020 11:46)  POCT Blood Glucose.: 225 mg/dL (30 Oct 2020 07:26)  POCT Blood Glucose.: 221 mg/dL (29 Oct 2020 21:04)  POCT Blood Glucose.: 324 mg/dL (29 Oct 2020 17:06)      10-30    137  |  102  |  15  ----------------------------<  211<H>  3.8   |  24  |  0.76    Ca    9.2      30 Oct 2020 04:30  Phos  4.0     10-  Mg     1.6     10-30      10-30 Chol 158  HDL 39 Trig 196<H>    A1C with Estimated Average Glucose: 6.9 % (10-22-20 @ 05:30)  Hemoglobin A1C, Whole Blood: 9.4 % (19 @ 16:00)    Diet, Regular:   Consistent Carbohydrate No Snacks (CSTCHO)  DASH/TLC Sodium & Cholesterol Restricted (DASH) (10-23-20 @ 16:50)

## 2024-01-17 ENCOUNTER — TELEPHONE (OUTPATIENT)
Dept: FAMILY MEDICINE CLINIC | Facility: CLINIC | Age: 79
End: 2024-01-17
Payer: MEDICARE

## 2024-01-17 NOTE — TELEPHONE ENCOUNTER
"Relay     \"No blood in the stool so we will continue to monitor with the next set of labs and if hemoglobin keeps decreasing we will refer to one of the hematologist.  Let us know if you have any other concerns\"                "

## 2024-01-30 ENCOUNTER — READMISSION MANAGEMENT (OUTPATIENT)
Dept: CALL CENTER | Facility: HOSPITAL | Age: 79
End: 2024-01-30
Payer: MEDICARE

## 2024-01-30 ENCOUNTER — TELEPHONE (OUTPATIENT)
Dept: FAMILY MEDICINE CLINIC | Facility: CLINIC | Age: 79
End: 2024-01-30
Payer: MEDICARE

## 2024-01-30 NOTE — TELEPHONE ENCOUNTER
Caller: aFby Le    Relationship to patient: Self    Best call back number:     [phone] (Mobile)       New or established patient?  [] New  [x] Established    Date of discharge: 1-29    Facility discharged from: [facility]   SHE WASN'T SURE IF IT WAS  OR NOT       Diagnosis/Symptoms: STROKE      Length of stay (If applicable):

## 2024-01-30 NOTE — TELEPHONE ENCOUNTER
Please call patient to make sure that we see her for follow-up on the dizziness and slurring of words that caused her to go to [place]'s emergency room.  We should see her back sometime the end of this week or next.

## 2024-01-30 NOTE — OUTREACH NOTE
Prep Survey      Flowsheet Row Responses   [facility] facility patient discharged from? Non-BH   Is LACE score < 7 ? Non-BH Discharge   Eligibility [facility] --Pt unsure   Date of Discharge 01/29/24   Discharge Disposition Home or Self Care   Discharge diagnosis Stroke   Does the patient have one of the following disease processes/diagnoses(primary or secondary)? Stroke   Prep survey completed? Yes            Charlotte CRUM - Registered Nurse

## 2024-01-31 ENCOUNTER — TRANSITIONAL CARE MANAGEMENT TELEPHONE ENCOUNTER (OUTPATIENT)
Dept: CALL CENTER | Facility: HOSPITAL | Age: 79
End: 2024-01-31
Payer: MEDICARE

## 2024-01-31 PROBLEM — G45.9 TIA (TRANSIENT ISCHEMIC ATTACK): Status: ACTIVE | Noted: 2024-01-31

## 2024-01-31 NOTE — OUTREACH NOTE
Call Center TCM Note      Flowsheet Row Responses   [facility] facility patient discharged from? Non-BH   Does the patient have one of the following disease processes/diagnoses(primary or secondary)? Stroke   TCM attempt successful? No   Unsuccessful attempts Attempt 1            Georgia Bocanegra LPN    1/31/2024, 10:31 EST

## 2024-01-31 NOTE — OUTREACH NOTE
Call Center TCM Note      Flowsheet Row Responses   [facility] facility patient discharged from? Non-BH   Does the patient have one of the following disease processes/diagnoses(primary or secondary)? Stroke   TCM attempt successful? No   Unsuccessful attempts Attempt 2            Georgia Bocanegra LPN    1/31/2024, 15:19 EST

## 2024-02-01 ENCOUNTER — OFFICE VISIT (OUTPATIENT)
Dept: FAMILY MEDICINE CLINIC | Facility: CLINIC | Age: 79
End: 2024-02-01
Payer: MEDICARE

## 2024-02-01 ENCOUNTER — TRANSITIONAL CARE MANAGEMENT TELEPHONE ENCOUNTER (OUTPATIENT)
Dept: CALL CENTER | Facility: HOSPITAL | Age: 79
End: 2024-02-01
Payer: MEDICARE

## 2024-02-01 VITALS
BODY MASS INDEX: 43.98 KG/M2 | DIASTOLIC BLOOD PRESSURE: 80 MMHG | SYSTOLIC BLOOD PRESSURE: 110 MMHG | WEIGHT: 239 LBS | HEART RATE: 59 BPM | HEIGHT: 62 IN | OXYGEN SATURATION: 96 %

## 2024-02-01 DIAGNOSIS — I10 PRIMARY HYPERTENSION: ICD-10-CM

## 2024-02-01 DIAGNOSIS — E66.1 CLASS 3 DRUG-INDUCED OBESITY WITH SERIOUS COMORBIDITY AND BODY MASS INDEX (BMI) OF 45.0 TO 49.9 IN ADULT: ICD-10-CM

## 2024-02-01 DIAGNOSIS — F32.A DEPRESSION, UNSPECIFIED DEPRESSION TYPE: ICD-10-CM

## 2024-02-01 DIAGNOSIS — I67.841 REVERSIBLE CEREBROVASCULAR VASOCONSTRICTION SYNDROME: ICD-10-CM

## 2024-02-01 DIAGNOSIS — G45.9 TIA (TRANSIENT ISCHEMIC ATTACK): Primary | ICD-10-CM

## 2024-02-01 DIAGNOSIS — I48.0 PAROXYSMAL ATRIAL FIBRILLATION: ICD-10-CM

## 2024-02-01 DIAGNOSIS — I25.10 CORONARY ARTERY DISEASE INVOLVING NATIVE HEART WITHOUT ANGINA PECTORIS, UNSPECIFIED VESSEL OR LESION TYPE: ICD-10-CM

## 2024-02-01 LAB
ALBUMIN SERPL-MCNC: 4 G/DL (ref 3.5–5.2)
ALBUMIN/GLOB SERPL: 1.5 G/DL
ALP SERPL-CCNC: 105 U/L (ref 39–117)
ALT SERPL W P-5'-P-CCNC: 15 U/L (ref 1–33)
ANION GAP SERPL CALCULATED.3IONS-SCNC: 9 MMOL/L (ref 5–15)
AST SERPL-CCNC: 20 U/L (ref 1–32)
BACTERIA UR QL AUTO: NORMAL /HPF
BASOPHILS # BLD AUTO: 0.03 10*3/MM3 (ref 0–0.2)
BASOPHILS NFR BLD AUTO: 0.6 % (ref 0–1.5)
BILIRUB SERPL-MCNC: 0.8 MG/DL (ref 0–1.2)
BILIRUB UR QL STRIP: NEGATIVE
BUN SERPL-MCNC: 21 MG/DL (ref 8–23)
BUN/CREAT SERPL: 18.1 (ref 7–25)
CALCIUM SPEC-SCNC: 10 MG/DL (ref 8.6–10.5)
CHLORIDE SERPL-SCNC: 105 MMOL/L (ref 98–107)
CLARITY UR: CLEAR
CO2 SERPL-SCNC: 24 MMOL/L (ref 22–29)
COLOR UR: YELLOW
CREAT SERPL-MCNC: 1.16 MG/DL (ref 0.57–1)
DEPRECATED RDW RBC AUTO: 47.1 FL (ref 37–54)
EGFRCR SERPLBLD CKD-EPI 2021: 48.7 ML/MIN/1.73
EOSINOPHIL # BLD AUTO: 0.11 10*3/MM3 (ref 0–0.4)
EOSINOPHIL NFR BLD AUTO: 2.2 % (ref 0.3–6.2)
ERYTHROCYTE [DISTWIDTH] IN BLOOD BY AUTOMATED COUNT: 13.2 % (ref 12.3–15.4)
EXPIRATION DATE: NORMAL
FLUAV AG UPPER RESP QL IA.RAPID: NOT DETECTED
FLUBV AG UPPER RESP QL IA.RAPID: NOT DETECTED
GLOBULIN UR ELPH-MCNC: 2.6 GM/DL
GLUCOSE SERPL-MCNC: 81 MG/DL (ref 65–99)
GLUCOSE UR STRIP-MCNC: NEGATIVE MG/DL
HCT VFR BLD AUTO: 34.1 % (ref 34–46.6)
HGB BLD-MCNC: 11.7 G/DL (ref 12–15.9)
HGB UR QL STRIP.AUTO: NEGATIVE
HOLD SPECIMEN: NORMAL
HYALINE CASTS UR QL AUTO: NORMAL /LPF
IMM GRANULOCYTES # BLD AUTO: 0.01 10*3/MM3 (ref 0–0.05)
IMM GRANULOCYTES NFR BLD AUTO: 0.2 % (ref 0–0.5)
INTERNAL CONTROL: NORMAL
KETONES UR QL STRIP: NEGATIVE
LEUKOCYTE ESTERASE UR QL STRIP.AUTO: ABNORMAL
LYMPHOCYTES # BLD AUTO: 1.55 10*3/MM3 (ref 0.7–3.1)
LYMPHOCYTES NFR BLD AUTO: 31.4 % (ref 19.6–45.3)
Lab: NORMAL
MAGNESIUM SERPL-MCNC: 1.7 MG/DL (ref 1.6–2.4)
MCH RBC QN AUTO: 33.9 PG (ref 26.6–33)
MCHC RBC AUTO-ENTMCNC: 34.3 G/DL (ref 31.5–35.7)
MCV RBC AUTO: 98.8 FL (ref 79–97)
MONOCYTES # BLD AUTO: 0.37 10*3/MM3 (ref 0.1–0.9)
MONOCYTES NFR BLD AUTO: 7.5 % (ref 5–12)
NEUTROPHILS NFR BLD AUTO: 2.86 10*3/MM3 (ref 1.7–7)
NEUTROPHILS NFR BLD AUTO: 58.1 % (ref 42.7–76)
NITRITE UR QL STRIP: NEGATIVE
NRBC BLD AUTO-RTO: 0 /100 WBC (ref 0–0.2)
PH UR STRIP.AUTO: 5.5 [PH] (ref 5–8)
PLATELET # BLD AUTO: 168 10*3/MM3 (ref 140–450)
PMV BLD AUTO: 10.6 FL (ref 6–12)
POTASSIUM SERPL-SCNC: 4.3 MMOL/L (ref 3.5–5.2)
PROT SERPL-MCNC: 6.6 G/DL (ref 6–8.5)
PROT UR QL STRIP: NEGATIVE
RBC # BLD AUTO: 3.45 10*6/MM3 (ref 3.77–5.28)
RBC # UR STRIP: NORMAL /HPF
REF LAB TEST METHOD: NORMAL
SARS-COV-2 AG UPPER RESP QL IA.RAPID: NOT DETECTED
SODIUM SERPL-SCNC: 138 MMOL/L (ref 136–145)
SP GR UR STRIP: 1.02 (ref 1–1.03)
SQUAMOUS #/AREA URNS HPF: NORMAL /HPF
UROBILINOGEN UR QL STRIP: ABNORMAL
WBC # UR STRIP: NORMAL /HPF
WBC NRBC COR # BLD AUTO: 4.93 10*3/MM3 (ref 3.4–10.8)

## 2024-02-01 PROCEDURE — 83735 ASSAY OF MAGNESIUM: CPT | Performed by: PREVENTIVE MEDICINE

## 2024-02-01 PROCEDURE — 81001 URINALYSIS AUTO W/SCOPE: CPT | Performed by: PREVENTIVE MEDICINE

## 2024-02-01 PROCEDURE — 85025 COMPLETE CBC W/AUTO DIFF WBC: CPT | Performed by: PREVENTIVE MEDICINE

## 2024-02-01 PROCEDURE — 80053 COMPREHEN METABOLIC PANEL: CPT | Performed by: PREVENTIVE MEDICINE

## 2024-02-01 RX ORDER — ESCITALOPRAM OXALATE 20 MG/1
1 TABLET ORAL DAILY
COMMUNITY
Start: 2024-01-30

## 2024-02-01 RX ORDER — SPIRONOLACTONE 25 MG/1
0.5 TABLET ORAL DAILY
COMMUNITY
Start: 2024-01-30

## 2024-02-01 NOTE — ASSESSMENT & PLAN NOTE
Patient's (Body mass index is 43.98 kg/m².) indicates that they are morbidly/severely obese (BMI > 40 or > 35 with obesity - related health condition) with health conditions that include hypertension, dyslipidemias, and osteoarthritis . Weight is worsening. BMI  is above average; BMI management plan is completed. We discussed portion control and increasing exercise.

## 2024-02-01 NOTE — PROGRESS NOTES
Venipuncture performed on Left Arm by Jennifer Vaughn MA  with good hemostasis. Patient tolerated well. 02/01/24

## 2024-02-01 NOTE — OUTREACH NOTE
Call Center TCM Note      Flowsheet Row Responses   [facility] patient discharged from? Non-BH   Does the patient have one of the following disease processes/diagnoses(primary or secondary)? Stroke   TCM attempt successful? No   Unsuccessful attempts Attempt 1   Revoked Reason Other  [PCP appointment within 2 business days of DC fulfills the TCM requirement, no call necessary. TCM complete]   Comments 2/1/24   Does the patient have an appointment with their PCP within 7-14 days of discharge? Yes            Cheli Bell RN    2/1/2024, 12:20 EST

## 2024-02-01 NOTE — ASSESSMENT & PLAN NOTE
I will obtain an EKG today. I will obtain an influenza and COVID-19 testing. I will obtain magnesium, electrolytes, and regular blood count. I will obtain a urinalysis today. I will call the Holter monitor people and see if we can use with the pacemaker. If all the labs do not show where it is, then I think we should probably get an MRI of her brain.

## 2024-02-01 NOTE — PROGRESS NOTES
Chief Complaint  Hospital Follow Up Visit    Subjective        Faby Le presents to [facility] PRIMARY CARE  History of Present Illness    Faby Le is a 79-year-old female who is here today to follow up on a TIA, coronary artery disease, depression, hypertension, paroxysmal atrial fibrillation, and class III severe obesity with serious comorbidities and a body mass index of 45. She is accompanied by an adult female.  One of her daughters was accompanying her today this was the daughter that took her to the emergency room.    She woke up at a normal time. She had a mild headache. She went for breakfast at 11:00 am at a restaurant on Monday morning. She kept feeling worse, and the headache got a little more worse.  She felt a little dizzy. She was afraid to get up and move because she was afraid to fall. One of her daughters helped her get up and walk. She was talking to a lady, and she could not get anything to come out of her mouth. She was coherent, but she could not get the words out. She had eaten earlier in the morning. She did not check her blood sugar on Monday morning. She usually takes her injection on Sundays, but did not take her injection on Sunday. She went to the emergency room and had a CT scan of her head. She was told she had a TIA. On Tuesday, she could not bend too much. She had a headache like a sinus infection. She was not blowing anything out of her nose. She did not have a fever. She was not tested for COVID-19 or influenza. She was pretty good all day yesterday. She does not have a sore throat. She gets dizzy if she jumps really fast. She has not had any trouble getting words out since the episode first thing in the morning. She drinks about 2 - 5 bottles of water a day, 12 ounces each.. She denies any sick contacts.     Supplemental Information  Her mood has been okay.  She is supposed to be getting a new set of false teeth.  Patient has had a hysterectomy.  Loop  "recorder removed, pacemaker placed.    Objective   Vital Signs:  /80 (BP Location: Left arm, Patient Position: Standing, Cuff Size: Large Adult)   Pulse 59   Ht 157 cm (61.81\")   Wt 108 kg (239 lb)   SpO2 96%   BMI 43.98 kg/m²   Estimated body mass index is 43.98 kg/m² as calculated from the following:    Height as of this encounter: 157 cm (61.81\").    Weight as of this encounter: 108 kg (239 lb).     ROS:  Negative: hearing changes, vision changes, taste and smell changes, cough, wheezing, chest pain, palpitations, fever, chills, sore throat, diarrhea, constipation, dysuria, hematuria, sleep disturbances, unusual vaginal discharge  Positive: headache, decreased urine output          Physical Exam  Constitutional:       Appearance: Normal appearance.   HENT:      Right Ear: Tympanic membrane normal.      Mouth/Throat:      Mouth: Mucous membranes are moist.   Eyes:      Pupils: Pupils are equal, round, and reactive to light.   Neck:      Vascular: No carotid bruit.   Cardiovascular:      Rate and Rhythm: Normal rate and regular rhythm.      Pulses: Normal pulses.      Heart sounds: Normal heart sounds. No murmur heard.  Pulmonary:      Effort: Pulmonary effort is normal.      Breath sounds: Normal breath sounds.   Abdominal:      General: Bowel sounds are normal. There is no distension.      Palpations: There is no mass.      Tenderness: There is no abdominal tenderness.      Comments: Examined in the seated position   Musculoskeletal:      Right lower leg: No edema.      Left lower leg: No edema.   Skin:     General: Skin is warm and dry.   Neurological:      Mental Status: She is alert and oriented to person, place, and time.      Comments: Visual fields to confrontation is normal. Tandem walking is normal. Drift is normal. Romberg is normal.   Psychiatric:         Behavior: Behavior normal.        Result Review :                     Assessment and Plan     Diagnoses and all orders for this " visit:    1. TIA (transient ischemic attack) (Primary)  Assessment & Plan:   I will obtain an EKG today. I will obtain an influenza and COVID-19 testing. I will obtain magnesium, electrolytes, and regular blood count. I will obtain a urinalysis today. I will call the Holter monitor people and see if we can use with the pacemaker. If all the labs do not show where it is, then I think we should probably get an MRI of her brain.    Orders:  -     Urinalysis With Culture If Indicated - Urine, Clean Catch  -     POCT SARS-CoV-2 + Flu Antigen YARELIS  -     Duplex Carotid Ultrasound CAR; Future  -     Holter Monitor - 72 Hour Up To 15 Days; Future  -     [place] Urine Culture Tube - Urine, Clean Catch  -     ECG 12 Lead    2. Coronary artery disease involving native heart without angina pectoris, unspecified vessel or lesion type  -     Holter Monitor - 72 Hour Up To 15 Days; Future  -     ECG 12 Lead    3. Depression, unspecified depression type    4. Primary hypertension  -     CBC Auto Differential  -     Comprehensive Metabolic Panel    5. Paroxysmal atrial fibrillation  -     Magnesium  -     Holter Monitor - 72 Hour Up To 15 Days; Future    6. Class 3 drug-induced obesity with serious comorbidity and body mass index (BMI) of 45.0 to 49.9 in adult  Assessment & Plan:  Patient's (Body mass index is 43.98 kg/m².) indicates that they are morbidly/severely obese (BMI > 40 or > 35 with obesity - related health condition) with health conditions that include hypertension, dyslipidemias, and osteoarthritis . Weight is worsening. BMI  is above average; BMI management plan is completed. We discussed portion control and increasing exercise.       7. Reversible cerebrovascular vasoconstriction syndrome  -     Duplex Carotid Ultrasound CAR; Future    Other orders  -     rivaroxaban (XARELTO) 20 MG tablet; Take 1 tablet by mouth Daily.  Dispense: 90 tablet; Refill: 1      I texted Dr. Herrera after looking at patient's  electrocardiogram.  Heart rate did jump from 50-99.  And there were several atrial irregularities.  Dr. Herrera felt that we should increase her Xarelto to 20 mg and his office would call her for an earlier recheck.  All of this was expressed to the patient.       Follow Up     No follow-ups on file.  Patient was given instructions and counseling regarding her condition or for health maintenance advice. Please see specific information pulled into the AVS if appropriate.       Transcribed from ambient dictation for Leonor Adam MD by Meri Davidson.  02/01/24   12:57 EST    Patient or patient representative verbalized consent to the visit recording.  I have personally performed the services described in this document as transcribed by the above individual, and it is both accurate and complete.

## 2024-02-02 DIAGNOSIS — G45.9 TIA (TRANSIENT ISCHEMIC ATTACK): Primary | ICD-10-CM

## 2024-02-02 NOTE — PROGRESS NOTES
Nothing in the blood tests to point to cause for lightheadedness and near syncope.  There is mild anemia still and kidney function has gone from 49-48 test for COVID and flu were both negative continue follow-up as planned.  Have put an order in for an MRI of her brain.  Please have her check her card on her pacemaker to make sure it is compatible with MRI.

## 2024-02-02 NOTE — PROGRESS NOTES
Procedure     ECG 12 Lead    Date/Time: 2/1/2024 7:03 PM  Performed by: Leonor Adam MD    Authorized by: Leonor Adam MD  Comparison: compared with previous ECG from 10/25/2023  Comparison to previous ECG: More atrial irregular beats  Rhythm: sinus rhythm  Rate: normal  Conduction: conduction normal  ST Segments: ST segments normal  T Waves: T waves normal  QRS axis: normal  Other: no other findings    Clinical impression: abnormal EKG

## 2024-02-08 ENCOUNTER — OFFICE VISIT (OUTPATIENT)
Dept: PODIATRY | Facility: CLINIC | Age: 79
End: 2024-02-08
Payer: MEDICARE

## 2024-02-08 VITALS — HEIGHT: 62 IN | BODY MASS INDEX: 43.98 KG/M2 | RESPIRATION RATE: 20 BRPM | WEIGHT: 239 LBS

## 2024-02-08 DIAGNOSIS — E66.01 MORBID OBESITY WITH BMI OF 45.0-49.9, ADULT: ICD-10-CM

## 2024-02-08 DIAGNOSIS — M19.072 ARTHRITIS OF LEFT FOOT: ICD-10-CM

## 2024-02-08 DIAGNOSIS — Z98.1 HISTORY OF ANKLE FUSION: ICD-10-CM

## 2024-02-08 DIAGNOSIS — M79.671 BILATERAL FOOT PAIN: Primary | ICD-10-CM

## 2024-02-08 DIAGNOSIS — M79.672 BILATERAL FOOT PAIN: Primary | ICD-10-CM

## 2024-02-08 DIAGNOSIS — M77.42 METATARSALGIA, LEFT FOOT: ICD-10-CM

## 2024-02-08 DIAGNOSIS — M19.071 ARTHRITIS OF RIGHT ANKLE: ICD-10-CM

## 2024-02-08 NOTE — PROGRESS NOTES
02/08/2024  Foot and Ankle Surgery - Established Patient/Follow-up  Provider: Dr. Donta Kelley DPM  Location: [facility]    Subjective:  Faby Le is a 79 y.o. female.     Chief Complaint   Patient presents with    Left Foot - Follow-up, Pain    Right Foot - Follow-up, Pain    Follow-up     LEE ANN Adam MD 2/1/2024         HPI: The patient is a 79-year-old female who returns to the office today for lower extremity follow-up. She is accompanied by an adult female.    She reports she is improved, and she confirms she is able to be active and perform her activities of daily living. The patient states she is moisturizing and massaging her feet. The adult female notes the patient is signed up at the [facility], but she has not yet attended. She adds that the patient recently completed water therapy. The patient states she previously attended water therapy twice weekly for approximately 2 months. The adult female adds that the patient will be moving in with her and she has a pool.    Allergies   Allergen Reactions    Celecoxib Itching and Swelling     swelling       Current Outpatient Medications on File Prior to Visit   Medication Sig Dispense Refill    allopurinol (ZYLOPRIM) 300 MG tablet TAKE 1 TABLET BY MOUTH EVERY DAY 90 tablet 2    atorvastatin (LIPITOR) 20 MG tablet Take 1 tablet by mouth Daily. 30 tablet 11    Azelastine HCl 137 MCG/SPRAY solution 2 sprays by Alternating Nares route Daily As Needed.      cetirizine (zyrTEC) 10 MG tablet Take 1 tablet by mouth Daily.      cyanocobalamin (VITAMIN B-12) 1000 MCG tablet Take 1 tablet by mouth Daily.      escitalopram (LEXAPRO) 20 MG tablet Take 1 tablet by mouth Daily.      IRON CR PO Take  by mouth.      levothyroxine (SYNTHROID, LEVOTHROID) 88 MCG tablet TAKE 1 TABLET BY MOUTH EVERY DAY (Patient taking differently: Take 1 tablet by mouth Daily.) 90 tablet 3    metoprolol succinate XL (TOPROL-XL) 25 MG 24 hr tablet Take 1 tablet by mouth Every Night. 90  "tablet 3    montelukast (SINGULAIR) 10 MG tablet Take 1 tablet by mouth Every Night.      multivitamin (MULTI VITAMIN PO) Take  by mouth Daily.      rivaroxaban (XARELTO) 20 MG tablet Take 1 tablet by mouth Daily. 90 tablet 1    spironolactone (ALDACTONE) 25 MG tablet Take 0.5 tablets by mouth Daily.      Vitamin D, Cholecalciferol, 25 MCG (1000 UT) tablet Take 1 tablet by mouth Daily.       No current facility-administered medications on file prior to visit.       Objective   Resp 20   Ht 157 cm (61.81\")   Wt 108 kg (239 lb)   LMP  (LMP Unknown)   BMI 43.98 kg/m²     Foot/Ankle Exam    GENERAL  Appearance:  obese  Orientation:  AAOx3  Affect:  appropriate    VASCULAR     Right Foot Vascularity   Normal vascular exam    Dorsalis pedis:  2+  Posterior tibial:  2+  Skin temperature:  warm  Edema grading:  None  CFT:  < 3 seconds  Pedal hair growth:  Present  Varicosities:  none     Left Foot Vascularity   Normal vascular exam    Dorsalis pedis:  2+  Posterior tibial:  2+  Skin temperature:  warm  Edema grading:  None  CFT:  < 3 seconds  Pedal hair growth:  Present  Varicosities:  none     NEUROLOGIC     Right Foot Neurologic   Light touch sensation: normal  Hot/Cold sensation: normal  Achilles reflex:  2+     Left Foot Neurologic   Light touch sensation: normal  Hot/Cold sensation:  normal  Achilles reflex:  2+    MUSCULOSKELETAL     Right Foot Musculoskeletal   Arch:  Normal     Left Foot Musculoskeletal   Arch:  Normal    MUSCLE STRENGTH     Right Foot Muscle Strength   Normal strength    Foot dorsiflexion:  5  Foot plantar flexion:  5  Foot inversion:  5  Foot eversion:  5     Left Foot Muscle Strength   Normal strength    Foot dorsiflexion:  5  Foot plantar flexion:  5  Foot inversion:  5  Foot eversion:  5    DERMATOLOGIC      Right Foot Dermatologic   Skin  Right foot skin is intact.      Left Foot Dermatologic   Skin  Left foot skin is intact.     TESTS     Right Foot Tests   Anterior drawer: " negative  Varus tilt: negative     Left Foot Tests   Anterior drawer: negative  Varus tilt: negative     Right foot additional comments: 11/08/2023  Continued rigidity involving ankles and feet. Discomfort with palpation to the arch and forefoot regions of both feet, left greater than right.    02/08/2024  Continued discomfort involving the feet. Soft tissue rigidity. No progressive deformity or instability.     Left foot additional comments: Prominent rigidity noted to the left rear foot secondary to previous tibiotalar calcaneal fusion. Moderate hypermobility involving the midfoot with mild crepitus. No isolated discomfort with palpation involving the forefoot or midfoot today.    03/13/2023  Continued discomfort involving the plantar lateral aspect of the forefoot. Moderate pes planus foot structure with soft tissue rigidity. Range of motion is limited to the rear foot secondary to previous fusion.    06/13/2023  No progressive deformity or instability. Continued discomfort involving the medial aspect of the foot and ankles. Normal range of motion, muscle strength testing.    11/08/2023  Continued rigidity involving ankles and feet. Discomfort with palpation to the arch and forefoot regions of both feet, left greater than right.    02/08/2024  Continued discomfort involving the feet. Soft tissue rigidity. No progressive deformity or instability.      Assessment & Plan   Diagnoses and all orders for this visit:    1. Bilateral foot pain (Primary)    2. Metatarsalgia, left foot    3. Arthritis of left foot    4. Arthritis of right ankle    5. History of ankle fusion    6. Morbid obesity with BMI of 45.0-49.9, adult        The patient returns to the office today for follow-up. No results were obtained or interpreted today. She has continued discomfort involving the bilateral feet with soft tissue rigidity. Recommended continuing to stay supported with her shoes and inserts and to perform low-impact exercise such as  water therapy, elliptical, or a recumbent bike. Advised the next step in treatment, if her symptoms persist or worsen, is a steroid injection, but surgery is an option as well if necessary. The patient will return for re-evaluation in 3 months. Greater than 20 minutes was spent before, during, and after evaluation for patient care.     No orders of the defined types were placed in this encounter.         Note is dictated utilizing voice recognition software. Unfortunately this leads to occasional typographical errors. I apologize in advance if the situation occurs. If questions occur please do not hesitate to call our office.    Transcribed from ambient dictation for STEHPANIE Kelley DPM by Estephanie Gomez.  02/08/24   11:55 EST    Patient or patient representative verbalized consent to the visit recording.  I have personally performed the services described in this document as transcribed by the above individual, and it is both accurate and complete.

## 2024-02-09 ENCOUNTER — PREP FOR SURGERY (OUTPATIENT)
Dept: OTHER | Facility: HOSPITAL | Age: 79
End: 2024-02-09
Payer: MEDICARE

## 2024-02-09 ENCOUNTER — OFFICE VISIT (OUTPATIENT)
Dept: CARDIOLOGY | Facility: CLINIC | Age: 79
End: 2024-02-09
Payer: MEDICARE

## 2024-02-09 VITALS
WEIGHT: 245 LBS | BODY MASS INDEX: 46.26 KG/M2 | OXYGEN SATURATION: 99 % | HEIGHT: 61 IN | SYSTOLIC BLOOD PRESSURE: 127 MMHG | DIASTOLIC BLOOD PRESSURE: 64 MMHG | HEART RATE: 58 BPM

## 2024-02-09 DIAGNOSIS — I49.5 SICK SINUS SYNDROME: ICD-10-CM

## 2024-02-09 DIAGNOSIS — I49.5 SICK SINUS SYNDROME: Primary | ICD-10-CM

## 2024-02-09 DIAGNOSIS — E78.2 MIXED HYPERLIPIDEMIA: ICD-10-CM

## 2024-02-09 DIAGNOSIS — I48.0 PAROXYSMAL ATRIAL FIBRILLATION: ICD-10-CM

## 2024-02-09 DIAGNOSIS — I10 PRIMARY HYPERTENSION: ICD-10-CM

## 2024-02-09 DIAGNOSIS — Z95.0 PRESENCE OF CARDIAC PACEMAKER: ICD-10-CM

## 2024-02-09 DIAGNOSIS — I48.0 PAROXYSMAL ATRIAL FIBRILLATION: Primary | ICD-10-CM

## 2024-02-09 DIAGNOSIS — D50.8 OTHER IRON DEFICIENCY ANEMIA: ICD-10-CM

## 2024-02-09 DIAGNOSIS — G45.9 TIA (TRANSIENT ISCHEMIC ATTACK): ICD-10-CM

## 2024-02-09 RX ORDER — SODIUM CHLORIDE 9 MG/ML
80 INJECTION, SOLUTION INTRAVENOUS CONTINUOUS
OUTPATIENT
Start: 2024-02-09

## 2024-02-09 RX ORDER — SODIUM CHLORIDE 9 MG/ML
40 INJECTION, SOLUTION INTRAVENOUS AS NEEDED
OUTPATIENT
Start: 2024-02-09

## 2024-02-09 RX ORDER — SODIUM CHLORIDE 0.9 % (FLUSH) 0.9 %
3-10 SYRINGE (ML) INJECTION AS NEEDED
OUTPATIENT
Start: 2024-02-09

## 2024-02-09 RX ORDER — SODIUM CHLORIDE 0.9 % (FLUSH) 0.9 %
3 SYRINGE (ML) INJECTION EVERY 12 HOURS SCHEDULED
OUTPATIENT
Start: 2024-02-09

## 2024-02-09 NOTE — H&P (VIEW-ONLY)
CC--sick sinus syndrome and pacemaker in situ      Sub  79-year-old pleasant patient has dual-chamber pacemaker for tachybradycardia syndrome and sick sinus syndrome.  He recently had TIA type of symptoms and she was on rivaroxaban 15 mg and I discussed with primary care physician to increase rivaroxaban to 20 mg.  Patient has history of atrial fibrillation with history of hypertension and sleep apnea and lower extremity edema  She underwent cardiac catheterization in 2022 revealing mild obstructive disease left to medical management with normal LV systolic function.  She has history of anemia and workup revealed iron deficiency with normal B12 and patient was treated with iron supplementation    Patient had TIA type of symptoms 2 weeks ago with speech abnormality and CT head was within normal limits and I was notified and after that Xarelto has been increased to 20 mg    Patient also has intermittent symptoms of fatigue and weakness and dizziness      Past Medical History:   Diagnosis Date    Afib     Ankle pain     Arthritis     Chronic kidney disease     Coronary artery disease     Depression     Gout     History of loop recorder 04/25/2023    current    Hyperglycemia     Hyperlipidemia     Hypertension     Low back pain     Morbid obesity     Sleep apnea     Vitamin D deficiency      Past Surgical History:   Procedure Laterality Date    ANKLE FUSION      2017-left    CARDIAC CATHETERIZATION      CARDIAC CATHETERIZATION Right 8/29/2022    Procedure: Left Heart Cath;  Surgeon: Ratna Zavala MD;  Location: [facility] CATH INVASIVE LOCATION;  Service: Cardiovascular;  Laterality: Right;    CARDIAC ELECTROPHYSIOLOGY PROCEDURE Left 7/3/2023    Procedure: Pacemaker DC new [place] Notified;  Surgeon: Cr Herrera MD;  Location: [facility] CATH INVASIVE LOCATION;  Service: Cardiovascular;  Laterality: Left;    CARPAL TUNNEL RELEASE      CATARACT EXTRACTION      CUBITAL TUNNEL RELEASE      HYSTERECTOMY       REPLACEMENT TOTAL KNEE      left 2008    ROTATOR CUFF REPAIR      TOTAL KNEE ARTHROPLASTY Right 5/4/2023    Procedure: TOTAL KNEE ARTHROPLASTY WITH CORI ROBOT;  Surgeon: Chino Herrera II, MD;  Location: [facility];  Service: Robotics - Ortho;  Laterality: Right;         Physical Exam    General:      well developed, well nourished, in no acute distress.    Head:      normocephalic and atraumatic.    Eyes:      PERRL/EOM intact, conjunctivae and sclerae clear without nystagmus.    Neck:      no  thyromegaly, trachea central with normal respiratory effort  Lungs:      clear bilaterally to auscultation.    Heart:       regular rate and rhythm, S1, S2 without murmurs, rubs, or gallops  Skin:      intact without lesions or rashes.    Psych:      alert and cooperative; normal mood and affect; normal attention span and concentration.          Assessment plan    Recent labs include LDL of 46 , hemoglobin 11.7 and occult blood in the stool was negative  Platelets are normal  Recent TSH is normal  Recent CT head without abnormality  Iron deficiency anemia on iron supplementation followed by primary care  Dual-chamber pacemaker in situ with a low burden of AF and patient is taking appropriate amount of Xarelto  Hypothyroidism supplemented with levothyroxine  Hyperlipidemia treated with atorvastatin  Hypertension well-controlled with a combination of spironolactone/metoprolol    Medications reviewed and follow-up appointments made    Pacemaker interrogated today and patient had few days of long episodes of atrial arrhythmia sometimes lasting up to 6 to 10 hours and patient was symptomatic during those days.  Therapeutic options for atrial arrhythmia discussed and patient scheduled for AF ablation since patient is symptomatic.  Risks and benefits and outcomes educated and orders placed  Cardiac monitor is canceled since patient has pacemaker in situ which was discussed with the patient and family    Electronically  signed by Cr Herrera MD, 02/09/24, 9:37 AM EST.

## 2024-02-09 NOTE — PROGRESS NOTES
CC--sick sinus syndrome and pacemaker in situ      Sub  79-year-old pleasant patient has dual-chamber pacemaker for tachybradycardia syndrome and sick sinus syndrome.  He recently had TIA type of symptoms and she was on rivaroxaban 15 mg and I discussed with primary care physician to increase rivaroxaban to 20 mg.  Patient has history of atrial fibrillation with history of hypertension and sleep apnea and lower extremity edema  She underwent cardiac catheterization in 2022 revealing mild obstructive disease left to medical management with normal LV systolic function.  She has history of anemia and workup revealed iron deficiency with normal B12 and patient was treated with iron supplementation    Patient had TIA type of symptoms 2 weeks ago with speech abnormality and CT head was within normal limits and I was notified and after that Xarelto has been increased to 20 mg    Patient also has intermittent symptoms of fatigue and weakness and dizziness      Past Medical History:   Diagnosis Date    Afib     Ankle pain     Arthritis     Chronic kidney disease     Coronary artery disease     Depression     Gout     History of loop recorder 04/25/2023    current    Hyperglycemia     Hyperlipidemia     Hypertension     Low back pain     Morbid obesity     Sleep apnea     Vitamin D deficiency      Past Surgical History:   Procedure Laterality Date    ANKLE FUSION      2017-left    CARDIAC CATHETERIZATION      CARDIAC CATHETERIZATION Right 8/29/2022    Procedure: Left Heart Cath;  Surgeon: Ratna Zavala MD;  Location: [facility] CATH INVASIVE LOCATION;  Service: Cardiovascular;  Laterality: Right;    CARDIAC ELECTROPHYSIOLOGY PROCEDURE Left 7/3/2023    Procedure: Pacemaker DC new [place] Notified;  Surgeon: Cr Herrera MD;  Location: [facility] CATH INVASIVE LOCATION;  Service: Cardiovascular;  Laterality: Left;    CARPAL TUNNEL RELEASE      CATARACT EXTRACTION      CUBITAL TUNNEL RELEASE      HYSTERECTOMY       REPLACEMENT TOTAL KNEE      left 2008    ROTATOR CUFF REPAIR      TOTAL KNEE ARTHROPLASTY Right 5/4/2023    Procedure: TOTAL KNEE ARTHROPLASTY WITH CORI ROBOT;  Surgeon: Chino Herrera II, MD;  Location: [facility];  Service: Robotics - Ortho;  Laterality: Right;         Physical Exam    General:      well developed, well nourished, in no acute distress.    Head:      normocephalic and atraumatic.    Eyes:      PERRL/EOM intact, conjunctivae and sclerae clear without nystagmus.    Neck:      no  thyromegaly, trachea central with normal respiratory effort  Lungs:      clear bilaterally to auscultation.    Heart:       regular rate and rhythm, S1, S2 without murmurs, rubs, or gallops  Skin:      intact without lesions or rashes.    Psych:      alert and cooperative; normal mood and affect; normal attention span and concentration.          Assessment plan    Recent labs include LDL of 46 , hemoglobin 11.7 and occult blood in the stool was negative  Platelets are normal  Recent TSH is normal  Recent CT head without abnormality  Iron deficiency anemia on iron supplementation followed by primary care  Dual-chamber pacemaker in situ with a low burden of AF and patient is taking appropriate amount of Xarelto  Hypothyroidism supplemented with levothyroxine  Hyperlipidemia treated with atorvastatin  Hypertension well-controlled with a combination of spironolactone/metoprolol    Medications reviewed and follow-up appointments made    Pacemaker interrogated today and patient had few days of long episodes of atrial arrhythmia sometimes lasting up to 6 to 10 hours and patient was symptomatic during those days.  Therapeutic options for atrial arrhythmia discussed and patient scheduled for AF ablation since patient is symptomatic.  Risks and benefits and outcomes educated and orders placed  Cardiac monitor is canceled since patient has pacemaker in situ which was discussed with the patient and family    Electronically  signed by Cr Herrera MD, 02/09/24, 9:37 AM EST.

## 2024-02-15 ENCOUNTER — HOSPITAL ENCOUNTER (OUTPATIENT)
Dept: CARDIOLOGY | Facility: HOSPITAL | Age: 79
Discharge: HOME | End: 2024-02-15
Admitting: PREVENTIVE MEDICINE
Payer: MEDICARE

## 2024-02-15 ENCOUNTER — APPOINTMENT (OUTPATIENT)
Dept: RESPIRATORY THERAPY | Facility: HOSPITAL | Age: 79
End: 2024-02-15
Payer: MEDICARE

## 2024-02-15 DIAGNOSIS — G45.9 TIA (TRANSIENT ISCHEMIC ATTACK): ICD-10-CM

## 2024-02-15 DIAGNOSIS — I67.841 REVERSIBLE CEREBROVASCULAR VASOCONSTRICTION SYNDROME: ICD-10-CM

## 2024-02-15 LAB
BH CV XLRA MEAS LEFT DIST CCA EDV: 10.1 CM/SEC
BH CV XLRA MEAS LEFT DIST CCA PSV: 32.9 CM/SEC
BH CV XLRA MEAS LEFT DIST ICA EDV: -21.2 CM/SEC
BH CV XLRA MEAS LEFT DIST ICA PSV: -56.8 CM/SEC
BH CV XLRA MEAS LEFT ICA/CCA RATIO: -0.47
BH CV XLRA MEAS LEFT PROX CCA EDV: 22.3 CM/SEC
BH CV XLRA MEAS LEFT PROX CCA PSV: 128 CM/SEC
BH CV XLRA MEAS LEFT PROX ECA PSV: -73.5 CM/SEC
BH CV XLRA MEAS LEFT PROX ICA EDV: -21.4 CM/SEC
BH CV XLRA MEAS LEFT PROX ICA PSV: -60.4 CM/SEC
BH CV XLRA MEAS LEFT PROX SCLA PSV: 151 CM/SEC
BH CV XLRA MEAS LEFT VERTEBRAL A EDV: -18.1 CM/SEC
BH CV XLRA MEAS LEFT VERTEBRAL A PSV: -56 CM/SEC
BH CV XLRA MEAS RIGHT DIST CCA EDV: -14.3 CM/SEC
BH CV XLRA MEAS RIGHT DIST CCA PSV: -67.1 CM/SEC
BH CV XLRA MEAS RIGHT DIST ICA EDV: -15.8 CM/SEC
BH CV XLRA MEAS RIGHT DIST ICA PSV: -48.9 CM/SEC
BH CV XLRA MEAS RIGHT ICA/CCA RATIO: -0.53
BH CV XLRA MEAS RIGHT PROX CCA EDV: 18.6 CM/SEC
BH CV XLRA MEAS RIGHT PROX CCA PSV: 91.9 CM/SEC
BH CV XLRA MEAS RIGHT PROX ECA PSV: -89.4 CM/SEC
BH CV XLRA MEAS RIGHT PROX ICA EDV: -16.6 CM/SEC
BH CV XLRA MEAS RIGHT PROX ICA PSV: -48.4 CM/SEC
BH CV XLRA MEAS RIGHT PROX SCLA PSV: 196 CM/SEC
BH CV XLRA MEAS RIGHT VERTEBRAL A EDV: -13.3 CM/SEC
BH CV XLRA MEAS RIGHT VERTEBRAL A PSV: -47 CM/SEC

## 2024-02-15 PROCEDURE — 93880 EXTRACRANIAL BILAT STUDY: CPT

## 2024-02-22 ENCOUNTER — OFFICE (AMBULATORY)
Dept: URBAN - METROPOLITAN AREA CLINIC 64 | Facility: CLINIC | Age: 79
End: 2024-02-22
Payer: MEDICAID

## 2024-02-22 VITALS
HEIGHT: 63 IN | WEIGHT: 245 LBS | HEART RATE: 60 BPM | DIASTOLIC BLOOD PRESSURE: 68 MMHG | SYSTOLIC BLOOD PRESSURE: 134 MMHG

## 2024-02-22 DIAGNOSIS — K30 FUNCTIONAL DYSPEPSIA: ICD-10-CM

## 2024-02-22 DIAGNOSIS — Z79.01 LONG TERM (CURRENT) USE OF ANTICOAGULANTS: ICD-10-CM

## 2024-02-22 DIAGNOSIS — D64.9 ANEMIA, UNSPECIFIED: ICD-10-CM

## 2024-02-22 PROCEDURE — 99214 OFFICE O/P EST MOD 30 MIN: CPT | Performed by: NURSE PRACTITIONER

## 2024-02-28 DIAGNOSIS — E03.9 HYPOTHYROIDISM, UNSPECIFIED TYPE: ICD-10-CM

## 2024-02-28 RX ORDER — LEVOTHYROXINE SODIUM 88 UG/1
TABLET ORAL
Qty: 90 TABLET | Refills: 3 | Status: SHIPPED | OUTPATIENT
Start: 2024-02-28

## 2024-03-04 ENCOUNTER — ANESTHESIA EVENT (OUTPATIENT)
Dept: CARDIOLOGY | Facility: HOSPITAL | Age: 79
End: 2024-03-04
Payer: MEDICARE

## 2024-03-04 NOTE — TELEPHONE ENCOUNTER
It looks like she has been off the Ozempic for a while.  When did she last take it and how long has she been on it?  Has she had any stomach upset or swelling in her neck?  Any change in her vision?  Has she had any feelings of lightheadedness or actually fainted?

## 2024-03-04 NOTE — TELEPHONE ENCOUNTER
Rx Refill Note  Requested Prescriptions     Pending Prescriptions Disp Refills   • Ozempic, 2 MG/DOSE, 8 MG/3ML solution pen-injector [Pharmacy Med Name: Ozempic (2 MG/DOSE) 8 MG/3ML Subcutaneous Solution Pen-injector] 3 mL 0     Sig: INJECT 2 MG INTO THE SKIN EVERY 7 DAYS, CONTINUE FOR 4 WEEKS      Last office visit with prescribing clinician: 2/1/2024      Next office visit with prescribing clinician: 5/2/2024   3}  Tonia Landry  03/04/24, 07:58 EST

## 2024-03-05 ENCOUNTER — HOSPITAL ENCOUNTER (OUTPATIENT)
Dept: CARDIOLOGY | Facility: HOSPITAL | Age: 79
Discharge: HOME | End: 2024-03-05
Payer: MEDICARE

## 2024-03-05 ENCOUNTER — LAB (OUTPATIENT)
Dept: LAB | Facility: HOSPITAL | Age: 79
End: 2024-03-05
Payer: MEDICARE

## 2024-03-05 ENCOUNTER — ANESTHESIA (OUTPATIENT)
Dept: CARDIOLOGY | Facility: HOSPITAL | Age: 79
End: 2024-03-05
Payer: MEDICARE

## 2024-03-05 ENCOUNTER — HOSPITAL ENCOUNTER (OUTPATIENT)
Facility: HOSPITAL | Age: 79
Setting detail: HOSPITAL OUTPATIENT SURGERY
Discharge: HOME | End: 2024-03-05
Attending: INTERNAL MEDICINE | Admitting: INTERNAL MEDICINE
Payer: MEDICARE

## 2024-03-05 VITALS
HEART RATE: 60 BPM | HEIGHT: 63 IN | SYSTOLIC BLOOD PRESSURE: 124 MMHG | RESPIRATION RATE: 13 BRPM | DIASTOLIC BLOOD PRESSURE: 110 MMHG | WEIGHT: 248.02 LBS | OXYGEN SATURATION: 99 % | BODY MASS INDEX: 43.95 KG/M2 | TEMPERATURE: 97.4 F

## 2024-03-05 VITALS
SYSTOLIC BLOOD PRESSURE: 164 MMHG | BODY MASS INDEX: 43.94 KG/M2 | WEIGHT: 248 LBS | HEIGHT: 63 IN | HEART RATE: 64 BPM | DIASTOLIC BLOOD PRESSURE: 76 MMHG

## 2024-03-05 DIAGNOSIS — Z95.0 PRESENCE OF CARDIAC PACEMAKER: ICD-10-CM

## 2024-03-05 DIAGNOSIS — I48.0 PAROXYSMAL ATRIAL FIBRILLATION: ICD-10-CM

## 2024-03-05 DIAGNOSIS — I49.5 SICK SINUS SYNDROME: ICD-10-CM

## 2024-03-05 DIAGNOSIS — G45.9 TIA (TRANSIENT ISCHEMIC ATTACK): Primary | ICD-10-CM

## 2024-03-05 DIAGNOSIS — N18.30 STAGE 3 CHRONIC KIDNEY DISEASE, UNSPECIFIED WHETHER STAGE 3A OR 3B CKD: ICD-10-CM

## 2024-03-05 LAB
ACT BLD: 152 SECONDS (ref 89–137)
ACT BLD: 260 SECONDS (ref 89–137)
ACT BLD: 293 SECONDS (ref 89–137)
ACT BLD: 304 SECONDS (ref 89–137)
ACT BLD: 304 SECONDS (ref 89–137)
ACT BLD: 320 SECONDS (ref 89–137)
ACT BLD: 325 SECONDS (ref 89–137)
ALBUMIN SERPL-MCNC: 3.7 G/DL (ref 3.5–5.2)
ALBUMIN/GLOB SERPL: 1.5 G/DL
ALP SERPL-CCNC: 101 U/L (ref 39–117)
ALT SERPL W P-5'-P-CCNC: 15 U/L (ref 1–33)
ANION GAP SERPL CALCULATED.3IONS-SCNC: 8 MMOL/L (ref 5–15)
AST SERPL-CCNC: 18 U/L (ref 1–32)
BASOPHILS # BLD AUTO: 0 10*3/MM3 (ref 0–0.2)
BASOPHILS NFR BLD AUTO: 0.8 % (ref 0–1.5)
BH CV ECHO SHUNT ASSESSMENT PERFORMED (HIDDEN SCRIPTING): 1
BILIRUB SERPL-MCNC: 0.5 MG/DL (ref 0–1.2)
BUN SERPL-MCNC: 29 MG/DL (ref 8–23)
BUN/CREAT SERPL: 26.6 (ref 7–25)
CALCIUM SPEC-SCNC: 9.3 MG/DL (ref 8.6–10.5)
CHLORIDE SERPL-SCNC: 106 MMOL/L (ref 98–107)
CO2 SERPL-SCNC: 25 MMOL/L (ref 22–29)
CREAT SERPL-MCNC: 1.09 MG/DL (ref 0.57–1)
DEPRECATED RDW RBC AUTO: 56.4 FL (ref 37–54)
EGFRCR SERPLBLD CKD-EPI 2021: 52.4 ML/MIN/1.73
EOSINOPHIL # BLD AUTO: 0.2 10*3/MM3 (ref 0–0.4)
EOSINOPHIL NFR BLD AUTO: 2.8 % (ref 0.3–6.2)
ERYTHROCYTE [DISTWIDTH] IN BLOOD BY AUTOMATED COUNT: 15.1 % (ref 12.3–15.4)
GLOBULIN UR ELPH-MCNC: 2.4 GM/DL
GLUCOSE SERPL-MCNC: 109 MG/DL (ref 65–99)
HCT VFR BLD AUTO: 31.3 % (ref 34–46.6)
HGB BLD-MCNC: 10.5 G/DL (ref 12–15.9)
LV EF 2D ECHO EST: 60 %
LYMPHOCYTES # BLD AUTO: 1.4 10*3/MM3 (ref 0.7–3.1)
LYMPHOCYTES NFR BLD AUTO: 23.5 % (ref 19.6–45.3)
MAGNESIUM SERPL-MCNC: 1.7 MG/DL (ref 1.6–2.4)
MCH RBC QN AUTO: 34.1 PG (ref 26.6–33)
MCHC RBC AUTO-ENTMCNC: 33.6 G/DL (ref 31.5–35.7)
MCV RBC AUTO: 101.5 FL (ref 79–97)
MONOCYTES # BLD AUTO: 0.7 10*3/MM3 (ref 0.1–0.9)
MONOCYTES NFR BLD AUTO: 11.7 % (ref 5–12)
NEUTROPHILS NFR BLD AUTO: 3.6 10*3/MM3 (ref 1.7–7)
NEUTROPHILS NFR BLD AUTO: 61.2 % (ref 42.7–76)
NRBC BLD AUTO-RTO: 0 /100 WBC (ref 0–0.2)
PLATELET # BLD AUTO: 159 10*3/MM3 (ref 140–450)
PMV BLD AUTO: 7.6 FL (ref 6–12)
POTASSIUM SERPL-SCNC: 4.5 MMOL/L (ref 3.5–5.2)
PROT SERPL-MCNC: 6.1 G/DL (ref 6–8.5)
RBC # BLD AUTO: 3.09 10*6/MM3 (ref 3.77–5.28)
SODIUM SERPL-SCNC: 139 MMOL/L (ref 136–145)
WBC NRBC COR # BLD AUTO: 5.9 10*3/MM3 (ref 3.4–10.8)

## 2024-03-05 PROCEDURE — 93656 COMPRE EP EVAL ABLTJ ATR FIB: CPT | Performed by: INTERNAL MEDICINE

## 2024-03-05 PROCEDURE — 25010000002 ONDANSETRON PER 1 MG: Performed by: NURSE ANESTHETIST, CERTIFIED REGISTERED

## 2024-03-05 PROCEDURE — C1732 CATH, EP, DIAG/ABL, 3D/VECT: HCPCS | Performed by: INTERNAL MEDICINE

## 2024-03-05 PROCEDURE — C1894 INTRO/SHEATH, NON-LASER: HCPCS | Performed by: INTERNAL MEDICINE

## 2024-03-05 PROCEDURE — 93320 DOPPLER ECHO COMPLETE: CPT

## 2024-03-05 PROCEDURE — C1733 CATH, EP, OTHR THAN COOL-TIP: HCPCS | Performed by: INTERNAL MEDICINE

## 2024-03-05 PROCEDURE — C1766 INTRO/SHEATH,STRBLE,NON-PEEL: HCPCS | Performed by: INTERNAL MEDICINE

## 2024-03-05 PROCEDURE — 25010000002 LIDOCAINE 1 % SOLUTION: Performed by: INTERNAL MEDICINE

## 2024-03-05 PROCEDURE — 25510000001 IOPAMIDOL PER 1 ML: Performed by: INTERNAL MEDICINE

## 2024-03-05 PROCEDURE — 93320 DOPPLER ECHO COMPLETE: CPT | Performed by: INTERNAL MEDICINE

## 2024-03-05 PROCEDURE — 93325 DOPPLER ECHO COLOR FLOW MAPG: CPT | Performed by: INTERNAL MEDICINE

## 2024-03-05 PROCEDURE — 25010000002 DEXAMETHASONE PER 1 MG: Performed by: NURSE ANESTHETIST, CERTIFIED REGISTERED

## 2024-03-05 PROCEDURE — 25010000002 HEPARIN (PORCINE) PER 1000 UNITS: Performed by: NURSE ANESTHETIST, CERTIFIED REGISTERED

## 2024-03-05 PROCEDURE — C1894 INTRO/SHEATH, NON-LASER: HCPCS

## 2024-03-05 PROCEDURE — 25010000002 PROTAMINE SULFATE PER 10 MG: Performed by: NURSE ANESTHETIST, CERTIFIED REGISTERED

## 2024-03-05 PROCEDURE — 93312 ECHO TRANSESOPHAGEAL: CPT | Performed by: INTERNAL MEDICINE

## 2024-03-05 PROCEDURE — 36415 COLL VENOUS BLD VENIPUNCTURE: CPT

## 2024-03-05 PROCEDURE — 25010000002 CEFAZOLIN PER 500 MG: Performed by: NURSE ANESTHETIST, CERTIFIED REGISTERED

## 2024-03-05 PROCEDURE — 93657 TX L/R ATRIAL FIB ADDL: CPT | Performed by: INTERNAL MEDICINE

## 2024-03-05 PROCEDURE — 93312 ECHO TRANSESOPHAGEAL: CPT

## 2024-03-05 PROCEDURE — 85347 COAGULATION TIME ACTIVATED: CPT

## 2024-03-05 PROCEDURE — 85025 COMPLETE CBC W/AUTO DIFF WBC: CPT

## 2024-03-05 PROCEDURE — 25010000002 FENTANYL CITRATE (PF) 100 MCG/2ML SOLUTION: Performed by: NURSE ANESTHETIST, CERTIFIED REGISTERED

## 2024-03-05 PROCEDURE — C1759 CATH, INTRA ECHOCARDIOGRAPHY: HCPCS | Performed by: INTERNAL MEDICINE

## 2024-03-05 PROCEDURE — C1893 INTRO/SHEATH, FIXED,NON-PEEL: HCPCS | Performed by: INTERNAL MEDICINE

## 2024-03-05 PROCEDURE — 93325 DOPPLER ECHO COLOR FLOW MAPG: CPT

## 2024-03-05 PROCEDURE — 25010000002 PHENYLEPHRINE 10 MG/ML SOLUTION 5 ML VIAL: Performed by: NURSE ANESTHETIST, CERTIFIED REGISTERED

## 2024-03-05 PROCEDURE — 83735 ASSAY OF MAGNESIUM: CPT

## 2024-03-05 PROCEDURE — 25010000002 SUGAMMADEX 200 MG/2ML SOLUTION: Performed by: NURSE ANESTHETIST, CERTIFIED REGISTERED

## 2024-03-05 PROCEDURE — C1769 GUIDE WIRE: HCPCS | Performed by: INTERNAL MEDICINE

## 2024-03-05 PROCEDURE — 93655 ICAR CATH ABLTJ DSCRT ARRHYT: CPT | Performed by: INTERNAL MEDICINE

## 2024-03-05 PROCEDURE — 25810000003 SODIUM CHLORIDE 0.9 % SOLUTION: Performed by: INTERNAL MEDICINE

## 2024-03-05 PROCEDURE — 25010000002 PROPOFOL 200 MG/20ML EMULSION: Performed by: NURSE ANESTHETIST, CERTIFIED REGISTERED

## 2024-03-05 PROCEDURE — 25810000003 SODIUM CHLORIDE 0.9 % SOLUTION 250 ML FLEX CONT: Performed by: NURSE ANESTHETIST, CERTIFIED REGISTERED

## 2024-03-05 PROCEDURE — 80053 COMPREHEN METABOLIC PANEL: CPT

## 2024-03-05 PROCEDURE — C1730 CATH, EP, 19 OR FEW ELECT: HCPCS | Performed by: INTERNAL MEDICINE

## 2024-03-05 RX ORDER — SODIUM CHLORIDE 9 MG/ML
40 INJECTION, SOLUTION INTRAVENOUS AS NEEDED
Status: DISCONTINUED | OUTPATIENT
Start: 2024-03-05 | End: 2024-03-05 | Stop reason: HOSPADM

## 2024-03-05 RX ORDER — MORPHINE SULFATE 2 MG/ML
1 INJECTION, SOLUTION INTRAMUSCULAR; INTRAVENOUS EVERY 4 HOURS PRN
Status: DISCONTINUED | OUTPATIENT
Start: 2024-03-05 | End: 2024-03-05 | Stop reason: HOSPADM

## 2024-03-05 RX ORDER — PROPOFOL 10 MG/ML
INJECTION, EMULSION INTRAVENOUS AS NEEDED
Status: DISCONTINUED | OUTPATIENT
Start: 2024-03-05 | End: 2024-03-05 | Stop reason: SURG

## 2024-03-05 RX ORDER — PROTAMINE SULFATE 10 MG/ML
INJECTION, SOLUTION INTRAVENOUS AS NEEDED
Status: DISCONTINUED | OUTPATIENT
Start: 2024-03-05 | End: 2024-03-05 | Stop reason: SURG

## 2024-03-05 RX ORDER — HYDROCODONE BITARTRATE AND ACETAMINOPHEN 5; 325 MG/1; MG/1
1 TABLET ORAL EVERY 4 HOURS PRN
Status: DISCONTINUED | OUTPATIENT
Start: 2024-03-05 | End: 2024-03-05 | Stop reason: HOSPADM

## 2024-03-05 RX ORDER — FENTANYL CITRATE 50 UG/ML
INJECTION, SOLUTION INTRAMUSCULAR; INTRAVENOUS AS NEEDED
Status: DISCONTINUED | OUTPATIENT
Start: 2024-03-05 | End: 2024-03-05 | Stop reason: SURG

## 2024-03-05 RX ORDER — ONDANSETRON 2 MG/ML
INJECTION INTRAMUSCULAR; INTRAVENOUS AS NEEDED
Status: DISCONTINUED | OUTPATIENT
Start: 2024-03-05 | End: 2024-03-05 | Stop reason: SURG

## 2024-03-05 RX ORDER — HEPARIN SODIUM 1000 [USP'U]/ML
INJECTION, SOLUTION INTRAVENOUS; SUBCUTANEOUS AS NEEDED
Status: DISCONTINUED | OUTPATIENT
Start: 2024-03-05 | End: 2024-03-05 | Stop reason: SURG

## 2024-03-05 RX ORDER — FENTANYL CITRATE 50 UG/ML
50 INJECTION, SOLUTION INTRAMUSCULAR; INTRAVENOUS
Status: DISCONTINUED | OUTPATIENT
Start: 2024-03-05 | End: 2024-03-05 | Stop reason: HOSPADM

## 2024-03-05 RX ORDER — CEFAZOLIN SODIUM 1 G/3ML
INJECTION, POWDER, FOR SOLUTION INTRAMUSCULAR; INTRAVENOUS AS NEEDED
Status: DISCONTINUED | OUTPATIENT
Start: 2024-03-05 | End: 2024-03-05 | Stop reason: SURG

## 2024-03-05 RX ORDER — ONDANSETRON 2 MG/ML
4 INJECTION INTRAMUSCULAR; INTRAVENOUS EVERY 6 HOURS PRN
Status: DISCONTINUED | OUTPATIENT
Start: 2024-03-05 | End: 2024-03-05 | Stop reason: HOSPADM

## 2024-03-05 RX ORDER — IPRATROPIUM BROMIDE AND ALBUTEROL SULFATE 2.5; .5 MG/3ML; MG/3ML
3 SOLUTION RESPIRATORY (INHALATION) ONCE AS NEEDED
Status: DISCONTINUED | OUTPATIENT
Start: 2024-03-05 | End: 2024-03-05 | Stop reason: HOSPADM

## 2024-03-05 RX ORDER — PANTOPRAZOLE SODIUM 40 MG/10ML
INJECTION, POWDER, LYOPHILIZED, FOR SOLUTION INTRAVENOUS AS NEEDED
Status: DISCONTINUED | OUTPATIENT
Start: 2024-03-05 | End: 2024-03-05 | Stop reason: SURG

## 2024-03-05 RX ORDER — ONDANSETRON 2 MG/ML
4 INJECTION INTRAMUSCULAR; INTRAVENOUS ONCE AS NEEDED
Status: DISCONTINUED | OUTPATIENT
Start: 2024-03-05 | End: 2024-03-05 | Stop reason: HOSPADM

## 2024-03-05 RX ORDER — METOPROLOL SUCCINATE 100 MG/1
100 TABLET, EXTENDED RELEASE ORAL NIGHTLY
Qty: 90 TABLET | Refills: 1 | Status: SHIPPED | OUTPATIENT
Start: 2024-03-05

## 2024-03-05 RX ORDER — SODIUM CHLORIDE 9 MG/ML
80 INJECTION, SOLUTION INTRAVENOUS CONTINUOUS
Status: DISCONTINUED | OUTPATIENT
Start: 2024-03-05 | End: 2024-03-05 | Stop reason: HOSPADM

## 2024-03-05 RX ORDER — PANTOPRAZOLE SODIUM 40 MG/1
40 TABLET, DELAYED RELEASE ORAL DAILY
Qty: 30 TABLET | Refills: 0 | Status: SHIPPED | OUTPATIENT
Start: 2024-03-06 | End: 2024-04-05

## 2024-03-05 RX ORDER — FAMOTIDINE 10 MG/ML
INJECTION, SOLUTION INTRAVENOUS AS NEEDED
Status: DISCONTINUED | OUTPATIENT
Start: 2024-03-05 | End: 2024-03-05 | Stop reason: SURG

## 2024-03-05 RX ORDER — NALOXONE HCL 0.4 MG/ML
0.4 VIAL (ML) INJECTION
Status: DISCONTINUED | OUTPATIENT
Start: 2024-03-05 | End: 2024-03-05 | Stop reason: HOSPADM

## 2024-03-05 RX ORDER — SEMAGLUTIDE 2.68 MG/ML
INJECTION, SOLUTION SUBCUTANEOUS
Qty: 3 ML | Refills: 0 | OUTPATIENT
Start: 2024-03-05

## 2024-03-05 RX ORDER — ACETAMINOPHEN 325 MG/1
650 TABLET ORAL ONCE AS NEEDED
Status: DISCONTINUED | OUTPATIENT
Start: 2024-03-05 | End: 2024-03-05 | Stop reason: HOSPADM

## 2024-03-05 RX ORDER — DEXAMETHASONE SODIUM PHOSPHATE 4 MG/ML
INJECTION, SOLUTION INTRA-ARTICULAR; INTRALESIONAL; INTRAMUSCULAR; INTRAVENOUS; SOFT TISSUE AS NEEDED
Status: DISCONTINUED | OUTPATIENT
Start: 2024-03-05 | End: 2024-03-05 | Stop reason: SURG

## 2024-03-05 RX ORDER — FLUMAZENIL 0.1 MG/ML
0.2 INJECTION INTRAVENOUS AS NEEDED
Status: DISCONTINUED | OUTPATIENT
Start: 2024-03-05 | End: 2024-03-05 | Stop reason: HOSPADM

## 2024-03-05 RX ORDER — OXYCODONE HYDROCHLORIDE 5 MG/1
5 TABLET ORAL ONCE AS NEEDED
Status: DISCONTINUED | OUTPATIENT
Start: 2024-03-05 | End: 2024-03-05 | Stop reason: HOSPADM

## 2024-03-05 RX ORDER — DROPERIDOL 2.5 MG/ML
0.62 INJECTION, SOLUTION INTRAMUSCULAR; INTRAVENOUS ONCE AS NEEDED
Status: DISCONTINUED | OUTPATIENT
Start: 2024-03-05 | End: 2024-03-05 | Stop reason: HOSPADM

## 2024-03-05 RX ORDER — EPHEDRINE SULFATE 5 MG/ML
5 INJECTION INTRAVENOUS ONCE AS NEEDED
Status: DISCONTINUED | OUTPATIENT
Start: 2024-03-05 | End: 2024-03-05 | Stop reason: HOSPADM

## 2024-03-05 RX ORDER — HYDRALAZINE HYDROCHLORIDE 20 MG/ML
5 INJECTION INTRAMUSCULAR; INTRAVENOUS
Status: DISCONTINUED | OUTPATIENT
Start: 2024-03-05 | End: 2024-03-05 | Stop reason: HOSPADM

## 2024-03-05 RX ORDER — SODIUM CHLORIDE 0.9 % (FLUSH) 0.9 %
3-10 SYRINGE (ML) INJECTION AS NEEDED
Status: DISCONTINUED | OUTPATIENT
Start: 2024-03-05 | End: 2024-03-05 | Stop reason: HOSPADM

## 2024-03-05 RX ORDER — LABETALOL HYDROCHLORIDE 5 MG/ML
5 INJECTION, SOLUTION INTRAVENOUS
Status: DISCONTINUED | OUTPATIENT
Start: 2024-03-05 | End: 2024-03-05 | Stop reason: HOSPADM

## 2024-03-05 RX ORDER — LIDOCAINE HYDROCHLORIDE 10 MG/ML
INJECTION, SOLUTION INFILTRATION; PERINEURAL
Status: DISCONTINUED | OUTPATIENT
Start: 2024-03-05 | End: 2024-03-05 | Stop reason: HOSPADM

## 2024-03-05 RX ORDER — NALOXONE HCL 0.4 MG/ML
0.4 VIAL (ML) INJECTION AS NEEDED
Status: DISCONTINUED | OUTPATIENT
Start: 2024-03-05 | End: 2024-03-05 | Stop reason: HOSPADM

## 2024-03-05 RX ORDER — ROCURONIUM BROMIDE 10 MG/ML
INJECTION, SOLUTION INTRAVENOUS AS NEEDED
Status: DISCONTINUED | OUTPATIENT
Start: 2024-03-05 | End: 2024-03-05 | Stop reason: SURG

## 2024-03-05 RX ORDER — SODIUM CHLORIDE 0.9 % (FLUSH) 0.9 %
3 SYRINGE (ML) INJECTION EVERY 12 HOURS SCHEDULED
Status: DISCONTINUED | OUTPATIENT
Start: 2024-03-05 | End: 2024-03-05 | Stop reason: HOSPADM

## 2024-03-05 RX ADMIN — HEPARIN SODIUM 5000 UNITS: 1000 INJECTION INTRAVENOUS; SUBCUTANEOUS at 09:08

## 2024-03-05 RX ADMIN — DEXAMETHASONE SODIUM PHOSPHATE 6 MG: 4 INJECTION, SOLUTION INTRAMUSCULAR; INTRAVENOUS at 08:53

## 2024-03-05 RX ADMIN — ONDANSETRON 4 MG: 2 INJECTION INTRAMUSCULAR; INTRAVENOUS at 11:05

## 2024-03-05 RX ADMIN — PROPOFOL 120 MG: 10 INJECTION, EMULSION INTRAVENOUS at 08:44

## 2024-03-05 RX ADMIN — HEPARIN SODIUM 2000 UNITS: 1000 INJECTION INTRAVENOUS; SUBCUTANEOUS at 10:23

## 2024-03-05 RX ADMIN — SODIUM CHLORIDE 80 ML/HR: 9 INJECTION, SOLUTION INTRAVENOUS at 07:12

## 2024-03-05 RX ADMIN — HEPARIN SODIUM 2000 UNITS: 1000 INJECTION INTRAVENOUS; SUBCUTANEOUS at 10:01

## 2024-03-05 RX ADMIN — Medication 10 ML: at 07:12

## 2024-03-05 RX ADMIN — PROPOFOL 25 MCG/KG/MIN: 10 INJECTION, EMULSION INTRAVENOUS at 08:47

## 2024-03-05 RX ADMIN — ROCURONIUM BROMIDE 35 MG: 10 INJECTION, SOLUTION INTRAVENOUS at 08:44

## 2024-03-05 RX ADMIN — SUGAMMADEX 100 MG: 100 INJECTION, SOLUTION INTRAVENOUS at 11:10

## 2024-03-05 RX ADMIN — LIDOCAINE HYDROCHLORIDE 100 MG: 20 INJECTION, SOLUTION EPIDURAL; INFILTRATION; INTRACAUDAL; PERINEURAL at 08:35

## 2024-03-05 RX ADMIN — PHENYLEPHRINE HYDROCHLORIDE 0.2 MCG/KG/MIN: 10 INJECTION INTRAVENOUS at 08:47

## 2024-03-05 RX ADMIN — FAMOTIDINE 20 MG: 10 INJECTION INTRAVENOUS at 09:00

## 2024-03-05 RX ADMIN — PROTAMINE SULFATE 100 MG: 10 INJECTION, SOLUTION INTRAVENOUS at 11:06

## 2024-03-05 RX ADMIN — PROPOFOL 100 MG: 10 INJECTION, EMULSION INTRAVENOUS at 08:35

## 2024-03-05 RX ADMIN — FENTANYL CITRATE 50 MCG: 50 INJECTION, SOLUTION INTRAMUSCULAR; INTRAVENOUS at 08:53

## 2024-03-05 RX ADMIN — HEPARIN SODIUM 7000 UNITS: 1000 INJECTION INTRAVENOUS; SUBCUTANEOUS at 09:10

## 2024-03-05 RX ADMIN — PANTOPRAZOLE SODIUM 40 MG: 40 INJECTION, POWDER, FOR SOLUTION INTRAVENOUS at 09:34

## 2024-03-05 RX ADMIN — HEPARIN SODIUM 4000 UNITS: 1000 INJECTION INTRAVENOUS; SUBCUTANEOUS at 09:23

## 2024-03-05 RX ADMIN — FENTANYL CITRATE 50 MCG: 50 INJECTION, SOLUTION INTRAMUSCULAR; INTRAVENOUS at 09:31

## 2024-03-05 RX ADMIN — CEFAZOLIN 2 G: 1 INJECTION, POWDER, FOR SOLUTION INTRAMUSCULAR; INTRAVENOUS at 09:00

## 2024-03-05 NOTE — Clinical Note
Hemostasis started on the right femoral vein. Figure 8 suturing was used in achieving hemostasis. Closure device deployed in the vessel. Hemostasis achieved successfully. Closure device additional comment: both

## 2024-03-05 NOTE — ANESTHESIA POSTPROCEDURE EVALUATION
Patient: Faby Le    Procedure Summary       Date: 03/05/24 Room / Location: [facility] CATH LAB 3 / [facility] CATH INVASIVE LOCATION    Anesthesia Start: 0827 Anesthesia Stop: 1130    Procedures:       Ablation atrial fibrillation, Cryo Vickie Costa notified 02/09/24 (Right: Groin)      Cardioversion Diagnosis:       Paroxysmal atrial fibrillation      Sick sinus syndrome      Presence of cardiac pacemaker      (PAF)    Providers: Cr Herrera MD Provider: Ramon Le MD    Anesthesia Type: general ASA Status: 3            Anesthesia Type: general    Vitals  Vitals Value Taken Time   /54 03/05/24 1347   Temp 97.4 °F (36.3 °C) 03/05/24 1125   Pulse 61 03/05/24 1356   Resp     SpO2 100 % 03/05/24 1356   Vitals shown include unfiled device data.        Post Anesthesia Care and Evaluation    Patient location during evaluation: PACU  Patient participation: complete - patient participated  Level of consciousness: awake  Pain scale: See nurse's notes for pain score.  Pain management: adequate    Airway patency: patent  Anesthetic complications: No anesthetic complications  PONV Status: none  Cardiovascular status: acceptable  Respiratory status: acceptable and spontaneous ventilation  Hydration status: acceptable    Comments: Patient seen and examined postoperatively; vital signs stable; SpO2 greater than or equal to 90%; cardiopulmonary status stable; nausea/vomiting adequately controlled; pain adequately controlled; no apparent anesthesia complications; patient discharged from anesthesia care when discharge criteria were met

## 2024-03-05 NOTE — TELEPHONE ENCOUNTER
Faby Le notified and voiced comprehension and understanding. She no longer takes this medication du eto Dizziness    Tonia Landry

## 2024-03-05 NOTE — ANESTHESIA PROCEDURE NOTES
Airway  Urgency: elective    Date/Time: 3/5/2024 8:47 AM  Airway not difficult    General Information and Staff    Patient location during procedure: OR  Anesthesiologist: Ramon Le MD  CRNA/CAA: Mehnaz Gimenez CRNA    Indications and Patient Condition  Indications for airway management: airway protection    Preoxygenated: yes (pt pre-O2 with 100% O2)  Mask difficulty assessment: 2 - vent by mask + OA or adjuvant +/- NMBA (easy BMV )    Final Airway Details  Final airway type: endotracheal airway      Successful airway: ETT  Cuffed: yes   Successful intubation technique: direct laryngoscopy  Facilitating devices/methods: intubating stylet  Endotracheal tube insertion site: oral  Blade: Frances  Blade size: 4  ETT size (mm): 7.0  Cormack-Lehane Classification: grade I - full view of glottis  Placement verified by: chest auscultation and capnometry   Cuff volume (mL): 7  Measured from: lips  ETT/EBT  to lips (cm): 21  Number of attempts at approach: 1  Assessment: lips, teeth, and gum same as pre-op and atraumatic intubation    Additional Comments  ATOETx1. No change in dentition.

## 2024-03-05 NOTE — ANESTHESIA PREPROCEDURE EVALUATION
Anesthesia Evaluation     Patient summary reviewed and Nursing notes reviewed   NPO Solid Status: > 8 hours  NPO Liquid Status: > 8 hours           Airway   Mallampati: II  TM distance: >3 FB  Neck ROM: full  No difficulty expected  Dental - normal exam     Pulmonary    (+) ,sleep apnea  Cardiovascular     (+) hypertension, CAD, dysrhythmias Atrial Fib, hyperlipidemia      Neuro/Psych  (+) TIA, syncope, psychiatric history Depression  GI/Hepatic/Renal/Endo    (+) morbid obesity, renal disease-, thyroid problem     Musculoskeletal     Abdominal    Substance History      OB/GYN          Other   arthritis,     ROS/Med Hx Other:   Left ventricular systolic function is normal. Estimated left ventricular EF = 60%  ·  Left ventricular wall thickness is consistent with mild concentric hypertrophy.  ·  The left atrial cavity is mild to moderately dilated.  ·  There is a small (<1cm) pericardial effusion.                Anesthesia Plan    ASA 3     general     intravenous induction     Anesthetic plan, risks, benefits, and alternatives have been provided, discussed and informed consent has been obtained with: patient.    Plan discussed with CRNA.    CODE STATUS:

## 2024-03-14 DIAGNOSIS — E03.9 HYPOTHYROIDISM, UNSPECIFIED TYPE: ICD-10-CM

## 2024-03-14 RX ORDER — LEVOTHYROXINE SODIUM 88 UG/1
88 TABLET ORAL DAILY
Qty: 90 TABLET | Refills: 0 | Status: SHIPPED | OUTPATIENT
Start: 2024-03-14

## 2024-03-14 NOTE — TELEPHONE ENCOUNTER
Caller: Faby Le    Relationship: Self    Best call back number: [phone]     Requested Prescriptions:   Requested Prescriptions     Pending Prescriptions Disp Refills    levothyroxine (SYNTHROID, LEVOTHROID) 88 MCG tablet 90 tablet 3     Sig: Take 1 tablet by mouth Daily.        Pharmacy where request should be sent: [facility] [address] [place], [place] [id number] [place] [phone]      Last office visit with prescribing clinician: 2/1/2024   Last telemedicine visit with prescribing clinician: Visit date not found   Next office visit with prescribing clinician: 5/2/2024     Additional details provided by patient: PATIENT HAS 5 DAY SUPPLY OF MEDICATION     Does the patient have less than a 3 day supply:  [] Yes  [x] No    Would you like a call back once the refill request has been completed: [] Yes [x] No    If the office needs to give you a call back, can they leave a voicemail: [] Yes [x] No    Sofi George Rep   03/14/24 15:44 EDT

## 2024-03-18 ENCOUNTER — OFFICE VISIT (OUTPATIENT)
Dept: CARDIOLOGY | Facility: CLINIC | Age: 79
End: 2024-03-18
Payer: MEDICARE

## 2024-03-18 VITALS
WEIGHT: 248 LBS | BODY MASS INDEX: 43.94 KG/M2 | HEART RATE: 63 BPM | DIASTOLIC BLOOD PRESSURE: 85 MMHG | HEIGHT: 63 IN | SYSTOLIC BLOOD PRESSURE: 134 MMHG | OXYGEN SATURATION: 100 %

## 2024-03-18 DIAGNOSIS — I10 PRIMARY HYPERTENSION: ICD-10-CM

## 2024-03-18 DIAGNOSIS — I25.10 CORONARY ARTERY DISEASE INVOLVING NATIVE HEART WITHOUT ANGINA PECTORIS, UNSPECIFIED VESSEL OR LESION TYPE: ICD-10-CM

## 2024-03-18 DIAGNOSIS — I49.5 SICK SINUS SYNDROME: Primary | ICD-10-CM

## 2024-03-18 DIAGNOSIS — I50.32 CHRONIC DIASTOLIC HEART FAILURE: ICD-10-CM

## 2024-03-18 DIAGNOSIS — Z95.0 PRESENCE OF CARDIAC PACEMAKER: ICD-10-CM

## 2024-03-18 DIAGNOSIS — I48.0 PAROXYSMAL ATRIAL FIBRILLATION: ICD-10-CM

## 2024-03-18 DIAGNOSIS — E78.2 MIXED HYPERLIPIDEMIA: ICD-10-CM

## 2024-03-18 PROCEDURE — 93000 ELECTROCARDIOGRAM COMPLETE: CPT | Performed by: INTERNAL MEDICINE

## 2024-03-18 PROCEDURE — 1160F RVW MEDS BY RX/DR IN RCRD: CPT | Performed by: INTERNAL MEDICINE

## 2024-03-18 PROCEDURE — 3079F DIAST BP 80-89 MM HG: CPT | Performed by: INTERNAL MEDICINE

## 2024-03-18 PROCEDURE — 1159F MED LIST DOCD IN RCRD: CPT | Performed by: INTERNAL MEDICINE

## 2024-03-18 PROCEDURE — 99214 OFFICE O/P EST MOD 30 MIN: CPT | Performed by: INTERNAL MEDICINE

## 2024-03-18 PROCEDURE — 3075F SYST BP GE 130 - 139MM HG: CPT | Performed by: INTERNAL MEDICINE

## 2024-03-18 NOTE — PROGRESS NOTES
Cardiology Office Visit      Encounter Date:  03/18/2024    Patient ID:   Faby Le is a 79 y.o. female.    Reason For Followup:  Coronary artery disease  Shortness of breath  Sick sinus syndrome    Brief Clinical History:  Dear Dr. Adam, Leonor Ordaz MD    I had the pleasure of seeing Faby Le today. As you are well aware, this is a 79 y.o. female  presented to the office with symptoms of lower extremity edema and some shortness of breath        cardiac catheterization showed moderate disease involving the diagonal and mild-to-moderate disease involving circumflex      Denies any further episodes of dizziness syncope presyncope  No exertional chest pain  Shortness of breath is improved  No new cardiac symptoms  Recent A-fib ablation without any complications    Assessment & Plan    Impressions:  Morbid obesity/Body mass index is 48.54 kg/m².  Bilateral lower extremity edema  Shortness of breath  Dyspnea on exertion  Obstructive sleep apnea  Coronary artery disease  Diastolic dysfunction  Hypertension  Normalization of LV systolic function  Normal LV systolic function in 2018  Moderate obstructive coronary artery disease involving the diagonal branch of the LAD and mild to moderate obstructive coronary artery disease involving the left circumflex artery in 2018  Paroxysmal atrial fibrillation  Patient is currently in sinus rhythm with intermittent PAC burden  Elevated chads vascular score-4  Coagulopathy on Xarelto  History of nonischemic cardiomyopathy  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  Primary hypertension  Obstructive sleep apnea  Recent cardiac catheterization in 2022 with a mild obstructive disease involving the LAD and a moderate obstructive disease involving the ostium of the diagonal branch with normal LV systolic function  Recent carotid Doppler with mild obstructive carotid stenosis  Cardiomyopathy with recovery of LV systolic function  Chronic renal insufficiency   Sick  "sinus syndrome status post permanent pacemaker placement  No clear correlation of patient's symptoms of dizziness with these episodes of bradycardia or pauses  Syncope and loss of consciousness  Loop recorder interrogation with episodes of pauses that were more than 4 seconds  Tachybradycardia syndrome  Status post permanent pacemaker placement  Status post ablation therapy for atrial fibrillation  Recommendations:  Recent ablation for atrial fibrillation  Continue current medical therapy with rivaroxaban 20 mg p.o. once a day Toprol- mg p.o. once a day Lipitor 20 mg p.o. once a day aldactone 25 mg p.o. once a day  Recent medical records reviewed and discussed with patient  Recommend continued weight loss  Close monitoring of renal function and hemoglobin A1c with primary care physician  Follow-up in office in 6 months        Vitals:  Vitals:    03/18/24 1524   BP: 134/85   Pulse: 63   SpO2: 100%   Weight: 112 kg (248 lb)   Height: 160 cm (63\")       Physical Exam:    General: Alert, cooperative, no distress, appears stated age  Head:  Normocephalic, atraumatic, mucous membranes moist  Eyes:  Conjunctiva/corneas clear, EOM's intact     Neck:  Supple,  no adenopathy;      Lungs: Clear to auscultation bilaterally, no wheezes rhonchi rales are noted  Chest wall: No tenderness  Heart::  Regular rate and rhythm, S1 and S2 normal, no murmur, rub or gallop  Abdomen: Soft, non-tender, nondistended bowel sounds active  Extremities: No cyanosis, clubbing, or edema  Pulses: 2+ and symmetric all extremities  Skin:  No rashes or lesions  Neuro/psych: A&O x3. CN II through XII are grossly intact with appropriate affect              Lab Results   Component Value Date    GLUCOSE 109 (H) 03/05/2024    BUN 29 (H) 03/05/2024    CREATININE 1.09 (H) 03/05/2024    EGFR 52.4 (L) 03/05/2024    BCR 26.6 (H) 03/05/2024    K 4.5 03/05/2024    CO2 25.0 03/05/2024    CALCIUM 9.3 03/05/2024    ALBUMIN 3.7 03/05/2024    BILITOT 0.5 " 03/05/2024    AST 18 03/05/2024    ALT 15 03/05/2024     Results for orders placed during the hospital encounter of 03/05/24    Adult Transesophageal Echo (HERBERT) W/ Cont if Necessary Per Protocol    Interpretation Summary  •  Left ventricular systolic function is normal. Estimated left ventricular EF = 60% Left ventricular ejection fraction appears to be 56 - 60%.  •  Left ventricular wall thickness is consistent with mild concentric hypertrophy.  •  Left ventricular diastolic function is consistent with (grade Ia w/high LAP) impaired relaxation.  •  The left atrial cavity is moderate to severely dilated.  •  The right atrial cavity is mildly  dilated.  •  Estimated right ventricular systolic pressure from tricuspid regurgitation is normal (<35 mmHg).    Procedure indication  Recurrent symptomatic atrial fibrillation    conscious sedation administered by anesthesia  Timeout before procedure    consent obtained before procedure      Procedure note  after obtaining a valid consent patient was sedated by Anesthesia and a HERBERT probe was easily placed into esophagus with multiplane imaging with 2D, color and Doppler followed by bubble study with agitated saline without any complications    HERBERT  Findings    Normal LV systolic function with EF of 60% with mild LVH.  Moderate to severe left atrial enlargement without any shunt or clot.  Mild MR and trace TR noted.  No effusion      Plan  Proceed with ablation    Procedure done  Transesophageal echocardiography        Electronically signed by Cr Herrera MD, 03/05/24, 11:35 AM EST.     Results for orders placed during the hospital encounter of 08/29/22    Cardiac Catheterization/Vascular Study    Narrative  Table formatting from the original result was not included.  Cardiac Catheterization Operative Report    Faby Le  [id number]  8/29/2022  @PCP@    She underwent cardiac catheterization.    Indications for the procedure include: abnormal stress test.    Procedure  Details:  The risks, benefits, complications, treatment options, and expected outcomes were discussed with the patient. The patient and/or family concurred with the proposed plan, giving informed consent.    After informed consent the patient was brought to the cath lab after appropriate IV hydration was begun and oral premedication was given. She was further sedated with fentanyl. She was prepped and draped in the usual manner. Using the modified Seldinger access technique, a 6 Albanian sheath was placed in the femoral artery. A left heart catheterization with coronary arteriography was performed. Findings are discussed below.    After the procedure was completed, sedation was stopped and the sheaths and catheters were all removed. Hemostasis was achieved per established hospital protocols.    Conscious sedation:  Conscious sedation was performed according to protocol.  I supervised and directed an independent trained observer with the assistance of monitoring the patient's level of consciousness and physiologic status throughout the procedure.  Intraoperative service time was 60 minutes.    Findings:    Hemodynamics Central arctic pressure systolic 150 and diastolic 56 with a mean pressure of 82 mmHg  LV end-diastolic pressure of 12 mmHg  There was no gradient across the aortic valve on the pullback of the pigtail catheter  Left Main  left main is a large-caliber vessel angiographically normal bifurcates into left into descending and left circumflex arteries  RCA  large-caliber dominant vessel  Right coronary artery is angiographically normal right coronary artery bifurcates into a large caliber PDA branch and a moderate caliber PLV branch that are angiographically normal  LAD  large-caliber vessel  LAD has mid focal angiographic 30% stenosis that is heavily calcified at the bifurcation with the diagonal and septal branch  Moderate size diagonal branch that has ostial angiographic of 40% stenosis  Circ   large-caliber vessel angiographically normal  First marginal branch of the circumflex has proximal angiographic 30 to 40% stenosis    LV  normal LV systolic function normal wall motion estimate LV ejection fraction of 55%  Coronary Dominance  right coronary artery    Estimated Blood Loss:  Minimal    Specimens: None    Complications:  None; patient tolerated the procedure well.    Disposition: PACU - hemodynamically stable.    Condition: stable    Impressions:  Mild obstructive coronary artery disease involving the LAD mild to moderate obstructive coronary artery disease involving the ostium of the diagonal branch mild obstructive coronary artery disease involving the proximal first marginal branch  Normal LV systolic function  Normal wall motion  Estimate LV ejection fraction of 55%  Normal left-sided filling pressures    Recommendations:  Medical management for obstructive coronary artery disease  Test results reviewed and discussed with patient and family     Lab Results   Component Value Date    CHOL 126 01/04/2024    TRIG 73 01/04/2024    HDL 65 (H) 01/04/2024    LDL 46 01/04/2024      Results for orders placed during the hospital encounter of 08/12/22    Stress Test With Myocardial Perfusion One Day    Interpretation Summary  · Left ventricular ejection fraction is normal. (Calculated EF = 68%).  · Findings consistent with an equivocal ECG stress test.    Abnormal stress  Submaximal study, no changes at submaximal stress on stress ECG  No arrhythmias seen  Nuclear imaging demonstrates decreased counts at rest in the mid inferior to apical wall involving the apex, summed rest score of 16  Stress imaging demonstrates significant worsening perfusion in the mid to apical inferior wall, EF 68%  There is significant GI and liver uptake cannot rule out diaphragmatic attenuation    Abnormal study  Correlate with clinical risk factors  Suggestive of inferior reversibility and ischemia   Results for orders placed during  the hospital encounter of 03/02/23    Loop Recorder Implant - Treatment Room    Interpretation Summary  •  Conclusion: Successful implantation.           Objective:          Allergies:  Allergies   Allergen Reactions   • Celecoxib Itching and Swelling     swelling       Medication Review:     Current Outpatient Medications:   •  allopurinol (ZYLOPRIM) 300 MG tablet, TAKE 1 TABLET BY MOUTH EVERY DAY, Disp: 90 tablet, Rfl: 2  •  atorvastatin (LIPITOR) 20 MG tablet, Take 1 tablet by mouth Daily., Disp: 30 tablet, Rfl: 11  •  Azelastine HCl 137 MCG/SPRAY solution, 2 sprays by Alternating Nares route Daily As Needed., Disp: , Rfl:   •  cetirizine (zyrTEC) 10 MG tablet, Take 1 tablet by mouth Daily., Disp: , Rfl:   •  cyanocobalamin (VITAMIN B-12) 1000 MCG tablet, Take 1 tablet by mouth Daily., Disp: , Rfl:   •  escitalopram (LEXAPRO) 20 MG tablet, Take 1 tablet by mouth Daily., Disp: , Rfl:   •  IRON CR PO, Take  by mouth Daily., Disp: , Rfl:   •  levothyroxine (SYNTHROID, LEVOTHROID) 88 MCG tablet, Take 1 tablet by mouth Daily., Disp: 90 tablet, Rfl: 0  •  metoprolol succinate XL (TOPROL-XL) 100 MG 24 hr tablet, Take 1 tablet by mouth Every Night., Disp: 90 tablet, Rfl: 1  •  montelukast (SINGULAIR) 10 MG tablet, Take 1 tablet by mouth Every Night., Disp: , Rfl:   •  multivitamin (MULTI VITAMIN PO), Take  by mouth Daily., Disp: , Rfl:   •  pantoprazole (PROTONIX) 40 MG EC tablet, Take 1 tablet by mouth Daily for 30 days., Disp: 30 tablet, Rfl: 0  •  rivaroxaban (XARELTO) 20 MG tablet, Take 1 tablet by mouth Daily., Disp: 90 tablet, Rfl: 1  •  spironolactone (ALDACTONE) 25 MG tablet, Take 0.5 tablets by mouth Daily., Disp: , Rfl:   •  Vitamin D, Cholecalciferol, 25 MCG (1000 UT) tablet, Take 1 tablet by mouth Daily., Disp: , Rfl:     Family History:  Family History   Problem Relation Age of Onset   • Hypertension Mother    • Stroke Father    • Arthritis Brother    • Cancer Brother        Past Medical History:  Past  Medical History:   Diagnosis Date   • Afib    • Ankle pain    • Arthritis    • Chronic kidney disease    • Coronary artery disease    • Depression    • Gout    • History of loop recorder 04/25/2023    current   • Hyperglycemia    • Hyperlipidemia    • Hypertension    • Low back pain    • Morbid obesity    • Pacemaker    • Sleep apnea    • Vitamin D deficiency        Past surgical History:  Past Surgical History:   Procedure Laterality Date   • ANKLE FUSION      2017-left   • CARDIAC CATHETERIZATION     • CARDIAC CATHETERIZATION Right 8/29/2022    Procedure: Left Heart Cath;  Surgeon: Ratna Zavala MD;  Location: [facility] CATH INVASIVE LOCATION;  Service: Cardiovascular;  Laterality: Right;   • CARDIAC ELECTROPHYSIOLOGY PROCEDURE Left 7/3/2023    Procedure: Pacemaker DC new [place] Notified;  Surgeon: Cr Herrera MD;  Location: [facility] CATH INVASIVE LOCATION;  Service: Cardiovascular;  Laterality: Left;   • CARDIAC ELECTROPHYSIOLOGY PROCEDURE Right 3/5/2024    Procedure: Ablation atrial fibrillation, Cryo [place]alonde and Wayne Igor notified 02/09/24;  Surgeon: Cr Herrera MD;  Location: [facility] CATH INVASIVE LOCATION;  Service: Cardiovascular;  Laterality: Right;   • CARDIAC ELECTROPHYSIOLOGY PROCEDURE N/A 3/5/2024    Procedure: Cardioversion;  Surgeon: Cr Herrera MD;  Location: [facility] CATH INVASIVE LOCATION;  Service: Cardiovascular;  Laterality: N/A;   • CARPAL TUNNEL RELEASE     • CATARACT EXTRACTION     • CUBITAL TUNNEL RELEASE     • HYSTERECTOMY     • REPLACEMENT TOTAL KNEE      left 2008   • ROTATOR CUFF REPAIR     • TOTAL KNEE ARTHROPLASTY Right 5/4/2023    Procedure: TOTAL KNEE ARTHROPLASTY WITH CORI ROBOT;  Surgeon: Chino Herrera II, MD;  Location: [facility] MAIN OR;  Service: Robotics - Ortho;  Laterality: Right;       Social History:  Social History     Socioeconomic History   • Marital status:    Tobacco Use   • Smoking status: Never     Passive exposure: Never    • Smokeless tobacco: Never   Vaping Use   • Vaping status: Never Used   Substance and Sexual Activity   • Alcohol use: No   • Drug use: No   • Sexual activity: Not Currently       Review of Systems:  The following systems were reviewed as they relate to the cardiovascular system: Constitutional, Eyes, ENT, Cardiovascular, Respiratory, Gastrointestinal, Integumentary, Neurological, Psychiatric, Hematologic, Endocrine, Musculoskeletal, and Genitourinary. The pertinent cardiovascular findings are reported above with all other cardiovascular points within those systems being negative.    Diagnostic Study Review:     Current Electrocardiogram:    ECG 12 Lead    Date/Time: 3/18/2024 3:45 PM  Performed by: Ratna Zavala MD    Authorized by: Ratna Zavala MD  Comparison: compared with previous ECG   Similar to previous ECG  Rate: normal  BPM: 64  QRS axis: normal    Clinical impression: abnormal EKG  Comments: Atrial paced rhythm with a ventricular rate of 64 bpm              NOTE: The following portions of the patient's history were reviewed and updated this visit as appropriate: allergies, current medications, past family history, past medical history, past social history, past surgical history and problem list.

## 2024-04-01 RX ORDER — SPIRONOLACTONE 25 MG/1
12.5 TABLET ORAL DAILY
Qty: 45 TABLET | Refills: 0 | Status: SHIPPED | OUTPATIENT
Start: 2024-04-01

## 2024-04-01 NOTE — TELEPHONE ENCOUNTER
Caller: Faby Le    Relationship to patient: Self    Best call back number: [phone]    Patient is needing: PT IS CALLING IN REGARDS TO HER STATING THAT MD MICHEL [facility] BUT WHEN PT CALLED, THEY TOLD HER THAT THEY HADNT RECEIVED A REFERRAL FROM HER PCP AND THAT THEY WOULD LIKE TO HAVE IT SENT TO THEM SO THEY CAN EVAL HER TO SEE IF SHE NEEDS A HOVER ROUND.         
.pat  
Please let patient know this has been sent  
no

## 2024-04-02 RX ORDER — ALLOPURINOL 300 MG/1
300 TABLET ORAL DAILY
Qty: 90 TABLET | Refills: 0 | Status: SHIPPED | OUTPATIENT
Start: 2024-04-02

## 2024-04-04 ENCOUNTER — HOSPITAL ENCOUNTER (OUTPATIENT)
Dept: MRI IMAGING | Facility: HOSPITAL | Age: 79
Discharge: HOME | End: 2024-04-04
Admitting: PREVENTIVE MEDICINE
Payer: MEDICARE

## 2024-04-04 DIAGNOSIS — G45.9 TIA (TRANSIENT ISCHEMIC ATTACK): ICD-10-CM

## 2024-04-04 PROCEDURE — A9579 GAD-BASE MR CONTRAST NOS,1ML: HCPCS | Performed by: PREVENTIVE MEDICINE

## 2024-04-04 PROCEDURE — 25010000002 GADOTERIDOL PER 1 ML: Performed by: PREVENTIVE MEDICINE

## 2024-04-04 PROCEDURE — 70553 MRI BRAIN STEM W/O & W/DYE: CPT

## 2024-04-04 RX ADMIN — GADOTERIDOL 20 ML: 279.3 INJECTION, SOLUTION INTRAVENOUS at 14:38

## 2024-04-12 ENCOUNTER — TELEPHONE (OUTPATIENT)
Dept: FAMILY MEDICINE CLINIC | Facility: CLINIC | Age: 79
End: 2024-04-12
Payer: MEDICARE

## 2024-04-12 DIAGNOSIS — G89.29 CHRONIC MIDLINE LOW BACK PAIN WITHOUT SCIATICA: Primary | ICD-10-CM

## 2024-04-12 DIAGNOSIS — M54.50 CHRONIC MIDLINE LOW BACK PAIN WITHOUT SCIATICA: Primary | ICD-10-CM

## 2024-04-12 NOTE — TELEPHONE ENCOUNTER
Caller: Faby Le    Relationship: Self    Best call back number:     [phone]       What orders are you requesting (i.e. lab or imaging): HOSPITAL BED    In what timeframe would the patient need to come in: AS SOON AS POSSIBLE    Where will you receive your lab/imaging services: [facility] IN [place]    Additional notes:            Rhofade Counseling: Rhofade is a topical medication which can decrease superficial blood flow where applied. Side effects are uncommon and include stinging, redness and allergic reactions.

## 2024-04-23 RX ORDER — ESCITALOPRAM OXALATE 20 MG/1
20 TABLET ORAL DAILY
Qty: 90 TABLET | Refills: 0 | Status: SHIPPED | OUTPATIENT
Start: 2024-04-23

## 2024-04-29 RX ORDER — METOPROLOL SUCCINATE 25 MG/1
25 TABLET, EXTENDED RELEASE ORAL NIGHTLY
Qty: 90 TABLET | Refills: 0 | Status: SHIPPED | OUTPATIENT
Start: 2024-04-29

## 2024-04-29 NOTE — TELEPHONE ENCOUNTER
Rx Refill Note  Requested Prescriptions     Pending Prescriptions Disp Refills   • metoprolol succinate XL (TOPROL-XL) 25 MG 24 hr tablet [Pharmacy Med Name: Metoprolol Succinate ER 25 MG Oral Tablet Extended Release 24 Hour] 90 tablet 0     Sig: Take 1 tablet by mouth nightly      Last office visit with prescribing clinician: 2/1/2024      Next office visit with prescribing clinician: 5/2/2024   3}  Tonia Landry  04/28/24, 23:05 EDT

## 2024-05-01 PROBLEM — I49.5 SICK SINUS SYNDROME: Status: RESOLVED | Noted: 2024-02-09 | Resolved: 2024-05-01

## 2024-05-01 NOTE — PATIENT INSTRUCTIONS
Health Maintenance Due   Topic Date Due    COVID-19 Vaccine (4 - 2023-24 season) 09/01/2023    ANNUAL WELLNESS VISIT  06/23/2024    Patient to visit [employer] website (https://www.ibanet.org/) for information reguarding advanced directive and living will.  Advance Directive       Advance directives are legal documents that let you make choices ahead of time about your health care and medical treatment in case you become unable to communicate for yourself. Advance directives are a way for you to communicate your wishes to family, friends, and health care providers. This can help convey your decisions about end-of-life care if you become unable to communicate.  Discussing and writing advance directives should happen over time rather than all at once. Advance directives can be changed depending on your situation and what you want, even after you have signed the advance directives.  If you do not have an advance directive, some states assign family decision makers to act on your behalf based on how closely you are related to them. Each state has its own laws regarding advance directives. You may want to check with your health care provider, , or state representative about the laws in your state. There are different types of advance directives, such as:  Medical power of .  Living will.  Do not resuscitate (DNR) or do not attempt resuscitation (DNAR) order.  Health care proxy and medical power of   A health care proxy, also called a health care agent, is a person who is appointed to make medical decisions for you in cases in which you are unable to make the decisions yourself. Generally, people choose someone they know well and trust to represent their preferences. Make sure to ask this person for an agreement to act as your proxy. A proxy may have to exercise judgment in the event of a medical decision for which your wishes are not known.  A medical power of  is a legal  document that names your health care proxy. Depending on the laws in your state, after the document is written, it may also need to be:  Signed.  Notarized.  Dated.  Copied.  Witnessed.  Incorporated into your medical record.  You may also want to appoint someone to manage your financial affairs in a situation in which you are unable to do so. This is called a durable power of  for finances. It is a separate legal document from the durable power of  for health care. You may choose the same person or someone different from your health care proxy to act as your agent in financial matters.  If you do not appoint a proxy, or if there is a concern that the proxy is not acting in your best interests, a court-appointed guardian may be designated to act on your behalf.  Living will  A living will is a set of instructions documenting your wishes about medical care when you cannot express them yourself. Health care providers should keep a copy of your living will in your medical record. You may want to give a copy to family members or friends. To alert caregivers in case of an emergency, you can place a card in your wallet to let them know that you have a living will and where they can find it. A living will is used if you become:  Terminally ill.  Incapacitated.  Unable to communicate or make decisions.  Items to consider in your living will include:  The use or non-use of life-sustaining equipment, such as dialysis machines and breathing machines (ventilators).  A DNR or DNAR order, which is the instruction not to use cardiopulmonary resuscitation (CPR) if breathing or heartbeat stops.  The use or non-use of tube feeding.  Withholding of food and fluids.  Comfort (palliative) care when the goal becomes comfort rather than a cure.  Organ and tissue donation.  A living will does not give instructions for distributing your money and property if you should pass away. It is recommended that you seek the advice of  a  when writing a will. Decisions about taxes, beneficiaries, and asset distribution will be legally binding. This process can relieve your family and friends of any concerns surrounding disputes or questions that may come up about the distribution of your assets.  DNR or DNAR  A DNR or DNAR order is a request not to have CPR in the event that your heart stops beating or you stop breathing. If a DNR or DNAR order has not been made and shared, a health care provider will try to help any patient whose heart has stopped or who has stopped breathing. If you plan to have surgery, talk with your health care provider about how your DNR or DNAR order will be followed if problems occur.  Summary  Advance directives are the legal documents that allow you to make choices ahead of time about your health care and medical treatment in case you become unable to communicate for yourself.  The process of discussing and writing advance directives should happen over time. You can change the advance directives, even after you have signed them.  Advance directives include DNR or DNAR orders, living aguayo, and designating an agent as your medical power of .  This information is not intended to replace advice given to you by your health care provider. Make sure you discuss any questions you have with your health care provider.  Document Released: 03/26/2009 Document Revised: 11/06/2017 Document Reviewed: 11/06/2017  [employer] Patient Education © 2019 [employer]      Stop Ozempic if stomache  ache.    12 hour fast for labs

## 2024-05-01 NOTE — ASSESSMENT & PLAN NOTE
Patient's (There is no height or weight on file to calculate BMI.) indicates that they are morbidly/severely obese (BMI > 40 or > 35 with obesity - related health condition) with health conditions that include obstructive sleep apnea, hypertension, coronary heart disease, dyslipidemias, and osteoarthritis . Weight is unchanged. BMI  is above average; BMI management plan is completed. We discussed portion control and increasing exercise.

## 2024-05-01 NOTE — PROGRESS NOTES
The ABCs of the Annual Wellness Visit  Subsequent Medicare Wellness Visit    Subjective    History of Present Illness:  Faby Le is a 79 y.o. female who presents for a Subsequent Medicare Wellness Visit.    The following portions of the patient's history were reviewed and   updated as appropriate: allergies, current medications, past family history, past medical history, past social history, past surgical history, and problem list.    Compared to one year ago, the patient feels her physical   health is worse.    Compared to one year ago, the patient feels her mental   health is the same.    Recent Hospitalizations:  She was admitted within the past 365 days at [facility].       Current Medical Providers:  Patient Care Team:  Leonor Adam MD as PCP - General  [facility]anco [facility] as Consulting Physician (Pain Medicine)  Ratna Zavala MD as Consulting Physician (Cardiology)  Nora Foley APRN as Nurse Practitioner (Gastroenterology)  Gianluca Walton MD as Consulting Physician (Pulmonary Disease)  Nathanael Murillo MD as Consulting Physician (Allergy and Immunology)  Delfin Thorpe MD as Surgeon (Orthopedic Surgery)  Josh Loomis MD as Consulting Physician (Gastroenterology)  Bo Ruggiero MD as Consulting Physician (Urology)    Outpatient Medications Prior to Visit   Medication Sig Dispense Refill    allopurinol (ZYLOPRIM) 300 MG tablet Take 1 tablet by mouth once daily 90 tablet 0    atorvastatin (LIPITOR) 20 MG tablet Take 1 tablet by mouth Daily. 30 tablet 11    Azelastine HCl 137 MCG/SPRAY solution 2 sprays by Alternating Nares route Daily As Needed.      cetirizine (zyrTEC) 10 MG tablet Take 1 tablet by mouth Daily.      cyanocobalamin (VITAMIN B-12) 1000 MCG tablet Take 1 tablet by mouth Daily.      escitalopram (LEXAPRO) 20 MG tablet Take 1 tablet by mouth once daily 90 tablet 0    IRON CR PO Take  by mouth Daily.      levothyroxine (SYNTHROID,  LEVOTHROID) 88 MCG tablet Take 1 tablet by mouth Daily. 90 tablet 0    montelukast (SINGULAIR) 10 MG tablet Take 1 tablet by mouth Every Night.      multivitamin (MULTI VITAMIN PO) Take  by mouth Daily.      rivaroxaban (XARELTO) 20 MG tablet Take 1 tablet by mouth Daily. 90 tablet 1    Vitamin D, Cholecalciferol, 25 MCG (1000 UT) tablet Take 1 tablet by mouth Daily.      metoprolol succinate XL (TOPROL-XL) 100 MG 24 hr tablet Take 1 tablet by mouth Every Night. 90 tablet 1    metoprolol succinate XL (TOPROL-XL) 25 MG 24 hr tablet Take 1 tablet by mouth nightly 90 tablet 0    spironolactone (ALDACTONE) 25 MG tablet Take 1/2 (one-half) tablet by mouth once daily 45 tablet 0     No facility-administered medications prior to visit.       No opioid medication identified on active medication list. I have reviewed chart for other potential  high risk medication/s and harmful drug interactions in the elderly.        Aspirin is not on active medication list.  Aspirin use is not indicated based on review of current medical condition/s. Risk of harm outweighs potential benefits.  .  Patient Active Problem List   Diagnosis    Abnormal complete blood count    Ankle pain    Arthralgia of left elbow    Arthritis    B12 deficiency    Coronary artery disease    Depression    Gallstones    Gout    Hyperglycemia    Hyperlipidemia    Hypertension    Hypothyroid    Low back pain    Mobility poor    Post-menopausal    Sleep apnea    Vitamin D deficiency    Chronic kidney disease, stage 3 (moderate)    Oropharyngeal dysphagia    Class 3 drug-induced obesity with serious comorbidity and body mass index (BMI) of 45.0 to 49.9 in adult    Chronic pain of right knee    Light headedness    Paroxysmal atrial fibrillation    Chronic left shoulder pain    Status post knee replacement    Syncope    Presence of cardiac pacemaker    Chronic diastolic heart failure    Paresthesia of left foot    TIA (transient ischemic attack)     Advance Care  "Planning  Advance Directive is not on file.  ACP discussion was held with the patient during this visit. Patient does not have an advance directive, declines further assistance.     Objective    Vitals:    24 1247 24 1249   BP: 127/72 126/82   BP Location: Left arm Right arm   Patient Position: Sitting Sitting   Cuff Size: Adult Adult   Pulse: 61 60   Temp: 98 °F (36.7 °C)    SpO2: 92% 92%   Weight: 116 kg (256 lb 12.8 oz)    PainSc:   6      Estimated body mass index is 45.49 kg/m² as calculated from the following:    Height as of 3/18/24: 160 cm (63\").    Weight as of this encounter: 116 kg (256 lb 12.8 oz).           Does the patient have evidence of cognitive impairment? No          HEALTH RISK ASSESSMENT    Smoking Status:  Social History     Tobacco Use   Smoking Status Never    Passive exposure: Never   Smokeless Tobacco Never     Alcohol Consumption:  Social History     Substance and Sexual Activity   Alcohol Use No     Fall Risk Screen:    SOFIYA Fall Risk Assessment was completed, and patient is at HIGH risk for falls. Assessment completed on:2024    Depression Screenin/2/2024    12:57 PM   PHQ-2/PHQ-9 Depression Screening   Little Interest or Pleasure in Doing Things 0-->not at all   Feeling Down, Depressed or Hopeless 0-->not at all   PHQ-9: Brief Depression Severity Measure Score 0       Health Habits and Functional and Cognitive Screenin/2/2024    12:54 PM   Functional & Cognitive Status   Do you have difficulty preparing food and eating? No   Do you have difficulty bathing yourself, getting dressed or grooming yourself? No   Do you have difficulty using the toilet? No   Do you have difficulty moving around from place to place? No   Do you have trouble with steps or getting out of a bed or a chair? Yes   Current Diet Other   Dental Exam Up to date   Eye Exam Up to date   Exercise (times per week) 0 times per week   Current Exercises Include No Regular Exercise   Do you " need help using the phone?  No   Are you deaf or do you have serious difficulty hearing?  No   Do you need help to go to places out of walking distance? Yes   Do you need help shopping? No   Do you need help preparing meals?  No   Do you need help with housework?  No   Do you need help with laundry? No   Do you need help taking your medications? No   Do you need help managing money? No   Do you ever drive or ride in a car without wearing a seat belt? Yes   Have you felt unusual stress, anger or loneliness in the last month? No   Who do you live with? Child   If you need help, do you have trouble finding someone available to you? No   Have you been bothered in the last four weeks by sexual problems? No   Do you have difficulty concentrating, remembering or making decisions? Yes       Age-appropriate Screening Schedule:  Refer to the list below for future screening recommendations based on patient's age, sex and/or medical conditions. Orders for these recommended tests are listed in the plan section. The patient has been provided with a written plan.    Health Maintenance   Topic Date Due    COVID-19 Vaccine (4 - 2023-24 season) 09/01/2023    MAMMOGRAM  03/01/2024    INFLUENZA VACCINE  08/01/2024    LIPID PANEL  01/04/2025    ANNUAL WELLNESS VISIT  05/02/2025    BMI FOLLOWUP  05/02/2025    COLORECTAL CANCER SCREENING  07/26/2025    DXA SCAN  11/09/2025    TDAP/TD VACCINES (3 - Td or Tdap) 01/23/2028    HEPATITIS C SCREENING  Completed    RSV Vaccine - Adults  Completed    Pneumococcal Vaccine 65+  Completed    ZOSTER VACCINE  Completed                CMS Preventative Services Quick Reference  Risk Factors Identified During Encounter  None Identified  Chronic Pain: Chronic Pain Educational material Discussed and Shared in After Visit Summary for Patient.  Pain Management Referral Ordered  The above risks/problems have been discussed with the patient.  Follow up actions/plans if indicated are seen below in the  Assessment/Plan Section.  Pertinent information has been shared with the patient in the After Visit Summary.  An After Visit Summary and PPPS were made available to the patient.    Follow Up:   Next Medicare Wellness visit to be scheduled in 1 year.         Additional E&M Note during same encounter follows:  Patient has multiple medical problems which are significant and separately identifiable that require additional work above and beyond the Medicare Wellness Visit.        Chief Complaint  medicare wellness    Subjective        HPI  Faby Le also presents   Chief Complaint   Patient presents with    medicare wellness   .  Patient is here for an age-specific physical and he has been advised to wear sunscreen and a seatbelt.  History of Present Illness  The patient is a 79-year-old female who is here today for her annual wellness exam and an age-specific physical for Medicare, vitamin B12 deficiency, chronic diastolic heart failure, class 3 drug-induced obesity with serious comorbidities and a body mass index of 45, coronary artery disease, depression, chronic gout, hypertension, hypothyroidism, atrial fibrillation, obstructive sleep apnea, syncope, vitamin D deficiency, and mixed hyperlipidemia.    The patient adheres to safety measures such as wearing sunscreen and a seatbelt. Her physical health and mental health have remained relatively stable over the past year, with a slight deterioration in her physical condition. She was hospitalized overnight due to a fall at [place], but does not take aspirin regularly. She does not have an advance directive. She experiences urinary urgency, but it is not severe during the day. She has previously consulted a urologist. She denies chronic pain and has participated in water aerobics. She is considering pain management. She denies experiencing chest pressure or heart flutters. Her mood is stable, and she denies any increase in dry skin or hair loss. She does not use her  CPAP machine at night and has not experienced syncope. She takes vitamin D and attempts to monitor her saturated fat intake. She does not require any medication refills. She denies any changes in her hearing or vision, and wears glasses. She has visited the eye doctor and dentist within the past year. She denies chest pressure, coughing up blood or sputum, or wheezing.    The patient expresses interest in resuming Ozempic or lowering the dosage. She experienced dizziness upon increasing the dosage, but did not lose consciousness. She took a full dose of Ozempic for a month, which resulted in stomach cramps and severe pain. She tolerated the low dose of Ozempic well. She has lost weight on Ozempic, dropping from 150 to 235 pounds.    The patient underwent a hysterectomy due to endometrial carcinoma. She had a virtual consultation with Dr. Florez in 03/2024.    The patient experiences pain in the right hip, which radiates across her back. She has been experiencing sciatic nerve pain for approximately 3 to 4 years, which appears to be worsening. She denies any radiation of pain down her leg. She has not undergone any back surgery and has not had an x-ray of her back. She denies any injury. The pain is located in the right L3 area, with some days being worse than others.    The patient experienced stiffness in her right knee on Monday, which she attributes to incorrect twisting or sleeping. She underwent a left knee replacement in 2008 and a right knee replacement in 2023. .   She is allergic to CELEBREX.    Review of Systems   Constitutional:  Positive for activity change.   Respiratory:  Negative for shortness of breath.    Cardiovascular:  Negative for chest pain and palpitations.   Genitourinary:  Positive for urgency. Negative for vaginal discharge.   Musculoskeletal:  Positive for arthralgias, back pain, gait problem and myalgias.   Neurological:  Negative for dizziness, weakness and light-headedness.        Poor  balance.       Objective   Vital Signs:  /82 (BP Location: Right arm, Patient Position: Sitting, Cuff Size: Adult)   Pulse 60   Temp 98 °F (36.7 °C)   Wt 116 kg (256 lb 12.8 oz)   SpO2 92%   BMI 45.49 kg/m²     Physical Exam  Vitals reviewed.   Constitutional:       General: She is not in acute distress.     Appearance: She is well-developed. She is obese. She is not ill-appearing or toxic-appearing.   HENT:      Head: Normocephalic and atraumatic.      Right Ear: Tympanic membrane, ear canal and external ear normal.      Left Ear: Tympanic membrane, ear canal and external ear normal.      Nose: Nose normal.      Mouth/Throat:      Mouth: Mucous membranes are moist.      Pharynx: No posterior oropharyngeal erythema.   Eyes:      Extraocular Movements: Extraocular movements intact.      Conjunctiva/sclera: Conjunctivae normal.      Pupils: Pupils are equal, round, and reactive to light.   Cardiovascular:      Rate and Rhythm: Normal rate and regular rhythm.      Heart sounds: Normal heart sounds.   Pulmonary:      Effort: Pulmonary effort is normal.      Breath sounds: Normal breath sounds.   Abdominal:      General: Bowel sounds are normal. There is no distension.      Palpations: Abdomen is soft. There is no mass.      Tenderness: There is no abdominal tenderness.   Musculoskeletal:      Cervical back: Neck supple.      Comments: Bilateral knee swelling without erythema.    Tenderness over the L3 spinous process and over the right buttocks area straight leg raising was negative she does have about 25% range of motion of the thoracolumbar spine can stand toe walk and heel walk but is very unsteady on her feet straight leg raising was negative squat was 25% and strong.   Skin:     General: Skin is warm.   Neurological:      General: No focal deficit present.      Mental Status: She is alert and oriented to person, place, and time.   Psychiatric:         Mood and Affect: Mood normal.         Behavior:  Behavior normal.        Physical Exam  Mild swelling noted in the right leg.          Results               Assessment and Plan   Diagnoses and all orders for this visit:    1. Encounter for annual general medical examination with abnormal findings in adult (Primary)    2. B12 deficiency  -     CBC Auto Differential  -     Vitamin B12    3. Chronic diastolic heart failure    4. Class 3 drug-induced obesity with serious comorbidity and body mass index (BMI) of 45.0 to 49.9 in adult  Assessment & Plan:  Patient's (There is no height or weight on file to calculate BMI.) indicates that they are morbidly/severely obese (BMI > 40 or > 35 with obesity - related health condition) with health conditions that include obstructive sleep apnea, hypertension, coronary heart disease, dyslipidemias, and osteoarthritis . Weight is unchanged. BMI  is above average; BMI management plan is completed. We discussed portion control and increasing exercise.       5. Coronary artery disease involving native heart without angina pectoris, unspecified vessel or lesion type    6. Depression, unspecified depression type  -     Magnesium  -     TSH Rfx On Abnormal To Free T4    7. Chronic gout without tophus, unspecified cause, unspecified site  -     Uric Acid    8. Primary hypertension  -     Comprehensive Metabolic Panel    9. Hypothyroidism, unspecified type    10. Paroxysmal atrial fibrillation    11. Obstructive sleep apnea syndrome    12. Syncope, unspecified syncope type    13. Vitamin D deficiency    14. Mixed hyperlipidemia  -     Lipid Panel    15. Endometrial carcinoma  -     Ambulatory Referral to Gynecology    16. Chronic right-sided low back pain, unspecified whether sciatica present  -     XR Spine Lumbar Complete With Flex & Ext; Future  -     Ambulatory Referral to Physical Therapy    Other orders  -     Semaglutide,0.25 or 0.5MG/DOS, (Ozempic, 0.25 or 0.5 MG/DOSE,) 2 MG/1.5ML solution pen-injector; Inject 0.25 mg under the  skin into the appropriate area as directed 1 (One) Time Per Week. For four weeks  Dispense: 1.5 mL; Refill: 0      Assessment & Plan  1. Annual wellness examination.  The patient was provided with information regarding advance directives. A prescription for Celebrex was provided. A 12-hour fast for laboratory tests will be scheduled. A mammogram will be scheduled.    2. Endometrial carcinoma.  The patient's last Pap smear was conducted by Dr. Florez in 03/2024. A referral to Dr. Daley was provided.    3. Chronic right-sided low back pain.  X-rays of the patient's back will be ordered. A referral for physical therapy will be made.         Follow Up   No follow-ups on file.  Patient was given instructions and counseling regarding her condition or for health maintenance advice. Please see specific information pulled into the AVS if appropriate.   Patient or patient representative verbalized consent for the use of Ambient Listening during the visit with  Leonor Adam MD for chart documentation. 5/2/2024  19:32 EDT

## 2024-05-02 ENCOUNTER — OFFICE VISIT (OUTPATIENT)
Dept: FAMILY MEDICINE CLINIC | Facility: CLINIC | Age: 79
End: 2024-05-02
Payer: MEDICARE

## 2024-05-02 VITALS
OXYGEN SATURATION: 92 % | HEART RATE: 60 BPM | TEMPERATURE: 98 F | DIASTOLIC BLOOD PRESSURE: 82 MMHG | SYSTOLIC BLOOD PRESSURE: 126 MMHG | BODY MASS INDEX: 45.49 KG/M2 | WEIGHT: 256.8 LBS

## 2024-05-02 DIAGNOSIS — E03.9 HYPOTHYROIDISM, UNSPECIFIED TYPE: ICD-10-CM

## 2024-05-02 DIAGNOSIS — Z00.01 ENCOUNTER FOR ANNUAL GENERAL MEDICAL EXAMINATION WITH ABNORMAL FINDINGS IN ADULT: Primary | ICD-10-CM

## 2024-05-02 DIAGNOSIS — F32.A DEPRESSION, UNSPECIFIED DEPRESSION TYPE: ICD-10-CM

## 2024-05-02 DIAGNOSIS — E53.8 B12 DEFICIENCY: ICD-10-CM

## 2024-05-02 DIAGNOSIS — E66.1 CLASS 3 DRUG-INDUCED OBESITY WITH SERIOUS COMORBIDITY AND BODY MASS INDEX (BMI) OF 45.0 TO 49.9 IN ADULT: ICD-10-CM

## 2024-05-02 DIAGNOSIS — M1A.9XX0 CHRONIC GOUT WITHOUT TOPHUS, UNSPECIFIED CAUSE, UNSPECIFIED SITE: ICD-10-CM

## 2024-05-02 DIAGNOSIS — M54.50 CHRONIC RIGHT-SIDED LOW BACK PAIN, UNSPECIFIED WHETHER SCIATICA PRESENT: ICD-10-CM

## 2024-05-02 DIAGNOSIS — I10 PRIMARY HYPERTENSION: ICD-10-CM

## 2024-05-02 DIAGNOSIS — I25.10 CORONARY ARTERY DISEASE INVOLVING NATIVE HEART WITHOUT ANGINA PECTORIS, UNSPECIFIED VESSEL OR LESION TYPE: ICD-10-CM

## 2024-05-02 DIAGNOSIS — I48.0 PAROXYSMAL ATRIAL FIBRILLATION: ICD-10-CM

## 2024-05-02 DIAGNOSIS — I50.32 CHRONIC DIASTOLIC HEART FAILURE: ICD-10-CM

## 2024-05-02 DIAGNOSIS — E78.2 MIXED HYPERLIPIDEMIA: ICD-10-CM

## 2024-05-02 DIAGNOSIS — C54.1 ENDOMETRIAL CARCINOMA: ICD-10-CM

## 2024-05-02 DIAGNOSIS — E55.9 VITAMIN D DEFICIENCY: ICD-10-CM

## 2024-05-02 DIAGNOSIS — G47.33 OBSTRUCTIVE SLEEP APNEA SYNDROME: ICD-10-CM

## 2024-05-02 DIAGNOSIS — G89.29 CHRONIC RIGHT-SIDED LOW BACK PAIN, UNSPECIFIED WHETHER SCIATICA PRESENT: ICD-10-CM

## 2024-05-02 DIAGNOSIS — R55 SYNCOPE, UNSPECIFIED SYNCOPE TYPE: ICD-10-CM

## 2024-05-02 RX ORDER — SEMAGLUTIDE 1.34 MG/ML
0.25 INJECTION, SOLUTION SUBCUTANEOUS WEEKLY
Qty: 1.5 ML | Refills: 0 | Status: SHIPPED | OUTPATIENT
Start: 2024-05-02

## 2024-05-06 DIAGNOSIS — G89.29 CHRONIC RIGHT-SIDED LOW BACK PAIN, UNSPECIFIED WHETHER SCIATICA PRESENT: ICD-10-CM

## 2024-05-06 DIAGNOSIS — M54.50 CHRONIC RIGHT-SIDED LOW BACK PAIN, UNSPECIFIED WHETHER SCIATICA PRESENT: ICD-10-CM

## 2024-05-06 RX ORDER — SEMAGLUTIDE 0.68 MG/ML
INJECTION, SOLUTION SUBCUTANEOUS WEEKLY
COMMUNITY
Start: 2024-05-02 | End: 2024-05-10

## 2024-05-07 ENCOUNTER — CLINICAL SUPPORT (OUTPATIENT)
Dept: FAMILY MEDICINE CLINIC | Facility: CLINIC | Age: 79
End: 2024-05-07
Payer: MEDICARE

## 2024-05-07 DIAGNOSIS — E78.2 MIXED HYPERLIPIDEMIA: Primary | ICD-10-CM

## 2024-05-07 LAB
T4 FREE SERPL-MCNC: 1.09 NG/DL (ref 0.93–1.7)
TSH SERPL DL<=0.05 MIU/L-ACNC: 7.44 UIU/ML (ref 0.27–4.2)

## 2024-05-07 PROCEDURE — 84439 ASSAY OF FREE THYROXINE: CPT | Performed by: PREVENTIVE MEDICINE

## 2024-05-07 PROCEDURE — 36415 COLL VENOUS BLD VENIPUNCTURE: CPT | Performed by: PREVENTIVE MEDICINE

## 2024-05-07 PROCEDURE — 83735 ASSAY OF MAGNESIUM: CPT | Performed by: PREVENTIVE MEDICINE

## 2024-05-07 PROCEDURE — 84550 ASSAY OF BLOOD/URIC ACID: CPT | Performed by: PREVENTIVE MEDICINE

## 2024-05-07 PROCEDURE — 85025 COMPLETE CBC W/AUTO DIFF WBC: CPT | Performed by: PREVENTIVE MEDICINE

## 2024-05-07 PROCEDURE — 84443 ASSAY THYROID STIM HORMONE: CPT | Performed by: PREVENTIVE MEDICINE

## 2024-05-07 PROCEDURE — 80053 COMPREHEN METABOLIC PANEL: CPT | Performed by: PREVENTIVE MEDICINE

## 2024-05-07 PROCEDURE — 82607 VITAMIN B-12: CPT | Performed by: PREVENTIVE MEDICINE

## 2024-05-07 PROCEDURE — 80061 LIPID PANEL: CPT | Performed by: PREVENTIVE MEDICINE

## 2024-05-08 ENCOUNTER — TELEPHONE (OUTPATIENT)
Dept: FAMILY MEDICINE CLINIC | Facility: CLINIC | Age: 79
End: 2024-05-08
Payer: MEDICARE

## 2024-05-08 LAB
ALBUMIN SERPL-MCNC: 4.1 G/DL (ref 3.5–5.2)
ALBUMIN/GLOB SERPL: 1.6 G/DL
ALP SERPL-CCNC: 104 U/L (ref 39–117)
ALT SERPL W P-5'-P-CCNC: 16 U/L (ref 1–33)
ANION GAP SERPL CALCULATED.3IONS-SCNC: 9 MMOL/L (ref 5–15)
AST SERPL-CCNC: 18 U/L (ref 1–32)
BASOPHILS # BLD AUTO: 0.05 10*3/MM3 (ref 0–0.2)
BASOPHILS NFR BLD AUTO: 1.1 % (ref 0–1.5)
BILIRUB SERPL-MCNC: 0.5 MG/DL (ref 0–1.2)
BUN SERPL-MCNC: 34 MG/DL (ref 8–23)
BUN/CREAT SERPL: 30.6 (ref 7–25)
CALCIUM SPEC-SCNC: 9.7 MG/DL (ref 8.6–10.5)
CHLORIDE SERPL-SCNC: 106 MMOL/L (ref 98–107)
CHOLEST SERPL-MCNC: 125 MG/DL (ref 0–200)
CO2 SERPL-SCNC: 24 MMOL/L (ref 22–29)
CREAT SERPL-MCNC: 1.11 MG/DL (ref 0.57–1)
DEPRECATED RDW RBC AUTO: 49 FL (ref 37–54)
EGFRCR SERPLBLD CKD-EPI 2021: 51.3 ML/MIN/1.73
EOSINOPHIL # BLD AUTO: 0.18 10*3/MM3 (ref 0–0.4)
EOSINOPHIL NFR BLD AUTO: 3.8 % (ref 0.3–6.2)
ERYTHROCYTE [DISTWIDTH] IN BLOOD BY AUTOMATED COUNT: 13.4 % (ref 12.3–15.4)
GLOBULIN UR ELPH-MCNC: 2.5 GM/DL
GLUCOSE SERPL-MCNC: 86 MG/DL (ref 65–99)
HCT VFR BLD AUTO: 31.3 % (ref 34–46.6)
HDLC SERPL-MCNC: 67 MG/DL (ref 40–60)
HGB BLD-MCNC: 10.5 G/DL (ref 12–15.9)
IMM GRANULOCYTES # BLD AUTO: 0.01 10*3/MM3 (ref 0–0.05)
IMM GRANULOCYTES NFR BLD AUTO: 0.2 % (ref 0–0.5)
LDLC SERPL CALC-MCNC: 47 MG/DL (ref 0–100)
LDLC/HDLC SERPL: 0.72 {RATIO}
LYMPHOCYTES # BLD AUTO: 1.73 10*3/MM3 (ref 0.7–3.1)
LYMPHOCYTES NFR BLD AUTO: 36.3 % (ref 19.6–45.3)
MAGNESIUM SERPL-MCNC: 1.9 MG/DL (ref 1.6–2.4)
MCH RBC QN AUTO: 33.8 PG (ref 26.6–33)
MCHC RBC AUTO-ENTMCNC: 33.5 G/DL (ref 31.5–35.7)
MCV RBC AUTO: 100.6 FL (ref 79–97)
MONOCYTES # BLD AUTO: 0.54 10*3/MM3 (ref 0.1–0.9)
MONOCYTES NFR BLD AUTO: 11.3 % (ref 5–12)
NEUTROPHILS NFR BLD AUTO: 2.25 10*3/MM3 (ref 1.7–7)
NEUTROPHILS NFR BLD AUTO: 47.3 % (ref 42.7–76)
NRBC BLD AUTO-RTO: 0 /100 WBC (ref 0–0.2)
PLATELET # BLD AUTO: 194 10*3/MM3 (ref 140–450)
PMV BLD AUTO: 10.5 FL (ref 6–12)
POTASSIUM SERPL-SCNC: 4.9 MMOL/L (ref 3.5–5.2)
PROT SERPL-MCNC: 6.6 G/DL (ref 6–8.5)
RBC # BLD AUTO: 3.11 10*6/MM3 (ref 3.77–5.28)
SODIUM SERPL-SCNC: 139 MMOL/L (ref 136–145)
TRIGL SERPL-MCNC: 49 MG/DL (ref 0–150)
URATE SERPL-MCNC: 4.5 MG/DL (ref 2.4–5.7)
VIT B12 BLD-MCNC: 909 PG/ML (ref 211–946)
VLDLC SERPL-MCNC: 11 MG/DL (ref 5–40)
WBC NRBC COR # BLD AUTO: 4.76 10*3/MM3 (ref 3.4–10.8)

## 2024-05-10 ENCOUNTER — OFFICE VISIT (OUTPATIENT)
Dept: CARDIOLOGY | Facility: CLINIC | Age: 79
End: 2024-05-10
Payer: MEDICARE

## 2024-05-10 VITALS
SYSTOLIC BLOOD PRESSURE: 132 MMHG | WEIGHT: 254 LBS | BODY MASS INDEX: 45 KG/M2 | HEART RATE: 64 BPM | DIASTOLIC BLOOD PRESSURE: 80 MMHG | HEIGHT: 63 IN | OXYGEN SATURATION: 100 %

## 2024-05-10 DIAGNOSIS — I10 PRIMARY HYPERTENSION: ICD-10-CM

## 2024-05-10 DIAGNOSIS — I50.32 CHRONIC DIASTOLIC HEART FAILURE: ICD-10-CM

## 2024-05-10 DIAGNOSIS — I49.5 SICK SINUS SYNDROME: ICD-10-CM

## 2024-05-10 DIAGNOSIS — I48.0 PAROXYSMAL ATRIAL FIBRILLATION: Primary | ICD-10-CM

## 2024-05-10 DIAGNOSIS — E78.2 MIXED HYPERLIPIDEMIA: ICD-10-CM

## 2024-05-10 DIAGNOSIS — Z95.0 PRESENCE OF CARDIAC PACEMAKER: ICD-10-CM

## 2024-05-10 DIAGNOSIS — G45.9 TIA (TRANSIENT ISCHEMIC ATTACK): ICD-10-CM

## 2024-05-27 ENCOUNTER — ANESTHESIA EVENT (OUTPATIENT)
Dept: GASTROENTEROLOGY | Facility: HOSPITAL | Age: 79
End: 2024-05-27
Payer: MEDICARE

## 2024-05-27 RX ORDER — SODIUM CHLORIDE 0.9 % (FLUSH) 0.9 %
10 SYRINGE (ML) INJECTION AS NEEDED
Status: CANCELLED | OUTPATIENT
Start: 2024-05-27

## 2024-05-27 RX ORDER — SODIUM CHLORIDE 9 MG/ML
9 INJECTION, SOLUTION INTRAVENOUS CONTINUOUS PRN
Status: CANCELLED | OUTPATIENT
Start: 2024-05-27

## 2024-05-27 RX ORDER — SODIUM CHLORIDE 0.9 % (FLUSH) 0.9 %
10 SYRINGE (ML) INJECTION EVERY 12 HOURS SCHEDULED
Status: CANCELLED | OUTPATIENT
Start: 2024-05-27

## 2024-05-27 NOTE — ANESTHESIA PREPROCEDURE EVALUATION
Anesthesia Evaluation     Patient summary reviewed and Nursing notes reviewed   no history of anesthetic complications:   NPO Solid Status: > 8 hours  NPO Liquid Status: > 2 hours           Airway   Dental      Pulmonary    (+) ,sleep apnea  Cardiovascular     ECG reviewed  PT is on anticoagulation therapy  Patient on routine beta blocker    (+) pacemaker pacemaker, hypertension, valvular problems/murmurs MR, CAD, dysrhythmias Paroxysmal Atrial Fib, hyperlipidemia      Neuro/Psych  (+) TIA, syncope, numbness, psychiatric history Depression  GI/Hepatic/Renal/Endo    (+) morbid obesity, renal disease- CRI, thyroid problem hypothyroidism    Musculoskeletal     (+) back pain, chronic pain  Abdominal    Substance History      OB/GYN          Other   arthritis,     ROS/Med Hx Other: Additional History:  Allergies, gout, dysphagia, poor mobility    Afib/RVR    Echo:    Echocardiogram Findings    Left Ventricle Left ventricular systolic function is normal. Estimated left ventricular EF = 60% Left ventricular ejection fraction appears to be 56 - 60%.     Normal left ventricular cavity size noted. Left ventricular wall thickness is consistent with mild concentric hypertrophy. Left ventricular diastolic function is consistent with (grade Ia w/high LAP) impaired relaxation. No evidence of left ventricular thrombus or mass present.  Right Ventricle Normal right ventricular cavity size, wall thickness and systolic function noted.  Left Atrium The left atrial cavity is moderate to severely dilated. No evidence of a left atrial thrombus present. There is no spontaneous echo contrast present. No evidence of a patent foramen ovale.  Right Atrium The right atrial cavity is mildly dilated.  Aortic Valve No aortic valve regurgitation or stenosis is present. The aortic valve appears trileaflet.  Mitral Valve The mitral valve is normal in structure. Mild mitral valve regurgitation is present with a centrally-directed jet  noted.  Tricuspid Valve The tricuspid valve is normal in structure. Trace tricuspid valve regurgitation is present. Estimated right ventricular systolic pressure from tricuspid regurgitation is normal (<35 mmHg).  Pulmonic Valve The pulmonic valve is structurally normal.  Pericardium There is no evidence of pericardial effusion. .        Stress Test:  · Left ventricular ejection fraction is normal. (Calculated EF = 68%).  · Findings consistent with an equivocal ECG stress test.     Abnormal stress  Submaximal study, no changes at submaximal stress on stress ECG  No arrhythmias seen  Nuclear imaging demonstrates decreased counts at rest in the mid inferior to apical wall involving the apex, summed rest score of 16  Stress imaging demonstrates significant worsening perfusion in the mid to apical inferior wall, EF 68%  There is significant GI and liver uptake cannot rule out diaphragmatic attenuation     Abnormal study  Correlate with clinical risk factors  Suggestive of inferior reversibility and ischemia      Cath:  Findings:     Hemodynamics Central arctic pressure systolic 150 and diastolic 56 with a mean pressure of 82 mmHg  LV end-diastolic pressure of 12 mmHg  There was no gradient across the aortic valve on the pullback of the pigtail catheter  Left Main  left main is a large-caliber vessel angiographically normal bifurcates into left into descending and left circumflex arteries  RCA  large-caliber dominant vessel  Right coronary artery is angiographically normal right coronary artery bifurcates into a large caliber PDA branch and a moderate caliber PLV branch that are angiographically normal  LAD  large-caliber vessel  LAD has mid focal angiographic 30% stenosis that is heavily calcified at the bifurcation with the diagonal and septal branch  Moderate size diagonal branch that has ostial angiographic of 40% stenosis  Circ  large-caliber vessel angiographically normal  First marginal branch of the circumflex  has proximal angiographic 30 to 40% stenosis      LV  normal LV systolic function normal wall motion estimate LV ejection fraction of 55%  Coronary Dominance  right coronary artery     Estimated Blood Loss:  Minimal     Specimens: None         Complications:  None; patient tolerated the procedure well.           Disposition: PACU - hemodynamically stable.           Condition: stable     Impressions:  Mild obstructive coronary artery disease involving the LAD mild to moderate obstructive coronary artery disease involving the ostium of the diagonal branch mild obstructive coronary artery disease involving the proximal first marginal branch  Normal LV systolic function  Normal wall motion  Estimate LV ejection fraction of 55%  Normal left-sided filling pressures     Recommendations:  Medical management for obstructive coronary artery disease  Test results reviewed and discussed with patient and family      PSH:  HYSTERECTOMY ROTATOR CUFF REPAIR  CATARACT EXTRACTION CARPAL TUNNEL RELEASE  CUBITAL TUNNEL RELEASE CARDIAC CATHETERIZATION  REPLACEMENT TOTAL KNEE ANKLE FUSION  CARDIAC CATHETERIZATION TOTAL KNEE ARTHROPLASTY  CARDIAC ELECTROPHYSIOLOGY PROCEDURE CARDIAC ELECTROPHYSIOLOGY PROCEDURE  CARDIAC ELECTROPHYSIOLOGY PROCEDURE                 Anesthesia Plan    ASA 3     general   total IV anesthesia  (Patient identified; pre-operative vital signs, all relevant labs/studies, complete medical/surgical/anesthetic history, full medication list, full allergy list, and NPO status obtained/reviewed; physical assessment performed; anesthetic options, side effects, potential complications, risks, and benefits discussed; questions answered; written anesthesia consent obtained; patient cleared for procedure; anesthesia machine and equipment checked and functioning    Patient off metoprolol; IV metoprolol for rate control; will proceed when HR better controlled)  intravenous induction     Anesthetic plan, risks, benefits, and  alternatives have been provided, discussed and informed consent has been obtained with: patient.    Plan discussed with CRNA and CAA.    CODE STATUS:

## 2024-05-28 ENCOUNTER — ON CAMPUS - OUTPATIENT (AMBULATORY)
Dept: URBAN - METROPOLITAN AREA HOSPITAL 85 | Facility: HOSPITAL | Age: 79
End: 2024-05-28
Payer: MEDICAID

## 2024-05-28 ENCOUNTER — ANESTHESIA (OUTPATIENT)
Dept: GASTROENTEROLOGY | Facility: HOSPITAL | Age: 79
End: 2024-05-28
Payer: MEDICARE

## 2024-05-28 ENCOUNTER — HOSPITAL ENCOUNTER (OUTPATIENT)
Facility: HOSPITAL | Age: 79
Setting detail: HOSPITAL OUTPATIENT SURGERY
Discharge: HOME | End: 2024-05-28
Attending: INTERNAL MEDICINE | Admitting: INTERNAL MEDICINE
Payer: MEDICARE

## 2024-05-28 VITALS
HEART RATE: 163 BPM | DIASTOLIC BLOOD PRESSURE: 63 MMHG | BODY MASS INDEX: 46.76 KG/M2 | RESPIRATION RATE: 20 BRPM | WEIGHT: 263.89 LBS | OXYGEN SATURATION: 98 % | SYSTOLIC BLOOD PRESSURE: 129 MMHG | HEIGHT: 63 IN

## 2024-05-28 DIAGNOSIS — D64.9 ANEMIA, UNSPECIFIED: ICD-10-CM

## 2024-05-28 DIAGNOSIS — K44.9 DIAPHRAGMATIC HERNIA WITHOUT OBSTRUCTION OR GANGRENE: ICD-10-CM

## 2024-05-28 DIAGNOSIS — K31.89 OTHER DISEASES OF STOMACH AND DUODENUM: ICD-10-CM

## 2024-05-28 DIAGNOSIS — K20.80 OTHER ESOPHAGITIS WITHOUT BLEEDING: ICD-10-CM

## 2024-05-28 DIAGNOSIS — K30 FUNCTIONAL DYSPEPSIA: ICD-10-CM

## 2024-05-28 DIAGNOSIS — D64.9 ANEMIA: ICD-10-CM

## 2024-05-28 DIAGNOSIS — R13.10 DYSPHAGIA: ICD-10-CM

## 2024-05-28 LAB
QT INTERVAL: 335 MS
QTC INTERVAL: 477 MS

## 2024-05-28 PROCEDURE — 25010000002 PROPOFOL 10 MG/ML EMULSION: Performed by: ANESTHESIOLOGIST ASSISTANT

## 2024-05-28 PROCEDURE — 43239 EGD BIOPSY SINGLE/MULTIPLE: CPT | Performed by: INTERNAL MEDICINE

## 2024-05-28 PROCEDURE — 93005 ELECTROCARDIOGRAM TRACING: CPT | Performed by: ANESTHESIOLOGY

## 2024-05-28 PROCEDURE — 88305 TISSUE EXAM BY PATHOLOGIST: CPT | Performed by: INTERNAL MEDICINE

## 2024-05-28 PROCEDURE — 25810000003 LACTATED RINGERS PER 1000 ML: Performed by: ANESTHESIOLOGIST ASSISTANT

## 2024-05-28 RX ORDER — SODIUM CHLORIDE, SODIUM LACTATE, POTASSIUM CHLORIDE, CALCIUM CHLORIDE 600; 310; 30; 20 MG/100ML; MG/100ML; MG/100ML; MG/100ML
INJECTION, SOLUTION INTRAVENOUS CONTINUOUS PRN
Status: DISCONTINUED | OUTPATIENT
Start: 2024-05-28 | End: 2024-05-28 | Stop reason: SURG

## 2024-05-28 RX ORDER — METOPROLOL TARTRATE 1 MG/ML
5 INJECTION, SOLUTION INTRAVENOUS ONCE
Status: DISCONTINUED | OUTPATIENT
Start: 2024-05-28 | End: 2024-05-28 | Stop reason: HOSPADM

## 2024-05-28 RX ORDER — ONDANSETRON 2 MG/ML
4 INJECTION INTRAMUSCULAR; INTRAVENOUS ONCE AS NEEDED
Status: DISCONTINUED | OUTPATIENT
Start: 2024-05-28 | End: 2024-05-28 | Stop reason: HOSPADM

## 2024-05-28 RX ADMIN — PROPOFOL INJECTABLE EMULSION 100 MCG/KG/MIN: 10 INJECTION, EMULSION INTRAVENOUS at 10:33

## 2024-05-28 RX ADMIN — SODIUM CHLORIDE, SODIUM LACTATE, POTASSIUM CHLORIDE, AND CALCIUM CHLORIDE: .6; .31; .03; .02 INJECTION, SOLUTION INTRAVENOUS at 10:33

## 2024-05-28 NOTE — OP NOTE
ESOPHAGOGASTRODUODENOSCOPY Procedure Report    Patient Name:  Faby Le  YOB: 1945    Date of Surgery:  5/28/2024     Preoperative diagnosis:  Anemia  Dyspepsia    Postoperative diagnosis:  Grade a erosive esophagitis  Small hiatal hernia  Nonerosive gastritis        Procedure(s):  ESOPHAGOGASTRODUODENOSCOPY WITH COLD FORCEP BIOPSY X1 AREA    Staff:  Surgeon(s):  Josh Loomis MD      Anesthesia: Monitored Anesthesia Care    Implants:    Nothing was implanted during the procedure    Specimen:        See Below    Estimated blood loss: Minimal     Complications:  None    Description of Procedure:  Informed consent was obtained for the procedure, including sedation.  Risks of perforation, hemorrhage, adverse drug reaction and aspiration were discussed.  The patient was brought into the endoscopy suite. Continuous cardiopulmonary monitoring was performed. The patient was placed in the left lateral decubitus position.  The bite block was inserted into the patient's mouth. After adequate sedation was attained, the Olympus gastroscope was inserted into the patient's mouth and advanced to the second portion of the duodenum without difficulty.  Circumferential examination was performed. A retroflex exam was performed in the patient's stomach.  On completion of the exam, the bowel was decompressed, the scope was removed from the patient, the patient tolerated the procedure well, there were no immediate post-operative complications.     Examination of the esophagus: Mild grade a erosive esophagitis was noted at the GE junction with a small 2 cm hiatal hernia.  Examination of the stomach: Mild erythema without ulceration or erosion was noted diffusely in the stomach antrum and body.  Cold forceps biopsies were obtained for histology and to rule out recurrent H. pylori infection.  Examination of the duodenum: Normal to second duodenum.    Impression:  EGD shows mild esophagitis with a small hiatal  hernia, and chronic nonerosive gastritis.    Recommendations:  Follow-up on pathology  No recall on EGD  If H. pylori is positive, treat with Talicia or Voquezna Triple delma  Pantoprazole 40 mg daily  Okay to resume Xarelto    We appreciate the referral    Electronically signed by Josh Loomis MD, 05/28/24, 10:45 AM EDT.

## 2024-05-28 NOTE — H&P
GI CONSULT  NOTE:    Referring Provider:    Leonor Adam MD Matthew David McColloug*    Chief complaint: Anemia    History of present illness:      Faby Le is a 79 y.o. female who presents today for Procedure(s):  ESOPHAGOGASTRODUODENOSCOPY for the indications listed below.     The updated Patient Profile was reviewed prior to the procedure, in conjunction with the Physical Exam, including medical conditions, surgical procedures, medications, allergies, family history and social history.     Pre-operatively, I reviewed the indication(s) for the procedure, the risks of the procedure [including but not limited to: unexpected bleeding possibly requiring hospitalization and/or unplanned repeat procedures, perforation possibly requiring surgical treatment, missed lesions and complications of sedation/MAC (also explained by anesthesia staff)].     I have evaluated the patient for risks associated with the planned anesthesia and the procedure to be performed and find the patient an acceptable candidate for IV sedation.    Multiple opportunities were provided for any questions or concerns, and all questions were answered satisfactorily before any anesthesia was administered. We will proceed with the planned procedure.    Past Medical History:  Past Medical History:   Diagnosis Date    Ankle pain     Arthritis     Coronary artery disease     Depression     Gout     Hyperglycemia     Hyperlipidemia     Hypertension     Low back pain     Sleep apnea     Vitamin D deficiency        Past Surgical History:  Past Surgical History:   Procedure Laterality Date    ANKLE FUSION      2017-left    CARDIAC CATHETERIZATION      CARDIAC CATHETERIZATION Right 8/29/2022    Procedure: Left Heart Cath;  Surgeon: Ratna Zavala MD;  Location: [facility] CATH INVASIVE LOCATION;  Service: Cardiovascular;  Laterality: Right;    CARDIAC ELECTROPHYSIOLOGY PROCEDURE Left 7/3/2023    Procedure: Pacemaker DC new [place] Notified;   Surgeon: Cr Herrera MD;  Location: [facility] CATH INVASIVE LOCATION;  Service: Cardiovascular;  Laterality: Left;    CARDIAC ELECTROPHYSIOLOGY PROCEDURE Right 3/5/2024    Procedure: Ablation atrial fibrillation, [employer] Ibrahima and Wayne Costa notified 02/09/24;  Surgeon: Cr Herrera MD;  Location: [facility] CATH INVASIVE LOCATION;  Service: Cardiovascular;  Laterality: Right;    CARDIAC ELECTROPHYSIOLOGY PROCEDURE N/A 3/5/2024    Procedure: Cardioversion;  Surgeon: Cr Herrera MD;  Location: [facility] CATH INVASIVE LOCATION;  Service: Cardiovascular;  Laterality: N/A;    CARPAL TUNNEL RELEASE      CATARACT EXTRACTION      CUBITAL TUNNEL RELEASE      HYSTERECTOMY      REPLACEMENT TOTAL KNEE      left 2008    ROTATOR CUFF REPAIR      TOTAL KNEE ARTHROPLASTY Right 5/4/2023    Procedure: TOTAL KNEE ARTHROPLASTY WITH CORI ROBOT;  Surgeon: Chino Herrera II, MD;  Location: [facility] MAIN OR;  Service: Robotics - Ortho;  Laterality: Right;       Social History:  Social History     Tobacco Use    Smoking status: Never     Passive exposure: Never    Smokeless tobacco: Never   Vaping Use    Vaping status: Never Used   Substance Use Topics    Alcohol use: No    Drug use: No       Family History:  Family History   Problem Relation Age of Onset    Hypertension Mother     Stroke Father     Arthritis Brother     Cancer Brother        Medications:  Medications Prior to Admission   Medication Sig Dispense Refill Last Dose    allopurinol (ZYLOPRIM) 300 MG tablet Take 1 tablet by mouth once daily 90 tablet 0 5/25/2024    atorvastatin (LIPITOR) 20 MG tablet Take 1 tablet by mouth Daily. 30 tablet 11 5/25/2024    Azelastine HCl 137 MCG/SPRAY solution 2 sprays by Alternating Nares route Daily As Needed.       cetirizine (zyrTEC) 10 MG tablet Take 1 tablet by mouth Daily.   5/25/2024    cyanocobalamin (VITAMIN B-12) 1000 MCG tablet Take 1 tablet by mouth Daily.   Past Week    escitalopram (LEXAPRO) 20 MG tablet  "Take 1 tablet by mouth once daily 90 tablet 0 5/25/2024    IRON CR PO Take  by mouth Daily.   Past Week    levothyroxine (SYNTHROID, LEVOTHROID) 88 MCG tablet Take 1 tablet by mouth Daily. 90 tablet 0 5/25/2024    metoprolol succinate XL (TOPROL-XL) 100 MG 24 hr tablet Take 1 tablet by mouth Every Night. 90 tablet 1 5/25/2024    montelukast (SINGULAIR) 10 MG tablet Take 1 tablet by mouth Every Night.   5/25/2024    multivitamin (MULTI VITAMIN PO) Take  by mouth Daily.   Past Week    rivaroxaban (XARELTO) 20 MG tablet Take 1 tablet by mouth Daily. 90 tablet 1 5/23/2024    spironolactone (ALDACTONE) 25 MG tablet Take 1/2 (one-half) tablet by mouth once daily 45 tablet 0 5/25/2024    Vitamin D, Cholecalciferol, 25 MCG (1000 UT) tablet Take 1 tablet by mouth Daily.   Past Week       Scheduled Meds:metoprolol tartrate, 5 mg, Intravenous, Once      Continuous Infusions:   PRN Meds:.    ALLERGIES:  Celecoxib    ROS:  The following systems were reviewed and negative;   Constitution:  No fevers, chills, no unintentional weight loss  Skin: no rash, no jaundice  Eyes:  No blurry vision, no eye pain  HENT:  No change in hearing or smell  Resp:  No dyspnea or cough  CV:  No chest pain or palpitations  :  No dysuria, hematuria  Musculoskeletal:  No leg cramps or arthralgias  Neuro:  No tremor, no numbness  Psych:  No depression or confusion    Objective     Vital Signs:   Vitals:    05/28/24 0945   BP: 129/63   BP Location: Left arm   Patient Position: Lying   Pulse: (!) 163   Resp: 20   SpO2: 98%   Weight: 120 kg (263 lb 14.3 oz)   Height: 160 cm (63\")       Physical Exam:       General Appearance:    Awake and alert, in no acute distress   Head:    Normocephalic, without obvious abnormality, atraumatic   Throat:   No oral lesions, no thrush, oral mucosa moist   Lungs:     respirations regular, even and unlabored   Skin:   No rash, no jaundice       Results Review:  Lab Results (last 24 hours)       ** No results found for the " last 24 hours. **            Imaging Results (Last 24 Hours)       ** No results found for the last 24 hours. **             I reviewed the patient's labs and imaging.    ASSESSMENT AND PLAN:      Principal Problem:    Anemia       Procedure(s):  ESOPHAGOGASTRODUODENOSCOPY      I discussed the patients findings and my recommendations with the patient.    Electronically signed by Josh Loomis MD, 05/28/24, 10:29 AM EDT.

## 2024-05-28 NOTE — ANESTHESIA POSTPROCEDURE EVALUATION
Patient: Faby Le    Procedure Summary       Date: 05/28/24 Room / Location: [facility] ENDOSCOPY 4 / [facility] ENDOSCOPY    Anesthesia Start: 1030 Anesthesia Stop: 1043    Procedure: ESOPHAGOGASTRODUODENOSCOPY WITH COLD FORCEP BIOPSY X1 AREA Diagnosis:       Anemia      Dysphagia      (Anemia [D64.9])      (Dysphagia [R13.10])    Surgeons: Josh Loomis MD Provider: Maurice Packer MD    Anesthesia Type: general ASA Status: 3            Anesthesia Type: general    Vitals  Vitals Value Taken Time   /66 05/28/24 1102   Temp     Pulse 81 05/28/24 1103   Resp     SpO2 99 % 05/28/24 1103   Vitals shown include unfiled device data.        Post Anesthesia Care and Evaluation    Patient location during evaluation: PACU  Patient participation: complete - patient participated  Level of consciousness: awake  Pain scale: See nurse's notes for pain score.  Pain management: adequate    Airway patency: patent  Anesthetic complications: No anesthetic complications  PONV Status: none  Cardiovascular status: acceptable  Respiratory status: acceptable and spontaneous ventilation  Hydration status: acceptable    Comments: Patient seen and examined postoperatively; vital signs stable; SpO2 greater than or equal to 90%; cardiopulmonary status stable; nausea/vomiting adequately controlled; pain adequately controlled; no apparent anesthesia complications; patient discharged from anesthesia care when discharge criteria were met

## 2024-05-28 NOTE — DISCHARGE INSTRUCTIONS
A responsible adult should stay with you and you should rest quietly for the rest of the day.    Do not drink alcohol, drive, operate any heavy machinery or power tools or make any legal/important decisions for the next 24 hours.     Progress your diet as tolerated.  If you begin to experience severe pain, increased shortness of breath, racing heartbeat or a fever above 101 F, seek immediate medical attention.     Follow up with MD as instructed. Call office for results in 3 to 5 days if needed.    Impression:  EGD shows mild esophagitis with a small hiatal hernia, and chronic nonerosive gastritis.     Recommendations:  Follow-up on pathology  No recall on EGD  If H. pylori is positive, treat with Talicia or Voquezna Triple delma  Pantoprazole 40 mg daily  Okay to resume Xarelto

## 2024-05-29 LAB
LAB AP CASE REPORT: NORMAL
PATH REPORT.FINAL DX SPEC: NORMAL
PATH REPORT.GROSS SPEC: NORMAL

## 2024-06-03 ENCOUNTER — OFFICE (AMBULATORY)
Dept: URBAN - METROPOLITAN AREA CLINIC 64 | Facility: CLINIC | Age: 79
End: 2024-06-03
Payer: MEDICAID

## 2024-06-03 VITALS
HEART RATE: 61 BPM | HEIGHT: 63 IN | WEIGHT: 261 LBS | DIASTOLIC BLOOD PRESSURE: 80 MMHG | SYSTOLIC BLOOD PRESSURE: 142 MMHG

## 2024-06-03 DIAGNOSIS — K30 FUNCTIONAL DYSPEPSIA: ICD-10-CM

## 2024-06-03 DIAGNOSIS — D64.9 ANEMIA, UNSPECIFIED: ICD-10-CM

## 2024-06-03 DIAGNOSIS — K59.00 CONSTIPATION, UNSPECIFIED: ICD-10-CM

## 2024-06-03 PROCEDURE — 99212 OFFICE O/P EST SF 10 MIN: CPT | Performed by: NURSE PRACTITIONER

## 2024-06-08 DIAGNOSIS — E03.9 HYPOTHYROIDISM, UNSPECIFIED TYPE: ICD-10-CM

## 2024-06-10 RX ORDER — LEVOTHYROXINE SODIUM 88 UG/1
88 TABLET ORAL DAILY
Qty: 90 TABLET | Refills: 0 | Status: SHIPPED | OUTPATIENT
Start: 2024-06-10

## 2024-06-10 NOTE — TELEPHONE ENCOUNTER
Rx Refill Note  Requested Prescriptions     Pending Prescriptions Disp Refills    levothyroxine (SYNTHROID, LEVOTHROID) 88 MCG tablet [Pharmacy Med Name: Levothyroxine Sodium 88 MCG Oral Tablet] 90 tablet 0     Sig: Take 1 tablet by mouth once daily      Last office visit with prescribing clinician: 5/2/2024   Last telemedicine visit with prescribing clinician: Visit date not found   Next office visit with prescribing clinician: 8/2/2024                         Would you like a call back once the refill request has been completed: [] Yes [] No    If the office needs to give you a call back, can they leave a voicemail: [] Yes [] No    Jennifer Vaughn MA  06/10/24, 08:27 EDT

## 2024-06-11 ENCOUNTER — OFFICE VISIT (OUTPATIENT)
Dept: FAMILY MEDICINE CLINIC | Facility: CLINIC | Age: 79
End: 2024-06-11
Payer: MEDICARE

## 2024-06-11 ENCOUNTER — APPOINTMENT (OUTPATIENT)
Dept: CT IMAGING | Facility: HOSPITAL | Age: 79
DRG: 308 | End: 2024-06-11
Payer: MEDICARE

## 2024-06-11 ENCOUNTER — HOSPITAL ENCOUNTER (INPATIENT)
Facility: HOSPITAL | Age: 79
LOS: 3 days | Discharge: HOME HEALTH | DRG: 308 | End: 2024-06-15
Attending: STUDENT IN AN ORGANIZED HEALTH CARE EDUCATION/TRAINING PROGRAM | Admitting: STUDENT IN AN ORGANIZED HEALTH CARE EDUCATION/TRAINING PROGRAM
Payer: MEDICARE

## 2024-06-11 ENCOUNTER — APPOINTMENT (OUTPATIENT)
Dept: GENERAL RADIOLOGY | Facility: HOSPITAL | Age: 79
DRG: 308 | End: 2024-06-11
Payer: MEDICARE

## 2024-06-11 VITALS
WEIGHT: 263 LBS | SYSTOLIC BLOOD PRESSURE: 118 MMHG | HEIGHT: 63 IN | TEMPERATURE: 97 F | HEART RATE: 52 BPM | OXYGEN SATURATION: 97 % | DIASTOLIC BLOOD PRESSURE: 73 MMHG | BODY MASS INDEX: 46.6 KG/M2

## 2024-06-11 DIAGNOSIS — R42 DIZZINESS: Primary | ICD-10-CM

## 2024-06-11 DIAGNOSIS — R42 LIGHT HEADEDNESS: ICD-10-CM

## 2024-06-11 DIAGNOSIS — G45.9 TIA (TRANSIENT ISCHEMIC ATTACK): ICD-10-CM

## 2024-06-11 DIAGNOSIS — E66.1 CLASS 3 DRUG-INDUCED OBESITY WITH SERIOUS COMORBIDITY AND BODY MASS INDEX (BMI) OF 45.0 TO 49.9 IN ADULT: ICD-10-CM

## 2024-06-11 DIAGNOSIS — I95.1 ORTHOSTATIC HYPOTENSION: ICD-10-CM

## 2024-06-11 DIAGNOSIS — R55 SYNCOPE, UNSPECIFIED SYNCOPE TYPE: ICD-10-CM

## 2024-06-11 DIAGNOSIS — N17.9 AKI (ACUTE KIDNEY INJURY): ICD-10-CM

## 2024-06-11 PROBLEM — I50.9 ACUTE CHF: Status: ACTIVE | Noted: 2024-06-11

## 2024-06-11 LAB
ALBUMIN SERPL-MCNC: 3.7 G/DL (ref 3.5–5.2)
ALBUMIN/GLOB SERPL: 1.4 G/DL
ALP SERPL-CCNC: 113 U/L (ref 39–117)
ALT SERPL W P-5'-P-CCNC: 19 U/L (ref 1–33)
ANION GAP SERPL CALCULATED.3IONS-SCNC: 8.7 MMOL/L (ref 5–15)
AST SERPL-CCNC: 33 U/L (ref 1–32)
BACTERIA UR QL AUTO: ABNORMAL /HPF
BASOPHILS # BLD AUTO: 0.04 10*3/MM3 (ref 0–0.2)
BASOPHILS NFR BLD AUTO: 0.7 % (ref 0–1.5)
BILIRUB SERPL-MCNC: 0.4 MG/DL (ref 0–1.2)
BILIRUB UR QL STRIP: NEGATIVE
BUN SERPL-MCNC: 33 MG/DL (ref 8–23)
BUN/CREAT SERPL: 24.3 (ref 7–25)
CALCIUM SPEC-SCNC: 9.7 MG/DL (ref 8.6–10.5)
CHLORIDE SERPL-SCNC: 106 MMOL/L (ref 98–107)
CLARITY UR: ABNORMAL
CO2 SERPL-SCNC: 23.3 MMOL/L (ref 22–29)
COLOR UR: YELLOW
CREAT SERPL-MCNC: 1.36 MG/DL (ref 0.57–1)
DEPRECATED RDW RBC AUTO: 50.1 FL (ref 37–54)
EGFRCR SERPLBLD CKD-EPI 2021: 40.2 ML/MIN/1.73
EOSINOPHIL # BLD AUTO: 0.14 10*3/MM3 (ref 0–0.4)
EOSINOPHIL NFR BLD AUTO: 2.4 % (ref 0.3–6.2)
ERYTHROCYTE [DISTWIDTH] IN BLOOD BY AUTOMATED COUNT: 13.2 % (ref 12.3–15.4)
FLUAV RNA RESP QL NAA+PROBE: NOT DETECTED
FLUBV RNA RESP QL NAA+PROBE: NOT DETECTED
GEN 5 2HR TROPONIN T REFLEX: 38 NG/L
GLOBULIN UR ELPH-MCNC: 2.6 GM/DL
GLUCOSE SERPL-MCNC: 95 MG/DL (ref 65–99)
GLUCOSE UR STRIP-MCNC: NEGATIVE MG/DL
HCT VFR BLD AUTO: 33.6 % (ref 34–46.6)
HGB BLD-MCNC: 10.7 G/DL (ref 12–15.9)
HGB UR QL STRIP.AUTO: ABNORMAL
HOLD SPECIMEN: NORMAL
HYALINE CASTS UR QL AUTO: ABNORMAL /LPF
IMM GRANULOCYTES # BLD AUTO: 0.01 10*3/MM3 (ref 0–0.05)
IMM GRANULOCYTES NFR BLD AUTO: 0.2 % (ref 0–0.5)
KETONES UR QL STRIP: NEGATIVE
LEUKOCYTE ESTERASE UR QL STRIP.AUTO: ABNORMAL
LYMPHOCYTES # BLD AUTO: 1.9 10*3/MM3 (ref 0.7–3.1)
LYMPHOCYTES NFR BLD AUTO: 33 % (ref 19.6–45.3)
MAGNESIUM SERPL-MCNC: 1.8 MG/DL (ref 1.6–2.4)
MCH RBC QN AUTO: 32.9 PG (ref 26.6–33)
MCHC RBC AUTO-ENTMCNC: 31.8 G/DL (ref 31.5–35.7)
MCV RBC AUTO: 103.4 FL (ref 79–97)
MONOCYTES # BLD AUTO: 0.48 10*3/MM3 (ref 0.1–0.9)
MONOCYTES NFR BLD AUTO: 8.3 % (ref 5–12)
NEUTROPHILS NFR BLD AUTO: 3.19 10*3/MM3 (ref 1.7–7)
NEUTROPHILS NFR BLD AUTO: 55.4 % (ref 42.7–76)
NITRITE UR QL STRIP: NEGATIVE
NRBC BLD AUTO-RTO: 0 /100 WBC (ref 0–0.2)
NT-PROBNP SERPL-MCNC: 5211 PG/ML (ref 0–1800)
PH UR STRIP.AUTO: 6 [PH] (ref 5–8)
PLATELET # BLD AUTO: 162 10*3/MM3 (ref 140–450)
PMV BLD AUTO: 9.4 FL (ref 6–12)
POTASSIUM SERPL-SCNC: 5 MMOL/L (ref 3.5–5.2)
PROT SERPL-MCNC: 6.3 G/DL (ref 6–8.5)
PROT UR QL STRIP: NEGATIVE
RBC # BLD AUTO: 3.25 10*6/MM3 (ref 3.77–5.28)
RBC # UR STRIP: ABNORMAL /HPF
REF LAB TEST METHOD: ABNORMAL
RSV RNA RESP QL NAA+PROBE: NOT DETECTED
SARS-COV-2 RNA RESP QL NAA+PROBE: NOT DETECTED
SODIUM SERPL-SCNC: 138 MMOL/L (ref 136–145)
SP GR UR STRIP: 1.01 (ref 1–1.03)
SQUAMOUS #/AREA URNS HPF: ABNORMAL /HPF
TROPONIN T DELTA: 3 NG/L
TROPONIN T SERPL HS-MCNC: 35 NG/L
TSH SERPL DL<=0.05 MIU/L-ACNC: 5.67 UIU/ML (ref 0.27–4.2)
UROBILINOGEN UR QL STRIP: ABNORMAL
WBC # UR STRIP: ABNORMAL /HPF
WBC NRBC COR # BLD AUTO: 5.76 10*3/MM3 (ref 3.4–10.8)
WHOLE BLOOD HOLD COAG: NORMAL
WHOLE BLOOD HOLD SPECIMEN: NORMAL

## 2024-06-11 PROCEDURE — 1126F AMNT PAIN NOTED NONE PRSNT: CPT | Performed by: PREVENTIVE MEDICINE

## 2024-06-11 PROCEDURE — 84484 ASSAY OF TROPONIN QUANT: CPT | Performed by: PHYSICIAN ASSISTANT

## 2024-06-11 PROCEDURE — G0378 HOSPITAL OBSERVATION PER HR: HCPCS

## 2024-06-11 PROCEDURE — 93005 ELECTROCARDIOGRAM TRACING: CPT | Performed by: PHYSICIAN ASSISTANT

## 2024-06-11 PROCEDURE — 25810000003 SODIUM CHLORIDE 0.9 % SOLUTION: Performed by: PHYSICIAN ASSISTANT

## 2024-06-11 PROCEDURE — 81001 URINALYSIS AUTO W/SCOPE: CPT | Performed by: PHYSICIAN ASSISTANT

## 2024-06-11 PROCEDURE — 85025 COMPLETE CBC W/AUTO DIFF WBC: CPT | Performed by: PHYSICIAN ASSISTANT

## 2024-06-11 PROCEDURE — 83880 ASSAY OF NATRIURETIC PEPTIDE: CPT | Performed by: PHYSICIAN ASSISTANT

## 2024-06-11 PROCEDURE — 87637 SARSCOV2&INF A&B&RSV AMP PRB: CPT | Performed by: STUDENT IN AN ORGANIZED HEALTH CARE EDUCATION/TRAINING PROGRAM

## 2024-06-11 PROCEDURE — 99214 OFFICE O/P EST MOD 30 MIN: CPT | Performed by: PREVENTIVE MEDICINE

## 2024-06-11 PROCEDURE — 99285 EMERGENCY DEPT VISIT HI MDM: CPT

## 2024-06-11 PROCEDURE — 3074F SYST BP LT 130 MM HG: CPT | Performed by: PREVENTIVE MEDICINE

## 2024-06-11 PROCEDURE — 71045 X-RAY EXAM CHEST 1 VIEW: CPT

## 2024-06-11 PROCEDURE — 70450 CT HEAD/BRAIN W/O DYE: CPT

## 2024-06-11 PROCEDURE — 36415 COLL VENOUS BLD VENIPUNCTURE: CPT

## 2024-06-11 PROCEDURE — 80053 COMPREHEN METABOLIC PANEL: CPT | Performed by: PHYSICIAN ASSISTANT

## 2024-06-11 PROCEDURE — 83735 ASSAY OF MAGNESIUM: CPT | Performed by: PHYSICIAN ASSISTANT

## 2024-06-11 PROCEDURE — 84443 ASSAY THYROID STIM HORMONE: CPT | Performed by: PHYSICIAN ASSISTANT

## 2024-06-11 PROCEDURE — 3078F DIAST BP <80 MM HG: CPT | Performed by: PREVENTIVE MEDICINE

## 2024-06-11 RX ORDER — SPIRONOLACTONE 25 MG/1
12.5 TABLET ORAL DAILY
Status: DISCONTINUED | OUTPATIENT
Start: 2024-06-12 | End: 2024-06-15 | Stop reason: HOSPADM

## 2024-06-11 RX ORDER — PANTOPRAZOLE SODIUM 40 MG/1
40 TABLET, DELAYED RELEASE ORAL EVERY MORNING
COMMUNITY
Start: 2024-05-28

## 2024-06-11 RX ORDER — SODIUM CHLORIDE 0.9 % (FLUSH) 0.9 %
10 SYRINGE (ML) INJECTION AS NEEDED
Status: DISCONTINUED | OUTPATIENT
Start: 2024-06-11 | End: 2024-06-15 | Stop reason: HOSPADM

## 2024-06-11 RX ORDER — FUROSEMIDE 10 MG/ML
40 INJECTION INTRAMUSCULAR; INTRAVENOUS 2 TIMES DAILY
Status: DISCONTINUED | OUTPATIENT
Start: 2024-06-11 | End: 2024-06-12

## 2024-06-11 RX ORDER — BISACODYL 5 MG/1
5 TABLET, DELAYED RELEASE ORAL DAILY PRN
Status: DISCONTINUED | OUTPATIENT
Start: 2024-06-11 | End: 2024-06-15 | Stop reason: HOSPADM

## 2024-06-11 RX ORDER — CETIRIZINE HYDROCHLORIDE 10 MG/1
10 TABLET ORAL DAILY
Status: DISCONTINUED | OUTPATIENT
Start: 2024-06-12 | End: 2024-06-15 | Stop reason: HOSPADM

## 2024-06-11 RX ORDER — ESCITALOPRAM OXALATE 10 MG/1
20 TABLET ORAL DAILY
Status: DISCONTINUED | OUTPATIENT
Start: 2024-06-12 | End: 2024-06-12

## 2024-06-11 RX ORDER — POLYETHYLENE GLYCOL 3350 17 G/17G
17 POWDER, FOR SOLUTION ORAL DAILY PRN
Status: DISCONTINUED | OUTPATIENT
Start: 2024-06-11 | End: 2024-06-15 | Stop reason: HOSPADM

## 2024-06-11 RX ORDER — AZELASTINE HYDROCHLORIDE 137 UG/1
2 SPRAY, METERED NASAL DAILY PRN
Status: DISCONTINUED | OUTPATIENT
Start: 2024-06-11 | End: 2024-06-15 | Stop reason: HOSPADM

## 2024-06-11 RX ORDER — NITROGLYCERIN 0.4 MG/1
0.4 TABLET SUBLINGUAL
Status: DISCONTINUED | OUTPATIENT
Start: 2024-06-11 | End: 2024-06-15 | Stop reason: HOSPADM

## 2024-06-11 RX ORDER — DIPHENOXYLATE HYDROCHLORIDE AND ATROPINE SULFATE 2.5; .025 MG/1; MG/1
1 TABLET ORAL DAILY
Status: DISCONTINUED | OUTPATIENT
Start: 2024-06-12 | End: 2024-06-15 | Stop reason: HOSPADM

## 2024-06-11 RX ORDER — AMOXICILLIN 250 MG
2 CAPSULE ORAL 2 TIMES DAILY
Status: DISCONTINUED | OUTPATIENT
Start: 2024-06-11 | End: 2024-06-15 | Stop reason: HOSPADM

## 2024-06-11 RX ORDER — METOPROLOL SUCCINATE 50 MG/1
100 TABLET, EXTENDED RELEASE ORAL NIGHTLY
Status: DISCONTINUED | OUTPATIENT
Start: 2024-06-12 | End: 2024-06-12

## 2024-06-11 RX ORDER — BISACODYL 10 MG
10 SUPPOSITORY, RECTAL RECTAL DAILY PRN
Status: DISCONTINUED | OUTPATIENT
Start: 2024-06-11 | End: 2024-06-15 | Stop reason: HOSPADM

## 2024-06-11 RX ORDER — LEVOTHYROXINE SODIUM 88 UG/1
88 TABLET ORAL
Status: DISCONTINUED | OUTPATIENT
Start: 2024-06-12 | End: 2024-06-15 | Stop reason: HOSPADM

## 2024-06-11 RX ORDER — PANTOPRAZOLE SODIUM 40 MG/1
40 TABLET, DELAYED RELEASE ORAL DAILY
Status: DISCONTINUED | OUTPATIENT
Start: 2024-06-12 | End: 2024-06-15 | Stop reason: HOSPADM

## 2024-06-11 RX ORDER — ATORVASTATIN CALCIUM 20 MG/1
20 TABLET, FILM COATED ORAL DAILY
Status: DISCONTINUED | OUTPATIENT
Start: 2024-06-12 | End: 2024-06-15 | Stop reason: HOSPADM

## 2024-06-11 RX ORDER — LANOLIN ALCOHOL/MO/W.PET/CERES
1000 CREAM (GRAM) TOPICAL DAILY
Status: DISCONTINUED | OUTPATIENT
Start: 2024-06-12 | End: 2024-06-15 | Stop reason: HOSPADM

## 2024-06-11 RX ORDER — ALLOPURINOL 300 MG/1
300 TABLET ORAL DAILY
Status: DISCONTINUED | OUTPATIENT
Start: 2024-06-12 | End: 2024-06-15 | Stop reason: HOSPADM

## 2024-06-11 RX ORDER — HEPARIN SODIUM 5000 [USP'U]/ML
5000 INJECTION, SOLUTION INTRAVENOUS; SUBCUTANEOUS EVERY 8 HOURS SCHEDULED
Status: DISCONTINUED | OUTPATIENT
Start: 2024-06-11 | End: 2024-06-11

## 2024-06-11 RX ORDER — MONTELUKAST SODIUM 10 MG/1
10 TABLET ORAL NIGHTLY
Status: DISCONTINUED | OUTPATIENT
Start: 2024-06-11 | End: 2024-06-15 | Stop reason: HOSPADM

## 2024-06-11 RX ORDER — SODIUM CHLORIDE 9 MG/ML
40 INJECTION, SOLUTION INTRAVENOUS AS NEEDED
Status: DISCONTINUED | OUTPATIENT
Start: 2024-06-11 | End: 2024-06-15 | Stop reason: HOSPADM

## 2024-06-11 RX ORDER — SODIUM CHLORIDE 0.9 % (FLUSH) 0.9 %
10 SYRINGE (ML) INJECTION EVERY 12 HOURS SCHEDULED
Status: DISCONTINUED | OUTPATIENT
Start: 2024-06-11 | End: 2024-06-15 | Stop reason: HOSPADM

## 2024-06-11 RX ORDER — MELATONIN
1000 DAILY
Status: DISCONTINUED | OUTPATIENT
Start: 2024-06-12 | End: 2024-06-15 | Stop reason: HOSPADM

## 2024-06-11 RX ADMIN — SODIUM CHLORIDE 1000 ML: 0.9 INJECTION, SOLUTION INTRAVENOUS at 18:06

## 2024-06-11 NOTE — ED PROVIDER NOTES
Subjective   History of Present Illness  Patient is a 79-year-old female presents to the ED with reports of intermittent dizziness started earlier today.  She describes it as lightheadedness but sometimes room spinning.  She does report it sometimes worse with position change.  Reports history of vertigo in the past but does not remember if this feels similar as she has not had it in several years.  No reports of chest pain, shortness of breath, syncopal episodes, cough, congestion, recent illness.  No unilateral weakness or numbness.  Also denies any leg swelling.  Does report history of pacemaker placed about a year ago. She was seen by her PCP today had EKG in the office she states that her PCP consulted Dr. Herrera and they advised her to come to the ED for further evaluation.        Review of Systems   Constitutional: Negative.    Respiratory: Negative.     Cardiovascular: Negative.    Gastrointestinal:  Negative for abdominal distention, abdominal pain, diarrhea, nausea and vomiting.   Genitourinary:  Negative for difficulty urinating, dysuria, flank pain and frequency.   Musculoskeletal:  Negative for back pain, neck pain and neck stiffness.   Skin: Negative.    Neurological:  Positive for dizziness and light-headedness. Negative for seizures, syncope, facial asymmetry, speech difficulty, weakness, numbness and headaches.       Past Medical History:   Diagnosis Date    Ankle pain     Arthritis     Coronary artery disease     Depression     Gout     Hyperglycemia     Hyperlipidemia     Hypertension     Low back pain     Sleep apnea     Vitamin D deficiency        Allergies   Allergen Reactions    Celecoxib Itching and Swelling     swelling       Past Surgical History:   Procedure Laterality Date    ANKLE FUSION      2017-left    CARDIAC CATHETERIZATION      CARDIAC CATHETERIZATION Right 8/29/2022    Procedure: Left Heart Cath;  Surgeon: Ratna Zavala MD;  Location: [facility] CATH INVASIVE LOCATION;   Service: Cardiovascular;  Laterality: Right;    CARDIAC ELECTROPHYSIOLOGY PROCEDURE Left 7/3/2023    Procedure: Pacemaker DC new [place] Notified;  Surgeon: Cr Herrera MD;  Location: [facility] CATH INVASIVE LOCATION;  Service: Cardiovascular;  Laterality: Left;    CARDIAC ELECTROPHYSIOLOGY PROCEDURE Right 3/5/2024    Procedure: Ablation atrial fibrillation, Cryo Ibrahima and Wayne Costa notified 02/09/24;  Surgeon: Cr Herrera MD;  Location: [facility] CATH INVASIVE LOCATION;  Service: Cardiovascular;  Laterality: Right;    CARDIAC ELECTROPHYSIOLOGY PROCEDURE N/A 3/5/2024    Procedure: Cardioversion;  Surgeon: Cr Herrera MD;  Location: [facility] CATH INVASIVE LOCATION;  Service: Cardiovascular;  Laterality: N/A;    CARPAL TUNNEL RELEASE      CATARACT EXTRACTION      CUBITAL TUNNEL RELEASE      ENDOSCOPY N/A 5/28/2024    Procedure: ESOPHAGOGASTRODUODENOSCOPY WITH COLD FORCEP BIOPSY X1 AREA;  Surgeon: Josh Loomis MD;  Location: [facility] ENDOSCOPY;  Service: Gastroenterology;  Laterality: N/A;  Post- ESOPHAGITIS, GASTRITIS, HIATAL HERNIA    HYSTERECTOMY      REPLACEMENT TOTAL KNEE      left 2008    ROTATOR CUFF REPAIR      TOTAL KNEE ARTHROPLASTY Right 5/4/2023    Procedure: TOTAL KNEE ARTHROPLASTY WITH CORI ROBOT;  Surgeon: Chino Herrera II, MD;  Location: [facility] MAIN OR;  Service: Robotics - Ortho;  Laterality: Right;       Family History   Problem Relation Age of Onset    Hypertension Mother     Stroke Father     Arthritis Brother     Cancer Brother        Social History     Socioeconomic History    Marital status:    Tobacco Use    Smoking status: Never     Passive exposure: Never    Smokeless tobacco: Never   Vaping Use    Vaping status: Never Used   Substance and Sexual Activity    Alcohol use: No    Drug use: No    Sexual activity: Not Currently           Objective   Physical Exam  Vitals and nursing note reviewed.   Constitutional:       General: She is not in  "acute distress.     Appearance: Normal appearance. She is well-developed. She is not ill-appearing, toxic-appearing or diaphoretic.   HENT:      Head: Normocephalic and atraumatic.      Mouth/Throat:      Mouth: Mucous membranes are moist.      Pharynx: Oropharynx is clear.   Eyes:      General: No scleral icterus.     Extraocular Movements: Extraocular movements intact.      Pupils: Pupils are equal, round, and reactive to light.   Cardiovascular:      Rate and Rhythm: Normal rate and regular rhythm.      Pulses: Normal pulses.      Heart sounds: No murmur heard.     No friction rub. No gallop.   Pulmonary:      Effort: Pulmonary effort is normal. No respiratory distress.      Breath sounds: Normal breath sounds. No stridor. No wheezing, rhonchi or rales.   Chest:      Chest wall: No tenderness.   Abdominal:      General: Bowel sounds are normal. There is no distension. There are no signs of injury.      Palpations: Abdomen is soft.      Tenderness: There is no abdominal tenderness. There is no guarding or rebound.   Skin:     General: Skin is warm.      Capillary Refill: Capillary refill takes less than 2 seconds.      Coloration: Skin is not cyanotic, jaundiced or pale.      Findings: No rash.   Neurological:      General: No focal deficit present.      Mental Status: She is alert and oriented to person, place, and time.      GCS: GCS eye subscore is 4. GCS verbal subscore is 5. GCS motor subscore is 6.      Comments: Moving all extremities freely without significant difficulty or pain.  Equal  strength.   Psychiatric:         Mood and Affect: Mood normal.         Behavior: Behavior normal.         Procedures           ED Course  ED Course as of 06/11/24 2048 Tue Jun 11, 2024 1813 Positive for orthostatic hypotensive [AA]   2000 CT Head Without Contrast [AA]      ED Course User Index  [AA] Emiliano Gaspar PA    /65   Pulse 75   Temp 97.9 °F (36.6 °C) (Oral)   Resp 19   Ht 160 cm (63\")   Wt " 120 kg (263 lb 10.7 oz)   LMP  (LMP Unknown)   SpO2 99%   BMI 46.71 kg/m²   Medications   sodium chloride 0.9 % flush 10 mL (has no administration in time range)   sodium chloride 0.9 % flush 10 mL (has no administration in time range)   sodium chloride 0.9 % flush 10 mL (has no administration in time range)   sodium chloride 0.9 % bolus 1,000 mL (1,000 mL Intravenous New Bag 6/11/24 1806)     Labs Reviewed   COMPREHENSIVE METABOLIC PANEL - Abnormal; Notable for the following components:       Result Value    BUN 33 (*)     Creatinine 1.36 (*)     AST (SGOT) 33 (*)     eGFR 40.2 (*)     All other components within normal limits    Narrative:     GFR Normal >60  Chronic Kidney Disease <60  Kidney Failure <15    The GFR formula is only valid for adults with stable renal function between ages 18 and 70.   URINALYSIS W/ MICROSCOPIC IF INDICATED (NO CULTURE) - Abnormal; Notable for the following components:    Appearance, UA Slightly Cloudy (*)     Blood, UA Trace (*)     Leuk Esterase, UA Large (3+) (*)     All other components within normal limits   TSH - Abnormal; Notable for the following components:    TSH 5.670 (*)     All other components within normal limits   BNP (IN-HOUSE) - Abnormal; Notable for the following components:    proBNP 5,211.0 (*)     All other components within normal limits    Narrative:     This assay is used as an aid in the diagnosis of individuals suspected of having heart failure. It can be used as an aid in the diagnosis of acute decompensated heart failure (ADHF) in patients presenting with signs and symptoms of ADHF to the emergency department (ED). In addition, NT-proBNP of <300 pg/mL indicates ADHF is not likely.    Age Range Result Interpretation  NT-proBNP Concentration (pg/mL:      <50             Positive            >450                   Gray                 300-450                    Negative             <300    50-75           Positive            >900                  [place]                 300-900                  Negative            <300      >75             Positive            >1800                  Gray                300-1800                  Negative            <300   TROPONIN - Abnormal; Notable for the following components:    HS Troponin T 35 (*)     All other components within normal limits    Narrative:     High Sensitive Troponin T Reference Range:  <14.0 ng/L- Negative Female for AMI  <22.0 ng/L- Negative Male for AMI  >=14 - Abnormal Female indicating possible myocardial injury.  >=22 - Abnormal Male indicating possible myocardial injury.   Clinicians would have to utilize clinical acumen, EKG, Troponin, and serial changes to determine if it is an Acute Myocardial Infarction or myocardial injury due to an underlying chronic condition.        CBC WITH AUTO DIFFERENTIAL - Abnormal; Notable for the following components:    RBC 3.25 (*)     Hemoglobin 10.7 (*)     Hematocrit 33.6 (*)     .4 (*)     All other components within normal limits   URINALYSIS, MICROSCOPIC ONLY - Abnormal; Notable for the following components:    WBC, UA 11-20 (*)     Bacteria, UA Trace (*)     Squamous Epithelial Cells, UA 3-6 (*)     All other components within normal limits   HIGH SENSITIVITIY TROPONIN T 2HR - Abnormal; Notable for the following components:    HS Troponin T 38 (*)     All other components within normal limits    Narrative:     High Sensitive Troponin T Reference Range:  <14.0 ng/L- Negative Female for AMI  <22.0 ng/L- Negative Male for AMI  >=14 - Abnormal Female indicating possible myocardial injury.  >=22 - Abnormal Male indicating possible myocardial injury.   Clinicians would have to utilize clinical acumen, EKG, Troponin, and serial changes to determine if it is an Acute Myocardial Infarction or myocardial injury due to an underlying chronic condition.        MAGNESIUM - Normal   RAINBOW DRAW    Narrative:     The following orders were created for panel order Roe  Draw.  Procedure                               Abnormality         Status                     ---------                               -----------         ------                     Green Top (Gel)[[id number]]                                  Final result               Lavender Top[[id number]]                                     Final result               Light Blue Top[[id number]]                                   Final result                 Please view results for these tests on the individual orders.   GREEN TOP   LAVENDER TOP   LIGHT BLUE TOP   CBC AND DIFFERENTIAL    Narrative:     The following orders were created for panel order CBC & Differential.  Procedure                               Abnormality         Status                     ---------                               -----------         ------                     CBC Auto Differential[[id number]]        Abnormal            Final result                 Please view results for these tests on the individual orders.     CT Head Without Contrast   Final Result   Impression:   No acute intracranial findings by CT.         Electronically Signed: Donta Jaramillo MD     6/11/2024 7:54 PM EDT     Workstation ID: [id number]      XR Chest 1 View   Final Result   Impression: No acute cardiopulmonary disease.         Electronically Signed: Miki Chan MD     6/11/2024 7:01 PM EDT     Workstation ID: [id number]                                                 Medical Decision Making  Chart Review: PCP note reviewed from today being seen for dizziness advised to the ED for further evaluation    Differentials: Arrhythmia, orthostatic hypotension, ACS, dehydration     ;this list is not all inclusive and does not constitute the entirety of considered causes  EKG: Interpreted by myself and Dr. Sarmiento sinus rhythm rate 74 prolonged ID interval previous EKG reviewed from 5/20/2024 show A-fib rate 121  Labs: as above   Radiology:   CT Head Without Contrast   Final Result     Impression:    No acute intracranial findings by CT.            Electronically Signed: Donta Jaramillo MD      6/11/2024 7:54 PM EDT      Workstation ID: [id number]     XR Chest 1 View   Final Result    Impression: No acute cardiopulmonary disease.        Electronically Signed: Miki Chan MD      6/11/2024 7:01 PM EDT      Workstation ID: [id number]    Disposition/Treatment:  Appropriate PPE was worn during exam and throughout all encounters with the patient.  Due to significant overcrowding in the emergency department patient was evaluated by myself in a staging bed.  This exam may be limited by privacy, noise, and the patient not wearing a hospital gown.  Explained to the patient our limitations in overcrowding.  They were in agreement to continue the exam and treatment at this time.    Patient reports with dizziness that started earlier today.  She states it is somewhat positional.  Orthostatics were positive for orthostatic hypotension when going from sitting to standing.  She was given fluids while in the ED. will reassess after fluid bolus is done.  EKG showed sinus rhythm no acute STEMI.  Labs and imaging were also obtained  CBC shows no leukocytosis hemoglobin 10.7 hematocrit 33.6 platelets 162 on chart review anemia is chronic and stable.  Metabolic panel showed BUN 33 creatinine 1.36 otherwise fairly unremarkable on chart review does have slight elevation in creatinine from 1 month ago and creatinine was 1.11 patient was lightly hydrated while in the ED.  TSH elevated at 5.67.  She is currently on levothyroxine.  Magnesium normal.  Urinalysis showed trace bacteria with negative nitrites likely contaminated specimen with 3-6 g epithelial cells.  She has no urinary symptoms therefore will not treat as acute UTI.  Initial troponin 35 pending repeat troponin.  No old charts to review.  BNP abnormal at 5,211.  No signs of fluid overload on physical exam or on chest x-ray. She currently denies any dyspnea.   Unknown clinical significance of elevated BNP at this time she does not overtly seem fluid overloaded.    On reassessment patient is resting quietly findings were discussed with the patient and family at bedside voiced understand admission for further cardiac consult as they were the ones that recommended her coming to the ED and reassessment of orthostatic hypotension.  Patient was in agreement with plan.  Spoke to Dr. Stauffer who agreed for admission with hospitalist group    Amount and/or Complexity of Data Reviewed  Labs: ordered. Decision-making details documented in ED Course.  Radiology: ordered. Decision-making details documented in ED Course.  ECG/medicine tests: ordered.    Risk  Prescription drug management.        Final diagnoses:   Dizziness   Orthostatic hypotension   MARIAM (acute kidney injury)       ED Disposition  ED Disposition       ED Disposition   Decision to Admit    Condition   --    Comment   Level of Care: Telemetry [5]   Admitting Physician: LLANES ALVAREZ, CARLOS [[id number]]   Attending Physician: LLANES ALVAREZ, CARLOS [[id number]]                 No follow-up provider specified.       Medication List      No changes were made to your prescriptions during this visit.            Emiliano Gaspar PA  06/11/24 [id number]

## 2024-06-11 NOTE — Clinical Note
Level of Care: Telemetry [5]   Admitting Physician: LLANES ALVAREZ, CARLOS [[id number]]   Attending Physician: LLANES ALVAREZ, CARLOS [[id number]]

## 2024-06-12 PROBLEM — I50.9 ACUTE EXACERBATION OF CHF (CONGESTIVE HEART FAILURE): Status: ACTIVE | Noted: 2024-06-12

## 2024-06-12 LAB
ALBUMIN SERPL-MCNC: 3.5 G/DL (ref 3.5–5.2)
ALBUMIN/GLOB SERPL: 1.5 G/DL
ALP SERPL-CCNC: 107 U/L (ref 39–117)
ALT SERPL W P-5'-P-CCNC: 15 U/L (ref 1–33)
ANION GAP SERPL CALCULATED.3IONS-SCNC: 11.9 MMOL/L (ref 5–15)
ANION GAP SERPL CALCULATED.3IONS-SCNC: 8.4 MMOL/L (ref 5–15)
AST SERPL-CCNC: 25 U/L (ref 1–32)
BASOPHILS # BLD AUTO: 0.04 10*3/MM3 (ref 0–0.2)
BASOPHILS NFR BLD AUTO: 0.8 % (ref 0–1.5)
BILIRUB SERPL-MCNC: 0.4 MG/DL (ref 0–1.2)
BUN SERPL-MCNC: 28 MG/DL (ref 8–23)
BUN SERPL-MCNC: 29 MG/DL (ref 8–23)
BUN/CREAT SERPL: 22 (ref 7–25)
BUN/CREAT SERPL: 23.5 (ref 7–25)
CALCIUM SPEC-SCNC: 10.2 MG/DL (ref 8.6–10.5)
CALCIUM SPEC-SCNC: 9.4 MG/DL (ref 8.6–10.5)
CHLORIDE SERPL-SCNC: 100 MMOL/L (ref 98–107)
CHLORIDE SERPL-SCNC: 108 MMOL/L (ref 98–107)
CO2 SERPL-SCNC: 21.6 MMOL/L (ref 22–29)
CO2 SERPL-SCNC: 23.1 MMOL/L (ref 22–29)
CREAT SERPL-MCNC: 1.19 MG/DL (ref 0.57–1)
CREAT SERPL-MCNC: 1.32 MG/DL (ref 0.57–1)
DEPRECATED RDW RBC AUTO: 51.5 FL (ref 37–54)
EGFRCR SERPLBLD CKD-EPI 2021: 41.7 ML/MIN/1.73
EGFRCR SERPLBLD CKD-EPI 2021: 47.2 ML/MIN/1.73
EOSINOPHIL # BLD AUTO: 0.12 10*3/MM3 (ref 0–0.4)
EOSINOPHIL NFR BLD AUTO: 2.5 % (ref 0.3–6.2)
ERYTHROCYTE [DISTWIDTH] IN BLOOD BY AUTOMATED COUNT: 13.2 % (ref 12.3–15.4)
GLOBULIN UR ELPH-MCNC: 2.4 GM/DL
GLUCOSE SERPL-MCNC: 122 MG/DL (ref 65–99)
GLUCOSE SERPL-MCNC: 93 MG/DL (ref 65–99)
HCT VFR BLD AUTO: 31.8 % (ref 34–46.6)
HGB BLD-MCNC: 10.2 G/DL (ref 12–15.9)
IMM GRANULOCYTES # BLD AUTO: 0.01 10*3/MM3 (ref 0–0.05)
IMM GRANULOCYTES NFR BLD AUTO: 0.2 % (ref 0–0.5)
INR PPP: 1.08 (ref 0.93–1.1)
IRON 24H UR-MRATE: 48 MCG/DL (ref 37–145)
IRON SATN MFR SERPL: 18 % (ref 20–50)
LYMPHOCYTES # BLD AUTO: 2.06 10*3/MM3 (ref 0.7–3.1)
LYMPHOCYTES NFR BLD AUTO: 42.7 % (ref 19.6–45.3)
MAGNESIUM SERPL-MCNC: 1.7 MG/DL (ref 1.6–2.4)
MCH RBC QN AUTO: 33.4 PG (ref 26.6–33)
MCHC RBC AUTO-ENTMCNC: 32.1 G/DL (ref 31.5–35.7)
MCV RBC AUTO: 104.3 FL (ref 79–97)
MONOCYTES # BLD AUTO: 0.34 10*3/MM3 (ref 0.1–0.9)
MONOCYTES NFR BLD AUTO: 7 % (ref 5–12)
NEUTROPHILS NFR BLD AUTO: 2.26 10*3/MM3 (ref 1.7–7)
NEUTROPHILS NFR BLD AUTO: 46.8 % (ref 42.7–76)
NRBC BLD AUTO-RTO: 0 /100 WBC (ref 0–0.2)
PLATELET # BLD AUTO: 170 10*3/MM3 (ref 140–450)
PMV BLD AUTO: 9.9 FL (ref 6–12)
POTASSIUM SERPL-SCNC: 4.2 MMOL/L (ref 3.5–5.2)
POTASSIUM SERPL-SCNC: 4.7 MMOL/L (ref 3.5–5.2)
PROT SERPL-MCNC: 5.9 G/DL (ref 6–8.5)
PROTHROMBIN TIME: 11.7 SECONDS (ref 9.6–11.7)
QT INTERVAL: 349 MS
QT INTERVAL: 401 MS
QTC INTERVAL: 444 MS
QTC INTERVAL: 484 MS
RBC # BLD AUTO: 3.05 10*6/MM3 (ref 3.77–5.28)
SODIUM SERPL-SCNC: 135 MMOL/L (ref 136–145)
SODIUM SERPL-SCNC: 138 MMOL/L (ref 136–145)
TIBC SERPL-MCNC: 264 MCG/DL (ref 298–536)
TRANSFERRIN SERPL-MCNC: 177 MG/DL (ref 200–360)
WBC NRBC COR # BLD AUTO: 4.83 10*3/MM3 (ref 3.4–10.8)

## 2024-06-12 PROCEDURE — 25010000002 FUROSEMIDE PER 20 MG: Performed by: STUDENT IN AN ORGANIZED HEALTH CARE EDUCATION/TRAINING PROGRAM

## 2024-06-12 PROCEDURE — 85025 COMPLETE CBC W/AUTO DIFF WBC: CPT | Performed by: STUDENT IN AN ORGANIZED HEALTH CARE EDUCATION/TRAINING PROGRAM

## 2024-06-12 PROCEDURE — 83735 ASSAY OF MAGNESIUM: CPT | Performed by: INTERNAL MEDICINE

## 2024-06-12 PROCEDURE — 93005 ELECTROCARDIOGRAM TRACING: CPT | Performed by: HOSPITALIST

## 2024-06-12 PROCEDURE — 83540 ASSAY OF IRON: CPT | Performed by: STUDENT IN AN ORGANIZED HEALTH CARE EDUCATION/TRAINING PROGRAM

## 2024-06-12 PROCEDURE — 25010000002 MAGNESIUM SULFATE 4 GM/100ML SOLUTION: Performed by: HOSPITALIST

## 2024-06-12 PROCEDURE — 80053 COMPREHEN METABOLIC PANEL: CPT | Performed by: STUDENT IN AN ORGANIZED HEALTH CARE EDUCATION/TRAINING PROGRAM

## 2024-06-12 PROCEDURE — 85610 PROTHROMBIN TIME: CPT | Performed by: STUDENT IN AN ORGANIZED HEALTH CARE EDUCATION/TRAINING PROGRAM

## 2024-06-12 PROCEDURE — 93005 ELECTROCARDIOGRAM TRACING: CPT | Performed by: INTERNAL MEDICINE

## 2024-06-12 PROCEDURE — 99222 1ST HOSP IP/OBS MODERATE 55: CPT | Performed by: NURSE PRACTITIONER

## 2024-06-12 PROCEDURE — 93000 ELECTROCARDIOGRAM COMPLETE: CPT | Performed by: PREVENTIVE MEDICINE

## 2024-06-12 PROCEDURE — 93010 ELECTROCARDIOGRAM REPORT: CPT | Performed by: INTERNAL MEDICINE

## 2024-06-12 PROCEDURE — 84466 ASSAY OF TRANSFERRIN: CPT | Performed by: STUDENT IN AN ORGANIZED HEALTH CARE EDUCATION/TRAINING PROGRAM

## 2024-06-12 RX ORDER — FUROSEMIDE 10 MG/ML
40 INJECTION INTRAMUSCULAR; INTRAVENOUS DAILY
Status: DISCONTINUED | OUTPATIENT
Start: 2024-06-13 | End: 2024-06-15 | Stop reason: HOSPADM

## 2024-06-12 RX ORDER — MAGNESIUM SULFATE HEPTAHYDRATE 40 MG/ML
4 INJECTION, SOLUTION INTRAVENOUS ONCE
Status: COMPLETED | OUTPATIENT
Start: 2024-06-12 | End: 2024-06-12

## 2024-06-12 RX ORDER — FERROUS SULFATE 324(65)MG
324 TABLET, DELAYED RELEASE (ENTERIC COATED) ORAL
Status: DISCONTINUED | OUTPATIENT
Start: 2024-06-12 | End: 2024-06-15 | Stop reason: HOSPADM

## 2024-06-12 RX ORDER — DOFETILIDE 0.25 MG/1
250 CAPSULE ORAL EVERY 12 HOURS
Status: DISCONTINUED | OUTPATIENT
Start: 2024-06-12 | End: 2024-06-15 | Stop reason: HOSPADM

## 2024-06-12 RX ADMIN — FERROUS SULFATE TAB EC 324 MG (65 MG FE EQUIVALENT) 324 MG: 324 (65 FE) TABLET DELAYED RESPONSE at 10:00

## 2024-06-12 RX ADMIN — Medication 10 ML: at 20:40

## 2024-06-12 RX ADMIN — Medication 10 ML: at 00:14

## 2024-06-12 RX ADMIN — FUROSEMIDE 40 MG: 10 INJECTION, SOLUTION INTRAMUSCULAR; INTRAVENOUS at 00:13

## 2024-06-12 RX ADMIN — DOFETILIDE 250 MCG: 0.25 CAPSULE ORAL at 17:56

## 2024-06-12 RX ADMIN — THERA TABS 1 TABLET: TAB at 10:00

## 2024-06-12 RX ADMIN — Medication 1000 UNITS: at 10:00

## 2024-06-12 RX ADMIN — PANTOPRAZOLE SODIUM 40 MG: 40 TABLET, DELAYED RELEASE ORAL at 10:00

## 2024-06-12 RX ADMIN — CYANOCOBALAMIN TAB 1000 MCG 1000 MCG: 1000 TAB at 10:00

## 2024-06-12 RX ADMIN — SPIRONOLACTONE 12.5 MG: 25 TABLET ORAL at 10:00

## 2024-06-12 RX ADMIN — SENNOSIDES AND DOCUSATE SODIUM 2 TABLET: 50; 8.6 TABLET ORAL at 10:00

## 2024-06-12 RX ADMIN — ALLOPURINOL 300 MG: 300 TABLET ORAL at 10:00

## 2024-06-12 RX ADMIN — RIVAROXABAN 15 MG: 15 TABLET, FILM COATED ORAL at 00:13

## 2024-06-12 RX ADMIN — MAGNESIUM SULFATE HEPTAHYDRATE 4 G: 40 INJECTION, SOLUTION INTRAVENOUS at 15:31

## 2024-06-12 RX ADMIN — Medication 10 ML: at 11:00

## 2024-06-12 RX ADMIN — LEVOTHYROXINE SODIUM 88 MCG: 0.09 TABLET ORAL at 04:17

## 2024-06-12 RX ADMIN — MONTELUKAST 10 MG: 10 TABLET, FILM COATED ORAL at 20:40

## 2024-06-12 RX ADMIN — ATORVASTATIN CALCIUM 20 MG: 20 TABLET, FILM COATED ORAL at 10:00

## 2024-06-12 RX ADMIN — FUROSEMIDE 40 MG: 10 INJECTION, SOLUTION INTRAMUSCULAR; INTRAVENOUS at 10:00

## 2024-06-12 RX ADMIN — CETIRIZINE HYDROCHLORIDE 10 MG: 10 TABLET, FILM COATED ORAL at 10:00

## 2024-06-12 RX ADMIN — MONTELUKAST 10 MG: 10 TABLET, FILM COATED ORAL at 00:13

## 2024-06-12 RX ADMIN — RIVAROXABAN 15 MG: 15 TABLET, FILM COATED ORAL at 17:56

## 2024-06-12 NOTE — PROGRESS NOTES
Subjective   Faby Le is a 79 y.o. female presents for   Chief Complaint   Patient presents with    Dizziness     Woke up dizzy.      Fatigue       Health Maintenance Due   Topic Date Due    COVID-19 Vaccine (4 - 2023-24 season) 09/01/2023    MAMMOGRAM  03/01/2024       Dizziness  Associated symptoms include chest pain and fatigue. Pertinent negatives include no abdominal pain, coughing or nausea.   Fatigue  Associated symptoms include chest pain and fatigue. Pertinent negatives include no abdominal pain, coughing or nausea.      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 She is accompanied by her daughter who was allowed to stay.    The patient experienced an episode of dizziness upon awakening this morning, necessitating support. She did not experience dizziness upon rolling over in bed. A few weeks ago, she consulted with Dr. Herrera, who found her condition to be stable. She has a pacemaker, which has been in place for a year. She experienced right-sided chest pain at home yesterday, described as a pressure-like sensation, which has since resolved. She has no history of myocardial infarction. The pain did not radiate. Post-dizziness, she experienced shortness of breath and weakness, which subsides. The dizziness primarily occurs upon standing and movement. Currently, she is not experiencing dizziness. She experienced chest pain yesterday, but not today. She denies perspiration, dysuria, hematuria, diarrhea, or nausea. Her appetite remains unaffected. She adheres to her medication regimen and has not missed any doses. Two weeks prior, she experienced dizzy spells the morning of her scope procedure. During her pre-procedure visit, her heart rate was recorded as 160 or 180, which normalized without medication. She underwent an ablation in  "03/2024. She typically sleeps in a recliner. She denies sore throat. She takes Zyrtec in the morning and evening. She has a history of vertigo, which occurred several months ago.  Patient feels like she is experiencing this again.  She did have no symptoms when rolling over in bed and it seemed to occur when she was sitting or trying to get up and down there was some orthostasis evidenced in today's vital signs.  Patient states that she has been urinating less than usual but does drink plenty of fluids.    Supplemental Information  She has heartburn and takes medication for that. She had an endoscopy, which was normal.    Dr. Herrera cardiologist was consulted by telephone and he agreed that patient should be evaluated in the emergency room.  Patient was deemed to be stable presently so no ambulance was called the patient was transported to the emergency room by her daughter and arrived safely.    Vitals:    06/11/24 1537 06/11/24 1538 06/11/24 1539   BP: 138/83 135/69 118/73   BP Location: Left arm Right arm Right arm   Patient Position: Sitting Sitting Standing   Cuff Size: Adult Adult Adult   Pulse: 75 74 52   Temp: 97 °F (36.1 °C)     TempSrc: Temporal     SpO2: 95% 95% 97%   Weight: 119 kg (263 lb)     Height: 160 cm (63\")       Body mass index is 46.59 kg/m².    No current facility-administered medications on file prior to visit.     Current Outpatient Medications on File Prior to Visit   Medication Sig Dispense Refill    allopurinol (ZYLOPRIM) 300 MG tablet Take 1 tablet by mouth once daily 90 tablet 0    atorvastatin (LIPITOR) 20 MG tablet Take 1 tablet by mouth Daily. 30 tablet 11    Azelastine HCl 137 MCG/SPRAY solution 2 sprays by Alternating Nares route Daily As Needed.      cetirizine (zyrTEC) 10 MG tablet Take 1 tablet by mouth Daily.      cyanocobalamin (VITAMIN B-12) 1000 MCG tablet Take 1 tablet by mouth Daily.      escitalopram (LEXAPRO) 20 MG tablet Take 1 tablet by mouth once daily 90 tablet 0 "    IRON CR PO Take  by mouth Daily.      levothyroxine (SYNTHROID, LEVOTHROID) 88 MCG tablet Take 1 tablet by mouth once daily 90 tablet 0    metoprolol succinate XL (TOPROL-XL) 100 MG 24 hr tablet Take 1 tablet by mouth Every Night. 90 tablet 1    montelukast (SINGULAIR) 10 MG tablet Take 1 tablet by mouth Every Night.      multivitamin (MULTI VITAMIN PO) Take  by mouth Daily.      pantoprazole (PROTONIX) 40 MG EC tablet Take 1 tablet by mouth Every Morning.      rivaroxaban (XARELTO) 20 MG tablet Take 1 tablet by mouth Daily. 90 tablet 1    spironolactone (ALDACTONE) 25 MG tablet Take 1/2 (one-half) tablet by mouth once daily 45 tablet 0    Vitamin D, Cholecalciferol, 25 MCG (1000 UT) tablet Take 1 tablet by mouth Daily.         The following portions of the patient's history were reviewed and updated as appropriate: allergies, current medications, past family history, past medical history, past social history, past surgical history, and problem list.    Review of Systems   Constitutional:  Positive for fatigue.   HENT:  Negative for hearing loss.    Respiratory:  Negative for cough.    Cardiovascular:  Positive for chest pain. Negative for palpitations and leg swelling.   Gastrointestinal:  Negative for abdominal pain, diarrhea and nausea.   Genitourinary:  Negative for dysuria.   Neurological:  Positive for dizziness.       Objective   Physical Exam  Vitals reviewed.   Constitutional:       General: She is not in acute distress.     Appearance: She is well-developed. She is obese. She is not ill-appearing or toxic-appearing.   HENT:      Head: Normocephalic and atraumatic.      Right Ear: Tympanic membrane, ear canal and external ear normal.      Left Ear: Tympanic membrane, ear canal and external ear normal.      Nose: Nose normal.      Mouth/Throat:      Mouth: Mucous membranes are moist.      Pharynx: No posterior oropharyngeal erythema.   Eyes:      Extraocular Movements: Extraocular movements intact.       Conjunctiva/sclera: Conjunctivae normal.      Pupils: Pupils are equal, round, and reactive to light.      Comments: Minimal nystagmus when head was chipped to the right and she laid down.   Neck:      Vascular: No carotid bruit.   Cardiovascular:      Rate and Rhythm: Normal rate. Rhythm irregular.      Heart sounds: Normal heart sounds.   Pulmonary:      Effort: Pulmonary effort is normal.      Breath sounds: Normal breath sounds.   Abdominal:      General: Bowel sounds are normal. There is no distension.      Palpations: Abdomen is soft. There is no mass.      Tenderness: There is no abdominal tenderness.   Musculoskeletal:         General: Normal range of motion.      Cervical back: Neck supple. No tenderness.      Right lower leg: No edema.      Left lower leg: No edema.   Lymphadenopathy:      Cervical: No cervical adenopathy.   Skin:     General: Skin is warm.   Neurological:      General: No focal deficit present.      Mental Status: She is alert and oriented to person, place, and time.   Psychiatric:         Mood and Affect: Mood normal.         Behavior: Behavior normal.       Physical Exam  Vital Signs  Upon standing, her blood pressure decreased from 135/69 to 118/73.    PHQ-9 Total Score:    Results           Assessment & Plan   Diagnoses and all orders for this visit:    1. Dizziness (Primary)  -     ECG 12 Lead    2. Class 3 drug-induced obesity with serious comorbidity and body mass index (BMI) of 45.0 to 49.9 in adult    3. Light headedness    4. TIA (transient ischemic attack)    5. Syncope, unspecified syncope type    Other orders  -     ECG Scan      Assessment & Plan  1. Dizziness.  Upon reviewing the patient's EKG, it was noted that her pacemaker was not functioning optimally. I will communicate with Dr. Herrera regarding the patient's EKG results.    Patient Instructions   Daughter to take to ER now at [place] and ER doctors to see and call Dr. ramos       Patient or patient representative  verbalized consent for the use of Ambient Listening during the visit with  Leonor Adam MD for chart documentation. 6/12/2024  08:15 EDT

## 2024-06-12 NOTE — PLAN OF CARE
Goal Outcome Evaluation: Plan is for patient to return home when medically stable. Transferring to PCU at this time for cardiac monitoring with new orders for Tikosyn.

## 2024-06-12 NOTE — PROGRESS NOTES
Procedure     ECG 12 Lead    Date/Time: 6/12/2024 8:10 AM  Performed by: Leonor Adam MD    Authorized by: Leonor Adam MD  Comparison: compared with previous ECG from 5/10/2024  Comparison to previous ECG: No evidence of pacemaker spikes.    Rhythm: atrial fibrillation  Rate: normal  Conduction: 1st degree AV block  T Waves: T waves normal  QRS axis: normal  Pacing: malfunctioning pacemaker  Clinical impression: abnormal EKG

## 2024-06-12 NOTE — PLAN OF CARE
Goal Outcome Evaluation:      Patient on IV Lasix. Patient had no complaints of pain. Patient stable at this time.

## 2024-06-12 NOTE — CONSULTS
Cardiology Consult-EP      REQUESTING PROVIDER  Trey Mishra MD    PATIENT IDENTIFICATION  Name: Faby Le  Age: 79 y.o.  Sex: female  :  1945  MRN: [id number]             REASON  FOR  CONSULTATION  79-year-old female patient of Dr. Herrera's with past medical history of sick sinus syndrome status post dual-chamber permanent pacemaker-[employer]-implant 7/3/2023, history of TIA, mild obstructive coronary disease per heart cath  being managed medically, history of hyperglycemia, hyperlipidemia, hypertension, vitamin D deficiency.    TTE 3/5/2024: EF 56/60%, mild concentric LVH, grade 1 DD, mod-severe left atrial cavity dilation.  Mild MR    CC:  Dizziness    HPI:  Patient presented to the emergency department from her primary care office on 2024 for further evaluation of significant dizziness without syncopal episodes.  She reports some right-sided chest discomfort described as a pressure-like sensation which has resolved since admission.  EKG in primary care office showed sinus rhythm at 74 with first-degree AV block.  There were concerns that her pacemaker was not functioning properly.  Primary care practitioner spoke with Dr. Herrera who suggested she be evaluated in the ER.  Upon my evaluation today, she is resting comfortably in bed.  Rhythm is sinus at 76, blood pressure 137/54.  TTE has been ordered.  She was noted to have some orthostatic hypotension with heart rate increase from  (lying-standing) and 1 L normal saline bolus was ordered.      REVIEW OF SYSTEMS:  Pertinent items are noted in HPI, all other systems reviewed and negative    OBJECTIVE   Hemoglobin 10.2  High-sensitivity troponin 35, 38  proBNP 5211    ASSESSMENT  Dizziness  Mildly elevated, flat high-sensitivity troponin  History of paroxysmal atrial fibrillation  History of sick sinus syndrome  Dual-chamber permanent pacemaker in situ  History of TIA  Coronary artery  "disease-mild  Dyslipidemia  Primary hypertension with hypertensive heart disease  Valvular heart disease-mild MR      RECOMMENDATIONS  TTE ikwoxig-apdreo-tv read  Request full interrogation of her device  Dr. Herrera is currently out of town and Dr. Guerrero will assume care for this patient during this admission.  Plan for initiation of Tikosyn protocol at 250 mg twice daily.  Monitor daily EKG and as needed for nurses discretion.  Monitor and replete electrolytes to maintain potassium > 4, magnesium > 2.  SSRI discontinued due to potential for QT prolongation  Further recommendations as patient's hospital course progresses              CHF Guideline Directed Medical Therapy  Beta Blocker:   ARNI/ACE/ARB:   SGLT 2 inhibitors:   Diuretics:   Aldosterone Antagonist:   Vasodilators & Nitrates:       Vital Signs  Visit Vitals  /69 (BP Location: Left arm, Patient Position: Lying)   Pulse 78   Temp 97.6 °F (36.4 °C) (Oral)   Resp 13   Ht 160 cm (63\")   Wt 118 kg (259 lb 11.2 oz)   LMP  (LMP Unknown)   SpO2 100%   BMI 46.00 kg/m²     Oxygen Therapy  SpO2: 100 %  Pulse Oximetry Type: Continuous  Device (Oxygen Therapy): room air  Flowsheet Rows      Flowsheet Row First Filed Value   Admission Height 160 cm (63\") Documented at 06/11/2024 1702   Admission Weight 120 kg (263 lb 10.7 oz) Documented at 06/11/2024 1702          Intake & Output (last 3 days)         06/09 0701  06/10 0700 06/10 0701  06/11 0700 06/11 0701  06/12 0700 06/12 0701  06/13 0700    P.O.    240    Total Intake(mL/kg)    240 (2)    Urine (mL/kg/hr)   3400 700 (1.1)    Total Output   3400 700    Net   -3400 -460                  Lines, Drains & Airways       Active LDAs       Name Placement date Placement time Site Days    Peripheral IV 06/11/24 1805 Right Antecubital 06/11/24  1805  Antecubital  less than 1    External Urinary Catheter --  --  --  --                    MEDICAL HISTORY    Past Medical History:   Diagnosis Date    Ankle pain     " Arthritis     Coronary artery disease     Depression     Gout     Hyperglycemia     Hyperlipidemia     Hypertension     Low back pain     Sleep apnea     Vitamin D deficiency         SURGICAL HISTORY    Past Surgical History:   Procedure Laterality Date    ANKLE FUSION      2017-left    CARDIAC CATHETERIZATION      CARDIAC CATHETERIZATION Right 8/29/2022    Procedure: Left Heart Cath;  Surgeon: Ratna Zavala MD;  Location: [facility] CATH INVASIVE LOCATION;  Service: Cardiovascular;  Laterality: Right;    CARDIAC ELECTROPHYSIOLOGY PROCEDURE Left 7/3/2023    Procedure: Pacemaker DC new Rand Notified;  Surgeon: Cr Herrera MD;  Location: [facility] CATH INVASIVE LOCATION;  Service: Cardiovascular;  Laterality: Left;    CARDIAC ELECTROPHYSIOLOGY PROCEDURE Right 3/5/2024    Procedure: Ablation atrial fibrillation, Cryo Slalonde and Wayne Igor notified 02/09/24;  Surgeon: Cr Herrera MD;  Location: [facility] CATH INVASIVE LOCATION;  Service: Cardiovascular;  Laterality: Right;    CARDIAC ELECTROPHYSIOLOGY PROCEDURE N/A 3/5/2024    Procedure: Cardioversion;  Surgeon: Cr Herrera MD;  Location: [facility] CATH INVASIVE LOCATION;  Service: Cardiovascular;  Laterality: N/A;    CARPAL TUNNEL RELEASE      CATARACT EXTRACTION      CUBITAL TUNNEL RELEASE      ENDOSCOPY N/A 5/28/2024    Procedure: ESOPHAGOGASTRODUODENOSCOPY WITH COLD FORCEP BIOPSY X1 AREA;  Surgeon: Josh Loomis MD;  Location: [facility] ENDOSCOPY;  Service: Gastroenterology;  Laterality: N/A;  Post- ESOPHAGITIS, GASTRITIS, HIATAL HERNIA    HYSTERECTOMY      REPLACEMENT TOTAL KNEE      left 2008    ROTATOR CUFF REPAIR      TOTAL KNEE ARTHROPLASTY Right 5/4/2023    Procedure: TOTAL KNEE ARTHROPLASTY WITH CORI ROBOT;  Surgeon: Chino Herrera II, MD;  Location: [facility] MAIN OR;  Service: Robotics - Ortho;  Laterality: Right;        FAMILY HISTORY    Family History   Problem Relation Age of Onset    Hypertension Mother      "Stroke Father     Arthritis Brother     Cancer Brother        SOCIAL HISTORY    Social History     Tobacco Use    Smoking status: Never     Passive exposure: Never    Smokeless tobacco: Never   Substance Use Topics    Alcohol use: No        ALLERGIES    Allergies   Allergen Reactions    Celecoxib Itching and Swelling     swelling              /69 (BP Location: Left arm, Patient Position: Lying)   Pulse 78   Temp 97.6 °F (36.4 °C) (Oral)   Resp 13   Ht 160 cm (63\")   Wt 118 kg (259 lb 11.2 oz)   LMP  (LMP Unknown)   SpO2 100%   BMI 46.00 kg/m²   Intake/Output last 3 shifts:  I/O last 3 completed shifts:  In: -   Out: 3400 [Urine:3400]  Intake/Output this shift:  I/O this shift:  In: 240 [P.O.:240]  Out: 700 [Urine:700]    PHYSICAL EXAM:    General: Alert, cooperative, no distress, appears stated age  Head:  Normocephalic, atraumatic, mucous membranes moist  Eyes:  Conjunctivae/corneas clear, EOM's intact     Neck:  Supple,  no adenopathy; no JVD or bruit  Lungs:  Clear to auscultation bilaterally, no wheezes, rhonchi or rales are noted  Chest wall: No tenderness  Heart::  Regular rate and rhythm, S1 and S2 normal, no murmur, rub or gallop  Abdomen: Soft, nontender, nondistended, bowel sounds active  Extremities: No cyanosis, clubbing, or edema   Pulses: 2+ and symmetric all extremities  Skin:  No rashes or lesions  Neuro/psych: A&O x3. CN II through XII are grossly intact with appropriate affect    Scheduled Meds:      allopurinol, 300 mg, Oral, Daily  atorvastatin, 20 mg, Oral, Daily  cetirizine, 10 mg, Oral, Daily  cholecalciferol, 1,000 Units, Oral, Daily  ferrous sulfate, 324 mg, Oral, Daily With Breakfast  [START ON 6/13/2024] furosemide, 40 mg, Intravenous, Daily  levothyroxine, 88 mcg, Oral, Q AM  montelukast, 10 mg, Oral, Nightly  multivitamin, 1 tablet, Oral, Daily  pantoprazole, 40 mg, Oral, Daily  rivaroxaban, 15 mg, Oral, Daily With Dinner  senna-docusate sodium, 2 tablet, Oral, BID  sodium " "chloride, 10 mL, Intravenous, Q12H  spironolactone, 12.5 mg, Oral, Daily  cyanocobalamin, 1,000 mcg, Oral, Daily        Continuous Infusions:         PRN Meds:      Azelastine HCl    senna-docusate sodium **AND** polyethylene glycol **AND** bisacodyl **AND** bisacodyl    Calcium Replacement - Follow Nurse / BPA Driven Protocol    Magnesium Standard Dose Replacement - Follow Nurse / BPA Driven Protocol    nitroglycerin    Phosphorus Replacement - Follow Nurse / BPA Driven Protocol    Potassium Replacement - Follow Nurse / BPA Driven Protocol    [COMPLETED] Insert Peripheral IV **AND** sodium chloride    sodium chloride    [COMPLETED] Insert Peripheral IV **AND** sodium chloride    sodium chloride    sodium chloride     Data Review:     Results Review:     I reviewed the patient's new clinical results.    CBC    Results from last 7 days   Lab Units 06/12/24  0007 06/11/24  1804   WBC 10*3/mm3 4.83 5.76   HEMOGLOBIN g/dL 10.2* 10.7*   PLATELETS 10*3/mm3 170 162     Cr Clearance Estimated Creatinine Clearance: 49.1 mL/min (A) (by C-G formula based on SCr of 1.19 mg/dL (H)).  Coag   Results from last 7 days   Lab Units 06/12/24  0007   INR  1.08     HbA1C   Lab Results   Component Value Date    HGBA1C 5.30 01/04/2024    HGBA1C 5.10 10/02/2023    HGBA1C 5.20 04/25/2023     Blood Glucose No results found for: \"POCGLU\"  Infection     CMP   Results from last 7 days   Lab Units 06/12/24  0007 06/11/24  1804   SODIUM mmol/L 138 138   POTASSIUM mmol/L 4.2 5.0   CHLORIDE mmol/L 108* 106   CO2 mmol/L 21.6* 23.3   BUN mg/dL 28* 33*   CREATININE mg/dL 1.19* 1.36*   GLUCOSE mg/dL 93 95   ALBUMIN g/dL 3.5 3.7   BILIRUBIN mg/dL 0.4 0.4   ALK PHOS U/L 107 113   AST (SGOT) U/L 25 33*   ALT (SGPT) U/L 15 19     ABG      UA    Results from last 7 days   Lab Units 06/11/24  1839   NITRITE UA  Negative   WBC UA /HPF 11-20*   BACTERIA UA /HPF Trace*   SQUAM EPITHEL UA /HPF 3-6*     MIGUEL  No results found for: \"POCMETH\", \"POCAMPHET\", " "\"POCBARBITUR\", \"POCBENZO\", \"POCCOCAINE\", \"POCOPIATES\", \"POCOXYCODO\", \"POCPHENCYC\", \"POCPROPOXY\", \"POCTHC\", \"POCTRICYC\"  Lysis Labs   Results from last 7 days   Lab Units 06/12/24  0007 06/11/24  1804   INR  1.08  --    HEMOGLOBIN g/dL 10.2* 10.7*   PLATELETS 10*3/mm3 170 162   CREATININE mg/dL 1.19* 1.36*     Radiology(recent) CT Head Without Contrast    Result Date: 6/11/2024  Impression: No acute intracranial findings by CT. Electronically Signed: Donta Jaramillo MD  6/11/2024 7:54 PM EDT  Workstation ID: [id number]    XR Chest 1 View    Result Date: 6/11/2024  Impression: No acute cardiopulmonary disease. Electronically Signed: Miki Chan MD  6/11/2024 7:01 PM EDT  Workstation ID: [id number]       Results from last 7 days   Lab Units 06/11/24 2021   HSTROP T ng/L 38*       X-rays, labs reviewed personally by physician.    ECG/EMG Results (most recent)       Procedure Component Value Units Date/Time    Telemetry Scan [[id number]] Resulted: 06/11/24     Updated: 06/12/24 0043    Telemetry Scan [[id number]] Resulted: 06/11/24     Updated: 06/12/24 0419    Telemetry Scan [[id number]] Resulted: 06/11/24     Updated: 06/12/24 0424    Telemetry Scan [[id number]] Resulted: 06/11/24     Updated: 06/12/24 1050    ECG 12 Lead Other; dizziness [[id number]] Collected: 06/11/24 1738     Updated: 06/12/24 1204     QT Interval 401 ms      QTC Interval 444 ms     Narrative:      HEART RATE= 74  bpm  RR Interval= 816  ms  AK Interval= 295  ms  P Horizontal Axis= 82  deg  P Front Axis= 22  deg  QRSD Interval= 102  ms  QT Interval= 401  ms  QTcB= 444  ms  QRS Axis= -4  deg  T Wave Axis= 26  deg  - ABNORMAL ECG -  Sinus rhythm  Prolonged AK interval  When compared with ECG of 28-May-2024 9:57:32,  Significant change in rhythm: previously atrial fibrillation  New conduction abnormality  Significant axis, voltage or hypertrophy change  Electronically Signed By: Maikel Sarmiento ([facility]) 12-Jun-2024 12:04:21  Date and Time of Study: 2024-06-11 " 17:38:53              Medication Review:   I have reviewed the patient's current medication list  Scheduled Meds:allopurinol, 300 mg, Oral, Daily  atorvastatin, 20 mg, Oral, Daily  cetirizine, 10 mg, Oral, Daily  cholecalciferol, 1,000 Units, Oral, Daily  ferrous sulfate, 324 mg, Oral, Daily With Breakfast  [START ON 6/13/2024] furosemide, 40 mg, Intravenous, Daily  levothyroxine, 88 mcg, Oral, Q AM  montelukast, 10 mg, Oral, Nightly  multivitamin, 1 tablet, Oral, Daily  pantoprazole, 40 mg, Oral, Daily  rivaroxaban, 15 mg, Oral, Daily With Dinner  senna-docusate sodium, 2 tablet, Oral, BID  sodium chloride, 10 mL, Intravenous, Q12H  spironolactone, 12.5 mg, Oral, Daily  cyanocobalamin, 1,000 mcg, Oral, Daily      Continuous Infusions:   PRN Meds:.  Azelastine HCl    senna-docusate sodium **AND** polyethylene glycol **AND** bisacodyl **AND** bisacodyl    Calcium Replacement - Follow Nurse / BPA Driven Protocol    Magnesium Standard Dose Replacement - Follow Nurse / BPA Driven Protocol    nitroglycerin    Phosphorus Replacement - Follow Nurse / BPA Driven Protocol    Potassium Replacement - Follow Nurse / BPA Driven Protocol    [COMPLETED] Insert Peripheral IV **AND** sodium chloride    sodium chloride    [COMPLETED] Insert Peripheral IV **AND** sodium chloride    sodium chloride    sodium chloride    Imaging:  Imaging Results (Last 72 Hours)       Procedure Component Value Units Date/Time    CT Head Without Contrast [[id number]] Collected: 06/11/24 1953     Updated: 06/11/24 1956    Narrative:      CT HEAD WO CONTRAST    Date of Exam: 6/11/2024 7:27 PM EDT    Indication: dizziness.    Comparison: MRI brain 4/4/2024, head CT 12/18/2023    Technique: Axial CT images were obtained of the head without contrast administration.  Coronal reconstructions were performed.  Automated exposure control and iterative reconstruction methods were used.      Findings:  Negative for large territory loss of gray-white differentiation,  "acute intracranial hemorrhage, large mass lesion, midline shift or hydrocephalus. Parenchymal volume and density appear normal for age. No large extra-axial fluid collection. Mild   nonobstructing sinus disease. No air-fluid levels. No mastoid effusion. Symmetric globes. Distal carotid atherosclerotic disease. Negative for depressed skull fracture.      Impression:      Impression:  No acute intracranial findings by CT.      Electronically Signed: Donta Jaramillo MD    6/11/2024 7:54 PM EDT    Workstation ID: [id number]    XR Chest 1 View [[id number]] Collected: 06/11/24 1900     Updated: 06/11/24 1904    Narrative:      XR CHEST 1 VW    Date of Exam: 6/11/2024 6:45 PM EDT    Indication: dizziness    Comparison: 7/3/2023    Findings: No focal consolidation. No pneumothorax or pleural effusion. Dual-lead cardiac pacemaker. Cardiac size is normal. The visualized clavicles appear intact. No displaced rib fractures. The visualized upper abdomen is normal. Complete loss of the   right acromiohumeral interval suggesting a full-thickness rotator cuff tear.      Impression:      Impression: No acute cardiopulmonary disease.      Electronically Signed: Miki Chan MD    6/11/2024 7:01 PM EDT    Workstation ID: [id number]              CAROL Jimenez  06/12/24  12:36 EDT      EMR Dragon/Transcription:   \"Dictated utilizing Dragon dictation\".              Electronically signed by CAROL Jimenez, 06/12/24, 12:36 PM EDT.  Copied text in this note has been reviewed by me and is accurate as of 06/12/24.    "

## 2024-06-12 NOTE — CASE MANAGEMENT/SOCIAL WORK
Discharge Planning Assessment   Geovanni     Patient Name: Faby Le  MRN: [id number]  Today's Date: 6/12/2024    Admit Date: 6/11/2024    Plan: Return home with family- pt lives with daughter and her family   Discharge Needs Assessment       Row Name 06/12/24 1322       Living Environment    People in Home child(marlon), adult;grandchild(marlon)    Name(s) of People in Home dtr Kemi, MONTY and 4 grandchildren    Current Living Arrangements home    Potentially Unsafe Housing Conditions none    In the past 12 months has the electric, gas, oil, or water company threatened to shut off services in your home? No    Primary Care Provided by self    Provides Primary Care For no one    Family Caregiver if Needed child(marlon), adult    Family Caregiver Names nain Mcmullen    Quality of Family Relationships helpful;involved;supportive    Able to Return to Prior Arrangements yes       Resource/Environmental Concerns    Resource/Environmental Concerns none    Transportation Concerns none       Transportation Needs    In the past 12 months, has lack of transportation kept you from medical appointments or from getting medications? no    In the past 12 months, has lack of transportation kept you from meetings, work, or from getting things needed for daily living? No       Food Insecurity    Within the past 12 months, you worried that your food would run out before you got the money to buy more. Never true    Within the past 12 months, the food you bought just didn't last and you didn't have money to get more. Never true       Transition Planning    Patient/Family Anticipates Transition to home with family    Patient/Family Anticipated Services at Transition none    Transportation Anticipated car, drives self;family or friend will provide       Discharge Needs Assessment    Readmission Within the Last 30 Days no previous admission in last 30 days    Equipment Currently Used at Home wheelchair, motorized;cane, straight;shower  chair;walker, rolling;hospital bed;cpap;wheelchair  [facility]    Concerns to be Addressed denies needs/concerns at this time;no discharge needs identified    Anticipated Changes Related to Illness none    Equipment Needed After Discharge none                   Discharge Plan       Row Name 06/12/24 1323       Plan    Plan Return home with family- pt lives with daughter and her family    Patient/Family in Agreement with Plan yes    Plan Comments CM met with pt at bedside to discuss discharge needs. Pt lives at home with daughter MONTY Mcmullen and 4 grandchildren, drives and is IADLs. PCP and pharmacy verified- pt enrolled in [facility] as requested. No issues stated affording food/ medications or transport, and home environment is safe. Current DME: hospital bed, electric wheelchair, walker, regular wheelchair, cane, shower chair and CPAP ([facility] No current HHC and none anticipated on discharge. Family will provide transportation home.                  Demographic Summary       Row Name 06/12/24 1321       General Information    Admission Type observation    Arrived From emergency department    Referral Source admission list    Reason for Consult discharge planning    Preferred Language English       Contact Information    Permission Granted to Share Info With                    Functional Status       Row Name 06/12/24 1321       Functional Status    Usual Activity Tolerance moderate    Current Activity Tolerance fair       Functional Status, IADL    Medications independent    Meal Preparation independent    Housekeeping independent    Laundry independent    Shopping independent       Mental Status    General Appearance WDL WDL       Mental Status Summary    Recent Changes in Mental Status/Cognitive Functioning no changes       Employment/    Current or Previous Occupation not applicable                    Leeann Mendez RN      Office phone: [phone]  Office fax:  [phone]

## 2024-06-12 NOTE — SIGNIFICANT NOTE
06/12/24 1524   OTHER   Discipline physical therapist   Rehab Time/Intention   Session Not Performed patient unavailable for evaluation  (not medically appropriate to mobilize today, starting tikosyn for a fib. Will follow up 6/13)   Recommendation   PT - Next Appointment 06/13/24

## 2024-06-12 NOTE — PROGRESS NOTES
[facility] MEDICINE SERVICE  DAILY PROGRESS NOTE    NAME: Faby Le  : 1945  MRN: [id number]      LOS: 0 days     PROVIDER OF SERVICE: Trey Mishra MD    Chief Complaint: Acute CHF    Subjective:     Interval History:  History taken from: patient chart RN  Miracle millard or cp      Review of Systems:   Review of Systems  All negative except as above  Objective:     Vital Signs  Temp:  [97 °F (36.1 °C)-97.9 °F (36.6 °C)] 97.6 °F (36.4 °C)  Heart Rate:  [] 78  Resp:  [13-19] 13  BP: (118-167)/(54-83) 118/69   Body mass index is 46 kg/m².    Physical Exam  Physical Exam  AOx3 NAD  RRR S1-S2 audible  Lungs CTA  Abdomen soft nontender nondistended  Scheduled Meds   allopurinol, 300 mg, Oral, Daily  atorvastatin, 20 mg, Oral, Daily  cetirizine, 10 mg, Oral, Daily  cholecalciferol, 1,000 Units, Oral, Daily  ferrous sulfate, 324 mg, Oral, Daily With Breakfast  furosemide, 40 mg, Intravenous, BID  levothyroxine, 88 mcg, Oral, Q AM  montelukast, 10 mg, Oral, Nightly  multivitamin, 1 tablet, Oral, Daily  pantoprazole, 40 mg, Oral, Daily  rivaroxaban, 15 mg, Oral, Daily With Dinner  senna-docusate sodium, 2 tablet, Oral, BID  sodium chloride, 10 mL, Intravenous, Q12H  spironolactone, 12.5 mg, Oral, Daily  cyanocobalamin, 1,000 mcg, Oral, Daily       PRN Meds     Azelastine HCl    senna-docusate sodium **AND** polyethylene glycol **AND** bisacodyl **AND** bisacodyl    Calcium Replacement - Follow Nurse / BPA Driven Protocol    Magnesium Standard Dose Replacement - Follow Nurse / BPA Driven Protocol    nitroglycerin    Phosphorus Replacement - Follow Nurse / BPA Driven Protocol    Potassium Replacement - Follow Nurse / BPA Driven Protocol    [COMPLETED] Insert Peripheral IV **AND** sodium chloride    sodium chloride    [COMPLETED] Insert Peripheral IV **AND** sodium chloride    sodium chloride    sodium chloride   Infusions         Diagnostic Data    Results from last 7 days   Lab Units 24  0007    WBC 10*3/mm3 4.83   HEMOGLOBIN g/dL 10.2*   HEMATOCRIT % 31.8*   PLATELETS 10*3/mm3 170   GLUCOSE mg/dL 93   CREATININE mg/dL 1.19*   BUN mg/dL 28*   SODIUM mmol/L 138   POTASSIUM mmol/L 4.2   AST (SGOT) U/L 25   ALT (SGPT) U/L 15   ALK PHOS U/L 107   BILIRUBIN mg/dL 0.4   ANION GAP mmol/L 8.4       CT Head Without Contrast    Result Date: 6/11/2024  Impression: No acute intracranial findings by CT. Electronically Signed: Donta Jaramillo MD  6/11/2024 7:54 PM EDT  Workstation ID: [id number]    XR Chest 1 View    Result Date: 6/11/2024  Impression: No acute cardiopulmonary disease. Electronically Signed: Miki Chan MD  6/11/2024 7:01 PM EDT  Workstation ID: [id number]       I reviewed the patient's new clinical results.  I reviewed the patient's new imaging results and agree with the interpretation.    Assessment/Plan:     Active and Resolved Problems  Active Hospital Problems    Diagnosis  POA    **Acute CHF [I50.9]  Yes      Resolved Hospital Problems   No resolved problems to display.       79-year-old female with a history of A-fib s/p ablation on Xarelto, tachybradycardia syndrome status post PPM, TIA, hypertension, ROSA CAD, SHEREE, hypothyroidism, hypertension, HFpEF admitted to [facility] 6/11 after being sent from PCPs office with lightheadedness and concern for pacemaker not functioning correctly       #Lightheadedness  #History of tachybradycardia syndrome status post PPM  #History of atrial fibrillation status post ablation  EKG on admission with sinus rhythm with prolonged SD interval  Cardiology consulted  Defer pacemaker interrogation to cards  Continue telemonitoring  Continue Xarelto   continue metoprolol  Avoid hypotension.  Check orthostatic vitals  PT/OT      #Acute on chronic HFpEF  Continue IV Lasix decrease dose to 40 daily.  Net -3.4 L last 24 hours  Follow TTE  Monitor I's and O's  Continue Aldactone  Continue metoprolol  Monitor renal function    #CKD stage III  Creatinine at  baseline  CTM    #Hypothyroidism  Continue home dose of levothyroxine 88 mcg    #Gout  Continue home dose of allopurinol      VTE Prophylaxis:  Pharmacologic VTE prophylaxis orders are present.         Code status is   There are no questions and answers to display.       Plan for disposition:1 to 2 days    Time: 30 minutes    Signature: Electronically signed by Trey Mishra MD, 06/12/24, 12:07 EDT.  [facility] Hospitalist Team

## 2024-06-12 NOTE — H&P
"[facility]  History & Physical    Patient Name: Faby Le  : 1945  MRN: [id number]  Primary Care Physician:  Leonor Adam MD  Date of admission: 2024  Date and Time of Service: 2024 at 9:45 pm    Subjective      Chief Complaint: \"Dizziness\"    History of Present Illness: Faby Le is a 79 y.o. female with a PMH of AF S/P ablation, tachybradycardia syndrome S/P PPM, TIA, Hypertension, ROSA, Cardiac catheterization in  revealing mild obstructive disease left to medical management normal LV ejection fraction, SHEREE, Hypothyroidism, HTN, chronic diastolic CHF, who presented to [facility] on 2024 with intermittent dizziness since today in am. Patient reports dizziness with standing and walking. Associated with generalized weakness, having to feeling of passing out. She reports a similar episode 2 weeks ago. She denies LOC, palpitations, hearing loss, recent URI symptoms, fever, chills, dysuria, hematuria. She does endorse mild exertional dyspnea and intermittent pedal edema. Denies chest pain or palpitations. Patient was evaluated by her PCP who sent her to ED for evaluation. Workup in ER her initial trop showed mildly elevated trop HS 35 peak at 38. BNP elevated 5,211, chemistry notable for mildly elevated creat 1.36, otherwise unremarkable. CBC is notable for Hb 10.7 which appears at baseline. EKG shows sinus rhythm, first degree AV block. The decision to admit was made.       Review of Systems   Constitutional:  Positive for fatigue. Negative for chills, diaphoresis and fever.   HENT:  Negative for dental problem, ear discharge, facial swelling, hearing loss, nosebleeds, postnasal drip, rhinorrhea, sinus pressure, sore throat and trouble swallowing.    Eyes:  Negative for redness and itching.   Respiratory:  Negative for cough, chest tightness and stridor.    Cardiovascular:  Negative for leg swelling.   Gastrointestinal:  Negative for " abdominal pain, blood in stool, diarrhea and rectal pain.   Endocrine: Negative for heat intolerance and polyphagia.   Genitourinary:  Negative for dyspareunia, dysuria, flank pain and menstrual problem.   Musculoskeletal:  Negative for back pain and gait problem.   Skin:  Positive for pallor.   Neurological:  Positive for dizziness and light-headedness. Negative for tremors, seizures, syncope, facial asymmetry, speech difficulty, numbness and headaches.   Psychiatric/Behavioral:  Negative for behavioral problems, confusion and dysphoric mood.        Personal History     Past Medical History:   Diagnosis Date    Ankle pain     Arthritis     Coronary artery disease     Depression     Gout     Hyperglycemia     Hyperlipidemia     Hypertension     Low back pain     Sleep apnea     Vitamin D deficiency        Past Surgical History:   Procedure Laterality Date    ANKLE FUSION      2017-left    CARDIAC CATHETERIZATION      CARDIAC CATHETERIZATION Right 8/29/2022    Procedure: Left Heart Cath;  Surgeon: Ratna Zavala MD;  Location: [facility] CATH INVASIVE LOCATION;  Service: Cardiovascular;  Laterality: Right;    CARDIAC ELECTROPHYSIOLOGY PROCEDURE Left 7/3/2023    Procedure: Pacemaker DC new [place] Notified;  Surgeon: Cr Herrera MD;  Location: [facility] CATH INVASIVE LOCATION;  Service: Cardiovascular;  Laterality: Left;    CARDIAC ELECTROPHYSIOLOGY PROCEDURE Right 3/5/2024    Procedure: Ablation atrial fibrillation, Cryo Ibrahima and Wayne Costa notified 02/09/24;  Surgeon: Cr Herrera MD;  Location: [facility] CATH INVASIVE LOCATION;  Service: Cardiovascular;  Laterality: Right;    CARDIAC ELECTROPHYSIOLOGY PROCEDURE N/A 3/5/2024    Procedure: Cardioversion;  Surgeon: Cr Herrera MD;  Location: [facility] CATH INVASIVE LOCATION;  Service: Cardiovascular;  Laterality: N/A;    CARPAL TUNNEL RELEASE      CATARACT EXTRACTION      CUBITAL TUNNEL RELEASE      ENDOSCOPY N/A 5/28/2024    Procedure:  ESOPHAGOGASTRODUODENOSCOPY WITH COLD FORCEP BIOPSY X1 AREA;  Surgeon: Josh Loomis MD;  Location: [facility] ENDOSCOPY;  Service: Gastroenterology;  Laterality: N/A;  Post- ESOPHAGITIS, GASTRITIS, HIATAL HERNIA    HYSTERECTOMY      REPLACEMENT TOTAL KNEE      left 2008    ROTATOR CUFF REPAIR      TOTAL KNEE ARTHROPLASTY Right 5/4/2023    Procedure: TOTAL KNEE ARTHROPLASTY WITH CORI ROBOT;  Surgeon: Chino Herrera II, MD;  Location: [facility] MAIN OR;  Service: Robotics - Ortho;  Laterality: Right;       Family History: family history includes Arthritis in her brother; Cancer in her brother; Hypertension in her mother; Stroke in her father. Otherwise pertinent FHx was reviewed and not pertinent to current issue.    Social History:  reports that she has never smoked. She has never been exposed to tobacco smoke. She has never used smokeless tobacco. She reports that she does not drink alcohol and does not use drugs.    Home Medications:  Prior to Admission Medications       Prescriptions Last Dose Informant Patient Reported? Taking?    allopurinol (ZYLOPRIM) 300 MG tablet 6/11/2024  No Yes    Take 1 tablet by mouth once daily    atorvastatin (LIPITOR) 20 MG tablet 6/10/2024 Self No Yes    Take 1 tablet by mouth Daily.    cetirizine (zyrTEC) 10 MG tablet 6/11/2024 Self Yes Yes    Take 1 tablet by mouth Daily.    cyanocobalamin (VITAMIN B-12) 1000 MCG tablet 6/11/2024 Self Yes Yes    Take 1 tablet by mouth Daily.    escitalopram (LEXAPRO) 20 MG tablet 6/11/2024  No Yes    Take 1 tablet by mouth once daily    IRON CR PO 6/11/2024  Yes Yes    Take  by mouth Daily.    levothyroxine (SYNTHROID, LEVOTHROID) 88 MCG tablet 6/11/2024  No Yes    Take 1 tablet by mouth once daily    metoprolol succinate XL (TOPROL-XL) 100 MG 24 hr tablet 6/10/2024  No Yes    Take 1 tablet by mouth Every Night.    montelukast (SINGULAIR) 10 MG tablet 6/10/2024 Self Yes Yes    Take 1 tablet by mouth Every Night.    multivitamin  (MULTI VITAMIN PO) 6/11/2024  Yes Yes    Take  by mouth Daily.    pantoprazole (PROTONIX) 40 MG EC tablet 6/11/2024  Yes Yes    Take 1 tablet by mouth Every Morning.    rivaroxaban (XARELTO) 20 MG tablet 6/10/2024  No Yes    Take 1 tablet by mouth Daily.    spironolactone (ALDACTONE) 25 MG tablet 6/11/2024  No Yes    Take 1/2 (one-half) tablet by mouth once daily    Vitamin D, Cholecalciferol, 25 MCG (1000 UT) tablet 6/11/2024 Self Yes Yes    Take 1 tablet by mouth Daily.    Azelastine HCl 137 MCG/SPRAY solution  Self Yes No    2 sprays by Alternating Nares route Daily As Needed.              Allergies:  Allergies   Allergen Reactions    Celecoxib Itching and Swelling     swelling       Objective      Vitals:   Temp:  [97 °F (36.1 °C)-97.9 °F (36.6 °C)] 97.9 °F (36.6 °C)  Heart Rate:  [] 75  Resp:  [16-19] 19  BP: (118-165)/(65-83) 149/65  Body mass index is 46.71 kg/m².  Physical Exam  Constitutional:       General: She is not in acute distress.     Appearance: She is obese. She is not toxic-appearing.   HENT:      Head: Atraumatic.      Comments: No nystagmus.      Nose: Nose normal.      Mouth/Throat:      Mouth: Mucous membranes are dry.      Pharynx: No oropharyngeal exudate or posterior oropharyngeal erythema.   Eyes:      Extraocular Movements: Extraocular movements intact.      Pupils: Pupils are equal, round, and reactive to light.   Cardiovascular:      Rate and Rhythm: Normal rate and regular rhythm.      Heart sounds: No murmur heard.  Pulmonary:      Effort: Pulmonary effort is normal.      Breath sounds: Normal breath sounds. No wheezing or rales.   Abdominal:      General: There is no distension.      Tenderness: There is no abdominal tenderness. There is no guarding.   Musculoskeletal:         General: Normal range of motion.      Cervical back: Neck supple. No rigidity or tenderness.      Right lower leg: Edema present.      Left lower leg: Edema present.      Comments: Bilateral LE trace  pedal edema.    Skin:     General: Skin is dry.      Capillary Refill: Capillary refill takes less than 2 seconds.   Neurological:      General: No focal deficit present.      Mental Status: She is alert and oriented to person, place, and time.   Psychiatric:         Mood and Affect: Mood normal.         Behavior: Behavior normal.         Diagnostic Data:  Lab Results (last 24 hours)       Procedure Component Value Units Date/Time    High Sensitivity Troponin T 2Hr [[id number]]  (Abnormal) Collected: 06/11/24 2021    Specimen: Blood from Arm, Right Updated: 06/11/24 2046     HS Troponin T 38 ng/L      Troponin T Delta 3 ng/L     Narrative:      High Sensitive Troponin T Reference Range:  <14.0 ng/L- Negative Female for AMI  <22.0 ng/L- Negative Male for AMI  >=14 - Abnormal Female indicating possible myocardial injury.  >=22 - Abnormal Male indicating possible myocardial injury.   Clinicians would have to utilize clinical acumen, EKG, Troponin, and serial changes to determine if it is an Acute Myocardial Infarction or myocardial injury due to an underlying chronic condition.         Urinalysis, Microscopic Only - Urine, Clean Catch [[id number]]  (Abnormal) Collected: 06/11/24 1839    Specimen: Urine, Clean Catch Updated: 06/1945     RBC, UA None Seen /HPF      WBC, UA 11-20 /HPF      Bacteria, UA Trace /HPF      Squamous Epithelial Cells, UA 3-6 /HPF      Hyaline Casts, UA None Seen /LPF      Methodology Manual Light Microscopy    Urinalysis With Microscopic If Indicated (No Culture) - Urine, Clean Catch [[id number]]  (Abnormal) Collected: 06/11/24 1839    Specimen: Urine, Clean Catch Updated: 06/11/24 1942     Color, UA Yellow     Appearance, UA Slightly Cloudy     pH, UA 6.0     Specific Gravity, UA 1.010     Glucose, UA Negative     Ketones, UA Negative     Bilirubin, UA Negative     Blood, UA Trace     Protein, UA Negative     Leuk Esterase, UA Large (3+)     Nitrite, UA Negative     Urobilinogen, UA 0.2  E.U./dL    Comprehensive Metabolic Panel [[id number]]  (Abnormal) Collected: 06/11/24 1804    Specimen: Blood from Arm, Right Updated: 06/11/24 1924     Glucose 95 mg/dL      BUN 33 mg/dL      Creatinine 1.36 mg/dL      Sodium 138 mmol/L      Potassium 5.0 mmol/L      Comment: Slight hemolysis detected by analyzer. Result may be falsely elevated.        Chloride 106 mmol/L      CO2 23.3 mmol/L      Calcium 9.7 mg/dL      Total Protein 6.3 g/dL      Albumin 3.7 g/dL      ALT (SGPT) 19 U/L      AST (SGOT) 33 U/L      Comment: Slight hemolysis detected by analyzer. Result may be falsely elevated.        Alkaline Phosphatase 113 U/L      Total Bilirubin 0.4 mg/dL      Globulin 2.6 gm/dL      A/G Ratio 1.4 g/dL      BUN/Creatinine Ratio 24.3     Anion Gap 8.7 mmol/L      eGFR 40.2 mL/min/1.73     Narrative:      GFR Normal >60  Chronic Kidney Disease <60  Kidney Failure <15    The GFR formula is only valid for adults with stable renal function between ages 18 and 70.    Magnesium [[id number]]  (Normal) Collected: 06/11/24 1804    Specimen: Blood from Arm, Right Updated: 06/11/24 1924     Magnesium 1.8 mg/dL     TSH [[id number]]  (Abnormal) Collected: 06/11/24 1804    Specimen: Blood from Arm, Right Updated: 06/11/24 1920     TSH 5.670 uIU/mL     BNP [[id number]]  (Abnormal) Collected: 06/11/24 1804    Specimen: Blood from Arm, Right Updated: 06/11/24 1920     proBNP 5,211.0 pg/mL     Narrative:      This assay is used as an aid in the diagnosis of individuals suspected of having heart failure. It can be used as an aid in the diagnosis of acute decompensated heart failure (ADHF) in patients presenting with signs and symptoms of ADHF to the emergency department (ED). In addition, NT-proBNP of <300 pg/mL indicates ADHF is not likely.    Age Range Result Interpretation  NT-proBNP Concentration (pg/mL:      <50             Positive            >450                   Gray                 300-450                    Negative              <300    50-75           Positive            >900                  Gray                300-900                  Negative            <300      >75             Positive            >1800                  Gray                300-1800                  Negative            <300    High Sensitivity Troponin T [[id number]]  (Abnormal) Collected: 06/11/24 1804    Specimen: Blood from Arm, Right Updated: 06/11/24 1920     HS Troponin T 35 ng/L     Narrative:      High Sensitive Troponin T Reference Range:  <14.0 ng/L- Negative Female for AMI  <22.0 ng/L- Negative Male for AMI  >=14 - Abnormal Female indicating possible myocardial injury.  >=22 - Abnormal Male indicating possible myocardial injury.   Clinicians would have to utilize clinical acumen, EKG, Troponin, and serial changes to determine if it is an Acute Myocardial Infarction or myocardial injury due to an underlying chronic condition.         CBC & Differential [[id number]]  (Abnormal) Collected: 06/11/24 1804    Specimen: Blood from Arm, Right Updated: 06/11/24 1843    Narrative:      The following orders were created for panel order CBC & Differential.  Procedure                               Abnormality         Status                     ---------                               -----------         ------                     CBC Auto Differential[[id number]]        Abnormal            Final result                 Please view results for these tests on the individual orders.    CBC Auto Differential [[id number]]  (Abnormal) Collected: 06/11/24 1804    Specimen: Blood from Arm, Right Updated: 06/11/24 1843     WBC 5.76 10*3/mm3      RBC 3.25 10*6/mm3      Hemoglobin 10.7 g/dL      Hematocrit 33.6 %      .4 fL      MCH 32.9 pg      MCHC 31.8 g/dL      RDW 13.2 %      RDW-SD 50.1 fl      MPV 9.4 fL      Platelets 162 10*3/mm3      Neutrophil % 55.4 %      Lymphocyte % 33.0 %      Monocyte % 8.3 %      Eosinophil % 2.4 %      Basophil % 0.7 %      Immature Grans %  0.2 %      Neutrophils, Absolute 3.19 10*3/mm3      Lymphocytes, Absolute 1.90 10*3/mm3      Monocytes, Absolute 0.48 10*3/mm3      Eosinophils, Absolute 0.14 10*3/mm3      Basophils, Absolute 0.04 10*3/mm3      Immature Grans, Absolute 0.01 10*3/mm3      nRBC 0.0 /100 WBC     [place] Draw [[id number]] Collected: 06/11/24 1804    Specimen: Blood from Arm, Right Updated: 06/11/24 1815    Narrative:      The following orders were created for panel order [place] Draw.  Procedure                               Abnormality         Status                     ---------                               -----------         ------                     Green Top (Gel)[[id number]]                                  Final result               Lavender Top[[id number]]                                     Final result               Light Blue Top[[id number]]                                   Final result                 Please view results for these tests on the individual orders.    Green Top (Gel) [[id number]] Collected: 06/11/24 1804    Specimen: Blood from Arm, Right Updated: 06/11/24 1815     Extra Tube Hold for add-ons.     Comment: Auto resulted.       Lavender Top [[id number]] Collected: 06/11/24 1804    Specimen: Blood from Arm, Right Updated: 06/11/24 1815     Extra Tube hold for add-on     Comment: Auto resulted       Light Blue Top [[id number]] Collected: 06/11/24 1804    Specimen: Blood from Arm, Right Updated: 06/11/24 1815     Extra Tube Hold for add-ons.     Comment: Auto resulted                Imaging Results (Last 24 Hours)       Procedure Component Value Units Date/Time    CT Head Without Contrast [[id number]] Collected: 06/11/24 1953     Updated: 06/11/24 1956    Narrative:      CT HEAD WO CONTRAST    Date of Exam: 6/11/2024 7:27 PM EDT    Indication: dizziness.    Comparison: MRI brain 4/4/2024, head CT 12/18/2023    Technique: Axial CT images were obtained of the head without contrast administration.  Coronal  reconstructions were performed.  Automated exposure control and iterative reconstruction methods were used.      Findings:  Negative for large territory loss of gray-white differentiation, acute intracranial hemorrhage, large mass lesion, midline shift or hydrocephalus. Parenchymal volume and density appear normal for age. No large extra-axial fluid collection. Mild   nonobstructing sinus disease. No air-fluid levels. No mastoid effusion. Symmetric globes. Distal carotid atherosclerotic disease. Negative for depressed skull fracture.      Impression:      Impression:  No acute intracranial findings by CT.      Electronically Signed: Donta Jaramillo MD    6/11/2024 7:54 PM EDT    Workstation ID: [id number]    XR Chest 1 View [[id number]] Collected: 06/11/24 1900     Updated: 06/11/24 1904    Narrative:      XR CHEST 1 VW    Date of Exam: 6/11/2024 6:45 PM EDT    Indication: dizziness    Comparison: 7/3/2023    Findings: No focal consolidation. No pneumothorax or pleural effusion. Dual-lead cardiac pacemaker. Cardiac size is normal. The visualized clavicles appear intact. No displaced rib fractures. The visualized upper abdomen is normal. Complete loss of the   right acromiohumeral interval suggesting a full-thickness rotator cuff tear.      Impression:      Impression: No acute cardiopulmonary disease.      Electronically Signed: Miki Chan MD    6/11/2024 7:01 PM EDT    Workstation ID: [id number]              Assessment & Plan        This is a 79 y.o. female with:    Active and Resolved Problems  Active Hospital Problems    Diagnosis  POA    **Acute CHF [I50.9]  Yes      Resolved Hospital Problems   No resolved problems to display.       Dizziness  Orthostatic hypotension  History of tachycardia bradycardia syndrome S/P PPM  History of A fib S/P ablation  CT head on admission does not show intracranial pathology  Continuous cardiac monitor  Pacemaker interrogation obtained in ED, will follow report  Continue xarelto  for AC  S/P 1 L NS bolus in ER  Cautious use of fluids due to CHF  Continue metoprolol 100 mg once daily    Acute on chronic diastolic CHF  Pro BNP on admission elevated 5211  Patient does have some trace pedal edema bilateral LE  Daily weights  Intake and output  Start lasix 40 mg IV bid  Continue Aldactone 25 mg once daily  Repeat ECHO  Cardiology consultation      Mildly elevated troponin, likely non-ischemic   Patient is chest pain free  EKG does not show ischemic changes    HTN  Continue aldactone, metoprolol    History of Hypothyroidism  TSH on admission 5.6  Continue home dose Synthroid 88 mcg once daily     CKD stage III  Creat on admission 1.36, appears near baseline.   Avoid nephrotoxic medications      Morbid Obesity  Lifestyle modifications      Chronic macrocytic anemia  Continue Vit B12 supplementation      VTE Prophylaxis:  Pharmacologic VTE prophylaxis orders are present.        The patient desires to be as follows:    CODE STATUS:       Full Code    Linda Kemi, who can be contacted at , is the designated person to make medical decisions on the patient's behalf if She is incapable of doing so. This was clarified with patient and/or next of kin on 6/11/2024 during the course of this H&P.    Admission Status:  I believe this patient meets observation status.    Expected Length of Stay: 1-2    PDMP and Medication Dispenses via Sidebar reviewed and consistent with patient reported medications.    I discussed the patient's findings and my recommendations with patient and family.      Signature:     This document has been electronically signed by Carlos Llanes Alvarez, MD on June 11, 2024 21:43 EDT   [facility] Hospitalist Team

## 2024-06-13 ENCOUNTER — PREP FOR SURGERY (OUTPATIENT)
Dept: OTHER | Facility: HOSPITAL | Age: 79
End: 2024-06-13
Payer: MEDICARE

## 2024-06-13 DIAGNOSIS — I48.0 PAROXYSMAL ATRIAL FIBRILLATION: Primary | ICD-10-CM

## 2024-06-13 LAB
ALBUMIN SERPL-MCNC: 3.7 G/DL (ref 3.5–5.2)
ALBUMIN/GLOB SERPL: 1.3 G/DL
ALP SERPL-CCNC: 115 U/L (ref 39–117)
ALT SERPL W P-5'-P-CCNC: 17 U/L (ref 1–33)
ANION GAP SERPL CALCULATED.3IONS-SCNC: 10.6 MMOL/L (ref 5–15)
AST SERPL-CCNC: 24 U/L (ref 1–32)
BASOPHILS # BLD AUTO: 0.06 10*3/MM3 (ref 0–0.2)
BASOPHILS NFR BLD AUTO: 1 % (ref 0–1.5)
BILIRUB SERPL-MCNC: 0.5 MG/DL (ref 0–1.2)
BUN SERPL-MCNC: 45 MG/DL (ref 8–23)
BUN/CREAT SERPL: 28.8 (ref 7–25)
CALCIUM SPEC-SCNC: 9.9 MG/DL (ref 8.6–10.5)
CHLORIDE SERPL-SCNC: 98 MMOL/L (ref 98–107)
CO2 SERPL-SCNC: 26.4 MMOL/L (ref 22–29)
CREAT SERPL-MCNC: 1.56 MG/DL (ref 0.57–1)
DEPRECATED RDW RBC AUTO: 49.8 FL (ref 37–54)
EGFRCR SERPLBLD CKD-EPI 2021: 34.1 ML/MIN/1.73
EOSINOPHIL # BLD AUTO: 0.16 10*3/MM3 (ref 0–0.4)
EOSINOPHIL NFR BLD AUTO: 2.7 % (ref 0.3–6.2)
ERYTHROCYTE [DISTWIDTH] IN BLOOD BY AUTOMATED COUNT: 13.1 % (ref 12.3–15.4)
GLOBULIN UR ELPH-MCNC: 2.8 GM/DL
GLUCOSE SERPL-MCNC: 112 MG/DL (ref 65–99)
HCT VFR BLD AUTO: 36.3 % (ref 34–46.6)
HGB BLD-MCNC: 11.6 G/DL (ref 12–15.9)
IMM GRANULOCYTES # BLD AUTO: 0 10*3/MM3 (ref 0–0.05)
IMM GRANULOCYTES NFR BLD AUTO: 0 % (ref 0–0.5)
LYMPHOCYTES # BLD AUTO: 2.18 10*3/MM3 (ref 0.7–3.1)
LYMPHOCYTES NFR BLD AUTO: 36.2 % (ref 19.6–45.3)
MAGNESIUM SERPL-MCNC: 2.8 MG/DL (ref 1.6–2.4)
MCH RBC QN AUTO: 32.8 PG (ref 26.6–33)
MCHC RBC AUTO-ENTMCNC: 32 G/DL (ref 31.5–35.7)
MCV RBC AUTO: 102.5 FL (ref 79–97)
MONOCYTES # BLD AUTO: 0.52 10*3/MM3 (ref 0.1–0.9)
MONOCYTES NFR BLD AUTO: 8.6 % (ref 5–12)
NEUTROPHILS NFR BLD AUTO: 3.11 10*3/MM3 (ref 1.7–7)
NEUTROPHILS NFR BLD AUTO: 51.5 % (ref 42.7–76)
NRBC BLD AUTO-RTO: 0 /100 WBC (ref 0–0.2)
PHOSPHATE SERPL-MCNC: 3.9 MG/DL (ref 2.5–4.5)
PLATELET # BLD AUTO: 189 10*3/MM3 (ref 140–450)
PMV BLD AUTO: 10 FL (ref 6–12)
POTASSIUM SERPL-SCNC: 4.1 MMOL/L (ref 3.5–5.2)
PROT SERPL-MCNC: 6.5 G/DL (ref 6–8.5)
QT INTERVAL: 377 MS
QT INTERVAL: 412 MS
QTC INTERVAL: 461 MS
QTC INTERVAL: 470 MS
RBC # BLD AUTO: 3.54 10*6/MM3 (ref 3.77–5.28)
SODIUM SERPL-SCNC: 135 MMOL/L (ref 136–145)
WBC NRBC COR # BLD AUTO: 6.03 10*3/MM3 (ref 3.4–10.8)

## 2024-06-13 PROCEDURE — 84100 ASSAY OF PHOSPHORUS: CPT | Performed by: HOSPITALIST

## 2024-06-13 PROCEDURE — 93005 ELECTROCARDIOGRAM TRACING: CPT | Performed by: INTERNAL MEDICINE

## 2024-06-13 PROCEDURE — 80053 COMPREHEN METABOLIC PANEL: CPT | Performed by: HOSPITALIST

## 2024-06-13 PROCEDURE — 85025 COMPLETE CBC W/AUTO DIFF WBC: CPT | Performed by: HOSPITALIST

## 2024-06-13 PROCEDURE — 97166 OT EVAL MOD COMPLEX 45 MIN: CPT

## 2024-06-13 PROCEDURE — 99232 SBSQ HOSP IP/OBS MODERATE 35: CPT | Performed by: INTERNAL MEDICINE

## 2024-06-13 PROCEDURE — 93010 ELECTROCARDIOGRAM REPORT: CPT | Performed by: INTERNAL MEDICINE

## 2024-06-13 PROCEDURE — 83735 ASSAY OF MAGNESIUM: CPT | Performed by: HOSPITALIST

## 2024-06-13 PROCEDURE — 25810000003 SODIUM CHLORIDE 0.9 % SOLUTION: Performed by: INTERNAL MEDICINE

## 2024-06-13 PROCEDURE — 25010000002 DIGOXIN PER 500 MCG: Performed by: INTERNAL MEDICINE

## 2024-06-13 PROCEDURE — 97162 PT EVAL MOD COMPLEX 30 MIN: CPT

## 2024-06-13 RX ORDER — DIGOXIN 0.25 MG/ML
250 INJECTION INTRAMUSCULAR; INTRAVENOUS
Qty: 2 ML | Refills: 0 | Status: COMPLETED | OUTPATIENT
Start: 2024-06-13 | End: 2024-06-13

## 2024-06-13 RX ADMIN — DOFETILIDE 250 MCG: 0.25 CAPSULE ORAL at 18:21

## 2024-06-13 RX ADMIN — DOFETILIDE 250 MCG: 0.25 CAPSULE ORAL at 05:52

## 2024-06-13 RX ADMIN — CYANOCOBALAMIN TAB 1000 MCG 1000 MCG: 1000 TAB at 09:18

## 2024-06-13 RX ADMIN — PANTOPRAZOLE SODIUM 40 MG: 40 TABLET, DELAYED RELEASE ORAL at 09:17

## 2024-06-13 RX ADMIN — CETIRIZINE HYDROCHLORIDE 10 MG: 10 TABLET, FILM COATED ORAL at 09:17

## 2024-06-13 RX ADMIN — MONTELUKAST 10 MG: 10 TABLET, FILM COATED ORAL at 20:03

## 2024-06-13 RX ADMIN — Medication 10 ML: at 09:19

## 2024-06-13 RX ADMIN — THERA TABS 1 TABLET: TAB at 09:17

## 2024-06-13 RX ADMIN — Medication 10 ML: at 20:03

## 2024-06-13 RX ADMIN — ALLOPURINOL 300 MG: 300 TABLET ORAL at 09:17

## 2024-06-13 RX ADMIN — FERROUS SULFATE TAB EC 324 MG (65 MG FE EQUIVALENT) 324 MG: 324 (65 FE) TABLET DELAYED RESPONSE at 09:18

## 2024-06-13 RX ADMIN — SENNOSIDES AND DOCUSATE SODIUM 2 TABLET: 50; 8.6 TABLET ORAL at 09:18

## 2024-06-13 RX ADMIN — LEVOTHYROXINE SODIUM 88 MCG: 0.09 TABLET ORAL at 05:55

## 2024-06-13 RX ADMIN — RIVAROXABAN 15 MG: 15 TABLET, FILM COATED ORAL at 18:21

## 2024-06-13 RX ADMIN — SODIUM CHLORIDE 500 ML: 9 INJECTION, SOLUTION INTRAVENOUS at 12:27

## 2024-06-13 RX ADMIN — ATORVASTATIN CALCIUM 20 MG: 20 TABLET, FILM COATED ORAL at 09:18

## 2024-06-13 RX ADMIN — Medication 1000 UNITS: at 09:18

## 2024-06-13 RX ADMIN — DIGOXIN 250 MCG: 0.25 INJECTION INTRAMUSCULAR; INTRAVENOUS at 14:05

## 2024-06-13 RX ADMIN — SPIRONOLACTONE 12.5 MG: 25 TABLET ORAL at 09:17

## 2024-06-13 NOTE — PROGRESS NOTES
Reason for follow-up: Atrial fibrillation     Patient Care Team:  Leonor Adam MD as PCP - General  Maggi Horn MD as Consulting Physician (Pain Medicine)  Ratna Zavala MD as Consulting Physician (Cardiology)  Nora Foley APRN as Nurse Practitioner (Gastroenterology)  Gianluca Walton MD as Consulting Physician (Pulmonary Disease)  Nathanael Murillo MD as Consulting Physician (Allergy and Immunology)  Delfin Thorpe MD as Surgeon (Orthopedic Surgery)  Josh Loomis MD as Consulting Physician (Gastroenterology)  Bo Ruggiero MD as Consulting Physician (Urology)    Subjective .   Faby GOMEZ Rey is doing fair today     ROS    Celecoxib    Scheduled Meds:allopurinol, 300 mg, Oral, Daily  atorvastatin, 20 mg, Oral, Daily  cetirizine, 10 mg, Oral, Daily  cholecalciferol, 1,000 Units, Oral, Daily  dofetilide, 250 mcg, Oral, Q12H  ferrous sulfate, 324 mg, Oral, Daily With Breakfast  [Held by provider] furosemide, 40 mg, Intravenous, Daily  levothyroxine, 88 mcg, Oral, Q AM  montelukast, 10 mg, Oral, Nightly  multivitamin, 1 tablet, Oral, Daily  pantoprazole, 40 mg, Oral, Daily  rivaroxaban, 15 mg, Oral, Daily With Dinner  senna-docusate sodium, 2 tablet, Oral, BID  sodium chloride, 10 mL, Intravenous, Q12H  spironolactone, 12.5 mg, Oral, Daily  cyanocobalamin, 1,000 mcg, Oral, Daily      Continuous Infusions:   PRN Meds:.  Azelastine HCl    senna-docusate sodium **AND** polyethylene glycol **AND** bisacodyl **AND** bisacodyl    Calcium Replacement - Follow Nurse / BPA Driven Protocol    Magnesium Cardiology Dose Replacement - Follow Nurse / BPA Driven Protocol    nitroglycerin    Pharmacy Consult    Phosphorus Replacement - Follow Nurse / BPA Driven Protocol    Potassium Replacement - Follow Nurse / BPA Driven Protocol    [COMPLETED] Insert Peripheral IV **AND** sodium chloride    sodium chloride    [COMPLETED] Insert Peripheral IV **AND** sodium chloride    sodium  "chloride    sodium chloride      VITAL SIGNS  Vitals:    06/12/24 2132 06/13/24 0115 06/13/24 0521 06/13/24 0552   BP: 103/60 97/62 108/50 108/50   BP Location: Left arm Left arm Left arm    Patient Position: Lying Lying Lying    Pulse: 91 108 110 94   Resp: 14 14 12 13   Temp: 97.6 °F (36.4 °C) 97.7 °F (36.5 °C) 97.6 °F (36.4 °C)    TempSrc: Oral Oral Oral    SpO2: 100% 95% 97%    Weight:   115 kg (252 lb 6.8 oz)    Height:           Flowsheet Rows      Flowsheet Row First Filed Value   Admission Height 160 cm (63\") Documented at 06/11/2024 1702   Admission Weight 120 kg (263 lb 10.7 oz) Documented at 06/11/2024 1702               Physical Exam  VITALS REVIEWED    General:      well developed, in no acute distress.    Head:      normocephalic and atraumatic.    Eyes:      PERRL/EOM intact, conjunctiva and sclera clear with out nystagmus.    Neck:      no masses, thyromegaly,  trachea central with normal respiratory effort and PMI displaced laterally  Lungs:      Clear  Heart:       Irregular rhythm  Msk:      no deformity or scoliosis noted of thoracic or lumbar spine.    Pulses:      pulses normal in all 4 extremities.    Extremities:       No lower extremity edema  Neurologic:      no focal deficits.   alert oriented x3  Skin:      intact without lesions or rashes.    Psych:      alert and cooperative; normal mood and affect; normal attention span and concentration.          LAB RESULTS (LAST 7 DAYS)    CBC  Results from last 7 days   Lab Units 06/13/24  0316 06/12/24  0007 06/11/24  1804   WBC 10*3/mm3 6.03 4.83 5.76   RBC 10*6/mm3 3.54* 3.05* 3.25*   HEMOGLOBIN g/dL 11.6* 10.2* 10.7*   HEMATOCRIT % 36.3 31.8* 33.6*   MCV fL 102.5* 104.3* 103.4*   PLATELETS 10*3/mm3 189 170 162       BMP  Results from last 7 days   Lab Units 06/13/24  0316 06/12/24  1327 06/12/24  0007 06/11/24  1804   SODIUM mmol/L 135* 135* 138 138   POTASSIUM mmol/L 4.1 4.7 4.2 5.0   CHLORIDE mmol/L 98 100 108* 106   CO2 mmol/L 26.4 23.1 " 21.6* 23.3   BUN mg/dL 45* 29* 28* 33*   CREATININE mg/dL 1.56* 1.32* 1.19* 1.36*   GLUCOSE mg/dL 112* 122* 93 95   MAGNESIUM mg/dL 2.8* 1.7  --  1.8   PHOSPHORUS mg/dL 3.9  --   --   --        CMP   Results from last 7 days   Lab Units 06/13/24  0316 06/12/24  1327 06/12/24  0007 06/11/24  1804   SODIUM mmol/L 135* 135* 138 138   POTASSIUM mmol/L 4.1 4.7 4.2 5.0   CHLORIDE mmol/L 98 100 108* 106   CO2 mmol/L 26.4 23.1 21.6* 23.3   BUN mg/dL 45* 29* 28* 33*   CREATININE mg/dL 1.56* 1.32* 1.19* 1.36*   GLUCOSE mg/dL 112* 122* 93 95   ALBUMIN g/dL 3.7  --  3.5 3.7   BILIRUBIN mg/dL 0.5  --  0.4 0.4   ALK PHOS U/L 115  --  107 113   AST (SGOT) U/L 24  --  25 33*   ALT (SGPT) U/L 17  --  15 19         BNP        TROPONIN  Results from last 7 days   Lab Units 06/11/24 2021   HSTROP T ng/L 38*       CoAg  Results from last 7 days   Lab Units 06/12/24  0007   INR  1.08       Creatinine Clearance  Estimated Creatinine Clearance: 36.9 mL/min (A) (by C-G formula based on SCr of 1.56 mg/dL (H)).    ABG          EKG    I personally reviewed the patient's EKG/Telemetry data: Atrial fibrillation      Assessment & Plan       Acute CHF    Acute exacerbation of CHF (congestive heart failure)      Faby Le is a 79-year-old female patient who has atrial fibrillation and atypical atrial flutter, she does have a dual-chamber pacemaker, she underwent complex arrhythmia ablation on 3/5/2024 by Dr. Herrera.  Patient presented to the hospital with A-fib with rapid ventricular rates.  Tikosyn therapy was initiated on 6/12/2024 at 250 mcg every 12 hours.  Plan for cardioversion tomorrow unless she self converts    I discussed the patients findings and my recommendations with patient and agrees with outlined plan.    Pacheco Aleman MD  06/13/24  09:06 EDT

## 2024-06-13 NOTE — THERAPY EVALUATION
Patient Name: Faby Le  : 1945    MRN: [id number]                              Today's Date: 2024       Admit Date: 2024    Visit Dx:     ICD-10-CM ICD-9-CM   1. Dizziness  R42 780.4   2. Orthostatic hypotension  I95.1 458.0   3. MARIAM (acute kidney injury)  N17.9 584.9     Patient Active Problem List   Diagnosis    Abnormal complete blood count    Ankle pain    Arthralgia of left elbow    Arthritis    B12 deficiency    Coronary artery disease    Depression    Gallstones    Gout    Hyperglycemia    Hyperlipidemia    Hypertension    Hypothyroid    Low back pain    Mobility poor    Post-menopausal    Sleep apnea    Vitamin D deficiency    Chronic kidney disease, stage 3 (moderate)    Oropharyngeal dysphagia    Class 3 drug-induced obesity with serious comorbidity and body mass index (BMI) of 45.0 to 49.9 in adult    Chronic pain of right knee    Light headedness    Paroxysmal atrial fibrillation    Chronic left shoulder pain    Status post knee replacement    Syncope    Presence of cardiac pacemaker    Chronic diastolic heart failure    Paresthesia of left foot    TIA (transient ischemic attack)    Anemia    Dizziness    Acute CHF    Acute exacerbation of CHF (congestive heart failure)     Past Medical History:   Diagnosis Date    Ankle pain     Arthritis     Coronary artery disease     Depression     Gout     Hyperglycemia     Hyperlipidemia     Hypertension     Low back pain     Sleep apnea     Vitamin D deficiency      Past Surgical History:   Procedure Laterality Date    ANKLE FUSION      2017-left    CARDIAC CATHETERIZATION      CARDIAC CATHETERIZATION Right 2022    Procedure: Left Heart Cath;  Surgeon: Ratna Zavala MD;  Location: [facility] CATH INVASIVE LOCATION;  Service: Cardiovascular;  Laterality: Right;    CARDIAC ELECTROPHYSIOLOGY PROCEDURE Left 7/3/2023    Procedure: Pacemaker DC new [place] Notified;  Surgeon: Cr Herrera MD;  Location: [facility] CATH INVASIVE  LOCATION;  Service: Cardiovascular;  Laterality: Left;    CARDIAC ELECTROPHYSIOLOGY PROCEDURE Right 3/5/2024    Procedure: Ablation atrial fibrillation, Cry[employer] Ibrahima and Wayne Costa notified 02/09/24;  Surgeon: Cr Herrera MD;  Location: [facility] CATH INVASIVE LOCATION;  Service: Cardiovascular;  Laterality: Right;    CARDIAC ELECTROPHYSIOLOGY PROCEDURE N/A 3/5/2024    Procedure: Cardioversion;  Surgeon: Cr Herrera MD;  Location: [facility] CATH INVASIVE LOCATION;  Service: Cardiovascular;  Laterality: N/A;    CARPAL TUNNEL RELEASE      CATARACT EXTRACTION      CUBITAL TUNNEL RELEASE      ENDOSCOPY N/A 5/28/2024    Procedure: ESOPHAGOGASTRODUODENOSCOPY WITH COLD FORCEP BIOPSY X1 AREA;  Surgeon: Josh Loomis MD;  Location: [facility] ENDOSCOPY;  Service: Gastroenterology;  Laterality: N/A;  Post- ESOPHAGITIS, GASTRITIS, HIATAL HERNIA    HYSTERECTOMY      REPLACEMENT TOTAL KNEE      left 2008    ROTATOR CUFF REPAIR      TOTAL KNEE ARTHROPLASTY Right 5/4/2023    Procedure: TOTAL KNEE ARTHROPLASTY WITH CORI ROBOT;  Surgeon: Chino Herrera II, MD;  Location: [facility] MAIN OR;  Service: Robotics - Ortho;  Laterality: Right;      General Information       Row Name 06/13/24 1402          Physical Therapy Time and Intention    Document Type evaluation  -CL     Mode of Treatment physical therapy  -CL       Row Name 06/13/24 1402          General Information    Patient Profile Reviewed yes  -CL     Prior Level of Function independent:;driving;ADL's;all household mobility;community mobility  -CL     Barriers to Rehab medically complex  -CL       Row Name 06/13/24 1402          Living Environment    People in Home child(mralon), adult  -CL       Row Name 06/13/24 1402          Home Main Entrance    Number of Stairs, Main Entrance none;other (see comments)  ramp  -CL       Row Name 06/13/24 1402          Stairs Within Home, Primary    Number of Stairs, Within Home, Primary none  -CL       Row Name  06/13/24 1402          Cognition    Orientation Status (Cognition) oriented x 4  -CL       Row Name 06/13/24 1402          Safety Issues, Functional Mobility    Impairments Affecting Function (Mobility) other (see comments)  -CL     Comment, Safety Issues/Impairments (Mobility) Orthostatic hypotension  -CL               User Key  (r) = Recorded By, (t) = Taken By, (c) = Cosigned By      Initials Name Provider Type    Ashlie Torres PT Physical Therapist                   Mobility       Row Name 06/13/24 1408          Bed Mobility    Bed Mobility supine-sit;sit-supine  -CL     Supine-Sit [place] (Bed Mobility) moderate assist (50% patient effort)  -CL     Sit-Supine [place] (Bed Mobility) maximum assist (25% patient effort);2 person assist  -CL     Comment, (Bed Mobility) Pt became orthostatic and needed A x 2 to return to supine  -CL       Row Name 06/13/24 1408          Sit-Stand Transfer    Sit-Stand [place] (Transfers) minimum assist (75% patient effort)  -CL     Assistive Device (Sit-Stand Transfers) cane, straight  -CL       Row Name 06/13/24 1408          Gait/Stairs (Locomotion)    Patient was able to Ambulate no, other medical factors prevent ambulation  -CL     Reason Patient was unable to Ambulate Hypotension  -CL               User Key  (r) = Recorded By, (t) = Taken By, (c) = Cosigned By      Initials Name Provider Type    Ashlie Torres PT Physical Therapist                   Obj/Interventions       Row Name 06/13/24 1414          Range of Motion Comprehensive    General Range of Motion bilateral lower extremity ROM WFL  -CL     Comment, General Range of Motion recent R TKA  -CL       Row Name 06/13/24 1414          Strength Comprehensive (MMT)    Comment, General Manual Muscle Testing (MMT) Assessment BLEs grossly 4+/5  -CL       Row Name 06/13/24 1414          Balance    Balance Assessment sitting static balance;sitting dynamic balance;standing static balance;standing  dynamic balance  -CL     Static Sitting Balance supervision  -CL     Dynamic Sitting Balance supervision  -CL     Position, Sitting Balance sitting edge of bed  -CL     Static Standing Balance minimal assist  -CL     Dynamic Standing Balance not tested  -CL     Comment, Balance Pt became orthostatic and non-verbal while standing and was assisted to sit then max A x 2 to supine  -CL       Row Name 06/13/24 1414          Sensory Assessment (Somatosensory)    Sensory Assessment (Somatosensory) sensation intact  -CL               User Key  (r) = Recorded By, (t) = Taken By, (c) = Cosigned By      Initials Name Provider Type    CL Ashlie Love, PT Physical Therapist                   Goals/Plan       Row Name 06/13/24 1431          Bed Mobility Goal 1 (PT)    Activity/Assistive Device (Bed Mobility Goal 1, PT) bed mobility activities, all  -CL     [place] Level/Cues Needed (Bed Mobility Goal 1, PT) independent  -CL     Time Frame (Bed Mobility Goal 1, PT) long term goal (LTG);2 weeks  -CL       Row Name 06/13/24 1431          Transfer Goal 1 (PT)    Activity/Assistive Device (Transfer Goal 1, PT) sit-to-stand/stand-to-sit;bed-to-chair/chair-to-bed  -CL     [place] Level/Cues Needed (Transfer Goal 1, PT) modified independence  -CL     Time Frame (Transfer Goal 1, PT) long term goal (LTG);2 weeks  -CL       Row Name 06/13/24 1431          Gait Training Goal 1 (PT)    Activity/Assistive Device (Gait Training Goal 1, PT) gait (walking locomotion);assistive device use  -CL     [place] Level (Gait Training Goal 1, PT) modified independence  -CL     Distance (Gait Training Goal 1, PT) 100'  -CL     Time Frame (Gait Training Goal 1, PT) long term goal (LTG);2 weeks  -CL       Row Name 06/13/24 1431          Therapy Assessment/Plan (PT)    Planned Therapy Interventions (PT) bed mobility training;balance training;gait training;patient/family education;neuromuscular re-education;strengthening;transfer training   -CL               User Key  (r) = Recorded By, (t) = Taken By, (c) = Cosigned By      Initials Name Provider Type    Ashlie Torres, PT Physical Therapist                   Clinical Impression       Row Name 06/13/24 1427          Pain    Pretreatment Pain Rating 0/10 - no pain  -CL     Posttreatment Pain Rating 0/10 - no pain  -CL       Row Name 06/13/24 1422          Plan of Care Review    Plan of Care Reviewed With patient  -CL     Outcome Evaluation Faby Le is a 79 y.o. female with PMH of Afib, s/p Ablation, s/p Pacemaker, TIA, HTN, ROSA, CAD, Chronic Anemia, Morbid obesity, Diastolic CHF who presents with dizziness and generalized weakness. Found to have acute on chronic CHF. Pt lives in apt attached to her daughter's home.  She has a ramped entrance. She currently uses a SPC due to recent TKA. She has a RW from previous surgeries.  She is typically independent with all mobility, ADLs and driving. She had 1 fall in the last 6 months when she tripped.  She wears a life alert at all times.  At time of PT evaluation, she is A&O x 4. Her BP is low initially and she had orthostatic symptoms upon standing becoming less verbally responsive but we were unable to capture BP before returning her to sit then supine. Pt has good support system and DME in her accessible home. She would be safe to return home with  PT.  -CL       Row Name 06/13/24 1429          Therapy Assessment/Plan (PT)    Rehab Potential (PT) good, to achieve stated therapy goals  -CL     Criteria for Skilled Interventions Met (PT) yes;skilled treatment is necessary  -CL     Therapy Frequency (PT) 3 times/wk  -CL     Predicted Duration of Therapy Intervention (PT) Until discharge  -CL       Row Name 06/13/24 1420          Vital Signs    Pre Systolic BP Rehab 102  -CL     Pre Treatment Diastolic BP 50  -CL     Intra Systolic BP Rehab 82  -CL     Intra Treatment Diastolic BP 51  -CL     Post Systolic BP Rehab 90  -CL     Post Treatment  Diastolic BP 58  -CL     Pretreatment Heart Rate (beats/min) 81  -CL     Intratreatment Heart Rate (beats/min) 110  -CL     Pretreatment Resp Rate (breaths/min) 13  -CL     Intratreatment Resp Rate (breaths/min) 17  -CL     Posttreatment Resp Rate (breaths/min) 17  -CL     Pre SpO2 (%) 99  -CL     O2 Delivery Pre Treatment room air  -CL     Post SpO2 (%) 99  -CL     O2 Delivery Post Treatment room air  -CL     Pre Patient Position Supine  -CL     Intra Patient Position Sitting  -CL     Post Patient Position Sitting  -CL       Row Name 06/13/24 1425          Positioning and Restraints    Pre-Treatment Position in bed  -CL     Post Treatment Position bed  -CL     In Bed notified nsg;fowlers;call light within reach;exit alarm on  -CL               User Key  (r) = Recorded By, (t) = Taken By, (c) = Cosigned By      Initials Name Provider Type    Ashlie Torres, PT Physical Therapist                   Outcome Measures       Row Name 06/13/24 1431 06/13/24 0800       How much help from another person do you currently need...    Turning from your back to your side while in flat bed without using bedrails? 4  -CL 4  -SM    Moving from lying on back to sitting on the side of a flat bed without bedrails? 2  -CL 4  -SM    Moving to and from a bed to a chair (including a wheelchair)? 3  -CL 3  -SM    Standing up from a chair using your arms (e.g., wheelchair, bedside chair)? 3  -CL 3  -SM    Climbing 3-5 steps with a railing? 2  -CL 3  -SM    To walk in hospital room? 2  -CL 3  -SM    AM-PAC 6 Clicks Score (PT) 16  -CL 20  -SM    Highest Level of Mobility Goal 5 --> Static standing  -CL 6 --> Walk 10 steps or more  -SM              User Key  (r) = Recorded By, (t) = Taken By, (c) = Cosigned By      Initials Name Provider Type    Vale Quinonez RN Registered Nurse    Ashlie Torres, PT Physical Therapist                                 Physical Therapy Education       Title: PT OT SLP Therapies (Done)        Topic: Physical Therapy (Done)       Point: Mobility training (Done)       Learning Progress Summary             Patient Acceptance, E,TB, VU by CL at 6/13/2024 1432                         Point: Precautions (Done)       Learning Progress Summary             Patient Acceptance, E,TB, VU by CL at 6/13/2024 1432                                         User Key       Initials Effective Dates Name Provider Type Discipline     03/02/22 -  Ashlie Love, PT Physical Therapist PT                  PT Recommendation and Plan  Planned Therapy Interventions (PT): bed mobility training, balance training, gait training, patient/family education, neuromuscular re-education, strengthening, transfer training  Plan of Care Reviewed With: patient  Outcome Evaluation: Faby Le is a 79 y.o. female with PMH of Afib, s/p Ablation, s/p Pacemaker, TIA, HTN, ROSA, CAD, Chronic Anemia, Morbid obesity, Diastolic CHF who presents with dizziness and generalized weakness. Found to have acute on chronic CHF. Pt lives in apt attached to her daughter's home.  She has a ramped entrance. She currently uses a SPC due to recent TKA. She has a RW from previous surgeries.  She is typically independent with all mobility, ADLs and driving. She had 1 fall in the last 6 months when she tripped.  She wears a life alert at all times.  At time of PT evaluation, she is A&O x 4. Her BP is low initially and she had orthostatic symptoms upon standing becoming less verbally responsive but we were unable to capture BP before returning her to sit then supine. Pt has good support system and DME in her accessible home. She would be safe to return home with HH PT.     Time Calculation:   PT Evaluation Complexity  History, PT Evaluation Complexity: 3 or more personal factors and/or comorbidities  Examination of Body Systems (PT Eval Complexity): total of 3 or more elements  Clinical Presentation (PT Evaluation Complexity): evolving  Clinical Decision Making (PT  Evaluation Complexity): moderate complexity  Overall Complexity (PT Evaluation Complexity): moderate complexity     PT Charges       Row Name 06/13/24 1432             Time Calculation    Start Time 0921  -CL      Stop Time 0942  -CL      Time Calculation (min) 21 min  -CL      PT Received On 06/13/24  -CL      PT - Next Appointment 06/15/24  -CL      PT Goal Re-Cert Due Date 06/27/24  -CL         Time Calculation- PT    Total Timed Code Minutes- PT 0 minute(s)  -CL                User Key  (r) = Recorded By, (t) = Taken By, (c) = Cosigned By      Initials Name Provider Type    CL Ashlie Love, PT Physical Therapist                  Therapy Charges for Today       Code Description Service Date Service Provider Modifiers Qty    20314625814 HC PT EVAL MOD COMPLEXITY 4 6/13/2024 Ashlie Love, PT GP 1            PT G-Codes  AM-PAC 6 Clicks Score (PT): 16  PT Discharge Summary  Anticipated Discharge Disposition (PT): home with home health    Ashlie Love, MATEUS  6/13/2024

## 2024-06-13 NOTE — CASE MANAGEMENT/SOCIAL WORK
Continued Stay Note  KOFI Galarza     Patient Name: Faby Le  MRN: [id number]  Today's Date: 6/13/2024    Admit Date: 6/11/2024    Plan: Return home with family- pt lives with daughter and her family.  No cost for Tikosyn   Discharge Plan       Row Name 06/13/24 1316       Plan    Plan Return home with family- pt lives with daughter and her family.  No cost for Tikosyn    Patient/Family in Agreement with Plan yes    Plan [facility] Cost check done for Tikosyn 250 mcg Q 12 hours. No cost to patient. Discussed in MDR.  Barriers to discharge: Initiation of Tikosyn.  If does not convert, may do Cardioversion 6/14/24.  Cardio following             Expected Discharge Date and Time       Expected Discharge Date Expected Discharge Time    [place] 15, 2024           Marivel Vyas RN     Office Phone [phone]  Office Cell [phone]

## 2024-06-13 NOTE — THERAPY EVALUATION
Patient Name: Faby Le  : 1945    MRN: [id number]                              Today's Date: 2024       Admit Date: 2024    Visit Dx:     ICD-10-CM ICD-9-CM   1. Dizziness  R42 780.4   2. Orthostatic hypotension  I95.1 458.0   3. MARIAM (acute kidney injury)  N17.9 584.9     Patient Active Problem List   Diagnosis    Abnormal complete blood count    Ankle pain    Arthralgia of left elbow    Arthritis    B12 deficiency    Coronary artery disease    Depression    Gallstones    Gout    Hyperglycemia    Hyperlipidemia    Hypertension    Hypothyroid    Low back pain    Mobility poor    Post-menopausal    Sleep apnea    Vitamin D deficiency    Chronic kidney disease, stage 3 (moderate)    Oropharyngeal dysphagia    Class 3 drug-induced obesity with serious comorbidity and body mass index (BMI) of 45.0 to 49.9 in adult    Chronic pain of right knee    Light headedness    Paroxysmal atrial fibrillation    Chronic left shoulder pain    Status post knee replacement    Syncope    Presence of cardiac pacemaker    Chronic diastolic heart failure    Paresthesia of left foot    TIA (transient ischemic attack)    Anemia    Dizziness    Acute CHF    Acute exacerbation of CHF (congestive heart failure)     Past Medical History:   Diagnosis Date    Ankle pain     Arthritis     Coronary artery disease     Depression     Gout     Hyperglycemia     Hyperlipidemia     Hypertension     Low back pain     Sleep apnea     Vitamin D deficiency      Past Surgical History:   Procedure Laterality Date    ANKLE FUSION      2017-left    CARDIAC CATHETERIZATION      CARDIAC CATHETERIZATION Right 2022    Procedure: Left Heart Cath;  Surgeon: Ratna Zavala MD;  Location: [facility] CATH INVASIVE LOCATION;  Service: Cardiovascular;  Laterality: Right;    CARDIAC ELECTROPHYSIOLOGY PROCEDURE Left 7/3/2023    Procedure: Pacemaker DC new [place] Notified;  Surgeon: Cr Herrera MD;  Location: [facility] CATH INVASIVE  LOCATION;  Service: Cardiovascular;  Laterality: Left;    CARDIAC ELECTROPHYSIOLOGY PROCEDURE Right 3/5/2024    Procedure: Ablation atrial fibrillation, [employer] and Wayne [place] notified 02/09/24;  Surgeon: Cr Herrera MD;  Location: [facility] CATH INVASIVE LOCATION;  Service: Cardiovascular;  Laterality: Right;    CARDIAC ELECTROPHYSIOLOGY PROCEDURE N/A 3/5/2024    Procedure: Cardioversion;  Surgeon: Cr Herrera MD;  Location: [facility] CATH INVASIVE LOCATION;  Service: Cardiovascular;  Laterality: N/A;    CARPAL TUNNEL RELEASE      CATARACT EXTRACTION      CUBITAL TUNNEL RELEASE      ENDOSCOPY N/A 5/28/2024    Procedure: ESOPHAGOGASTRODUODENOSCOPY WITH COLD FORCEP BIOPSY X1 AREA;  Surgeon: Josh Loomis MD;  Location: [facility] ENDOSCOPY;  Service: Gastroenterology;  Laterality: N/A;  Post- ESOPHAGITIS, GASTRITIS, HIATAL HERNIA    HYSTERECTOMY      REPLACEMENT TOTAL KNEE      left 2008    ROTATOR CUFF REPAIR      TOTAL KNEE ARTHROPLASTY Right 5/4/2023    Procedure: TOTAL KNEE ARTHROPLASTY WITH CORI ROBOT;  Surgeon: Chino Herrera II, MD;  Location: [facility] MAIN OR;  Service: Robotics - Ortho;  Laterality: Right;      General Information       Row Name 06/13/24 1449          OT Time and Intention    Document Type evaluation  -BL     Mode of Treatment occupational therapy  -BL       Row Name 06/13/24 1449          General Information    Patient Profile Reviewed yes  -BL     Prior Level of Function independent:;all household mobility;ADL's;driving  -BL     Existing Precautions/Restrictions fall  -BL     Barriers to Rehab medically complex  -BL       Row Name 06/13/24 1449          Living Environment    People in Home child(marlon), adult  -BL       Row Name 06/13/24 1449          Home Main Entrance    Number of Stairs, Main Entrance none  ramp to enter  -BL       Row Name 06/13/24 1449          Stairs Within Home, Primary    Number of Stairs, Within Home, Primary none  -BL       Row  Name 06/13/24 1449          Cognition    Orientation Status (Cognition) oriented x 4  -BL       Row Name 06/13/24 1449          Safety Issues, Functional Mobility    Comment, Safety Issues/Impairments (Mobility) Orthostatic hypotension  -BL               User Key  (r) = Recorded By, (t) = Taken By, (c) = Cosigned By      Initials Name Provider Type    BL Azalea Bolanos OT Occupational Therapist                     Mobility/ADL's       Row Name 06/13/24 1451          Bed Mobility    Bed Mobility supine-sit;sit-supine  -BL     Supine-Sit [place] (Bed Mobility) moderate assist (50% patient effort)  -     Sit-Supine [place] (Bed Mobility) maximum assist (25% patient effort);2 person assist  -     Comment, (Bed Mobility) Pt became orthostatic and needed A x 2 to return to supine  -BL       Row Name 06/13/24 1451          Sit-Stand Transfer    Sit-Stand [place] (Transfers) minimum assist (75% patient effort)  -     Assistive Device (Sit-Stand Transfers) cane, straight  -       Row Name 06/13/24 1451          Functional Mobility    Reason Patient was unable to Ambulate Hypotension  -BL               User Key  (r) = Recorded By, (t) = Taken By, (c) = Cosigned By      Initials Name Provider Type    BL Azalea Bolanos OT Occupational Therapist                   Obj/Interventions       Row Name 06/13/24 1452          Sensory Assessment (Somatosensory)    Sensory Assessment (Somatosensory) sensation intact  -       Row Name 06/13/24 1452          Vision Assessment/Intervention    Visual Impairment/Limitations WFL  -       Row Name 06/13/24 1452          Range of Motion Comprehensive    General Range of Motion bilateral upper extremity ROM L  -       Row Name 06/13/24 1452          Strength Comprehensive (MMT)    Comment, General Manual Muscle Testing (MMT) Assessment BUEs functionally WFL  -       Row Name 06/13/24 1452          Balance    Static Sitting Balance supervision  -     Dynamic  Sitting Balance supervision  -BL     Position, Sitting Balance sitting edge of bed  -BL     Static Standing Balance minimal assist  -BL               User Key  (r) = Recorded By, (t) = Taken By, (c) = Cosigned By      Initials Name Provider Type    Azalea Manuel OT Occupational Therapist                   Goals/Plan       Row Name 06/13/24 1505          Transfer Goal 1 (OT)    Activity/Assistive Device (Transfer Goal 1, OT) sit-to-stand/stand-to-sit  -BL     Gordon Level/Cues Needed (Transfer Goal 1, OT) standby assist  -BL     Time Frame (Transfer Goal 1, OT) 2 weeks  -BL       Row Name 06/13/24 1504          Dressing Goal 1 (OT)    Activity/Device (Dressing Goal 1, OT) dressing skills, all  -BL     Gordon/Cues Needed (Dressing Goal 1, OT) modified independence  -BL     Time Frame (Dressing Goal 1, OT) 2 weeks  -BL       Row Name 06/13/24 1504          Toileting Goal 1 (OT)    Activity/Device (Toileting Goal 1, OT) toileting skills, all  -BL     Gordon Level/Cues Needed (Toileting Goal 1, OT) modified independence  -BL     Time Frame (Toileting Goal 1, OT) 2 weeks  -BL       Row Name 06/13/24 1504          Therapy Assessment/Plan (OT)    Planned Therapy Interventions (OT) activity tolerance training;adaptive equipment training;BADL retraining;edema control/reduction;functional balance retraining;IADL retraining;occupation/activity based interventions;passive ROM/stretching;patient/caregiver education/training;neuromuscular control/coordination retraining;strengthening exercise;transfer/mobility retraining  -BL               User Key  (r) = Recorded By, (t) = Taken By, (c) = Cosigned By      Initials Name Provider Type    Azalea Manuel OT Occupational Therapist                   Clinical Impression       Row Name 06/13/24 1450          Pain Assessment    Pretreatment Pain Rating 0/10 - no pain  -BL     Posttreatment Pain Rating 0/10 - no pain  -BL     Pain Intervention(s) Repositioned   -       Row Name 06/13/24 1454          Plan of Care Review    Plan of Care Reviewed With patient  -     Outcome Evaluation 79 y.o. female with PMH of Afib, s/p Ablation, s/p Pacemaker, TIA, HTN, ROSA, CAD, Chronic Anemia, Morbid obesity, Diastolic CHF who presents with dizziness and generalized weakness. Found to have acute on chronic CHF. Pt lives in apt attached to her daughter's home.  She has a ramped entrance. She currently uses a SPC due to recent TKA. She has a RW from previous surgeries. Pt is typically independent with all ADLs and drives. Pt wears a life alert at all times. At time of evaluation, pt was A&OX4. She was able to get OOB and stand, however with orthostatic BP and became unresponsive. Pt returned to supine in bed and reported she was able to hear therapist, just got really dizzy. Pt BP returned to WFL while supine in bed with legs elevated. Pt educated on orthostatic hypotension and activity recommendations. Pt receptive. OT anticipates pt will be good to d/c home with assist and HHOT at d/c. OT will follow.  -[facility] [place] Name 06/13/24 1454          Therapy Assessment/Plan (OT)    Criteria for Skilled Therapeutic Interventions Met (OT) yes;skilled treatment is necessary  -     Therapy Frequency (OT) 5 times/wk  -[facility] [place] Name 06/13/24 1454          Therapy Plan Review/Discharge Plan (OT)    Anticipated Discharge Disposition (OT) home with assist;home with home health  -[facility] [place] Name 06/13/24 1454          Vital Signs    Pre Systolic BP Rehab 102  -BL     Pre Treatment Diastolic BP 50  -BL     Intra Systolic BP Rehab 82  -BL     Intra Treatment Diastolic BP 51  -BL     Post Systolic BP Rehab 90  -BL     Post Treatment Diastolic BP 58  -BL     Pretreatment Heart Rate (beats/min) 81  -BL     Intratreatment Heart Rate (beats/min) 110  -BL     Pretreatment Resp Rate (breaths/min) 13  -BL     Intratreatment Resp Rate (breaths/min) 17  -BL     Posttreatment Resp Rate (breaths/min) 17   -BL     Pre SpO2 (%) 99  -BL     O2 Delivery Pre Treatment room air  -BL     O2 Delivery Intra Treatment room air  -BL     Post SpO2 (%) 99  -BL     O2 Delivery Post Treatment room air  -BL     Pre Patient Position Supine  -BL     Intra Patient Position Sitting  -BL     Post Patient Position Sitting  -BL       Row Name 06/13/24 1454          Positioning and Restraints    Pre-Treatment Position in bed  -BL     Post Treatment Position bed  -BL     In Bed notified nsg;exit alarm on;encouraged to call for assist;call light within reach  -BL               User Key  (r) = Recorded By, (t) = Taken By, (c) = Cosigned By      Initials Name Provider Type    Azalea Manuel OT Occupational Therapist                   Outcome Measures       Row Name 06/13/24 1431 06/13/24 0800       How much help from another person do you currently need...    Turning from your back to your side while in flat bed without using bedrails? 4  -CL 4  -SM    Moving from lying on back to sitting on the side of a flat bed without bedrails? 2  -CL 4  -SM    Moving to and from a bed to a chair (including a wheelchair)? 3  -CL 3  -SM    Standing up from a chair using your arms (e.g., wheelchair, bedside chair)? 3  -CL 3  -SM    Climbing 3-5 steps with a railing? 2  -CL 3  -SM    To walk in hospital room? 2  -CL 3  -SM    AM-PAC 6 Clicks Score (PT) 16  -CL 20  -SM    Highest Level of Mobility Goal 5 --> Static standing  -CL 6 --> Walk 10 steps or more  -SM              User Key  (r) = Recorded By, (t) = Taken By, (c) = Cosigned By      Initials Name Provider Type    Vale Quinonez, RN Registered Nurse    Ashlie Torres, PT Physical Therapist                    Occupational Therapy Education       Title: PT OT SLP Therapies (Done)       Topic: Occupational Therapy (Done)       Point: ADL training (Done)       Description:   Instruct learner(s) on proper safety adaptation and remediation techniques during self care or transfers.   Instruct  in proper use of assistive devices.                  Learning Progress Summary             Patient Acceptance, E,TB, VU by  at 6/13/2024 1504                                         User Key       Initials Effective Dates Name Provider Type Discipline     09/22/22 -  Azalea Bolanos OT Occupational Therapist OT                  OT Recommendation and Plan  Planned Therapy Interventions (OT): activity tolerance training, adaptive equipment training, BADL retraining, edema control/reduction, functional balance retraining, IADL retraining, occupation/activity based interventions, passive ROM/stretching, patient/caregiver education/training, neuromuscular control/coordination retraining, strengthening exercise, transfer/mobility retraining  Therapy Frequency (OT): 5 times/wk  Plan of Care Review  Plan of Care Reviewed With: patient  Outcome Evaluation: 79 y.o. female with PMH of Afib, s/p Ablation, s/p Pacemaker, TIA, HTN, ROSA, CAD, Chronic Anemia, Morbid obesity, Diastolic CHF who presents with dizziness and generalized weakness. Found to have acute on chronic CHF. Pt lives in [facility] attached to her daughter's home.  She has a ramped entrance. She currently uses a SPC due to recent TKA. She has a RW from previous surgeries. Pt is typically independent with all ADLs and drives. Pt wears a life alert at all times. At time of evaluation, pt was A&OX4. She was able to get OOB and stand, however with orthostatic BP and became unresponsive. Pt returned to supine in bed and reported she was able to hear therapist, just got really dizzy. Pt BP returned to WFL while supine in bed with legs elevated. Pt educated on orthostatic hypotension and activity recommendations. Pt receptive. OT anticipates pt will be good to d/c home with assist and HHOT at d/c. OT will follow.     Time Calculation:         Time Calculation- OT       Row Name 06/13/24 1508             Time Calculation- OT    OT Start Time 0922  -      OT Stop Time  0943  -      OT Time Calculation (min) 21 min  -BL      OT Received On 06/13/24  -BL      OT - Next Appointment 06/14/24  -BL      OT Goal Re-Cert Due Date 06/27/24  -BL                User Key  (r) = Recorded By, (t) = Taken By, (c) = Cosigned By      Initials Name Provider Type     Azalea Bolanos OT Occupational Therapist                  Therapy Charges for Today       Code Description Service Date Service Provider Modifiers Qty    [id number] HC OT EVAL MOD COMPLEXITY 4 6/13/2024 Azalea Bolanos OT GO 1                 Azalea Bolanos OT  6/13/2024

## 2024-06-13 NOTE — PLAN OF CARE
Goal Outcome Evaluation:  Plan of Care Reviewed With: patient           Outcome Evaluation: 79 y.o. female with PMH of Afib, s/p Ablation, s/p Pacemaker, TIA, HTN, ROSA, CAD, Chronic Anemia, Morbid obesity, Diastolic CHF who presents with dizziness and generalized weakness. Found to have acute on chronic CHF. Pt lives in apt attached to her daughter's home.  She has a ramped entrance. She currently uses a SPC due to recent TKA. She has a RW from previous surgeries. Pt is typically independent with all ADLs and drives. Pt wears a life alert at all times. At time of evaluation, pt was A&OX4. She was able to get OOB and stand, however with orthostatic BP and became unresponsive. Pt returned to supine in bed and reported she was able to hear therapist, just got really dizzy. Pt BP returned to WFL while supine in bed with legs elevated. Pt educated on orthostatic hypotension and activity recommendations. Pt receptive. OT anticipates pt will be good to d/c home with assist and HHOT at d/c. OT will follow.      Anticipated Discharge Disposition (OT): home with assist, home with home health

## 2024-06-13 NOTE — PLAN OF CARE
Goal Outcome Evaluation:  Plan of Care Reviewed With: patient      Pt rested throughout shift. No complaints of pain. Patient received first dose of tikosyn on previous shift. Obtained EKG, QTC was 484. Placed a call to on call cardiology to relay, and inform about patients heart rate sustaining from 130-150s, no new orders. Eventually came down to 90s-100s. Remains on room air. Will continue to observe.   Progress: improving

## 2024-06-13 NOTE — PLAN OF CARE
Problem: Adult Inpatient Plan of Care  Goal: Plan of Care Review  Outcome: Ongoing, Progressing  Flowsheets (Taken 6/13/2024 1427)  Outcome Evaluation: Pt HR up 150,s after PT eval  BP 88/50  see PT notes. new orders for 500cc bolus and dig.  Pt states she could not feel the fast HR but felt dizzy if she moved her head.  Pt to have cardioversion tomorrow. NPO after MN  will cont to monitor   Goal Outcome Evaluation:              Outcome Evaluation: Pt HR up 150,s after PT eval  BP 88/50  see PT notes. new orders for 500cc bolus and dig.  Pt states she could not feel the fast HR but felt dizzy if she moved her head.  Pt to have cardioversion tomorrow. NPO after MN  will cont to monitor

## 2024-06-13 NOTE — PROGRESS NOTES
[facility] MEDICINE SERVICE  DAILY PROGRESS NOTE    NAME: Faby Le  : 1945  MRN: [id number]      LOS: 1 day     PROVIDER OF SERVICE: Roland Cast DO    Chief Complaint: Acute CHF    Subjective:     Interval History:  History taken from: patient chart RN    Patient seen and examined this morning.  Taking over care today.  Doing well, denies any complaints this morning.  No further dizziness noted however she has not moved around today yet.  On Tikosyn protocol per EP.  Possible plan for DCCV cardioversion tomorrow if she does not convert on Tikosyn.      Review of Systems:     Pertinent positives as noted in HPI/subjective.  All other systems were reviewed and are negative.    Objective:     Vital Signs  Temp:  [97.2 °F (36.2 °C)-97.7 °F (36.5 °C)] 97.2 °F (36.2 °C)  Heart Rate:  [] 84  Resp:  [12-15] 15  BP: ()/(50-89) 107/58   Body mass index is 44.72 kg/m².    Physical Exam  General: Awake, alert, NAD  Eyes: PERRL, EOMI, conjunctivae are clear  Cardiovascular: Regular rate and rhythm, no murmurs  Respiratory: Clear to auscultation bilaterally, no wheezing or rales, unlabored breathing  Abdomen: Soft, nontender, positive bowel sounds, no guarding  Neurologic: A&O, CN grossly intact, moves all extremities spontaneously  Musculoskeletal: Normal range of motion, no other gross deformities  Skin: Warm, dry    Scheduled Meds   allopurinol, 300 mg, Oral, Daily  atorvastatin, 20 mg, Oral, Daily  cetirizine, 10 mg, Oral, Daily  cholecalciferol, 1,000 Units, Oral, Daily  dofetilide, 250 mcg, Oral, Q12H  ferrous sulfate, 324 mg, Oral, Daily With Breakfast  [Held by provider] furosemide, 40 mg, Intravenous, Daily  levothyroxine, 88 mcg, Oral, Q AM  montelukast, 10 mg, Oral, Nightly  multivitamin, 1 tablet, Oral, Daily  pantoprazole, 40 mg, Oral, Daily  rivaroxaban, 15 mg, Oral, Daily With Dinner  senna-docusate sodium, 2 tablet, Oral, BID  sodium chloride, 10 mL, Intravenous,  Q12H  spironolactone, 12.5 mg, Oral, Daily  cyanocobalamin, 1,000 mcg, Oral, Daily       PRN Meds     Azelastine HCl    senna-docusate sodium **AND** polyethylene glycol **AND** bisacodyl **AND** bisacodyl    Calcium Replacement - Follow Nurse / BPA Driven Protocol    Magnesium Cardiology Dose Replacement - Follow Nurse / BPA Driven Protocol    nitroglycerin    Pharmacy Consult    Phosphorus Replacement - Follow Nurse / BPA Driven Protocol    Potassium Replacement - Follow Nurse / BPA Driven Protocol    [COMPLETED] Insert Peripheral IV **AND** sodium chloride    sodium chloride    [COMPLETED] Insert Peripheral IV **AND** sodium chloride    sodium chloride    sodium chloride   Infusions         Diagnostic Data    Results from last 7 days   Lab Units 06/13/24  0316   WBC 10*3/mm3 6.03   HEMOGLOBIN g/dL 11.6*   HEMATOCRIT % 36.3   PLATELETS 10*3/mm3 189   GLUCOSE mg/dL 112*   CREATININE mg/dL 1.56*   BUN mg/dL 45*   SODIUM mmol/L 135*   POTASSIUM mmol/L 4.1   AST (SGOT) U/L 24   ALT (SGPT) U/L 17   ALK PHOS U/L 115   BILIRUBIN mg/dL 0.5   ANION GAP mmol/L 10.6       CT Head Without Contrast    Result Date: 6/11/2024  Impression: No acute intracranial findings by CT. Electronically Signed: Donta Jaramillo MD  6/11/2024 7:54 PM EDT  Workstation ID: [id number]    XR Chest 1 View    Result Date: 6/11/2024  Impression: No acute cardiopulmonary disease. Electronically Signed: Miki Chan MD  6/11/2024 7:01 PM EDT  Workstation ID: [id number]       I reviewed the patient's new clinical results.  I reviewed the patient's new imaging results and agree with the interpretation.    Assessment/Plan:     Active and Resolved Problems  Active Hospital Problems    Diagnosis  POA    **Acute CHF [I50.9]  Yes    Acute exacerbation of CHF (congestive heart failure) [I50.9]  Yes      Resolved Hospital Problems   No resolved problems to display.       79-year-old female with a history of A-fib s/p ablation on Xarelto, tachybradycardia syndrome  status post PPM, TIA, hypertension, ROSA CAD, SHEREE, hypothyroidism, hypertension, HFpEF admitted to [facility] 6/11 after being sent from PCPs office with lightheadedness and concern for pacemaker not functioning correctly       # Dizziness  #History of tachybradycardia syndrome status post dual-chamber PPM  #History of atrial fibrillation status post ablation  EKG on admission with sinus rhythm with prolonged AL interval  Remains in A-fib with atypical flutter per EP, started on Tikosyn protocol  Defer pacemaker interrogation to cards  Continue telemonitoring  Continue Xarelto   continue metoprolol  Avoid hypotension  PT/OT  -If patient does not convert then possible plan for cardioversion tomorrow per EP      #Acute on chronic HFpEF  Volume status improved, continue Lasix as tolerated, monitor I's and O's  Follow TTE  Monitor I's and O's  Continue Aldactone  Continue metoprolol  Monitor renal function    #CKD stage III  Creatinine close to baseline, monitor with diuresis  CTM    #Hypothyroidism  Continue home dose of levothyroxine 88 mcg    #Gout  Continue home dose of allopurinol      VTE Prophylaxis:  Pharmacologic VTE prophylaxis orders are present.         Code status is   Code Status and Medical Interventions:   Ordered at: 06/12/24 1708     Code Status (Patient has no pulse and is not breathing):    CPR (Attempt to Resuscitate)     Medical Interventions (Patient has pulse or is breathing):    Full Support       Plan for disposition:1 to 2 days    Signature: Electronically signed by Roland Cast DO, 06/13/24, 10:34 EDT.  [facility] Hospitalist Team    Part of this note may be an electronic transcription/translation of spoken language to printed text using the Dragon Dictation System.

## 2024-06-13 NOTE — PLAN OF CARE
Goal Outcome Evaluation:  Plan of Care Reviewed With: patient           Outcome Evaluation: Faby Le is a 79 y.o. female with PMH of Afib, s/p Ablation, s/p Pacemaker, TIA, HTN, ROSA, CAD, Chronic Anemia, Morbid obesity, Diastolic CHF who presents with dizziness and generalized weakness. Found to have acute on chronic CHF. Pt lives in apt attached to her daughter's home.  She has a ramped entrance. She currently uses a SPC due to recent TKA. She has a RW from previous surgeries.  She is typically independent with all mobility, ADLs and driving. She had 1 fall in the last 6 months when she tripped.  She wears a life alert at all times.  At time of PT evaluation, she is A&O x 4. Her BP is low initially and she had orthostatic symptoms upon standing becoming less verbally responsive but we were unable to capture BP before returning her to sit then supine. Pt has good support system and DME in her accessible home. She would be safe to return home with  PT.      Anticipated Discharge Disposition (PT): home with home health

## 2024-06-14 ENCOUNTER — APPOINTMENT (OUTPATIENT)
Dept: CARDIOLOGY | Facility: HOSPITAL | Age: 79
DRG: 308 | End: 2024-06-14
Payer: MEDICARE

## 2024-06-14 ENCOUNTER — HOSPITAL ENCOUNTER (OUTPATIENT)
Dept: CARDIOLOGY | Facility: HOSPITAL | Age: 79
DRG: 308 | End: 2024-06-14
Payer: MEDICARE

## 2024-06-14 ENCOUNTER — ANESTHESIA (OUTPATIENT)
Dept: TELEMETRY | Facility: HOSPITAL | Age: 79
End: 2024-06-14

## 2024-06-14 ENCOUNTER — ANESTHESIA EVENT (OUTPATIENT)
Dept: TELEMETRY | Facility: HOSPITAL | Age: 79
End: 2024-06-14

## 2024-06-14 LAB
ALBUMIN SERPL-MCNC: 3.6 G/DL (ref 3.5–5.2)
ALBUMIN/GLOB SERPL: 1.4 G/DL
ALP SERPL-CCNC: 108 U/L (ref 39–117)
ALT SERPL W P-5'-P-CCNC: 15 U/L (ref 1–33)
ANION GAP SERPL CALCULATED.3IONS-SCNC: 9.1 MMOL/L (ref 5–15)
AST SERPL-CCNC: 22 U/L (ref 1–32)
BASOPHILS # BLD AUTO: 0.05 10*3/MM3 (ref 0–0.2)
BASOPHILS NFR BLD AUTO: 0.9 % (ref 0–1.5)
BILIRUB SERPL-MCNC: 0.4 MG/DL (ref 0–1.2)
BUN SERPL-MCNC: 44 MG/DL (ref 8–23)
BUN/CREAT SERPL: 28 (ref 7–25)
CALCIUM SPEC-SCNC: 9.7 MG/DL (ref 8.6–10.5)
CHLORIDE SERPL-SCNC: 103 MMOL/L (ref 98–107)
CO2 SERPL-SCNC: 24.9 MMOL/L (ref 22–29)
CREAT SERPL-MCNC: 1.57 MG/DL (ref 0.57–1)
DEPRECATED RDW RBC AUTO: 49.4 FL (ref 37–54)
EGFRCR SERPLBLD CKD-EPI 2021: 33.8 ML/MIN/1.73
EOSINOPHIL # BLD AUTO: 0.17 10*3/MM3 (ref 0–0.4)
EOSINOPHIL NFR BLD AUTO: 2.9 % (ref 0.3–6.2)
ERYTHROCYTE [DISTWIDTH] IN BLOOD BY AUTOMATED COUNT: 13.2 % (ref 12.3–15.4)
GLOBULIN UR ELPH-MCNC: 2.6 GM/DL
GLUCOSE SERPL-MCNC: 108 MG/DL (ref 65–99)
HCT VFR BLD AUTO: 33.6 % (ref 34–46.6)
HGB BLD-MCNC: 10.9 G/DL (ref 12–15.9)
IMM GRANULOCYTES # BLD AUTO: 0.02 10*3/MM3 (ref 0–0.05)
IMM GRANULOCYTES NFR BLD AUTO: 0.3 % (ref 0–0.5)
LYMPHOCYTES # BLD AUTO: 2.25 10*3/MM3 (ref 0.7–3.1)
LYMPHOCYTES NFR BLD AUTO: 38.9 % (ref 19.6–45.3)
MAGNESIUM SERPL-MCNC: 2.2 MG/DL (ref 1.6–2.4)
MCH RBC QN AUTO: 33 PG (ref 26.6–33)
MCHC RBC AUTO-ENTMCNC: 32.4 G/DL (ref 31.5–35.7)
MCV RBC AUTO: 101.8 FL (ref 79–97)
MONOCYTES # BLD AUTO: 0.55 10*3/MM3 (ref 0.1–0.9)
MONOCYTES NFR BLD AUTO: 9.5 % (ref 5–12)
NEUTROPHILS NFR BLD AUTO: 2.74 10*3/MM3 (ref 1.7–7)
NEUTROPHILS NFR BLD AUTO: 47.5 % (ref 42.7–76)
NRBC BLD AUTO-RTO: 0 /100 WBC (ref 0–0.2)
PHOSPHATE SERPL-MCNC: 3.7 MG/DL (ref 2.5–4.5)
PLATELET # BLD AUTO: 190 10*3/MM3 (ref 140–450)
PMV BLD AUTO: 10 FL (ref 6–12)
POTASSIUM SERPL-SCNC: 4.4 MMOL/L (ref 3.5–5.2)
PROT SERPL-MCNC: 6.2 G/DL (ref 6–8.5)
QT INTERVAL: 444 MS
QT INTERVAL: 446 MS
QTC INTERVAL: 463 MS
QTC INTERVAL: 480 MS
RBC # BLD AUTO: 3.3 10*6/MM3 (ref 3.77–5.28)
SODIUM SERPL-SCNC: 137 MMOL/L (ref 136–145)
WBC NRBC COR # BLD AUTO: 5.78 10*3/MM3 (ref 3.4–10.8)

## 2024-06-14 PROCEDURE — 93005 ELECTROCARDIOGRAM TRACING: CPT | Performed by: INTERNAL MEDICINE

## 2024-06-14 PROCEDURE — 97530 THERAPEUTIC ACTIVITIES: CPT

## 2024-06-14 PROCEDURE — 93356 MYOCRD STRAIN IMG SPCKL TRCK: CPT | Performed by: INTERNAL MEDICINE

## 2024-06-14 PROCEDURE — 93306 TTE W/DOPPLER COMPLETE: CPT

## 2024-06-14 PROCEDURE — 83735 ASSAY OF MAGNESIUM: CPT | Performed by: HOSPITALIST

## 2024-06-14 PROCEDURE — 97535 SELF CARE MNGMENT TRAINING: CPT

## 2024-06-14 PROCEDURE — 99233 SBSQ HOSP IP/OBS HIGH 50: CPT | Performed by: INTERNAL MEDICINE

## 2024-06-14 PROCEDURE — 93356 MYOCRD STRAIN IMG SPCKL TRCK: CPT

## 2024-06-14 PROCEDURE — 93010 ELECTROCARDIOGRAM REPORT: CPT | Performed by: INTERNAL MEDICINE

## 2024-06-14 PROCEDURE — 85025 COMPLETE CBC W/AUTO DIFF WBC: CPT | Performed by: HOSPITALIST

## 2024-06-14 PROCEDURE — 93306 TTE W/DOPPLER COMPLETE: CPT | Performed by: INTERNAL MEDICINE

## 2024-06-14 PROCEDURE — 80053 COMPREHEN METABOLIC PANEL: CPT | Performed by: HOSPITALIST

## 2024-06-14 PROCEDURE — 84100 ASSAY OF PHOSPHORUS: CPT | Performed by: HOSPITALIST

## 2024-06-14 PROCEDURE — 97110 THERAPEUTIC EXERCISES: CPT

## 2024-06-14 RX ADMIN — ATORVASTATIN CALCIUM 20 MG: 20 TABLET, FILM COATED ORAL at 10:45

## 2024-06-14 RX ADMIN — Medication 10 ML: at 20:15

## 2024-06-14 RX ADMIN — THERA TABS 1 TABLET: TAB at 10:46

## 2024-06-14 RX ADMIN — SENNOSIDES AND DOCUSATE SODIUM 2 TABLET: 50; 8.6 TABLET ORAL at 10:45

## 2024-06-14 RX ADMIN — CETIRIZINE HYDROCHLORIDE 10 MG: 10 TABLET, FILM COATED ORAL at 10:45

## 2024-06-14 RX ADMIN — DOFETILIDE 250 MCG: 0.25 CAPSULE ORAL at 06:02

## 2024-06-14 RX ADMIN — ALLOPURINOL 300 MG: 300 TABLET ORAL at 10:45

## 2024-06-14 RX ADMIN — SPIRONOLACTONE 12.5 MG: 25 TABLET ORAL at 10:45

## 2024-06-14 RX ADMIN — Medication 1000 UNITS: at 10:45

## 2024-06-14 RX ADMIN — CYANOCOBALAMIN TAB 1000 MCG 1000 MCG: 1000 TAB at 10:45

## 2024-06-14 RX ADMIN — RIVAROXABAN 15 MG: 15 TABLET, FILM COATED ORAL at 17:09

## 2024-06-14 RX ADMIN — PANTOPRAZOLE SODIUM 40 MG: 40 TABLET, DELAYED RELEASE ORAL at 10:45

## 2024-06-14 RX ADMIN — MONTELUKAST 10 MG: 10 TABLET, FILM COATED ORAL at 20:14

## 2024-06-14 RX ADMIN — FERROUS SULFATE TAB EC 324 MG (65 MG FE EQUIVALENT) 324 MG: 324 (65 FE) TABLET DELAYED RESPONSE at 10:45

## 2024-06-14 RX ADMIN — LEVOTHYROXINE SODIUM 88 MCG: 0.09 TABLET ORAL at 06:03

## 2024-06-14 RX ADMIN — Medication 10 ML: at 10:47

## 2024-06-14 RX ADMIN — DOFETILIDE 250 MCG: 0.25 CAPSULE ORAL at 17:59

## 2024-06-14 NOTE — PLAN OF CARE
"Goal Outcome Evaluation:     Dry cough with upright activity. Increased arousal and eating with assistance once up in the chair, but BP drops for transfer. [facility] is following and family are awaiting biopsy of cystic mass and potential for surgical mgmt before deciding on pt goals and plan of care.    Assessment: Faby Le presents with ADL impairments affecting function including balance, cognition, coordination, endurance / activity tolerance, grasp, range of motion (ROM), shortness of breath, and strength. Demonstrated functioning below baseline abilities indicate the need for continued skilled intervention while inpatient. Tolerating session today without incident. Will continue to follow and progress as tolerated.      Plan/Recommendations:   Moderate Intensity Therapy recommended post-acute care. This is recommended as therapy feels the patient would require 3-4 days per week and wouldn't tolerate \"3 hour daily\" rehab intensity. SNF would be the preferred choice. If the patient does not agree to SNF, arrange HH or OP depending on home bound status. If patient is medically complex, consider LTACH. Pt requires no DME at discharge.      Pt desires Skilled Rehab placement at discharge. Pt cooperative; agreeable to therapeutic recommendations and plan of care  "

## 2024-06-14 NOTE — THERAPY TREATMENT NOTE
Subjective: Pt agreeable to therapeutic plan of care.  Cognition: arousal/alertness: Listless, safety/judgement: limited, and awareness of deficits: poor awareness of safety precaution and poor awareness of defits    Objective: Dry cough with upright activity. Increased arousal and eating with assistance once up in the chair, but BP drops for transfer. [facility] is following and family are awaiting biopsy of cystic mass and potential for surgical mgmt before deciding on pt goals and plan of care.    Bed Mobility: Max-A   Functional Transfers: Min-A, with adaptive equipment, and with rolling walker     Balance: sitting EOB, unsupported, and static Supervision  Functional Ambulation: N/A or Not attempted. Pt did make ambulatory transfer to chair w/ RW, O2 and IV lines held out of the way and min (A) to steer RW, mod cues, and mod (A) to come to stand initially.    Lower Body Dressing: Dependent  ADL Position: supine and unsupported sitting  ADL Comments: don socks in supine    Feeding: Max-A  ADL Position: supported sitting  ADL Comments: very supportive friend at bedside takes over feeding task after OT demonstrates the needed level of assist.    Vitals: Orthostatic but BP recovers after tfr. Bp from 130/52 to 74/46 immediately after transfer.    Pain: 0 VAS  Location: denies pain though note lt hand is swollen. Pt completed  flex/ex X10, 2 reps, to move fluid in tissues.  Interventions for pain: Repositioned, Increased Activity, and Therapeutic Presence  Education: Provided education on the importance of mobility in the acute care setting, Verbal/Tactile Cues, ADL training, and Transfer Training    Assessment: Faby Le presents with ADL impairments affecting function including balance, cognition, coordination, endurance / activity tolerance, grasp, range of motion (ROM), shortness of breath, and strength. Demonstrated functioning below baseline abilities indicate the need for continued skilled intervention  "while inpatient. Tolerating session today without incident. Will continue to follow and progress as tolerated.     Plan/Recommendations:   Moderate Intensity Therapy recommended post-acute care. This is recommended as therapy feels the patient would require 3-4 days per week and wouldn't tolerate \"3 hour daily\" rehab intensity. SNF would be the preferred choice. If the patient does not agree to SNF, arrange HH or OP depending on home bound status. If patient is medically complex, consider LTACH. Pt requires no DME at discharge.     Pt desires Skilled Rehab placement at discharge. Pt cooperative; agreeable to therapeutic recommendations and plan of care.     Modified [place]: 4 = Moderately severe disability (Unable to attend to own bodily needs without assistance, and unable to walk unassisted)     Post-Tx Position: Up in Chair, Alarms activated, and Call light and personal items within reach  PPE: gloves    "

## 2024-06-14 NOTE — PROGRESS NOTES
Reason for follow-up: Atrial fibrillation     Patient Care Team:  Leonor Adam MD as PCP - General  Maggi Horn MD as Consulting Physician (Pain Medicine)  Ratna Zavala MD as Consulting Physician (Cardiology)  Nora Foley APRN as Nurse Practitioner (Gastroenterology)  Gianluca Walton MD as Consulting Physician (Pulmonary Disease)  Nathanael Murillo MD as Consulting Physician (Allergy and Immunology)  Delfin Thorpe MD as Surgeon (Orthopedic Surgery)  Josh Loomis MD as Consulting Physician (Gastroenterology)  Bo Ruggiero MD as Consulting Physician (Urology)    Subjective .   Faby Le is doing well today     ROS    Celecoxib    Scheduled Meds:allopurinol, 300 mg, Oral, Daily  atorvastatin, 20 mg, Oral, Daily  cetirizine, 10 mg, Oral, Daily  cholecalciferol, 1,000 Units, Oral, Daily  dofetilide, 250 mcg, Oral, Q12H  ferrous sulfate, 324 mg, Oral, Daily With Breakfast  [Held by provider] furosemide, 40 mg, Intravenous, Daily  levothyroxine, 88 mcg, Oral, Q AM  montelukast, 10 mg, Oral, Nightly  multivitamin, 1 tablet, Oral, Daily  pantoprazole, 40 mg, Oral, Daily  rivaroxaban, 15 mg, Oral, Daily With Dinner  senna-docusate sodium, 2 tablet, Oral, BID  sodium chloride, 10 mL, Intravenous, Q12H  spironolactone, 12.5 mg, Oral, Daily  cyanocobalamin, 1,000 mcg, Oral, Daily      Continuous Infusions:   PRN Meds:.  Azelastine HCl    senna-docusate sodium **AND** polyethylene glycol **AND** bisacodyl **AND** bisacodyl    Calcium Replacement - Follow Nurse / BPA Driven Protocol    Magnesium Cardiology Dose Replacement - Follow Nurse / BPA Driven Protocol    nitroglycerin    Pharmacy Consult    Phosphorus Replacement - Follow Nurse / BPA Driven Protocol    Potassium Replacement - Follow Nurse / BPA Driven Protocol    [COMPLETED] Insert Peripheral IV **AND** sodium chloride    sodium chloride    [COMPLETED] Insert Peripheral IV **AND** sodium chloride    sodium  "chloride    sodium chloride      VITAL SIGNS  Vitals:    06/13/24 2100 06/14/24 0131 06/14/24 0500 06/14/24 0602   BP: 119/55 119/52 131/61 124/57   BP Location: Left arm Left arm Left arm    Patient Position: Lying Lying Lying    Pulse: 73 63 63 73   Resp: 14 15 15    Temp: 98 °F (36.7 °C) 97.8 °F (36.6 °C) 98 °F (36.7 °C)    TempSrc: Oral Oral Oral    SpO2: 98% 95% 96%    Weight:   115 kg (253 lb 1.4 oz)    Height:           Flowsheet Rows      Flowsheet Row First Filed Value   Admission Height 160 cm (63\") Documented at 06/11/2024 1702   Admission Weight 120 kg (263 lb 10.7 oz) Documented at 06/11/2024 1702               Physical Exam  VITALS REVIEWED    General:      well developed, in no acute distress.    Head:      normocephalic and atraumatic.    Eyes:      PERRL/EOM intact, conjunctiva and sclera clear with out nystagmus.    Neck:      no masses, thyromegaly,  trachea central with normal respiratory effort and PMI displaced laterally  Lungs:      Clear  Heart:       Regular rate and rhythm  Msk:      no deformity or scoliosis noted of thoracic or lumbar spine.    Pulses:      pulses normal in all 4 extremities.    Extremities:       No lower extremity edema  Neurologic:      no focal deficits.   alert oriented x3  Skin:      intact without lesions or rashes.    Psych:      alert and cooperative; normal mood and affect; normal attention span and concentration.          LAB RESULTS (LAST 7 DAYS)    CBC  Results from last 7 days   Lab Units 06/14/24  0321 06/13/24  0316 06/12/24  0007 06/11/24  1804   WBC 10*3/mm3 5.78 6.03 4.83 5.76   RBC 10*6/mm3 3.30* 3.54* 3.05* 3.25*   HEMOGLOBIN g/dL 10.9* 11.6* 10.2* 10.7*   HEMATOCRIT % 33.6* 36.3 31.8* 33.6*   MCV fL 101.8* 102.5* 104.3* 103.4*   PLATELETS 10*3/mm3 190 189 170 162       BMP  Results from last 7 days   Lab Units 06/14/24  0321 06/13/24  0316 06/12/24  1327 06/12/24  0007 06/11/24  1804   SODIUM mmol/L 137 135* 135* 138 138   POTASSIUM mmol/L 4.4 4.1 " 4.7 4.2 5.0   CHLORIDE mmol/L 103 98 100 108* 106   CO2 mmol/L 24.9 26.4 23.1 21.6* 23.3   BUN mg/dL 44* 45* 29* 28* 33*   CREATININE mg/dL 1.57* 1.56* 1.32* 1.19* 1.36*   GLUCOSE mg/dL 108* 112* 122* 93 95   MAGNESIUM mg/dL 2.2 2.8* 1.7  --  1.8   PHOSPHORUS mg/dL 3.7 3.9  --   --   --        CMP   Results from last 7 days   Lab Units 06/14/24  0321 06/13/24  0316 06/12/24  1327 06/12/24  0007 06/11/24  1804   SODIUM mmol/L 137 135* 135* 138 138   POTASSIUM mmol/L 4.4 4.1 4.7 4.2 5.0   CHLORIDE mmol/L 103 98 100 108* 106   CO2 mmol/L 24.9 26.4 23.1 21.6* 23.3   BUN mg/dL 44* 45* 29* 28* 33*   CREATININE mg/dL 1.57* 1.56* 1.32* 1.19* 1.36*   GLUCOSE mg/dL 108* 112* 122* 93 95   ALBUMIN g/dL 3.6 3.7  --  3.5 3.7   BILIRUBIN mg/dL 0.4 0.5  --  0.4 0.4   ALK PHOS U/L 108 115  --  107 113   AST (SGOT) U/L 22 24  --  25 33*   ALT (SGPT) U/L 15 17  --  15 19         BNP        TROPONIN  Results from last 7 days   Lab Units 06/11/24 2021   HSTROP T ng/L 38*       CoAg  Results from last 7 days   Lab Units 06/12/24  0007   INR  1.08       Creatinine Clearance  Estimated Creatinine Clearance: 36.7 mL/min (A) (by C-G formula based on SCr of 1.57 mg/dL (H)).    ABG          EKG    I personally reviewed the patient's EKG/Telemetry data: Sinus rhythm      Assessment & Plan       Acute CHF    Acute exacerbation of CHF (congestive heart failure)      Faby Le is a 79-year-old female patient who has atrial fibrillation and atypical atrial flutter, she does have a dual-chamber pacemaker, she underwent complex arrhythmia ablation on 3/5/2024 by Dr. Herrera.  Patient presented to the hospital with A-fib with rapid ventricular rates.  Tikosyn therapy was initiated on 6/12/2024 at 250 mcg every 12 hours.    Patient self converted to sinus rhythm with Tikosyn.  No need for cardioversion.  Potentially she can be discharged home tomorrow after her sixth dose.  QTc is okay on EKG and rhythm strips.  None of the medicines that she is  on currently prolonged QT.      I discussed the patients findings and my recommendations with patient and agrees with outlined plan.    Pacheco Aleman MD  06/14/24  08:33 EDT

## 2024-06-14 NOTE — THERAPY TREATMENT NOTE
Subjective: Pt agreeable to therapeutic plan of care.  Cognition: oriented to Person, Place, Time, and Situation, memory: Normal, arousal/alertness: Alert and Attentive, safety/judgement: good, and awareness of deficits: good awareness of safety precautions and good awareness of deficits    Objective: Orthostatic but with mild symptoms today. Plan one more day of medication tx then home tomorrow.    Bed Mobility: N/A or Not attempted.   Functional Transfers: SBA     Balance: unsupported, dynamic, with UE support, and standing CGA  Functional Ambulation: CGA and with adaptive equipment, cane. Walked in room for 10 min. BP recovered to baseline after moving around a little. Coached pt on doing leg exercises and getting up slowly with her Rolator at home and having someone nearby when she gets up.    Upper Body Dressing: Max-A  ADL Position: unsupported standing  ADL Comments: assisted pt to don a front closure bra and ABD binder to trial for comfort and non-medical mgmt of orthostatic hypotension. Pt tolerated well.    Vitals: Orthostatic    Pain: 0 VAS  Location: denies  Interventions for pain: Repositioned, Increased Activity, and Therapeutic Presence  Education: Provided education on the importance of mobility in the acute care setting, Verbal/Tactile Cues, ADL training, and Transfer Training    Assessment: Faby Le presents with ADL impairments affecting function including balance, endurance / activity tolerance, and strength. Demonstrated functioning below baseline abilities indicate the need for continued skilled intervention while inpatient. Tolerating session today without incident. Has remained in Sinus rhythm and cardiology now are not planning ablation or other more invasive procedures. Pt is to have one more day of medical mgmt then home with family.Her OH has been persistent but this date pt had very little symptoms getting up and her BP recovered quickly and she was then able to walk and be upright  "for about 7 minutes in the room with CGA to SBA & pt using her cane. Pt has Rolator and plans to use this at home for a while. Will continue to follow and progress as tolerated.     Plan/Recommendations:   Low Intensity Therapy recommended post-acute care - This is recommended as therapy feels this patient would require 2-3 visits per week. OP or HH would be the best option depending on patient's home bound status. Consider, if the patient has other  \"skilled\" needs such as wounds, IV antibiotics, etc. Combined with \"low intensity\" could also equate to a SNF. If patient is medically complex, consider LTAC.. Pt requires no DME at discharge.     Pt desires Home with Home Health and Home with family assist at discharge. Pt cooperative; agreeable to therapeutic recommendations and plan of care.     Modified Kerri: 4 = Moderately severe disability (Unable to attend to own bodily needs without assistance, and unable to walk unassisted)     Post-Tx Position: Up in Chair, Alarms activated, and Call light and personal items within reach  PPE: gloves    "

## 2024-06-14 NOTE — PLAN OF CARE
Goal Outcome Evaluation:         Patient self converted to sinus rhythm with Tikosyn. Tikosyn for 6 dose course with plan to discharge on oral Tikosyn likely tomorrow as per Hospitalist. No further concerns. Will continue to monitor

## 2024-06-14 NOTE — PLAN OF CARE
Goal Outcome Evaluation:  Plan of Care Reviewed With: patient        Progress: improving           Patient is currently NPO for a possible Cardioversion.    Plan of care is ongoing.

## 2024-06-14 NOTE — PLAN OF CARE
"Goal Outcome Evaluation:     Assessment: Faby Le presents with ADL impairments affecting function including balance, endurance / activity tolerance, and strength. Demonstrated functioning below baseline abilities indicate the need for continued skilled intervention while inpatient. Tolerating session today without incident. Has remained in Sinus rhythm and cardiology now are not planning ablation or other more invasive procedures. Pt is to have one more day of medical mgmt then home with family.Her OH has been persistent but this date pt had very little symptoms getting up and her BP recovered quickly and she was then able to walk and be upright for about 7 minutes in the room with CGA to SBA & pt using her cane. Pt has Rolator and plans to use this at home for a while. Will continue to follow and progress as tolerated.     Plan/Recommendations:   Low Intensity Therapy recommended post-acute care - This is recommended as therapy feels this patient would require 2-3 visits per week. OP or HH would be the best option depending on patient's home bound status. Consider, if the patient has other  \"skilled\" needs such as wounds, IV antibiotics, etc. Combined with \"low intensity\" could also equate to a SNF. If patient is medically complex, consider LTAC.. Pt requires no DME at discharge.     Pt desires Home with Home Health and Home with family assist at discharge. Pt cooperative; agreeable to therapeutic recommendations and plan of care.     Modified [place]: 4 = Moderately severe disability (Unable to attend to own bodily needs without assistance, and unable to walk unassisted)   "

## 2024-06-14 NOTE — PROGRESS NOTES
[facility] MEDICINE SERVICE  DAILY PROGRESS NOTE    NAME: Faby Le  : 1945  MRN: [id number]      LOS: 2 days     PROVIDER OF SERVICE: Sly Sandy MD    Chief Complaint: Acute CHF    Subjective:     Interval History:  History taken from: patient chart RN    Patient doing well today, she denies any significant complaints including no chest pain or shortness of breath.  She converted to NSR overnight.      Review of Systems:     Pertinent positives as noted in HPI/subjective.  All other systems were reviewed and are negative.    Objective:     Vital Signs  Temp:  [97.6 °F (36.4 °C)-98.1 °F (36.7 °C)] 98.1 °F (36.7 °C)  Heart Rate:  [] 73  Resp:  [14-16] 16  BP: (102-139)/(44-61) 139/52   Body mass index is 44.82 kg/m².    Physical Exam  General: Awake, alert, NAD  Eyes: PERRL, EOMI, conjunctivae are clear  Cardiovascular: Regular rate and rhythm, no murmurs  Respiratory: Clear to auscultation bilaterally, no wheezing or rales, unlabored breathing  Abdomen: Soft, nontender, positive bowel sounds, no guarding  Neurologic: A&O, CN grossly intact, moves all extremities spontaneously  Musculoskeletal: Normal range of motion, no other gross deformities  Skin: Warm, dry    Scheduled Meds   allopurinol, 300 mg, Oral, Daily  atorvastatin, 20 mg, Oral, Daily  cetirizine, 10 mg, Oral, Daily  cholecalciferol, 1,000 Units, Oral, Daily  dofetilide, 250 mcg, Oral, Q12H  ferrous sulfate, 324 mg, Oral, Daily With Breakfast  [Held by provider] furosemide, 40 mg, Intravenous, Daily  levothyroxine, 88 mcg, Oral, Q AM  montelukast, 10 mg, Oral, Nightly  multivitamin, 1 tablet, Oral, Daily  pantoprazole, 40 mg, Oral, Daily  rivaroxaban, 15 mg, Oral, Daily With Dinner  senna-docusate sodium, 2 tablet, Oral, BID  sodium chloride, 10 mL, Intravenous, Q12H  spironolactone, 12.5 mg, Oral, Daily  cyanocobalamin, 1,000 mcg, Oral, Daily       PRN Meds     Azelastine HCl    senna-docusate sodium **AND** polyethylene  glycol **AND** bisacodyl **AND** bisacodyl    Calcium Replacement - Follow Nurse / BPA Driven Protocol    Magnesium Cardiology Dose Replacement - Follow Nurse / BPA Driven Protocol    nitroglycerin    Pharmacy Consult    Phosphorus Replacement - Follow Nurse / BPA Driven Protocol    Potassium Replacement - Follow Nurse / BPA Driven Protocol    [COMPLETED] Insert Peripheral IV **AND** sodium chloride    sodium chloride    [COMPLETED] Insert Peripheral IV **AND** sodium chloride    sodium chloride    sodium chloride   Infusions         Diagnostic Data    Results from last 7 days   Lab Units 06/14/24  0321   WBC 10*3/mm3 5.78   HEMOGLOBIN g/dL 10.9*   HEMATOCRIT % 33.6*   PLATELETS 10*3/mm3 190   GLUCOSE mg/dL 108*   CREATININE mg/dL 1.57*   BUN mg/dL 44*   SODIUM mmol/L 137   POTASSIUM mmol/L 4.4   AST (SGOT) U/L 22   ALT (SGPT) U/L 15   ALK PHOS U/L 108   BILIRUBIN mg/dL 0.4   ANION GAP mmol/L 9.1       No radiology results for the last day      I reviewed the patient's new clinical results.  I reviewed the patient's new imaging results and agree with the interpretation.    Assessment/Plan:     Active and Resolved Problems  Active Hospital Problems    Diagnosis  POA    **Acute CHF [I50.9]  Yes    Acute exacerbation of CHF (congestive heart failure) [I50.9]  Yes      Resolved Hospital Problems   No resolved problems to display.       79-year-old female with a history of A-fib s/p ablation on Xarelto, tachybradycardia syndrome status post PPM, TIA, hypertension, ROSA CAD, SHEREE, hypothyroidism, hypertension, HFpEF admitted to [facility] 6/11 after being sent from PCPs office with lightheadedness and concern for pacemaker not functioning correctly       Recurrent atrial fibrillation with tachybradycardia syndrome  -Patient currently has PPM but presented with A-fib with RVR  -She has previous history of ablation for A-fib  -She was started on Tikosyn here and has converted to NSR overnight  -Continue Tikosyn for 6 dose  course with plan to discharge on oral Tikosyn likely tomorrow  -Continue Xarelto   -Continue beta-blocker therapy for rate control    Acute on chronic diastolic heart failure  -Diuresed well with Lasix  -Continue diuretic therapy with spironolactone as well  -Continue GDMT    CKD stage III  -Renal function at baseline    Hypothyroidism  -Continue home dose of levothyroxine 88 mcg    Gout  -Continue home dose of allopurinol      VTE Prophylaxis:  Pharmacologic VTE prophylaxis orders are present.         Code status is   Code Status and Medical Interventions:   Ordered at: 06/12/24 3147     Code Status (Patient has no pulse and is not breathing):    CPR (Attempt to Resuscitate)     Medical Interventions (Patient has pulse or is breathing):    Full Support       Plan for disposition: DC home on 6/15    Signature: Electronically signed by Sly Sandy MD, 06/14/24, 13:52 EDT.  [facility] Hospitalist Team    Part of this note may be an electronic transcription/translation of spoken language to printed text using the Dragon Dictation System.

## 2024-06-14 NOTE — CASE MANAGEMENT/SOCIAL WORK
Continued Stay Note  KOFI Galarza     Patient Name: Faby Le  MRN: [id number]  Today's Date: 6/14/2024    Admit Date: 6/11/2024    Plan: Return home with family- pt lives with daughter and her family. No cost for Tikosyn   Discharge Plan       Row Name 06/14/24 1711       Plan    Plan Return home with family- pt lives with daughter and her family. No cost for Tikosyn    Patient/Family in Agreement with Plan yes    Plan Comments BArriers to discharge: Converted to NSR.  Plan to DC saturday after 6th dose of Tikosyn             Expected Discharge Date and Time       Expected Discharge Date Expected Discharge Time    Nelson 15, 2024           Marivel Vyas RN     Office Phone [phone]  Office Cell [phone]

## 2024-06-15 ENCOUNTER — READMISSION MANAGEMENT (OUTPATIENT)
Dept: CALL CENTER | Facility: HOSPITAL | Age: 79
End: 2024-06-15
Payer: MEDICARE

## 2024-06-15 VITALS
HEIGHT: 63 IN | HEART RATE: 73 BPM | SYSTOLIC BLOOD PRESSURE: 136 MMHG | BODY MASS INDEX: 44.92 KG/M2 | WEIGHT: 253.53 LBS | TEMPERATURE: 97.4 F | OXYGEN SATURATION: 96 % | DIASTOLIC BLOOD PRESSURE: 54 MMHG | RESPIRATION RATE: 17 BRPM

## 2024-06-15 PROBLEM — I50.22 CHRONIC HFREF (HEART FAILURE WITH REDUCED EJECTION FRACTION): Status: ACTIVE | Noted: 2024-06-15

## 2024-06-15 LAB
ALBUMIN SERPL-MCNC: 3.6 G/DL (ref 3.5–5.2)
ALBUMIN/GLOB SERPL: 1.5 G/DL
ALP SERPL-CCNC: 109 U/L (ref 39–117)
ALT SERPL W P-5'-P-CCNC: 12 U/L (ref 1–33)
ANION GAP SERPL CALCULATED.3IONS-SCNC: 9.3 MMOL/L (ref 5–15)
AST SERPL-CCNC: 23 U/L (ref 1–32)
BASOPHILS # BLD AUTO: 0.04 10*3/MM3 (ref 0–0.2)
BASOPHILS NFR BLD AUTO: 0.7 % (ref 0–1.5)
BH CV ECHO LEFT VENTRICLE GLOBAL LONGITUDINAL STRAIN: -15.4 %
BH CV ECHO MEAS - AO MAX PG: 8.4 MMHG
BH CV ECHO MEAS - AO MEAN PG: 5 MMHG
BH CV ECHO MEAS - AO V2 MAX: 145 CM/SEC
BH CV ECHO MEAS - AO V2 VTI: 31 CM
BH CV ECHO MEAS - AVA(I,D): 1.69 CM2
BH CV ECHO MEAS - EDV(CUBED): 110.6 ML
BH CV ECHO MEAS - EDV(MOD-SP4): 88.1 ML
BH CV ECHO MEAS - EF(MOD-BP): 52 %
BH CV ECHO MEAS - EF(MOD-SP4): 52.2 %
BH CV ECHO MEAS - ESV(CUBED): 39.3 ML
BH CV ECHO MEAS - ESV(MOD-SP4): 42.1 ML
BH CV ECHO MEAS - FS: 29.2 %
BH CV ECHO MEAS - IVS/LVPW: 1 CM
BH CV ECHO MEAS - IVSD: 0.8 CM
BH CV ECHO MEAS - LA DIMENSION: 4.3 CM
BH CV ECHO MEAS - LAT PEAK E' VEL: 9.5 CM/SEC
BH CV ECHO MEAS - LV DIASTOLIC VOL/BSA (35-75): 41.2 CM2
BH CV ECHO MEAS - LV MASS(C)D: 126.7 GRAMS
BH CV ECHO MEAS - LV MAX PG: 4.3 MMHG
BH CV ECHO MEAS - LV MEAN PG: 2 MMHG
BH CV ECHO MEAS - LV SYSTOLIC VOL/BSA (12-30): 19.7 CM2
BH CV ECHO MEAS - LV V1 MAX: 104 CM/SEC
BH CV ECHO MEAS - LV V1 VTI: 23.1 CM
BH CV ECHO MEAS - LVIDD: 4.8 CM
BH CV ECHO MEAS - LVIDS: 3.4 CM
BH CV ECHO MEAS - LVOT AREA: 2.27 CM2
BH CV ECHO MEAS - LVOT DIAM: 1.7 CM
BH CV ECHO MEAS - LVPWD: 0.8 CM
BH CV ECHO MEAS - MED PEAK E' VEL: 6.6 CM/SEC
BH CV ECHO MEAS - MV A MAX VEL: 51.9 CM/SEC
BH CV ECHO MEAS - MV DEC SLOPE: 260 CM/SEC2
BH CV ECHO MEAS - MV DEC TIME: 0.23 SEC
BH CV ECHO MEAS - MV E MAX VEL: 77 CM/SEC
BH CV ECHO MEAS - MV E/A: 1.48
BH CV ECHO MEAS - MV MAX PG: 3.1 MMHG
BH CV ECHO MEAS - MV MEAN PG: 1 MMHG
BH CV ECHO MEAS - MV P1/2T: 98 MSEC
BH CV ECHO MEAS - MV V2 VTI: 27.5 CM
BH CV ECHO MEAS - MVA(P1/2T): 2.24 CM2
BH CV ECHO MEAS - MVA(VTI): 1.91 CM2
BH CV ECHO MEAS - PA ACC TIME: 0.1 SEC
BH CV ECHO MEAS - PA V2 MAX: 94.1 CM/SEC
BH CV ECHO MEAS - RAP SYSTOLE: 3 MMHG
BH CV ECHO MEAS - RV MAX PG: 1.93 MMHG
BH CV ECHO MEAS - RV V1 MAX: 69.5 CM/SEC
BH CV ECHO MEAS - RV V1 VTI: 11 CM
BH CV ECHO MEAS - RVDD: 4.7 CM
BH CV ECHO MEAS - SV(LVOT): 52.4 ML
BH CV ECHO MEAS - SV(MOD-SP4): 46 ML
BH CV ECHO MEAS - SVI(LVOT): 24.5 ML/M2
BH CV ECHO MEAS - SVI(MOD-SP4): 21.5 ML/M2
BH CV ECHO MEAS - TAPSE (>1.6): 2.04 CM
BH CV ECHO MEAS RV FREE WALL STRAIN: -17.4 %
BH CV ECHO MEASUREMENTS AVERAGE E/E' RATIO: 9.57
BH CV XLRA - TDI S': 13.2 CM/SEC
BILIRUB SERPL-MCNC: 0.4 MG/DL (ref 0–1.2)
BUN SERPL-MCNC: 38 MG/DL (ref 8–23)
BUN/CREAT SERPL: 24.1 (ref 7–25)
CALCIUM SPEC-SCNC: 9.7 MG/DL (ref 8.6–10.5)
CHLORIDE SERPL-SCNC: 102 MMOL/L (ref 98–107)
CO2 SERPL-SCNC: 26.7 MMOL/L (ref 22–29)
CREAT SERPL-MCNC: 1.58 MG/DL (ref 0.57–1)
DEPRECATED RDW RBC AUTO: 50 FL (ref 37–54)
EGFRCR SERPLBLD CKD-EPI 2021: 33.6 ML/MIN/1.73
EOSINOPHIL # BLD AUTO: 0.19 10*3/MM3 (ref 0–0.4)
EOSINOPHIL NFR BLD AUTO: 3.2 % (ref 0.3–6.2)
ERYTHROCYTE [DISTWIDTH] IN BLOOD BY AUTOMATED COUNT: 13.2 % (ref 12.3–15.4)
GLOBULIN UR ELPH-MCNC: 2.4 GM/DL
GLUCOSE SERPL-MCNC: 105 MG/DL (ref 65–99)
HCT VFR BLD AUTO: 32.6 % (ref 34–46.6)
HGB BLD-MCNC: 10.6 G/DL (ref 12–15.9)
IMM GRANULOCYTES # BLD AUTO: 0.01 10*3/MM3 (ref 0–0.05)
IMM GRANULOCYTES NFR BLD AUTO: 0.2 % (ref 0–0.5)
LEFT ATRIUM VOLUME INDEX: 28.1 ML/M2
LYMPHOCYTES # BLD AUTO: 2.15 10*3/MM3 (ref 0.7–3.1)
LYMPHOCYTES NFR BLD AUTO: 36.1 % (ref 19.6–45.3)
MAGNESIUM SERPL-MCNC: 2 MG/DL (ref 1.6–2.4)
MCH RBC QN AUTO: 33.7 PG (ref 26.6–33)
MCHC RBC AUTO-ENTMCNC: 32.5 G/DL (ref 31.5–35.7)
MCV RBC AUTO: 103.5 FL (ref 79–97)
MONOCYTES # BLD AUTO: 0.61 10*3/MM3 (ref 0.1–0.9)
MONOCYTES NFR BLD AUTO: 10.2 % (ref 5–12)
NEUTROPHILS NFR BLD AUTO: 2.96 10*3/MM3 (ref 1.7–7)
NEUTROPHILS NFR BLD AUTO: 49.6 % (ref 42.7–76)
NRBC BLD AUTO-RTO: 0 /100 WBC (ref 0–0.2)
PHOSPHATE SERPL-MCNC: 4.1 MG/DL (ref 2.5–4.5)
PLATELET # BLD AUTO: 172 10*3/MM3 (ref 140–450)
PMV BLD AUTO: 9.8 FL (ref 6–12)
POTASSIUM SERPL-SCNC: 4.5 MMOL/L (ref 3.5–5.2)
PROT SERPL-MCNC: 6 G/DL (ref 6–8.5)
QT INTERVAL: 450 MS
QT INTERVAL: 454 MS
QTC INTERVAL: 458 MS
QTC INTERVAL: 466 MS
RBC # BLD AUTO: 3.15 10*6/MM3 (ref 3.77–5.28)
SINUS: 2.8 CM
SODIUM SERPL-SCNC: 138 MMOL/L (ref 136–145)
WBC NRBC COR # BLD AUTO: 5.96 10*3/MM3 (ref 3.4–10.8)

## 2024-06-15 PROCEDURE — 97116 GAIT TRAINING THERAPY: CPT

## 2024-06-15 PROCEDURE — 80053 COMPREHEN METABOLIC PANEL: CPT | Performed by: HOSPITALIST

## 2024-06-15 PROCEDURE — 97112 NEUROMUSCULAR REEDUCATION: CPT

## 2024-06-15 PROCEDURE — 93005 ELECTROCARDIOGRAM TRACING: CPT | Performed by: INTERNAL MEDICINE

## 2024-06-15 PROCEDURE — 99232 SBSQ HOSP IP/OBS MODERATE 35: CPT | Performed by: NURSE PRACTITIONER

## 2024-06-15 PROCEDURE — 85025 COMPLETE CBC W/AUTO DIFF WBC: CPT | Performed by: HOSPITALIST

## 2024-06-15 PROCEDURE — 83735 ASSAY OF MAGNESIUM: CPT | Performed by: HOSPITALIST

## 2024-06-15 PROCEDURE — 84100 ASSAY OF PHOSPHORUS: CPT | Performed by: HOSPITALIST

## 2024-06-15 PROCEDURE — 97530 THERAPEUTIC ACTIVITIES: CPT

## 2024-06-15 RX ORDER — DOFETILIDE 0.25 MG/1
250 CAPSULE ORAL EVERY 12 HOURS
Qty: 60 CAPSULE | Refills: 3 | Status: SHIPPED | OUTPATIENT
Start: 2024-06-15

## 2024-06-15 RX ADMIN — SPIRONOLACTONE 12.5 MG: 25 TABLET ORAL at 10:01

## 2024-06-15 RX ADMIN — Medication 10 ML: at 10:04

## 2024-06-15 RX ADMIN — DOFETILIDE 250 MCG: 0.25 CAPSULE ORAL at 06:03

## 2024-06-15 RX ADMIN — THERA TABS 1 TABLET: TAB at 10:01

## 2024-06-15 RX ADMIN — CYANOCOBALAMIN TAB 1000 MCG 1000 MCG: 1000 TAB at 10:03

## 2024-06-15 RX ADMIN — ALLOPURINOL 300 MG: 300 TABLET ORAL at 10:03

## 2024-06-15 RX ADMIN — FERROUS SULFATE TAB EC 324 MG (65 MG FE EQUIVALENT) 324 MG: 324 (65 FE) TABLET DELAYED RESPONSE at 10:01

## 2024-06-15 RX ADMIN — CETIRIZINE HYDROCHLORIDE 10 MG: 10 TABLET, FILM COATED ORAL at 10:03

## 2024-06-15 RX ADMIN — LEVOTHYROXINE SODIUM 88 MCG: 0.09 TABLET ORAL at 06:03

## 2024-06-15 RX ADMIN — ATORVASTATIN CALCIUM 20 MG: 20 TABLET, FILM COATED ORAL at 10:03

## 2024-06-15 RX ADMIN — Medication 1000 UNITS: at 10:01

## 2024-06-15 RX ADMIN — PANTOPRAZOLE SODIUM 40 MG: 40 TABLET, DELAYED RELEASE ORAL at 10:01

## 2024-06-15 NOTE — OUTREACH NOTE
Prep Survey      Flowsheet Row Responses   [facility] patient discharged from? [facility]   Is LACE score < 7 ? No   Eligibility [facility]   Date of Admission 06/11/24   Date of Discharge 06/15/24   Discharge Disposition Home or Self Care   Discharge diagnosis Acute CHF   Does the patient have one of the following disease processes/diagnoses(primary or secondary)? CHF   Does the patient have Home health ordered? No   Is there a DME ordered? No   Medication alerts for this patient see AVS   Prep survey completed? Yes            Leeann VIVEROS - Registered Nurse

## 2024-06-15 NOTE — PROGRESS NOTES
"    Reason for follow-up: Atrial fibrillation with RVR    Patient seen and examined, chart and labs reviewed.  Sitting in bedside chair reports feeling well.  Denies any chest discomfort, shortness of breath.  Has been up in room with no issues.    Creatinine 1.58  Hemoglobin 10.6     Patient Care Team:  Leonor Adam MD as PCP - General  Maggi Horn MD as Consulting Physician (Pain Medicine)  Ratna Zavala MD as Consulting Physician (Cardiology)  Nora Foley APRN as Nurse Practitioner (Gastroenterology)  Gianluca Walton MD as Consulting Physician (Pulmonary Disease)  Nathanael Murillo MD as Consulting Physician (Allergy and Immunology)  Delfin Thorpe MD as Surgeon (Orthopedic Surgery)  Josh Loomis MD as Consulting Physician (Gastroenterology)  Bo Ruggiero MD as Consulting Physician (Urology)    Subjective .   Faby S Rey is doing fair today     Review of Systems   All other systems reviewed and are negative.      Celecoxib    Scheduled Meds:    Continuous Infusions:No current facility-administered medications for this encounter.    PRN Meds:.      VITAL SIGNS  Vitals:    06/15/24 0100 06/15/24 0500 06/15/24 0946 06/15/24 1001   BP:  125/53 136/54    BP Location:  Left arm Left arm    Patient Position:  Lying Sitting    Pulse:  68 65 73   Resp:  16 17    Temp: 97.3 °F (36.3 °C) 97.7 °F (36.5 °C) 97.4 °F (36.3 °C)    TempSrc: Oral Oral Oral    SpO2:       Weight:  115 kg (253 lb 8.5 oz)     Height:           Flowsheet Rows      Flowsheet Row First Filed Value   Admission Height 160 cm (63\") Documented at 06/11/2024 1702   Admission Weight 120 kg (263 lb 10.7 oz) Documented at 06/11/2024 1702               Physical Exam  VITALS REVIEWED    General:      well developed, in no acute distress.    Head:      normocephalic and atraumatic.    Eyes:      PERRL/EOM intact, conjunctiva and sclera clear with out nystagmus.    Neck:      no masses, thyromegaly,  trachea " central with normal respiratory effort and PMI displaced laterally  Lungs:      Clear  Heart:       Irregular rhythm  Msk:      no deformity or scoliosis noted of thoracic or lumbar spine.    Pulses:      pulses normal in all 4 extremities.    Extremities:       No lower extremity edema  Neurologic:      no focal deficits.   alert oriented x3  Skin:      intact without lesions or rashes.    Psych:      alert and cooperative; normal mood and affect; normal attention span and concentration.          LAB RESULTS (LAST 7 DAYS)    CBC  Results from last 7 days   Lab Units 06/15/24  0255 06/14/24 0321 06/13/24 0316 06/12/24  0007 06/11/24  1804   WBC 10*3/mm3 5.96 5.78 6.03 4.83 5.76   RBC 10*6/mm3 3.15* 3.30* 3.54* 3.05* 3.25*   HEMOGLOBIN g/dL 10.6* 10.9* 11.6* 10.2* 10.7*   HEMATOCRIT % 32.6* 33.6* 36.3 31.8* 33.6*   MCV fL 103.5* 101.8* 102.5* 104.3* 103.4*   PLATELETS 10*3/mm3 172 190 189 170 162       BMP  Results from last 7 days   Lab Units 06/15/24  0255 06/14/24  0321 06/13/24  0316 06/12/24  1327 06/12/24  0007 06/11/24  1804   SODIUM mmol/L 138 137 135* 135* 138 138   POTASSIUM mmol/L 4.5 4.4 4.1 4.7 4.2 5.0   CHLORIDE mmol/L 102 103 98 100 108* 106   CO2 mmol/L 26.7 24.9 26.4 23.1 21.6* 23.3   BUN mg/dL 38* 44* 45* 29* 28* 33*   CREATININE mg/dL 1.58* 1.57* 1.56* 1.32* 1.19* 1.36*   GLUCOSE mg/dL 105* 108* 112* 122* 93 95   MAGNESIUM mg/dL 2.0 2.2 2.8* 1.7  --  1.8   PHOSPHORUS mg/dL 4.1 3.7 3.9  --   --   --        CMP   Results from last 7 days   Lab Units 06/15/24  0255 06/14/24 0321 06/13/24 0316 06/12/24  1327 06/12/24  0007 06/11/24  1804   SODIUM mmol/L 138 137 135* 135* 138 138   POTASSIUM mmol/L 4.5 4.4 4.1 4.7 4.2 5.0   CHLORIDE mmol/L 102 103 98 100 108* 106   CO2 mmol/L 26.7 24.9 26.4 23.1 21.6* 23.3   BUN mg/dL 38* 44* 45* 29* 28* 33*   CREATININE mg/dL 1.58* 1.57* 1.56* 1.32* 1.19* 1.36*   GLUCOSE mg/dL 105* 108* 112* 122* 93 95   ALBUMIN g/dL 3.6 3.6 3.7  --  3.5 3.7   BILIRUBIN mg/dL 0.4  0.4 0.5  --  0.4 0.4   ALK PHOS U/L 109 108 115  --  107 113   AST (SGOT) U/L 23 22 24  --  25 33*   ALT (SGPT) U/L 12 15 17  --  15 19         BNP        TROPONIN  Results from last 7 days   Lab Units 06/11/24 2021   HSTROP T ng/L 38*       CoAg  Results from last 7 days   Lab Units 06/12/24  0007   INR  1.08       Creatinine Clearance  Estimated Creatinine Clearance: 36.4 mL/min (A) (by C-G formula based on SCr of 1.58 mg/dL (H)).    ABG          EKG    I personally reviewed the patient's EKG/Telemetry data: Atrial fibrillation      Assessment & Plan       Acute CHF    Acute exacerbation of CHF (congestive heart failure)    Chronic HFrEF (heart failure with reduced ejection fraction)      Faby Le is a 79-year-old female patient who has atrial fibrillation and atypical atrial flutter, she does have a dual-chamber pacemaker, she underwent complex arrhythmia ablation on 3/5/2024 by Dr. Herrera.  Patient presented to the hospital with A-fib with rapid ventricular rates.  Tikosyn therapy was initiated on 6/12/2024 at 250 mcg every 12 hours.  Plans for cardioversion, however patient spontaneously converted to sinus rhythm and has maintained.  She has tolerated Tikosyn, normal QT intervals.  She is stable for discharge home today.  She should follow-up with Dr. Herrera-call the office Monday for appointment.  Continue current medical plan of care.    I discussed the patients findings and my recommendations with patient and agrees with outlined plan.    CAROL Jimenez  06/15/24  16:49 EDT    Electronically signed by CAROL Jimenez, 06/15/24, 4:49 PM EDT.  Copied text in this note has been reviewed by me and is accurate as of 06/15/24.

## 2024-06-15 NOTE — PLAN OF CARE
Goal Outcome Evaluation:              Outcome Evaluation: Pt HR up 150,s after PT eval  BP 88/50  see PT notes. new orders for 500cc bolus and dig.  Pt states she could not feel the fast HR but felt dizzy if she moved her head.  Pt to have cardioversion tomorrow. NPO after MN  will cont to monitor

## 2024-06-15 NOTE — PLAN OF CARE
Assessment: Faby Le presents with functional mobility impairments which indicate the need for skilled intervention. Tolerating session today without incident. Pt doing much better today with no drop in BP. Plans on dc home with  and she lives in apt off her dtrs home. Will continue to follow and progress as tolerated.

## 2024-06-15 NOTE — DISCHARGE SUMMARY
[facility] Medicine Services  Discharge Summary    Date of Service: 6/15/2024  Patient Name: Faby Le  : 1945  MRN: [id number]    Date of Admission: 2024  Discharge Diagnosis:   Recurrent A-fib with tachybradycardia syndrome  Acute on chronic diastolic heart failure  CKD stage III  Hypothyroidism  Gout  Morbid obesity    Date of Discharge: 6/15/2024  Primary Care Physician: Lenoor Adam MD      Presenting Problem:   Orthostatic hypotension [I95.1]  Dizziness [R42]  Acute CHF [I50.9]  MARIAM (acute kidney injury) [N17.9]  Acute exacerbation of CHF (congestive heart failure) [I50.9]  Chronic HFrEF (heart failure with reduced ejection fraction) [I50.22]    Active and Resolved Hospital Problems:  Active Hospital Problems    Diagnosis POA    **Acute CHF [I50.9] Yes    Chronic HFrEF (heart failure with reduced ejection fraction) [I50.22] Yes    Acute exacerbation of CHF (congestive heart failure) [I50.9] Yes      Resolved Hospital Problems   No resolved problems to display.         Hospital Course     HPI:  Patient is a 79-year-old female who presented to the hospital with lightheadedness.  Please see H&P for details.    Hospital Course:  The patient has a history of atrial fibrillation with tachybradycardia syndrome.  She has previous PPM placement presented with A-fib with RVR to the hospital.  She had previous history of ablation for her A-fib and has been on multiple medications including currently on Toprol-XL.  However given her previous history, cardiology felt that she would benefit from initiation of Tikosyn.  She was started on Tikosyn protocol and received 6 doses of Tikosyn during her hospital stay, with EKG showing no increase in her QTc.  Her heart rhythm converted back to NSR after the fourth dose of Tikosyn.  She remained in the hospital to complete a 6 dose course and will be discharged on the same dose of Tikosyn.  She will follow-up with cardiology in 2 weeks.   She also presented with acute on chronic diastolic heart failure, likely related to her persistent A-fib with RVR.  She diuresed well with IV Lasix and will be transition back to oral diuretics.  She is stable for discharge.  When she follows up with cardiology, they can determine whether she would be appropriate to initiate ACE inhibitor/ARB and SGLT2 inhibitors.        DISCHARGE Follow Up Recommendations for labs and diagnostics: Follow-up with cardiology in 2 weeks.      Reasons For Change In Medications and Indications for New Medications:      Day of Discharge     Vital Signs:  Temp:  [97.3 °F (36.3 °C)-98.1 °F (36.7 °C)] 97.4 °F (36.3 °C)  Heart Rate:  [65-73] 73  Resp:  [16-17] 17  BP: (106-139)/(40-54) 136/54    Physical Exam:  Physical Exam   General Appearance:  Alert, cooperative, no distress, appears stated age  Head:  Normocephalic, without obvious abnormality, atraumatic  Eyes:  PERRL, conjunctiva/corneas clear, EOM's intact, fundi benign, both eyes  Ears:  Normal TM's and external ear canals, both ears  Nose: Nares normal, septum midline, mucosa normal, no drainage or sinus tenderness  Throat: Lips, mucosa, and tongue normal; teeth and gums normal  Neck: Supple, symmetrical, trachea midline, no adenopathy, thyroid: not enlarged, symmetric, no tenderness/mass/nodules, no carotid bruit or JVD  Lungs:   Clear to auscultation bilaterally, respirations unlabored  Heart:  Regular rate and rhythm, S1, S2 normal, no murmur, rub or gallop  Abdomen:  Soft, non-tender, bowel sounds active all four quadrants,  no masses, no organomegaly  Extremities: Extremities normal, atraumatic, no cyanosis or edema  Pulses: 2+ and symmetric  Skin: Skin color, texture, turgor normal, no rashes or lesions  Neurologic: Normal        Pertinent  and/or Most Recent Results     LAB RESULTS:      Lab 06/15/24  0255 06/14/24  0321 06/13/24  0316 06/12/24  0007 06/11/24  1804   WBC 5.96 5.78 6.03 4.83 5.76   HEMOGLOBIN 10.6* 10.9*  11.6* 10.2* 10.7*   HEMATOCRIT 32.6* 33.6* 36.3 31.8* 33.6*   PLATELETS 172 190 189 170 162   NEUTROS ABS 2.96 2.74 3.11 2.26 3.19   IMMATURE GRANS (ABS) 0.01 0.02 0.00 0.01 0.01   LYMPHS ABS 2.15 2.25 2.18 2.06 1.90   MONOS ABS 0.61 0.55 0.52 0.34 0.48   EOS ABS 0.19 0.17 0.16 0.12 0.14   .5* 101.8* 102.5* 104.3* 103.4*   PROTIME  --   --   --  11.7  --          Lab 06/15/24  0255 06/14/24  0321 06/13/24  0316 06/12/24  1327 06/12/24  0007 06/11/24  1804   SODIUM 138 137 135* 135* 138 138   POTASSIUM 4.5 4.4 4.1 4.7 4.2 5.0   CHLORIDE 102 103 98 100 108* 106   CO2 26.7 24.9 26.4 23.1 21.6* 23.3   ANION GAP 9.3 9.1 10.6 11.9 8.4 8.7   BUN 38* 44* 45* 29* 28* 33*   CREATININE 1.58* 1.57* 1.56* 1.32* 1.19* 1.36*   EGFR 33.6* 33.8* 34.1* 41.7* 47.2* 40.2*   GLUCOSE 105* 108* 112* 122* 93 95   CALCIUM 9.7 9.7 9.9 10.2 9.4 9.7   MAGNESIUM 2.0 2.2 2.8* 1.7  --  1.8   PHOSPHORUS 4.1 3.7 3.9  --   --   --    TSH  --   --   --   --   --  5.670*         Lab 06/15/24  0255 06/14/24  0321 06/13/24  0316 06/12/24  0007 06/11/24  1804   TOTAL PROTEIN 6.0 6.2 6.5 5.9* 6.3   ALBUMIN 3.6 3.6 3.7 3.5 3.7   GLOBULIN 2.4 2.6 2.8 2.4 2.6   ALT (SGPT) 12 15 17 15 19   AST (SGOT) 23 22 24 25 33*   BILIRUBIN 0.4 0.4 0.5 0.4 0.4   ALK PHOS 109 108 115 107 113         Lab 06/12/24  0007 06/11/24 2021 06/11/24  1804   PROBNP  --   --  5,211.0*   HSTROP T  --  38* 35*   PROTIME 11.7  --   --    INR 1.08  --   --              Lab 06/12/24  0007   IRON 48   IRON SATURATION (TSAT) 18*   TIBC 264*   TRANSFERRIN 177*         Brief Urine Lab Results  (Last result in the past 365 days)        Color   Clarity   Blood   Leuk Est   Nitrite   Protein   CREAT   Urine HCG        06/11/24 1839 Yellow   Slightly Cloudy   Trace   Large (3+)   Negative   Negative                 Microbiology Results (last 10 days)       Procedure Component Value - Date/Time    COVID-19, FLU A/B, RSV PCR 1 HR TAT - Swab, Nasopharynx [[id number]]  (Normal) Collected:  06/11/24 2200    Lab Status: Final result Specimen: Swab from Nasopharynx Updated: 06/11/24 2240     COVID19 Not Detected     Influenza A PCR Not Detected     Influenza B PCR Not Detected     RSV, PCR Not Detected    Narrative:      Fact sheet for providers: https://www.fda.gov/media/287499/download    Fact sheet for patients: https://www.fda.gov/media/829361/download    Test performed by PCR.            CT Head Without Contrast    Result Date: 6/11/2024  Impression: Impression: No acute intracranial findings by CT. Electronically Signed: Dnota Jaramillo MD  6/11/2024 7:54 PM EDT  Workstation ID: [id number]    XR Chest 1 View    Result Date: 6/11/2024  Impression: Impression: No acute cardiopulmonary disease. Electronically Signed: Miki Chan MD  6/11/2024 7:01 PM EDT  Workstation ID: [id number]     Results for orders placed during the hospital encounter of 02/15/24    Duplex Carotid Ultrasound CAR    Interpretation Summary    Right internal carotid artery demonstrates a less than 50% stenosis.    Left internal carotid artery demonstrates a less than 50% stenosis.      Results for orders placed during the hospital encounter of 02/15/24    Duplex Carotid Ultrasound CAR    Interpretation Summary    Right internal carotid artery demonstrates a less than 50% stenosis.    Left internal carotid artery demonstrates a less than 50% stenosis.      Results for orders placed during the hospital encounter of 06/11/24    Adult Transthoracic Echo Complete W/ Cont if Necessary Per Protocol    Interpretation Summary    Left ventricular systolic function is normal. Left ventricular ejection fraction appears to be 56 - 60%.    Left ventricular diastolic function was normal.    The left atrial cavity is moderately dilated.    The right atrial cavity is mildly  dilated.      Labs Pending at Discharge:      Procedures Performed           Consults:   Consults       Date and Time Order Name Status Description    6/11/2024 10:23 PM Inpatient  Cardiology Consult Completed     6/11/2024  8:34 PM Cardiology (on-call MD unless specified)      6/11/2024  8:34 PM Hospitalist (on-call MD unless specified)                Discharge Details        Discharge Medications        New Medications        Instructions Start Date   dofetilide 250 MCG capsule  Commonly known as: Tikosyn   250 mcg, Oral, Every 12 Hours             Changes to Medications        Instructions Start Date   Xarelto 15 MG tablet  Generic drug: rivaroxaban  What changed:   medication strength  how much to take  when to take this   15 mg, Oral, Daily With Dinner             Continue These Medications        Instructions Start Date   allopurinol 300 MG tablet  Commonly known as: ZYLOPRIM   300 mg, Oral, Daily      atorvastatin 20 MG tablet  Commonly known as: LIPITOR   20 mg, Oral, Daily      Azelastine HCl 137 MCG/SPRAY solution   2 sprays, Alternating Nares, Daily PRN      cetirizine 10 MG tablet  Commonly known as: zyrTEC   1 tablet, Oral, Daily      cholecalciferol 25 MCG (1000 UT) tablet  Commonly known as: VITAMIN D3   1 tablet, Oral, Daily      cyanocobalamin 1000 MCG tablet  Commonly known as: VITAMIN B-12   1 tablet, Oral, Daily      IRON CR PO   Oral, Daily      levothyroxine 88 MCG tablet  Commonly known as: SYNTHROID, LEVOTHROID   88 mcg, Oral, Daily      montelukast 10 MG tablet  Commonly known as: SINGULAIR   10 mg, Oral, Nightly      multivitamin tablet tablet   Oral, Daily      pantoprazole 40 MG EC tablet  Commonly known as: PROTONIX   40 mg, Oral, Every Morning      spironolactone 25 MG tablet  Commonly known as: ALDACTONE   12.5 mg, Oral, Daily             Stop These Medications      escitalopram 20 MG tablet  Commonly known as: LEXAPRO     metoprolol succinate  MG 24 hr tablet  Commonly known as: TOPROL-XL              Allergies   Allergen Reactions    Celecoxib Itching and Swelling     swelling         Discharge Disposition:     Home-Health Care  Svc    Diet:  Hospital:  Diet Order   Procedures    Diet: Cardiac; Healthy Heart (2-3 Na+); Fluid Consistency: Thin (IDDSI 0)         Discharge Activity:         CODE STATUS:  Code Status and Medical Interventions:   Ordered at: 06/12/24 1708     Code Status (Patient has no pulse and is not breathing):    CPR (Attempt to Resuscitate)     Medical Interventions (Patient has pulse or is breathing):    Full Support         Future Appointments   Date Time Provider Department Center   8/2/2024 10:30 AM Leonor Adam MD [facility]   8/9/2024  9:30 AM Cr Herrera MD [facility]   10/14/2024 10:45 AM Ratna Zavala MD [facility]       Additional Instructions for the Follow-ups that You Need to Schedule       Discharge Follow-up with Specified Provider: Follow-up with cardiology in 2 weeks; 2 Weeks   As directed      To: Follow-up with cardiology in 2 weeks   Follow Up: 2 Weeks                Time spent on Discharge including face to face service:  >30 minutes    Signature: Electronically signed by Sly Sandy MD, 06/15/24, 12:45 EDT.  [facility] Hospitalist Team

## 2024-06-15 NOTE — THERAPY TREATMENT NOTE
"Subjective: Pt agreeable to therapeutic plan of care. Pt feeling better and feels ready to go home after last IV treatment today    Objective:     Bed mobility - Modified-Independent  Transfers - SBA  Ambulation - 50 feet SBA with snehal      Vitals: WNL    Pain: 0 VAS       Education: Provided education on the importance of mobility in the acute care setting, Verbal/Tactile Cues, Transfer Training, and Gait Training    Assessment: Faby Le presents with functional mobility impairments which indicate the need for skilled intervention. Tolerating session today without incident. Pt doing much better today with no drop in BP. Plans on dc home with HH and she lives in apt off her dtrs home. Will continue to follow and progress as tolerated.     Plan/Recommendations:   If medically appropriate, Low Intensity Therapy recommended post-acute care - This is recommended as therapy feels this patient would require 2-3 visits per week. OP or HH would be the best option depending on patient's home bound status. Consider, if the patient has other  \"skilled\" needs such as wounds, IV antibiotics, etc. Combined with \"low intensity\" could also equate to a SNF. If patient is medically complex, consider LTAC. Pt requires no DME at discharge.     Pt desires Home with Home Health and Home with family assist at discharge. Pt cooperative; agreeable to therapeutic recommendations and plan of care.         Basic Mobility 6-click:  Rollin = Total, A lot = 2, A little = 3; 4 = None  Supine>Sit:   1 = Total, A lot = 2, A little = 3; 4 = None   Sit>Stand with arms:  1 = Total, A lot = 2, A little = 3; 4 = None  Bed>Chair:   1 = Total, A lot = 2, A little = 3; 4 = None  Ambulate in room:  1 = Total, A lot = 2, A little = 3; 4 = None  3-5 Steps with railin = Total, A lot = 2, A little = 3; 4 = None  Score: 22    Post-Tx Position: Up in Chair, Alarms activated, and Call light and personal items within reach  PPE: gloves   "

## 2024-06-16 NOTE — CASE MANAGEMENT/SOCIAL WORK
Case Management Discharge Note      Final Note: Home with family     Transportation Services  Private: Car    Final Discharge Disposition Code: 01 - home or self-care

## 2024-06-17 ENCOUNTER — TRANSITIONAL CARE MANAGEMENT TELEPHONE ENCOUNTER (OUTPATIENT)
Dept: CALL CENTER | Facility: HOSPITAL | Age: 79
End: 2024-06-17
Payer: MEDICARE

## 2024-06-17 LAB
QT INTERVAL: 395 MS
QT INTERVAL: 435 MS
QT INTERVAL: 438 MS
QTC INTERVAL: 454 MS
QTC INTERVAL: 475 MS
QTC INTERVAL: 487 MS

## 2024-06-17 NOTE — OUTREACH NOTE
Call Center TCM Note      Flowsheet Row Responses   [facility] patient discharged from? [facility]   Does the patient have one of the following disease processes/diagnoses(primary or secondary)? CHF   TCM attempt successful? Yes   Call start time 0907   Call end time 0912   Discharge diagnosis Acute CHF   Person spoke with today (if not patient) and relationship pt   Meds reviewed with patient/caregiver? Yes   Is the patient having any side effects they believe may be caused by any medication additions or changes? No   Does the patient have all medications ordered at discharge? Yes   Is the patient taking all medications as directed (includes completed medication regime)? Yes   Comments Cardiology 8/9/24,  pt to call cardiology office to see if sooner appt   Does the patient have an appointment with their PCP within 7-14 days of discharge? Yes  [6/20/2024  1:00 PM]   Psychosocial issues? No   Did the patient receive a copy of their discharge instructions? Yes   Nursing interventions Reviewed instructions with patient   What is the patient's perception of their health status since discharge? Improving   Nursing interventions Nurse provided patient education   Is the patient able to teach back signs and symptoms of worsening condition? (i.e. weight gain, shortness of air, etc.) Yes   If the patient is a current smoker, are they able to teach back resources for cessation? Not a smoker   Notified Case Management Education issues   Is the patient able to teach back Heart Failure Zones? Yes   CHF Zone this Call Green Zone   Green Zone Patient reports doing well, No new or worsening shortness of breath, No new swelling -  feet, ankles and legs look normal for you, No chest pain, Physical activity level is normal for you   Green Zone Interventions Daily weight check, Meds as directed, Low sodium diet, Follow up visits planned   TCM call completed? Yes   Wrap up additional comments Pt denies any SOB, dizziness, chest pain.  Reviewed AVS/meds with pt. Pt verified PCP fu appt, and advised to call cardiology for an earlier appt than August.   Call end time 0912   Would this patient benefit from a Referral to [facility] Social Work? No   Is the patient interested in additional calls from an ambulatory ? No             Estela Lemus RN    6/17/2024, 09:14 EDT

## 2024-06-18 ENCOUNTER — TELEPHONE (OUTPATIENT)
Dept: CARDIOLOGY | Facility: CLINIC | Age: 79
End: 2024-06-18
Payer: MEDICARE

## 2024-06-18 LAB
QT INTERVAL: 446 MS
QTC INTERVAL: 480 MS

## 2024-06-18 NOTE — TELEPHONE ENCOUNTER
Name: ReyFaby    Relationship: Self    Best Callback Number: [phone]    HUB PROVIDED THE RELAY MESSAGE FROM THE OFFICE   PATIENT SCHEDULED AS REQUESTED    ADDITIONAL INFORMATION: 6/28/24 WITH RAVEN

## 2024-06-18 NOTE — TELEPHONE ENCOUNTER
Caller: Faby Le    Relationship to patient: Self    Best call back number: [phone]    Patient is needing: PT WAS TOLD TO FOLLOW UP WITH DR. KEY WITHIN 2 WEEKS OF HOSPITAL VISIT. NO AVAILABILITY. PLEASE ADVISE.

## 2024-06-20 ENCOUNTER — OFFICE VISIT (OUTPATIENT)
Dept: FAMILY MEDICINE CLINIC | Facility: CLINIC | Age: 79
End: 2024-06-20
Payer: MEDICARE

## 2024-06-20 VITALS
DIASTOLIC BLOOD PRESSURE: 75 MMHG | TEMPERATURE: 97.5 F | HEART RATE: 60 BPM | WEIGHT: 265.2 LBS | HEIGHT: 63 IN | OXYGEN SATURATION: 98 % | BODY MASS INDEX: 46.99 KG/M2 | SYSTOLIC BLOOD PRESSURE: 118 MMHG

## 2024-06-20 DIAGNOSIS — Z12.31 ENCOUNTER FOR SCREENING MAMMOGRAM FOR MALIGNANT NEOPLASM OF BREAST: ICD-10-CM

## 2024-06-20 DIAGNOSIS — I49.5 TACHY-BRADY SYNDROME: ICD-10-CM

## 2024-06-20 DIAGNOSIS — I50.22 CHRONIC HFREF (HEART FAILURE WITH REDUCED EJECTION FRACTION): ICD-10-CM

## 2024-06-20 DIAGNOSIS — D64.9 ANEMIA, UNSPECIFIED TYPE: ICD-10-CM

## 2024-06-20 DIAGNOSIS — R30.0 DYSURIA: ICD-10-CM

## 2024-06-20 DIAGNOSIS — E66.1 CLASS 3 DRUG-INDUCED OBESITY WITH SERIOUS COMORBIDITY AND BODY MASS INDEX (BMI) OF 45.0 TO 49.9 IN ADULT: ICD-10-CM

## 2024-06-20 DIAGNOSIS — I48.0 PAROXYSMAL ATRIAL FIBRILLATION: ICD-10-CM

## 2024-06-20 DIAGNOSIS — I50.9 ACUTE ON CHRONIC CONGESTIVE HEART FAILURE, UNSPECIFIED HEART FAILURE TYPE: ICD-10-CM

## 2024-06-20 DIAGNOSIS — R42 DIZZINESS: Primary | ICD-10-CM

## 2024-06-20 DIAGNOSIS — F32.A DEPRESSION, UNSPECIFIED DEPRESSION TYPE: ICD-10-CM

## 2024-06-20 LAB
BACTERIA UR QL AUTO: NORMAL /HPF
BASOPHILS # BLD AUTO: 0.05 10*3/MM3 (ref 0–0.2)
BASOPHILS NFR BLD AUTO: 1.1 % (ref 0–1.5)
BILIRUB UR QL STRIP: NEGATIVE
CLARITY UR: CLEAR
COLOR UR: YELLOW
DEPRECATED RDW RBC AUTO: 45.3 FL (ref 37–54)
EOSINOPHIL # BLD AUTO: 0.23 10*3/MM3 (ref 0–0.4)
EOSINOPHIL NFR BLD AUTO: 4.9 % (ref 0.3–6.2)
ERYTHROCYTE [DISTWIDTH] IN BLOOD BY AUTOMATED COUNT: 12.5 % (ref 12.3–15.4)
GLUCOSE UR STRIP-MCNC: NEGATIVE MG/DL
HCT VFR BLD AUTO: 31.6 % (ref 34–46.6)
HGB BLD-MCNC: 10.5 G/DL (ref 12–15.9)
HGB UR QL STRIP.AUTO: NEGATIVE
HOLD SPECIMEN: NORMAL
HYALINE CASTS UR QL AUTO: NORMAL /LPF
IMM GRANULOCYTES # BLD AUTO: 0.01 10*3/MM3 (ref 0–0.05)
IMM GRANULOCYTES NFR BLD AUTO: 0.2 % (ref 0–0.5)
KETONES UR QL STRIP: NEGATIVE
LEUKOCYTE ESTERASE UR QL STRIP.AUTO: ABNORMAL
LYMPHOCYTES # BLD AUTO: 1.61 10*3/MM3 (ref 0.7–3.1)
LYMPHOCYTES NFR BLD AUTO: 34 % (ref 19.6–45.3)
MCH RBC QN AUTO: 33.4 PG (ref 26.6–33)
MCHC RBC AUTO-ENTMCNC: 33.2 G/DL (ref 31.5–35.7)
MCV RBC AUTO: 100.6 FL (ref 79–97)
MONOCYTES # BLD AUTO: 0.46 10*3/MM3 (ref 0.1–0.9)
MONOCYTES NFR BLD AUTO: 9.7 % (ref 5–12)
NEUTROPHILS NFR BLD AUTO: 2.37 10*3/MM3 (ref 1.7–7)
NEUTROPHILS NFR BLD AUTO: 50.1 % (ref 42.7–76)
NITRITE UR QL STRIP: NEGATIVE
NRBC BLD AUTO-RTO: 0 /100 WBC (ref 0–0.2)
PH UR STRIP.AUTO: 6.5 [PH] (ref 5–8)
PLATELET # BLD AUTO: 203 10*3/MM3 (ref 140–450)
PMV BLD AUTO: 10.5 FL (ref 6–12)
PROT UR QL STRIP: NEGATIVE
RBC # BLD AUTO: 3.14 10*6/MM3 (ref 3.77–5.28)
RBC # UR STRIP: NORMAL /HPF
REF LAB TEST METHOD: NORMAL
SP GR UR STRIP: 1.01 (ref 1–1.03)
SQUAMOUS #/AREA URNS HPF: NORMAL /HPF
TSH SERPL DL<=0.05 MIU/L-ACNC: 2.67 UIU/ML (ref 0.27–4.2)
UROBILINOGEN UR QL STRIP: ABNORMAL
WBC # UR STRIP: NORMAL /HPF
WBC NRBC COR # BLD AUTO: 4.73 10*3/MM3 (ref 3.4–10.8)

## 2024-06-20 PROCEDURE — G2211 COMPLEX E/M VISIT ADD ON: HCPCS | Performed by: PREVENTIVE MEDICINE

## 2024-06-20 PROCEDURE — 80053 COMPREHEN METABOLIC PANEL: CPT | Performed by: PREVENTIVE MEDICINE

## 2024-06-20 PROCEDURE — 1160F RVW MEDS BY RX/DR IN RCRD: CPT | Performed by: PREVENTIVE MEDICINE

## 2024-06-20 PROCEDURE — 3074F SYST BP LT 130 MM HG: CPT | Performed by: PREVENTIVE MEDICINE

## 2024-06-20 PROCEDURE — 81001 URINALYSIS AUTO W/SCOPE: CPT | Performed by: PREVENTIVE MEDICINE

## 2024-06-20 PROCEDURE — 83735 ASSAY OF MAGNESIUM: CPT | Performed by: PREVENTIVE MEDICINE

## 2024-06-20 PROCEDURE — 85025 COMPLETE CBC W/AUTO DIFF WBC: CPT | Performed by: PREVENTIVE MEDICINE

## 2024-06-20 PROCEDURE — 1126F AMNT PAIN NOTED NONE PRSNT: CPT | Performed by: PREVENTIVE MEDICINE

## 2024-06-20 PROCEDURE — 1159F MED LIST DOCD IN RCRD: CPT | Performed by: PREVENTIVE MEDICINE

## 2024-06-20 PROCEDURE — 84443 ASSAY THYROID STIM HORMONE: CPT | Performed by: PREVENTIVE MEDICINE

## 2024-06-20 PROCEDURE — 3078F DIAST BP <80 MM HG: CPT | Performed by: PREVENTIVE MEDICINE

## 2024-06-20 PROCEDURE — 36415 COLL VENOUS BLD VENIPUNCTURE: CPT | Performed by: PREVENTIVE MEDICINE

## 2024-06-20 PROCEDURE — 99214 OFFICE O/P EST MOD 30 MIN: CPT | Performed by: PREVENTIVE MEDICINE

## 2024-06-20 NOTE — ASSESSMENT & PLAN NOTE
Patient's (Body mass index is 46.98 kg/m².) indicates that they are morbidly/severely obese (BMI > 40 or > 35 with obesity - related health condition) with health conditions that include hypertension, coronary heart disease, and dyslipidemias . Weight is unchanged. BMI  is above average; BMI management plan is completed. We discussed portion control and increasing exercise.

## 2024-06-20 NOTE — PROGRESS NOTES
Venipuncture performed on Left Arm by Jennifer Vaughn MA  with good hemostasis. Patient tolerated well. 06/20/24

## 2024-06-20 NOTE — PATIENT INSTRUCTIONS
Health Maintenance Due   Topic Date Due    COVID-19 Vaccine (4 - 2023-24 season) 09/01/2023    MAMMOGRAM  03/01/2024

## 2024-06-21 ENCOUNTER — TELEPHONE (OUTPATIENT)
Dept: FAMILY MEDICINE CLINIC | Facility: CLINIC | Age: 79
End: 2024-06-21
Payer: MEDICARE

## 2024-06-21 LAB
ALBUMIN SERPL-MCNC: 3.9 G/DL (ref 3.5–5.2)
ALBUMIN/GLOB SERPL: 1.6 G/DL
ALP SERPL-CCNC: 115 U/L (ref 39–117)
ALT SERPL W P-5'-P-CCNC: 17 U/L (ref 1–33)
ANION GAP SERPL CALCULATED.3IONS-SCNC: 8.6 MMOL/L (ref 5–15)
AST SERPL-CCNC: 21 U/L (ref 1–32)
BILIRUB SERPL-MCNC: 0.4 MG/DL (ref 0–1.2)
BUN SERPL-MCNC: 26 MG/DL (ref 8–23)
BUN/CREAT SERPL: 22.2 (ref 7–25)
CALCIUM SPEC-SCNC: 9.4 MG/DL (ref 8.6–10.5)
CHLORIDE SERPL-SCNC: 106 MMOL/L (ref 98–107)
CO2 SERPL-SCNC: 24.4 MMOL/L (ref 22–29)
CREAT SERPL-MCNC: 1.17 MG/DL (ref 0.57–1)
EGFRCR SERPLBLD CKD-EPI 2021: 48.2 ML/MIN/1.73
GLOBULIN UR ELPH-MCNC: 2.5 GM/DL
GLUCOSE SERPL-MCNC: 80 MG/DL (ref 65–99)
MAGNESIUM SERPL-MCNC: 1.9 MG/DL (ref 1.6–2.4)
POTASSIUM SERPL-SCNC: 4.5 MMOL/L (ref 3.5–5.2)
PROT SERPL-MCNC: 6.4 G/DL (ref 6–8.5)
SODIUM SERPL-SCNC: 139 MMOL/L (ref 136–145)

## 2024-06-21 NOTE — PROGRESS NOTES
Kidney function has improved from 34-48 but still avoid ibuprofen Aleve Motrin and limit x-ray dyes.  There was a trace of leukocytes in your urine but the microscopic looked fine hemoglobin is still decreased at 10.5 which is probably due to your renal function keep your follow-up with Dr. Navarro.  Call if any other questions or concerns

## 2024-06-21 NOTE — TELEPHONE ENCOUNTER
"Relay     \"Kidney function has improved from 34-48 but still avoid ibuprofen Aleve Motrin and limit x-ray dyes.  There was a trace of leukocytes in your urine but the microscopic looked fine hemoglobin is still decreased at 10.5 which is probably due to your renal function keep your follow-up with Dr. Navarro.  Call if any other questions or concerns\"                "

## 2024-06-21 NOTE — PROGRESS NOTES
"Subjective   Faby Le is a 79 y.o. female presents for   Chief Complaint   Patient presents with    Transitional Care Management     F- Heart issues   Tuesday thru saturday       Health Maintenance Due   Topic Date Due    COVID-19 Vaccine (4 - 2023-24 season) 09/01/2023    MAMMOGRAM  03/01/2024       History of Present Illness   History of Present Illness  The patient is a 79-year-old female who is here today to follow up on dizziness, paroxysmal atrial fibrillation, tachy-bradycardia syndrome, encounter for screening mammogram for breast cancer, chronic heart failure with reduced ejection fraction, acute on chronic congestive heart failure, anemia, class 3 drug-induced obesity with serious comorbidities and a body mass index of 46, depression, and unspecified depression type.    The patient continues her regimen of Tikosyn. She has an upcoming appointment with her cardiologist, Dr. Herrera, on 08/09/2024 and a nurse practitioner on 06/28/2024. A substantial amount of fluid was extracted during her hospital stay. She has been monitoring her weight daily as advised by a registered nurse, which has remained stable. She denies any hematuria or hematochezia. Her dizziness has improved, and she does not experience dizziness when rolling over in bed. Her bowel movements are regular, and her appetite is robust. Her sodium intake is minimal.    Vitals:    06/20/24 1259 06/20/24 1300 06/20/24 1302   BP: 142/78 142/78 118/75   BP Location: Right arm Left arm Left arm   Patient Position: Sitting Sitting Standing   Cuff Size: Adult Adult Adult   Pulse: 60 60 60   Temp: 97.5 °F (36.4 °C)     TempSrc: Temporal     SpO2: 98%  98%   Weight: 120 kg (265 lb 3.2 oz)     Height: 160 cm (63\")       Body mass index is 46.98 kg/m².    Current Outpatient Medications on File Prior to Visit   Medication Sig Dispense Refill    allopurinol (ZYLOPRIM) 300 MG tablet Take 1 tablet by mouth once daily 90 tablet 0    atorvastatin (LIPITOR) " 20 MG tablet Take 1 tablet by mouth Daily. 30 tablet 11    Azelastine HCl 137 MCG/SPRAY solution 2 sprays by Alternating Nares route Daily As Needed.      cetirizine (zyrTEC) 10 MG tablet Take 1 tablet by mouth Daily.      cyanocobalamin (VITAMIN B-12) 1000 MCG tablet Take 1 tablet by mouth Daily.      dofetilide (Tikosyn) 250 MCG capsule Take 1 capsule by mouth Every 12 (Twelve) Hours. 60 capsule 3    IRON CR PO Take  by mouth Daily.      levothyroxine (SYNTHROID, LEVOTHROID) 88 MCG tablet Take 1 tablet by mouth once daily 90 tablet 0    montelukast (SINGULAIR) 10 MG tablet Take 1 tablet by mouth Every Night.      multivitamin (MULTI VITAMIN PO) Take  by mouth Daily.      pantoprazole (PROTONIX) 40 MG EC tablet Take 1 tablet by mouth Every Morning.      rivaroxaban (XARELTO) 15 MG tablet Take 1 tablet by mouth Daily With Dinner. Indications: Atrial Fibrillation 30 tablet 2    spironolactone (ALDACTONE) 25 MG tablet Take 1/2 (one-half) tablet by mouth once daily 45 tablet 0    Vitamin D, Cholecalciferol, 25 MCG (1000 UT) tablet Take 1 tablet by mouth Daily.       No current facility-administered medications on file prior to visit.       The following portions of the patient's history were reviewed and updated as appropriate: allergies, current medications, past family history, past medical history, past social history, past surgical history, and problem list.    Review of Systems   Respiratory:  Negative for shortness of breath.    Cardiovascular:  Negative for chest pain and palpitations.   Genitourinary:  Negative for dysuria.   Neurological:  Negative for dizziness and light-headedness.   Psychiatric/Behavioral:  Negative for depressed mood.        Objective   Physical Exam  Vitals reviewed.   Constitutional:       General: She is not in acute distress.     Appearance: She is well-developed. She is obese. She is not ill-appearing or toxic-appearing.   HENT:      Head: Normocephalic and atraumatic.      Right Ear:  Tympanic membrane, ear canal and external ear normal.      Left Ear: Tympanic membrane, ear canal and external ear normal.      Nose: Nose normal.      Mouth/Throat:      Mouth: Mucous membranes are moist.      Pharynx: No posterior oropharyngeal erythema.   Eyes:      Extraocular Movements: Extraocular movements intact.      Conjunctiva/sclera: Conjunctivae normal.      Pupils: Pupils are equal, round, and reactive to light.   Neck:      Vascular: No carotid bruit.   Cardiovascular:      Rate and Rhythm: Normal rate and regular rhythm.      Heart sounds: Normal heart sounds.   Pulmonary:      Effort: Pulmonary effort is normal.      Breath sounds: Normal breath sounds.   Abdominal:      General: Bowel sounds are normal. There is no distension.      Palpations: Abdomen is soft. There is no mass.      Tenderness: There is no abdominal tenderness.   Musculoskeletal:      Cervical back: Neck supple.      Right lower leg: Edema present.      Left lower leg: Edema present.   Lymphadenopathy:      Cervical: No cervical adenopathy.   Skin:     General: Skin is warm.   Neurological:      General: No focal deficit present.      Mental Status: She is alert and oriented to person, place, and time.   Psychiatric:         Mood and Affect: Mood normal.         Behavior: Behavior normal.       Physical Exam      PHQ-9 Total Score:    Results  Laboratory Studies  CBC was abnormal, indicating mild anemia.         Assessment & Plan   Diagnoses and all orders for this visit:    1. Dizziness (Primary)  -     Urinalysis With Culture If Indicated - Urine, Clean Catch; Future  -     Urinalysis With Culture If Indicated - Urine, Clean Catch  -     [place] Urine Culture Tube - Urine, Clean Catch  -     Urinalysis, Microscopic Only - Urine, Clean Catch    2. Encounter for screening mammogram for malignant neoplasm of breast  -     Mammo Screening Digital Tomosynthesis Bilateral With CAD; Future    3. Chronic HFrEF (heart failure with reduced  ejection fraction)    4. Acute on chronic congestive heart failure, unspecified heart failure type    5. Anemia, unspecified type  -     CBC Auto Differential; Future  -     CBC Auto Differential    6. Class 3 drug-induced obesity with serious comorbidity and body mass index (BMI) of 45.0 to 49.9 in adult  Assessment & Plan:  Patient's (Body mass index is 46.98 kg/m².) indicates that they are morbidly/severely obese (BMI > 40 or > 35 with obesity - related health condition) with health conditions that include hypertension, coronary heart disease, and dyslipidemias . Weight is unchanged. BMI  is above average; BMI management plan is completed. We discussed portion control and increasing exercise.       7. Depression, unspecified depression type  -     TSH Rfx On Abnormal To Free T4; Future  -     TSH Rfx On Abnormal To Free T4    8. Paroxysmal atrial fibrillation    9. Tachy-jojo syndrome  -     Comprehensive Metabolic Panel; Future  -     TSH Rfx On Abnormal To Free T4; Future  -     Magnesium; Future  -     Comprehensive Metabolic Panel  -     TSH Rfx On Abnormal To Free T4  -     Magnesium    10. Dysuria  -     Urinalysis With Culture If Indicated - Urine, Clean Catch; Future  -     Urinalysis With Culture If Indicated - Urine, Clean Catch  -     [place] Urine Culture Tube - Urine, Clean Catch  -     Urinalysis, Microscopic Only - Urine, Clean Catch      Assessment & Plan  1. Health maintenance.  The patient's blood pressure and heart rate are within the normal range today. The patient is advised to monitor her sodium intake. A repeat of electrolytes, complete blood count, and thyroid function tests will be conducted. A urine sample will be collected today. The patient is encouraged to maintain her scheduled appointment with cardiology next week. She is also advised to monitor her weight daily. Elevation of her legs in the afternoon is recommended. Should her weight begin to rise, she is advised to contact us  immediately.    Patient Instructions     Health Maintenance Due   Topic Date Due    COVID-19 Vaccine (4 - 2023-24 season) 09/01/2023    MAMMOGRAM  03/01/2024           Patient or patient representative verbalized consent for the use of Ambient Listening during the visit with  Leonor Adam MD for chart documentation. 6/21/2024  17:28 EDT

## 2024-06-25 DIAGNOSIS — E03.9 HYPOTHYROIDISM, UNSPECIFIED TYPE: ICD-10-CM

## 2024-06-25 RX ORDER — LEVOTHYROXINE SODIUM 88 UG/1
88 TABLET ORAL DAILY
Qty: 90 TABLET | Refills: 0 | Status: SHIPPED | OUTPATIENT
Start: 2024-06-25

## 2024-06-25 RX ORDER — MONTELUKAST SODIUM 10 MG/1
10 TABLET ORAL NIGHTLY
Qty: 90 TABLET | Refills: 0 | Status: SHIPPED | OUTPATIENT
Start: 2024-06-25

## 2024-06-25 NOTE — TELEPHONE ENCOUNTER
Caller: Rey Faby GOMEZ    Relationship: Self    Best call back number: [phone]    Requested Prescriptions:   Requested Prescriptions     Pending Prescriptions Disp Refills    levothyroxine (SYNTHROID, LEVOTHROID) 88 MCG tablet 90 tablet 0     Sig: Take 1 tablet by mouth Daily.        Pharmacy where request should be sent: [facility] [address] [place], [place] [id number] [place] [phone]      Last office visit with prescribing clinician: 6/20/2024   Last telemedicine visit with prescribing clinician: Visit date not found   Next office visit with prescribing clinician: 8/2/2024     Additional details provided by patient: WILL NEED FILLED    Does the patient have less than a 3 day supply:  [] Yes  [x] No    Would you like a call back once the refill request has been completed: [] Yes [x] No    If the office needs to give you a call back, can they leave a voicemail: [] Yes [x] No    Jayleen Cho   06/25/24 09:05 EDT

## 2024-06-25 NOTE — TELEPHONE ENCOUNTER
Caller: Faby Le    Relationship: Self    Best call back number: [phone]    Requested Prescriptions:   Requested Prescriptions     Pending Prescriptions Disp Refills    montelukast (SINGULAIR) 10 MG tablet       Sig: Take 1 tablet by mouth Every Night.        Pharmacy where request should be sent: [facility] [address] [place] [address] [address] [place] [phone] [place] [phone]      Last office visit with prescribing clinician: 6/20/2024   Last telemedicine visit with prescribing clinician: Visit date not found   Next office visit with prescribing clinician: 8/2/2024     Additional details provided by patient: IS , WILL NEED 90 DAY SUPPLY WITH REFILLS    Does the patient have less than a 3 day supply:  [x] Yes  [] No    Would you like a call back once the refill request has been completed: [] Yes [x] No    If the office needs to give you a call back, can they leave a voicemail: [] Yes [x] No    Jayleen Cho   06/25/24 09:02 EDT

## 2024-06-26 ENCOUNTER — READMISSION MANAGEMENT (OUTPATIENT)
Dept: CALL CENTER | Facility: HOSPITAL | Age: 79
End: 2024-06-26
Payer: MEDICARE

## 2024-06-26 NOTE — OUTREACH NOTE
CHF Week 2 Survey      Flowsheet Row Responses   [facility]? [facility]   Does the patient have one of the following disease processes/diagnoses(primary or secondary)? CHF   Week 2 attempt successful? No   Unsuccessful attempts Attempt 1            Em CABEZAS - Registered Nurse

## 2024-06-27 NOTE — PROGRESS NOTES
Cardiology Office Follow Up Visit      Primary Care Provider:  Leonor Adam MD    Reason for f/u:     Hospital Follow-Up      Subjective       History of Present Illness       Faby Le is a 79 y.o. female seen in clinic today for hospital follow-up.    Patient has a past cardiac history of chronic diastolic heart failure, mild non-obstructive CAD per cath in 2022, SSS s/p [employer] dual chamber PPM 7/2023, and paroxysmal atrial fibrillation and atypical atrial flutter s/p ablation 3/2024.  PMH includes HTN, HLD, hypothyroidism, TIA, ROSA, and gout.      Patient presented to the ER 6/2024 with c/o dizziness and found with AF with RVR and CHF exacerbation.  She received IV diuretics and converted to sinus rhythm with initiation of tikosyn.  Toprol XL was discontinued.  2D echo showed EF 56-60% with mild MR.     Patient reports feeling well.  She has had resolution of dizziness.  She denies shortness of breath and reports daily weights have been stable.      ASSESSMENT/PLAN:      Diagnoses and all orders for this visit:    1. Paroxysmal atrial fibrillation (Primary)  -     ECG 12 Lead    2. Presence of cardiac pacemaker  -     ECG 12 Lead    3. Chronic diastolic heart failure            MEDICAL DECISION MAKING:    EKG shows underlying sinus rhythm today with appropriate QT interval on Tikosyn.  She is anticoagulated with Xarelto.      Patient appears compensated with respect to volume.  I have reiterated daily weight monitoring.  Could consider addition of SGLT2 inhibitor, but I do not have a good sense of her baseline renal function.  She will follow-up with her nephrologist in September.      RTC: We will keep scheduled follow-up appointments with Dr. Herrera 8/9 Dr. Zavala 10/14.    Past Medical History:   Diagnosis Date    Ankle pain     Arthritis     Coronary artery disease     Depression     Gout     Hyperglycemia     Hyperlipidemia     Hypertension     Low back pain     Sleep apnea      Vitamin D deficiency        Past Surgical History:   Procedure Laterality Date    ANKLE FUSION      2017-left    CARDIAC CATHETERIZATION      CARDIAC CATHETERIZATION Right 8/29/2022    Procedure: Left Heart Cath;  Surgeon: Ratna Zavala MD;  Location: [facility] CATH INVASIVE LOCATION;  Service: Cardiovascular;  Laterality: Right;    CARDIAC ELECTROPHYSIOLOGY PROCEDURE Left 7/3/2023    Procedure: Pacemaker DC new [place] Notified;  Surgeon: Cr Herrera MD;  Location: [facility] CATH INVASIVE LOCATION;  Service: Cardiovascular;  Laterality: Left;    CARDIAC ELECTROPHYSIOLOGY PROCEDURE Right 3/5/2024    Procedure: Ablation atrial fibrillation, Cryo Slalonde and Wayne Igor notified 02/09/24;  Surgeon: Cr Herrera MD;  Location: [facility] CATH INVASIVE LOCATION;  Service: Cardiovascular;  Laterality: Right;    CARDIAC ELECTROPHYSIOLOGY PROCEDURE N/A 3/5/2024    Procedure: Cardioversion;  Surgeon: Cr Herrera MD;  Location: [facility] CATH INVASIVE LOCATION;  Service: Cardiovascular;  Laterality: N/A;    CARPAL TUNNEL RELEASE      CATARACT EXTRACTION      CUBITAL TUNNEL RELEASE      ENDOSCOPY N/A 5/28/2024    Procedure: ESOPHAGOGASTRODUODENOSCOPY WITH COLD FORCEP BIOPSY X1 AREA;  Surgeon: Josh Loomis MD;  Location: [facility] ENDOSCOPY;  Service: Gastroenterology;  Laterality: N/A;  Post- ESOPHAGITIS, GASTRITIS, HIATAL HERNIA    HYSTERECTOMY      REPLACEMENT TOTAL KNEE      left 2008    ROTATOR CUFF REPAIR      TOTAL KNEE ARTHROPLASTY Right 5/4/2023    Procedure: TOTAL KNEE ARTHROPLASTY WITH CORI ROBOT;  Surgeon: Chino Herrera II, MD;  Location: [facility] MAIN OR;  Service: Robotics - Ortho;  Laterality: Right;         Current Outpatient Medications:     allopurinol (ZYLOPRIM) 300 MG tablet, Take 1 tablet by mouth once daily, Disp: 90 tablet, Rfl: 0    atorvastatin (LIPITOR) 20 MG tablet, Take 1 tablet by mouth Daily., Disp: 30 tablet, Rfl: 11    Azelastine HCl 137 MCG/SPRAY  "solution, 2 sprays by Alternating Nares route Daily As Needed., Disp: , Rfl:     cetirizine (zyrTEC) 10 MG tablet, Take 1 tablet by mouth Daily., Disp: , Rfl:     cyanocobalamin (VITAMIN B-12) 1000 MCG tablet, Take 1 tablet by mouth Daily., Disp: , Rfl:     dofetilide (Tikosyn) 250 MCG capsule, Take 1 capsule by mouth Every 12 (Twelve) Hours., Disp: 60 capsule, Rfl: 3    IRON CR PO, Take  by mouth Daily., Disp: , Rfl:     levothyroxine (SYNTHROID, LEVOTHROID) 88 MCG tablet, Take 1 tablet by mouth Daily., Disp: 90 tablet, Rfl: 0    montelukast (SINGULAIR) 10 MG tablet, Take 1 tablet by mouth Every Night., Disp: 90 tablet, Rfl: 0    multivitamin (MULTI VITAMIN PO), Take  by mouth Daily., Disp: , Rfl:     pantoprazole (PROTONIX) 40 MG EC tablet, Take 1 tablet by mouth Every Morning., Disp: , Rfl:     rivaroxaban (XARELTO) 15 MG tablet, Take 1 tablet by mouth Daily With Dinner. Indications: Atrial Fibrillation, Disp: 30 tablet, Rfl: 2    spironolactone (ALDACTONE) 25 MG tablet, Take 1/2 (one-half) tablet by mouth once daily, Disp: 45 tablet, Rfl: 0    Vitamin D, Cholecalciferol, 25 MCG (1000 UT) tablet, Take 1 tablet by mouth Daily., Disp: , Rfl:     Social History     Socioeconomic History    Marital status:    Tobacco Use    Smoking status: Never     Passive exposure: Never    Smokeless tobacco: Never   Vaping Use    Vaping status: Never Used   Substance and Sexual Activity    Alcohol use: No    Drug use: No    Sexual activity: Not Currently       Family History   Problem Relation Age of Onset    Hypertension Mother     Stroke Father     Arthritis Brother     Cancer Brother        The following portions of the patient's history were reviewed and updated as appropriate: allergies, current medications, past family history, past medical history, past social history, past surgical history and problem list.    ROS  /80   Pulse 62   Ht 160 cm (63\")   Wt 120 kg (264 lb)   LMP  (LMP Unknown)   SpO2 100%   " BMI 46.77 kg/m² .  Objective     Physical Exam    Physical Exam:  Neuro:  CV:  Resp:  GI:  Ext:  Pysch: AAOx3, no gross deficits  S1S2 RRR, no murmur  Non-labored, CTA  BS+, abd soft  Pedal pulses palp, mild non-pitting BLE edema  Calm and cooperative       Procedures    EKG ordered by and reviewed by me in office  EKG shows atrial pacing.  QT intervals appropriate.  There is no significant change from prior study.

## 2024-06-28 ENCOUNTER — OFFICE VISIT (OUTPATIENT)
Dept: CARDIOLOGY | Facility: CLINIC | Age: 79
End: 2024-06-28
Payer: MEDICARE

## 2024-06-28 VITALS
OXYGEN SATURATION: 100 % | BODY MASS INDEX: 46.78 KG/M2 | WEIGHT: 264 LBS | DIASTOLIC BLOOD PRESSURE: 80 MMHG | SYSTOLIC BLOOD PRESSURE: 165 MMHG | HEIGHT: 63 IN | HEART RATE: 62 BPM

## 2024-06-28 DIAGNOSIS — I50.32 CHRONIC DIASTOLIC HEART FAILURE: ICD-10-CM

## 2024-06-28 DIAGNOSIS — Z95.0 PRESENCE OF CARDIAC PACEMAKER: ICD-10-CM

## 2024-06-28 DIAGNOSIS — I48.0 PAROXYSMAL ATRIAL FIBRILLATION: Primary | ICD-10-CM

## 2024-07-01 RX ORDER — ALLOPURINOL 300 MG/1
300 TABLET ORAL DAILY
Qty: 90 TABLET | Refills: 0 | Status: SHIPPED | OUTPATIENT
Start: 2024-07-01

## 2024-07-02 ENCOUNTER — READMISSION MANAGEMENT (OUTPATIENT)
Dept: CALL CENTER | Facility: HOSPITAL | Age: 79
End: 2024-07-02
Payer: MEDICARE

## 2024-07-02 NOTE — OUTREACH NOTE
CHF Week 2 Survey      Flowsheet Row Responses   [facility] patient discharged from? [facility]   Does the patient have one of the following disease processes/diagnoses(primary or secondary)? CHF   Week 2 attempt successful? Yes   Call start time 1606   Call end time 1610   Discharge diagnosis Acute CHF   Meds reviewed with patient/caregiver? Yes   Is the patient taking all medications as directed (includes completed medication regime)? Yes   Has the patient kept scheduled appointments due by today? Yes   Comments Pt  reports no dizziness, SOA or chest pain and reports that her edema has resolved keeping her wt stable at home. Pt is limiting NA in her diet and monitoring her wts each day, she reports   What is the patient's perception of their health status since discharge? Returned to baseline/stable   Nursing interventions Nurse provided patient education   Is the patient/caregiver able to teach back the hierarchy of who to call/visit for symptoms/problems? PCP, Specialist, Home health nurse, Urgent Care, ED, 911 Yes   CHF Nursing Interventions Education provided on various zones   CHF Zone this Call Green Zone   Green Zone Patient reports doing well, Weight check stable, No chest pain   Green Zone Interventions Daily weight check, Meds as directed, Low sodium diet, Follow up visits planned   CHF Week 2 call completed? Yes   Revoked No further contact(revokes)-requires comment   Call end time 1610            Candice GRANT - Registered Nurse

## 2024-07-08 ENCOUNTER — TELEPHONE (OUTPATIENT)
Dept: FAMILY MEDICINE CLINIC | Facility: CLINIC | Age: 79
End: 2024-07-08
Payer: MEDICARE

## 2024-07-08 NOTE — TELEPHONE ENCOUNTER
"Relay     \"mammogram normal.  Ordered for one year unless change in symptoms or physical\"                "

## 2024-07-18 RX ORDER — SPIRONOLACTONE 25 MG/1
12.5 TABLET ORAL DAILY
Qty: 45 TABLET | Refills: 0 | Status: SHIPPED | OUTPATIENT
Start: 2024-07-18

## 2024-07-22 RX ORDER — METOPROLOL SUCCINATE 25 MG/1
25 TABLET, EXTENDED RELEASE ORAL NIGHTLY
Qty: 90 TABLET | Refills: 0 | OUTPATIENT
Start: 2024-07-22

## 2024-07-30 RX ORDER — SPIRONOLACTONE 25 MG/1
12.5 TABLET ORAL DAILY
Qty: 45 TABLET | Refills: 0 | OUTPATIENT
Start: 2024-07-30

## 2024-08-01 NOTE — PATIENT INSTRUCTIONS
Health Maintenance Due   Topic Date Due    COVID-19 Vaccine (4 - 2023-24 season) 09/01/2023    Check blood pressure cuff for accuracy and send 10 blood pressures over 2 weeks.  Watch sodium, alcohol and weight

## 2024-08-02 ENCOUNTER — OFFICE VISIT (OUTPATIENT)
Dept: FAMILY MEDICINE CLINIC | Facility: CLINIC | Age: 79
End: 2024-08-02
Payer: MEDICARE

## 2024-08-02 VITALS
TEMPERATURE: 97.1 F | DIASTOLIC BLOOD PRESSURE: 71 MMHG | SYSTOLIC BLOOD PRESSURE: 115 MMHG | HEART RATE: 62 BPM | HEIGHT: 63 IN | BODY MASS INDEX: 45.36 KG/M2 | WEIGHT: 256 LBS | OXYGEN SATURATION: 98 %

## 2024-08-02 DIAGNOSIS — E53.8 B12 DEFICIENCY: ICD-10-CM

## 2024-08-02 DIAGNOSIS — I10 PRIMARY HYPERTENSION: Primary | ICD-10-CM

## 2024-08-02 DIAGNOSIS — N18.32 STAGE 3B CHRONIC KIDNEY DISEASE: ICD-10-CM

## 2024-08-02 DIAGNOSIS — G45.9 TIA (TRANSIENT ISCHEMIC ATTACK): ICD-10-CM

## 2024-08-02 DIAGNOSIS — R13.12 OROPHARYNGEAL DYSPHAGIA: ICD-10-CM

## 2024-08-02 DIAGNOSIS — I25.10 CORONARY ARTERY DISEASE INVOLVING NATIVE HEART WITHOUT ANGINA PECTORIS, UNSPECIFIED VESSEL OR LESION TYPE: ICD-10-CM

## 2024-08-02 DIAGNOSIS — C54.1 ENDOMETRIAL CARCINOMA: ICD-10-CM

## 2024-08-02 DIAGNOSIS — F32.A DEPRESSION, UNSPECIFIED DEPRESSION TYPE: ICD-10-CM

## 2024-08-02 DIAGNOSIS — E66.1 CLASS 3 DRUG-INDUCED OBESITY WITH SERIOUS COMORBIDITY AND BODY MASS INDEX (BMI) OF 45.0 TO 49.9 IN ADULT: ICD-10-CM

## 2024-08-02 DIAGNOSIS — M1A.9XX0 CHRONIC GOUT WITHOUT TOPHUS, UNSPECIFIED CAUSE, UNSPECIFIED SITE: ICD-10-CM

## 2024-08-02 DIAGNOSIS — I48.0 PAROXYSMAL ATRIAL FIBRILLATION: ICD-10-CM

## 2024-08-02 DIAGNOSIS — Z95.0 PRESENCE OF CARDIAC PACEMAKER: ICD-10-CM

## 2024-08-02 DIAGNOSIS — R73.9 HYPERGLYCEMIA: ICD-10-CM

## 2024-08-02 DIAGNOSIS — R55 SYNCOPE, UNSPECIFIED SYNCOPE TYPE: ICD-10-CM

## 2024-08-02 DIAGNOSIS — E78.2 MIXED HYPERLIPIDEMIA: ICD-10-CM

## 2024-08-02 DIAGNOSIS — R42 DIZZINESS: ICD-10-CM

## 2024-08-02 RX ORDER — SEMAGLUTIDE 1.34 MG/ML
1 INJECTION, SOLUTION SUBCUTANEOUS
Qty: 3 ML | Refills: 0 | Status: SHIPPED | OUTPATIENT
Start: 2024-09-27 | End: 2024-10-19

## 2024-08-02 RX ORDER — SEMAGLUTIDE 0.68 MG/ML
0.5 INJECTION, SOLUTION SUBCUTANEOUS
Qty: 3 ML | Refills: 0 | Status: SHIPPED | OUTPATIENT
Start: 2024-08-30 | End: 2024-09-21

## 2024-08-02 RX ORDER — SEMAGLUTIDE 1.34 MG/ML
0.25 INJECTION, SOLUTION SUBCUTANEOUS WEEKLY
Qty: 1.5 ML | Refills: 0 | Status: SHIPPED | OUTPATIENT
Start: 2024-08-02

## 2024-08-03 NOTE — PROGRESS NOTES
Subjective   Faby Le is a 79 y.o. female presents for   Chief Complaint   Patient presents with    Hypertension     3 month check up  Patient is fasting.  Patient has taken medication this morning       Health Maintenance Due   Topic Date Due    COVID-19 Vaccine (4 - 2023-24 season) 09/01/2023    INFLUENZA VACCINE  08/01/2024   Patient's daughter was present throughout the exam and she was allowed to stay.    Hypertension  Pertinent negatives include no chest pain or palpitations.      History of Present Illness  The patient is a 79-year-old female who is here today for a 3-month recheck for primary hypertension, hyperlipidemia, hyperglycemia, chronic gout, dizziness, depression, coronary artery disease, class 3 obesity with serious comorbidities, stage 3b chronic kidney disease, B12, TIA, syncope, paroxysmal atrial fibrillation, presence of cardiac pacemaker, and oropharyngeal dysphagia.    The patient reports overall well-being, with the exception of joint pain and arthritis. She does not currently take any medication for arthritis, although she occasionally resorts to Tylenol. Her dizziness and syncope have largely resolved. She denies any alterations in her auditory or visual capabilities. Her dental and ophthalmological check-ups are current. She has a history of dysphagia, which has since improved. Her bowel movements are irregular, although she does not use MiraLAX. She denies any melena or hematochezia. She denies dysuria or hematuria. She reports chronic cold intolerance. She denies any abnormal vaginal discharge. She has a history of endometrial cancer, which has since resolved. Her last mammogram was conducted on 03/07/2023. She retains her gallbladder. Her gynecologist was Dr. Churchill. She underwent a total hysterectomy due to an endometrial mass, which was non-cancerous. She has a large mass on her ankle due to poison ivy exposure. She consulted with Dr. Herrera following her last knee replacement  "surgery. She monitors her blood pressure at home, which was lower this morning. She monitors her carbohydrate and sugar intake. Her chronic gout is well-managed. She denies any renal calculi or inflammatory arthritis. Her dizziness has resolved. Her mood and depression are well-managed. Her medication was discontinued due to an adverse reaction to her cardiac medication. She has an appointment with Dr. Tirado next Saturday. She avoids ibuprofen, Aleve, and Motrin. She takes vitamin B12. She denies any cerebrovascular symptoms. She experiences a sensation akin to a light stroke when chewing, which has not worsened. She has a history of Bell's palsy, which resulted in one-sided drooping. She discontinued the full dose of Ozempic due to gastrointestinal upset.    Vitals:    08/02/24 1028 08/02/24 1029 08/02/24 1030   BP: 158/79 148/76 115/71   BP Location: Right arm Left arm Left arm   Patient Position: Sitting Sitting Standing   Cuff Size: Adult Adult Adult   Pulse: 60 60 62   Temp: 97.1 °F (36.2 °C)     TempSrc: Temporal     SpO2: 99%  98%   Weight: 116 kg (256 lb)     Height: 160 cm (63\")       Body mass index is 45.35 kg/m².    Current Outpatient Medications on File Prior to Visit   Medication Sig Dispense Refill    allopurinol (ZYLOPRIM) 300 MG tablet Take 1 tablet by mouth once daily 90 tablet 0    atorvastatin (LIPITOR) 20 MG tablet Take 1 tablet by mouth Daily. 30 tablet 11    Azelastine HCl 137 MCG/SPRAY solution 2 sprays by Alternating Nares route Daily As Needed.      cetirizine (zyrTEC) 10 MG tablet Take 1 tablet by mouth Daily.      cyanocobalamin (VITAMIN B-12) 1000 MCG tablet Take 1 tablet by mouth Daily.      dofetilide (Tikosyn) 250 MCG capsule Take 1 capsule by mouth Every 12 (Twelve) Hours. 60 capsule 3    IRON CR PO Take  by mouth Daily.      levothyroxine (SYNTHROID, LEVOTHROID) 88 MCG tablet Take 1 tablet by mouth Daily. 90 tablet 0    montelukast (SINGULAIR) 10 MG tablet Take 1 tablet by mouth " Every Night. 90 tablet 0    multivitamin (MULTI VITAMIN PO) Take  by mouth Daily.      pantoprazole (PROTONIX) 40 MG EC tablet Take 1 tablet by mouth Every Morning.      rivaroxaban (XARELTO) 15 MG tablet Take 1 tablet by mouth Daily With Dinner. Indications: Atrial Fibrillation 30 tablet 2    spironolactone (ALDACTONE) 25 MG tablet Take 1/2 (one-half) tablet by mouth once daily 45 tablet 0    Vitamin D, Cholecalciferol, 25 MCG (1000 UT) tablet Take 1 tablet by mouth Daily.       No current facility-administered medications on file prior to visit.       The following portions of the patient's history were reviewed and updated as appropriate: allergies, current medications, past family history, past medical history, past social history, past surgical history, and problem list.    Review of Systems   Cardiovascular:  Negative for chest pain and palpitations.   Genitourinary:  Negative for vaginal bleeding and vaginal discharge.   Neurological:  Negative for dizziness, light-headedness, headache and memory problem.   Psychiatric/Behavioral:  Negative for depressed mood. The patient is not nervous/anxious.        Objective   Physical Exam  Vitals reviewed.   Constitutional:       General: She is not in acute distress.     Appearance: She is well-developed. She is obese. She is not ill-appearing or toxic-appearing.   HENT:      Head: Normocephalic and atraumatic.      Right Ear: Tympanic membrane, ear canal and external ear normal.      Left Ear: Tympanic membrane, ear canal and external ear normal.      Nose: Nose normal.      Mouth/Throat:      Mouth: Mucous membranes are moist.      Pharynx: No posterior oropharyngeal erythema.   Eyes:      Extraocular Movements: Extraocular movements intact.      Conjunctiva/sclera: Conjunctivae normal.      Pupils: Pupils are equal, round, and reactive to light.   Neck:      Vascular: No carotid bruit.   Cardiovascular:      Rate and Rhythm: Normal rate and regular rhythm.       Heart sounds: Normal heart sounds.   Pulmonary:      Effort: Pulmonary effort is normal.      Breath sounds: Normal breath sounds.   Abdominal:      General: Bowel sounds are normal. There is no distension.      Palpations: Abdomen is soft. There is no mass.      Tenderness: There is no abdominal tenderness.   Musculoskeletal:      Cervical back: Neck supple. No tenderness.   Lymphadenopathy:      Cervical: No cervical adenopathy.   Skin:     General: Skin is warm.   Neurological:      General: No focal deficit present.      Mental Status: She is alert and oriented to person, place, and time.   Psychiatric:         Mood and Affect: Mood normal.         Behavior: Behavior normal.       Physical Exam  Throat appears normal.  Carotid arteries in the neck are normal.  Lungs are normal.  Heart has a normal rate and rhythm.    PHQ-9 Total Score:    Results  Imaging  CT of the abdomen and pelvis on 2/23/2023 showed no evidence of recurrent disease, gallstones, and degenerative changes in both shoulders.         Assessment & Plan   Diagnoses and all orders for this visit:    1. Primary hypertension (Primary)    2. Mixed hyperlipidemia    3. Hyperglycemia    4. Chronic gout without tophus, unspecified cause, unspecified site    5. Dizziness    6. Depression, unspecified depression type    7. Coronary artery disease involving native heart without angina pectoris, unspecified vessel or lesion type    8. Class 3 drug-induced obesity with serious comorbidity and body mass index (BMI) of 45.0 to 49.9 in adult    9. Stage 3b chronic kidney disease    10. B12 deficiency    11. TIA (transient ischemic attack)    12. Syncope, unspecified syncope type    13. Paroxysmal atrial fibrillation    14. Presence of cardiac pacemaker    15. Oropharyngeal dysphagia    16. Endometrial carcinoma  -     Ambulatory Referral to Obstetrics / Gynecology    Other orders  -     Semaglutide,0.25 or 0.5MG/DOS, (Ozempic, 0.25 or 0.5 MG/DOSE,) 2 MG/1.5ML  solution pen-injector; Inject 0.25 mg under the skin into the appropriate area as directed 1 (One) Time Per Week. For four weeks  Dispense: 1.5 mL; Refill: 0  -     Semaglutide,0.25 or 0.5MG/DOS, (Ozempic, 0.25 or 0.5 MG/DOSE,) 2 MG/3ML solution pen-injector; Inject 0.5 mg under the skin into the appropriate area as directed Every 7 (Seven) Days for 4 doses. Continue 4 weeks  Dispense: 3 mL; Refill: 0  -     Semaglutide, 1 MG/DOSE, (Ozempic, 1 MG/DOSE,) 4 MG/3ML solution pen-injector; Inject 1 mg under the skin into the appropriate area as directed Every 7 (Seven) Days for 4 doses. Continue for 4 weeks  Dispense: 3 mL; Refill: 0      Assessment & Plan  1. Three-month recheck.  The patient's blood pressure was slightly elevated during this visit. The patient is advised to take arthritis-strength Tylenol at night to assess if it improves her sleep quality. If it does not elevate her liver function, the decision will be made to administer it during the day. If it does not, the dosage will be increased to 650 mg twice daily. She is advised to monitor her blood pressure at home and bring her blood pressure cuff to her next visit for calibration. She is advised to monitor her carbohydrate and sugar intake, maintain physical activity, and avoid ibuprofen, Aleve, and Motrin. A prescription for Ozempic will be provided.    Follow-up  A follow-up appointment is scheduled for 3 months from now.    Patient Instructions     Health Maintenance Due   Topic Date Due    COVID-19 Vaccine (4 - 2023-24 season) 09/01/2023    Check blood pressure cuff for accuracy and send 10 blood pressures over 2 weeks.  Watch sodium, alcohol and weight        Patient or patient representative verbalized consent for the use of Ambient Listening during the visit with  Leonor Adam MD for chart documentation. 8/3/2024  07:25 EDT

## 2024-08-09 ENCOUNTER — OFFICE VISIT (OUTPATIENT)
Dept: CARDIOLOGY | Facility: CLINIC | Age: 79
End: 2024-08-09
Payer: MEDICARE

## 2024-08-09 VITALS
BODY MASS INDEX: 46.95 KG/M2 | HEIGHT: 63 IN | OXYGEN SATURATION: 98 % | SYSTOLIC BLOOD PRESSURE: 145 MMHG | HEART RATE: 64 BPM | DIASTOLIC BLOOD PRESSURE: 61 MMHG | WEIGHT: 265 LBS

## 2024-08-09 DIAGNOSIS — N18.30 STAGE 3 CHRONIC KIDNEY DISEASE, UNSPECIFIED WHETHER STAGE 3A OR 3B CKD: ICD-10-CM

## 2024-08-09 DIAGNOSIS — Z95.0 PRESENCE OF CARDIAC PACEMAKER: ICD-10-CM

## 2024-08-09 DIAGNOSIS — E78.2 MIXED HYPERLIPIDEMIA: ICD-10-CM

## 2024-08-09 DIAGNOSIS — I50.32 CHRONIC DIASTOLIC HEART FAILURE: ICD-10-CM

## 2024-08-09 DIAGNOSIS — G45.9 TIA (TRANSIENT ISCHEMIC ATTACK): ICD-10-CM

## 2024-08-09 DIAGNOSIS — I48.0 PAROXYSMAL ATRIAL FIBRILLATION: Primary | ICD-10-CM

## 2024-08-09 NOTE — LETTER
August 9, 2024       No Recipients    Patient: Faby Le   YOB: 1945   Date of Visit: 8/9/2024     Dear Leonor Adam MD:       Thank you for referring Faby Le to me for evaluation. Below are the relevant portions of my assessment and plan of care.    If you have questions, please do not hesitate to call me. I look forward to following Faby along with you.         Sincerely,        Cr Herrera MD        CC:   No Recipients    Cr Herrera MD  08/09/24 0939  Sign when Signing Visit  C--sick sinus syndrome and pacemaker in situ      Sub  Cerebral and patient underwent EP study and ablation results are attached below --study was performed and March 2024    Patient had   moderate to severe left atrial enlargement    Patient had a large left superior pulmonary vein and a large left inferior pulmonary vein and  a  large right inferior pulmonary vein and right superior pulmonary vein  Pulmonary vein isolation with cryo ablation done--post completion of cryoablation, mapping revealed a small area of reconnection on anterior ridge of the left supra pulmonary vein and the left atrial appendage and this area was carefully ablated and repeat mapping revealed complete antral isolation.  No retrograde conduction at 600 ms post pulmonary vein antral isolation  AV bandar ERP was 500/250  No preexcitation  No induction of SVT  Rapid pacing did induce atypical flutter with constant wobbling in the posterior wall adjoining the inferior pulmonary veins was ablated with a linear ablation connecting the inferior pulmonary veins.  Repeat rapid pacing could reinduce different types of flutters which were constant wobbling and the flutters for terminating while the coronary sinus catheter was moved into the middle CS to proximal CS suggestive of endocardial epicardial bridge.  No further induction of atrial fibrillation despite aggressive pacing.  Appropriately functioning dual-chamber  pacemaker    Patient has dual-chamber pacemaker in situ and history of TIA in the past and has a history of hypertension sleep apnea and lower extremity edema.  Cardiac catheterization in the past revealed nonobstructive disease with normal EF.  Patient has history of iron deficiency anemia in the past.        Past Medical History:   Diagnosis Date   • Ankle pain    • Arthritis    • Coronary artery disease    • Depression    • Gout    • Hyperglycemia    • Hyperlipidemia    • Hypertension    • Low back pain    • Sleep apnea    • Vitamin D deficiency      Past Surgical History:   Procedure Laterality Date   • ANKLE FUSION      2017-left   • CARDIAC CATHETERIZATION     • CARDIAC CATHETERIZATION Right 8/29/2022    Procedure: Left Heart Cath;  Surgeon: Ratna Zavala MD;  Location: [facility] CATH INVASIVE LOCATION;  Service: Cardiovascular;  Laterality: Right;   • CARDIAC ELECTROPHYSIOLOGY PROCEDURE Left 7/3/2023    Procedure: Pacemaker DC new [place] Notified;  Surgeon: Cr Herrera MD;  Location: [facility] CATH INVASIVE LOCATION;  Service: Cardiovascular;  Laterality: Left;   • CARDIAC ELECTROPHYSIOLOGY PROCEDURE Right 3/5/2024    Procedure: Ablation atrial fibrillation, Cryo Marcialonddaniel and Wayne Igor notified 02/09/24;  Surgeon: Cr Herrera MD;  Location: [facility] CATH INVASIVE LOCATION;  Service: Cardiovascular;  Laterality: Right;   • CARDIAC ELECTROPHYSIOLOGY PROCEDURE N/A 3/5/2024    Procedure: Cardioversion;  Surgeon: Cr Herrera MD;  Location: [facility] CATH INVASIVE LOCATION;  Service: Cardiovascular;  Laterality: N/A;   • CARPAL TUNNEL RELEASE     • CATARACT EXTRACTION     • CUBITAL TUNNEL RELEASE     • HYSTERECTOMY     • REPLACEMENT TOTAL KNEE      left 2008   • ROTATOR CUFF REPAIR     • TOTAL KNEE ARTHROPLASTY Right 5/4/2023    Procedure: TOTAL KNEE ARTHROPLASTY WITH CORI ROBOT;  Surgeon: Chino Herrera II, MD;  Location: [facility] MAIN OR;  Service: Robotics - Ortho;  Laterality:  Right;       Physical Exam    General:      well developed, well nourished, in no acute distress.    Head:      normocephalic and atraumatic.    Eyes:      PERRL/EOM intact, conjunctivae and sclerae clear without nystagmus.    Neck:      no  thyromegaly, trachea central with normal respiratory effort  Lungs:      clear bilaterally to auscultation.    Heart:       regular rate and rhythm, S1, S2 without murmurs, rubs, or gallops  Skin:      intact without lesions or rashes.    Psych:      alert and cooperative; normal mood and affect; normal attention span and concentration.                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 currently on Xarelto--- patient currently left on Tikosyn  Home monitoring of pacemaker reviewed low burden of atrial arrhythmia  Hyperlipidemia on atorvastatin  Hypothyroidism on levothyroxine  Prior history of TIA on Xarelto  Iron deficiency anemia on iron supplementation  Dual-chamber pacemaker with normal function and home monitoring reviewed  Medications reviewed and follow-up appointments made        ECG 12 Lead    Date/Time: 8/9/2024 9:36 AM  Performed by: Cr Herrera MD    Authorized by: Cr Herrera MD  Comparison: compared with previous ECG   Similar to previous ECG  Rhythm: sinus rhythm  Rate: normal  Conduction: conduction normal  QRS axis: normal      Electronically signed by Cr Herrera MD, 08/09/24, 9:36 AM EDT.

## 2024-08-09 NOTE — PROGRESS NOTES
C--sick sinus syndrome and pacemaker in situ      Sub  Cerebral and patient underwent EP study and ablation results are attached below --study was performed and March 2024    Patient had   moderate to severe left atrial enlargement    Patient had a large left superior pulmonary vein and a large left inferior pulmonary vein and  a  large right inferior pulmonary vein and right superior pulmonary vein  Pulmonary vein isolation with cryo ablation done--post completion of cryoablation, mapping revealed a small area of reconnection on anterior ridge of the left supra pulmonary vein and the left atrial appendage and this area was carefully ablated and repeat mapping revealed complete antral isolation.  No retrograde conduction at 600 ms post pulmonary vein antral isolation  AV bandar ERP was 500/250  No preexcitation  No induction of SVT  Rapid pacing did induce atypical flutter with constant wobbling in the posterior wall adjoining the inferior pulmonary veins was ablated with a linear ablation connecting the inferior pulmonary veins.  Repeat rapid pacing could reinduce different types of flutters which were constant wobbling and the flutters for terminating while the coronary sinus catheter was moved into the middle CS to proximal CS suggestive of endocardial epicardial bridge.  No further induction of atrial fibrillation despite aggressive pacing.  Appropriately functioning dual-chamber pacemaker    Patient has dual-chamber pacemaker in situ and history of TIA in the past and has a history of hypertension sleep apnea and lower extremity edema.  Cardiac catheterization in the past revealed nonobstructive disease with normal EF.  Patient has history of iron deficiency anemia in the past.        Past Medical History:   Diagnosis Date   • Ankle pain    • Arthritis    • Coronary artery disease    • Depression    • Gout    • Hyperglycemia    • Hyperlipidemia    • Hypertension    • Low back pain    • Sleep apnea    • Vitamin D  deficiency      Past Surgical History:   Procedure Laterality Date   • ANKLE FUSION      2017-left   • CARDIAC CATHETERIZATION     • CARDIAC CATHETERIZATION Right 8/29/2022    Procedure: Left Heart Cath;  Surgeon: Ratna Zavala MD;  Location: [facility] CATH INVASIVE LOCATION;  Service: Cardiovascular;  Laterality: Right;   • CARDIAC ELECTROPHYSIOLOGY PROCEDURE Left 7/3/2023    Procedure: Pacemaker DC new [place] Notified;  Surgeon: Cr Herrera MD;  Location: [facility] CATH INVASIVE LOCATION;  Service: Cardiovascular;  Laterality: Left;   • CARDIAC ELECTROPHYSIOLOGY PROCEDURE Right 3/5/2024    Procedure: Ablation atrial fibrillation, Cryo [place] and Wayne Igor notified 02/09/24;  Surgeon: Cr Herrera MD;  Location: [facility] CATH INVASIVE LOCATION;  Service: Cardiovascular;  Laterality: Right;   • CARDIAC ELECTROPHYSIOLOGY PROCEDURE N/A 3/5/2024    Procedure: Cardioversion;  Surgeon: Cr Herrera MD;  Location: [facility] CATH INVASIVE LOCATION;  Service: Cardiovascular;  Laterality: N/A;   • CARPAL TUNNEL RELEASE     • CATARACT EXTRACTION     • CUBITAL TUNNEL RELEASE     • HYSTERECTOMY     • REPLACEMENT TOTAL KNEE      left 2008   • ROTATOR CUFF REPAIR     • TOTAL KNEE ARTHROPLASTY Right 5/4/2023    Procedure: TOTAL KNEE ARTHROPLASTY WITH CORI ROBOT;  Surgeon: Chino Herrera II, MD;  Location: [facility] OR;  Service: Robotics - Ortho;  Laterality: Right;       Physical Exam    General:      well developed, well nourished, in no acute distress.    Head:      normocephalic and atraumatic.    Eyes:      PERRL/EOM intact, conjunctivae and sclerae clear without nystagmus.    Neck:      no  thyromegaly, trachea central with normal respiratory effort  Lungs:      clear bilaterally to auscultation.    Heart:       regular rate and rhythm, S1, S2 without murmurs, rubs, or gallops  Skin:      intact without lesions or rashes.    Psych:      alert and cooperative; normal mood and affect;  normal attention span and concentration.                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 currently on Xarelto--- patient currently left on Tikosyn  Home monitoring of pacemaker reviewed low burden of atrial arrhythmia  Hyperlipidemia on atorvastatin  Hypothyroidism on levothyroxine  Prior history of TIA on Xarelto  Iron deficiency anemia on iron supplementation  Dual-chamber pacemaker with normal function and home monitoring reviewed  Medications reviewed and follow-up appointments made        ECG 12 Lead    Date/Time: 8/9/2024 9:36 AM  Performed by: Cr Herrera MD    Authorized by: Cr Herrera MD  Comparison: compared with previous ECG   Similar to previous ECG  Rhythm: sinus rhythm  Rate: normal  Conduction: conduction normal  QRS axis: normal      Electronically signed by Cr Herrera MD, 08/09/24, 9:36 AM EDT.

## 2024-08-14 ENCOUNTER — HOSPITAL ENCOUNTER (INPATIENT)
Facility: HOSPITAL | Age: 79
LOS: 1 days | Discharge: NURSING, REHAB OR LONG-TERM CARE | DRG: 312 | End: 2024-08-16
Attending: EMERGENCY MEDICINE | Admitting: INTERNAL MEDICINE
Payer: MEDICARE

## 2024-08-14 DIAGNOSIS — I95.1 ORTHOSTATIC HYPOTENSION: Primary | ICD-10-CM

## 2024-08-14 PROBLEM — I95.9 SYMPTOMATIC HYPOTENSION: Status: ACTIVE | Noted: 2024-08-14

## 2024-08-14 LAB
ALBUMIN SERPL-MCNC: 4 G/DL (ref 3.5–5.2)
ALBUMIN/GLOB SERPL: 1.4 G/DL
ALP SERPL-CCNC: 129 U/L (ref 39–117)
ALT SERPL W P-5'-P-CCNC: 14 U/L (ref 1–33)
ANION GAP SERPL CALCULATED.3IONS-SCNC: 12.2 MMOL/L (ref 5–15)
AST SERPL-CCNC: 22 U/L (ref 1–32)
BACTERIA UR QL AUTO: ABNORMAL /HPF
BASOPHILS # BLD AUTO: 0.05 10*3/MM3 (ref 0–0.2)
BASOPHILS NFR BLD AUTO: 0.8 % (ref 0–1.5)
BILIRUB SERPL-MCNC: 0.8 MG/DL (ref 0–1.2)
BILIRUB UR QL STRIP: NEGATIVE
BUN SERPL-MCNC: 34 MG/DL (ref 8–23)
BUN/CREAT SERPL: 25 (ref 7–25)
CALCIUM SPEC-SCNC: 10 MG/DL (ref 8.6–10.5)
CHLORIDE SERPL-SCNC: 105 MMOL/L (ref 98–107)
CLARITY UR: CLEAR
CO2 SERPL-SCNC: 21.8 MMOL/L (ref 22–29)
COLOR UR: YELLOW
CREAT SERPL-MCNC: 1.36 MG/DL (ref 0.57–1)
DEPRECATED RDW RBC AUTO: 51.4 FL (ref 37–54)
EGFRCR SERPLBLD CKD-EPI 2021: 40.2 ML/MIN/1.73
EOSINOPHIL # BLD AUTO: 0.08 10*3/MM3 (ref 0–0.4)
EOSINOPHIL NFR BLD AUTO: 1.3 % (ref 0.3–6.2)
ERYTHROCYTE [DISTWIDTH] IN BLOOD BY AUTOMATED COUNT: 13.7 % (ref 12.3–15.4)
GLOBULIN UR ELPH-MCNC: 2.8 GM/DL
GLUCOSE SERPL-MCNC: 96 MG/DL (ref 65–99)
GLUCOSE UR STRIP-MCNC: NEGATIVE MG/DL
HCT VFR BLD AUTO: 34.5 % (ref 34–46.6)
HGB BLD-MCNC: 11.1 G/DL (ref 12–15.9)
HGB UR QL STRIP.AUTO: NEGATIVE
HOLD SPECIMEN: NORMAL
HOLD SPECIMEN: NORMAL
HYALINE CASTS UR QL AUTO: ABNORMAL /LPF
IMM GRANULOCYTES # BLD AUTO: 0.01 10*3/MM3 (ref 0–0.05)
IMM GRANULOCYTES NFR BLD AUTO: 0.2 % (ref 0–0.5)
KETONES UR QL STRIP: NEGATIVE
LEUKOCYTE ESTERASE UR QL STRIP.AUTO: ABNORMAL
LYMPHOCYTES # BLD AUTO: 2.04 10*3/MM3 (ref 0.7–3.1)
LYMPHOCYTES NFR BLD AUTO: 33.2 % (ref 19.6–45.3)
MCH RBC QN AUTO: 32.6 PG (ref 26.6–33)
MCHC RBC AUTO-ENTMCNC: 32.2 G/DL (ref 31.5–35.7)
MCV RBC AUTO: 101.5 FL (ref 79–97)
MONOCYTES # BLD AUTO: 0.57 10*3/MM3 (ref 0.1–0.9)
MONOCYTES NFR BLD AUTO: 9.3 % (ref 5–12)
NEUTROPHILS NFR BLD AUTO: 3.39 10*3/MM3 (ref 1.7–7)
NEUTROPHILS NFR BLD AUTO: 55.2 % (ref 42.7–76)
NITRITE UR QL STRIP: NEGATIVE
NRBC BLD AUTO-RTO: 0 /100 WBC (ref 0–0.2)
NT-PROBNP SERPL-MCNC: 2414 PG/ML (ref 0–1800)
PH UR STRIP.AUTO: <=5 [PH] (ref 5–8)
PLATELET # BLD AUTO: 202 10*3/MM3 (ref 140–450)
PMV BLD AUTO: 9.6 FL (ref 6–12)
POTASSIUM SERPL-SCNC: 4.4 MMOL/L (ref 3.5–5.2)
PROT SERPL-MCNC: 6.8 G/DL (ref 6–8.5)
PROT UR QL STRIP: NEGATIVE
RBC # BLD AUTO: 3.4 10*6/MM3 (ref 3.77–5.28)
RBC # UR STRIP: ABNORMAL /HPF
REF LAB TEST METHOD: ABNORMAL
SODIUM SERPL-SCNC: 139 MMOL/L (ref 136–145)
SP GR UR STRIP: 1.01 (ref 1–1.03)
SQUAMOUS #/AREA URNS HPF: ABNORMAL /HPF
TROPONIN T SERPL HS-MCNC: 13 NG/L
UROBILINOGEN UR QL STRIP: ABNORMAL
WBC # UR STRIP: ABNORMAL /HPF
WBC NRBC COR # BLD AUTO: 6.14 10*3/MM3 (ref 3.4–10.8)
WHOLE BLOOD HOLD COAG: NORMAL
WHOLE BLOOD HOLD SPECIMEN: NORMAL

## 2024-08-14 PROCEDURE — 36415 COLL VENOUS BLD VENIPUNCTURE: CPT

## 2024-08-14 PROCEDURE — 93005 ELECTROCARDIOGRAM TRACING: CPT

## 2024-08-14 PROCEDURE — 25810000003 LACTATED RINGERS SOLUTION: Performed by: EMERGENCY MEDICINE

## 2024-08-14 PROCEDURE — 83880 ASSAY OF NATRIURETIC PEPTIDE: CPT | Performed by: EMERGENCY MEDICINE

## 2024-08-14 PROCEDURE — 87086 URINE CULTURE/COLONY COUNT: CPT | Performed by: EMERGENCY MEDICINE

## 2024-08-14 PROCEDURE — 84484 ASSAY OF TROPONIN QUANT: CPT | Performed by: EMERGENCY MEDICINE

## 2024-08-14 PROCEDURE — 99285 EMERGENCY DEPT VISIT HI MDM: CPT

## 2024-08-14 PROCEDURE — G0378 HOSPITAL OBSERVATION PER HR: HCPCS

## 2024-08-14 PROCEDURE — 80053 COMPREHEN METABOLIC PANEL: CPT | Performed by: EMERGENCY MEDICINE

## 2024-08-14 PROCEDURE — 25010000002 CEFTRIAXONE PER 250 MG: Performed by: NURSE PRACTITIONER

## 2024-08-14 PROCEDURE — 93005 ELECTROCARDIOGRAM TRACING: CPT | Performed by: EMERGENCY MEDICINE

## 2024-08-14 PROCEDURE — 81001 URINALYSIS AUTO W/SCOPE: CPT | Performed by: EMERGENCY MEDICINE

## 2024-08-14 PROCEDURE — 85025 COMPLETE CBC W/AUTO DIFF WBC: CPT | Performed by: EMERGENCY MEDICINE

## 2024-08-14 RX ORDER — SODIUM CHLORIDE 0.9 % (FLUSH) 0.9 %
10 SYRINGE (ML) INJECTION EVERY 12 HOURS SCHEDULED
Status: DISCONTINUED | OUTPATIENT
Start: 2024-08-14 | End: 2024-08-16 | Stop reason: HOSPADM

## 2024-08-14 RX ORDER — MECLIZINE HYDROCHLORIDE 25 MG/1
25 TABLET ORAL 2 TIMES DAILY
COMMUNITY
Start: 2024-08-14

## 2024-08-14 RX ORDER — AZELASTINE HYDROCHLORIDE 137 UG/1
2 SPRAY, METERED NASAL DAILY PRN
Status: DISCONTINUED | OUTPATIENT
Start: 2024-08-14 | End: 2024-08-16 | Stop reason: HOSPADM

## 2024-08-14 RX ORDER — DIPHENOXYLATE HYDROCHLORIDE AND ATROPINE SULFATE 2.5; .025 MG/1; MG/1
1 TABLET ORAL DAILY
Status: DISCONTINUED | OUTPATIENT
Start: 2024-08-15 | End: 2024-08-16 | Stop reason: HOSPADM

## 2024-08-14 RX ORDER — ALLOPURINOL 300 MG/1
300 TABLET ORAL DAILY
Status: DISCONTINUED | OUTPATIENT
Start: 2024-08-15 | End: 2024-08-16 | Stop reason: HOSPADM

## 2024-08-14 RX ORDER — CETIRIZINE HYDROCHLORIDE 10 MG/1
10 TABLET ORAL NIGHTLY
Status: DISCONTINUED | OUTPATIENT
Start: 2024-08-14 | End: 2024-08-16 | Stop reason: HOSPADM

## 2024-08-14 RX ORDER — SODIUM CHLORIDE 9 MG/ML
40 INJECTION, SOLUTION INTRAVENOUS AS NEEDED
Status: DISCONTINUED | OUTPATIENT
Start: 2024-08-14 | End: 2024-08-16 | Stop reason: HOSPADM

## 2024-08-14 RX ORDER — ATORVASTATIN CALCIUM 20 MG/1
20 TABLET, FILM COATED ORAL DAILY
Status: DISCONTINUED | OUTPATIENT
Start: 2024-08-15 | End: 2024-08-16 | Stop reason: HOSPADM

## 2024-08-14 RX ORDER — ACETAMINOPHEN 325 MG/1
650 TABLET ORAL EVERY 4 HOURS PRN
Status: DISCONTINUED | OUTPATIENT
Start: 2024-08-14 | End: 2024-08-16 | Stop reason: HOSPADM

## 2024-08-14 RX ORDER — ACETAMINOPHEN 160 MG/5ML
650 SOLUTION ORAL EVERY 4 HOURS PRN
Status: DISCONTINUED | OUTPATIENT
Start: 2024-08-14 | End: 2024-08-16 | Stop reason: HOSPADM

## 2024-08-14 RX ORDER — DOFETILIDE 0.25 MG/1
250 CAPSULE ORAL EVERY 12 HOURS
Status: DISCONTINUED | OUTPATIENT
Start: 2024-08-14 | End: 2024-08-16 | Stop reason: HOSPADM

## 2024-08-14 RX ORDER — PANTOPRAZOLE SODIUM 40 MG/1
40 TABLET, DELAYED RELEASE ORAL EVERY MORNING
Status: DISCONTINUED | OUTPATIENT
Start: 2024-08-15 | End: 2024-08-16 | Stop reason: HOSPADM

## 2024-08-14 RX ORDER — POLYETHYLENE GLYCOL 3350 17 G/17G
17 POWDER, FOR SOLUTION ORAL DAILY PRN
Status: DISCONTINUED | OUTPATIENT
Start: 2024-08-14 | End: 2024-08-16 | Stop reason: HOSPADM

## 2024-08-14 RX ORDER — SODIUM CHLORIDE 0.9 % (FLUSH) 0.9 %
10 SYRINGE (ML) INJECTION AS NEEDED
Status: DISCONTINUED | OUTPATIENT
Start: 2024-08-14 | End: 2024-08-16 | Stop reason: HOSPADM

## 2024-08-14 RX ORDER — ACETAMINOPHEN 650 MG/1
650 SUPPOSITORY RECTAL EVERY 4 HOURS PRN
Status: DISCONTINUED | OUTPATIENT
Start: 2024-08-14 | End: 2024-08-16 | Stop reason: HOSPADM

## 2024-08-14 RX ORDER — ONDANSETRON 2 MG/ML
4 INJECTION INTRAMUSCULAR; INTRAVENOUS EVERY 6 HOURS PRN
Status: DISCONTINUED | OUTPATIENT
Start: 2024-08-14 | End: 2024-08-16 | Stop reason: HOSPADM

## 2024-08-14 RX ORDER — CHOLECALCIFEROL (VITAMIN D3) 25 MCG
1000 TABLET ORAL DAILY
Status: DISCONTINUED | OUTPATIENT
Start: 2024-08-15 | End: 2024-08-16 | Stop reason: HOSPADM

## 2024-08-14 RX ORDER — AMOXICILLIN 250 MG
2 CAPSULE ORAL 2 TIMES DAILY PRN
Status: DISCONTINUED | OUTPATIENT
Start: 2024-08-14 | End: 2024-08-16 | Stop reason: HOSPADM

## 2024-08-14 RX ORDER — BISACODYL 10 MG
10 SUPPOSITORY, RECTAL RECTAL DAILY PRN
Status: DISCONTINUED | OUTPATIENT
Start: 2024-08-14 | End: 2024-08-16 | Stop reason: HOSPADM

## 2024-08-14 RX ORDER — NITROGLYCERIN 0.4 MG/1
0.4 TABLET SUBLINGUAL
Status: DISCONTINUED | OUTPATIENT
Start: 2024-08-14 | End: 2024-08-16 | Stop reason: HOSPADM

## 2024-08-14 RX ORDER — UREA 10 %
1000 LOTION (ML) TOPICAL DAILY
Status: DISCONTINUED | OUTPATIENT
Start: 2024-08-15 | End: 2024-08-16 | Stop reason: HOSPADM

## 2024-08-14 RX ORDER — BISACODYL 5 MG/1
5 TABLET, DELAYED RELEASE ORAL DAILY PRN
Status: DISCONTINUED | OUTPATIENT
Start: 2024-08-14 | End: 2024-08-16 | Stop reason: HOSPADM

## 2024-08-14 RX ORDER — MONTELUKAST SODIUM 10 MG/1
10 TABLET ORAL NIGHTLY
Status: DISCONTINUED | OUTPATIENT
Start: 2024-08-14 | End: 2024-08-16 | Stop reason: HOSPADM

## 2024-08-14 RX ORDER — LEVOTHYROXINE SODIUM 88 UG/1
88 TABLET ORAL DAILY
Status: DISCONTINUED | OUTPATIENT
Start: 2024-08-15 | End: 2024-08-16 | Stop reason: HOSPADM

## 2024-08-14 RX ADMIN — SODIUM CHLORIDE, POTASSIUM CHLORIDE, SODIUM LACTATE AND CALCIUM CHLORIDE 1000 ML: 600; 310; 30; 20 INJECTION, SOLUTION INTRAVENOUS at 16:49

## 2024-08-14 RX ADMIN — DOFETILIDE 250 MCG: 0.25 CAPSULE ORAL at 21:42

## 2024-08-14 RX ADMIN — CEFTRIAXONE 1000 MG: 1 INJECTION, POWDER, FOR SOLUTION INTRAMUSCULAR; INTRAVENOUS at 21:41

## 2024-08-14 RX ADMIN — MONTELUKAST 10 MG: 10 TABLET, FILM COATED ORAL at 21:42

## 2024-08-14 RX ADMIN — CETIRIZINE HYDROCHLORIDE 10 MG: 10 TABLET, FILM COATED ORAL at 21:42

## 2024-08-14 NOTE — Clinical Note
Level of Care: Telemetry [5]   Admitting Physician: LETTY BECK [[id number]]   Attending Physician: LETTY BECK [[id number]]

## 2024-08-14 NOTE — H&P
[facility]    Hospitalist History and Physical     Faby Le : 1945 MRN:[id number] LOS:0 ROOM:      Reason for admission: Symptomatic hypotension     Assessment / Plan     Dizziness  Orthostatic hypotension  History of tachycardia bradycardia syndrome S/P PPM  History of A fib S/P ablation  -Continuous cardiac monitor  -Troponin normalized at 13  -Electrolytes WNL  -EKG appears stable   -Continue xarelto for AC  - on Tikosyn  -S/P 1 L NS bolus in ER  -Cautious use of fluids due to CHF  - hold diuretics  - hold BP medication   -cardiology consultation    Possible UTI  - positive for leukocytes  - positive for WBC 11-20  - start rocephin  -follow urine culture  - de-escalate as appropriate      Chronic diastolic CHF  -Not in exacerbation   -Pro BNP on admission elevated improved to 2,414  -Patient does have some trace pedal edema bilateral LE  -Daily weights  -Intake and output  -Cardiology consultation    Borderline DM  - A1c 5.3 24  - started on Ozempic by PCP 2 wks ago     HTN, now hypotensive   -Hold BP medication for now      History of Hypothyroidism  -Continue home dose Synthroid 88 mcg once daily      CKD stage III  -Creat on admission 1.36, appears near baseline.   -Avoid nephrotoxic medications  - consult renal as needed       Morbid Obesity  -BMI 45.88   Lifestyle modifications     Chronic macrocytic anemia  -Continue Vit B12 supplementation       Code Status (Patient has no pulse and is not breathing): CPR (Attempt to Resuscitate)  Medical Interventions (Patient has pulse or is breathing): Full Support       Nutrition:   Diet: Cardiac; Healthy Heart (2-3 Na+); Fluid Consistency: Thin (IDDSI 0)     VTE Prophylaxis:  Pharmacologic VTE prophylaxis orders are present.         History of Present illness     Faby Le is a 79 y.o. female with PMH atrial fibrillation status post ablation, tachybradycardia syndrome status post pacemaker, TIA, hypertension,  "obstructive sleep apnea, cardiac catheterization in 2022 with mild obstructive disease, iron deficiency anemia, hypothyroidism,, chronic diastolic congestive heart failure, GERD, borderline diabetes chronic kidney disease, hyperlipidemia gout scented to [facility] on 8/14/2024 with complaint of dizziness and feeling like going to pass out    Known from previous admission from 6/11/2024 through 6/15/2024 where she was admitted with similar complaint of dizziness and orthostatic hypotension.  He had presented with A-fib with RVR and seen by cardiology and completed 6 doses of Tikosyn protocol.  He was treated for acute on chronic diastolic congestive heart failure and diuresed well with Lasix and transition back to her oral diuretics.  She was discharged back home in stable condition.    She reports she had been doing well up until Friday she had had an appointment with Dr. Herrera and following the appointment had gone shopping at an [employer] store.  She reports while in the store her head suddenly became dizzy and had to grab hold of a cabinet.  She reports after drinking some water the episode passed.  On Saturday she had another episode that was very short in duration.  She reports Sunday Monday and Tuesday was only slightly lightheaded but today has had at least 5 \"spells\" of dizziness and feeling like going to pass out.  She states has been taking medication as usual.  Denied any changes in diet.  Denied dysuria abdominal pain, chest pain, but mild shortness of breath.  Reports has had chronic urgency over the past few months.  Only new medication was starting Ozempic 2 weeks ago    In the ED she was positive for orthostatic hypotension.  She was given a liter of IV fluids.  Labs reviewed and high sensitive troponin 13, BNP had improved to 2414.  Patient denies any edema.  Creatinine 1.36 which appears around baseline.  Negative for leukocytosis.  Hemoglobin stable 11.1.  Urinalysis did show moderate " leukocytes and 11-20 white blood cells but no bacteria.  Will give a dose of Rocephin as urine culture is pending.  De-escalate antibiotics as appropriate.  Heart rate is normal sinus.  Patient had an echocardiogram June 14 with EF 56 to 60%.  Will hold blood pressure medication and diuretics. consult cardiology to follow    Patient was seen and examined on 08/14/24 at 22:18 EDT .    Subjective / Review of systems     Review of Systems   Constitutional:  Positive for activity change and fever.   Respiratory:  Positive for shortness of breath.    Neurological:  Positive for dizziness.        Past Medical/Surgical/Social/Family History & Allergies     Past Medical History:   Diagnosis Date    Ankle pain     Arthritis     Coronary artery disease     Depression     Gout     Hyperglycemia     Hyperlipidemia     Hypertension     Low back pain     Sleep apnea     Vitamin D deficiency       Past Surgical History:   Procedure Laterality Date    ANKLE FUSION      2017-left    CARDIAC CATHETERIZATION      CARDIAC CATHETERIZATION Right 8/29/2022    Procedure: Left Heart Cath;  Surgeon: Ratna Zavala MD;  Location: [facility] CATH INVASIVE LOCATION;  Service: Cardiovascular;  Laterality: Right;    CARDIAC ELECTROPHYSIOLOGY PROCEDURE Left 7/3/2023    Procedure: Pacemaker DC new [place] Notified;  Surgeon: Cr Herrera MD;  Location: [facility] INVASIVE LOCATION;  Service: Cardiovascular;  Laterality: Left;    CARDIAC ELECTROPHYSIOLOGY PROCEDURE Right 3/5/2024    Procedure: Ablation atrial fibrillation, [employer] and Wayne Costa notified 02/09/24;  Surgeon: Cr Herrera MD;  Location: [facility] INVASIVE LOCATION;  Service: Cardiovascular;  Laterality: Right;    CARDIAC ELECTROPHYSIOLOGY PROCEDURE N/A 3/5/2024    Procedure: Cardioversion;  Surgeon: Cr Herrera MD;  Location: [facility] INVASIVE LOCATION;  Service: Cardiovascular;  Laterality: N/A;    CARPAL TUNNEL RELEASE      CATARACT  EXTRACTION      CUBITAL TUNNEL RELEASE      ENDOSCOPY N/A 5/28/2024    Procedure: ESOPHAGOGASTRODUODENOSCOPY WITH COLD FORCEP BIOPSY X1 AREA;  Surgeon: Josh Loomis MD;  Location: [facility] ENDOSCOPY;  Service: Gastroenterology;  Laterality: N/A;  Post- ESOPHAGITIS, GASTRITIS, HIATAL HERNIA    HYSTERECTOMY      REPLACEMENT TOTAL KNEE      left 2008    ROTATOR CUFF REPAIR      TOTAL KNEE ARTHROPLASTY Right 5/4/2023    Procedure: TOTAL KNEE ARTHROPLASTY WITH CORI ROBOT;  Surgeon: Chino Herrera II, MD;  Location: [facility] MAIN OR;  Service: Robotics - Ortho;  Laterality: Right;      Social History     Socioeconomic History    Marital status:    Tobacco Use    Smoking status: Never     Passive exposure: Never    Smokeless tobacco: Never   Vaping Use    Vaping status: Never Used   Substance and Sexual Activity    Alcohol use: No    Drug use: No    Sexual activity: Not Currently      Family History   Problem Relation Age of Onset    Hypertension Mother     Stroke Father     Arthritis Brother     Cancer Brother       Allergies   Allergen Reactions    Celecoxib Itching and Swelling     swelling      Social Determinants of Health     Tobacco Use: Low Risk  (8/9/2024)    Patient History     Smoking Tobacco Use: Never     Smokeless Tobacco Use: Never     Passive Exposure: Never   Alcohol Use: Not At Risk (6/11/2024)    AUDIT-C     Frequency of Alcohol Consumption: Never     Average Number of Drinks: Patient does not drink     Frequency of Binge Drinking: Never   Financial Resource Strain: Not on file   Food Insecurity: No Food Insecurity (6/12/2024)    Hunger Vital Sign     Worried About Running Out of Food in the Last Year: Never true     Ran Out of Food in the Last Year: Never true   Transportation Needs: No Transportation Needs (6/12/2024)    PRAPARE - Transportation     Lack of Transportation (Medical): No     Lack of Transportation (Non-Medical): No   Physical Activity: Not on file   Stress: Not  on file   Social Connections: Unknown (10/11/2023)    Family and Community Support     Help with Day-to-Day Activities: Not on file     Lonely or Isolated: Not on file   Interpersonal Safety: Not At Risk (8/14/2024)    Abuse Screen     Unsafe at Home or Work/School: no     Feels Threatened by Someone?: no     Does Anyone Keep You from Contacting Others or Doint Things Outside the Home?: no     Physical Sign of Abuse Present: no   Depression: Not at risk (5/2/2024)    PHQ-2     PHQ-2 Score: 0   Housing Stability: Not At Risk (6/12/2024)    Housing Stability     Current Living Arrangements: home     Potentially Unsafe Housing Conditions: none   Utilities: Not At Risk (6/12/2024)    [facility] Utilities     Threatened with loss of utilities: No   Health Literacy: Unknown (6/12/2024)    Education     Help with school or training?: Not on file     Preferred Language: English   Employment: Unknown (10/11/2023)    Employment     Do you want help finding or keeping work or a job?: Not on file   Disabilities: Not At Risk (6/11/2024)    Disabilities     Concentrating, Remembering, or Making Decisions Difficulty: no     Doing Errands Independently Difficulty: no        Home Medications     Prior to Admission medications    Medication Sig Start Date End Date Taking? Authorizing Provider   allopurinol (ZYLOPRIM) 300 MG tablet Take 1 tablet by mouth once daily 7/1/24   Leonor Adam MD   atorvastatin (LIPITOR) 20 MG tablet Take 1 tablet by mouth Daily. 10/3/23   Leonor Adam MD   Azelastine HCl 137 MCG/SPRAY solution 2 sprays by Alternating Nares route Daily As Needed. 6/19/23   Franklin iCd MD   cetirizine (zyrTEC) 10 MG tablet Take 1 tablet by mouth Daily. 8/28/18   Franklin Cid MD   cyanocobalamin (VITAMIN B-12) 1000 MCG tablet Take 1 tablet by mouth Daily. 12/20/18   Franklin Cid MD   dofetilide (Tikosyn) 250 MCG capsule Take 1 capsule by mouth Every 12 (Twelve) Hours. 6/15/24   Du  MD Sly   IRON CR PO Take  by mouth Daily.    Franklin Cid MD   levothyroxine (SYNTHROID, LEVOTHROID) 88 MCG tablet Take 1 tablet by mouth Daily. 6/25/24   Leonor Adam MD   montelukast (SINGULAIR) 10 MG tablet Take 1 tablet by mouth Every Night. 6/25/24   Leonor Adam MD   multivitamin (MULTI VITAMIN PO) Take  by mouth Daily.    Franklin Cid MD   pantoprazole (PROTONIX) 40 MG EC tablet Take 1 tablet by mouth Every Morning. 5/28/24   Franklin Cid MD   rivaroxaban (XARELTO) 15 MG tablet Take 1 tablet by mouth Daily With Dinner. Indications: Atrial Fibrillation 6/15/24   Sly Sandy MD   Semaglutide, 1 MG/DOSE, (Ozempic, 1 MG/DOSE,) 4 MG/3ML solution pen-injector Inject 1 mg under the skin into the appropriate area as directed Every 7 (Seven) Days for 4 doses. Continue for 4 weeks 9/27/24 10/19/24  Leonor Adam MD   Semaglutide,0.25 or 0.5MG/DOS, (Ozempic, 0.25 or 0.5 MG/DOSE,) 2 MG/1.5ML solution pen-injector Inject 0.25 mg under the skin into the appropriate area as directed 1 (One) Time Per Week. For four weeks 8/2/24   Leonor Adam MD   Semaglutide,0.25 or 0.5MG/DOS, (Ozempic, 0.25 or 0.5 MG/DOSE,) 2 MG/3ML solution pen-injector Inject 0.5 mg under the skin into the appropriate area as directed Every 7 (Seven) Days for 4 doses. Continue 4 weeks 8/30/24 9/21/24  Leonor Adam MD   spironolactone (ALDACTONE) 25 MG tablet Take 1/2 (one-half) tablet by mouth once daily 7/18/24   Ratna Zavala MD   Vitamin D, Cholecalciferol, 25 MCG (1000 UT) tablet Take 1 tablet by mouth Daily. 6/16/16   Franklin Cid MD        Objective / Physical Exam     Vital signs:  Temp: 98 °F (36.7 °C)  BP: 122/56  Heart Rate: 69  Resp: 16  SpO2: 100 %  Weight: 117 kg (259 lb)    Admission Weight: Weight: 117 kg (259 lb)    Physical Exam  Constitutional:       Appearance: She is obese.   Eyes:      Pupils: Pupils are equal, round, and reactive to  light.   Cardiovascular:      Rate and Rhythm: Normal rate and regular rhythm.   Pulmonary:      Effort: Pulmonary effort is normal.      Breath sounds: Normal breath sounds.   Abdominal:      Palpations: Abdomen is soft.   Musculoskeletal:         General: Normal range of motion.      Right lower leg: No edema.      Left lower leg: No edema.   Neurological:      Mental Status: She is alert and oriented to person, place, and time.   Psychiatric:         Behavior: Behavior normal.          Labs     Results from last 7 days   Lab Units 08/14/24  1407   WBC 10*3/mm3 6.14   HEMOGLOBIN g/dL 11.1*   HEMATOCRIT % 34.5   PLATELETS 10*3/mm3 202      Results from last 7 days   Lab Units 08/14/24  1407   ALK PHOS U/L 129*   AST (SGOT) U/L 22   ALT (SGPT) U/L 14           Results from last 7 days   Lab Units 08/14/24  1407   SODIUM mmol/L 139   POTASSIUM mmol/L 4.4   CHLORIDE mmol/L 105   CO2 mmol/L 21.8*   BUN mg/dL 34*   CREATININE mg/dL 1.36*   GLUCOSE mg/dL 96        Imaging     No Radiology Exams Resulted Within Past 24 Hours   ECG 12 Lead Other; dizziness   Preliminary Result   HEART RATE=71  bpm   RR Zldhuvts=706  ms   CT Oulehmnz=504  ms   P Horizontal Axis=59  deg   P Front Axis=9  deg   QRSD Sfdlpfle=496  ms   QT Oiuwargy=174  ms   ZCeX=039  ms   QRS Axis=-19  deg   T Wave Axis=58  deg   - ABNORMAL ECG -   Sinus rhythm   Probable left ventricular hypertrophy   Anterior Q waves, possibly due to LVH   Date and Time of Study:2024-08-14 13:16:07      Telemetry Scan   Final Result      Telemetry Scan   Final Result           Current Medications     Scheduled Meds:  [START ON 8/15/2024] allopurinol, 300 mg, Oral, Daily  [START ON 8/15/2024] atorvastatin, 20 mg, Oral, Daily  cefTRIAXone, 1,000 mg, Intravenous, Q24H  cetirizine, 10 mg, Oral, Nightly  [START ON 8/15/2024] cholecalciferol, 1,000 Units, Oral, Daily  dofetilide, 250 mcg, Oral, Q12H  [START ON 8/15/2024] levothyroxine, 88 mcg, Oral, Daily  montelukast, 10 mg,  Oral, Nightly  [START ON 8/15/2024] multivitamin, 1 tablet, Oral, Daily  [START ON 8/15/2024] pantoprazole, 40 mg, Oral, QAM  rivaroxaban, 15 mg, Oral, Daily With Dinner  sodium chloride, 10 mL, Intravenous, Q12H  [START ON 8/15/2024] cyanocobalamin, 1,000 mcg, Oral, Daily         Continuous Infusions:            Jessica Vasquez, [facility]  08/14/24   22:18 EDT

## 2024-08-14 NOTE — ED PROVIDER NOTES
Subjective   History of Present Illness  79-year-old female presents for multiple recurrent episodes where she feels like she is going to pass out.  Will be walking well, and she will lay down or sit down and after.  At times it is improved.  Been going on the last for 5 days.  Never had symptoms like this consistently before.  Review of Systems  See HPI.  Past Medical History:   Diagnosis Date    Ankle pain     Arthritis     Coronary artery disease     Depression     Gout     Hyperglycemia     Hyperlipidemia     Hypertension     Low back pain     Sleep apnea     Vitamin D deficiency        Allergies   Allergen Reactions    Celecoxib Itching and Swelling     swelling       Past Surgical History:   Procedure Laterality Date    ANKLE FUSION      2017-left    CARDIAC CATHETERIZATION      CARDIAC CATHETERIZATION Right 8/29/2022    Procedure: Left Heart Cath;  Surgeon: Ratna Zavala MD;  Location: [facility] CATH INVASIVE LOCATION;  Service: Cardiovascular;  Laterality: Right;    CARDIAC ELECTROPHYSIOLOGY PROCEDURE Left 7/3/2023    Procedure: Pacemaker DC new [place] Notified;  Surgeon: Cr Herrera MD;  Location: [facility] CATH INVASIVE LOCATION;  Service: Cardiovascular;  Laterality: Left;    CARDIAC ELECTROPHYSIOLOGY PROCEDURE Right 3/5/2024    Procedure: Ablation atrial fibrillation, Cryo Ibrahima and Wayne Costa notified 02/09/24;  Surgeon: Cr Herrera MD;  Location: [facility] CATH INVASIVE LOCATION;  Service: Cardiovascular;  Laterality: Right;    CARDIAC ELECTROPHYSIOLOGY PROCEDURE N/A 3/5/2024    Procedure: Cardioversion;  Surgeon: Cr Herrera MD;  Location: [facility] CATH INVASIVE LOCATION;  Service: Cardiovascular;  Laterality: N/A;    CARPAL TUNNEL RELEASE      CATARACT EXTRACTION      CUBITAL TUNNEL RELEASE      ENDOSCOPY N/A 5/28/2024    Procedure: ESOPHAGOGASTRODUODENOSCOPY WITH COLD FORCEP BIOPSY X1 AREA;  Surgeon: Josh Loomis MD;  Location: [facility] ENDOSCOPY;  Service:  "Gastroenterology;  Laterality: N/A;  Post- ESOPHAGITIS, GASTRITIS, HIATAL HERNIA    HYSTERECTOMY      REPLACEMENT TOTAL KNEE      left 2008    ROTATOR CUFF REPAIR      TOTAL KNEE ARTHROPLASTY Right 5/4/2023    Procedure: TOTAL KNEE ARTHROPLASTY WITH CORI ROBOT;  Surgeon: Chino Herrera II, MD;  Location: [facility] MAIN OR;  Service: Robotics - Ortho;  Laterality: Right;       Family History   Problem Relation Age of Onset    Hypertension Mother     Stroke Father     Arthritis Brother     Cancer Brother        Social History     Socioeconomic History    Marital status:    Tobacco Use    Smoking status: Never     Passive exposure: Never    Smokeless tobacco: Never   Vaping Use    Vaping status: Never Used   Substance and Sexual Activity    Alcohol use: No    Drug use: No    Sexual activity: Not Currently           Objective   Physical Exam  No acute distress, regular rate and rhythm, no tachypnea or increased work of breathing, abdomen soft and nontender, alert and oriented x 4, no murmur appreciated.  Procedures           ED Course      /51   Pulse 69   Temp 98 °F (36.7 °C) (Oral)   Resp 16   Ht 160 cm (63\")   Wt 117 kg (259 lb)   LMP  (LMP Unknown)   SpO2 99%   BMI 45.88 kg/m²   Labs Reviewed   URINALYSIS W/ CULTURE IF INDICATED - Abnormal; Notable for the following components:       Result Value    Leuk Esterase, UA Moderate (2+) (*)     All other components within normal limits    Narrative:     In absence of clinical symptoms, the presence of pyuria, bacteria, and/or nitrites on the urinalysis result does not correlate with infection.   URINALYSIS, MICROSCOPIC ONLY - Abnormal; Notable for the following components:    WBC, UA 11-20 (*)     All other components within normal limits   BNP (IN-HOUSE) - Abnormal; Notable for the following components:    proBNP 2,414.0 (*)     All other components within normal limits    Narrative:     This assay is used as an aid in the diagnosis of " individuals suspected of having heart failure. It can be used as an aid in the diagnosis of acute decompensated heart failure (ADHF) in patients presenting with signs and symptoms of ADHF to the emergency department (ED). In addition, NT-proBNP of <300 pg/mL indicates ADHF is not likely.    Age Range Result Interpretation  NT-proBNP Concentration (pg/mL:      <50             Positive            >450                   Gray                 300-450                    Negative             <300    50-75           Positive            >900                  Gray                300-900                  Negative            <300      >75             Positive            >1800                  Gray                300-1800                  Negative            <300   COMPREHENSIVE METABOLIC PANEL - Abnormal; Notable for the following components:    BUN 34 (*)     Creatinine 1.36 (*)     CO2 21.8 (*)     Alkaline Phosphatase 129 (*)     eGFR 40.2 (*)     All other components within normal limits    Narrative:     GFR Normal >60  Chronic Kidney Disease <60  Kidney Failure <15    The GFR formula is only valid for adults with stable renal function between ages 18 and 70.   CBC WITH AUTO DIFFERENTIAL - Abnormal; Notable for the following components:    RBC 3.40 (*)     Hemoglobin 11.1 (*)     .5 (*)     All other components within normal limits   SINGLE HS TROPONIN T - Normal    Narrative:     High Sensitive Troponin T Reference Range:  <14.0 ng/L- Negative Female for AMI  <22.0 ng/L- Negative Male for AMI  >=14 - Abnormal Female indicating possible myocardial injury.  >=22 - Abnormal Male indicating possible myocardial injury.   Clinicians would have to utilize clinical acumen, EKG, Troponin, and serial changes to determine if it is an Acute Myocardial Infarction or myocardial injury due to an underlying chronic condition.        URINE CULTURE   RAINBOW DRAW    Narrative:     The following orders were created for panel order  [place] Draw.  Procedure                               Abnormality         Status                     ---------                               -----------         ------                     Green Top (Gel)[[id number]]                                  Final result               Lavender Top[[id number]]                                     Final result               Gold Top - SST[[id number]]                                   Final result               Light Blue Top[[id number]]                                   Final result                 Please view results for these tests on the individual orders.   BASIC METABOLIC PANEL   CBC WITH AUTO DIFFERENTIAL   GREEN TOP   LAVENDER TOP   GOLD TOP - SST   LIGHT BLUE TOP   CBC AND DIFFERENTIAL    Narrative:     The following orders were created for panel order CBC & Differential.  Procedure                               Abnormality         Status                     ---------                               -----------         ------                     CBC Auto Differential[[id number]]        Abnormal            Final result                 Please view results for these tests on the individual orders.     Medications   sodium chloride 0.9 % flush 10 mL (10 mL Intravenous Not Given 8/14/24 6580)   sodium chloride 0.9 % flush 10 mL (has no administration in time range)   sodium chloride 0.9 % infusion 40 mL (has no administration in time range)   nitroglycerin (NITROSTAT) SL tablet 0.4 mg (has no administration in time range)   acetaminophen (TYLENOL) tablet 650 mg (has no administration in time range)     Or   acetaminophen (TYLENOL) 160 MG/5ML oral solution 650 mg (has no administration in time range)     Or   acetaminophen (TYLENOL) suppository 650 mg (has no administration in time range)   ondansetron (ZOFRAN) injection 4 mg (has no administration in time range)   sennosides-docusate (PERICOLACE) 8.6-50 MG per tablet 2 tablet (has no administration in time range)     And    polyethylene glycol (MIRALAX) packet 17 g (has no administration in time range)     And   bisacodyl (DULCOLAX) EC tablet 5 mg (has no administration in time range)     And   bisacodyl (DULCOLAX) suppository 10 mg (has no administration in time range)   allopurinol (ZYLOPRIM) tablet 300 mg (has no administration in time range)   atorvastatin (LIPITOR) tablet 20 mg (has no administration in time range)   Azelastine HCl solution 274 mcg (has no administration in time range)   cetirizine (zyrTEC) tablet 10 mg (10 mg Oral Given 8/14/24 2142)   vitamin B-12 (CYANOCOBALAMIN) tablet 1,000 mcg (has no administration in time range)   dofetilide (TIKOSYN) capsule 250 mcg (250 mcg Oral Given 8/14/24 2142)   levothyroxine (SYNTHROID, LEVOTHROID) tablet 88 mcg (has no administration in time range)   montelukast (SINGULAIR) tablet 10 mg (10 mg Oral Given 8/14/24 2142)   multivitamin (THERAGRAN) tablet 1 tablet (has no administration in time range)   pantoprazole (PROTONIX) EC tablet 40 mg (has no administration in time range)   rivaroxaban (XARELTO) tablet 15 mg (15 mg Oral Not Given 8/14/24 2358)   cholecalciferol (VITAMIN D3) tablet 1,000 Units (has no administration in time range)   cefTRIAXone (ROCEPHIN) 1,000 mg in sodium chloride 0.9 % 100 mL MBP (1,000 mg Intravenous New Bag 8/14/24 2141)   lactated ringers bolus 1,000 mL (0 mL Intravenous Stopped 8/14/24 1719)     No radiology results for the last day                                         Medical Decision Making  Problems Addressed:  Orthostatic hypotension: complicated acute illness or injury    Amount and/or Complexity of Data Reviewed  Labs: ordered.  ECG/medicine tests: ordered.    Risk  Decision regarding hospitalization.    EKG interpretation: Rate 71, normal sinus rhythm, normal Qtc,  No STEMI    Patient orthostatic.  Reported history of CHF but review of recent echo from [facility] shows patient with normal ejection fraction and normal diastolic function.  The  aortic valve was not well visualized but there was no noted regurgitation or stenosis.  Patient given fluid bolus here with no improvement, blood pressure continues to drop when patient stands.  Patient does not appear to be fluid responsive here so concerned that some other cause for patient's orthostatic hypotension.  Will admit to hospitalist service for further workup and evaluation.    Final diagnoses:   Orthostatic hypotension       ED Disposition  ED Disposition       ED Disposition   Decision to Admit    Condition   --    Comment   Level of Care: Telemetry [5]   Diagnosis: Symptomatic hypotension [[id number]]                 No follow-up provider specified.       Medication List      No changes were made to your prescriptions during this visit.            Vern Gandhi MD  08/14/24 5524

## 2024-08-15 PROBLEM — I95.1 ORTHOSTATIC HYPOTENSION: Status: ACTIVE | Noted: 2024-08-15

## 2024-08-15 LAB
ANION GAP SERPL CALCULATED.3IONS-SCNC: 10.9 MMOL/L (ref 5–15)
BASOPHILS # BLD AUTO: 0.06 10*3/MM3 (ref 0–0.2)
BASOPHILS NFR BLD AUTO: 1 % (ref 0–1.5)
BUN SERPL-MCNC: 30 MG/DL (ref 8–23)
BUN/CREAT SERPL: 24.2 (ref 7–25)
CALCIUM SPEC-SCNC: 9.9 MG/DL (ref 8.6–10.5)
CHLORIDE SERPL-SCNC: 107 MMOL/L (ref 98–107)
CO2 SERPL-SCNC: 23.1 MMOL/L (ref 22–29)
CREAT SERPL-MCNC: 1.24 MG/DL (ref 0.57–1)
DEPRECATED RDW RBC AUTO: 52.2 FL (ref 37–54)
EGFRCR SERPLBLD CKD-EPI 2021: 44.9 ML/MIN/1.73
EOSINOPHIL # BLD AUTO: 0.14 10*3/MM3 (ref 0–0.4)
EOSINOPHIL NFR BLD AUTO: 2.3 % (ref 0.3–6.2)
ERYTHROCYTE [DISTWIDTH] IN BLOOD BY AUTOMATED COUNT: 13.7 % (ref 12.3–15.4)
FLUAV RNA RESP QL NAA+PROBE: NOT DETECTED
FLUBV RNA RESP QL NAA+PROBE: NOT DETECTED
GLUCOSE SERPL-MCNC: 94 MG/DL (ref 65–99)
HCT VFR BLD AUTO: 35.5 % (ref 34–46.6)
HGB BLD-MCNC: 11.4 G/DL (ref 12–15.9)
IMM GRANULOCYTES # BLD AUTO: 0.02 10*3/MM3 (ref 0–0.05)
IMM GRANULOCYTES NFR BLD AUTO: 0.3 % (ref 0–0.5)
LYMPHOCYTES # BLD AUTO: 1.89 10*3/MM3 (ref 0.7–3.1)
LYMPHOCYTES NFR BLD AUTO: 31.1 % (ref 19.6–45.3)
MCH RBC QN AUTO: 33.1 PG (ref 26.6–33)
MCHC RBC AUTO-ENTMCNC: 32.1 G/DL (ref 31.5–35.7)
MCV RBC AUTO: 103.2 FL (ref 79–97)
MONOCYTES # BLD AUTO: 0.58 10*3/MM3 (ref 0.1–0.9)
MONOCYTES NFR BLD AUTO: 9.6 % (ref 5–12)
NEUTROPHILS NFR BLD AUTO: 3.38 10*3/MM3 (ref 1.7–7)
NEUTROPHILS NFR BLD AUTO: 55.7 % (ref 42.7–76)
NRBC BLD AUTO-RTO: 0 /100 WBC (ref 0–0.2)
PLATELET # BLD AUTO: 180 10*3/MM3 (ref 140–450)
PMV BLD AUTO: 10 FL (ref 6–12)
POTASSIUM SERPL-SCNC: 4.1 MMOL/L (ref 3.5–5.2)
QT INTERVAL: 420 MS
QTC INTERVAL: 456 MS
RBC # BLD AUTO: 3.44 10*6/MM3 (ref 3.77–5.28)
RSV RNA RESP QL NAA+PROBE: NOT DETECTED
SARS-COV-2 RNA RESP QL NAA+PROBE: NOT DETECTED
SODIUM SERPL-SCNC: 141 MMOL/L (ref 136–145)
WBC NRBC COR # BLD AUTO: 6.07 10*3/MM3 (ref 3.4–10.8)

## 2024-08-15 PROCEDURE — 85025 COMPLETE CBC W/AUTO DIFF WBC: CPT | Performed by: NURSE PRACTITIONER

## 2024-08-15 PROCEDURE — 80048 BASIC METABOLIC PNL TOTAL CA: CPT | Performed by: NURSE PRACTITIONER

## 2024-08-15 PROCEDURE — 99222 1ST HOSP IP/OBS MODERATE 55: CPT | Performed by: INTERNAL MEDICINE

## 2024-08-15 PROCEDURE — 87637 SARSCOV2&INF A&B&RSV AMP PRB: CPT | Performed by: STUDENT IN AN ORGANIZED HEALTH CARE EDUCATION/TRAINING PROGRAM

## 2024-08-15 PROCEDURE — 25010000002 CEFTRIAXONE PER 250 MG: Performed by: INTERNAL MEDICINE

## 2024-08-15 PROCEDURE — 25810000003 SODIUM CHLORIDE 0.9 % SOLUTION: Performed by: INTERNAL MEDICINE

## 2024-08-15 RX ORDER — SODIUM CHLORIDE 9 MG/ML
75 INJECTION, SOLUTION INTRAVENOUS CONTINUOUS
Status: DISCONTINUED | OUTPATIENT
Start: 2024-08-15 | End: 2024-08-16 | Stop reason: HOSPADM

## 2024-08-15 RX ORDER — MIDODRINE HYDROCHLORIDE 5 MG/1
5 TABLET ORAL
Status: DISCONTINUED | OUTPATIENT
Start: 2024-08-15 | End: 2024-08-16 | Stop reason: HOSPADM

## 2024-08-15 RX ADMIN — CEFTRIAXONE 2000 MG: 2 INJECTION, POWDER, FOR SOLUTION INTRAMUSCULAR; INTRAVENOUS at 20:48

## 2024-08-15 RX ADMIN — Medication 5 MG: at 22:47

## 2024-08-15 RX ADMIN — CYANOCOBALAMIN TAB 500 MCG 1000 MCG: 500 TAB at 09:01

## 2024-08-15 RX ADMIN — ALLOPURINOL 300 MG: 300 TABLET ORAL at 09:01

## 2024-08-15 RX ADMIN — SODIUM CHLORIDE 75 ML/HR: 9 INJECTION, SOLUTION INTRAVENOUS at 15:25

## 2024-08-15 RX ADMIN — Medication 10 ML: at 20:49

## 2024-08-15 RX ADMIN — ATORVASTATIN CALCIUM 20 MG: 20 TABLET, FILM COATED ORAL at 09:01

## 2024-08-15 RX ADMIN — PANTOPRAZOLE SODIUM 40 MG: 40 TABLET, DELAYED RELEASE ORAL at 06:15

## 2024-08-15 RX ADMIN — MIDODRINE HYDROCHLORIDE 5 MG: 5 TABLET ORAL at 16:29

## 2024-08-15 RX ADMIN — DOFETILIDE 250 MCG: 0.25 CAPSULE ORAL at 20:48

## 2024-08-15 RX ADMIN — RIVAROXABAN 15 MG: 15 TABLET, FILM COATED ORAL at 16:29

## 2024-08-15 RX ADMIN — Medication 10 ML: at 10:18

## 2024-08-15 RX ADMIN — Medication 1000 UNITS: at 09:01

## 2024-08-15 RX ADMIN — LEVOTHYROXINE SODIUM 88 MCG: 0.09 TABLET ORAL at 06:15

## 2024-08-15 RX ADMIN — DOFETILIDE 250 MCG: 0.25 CAPSULE ORAL at 10:34

## 2024-08-15 RX ADMIN — MONTELUKAST 10 MG: 10 TABLET, FILM COATED ORAL at 20:48

## 2024-08-15 RX ADMIN — CETIRIZINE HYDROCHLORIDE 10 MG: 10 TABLET, FILM COATED ORAL at 20:48

## 2024-08-15 RX ADMIN — THERA TABS 1 TABLET: TAB at 09:01

## 2024-08-15 NOTE — PROGRESS NOTES
[facility] MEDICINE SERVICE  DAILY PROGRESS NOTE    NAME: Faby Le  : 1945  MRN: [id number]      LOS: 0 days     PROVIDER OF SERVICE: Sly Sandy MD    Chief Complaint: Symptomatic hypotension    Subjective:     Interval History: Patient states that she feels overall better today and was able to ambulate to the bathroom without any significant dizziness.  However repeat orthostatics revealed significant drop in systolic blood pressure with standing and patient was feeling dizzy at the time.    Review of Systems:   Review of Systems    Objective:     Vital Signs  Temp:  [97.3 °F (36.3 °C)-97.5 °F (36.4 °C)] 97.3 °F (36.3 °C)  Heart Rate:  [60-73] 63  Resp:  [11-18] 14  BP: ()/(33-97) 130/55   Body mass index is 46.91 kg/m².    Physical Exam  Physical Exam  General Appearance:  Alert, cooperative, no distress, appears stated age  Head:  Normocephalic, without obvious abnormality, atraumatic  Eyes:  PERRL, conjunctiva/corneas clear, EOM's intact, fundi benign, both eyes  Ears:  Normal TM's and external ear canals, both ears  Nose: Nares normal, septum midline, mucosa normal, no drainage or sinus tenderness  Throat: Lips, mucosa, and tongue normal; teeth and gums normal  Neck: Supple, symmetrical, trachea midline, no adenopathy, thyroid: not enlarged, symmetric, no tenderness/mass/nodules, no carotid bruit or JVD  Lungs:   Clear to auscultation bilaterally, respirations unlabored  Heart:  Regular rate and rhythm, S1, S2 normal, no murmur, rub or gallop  Abdomen:  Soft, non-tender, bowel sounds active all four quadrants,  no masses, no organomegaly  Extremities: Extremities normal, atraumatic, no cyanosis or edema  Pulses: 2+ and symmetric  Skin: Skin color, texture, turgor normal, no rashes or lesions  Neurologic: Normal      Scheduled Meds   allopurinol, 300 mg, Oral, Daily  atorvastatin, 20 mg, Oral, Daily  cefTRIAXone, 2,000 mg, Intravenous, Q24H  cetirizine, 10 mg, Oral,  Nightly  cholecalciferol, 1,000 Units, Oral, Daily  dofetilide, 250 mcg, Oral, Q12H  levothyroxine, 88 mcg, Oral, Daily  midodrine, 5 mg, Oral, BID AC  montelukast, 10 mg, Oral, Nightly  multivitamin, 1 tablet, Oral, Daily  pantoprazole, 40 mg, Oral, QAM  rivaroxaban, 15 mg, Oral, Daily With Dinner  sodium chloride, 10 mL, Intravenous, Q12H  cyanocobalamin, 1,000 mcg, Oral, Daily       PRN Meds     acetaminophen **OR** acetaminophen **OR** acetaminophen    Azelastine HCl    senna-docusate sodium **AND** polyethylene glycol **AND** bisacodyl **AND** bisacodyl    nitroglycerin    ondansetron    sodium chloride    sodium chloride   Infusions  sodium chloride, 75 mL/hr          Diagnostic Data    Results from last 7 days   Lab Units 08/15/24  0429 08/14/24  1407   WBC 10*3/mm3 6.07 6.14   HEMOGLOBIN g/dL 11.4* 11.1*   HEMATOCRIT % 35.5 34.5   PLATELETS 10*3/mm3 180 202   GLUCOSE mg/dL 94 96   CREATININE mg/dL 1.24* 1.36*   BUN mg/dL 30* 34*   SODIUM mmol/L 141 139   POTASSIUM mmol/L 4.1 4.4   AST (SGOT) U/L  --  22   ALT (SGPT) U/L  --  14   ALK PHOS U/L  --  129*   BILIRUBIN mg/dL  --  0.8   ANION GAP mmol/L 10.9 12.2       No radiology results for the last day      I reviewed the patient's new clinical results.    Assessment/Plan:     Active and Resolved Problems  Active Hospital Problems    Diagnosis  POA    **Symptomatic hypotension [I95.9]  Yes      Resolved Hospital Problems   No resolved problems to display.       Orthostatic hypotension with near syncope  -Patient still has orthostatic hypotension although BP overall improved  Continue IV fluids  -Added low-dose midodrine  -Holding any antihypertensive medications or diuretics    Acute UTI  -Urinalysis concerning for UTI  -Continue IV Rocephin  -Follow-up urine cultures    Chronic diastolic heart failure  -Patient appears compensated and possibly hypovolemic  -Continue GDMT    CKD stage III  -Renal function essentially at baseline  -Continue IV fluids and  monitor urine output    Hypothyroidism  -Continue Synthroid    Morbid Obesity  -Counseled patient on diet and exercise to improve weight loss and comorbid conditions    VTE Prophylaxis:  Pharmacologic VTE prophylaxis orders are present.         Code status is   Code Status and Medical Interventions: CPR (Attempt to Resuscitate); Full Support   Ordered at: 08/14/24 1916     Code Status (Patient has no pulse and is not breathing):    CPR (Attempt to Resuscitate)     Medical Interventions (Patient has pulse or is breathing):    Full Support       Plan for disposition: DC on 8/16    Time: 30 minutes    Signature: Electronically signed by Sly Sandy MD, 08/15/24, 15:09 EDT.  [facility] Hospitalist Team

## 2024-08-15 NOTE — CASE MANAGEMENT/SOCIAL WORK
Discharge Planning Assessment  [facility]     Patient Name: Faby Le  MRN: [id number]  Today's Date: 8/15/2024    Admit Date: 8/14/2024    Plan: Routine home with family.   Discharge Needs Assessment       Row Name 08/15/24 1133       Living Environment    People in Home child(marlon), adult;grandchild(marlon);other relative(s)  Lives with daughter, Kemi and Kemi's  and children.    Name(s) of People in Home Kemi, daughter    Current Living Arrangements home    Potentially Unsafe Housing Conditions none    In the past 12 months has the electric, gas, oil, or water company threatened to shut off services in your home? No    Primary Care Provided by self    Provides Primary Care For no one    Family Caregiver if Needed child(marlon), adult    Family Caregiver Names Kemi Crawford    Quality of Family Relationships helpful;involved;supportive    Able to Return to Prior Arrangements yes       Resource/Environmental Concerns    Resource/Environmental Concerns none    Transportation Concerns none       Transportation Needs    In the past 12 months, has lack of transportation kept you from medical appointments or from getting medications? no    In the past 12 months, has lack of transportation kept you from meetings, work, or from getting things needed for daily living? No       Food Insecurity    Within the past 12 months, you worried that your food would run out before you got the money to buy more. Never true    Within the past 12 months, the food you bought just didn't last and you didn't have money to get more. Never true       Transition Planning    Patient/Family Anticipates Transition to home with family    Patient/Family Anticipated Services at Transition none    Transportation Anticipated family or friend will provide  Patient states one of her daughters will transport.       Discharge Needs Assessment    Readmission Within the Last 30 Days no previous admission in last 30 days    Equipment Currently Used  at Home cane, straight;walker, standard;rollator;wheelchair, motorized;shower chair;cpap;bp cuff;glucometer;commode    Concerns to be Addressed no discharge needs identified    Anticipated Changes Related to Illness none    Equipment Needed After Discharge none    Provided Post Acute Provider List? N/A    Provided Post Acute Provider Quality & Resource List? N/A                   Discharge Plan       Row Name 08/15/24 1134       Plan    Plan Routine home with family.    Patient/Family in Agreement with Plan yes    Provided Post Acute Provider List? N/A    Provided Post Acute Provider Quality & Resource List? N/A    Plan Comments CM met with patient at bedside. Confirmed patient lives at her daughter, Kemi's home with Kemi's  and children. Patient does local driving and is independent with ADLs. PCP and pharmacy confirmed. Pt agreeable to Meds to Bed program. Denies issues affording medications or financial/transportation concerns. DME includes cane, walker, rollator, electric wheelchair, showerchair, bedside commode, blood pressure cuff, glucometer, and CPAP which is supplied by [employer]. Pt does not have current OP therapies. Has hx of using [facility] for OP PT.  Patient states one of her daughters will transport at discharge. IV Abx and Cardiology consult pending.                  Continued Care and Services - Admitted Since 8/14/2024    No active coordination exists for this encounter.        Demographic Summary       Row Name 08/15/24 1133       General Information    Admission Type observation    Arrived From emergency department    Required Notices Provided Important Message from Medicare    Referral Source admission list    Reason for Consult care coordination/care conference;discharge planning    Preferred Language English       Contact Information    Permission Granted to Share Info With                    Functional Status       Row Name 08/15/24 0273       Functional  Status    Usual Activity Tolerance moderate    Current Activity Tolerance moderate       Functional Status, IADL    Medications independent    Meal Preparation independent    Housekeeping independent    Laundry independent    Shopping independent             Mehnaz Summers RN     Office: [phone]  Fax: [phone]

## 2024-08-15 NOTE — CONSULTS
Referring Provider: Dr. Sly Lopes  Reason for Consultation: Hypotension and dizziness    Chief complaint hypotension and dizziness    Cardiology assessment and plan      Dizziness  Hypertension  Orthostatic hypotension  History of sick sinus syndrome status post pacemaker placement  History of atrial fibrillation status post ablation therapy  Status post a flutter ablation  Prior history of TIA on chronic anticoagulation therapy  Chronic diastolic heart failure  History of hypertension  History of hypothyroidism  Chronic kidney disease  Morbid obesity  Chronic anemia  Severe left atrial enlargement  Dual-chamber pacemaker in situ  History of TIA in the past  Normal LV systolic function  cardiac catheterization in 2022 with a mild obstructive disease involving the LAD and a moderate obstructive disease involving the ostium of the diagonal branch with normal LV systolic function   New initiation of Ozempic    Denies any new cardiac symptoms  Clinically feels better  No chest pain  No syncope  Tmax is 97.5 pulse is 60 respirations are 14 blood pressure is 78/39 to 132/46 sats are 100%  Normal troponin  Abnormal elevated proBNP at 2400  Sodium is 140 potassium is 4.1 creatinine is 1.2 hemoglobin is 11.4  Twelve-lead EKG shows normal sinus rhythm with no acute ST changes  Echocardiogram in June with normal LV systolic function  Current medications include Lipitor 20 mg p.o. 7-day patient is on Tikosyn 250 mg p.o. twice daily  Patient is on anticoagulation therapy with Xarelto 15 mg p.o. once a day  Twelve-lead EKG shows normal sinus rhythm with a QT interval of 456  Patient clinically feels better  Recheck orthostatic vitals  There is a component of vertigo also  Conservative measures for orthostatic hypotension  If patient fails conservative measures consider adding midodrine versus fludrocortisone to help with the orthostatic symptoms  Close monitoring of blood pressure at home  Normal device function in July  If  the orthostatic vitals are improved and patient does not have any orthostatic symptoms okay to discharge and follow-up in the office  Discussed with patient at bedside                    History of present illness:  Faby Le is a 79 y.o. female who presents with past medical history that is significant for history of atrial fibrillation status post ablation therapy tachybradycardia syndrome status post pacemaker placement history of TIA history of hypertension history of obstructive sleep apnea history of nonobstructive coronary disease history of iron deficiency anemia hypothyroidism chronic diastolic heart failure gastroesophageal reflux disease chronic kidney disease hyperlipidemia gout presented to the hospital with symptoms of dizziness and orthostasis  Denies any chest pain  Poor functional status  Complaining of episodes of dizziness with some orthostasis  Also some symptoms of spinning sensation consistent with vertigo  No syncope  No atrial fibrillation  No chest discomfort   Home blood pressure readings are optimal  P.o. intake is optimal per patient          Review of Systems  Review of Systems   Constitutional: Negative for chills, decreased appetite and malaise/fatigue.   HENT:  Negative for congestion and nosebleeds.    Eyes:  Negative for blurred vision and double vision.   Cardiovascular:  Negative for chest pain, dyspnea on exertion, irregular heartbeat, leg swelling, near-syncope, orthopnea, palpitations and paroxysmal nocturnal dyspnea.   Respiratory:  Negative for cough and shortness of breath.    Hematologic/Lymphatic: Negative for adenopathy. Does not bruise/bleed easily.   Skin:  Negative for color change and rash.   Musculoskeletal:  Negative for back pain and joint pain.   Gastrointestinal:  Negative for bloating, abdominal pain, hematemesis and hematochezia.   Genitourinary:  Negative for flank pain and hematuria.   Neurological:  Negative for dizziness and focal weakness.    Psychiatric/Behavioral:  Negative for altered mental status. The patient does not have insomnia.        Past Medical History  Past Medical History:   Diagnosis Date    Ankle pain     Arthritis     Coronary artery disease     Depression     Gout     Hyperglycemia     Hyperlipidemia     Hypertension     Low back pain     Sleep apnea     Vitamin D deficiency     and   Past Surgical History:   Procedure Laterality Date    ANKLE FUSION      2017-left    CARDIAC CATHETERIZATION      CARDIAC CATHETERIZATION Right 8/29/2022    Procedure: Left Heart Cath;  Surgeon: Ratna Zavala MD;  Location: [facility] CATH INVASIVE LOCATION;  Service: Cardiovascular;  Laterality: Right;    CARDIAC ELECTROPHYSIOLOGY PROCEDURE Left 7/3/2023    Procedure: Pacemaker DC new [place] Notified;  Surgeon: Cr Herrera MD;  Location: [facility] CATH INVASIVE LOCATION;  Service: Cardiovascular;  Laterality: Left;    CARDIAC ELECTROPHYSIOLOGY PROCEDURE Right 3/5/2024    Procedure: Ablation atrial fibrillation, Cryo Slalonde and Wayne Igor notified 02/09/24;  Surgeon: Cr Herrera MD;  Location: [facility] CATH INVASIVE LOCATION;  Service: Cardiovascular;  Laterality: Right;    CARDIAC ELECTROPHYSIOLOGY PROCEDURE N/A 3/5/2024    Procedure: Cardioversion;  Surgeon: Cr Herrera MD;  Location: [facility] CATH INVASIVE LOCATION;  Service: Cardiovascular;  Laterality: N/A;    CARPAL TUNNEL RELEASE      CATARACT EXTRACTION      CUBITAL TUNNEL RELEASE      ENDOSCOPY N/A 5/28/2024    Procedure: ESOPHAGOGASTRODUODENOSCOPY WITH COLD FORCEP BIOPSY X1 AREA;  Surgeon: Josh Loomis MD;  Location: [facility] ENDOSCOPY;  Service: Gastroenterology;  Laterality: N/A;  Post- ESOPHAGITIS, GASTRITIS, HIATAL HERNIA    HYSTERECTOMY      REPLACEMENT TOTAL KNEE      left 2008    ROTATOR CUFF REPAIR      TOTAL KNEE ARTHROPLASTY Right 5/4/2023    Procedure: TOTAL KNEE ARTHROPLASTY WITH CORI ROBOT;  Surgeon: Chino Herrera II, MD;   "Location: [facility] OR;  Service: Robotics - Ortho;  Laterality: Right;       Family History  Family History   Problem Relation Age of Onset    Hypertension Mother     Stroke Father     Arthritis Brother     Cancer Brother        Social History  Social History     Socioeconomic History    Marital status:    Tobacco Use    Smoking status: Never     Passive exposure: Never    Smokeless tobacco: Never   Vaping Use    Vaping status: Never Used   Substance and Sexual Activity    Alcohol use: No    Drug use: No    Sexual activity: Not Currently       Objective     Physical Exam:  Constitutional:       Appearance: Well-developed.   Eyes:      Conjunctiva/sclera: Conjunctivae normal.      Pupils: Pupils are equal, round, and reactive to light.   HENT:      Head: Normocephalic and atraumatic.   Neck:      Thyroid: No thyromegaly.   Pulmonary:      Effort: Pulmonary effort is normal.      Breath sounds: Normal breath sounds.   Cardiovascular:      Normal rate. Regular rhythm.   Pulses:     Intact distal pulses.   Edema:     Peripheral edema absent.   Abdominal:      General: Bowel sounds are normal.      Palpations: Abdomen is soft.   Musculoskeletal:      Cervical back: Normal range of motion and neck supple. Skin:     General: Skin is warm.   Neurological:      Mental Status: Alert and oriented to person, place, and time.         Vital Signs  Vitals:    08/15/24 0013 08/15/24 0403 08/15/24 0525 08/15/24 0802   BP: 115/97 139/61 119/49 133/46   BP Location: Left arm Left arm Left arm Left arm   Patient Position: Lying Lying Lying Lying   Pulse: 60 70 73 63   Resp: 11 16 13 14   Temp: 97.5 °F (36.4 °C) 97.4 °F (36.3 °C) 97.3 °F (36.3 °C) 97.5 °F (36.4 °C)   TempSrc: Oral Oral Oral Oral   SpO2: 99% 95% 100% 100%   Weight:   120 kg (264 lb 12.8 oz)    Height:           Weight  Flowsheet Rows      Flowsheet Row First Filed Value   Admission Height 160 cm (63\") Documented at 08/14/2024 1310   Admission Weight 117 kg (259 " lb) Documented at 08/14/2024 1310                Results Review:  Lab Results (last 24 hours)       Procedure Component Value Units Date/Time    Basic Metabolic Panel [[id number]]  (Abnormal) Collected: 08/15/24 0429    Specimen: Blood Updated: 08/15/24 0515     Glucose 94 mg/dL      BUN 30 mg/dL      Creatinine 1.24 mg/dL      Sodium 141 mmol/L      Potassium 4.1 mmol/L      Chloride 107 mmol/L      CO2 23.1 mmol/L      Calcium 9.9 mg/dL      BUN/Creatinine Ratio 24.2     Anion Gap 10.9 mmol/L      eGFR 44.9 mL/min/1.73     Narrative:      GFR Normal >60  Chronic Kidney Disease <60  Kidney Failure <15    The GFR formula is only valid for adults with stable renal function between ages 18 and 70.    CBC Auto Differential [[id number]]  (Abnormal) Collected: 08/15/24 0429    Specimen: Blood Updated: 08/15/24 0451     WBC 6.07 10*3/mm3      RBC 3.44 10*6/mm3      Hemoglobin 11.4 g/dL      Hematocrit 35.5 %      .2 fL      MCH 33.1 pg      MCHC 32.1 g/dL      RDW 13.7 %      RDW-SD 52.2 fl      MPV 10.0 fL      Platelets 180 10*3/mm3      Neutrophil % 55.7 %      Lymphocyte % 31.1 %      Monocyte % 9.6 %      Eosinophil % 2.3 %      Basophil % 1.0 %      Immature Grans % 0.3 %      Neutrophils, Absolute 3.38 10*3/mm3      Lymphocytes, Absolute 1.89 10*3/mm3      Monocytes, Absolute 0.58 10*3/mm3      Eosinophils, Absolute 0.14 10*3/mm3      Basophils, Absolute 0.06 10*3/mm3      Immature Grans, Absolute 0.02 10*3/mm3      nRBC 0.0 /100 WBC     COVID-19, FLU A/B, RSV PCR 1 HR TAT - Swab, Nasopharynx [[id number]]  (Normal) Collected: 08/15/24 0243    Specimen: Swab from Nasopharynx Updated: 08/15/24 0324     COVID19 Not Detected     Influenza A PCR Not Detected     Influenza B PCR Not Detected     RSV, PCR Not Detected    Narrative:      Fact sheet for providers: https://www.fda.gov/media/147740/download    Fact sheet for patients: https://www.fda.gov/media/202591/download    Test performed by PCR.    CBC &  Differential [[id number]]  (Abnormal) Collected: 08/14/24 1407    Specimen: Blood Updated: 08/14/24 1622    Narrative:      The following orders were created for panel order CBC & Differential.  Procedure                               Abnormality         Status                     ---------                               -----------         ------                     CBC Auto Differential[[id number]]        Abnormal            Final result                 Please view results for these tests on the individual orders.    CBC Auto Differential [[id number]]  (Abnormal) Collected: 08/14/24 1407    Specimen: Blood Updated: 08/14/24 1622     WBC 6.14 10*3/mm3      RBC 3.40 10*6/mm3      Hemoglobin 11.1 g/dL      Hematocrit 34.5 %      .5 fL      MCH 32.6 pg      MCHC 32.2 g/dL      RDW 13.7 %      RDW-SD 51.4 fl      MPV 9.6 fL      Platelets 202 10*3/mm3      Neutrophil % 55.2 %      Lymphocyte % 33.2 %      Monocyte % 9.3 %      Eosinophil % 1.3 %      Basophil % 0.8 %      Immature Grans % 0.2 %      Neutrophils, Absolute 3.39 10*3/mm3      Lymphocytes, Absolute 2.04 10*3/mm3      Monocytes, Absolute 0.57 10*3/mm3      Eosinophils, Absolute 0.08 10*3/mm3      Basophils, Absolute 0.05 10*3/mm3      Immature Grans, Absolute 0.01 10*3/mm3      nRBC 0.0 /100 WBC     Comprehensive Metabolic Panel [[id number]]  (Abnormal) Collected: 08/14/24 1407    Specimen: Blood Updated: 08/14/24 155     Glucose 96 mg/dL      BUN 34 mg/dL      Creatinine 1.36 mg/dL      Sodium 139 mmol/L      Potassium 4.4 mmol/L      Chloride 105 mmol/L      CO2 21.8 mmol/L      Calcium 10.0 mg/dL      Total Protein 6.8 g/dL      Albumin 4.0 g/dL      ALT (SGPT) 14 U/L      AST (SGOT) 22 U/L      Alkaline Phosphatase 129 U/L      Total Bilirubin 0.8 mg/dL      Globulin 2.8 gm/dL      A/G Ratio 1.4 g/dL      BUN/Creatinine Ratio 25.0     Anion Gap 12.2 mmol/L      eGFR 40.2 mL/min/1.73     Narrative:      GFR Normal >60  Chronic Kidney Disease  <60  Kidney Failure <15    The GFR formula is only valid for adults with stable renal function between ages 18 and 70.    BNP [[id number]]  (Abnormal) Collected: 08/14/24 1407    Specimen: Blood Updated: 08/14/24 1537     proBNP 2,414.0 pg/mL     Narrative:      This assay is used as an aid in the diagnosis of individuals suspected of having heart failure. It can be used as an aid in the diagnosis of acute decompensated heart failure (ADHF) in patients presenting with signs and symptoms of ADHF to the emergency department (ED). In addition, NT-proBNP of <300 pg/mL indicates ADHF is not likely.    Age Range Result Interpretation  NT-proBNP Concentration (pg/mL:      <50             Positive            >450                   Gray                 300-450                    Negative             <300    50-75           Positive            >900                  Gray                300-900                  Negative            <300      >75             Positive            >1800                  Gray                300-1800                  Negative            <300    Single High Sensitivity Troponin T [[id number]]  (Normal) Collected: 08/14/24 1407    Specimen: Blood Updated: 08/14/24 1537     HS Troponin T 13 ng/L     Narrative:      High Sensitive Troponin T Reference Range:  <14.0 ng/L- Negative Female for AMI  <22.0 ng/L- Negative Male for AMI  >=14 - Abnormal Female indicating possible myocardial injury.  >=22 - Abnormal Male indicating possible myocardial injury.   Clinicians would have to utilize clinical acumen, EKG, Troponin, and serial changes to determine if it is an Acute Myocardial Infarction or myocardial injury due to an underlying chronic condition.         Urinalysis, Microscopic Only - Urine, Clean Catch [[id number]]  (Abnormal) Collected: 08/14/24 1407    Specimen: Urine, Clean Catch Updated: 08/14/24 1434     RBC, UA 0-2 /HPF      WBC, UA 11-20 /HPF      Bacteria, UA None Seen /HPF      Squamous Epithelial  Cells, UA 0-2 /HPF      Hyaline Casts, UA 0-2 /LPF      Methodology Automated Microscopy    Urine Culture - Urine, Urine, Clean Catch [[id number]] Collected: 08/14/24 1407    Specimen: Urine, Clean Catch Updated: 08/14/24 1434    Urinalysis With Culture If Indicated - Urine, Clean Catch [[id number]]  (Abnormal) Collected: 08/14/24 1407    Specimen: Urine, Clean Catch Updated: 08/14/24 1433     Color, UA Yellow     Appearance, UA Clear     pH, UA <=5.0     Specific Gravity, UA 1.014     Glucose, UA Negative     Ketones, UA Negative     Bilirubin, UA Negative     Blood, UA Negative     Protein, UA Negative     Leuk Esterase, UA Moderate (2+)     Nitrite, UA Negative     Urobilinogen, UA 0.2 E.U./dL    Narrative:      In absence of clinical symptoms, the presence of pyuria, bacteria, and/or nitrites on the urinalysis result does not correlate with infection.    [place] Draw [[id number]] Collected: 08/14/24 1407    Specimen: Blood Updated: 08/14/24 1430    Narrative:      The following orders were created for panel order [place] Draw.  Procedure                               Abnormality         Status                     ---------                               -----------         ------                     Green Top (Gel)[[id number]]                                  Final result               Lavender Top[[id number]]                                     Final result               Gold Top - SST[[id number]]                                   Final result               Light Blue Top[[id number]]                                   Final result                 Please view results for these tests on the individual orders.    Green Top (Gel) [[id number]] Collected: 08/14/24 1407    Specimen: Blood Updated: 08/14/24 1430     Extra Tube Hold for add-ons.     Comment: Auto resulted.       Lavender Top [[id number]] Collected: 08/14/24 1407    Specimen: Blood Updated: 08/14/24 1430     Extra Tube hold for add-on     Comment: Auto resulted        Gold Top - SST [[id number]] Collected: 08/14/24 1407    Specimen: Blood Updated: 08/14/24 1430     Extra Tube Hold for add-ons.     Comment: Auto resulted.       Light Blue Top [[id number]] Collected: 08/14/24 1407    Specimen: Blood Updated: 08/14/24 1430     Extra Tube Hold for add-ons.     Comment: Auto resulted             Imaging Results (Last 72 Hours)       ** No results found for the last 72 hours. **            Results for orders placed during the hospital encounter of 06/11/24    Adult Transthoracic Echo Complete W/ Cont if Necessary Per Protocol    Interpretation Summary    Left ventricular systolic function is normal. Left ventricular ejection fraction appears to be 56 - 60%.    Left ventricular diastolic function was normal.    The left atrial cavity is moderately dilated.    The right atrial cavity is mildly  dilated.      Medication Review  Scheduled Meds:allopurinol, 300 mg, Oral, Daily  atorvastatin, 20 mg, Oral, Daily  cefTRIAXone, 2,000 mg, Intravenous, Q24H  cetirizine, 10 mg, Oral, Nightly  cholecalciferol, 1,000 Units, Oral, Daily  dofetilide, 250 mcg, Oral, Q12H  levothyroxine, 88 mcg, Oral, Daily  montelukast, 10 mg, Oral, Nightly  multivitamin, 1 tablet, Oral, Daily  pantoprazole, 40 mg, Oral, QAM  rivaroxaban, 15 mg, Oral, Daily With Dinner  sodium chloride, 10 mL, Intravenous, Q12H  cyanocobalamin, 1,000 mcg, Oral, Daily      Continuous Infusions:   PRN Meds:.  acetaminophen **OR** acetaminophen **OR** acetaminophen    Azelastine HCl    senna-docusate sodium **AND** polyethylene glycol **AND** bisacodyl **AND** bisacodyl    nitroglycerin    ondansetron    sodium chloride    sodium chloride    Lab Results   Component Value Date    GLUCOSE 94 08/15/2024    BUN 30 (H) 08/15/2024    CREATININE 1.24 (H) 08/15/2024    EGFR 44.9 (L) 08/15/2024    BCR 24.2 08/15/2024    K 4.1 08/15/2024    CO2 23.1 08/15/2024    CALCIUM 9.9 08/15/2024    ALBUMIN 4.0 08/14/2024    BILITOT 0.8 08/14/2024    AST 22  08/14/2024    ALT 14 08/14/2024     Results for orders placed during the hospital encounter of 08/29/22    Cardiac Catheterization/Vascular Study    Narrative  Table formatting from the original result was not included.  Cardiac Catheterization Operative Report    Faby Le  [id number]  8/29/2022  @PCP@    She underwent cardiac catheterization.    Indications for the procedure include: abnormal stress test.    Procedure Details:  The risks, benefits, complications, treatment options, and expected outcomes were discussed with the patient. The patient and/or family concurred with the proposed plan, giving informed consent.    After informed consent the patient was brought to the cath lab after appropriate IV hydration was begun and oral premedication was given. She was further sedated with fentanyl. She was prepped and draped in the usual manner. Using the modified Seldinger access technique, a 6 [place] sheath was placed in the femoral artery. A left heart catheterization with coronary arteriography was performed. Findings are discussed below.    After the procedure was completed, sedation was stopped and the sheaths and catheters were all removed. Hemostasis was achieved per established hospital protocols.    Conscious sedation:  Conscious sedation was performed according to protocol.  I supervised and directed an independent trained observer with the assistance of monitoring the patient's level of consciousness and physiologic status throughout the procedure.  Intraoperative service time was 60 minutes.    Findings:    Hemodynamics Central arctic pressure systolic 150 and diastolic 56 with a mean pressure of 82 mmHg  LV end-diastolic pressure of 12 mmHg  There was no gradient across the aortic valve on the pullback of the pigtail catheter  Left Main  left main is a large-caliber vessel angiographically normal bifurcates into left into descending and left circumflex arteries  RCA  large-caliber dominant  vessel  Right coronary artery is angiographically normal right coronary artery bifurcates into a large caliber PDA branch and a moderate caliber PLV branch that are angiographically normal  LAD  large-caliber vessel  LAD has mid focal angiographic 30% stenosis that is heavily calcified at the bifurcation with the diagonal and septal branch  Moderate size diagonal branch that has ostial angiographic of 40% stenosis  Circ  large-caliber vessel angiographically normal  First marginal branch of the circumflex has proximal angiographic 30 to 40% stenosis    LV  normal LV systolic function normal wall motion estimate LV ejection fraction of 55%  Coronary Dominance  right coronary artery    Estimated Blood Loss:  Minimal    Specimens: None    Complications:  None; patient tolerated the procedure well.    Disposition: PACU - hemodynamically stable.    Condition: stable    Impressions:  Mild obstructive coronary artery disease involving the LAD mild to moderate obstructive coronary artery disease involving the ostium of the diagonal branch mild obstructive coronary artery disease involving the proximal first marginal branch  Normal LV systolic function  Normal wall motion  Estimate LV ejection fraction of 55%  Normal left-sided filling pressures    Recommendations:  Medical management for obstructive coronary artery disease  Test results reviewed and discussed with patient and family     Results for orders placed during the hospital encounter of 06/11/24    Adult Transthoracic Echo Complete W/ Cont if Necessary Per Protocol    Interpretation Summary    Left ventricular systolic function is normal. Left ventricular ejection fraction appears to be 56 - 60%.    Left ventricular diastolic function was normal.    The left atrial cavity is moderately dilated.    The right atrial cavity is mildly  dilated.     Lab Results   Component Value Date    CHOL 125 05/07/2024    TRIG 49 05/07/2024    HDL 67 (H) 05/07/2024    LDL 47  05/07/2024            Assessment & Plan       Symptomatic hypotension          Ratna Zavala MD  08/15/24  09:20 EDT

## 2024-08-15 NOTE — PLAN OF CARE
Goal Outcome Evaluation:  Plan of Care Reviewed With: patient        Progress: improving  Outcome Evaluation: Pt has rested comfortably throughout shift. Attending MD is planning on keeping Pt for one more night for observation. Pt orthostatic b/p still dropping to 96/58. Pt a & O x4. Pleasant throughout shift.

## 2024-08-15 NOTE — PLAN OF CARE
Problem: Fall Injury Risk  Goal: Absence of Fall and Fall-Related Injury  Intervention: Identify and Manage Contributors  Description: Develop a fall prevention plan with the patient and caregiver/family.  Provide reorientation, appropriate sensory stimulation and routines with changes in mental status to decrease risk of fall.  Promote use of personal vision and auditory aids.  Assess assistance level required for safe and effective self-care; provide support as needed, such as toileting, mobilization. For age 65 and older, implement timed toileting with assistance.  Encourage physical activity, such as performance of mobility and self-care at highest level of patient ability, multicomponent exercise program and provision of appropriate assistive devices.  If fall occurs, assess the severity of injury; implement fall injury protocol. Determine the cause and revise fall injury prevention plan.  Regularly review medication contribution to fall risk; adjust medication administration times to minimize risk of falling.  Consider risk related to polypharmacy and age.  Balance adequate pain management with potential for oversedation.  Recent Flowsheet Documentation  Taken 8/15/2024 0426 by Zoie Oliver RN  Medication Review/Management: medications reviewed  Taken 8/15/2024 0400 by Zoie Oliver RN  Medication Review/Management: medications reviewed  Taken 8/15/2024 0200 by Zoie Oliver RN  Medication Review/Management: medications reviewed  Taken 8/15/2024 0013 by Zoie Oliver RN  Medication Review/Management: medications reviewed  Intervention: Promote Injury-Free Environment  Description: Provide a safe, barrier-free environment that encourages independent activity.  Keep care area uncluttered and well-lighted.  Determine need for increased observation or monitoring.  Avoid use of devices that minimize mobility, such as restraints or indwelling urinary catheter.  Recent Flowsheet  Documentation  Taken 8/15/2024 0426 by Zoie Oliver RN  Safety Promotion/Fall Prevention: fall prevention program maintained  Taken 8/15/2024 0400 by Zoie Oliver RN  Safety Promotion/Fall Prevention:   activity supervised   fall prevention program maintained   clutter free environment maintained   lighting adjusted   safety round/check completed   room organization consistent  Taken 8/15/2024 0200 by Zoie Oliver RN  Safety Promotion/Fall Prevention:   activity supervised   clutter free environment maintained   fall prevention program maintained   lighting adjusted   room organization consistent   safety round/check completed  Taken 8/15/2024 0013 by Zoie Oliver RN  Safety Promotion/Fall Prevention:   clutter free environment maintained   activity supervised   fall prevention program maintained   lighting adjusted   room organization consistent   safety round/check completed   Goal Outcome Evaluation:

## 2024-08-16 VITALS
HEIGHT: 63 IN | RESPIRATION RATE: 15 BRPM | OXYGEN SATURATION: 99 % | WEIGHT: 264.8 LBS | BODY MASS INDEX: 46.92 KG/M2 | DIASTOLIC BLOOD PRESSURE: 48 MMHG | TEMPERATURE: 98.6 F | HEART RATE: 60 BPM | SYSTOLIC BLOOD PRESSURE: 142 MMHG

## 2024-08-16 LAB — BACTERIA SPEC AEROBE CULT: NO GROWTH

## 2024-08-16 PROCEDURE — 99232 SBSQ HOSP IP/OBS MODERATE 35: CPT | Performed by: INTERNAL MEDICINE

## 2024-08-16 PROCEDURE — 25810000003 SODIUM CHLORIDE 0.9 % SOLUTION: Performed by: INTERNAL MEDICINE

## 2024-08-16 PROCEDURE — 97166 OT EVAL MOD COMPLEX 45 MIN: CPT

## 2024-08-16 PROCEDURE — 97162 PT EVAL MOD COMPLEX 30 MIN: CPT

## 2024-08-16 RX ORDER — MIDODRINE HYDROCHLORIDE 5 MG/1
5 TABLET ORAL
Status: CANCELLED
Start: 2024-08-16

## 2024-08-16 RX ORDER — MIDODRINE HYDROCHLORIDE 5 MG/1
5 TABLET ORAL
Start: 2024-08-16

## 2024-08-16 RX ADMIN — THERA TABS 1 TABLET: TAB at 08:43

## 2024-08-16 RX ADMIN — ALLOPURINOL 300 MG: 300 TABLET ORAL at 08:43

## 2024-08-16 RX ADMIN — LEVOTHYROXINE SODIUM 88 MCG: 0.09 TABLET ORAL at 05:30

## 2024-08-16 RX ADMIN — Medication 1000 UNITS: at 08:43

## 2024-08-16 RX ADMIN — SODIUM CHLORIDE 75 ML/HR: 9 INJECTION, SOLUTION INTRAVENOUS at 05:30

## 2024-08-16 RX ADMIN — ATORVASTATIN CALCIUM 20 MG: 20 TABLET, FILM COATED ORAL at 08:43

## 2024-08-16 RX ADMIN — MIDODRINE HYDROCHLORIDE 5 MG: 5 TABLET ORAL at 08:43

## 2024-08-16 RX ADMIN — RIVAROXABAN 15 MG: 15 TABLET, FILM COATED ORAL at 17:06

## 2024-08-16 RX ADMIN — Medication 10 ML: at 08:45

## 2024-08-16 RX ADMIN — MIDODRINE HYDROCHLORIDE 5 MG: 5 TABLET ORAL at 16:59

## 2024-08-16 RX ADMIN — RIVAROXABAN 15 MG: 15 TABLET, FILM COATED ORAL at 16:55

## 2024-08-16 RX ADMIN — CYANOCOBALAMIN TAB 500 MCG 1000 MCG: 500 TAB at 08:43

## 2024-08-16 RX ADMIN — DOFETILIDE 250 MCG: 0.25 CAPSULE ORAL at 08:48

## 2024-08-16 RX ADMIN — PANTOPRAZOLE SODIUM 40 MG: 40 TABLET, DELAYED RELEASE ORAL at 05:30

## 2024-08-16 NOTE — DISCHARGE PLACEMENT REQUEST
"Mara Le (79 y.o. Female)       Date of Birth   1945    Social Security Number       Address   [address] [place] [facility]    Home Phone   [phone]    MRN   [id number]       Gnosticist   Jainism    Marital Status                               Admission Date   8/14/24    Admission Type   Emergency    Admitting Provider   Sly Sandy MD    Attending Provider   Lily Mireles MD    Department, Room/Bed   [facility] 3C MEDICAL INPATIENT, 360/1       Discharge Date       Discharge Disposition       Discharge Destination                                 Attending Provider: Lily Mireles MD    Allergies: Celecoxib    Isolation: None   Infection: MRSA No Isolation this Admit (05/04/23)   Code Status: CPR    Ht: 160 cm (63\")   Wt: 120 kg (264 lb 12.8 oz)    Admission Cmt: None   Principal Problem: Symptomatic hypotension [I95.9]                   Active Insurance as of 8/14/2024       Primary Coverage       Payor Plan Insurance Group Employer/Plan Group    [facility] MEDICARE REPLACEMENT [facility] MED ADV SNP PPO [id number]       Payor Plan Address Payor Plan Phone Number Payor Plan Fax Number Effective Dates    [address] [address]   1/1/2023 - None Entered    [facility] [id number]         Subscriber Name Subscriber Birth Date Member ID       MARA LE 1945 [id number]               Secondary Coverage       Payor Plan Insurance Group Employer/Plan Group    [facility] COMMUNITY PLAN OF [place] - [employer] [facility] COMMUNITY PLAN PATHWAYS IN        Payor Plan Address Payor Plan Phone Number Payor Plan Fax Number Effective Dates    [address]   7/1/2024 - None Entered    [facility] [id number]         Subscriber Name Subscriber Birth Date Member ID       MARA LE 1945 [id number]                     Emergency Contacts        (Rel.) Home Phone Work Phone Mobile Phone    JANICE VALERIO (Daughter) [phone] -- [phone]    FRANCK MARTINEZ " (Daughter) [phone] -- [phone]    ROSA MARRERO (Daughter) [phone] -- [phone]    RAMÓN LE (Daughter) -- -- [phone]                 History & Physical        VasqueziVjayJessica, APRN at 24 1915       Attestation signed by Joy Walker DO at 08/15/24 0141    I have reviewed this documentation and agree.      Electronically signed by Joy Walker DO, 08/15/24, 1:41 AM EDT.                      [facility] Medicine Services    Hospitalist History and Physical     Faby Le : 1945 MRN:[id number] LOS:0 ROOM:      Reason for admission: Symptomatic hypotension     Assessment / Plan     Dizziness  Orthostatic hypotension  History of tachycardia bradycardia syndrome S/P PPM  History of A fib S/P ablation  -Continuous cardiac monitor  -Troponin normalized at 13  -Electrolytes WNL  -EKG appears stable   -Continue xarelto for AC  - on Tikosyn  -S/P 1 L NS bolus in ER  -Cautious use of fluids due to CHF  - hold diuretics  - hold BP medication   -cardiology consultation    Possible UTI  - positive for leukocytes  - positive for WBC 11-20  - start rocephin  -follow urine culture  - de-escalate as appropriate      Chronic diastolic CHF  -Not in exacerbation   -Pro BNP on admission elevated improved to 2,414  -Patient does have some trace pedal edema bilateral LE  -Daily weights  -Intake and output  -Cardiology consultation    Borderline DM  - A1c 5.3 24  - started on Ozempic by PCP 2 wks ago     HTN, now hypotensive   -Hold BP medication for now      History of Hypothyroidism  -Continue home dose Synthroid 88 mcg once daily      CKD stage III  -Creat on admission 1.36, appears near baseline.   -Avoid nephrotoxic medications  - consult renal as needed       Morbid Obesity  -BMI 45.88   Lifestyle modifications     Chronic macrocytic anemia  -Continue Vit B12 supplementation       Code Status (Patient has no pulse and is not breathing): CPR (Attempt to Resuscitate)  Medical  "Interventions (Patient has pulse or is breathing): Full Support       Nutrition:   Diet: Cardiac; Healthy Heart (2-3 Na+); Fluid Consistency: Thin (IDDSI 0)     VTE Prophylaxis:  Pharmacologic VTE prophylaxis orders are present.         History of Present illness     Faby Le is a 79 y.o. female with PMH atrial fibrillation status post ablation, tachybradycardia syndrome status post pacemaker, TIA, hypertension, obstructive sleep apnea, cardiac catheterization in 2022 with mild obstructive disease, iron deficiency anemia, hypothyroidism,, chronic diastolic congestive heart failure, GERD, borderline diabetes chronic kidney disease, hyperlipidemia gout scented to [facility] on 8/14/2024 with complaint of dizziness and feeling like going to pass out    Known from previous admission from 6/11/2024 through 6/15/2024 where she was admitted with similar complaint of dizziness and orthostatic hypotension.  He had presented with A-fib with RVR and seen by cardiology and completed 6 doses of Tikosyn protocol.  He was treated for acute on chronic diastolic congestive heart failure and diuresed well with Lasix and transition back to her oral diuretics.  She was discharged back home in stable condition.    She reports she had been doing well up until Friday she had had an appointment with Dr. Herrera and following the appointment had gone shopping at an [employer] store.  She reports while in the store her head suddenly became dizzy and had to grab hold of a cabinet.  She reports after drinking some water the episode passed.  On Saturday she had another episode that was very short in duration.  She reports Sunday Monday and Tuesday was only slightly lightheaded but today has had at least 5 \"spells\" of dizziness and feeling like going to pass out.  She states has been taking medication as usual.  Denied any changes in diet.  Denied dysuria abdominal pain, chest pain, but mild shortness of breath.  Reports has had " chronic urgency over the past few months.  Only new medication was starting Ozempic 2 weeks ago    In the ED she was positive for orthostatic hypotension.  She was given a liter of IV fluids.  Labs reviewed and high sensitive troponin 13, BNP had improved to 2414.  Patient denies any edema.  Creatinine 1.36 which appears around baseline.  Negative for leukocytosis.  Hemoglobin stable 11.1.  Urinalysis did show moderate leukocytes and 11-20 white blood cells but no bacteria.  Will give a dose of Rocephin as urine culture is pending.  De-escalate antibiotics as appropriate.  Heart rate is normal sinus.  Patient had an echocardiogram June 14 with EF 56 to 60%.  Will hold blood pressure medication and diuretics. consult cardiology to follow    Patient was seen and examined on 08/14/24 at 22:18 EDT .    Subjective / Review of systems     Review of Systems   Constitutional:  Positive for activity change and fever.   Respiratory:  Positive for shortness of breath.    Neurological:  Positive for dizziness.        Past Medical/Surgical/Social/Family History & Allergies     Past Medical History:   Diagnosis Date    Ankle pain     Arthritis     Coronary artery disease     Depression     Gout     Hyperglycemia     Hyperlipidemia     Hypertension     Low back pain     Sleep apnea     Vitamin D deficiency       Past Surgical History:   Procedure Laterality Date    ANKLE FUSION      2017-left    CARDIAC CATHETERIZATION      CARDIAC CATHETERIZATION Right 8/29/2022    Procedure: Left Heart Cath;  Surgeon: Ratna Zavala MD;  Location: [facility] CATH INVASIVE LOCATION;  Service: Cardiovascular;  Laterality: Right;    CARDIAC ELECTROPHYSIOLOGY PROCEDURE Left 7/3/2023    Procedure: Pacemaker DC new Sidney Notified;  Surgeon: Cr Herrera MD;  Location: [facility] CATH INVASIVE LOCATION;  Service: Cardiovascular;  Laterality: Left;    CARDIAC ELECTROPHYSIOLOGY PROCEDURE Right 3/5/2024    Procedure: Ablation atrial fibrillation,  Jaycob Alexandra and Wayne Costa notified 02/09/24;  Surgeon: Cr Herrera MD;  Location: [facility] CATH INVASIVE LOCATION;  Service: Cardiovascular;  Laterality: Right;    CARDIAC ELECTROPHYSIOLOGY PROCEDURE N/A 3/5/2024    Procedure: Cardioversion;  Surgeon: Cr Herrera MD;  Location: [facility] CATH INVASIVE LOCATION;  Service: Cardiovascular;  Laterality: N/A;    CARPAL TUNNEL RELEASE      CATARACT EXTRACTION      CUBITAL TUNNEL RELEASE      ENDOSCOPY N/A 5/28/2024    Procedure: ESOPHAGOGASTRODUODENOSCOPY WITH COLD FORCEP BIOPSY X1 AREA;  Surgeon: Josh Loomis MD;  Location: [facility] ENDOSCOPY;  Service: Gastroenterology;  Laterality: N/A;  Post- ESOPHAGITIS, GASTRITIS, HIATAL HERNIA    HYSTERECTOMY      REPLACEMENT TOTAL KNEE      left 2008    ROTATOR CUFF REPAIR      TOTAL KNEE ARTHROPLASTY Right 5/4/2023    Procedure: TOTAL KNEE ARTHROPLASTY WITH CORI ROBOT;  Surgeon: Chino Herrera II, MD;  Location: [facility] MAIN OR;  Service: Robotics - Ortho;  Laterality: Right;      Social History     Socioeconomic History    Marital status:    Tobacco Use    Smoking status: Never     Passive exposure: Never    Smokeless tobacco: Never   Vaping Use    Vaping status: Never Used   Substance and Sexual Activity    Alcohol use: No    Drug use: No    Sexual activity: Not Currently      Family History   Problem Relation Age of Onset    Hypertension Mother     Stroke Father     Arthritis Brother     Cancer Brother       Allergies   Allergen Reactions    Celecoxib Itching and Swelling     swelling      Social Determinants of Health     Tobacco Use: Low Risk  (8/9/2024)    Patient History     Smoking Tobacco Use: Never     Smokeless Tobacco Use: Never     Passive Exposure: Never   Alcohol Use: Not At Risk (6/11/2024)    AUDIT-C     Frequency of Alcohol Consumption: Never     Average Number of Drinks: Patient does not drink     Frequency of Binge Drinking: Never   Financial Resource Strain: Not on  file   Food Insecurity: No Food Insecurity (6/12/2024)    Hunger Vital Sign     Worried About Running Out of Food in the Last Year: Never true     Ran Out of Food in the Last Year: Never true   Transportation Needs: No Transportation Needs (6/12/2024)    PRAPARE - Transportation     Lack of Transportation (Medical): No     Lack of Transportation (Non-Medical): No   Physical Activity: Not on file   Stress: Not on file   Social Connections: Unknown (10/11/2023)    Family and Community Support     Help with Day-to-Day Activities: Not on file     Lonely or Isolated: Not on file   Interpersonal Safety: Not At Risk (8/14/2024)    Abuse Screen     Unsafe at Home or Work/School: no     Feels Threatened by Someone?: no     Does Anyone Keep You from Contacting Others or Doint Things Outside the Home?: no     Physical Sign of Abuse Present: no   Depression: Not at risk (5/2/2024)    PHQ-2     PHQ-2 Score: 0   Housing Stability: Not At Risk (6/12/2024)    Housing Stability     Current Living Arrangements: home     Potentially Unsafe Housing Conditions: none   Utilities: Not At Risk (6/12/2024)    [facility] Utilities     Threatened with loss of utilities: No   Health Literacy: Unknown (6/12/2024)    Education     Help with school or training?: Not on file     Preferred Language: English   Employment: Unknown (10/11/2023)    Employment     Do you want help finding or keeping work or a job?: Not on file   Disabilities: Not At Risk (6/11/2024)    Disabilities     Concentrating, Remembering, or Making Decisions Difficulty: no     Doing Errands Independently Difficulty: no        Home Medications     Prior to Admission medications    Medication Sig Start Date End Date Taking? Authorizing Provider   allopurinol (ZYLOPRIM) 300 MG tablet Take 1 tablet by mouth once daily 7/1/24   Leonor Adam MD   atorvastatin (LIPITOR) 20 MG tablet Take 1 tablet by mouth Daily. 10/3/23   Leonor Adam MD   Azelastine HCl 137 MCG/SPRAY  solution 2 sprays by Alternating Nares route Daily As Needed. 6/19/23   Franklin Cid MD   cetirizine (zyrTEC) 10 MG tablet Take 1 tablet by mouth Daily. 8/28/18   Franklin Cid MD   cyanocobalamin (VITAMIN B-12) 1000 MCG tablet Take 1 tablet by mouth Daily. 12/20/18   Franklin Cid MD   dofetilide (Tikosyn) 250 MCG capsule Take 1 capsule by mouth Every 12 (Twelve) Hours. 6/15/24   Sly Sandy MD   IRON CR PO Take  by mouth Daily.    Franklin Cid MD   levothyroxine (SYNTHROID, LEVOTHROID) 88 MCG tablet Take 1 tablet by mouth Daily. 6/25/24   Leonor Adam MD   montelukast (SINGULAIR) 10 MG tablet Take 1 tablet by mouth Every Night. 6/25/24   Leonor Adam MD   multivitamin (MULTI VITAMIN PO) Take  by mouth Daily.    Franklin Cid MD   pantoprazole (PROTONIX) 40 MG EC tablet Take 1 tablet by mouth Every Morning. 5/28/24   Franklin Cid MD   rivaroxaban (XARELTO) 15 MG tablet Take 1 tablet by mouth Daily With Dinner. Indications: Atrial Fibrillation 6/15/24   Sly Sandy MD   Semaglutide, 1 MG/DOSE, (Ozempic, 1 MG/DOSE,) 4 MG/3ML solution pen-injector Inject 1 mg under the skin into the appropriate area as directed Every 7 (Seven) Days for 4 doses. Continue for 4 weeks 9/27/24 10/19/24  Leonor Adam MD   Semaglutide,0.25 or 0.5MG/DOS, (Ozempic, 0.25 or 0.5 MG/DOSE,) 2 MG/1.5ML solution pen-injector Inject 0.25 mg under the skin into the appropriate area as directed 1 (One) Time Per Week. For four weeks 8/2/24   Leonor Adam MD   Semaglutide,0.25 or 0.5MG/DOS, (Ozempic, 0.25 or 0.5 MG/DOSE,) 2 MG/3ML solution pen-injector Inject 0.5 mg under the skin into the appropriate area as directed Every 7 (Seven) Days for 4 doses. Continue 4 weeks 8/30/24 9/21/24  Leonor Adam MD   spironolactone (ALDACTONE) 25 MG tablet Take 1/2 (one-half) tablet by mouth once daily 7/18/24   Ratna Zavala MD   Vitamin D, Cholecalciferol,  25 MCG (1000 UT) tablet Take 1 tablet by mouth Daily. 6/16/16   Provider, MD Franklin        Objective / Physical Exam     Vital signs:  Temp: 98 °F (36.7 °C)  BP: 122/56  Heart Rate: 69  Resp: 16  SpO2: 100 %  Weight: 117 kg (259 lb)    Admission Weight: Weight: 117 kg (259 lb)    Physical Exam  Constitutional:       Appearance: She is obese.   Eyes:      Pupils: Pupils are equal, round, and reactive to light.   Cardiovascular:      Rate and Rhythm: Normal rate and regular rhythm.   Pulmonary:      Effort: Pulmonary effort is normal.      Breath sounds: Normal breath sounds.   Abdominal:      Palpations: Abdomen is soft.   Musculoskeletal:         General: Normal range of motion.      Right lower leg: No edema.      Left lower leg: No edema.   Neurological:      Mental Status: She is alert and oriented to person, place, and time.   Psychiatric:         Behavior: Behavior normal.          Labs     Results from last 7 days   Lab Units 08/14/24  1407   WBC 10*3/mm3 6.14   HEMOGLOBIN g/dL 11.1*   HEMATOCRIT % 34.5   PLATELETS 10*3/mm3 202      Results from last 7 days   Lab Units 08/14/24  1407   ALK PHOS U/L 129*   AST (SGOT) U/L 22   ALT (SGPT) U/L 14           Results from last 7 days   Lab Units 08/14/24  1407   SODIUM mmol/L 139   POTASSIUM mmol/L 4.4   CHLORIDE mmol/L 105   CO2 mmol/L 21.8*   BUN mg/dL 34*   CREATININE mg/dL 1.36*   GLUCOSE mg/dL 96        Imaging     No Radiology Exams Resulted Within Past 24 Hours   ECG 12 Lead Other; dizziness   Preliminary Result   HEART RATE=71  bpm   RR Slqyjdlk=728  ms   CA Gbhtrjhs=338  ms   P Horizontal Axis=59  deg   P Front Axis=9  deg   QRSD Wqstfgez=919  ms   QT Kijxwqtn=634  ms   VAuX=441  ms   QRS Axis=-19  deg   T Wave Axis=58  deg   - ABNORMAL ECG -   Sinus rhythm   Probable left ventricular hypertrophy   Anterior Q waves, possibly due to LVH   Date and Time of Study:2024-08-14 13:16:07      Telemetry Scan   Final Result      Telemetry Scan   Final Result            Current Medications     Scheduled Meds:  [START ON 8/15/2024] allopurinol, 300 mg, Oral, Daily  [START ON 8/15/2024] atorvastatin, 20 mg, Oral, Daily  cefTRIAXone, 1,000 mg, Intravenous, Q24H  cetirizine, 10 mg, Oral, Nightly  [START ON 8/15/2024] cholecalciferol, 1,000 Units, Oral, Daily  dofetilide, 250 mcg, Oral, Q12H  [START ON 8/15/2024] levothyroxine, 88 mcg, Oral, Daily  montelukast, 10 mg, Oral, Nightly  [START ON 8/15/2024] multivitamin, 1 tablet, Oral, Daily  [START ON 8/15/2024] pantoprazole, 40 mg, Oral, QAM  rivaroxaban, 15 mg, Oral, Daily With Dinner  sodium chloride, 10 mL, Intravenous, Q12H  [START ON 8/15/2024] cyanocobalamin, 1,000 mcg, Oral, Daily         Continuous Infusions:            CAROL Merlos   [facility] Medicine  24   22:18 EDT      Electronically signed by Joy Walker DO at 08/15/24 0141          Physician Progress Notes (most recent note)        Cheryl Rao APRN at 24 1410          [facility] CARDIOLOGY  [facility]        LOS:  LOS: 1 day   Patient Name: Faby Le  Age/Sex: 79 y.o. female  : 1945  MRN: [id number]    Day of Service: 24   Length of Stay: 1  Encounter Provider: CAROL Miranda  Place of Service: [facility] CARDIOLOGY  Patient Care Team:  Leonor Adam MD as PCP - General  Maggi Horn MD as Consulting Physician (Pain Medicine)  Ratna Zavala MD as Consulting Physician (Cardiology)  Nora Foley APRN as Nurse Practitioner (Gastroenterology)  Gianluca Walton MD as Consulting Physician (Pulmonary Disease)  Nathanael Murillo MD as Consulting Physician (Allergy and Immunology)  Delfin Thorpe MD as Surgeon (Orthopedic Surgery)  Josh Loomis MD as Consulting Physician (Gastroenterology)  Bo Ruggiero MD as Consulting Physician (Urology)    Subjective:     Chief Complaint:  F/U  orthostasis    Subjective:   Patient reports improvement in symptoms but still feels dizziness upon getting OOB.  She expresses she is not comfortable going home.    Current Medications:   Scheduled Meds:allopurinol, 300 mg, Oral, Daily  atorvastatin, 20 mg, Oral, Daily  cetirizine, 10 mg, Oral, Nightly  cholecalciferol, 1,000 Units, Oral, Daily  dofetilide, 250 mcg, Oral, Q12H  levothyroxine, 88 mcg, Oral, Daily  midodrine, 5 mg, Oral, BID AC  montelukast, 10 mg, Oral, Nightly  multivitamin, 1 tablet, Oral, Daily  pantoprazole, 40 mg, Oral, QAM  rivaroxaban, 15 mg, Oral, Daily With Dinner  sodium chloride, 10 mL, Intravenous, Q12H  cyanocobalamin, 1,000 mcg, Oral, Daily      Continuous Infusions:sodium chloride, 75 mL/hr, Last Rate: 75 mL/hr (08/16/24 0530)        Allergies:  Allergies   Allergen Reactions    Celecoxib Itching and Swelling     swelling       ROS    Objective:     Temp:  [97.4 °F (36.3 °C)-98.7 °F (37.1 °C)] 97.6 °F (36.4 °C)  Heart Rate:  [60-66] 60  Resp:  [12-16] 15  BP: (128-168)/() 130/80     Intake/Output Summary (Last 24 hours) at 8/16/2024 1410  Last data filed at 8/16/2024 0900  Gross per 24 hour   Intake 240 ml   Output --   Net 240 ml     Body mass index is 46.91 kg/m².      08/14/24  1310 08/15/24  0525   Weight: 117 kg (259 lb) 120 kg (264 lb 12.8 oz)         Physical Exam:  Neuro:  CV:  Resp:  GI:  Ext:  Tele: AAOx3, no gross deficits  S1S2 RRR, no murmur  Nonlabored, CTA  BS+, abd soft  Pedal pulses palp, no edema  SR                                                   Lab Review:   Results from last 7 days   Lab Units 08/15/24  0429 08/14/24  1407   SODIUM mmol/L 141 139   POTASSIUM mmol/L 4.1 4.4   CHLORIDE mmol/L 107 105   CO2 mmol/L 23.1 21.8*   BUN mg/dL 30* 34*   CREATININE mg/dL 1.24* 1.36*   GLUCOSE mg/dL 94 96   CALCIUM mg/dL 9.9 10.0   AST (SGOT) U/L  --  22   ALT (SGPT) U/L  --  14     Results from last 7 days   Lab Units 08/14/24  1407   HSTROP T ng/L 13     Results  "from last 7 days   Lab Units 08/15/24  0429 08/14/24  1407   WBC 10*3/mm3 6.07 6.14   HEMOGLOBIN g/dL 11.4* 11.1*   HEMATOCRIT % 35.5 34.5   PLATELETS 10*3/mm3 180 202                   Invalid input(s): \"LDLCALC\"  Results from last 7 days   Lab Units 08/14/24  1407   PROBNP pg/mL 2,414.0*           Recent Radiology:  Imaging Results (Most Recent)       None            ECHOCARDIOGRAM:    Results for orders placed during the hospital encounter of 06/11/24    Adult Transthoracic Echo Complete W/ Cont if Necessary Per Protocol    Interpretation Summary    Left ventricular systolic function is normal. Left ventricular ejection fraction appears to be 56 - 60%.    Left ventricular diastolic function was normal.    The left atrial cavity is moderately dilated.    The right atrial cavity is mildly  dilated.        I reviewed the patient's new clinical results.    EKG:      Assessment:       Symptomatic hypotension    Orthostatic hypotension    1) Symptomatic Orthostatic Hypotension    2) PAF  - s/p ablation 3/2024  - in SR on tikosyn; QT appropriate  - on Xarelto for anticoagulation  - mild CAD per cath in 2022    3) Chronic Diastolic Heart Failure  - 2D echo 6/2024 showed EF 56-60% with mild MR    3) SSS s/p BSc dual chamber PPM 7/2023    4) hx TIA    5) HTN    6) HLD    7) ROSA    8) CKD stage 3    9) hypothyroidism    Plan:   Orthostatics improved on midodrine.  PT recommends SNF.  Dispo planning in progress.        Electronically signed by CAROL Miranda, 08/16/24, 2:19 PM EDT.     Electronically signed by Cheryl Rao APRN at 08/16/24 1419          Consult Notes (most recent note)        aRtna Zavala MD at 08/15/24 0919            Referring Provider: Dr. Sly Lopes  Reason for Consultation: Hypotension and dizziness    Chief complaint hypotension and dizziness    Cardiology assessment and plan      Dizziness  Hypertension  Orthostatic hypotension  History of sick sinus syndrome status " post pacemaker placement  History of atrial fibrillation status post ablation therapy  Status post a flutter ablation  Prior history of TIA on chronic anticoagulation therapy  Chronic diastolic heart failure  History of hypertension  History of hypothyroidism  Chronic kidney disease  Morbid obesity  Chronic anemia  Severe left atrial enlargement  Dual-chamber pacemaker in situ  History of TIA in the past  Normal LV systolic function  cardiac catheterization in 2022 with a mild obstructive disease involving the LAD and a moderate obstructive disease involving the ostium of the diagonal branch with normal LV systolic function   New initiation of Ozempic    Denies any new cardiac symptoms  Clinically feels better  No chest pain  No syncope  Tmax is 97.5 pulse is 60 respirations are 14 blood pressure is 78/39 to 132/46 sats are 100%  Normal troponin  Abnormal elevated proBNP at 2400  Sodium is 140 potassium is 4.1 creatinine is 1.2 hemoglobin is 11.4  Twelve-lead EKG shows normal sinus rhythm with no acute ST changes  Echocardiogram in June with normal LV systolic function  Current medications include Lipitor 20 mg p.o. 7-day patient is on Tikosyn 250 mg p.o. twice daily  Patient is on anticoagulation therapy with Xarelto 15 mg p.o. once a day  Twelve-lead EKG shows normal sinus rhythm with a QT interval of 456  Patient clinically feels better  Recheck orthostatic vitals  There is a component of vertigo also  Conservative measures for orthostatic hypotension  If patient fails conservative measures consider adding midodrine versus fludrocortisone to help with the orthostatic symptoms  Close monitoring of blood pressure at home  Normal device function in July  If the orthostatic vitals are improved and patient does not have any orthostatic symptoms okay to discharge and follow-up in the office  Discussed with patient at bedside                    History of present illness:  Faby Le is a 79 y.o. female who presents  with past medical history that is significant for history of atrial fibrillation status post ablation therapy tachybradycardia syndrome status post pacemaker placement history of TIA history of hypertension history of obstructive sleep apnea history of nonobstructive coronary disease history of iron deficiency anemia hypothyroidism chronic diastolic heart failure gastroesophageal reflux disease chronic kidney disease hyperlipidemia gout presented to the hospital with symptoms of dizziness and orthostasis  Denies any chest pain  Poor functional status  Complaining of episodes of dizziness with some orthostasis  Also some symptoms of spinning sensation consistent with vertigo  No syncope  No atrial fibrillation  No chest discomfort   Home blood pressure readings are optimal  P.o. intake is optimal per patient          Review of Systems  Review of Systems   Constitutional: Negative for chills, decreased appetite and malaise/fatigue.   HENT:  Negative for congestion and nosebleeds.    Eyes:  Negative for blurred vision and double vision.   Cardiovascular:  Negative for chest pain, dyspnea on exertion, irregular heartbeat, leg swelling, near-syncope, orthopnea, palpitations and paroxysmal nocturnal dyspnea.   Respiratory:  Negative for cough and shortness of breath.    Hematologic/Lymphatic: Negative for adenopathy. Does not bruise/bleed easily.   Skin:  Negative for color change and rash.   Musculoskeletal:  Negative for back pain and joint pain.   Gastrointestinal:  Negative for bloating, abdominal pain, hematemesis and hematochezia.   Genitourinary:  Negative for flank pain and hematuria.   Neurological:  Negative for dizziness and focal weakness.   Psychiatric/Behavioral:  Negative for altered mental status. The patient does not have insomnia.        Past Medical History  Past Medical History:   Diagnosis Date    Ankle pain     Arthritis     Coronary artery disease     Depression     Gout     Hyperglycemia      Hyperlipidemia     Hypertension     Low back pain     Sleep apnea     Vitamin D deficiency     and   Past Surgical History:   Procedure Laterality Date    ANKLE FUSION      2017-left    CARDIAC CATHETERIZATION      CARDIAC CATHETERIZATION Right 8/29/2022    Procedure: Left Heart Cath;  Surgeon: Ratna Zavala MD;  Location: [facility] CATH INVASIVE LOCATION;  Service: Cardiovascular;  Laterality: Right;    CARDIAC ELECTROPHYSIOLOGY PROCEDURE Left 7/3/2023    Procedure: Pacemaker DC new [place] Notified;  Surgeon: Cr Herrera MD;  Location: [facility] CATH INVASIVE LOCATION;  Service: Cardiovascular;  Laterality: Left;    CARDIAC ELECTROPHYSIOLOGY PROCEDURE Right 3/5/2024    Procedure: Ablation atrial fibrillation, Cryo [place] and Wayne Igor notified 02/09/24;  Surgeon: Cr Herrera MD;  Location: [facility] CATH INVASIVE LOCATION;  Service: Cardiovascular;  Laterality: Right;    CARDIAC ELECTROPHYSIOLOGY PROCEDURE N/A 3/5/2024    Procedure: Cardioversion;  Surgeon: Cr Herrera MD;  Location: [facility] CATH INVASIVE LOCATION;  Service: Cardiovascular;  Laterality: N/A;    CARPAL TUNNEL RELEASE      CATARACT EXTRACTION      CUBITAL TUNNEL RELEASE      ENDOSCOPY N/A 5/28/2024    Procedure: ESOPHAGOGASTRODUODENOSCOPY WITH COLD FORCEP BIOPSY X1 AREA;  Surgeon: Josh Loomis MD;  Location: [facility] ENDOSCOPY;  Service: Gastroenterology;  Laterality: N/A;  Post- ESOPHAGITIS, GASTRITIS, HIATAL HERNIA    HYSTERECTOMY      REPLACEMENT TOTAL KNEE      left 2008    ROTATOR CUFF REPAIR      TOTAL KNEE ARTHROPLASTY Right 5/4/2023    Procedure: TOTAL KNEE ARTHROPLASTY WITH CORI ROBOT;  Surgeon: Chino Herrera II, MD;  Location: [facility] MAIN OR;  Service: Robotics - Ortho;  Laterality: Right;       Family History  Family History   Problem Relation Age of Onset    Hypertension Mother     Stroke Father     Arthritis Brother     Cancer Brother        Social History  Social History  "    Socioeconomic History    Marital status:    Tobacco Use    Smoking status: Never     Passive exposure: Never    Smokeless tobacco: Never   Vaping Use    Vaping status: Never Used   Substance and Sexual Activity    Alcohol use: No    Drug use: No    Sexual activity: Not Currently       Objective     Physical Exam:  Constitutional:       Appearance: Well-developed.   Eyes:      Conjunctiva/sclera: Conjunctivae normal.      Pupils: Pupils are equal, round, and reactive to light.   HENT:      Head: Normocephalic and atraumatic.   Neck:      Thyroid: No thyromegaly.   Pulmonary:      Effort: Pulmonary effort is normal.      Breath sounds: Normal breath sounds.   Cardiovascular:      Normal rate. Regular rhythm.   Pulses:     Intact distal pulses.   Edema:     Peripheral edema absent.   Abdominal:      General: Bowel sounds are normal.      Palpations: Abdomen is soft.   Musculoskeletal:      Cervical back: Normal range of motion and neck supple. Skin:     General: Skin is warm.   Neurological:      Mental Status: Alert and oriented to person, place, and time.         Vital Signs  Vitals:    08/15/24 0013 08/15/24 0403 08/15/24 0525 08/15/24 0802   BP: 115/97 139/61 119/49 133/46   BP Location: Left arm Left arm Left arm Left arm   Patient Position: Lying Lying Lying Lying   Pulse: 60 70 73 63   Resp: 11 16 13 14   Temp: 97.5 °F (36.4 °C) 97.4 °F (36.3 °C) 97.3 °F (36.3 °C) 97.5 °F (36.4 °C)   TempSrc: Oral Oral Oral Oral   SpO2: 99% 95% 100% 100%   Weight:   120 kg (264 lb 12.8 oz)    Height:           Weight  Flowsheet Rows      Flowsheet Row First Filed Value   Admission Height 160 cm (63\") Documented at 08/14/2024 1310   Admission Weight 117 kg (259 lb) Documented at 08/14/2024 1310                Results Review:  Lab Results (last 24 hours)       Procedure Component Value Units Date/Time    Basic Metabolic Panel [[id number]]  (Abnormal) Collected: 08/15/24 0429    Specimen: Blood Updated: 08/15/24 0515     " Glucose 94 mg/dL      BUN 30 mg/dL      Creatinine 1.24 mg/dL      Sodium 141 mmol/L      Potassium 4.1 mmol/L      Chloride 107 mmol/L      CO2 23.1 mmol/L      Calcium 9.9 mg/dL      BUN/Creatinine Ratio 24.2     Anion Gap 10.9 mmol/L      eGFR 44.9 mL/min/1.73     Narrative:      GFR Normal >60  Chronic Kidney Disease <60  Kidney Failure <15    The GFR formula is only valid for adults with stable renal function between ages 18 and 70.    CBC Auto Differential [[id number]]  (Abnormal) Collected: 08/15/24 0429    Specimen: Blood Updated: 08/15/24 0451     WBC 6.07 10*3/mm3      RBC 3.44 10*6/mm3      Hemoglobin 11.4 g/dL      Hematocrit 35.5 %      .2 fL      MCH 33.1 pg      MCHC 32.1 g/dL      RDW 13.7 %      RDW-SD 52.2 fl      MPV 10.0 fL      Platelets 180 10*3/mm3      Neutrophil % 55.7 %      Lymphocyte % 31.1 %      Monocyte % 9.6 %      Eosinophil % 2.3 %      Basophil % 1.0 %      Immature Grans % 0.3 %      Neutrophils, Absolute 3.38 10*3/mm3      Lymphocytes, Absolute 1.89 10*3/mm3      Monocytes, Absolute 0.58 10*3/mm3      Eosinophils, Absolute 0.14 10*3/mm3      Basophils, Absolute 0.06 10*3/mm3      Immature Grans, Absolute 0.02 10*3/mm3      nRBC 0.0 /100 WBC     COVID-19, FLU A/B, RSV PCR 1 HR TAT - Swab, Nasopharynx [[id number]]  (Normal) Collected: 08/15/24 0243    Specimen: Swab from Nasopharynx Updated: 08/15/24 0324     COVID19 Not Detected     Influenza A PCR Not Detected     Influenza B PCR Not Detected     RSV, PCR Not Detected    Narrative:      Fact sheet for providers: https://www.fda.gov/media/953455/download    Fact sheet for patients: https://www.fda.gov/media/844786/download    Test performed by PCR.    CBC & Differential [[id number]]  (Abnormal) Collected: 08/14/24 1407    Specimen: Blood Updated: 08/14/24 1622    Narrative:      The following orders were created for panel order CBC & Differential.  Procedure                               Abnormality         Status                      ---------                               -----------         ------                     CBC Auto Differential[[id number]]        Abnormal            Final result                 Please view results for these tests on the individual orders.    CBC Auto Differential [[id number]]  (Abnormal) Collected: 08/14/24 1407    Specimen: Blood Updated: 08/14/24 1622     WBC 6.14 10*3/mm3      RBC 3.40 10*6/mm3      Hemoglobin 11.1 g/dL      Hematocrit 34.5 %      .5 fL      MCH 32.6 pg      MCHC 32.2 g/dL      RDW 13.7 %      RDW-SD 51.4 fl      MPV 9.6 fL      Platelets 202 10*3/mm3      Neutrophil % 55.2 %      Lymphocyte % 33.2 %      Monocyte % 9.3 %      Eosinophil % 1.3 %      Basophil % 0.8 %      Immature Grans % 0.2 %      Neutrophils, Absolute 3.39 10*3/mm3      Lymphocytes, Absolute 2.04 10*3/mm3      Monocytes, Absolute 0.57 10*3/mm3      Eosinophils, Absolute 0.08 10*3/mm3      Basophils, Absolute 0.05 10*3/mm3      Immature Grans, Absolute 0.01 10*3/mm3      nRBC 0.0 /100 WBC     Comprehensive Metabolic Panel [[id number]]  (Abnormal) Collected: 08/14/24 1407    Specimen: Blood Updated: 08/14/24 1558     Glucose 96 mg/dL      BUN 34 mg/dL      Creatinine 1.36 mg/dL      Sodium 139 mmol/L      Potassium 4.4 mmol/L      Chloride 105 mmol/L      CO2 21.8 mmol/L      Calcium 10.0 mg/dL      Total Protein 6.8 g/dL      Albumin 4.0 g/dL      ALT (SGPT) 14 U/L      AST (SGOT) 22 U/L      Alkaline Phosphatase 129 U/L      Total Bilirubin 0.8 mg/dL      Globulin 2.8 gm/dL      A/G Ratio 1.4 g/dL      BUN/Creatinine Ratio 25.0     Anion Gap 12.2 mmol/L      eGFR 40.2 mL/min/1.73     Narrative:      GFR Normal >60  Chronic Kidney Disease <60  Kidney Failure <15    The GFR formula is only valid for adults with stable renal function between ages 18 and 70.    BNP [[id number]]  (Abnormal) Collected: 08/14/24 1407    Specimen: Blood Updated: 08/14/24 1537     proBNP 2,414.0 pg/mL     Narrative:      This assay is used as  an aid in the diagnosis of individuals suspected of having heart failure. It can be used as an aid in the diagnosis of acute decompensated heart failure (ADHF) in patients presenting with signs and symptoms of ADHF to the emergency department (ED). In addition, NT-proBNP of <300 pg/mL indicates ADHF is not likely.    Age Range Result Interpretation  NT-proBNP Concentration (pg/mL:      <50             Positive            >450                   Gray                 300-450                    Negative             <300    50-75           Positive            >900                  Gray                300-900                  Negative            <300      >75             Positive            >1800                  Gray                300-1800                  Negative            <300    Single High Sensitivity Troponin T [[id number]]  (Normal) Collected: 08/14/24 1407    Specimen: Blood Updated: 08/14/24 1537     HS Troponin T 13 ng/L     Narrative:      High Sensitive Troponin T Reference Range:  <14.0 ng/L- Negative Female for AMI  <22.0 ng/L- Negative Male for AMI  >=14 - Abnormal Female indicating possible myocardial injury.  >=22 - Abnormal Male indicating possible myocardial injury.   Clinicians would have to utilize clinical acumen, EKG, Troponin, and serial changes to determine if it is an Acute Myocardial Infarction or myocardial injury due to an underlying chronic condition.         Urinalysis, Microscopic Only - Urine, Clean Catch [[id number]]  (Abnormal) Collected: 08/14/24 1407    Specimen: Urine, Clean Catch Updated: 08/14/24 1434     RBC, UA 0-2 /HPF      WBC, UA 11-20 /HPF      Bacteria, UA None Seen /HPF      Squamous Epithelial Cells, UA 0-2 /HPF      Hyaline Casts, UA 0-2 /LPF      Methodology Automated Microscopy    Urine Culture - Urine, Urine, Clean Catch [[id number]] Collected: 08/14/24 1407    Specimen: Urine, Clean Catch Updated: 08/14/24 1434    Urinalysis With Culture If Indicated - Urine, Clean  Catch [[id number]]  (Abnormal) Collected: 08/14/24 1407    Specimen: Urine, Clean Catch Updated: 08/14/24 1433     Color, UA Yellow     Appearance, UA Clear     pH, UA <=5.0     Specific Gravity, UA 1.014     Glucose, UA Negative     Ketones, UA Negative     Bilirubin, UA Negative     Blood, UA Negative     Protein, UA Negative     Leuk Esterase, UA Moderate (2+)     Nitrite, UA Negative     Urobilinogen, UA 0.2 E.U./dL    Narrative:      In absence of clinical symptoms, the presence of pyuria, bacteria, and/or nitrites on the urinalysis result does not correlate with infection.    [place] Draw [[id number]] Collected: 08/14/24 1407    Specimen: Blood Updated: 08/14/24 1430    Narrative:      The following orders were created for panel order [place] Draw.  Procedure                               Abnormality         Status                     ---------                               -----------         ------                     Green Top (Gel)[[id number]]                                  Final result               Lavender Top[[id number]]                                     Final result               Gold Top - SST[[id number]]                                   Final result               Light Blue Top[[id number]]                                   Final result                 Please view results for these tests on the individual orders.    Green Top (Gel) [[id number]] Collected: 08/14/24 1407    Specimen: Blood Updated: 08/14/24 1430     Extra Tube Hold for add-ons.     Comment: Auto resulted.       Lavender Top [[id number]] Collected: 08/14/24 1407    Specimen: Blood Updated: 08/14/24 1430     Extra Tube hold for add-on     Comment: Auto resulted       Gold Top - SST [[id number]] Collected: 08/14/24 1407    Specimen: Blood Updated: 08/14/24 1430     Extra Tube Hold for add-ons.     Comment: Auto resulted.       Light Blue Top [[id number]] Collected: 08/14/24 1407    Specimen: Blood Updated: 08/14/24 1430     Extra Tube Hold for  add-ons.     Comment: Auto resulted             Imaging Results (Last 72 Hours)       ** No results found for the last 72 hours. **            Results for orders placed during the hospital encounter of 06/11/24    Adult Transthoracic Echo Complete W/ Cont if Necessary Per Protocol    Interpretation Summary    Left ventricular systolic function is normal. Left ventricular ejection fraction appears to be 56 - 60%.    Left ventricular diastolic function was normal.    The left atrial cavity is moderately dilated.    The right atrial cavity is mildly  dilated.      Medication Review  Scheduled Meds:allopurinol, 300 mg, Oral, Daily  atorvastatin, 20 mg, Oral, Daily  cefTRIAXone, 2,000 mg, Intravenous, Q24H  cetirizine, 10 mg, Oral, Nightly  cholecalciferol, 1,000 Units, Oral, Daily  dofetilide, 250 mcg, Oral, Q12H  levothyroxine, 88 mcg, Oral, Daily  montelukast, 10 mg, Oral, Nightly  multivitamin, 1 tablet, Oral, Daily  pantoprazole, 40 mg, Oral, QAM  rivaroxaban, 15 mg, Oral, Daily With Dinner  sodium chloride, 10 mL, Intravenous, Q12H  cyanocobalamin, 1,000 mcg, Oral, Daily      Continuous Infusions:   PRN Meds:.  acetaminophen **OR** acetaminophen **OR** acetaminophen    Azelastine HCl    senna-docusate sodium **AND** polyethylene glycol **AND** bisacodyl **AND** bisacodyl    nitroglycerin    ondansetron    sodium chloride    sodium chloride    Lab Results   Component Value Date    GLUCOSE 94 08/15/2024    BUN 30 (H) 08/15/2024    CREATININE 1.24 (H) 08/15/2024    EGFR 44.9 (L) 08/15/2024    BCR 24.2 08/15/2024    K 4.1 08/15/2024    CO2 23.1 08/15/2024    CALCIUM 9.9 08/15/2024    ALBUMIN 4.0 08/14/2024    BILITOT 0.8 08/14/2024    AST 22 08/14/2024    ALT 14 08/14/2024     Results for orders placed during the hospital encounter of 08/29/22    Cardiac Catheterization/Vascular Study    Narrative  Table formatting from the original result was not included.  Cardiac Catheterization Operative Report    Faby GOMEZ  Rey  [id number]  8/29/2022  @PCP@    She underwent cardiac catheterization.    Indications for the procedure include: abnormal stress test.    Procedure Details:  The risks, benefits, complications, treatment options, and expected outcomes were discussed with the patient. The patient and/or family concurred with the proposed plan, giving informed consent.    After informed consent the patient was brought to the cath lab after appropriate IV hydration was begun and oral premedication was given. She was further sedated with fentanyl. She was prepped and draped in the usual manner. Using the modified Seldinger access technique, a 6 [place] sheath was placed in the femoral artery. A left heart catheterization with coronary arteriography was performed. Findings are discussed below.    After the procedure was completed, sedation was stopped and the sheaths and catheters were all removed. Hemostasis was achieved per established hospital protocols.    Conscious sedation:  Conscious sedation was performed according to protocol.  I supervised and directed an independent trained observer with the assistance of monitoring the patient's level of consciousness and physiologic status throughout the procedure.  Intraoperative service time was 60 minutes.    Findings:    Hemodynamics Central arctic pressure systolic 150 and diastolic 56 with a mean pressure of 82 mmHg  LV end-diastolic pressure of 12 mmHg  There was no gradient across the aortic valve on the pullback of the pigtail catheter  Left Main  left main is a large-caliber vessel angiographically normal bifurcates into left into descending and left circumflex arteries  RCA  large-caliber dominant vessel  Right coronary artery is angiographically normal right coronary artery bifurcates into a large caliber PDA branch and a moderate caliber PLV branch that are angiographically normal  LAD  large-caliber vessel  LAD has mid focal angiographic 30% stenosis that is heavily  calcified at the bifurcation with the diagonal and septal branch  Moderate size diagonal branch that has ostial angiographic of 40% stenosis  Circ  large-caliber vessel angiographically normal  First marginal branch of the circumflex has proximal angiographic 30 to 40% stenosis    LV  normal LV systolic function normal wall motion estimate LV ejection fraction of 55%  Coronary Dominance  right coronary artery    Estimated Blood Loss:  Minimal    Specimens: None    Complications:  None; patient tolerated the procedure well.    Disposition: PACU - hemodynamically stable.    Condition: stable    Impressions:  Mild obstructive coronary artery disease involving the LAD mild to moderate obstructive coronary artery disease involving the ostium of the diagonal branch mild obstructive coronary artery disease involving the proximal first marginal branch  Normal LV systolic function  Normal wall motion  Estimate LV ejection fraction of 55%  Normal left-sided filling pressures    Recommendations:  Medical management for obstructive coronary artery disease  Test results reviewed and discussed with patient and family     Results for orders placed during the hospital encounter of 06/11/24    Adult Transthoracic Echo Complete W/ Cont if Necessary Per Protocol    Interpretation Summary    Left ventricular systolic function is normal. Left ventricular ejection fraction appears to be 56 - 60%.    Left ventricular diastolic function was normal.    The left atrial cavity is moderately dilated.    The right atrial cavity is mildly  dilated.     Lab Results   Component Value Date    CHOL 125 05/07/2024    TRIG 49 05/07/2024    HDL 67 (H) 05/07/2024    LDL 47 05/07/2024            Assessment & Plan       Symptomatic hypotension          Ratna Zavala MD  08/15/24  09:20 EDT                 Electronically signed by Ratna Zavala MD at 08/15/24 4204          Physical Therapy Notes (all)        Huey Terry, PT at 08/16/24  1311  Version 1 of 1         Goal Outcome Evaluation:  Plan of Care Reviewed With: patient           Outcome Evaluation: Pt is a 80 y/o female with c/o 5 episodes of dizziness with feeling like she was going to pass out.  Pt recently admitted with similar symptoms of dizziness and orthostatic hypotension.  (+) orthostatic hypotension.  Pt reports she lives with her daughter and famiy in a 1 story home with no steps to enter into home.  Pt was mod I with all functional mobility tasks and ambualted with st cane prior to hospitalization.  Pt on room air, IV, telemetry and IV this date.  Orthostatic hypotension BP readings:  supine 143/58.  Sitting; 150/61.  Standin/57.  Supine: 158/54.  Pt required Min A for bed mobility and reported no dizziness sitting EOB.  Min A to stand and 31 point systolic drop in BP noted with c/o dizziness.  No increased c/o dizziness with head turns.  Pt reported had recently ambulated to bathroom with nsg and was too dizzy to attempt with therapist this date.  Pt is a high fall risk secondary to hypotension and dizziness with standing.  Recommend SNF.      Anticipated Discharge Disposition (PT): skilled nursing facility                          Electronically signed by Huey Terry, PT at 24 1311       Huey Terry, PT at 24 1313  Version 1 of 1         Patient Name: Faby Le  : 1945    MRN: [id number]                              Today's Date: 2024       Admit Date: 2024    Visit Dx:     ICD-10-CM ICD-9-CM   1. Orthostatic hypotension  I95.1 458.0     Patient Active Problem List   Diagnosis    Abnormal complete blood count    Ankle pain    Arthralgia of left elbow    Arthritis    B12 deficiency    Coronary artery disease    Depression    Gallstones    Gout    Hyperglycemia    Hyperlipidemia    Hypertension    Hypothyroid    Low back pain    Mobility poor    Post-menopausal    Sleep apnea    Vitamin D deficiency    Chronic kidney disease, stage 3  (moderate)    Oropharyngeal dysphagia    Class 3 drug-induced obesity with serious comorbidity and body mass index (BMI) of 45.0 to 49.9 in adult    Chronic pain of right knee    Light headedness    Paroxysmal atrial fibrillation    Chronic left shoulder pain    Status post knee replacement    Syncope    Presence of cardiac pacemaker    Chronic diastolic heart failure    Paresthesia of left foot    TIA (transient ischemic attack)    Tachy-jojo syndrome    Anemia    Dizziness    Acute CHF    Acute exacerbation of CHF (congestive heart failure)    Chronic HFrEF (heart failure with reduced ejection fraction)    Endometrial carcinoma    Symptomatic hypotension    Orthostatic hypotension     Past Medical History:   Diagnosis Date    Ankle pain     Arthritis     Coronary artery disease     Depression     Gout     Hyperglycemia     Hyperlipidemia     Hypertension     Low back pain     Sleep apnea     Vitamin D deficiency      Past Surgical History:   Procedure Laterality Date    ANKLE FUSION      2017-left    CARDIAC CATHETERIZATION      CARDIAC CATHETERIZATION Right 8/29/2022    Procedure: Left Heart Cath;  Surgeon: Ratna Zavala MD;  Location: [facility] CATH INVASIVE LOCATION;  Service: Cardiovascular;  Laterality: Right;    CARDIAC ELECTROPHYSIOLOGY PROCEDURE Left 7/3/2023    Procedure: Pacemaker DC new [place] Notified;  Surgeon: Cr Herrera MD;  Location: [facility] CATH INVASIVE LOCATION;  Service: Cardiovascular;  Laterality: Left;    CARDIAC ELECTROPHYSIOLOGY PROCEDURE Right 3/5/2024    Procedure: Ablation atrial fibrillation, Cryo [place] and Wayne [place] notified 02/09/24;  Surgeon: Cr Herrera MD;  Location: [facility] CATH INVASIVE LOCATION;  Service: Cardiovascular;  Laterality: Right;    CARDIAC ELECTROPHYSIOLOGY PROCEDURE N/A 3/5/2024    Procedure: Cardioversion;  Surgeon: Cr Herrera MD;  Location: [facility] CATH INVASIVE LOCATION;  Service: Cardiovascular;  Laterality: N/A;    CARPAL  TUNNEL RELEASE      CATARACT EXTRACTION      CUBITAL TUNNEL RELEASE      ENDOSCOPY N/A 5/28/2024    Procedure: ESOPHAGOGASTRODUODENOSCOPY WITH COLD FORCEP BIOPSY X1 AREA;  Surgeon: Josh Loomis MD;  Location: [facility] ENDOSCOPY;  Service: Gastroenterology;  Laterality: N/A;  Post- ESOPHAGITIS, GASTRITIS, HIATAL HERNIA    HYSTERECTOMY      REPLACEMENT TOTAL KNEE      left 2008    ROTATOR CUFF REPAIR      TOTAL KNEE ARTHROPLASTY Right 5/4/2023    Procedure: TOTAL KNEE ARTHROPLASTY WITH CORI ROBOT;  Surgeon: Chino Herrera II, MD;  Location: [facility] MAIN OR;  Service: Robotics - Ortho;  Laterality: Right;      General Information       Row Name 08/16/24 1300          Physical Therapy Time and Intention    Document Type evaluation  -AM     Mode of Treatment physical therapy  -AM       Row Name 08/16/24 1300          General Information    Patient Profile Reviewed yes  -AM     Prior Level of Function independent:;all household mobility;community mobility;gait;transfer;bed mobility  ambulates with st cane  -AM     Existing Precautions/Restrictions orthostatic hypotension  -AM     Barriers to Rehab medically complex  -AM       Row Name 08/16/24 1300          Living Environment    People in Home child(marlon), adult;grandchild(marlon)  -AM       Row Name 08/16/24 1300          Home Main Entrance    Number of Stairs, Main Entrance none  -AM       Row Name 08/16/24 1300          Stairs Within Home, Primary    Number of Stairs, Within Home, Primary none  -AM       Row Name 08/16/24 1300          Cognition    Orientation Status (Cognition) oriented x 4  -AM       Row Name 08/16/24 1300          Safety Issues, Functional Mobility    Impairments Affecting Function (Mobility) balance;endurance/activity tolerance;strength  -AM     Comment, Safety Issues/Impairments (Mobility) gait belt utilized  -AM               User Key  (r) = Recorded By, (t) = Taken By, (c) = Cosigned By      Initials Name Provider Type    AM  Huey Terry, PT Physical Therapist                   Mobility       Row Name 08/16/24 1302          Bed Mobility    Bed Mobility bed mobility (all) activities  -AM     All Activities, [place] (Bed Mobility) minimum assist (75% patient effort);1 person assist  -AM     Comment, (Bed Mobility) no c/o dizziness sitting EOB.  -AM       Row Name 08/16/24 1302          Sit-Stand Transfer    Sit-Stand [place] (Transfers) minimum assist (75% patient effort);1 person assist  -AM     Comment, (Sit-Stand Transfer) pt with c/o dizziness with standing.  31 point drop in systolic BP with standing  -AM       Row Name 08/16/24 1302          Gait/Stairs (Locomotion)    Comment, (Gait/Stairs) unable to assess secondary to dizziness and 31 point drop in systolic BP upon standing  -AM               User Key  (r) = Recorded By, (t) = Taken By, (c) = Cosigned By      Initials Name Provider Type    AM Huey Terry, PT Physical Therapist                   Obj/Interventions       Row Name 08/16/24 1303          Range of Motion Comprehensive    General Range of Motion bilateral lower extremity ROM WFL  -AM     Comment, General Range of Motion Defer BUE ROM to OT  -AM       [facility] Name 08/16/24 1303          Strength Comprehensive (MMT)    Comment, General Manual Muscle Testing (MMT) Assessment Defer BUE MMT to OT.  BLEs: 4+/5  -AM       Row Name 08/16/24 1303          Motor Skills    Motor Skills functional endurance  -AM     Functional Endurance fair -  -AM       [facility] Name 08/16/24 1303          Balance    Balance Assessment sitting static balance;sitting dynamic balance;sit to stand dynamic balance;standing static balance  -AM     Static Sitting Balance independent  -AM     Dynamic Sitting Balance independent  -AM     Position, Sitting Balance unsupported;sitting edge of bed  -AM     Sit to Stand Dynamic Balance minimal assist  -AM     Static Standing Balance contact guard  -AM     Dynamic Standing Balance minimal assist  -AM        Row Name 08/16/24 1303          Sensory Assessment (Somatosensory)    Sensory Assessment (Somatosensory) sensation intact  -AM               User Key  (r) = Recorded By, (t) = Taken By, (c) = Cosigned By      Initials Name Provider Type    AM Huey Terry, PT Physical Therapist                   Goals/Plan       Row Name 08/16/24 1309          Bed Mobility Goal 1 (PT)    Activity/Assistive Device (Bed Mobility Goal 1, PT) bed mobility activities, all  -AM     [place] Level/Cues Needed (Bed Mobility Goal 1, PT) modified independence  -AM     Time Frame (Bed Mobility Goal 1, PT) long term goal (LTG)  -AM       Row Name 08/16/24 1309          Transfer Goal 1 (PT)    Activity/Assistive Device (Transfer Goal 1, PT) transfers, all  -AM     [place] Level/Cues Needed (Transfer Goal 1, PT) modified independence  -AM     Time Frame (Transfer Goal 1, PT) long term goal (LTG)  -AM       Row Name 08/16/24 1309          Gait Training Goal 1 (PT)    Activity/Assistive Device (Gait Training Goal 1, PT) gait (walking locomotion);assistive device use  -AM     [place] Level (Gait Training Goal 1, PT) modified independence  -AM     Distance (Gait Training Goal 1, PT) 150'  -AM     Time Frame (Gait Training Goal 1, PT) long term goal (LTG)  -AM       Row Name 08/16/24 1309          Therapy Assessment/Plan (PT)    Planned Therapy Interventions (PT) balance training;bed mobility training;gait training;strengthening;patient/family education;transfer training  -AM               User Key  (r) = Recorded By, (t) = Taken By, (c) = Cosigned By      Initials Name Provider Type    AM Huey Terry, PT Physical Therapist                   Clinical Impression       Row Name 08/16/24 1304          Pain    Pretreatment Pain Rating 0/10 - no pain  -AM     Posttreatment Pain Rating 0/10 - no pain  -AM       [facility] 08/16/24 1304          Plan of Care Review    Plan of Care Reviewed With patient  -AM     Outcome Evaluation Pt is  a 80 y/o female with c/o 5 episodes of dizziness with feeling like she was going to pass out.  Pt recently admitted with similar symptoms of dizziness and orthostatic hypotension.  (+) orthostatic hypotension.  Pt reports she lives with her daughter and famiy in a 1 story home with no steps to enter into home.  Pt was mod I with all functional mobility tasks and ambualted with st cane prior to hospitalization.  Pt on room air, IV, telemetry and IV this date.  Orthostatic hypotension BP readings:  supine 143/58.  Sitting; 150/61.  Standin/57.  Supine: 158/54.  Pt required Min A for bed mobility and reported no dizziness sitting EOB.  Min A to stand and 31 point systolic drop in BP noted with c/o dizziness.  No increased c/o dizziness with head turns.  Pt reported had recently ambulated to bathroom with nsg and was too dizzy to attempt with therapist this date.  Pt is a high fall risk secondary to hypotension and dizziness with standing.  Recommend SNF.  -AM       Row Name 24 1304          Therapy Assessment/Plan (PT)    Patient/Family Therapy Goals Statement (PT) To go home  -AM     Rehab Potential (PT) good, to achieve stated therapy goals  -AM     Criteria for Skilled Interventions Met (PT) yes  -AM     Therapy Frequency (PT) 5 times/wk  -AM     Predicted Duration of Therapy Intervention (PT) until d/c  -AM       Row Name 24 1304          Vital Signs    Pre Systolic BP Rehab 150  -AM     Pre Treatment Diastolic BP 61  -AM     Intra Systolic BP Rehab 119  -AM     Intra Treatment Diastolic BP 57  -AM     Post Systolic BP Rehab 158  -AM     Post Treatment Diastolic BP 54  -AM     O2 Delivery Pre Treatment room air  -AM     O2 Delivery Intra Treatment room air  -AM     O2 Delivery Post Treatment room air  -AM     Pre Patient Position Supine  -AM     Intra Patient Position Standing  -AM     Post Patient Position Supine  -AM       Row Name 24 1304          Positioning and Restraints     Pre-Treatment Position in bed  -AM     Post Treatment Position bed  -AM     In Bed supine;call light within reach;encouraged to call for assist;exit alarm on  -AM               User Key  (r) = Recorded By, (t) = Taken By, (c) = Cosigned By      Initials Name Provider Type    AM Huey Terry PT Physical Therapist                   Outcome Measures       Row Name 08/16/24 1311 08/16/24 0800       How much help from another person do you currently need...    Turning from your back to your side while in flat bed without using bedrails? 4  -AM 4  -MN    Moving from lying on back to sitting on the side of a flat bed without bedrails? 3  -AM 4  -MN    Moving to and from a bed to a chair (including a wheelchair)? 3  -AM 4  -MN    Standing up from a chair using your arms (e.g., wheelchair, bedside chair)? 3  -AM 4  -MN    Climbing 3-5 steps with a railing? 2  -AM 4  -MN    To walk in hospital room? 3  -AM 4  -MN    AM-PAC 6 Clicks Score (PT) 18  -AM 24  -MN    Highest Level of Mobility Goal 6 --> Walk 10 steps or more  -AM 8 --> Walked 250 feet or more  -MN      Row Name 08/16/24 0200          How much help from another person do you currently need...    Turning from your back to your side while in flat bed without using bedrails? 4  -SS     Moving from lying on back to sitting on the side of a flat bed without bedrails? 4  -SS     Moving to and from a bed to a chair (including a wheelchair)? 4  -SS     Standing up from a chair using your arms (e.g., wheelchair, bedside chair)? 4  -SS     Climbing 3-5 steps with a railing? 4  -SS     To walk in hospital room? 4  -SS     AM-PAC 6 Clicks Score (PT) 24  -SS     Highest Level of Mobility Goal 8 --> Walked 250 feet or more  -SS               User Key  (r) = Recorded By, (t) = Taken By, (c) = Cosigned By      Initials Name Provider Type    AM Huey Terry, PT Physical Therapist    SS David Alvarez RN Registered Nurse    Tab Anton LPN Licensed Nurse                                  Physical Therapy Education       Title: PT OT SLP Therapies (Done)       Topic: Physical Therapy (Done)       Point: Mobility training (Done)       Learning Progress Summary             Patient Acceptance, E,TB, VU by AM at 2024 1311                         Point: Body mechanics (Done)       Learning Progress Summary             Patient Acceptance, E,TB, VU by AM at 2024 1311                         Point: Precautions (Done)       Learning Progress Summary             Patient Acceptance, E,TB, VU by AM at 2024 1311                                         User Key       Initials Effective Dates Name Provider Type Discipline    AM 05/10/21 -  Huey Terry PT Physical Therapist PT                  PT Recommendation and Plan  Planned Therapy Interventions (PT): balance training, bed mobility training, gait training, strengthening, patient/family education, transfer training  Plan of Care Reviewed With: patient  Outcome Evaluation: Pt is a 80 y/o female with c/o 5 episodes of dizziness with feeling like she was going to pass out.  Pt recently admitted with similar symptoms of dizziness and orthostatic hypotension.  (+) orthostatic hypotension.  Pt reports she lives with her daughter and famiy in a 1 story home with no steps to enter into home.  Pt was mod I with all functional mobility tasks and ambualted with st cane prior to hospitalization.  Pt on room air, IV, telemetry and IV this date.  Orthostatic hypotension BP readings:  supine 143/58.  Sitting; 150/61.  Standin/57.  Supine: 158/54.  Pt required Min A for bed mobility and reported no dizziness sitting EOB.  Min A to stand and 31 point systolic drop in BP noted with c/o dizziness.  No increased c/o dizziness with head turns.  Pt reported had recently ambulated to bathroom with nsg and was too dizzy to attempt with therapist this date.  Pt is a high fall risk secondary to hypotension and dizziness with standing.  Recommend  SNF.     Time Calculation:         PT Charges       Row Name 08/16/24 1313             Time Calculation    Start Time 0805  -AM      Stop Time 0827  -AM      Time Calculation (min) 22 min  -AM      PT Received On 08/16/24  -AM      PT - Next Appointment 08/18/24  -AM      PT Goal Re-Cert Due Date 08/30/24  -AM                User Key  (r) = Recorded By, (t) = Taken By, (c) = Cosigned By      Initials Name Provider Type    AM Huey Terry, PT Physical Therapist                  Therapy Charges for Today       Code Description Service Date Service Provider Modifiers Qty    48440189495 HC PT EVAL MOD COMPLEXITY 3 8/16/2024 Huey Terry, PT GP 1            PT G-Codes  AM-PAC 6 Clicks Score (PT): 18  PT Discharge Summary  Anticipated Discharge Disposition (PT): skilled nursing facility    Huey Terry PT  8/16/2024      Electronically signed by Huey Terry, PT at 08/16/24 1313       Occupational Therapy Notes (all)    No notes exist for this encounter.

## 2024-08-16 NOTE — PLAN OF CARE
Goal Outcome Evaluation:      Pt alert and orient x 4. Pt makes needs known verbally. Pt compliant with plan of care at this time. Pt complaint with discharge at this time.

## 2024-08-16 NOTE — PLAN OF CARE
Goal Outcome Evaluation:  Plan of Care Reviewed With: patient           Outcome Evaluation: Pt is a 78 y/o female with c/o 5 episodes of dizziness with feeling like she was going to pass out.  Pt recently admitted with similar symptoms of dizziness and orthostatic hypotension.  (+) orthostatic hypotension.  Pt reports she lives with her daughter and famiy in a 1 story home with no steps to enter into home.  Pt was mod I with all functional mobility tasks and ambualted with st cane prior to hospitalization.  Pt on room air, IV, telemetry and IV this date.  Orthostatic hypotension BP readings:  supine 143/58.  Sitting; 150/61.  Standin/57.  Supine: 158/54.  Pt required Min A for bed mobility and reported no dizziness sitting EOB.  Min A to stand and 31 point systolic drop in BP noted with c/o dizziness.  No increased c/o dizziness with head turns.  Pt reported had recently ambulated to bathroom with nsg and was too dizzy to attempt with therapist this date.  Pt is a high fall risk secondary to hypotension and dizziness with standing.  Recommend SNF.      Anticipated Discharge Disposition (PT): skilled nursing facility

## 2024-08-16 NOTE — PROGRESS NOTES
[facility] CARDIOLOGY  [facility]        LOS:  LOS: 1 day   Patient Name: Faby Le  Age/Sex: 79 y.o. female  : 1945  MRN: [id number]    Day of Service: 24   Length of Stay: 1  Encounter Provider: CAROL Miranda  Place of Service: [facility] CARDIOLOGY  Patient Care Team:  Leonor Adam MD as PCP - Maggi Anne MD as Consulting Physician (Pain Medicine)  Ratan Zavala MD as Consulting Physician (Cardiology)  Nora Foley APRN as Nurse Practitioner (Gastroenterology)  Gianluca Walton MD as Consulting Physician (Pulmonary Disease)  Nathanael Murillo MD as Consulting Physician (Allergy and Immunology)  Delfin Thorpe MD as Surgeon (Orthopedic Surgery)  Josh Loomis MD as Consulting Physician (Gastroenterology)  Bo Ruggiero MD as Consulting Physician (Urology)          Cardiology assessment and plan        Dizziness  Hypertension  Orthostatic hypotension  History of sick sinus syndrome status post pacemaker placement  History of atrial fibrillation status post ablation therapy  Status post a flutter ablation  Prior history of TIA on chronic anticoagulation therapy  Chronic diastolic heart failure  History of hypertension  History of hypothyroidism  Chronic kidney disease  Morbid obesity  Chronic anemia  Severe left atrial enlargement  Dual-chamber pacemaker in situ  History of TIA in the past  Normal LV systolic function  cardiac catheterization in  with a mild obstructive disease involving the LAD and a moderate obstructive disease involving the ostium of the diagonal branch with normal LV systolic function   New initiation of Ozempic     Denies any chest pain  No atrial fibrillation  Still complaining of some dizziness   Tmax is 97.6 pulse is 60 respirations are 15 blood pressure is 130/80 sats are 100%  Normal troponin  Abnormal elevated proBNP at  2400  Sodium is 141 potassium is 4.1 creatinine is 1.24 hemoglobin is 11.4  Twelve-lead EKG shows normal sinus rhythm with no acute ST changes  Echocardiogram in June with normal LV systolic function  Current medications include Lipitor 20 mg p.o. once a day patient is on Tikosyn 250 mcg p.o. twice a day patient is on midodrine 5 mg p.o. twice a day patient is on anticoagulation therapy with Xarelto  Conservative measures for orthostatic hypotension reviewed and discussed with patient  Close monitoring of blood pressure at home  Normal device function in July  If the orthostatic vitals are improved and patient does not have any orthostatic symptoms okay to discharge and follow-up in the office  Discussed with patient at bedside               Subjective:     Chief Complaint:  F/U orthostasis    Subjective:   Patient reports improvement in symptoms but still feels dizziness upon getting OOB.  She expresses she is not comfortable going home.    Current Medications:   Scheduled Meds:allopurinol, 300 mg, Oral, Daily  atorvastatin, 20 mg, Oral, Daily  cetirizine, 10 mg, Oral, Nightly  cholecalciferol, 1,000 Units, Oral, Daily  dofetilide, 250 mcg, Oral, Q12H  levothyroxine, 88 mcg, Oral, Daily  midodrine, 5 mg, Oral, BID AC  montelukast, 10 mg, Oral, Nightly  multivitamin, 1 tablet, Oral, Daily  pantoprazole, 40 mg, Oral, QAM  rivaroxaban, 15 mg, Oral, Daily With Dinner  sodium chloride, 10 mL, Intravenous, Q12H  cyanocobalamin, 1,000 mcg, Oral, Daily      Continuous Infusions:sodium chloride, 75 mL/hr, Last Rate: 75 mL/hr (08/16/24 0530)        Allergies:  Allergies   Allergen Reactions    Celecoxib Itching and Swelling     swelling       Review of Systems   Constitutional: Negative for chills, decreased appetite and malaise/fatigue.   HENT:  Negative for congestion and nosebleeds.    Eyes:  Negative for blurred vision and double vision.   Cardiovascular:  Negative for chest pain, dyspnea on exertion, irregular  "heartbeat, leg swelling, near-syncope, orthopnea, palpitations and paroxysmal nocturnal dyspnea.   Respiratory:  Negative for cough and shortness of breath.    Hematologic/Lymphatic: Negative for adenopathy. Does not bruise/bleed easily.   Skin:  Negative for color change and rash.   Musculoskeletal:  Negative for back pain and joint pain.   Gastrointestinal:  Negative for bloating, abdominal pain, hematemesis and hematochezia.   Genitourinary:  Negative for flank pain and hematuria.   Neurological:  Negative for dizziness and focal weakness.   Psychiatric/Behavioral:  Negative for altered mental status. The patient does not have insomnia.        Objective:     Temp:  [97.4 °F (36.3 °C)-98.7 °F (37.1 °C)] 97.6 °F (36.4 °C)  Heart Rate:  [60-66] 60  Resp:  [12-16] 15  BP: (128-168)/() 130/80     Intake/Output Summary (Last 24 hours) at 8/16/2024 1410  Last data filed at 8/16/2024 0900  Gross per 24 hour   Intake 240 ml   Output --   Net 240 ml     Body mass index is 46.91 kg/m².      08/14/24  1310 08/15/24  0525   Weight: 117 kg (259 lb) 120 kg (264 lb 12.8 oz)         Physical Exam:  Neuro:  CV:  Resp:  GI:  Ext:  Tele: AAOx3, no gross deficits  S1S2 RRR, no murmur  Nonlabored, CTA  BS+, abd soft  Pedal pulses palp, no edema  SR                                                   Lab Review:   Results from last 7 days   Lab Units 08/15/24  0429 08/14/24  1407   SODIUM mmol/L 141 139   POTASSIUM mmol/L 4.1 4.4   CHLORIDE mmol/L 107 105   CO2 mmol/L 23.1 21.8*   BUN mg/dL 30* 34*   CREATININE mg/dL 1.24* 1.36*   GLUCOSE mg/dL 94 96   CALCIUM mg/dL 9.9 10.0   AST (SGOT) U/L  --  22   ALT (SGPT) U/L  --  14     Results from last 7 days   Lab Units 08/14/24  1407   HSTROP T ng/L 13     Results from last 7 days   Lab Units 08/15/24  0429 08/14/24  1407   WBC 10*3/mm3 6.07 6.14   HEMOGLOBIN g/dL 11.4* 11.1*   HEMATOCRIT % 35.5 34.5   PLATELETS 10*3/mm3 180 202                   Invalid input(s): \"LDLCALC\"  Results " from last 7 days   Lab Units 08/14/24  1407   PROBNP pg/mL 2,414.0*           Recent Radiology:  Imaging Results (Most Recent)       None            ECHOCARDIOGRAM:    Results for orders placed during the hospital encounter of 06/11/24    Adult Transthoracic Echo Complete W/ Cont if Necessary Per Protocol    Interpretation Summary    Left ventricular systolic function is normal. Left ventricular ejection fraction appears to be 56 - 60%.    Left ventricular diastolic function was normal.    The left atrial cavity is moderately dilated.    The right atrial cavity is mildly  dilated.        I reviewed the patient's new clinical results.    EKG:      Assessment:       Symptomatic hypotension    Orthostatic hypotension    1) Symptomatic Orthostatic Hypotension    2) PAF  - s/p ablation 3/2024  - in SR on tikosyn; QT appropriate  - on Xarelto for anticoagulation  - mild CAD per cath in 2022    3) Chronic Diastolic Heart Failure  - 2D echo 6/2024 showed EF 56-60% with mild MR    3) SSS s/p BSc dual chamber PPM 7/2023    4) hx TIA    5) HTN    6) HLD    7) ROSA    8) CKD stage 3    9) hypothyroidism    Plan:   Orthostatics improved on midodrine.  PT recommends SNF.  Dispo planning in progress.        Electronically signed by CAROL Miranda, 08/16/24, 2:19 PM EDT.    Yes

## 2024-08-16 NOTE — SIGNIFICANT NOTE
08/16/24 1503   Post Acute Pre-Cert Documentation   Request Submitted by Facility - Type: Hospital   Post-Acute Authorization Type Submitted: SNF   Date Post Acute Pre-Cert Inititated per Facility 08/16/24   Verification from Payer Yes   Date Post Acute Pre-Cert Completed 08/16/24   Accepting Facility [facility]   Hospital Discharge Date Requested 08/16/24   All Clinicals Submitted? Yes   Had Accepting Facility at Time of Submission Yes   Response Received from Insurance? Approval   Response Communicated to:    Authorization Number: [id number]   Post Acute Pre-Cert Initiated Comment CM submitted pre-cert for [facility] via [facility] portal. Pre-cert auto approved, valid 8/16-8/20. Auth ID  [id number]. CM updated 3C cm.

## 2024-08-16 NOTE — THERAPY EVALUATION
Patient Name: Faby Le  : 1945    MRN: [id number]                              Today's Date: 2024       Admit Date: 2024    Visit Dx:     ICD-10-CM ICD-9-CM   1. Orthostatic hypotension  I95.1 458.0     Patient Active Problem List   Diagnosis    Abnormal complete blood count    Ankle pain    Arthralgia of left elbow    Arthritis    B12 deficiency    Coronary artery disease    Depression    Gallstones    Gout    Hyperglycemia    Hyperlipidemia    Hypertension    Hypothyroid    Low back pain    Mobility poor    Post-menopausal    Sleep apnea    Vitamin D deficiency    Chronic kidney disease, stage 3 (moderate)    Oropharyngeal dysphagia    Class 3 drug-induced obesity with serious comorbidity and body mass index (BMI) of 45.0 to 49.9 in adult    Chronic pain of right knee    Light headedness    Paroxysmal atrial fibrillation    Chronic left shoulder pain    Status post knee replacement    Syncope    Presence of cardiac pacemaker    Chronic diastolic heart failure    Paresthesia of left foot    TIA (transient ischemic attack)    Tachy-jojo syndrome    Anemia    Dizziness    Acute CHF    Acute exacerbation of CHF (congestive heart failure)    Chronic HFrEF (heart failure with reduced ejection fraction)    Endometrial carcinoma    Symptomatic hypotension    Orthostatic hypotension     Past Medical History:   Diagnosis Date    Ankle pain     Arthritis     Coronary artery disease     Depression     Gout     Hyperglycemia     Hyperlipidemia     Hypertension     Low back pain     Sleep apnea     Vitamin D deficiency      Past Surgical History:   Procedure Laterality Date    ANKLE FUSION      2017-left    CARDIAC CATHETERIZATION      CARDIAC CATHETERIZATION Right 2022    Procedure: Left Heart Cath;  Surgeon: Ratna Zavala MD;  Location: [facility] CATH INVASIVE LOCATION;  Service: Cardiovascular;  Laterality: Right;    CARDIAC ELECTROPHYSIOLOGY PROCEDURE Left 7/3/2023    Procedure: Pacemaker  DC new [place] Notified;  Surgeon: Cr Herrera MD;  Location: [facility] CATH INVASIVE LOCATION;  Service: Cardiovascular;  Laterality: Left;    CARDIAC ELECTROPHYSIOLOGY PROCEDURE Right 3/5/2024    Procedure: Ablation atrial fibrillation, Cryo Ibrahima and Wayne Costa notified 02/09/24;  Surgeon: Cr Herrera MD;  Location: [facility] CATH INVASIVE LOCATION;  Service: Cardiovascular;  Laterality: Right;    CARDIAC ELECTROPHYSIOLOGY PROCEDURE N/A 3/5/2024    Procedure: Cardioversion;  Surgeon: Cr Herrera MD;  Location: [facility] CATH INVASIVE LOCATION;  Service: Cardiovascular;  Laterality: N/A;    CARPAL TUNNEL RELEASE      CATARACT EXTRACTION      CUBITAL TUNNEL RELEASE      ENDOSCOPY N/A 5/28/2024    Procedure: ESOPHAGOGASTRODUODENOSCOPY WITH COLD FORCEP BIOPSY X1 AREA;  Surgeon: Josh Loomis MD;  Location: [facility] ENDOSCOPY;  Service: Gastroenterology;  Laterality: N/A;  Post- ESOPHAGITIS, GASTRITIS, HIATAL HERNIA    HYSTERECTOMY      REPLACEMENT TOTAL KNEE      left 2008    ROTATOR CUFF REPAIR      TOTAL KNEE ARTHROPLASTY Right 5/4/2023    Procedure: TOTAL KNEE ARTHROPLASTY WITH CORI ROBOT;  Surgeon: Chino Herrera II, MD;  Location: [facility] MAIN OR;  Service: Robotics - Ortho;  Laterality: Right;      General Information       Row Name 08/16/24 1300          Physical Therapy Time and Intention    Document Type evaluation  -AM     Mode of Treatment physical therapy  -AM       Row Name 08/16/24 1300          General Information    Patient Profile Reviewed yes  -AM     Prior Level of Function independent:;all household mobility;community mobility;gait;transfer;bed mobility  ambulates with st cane  -AM     Existing Precautions/Restrictions orthostatic hypotension  -AM     Barriers to Rehab medically complex  -AM       Row Name 08/16/24 1300          Living Environment    People in Home child(marlon), adult;grandchild(marlon)  -AM       Row Name 08/16/24 1300          Home Main  Entrance    Number of Stairs, Main Entrance none  -AM       Row Name 08/16/24 1300          Stairs Within Home, Primary    Number of Stairs, Within Home, Primary none  -AM       Row Name 08/16/24 1300          Cognition    Orientation Status (Cognition) oriented x 4  -AM       Row Name 08/16/24 1300          Safety Issues, Functional Mobility    Impairments Affecting Function (Mobility) balance;endurance/activity tolerance;strength  -AM     Comment, Safety Issues/Impairments (Mobility) gait belt utilized  -AM               User Key  (r) = Recorded By, (t) = Taken By, (c) = Cosigned By      Initials Name Provider Type    AM Huey Terry, PT Physical Therapist                   Mobility       Row Name 08/16/24 1302          Bed Mobility    Bed Mobility bed mobility (all) activities  -AM     All Activities, [place] (Bed Mobility) minimum assist (75% patient effort);1 person assist  -AM     Comment, (Bed Mobility) no c/o dizziness sitting EOB.  -AM       Row Name 08/16/24 1302          Sit-Stand Transfer    Sit-Stand [place] (Transfers) minimum assist (75% patient effort);1 person assist  -AM     Comment, (Sit-Stand Transfer) pt with c/o dizziness with standing.  31 point drop in systolic BP with standing  -AM       Row Name 08/16/24 1302          Gait/Stairs (Locomotion)    Comment, (Gait/Stairs) unable to assess secondary to dizziness and 31 point drop in systolic BP upon standing  -AM               User Key  (r) = Recorded By, (t) = Taken By, (c) = Cosigned By      Initials Name Provider Type    AM Huey Terry, PT Physical Therapist                   Obj/Interventions       Row Name 08/16/24 1303          Range of Motion Comprehensive    General Range of Motion bilateral lower extremity ROM WFL  -AM     Comment, General Range of Motion Defer BUE ROM to OT  -AM       Row Name 08/16/24 1303          Strength Comprehensive (MMT)    Comment, General Manual Muscle Testing (MMT) Assessment Defer BUE MMT to  OT.  BLEs: 4+/5  -AM       Row Name 08/16/24 1303          Motor Skills    Motor Skills functional endurance  -AM     Functional Endurance fair -  -AM       Row Name 08/16/24 1303          Balance    Balance Assessment sitting static balance;sitting dynamic balance;sit to stand dynamic balance;standing static balance  -AM     Static Sitting Balance independent  -AM     Dynamic Sitting Balance independent  -AM     Position, Sitting Balance unsupported;sitting edge of bed  -AM     Sit to Stand Dynamic Balance minimal assist  -AM     Static Standing Balance contact guard  -AM     Dynamic Standing Balance minimal assist  -AM       Row Name 08/16/24 1303          Sensory Assessment (Somatosensory)    Sensory Assessment (Somatosensory) sensation intact  -AM               User Key  (r) = Recorded By, (t) = Taken By, (c) = Cosigned By      Initials Name Provider Type    AM Huey Terry, PT Physical Therapist                   Goals/Plan       Row Name 08/16/24 1309          Bed Mobility Goal 1 (PT)    Activity/Assistive Device (Bed Mobility Goal 1, PT) bed mobility activities, all  -AM     [place] Level/Cues Needed (Bed Mobility Goal 1, PT) modified independence  -AM     Time Frame (Bed Mobility Goal 1, PT) long term goal (LTG)  -AM       Row Name 08/16/24 1309          Transfer Goal 1 (PT)    Activity/Assistive Device (Transfer Goal 1, PT) transfers, all  -AM     [place] Level/Cues Needed (Transfer Goal 1, PT) modified independence  -AM     Time Frame (Transfer Goal 1, PT) long term goal (LTG)  -AM       Row Name 08/16/24 1309          Gait Training Goal 1 (PT)    Activity/Assistive Device (Gait Training Goal 1, PT) gait (walking locomotion);assistive device use  -AM     [place] Level (Gait Training Goal 1, PT) modified independence  -AM     Distance (Gait Training Goal 1, PT) 150'  -AM     Time Frame (Gait Training Goal 1, PT) long term goal (LTG)  -AM       Row Name 08/16/24 1309          Therapy  Assessment/Plan (PT)    Planned Therapy Interventions (PT) balance training;bed mobility training;gait training;strengthening;patient/family education;transfer training  -AM               User Key  (r) = Recorded By, (t) = Taken By, (c) = Cosigned By      Initials Name Provider Type    AM Huey Terry, PT Physical Therapist                   Clinical Impression       Row Name 24 1304          Pain    Pretreatment Pain Rating 0/10 - no pain  -AM     Posttreatment Pain Rating 0/10 - no pain  -AM       Row Name 24 1302          Plan of Care Review    Plan of Care Reviewed With patient  -AM     Outcome Evaluation Pt is a 78 y/o female with c/o 5 episodes of dizziness with feeling like she was going to pass out.  Pt recently admitted with similar symptoms of dizziness and orthostatic hypotension.  (+) orthostatic hypotension.  Pt reports she lives with her daughter and famiy in a 1 story home with no steps to enter into home.  Pt was mod I with all functional mobility tasks and ambualted with st cane prior to hospitalization.  Pt on room air, IV, telemetry and IV this date.  Orthostatic hypotension BP readings:  supine 143/58.  Sitting; 150/61.  Standin/57.  Supine: 158/54.  Pt required Min A for bed mobility and reported no dizziness sitting EOB.  Min A to stand and 31 point systolic drop in BP noted with c/o dizziness.  No increased c/o dizziness with head turns.  Pt reported had recently ambulated to bathroom with nsg and was too dizzy to attempt with therapist this date.  Pt is a high fall risk secondary to hypotension and dizziness with standing.  Recommend SNF.  -AM       Row Name 24 1305          Therapy Assessment/Plan (PT)    Patient/Family Therapy Goals Statement (PT) To go home  -AM     Rehab Potential (PT) good, to achieve stated therapy goals  -AM     Criteria for Skilled Interventions Met (PT) yes  -AM     Therapy Frequency (PT) 5 times/wk  -AM     Predicted Duration of Therapy  Intervention (PT) until d/c  -AM       Row Name 08/16/24 1304          Vital Signs    Pre Systolic BP Rehab 150  -AM     Pre Treatment Diastolic BP 61  -AM     Intra Systolic BP Rehab 119  -AM     Intra Treatment Diastolic BP 57  -AM     Post Systolic BP Rehab 158  -AM     Post Treatment Diastolic BP 54  -AM     O2 Delivery Pre Treatment room air  -AM     O2 Delivery Intra Treatment room air  -AM     O2 Delivery Post Treatment room air  -AM     Pre Patient Position Supine  -AM     Intra Patient Position Standing  -AM     Post Patient Position Supine  -AM       Row Name 08/16/24 1304          Positioning and Restraints    Pre-Treatment Position in bed  -AM     Post Treatment Position bed  -AM     In Bed supine;call light within reach;encouraged to call for assist;exit alarm on  -AM               User Key  (r) = Recorded By, (t) = Taken By, (c) = Cosigned By      Initials Name Provider Type    AM Huey Terry, PT Physical Therapist                   Outcome Measures       Row Name 08/16/24 1311 08/16/24 0800       How much help from another person do you currently need...    Turning from your back to your side while in flat bed without using bedrails? 4  -AM 4  -MN    Moving from lying on back to sitting on the side of a flat bed without bedrails? 3  -AM 4  -MN    Moving to and from a bed to a chair (including a wheelchair)? 3  -AM 4  -MN    Standing up from a chair using your arms (e.g., wheelchair, bedside chair)? 3  -AM 4  -MN    Climbing 3-5 steps with a railing? 2  -AM 4  -MN    To walk in hospital room? 3  -AM 4  -MN    AM-PAC 6 Clicks Score (PT) 18  -AM 24  -MN    Highest Level of Mobility Goal 6 --> Walk 10 steps or more  -AM 8 --> Walked 250 feet or more  -MN      Row Name 08/16/24 0200          How much help from another person do you currently need...    Turning from your back to your side while in flat bed without using bedrails? 4  -SS     Moving from lying on back to sitting on the side of a flat bed  without bedrails? 4  -SS     Moving to and from a bed to a chair (including a wheelchair)? 4  -SS     Standing up from a chair using your arms (e.g., wheelchair, bedside chair)? 4  -SS     Climbing 3-5 steps with a railing? 4  -SS     To walk in hospital room? 4  -SS     AM-PAC 6 Clicks Score (PT) 24  -SS     Highest Level of Mobility Goal 8 --> Walked 250 feet or more  -SS               User Key  (r) = Recorded By, (t) = Taken By, (c) = Cosigned By      Initials Name Provider Type    AM Huey Terry, PT Physical Therapist    SS David Alvarez, RN Registered Nurse    Tab Anton LPN Licensed Nurse                                 Physical Therapy Education       Title: PT OT SLP Therapies (Done)       Topic: Physical Therapy (Done)       Point: Mobility training (Done)       Learning Progress Summary             Patient Acceptance, E,TB, VU by AM at 8/16/2024 1311                         Point: Body mechanics (Done)       Learning Progress Summary             Patient Acceptance, E,TB, VU by AM at 8/16/2024 1311                         Point: Precautions (Done)       Learning Progress Summary             Patient Acceptance, E,TB, VU by AM at 8/16/2024 1311                                         User Key       Initials Effective Dates Name Provider Type Discipline    AM 05/10/21 -  Huey Terry, PT Physical Therapist PT                  PT Recommendation and Plan  Planned Therapy Interventions (PT): balance training, bed mobility training, gait training, strengthening, patient/family education, transfer training  Plan of Care Reviewed With: patient  Outcome Evaluation: Pt is a 78 y/o female with c/o 5 episodes of dizziness with feeling like she was going to pass out.  Pt recently admitted with similar symptoms of dizziness and orthostatic hypotension.  (+) orthostatic hypotension.  Pt reports she lives with her daughter and famiy in a 1 story home with no steps to enter into home.  Pt was mod I with all  functional mobility tasks and ambualted with st cane prior to hospitalization.  Pt on room air, IV, telemetry and IV this date.  Orthostatic hypotension BP readings:  supine 143/58.  Sitting; 150/61.  Standin/57.  Supine: 158/54.  Pt required Min A for bed mobility and reported no dizziness sitting EOB.  Min A to stand and 31 point systolic drop in BP noted with c/o dizziness.  No increased c/o dizziness with head turns.  Pt reported had recently ambulated to bathroom with nsg and was too dizzy to attempt with therapist this date.  Pt is a high fall risk secondary to hypotension and dizziness with standing.  Recommend SNF.     Time Calculation:         PT Charges       Row Name 24 1313             Time Calculation    Start Time 0805  -AM      Stop Time 827  -AM      Time Calculation (min) 22 min  -AM      PT Received On 24  -AM      PT - Next Appointment 24  -AM      PT Goal Re-Cert Due Date 24                User Key  (r) = Recorded By, (t) = Taken By, (c) = Cosigned By      Initials Name Provider Type    AM Huey Terry, PT Physical Therapist                  Therapy Charges for Today       Code Description Service Date Service Provider Modifiers Qty    40053427229 HC PT EVAL MOD COMPLEXITY 3 2024 Huey Terry, PT GP 1            PT G-Codes  AM-PAC 6 Clicks Score (PT): 18  PT Discharge Summary  Anticipated Discharge Disposition (PT): skilled nursing facility    Huey Terry PT  2024

## 2024-08-16 NOTE — DISCHARGE PLACEMENT REQUEST
"Mara Le (79 y.o. Female)       Date of Birth   1945    Social Security Number       Address   [address] [place] [facility]    Home Phone   [phone]    MRN   [id number]       Jain   Yazdanism    Marital Status                               Admission Date   8/14/24    Admission Type   Emergency    Admitting Provider   Sly Sandy MD    Attending Provider   Lily Mireles MD    Department, Room/Bed   [facility] 3C MEDICAL INPATIENT, 360/1       Discharge Date       Discharge Disposition       Discharge Destination                                 Attending Provider: Lily Mireles MD    Allergies: Celecoxib    Isolation: None   Infection: MRSA No Isolation this Admit (05/04/23)   Code Status: CPR    Ht: 160 cm (63\")   Wt: 120 kg (264 lb 12.8 oz)    Admission Cmt: None   Principal Problem: Symptomatic hypotension [I95.9]                   Active Insurance as of 8/14/2024       Primary Coverage       Payor Plan Insurance Group Employer/Plan Group    [facility] MEDICARE REPLACEMENT [facility] MED ADV SNP PPO [id number]       Payor Plan Address Payor Plan Phone Number Payor Plan Fax Number Effective Dates    [address] [address]   1/1/2023 - None Entered    [facility] [id number]         Subscriber Name Subscriber Birth Date Member ID       MARA LE 1945 [id number]               Secondary Coverage       Payor Plan Insurance Group Employer/Plan Group    [facility] COMMUNITY PLAN [place] - [employer] [facility] COMMUNITY PLAN PATHWAYS [place]        Payor Plan Address Payor Plan Phone Number Payor Plan Fax Number Effective Dates    [address]   7/1/2024 - None Entered    [facility] [id number]         Subscriber Name Subscriber Birth Date Member ID       MARA LE 1945 [id number]                     Emergency Contacts        (Rel.) Home Phone Work Phone Mobile Phone    JANICE VALERIO (Daughter) [phone] -- [phone]    FRANCK MARTINEZ " (Daughter) [phone] -- [phone]    ROSA MARRERO (Daughter) [phone] -- [phone]    RAMÓN HUANG (Daughter) -- -- [phone]

## 2024-08-16 NOTE — THERAPY EVALUATION
Patient Name: Faby Le  : 1945    MRN: [id number]                              Today's Date: 2024       Admit Date: 2024    Visit Dx:     ICD-10-CM ICD-9-CM   1. Orthostatic hypotension  I95.1 458.0     Patient Active Problem List   Diagnosis    Abnormal complete blood count    Ankle pain    Arthralgia of left elbow    Arthritis    B12 deficiency    Coronary artery disease    Depression    Gallstones    Gout    Hyperglycemia    Hyperlipidemia    Hypertension    Hypothyroid    Low back pain    Mobility poor    Post-menopausal    Sleep apnea    Vitamin D deficiency    Chronic kidney disease, stage 3 (moderate)    Oropharyngeal dysphagia    Class 3 drug-induced obesity with serious comorbidity and body mass index (BMI) of 45.0 to 49.9 in adult    Chronic pain of right knee    Light headedness    Paroxysmal atrial fibrillation    Chronic left shoulder pain    Status post knee replacement    Syncope    Presence of cardiac pacemaker    Chronic diastolic heart failure    Paresthesia of left foot    TIA (transient ischemic attack)    Tachy-jojo syndrome    Anemia    Dizziness    Acute CHF    Acute exacerbation of CHF (congestive heart failure)    Chronic HFrEF (heart failure with reduced ejection fraction)    Endometrial carcinoma    Symptomatic hypotension    Orthostatic hypotension     Past Medical History:   Diagnosis Date    Ankle pain     Arthritis     Coronary artery disease     Depression     Gout     Hyperglycemia     Hyperlipidemia     Hypertension     Low back pain     Sleep apnea     Vitamin D deficiency      Past Surgical History:   Procedure Laterality Date    ANKLE FUSION      2017-left    CARDIAC CATHETERIZATION      CARDIAC CATHETERIZATION Right 2022    Procedure: Left Heart Cath;  Surgeon: Ratna Zavala MD;  Location: [facility] CATH INVASIVE LOCATION;  Service: Cardiovascular;  Laterality: Right;    CARDIAC ELECTROPHYSIOLOGY PROCEDURE Left 7/3/2023    Procedure: Pacemaker  DC new [place] Notified;  Surgeon: Cr Herrera MD;  Location: [facility] CATH INVASIVE LOCATION;  Service: Cardiovascular;  Laterality: Left;    CARDIAC ELECTROPHYSIOLOGY PROCEDURE Right 3/5/2024    Procedure: Ablation atrial fibrillation, Cryo Ibrahima and Wayne Costa notified 02/09/24;  Surgeon: Cr Herrera MD;  Location: [facility] CATH INVASIVE LOCATION;  Service: Cardiovascular;  Laterality: Right;    CARDIAC ELECTROPHYSIOLOGY PROCEDURE N/A 3/5/2024    Procedure: Cardioversion;  Surgeon: Cr Herrera MD;  Location: [facility] CATH INVASIVE LOCATION;  Service: Cardiovascular;  Laterality: N/A;    CARPAL TUNNEL RELEASE      CATARACT EXTRACTION      CUBITAL TUNNEL RELEASE      ENDOSCOPY N/A 5/28/2024    Procedure: ESOPHAGOGASTRODUODENOSCOPY WITH COLD FORCEP BIOPSY X1 AREA;  Surgeon: Josh Loomis MD;  Location: [facility] ENDOSCOPY;  Service: Gastroenterology;  Laterality: N/A;  Post- ESOPHAGITIS, GASTRITIS, HIATAL HERNIA    HYSTERECTOMY      REPLACEMENT TOTAL KNEE      left 2008    ROTATOR CUFF REPAIR      TOTAL KNEE ARTHROPLASTY Right 5/4/2023    Procedure: TOTAL KNEE ARTHROPLASTY WITH CORI ROBOT;  Surgeon: Chino Herrera II, MD;  Location: [facility] MAIN OR;  Service: Robotics - Ortho;  Laterality: Right;      General Information       Row Name 08/16/24 1157          OT Time and Intention    Document Type evaluation  -LS (r) MS (t) LS (c)     Mode of Treatment occupational therapy  -LS (r) MS (t) LS (c)       Row Name 08/16/24 1157          General Information    Patient Profile Reviewed yes  -LS (r) MS (t) LS (c)     Prior Level of Function independent:;all household mobility;transfer;bed mobility;ADL's;feeding;grooming;dressing;bathing;home management;cleaning;mod assist:;cooking  daughter cooks meals  -LS (r) MS (t) LS (c)     Existing Precautions/Restrictions fall;orthostatic hypotension  -LS (r) MS (t) LS (c)     Barriers to Rehab medically complex;previous functional deficit   -LS (r) MS (t) LS (c)       Row Name 08/16/24 1157          Occupational Profile    Reason for Services/Referral (Occupational Profile) Pt is a 79 y.o. year old female admitted to [facility] secondary to symptomatic hypotension. Post hospital work up pt dx acute UTI, and CHF.     Pmhx significant for atrial fibrillation status post ablation, tachybradycardia syndrome status post pacemaker, TIA, hypertension, obstructive sleep apnea, cardiac catheterization in 2022 with mild obstructive disease, iron deficiency anemia, hypothyroidism, chronic diastolic congestive heart failure, GERD, borderline diabetes chronic kidney disease, hyperlipidemia and gout.  -LS (r) MS (t) LS (c)       Row Name 08/16/24 1157          Living Environment    People in Home child(marlon), adult;grandchild(marlon)  -LS (r) MS (t) LS (c)       Row Name 08/16/24 1157          Home Main Entrance    Number of Stairs, Main Entrance none  -LS (r) MS (t) LS (c)       Row Name 08/16/24 1157          Stairs Within Home, Primary    Number of Stairs, Within Home, Primary none  -LS (r) MS (t) LS (c)       Row Name 08/16/24 1157          Cognition    Orientation Status (Cognition) oriented x 4  -LS (r) MS (t) LS (c)       Row Name 08/16/24 1157          Safety Issues, Functional Mobility    Safety Issues Affecting Function (Mobility) safety precaution awareness  -LS (r) MS (t) LS (c)     Impairments Affecting Function (Mobility) endurance/activity tolerance;strength  -LS (r) MS (t) LS (c)               User Key  (r) = Recorded By, (t) = Taken By, (c) = Cosigned By      Initials Name Provider Type    Bg Hendrickson OT Occupational Therapist    Ying Rodriguez OT Student OT Student                     Mobility/ADL's       Row Name 08/16/24 1200          Bed Mobility    Bed Mobility bed mobility (all) activities  -LS (r) MS (t) LS (c)     All Activities, [place] (Bed Mobility) supervision  -LS (r) MS (t) LS (c)     Assistive Device (Bed Mobility) bed rails   -LS (r) MS (t) LS (c)       Row Name 08/16/24 1200          Transfers    Transfers sit-stand transfer;stand-sit transfer;toilet transfer  -LS (r) MS (t) LS (c)       Row Name 08/16/24 1200          Sit-Stand Transfer    Sit-Stand [place] (Transfers) contact guard  -LS (r) MS (t) LS (c)     Assistive Device (Sit-Stand Transfers) cane, quad tip  -LS (r) MS (t) LS (c)     Comment, (Sit-Stand Transfer) hurrycane  -LS (r) MS (t) LS (c)       Row Name 08/16/24 1200          Stand-Sit Transfer    Stand-Sit [place] (Transfers) contact guard  -LS (r) MS (t) LS (c)     Assistive Device (Stand-Sit Transfers) cane, quad tip  -LS (r) MS (t) LS (c)     Comment, (Stand-Sit Transfer) hurrycane  -LS (r) MS (t) LS (c)       Row Name 08/16/24 1200          Toilet Transfer    Type (Toilet Transfer) sit-stand;stand-sit  -LS (r) MS (t) LS (c)     [place] Level (Toilet Transfer) contact guard  -LS (r) MS (t) LS (c)     Assistive Device (Toilet Transfer) cane, quad;commode  -LS (r) MS (t) LS (c)     Comment, (Toilet Transfer) hurrycane  -LS (r) MS (t) LS (c)       Row Name 08/16/24 1200          Functional Mobility    Functional Mobility- Ind. Level contact guard assist  -LS (r) MS (t) LS (c)     Functional Mobility- Device other (see comments)  -LS (r) MS (t) LS (c)     Functional Mobility- Comment hurrycane  -LS (r) MS (t) LS (c)     Patient was able to Ambulate yes  -LS (r) MS (t) LS (c)       Row Name 08/16/24 1200          Activities of Daily Living    BADL Assessment/Intervention grooming;toileting  -LS (r) MS (t) LS (c)       Row Name 08/16/24 1200          Grooming Assessment/Training    [place] Level (Grooming) grooming skills;hair care, combing/brushing;standby assist  -LS (r) MS (t) LS (c)     Position (Grooming) sink side  -LS (r) MS (t) LS (c)       Row Name 08/16/24 1200          Toileting Assessment/Training    [place] Level (Toileting) toileting skills;adjust/manage clothing;perform perineal  hygiene;standby assist  -LS (r) MS (t) LS (c)     Assistive Devices (Toileting) commode  -LS (r) MS (t) LS (c)               User Key  (r) = Recorded By, (t) = Taken By, (c) = Cosigned By      Initials Name Provider Type    LS Bg Alves, OT Occupational Therapist    MS Ying Saunders, OT Student OT Student                   Obj/Interventions       Row Name 08/16/24 1204          Sensory Assessment (Somatosensory)    Sensory Assessment (Somatosensory) sensation intact  -LS (r) MS (t) LS (c)       Row Name 08/16/24 1204          Vision Assessment/Intervention    Visual Impairment/Limitations corrective lenses for reading  -LS (r) MS (t) LS (c)       Row Name 08/16/24 1204          Range of Motion Comprehensive    General Range of Motion bilateral upper extremity ROM WFL  -LS (r) MS (t) LS (c)       Row Name 08/16/24 1204          Strength Comprehensive (MMT)    Comment, General Manual Muscle Testing (MMT) Assessment BUE MMT grossly 3/5. Bilateral  4/5.  -LS (r) MS (t) LS (c)       Row Name 08/16/24 1204          Balance    Balance Assessment sitting static balance;sitting dynamic balance;sit to stand dynamic balance;standing static balance;standing dynamic balance  -LS (r) MS (t) LS (c)     Static Sitting Balance standby assist  -LS (r) MS (t) LS (c)     Dynamic Sitting Balance standby assist  -LS (r) MS (t) LS (c)     Position, Sitting Balance sitting edge of bed  -LS (r) MS (t) LS (c)     Sit to Stand Dynamic Balance contact guard  -LS (r) MS (t) LS (c)     Static Standing Balance contact guard  -LS (r) MS (t) LS (c)     Dynamic Standing Balance contact guard  -LS (r) MS (t) LS (c)     Position/Device Used, Standing Balance other (see comments)  -LS (r) MS (t) LS (c)     Balance Interventions sitting;standing;sit to stand;supported;minimal challenge  -LS (r) MS (t) LS (c)     Comment, Balance hurrycane  -LS (r) MS (t) LS (c)               User Key  (r) = Recorded By, (t) = Taken By, (c) = Cosigned By       Initials Name Provider Type    LS Bg Alves, OT Occupational Therapist    Ying Rodriguez, OT Student OT Student                   Goals/Plan       Row Name 08/16/24 1313          Bed Mobility Goal 1 (OT)    Activity/Assistive Device (Bed Mobility Goal 1, OT) bed mobility activities, all  -LS (r) MS (t) LS (c)     [place] Level/Cues Needed (Bed Mobility Goal 1, OT) independent  -LS (r) MS (t) LS (c)     Time Frame (Bed Mobility Goal 1, OT) long term goal (LTG);2 weeks  -LS (r) MS (t) LS (c)       Row Name 08/16/24 1313          Transfer Goal 1 (OT)    Activity/Assistive Device (Transfer Goal 1, OT) transfers, all  -LS (r) MS (t) LS (c)     [place] Level/Cues Needed (Transfer Goal 1, OT) standby assist  -LS (r) MS (t) LS (c)     Time Frame (Transfer Goal 1, OT) long term goal (LTG);2 weeks  -LS (r) MS (t) LS (c)       Row Name 08/16/24 1313          Bathing Goal 1 (OT)    Activity/Device (Bathing Goal 1, OT) bathing skills, all  -LS (r) MS (t) LS (c)     [place] Level/Cues Needed (Bathing Goal 1, OT) modified independence  -LS (r) MS (t) LS (c)     Time Frame (Bathing Goal 1, OT) long term goal (LTG);2 weeks  -LS (r) MS (t) LS (c)       Row Name 08/16/24 1313          Toileting Goal 1 (OT)    Activity/Device (Toileting Goal 1, OT) toileting skills, all  -LS (r) MS (t) LS (c)     [place] Level/Cues Needed (Toileting Goal 1, OT) independent  -LS (r) MS (t) LS (c)     Time Frame (Toileting Goal 1, OT) long term goal (LTG);2 weeks  -LS (r) MS (t) LS (c)               User Key  (r) = Recorded By, (t) = Taken By, (c) = Cosigned By      Initials Name Provider Type    LS Bg Alves, OT Occupational Therapist    Ying Rodriguez, OT Student OT Student                   Clinical Impression       Row Name 08/16/24 1258          Pain Assessment    Pretreatment Pain Rating 0/10 - no pain  -LS (r) MS (t) LS (c)     Posttreatment Pain Rating 0/10 - no pain  -LS (r) MS (t) LS (c)       Row Name  08/16/24 1253          Plan of Care Review    Plan of Care Reviewed With patient  -LS (r) MS (t) LS (c)     Outcome Evaluation Pt is a 79 y.o. year old female admitted to [facility] secondary to symptomatic hypotension. Post hospital work up pt dx acute UTI, and CHF. Pmhx significant for atrial fibrillation status post ablation, tachybradycardia syndrome status post pacemaker, TIA, hypertension, obstructive sleep apnea, cardiac catheterization in 2022 with mild obstructive disease, iron deficiency anemia, hypothyroidism, chronic diastolic congestive heart failure, GERD, borderline diabetes chronic kidney disease, hyperlipidemia gout    At baseline, pt is IND for ADLs, bed mobility, and functional mobility with use of hurrycane or rollator when needed. Pt lives in a SSH with her adult daughter and grandchildren, her daughter cooks all the meals. Daughter often has to leave home to be at work, leaving pt by herself. On this date, pt completed bed mobility with supervision, transfers with CGA with use of hurrycane, completed grooming/toileting with SBA. BP was monitored closely throughout session, BP drop + for orthostatic hypotension refer to vitals section. OT feels pt is functioning slightly below baseline and with current BP lability and inconsistent availablilty of care pt is unsafe to return home at this time. Pt would benefit from skilled OT while IP at [facility]. Recommending SNF at d/c.  -LS (r) MS (t) LS (c)       Row Name 08/16/24 7217          Therapy Assessment/Plan (OT)    Rehab Potential (OT) good, to achieve stated therapy goals  -LS (r) MS (t) LS (c)     Criteria for Skilled Therapeutic Interventions Met (OT) yes;meets criteria;skilled treatment is necessary  -LS (r) MS (t) LS (c)     Therapy Frequency (OT) 3 times/wk  -LS (r) MS (t) LS (c)     Predicted Duration of Therapy Intervention (OT) until d/c  -LS (r) MS (t) LS (c)       Row Name 08/16/24 7002          Therapy Plan Review/Discharge Plan (OT)    Anticipated  Discharge Disposition (OT) skilled nursing facility  -LS (r) MS (t) LS (c)       Row Name 08/16/24 1253          Vital Signs    Pre Systolic BP Rehab 167  -LS (r) MS (t) LS (c)     Pre Treatment Diastolic BP 56  sitting EOB  -LS (r) MS (t) LS (c)     Intra Systolic BP Rehab 126  -LS (r) MS (t) LS (c)     Intra Treatment Diastolic BP 68  Standing Pre Activity  -LS (r) MS (t) LS (c)     Post Systolic BP Rehab 149  -LS (r) MS (t) LS (c)     Post Treatment Diastolic BP 78  Standing post activity  -LS (r) MS (t) LS (c)     O2 Delivery Pre Treatment room air  -LS (r) MS (t) LS (c)     O2 Delivery Intra Treatment room air  -LS (r) MS (t) LS (c)     O2 Delivery Post Treatment room air  -LS (r) MS (t) LS (c)     Pre Patient Position Supine  -LS (r) MS (t) LS (c)     Intra Patient Position Standing  -LS (r) MS (t) LS (c)     Post Patient Position Supine  -LS (r) MS (t) LS (c)       Row Name 08/16/24 1252          Positioning and Restraints    Pre-Treatment Position in bed  -LS (r) MS (t) LS (c)     Post Treatment Position bed  -LS (r) MS (t) LS (c)     In Bed fowlers;call light within reach;encouraged to call for assist;exit alarm on  -LS (r) MS (t) LS (c)               User Key  (r) = Recorded By, (t) = Taken By, (c) = Cosigned By      Initials Name Provider Type    Bg Hendrickson, OT Occupational Therapist    Ying Rodriguez, IMANI Student OT Student                   Outcome Measures       Row Name 08/16/24 1314          How much help from another is currently needed...    Putting on and taking off regular lower body clothing? 3  -LS (r) MS (t) LS (c)     Bathing (including washing, rinsing, and drying) 3  -LS (r) MS (t) LS (c)     Toileting (which includes using toilet bed pan or urinal) 3  -LS (r) MS (t) LS (c)     Putting on and taking off regular upper body clothing 3  -LS (r) MS (t) LS (c)     Taking care of personal grooming (such as brushing teeth) 3  -LS (r) MS (t) LS (c)     Eating meals 4  -LS (r) MS (t) LS  (c)     AM-PAC 6 Clicks Score (OT) 19  -LS (r) MS (t)       Row Name 08/16/24 1311 08/16/24 0800       How much help from another person do you currently need...    Turning from your back to your side while in flat bed without using bedrails? 4  -AM 4  -MN    Moving from lying on back to sitting on the side of a flat bed without bedrails? 3  -AM 4  -MN    Moving to and from a bed to a chair (including a wheelchair)? 3  -AM 4  -MN    Standing up from a chair using your arms (e.g., wheelchair, bedside chair)? 3  -AM 4  -MN    Climbing 3-5 steps with a railing? 2  -AM 4  -MN    To walk in hospital room? 3  -AM 4  -MN    AM-PAC 6 Clicks Score (PT) 18  -AM 24  -MN    Highest Level of Mobility Goal 6 --> Walk 10 steps or more  -AM 8 --> Walked 250 feet or more  -MN      Row Name 08/16/24 1315          Functional Assessment    Outcome Measure Options AM-PAC 6 Clicks Daily Activity (OT)  -LS (r) MS (t) LS (c)               User Key  (r) = Recorded By, (t) = Taken By, (c) = Cosigned By      Initials Name Provider Type    AM Huey Terry, PT Physical Therapist    Bg Hendrickson, OT Occupational Therapist    Tab Anton LPN Licensed Nurse    Ying Rodriguez, OT Student OT Student                    Occupational Therapy Education       Title: PT OT SLP Therapies (In Progress)       Topic: Occupational Therapy (In Progress)       Point: ADL training (Done)       Description:   Instruct learner(s) on proper safety adaptation and remediation techniques during self care or transfers.   Instruct in proper use of assistive devices.                  Learning Progress Summary             Patient Acceptance, E,TB, VU by MS at 8/16/2024 1316                         Point: Home exercise program (Not Started)       Description:   Instruct learner(s) on appropriate technique for monitoring, assisting and/or progressing therapeutic exercises/activities.                  Learner Progress:  Not documented in this visit.               Point: Precautions (Done)       Description:   Instruct learner(s) on prescribed precautions during self-care and functional transfers.                  Learning Progress Summary             Patient Acceptance, E,TB, VU by MS at 8/16/2024 1316                         Point: Body mechanics (Done)       Description:   Instruct learner(s) on proper positioning and spine alignment during self-care, functional mobility activities and/or exercises.                  Learning Progress Summary             Patient Acceptance, E,TB, VU by MS at 8/16/2024 1316                                         User Key       Initials Effective Dates Name Provider Type Discipline    MS 06/27/24 -  Ying Saunders, OT Student OT Student OT                  OT Recommendation and Plan  Therapy Frequency (OT): 3 times/wk  Plan of Care Review  Plan of Care Reviewed With: patient  Outcome Evaluation: Pt is a 79 y.o. year old female admitted to [facility] secondary to symptomatic hypotension. Post hospital work up pt dx acute UTI, and CHF. Pmhx significant for atrial fibrillation status post ablation, tachybradycardia syndrome status post pacemaker, TIA, hypertension, obstructive sleep apnea, cardiac catheterization in 2022 with mild obstructive disease, iron deficiency anemia, hypothyroidism, chronic diastolic congestive heart failure, GERD, borderline diabetes chronic kidney disease, hyperlipidemia gout    At baseline, pt is IND for ADLs, bed mobility, and functional mobility with use of hurrycane or rollator when needed. Pt lives in a H with her adult daughter and grandchildren, her daughter cooks all the meals. Daughter often has to leave home to be at work, leaving pt by herself. On this date, pt completed bed mobility with supervision, transfers with CGA with use of hurrycane, completed grooming/toileting with SBA. BP was monitored closely throughout session, BP drop + for orthostatic hypotension refer to vitals section. OT feels pt is  functioning slightly below baseline and with current BP lability and inconsistent availablilty of care pt is unsafe to return home at this time. Pt would benefit from skilled OT while IP at [facility]. Recommending SNF at d/c.     Time Calculation:         Time Calculation- OT       Row Name 08/16/24 1316             Time Calculation- OT    OT Start Time 0935  -LS (r) MS (t) LS (c)      OT Stop Time 1007  -LS (r) MS (t) LS (c)      OT Time Calculation (min) 32 min  -LS (r) MS (t)      OT Received On 08/16/24  -LS (r) MS (t) LS (c)      OT - Next Appointment 08/19/24  -LS (r) MS (t) LS (c)      OT Goal Re-Cert Due Date 08/30/24  -LS (r) MS (t) LS (c)         Untimed Charges    OT Eval/Re-eval Minutes 32  -LS (r) MS (t) LS (c)         Total Minutes    Untimed Charges Total Minutes 32  -LS (r) MS (t)       Total Minutes 32  -LS (r) MS (t)                User Key  (r) = Recorded By, (t) = Taken By, (c) = Cosigned By      Initials Name Provider Type    Bg Hendrickson OT Occupational Therapist    Ying Rodriguez OT Student OT Student                  Therapy Charges for Today       Code Description Service Date Service Provider Modifiers Qty    [id number] HC OT EVAL MOD COMPLEXITY 4 8/16/2024 Ying Saunders OT Student GO 1                 IMANI Ortega  8/16/2024

## 2024-08-16 NOTE — PLAN OF CARE
Goal Outcome Evaluation:  Plan of Care Reviewed With: patient           Outcome Evaluation: Pt is a 79 y.o. year old female admitted to [facility] secondary to symptomatic hypotension. Post hospital work up pt dx acute UTI, and CHF. Pmhx significant for atrial fibrillation status post ablation, tachybradycardia syndrome status post pacemaker, TIA, hypertension, obstructive sleep apnea, cardiac catheterization in 2022 with mild obstructive disease, iron deficiency anemia, hypothyroidism, chronic diastolic congestive heart failure, GERD, borderline diabetes chronic kidney disease, hyperlipidemia gout    At baseline, pt is IND for ADLs, bed mobility, and functional mobility with use of hurrycane or rollator when needed. Pt lives in a H with her adult daughter and grandchildren, her daughter cooks all the meals. Daughter often has to leave home to be at work, leaving pt by herself. On this date, pt completed bed mobility with supervision, transfers with CGA with use of hurrycane, completed grooming/toileting with SBA. BP was monitored closely throughout session, BP drop + for orthostatic hypotension refer to vitals section. OT feels pt is functioning slightly below baseline and with current BP lability and inconsistent availablilty of care pt is unsafe to return home at this time. Pt would benefit from skilled OT while IP at [facility]. Recommending SNF at d/c.      Anticipated Discharge Disposition (OT): skilled nursing facility

## 2024-08-16 NOTE — DISCHARGE SUMMARY
"             [facility] Medicine Services  Discharge Summary    Date of Service: 2024  Patient Name: Faby Le  : 1945  MRN: [id number]    Date of Admission: 2024  Discharge Diagnosis: Orthostatic hypotension  Date of Discharge:  2024  Primary Care Physician: Leonor Adam MD      Presenting Problem:   Orthostatic hypotension [I95.1]  Symptomatic hypotension [I95.9]    Active and Resolved Hospital Problems:  Active Hospital Problems    Diagnosis POA    **Symptomatic hypotension [I95.9] Yes    Orthostatic hypotension [I95.1] Yes      Resolved Hospital Problems   No resolved problems to display.         Hospital Course     HPI:  Per the H&P written by CAROL Merlos, dated :  \"Faby Le is a 79 y.o. female with PMH atrial fibrillation status post ablation, tachybradycardia syndrome status post pacemaker, TIA, hypertension, obstructive sleep apnea, cardiac catheterization in  with mild obstructive disease, iron deficiency anemia, hypothyroidism,, chronic diastolic congestive heart failure, GERD, borderline diabetes chronic kidney disease, hyperlipidemia gout scented to [facility] on 2024 with complaint of dizziness and feeling like going to pass out     Known from previous admission from 2024 through 6/15/2024 where she was admitted with similar complaint of dizziness and orthostatic hypotension.  He had presented with A-fib with RVR and seen by cardiology and completed 6 doses of Tikosyn protocol.  He was treated for acute on chronic diastolic congestive heart failure and diuresed well with Lasix and transition back to her oral diuretics.  She was discharged back home in stable condition.     She reports she had been doing well up until Friday she had had an appointment with Dr. Herrera and following the appointment had gone shopping at an [employer] store.  She reports while in the store her head suddenly became dizzy and had to grab hold " "of a cabinet.  She reports after drinking some water the episode passed.  On Saturday she had another episode that was very short in duration.  She reports Sunday Monday and Tuesday was only slightly lightheaded but today has had at least 5 \"spells\" of dizziness and feeling like going to pass out.  She states has been taking medication as usual.  Denied any changes in diet.  Denied dysuria abdominal pain, chest pain, but mild shortness of breath.  Reports has had chronic urgency over the past few months.  Only new medication was starting Ozempic 2 weeks ago     In the ED she was positive for orthostatic hypotension.  She was given a liter of IV fluids.  Labs reviewed and high sensitive troponin 13, BNP had improved to 2414.  Patient denies any edema.  Creatinine 1.36 which appears around baseline.  Negative for leukocytosis.  Hemoglobin stable 11.1.  Urinalysis did show moderate leukocytes and 11-20 white blood cells but no bacteria.  Will give a dose of Rocephin as urine culture is pending.  De-escalate antibiotics as appropriate.  Heart rate is normal sinus.  Patient had an echocardiogram June 14 with EF 56 to 60%.  Will hold blood pressure medication and diuretics. consult cardiology to follow\"    Hospital Course:  Faby Le was admitted for management of orthostatic hypotension and suspected UTI.   Orthostatics on admission -> Lying 144/60, Sitting 116/57, Standing 78/39. She was given a bolus of IVFs which did improve her blood pressure but still symptomatic.  She was started on midodrine 5 mg BID and blood pressures improved over all but did still show drop in systolic BP to standing position, but has become less symptomatic. She does still have some dizziness with position changes but not every time she stands up and ambulates.  Educated on orthostatic precautions.  Cardiology was consulted and recommended conservative measure for OH and will follow her in the office.  She was seen by PT/OT who recommended " SNF on discharge due to decreased baseline functioning and OH making her high falls risk. [facility] and will be going there on discharge.    Additionally, she was started on ceftriaxone for suspected UTI.  UC showed no growth and antibiotics were discontinued.     Patient overall feeling better and feels ready to be discharged to facility. At this time, patient is thought to be safe for discharge to a skilled nursing facility with close follow up with her heart doctor.  Recommended that she frequently check BP at home and keep log.    Reasons For Change In Medications and Indications for New Medications:  Midodrine added for OH - would recommend taking morning and midday and avoid taking at night due to elevated supine pressures  D/c spironolactone due to OH    Day of Discharge     Vital Signs:  Temp:  [97.4 °F (36.3 °C)-98.7 °F (37.1 °C)] 98.6 °F (37 °C)  Heart Rate:  [60] 60  Resp:  [12-16] 15  BP: (130-168)/() 142/48    Physical Exam:  Physical Exam  Constitutional:       Appearance: Normal appearance.   HENT:      Head: Normocephalic and atraumatic.      Mouth/Throat:      Mouth: Mucous membranes are moist.   Eyes:      Extraocular Movements: Extraocular movements intact.      Pupils: Pupils are equal, round, and reactive to light.   Cardiovascular:      Rate and Rhythm: Normal rate and regular rhythm.   Pulmonary:      Effort: Pulmonary effort is normal.      Breath sounds: Normal breath sounds.   Abdominal:      General: Abdomen is flat.      Palpations: Abdomen is soft.   Musculoskeletal:         General: Normal range of motion.      Cervical back: Normal range of motion.      Right lower leg: No edema.      Left lower leg: No edema.   Skin:     General: Skin is warm.   Neurological:      General: No focal deficit present.      Mental Status: She is alert and oriented to person, place, and time.            Pertinent  and/or Most Recent Results     LAB RESULTS:      Lab  08/15/24  0429 08/14/24  1407   WBC 6.07 6.14   HEMOGLOBIN 11.4* 11.1*   HEMATOCRIT 35.5 34.5   PLATELETS 180 202   NEUTROS ABS 3.38 3.39   IMMATURE GRANS (ABS) 0.02 0.01   LYMPHS ABS 1.89 2.04   MONOS ABS 0.58 0.57   EOS ABS 0.14 0.08   .2* 101.5*         Lab 08/15/24  0429 08/14/24  1407   SODIUM 141 139   POTASSIUM 4.1 4.4   CHLORIDE 107 105   CO2 23.1 21.8*   ANION GAP 10.9 12.2   BUN 30* 34*   CREATININE 1.24* 1.36*   EGFR 44.9* 40.2*   GLUCOSE 94 96   CALCIUM 9.9 10.0         Lab 08/14/24  1407   TOTAL PROTEIN 6.8   ALBUMIN 4.0   GLOBULIN 2.8   ALT (SGPT) 14   AST (SGOT) 22   BILIRUBIN 0.8   ALK PHOS 129*         Lab 08/14/24  1407   PROBNP 2,414.0*   HSTROP T 13                 Brief Urine Lab Results  (Last result in the past 365 days)        Color   Clarity   Blood   Leuk Est   Nitrite   Protein   CREAT   Urine HCG        08/14/24 1407 Yellow   Clear   Negative   Moderate (2+)   Negative   Negative                 Microbiology Results (last 10 days)       Procedure Component Value - Date/Time    COVID-19, FLU A/B, RSV PCR 1 HR TAT - Swab, Nasopharynx [[id number]]  (Normal) Collected: 08/15/24 0243    Lab Status: Final result Specimen: Swab from Nasopharynx Updated: 08/15/24 0324     COVID19 Not Detected     Influenza A PCR Not Detected     Influenza B PCR Not Detected     RSV, PCR Not Detected    Narrative:      Fact sheet for providers: https://www.fda.gov/media/464910/download    Fact sheet for patients: https://www.fda.gov/media/008111/download    Test performed by PCR.    Urine Culture - Urine, Urine, Clean Catch [[id number]]  (Normal) Collected: 08/14/24 1407    Lab Status: Final result Specimen: Urine, Clean Catch Updated: 08/16/24 1152     Urine Culture No growth                 Results for orders placed during the hospital encounter of 02/15/24    Duplex Carotid Ultrasound CAR    Interpretation Summary    Right internal carotid artery demonstrates a less than 50% stenosis.    Left internal  carotid artery demonstrates a less than 50% stenosis.      Results for orders placed during the hospital encounter of 02/15/24    Duplex Carotid Ultrasound CAR    Interpretation Summary    Right internal carotid artery demonstrates a less than 50% stenosis.    Left internal carotid artery demonstrates a less than 50% stenosis.      Results for orders placed during the hospital encounter of 06/11/24    Adult Transthoracic Echo Complete W/ Cont if Necessary Per Protocol    Interpretation Summary    Left ventricular systolic function is normal. Left ventricular ejection fraction appears to be 56 - 60%.    Left ventricular diastolic function was normal.    The left atrial cavity is moderately dilated.    The right atrial cavity is mildly  dilated.      Labs Pending at Discharge:      Procedures Performed           Consults:   Consults       Date and Time Order Name Status Description    8/14/2024  8:16 PM Inpatient Cardiology Consult                Discharge Details        Discharge Medications        New Medications        Instructions Start Date   midodrine 5 MG tablet  Commonly known as: PROAMATINE   5 mg, Oral, 2 Times Daily Before Meals             Continue These Medications        Instructions Start Date   allopurinol 300 MG tablet  Commonly known as: ZYLOPRIM   300 mg, Oral, Daily      atorvastatin 20 MG tablet  Commonly known as: LIPITOR   20 mg, Oral, Daily      Azelastine HCl 137 MCG/SPRAY solution   2 sprays, Alternating Nares, Daily PRN      cetirizine 10 MG tablet  Commonly known as: zyrTEC   1 tablet, Oral, Daily      cholecalciferol 25 MCG (1000 UT) tablet  Commonly known as: VITAMIN D3   1 tablet, Oral, Daily      cyanocobalamin 1000 MCG tablet  Commonly known as: VITAMIN B-12   1 tablet, Oral, Daily      dofetilide 250 MCG capsule  Commonly known as: Tikosyn   250 mcg, Oral, Every 12 Hours      IRON CR PO   1 tablet, Oral, Daily      levothyroxine 88 MCG tablet  Commonly known as: SYNTHROID,  LEVOTHROID   88 mcg, Oral, Daily      meclizine 25 MG tablet  Commonly known as: ANTIVERT   25 mg, Oral, 2 Times Daily      montelukast 10 MG tablet  Commonly known as: SINGULAIR   10 mg, Oral, Nightly      multivitamin tablet tablet   1 tablet, Oral, Daily      Ozempic (0.25 or 0.5 MG/DOSE) 2 MG/1.5ML solution pen-injector  Generic drug: Semaglutide(0.25 or 0.5MG/DOS)   0.25 mg, Subcutaneous, Weekly, For four weeks      Ozempic (0.25 or 0.5 MG/DOSE) 2 MG/3ML solution pen-injector  Generic drug: Semaglutide(0.25 or 0.5MG/DOS)   0.5 mg, Subcutaneous, Every 7 Days, Continue 4 weeks   Start Date: August 30, 2024     Ozempic (1 MG/DOSE) 4 MG/3ML solution pen-injector  Generic drug: Semaglutide (1 MG/DOSE)   1 mg, Subcutaneous, Every 7 Days, Continue for 4 weeks   Start Date: September 27, 2024     pantoprazole 40 MG EC tablet  Commonly known as: PROTONIX   40 mg, Oral, Every Morning      rivaroxaban 15 MG tablet  Commonly known as: XARELTO   15 mg, Oral, Daily With Dinner             Stop These Medications      spironolactone 25 MG tablet  Commonly known as: ALDACTONE              Allergies   Allergen Reactions    Celecoxib Itching and Swelling     swelling         Discharge Disposition:   Skilled Nursing Facility (DC - External)    Diet:  Hospital:  Diet Order   Procedures    Diet: Cardiac; Healthy Heart (2-3 Na+); Fluid Consistency: Thin (IDDSI 0)         Discharge Activity:   Activity Instructions       Activity as Tolerated                CODE STATUS:  Code Status and Medical Interventions: CPR (Attempt to Resuscitate); Full Support   Ordered at: 08/14/24 1916     Code Status (Patient has no pulse and is not breathing):    CPR (Attempt to Resuscitate)     Medical Interventions (Patient has pulse or is breathing):    Full Support         Future Appointments   Date Time Provider Department Center   10/14/2024 10:45 AM Ratna Zavala MD [facility]   11/8/2024  8:45 AM Cr Herrera MD [facility]  [place]   11/11/2024 10:00 AM Leonor Adam MD [facility]       Additional Instructions for the Follow-ups that You Need to Schedule       Discharge Follow-up with Specified Provider: Your heart doctor; 2 Weeks   As directed      To: Your heart doctor   Follow Up: 2 Weeks                Time spent on Discharge including face to face service:  >30 minutes    Signature: Electronically signed by Kaitlin Thorpe PA-C, 08/16/24, 19:16 EDT.  [facility] Hospitalist Team

## 2024-08-16 NOTE — CASE MANAGEMENT/SOCIAL WORK
Continued Stay Note  KOFI Galarza     Patient Name: Faby Le  MRN: [id number]  Today's Date: 8/16/2024    Admit Date: 8/14/2024    Plan: Routine home with family.   Discharge Plan       Row Name 08/16/24 1216       Plan    Plan Comments DC barriers: Cardio following. Possible dc today if orthostatics WNL.             Megan Naegele, RN     Office Phone: [phone]  Office Cell: [phone]

## 2024-08-16 NOTE — CASE MANAGEMENT/SOCIAL WORK
Continued Stay Note  KOFI Galarza     Patient Name: Faby Le  MRN: [id number]  Today's Date: 8/16/2024    Admit Date: 8/14/2024    Plan: [facility] - accepted. PASRR approved and precert approved 8/16-8/20.   Discharge Plan       Row Name 08/16/24 1456       Plan    Plan [facility] - accepted. PASRR approved and precert approved 8/16-8/20.    Patient/Family in Agreement with Plan yes    Plan Comments PT recommending SNF. CM met with patient and daughter at bedside and delivered list of facilities. Patient requested [facility]. CM send basket referral and notified liaison. [facility] accepted. PASRR approved per Devan MURDOCK and precert approved 8/16-8/20.  Pharmacy updated to [facility]. CM updated hospitalist PA and nursing.             Mehnaz Summers, RN     Office: [phone]  Fax: [phone]

## 2024-08-19 NOTE — CASE MANAGEMENT/SOCIAL WORK
Case Management Discharge Note      Final Note: Keyla Pathak, [place]    Provided Post Acute Provider List?: N/A  Provided Post Acute Provider Quality & Resource List?: N/A    Selected Continued Care - Discharged on 8/16/2024 Admission date: 8/14/2024 - Discharge disposition: Skilled Nursing Facility (DC - External)      Destination Coordination complete.      Service Provider Selected Services Address Phone Fax Patient Preferred    [facility] [address] [place] [address] [phone]              Transportation Services  Private: Car    Final Discharge Disposition Code: 03 - skilled nursing facility (SNF)

## 2024-09-10 NOTE — PATIENT INSTRUCTIONS
Health Maintenance Due   Topic Date Due    COVID-19 Vaccine (4 - 2023-24 season) 09/01/2024    INFLUENZA VACCINE  08/01/2024    Patient to call with meds she is taking    Middle October please get Flu high dose and Covid vaccination.  Consider regular strength Tylenol -two tablets 3-4  times daily for arthritis pain

## 2024-09-12 ENCOUNTER — OFFICE VISIT (OUTPATIENT)
Dept: FAMILY MEDICINE CLINIC | Facility: CLINIC | Age: 79
End: 2024-09-12
Payer: MEDICARE

## 2024-09-12 VITALS
HEART RATE: 63 BPM | DIASTOLIC BLOOD PRESSURE: 72 MMHG | WEIGHT: 263.2 LBS | SYSTOLIC BLOOD PRESSURE: 110 MMHG | BODY MASS INDEX: 46.64 KG/M2 | HEIGHT: 63 IN | TEMPERATURE: 97.6 F | OXYGEN SATURATION: 97 %

## 2024-09-12 DIAGNOSIS — I95.1 ORTHOSTATIC HYPOTENSION: Primary | ICD-10-CM

## 2024-09-12 DIAGNOSIS — Z95.0 PRESENCE OF CARDIAC PACEMAKER: ICD-10-CM

## 2024-09-12 DIAGNOSIS — R42 DIZZINESS: ICD-10-CM

## 2024-09-12 DIAGNOSIS — E66.1 CLASS 3 DRUG-INDUCED OBESITY WITH SERIOUS COMORBIDITY AND BODY MASS INDEX (BMI) OF 45.0 TO 49.9 IN ADULT: ICD-10-CM

## 2024-09-12 DIAGNOSIS — I50.22 CHRONIC HFREF (HEART FAILURE WITH REDUCED EJECTION FRACTION): ICD-10-CM

## 2024-09-12 DIAGNOSIS — I10 PRIMARY HYPERTENSION: ICD-10-CM

## 2024-09-12 PROCEDURE — 3074F SYST BP LT 130 MM HG: CPT | Performed by: PREVENTIVE MEDICINE

## 2024-09-12 PROCEDURE — 1126F AMNT PAIN NOTED NONE PRSNT: CPT | Performed by: PREVENTIVE MEDICINE

## 2024-09-12 PROCEDURE — 1160F RVW MEDS BY RX/DR IN RCRD: CPT | Performed by: PREVENTIVE MEDICINE

## 2024-09-12 PROCEDURE — 99214 OFFICE O/P EST MOD 30 MIN: CPT | Performed by: PREVENTIVE MEDICINE

## 2024-09-12 PROCEDURE — G2211 COMPLEX E/M VISIT ADD ON: HCPCS | Performed by: PREVENTIVE MEDICINE

## 2024-09-12 PROCEDURE — 3078F DIAST BP <80 MM HG: CPT | Performed by: PREVENTIVE MEDICINE

## 2024-09-12 PROCEDURE — 1159F MED LIST DOCD IN RCRD: CPT | Performed by: PREVENTIVE MEDICINE

## 2024-09-12 RX ORDER — METOPROLOL SUCCINATE 25 MG/1
TABLET, EXTENDED RELEASE ORAL
COMMUNITY

## 2024-09-12 RX ORDER — MUPIROCIN 20 MG/G
OINTMENT TOPICAL
COMMUNITY
Start: 2024-08-22

## 2024-09-12 RX ORDER — SPIRONOLACTONE 25 MG/1
TABLET ORAL
COMMUNITY

## 2024-09-12 RX ORDER — ESCITALOPRAM OXALATE 20 MG/1
TABLET ORAL
COMMUNITY

## 2024-09-12 RX ORDER — HYDROCORTISONE 25 MG/G
OINTMENT TOPICAL
COMMUNITY
Start: 2024-08-22

## 2024-09-12 RX ORDER — TRIAMCINOLONE ACETONIDE 1 MG/G
CREAM TOPICAL
COMMUNITY
Start: 2024-08-22

## 2024-09-12 NOTE — PROGRESS NOTES
Subjective   Faby Le is a 79 y.o. female presents for   Chief Complaint   Patient presents with    Transitional Care Management     Hypotension [facility]  Patient brought her BP cuff with her today to compare       Health Maintenance Due   Topic Date Due    COVID-19 Vaccine (4 - 2023-24 season) 09/01/2024    INFLUENZA VACCINE  08/01/2024       History of Present Illness   History of Present Illness  The patient is a 79-year-old female who is here today to follow up on orthostatic hypotension, chronic heart failure with reduced ejection fraction, presence of a cardiac pacemaker, hypertension, dizziness, class 3 severe obesity due to excess calories and serious comorbidities, and a body mass index of 45, as well as a urinary tract infection.    She was previously hospitalized due to dizziness and was advised to attend a rehabilitation center. She spent over a week in rehab but left early due to a COVID-19 outbreak. She has been monitoring her blood pressure at home and reports feeling well, with no recent episodes of dizziness. She has been cautious when standing up to prevent dizziness. She recalls experiencing dizziness after taking Ozempic during a visit to an ENT shop with her daughter. She also experienced dizziness every time she stood up from Friday to Wednesday of that week. She has discontinued the use of Ozempic, suspecting it may have caused her dizziness. She was prescribed meclizine during her hospital stay, which she believes has helped with her dizziness. She reports no chest pressure or heart palpitations.    She has an upcoming appointment with her cardiologist, Dr. Gomez, on 10/14/2024. She is currently taking spironolactone 25 mg and had blood work done last week for her nephrologist. She has an appointment with her nephrologist next Monday.    Her weight has remained stable. She also reports pain in her left shoulder and right hip, which limits her ability to walk long  "distances.    IMMUNIZATIONS  She had pneumonia vaccine last year.    Vitals:    09/12/24 1026 09/12/24 1031 09/12/24 1032   BP: 111/70 121/73 110/72   BP Location: Right arm Left arm Left arm   Patient Position: Sitting Sitting Sitting   Cuff Size: Adult Adult Adult   Pulse: 64 63 63   Temp: 97.6 °F (36.4 °C)     TempSrc: Temporal     SpO2: 97%     Weight: 119 kg (263 lb 3.2 oz)     Height: 160 cm (63\")       Body mass index is 46.62 kg/m².    Current Outpatient Medications on File Prior to Visit   Medication Sig Dispense Refill    allopurinol (ZYLOPRIM) 300 MG tablet Take 1 tablet by mouth once daily 90 tablet 0    atorvastatin (LIPITOR) 20 MG tablet Take 1 tablet by mouth Daily. 30 tablet 11    Azelastine HCl 137 MCG/SPRAY solution 2 sprays by Alternating Nares route Daily As Needed.      cetirizine (zyrTEC) 10 MG tablet Take 1 tablet by mouth Daily.      cyanocobalamin (VITAMIN B-12) 1000 MCG tablet Take 1 tablet by mouth Daily.      dofetilide (Tikosyn) 250 MCG capsule Take 1 capsule by mouth Every 12 (Twelve) Hours. 60 capsule 3    escitalopram (LEXAPRO) 20 MG tablet TAKE 1 TABLET BY MOUTH ONCE DAILY Oral for 90 Days      hydrocortisone 2.5 % ointment APPLY THIN LAYER OF OINTMENT EXTERNALLY TO AFFECTED AREA TWICE DAILY. SAFE TO USE ON FACE      IRON CR PO Take 1 tablet by mouth Daily.      levothyroxine (SYNTHROID, LEVOTHROID) 88 MCG tablet Take 1 tablet by mouth Daily. 90 tablet 0    meclizine (ANTIVERT) 25 MG tablet Take 1 tablet by mouth 2 (Two) Times a Day.      metoprolol succinate XL (TOPROL-XL) 25 MG 24 hr tablet TAKE 1 TABLET BY MOUTH EVERY DAY AT NIGHT Oral for 90 Days      midodrine (PROAMATINE) 5 MG tablet Take 1 tablet by mouth 2 (Two) Times a Day Before Meals.      montelukast (SINGULAIR) 10 MG tablet Take 1 tablet by mouth Every Night. 90 tablet 0    multivitamin (MULTI VITAMIN PO) Take 1 tablet by mouth Daily.      mupirocin (BACTROBAN) 2 % ointment APPLY SMALL AMOUNT OF OINTMENT TOPICALLY TO " AFFECTED AREA TWICE DAILY      pantoprazole (PROTONIX) 40 MG EC tablet Take 1 tablet by mouth Every Morning.      rivaroxaban (XARELTO) 15 MG tablet Take 1 tablet by mouth Daily With Dinner. Indications: Atrial Fibrillation 30 tablet 2    spironolactone (ALDACTONE) 25 MG tablet Oral for 90 Days      triamcinolone (KENALOG) 0.1 % cream APPLY CREAM EXTERNALLY TWICE DAILY TO ECZEMA FOR UP TO 2 WEEKS PER MONTH AS NEEDED      Vitamin D, Cholecalciferol, 25 MCG (1000 UT) tablet Take 1 tablet by mouth Daily.      [DISCONTINUED] Semaglutide, 1 MG/DOSE, (Ozempic, 1 MG/DOSE,) 4 MG/3ML solution pen-injector Inject 1 mg under the skin into the appropriate area as directed Every 7 (Seven) Days for 4 doses. Continue for 4 weeks (Patient not taking: Reported on 9/12/2024) 3 mL 0    [DISCONTINUED] Semaglutide,0.25 or 0.5MG/DOS, (Ozempic, 0.25 or 0.5 MG/DOSE,) 2 MG/1.5ML solution pen-injector Inject 0.25 mg under the skin into the appropriate area as directed 1 (One) Time Per Week. For four weeks (Patient not taking: Reported on 9/12/2024) 1.5 mL 0    [DISCONTINUED] Semaglutide,0.25 or 0.5MG/DOS, (Ozempic, 0.25 or 0.5 MG/DOSE,) 2 MG/3ML solution pen-injector Inject 0.5 mg under the skin into the appropriate area as directed Every 7 (Seven) Days for 4 doses. Continue 4 weeks (Patient not taking: Reported on 9/12/2024) 3 mL 0     No current facility-administered medications on file prior to visit.       The following portions of the patient's history were reviewed and updated as appropriate: allergies, current medications, past family history, past medical history, past social history, past surgical history, and problem list.    Review of Systems   Respiratory:  Negative for shortness of breath.    Cardiovascular:  Negative for chest pain and palpitations.   Genitourinary:  Negative for dysuria, flank pain and hematuria.   Neurological:  Positive for dizziness and light-headedness.       Objective   Physical Exam  Vitals reviewed.    Constitutional:       General: She is not in acute distress.     Appearance: She is well-developed. She is obese. She is not ill-appearing or toxic-appearing.   HENT:      Head: Normocephalic and atraumatic.      Right Ear: Tympanic membrane, ear canal and external ear normal.      Left Ear: Tympanic membrane, ear canal and external ear normal.      Nose: Nose normal.      Mouth/Throat:      Mouth: Mucous membranes are moist.      Pharynx: No posterior oropharyngeal erythema.   Eyes:      Extraocular Movements: Extraocular movements intact.      Conjunctiva/sclera: Conjunctivae normal.      Pupils: Pupils are equal, round, and reactive to light.   Neck:      Vascular: No carotid bruit.   Cardiovascular:      Rate and Rhythm: Normal rate and regular rhythm.      Heart sounds: Normal heart sounds.   Pulmonary:      Effort: Pulmonary effort is normal.      Breath sounds: Normal breath sounds.   Abdominal:      General: Bowel sounds are normal. There is no distension.      Palpations: Abdomen is soft. There is no mass.      Tenderness: There is no abdominal tenderness.   Musculoskeletal:      Cervical back: Neck supple. No tenderness.   Lymphadenopathy:      Cervical: No cervical adenopathy.   Skin:     General: Skin is warm.      Findings: No rash.   Neurological:      General: No focal deficit present.      Mental Status: She is alert and oriented to person, place, and time.   Psychiatric:         Mood and Affect: Mood normal.         Behavior: Behavior normal.       Physical Exam  Heart rate and rhythm are normal.  Minimal swelling noted in the legs.    PHQ-9 Total Score:    Results  Laboratory Studies  Patient is still anemic. Kidney function is decreased. Calcium and phosphorus levels are okay.         Assessment & Plan   Diagnoses and all orders for this visit:    1. Orthostatic hypotension (Primary)    2. Class 3 drug-induced obesity with serious comorbidity and body mass index (BMI) of 45.0 to 49.9 in  adult    3. Chronic HFrEF (heart failure with reduced ejection fraction)    4. Presence of cardiac pacemaker    5. Primary hypertension    6. Dizziness      Assessment & Plan  1. Orthostatic Hypotension.  She reports no dizziness currently and has been taking midodrine 5 mg twice daily before meals, which has helped manage her low blood pressure. She is advised to continue this medication. A urine sample will be collected today to ensure the urinary tract infection has resolved. If the urine test indicates an infection, an antibiotic will be initiated prior to her nephrology appointment.    2. Chronic Heart Failure with Reduced Ejection Fraction.  She will continue to follow up with her cardiologist, Dr. Gomez, on October 14, 2024. Her current medications and management plan will be reviewed at that time.    3. Cardiac Pacemaker.  She will continue to follow up with her cardiologist to monitor the function of her pacemaker.    4. Hypertension.  Her blood pressure readings are stable. She will continue her current antihypertensive medications.    5. Dizziness.  She reports no current dizziness. Meclizine has been effective in managing her symptoms. She will continue to monitor her symptoms and report any changes.    6. Severe Obesity.  Ozempic will be discontinued for 6 months due to concerns about dizziness. Weight loss is still encouraged through lifestyle modifications. Reassessment of Ozempic will be considered in the spring if she remains symptom-free.    7. Urinary Tract Infection.  A urine sample will be collected today to ensure the infection has resolved. If the urine test indicates an infection, an antibiotic will be initiated prior to her nephrology appointment.    8. Anemia.  She remains anemic. Her kidney function is decreased, but calcium and phosphorus levels are normal. Further management will be discussed with her nephrologist.    9. Arthritis.  For arthritis pain, she can take two regular strength  Tylenol tablets three to four times daily. She is advised to avoid anti-inflammatory medications due to her kidney function.    10. Medication Management.  She is advised to call with her current medication list to ensure accuracy. Spironolactone 25 mg, a water pill, is still being taken despite hospital instructions to stop it. This will be clarified upon receiving her medication list.    11. Health Maintenance.  She is advised to receive the influenza and COVID-19 vaccines in October 2024. She has already received the pneumonia and RSV vaccines.        Patient Instructions     Health Maintenance Due   Topic Date Due    COVID-19 Vaccine (4 - 2023-24 season) 09/01/2024    INFLUENZA VACCINE  08/01/2024    Patient to call with meds she is taking    Middle October please get Flu high dose and Covid vaccination.  Consider regular strength Tylenol -two tablets 3-4  times daily for arthritis pain         Patient or patient representative verbalized consent for the use of Ambient Listening during the visit with  Leonor Adam MD for chart documentation. 9/12/2024  16:08 EDT

## 2024-09-13 ENCOUNTER — CLINICAL SUPPORT (OUTPATIENT)
Dept: FAMILY MEDICINE CLINIC | Facility: CLINIC | Age: 79
End: 2024-09-13
Payer: MEDICARE

## 2024-09-13 DIAGNOSIS — R30.0 DYSURIA: Primary | ICD-10-CM

## 2024-09-13 LAB
BILIRUB BLD-MCNC: NEGATIVE MG/DL
CLARITY, POC: CLEAR
COLOR UR: YELLOW
EXPIRATION DATE: ABNORMAL
GLUCOSE UR STRIP-MCNC: NEGATIVE MG/DL
KETONES UR QL: NEGATIVE
LEUKOCYTE EST, POC: ABNORMAL
Lab: ABNORMAL
NITRITE UR-MCNC: NEGATIVE MG/ML
PH UR: 5.5 [PH] (ref 5–8)
PROT UR STRIP-MCNC: NEGATIVE MG/DL
RBC # UR STRIP: NEGATIVE /UL
SP GR UR: 1.01 (ref 1–1.03)
UROBILINOGEN UR QL: ABNORMAL

## 2024-09-13 PROCEDURE — 87086 URINE CULTURE/COLONY COUNT: CPT | Performed by: PREVENTIVE MEDICINE

## 2024-09-13 PROCEDURE — 81003 URINALYSIS AUTO W/O SCOPE: CPT | Performed by: PREVENTIVE MEDICINE

## 2024-09-15 LAB — BACTERIA SPEC AEROBE CULT: NO GROWTH

## 2024-09-18 RX ORDER — MIDODRINE HYDROCHLORIDE 5 MG/1
5 TABLET ORAL
Qty: 90 TABLET | Refills: 0 | Status: SHIPPED | OUTPATIENT
Start: 2024-09-18

## 2024-09-23 ENCOUNTER — TELEPHONE (OUTPATIENT)
Dept: FAMILY MEDICINE CLINIC | Facility: CLINIC | Age: 79
End: 2024-09-23
Payer: MEDICARE

## 2024-09-28 ENCOUNTER — HOSPITAL ENCOUNTER (INPATIENT)
Facility: HOSPITAL | Age: 79
LOS: 1 days | Discharge: HOME | DRG: 309 | End: 2024-09-29
Attending: EMERGENCY MEDICINE | Admitting: INTERNAL MEDICINE
Payer: MEDICARE

## 2024-09-28 ENCOUNTER — APPOINTMENT (OUTPATIENT)
Dept: GENERAL RADIOLOGY | Facility: HOSPITAL | Age: 79
DRG: 309 | End: 2024-09-28
Payer: MEDICARE

## 2024-09-28 DIAGNOSIS — I48.91 ATRIAL FIBRILLATION WITH RAPID VENTRICULAR RESPONSE: Primary | ICD-10-CM

## 2024-09-28 LAB
ALBUMIN SERPL-MCNC: 3.8 G/DL (ref 3.5–5.2)
ALBUMIN/GLOB SERPL: 1.6 G/DL
ALP SERPL-CCNC: 123 U/L (ref 39–117)
ALT SERPL W P-5'-P-CCNC: 20 U/L (ref 1–33)
ANION GAP SERPL CALCULATED.3IONS-SCNC: 8.3 MMOL/L (ref 5–15)
AST SERPL-CCNC: 26 U/L (ref 1–32)
BASOPHILS # BLD AUTO: 0.06 10*3/MM3 (ref 0–0.2)
BASOPHILS NFR BLD AUTO: 1 % (ref 0–1.5)
BILIRUB SERPL-MCNC: 0.4 MG/DL (ref 0–1.2)
BUN SERPL-MCNC: 30 MG/DL (ref 8–23)
BUN/CREAT SERPL: 21.3 (ref 7–25)
CALCIUM SPEC-SCNC: 9.7 MG/DL (ref 8.6–10.5)
CHLORIDE SERPL-SCNC: 108 MMOL/L (ref 98–107)
CO2 SERPL-SCNC: 22.7 MMOL/L (ref 22–29)
CREAT SERPL-MCNC: 1.41 MG/DL (ref 0.57–1)
DEPRECATED RDW RBC AUTO: 54.7 FL (ref 37–54)
EGFRCR SERPLBLD CKD-EPI 2021: 38.5 ML/MIN/1.73
EOSINOPHIL # BLD AUTO: 0.12 10*3/MM3 (ref 0–0.4)
EOSINOPHIL NFR BLD AUTO: 1.9 % (ref 0.3–6.2)
ERYTHROCYTE [DISTWIDTH] IN BLOOD BY AUTOMATED COUNT: 14.6 % (ref 12.3–15.4)
GLOBULIN UR ELPH-MCNC: 2.4 GM/DL
GLUCOSE SERPL-MCNC: 112 MG/DL (ref 65–99)
HCT VFR BLD AUTO: 34.8 % (ref 34–46.6)
HGB BLD-MCNC: 11.3 G/DL (ref 12–15.9)
IMM GRANULOCYTES # BLD AUTO: 0.02 10*3/MM3 (ref 0–0.05)
IMM GRANULOCYTES NFR BLD AUTO: 0.3 % (ref 0–0.5)
LYMPHOCYTES # BLD AUTO: 1.91 10*3/MM3 (ref 0.7–3.1)
LYMPHOCYTES NFR BLD AUTO: 30.6 % (ref 19.6–45.3)
MAGNESIUM SERPL-MCNC: 1.8 MG/DL (ref 1.6–2.4)
MCH RBC QN AUTO: 33.4 PG (ref 26.6–33)
MCHC RBC AUTO-ENTMCNC: 32.5 G/DL (ref 31.5–35.7)
MCV RBC AUTO: 103 FL (ref 79–97)
MONOCYTES # BLD AUTO: 0.51 10*3/MM3 (ref 0.1–0.9)
MONOCYTES NFR BLD AUTO: 8.2 % (ref 5–12)
NEUTROPHILS NFR BLD AUTO: 3.62 10*3/MM3 (ref 1.7–7)
NEUTROPHILS NFR BLD AUTO: 58 % (ref 42.7–76)
NRBC BLD AUTO-RTO: 0 /100 WBC (ref 0–0.2)
PLATELET # BLD AUTO: 178 10*3/MM3 (ref 140–450)
PMV BLD AUTO: 9.7 FL (ref 6–12)
POTASSIUM SERPL-SCNC: 4.6 MMOL/L (ref 3.5–5.2)
PROT SERPL-MCNC: 6.2 G/DL (ref 6–8.5)
RBC # BLD AUTO: 3.38 10*6/MM3 (ref 3.77–5.28)
SODIUM SERPL-SCNC: 139 MMOL/L (ref 136–145)
TSH SERPL DL<=0.05 MIU/L-ACNC: 3.04 UIU/ML (ref 0.27–4.2)
WBC NRBC COR # BLD AUTO: 6.24 10*3/MM3 (ref 3.4–10.8)
WHOLE BLOOD HOLD COAG: NORMAL

## 2024-09-28 PROCEDURE — 99285 EMERGENCY DEPT VISIT HI MDM: CPT

## 2024-09-28 PROCEDURE — 25810000003 LACTATED RINGERS SOLUTION: Performed by: EMERGENCY MEDICINE

## 2024-09-28 PROCEDURE — 83735 ASSAY OF MAGNESIUM: CPT | Performed by: EMERGENCY MEDICINE

## 2024-09-28 PROCEDURE — 84443 ASSAY THYROID STIM HORMONE: CPT

## 2024-09-28 PROCEDURE — 93005 ELECTROCARDIOGRAM TRACING: CPT

## 2024-09-28 PROCEDURE — 99291 CRITICAL CARE FIRST HOUR: CPT

## 2024-09-28 PROCEDURE — 71045 X-RAY EXAM CHEST 1 VIEW: CPT

## 2024-09-28 PROCEDURE — 99222 1ST HOSP IP/OBS MODERATE 55: CPT | Performed by: INTERNAL MEDICINE

## 2024-09-28 PROCEDURE — 80053 COMPREHEN METABOLIC PANEL: CPT | Performed by: EMERGENCY MEDICINE

## 2024-09-28 PROCEDURE — 93005 ELECTROCARDIOGRAM TRACING: CPT | Performed by: EMERGENCY MEDICINE

## 2024-09-28 PROCEDURE — 85025 COMPLETE CBC W/AUTO DIFF WBC: CPT | Performed by: EMERGENCY MEDICINE

## 2024-09-28 RX ORDER — AMOXICILLIN 250 MG
2 CAPSULE ORAL 2 TIMES DAILY PRN
Status: DISCONTINUED | OUTPATIENT
Start: 2024-09-28 | End: 2024-09-29 | Stop reason: HOSPADM

## 2024-09-28 RX ORDER — ONDANSETRON 4 MG/1
4 TABLET, ORALLY DISINTEGRATING ORAL EVERY 6 HOURS PRN
Status: DISCONTINUED | OUTPATIENT
Start: 2024-09-28 | End: 2024-09-29 | Stop reason: HOSPADM

## 2024-09-28 RX ORDER — DILTIAZEM HYDROCHLORIDE 5 MG/ML
10 INJECTION INTRAVENOUS ONCE
Status: COMPLETED | OUTPATIENT
Start: 2024-09-28 | End: 2024-09-28

## 2024-09-28 RX ORDER — BISACODYL 5 MG/1
5 TABLET, DELAYED RELEASE ORAL DAILY PRN
Status: DISCONTINUED | OUTPATIENT
Start: 2024-09-28 | End: 2024-09-29 | Stop reason: HOSPADM

## 2024-09-28 RX ORDER — ACETAMINOPHEN 650 MG/1
650 SUPPOSITORY RECTAL EVERY 4 HOURS PRN
Status: DISCONTINUED | OUTPATIENT
Start: 2024-09-28 | End: 2024-09-29 | Stop reason: HOSPADM

## 2024-09-28 RX ORDER — NITROGLYCERIN 0.4 MG/1
0.4 TABLET SUBLINGUAL
Status: DISCONTINUED | OUTPATIENT
Start: 2024-09-28 | End: 2024-09-29 | Stop reason: HOSPADM

## 2024-09-28 RX ORDER — POLYETHYLENE GLYCOL 3350 17 G/17G
17 POWDER, FOR SOLUTION ORAL DAILY PRN
Status: DISCONTINUED | OUTPATIENT
Start: 2024-09-28 | End: 2024-09-29 | Stop reason: HOSPADM

## 2024-09-28 RX ORDER — ACETAMINOPHEN 325 MG/1
650 TABLET ORAL EVERY 4 HOURS PRN
Status: DISCONTINUED | OUTPATIENT
Start: 2024-09-28 | End: 2024-09-29 | Stop reason: HOSPADM

## 2024-09-28 RX ORDER — ONDANSETRON 2 MG/ML
4 INJECTION INTRAMUSCULAR; INTRAVENOUS EVERY 6 HOURS PRN
Status: DISCONTINUED | OUTPATIENT
Start: 2024-09-28 | End: 2024-09-29 | Stop reason: HOSPADM

## 2024-09-28 RX ORDER — ACETAMINOPHEN 160 MG/5ML
650 SOLUTION ORAL EVERY 4 HOURS PRN
Status: DISCONTINUED | OUTPATIENT
Start: 2024-09-28 | End: 2024-09-29 | Stop reason: HOSPADM

## 2024-09-28 RX ORDER — DILTIAZEM HCL/D5W 125 MG/125
5-15 PLASTIC BAG, INJECTION (ML) INTRAVENOUS
Status: DISCONTINUED | OUTPATIENT
Start: 2024-09-28 | End: 2024-09-29

## 2024-09-28 RX ORDER — BISACODYL 10 MG
10 SUPPOSITORY, RECTAL RECTAL DAILY PRN
Status: DISCONTINUED | OUTPATIENT
Start: 2024-09-28 | End: 2024-09-29 | Stop reason: HOSPADM

## 2024-09-28 RX ORDER — SODIUM CHLORIDE 0.9 % (FLUSH) 0.9 %
10 SYRINGE (ML) INJECTION AS NEEDED
Status: DISCONTINUED | OUTPATIENT
Start: 2024-09-28 | End: 2024-09-29 | Stop reason: HOSPADM

## 2024-09-28 RX ADMIN — SODIUM CHLORIDE, POTASSIUM CHLORIDE, SODIUM LACTATE AND CALCIUM CHLORIDE 500 ML: 600; 310; 30; 20 INJECTION, SOLUTION INTRAVENOUS at 15:10

## 2024-09-28 RX ADMIN — Medication 5 MG/HR: at 14:28

## 2024-09-28 RX ADMIN — DILTIAZEM HYDROCHLORIDE 10 MG: 5 INJECTION, SOLUTION INTRAVENOUS at 14:31

## 2024-09-28 NOTE — CONSULTS
Referring Provider: Dr. Eli Malhotra  Reason for Consultation: Atrial fibrillation    Chief complaint palpitations      Cardiology assessment and plan    Atrial fibrillation with rapid ventricular response  Paroxysmal atrial fibrillation  Chronic diastolic heart failure  Hypertension  Chronic kidney disease  History of sick sinus syndrome status post pacemaker placement  History of atrial fibrillation status post ablation therapy  Status post a flutter ablation  Prior history of TIA on chronic anticoagulation therapy  Chronic diastolic heart failure  History of hypertension  History of hypothyroidism  Chronic kidney disease  Morbid obesity  Chronic anemia  Severe left atrial enlargement  Dual-chamber pacemaker in situ  History of TIA in the past  Normal LV systolic function  cardiac catheterization in 2022 with a mild obstructive disease involving the LAD and a moderate obstructive disease involving the ostium of the diagonal branch with normal LV systolic function     Denies any new complaints  Clinically feels better  Patient is currently on IV Cardizem with a controlled ventricular response  Tmax is 98.2 pulse is 75-1 30 respirations of 16 blood pressure is 146/58 sats are 98%  Sodium is 139 potassium is 4.6 creatinine is 1.4 LFTs are normal TSH is normal at 3.0 hemoglobin is 11.3  Twelve-lead EKG with atrial fibrillation with rapid lower response with ventricular rate of 123 bpm  Continue anticoagulation therapy with Xarelto  Further recommendation based on patient course                  History of present illness:  Faby Le is a 79 y.o. female who presents with past medical history that is significant for history of prior atrial fibrillation hypertension hyperlipidemia presented to the emergency room with a complaints of dizziness shortness of breath palpitations and patient noted to be in atrial fibrillation with rapid ventricular response patient was started on IV Cardizem and patient was  hospitalized    Denies any symptoms of chest pain  No syncope  No orthopnea no PND  No dizziness  No GI symptoms    Review of Systems  Review of Systems   Constitutional: Negative for chills, decreased appetite and malaise/fatigue.   HENT:  Negative for congestion and nosebleeds.    Eyes:  Negative for blurred vision and double vision.   Cardiovascular:  Positive for dyspnea on exertion, irregular heartbeat, orthopnea and palpitations. Negative for chest pain and claudication.   Respiratory:  Negative for cough and shortness of breath.    Hematologic/Lymphatic: Negative for adenopathy. Does not bruise/bleed easily.   Skin:  Negative for color change and rash.   Musculoskeletal:  Negative for back pain and joint pain.   Gastrointestinal:  Negative for bloating, abdominal pain, hematemesis and hematochezia.   Genitourinary:  Negative for flank pain and hematuria.   Neurological:  Negative for dizziness and focal weakness.       Past Medical History  Past Medical History:   Diagnosis Date    Ankle pain     Arthritis     Coronary artery disease     Depression     Gout     Hyperglycemia     Hyperlipidemia     Hypertension     Low back pain     Sleep apnea     Vitamin D deficiency     and   Past Surgical History:   Procedure Laterality Date    ANKLE FUSION      2017-left    CARDIAC CATHETERIZATION      CARDIAC CATHETERIZATION Right 8/29/2022    Procedure: Left Heart Cath;  Surgeon: Ratna Zavala MD;  Location: [facility] INVASIVE LOCATION;  Service: Cardiovascular;  Laterality: Right;    CARDIAC ELECTROPHYSIOLOGY PROCEDURE Left 7/3/2023    Procedure: Pacemaker DC new [place] Notified;  Surgeon: Cr Herrera MD;  Location: [facility] INVASIVE LOCATION;  Service: Cardiovascular;  Laterality: Left;    CARDIAC ELECTROPHYSIOLOGY PROCEDURE Right 3/5/2024    Procedure: Ablation atrial fibrillation, Cryo Vickie Costa notified 02/09/24;  Surgeon: Cr Herrera MD;  Location: [facility]  INVASIVE LOCATION;  Service: Cardiovascular;  Laterality: Right;    CARDIAC ELECTROPHYSIOLOGY PROCEDURE N/A 3/5/2024    Procedure: Cardioversion;  Surgeon: Cr Herrera MD;  Location: [facility] CATH INVASIVE LOCATION;  Service: Cardiovascular;  Laterality: N/A;    CARPAL TUNNEL RELEASE      CATARACT EXTRACTION      CUBITAL TUNNEL RELEASE      ENDOSCOPY N/A 5/28/2024    Procedure: ESOPHAGOGASTRODUODENOSCOPY WITH COLD FORCEP BIOPSY X1 AREA;  Surgeon: Josh Loomis MD;  Location: [facility] ENDOSCOPY;  Service: Gastroenterology;  Laterality: N/A;  Post- ESOPHAGITIS, GASTRITIS, HIATAL HERNIA    HYSTERECTOMY      REPLACEMENT TOTAL KNEE      left 2008    ROTATOR CUFF REPAIR      TOTAL KNEE ARTHROPLASTY Right 5/4/2023    Procedure: TOTAL KNEE ARTHROPLASTY WITH CORI ROBOT;  Surgeon: Chino Herrera II, MD;  Location: [facility] MAIN OR;  Service: Robotics - Ortho;  Laterality: Right;       Family History  Family History   Problem Relation Age of Onset    Hypertension Mother     Stroke Father     Arthritis Brother     Cancer Brother        Social History  Social History     Socioeconomic History    Marital status:    Tobacco Use    Smoking status: Never     Passive exposure: Never    Smokeless tobacco: Never   Vaping Use    Vaping status: Never Used   Substance and Sexual Activity    Alcohol use: No    Drug use: No    Sexual activity: Not Currently       Objective     Physical Exam:  Constitutional:       Appearance: Well-developed.   Eyes:      Conjunctiva/sclera: Conjunctivae normal.      Pupils: Pupils are equal, round, and reactive to light.   HENT:      Head: Normocephalic and atraumatic.   Neck:      Thyroid: No thyromegaly.   Pulmonary:      Effort: Pulmonary effort is normal.      Breath sounds: Examination of the right-lower field reveals decreased breath sounds. Examination of the left-lower field reveals decreased breath sounds. Decreased breath sounds present.   Cardiovascular:       "Abnormal PMI. Tachycardia present. Regularly irregular rhythm.   Pulses:     Intact distal pulses.   Edema:     Peripheral edema absent.   Abdominal:      General: Bowel sounds are normal.      Palpations: Abdomen is soft.   Musculoskeletal:      Cervical back: Normal range of motion and neck supple. Skin:     General: Skin is warm.   Neurological:      Mental Status: Alert and oriented to person, place, and time.         Vital Signs  Vitals:    09/28/24 1432 09/28/24 1436 09/28/24 1438 09/28/24 1502   BP: 140/76   146/58   BP Location:       Patient Position:       Pulse: 107 76 75 76   Resp:    16   Temp:       TempSrc:       SpO2: 97% 98% 97% 98%   Weight:       Height:           Weight  Flowsheet Rows      Flowsheet Row First Filed Value   Admission Height 160 cm (63\") Documented at 09/28/2024 1349   Admission Weight 120 kg (263 lb 10.7 oz) Documented at 09/28/2024 1349                Results Review:  Lab Results (last 24 hours)       Procedure Component Value Units Date/Time    TSH Rfx On Abnormal To Free T4 [[id number]]  (Normal) Collected: 09/28/24 1420    Specimen: Blood from Arm, Left Updated: 09/28/24 1552     TSH 3.040 uIU/mL     Comprehensive Metabolic Panel [[id number]]  (Abnormal) Collected: 09/28/24 1420    Specimen: Blood from Arm, Left Updated: 09/28/24 1453     Glucose 112 mg/dL      BUN 30 mg/dL      Creatinine 1.41 mg/dL      Sodium 139 mmol/L      Potassium 4.6 mmol/L      Comment: Slight hemolysis detected by analyzer. Result may be falsely elevated.        Chloride 108 mmol/L      CO2 22.7 mmol/L      Calcium 9.7 mg/dL      Total Protein 6.2 g/dL      Albumin 3.8 g/dL      ALT (SGPT) 20 U/L      AST (SGOT) 26 U/L      Alkaline Phosphatase 123 U/L      Total Bilirubin 0.4 mg/dL      Globulin 2.4 gm/dL      A/G Ratio 1.6 g/dL      BUN/Creatinine Ratio 21.3     Anion Gap 8.3 mmol/L      eGFR 38.5 mL/min/1.73     Narrative:      GFR Normal >60  Chronic Kidney Disease <60  Kidney Failure <15    The " GFR formula is only valid for adults with stable renal function between ages 18 and 70.    Magnesium [[id number]]  (Normal) Collected: 09/28/24 1420    Specimen: Blood from Arm, Left Updated: 09/28/24 1453     Magnesium 1.8 mg/dL     CBC & Differential [[id number]]  (Abnormal) Collected: 09/28/24 1420    Specimen: Blood from Arm, Left Updated: 09/28/24 1431    Narrative:      The following orders were created for panel order CBC & Differential.  Procedure                               Abnormality         Status                     ---------                               -----------         ------                     CBC Auto Differential[[id number]]        Abnormal            Final result                 Please view results for these tests on the individual orders.    CBC Auto Differential [[id number]]  (Abnormal) Collected: 09/28/24 1420    Specimen: Blood from Arm, Left Updated: 09/28/24 1431     WBC 6.24 10*3/mm3      RBC 3.38 10*6/mm3      Hemoglobin 11.3 g/dL      Hematocrit 34.8 %      .0 fL      MCH 33.4 pg      MCHC 32.5 g/dL      RDW 14.6 %      RDW-SD 54.7 fl      MPV 9.7 fL      Platelets 178 10*3/mm3      Neutrophil % 58.0 %      Lymphocyte % 30.6 %      Monocyte % 8.2 %      Eosinophil % 1.9 %      Basophil % 1.0 %      Immature Grans % 0.3 %      Neutrophils, Absolute 3.62 10*3/mm3      Lymphocytes, Absolute 1.91 10*3/mm3      Monocytes, Absolute 0.51 10*3/mm3      Eosinophils, Absolute 0.12 10*3/mm3      Basophils, Absolute 0.06 10*3/mm3      Immature Grans, Absolute 0.02 10*3/mm3      nRBC 0.0 /100 WBC     Extra Tubes [[id number]] Collected: 09/28/24 1247    Specimen: Blood from Arm, Right Updated: 09/28/24 1430    Narrative:      The following orders were created for panel order Extra Tubes.  Procedure                               Abnormality         Status                     ---------                               -----------         ------                     Light Blue Top[[id number]]                                    Final result                 Please view results for these tests on the individual orders.    Light Blue Top [[id number]] Collected: 09/28/24 1247    Specimen: Blood from Arm, Right Updated: 09/28/24 1430     Extra Tube Hold for add-ons.     Comment: Auto resulted             Imaging Results (Last 72 Hours)       Procedure Component Value Units Date/Time    XR Chest 1 View [[id number]] Collected: 09/28/24 1438     Updated: 09/28/24 1441    Narrative:      XR CHEST 1 VW    Date of Exam: 9/28/2024 2:35 PM EDT    Indication: Palpitations    Comparison: 6/11/2024    Findings:  Cardiomediastinal silhouette is unremarkable. There is minimal nonspecific interstitial prominence. No airspace disease, pneumothorax, nor pleural effusion. No acute osseous abnormality identified.      Impression:      Impression:  No acute process identified.      Electronically Signed: Edward Kahn MD    9/28/2024 2:39 PM EDT    Workstation ID: [id number]            Results for orders placed during the hospital encounter of 06/11/24    Adult Transthoracic Echo Complete W/ Cont if Necessary Per Protocol    Interpretation Summary    Left ventricular systolic function is normal. Left ventricular ejection fraction appears to be 56 - 60%.    Left ventricular diastolic function was normal.    The left atrial cavity is moderately dilated.    The right atrial cavity is mildly  dilated.      Medication Review  Scheduled Meds:   Continuous Infusions:dilTIAZem, 5-15 mg/hr, Last Rate: 5 mg/hr (09/28/24 1428)      PRN Meds:.  acetaminophen **OR** acetaminophen **OR** acetaminophen    senna-docusate sodium **AND** polyethylene glycol **AND** bisacodyl **AND** bisacodyl    Calcium Replacement - Follow Nurse / BPA Driven Protocol    Magnesium Standard Dose Replacement - Follow Nurse / BPA Driven Protocol    melatonin    nitroglycerin    ondansetron ODT **OR** ondansetron    Phosphorus Replacement - Follow Nurse / BPA Driven Protocol     Potassium Replacement - Follow Nurse / BPA Driven Protocol    [COMPLETED] Insert Peripheral IV **AND** sodium chloride    Lab Results   Component Value Date    GLUCOSE 112 (H) 09/28/2024    BUN 30 (H) 09/28/2024    CREATININE 1.41 (H) 09/28/2024    EGFR 38.5 (L) 09/28/2024    BCR 21.3 09/28/2024    K 4.6 09/28/2024    CO2 22.7 09/28/2024    CALCIUM 9.7 09/28/2024    ALBUMIN 3.8 09/28/2024    BILITOT 0.4 09/28/2024    AST 26 09/28/2024    ALT 20 09/28/2024     Results for orders placed during the hospital encounter of 08/29/22    Cardiac Catheterization/Vascular Study    Narrative  Table formatting from the original result was not included.  Cardiac Catheterization Operative Report    Faby Le  [id number]  8/29/2022  @PCP@    She underwent cardiac catheterization.    Indications for the procedure include: abnormal stress test.    Procedure Details:  The risks, benefits, complications, treatment options, and expected outcomes were discussed with the patient. The patient and/or family concurred with the proposed plan, giving informed consent.    After informed consent the patient was brought to the cath lab after appropriate IV hydration was begun and oral premedication was given. She was further sedated with fentanyl. She was prepped and draped in the usual manner. Using the modified Seldinger access technique, a 6 [place]atian sheath was placed in the femoral artery. A left heart catheterization with coronary arteriography was performed. Findings are discussed below.    After the procedure was completed, sedation was stopped and the sheaths and catheters were all removed. Hemostasis was achieved per established hospital protocols.    Conscious sedation:  Conscious sedation was performed according to protocol.  I supervised and directed an independent trained observer with the assistance of monitoring the patient's level of consciousness and physiologic status throughout the procedure.  Intraoperative service time was  60 minutes.    Findings:    Hemodynamics Central arctic pressure systolic 150 and diastolic 56 with a mean pressure of 82 mmHg  LV end-diastolic pressure of 12 mmHg  There was no gradient across the aortic valve on the pullback of the pigtail catheter  Left Main  left main is a large-caliber vessel angiographically normal bifurcates into left into descending and left circumflex arteries  RCA  large-caliber dominant vessel  Right coronary artery is angiographically normal right coronary artery bifurcates into a large caliber PDA branch and a moderate caliber PLV branch that are angiographically normal  LAD  large-caliber vessel  LAD has mid focal angiographic 30% stenosis that is heavily calcified at the bifurcation with the diagonal and septal branch  Moderate size diagonal branch that has ostial angiographic of 40% stenosis  Circ  large-caliber vessel angiographically normal  First marginal branch of the circumflex has proximal angiographic 30 to 40% stenosis    LV  normal LV systolic function normal wall motion estimate LV ejection fraction of 55%  Coronary Dominance  right coronary artery    Estimated Blood Loss:  Minimal    Specimens: None    Complications:  None; patient tolerated the procedure well.    Disposition: PACU - hemodynamically stable.    Condition: stable    Impressions:  Mild obstructive coronary artery disease involving the LAD mild to moderate obstructive coronary artery disease involving the ostium of the diagonal branch mild obstructive coronary artery disease involving the proximal first marginal branch  Normal LV systolic function  Normal wall motion  Estimate LV ejection fraction of 55%  Normal left-sided filling pressures    Recommendations:  Medical management for obstructive coronary artery disease  Test results reviewed and discussed with patient and family     Results for orders placed during the hospital encounter of 06/11/24    Adult Transthoracic Echo Complete W/ Cont if Necessary  Per Protocol    Interpretation Summary    Left ventricular systolic function is normal. Left ventricular ejection fraction appears to be 56 - 60%.    Left ventricular diastolic function was normal.    The left atrial cavity is moderately dilated.    The right atrial cavity is mildly  dilated.     Lab Results   Component Value Date    CHOL 125 05/07/2024    TRIG 49 05/07/2024    HDL 67 (H) 05/07/2024    LDL 47 05/07/2024            Assessment & Plan       Atrial fibrillation with RVR          Ratna Zavala MD  09/28/24  15:59 EDT

## 2024-09-28 NOTE — PLAN OF CARE
Goal Outcome Evaluation:      Patient admitted to pcu for afib with rvr. Patient came up on Cardizem drip and has already converted to sinus rhythm with a rate of 65.                                          Heme/Onc Gastroenterology

## 2024-09-28 NOTE — ED PROVIDER NOTES
Subjective   History of Present Illness  Complaint rapid heartbeat chest tightness shortness of breath    History of present illness 79-year-old female with several health problems who states that last night she started noticing elevated heart rate today some lightheadedness and feeling weak no neck arm jaw pain or radiation worse with exertion better with rest no fever chills sweats cough congestion recent injury illness flus viruses vaccinations foreign travels leg pain or swelling long car ride or plane ride.  No injury.  No one in the home with similar illness.  No specific chest pain neck arm jaw pain.      Review of Systems   Constitutional:  Negative for chills and fever.   Respiratory:  Negative for cough, chest tightness and shortness of breath.    Cardiovascular:  Positive for palpitations. Negative for chest pain.   Gastrointestinal:  Negative for abdominal pain and vomiting.   Genitourinary:  Negative for difficulty urinating and dysuria.   Musculoskeletal:  Negative for back pain and neck pain.   Skin:  Negative for rash.   Neurological:  Positive for weakness and light-headedness. Negative for dizziness, facial asymmetry, speech difficulty and headaches.   Psychiatric/Behavioral:  Negative for confusion.        Past Medical History:   Diagnosis Date    Ankle pain     Arthritis     Coronary artery disease     Depression     Gout     Hyperglycemia     Hyperlipidemia     Hypertension     Low back pain     Sleep apnea     Vitamin D deficiency        Allergies   Allergen Reactions    Celecoxib Itching and Swelling     swelling       Past Surgical History:   Procedure Laterality Date    ANKLE FUSION      2017-left    CARDIAC CATHETERIZATION      CARDIAC CATHETERIZATION Right 8/29/2022    Procedure: Left Heart Cath;  Surgeon: Ratna Zavala MD;  Location: [facility] CATH INVASIVE LOCATION;  Service: Cardiovascular;  Laterality: Right;    CARDIAC ELECTROPHYSIOLOGY PROCEDURE Left 7/3/2023    Procedure:  Pacemaker DC new Williamson Notified;  Surgeon: Cr Herrera MD;  Location: [facility] CATH INVASIVE LOCATION;  Service: Cardiovascular;  Laterality: Left;    CARDIAC ELECTROPHYSIOLOGY PROCEDURE Right 3/5/2024    Procedure: Ablation atrial fibrillation, Cryo Ibrahima and Wayne Costa notified 02/09/24;  Surgeon: Cr Herrera MD;  Location: [facility] CATH INVASIVE LOCATION;  Service: Cardiovascular;  Laterality: Right;    CARDIAC ELECTROPHYSIOLOGY PROCEDURE N/A 3/5/2024    Procedure: Cardioversion;  Surgeon: Cr Herrera MD;  Location: [facility] CATH INVASIVE LOCATION;  Service: Cardiovascular;  Laterality: N/A;    CARPAL TUNNEL RELEASE      CATARACT EXTRACTION      CUBITAL TUNNEL RELEASE      ENDOSCOPY N/A 5/28/2024    Procedure: ESOPHAGOGASTRODUODENOSCOPY WITH COLD FORCEP BIOPSY X1 AREA;  Surgeon: Josh Loomis MD;  Location: [facility] ENDOSCOPY;  Service: Gastroenterology;  Laterality: N/A;  Post- ESOPHAGITIS, GASTRITIS, HIATAL HERNIA    HYSTERECTOMY      REPLACEMENT TOTAL KNEE      left 2008    ROTATOR CUFF REPAIR      TOTAL KNEE ARTHROPLASTY Right 5/4/2023    Procedure: TOTAL KNEE ARTHROPLASTY WITH CORI ROBOT;  Surgeon: Chino Herrera II, MD;  Location: [facility] MAIN OR;  Service: Robotics - Ortho;  Laterality: Right;       Family History   Problem Relation Age of Onset    Hypertension Mother     Stroke Father     Arthritis Brother     Cancer Brother        Social History     Socioeconomic History    Marital status:    Tobacco Use    Smoking status: Never     Passive exposure: Never    Smokeless tobacco: Never   Vaping Use    Vaping status: Never Used   Substance and Sexual Activity    Alcohol use: No    Drug use: No    Sexual activity: Defer     Prior to Admission medications    Medication Sig Start Date End Date Taking? Authorizing Provider   allopurinol (ZYLOPRIM) 300 MG tablet Take 1 tablet by mouth once daily 7/1/24  Yes Leonor Adam MD   atorvastatin (LIPITOR) 20  MG tablet Take 1 tablet by mouth Daily. 10/3/23  Yes Leonor Adam MD   cetirizine (zyrTEC) 10 MG tablet Take 1 tablet by mouth Daily. 8/28/18  Yes Franklin Cid MD   cyanocobalamin (VITAMIN B-12) 1000 MCG tablet Take 1 tablet by mouth Daily. 12/20/18  Yes Franklin Cid MD   dofetilide (Tikosyn) 250 MCG capsule Take 1 capsule by mouth Every 12 (Twelve) Hours. 6/15/24  Yes Sly Sandy MD   IRON CR PO Take 1 tablet by mouth Daily.   Yes Franklin Cid MD   levothyroxine (SYNTHROID, LEVOTHROID) 88 MCG tablet Take 1 tablet by mouth Daily. 6/25/24  Yes Leonor Adam MD   midodrine (PROAMATINE) 5 MG tablet Take 1 tablet by mouth 2 (Two) Times a Day Before Meals. 9/18/24  Yes Leonor Adam MD   montelukast (SINGULAIR) 10 MG tablet Take 1 tablet by mouth Every Night. 6/25/24  Yes Leonor Adam MD   multivitamin (MULTI VITAMIN PO) Take 1 tablet by mouth Daily.   Yes Franklin Cid MD   mupirocin (BACTROBAN) 2 % ointment APPLY SMALL AMOUNT OF OINTMENT TOPICALLY TO AFFECTED AREA TWICE DAILY 8/22/24  Yes Franklin Cid MD   pantoprazole (PROTONIX) 40 MG EC tablet Take 1 tablet by mouth Every Morning. 5/28/24  Yes Franklin Cid MD   rivaroxaban (XARELTO) 15 MG tablet Take 1 tablet by mouth Daily With Dinner. Indications: Atrial Fibrillation 9/23/24  Yes Leonor Adam MD   spironolactone (ALDACTONE) 25 MG tablet Oral for 90 Days   Yes Franklin Cid MD   triamcinolone (KENALOG) 0.1 % cream APPLY CREAM EXTERNALLY TWICE DAILY TO ECZEMA FOR UP TO 2 WEEKS PER MONTH AS NEEDED 8/22/24  Yes Franklin Cid MD   Vitamin D, Cholecalciferol, 25 MCG (1000 UT) tablet Take 1 tablet by mouth Daily. 6/16/16  Yes Franklin Cid MD   Azelastine HCl 137 MCG/SPRAY solution 2 sprays by Alternating Nares route Daily As Needed. 6/19/23   Provider, MD Franklin   hydrocortisone 2.5 % ointment APPLY THIN LAYER OF OINTMENT EXTERNALLY TO AFFECTED  AREA TWICE DAILY. SAFE TO USE ON FACE 8/22/24   Franklin Cid MD   escitalopram (LEXAPRO) 20 MG tablet TAKE 1 TABLET BY MOUTH ONCE DAILY Oral for 90 Days  9/28/24  Franklin Cid MD   meclizine (ANTIVERT) 25 MG tablet Take 1 tablet by mouth 2 (Two) Times a Day. 8/14/24 9/28/24  Franklin Cid MD   metoprolol succinate XL (TOPROL-XL) 25 MG 24 hr tablet TAKE 1 TABLET BY MOUTH EVERY DAY AT NIGHT Oral for 90 Days  9/28/24  Frnaklin Cid MD          Objective   Physical Exam  Institutional this is a 79-year-old female awake alert no acute distress triage vital signs reviewed.  Heart rates in the 130s on the monitor atrial fibrillation.  This reviewed by me.  HEENT extraocular muscles are intact pupils equal and reactive no photophobia.  Neck supple no adenopathy no JVD no bruits no meningeal signs lungs clear no retraction no use of accessory heart irregular tachycardic without murmur rub.  Abdomen soft nontender good bowel sounds no peritoneal findings or pulsatile masses extremities pulses equal upper and lower extremities trace edema no cords no Homans' sign no evidence of DVT skin is warm and dry without rashes or cellulitic changes neurologic awake alert follows commands motor strength normal without focal weakness  Procedures           ED Course      Results for orders placed or performed during the hospital encounter of 09/28/24   Comprehensive Metabolic Panel    Specimen: Arm, Left; Blood   Result Value Ref Range    Glucose 112 (H) 65 - 99 mg/dL    BUN 30 (H) 8 - 23 mg/dL    Creatinine 1.41 (H) 0.57 - 1.00 mg/dL    Sodium 139 136 - 145 mmol/L    Potassium 4.6 3.5 - 5.2 mmol/L    Chloride 108 (H) 98 - 107 mmol/L    CO2 22.7 22.0 - 29.0 mmol/L    Calcium 9.7 8.6 - 10.5 mg/dL    Total Protein 6.2 6.0 - 8.5 g/dL    Albumin 3.8 3.5 - 5.2 g/dL    ALT (SGPT) 20 1 - 33 U/L    AST (SGOT) 26 1 - 32 U/L    Alkaline Phosphatase 123 (H) 39 - 117 U/L    Total Bilirubin 0.4 0.0 - 1.2 mg/dL     Globulin 2.4 gm/dL    A/G Ratio 1.6 g/dL    BUN/Creatinine Ratio 21.3 7.0 - 25.0    Anion Gap 8.3 5.0 - 15.0 mmol/L    eGFR 38.5 (L) >60.0 mL/min/1.73   Magnesium    Specimen: Arm, Left; Blood   Result Value Ref Range    Magnesium 1.8 1.6 - 2.4 mg/dL   CBC Auto Differential    Specimen: Arm, Left; Blood   Result Value Ref Range    WBC 6.24 3.40 - 10.80 10*3/mm3    RBC 3.38 (L) 3.77 - 5.28 10*6/mm3    Hemoglobin 11.3 (L) 12.0 - 15.9 g/dL    Hematocrit 34.8 34.0 - 46.6 %    .0 (H) 79.0 - 97.0 fL    MCH 33.4 (H) 26.6 - 33.0 pg    MCHC 32.5 31.5 - 35.7 g/dL    RDW 14.6 12.3 - 15.4 %    RDW-SD 54.7 (H) 37.0 - 54.0 fl    MPV 9.7 6.0 - 12.0 fL    Platelets 178 140 - 450 10*3/mm3    Neutrophil % 58.0 42.7 - 76.0 %    Lymphocyte % 30.6 19.6 - 45.3 %    Monocyte % 8.2 5.0 - 12.0 %    Eosinophil % 1.9 0.3 - 6.2 %    Basophil % 1.0 0.0 - 1.5 %    Immature Grans % 0.3 0.0 - 0.5 %    Neutrophils, Absolute 3.62 1.70 - 7.00 10*3/mm3    Lymphocytes, Absolute 1.91 0.70 - 3.10 10*3/mm3    Monocytes, Absolute 0.51 0.10 - 0.90 10*3/mm3    Eosinophils, Absolute 0.12 0.00 - 0.40 10*3/mm3    Basophils, Absolute 0.06 0.00 - 0.20 10*3/mm3    Immature Grans, Absolute 0.02 0.00 - 0.05 10*3/mm3    nRBC 0.0 0.0 - 0.2 /100 WBC   TSH Rfx On Abnormal To Free T4    Specimen: Arm, Left; Blood   Result Value Ref Range    TSH 3.040 0.270 - 4.200 uIU/mL   ECG 12 Lead Dyspnea   Result Value Ref Range    QT Interval 342 ms    QTC Interval 486 ms   Light Blue Top   Result Value Ref Range    Extra Tube Hold for add-ons.      XR Chest 1 View    Result Date: 9/28/2024  Impression: No acute process identified. Electronically Signed: Edward Kahn MD  9/28/2024 2:39 PM EDT  Workstation ID: [id number]   Medications   sodium chloride 0.9 % flush 10 mL (has no administration in time range)   dilTIAZem (CARDIZEM) 125 mg in 125 mL D5W infusion (5 mg/hr Intravenous New Bag 9/28/24 1428)   nitroglycerin (NITROSTAT) SL tablet 0.4 mg (has no administration in time  range)   Potassium Replacement - Follow Nurse / BPA Driven Protocol (has no administration in time range)   Magnesium Standard Dose Replacement - Follow Nurse / BPA Driven Protocol (has no administration in time range)   Phosphorus Replacement - Follow Nurse / BPA Driven Protocol (has no administration in time range)   Calcium Replacement - Follow Nurse / BPA Driven Protocol (has no administration in time range)   acetaminophen (TYLENOL) tablet 650 mg (has no administration in time range)     Or   acetaminophen (TYLENOL) 160 MG/5ML oral solution 650 mg (has no administration in time range)     Or   acetaminophen (TYLENOL) suppository 650 mg (has no administration in time range)   sennosides-docusate (PERICOLACE) 8.6-50 MG per tablet 2 tablet (has no administration in time range)     And   polyethylene glycol (MIRALAX) packet 17 g (has no administration in time range)     And   bisacodyl (DULCOLAX) EC tablet 5 mg (has no administration in time range)     And   bisacodyl (DULCOLAX) suppository 10 mg (has no administration in time range)   ondansetron ODT (ZOFRAN-ODT) disintegrating tablet 4 mg (has no administration in time range)     Or   ondansetron (ZOFRAN) injection 4 mg (has no administration in time range)   melatonin tablet 5 mg (has no administration in time range)   lactated ringers bolus 500 mL (500 mL Intravenous New Bag 9/28/24 1510)   dilTIAZem (CARDIZEM) injection 10 mg (10 mg Intravenous Given 9/28/24 1431)                                  EKG my interpretation atrial fibrillation rate of 123 normal axis no hypertrophy QTc of 486 no acute ST elevation this is changed from previous EKG on 8/14/2024 1 sinus rhythm was noted            Medical Decision Making  Decision making IV established monitor placed my review shows atrial fibrillation rapid ventricular response.  EKG obtained my interpretation atrial fibrillation rate of 123 normal axis no Ky QTc of 48 6 no acute ST elevation this is changed from  previous on 8/14/2024 she was in sinus rhythm.  Patient was started on Cardizem 10 mg bolus and then placed on a drip and given 500 cc lactated ringer bolus.  This titrated to keep heart rate down to 100.  Patient's labs obtained independent review of me comprehensive metabolic profile BUN of 30 creatinine 1.4.  This independent reviewed by me as well as the rest of her labs magnesium was 1.8 potassium 4.6 CBC unremarkable and hemoglobin 11.3.  Chest x-ray my independent review no pneumonia pneumothorax or failure acute findings etiology without acute findings.  Patient repeat exam is resting comfortably her Cardizem has been titrated.  The patient's heart rate came down into the 70s this atrial fibrillation on the monitor.  I do not see any of that suggest acute DVT or pulmonary embolism patient is already anticoagulated with Xarelto.  I do not see any evidence of acute pericarditis myocarditis pericardial effusion sepsis myocardial infarction based on my history and physical clinical findings although not complete list of all possibilities.  Patient will be admitted to hospital for further workup.  I talked to the hospitalist we discussed the case patient made aware of the findings stable improved ER course critical care 35 minutes patient is on Cardizem drip being titrated.    Problems Addressed:  Atrial fibrillation with rapid ventricular response: complicated acute illness or injury    Amount and/or Complexity of Data Reviewed  Labs: ordered. Decision-making details documented in ED Course.  Radiology: ordered and independent interpretation performed. Decision-making details documented in ED Course.  ECG/medicine tests: ordered and independent interpretation performed. Decision-making details documented in ED Course.    Risk  Drug therapy requiring intensive monitoring for toxicity.  Decision regarding hospitalization.        Final diagnoses:   Atrial fibrillation with rapid ventricular response       ED  Disposition  ED Disposition       ED Disposition   Decision to Admit    Condition   --    Comment   Level of Care: Telemetry [5]   Admitting Physician: DAISY BURTON [[id number]]   Attending Physician: DAISY BURTON [[id number]]   Bed Request Comments: pcu                 No follow-up provider specified.       Medication List      No changes were made to your prescriptions during this visit.            Kyree Burden MD  09/28/24 2224

## 2024-09-28 NOTE — Clinical Note
Level of Care: Telemetry [5]   Diagnosis: Atrial fibrillation with RVR [[id number]]   Admitting Physician: DAISY BURTON [[id number]]   Certification: I Certify That Inpatient Hospital Services Are Medically Necessary For Greater Than 2 Midnights

## 2024-09-28 NOTE — H&P
[facility]  History & Physical    Patient Name: Faby Le  : 1945  MRN: [id number]  Primary Care Physician:  Leonor Adam MD  Date of admission: 2024  Date and Time of Service: 2024 at 1530    Subjective      Chief Complaint: heart palpitations    History of Present Illness: Faby Le is a 79 y.o. female with a CMH of atrial fibrillation, HLD, CAD, HTN, depression who presented to [facility] on 2024 with heart palpiations, dizziness and shortness of breath. On ED evaluation patient was noted to be in afib with RVR and a rate of 140. A cardizem gtt was started, and rate is now in 70s. CMP shows Cr of 1.41 which is above her baseline of 1.24, otherwise labs are unremarkable. Cardiology has been consulted. Hospitalist team to admit for further medical management.       Review of Systems   Constitutional:  Negative for chills, fatigue and fever.   Respiratory:  Positive for shortness of breath. Negative for cough and chest tightness.    Cardiovascular:  Positive for palpitations. Negative for chest pain and leg swelling.   Neurological:  Positive for dizziness.       Personal History     Past Medical History:   Diagnosis Date    Ankle pain     Arthritis     Coronary artery disease     Depression     Gout     Hyperglycemia     Hyperlipidemia     Hypertension     Low back pain     Sleep apnea     Vitamin D deficiency        Past Surgical History:   Procedure Laterality Date    ANKLE FUSION      2017-left    CARDIAC CATHETERIZATION      CARDIAC CATHETERIZATION Right 2022    Procedure: Left Heart Cath;  Surgeon: Ratna Zavala MD;  Location: [facility] CATH INVASIVE LOCATION;  Service: Cardiovascular;  Laterality: Right;    CARDIAC ELECTROPHYSIOLOGY PROCEDURE Left 7/3/2023    Procedure: Pacemaker DC new [place] Notified;  Surgeon: Cr Herrera MD;  Location: [facility] CATH INVASIVE LOCATION;  Service: Cardiovascular;  Laterality: Left;     CARDIAC ELECTROPHYSIOLOGY PROCEDURE Right 3/5/2024    Procedure: Ablation atrial fibrillation, Cryo Marciayahirshai and Wayne Costa notified 02/09/24;  Surgeon: Cr Herrera MD;  Location: [facility] CATH INVASIVE LOCATION;  Service: Cardiovascular;  Laterality: Right;    CARDIAC ELECTROPHYSIOLOGY PROCEDURE N/A 3/5/2024    Procedure: Cardioversion;  Surgeon: Cr Herrera MD;  Location: [facility] CATH INVASIVE LOCATION;  Service: Cardiovascular;  Laterality: N/A;    CARPAL TUNNEL RELEASE      CATARACT EXTRACTION      CUBITAL TUNNEL RELEASE      ENDOSCOPY N/A 5/28/2024    Procedure: ESOPHAGOGASTRODUODENOSCOPY WITH COLD FORCEP BIOPSY X1 AREA;  Surgeon: Josh Loomis MD;  Location: [facility] ENDOSCOPY;  Service: Gastroenterology;  Laterality: N/A;  Post- ESOPHAGITIS, GASTRITIS, HIATAL HERNIA    HYSTERECTOMY      REPLACEMENT TOTAL KNEE      left 2008    ROTATOR CUFF REPAIR      TOTAL KNEE ARTHROPLASTY Right 5/4/2023    Procedure: TOTAL KNEE ARTHROPLASTY WITH CORI ROBOT;  Surgeon: Chino Herrera II, MD;  Location: [facility] MAIN OR;  Service: Robotics - Ortho;  Laterality: Right;       Family History: family history includes Arthritis in her brother; Cancer in her brother; Hypertension in her mother; Stroke in her father. Otherwise pertinent FHx was reviewed and not pertinent to current issue.    Social History:  reports that she has never smoked. She has never been exposed to tobacco smoke. She has never used smokeless tobacco. She reports that she does not drink alcohol and does not use drugs.    Home Medications:  Prior to Admission Medications       Prescriptions Last Dose Informant Patient Reported? Taking?    allopurinol (ZYLOPRIM) 300 MG tablet   No No    Take 1 tablet by mouth once daily    atorvastatin (LIPITOR) 20 MG tablet  Self No No    Take 1 tablet by mouth Daily.    Azelastine HCl 137 MCG/SPRAY solution  Self Yes No    2 sprays by Alternating Nares route Daily As Needed.    cetirizine  (zyrTEC) 10 MG tablet  Self Yes No    Take 1 tablet by mouth Daily.    cyanocobalamin (VITAMIN B-12) 1000 MCG tablet  Self Yes No    Take 1 tablet by mouth Daily.    dofetilide (Tikosyn) 250 MCG capsule   No No    Take 1 capsule by mouth Every 12 (Twelve) Hours.    escitalopram (LEXAPRO) 20 MG tablet   Yes No    TAKE 1 TABLET BY MOUTH ONCE DAILY Oral for 90 Days    hydrocortisone 2.5 % ointment   Yes No    APPLY THIN LAYER OF OINTMENT EXTERNALLY TO AFFECTED AREA TWICE DAILY. SAFE TO USE ON FACE    IRON CR PO   Yes No    Take 1 tablet by mouth Daily.    levothyroxine (SYNTHROID, LEVOTHROID) 88 MCG tablet   No No    Take 1 tablet by mouth Daily.    meclizine (ANTIVERT) 25 MG tablet   Yes No    Take 1 tablet by mouth 2 (Two) Times a Day.    metoprolol succinate XL (TOPROL-XL) 25 MG 24 hr tablet   Yes No    TAKE 1 TABLET BY MOUTH EVERY DAY AT NIGHT Oral for 90 Days    midodrine (PROAMATINE) 5 MG tablet   No No    Take 1 tablet by mouth 2 (Two) Times a Day Before Meals.    montelukast (SINGULAIR) 10 MG tablet   No No    Take 1 tablet by mouth Every Night.    multivitamin (MULTI VITAMIN PO)   Yes No    Take 1 tablet by mouth Daily.    mupirocin (BACTROBAN) 2 % ointment   Yes No    APPLY SMALL AMOUNT OF OINTMENT TOPICALLY TO AFFECTED AREA TWICE DAILY    pantoprazole (PROTONIX) 40 MG EC tablet   Yes No    Take 1 tablet by mouth Every Morning.    rivaroxaban (XARELTO) 15 MG tablet   No No    Take 1 tablet by mouth Daily With Dinner. Indications: Atrial Fibrillation    spironolactone (ALDACTONE) 25 MG tablet   Yes No    Oral for 90 Days    triamcinolone (KENALOG) 0.1 % cream   Yes No    APPLY CREAM EXTERNALLY TWICE DAILY TO ECZEMA FOR UP TO 2 WEEKS PER MONTH AS NEEDED    Vitamin D, Cholecalciferol, 25 MCG (1000 UT) tablet  Self Yes No    Take 1 tablet by mouth Daily.              Allergies:  Allergies   Allergen Reactions    Celecoxib Itching and Swelling     swelling       Objective      Vitals:   Temp:  [98.3 °F (36.8 °C)]  98.3 °F (36.8 °C)  Heart Rate:  [] 76  Resp:  [16-20] 16  BP: (118-146)/(58-82) 146/58  Body mass index is 46.71 kg/m².  Physical Exam  Vitals and nursing note reviewed.   Constitutional:       Appearance: Normal appearance.   HENT:      Head: Normocephalic and atraumatic.      Nose: Nose normal.      Mouth/Throat:      Mouth: Mucous membranes are moist.   Eyes:      Extraocular Movements: Extraocular movements intact.      Pupils: Pupils are equal, round, and reactive to light.   Cardiovascular:      Rate and Rhythm: Normal rate. Rhythm irregular.      Pulses: Normal pulses.   Pulmonary:      Effort: Pulmonary effort is normal.      Breath sounds: Normal breath sounds.   Abdominal:      General: Bowel sounds are normal.      Palpations: Abdomen is soft.   Musculoskeletal:         General: Normal range of motion.      Cervical back: Normal range of motion and neck supple.      Right lower leg: No edema.      Left lower leg: No edema.   Skin:     General: Skin is warm.      Capillary Refill: Capillary refill takes less than 2 seconds.   Neurological:      Mental Status: She is alert and oriented to person, place, and time.         Diagnostic Data:  Lab Results (last 24 hours)       Procedure Component Value Units Date/Time    TSH Rfx On Abnormal To Free T4 [[id number]] Collected: 09/28/24 1420    Specimen: Blood from Arm, Left Updated: 09/28/24 1531    Comprehensive Metabolic Panel [[id number]]  (Abnormal) Collected: 09/28/24 1420    Specimen: Blood from Arm, Left Updated: 09/28/24 1453     Glucose 112 mg/dL      BUN 30 mg/dL      Creatinine 1.41 mg/dL      Sodium 139 mmol/L      Potassium 4.6 mmol/L      Comment: Slight hemolysis detected by analyzer. Result may be falsely elevated.        Chloride 108 mmol/L      CO2 22.7 mmol/L      Calcium 9.7 mg/dL      Total Protein 6.2 g/dL      Albumin 3.8 g/dL      ALT (SGPT) 20 U/L      AST (SGOT) 26 U/L      Alkaline Phosphatase 123 U/L      Total Bilirubin 0.4 mg/dL       Globulin 2.4 gm/dL      A/G Ratio 1.6 g/dL      BUN/Creatinine Ratio 21.3     Anion Gap 8.3 mmol/L      eGFR 38.5 mL/min/1.73     Narrative:      GFR Normal >60  Chronic Kidney Disease <60  Kidney Failure <15    The GFR formula is only valid for adults with stable renal function between ages 18 and 70.    Magnesium [[id number]]  (Normal) Collected: 09/28/24 1420    Specimen: Blood from Arm, Left Updated: 09/28/24 1453     Magnesium 1.8 mg/dL     CBC & Differential [[id number]]  (Abnormal) Collected: 09/28/24 1420    Specimen: Blood from Arm, Left Updated: 09/28/24 1431    Narrative:      The following orders were created for panel order CBC & Differential.  Procedure                               Abnormality         Status                     ---------                               -----------         ------                     CBC Auto Differential[[id number]]        Abnormal            Final result                 Please view results for these tests on the individual orders.    CBC Auto Differential [[id number]]  (Abnormal) Collected: 09/28/24 1420    Specimen: Blood from Arm, Left Updated: 09/28/24 1431     WBC 6.24 10*3/mm3      RBC 3.38 10*6/mm3      Hemoglobin 11.3 g/dL      Hematocrit 34.8 %      .0 fL      MCH 33.4 pg      MCHC 32.5 g/dL      RDW 14.6 %      RDW-SD 54.7 fl      MPV 9.7 fL      Platelets 178 10*3/mm3      Neutrophil % 58.0 %      Lymphocyte % 30.6 %      Monocyte % 8.2 %      Eosinophil % 1.9 %      Basophil % 1.0 %      Immature Grans % 0.3 %      Neutrophils, Absolute 3.62 10*3/mm3      Lymphocytes, Absolute 1.91 10*3/mm3      Monocytes, Absolute 0.51 10*3/mm3      Eosinophils, Absolute 0.12 10*3/mm3      Basophils, Absolute 0.06 10*3/mm3      Immature Grans, Absolute 0.02 10*3/mm3      nRBC 0.0 /100 WBC     Extra Tubes [[id number]] Collected: 09/28/24 1247    Specimen: Blood from Arm, Right Updated: 09/28/24 1430    Narrative:      The following orders were created for panel order  Extra Tubes.  Procedure                               Abnormality         Status                     ---------                               -----------         ------                     Light Blue Top[[id number]]                                   Final result                 Please view results for these tests on the individual orders.    Light Blue Top [[id number]] Collected: 09/28/24 1247    Specimen: Blood from Arm, Right Updated: 09/28/24 1430     Extra Tube Hold for add-ons.     Comment: Auto resulted                Imaging Results (Last 24 Hours)       Procedure Component Value Units Date/Time    XR Chest 1 View [[id number]] Collected: 09/28/24 1438     Updated: 09/28/24 1441    Narrative:      XR CHEST 1 VW    Date of Exam: 9/28/2024 2:35 PM EDT    Indication: Palpitations    Comparison: 6/11/2024    Findings:  Cardiomediastinal silhouette is unremarkable. There is minimal nonspecific interstitial prominence. No airspace disease, pneumothorax, nor pleural effusion. No acute osseous abnormality identified.      Impression:      Impression:  No acute process identified.      Electronically Signed: Edward Kahn MD    9/28/2024 2:39 PM EDT    Workstation ID: [id number]              Assessment & Plan        This is a 79 y.o. female with:    Active and Resolved Problems  Active Hospital Problems    Diagnosis  POA    **Atrial fibrillation with RVR [I48.91]  Yes      Resolved Hospital Problems   No resolved problems to display.       Atrial Fibrillation with RVR  - presented with rates 130s - 140s  - has had ablation in the past   - takes xarelto for AC, patient reports compliance   - currently on cardizem gtt with rates in 70s  - cardiology consulted     Chronic diastolic CHF  - not in acute exacerbation   - last echo 3 months ago showed EF of 56-60%  - daily weights     Chronic macrocytic anemia   - Hgb 11.3, at baseline   - , RBC 3.38  - continue vitamin b 12  - monitor CBC daily     Hypertension   - BP  stable at 146/58  - continue home medications    CKD stage three   - Cr 1.41, at baseline   - Avoid nephrotoxic medications  - Continue to monitor kidney fn on BMP       VTE Prophylaxis:  No VTE prophylaxis order currently exists.        The patient desires to be as follows:    CODE STATUS:    Code Status (Patient has no pulse and is not breathing): CPR (Attempt to Resuscitate)  Medical Interventions (Patient has pulse or is breathing): Full Support      Kemi Mckeon, who can be contacted at [phone], is the designated person to make medical decisions on the patient's behalf if She is incapable of doing so. This was clarified with patient and/or next of kin on 9/28/2024 during the course of this H&P.    Admission Status:  I believe this patient meets inpatient status.    Expected Length of Stay: > 24 hours     PDMP and Medication Dispenses via Sidebar reviewed and consistent with patient reported medications.    I discussed the patient's findings and my recommendations with patient.      Signature:     This document has been electronically signed by CAROL Johnson on September 28, 2024 15:42 EDT   [facility] [place] [facility]

## 2024-09-29 ENCOUNTER — READMISSION MANAGEMENT (OUTPATIENT)
Dept: CALL CENTER | Facility: HOSPITAL | Age: 79
End: 2024-09-29
Payer: MEDICARE

## 2024-09-29 VITALS
HEART RATE: 64 BPM | OXYGEN SATURATION: 100 % | RESPIRATION RATE: 16 BRPM | TEMPERATURE: 97.8 F | SYSTOLIC BLOOD PRESSURE: 126 MMHG | BODY MASS INDEX: 47.19 KG/M2 | DIASTOLIC BLOOD PRESSURE: 56 MMHG | HEIGHT: 63 IN | WEIGHT: 266.32 LBS

## 2024-09-29 LAB
ANION GAP SERPL CALCULATED.3IONS-SCNC: 7.1 MMOL/L (ref 5–15)
BASOPHILS # BLD AUTO: 0.04 10*3/MM3 (ref 0–0.2)
BASOPHILS NFR BLD AUTO: 0.7 % (ref 0–1.5)
BUN SERPL-MCNC: 27 MG/DL (ref 8–23)
BUN/CREAT SERPL: 20.8 (ref 7–25)
CALCIUM SPEC-SCNC: 9.4 MG/DL (ref 8.6–10.5)
CHLORIDE SERPL-SCNC: 108 MMOL/L (ref 98–107)
CO2 SERPL-SCNC: 23.9 MMOL/L (ref 22–29)
CREAT SERPL-MCNC: 1.3 MG/DL (ref 0.57–1)
DEPRECATED RDW RBC AUTO: 55.1 FL (ref 37–54)
EGFRCR SERPLBLD CKD-EPI 2021: 42.4 ML/MIN/1.73
EOSINOPHIL # BLD AUTO: 0.18 10*3/MM3 (ref 0–0.4)
EOSINOPHIL NFR BLD AUTO: 3.3 % (ref 0.3–6.2)
ERYTHROCYTE [DISTWIDTH] IN BLOOD BY AUTOMATED COUNT: 14.6 % (ref 12.3–15.4)
GLUCOSE SERPL-MCNC: 94 MG/DL (ref 65–99)
HCT VFR BLD AUTO: 31.6 % (ref 34–46.6)
HGB BLD-MCNC: 10.3 G/DL (ref 12–15.9)
IMM GRANULOCYTES # BLD AUTO: 0.01 10*3/MM3 (ref 0–0.05)
IMM GRANULOCYTES NFR BLD AUTO: 0.2 % (ref 0–0.5)
LYMPHOCYTES # BLD AUTO: 2.04 10*3/MM3 (ref 0.7–3.1)
LYMPHOCYTES NFR BLD AUTO: 37.8 % (ref 19.6–45.3)
MCH RBC QN AUTO: 33.6 PG (ref 26.6–33)
MCHC RBC AUTO-ENTMCNC: 32.6 G/DL (ref 31.5–35.7)
MCV RBC AUTO: 102.9 FL (ref 79–97)
MONOCYTES # BLD AUTO: 0.44 10*3/MM3 (ref 0.1–0.9)
MONOCYTES NFR BLD AUTO: 8.2 % (ref 5–12)
NEUTROPHILS NFR BLD AUTO: 2.68 10*3/MM3 (ref 1.7–7)
NEUTROPHILS NFR BLD AUTO: 49.8 % (ref 42.7–76)
NRBC BLD AUTO-RTO: 0 /100 WBC (ref 0–0.2)
PLATELET # BLD AUTO: 154 10*3/MM3 (ref 140–450)
PMV BLD AUTO: 10.3 FL (ref 6–12)
POTASSIUM SERPL-SCNC: 4.6 MMOL/L (ref 3.5–5.2)
QT INTERVAL: 342 MS
QT INTERVAL: 481 MS
QTC INTERVAL: 486 MS
QTC INTERVAL: 498 MS
RBC # BLD AUTO: 3.07 10*6/MM3 (ref 3.77–5.28)
SODIUM SERPL-SCNC: 139 MMOL/L (ref 136–145)
WBC NRBC COR # BLD AUTO: 5.39 10*3/MM3 (ref 3.4–10.8)

## 2024-09-29 PROCEDURE — 99232 SBSQ HOSP IP/OBS MODERATE 35: CPT | Performed by: INTERNAL MEDICINE

## 2024-09-29 PROCEDURE — 97162 PT EVAL MOD COMPLEX 30 MIN: CPT

## 2024-09-29 PROCEDURE — 85025 COMPLETE CBC W/AUTO DIFF WBC: CPT

## 2024-09-29 PROCEDURE — 80048 BASIC METABOLIC PNL TOTAL CA: CPT

## 2024-09-29 PROCEDURE — 93010 ELECTROCARDIOGRAM REPORT: CPT | Performed by: INTERNAL MEDICINE

## 2024-09-29 PROCEDURE — 93005 ELECTROCARDIOGRAM TRACING: CPT | Performed by: INTERNAL MEDICINE

## 2024-09-29 RX ORDER — ALLOPURINOL 300 MG/1
300 TABLET ORAL DAILY
Status: DISCONTINUED | OUTPATIENT
Start: 2024-09-29 | End: 2024-09-29 | Stop reason: HOSPADM

## 2024-09-29 RX ORDER — LEVOTHYROXINE SODIUM 88 UG/1
88 TABLET ORAL
Status: DISCONTINUED | OUTPATIENT
Start: 2024-09-29 | End: 2024-09-29 | Stop reason: HOSPADM

## 2024-09-29 RX ORDER — MONTELUKAST SODIUM 10 MG/1
10 TABLET ORAL NIGHTLY
Status: DISCONTINUED | OUTPATIENT
Start: 2024-09-29 | End: 2024-09-29 | Stop reason: HOSPADM

## 2024-09-29 RX ORDER — MIDODRINE HYDROCHLORIDE 5 MG/1
5 TABLET ORAL
Status: DISCONTINUED | OUTPATIENT
Start: 2024-09-29 | End: 2024-09-29 | Stop reason: HOSPADM

## 2024-09-29 RX ORDER — UREA 10 %
1000 LOTION (ML) TOPICAL DAILY
Status: DISCONTINUED | OUTPATIENT
Start: 2024-09-29 | End: 2024-09-29 | Stop reason: HOSPADM

## 2024-09-29 RX ORDER — SPIRONOLACTONE 25 MG/1
25 TABLET ORAL DAILY
Status: DISCONTINUED | OUTPATIENT
Start: 2024-09-29 | End: 2024-09-29 | Stop reason: DRUGHIGH

## 2024-09-29 RX ORDER — CETIRIZINE HYDROCHLORIDE 10 MG/1
10 TABLET ORAL DAILY
Status: DISCONTINUED | OUTPATIENT
Start: 2024-09-29 | End: 2024-09-29 | Stop reason: HOSPADM

## 2024-09-29 RX ORDER — DIPHENOXYLATE HYDROCHLORIDE AND ATROPINE SULFATE 2.5; .025 MG/1; MG/1
1 TABLET ORAL DAILY
Status: DISCONTINUED | OUTPATIENT
Start: 2024-09-29 | End: 2024-09-29 | Stop reason: HOSPADM

## 2024-09-29 RX ORDER — ATORVASTATIN CALCIUM 20 MG/1
20 TABLET, FILM COATED ORAL DAILY
Status: DISCONTINUED | OUTPATIENT
Start: 2024-09-29 | End: 2024-09-29 | Stop reason: HOSPADM

## 2024-09-29 RX ORDER — DOFETILIDE 0.25 MG/1
250 CAPSULE ORAL EVERY 12 HOURS
Status: DISCONTINUED | OUTPATIENT
Start: 2024-09-29 | End: 2024-09-29 | Stop reason: HOSPADM

## 2024-09-29 RX ORDER — PANTOPRAZOLE SODIUM 40 MG/1
40 TABLET, DELAYED RELEASE ORAL
Status: DISCONTINUED | OUTPATIENT
Start: 2024-09-29 | End: 2024-09-29 | Stop reason: HOSPADM

## 2024-09-29 RX ORDER — SPIRONOLACTONE 25 MG/1
12.5 TABLET ORAL DAILY
Status: DISCONTINUED | OUTPATIENT
Start: 2024-09-29 | End: 2024-09-29 | Stop reason: HOSPADM

## 2024-09-29 RX ORDER — METOPROLOL SUCCINATE 25 MG/1
25 TABLET, EXTENDED RELEASE ORAL
Qty: 30 TABLET | Refills: 0 | Status: SHIPPED | OUTPATIENT
Start: 2024-09-30 | End: 2024-10-14 | Stop reason: SDUPTHER

## 2024-09-29 RX ORDER — CHOLECALCIFEROL (VITAMIN D3) 25 MCG
1000 TABLET ORAL DAILY
Status: DISCONTINUED | OUTPATIENT
Start: 2024-09-29 | End: 2024-09-29 | Stop reason: HOSPADM

## 2024-09-29 RX ORDER — METOPROLOL SUCCINATE 25 MG/1
25 TABLET, EXTENDED RELEASE ORAL
Status: DISCONTINUED | OUTPATIENT
Start: 2024-09-29 | End: 2024-09-29 | Stop reason: HOSPADM

## 2024-09-29 RX ADMIN — THERA TABS 1 TABLET: TAB at 08:36

## 2024-09-29 RX ADMIN — MIDODRINE HYDROCHLORIDE 5 MG: 5 TABLET ORAL at 08:37

## 2024-09-29 RX ADMIN — MIDODRINE HYDROCHLORIDE 5 MG: 5 TABLET ORAL at 17:35

## 2024-09-29 RX ADMIN — SENNOSIDES AND DOCUSATE SODIUM 2 TABLET: 50; 8.6 TABLET ORAL at 12:32

## 2024-09-29 RX ADMIN — ALLOPURINOL 300 MG: 300 TABLET ORAL at 08:36

## 2024-09-29 RX ADMIN — RIVAROXABAN 15 MG: 15 TABLET, FILM COATED ORAL at 17:35

## 2024-09-29 RX ADMIN — POLYETHYLENE GLYCOL 3350 17 G: 17 POWDER, FOR SOLUTION ORAL at 12:32

## 2024-09-29 RX ADMIN — DOFETILIDE 250 MCG: 0.25 CAPSULE ORAL at 08:36

## 2024-09-29 RX ADMIN — Medication 1000 UNITS: at 08:36

## 2024-09-29 RX ADMIN — CETIRIZINE HYDROCHLORIDE 10 MG: 10 TABLET, FILM COATED ORAL at 08:37

## 2024-09-29 RX ADMIN — SPIRONOLACTONE 12.5 MG: 25 TABLET ORAL at 09:50

## 2024-09-29 RX ADMIN — LEVOTHYROXINE SODIUM 88 MCG: 0.09 TABLET ORAL at 08:37

## 2024-09-29 RX ADMIN — ATORVASTATIN CALCIUM 20 MG: 20 TABLET, FILM COATED ORAL at 08:37

## 2024-09-29 RX ADMIN — PANTOPRAZOLE SODIUM 40 MG: 40 TABLET, DELAYED RELEASE ORAL at 08:36

## 2024-09-29 RX ADMIN — METOPROLOL SUCCINATE 25 MG: 25 TABLET, EXTENDED RELEASE ORAL at 12:26

## 2024-09-29 RX ADMIN — CYANOCOBALAMIN TAB 500 MCG 1000 MCG: 500 TAB at 08:36

## 2024-09-29 NOTE — PROGRESS NOTES
[facility]      Patient Care Team:  Leonor Adam MD as PCP - General  Maggi Horn MD as Consulting Physician (Pain Medicine)  Ratna Zavala MD as Consulting Physician (Cardiology)  Nora Foley APRN as Nurse Practitioner (Gastroenterology)  Gianluca Walton MD as Consulting Physician (Pulmonary Disease)  Nathanael Murillo MD as Consulting Physician (Allergy and Immunology)  Delfin Thorpe MD as Surgeon (Orthopedic Surgery)  Josh Loomis MD as Consulting Physician (Gastroenterology)  oB Ruggiero MD as Consulting Physician (Urology)          Cardiology assessment and plan     Atrial fibrillation with rapid ventricular response/paroxysmal currently in sinus rhythm  Paroxysmal atrial fibrillation  Chronic diastolic heart failure  Hypertension  Chronic kidney disease  History of sick sinus syndrome status post pacemaker placement  History of atrial fibrillation status post ablation therapy  Status post a flutter ablation  Prior history of TIA on chronic anticoagulation therapy  Chronic diastolic heart failure  History of hypertension  History of hypothyroidism  Chronic kidney disease  Morbid obesity  Chronic anemia  Severe left atrial enlargement  Dual-chamber pacemaker in situ  History of TIA in the past  Normal LV systolic function  cardiac catheterization in 2022 with a mild obstructive disease involving the LAD and a moderate obstructive disease involving the ostium of the diagonal branch with normal LV systolic function      Denies any new complaints  Clinically feels better  Patient is currently back in sinus rhythm  Clinically feels better  Heart rate is normal  Tmax is 97.5 pulse is 60 respirations are 15 blood pressure is 125/58 sats are 100%  Sodium is 139 potassium is 4.6 creatinine is 1.3 TSH is normal hemoglobin is 10.3  Discontinue IV Cardizem  Start patient on Toprol-XL 25 mg p.o. once a day  Resume home medications  Current medications include  "Lipitor 20 mg p.o. once a day Tikosyn to 50 mcg p.o. twice a day Toprol-XL 25 mg p.o. once a day midodrine 5 mg p.o. twice a day patient is on Aldactone 12.5 mg p.o. once a day and Xarelto 15 mg p.o. once a day  Continue anticoagulation therapy with Xarelto  Stable for discharge from cardiac standpoint  Toprol-XL is a new medication this hospital admission  Follow-up in office in 2 weeks        Chief Complaint: Palpitations    Subjective denies any new complaints clinically feels better back in sinus rhythm    Interval History: Patient reverted back to sinus rhythm from atrial fibrillation    History taken from: patient    Review of Systems:  Review of Systems   Constitutional: Negative for chills, decreased appetite and malaise/fatigue.   HENT:  Negative for congestion and nosebleeds.    Eyes:  Negative for blurred vision and double vision.   Cardiovascular:  Negative for chest pain, dyspnea on exertion and near-syncope.   Respiratory:  Negative for cough and shortness of breath.    Hematologic/Lymphatic: Negative for adenopathy. Does not bruise/bleed easily.   Skin:  Negative for color change and rash.   Musculoskeletal:  Negative for back pain and joint pain.   Gastrointestinal:  Negative for bloating, abdominal pain, hematemesis and hematochezia.   Genitourinary:  Negative for flank pain and hematuria.   Neurological:  Negative for dizziness and focal weakness.   Psychiatric/Behavioral:  Negative for altered mental status. The patient does not have insomnia.        Objective    Vital Signs  Visit Vitals  /51 (BP Location: Left arm, Patient Position: Lying)   Pulse 60   Temp 97.5 °F (36.4 °C) (Oral)   Resp 15   Ht 160 cm (63\")   Wt 121 kg (266 lb 5.1 oz)   LMP  (LMP Unknown)   SpO2 100%   BMI 47.18 kg/m²     Oxygen Therapy  SpO2: 100 %  Pulse Oximetry Type: Continuous  Device (Oxygen Therapy): CPAP  Flowsheet Rows      Flowsheet Row First Filed Value   Admission Height 160 cm (63\") Documented at 09/28/2024 " 1349   Admission Weight 120 kg (263 lb 10.7 oz) Documented at 09/28/2024 1349          Intake & Output (last 3 days)         09/26 0701 09/27 0700 09/27 0701 09/28 0700 09/28 0701 09/29 0700 09/29 0701 09/30 0700            Urine Unmeasured Occurrence   3 x 1 x    Stool Unmeasured Occurrence   0 x           Lines, Drains & Airways       Active LDAs       Name Placement date Placement time Site Days    Peripheral IV 09/28/24 1420 Left;Posterior Forearm 09/28/24  1420  Forearm  less than 1    Peripheral IV 09/28/24 1509 Right Antecubital 09/28/24  1509  Antecubital  less than 1                    Physical Exam:  Constitutional:       Appearance: Well-developed.   Eyes:      Conjunctiva/sclera: Conjunctivae normal.      Pupils: Pupils are equal, round, and reactive to light.   HENT:      Head: Normocephalic and atraumatic.   Neck:      Thyroid: No thyromegaly.   Pulmonary:      Effort: Pulmonary effort is normal.      Breath sounds: Normal breath sounds.   Cardiovascular:      Normal rate. Regular rhythm.   Pulses:     Intact distal pulses.   Edema:     Peripheral edema absent.   Abdominal:      General: Bowel sounds are normal.      Palpations: Abdomen is soft.   Musculoskeletal:      Cervical back: Normal range of motion and neck supple. Skin:     General: Skin is warm.   Neurological:      Mental Status: Alert and oriented to person, place, and time.         Results Review:     I reviewed the patient's new clinical results.    Lab Results (last 24 hours)       Procedure Component Value Units Date/Time    Basic Metabolic Panel [[id number]]  (Abnormal) Collected: 09/29/24 0510    Specimen: Blood Updated: 09/29/24 0547     Glucose 94 mg/dL      BUN 27 mg/dL      Creatinine 1.30 mg/dL      Sodium 139 mmol/L      Potassium 4.6 mmol/L      Chloride 108 mmol/L      CO2 23.9 mmol/L      Calcium 9.4 mg/dL      BUN/Creatinine Ratio 20.8     Anion Gap 7.1 mmol/L      eGFR 42.4 mL/min/1.73     Narrative:      GFR Normal  >60  Chronic Kidney Disease <60  Kidney Failure <15    The GFR formula is only valid for adults with stable renal function between ages 18 and 70.    CBC & Differential [[id number]]  (Abnormal) Collected: 09/29/24 0510    Specimen: Blood Updated: 09/29/24 0518    Narrative:      The following orders were created for panel order CBC & Differential.  Procedure                               Abnormality         Status                     ---------                               -----------         ------                     CBC Auto Differential[[id number]]        Abnormal            Final result                 Please view results for these tests on the individual orders.    CBC Auto Differential [[id number]]  (Abnormal) Collected: 09/29/24 0510    Specimen: Blood Updated: 09/29/24 0518     WBC 5.39 10*3/mm3      RBC 3.07 10*6/mm3      Hemoglobin 10.3 g/dL      Hematocrit 31.6 %      .9 fL      MCH 33.6 pg      MCHC 32.6 g/dL      RDW 14.6 %      RDW-SD 55.1 fl      MPV 10.3 fL      Platelets 154 10*3/mm3      Neutrophil % 49.8 %      Lymphocyte % 37.8 %      Monocyte % 8.2 %      Eosinophil % 3.3 %      Basophil % 0.7 %      Immature Grans % 0.2 %      Neutrophils, Absolute 2.68 10*3/mm3      Lymphocytes, Absolute 2.04 10*3/mm3      Monocytes, Absolute 0.44 10*3/mm3      Eosinophils, Absolute 0.18 10*3/mm3      Basophils, Absolute 0.04 10*3/mm3      Immature Grans, Absolute 0.01 10*3/mm3      nRBC 0.0 /100 WBC     TSH Rfx On Abnormal To Free T4 [[id number]]  (Normal) Collected: 09/28/24 1420    Specimen: Blood from Arm, Left Updated: 09/28/24 1552     TSH 3.040 uIU/mL     Comprehensive Metabolic Panel [[id number]]  (Abnormal) Collected: 09/28/24 1420    Specimen: Blood from Arm, Left Updated: 09/28/24 1453     Glucose 112 mg/dL      BUN 30 mg/dL      Creatinine 1.41 mg/dL      Sodium 139 mmol/L      Potassium 4.6 mmol/L      Comment: Slight hemolysis detected by analyzer. Result may be falsely elevated.         Chloride 108 mmol/L      CO2 22.7 mmol/L      Calcium 9.7 mg/dL      Total Protein 6.2 g/dL      Albumin 3.8 g/dL      ALT (SGPT) 20 U/L      AST (SGOT) 26 U/L      Alkaline Phosphatase 123 U/L      Total Bilirubin 0.4 mg/dL      Globulin 2.4 gm/dL      A/G Ratio 1.6 g/dL      BUN/Creatinine Ratio 21.3     Anion Gap 8.3 mmol/L      eGFR 38.5 mL/min/1.73     Narrative:      GFR Normal >60  Chronic Kidney Disease <60  Kidney Failure <15    The GFR formula is only valid for adults with stable renal function between ages 18 and 70.    Magnesium [[id number]]  (Normal) Collected: 09/28/24 1420    Specimen: Blood from Arm, Left Updated: 09/28/24 1453     Magnesium 1.8 mg/dL     CBC & Differential [[id number]]  (Abnormal) Collected: 09/28/24 1420    Specimen: Blood from Arm, Left Updated: 09/28/24 1431    Narrative:      The following orders were created for panel order CBC & Differential.  Procedure                               Abnormality         Status                     ---------                               -----------         ------                     CBC Auto Differential[[id number]]        Abnormal            Final result                 Please view results for these tests on the individual orders.    CBC Auto Differential [[id number]]  (Abnormal) Collected: 09/28/24 1420    Specimen: Blood from Arm, Left Updated: 09/28/24 1431     WBC 6.24 10*3/mm3      RBC 3.38 10*6/mm3      Hemoglobin 11.3 g/dL      Hematocrit 34.8 %      .0 fL      MCH 33.4 pg      MCHC 32.5 g/dL      RDW 14.6 %      RDW-SD 54.7 fl      MPV 9.7 fL      Platelets 178 10*3/mm3      Neutrophil % 58.0 %      Lymphocyte % 30.6 %      Monocyte % 8.2 %      Eosinophil % 1.9 %      Basophil % 1.0 %      Immature Grans % 0.3 %      Neutrophils, Absolute 3.62 10*3/mm3      Lymphocytes, Absolute 1.91 10*3/mm3      Monocytes, Absolute 0.51 10*3/mm3      Eosinophils, Absolute 0.12 10*3/mm3      Basophils, Absolute 0.06 10*3/mm3      Immature Grans,  Absolute 0.02 10*3/mm3      nRBC 0.0 /100 WBC     Extra Tubes [[id number]] Collected: 09/28/24 1247    Specimen: Blood from Arm, Right Updated: 09/28/24 1430    Narrative:      The following orders were created for panel order Extra Tubes.  Procedure                               Abnormality         Status                     ---------                               -----------         ------                     Light Blue Top[[id number]]                                   Final result                 Please view results for these tests on the individual orders.    Light Blue Top [[id number]] Collected: 09/28/24 1247    Specimen: Blood from Arm, Right Updated: 09/28/24 1430     Extra Tube Hold for add-ons.     Comment: Auto resulted             Results for orders placed during the hospital encounter of 06/11/24    Adult Transthoracic Echo Complete W/ Cont if Necessary Per Protocol    Interpretation Summary    Left ventricular systolic function is normal. Left ventricular ejection fraction appears to be 56 - 60%.    Left ventricular diastolic function was normal.    The left atrial cavity is moderately dilated.    The right atrial cavity is mildly  dilated.        Medication Review:   I have reviewed the patient's current medication list  Scheduled Meds:allopurinol, 300 mg, Oral, Daily  atorvastatin, 20 mg, Oral, Daily  cetirizine, 10 mg, Oral, Daily  cholecalciferol, 1,000 Units, Oral, Daily  dofetilide, 250 mcg, Oral, Q12H  levothyroxine, 88 mcg, Oral, Q AM  midodrine, 5 mg, Oral, BID AC  montelukast, 10 mg, Oral, Nightly  multivitamin, 1 tablet, Oral, Daily  pantoprazole, 40 mg, Oral, QAM AC  rivaroxaban, 15 mg, Oral, Daily With Dinner  spironolactone, 12.5 mg, Oral, Daily  cyanocobalamin, 1,000 mcg, Oral, Daily      Continuous Infusions:   PRN Meds:.  acetaminophen **OR** acetaminophen **OR** acetaminophen    senna-docusate sodium **AND** polyethylene glycol **AND** bisacodyl **AND** bisacodyl    Calcium Replacement -  Follow Nurse / BPA Driven Protocol    Magnesium Standard Dose Replacement - Follow Nurse / BPA Driven Protocol    melatonin    nitroglycerin    ondansetron ODT **OR** ondansetron    Phosphorus Replacement - Follow Nurse / BPA Driven Protocol    Potassium Replacement - Follow Nurse / BPA Driven Protocol    [COMPLETED] Insert Peripheral IV **AND** sodium chloride    ECG/EMG Results (last 24 hours)       Procedure Component Value Units Date/Time    ECG 12 Lead Dyspnea [[id number]] Collected: 09/28/24 1354     Updated: 09/28/24 1355     QT Interval 342 ms      QTC Interval 486 ms     Narrative:      HEART FCHY=866  bpm  RR Lmankkbx=025  ms  OK Uqycjlrs=830  ms  P Horizontal Axis=40  deg  P Front Axis=45  deg  QRSD Interval=95  ms  QT Mkxuxovv=350  ms  KLqF=734  ms  QRS Axis=35  deg  T Wave Axis=33  deg  - ABNORMAL ECG -  Sinus tachycardia  Ventricular premature complex  Prolonged OK interval  Date and Time of Study:2024-09-28 13:54:55    Telemetry Scan [[id number]] Resulted: 09/28/24     Updated: 09/29/24 0114    Telemetry Scan [[id number]] Resulted: 09/28/24     Updated: 09/29/24 0235    Telemetry Scan [[id number]] Resulted: 09/28/24     Updated: 09/29/24 0848    Telemetry Scan [[id number]] Resulted: 09/28/24     Updated: 09/29/24 0910    Telemetry Scan [[id number]] Resulted: 09/28/24     Updated: 09/29/24 0910    Telemetry Scan [[id number]] Resulted: 09/28/24     Updated: 09/29/24 0910            Imaging Results (Last 24 Hours)       Procedure Component Value Units Date/Time    XR Chest 1 View [[id number]] Collected: 09/28/24 1438     Updated: 09/28/24 1441    Narrative:      XR CHEST 1 VW    Date of Exam: 9/28/2024 2:35 PM EDT    Indication: Palpitations    Comparison: 6/11/2024    Findings:  Cardiomediastinal silhouette is unremarkable. There is minimal nonspecific interstitial prominence. No airspace disease, pneumothorax, nor pleural effusion. No acute osseous abnormality identified.      Impression:      Impression:  No  acute process identified.      Electronically Signed: Edward Kahn MD    9/28/2024 2:39 PM EDT    Workstation ID: [id number]          Results for orders placed during the hospital encounter of 08/29/22    Cardiac Catheterization/Vascular Study    Narrative  Table formatting from the original result was not included.  Cardiac Catheterization Operative Report    Faby Le  [id number]  8/29/2022  @PCP@    She underwent cardiac catheterization.    Indications for the procedure include: abnormal stress test.    Procedure Details:  The risks, benefits, complications, treatment options, and expected outcomes were discussed with the patient. The patient and/or family concurred with the proposed plan, giving informed consent.    After informed consent the patient was brought to the cath lab after appropriate IV hydration was begun and oral premedication was given. She was further sedated with fentanyl. She was prepped and draped in the usual manner. Using the modified Seldinger access technique, a 6 [place] sheath was placed in the femoral artery. A left heart catheterization with coronary arteriography was performed. Findings are discussed below.    After the procedure was completed, sedation was stopped and the sheaths and catheters were all removed. Hemostasis was achieved per established hospital protocols.    Conscious sedation:  Conscious sedation was performed according to protocol.  I supervised and directed an independent trained observer with the assistance of monitoring the patient's level of consciousness and physiologic status throughout the procedure.  Intraoperative service time was 60 minutes.    Findings:    Hemodynamics Central arctic pressure systolic 150 and diastolic 56 with a mean pressure of 82 mmHg  LV end-diastolic pressure of 12 mmHg  There was no gradient across the aortic valve on the pullback of the pigtail catheter  Left Main  left main is a large-caliber vessel angiographically normal  bifurcates into left into descending and left circumflex arteries  RCA  large-caliber dominant vessel  Right coronary artery is angiographically normal right coronary artery bifurcates into a large caliber PDA branch and a moderate caliber PLV branch that are angiographically normal  LAD  large-caliber vessel  LAD has mid focal angiographic 30% stenosis that is heavily calcified at the bifurcation with the diagonal and septal branch  Moderate size diagonal branch that has ostial angiographic of 40% stenosis  Circ  large-caliber vessel angiographically normal  First marginal branch of the circumflex has proximal angiographic 30 to 40% stenosis    LV  normal LV systolic function normal wall motion estimate LV ejection fraction of 55%  Coronary Dominance  right coronary artery    Estimated Blood Loss:  Minimal    Specimens: None    Complications:  None; patient tolerated the procedure well.    Disposition: PACU - hemodynamically stable.    Condition: stable    Impressions:  Mild obstructive coronary artery disease involving the LAD mild to moderate obstructive coronary artery disease involving the ostium of the diagonal branch mild obstructive coronary artery disease involving the proximal first marginal branch  Normal LV systolic function  Normal wall motion  Estimate LV ejection fraction of 55%  Normal left-sided filling pressures    Recommendations:  Medical management for obstructive coronary artery disease  Test results reviewed and discussed with patient and family     Results for orders placed during the hospital encounter of 06/11/24    Adult Transthoracic Echo Complete W/ Cont if Necessary Per Protocol    Interpretation Summary    Left ventricular systolic function is normal. Left ventricular ejection fraction appears to be 56 - 60%.    Left ventricular diastolic function was normal.    The left atrial cavity is moderately dilated.    The right atrial cavity is mildly  dilated.     Lab Results   Component  Value Date    GLUCOSE 94 09/29/2024    BUN 27 (H) 09/29/2024    CREATININE 1.30 (H) 09/29/2024    EGFR 42.4 (L) 09/29/2024    BCR 20.8 09/29/2024    K 4.6 09/29/2024    CO2 23.9 09/29/2024    CALCIUM 9.4 09/29/2024    ALBUMIN 3.8 09/28/2024    BILITOT 0.4 09/28/2024    AST 26 09/28/2024    ALT 20 09/28/2024      Lab Results   Component Value Date    CHOL 125 05/07/2024    TRIG 49 05/07/2024    HDL 67 (H) 05/07/2024    LDL 47 05/07/2024      Lab Results   Component Value Date    TROPONINT 13 08/14/2024        Assessment & Plan       Atrial fibrillation with RVR        Ratna Zavala MD  09/29/24  10:51 EDT

## 2024-09-29 NOTE — CONSULTS
[facility] CONSULT NOTE    Referring Provider: Dr. YURI Benitez  Reason for Consultation: Renal insufficiency    Chief complaint palpitation, shortness of breath    History of present illness: Patient is 79 years old female with chronic kidney disease stage III, hypertension, hyperlipidemia, coronary disease, seen recently in office, presented back to the hospital with palpitation, shortness of breath and dizziness.  Patient was found to be in A-fib with RVR and she was started on Cardizem drip.  Patient converted back to sinus rhythm.  Denied any fever, cough, expectoration, nausea, vomiting, hematuria or dysuria    History  Past Medical History:   Diagnosis Date    Ankle pain     Arthritis     Coronary artery disease     Depression     Gout     Hyperglycemia     Hyperlipidemia     Hypertension     Low back pain     Sleep apnea     Vitamin D deficiency      Past Surgical History:   Procedure Laterality Date    ANKLE FUSION      2017-left    CARDIAC CATHETERIZATION      CARDIAC CATHETERIZATION Right 8/29/2022    Procedure: Left Heart Cath;  Surgeon: Ratna Zavala MD;  Location: [facility] CATH INVASIVE LOCATION;  Service: Cardiovascular;  Laterality: Right;    CARDIAC ELECTROPHYSIOLOGY PROCEDURE Left 7/3/2023    Procedure: Pacemaker DC new [place] Notified;  Surgeon: Cr Herrera MD;  Location: [facility] CATH INVASIVE LOCATION;  Service: Cardiovascular;  Laterality: Left;    CARDIAC ELECTROPHYSIOLOGY PROCEDURE Right 3/5/2024    Procedure: Ablation atrial fibrillation, Cryo Ibrahima and Wayne Costa notified 02/09/24;  Surgeon: Cr Herrera MD;  Location: [facility] CATH INVASIVE LOCATION;  Service: Cardiovascular;  Laterality: Right;    CARDIAC ELECTROPHYSIOLOGY PROCEDURE N/A 3/5/2024    Procedure: Cardioversion;  Surgeon: Cr Herrera MD;  Location: [facility] CATH INVASIVE LOCATION;  Service: Cardiovascular;  Laterality: N/A;    CARPAL TUNNEL RELEASE      CATARACT EXTRACTION      CUBITAL TUNNEL  RELEASE      ENDOSCOPY N/A 5/28/2024    Procedure: ESOPHAGOGASTRODUODENOSCOPY WITH COLD FORCEP BIOPSY X1 AREA;  Surgeon: Josh Loomis MD;  Location: [facility] ENDOSCOPY;  Service: Gastroenterology;  Laterality: N/A;  Post- ESOPHAGITIS, GASTRITIS, HIATAL HERNIA    HYSTERECTOMY      REPLACEMENT TOTAL KNEE      left 2008    ROTATOR CUFF REPAIR      TOTAL KNEE ARTHROPLASTY Right 5/4/2023    Procedure: TOTAL KNEE ARTHROPLASTY WITH CORI ROBOT;  Surgeon: Chino Herrera II, MD;  Location: [facility] MAIN OR;  Service: Robotics - Ortho;  Laterality: Right;     Social History     Tobacco Use    Smoking status: Never     Passive exposure: Never    Smokeless tobacco: Never   Vaping Use    Vaping status: Never Used   Substance Use Topics    Alcohol use: No    Drug use: No     Family History   Problem Relation Age of Onset    Hypertension Mother     Stroke Father     Arthritis Brother     Cancer Brother        Review of Systems  ROS    Objective     Vital Signs  Temp:  [97.3 °F (36.3 °C)-98.3 °F (36.8 °C)] 97.5 °F (36.4 °C)  Heart Rate:  [] 60  Resp:  [13-20] 15  BP: (116-156)/(40-82) 125/51    No intake/output data recorded.  No intake/output data recorded.    Physical Exam:  Physical Exam    General Appearance: alert, oriented x 3, no acute distress   Skin: warm and dry  HEENT: oral mucosa normal, nonicteric sclera  Neck: supple, no JVD  Lungs: CTA  Heart: RRR, normal S1 and S2  Abdomen: soft, nontender, nondistended  : no palpable bladder  Extremities: no edema, cyanosis or clubbing  Neuro: normal speech and mental status     Results Review:   I reviewed the patient's new clinical results.    Lab Results   Component Value Date    CALCIUM 9.4 09/29/2024    PHOS 4.1 06/15/2024     Results from last 7 days   Lab Units 09/29/24  0510 09/28/24  1420   MAGNESIUM mg/dL  --  1.8   SODIUM mmol/L 139 139   POTASSIUM mmol/L 4.6 4.6   CHLORIDE mmol/L 108* 108*   CO2 mmol/L 23.9 22.7   BUN mg/dL 27* 30*    CREATININE mg/dL 1.30* 1.41*   GLUCOSE mg/dL 94 112*   CALCIUM mg/dL 9.4 9.7   WBC 10*3/mm3 5.39 6.24   HEMOGLOBIN g/dL 10.3* 11.3*   PLATELETS 10*3/mm3 154 178   ALT (SGPT) U/L  --  20   AST (SGOT) U/L  --  26     Lab Results   Component Value Date    TROPONINT 13 08/14/2024     Estimated Creatinine Clearance: 45.7 mL/min (A) (by C-G formula based on SCr of 1.3 mg/dL (H)).  Lab Results   Component Value Date    URICACID 4.5 05/07/2024       Brief Urine Lab Results  (Last result in the past 365 days)        Color   Clarity   Blood   Leuk Est   Nitrite   Protein   CREAT   Urine HCG        09/13/24 1417 Yellow   Clear   Negative   Moderate (2+)   Negative   Negative                   Prior to Admission medications    Medication Sig Start Date End Date Taking? Authorizing Provider   allopurinol (ZYLOPRIM) 300 MG tablet Take 1 tablet by mouth once daily 7/1/24  Yes Leonor Adam MD   atorvastatin (LIPITOR) 20 MG tablet Take 1 tablet by mouth Daily. 10/3/23  Yes Leonor Adam MD   cetirizine (zyrTEC) 10 MG tablet Take 1 tablet by mouth Daily. 8/28/18  Yes Franklin Cid MD   cyanocobalamin (VITAMIN B-12) 1000 MCG tablet Take 1 tablet by mouth Daily. 12/20/18  Yes Franklin Cid MD   dofetilide (Tikosyn) 250 MCG capsule Take 1 capsule by mouth Every 12 (Twelve) Hours. 6/15/24  Yes Sly Sandy MD   IRON CR PO Take 1 tablet by mouth Daily.   Yes Franklin Cid MD   levothyroxine (SYNTHROID, LEVOTHROID) 88 MCG tablet Take 1 tablet by mouth Daily. 6/25/24  Yes Leonor Adam MD   midodrine (PROAMATINE) 5 MG tablet Take 1 tablet by mouth 2 (Two) Times a Day Before Meals. 9/18/24  Yes Leonor Adam MD   montelukast (SINGULAIR) 10 MG tablet Take 1 tablet by mouth Every Night. 6/25/24  Yes Leonor Adam MD   multivitamin (MULTI VITAMIN PO) Take 1 tablet by mouth Daily.   Yes Provider, MD Franklin   mupirocin (BACTROBAN) 2 % ointment APPLY SMALL AMOUNT OF OINTMENT  TOPICALLY TO AFFECTED AREA TWICE DAILY 8/22/24  Yes Franklin Cid MD   pantoprazole (PROTONIX) 40 MG EC tablet Take 1 tablet by mouth Every Morning. 5/28/24  Yes Franklin Cid MD   rivaroxaban (XARELTO) 15 MG tablet Take 1 tablet by mouth Daily With Dinner. Indications: Atrial Fibrillation 9/23/24  Yes Leonor Adam MD   spironolactone (ALDACTONE) 25 MG tablet Oral for 90 Days   Yes Franklin Cid MD   triamcinolone (KENALOG) 0.1 % cream APPLY CREAM EXTERNALLY TWICE DAILY TO ECZEMA FOR UP TO 2 WEEKS PER MONTH AS NEEDED 8/22/24  Yes Franklin Cid MD   Vitamin D, Cholecalciferol, 25 MCG (1000 UT) tablet Take 1 tablet by mouth Daily. 6/16/16  Yes Franklin Cid MD   Azelastine HCl 137 MCG/SPRAY solution 2 sprays by Alternating Nares route Daily As Needed. 6/19/23   Franklin Cid MD   hydrocortisone 2.5 % ointment APPLY THIN LAYER OF OINTMENT EXTERNALLY TO AFFECTED AREA TWICE DAILY. SAFE TO USE ON FACE 8/22/24   Franklin Cid MD       allopurinol, 300 mg, Oral, Daily  atorvastatin, 20 mg, Oral, Daily  cetirizine, 10 mg, Oral, Daily  cholecalciferol, 1,000 Units, Oral, Daily  dofetilide, 250 mcg, Oral, Q12H  levothyroxine, 88 mcg, Oral, Q AM  metoprolol succinate XL, 25 mg, Oral, Q24H  midodrine, 5 mg, Oral, BID AC  montelukast, 10 mg, Oral, Nightly  multivitamin, 1 tablet, Oral, Daily  pantoprazole, 40 mg, Oral, QAM AC  rivaroxaban, 15 mg, Oral, Daily With Dinner  spironolactone, 12.5 mg, Oral, Daily  cyanocobalamin, 1,000 mcg, Oral, Daily           Assessment & Plan       Chronic kidney disease stage III.  Secondary to hypertensive nephrosclerosis.  Creatinine at around baseline now.  Electrolytes, volume status okay  Paroxysmal atrial fibrillation.  Patient was admitted with A-fib with RVR.  Converted back to sinus rhythm.  Off Cardizem drip now  Hypotension.  Blood pressure okay.  On oral midodrine.  Antihypertensives were stopped recently  Anemia and  chronic kidney disease.  Hemoglobin acceptable    Plan:  Continue current treatment  Surveillance labs    I discussed the patients findings and my recommendations with patient and nursing staff    Ethan Navarro MD  09/29/24  12:09 EDT

## 2024-09-29 NOTE — PLAN OF CARE
Goal Outcome Evaluation:  Plan of Care Reviewed With: patient        Progress: improving  Outcome Evaluation: pending discharge today

## 2024-09-29 NOTE — PROGRESS NOTES
"             [facility] Medicine Services  Discharge Summary    Date of Service: 24  Patient Name: Faby Le  : 1945  MRN: [id number]    Date of Admission: 2024  Discharge Diagnosis:   Afib with RVR - Resolved  Date of Discharge:  24  Primary Care Physician: Leonor Adam MD      Presenting Problem:   Atrial fibrillation with rapid ventricular response [I48.91]  Atrial fibrillation with RVR [I48.91]    Active and Resolved Hospital Problems:  Active Hospital Problems    Diagnosis POA    **Atrial fibrillation with RVR [I48.91] Yes      Resolved Hospital Problems   No resolved problems to display.         Hospital Course     HPI:  Per the H&P written by Martha Casillas APRN , dated 24:  \"Palpitations\"    Hospital Course:  Faby Le is a 79 y.o. female with a CMH of atrial fibrillation, HLD, CAD, HTN, depression who presented to [facility] on 2024 with heart palpiations, dizziness and shortness of breath. On ED evaluation patient was noted to be in afib with RVR and a rate of 140. A cardizem gtt was started, and rate is now in 70s. CMP shows Cr of 1.41 which is above her baseline of 1.24, otherwise labs are unremarkable. Cardiology has been consulted. Hospitalist team to admit for further medical management.     Patient was evaluated by cardiologist, patient is taken off dill drip given her heart rate is controlled now in 60s, patient home medications were resumed at home she is on Tikosyn 250 which was also resumed, cardiology also recommended to start her on metoprolol succinate 25 mg daily, clear the patient for discharge from their standpoint.    Patient is currently clinically stable. Patient evaluated per PT and is recommended to Home . Plan for discharge discussed with the Patient prior to discharge, patient agreed with the plan. Patient advised to return to hospital or go near by hospital or call 911, should patient's symptoms worsen or hypotension. " Patient also advised to follow up with PCP within 1-5 days for recent hospitalization, monitoring and further management of patient's health problems.  Patient acknowledged understanding and agreed with the discharge plan.     DISCHARGE Follow Up Recommendations for labs and diagnostics:     -Follow-up with your cardiologist for further adjustment of your medications    -Follow-up with your PCP for recent hospitalization      Reasons For Change In Medications and Indications for New Medications:      Day of Discharge     Vital Signs:  Temp:  [97.3 °F (36.3 °C)-97.8 °F (36.6 °C)] 97.8 °F (36.6 °C)  Heart Rate:  [60-76] 64  Resp:  [13-18] 16  BP: (116-156)/(40-61) 139/53    Physical Exam:  General Appearance:  Alert, awake, conversational   Head:  Atraumatic normocephalic   Eyes:        No scleral icterus   Neck: Normal range of motion non tender   Pulm: Clear to auscultation   Cardio: Normal heart rate-controlled, irregular rhythm   Extremities: No remarkable edema of the lower extremities   Abdomen: Soft nontender, no rebound or guarding   /Renal: No suprapubic tenderness   Musculoskeletal: Lower extremities       Neurologic: Awake alert oriented x 3, normal speech, no focal neurological deficit present on my exam             Pertinent  and/or Most Recent Results     LAB RESULTS:      Lab 09/29/24  0510 09/28/24  1420   WBC 5.39 6.24   HEMOGLOBIN 10.3* 11.3*   HEMATOCRIT 31.6* 34.8   PLATELETS 154 178   NEUTROS ABS 2.68 3.62   IMMATURE GRANS (ABS) 0.01 0.02   LYMPHS ABS 2.04 1.91   MONOS ABS 0.44 0.51   EOS ABS 0.18 0.12   .9* 103.0*         Lab 09/29/24  0510 09/28/24  1420   SODIUM 139 139   POTASSIUM 4.6 4.6   CHLORIDE 108* 108*   CO2 23.9 22.7   ANION GAP 7.1 8.3   BUN 27* 30*   CREATININE 1.30* 1.41*   EGFR 42.4* 38.5*   GLUCOSE 94 112*   CALCIUM 9.4 9.7   MAGNESIUM  --  1.8   TSH  --  3.040         Lab 09/28/24  1420   TOTAL PROTEIN 6.2   ALBUMIN 3.8   GLOBULIN 2.4   ALT (SGPT) 20   AST (SGOT) 26    BILIRUBIN 0.4   ALK PHOS 123*                     Brief Urine Lab Results  (Last result in the past 365 days)        Color   Clarity   Blood   Leuk Est   Nitrite   Protein   CREAT   Urine HCG        09/13/24 1417 Yellow   Clear   Negative   Moderate (2+)   Negative   Negative                 Microbiology Results (last 10 days)       ** No results found for the last 240 hours. **            XR Chest 1 View    Result Date: 9/28/2024  Impression: Impression: No acute process identified. Electronically Signed: Edward Kahn MD  9/28/2024 2:39 PM EDT  Workstation ID: [id number]     Results for orders placed during the hospital encounter of 02/15/24    Duplex Carotid Ultrasound CAR    Interpretation Summary    Right internal carotid artery demonstrates a less than 50% stenosis.    Left internal carotid artery demonstrates a less than 50% stenosis.      Results for orders placed during the hospital encounter of 02/15/24    Duplex Carotid Ultrasound CAR    Interpretation Summary    Right internal carotid artery demonstrates a less than 50% stenosis.    Left internal carotid artery demonstrates a less than 50% stenosis.      Results for orders placed during the hospital encounter of 06/11/24    Adult Transthoracic Echo Complete W/ Cont if Necessary Per Protocol    Interpretation Summary    Left ventricular systolic function is normal. Left ventricular ejection fraction appears to be 56 - 60%.    Left ventricular diastolic function was normal.    The left atrial cavity is moderately dilated.    The right atrial cavity is mildly  dilated.      Labs Pending at Discharge:      Procedures Performed           Consults:   Consults       Date and Time Order Name Status Description    9/28/2024  3:25 PM Inpatient Cardiology Consult                Discharge Details        Discharge Medications        ASK your doctor about these medications        Instructions Start Date   allopurinol 300 MG tablet  Commonly known as: ZYLOPRIM   300  mg, Oral, Daily      atorvastatin 20 MG tablet  Commonly known as: LIPITOR   20 mg, Oral, Daily      Azelastine HCl 137 MCG/SPRAY solution   2 sprays, Alternating Nares, Daily PRN      cetirizine 10 MG tablet  Commonly known as: zyrTEC   1 tablet, Oral, Daily      cholecalciferol 25 MCG (1000 UT) tablet  Commonly known as: VITAMIN D3   1 tablet, Oral, Daily      cyanocobalamin 1000 MCG tablet  Commonly known as: VITAMIN B-12   1 tablet, Oral, Daily      dofetilide 250 MCG capsule  Commonly known as: Tikosyn   250 mcg, Oral, Every 12 Hours      hydrocortisone 2.5 % ointment   APPLY THIN LAYER OF OINTMENT EXTERNALLY TO AFFECTED AREA TWICE DAILY. SAFE TO USE ON FACE      IRON CR PO   1 tablet, Oral, Daily      levothyroxine 88 MCG tablet  Commonly known as: SYNTHROID, LEVOTHROID   88 mcg, Oral, Daily      midodrine 5 MG tablet  Commonly known as: PROAMATINE   5 mg, Oral, 2 Times Daily Before Meals      montelukast 10 MG tablet  Commonly known as: SINGULAIR   10 mg, Oral, Nightly      multivitamin tablet tablet   1 tablet, Oral, Daily      mupirocin 2 % ointment  Commonly known as: BACTROBAN   APPLY SMALL AMOUNT OF OINTMENT TOPICALLY TO AFFECTED AREA TWICE DAILY      pantoprazole 40 MG EC tablet  Commonly known as: PROTONIX   40 mg, Oral, Every Morning      rivaroxaban 15 MG tablet  Commonly known as: XARELTO   15 mg, Oral, Daily With Dinner      spironolactone 25 MG tablet  Commonly known as: ALDACTONE   Oral for 90 Days      triamcinolone 0.1 % cream  Commonly known as: KENALOG   APPLY CREAM EXTERNALLY TWICE DAILY TO ECZEMA FOR UP TO 2 WEEKS PER MONTH AS NEEDED               Allergies   Allergen Reactions    Celecoxib Itching and Swelling     swelling         Discharge Disposition:       Diet:  Hospital:  Diet Order   Procedures    Diet: Cardiac; Healthy Heart (2-3 Na+); Fluid Consistency: Thin (IDDSI 0)         Discharge Activity:         CODE STATUS:  Code Status and Medical Interventions: CPR (Attempt to  Resuscitate); Full Support   Ordered at: 09/28/24 1525     Code Status (Patient has no pulse and is not breathing):    CPR (Attempt to Resuscitate)     Medical Interventions (Patient has pulse or is breathing):    Full Support         Future Appointments   Date Time Provider Department Center   10/14/2024 10:45 AM Ratna Zavala MD [facility]   11/8/2024  8:45 AM Cr Herrera MD [facility]   12/12/2024 10:00 AM Leonor Adam MD [facility]       [unfilled]    Time spent on Discharge including face to face service:  >30 minutes    Part of this note may be an electronic transcription/translation of spoken language to printed text using the Dragon Dictation System.    Signature: Electronically signed by Frank Benitez MD, 09/29/24, 14:46 EDT.  [facility] Hospitalist Team

## 2024-09-29 NOTE — DISCHARGE SUMMARY
"             [facility] Medicine Services  Discharge Summary    Date of Service: 24  Patient Name: Faby Le  : 1945  MRN: [id number]     Date of Admission: 2024  Discharge Diagnosis:   Afib with RVR - Resolved  Date of Discharge:  24  Primary Care Physician: Leonor Adam MD        Presenting Problem:   Atrial fibrillation with rapid ventricular response [I48.91]  Atrial fibrillation with RVR [I48.91]    Active and Resolved Hospital Problems:  Active Hospital Problems    Diagnosis POA    **Atrial fibrillation with RVR [I48.91] Yes      Resolved Hospital Problems   No resolved problems to display.         Hospital Course     HPI:  Per the H&P written by Martha Casillas APRN , dated 24:  \"Palpitations\"     Hospital Course:  Faby Le is a 79 y.o. female with a CMH of atrial fibrillation, HLD, CAD, HTN, depression who presented to [facility] on 2024 with heart palpiations, dizziness and shortness of breath. On ED evaluation patient was noted to be in afib with RVR and a rate of 140. A cardizem gtt was started, and rate is now in 70s. CMP shows Cr of 1.41 which is above her baseline of 1.24, otherwise labs are unremarkable. Cardiology has been consulted. Hospitalist team to admit for further medical management.      Patient was evaluated by cardiologist, patient is taken off dill drip given her heart rate is controlled now in 60s, patient home medications were resumed at home she is on Tikosyn 250 which was also resumed, cardiology also recommended to start her on metoprolol succinate 25 mg daily, clear the patient for discharge from their standpoint.     Patient is currently clinically stable. Patient evaluated per PT and is recommended to Home . Plan for discharge discussed with the Patient prior to discharge, patient agreed with the plan. Patient advised to return to hospital or go near by hospital or call 911, should patient's symptoms worsen or hypotension. " Patient also advised to follow up with PCP within 1-5 days for recent hospitalization, monitoring and further management of patient's health problems.  Patient acknowledged understanding and agreed with the discharge plan.      DISCHARGE Follow Up Recommendations for labs and diagnostics:      -Follow-up with your cardiologist for further adjustment of your medications     -Follow-up with your PCP for recent hospitalization         Reasons For Change In Medications and Indications for New Medications:      Day of Discharge     Vital Signs:  Temp:  [97.3 °F (36.3 °C)-97.8 °F (36.6 °C)] 97.8 °F (36.6 °C)  Heart Rate:  [60-64] 64  Resp:  [14-18] 16  BP: (116-156)/(40-61) 126/56    Physical Exam:  General Appearance:  Alert, awake, conversational   Head:  Atraumatic normocephalic   Eyes:        No scleral icterus   Neck: Normal range of motion non tender   Pulm: Clear to auscultation   Cardio: Normal heart rate-controlled, irregular rhythm   Extremities: No remarkable edema of the lower extremities   Abdomen: Soft nontender, no rebound or guarding   /Renal: No suprapubic tenderness   Musculoskeletal: Lower extremities         Neurologic: Awake alert oriented x 3, normal speech, no focal neurological deficit present on my exam                  Pertinent  and/or Most Recent Results     LAB RESULTS:      Lab 09/29/24  0510 09/28/24  1420   WBC 5.39 6.24   HEMOGLOBIN 10.3* 11.3*   HEMATOCRIT 31.6* 34.8   PLATELETS 154 178   NEUTROS ABS 2.68 3.62   IMMATURE GRANS (ABS) 0.01 0.02   LYMPHS ABS 2.04 1.91   MONOS ABS 0.44 0.51   EOS ABS 0.18 0.12   .9* 103.0*         Lab 09/29/24  0510 09/28/24  1420   SODIUM 139 139   POTASSIUM 4.6 4.6   CHLORIDE 108* 108*   CO2 23.9 22.7   ANION GAP 7.1 8.3   BUN 27* 30*   CREATININE 1.30* 1.41*   EGFR 42.4* 38.5*   GLUCOSE 94 112*   CALCIUM 9.4 9.7   MAGNESIUM  --  1.8   TSH  --  3.040         Lab 09/28/24  1420   TOTAL PROTEIN 6.2   ALBUMIN 3.8   GLOBULIN 2.4   ALT (SGPT) 20   AST  (SGOT) 26   BILIRUBIN 0.4   ALK PHOS 123*                     Brief Urine Lab Results  (Last result in the past 365 days)        Color   Clarity   Blood   Leuk Est   Nitrite   Protein   CREAT   Urine HCG        09/13/24 1417 Yellow   Clear   Negative   Moderate (2+)   Negative   Negative                 Microbiology Results (last 10 days)       ** No results found for the last 240 hours. **            XR Chest 1 View    Result Date: 9/28/2024  Impression: Impression: No acute process identified. Electronically Signed: Edward Kahn MD  9/28/2024 2:39 PM EDT  Workstation ID: [id number]     Results for orders placed during the hospital encounter of 02/15/24    Duplex Carotid Ultrasound CAR    Interpretation Summary    Right internal carotid artery demonstrates a less than 50% stenosis.    Left internal carotid artery demonstrates a less than 50% stenosis.      Results for orders placed during the hospital encounter of 02/15/24    Duplex Carotid Ultrasound CAR    Interpretation Summary    Right internal carotid artery demonstrates a less than 50% stenosis.    Left internal carotid artery demonstrates a less than 50% stenosis.      Results for orders placed during the hospital encounter of 06/11/24    Adult Transthoracic Echo Complete W/ Cont if Necessary Per Protocol    Interpretation Summary    Left ventricular systolic function is normal. Left ventricular ejection fraction appears to be 56 - 60%.    Left ventricular diastolic function was normal.    The left atrial cavity is moderately dilated.    The right atrial cavity is mildly  dilated.      Labs Pending at Discharge:      Procedures Performed           Consults:   Consults       Date and Time Order Name Status Description    9/28/2024  3:25 PM Inpatient Cardiology Consult                Discharge Details        Discharge Medications        New Medications        Instructions Start Date   metoprolol succinate XL 25 MG 24 hr tablet  Commonly known as: TOPROL-XL   25  mg, Oral, Every 24 Hours Scheduled   Start Date: September 30, 2024            Continue These Medications        Instructions Start Date   allopurinol 300 MG tablet  Commonly known as: ZYLOPRIM   300 mg, Oral, Daily      atorvastatin 20 MG tablet  Commonly known as: LIPITOR   20 mg, Oral, Daily      Azelastine HCl 137 MCG/SPRAY solution   2 sprays, Alternating Nares, Daily PRN      cetirizine 10 MG tablet  Commonly known as: zyrTEC   1 tablet, Oral, Daily      cholecalciferol 25 MCG (1000 UT) tablet  Commonly known as: VITAMIN D3   1 tablet, Oral, Daily      cyanocobalamin 1000 MCG tablet  Commonly known as: VITAMIN B-12   1 tablet, Oral, Daily      dofetilide 250 MCG capsule  Commonly known as: Tikosyn   250 mcg, Oral, Every 12 Hours      hydrocortisone 2.5 % ointment   APPLY THIN LAYER OF OINTMENT EXTERNALLY TO AFFECTED AREA TWICE DAILY. SAFE TO USE ON FACE      IRON CR PO   1 tablet, Oral, Daily      levothyroxine 88 MCG tablet  Commonly known as: SYNTHROID, LEVOTHROID   88 mcg, Oral, Daily      midodrine 5 MG tablet  Commonly known as: PROAMATINE   5 mg, Oral, 2 Times Daily Before Meals      montelukast 10 MG tablet  Commonly known as: SINGULAIR   10 mg, Oral, Nightly      multivitamin tablet tablet   1 tablet, Oral, Daily      mupirocin 2 % ointment  Commonly known as: BACTROBAN   APPLY SMALL AMOUNT OF OINTMENT TOPICALLY TO AFFECTED AREA TWICE DAILY      pantoprazole 40 MG EC tablet  Commonly known as: PROTONIX   40 mg, Oral, Every Morning      rivaroxaban 15 MG tablet  Commonly known as: XARELTO   15 mg, Oral, Daily With Dinner      spironolactone 25 MG tablet  Commonly known as: ALDACTONE   Oral for 90 Days      triamcinolone 0.1 % cream  Commonly known as: KENALOG   APPLY CREAM EXTERNALLY TWICE DAILY TO ECZEMA FOR UP TO 2 WEEKS PER MONTH AS NEEDED               Allergies   Allergen Reactions    Celecoxib Itching and Swelling     swelling         Discharge Disposition:   Home or Self  Care    Diet:  Hospital:  Diet Order   Procedures    Diet: Cardiac; Healthy Heart (2-3 Na+); Fluid Consistency: Thin (IDDSI 0)         Discharge Activity:         CODE STATUS:  Code Status and Medical Interventions: CPR (Attempt to Resuscitate); Full Support   Ordered at: 09/28/24 1525     Code Status (Patient has no pulse and is not breathing):    CPR (Attempt to Resuscitate)     Medical Interventions (Patient has pulse or is breathing):    Full Support         Future Appointments   Date Time Provider Department Center   10/14/2024 10:45 AM Ratna Zavala MD [facility]   11/8/2024  8:45 AM Cr Herrera MD [facility]   12/12/2024 10:00 AM Leonor Adam MD [facility]  [facility]           Time spent on Discharge including face to face service:  >30 minutes    Part of this note may be an electronic transcription/translation of spoken language to printed text using the Dragon Dictation System.    Signature: Electronically signed by Frank Benitez MD, 09/29/24, 18:19 EDT.  [facility] Hospitalist Team

## 2024-09-29 NOTE — THERAPY EVALUATION
Patient Name: Faby Le  : 1945    MRN: [id number]                              Today's Date: 2024       Admit Date: 2024    Visit Dx:     ICD-10-CM ICD-9-CM   1. Atrial fibrillation with rapid ventricular response  I48.91 427.31     Patient Active Problem List   Diagnosis    Abnormal complete blood count    Ankle pain    Arthralgia of left elbow    Arthritis    B12 deficiency    Coronary artery disease    Depression    Gallstones    Gout    Hyperglycemia    Hyperlipidemia    Hypertension    Hypothyroid    Low back pain    Mobility poor    Post-menopausal    Sleep apnea    Vitamin D deficiency    Chronic kidney disease, stage 3 (moderate)    Oropharyngeal dysphagia    Class 3 drug-induced obesity with serious comorbidity and body mass index (BMI) of 45.0 to 49.9 in adult    Chronic pain of right knee    Light headedness    Paroxysmal atrial fibrillation    Chronic left shoulder pain    Status post knee replacement    Syncope    Presence of cardiac pacemaker    Chronic diastolic heart failure    Paresthesia of left foot    TIA (transient ischemic attack)    Tachy-jojo syndrome    Anemia    Dizziness    Acute CHF    Acute exacerbation of CHF (congestive heart failure)    Chronic HFrEF (heart failure with reduced ejection fraction)    Endometrial carcinoma    Symptomatic hypotension    Orthostatic hypotension    Atrial fibrillation with RVR     Past Medical History:   Diagnosis Date    Ankle pain     Arthritis     Coronary artery disease     Depression     Gout     Hyperglycemia     Hyperlipidemia     Hypertension     Low back pain     Sleep apnea     Vitamin D deficiency      Past Surgical History:   Procedure Laterality Date    ANKLE FUSION      2017-left    CARDIAC CATHETERIZATION      CARDIAC CATHETERIZATION Right 2022    Procedure: Left Heart Cath;  Surgeon: Ratna Zavala MD;  Location: [facility] CATH INVASIVE LOCATION;  Service: Cardiovascular;  Laterality: Right;    CARDIAC  ELECTROPHYSIOLOGY PROCEDURE Left 7/3/2023    Procedure: Pacemaker DC new Alleman Notified;  Surgeon: Cr Herrera MD;  Location: [facility] CATH INVASIVE LOCATION;  Service: Cardiovascular;  Laterality: Left;    CARDIAC ELECTROPHYSIOLOGY PROCEDURE Right 3/5/2024    Procedure: Ablation atrial fibrillation, Cryo Ibrahima and Wayne Igor notified 02/09/24;  Surgeon: Cr Herrera MD;  Location: [facility] CATH INVASIVE LOCATION;  Service: Cardiovascular;  Laterality: Right;    CARDIAC ELECTROPHYSIOLOGY PROCEDURE N/A 3/5/2024    Procedure: Cardioversion;  Surgeon: Cr Herrera MD;  Location: [facility] CATH INVASIVE LOCATION;  Service: Cardiovascular;  Laterality: N/A;    CARPAL TUNNEL RELEASE      CATARACT EXTRACTION      CUBITAL TUNNEL RELEASE      ENDOSCOPY N/A 5/28/2024    Procedure: ESOPHAGOGASTRODUODENOSCOPY WITH COLD FORCEP BIOPSY X1 AREA;  Surgeon: Josh Loomis MD;  Location: [facility] ENDOSCOPY;  Service: Gastroenterology;  Laterality: N/A;  Post- ESOPHAGITIS, GASTRITIS, HIATAL HERNIA    HYSTERECTOMY      REPLACEMENT TOTAL KNEE      left 2008    ROTATOR CUFF REPAIR      TOTAL KNEE ARTHROPLASTY Right 5/4/2023    Procedure: TOTAL KNEE ARTHROPLASTY WITH CORI ROBOT;  Surgeon: Chino Herrera II, MD;  Location: [facility] MAIN OR;  Service: Robotics - Ortho;  Laterality: Right;      General Information       Row Name 09/29/24 1538          Physical Therapy Time and Intention    Document Type evaluation  -EL     Mode of Treatment individual therapy;physical therapy  -EL       Row Name 09/29/24 1538          General Information    Prior Level of Function independent:;all household mobility;ADL's;driving  Has daughters who can assist, but works during the day  -EL       Row Name 09/29/24 1538          Living Environment    People in Home child(marlon), adult  -EL       Row Name 09/29/24 1538          Cognition    Orientation Status (Cognition) oriented x 4  -EL       Row Name 09/29/24 1539           Safety Issues, Functional Mobility    Impairments Affecting Function (Mobility) pain;endurance/activity tolerance  -EL               User Key  (r) = Recorded By, (t) = Taken By, (c) = Cosigned By      Initials Name Provider Type    Erik Malcolm, MATEUS Physical Therapist                   Mobility       Row Name 09/29/24 1542          Bed Mobility    Bed Mobility supine-sit  -EL     Supine-Sit Maries (Bed Mobility) standby assist  -EL       Row Name 09/29/24 1542          Sit-Stand Transfer    Sit-Stand Maries (Transfers) contact guard  -EL     Assistive Device (Sit-Stand Transfers) cane, straight  -EL       Row Name 09/29/24 1542          Gait/Stairs (Locomotion)    Maries Level (Gait) contact guard  -EL     Assistive Device (Gait) cane, straight  -EL     Distance in Feet (Gait) 80  -EL     Deviations/Abnormal Patterns (Gait) gait speed decreased  -EL     Comment, (Gait/Stairs) Mild SOA following  -EL               User Key  (r) = Recorded By, (t) = Taken By, (c) = Cosigned By      Initials Name Provider Type    Erik Malcolm, MATEUS Physical Therapist                   Obj/Interventions       Row Name 09/29/24 1546          Range of Motion Comprehensive    General Range of Motion bilateral lower extremity ROM WFL  -EL       [facility] Name 09/29/24 1546          Strength Comprehensive (MMT)    General Manual Muscle Testing (MMT) Assessment lower extremity strength deficits identified  -EL     Comment, General Manual Muscle Testing (MMT) Assessment BLE 4-/5 gross  -EL       [facility] Name 09/29/24 1546          Balance    Balance Assessment sitting static balance;standing dynamic balance;standing static balance  -EL     Static Sitting Balance independent  -EL     Static Standing Balance contact guard  -EL     Dynamic Standing Balance contact guard  -EL     Position/Device Used, Standing Balance cane, straight  -EL       Row Name 09/29/24 1546          Sensory Assessment (Somatosensory)    Sensory Assessment  (Somatosensory) sensation intact  -EL               User Key  (r) = Recorded By, (t) = Taken By, (c) = Cosigned By      Initials Name Provider Type    Erik Malcolm PT Physical Therapist                   Goals/Plan       Row Name 09/29/24 1554          Bed Mobility Goal 1 (PT)    Activity/Assistive Device (Bed Mobility Goal 1, PT) bed mobility activities, all  -EL     Devon Level/Cues Needed (Bed Mobility Goal 1, PT) independent  -EL     Time Frame (Bed Mobility Goal 1, PT) long term goal (LTG);2 weeks  -EL       Row Name 09/29/24 1554          Transfer Goal 1 (PT)    Activity/Assistive Device (Transfer Goal 1, PT) transfers, all  -EL     Devon Level/Cues Needed (Transfer Goal 1, PT) standby assist  -EL     Time Frame (Transfer Goal 1, PT) long term goal (LTG);2 weeks  -EL       [facility] Name 09/29/24 1554          Gait Training Goal 1 (PT)    Activity/Assistive Device (Gait Training Goal 1, PT) gait (walking locomotion);cane, straight  -EL     Devon Level (Gait Training Goal 1, PT) modified independence  -EL     Distance (Gait Training Goal 1, PT) 120  -EL     Time Frame (Gait Training Goal 1, PT) long term goal (LTG);2 weeks  -EL       Row Name 09/29/24 1554          Therapy Assessment/Plan (PT)    Planned Therapy Interventions (PT) balance training;bed mobility training;gait training;neuromuscular re-education;transfer training  -EL               User Key  (r) = Recorded By, (t) = Taken By, (c) = Cosigned By      Initials Name Provider Type    Erik Malcolm PT Physical Therapist                   Clinical Impression       Row Name 09/29/24 1546          Pain    Pretreatment Pain Rating 0/10 - no pain  -EL     Posttreatment Pain Rating 0/10 - no pain  -EL       [facility] Name 09/29/24 1545          Plan of Care Review    Plan of Care Reviewed With patient  -EL     Outcome Evaluation [facility] 80 [facility]. Pt reports living at home with daughter, typically is independent with all ADLs,  ambulation with SPC and no recent falls. Pt this date reports feeling much improved from admission. Pt requiring no physical assitsance with bed mobility, transfers or ambulation. Pt ambulating with SPC without significant balance deficits. Pt with mild SOA but states this is not abnormal for her. Pt VSS throughout and appears to be safe to return home with family assist. PT to follow while admitted.  -       Row Name 09/29/24 1548          Therapy Assessment/Plan (PT)    Rehab Potential (PT) good, to achieve stated therapy goals  -     Criteria for Skilled Interventions Met (PT) yes  -EL     Therapy Frequency (PT) 3 times/wk  -       Row Name 09/29/24 1548          Vital Signs    O2 Delivery Pre Treatment room air  -EL     O2 Delivery Intra Treatment room air  -EL     O2 Delivery Post Treatment room air  -EL     Pre Patient Position Supine  -EL     Intra Patient Position Standing  -EL     Post Patient Position Sitting  -EL       Row Name 09/29/24 1548          Positioning and Restraints    Pre-Treatment Position in bed  -EL     Post Treatment Position bathroom  -EL     Bathroom sitting  -EL               User Key  (r) = Recorded By, (t) = Taken By, (c) = Cosigned By      Initials Name Provider Type    Erik Malcolm, PT Physical Therapist                   Outcome Measures       Row Name 09/29/24 1554 09/29/24 1200       How much help from another person do you currently need...    Turning from your back to your side while in flat bed without using bedrails? 4  -EL 4  -JH    Moving from lying on back to sitting on the side of a flat bed without bedrails? 4  -EL 4  -JH    Moving to and from a bed to a chair (including a wheelchair)? 3  -EL 3  -JH    Standing up from a chair using your arms (e.g., wheelchair, bedside chair)? 3  -EL 3  -JH    Climbing 3-5 steps with a railing? 3  -EL 2  -JH    To walk in hospital room? 3  -EL 3  -JH    AM-PAC 6 Clicks Score (PT) 20  -EL 19  -JH    Highest Level of Mobility Goal 6  --> Walk 10 steps or more  - 6 --> Walk 10 steps or more  -      Row Name 09/29/24 0800          How much help from another person do you currently need...    Turning from your back to your side while in flat bed without using bedrails? 4  -PS     Moving from lying on back to sitting on the side of a flat bed without bedrails? 4  -PS     Moving to and from a bed to a chair (including a wheelchair)? 3  -PS     Standing up from a chair using your arms (e.g., wheelchair, bedside chair)? 3  -PS     Climbing 3-5 steps with a railing? 2  -PS     To walk in hospital room? 3  -PS     AM-PAC 6 Clicks Score (PT) 19  -PS     Highest Level of Mobility Goal 6 --> Walk 10 steps or more  -       Row Name 09/29/24 1554          Functional Assessment    Outcome Measure Options AM-PAC 6 Clicks Basic Mobility (PT)  -               User Key  (r) = Recorded By, (t) = Taken By, (c) = Cosigned By      Initials Name Provider Type    EL Erik Le, PT Physical Therapist    PS Morgan Cedillo, RN Registered Nurse     Chaya Holcomb, RN Registered Nurse                                 Physical Therapy Education       Title: PT OT SLP Therapies (Done)       Topic: Physical Therapy (Done)       Point: Mobility training (Done)       Learning Progress Summary             Patient Acceptance, E,TB, VU by  at 9/29/2024 1555                         Point: Precautions (Done)       Learning Progress Summary             Patient Acceptance, E,TB, VU by  at 9/29/2024 1555                                         User Key       Initials Effective Dates Name Provider Type Discipline     06/23/20 -  Erik Le, PT Physical Therapist PT                  PT Recommendation and Plan  Planned Therapy Interventions (PT): balance training, bed mobility training, gait training, neuromuscular re-education, transfer training  Plan of Care Reviewed With: patient  Outcome Evaluation: [facility] 80 [facility] Pt reports living at home  with daughter, typically is independent with all ADLs, ambulation with SPC and no recent falls. Pt this date reports feeling much improved from admission. Pt requiring no physical assitsance with bed mobility, transfers or ambulation. Pt ambulating with SPC without significant balance deficits. Pt with mild SOA but states this is not abnormal for her. Pt VSS throughout and appears to be safe to return home with family assist. PT to follow while admitted.     Time Calculation:   PT Evaluation Complexity  History, PT Evaluation Complexity: 1-2 personal factors and/or comorbidities  Examination of Body Systems (PT Eval Complexity): total of 3 or more elements  Clinical Presentation (PT Evaluation Complexity): evolving  Clinical Decision Making (PT Evaluation Complexity): moderate complexity  Overall Complexity (PT Evaluation Complexity): moderate complexity     PT Charges       Row Name 09/29/24 1559             Time Calculation    Start Time 1455  -EL      Stop Time 1508  -EL      Time Calculation (min) 13 min  -EL      PT Received On 09/29/24  -EL      PT - Next Appointment 10/01/24  -EL      PT Goal Re-Cert Due Date 10/13/24  -EL                User Key  (r) = Recorded By, (t) = Taken By, (c) = Cosigned By      Initials Name Provider Type    Erik Malcolm PT Physical Therapist                  Therapy Charges for Today       Code Description Service Date Service Provider Modifiers Qty    02296824571 HC PT EVAL MOD COMPLEXITY 3 9/29/2024 Erik Le PT GP 1            PT G-Codes  Outcome Measure Options: AM-PAC 6 Clicks Basic Mobility (PT)  AM-PAC 6 Clicks Score (PT): 20  PT Discharge Summary  Anticipated Discharge Disposition (PT): home with assist    Erik Le PT  9/29/2024

## 2024-09-29 NOTE — PLAN OF CARE
Goal Outcome Evaluation:  Plan of Care Reviewed With: patient           Outcome Evaluation: [facility] 78 [facility] reports living at home with daughter, typically is independent with all ADLs, ambulation with SPC and no recent falls. Pt this date reports feeling much improved from admission. Pt requiring no physical assitsance with bed mobility, transfers or ambulation. Pt ambulating with SPC without significant balance deficits. Pt with mild SOA but states this is not abnormal for her. Pt VSS throughout and appears to be safe to return home with family assist. PT to follow while admitted.      Anticipated Discharge Disposition (PT): home with assist

## 2024-09-30 ENCOUNTER — TRANSITIONAL CARE MANAGEMENT TELEPHONE ENCOUNTER (OUTPATIENT)
Dept: CALL CENTER | Facility: HOSPITAL | Age: 79
End: 2024-09-30
Payer: MEDICARE

## 2024-09-30 PROBLEM — M17.9 OSTEOARTHRITIS OF KNEE: Status: ACTIVE | Noted: 2023-03-16

## 2024-09-30 RX ORDER — ATORVASTATIN CALCIUM 20 MG/1
20 TABLET, FILM COATED ORAL DAILY
Qty: 270 TABLET | Refills: 0 | Status: SHIPPED | OUTPATIENT
Start: 2024-09-30

## 2024-09-30 NOTE — CASE MANAGEMENT/SOCIAL WORK
Case Management Discharge Note      Final Note: Routine Home.         Selected Continued Care - Discharged on 9/29/2024 Admission date: 9/28/2024 - Discharge disposition: Home or Self Care            Transportation Services  Private: Car    Final Discharge Disposition Code: 01 - home or self-care

## 2024-09-30 NOTE — OUTREACH NOTE
Call Center TCM Note      Flowsheet Row Responses   [facility] facility patient discharged from? [facility]   Does the patient have one of the following disease processes/diagnoses(primary or secondary)? Other   TCM attempt successful? No   Unsuccessful attempts Attempt 1   Comments Cardiology 10/14/24            Estela Lemus RN    9/30/2024, 14:41 EDT

## 2024-09-30 NOTE — OUTREACH NOTE
Call Center TCM Note      Flowsheet Row Responses   [facility] patient discharged from? [facility]   Does the patient have one of the following disease processes/diagnoses(primary or secondary)? Other   TCM attempt successful? Yes   Call start time 1600   Call end time 1603   Discharge diagnosis Atrial fibrillation with RVR   Person spoke with today (if not patient) and relationship pt   Meds reviewed with patient/caregiver? Yes   Is the patient having any side effects they believe may be caused by any medication additions or changes? No   Does the patient have all medications ordered at discharge? Yes   Is the patient taking all medications as directed (includes completed medication regime)? Yes   Comments Cardiology 10/14/24   Does the patient have an appointment with their PCP within 7-14 days of discharge? Yes  [10/3/2024 at 8:30 AM]   Psychosocial issues? No   Did the patient receive a copy of their discharge instructions? Yes   Nursing interventions Reviewed instructions with patient   What is the patient's perception of their health status since discharge? Improving   Is the patient/caregiver able to teach back signs and symptoms related to disease process for when to call PCP? Yes   Is the patient/caregiver able to teach back signs and symptoms related to disease process for when to call 911? Yes   Is the patient/caregiver able to teach back the hierarchy of who to call/visit for symptoms/problems? PCP, Specialist, Home health nurse, Urgent Care, ED, 911 Yes   If the patient is a current smoker, are they able to teach back resources for cessation? Not a smoker   TCM call completed? Yes   Wrap up additional comments Pt states she is doing ok, and denies any chest pain, irregular heartbeats, SOB. Reviewed AVS/meds with pt. Pt verified PCP/cardiology fu appts.   Call end time 1603   Would this patient benefit from a Referral to [facility] Social Work? No   Is the patient interested in additional calls from an  ambulatory ? No            Estela Lemus RN    9/30/2024, 16:04 EDT

## 2024-09-30 NOTE — PATIENT INSTRUCTIONS
Health Maintenance Due   Topic Date Due    COVID-19 Vaccine (4 - 2023-24 season) 09/01/2024    INFLUENZA VACCINE  08/01/2024

## 2024-10-03 ENCOUNTER — OFFICE VISIT (OUTPATIENT)
Dept: FAMILY MEDICINE CLINIC | Facility: CLINIC | Age: 79
End: 2024-10-03
Payer: MEDICARE

## 2024-10-03 VITALS
SYSTOLIC BLOOD PRESSURE: 140 MMHG | OXYGEN SATURATION: 97 % | TEMPERATURE: 97 F | BODY MASS INDEX: 47.02 KG/M2 | HEART RATE: 60 BPM | WEIGHT: 265.4 LBS | HEIGHT: 63 IN | DIASTOLIC BLOOD PRESSURE: 83 MMHG

## 2024-10-03 DIAGNOSIS — E66.1 CLASS 3 DRUG-INDUCED OBESITY WITH SERIOUS COMORBIDITY AND BODY MASS INDEX (BMI) OF 45.0 TO 49.9 IN ADULT: ICD-10-CM

## 2024-10-03 DIAGNOSIS — G89.29 CHRONIC MIDLINE LOW BACK PAIN WITHOUT SCIATICA: ICD-10-CM

## 2024-10-03 DIAGNOSIS — N39.0 URINARY TRACT INFECTION WITHOUT HEMATURIA, SITE UNSPECIFIED: ICD-10-CM

## 2024-10-03 DIAGNOSIS — I49.5 TACHY-BRADY SYNDROME: ICD-10-CM

## 2024-10-03 DIAGNOSIS — Z23 NEED FOR COVID-19 VACCINE: ICD-10-CM

## 2024-10-03 DIAGNOSIS — E66.813 CLASS 3 DRUG-INDUCED OBESITY WITH SERIOUS COMORBIDITY AND BODY MASS INDEX (BMI) OF 45.0 TO 49.9 IN ADULT: ICD-10-CM

## 2024-10-03 DIAGNOSIS — M54.50 CHRONIC MIDLINE LOW BACK PAIN WITHOUT SCIATICA: ICD-10-CM

## 2024-10-03 DIAGNOSIS — Z23 NEED FOR INFLUENZA VACCINATION: ICD-10-CM

## 2024-10-03 DIAGNOSIS — I10 PRIMARY HYPERTENSION: Primary | ICD-10-CM

## 2024-10-03 PROCEDURE — 85025 COMPLETE CBC W/AUTO DIFF WBC: CPT | Performed by: PREVENTIVE MEDICINE

## 2024-10-03 PROCEDURE — 3077F SYST BP >= 140 MM HG: CPT | Performed by: PREVENTIVE MEDICINE

## 2024-10-03 PROCEDURE — 81001 URINALYSIS AUTO W/SCOPE: CPT | Performed by: PREVENTIVE MEDICINE

## 2024-10-03 PROCEDURE — 90662 IIV NO PRSV INCREASED AG IM: CPT | Performed by: PREVENTIVE MEDICINE

## 2024-10-03 PROCEDURE — 87086 URINE CULTURE/COLONY COUNT: CPT | Performed by: PREVENTIVE MEDICINE

## 2024-10-03 PROCEDURE — 80053 COMPREHEN METABOLIC PANEL: CPT | Performed by: PREVENTIVE MEDICINE

## 2024-10-03 PROCEDURE — 1159F MED LIST DOCD IN RCRD: CPT | Performed by: PREVENTIVE MEDICINE

## 2024-10-03 PROCEDURE — 3078F DIAST BP <80 MM HG: CPT | Performed by: PREVENTIVE MEDICINE

## 2024-10-03 PROCEDURE — 83735 ASSAY OF MAGNESIUM: CPT | Performed by: PREVENTIVE MEDICINE

## 2024-10-03 PROCEDURE — 90480 ADMN SARSCOV2 VAC 1/ONLY CMP: CPT | Performed by: PREVENTIVE MEDICINE

## 2024-10-03 PROCEDURE — 1125F AMNT PAIN NOTED PAIN PRSNT: CPT | Performed by: PREVENTIVE MEDICINE

## 2024-10-03 PROCEDURE — 91320 SARSCV2 VAC 30MCG TRS-SUC IM: CPT | Performed by: PREVENTIVE MEDICINE

## 2024-10-03 PROCEDURE — 99495 TRANSJ CARE MGMT MOD F2F 14D: CPT | Performed by: PREVENTIVE MEDICINE

## 2024-10-03 PROCEDURE — G0008 ADMIN INFLUENZA VIRUS VAC: HCPCS | Performed by: PREVENTIVE MEDICINE

## 2024-10-03 PROCEDURE — 1160F RVW MEDS BY RX/DR IN RCRD: CPT | Performed by: PREVENTIVE MEDICINE

## 2024-10-03 RX ORDER — NEBULIZER AND COMPRESSOR
1 EACH MISCELLANEOUS DAILY
Qty: 1 EACH | Refills: 0 | Status: SHIPPED | OUTPATIENT
Start: 2024-10-03

## 2024-10-03 NOTE — PROGRESS NOTES
"Subjective   Faby Le is a 79 y.o. female presents for   Chief Complaint   Patient presents with    Transitional Care Management     [facility]    Hypertension     Patient brought BP log with her has not taken her medication this morning.  Patient is fasting.  Would like both flu and COVID shot       There are no preventive care reminders to display for this patient.    Hypertension       History of Present Illness  The patient is a 79-year-old female presenting today for hypertension, tachybradycardia syndrome, need for flu shot, need for COVID-19 shot, and class 3 severe obesity with serious comorbidities and a body mass index of 47.    She experienced dizziness on Saturday morning, which led her to the emergency room. Her blood pressure was recorded as 93/57 with a pulse of 153. She was admitted to the hospital on Saturday evening and discharged on Sunday night after receiving treatment. She has been compliant with her medication regimen.    She reports feeling fatigued but does not endorse any heart palpitations. She also mentions that she is unable to walk due to back pain. She does not report any urinary discomfort or fever.    Vitals:    10/03/24 0842 10/03/24 0844   BP: 168/78 140/83   BP Location: Right arm Left arm   Patient Position: Sitting Sitting   Cuff Size: Adult Adult   Pulse: 81 60   Temp: 97 °F (36.1 °C)    TempSrc: Temporal    SpO2: 97%    Weight: 120 kg (265 lb 6.4 oz)    Height: 160 cm (63\")      Body mass index is 47.01 kg/m².    Current Outpatient Medications on File Prior to Visit   Medication Sig Dispense Refill    allopurinol (ZYLOPRIM) 300 MG tablet Take 1 tablet by mouth once daily 90 tablet 0    atorvastatin (LIPITOR) 20 MG tablet Take 1 tablet by mouth once daily 270 tablet 0    Azelastine HCl 137 MCG/SPRAY solution 2 sprays by Alternating Nares route Daily As Needed.      cetirizine (zyrTEC) 10 MG tablet Take 1 tablet by mouth Daily.      cyanocobalamin (VITAMIN B-12) 1000 MCG " tablet Take 1 tablet by mouth Daily.      dofetilide (Tikosyn) 250 MCG capsule Take 1 capsule by mouth Every 12 (Twelve) Hours. 60 capsule 3    hydrocortisone 2.5 % ointment APPLY THIN LAYER OF OINTMENT EXTERNALLY TO AFFECTED AREA TWICE DAILY. SAFE TO USE ON FACE      IRON CR PO Take 1 tablet by mouth Daily.      levothyroxine (SYNTHROID, LEVOTHROID) 88 MCG tablet Take 1 tablet by mouth Daily. 90 tablet 0    metoprolol succinate XL (TOPROL-XL) 25 MG 24 hr tablet Take 1 tablet by mouth Daily for 30 days. 30 tablet 0    midodrine (PROAMATINE) 5 MG tablet Take 1 tablet by mouth 2 (Two) Times a Day Before Meals. 90 tablet 0    multivitamin (MULTI VITAMIN PO) Take 1 tablet by mouth Daily.      mupirocin (BACTROBAN) 2 % ointment APPLY SMALL AMOUNT OF OINTMENT TOPICALLY TO AFFECTED AREA TWICE DAILY      pantoprazole (PROTONIX) 40 MG EC tablet Take 1 tablet by mouth Every Morning.      rivaroxaban (XARELTO) 15 MG tablet Take 1 tablet by mouth Daily With Dinner. Indications: Atrial Fibrillation 30 tablet 2    spironolactone (ALDACTONE) 25 MG tablet Oral for 90 Days      triamcinolone (KENALOG) 0.1 % cream APPLY CREAM EXTERNALLY TWICE DAILY TO ECZEMA FOR UP TO 2 WEEKS PER MONTH AS NEEDED      Vitamin D, Cholecalciferol, 25 MCG (1000 UT) tablet Take 1 tablet by mouth Daily.      montelukast (SINGULAIR) 10 MG tablet Take 1 tablet by mouth Every Night. (Patient not taking: Reported on 10/3/2024) 90 tablet 0     No current facility-administered medications on file prior to visit.       The following portions of the patient's history were reviewed and updated as appropriate: allergies, current medications, past family history, past medical history, past social history, past surgical history, and problem list.    Review of Systems   Constitutional:  Positive for fatigue.   Musculoskeletal:  Positive for back pain.       Objective   Physical Exam  Vitals reviewed.   Constitutional:       General: She is not in acute distress.      Appearance: She is well-developed. She is obese. She is not ill-appearing or toxic-appearing.   HENT:      Head: Normocephalic and atraumatic.      Right Ear: Tympanic membrane, ear canal and external ear normal.      Left Ear: Tympanic membrane, ear canal and external ear normal.      Nose: Nose normal.      Mouth/Throat:      Mouth: Mucous membranes are moist.      Pharynx: No posterior oropharyngeal erythema.   Eyes:      Extraocular Movements: Extraocular movements intact.      Conjunctiva/sclera: Conjunctivae normal.      Pupils: Pupils are equal, round, and reactive to light.   Neck:      Vascular: No carotid bruit.   Cardiovascular:      Rate and Rhythm: Regular rhythm. Bradycardia present.      Heart sounds: Normal heart sounds.   Pulmonary:      Effort: Pulmonary effort is normal.      Breath sounds: Normal breath sounds.   Abdominal:      General: Bowel sounds are normal. There is no distension.      Palpations: Abdomen is soft. There is no mass.      Tenderness: There is no abdominal tenderness.   Musculoskeletal:         General: Tenderness present.      Cervical back: Neck supple. No tenderness.      Right lower leg: No edema.      Left lower leg: No edema.   Lymphadenopathy:      Cervical: No cervical adenopathy.   Skin:     General: Skin is warm.   Neurological:      General: No focal deficit present.      Mental Status: She is alert and oriented to person, place, and time.   Psychiatric:         Mood and Affect: Mood normal.         Behavior: Behavior normal.       Physical Exam  Clear lung sounds.  Normal heart rate and rhythm.    Vital Signs  Blood pressure readings are 168/78 and 140/83. Initial pulse was 81 and follow-up was 60.    PHQ-9 Total Score:    Results  Imaging  Mild disc arthritis in the fifth lumbar which had slipped forward.         Assessment & Plan   Diagnoses and all orders for this visit:    1. Primary hypertension (Primary)  -     CBC Auto Differential; Future  -      Comprehensive Metabolic Panel; Future    2. Tachy-jojo syndrome  -     Magnesium; Future    3. Need for influenza vaccination  -     Fluzone High-Dose 65+yrs (6570-6475)    4. Need for COVID-19 vaccine  -     COVID-19 (Pfizer) 12yrs+ (COMIRNATY)    5. Class 3 drug-induced obesity with serious comorbidity and body mass index (BMI) of 45.0 to 49.9 in adult    6. Urinary tract infection without hematuria, site unspecified  -     Urinalysis With Culture If Indicated - Urine, Clean Catch; Future    7. Chronic midline low back pain without sciatica  -     Ambulatory Referral to Physical Therapy for Evaluation & Treatment    Other orders  -     Blood Pressure Monitoring (Adult Blood Pressure Cuff Lg) kit; Use 1 kit Daily.  Dispense: 1 each; Refill: 0      Assessment & Plan  1. Hypertension.  Her blood pressure readings at home were 93/57 with a pulse of 153, and in the clinic, it was 168/78 and 140/83 with a pulse of 60. A new blood pressure cuff will be ordered to ensure accurate readings. A complete blood count (CBC), kidney function test, liver function test, magnesium level, and urine analysis will be conducted.     2. Tachybradycardia syndrome.  She experienced dizziness and was hospitalized on 09/28/2024. Her pulse was 153 before hospitalization. She was treated with a liquid medication in the hospital, which stabilized her condition. She will see Dr. West, the heart doctor, on 10/14/2024 and Dr. Pandya, the rhythm specialist, at the beginning of November.    3. Severe obesity.  Her body mass index (BMI) is 47. She is advised to watch her portion sizes and continue walking as tolerated. Physical therapy will be initiated to help with mobility and weight management.    4. Back pain.  She has a history of mild disc arthritis in the fifth lumbar region, which was imaged in May. She reports significant pain in her right hip and left shoulder. Physical therapy will be initiated at [facility] to address her back  and hip pain.    5. Health Maintenance.  She received her influenza and COVID-19 vaccines today.    Follow-up  Return on 12/12/2024 for follow-up.    Patient Instructions     Health Maintenance Due   Topic Date Due    COVID-19 Vaccine (4 - 2023-24 season) 09/01/2024    INFLUENZA VACCINE  08/01/2024           Patient or patient representative verbalized consent for the use of Ambient Listening during the visit with  Leonor Adam MD for chart documentation. 10/3/2024  16:58 EDT

## 2024-10-07 RX ORDER — DOFETILIDE 0.25 MG/1
250 CAPSULE ORAL EVERY 12 HOURS
Qty: 60 CAPSULE | Refills: 3 | Status: CANCELLED | OUTPATIENT
Start: 2024-10-07

## 2024-10-07 NOTE — TELEPHONE ENCOUNTER
Requested Prescriptions     Pending Prescriptions Disp Refills    dofetilide (Tikosyn) 250 MCG capsule 60 capsule 3     Sig: Take 1 capsule by mouth Every 12 (Twelve) Hours.

## 2024-10-09 ENCOUNTER — READMISSION MANAGEMENT (OUTPATIENT)
Dept: CALL CENTER | Facility: HOSPITAL | Age: 79
End: 2024-10-09
Payer: MEDICARE

## 2024-10-09 NOTE — OUTREACH NOTE
Medical Week 2 Survey      Flowsheet Row Responses   [facility] patient discharged from? [facility]   Does the patient have one of the following disease processes/diagnoses(primary or secondary)? Other   Week 2 attempt successful? Yes   Call start time 1416   Discharge diagnosis Atrial fibrillation with RVR   Call end time 1417   Person spoke with today (if not patient) and relationship pt   Meds reviewed with patient/caregiver? Yes   Does the patient have all medications ordered at discharge? Yes   Is the patient taking all medications as directed (includes completed medication regime)? Yes   Does the patient have a primary care provider?  Yes   Comments regarding PCP Patient has seen pcp since DC   Has home health visited the patient within 72 hours of discharge? N/A   Psychosocial issues? No   Did the patient receive a copy of their discharge instructions? Yes   Nursing interventions Reviewed instructions with patient   What is the patient's perception of their health status since discharge? Improving   Is the patient/caregiver able to teach back signs and symptoms related to disease process for when to call PCP? Yes   Is the patient/caregiver able to teach back signs and symptoms related to disease process for when to call 911? Yes   Is the patient/caregiver able to teach back the hierarchy of who to call/visit for symptoms/problems? PCP, Specialist, Home health nurse, Urgent Care, ED, 911 Yes   Week 2 Call Completed? Yes   Graduated Yes   Wrap up additional comments Patient reports doing well. No concerns or questions noted.   Call end time 1417            Em CABEZAS - Registered Nurse

## 2024-10-14 ENCOUNTER — TELEPHONE (OUTPATIENT)
Dept: FAMILY MEDICINE CLINIC | Facility: CLINIC | Age: 79
End: 2024-10-14
Payer: MEDICARE

## 2024-10-14 ENCOUNTER — OFFICE VISIT (OUTPATIENT)
Dept: CARDIOLOGY | Facility: CLINIC | Age: 79
End: 2024-10-14
Payer: MEDICARE

## 2024-10-14 VITALS
HEIGHT: 63 IN | BODY MASS INDEX: 47.66 KG/M2 | DIASTOLIC BLOOD PRESSURE: 80 MMHG | WEIGHT: 269 LBS | OXYGEN SATURATION: 99 % | SYSTOLIC BLOOD PRESSURE: 177 MMHG | HEART RATE: 60 BPM

## 2024-10-14 DIAGNOSIS — Z95.0 PRESENCE OF CARDIAC PACEMAKER: ICD-10-CM

## 2024-10-14 DIAGNOSIS — I50.22 CHRONIC HFREF (HEART FAILURE WITH REDUCED EJECTION FRACTION): ICD-10-CM

## 2024-10-14 DIAGNOSIS — I48.0 PAROXYSMAL ATRIAL FIBRILLATION: ICD-10-CM

## 2024-10-14 DIAGNOSIS — I95.1 ORTHOSTATIC HYPOTENSION: ICD-10-CM

## 2024-10-14 DIAGNOSIS — I49.5 SICK SINUS SYNDROME: Primary | ICD-10-CM

## 2024-10-14 DIAGNOSIS — I10 PRIMARY HYPERTENSION: ICD-10-CM

## 2024-10-14 DIAGNOSIS — E78.2 MIXED HYPERLIPIDEMIA: ICD-10-CM

## 2024-10-14 PROCEDURE — 1160F RVW MEDS BY RX/DR IN RCRD: CPT | Performed by: INTERNAL MEDICINE

## 2024-10-14 PROCEDURE — 99214 OFFICE O/P EST MOD 30 MIN: CPT | Performed by: INTERNAL MEDICINE

## 2024-10-14 PROCEDURE — 1159F MED LIST DOCD IN RCRD: CPT | Performed by: INTERNAL MEDICINE

## 2024-10-14 PROCEDURE — 3079F DIAST BP 80-89 MM HG: CPT | Performed by: INTERNAL MEDICINE

## 2024-10-14 PROCEDURE — 3077F SYST BP >= 140 MM HG: CPT | Performed by: INTERNAL MEDICINE

## 2024-10-14 RX ORDER — METOPROLOL SUCCINATE 25 MG/1
25 TABLET, EXTENDED RELEASE ORAL
Qty: 90 TABLET | Refills: 3 | Status: SHIPPED | OUTPATIENT
Start: 2024-10-14 | End: 2025-10-09

## 2024-10-14 RX ORDER — DOFETILIDE 0.25 MG/1
250 CAPSULE ORAL EVERY 12 HOURS
Qty: 180 CAPSULE | Refills: 3 | Status: SHIPPED | OUTPATIENT
Start: 2024-10-14

## 2024-10-14 NOTE — TELEPHONE ENCOUNTER
Caller: Faby Le    Relationship: Self    Best call back number: [phone]     What medication are you requesting: WEIGHT LOSS MEDICATION    If a prescription is needed, what is your preferred pharmacy and phone number: [facility] [address], [place] - [address] - [phone] [facility] [phone]      Additional notes: PATIENTS HEART DOCTOR DR. ARIAS SUGGESTED FOR PATIENT TO START A WEIGHT LOSS MEDICATION    PATIENT PREVIOUSLY TRIED OZEMPIC AND IS REQUESTING TO SEE IF ANY OTHER ALTERNATIVES WORK BETTER    PLEASE ADVISE

## 2024-10-14 NOTE — PROGRESS NOTES
Cardiology Office Visit      Encounter Date:  10/14/2024    Patient ID:   Faby Le is a 79 y.o. female.    Reason For Followup:  Coronary artery disease  Shortness of breath  Sick sinus syndrome    Brief Clinical History:  Dear Dr. Adam, Lenoor Ordaz MD    I had the pleasure of seeing Faby Le today. As you are well aware, this is a 79 y.o. female  presented to the office with symptoms of lower extremity edema and some shortness of breath        cardiac catheterization showed moderate disease involving the diagonal and mild-to-moderate disease involving circumflex      Denies any further episodes of dizziness syncope presyncope  No exertional chest pain  Shortness of breath is improved  No new cardiac symptoms  Recent A-fib ablation without any complications    Assessment & Plan    Impressions:  Morbid obesity/Body mass index is 48.54 kg/m².  Bilateral lower extremity edema  Shortness of breath  Dyspnea on exertion  Obstructive sleep apnea  Coronary artery disease  Diastolic dysfunction  Hypertension  Normalization of LV systolic function  Normal LV systolic function in 2018  Moderate obstructive coronary artery disease involving the diagonal branch of the LAD and mild to moderate obstructive coronary artery disease involving the left circumflex artery in 2018  Paroxysmal atrial fibrillation  Patient is currently in sinus rhythm with intermittent PAC burden  Elevated chads vascular score-4  Coagulopathy on Xarelto  History of nonischemic cardiomyopathy  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  Primary hypertension  Obstructive sleep apnea  Recent cardiac catheterization in 2022 with a mild obstructive disease involving the LAD and a moderate obstructive disease involving the ostium of the diagonal branch with normal LV systolic function  Recent carotid Doppler with mild obstructive carotid stenosis  Cardiomyopathy with recovery of LV systolic function  Chronic renal insufficiency   Sick  "sinus syndrome status post permanent pacemaker placement  No clear correlation of patient's symptoms of dizziness with these episodes of bradycardia or pauses  Syncope and loss of consciousness  Loop recorder interrogation with episodes of pauses that were more than 4 seconds  Tachybradycardia syndrome  Status post permanent pacemaker placement  Status post ablation therapy for atrial fibrillation  Recent hospitalization for atrial fibrillation with rapid ventricular response currently in sinus rhythm  Recommendations:  Recent ablation for atrial fibrillation  Continue current medical therapy with rivaroxaban 20 mg p.o. once a day Toprol-XL 25 mg p.o. once a day Lipitor 20 mg p.o. once a day aldactone 25 mg p.o. once a day  Tikosyn to 50 mcg p.o. twice a day Toprol-XL 25 mg p.o. once a day  Recent medical records reviewed and discussed with patient  Recommend continued weight loss  Close monitoring of renal function and hemoglobin A1c with primary care physician  Discussed with the primary care physician about reconsidering restarting medication for weight loss  Follow-up in office in 6 months        Vitals:  Vitals:    10/14/24 1042   BP: 177/80   Pulse: 60   SpO2: 99%   Weight: 122 kg (269 lb)   Height: 160 cm (63\")       Physical Exam:    General: Alert, cooperative, no distress, appears stated age  Head:  Normocephalic, atraumatic, mucous membranes moist  Eyes:  Conjunctiva/corneas clear, EOM's intact     Neck:  Supple,  no adenopathy;      Lungs: Clear to auscultation bilaterally, no wheezes rhonchi rales are noted  Chest wall: No tenderness  Heart::  Regular rate and rhythm, S1 and S2 normal, no murmur, rub or gallop  Abdomen: Soft, non-tender, nondistended bowel sounds active  Extremities: No cyanosis, clubbing, or edema  Pulses: 2+ and symmetric all extremities  Skin:  No rashes or lesions  Neuro/psych: A&O x3. CN II through XII are grossly intact with appropriate affect              Lab Results   Component " Value Date    GLUCOSE 92 10/03/2024    BUN 31 (H) 10/03/2024    CREATININE 1.24 (H) 10/03/2024    EGFR 44.9 (L) 10/03/2024    BCR 25.0 10/03/2024    K 4.3 10/03/2024    CO2 25.0 10/03/2024    CALCIUM 10.2 10/03/2024    ALBUMIN 4.1 10/03/2024    BILITOT 0.5 10/03/2024    AST 19 10/03/2024    ALT 18 10/03/2024     Results for orders placed during the hospital encounter of 06/11/24    Adult Transthoracic Echo Complete W/ Cont if Necessary Per Protocol    Interpretation Summary    Left ventricular systolic function is normal. Left ventricular ejection fraction appears to be 56 - 60%.    Left ventricular diastolic function was normal.    The left atrial cavity is moderately dilated.    The right atrial cavity is mildly  dilated.     Results for orders placed during the hospital encounter of 08/29/22    Cardiac Catheterization/Vascular Study    Narrative  Table formatting from the original result was not included.  Cardiac Catheterization Operative Report    Faby GOMEZ Rey  [id number]  8/29/2022  @PCP@    She underwent cardiac catheterization.    Indications for the procedure include: abnormal stress test.    Procedure Details:  The risks, benefits, complications, treatment options, and expected outcomes were discussed with the patient. The patient and/or family concurred with the proposed plan, giving informed consent.    After informed consent the patient was brought to the cath lab after appropriate IV hydration was begun and oral premedication was given. She was further sedated with fentanyl. She was prepped and draped in the usual manner. Using the modified Seldinger access technique, a 6 [place] sheath was placed in the femoral artery. A left heart catheterization with coronary arteriography was performed. Findings are discussed below.    After the procedure was completed, sedation was stopped and the sheaths and catheters were all removed. Hemostasis was achieved per established hospital protocols.    Conscious  sedation:  Conscious sedation was performed according to protocol.  I supervised and directed an independent trained observer with the assistance of monitoring the patient's level of consciousness and physiologic status throughout the procedure.  Intraoperative service time was 60 minutes.    Findings:    Hemodynamics Central arctic pressure systolic 150 and diastolic 56 with a mean pressure of 82 mmHg  LV end-diastolic pressure of 12 mmHg  There was no gradient across the aortic valve on the pullback of the pigtail catheter  Left Main  left main is a large-caliber vessel angiographically normal bifurcates into left into descending and left circumflex arteries  RCA  large-caliber dominant vessel  Right coronary artery is angiographically normal right coronary artery bifurcates into a large caliber PDA branch and a moderate caliber PLV branch that are angiographically normal  LAD  large-caliber vessel  LAD has mid focal angiographic 30% stenosis that is heavily calcified at the bifurcation with the diagonal and septal branch  Moderate size diagonal branch that has ostial angiographic of 40% stenosis  Circ  large-caliber vessel angiographically normal  First marginal branch of the circumflex has proximal angiographic 30 to 40% stenosis    LV  normal LV systolic function normal wall motion estimate LV ejection fraction of 55%  Coronary Dominance  right coronary artery    Estimated Blood Loss:  Minimal    Specimens: None    Complications:  None; patient tolerated the procedure well.    Disposition: PACU - hemodynamically stable.    Condition: stable    Impressions:  Mild obstructive coronary artery disease involving the LAD mild to moderate obstructive coronary artery disease involving the ostium of the diagonal branch mild obstructive coronary artery disease involving the proximal first marginal branch  Normal LV systolic function  Normal wall motion  Estimate LV ejection fraction of 55%  Normal left-sided filling  pressures    Recommendations:  Medical management for obstructive coronary artery disease  Test results reviewed and discussed with patient and family     Lab Results   Component Value Date    CHOL 125 05/07/2024    TRIG 49 05/07/2024    HDL 67 (H) 05/07/2024    LDL 47 05/07/2024      Results for orders placed during the hospital encounter of 08/12/22    Stress Test With Myocardial Perfusion One Day    Interpretation Summary  · Left ventricular ejection fraction is normal. (Calculated EF = 68%).  · Findings consistent with an equivocal ECG stress test.    Abnormal stress  Submaximal study, no changes at submaximal stress on stress ECG  No arrhythmias seen  Nuclear imaging demonstrates decreased counts at rest in the mid inferior to apical wall involving the apex, summed rest score of 16  Stress imaging demonstrates significant worsening perfusion in the mid to apical inferior wall, EF 68%  There is significant GI and liver uptake cannot rule out diaphragmatic attenuation    Abnormal study  Correlate with clinical risk factors  Suggestive of inferior reversibility and ischemia   Results for orders placed during the hospital encounter of 03/02/23    Loop Recorder Implant - Treatment Room    Interpretation Summary    Conclusion: Successful implantation.           Objective:          Allergies:  Allergies   Allergen Reactions    Celecoxib Itching and Swelling     swelling       Medication Review:     Current Outpatient Medications:     allopurinol (ZYLOPRIM) 300 MG tablet, Take 1 tablet by mouth once daily, Disp: 90 tablet, Rfl: 0    atorvastatin (LIPITOR) 20 MG tablet, Take 1 tablet by mouth once daily, Disp: 270 tablet, Rfl: 0    Azelastine HCl 137 MCG/SPRAY solution, 2 sprays by Alternating Nares route Daily As Needed., Disp: , Rfl:     Blood Pressure Monitoring (Adult Blood Pressure Cuff Lg) kit, Use 1 kit Daily., Disp: 1 each, Rfl: 0    cetirizine (zyrTEC) 10 MG tablet, Take 1 tablet by mouth Daily., Disp: , Rfl:      cyanocobalamin (VITAMIN B-12) 1000 MCG tablet, Take 1 tablet by mouth Daily., Disp: , Rfl:     dofetilide (Tikosyn) 250 MCG capsule, Take 1 capsule by mouth Every 12 (Twelve) Hours., Disp: 180 capsule, Rfl: 3    hydrocortisone 2.5 % ointment, APPLY THIN LAYER OF OINTMENT EXTERNALLY TO AFFECTED AREA TWICE DAILY. SAFE TO USE ON FACE, Disp: , Rfl:     IRON CR PO, Take 1 tablet by mouth Daily., Disp: , Rfl:     levothyroxine (SYNTHROID, LEVOTHROID) 88 MCG tablet, Take 1 tablet by mouth Daily., Disp: 90 tablet, Rfl: 0    metoprolol succinate XL (TOPROL-XL) 25 MG 24 hr tablet, Take 1 tablet by mouth Daily for 360 days., Disp: 90 tablet, Rfl: 3    midodrine (PROAMATINE) 5 MG tablet, Take 1 tablet by mouth 2 (Two) Times a Day Before Meals., Disp: 90 tablet, Rfl: 0    montelukast (SINGULAIR) 10 MG tablet, Take 1 tablet by mouth Every Night., Disp: 90 tablet, Rfl: 0    multivitamin (MULTI VITAMIN PO), Take 1 tablet by mouth Daily., Disp: , Rfl:     mupirocin (BACTROBAN) 2 % ointment, APPLY SMALL AMOUNT OF OINTMENT TOPICALLY TO AFFECTED AREA TWICE DAILY, Disp: , Rfl:     pantoprazole (PROTONIX) 40 MG EC tablet, Take 1 tablet by mouth Every Morning., Disp: , Rfl:     rivaroxaban (XARELTO) 15 MG tablet, Take 1 tablet by mouth Daily With Dinner. Indications: Atrial Fibrillation, Disp: 30 tablet, Rfl: 2    spironolactone (ALDACTONE) 25 MG tablet, Oral for 90 Days, Disp: , Rfl:     triamcinolone (KENALOG) 0.1 % cream, APPLY CREAM EXTERNALLY TWICE DAILY TO ECZEMA FOR UP TO 2 WEEKS PER MONTH AS NEEDED, Disp: , Rfl:     Vitamin D, Cholecalciferol, 25 MCG (1000 UT) tablet, Take 1 tablet by mouth Daily., Disp: , Rfl:     Family History:  Family History   Problem Relation Age of Onset    Hypertension Mother     Stroke Father     Arthritis Brother     Cancer Brother        Past Medical History:  Past Medical History:   Diagnosis Date    Ankle pain     Arthritis     Coronary artery disease     Depression     Gout     Hyperglycemia      Hyperlipidemia     Hypertension     Low back pain     Sleep apnea     Vitamin D deficiency        Past surgical History:  Past Surgical History:   Procedure Laterality Date    ANKLE FUSION      2017-left    CARDIAC CATHETERIZATION      CARDIAC CATHETERIZATION Right 8/29/2022    Procedure: Left Heart Cath;  Surgeon: Ratna Zavala MD;  Location: [facility] CATH INVASIVE LOCATION;  Service: Cardiovascular;  Laterality: Right;    CARDIAC ELECTROPHYSIOLOGY PROCEDURE Left 7/3/2023    Procedure: Pacemaker DC new [place] Notified;  Surgeon: Cr Herrera MD;  Location: [facility] CATH INVASIVE LOCATION;  Service: Cardiovascular;  Laterality: Left;    CARDIAC ELECTROPHYSIOLOGY PROCEDURE Right 3/5/2024    Procedure: Ablation atrial fibrillation, Cryo Slalonde and Wayne Igor notified 02/09/24;  Surgeon: Cr Herrera MD;  Location: [facility] CATH INVASIVE LOCATION;  Service: Cardiovascular;  Laterality: Right;    CARDIAC ELECTROPHYSIOLOGY PROCEDURE N/A 3/5/2024    Procedure: Cardioversion;  Surgeon: Cr Herrera MD;  Location: [facility] CATH INVASIVE LOCATION;  Service: Cardiovascular;  Laterality: N/A;    CARPAL TUNNEL RELEASE      CATARACT EXTRACTION      CUBITAL TUNNEL RELEASE      ENDOSCOPY N/A 5/28/2024    Procedure: ESOPHAGOGASTRODUODENOSCOPY WITH COLD FORCEP BIOPSY X1 AREA;  Surgeon: Josh Loomis MD;  Location: [facility] ENDOSCOPY;  Service: Gastroenterology;  Laterality: N/A;  Post- ESOPHAGITIS, GASTRITIS, HIATAL HERNIA    HYSTERECTOMY      REPLACEMENT TOTAL KNEE      left 2008    ROTATOR CUFF REPAIR      TOTAL KNEE ARTHROPLASTY Right 5/4/2023    Procedure: TOTAL KNEE ARTHROPLASTY WITH CORI ROBOT;  Surgeon: Chino Herrera II, MD;  Location: [facility] MAIN OR;  Service: Robotics - Ortho;  Laterality: Right;       Social History:  Social History     Socioeconomic History    Marital status:    Tobacco Use    Smoking status: Never     Passive exposure: Never    Smokeless tobacco:  Never   Vaping Use    Vaping status: Never Used   Substance and Sexual Activity    Alcohol use: No    Drug use: No    Sexual activity: Defer       Review of Systems:  The following systems were reviewed as they relate to the cardiovascular system: Constitutional, Eyes, ENT, Cardiovascular, Respiratory, Gastrointestinal, Integumentary, Neurological, Psychiatric, Hematologic, Endocrine, Musculoskeletal, and Genitourinary. The pertinent cardiovascular findings are reported above with all other cardiovascular points within those systems being negative.    Diagnostic Study Review:     Current Electrocardiogram:  Procedures          NOTE: The following portions of the patient's history were reviewed and updated this visit as appropriate: allergies, current medications, past family history, past medical history, past social history, past surgical history and problem list.

## 2024-10-15 RX ORDER — SPIRONOLACTONE 25 MG/1
12.5 TABLET ORAL DAILY
Qty: 45 TABLET | Refills: 2 | Status: SHIPPED | OUTPATIENT
Start: 2024-10-15

## 2024-10-18 ENCOUNTER — OFFICE VISIT (OUTPATIENT)
Dept: FAMILY MEDICINE CLINIC | Facility: CLINIC | Age: 79
End: 2024-10-18
Payer: MEDICARE

## 2024-10-18 VITALS
DIASTOLIC BLOOD PRESSURE: 79 MMHG | BODY MASS INDEX: 47.38 KG/M2 | TEMPERATURE: 98 F | OXYGEN SATURATION: 98 % | HEIGHT: 63 IN | HEART RATE: 61 BPM | SYSTOLIC BLOOD PRESSURE: 152 MMHG | WEIGHT: 267.4 LBS

## 2024-10-18 DIAGNOSIS — F32.A DEPRESSION, UNSPECIFIED DEPRESSION TYPE: ICD-10-CM

## 2024-10-18 DIAGNOSIS — E66.1 CLASS 3 DRUG-INDUCED OBESITY WITH SERIOUS COMORBIDITY AND BODY MASS INDEX (BMI) OF 45.0 TO 49.9 IN ADULT: Primary | ICD-10-CM

## 2024-10-18 DIAGNOSIS — R42 LIGHT HEADEDNESS: ICD-10-CM

## 2024-10-18 DIAGNOSIS — I48.0 PAROXYSMAL ATRIAL FIBRILLATION: ICD-10-CM

## 2024-10-18 DIAGNOSIS — E66.813 CLASS 3 DRUG-INDUCED OBESITY WITH SERIOUS COMORBIDITY AND BODY MASS INDEX (BMI) OF 45.0 TO 49.9 IN ADULT: Primary | ICD-10-CM

## 2024-10-18 DIAGNOSIS — I25.10 CORONARY ARTERY DISEASE INVOLVING NATIVE HEART WITHOUT ANGINA PECTORIS, UNSPECIFIED VESSEL OR LESION TYPE: ICD-10-CM

## 2024-10-18 DIAGNOSIS — M54.50 ACUTE RIGHT-SIDED LOW BACK PAIN WITHOUT SCIATICA: ICD-10-CM

## 2024-10-18 DIAGNOSIS — R73.9 HYPERGLYCEMIA: ICD-10-CM

## 2024-10-18 DIAGNOSIS — I10 PRIMARY HYPERTENSION: ICD-10-CM

## 2024-10-18 PROCEDURE — 3077F SYST BP >= 140 MM HG: CPT | Performed by: PREVENTIVE MEDICINE

## 2024-10-18 PROCEDURE — 1159F MED LIST DOCD IN RCRD: CPT | Performed by: PREVENTIVE MEDICINE

## 2024-10-18 PROCEDURE — 99214 OFFICE O/P EST MOD 30 MIN: CPT | Performed by: PREVENTIVE MEDICINE

## 2024-10-18 PROCEDURE — 1160F RVW MEDS BY RX/DR IN RCRD: CPT | Performed by: PREVENTIVE MEDICINE

## 2024-10-18 PROCEDURE — 3078F DIAST BP <80 MM HG: CPT | Performed by: PREVENTIVE MEDICINE

## 2024-10-18 PROCEDURE — 1125F AMNT PAIN NOTED PAIN PRSNT: CPT | Performed by: PREVENTIVE MEDICINE

## 2024-10-18 NOTE — PROGRESS NOTES
Patient had arthritis in her back but the sacroiliac joints looks good she is fine for physical therapy.

## 2024-10-18 NOTE — PROGRESS NOTES
"Subjective   Faby Le is a 79 y.o. female presents for   Chief Complaint   Patient presents with    Hypertension     Has taken medication this morning    Obesity     Cardiologist wants her to lose weight       There are no preventive care reminders to display for this patient.    Hypertension    Obesity       History of Present Illness  The patient is a 79-year-old female presenting today for paroxysmal atrial fibrillation, lightheadedness, primary hypertension, hyperglycemia, depression, coronary artery disease, and class 3 severe obesity due to excess calories with serious comorbidities.    She experienced an episode of dizziness on Wednesday night, accompanied by a sensation of her left arm falling. She also noticed an increase in her blood pressure and heart rate. Despite these symptoms, she did not seek immediate medical attention at a hospital. She has been monitoring her heart rate at home, which she reports as regular.    She had a consultation with her cardiologist on Monday, during which she was advised to lose weight. Her cardiologist mentioned that one of her heart chambers was enlarged and working harder than usual.    She discontinued the use of Ozempic due to concerns about its impact on her blood pressure. She has not tried Wegovy or Zepbound before.    She experiences pain in her right hip after walking for a while, which she describes as a feeling of her legs wanting to give out. She has previously undergone physical therapy for this issue. She has also received injections in her shoulder, but reports that the relief was short-lived.    IMMUNIZATIONS  She has received influenza, COVID-19, and RSV vaccines.    Vitals:    10/18/24 0840 10/18/24 0841   BP: 146/73 152/79   BP Location: Right arm Left arm   Patient Position: Sitting Sitting   Cuff Size: Adult Adult   Pulse: 61 61   Temp: 98 °F (36.7 °C)    TempSrc: Temporal    SpO2: 98%    Weight: 121 kg (267 lb 6.4 oz)    Height: 160 cm (63\")  "     Body mass index is 47.37 kg/m².    Current Outpatient Medications on File Prior to Visit   Medication Sig Dispense Refill    allopurinol (ZYLOPRIM) 300 MG tablet Take 1 tablet by mouth once daily 90 tablet 0    atorvastatin (LIPITOR) 20 MG tablet Take 1 tablet by mouth once daily 270 tablet 0    Azelastine HCl 137 MCG/SPRAY solution 2 sprays by Alternating Nares route Daily As Needed.      Blood Pressure Monitoring (Adult Blood Pressure Cuff Lg) kit Use 1 kit Daily. 1 each 0    cetirizine (zyrTEC) 10 MG tablet Take 1 tablet by mouth Daily.      cyanocobalamin (VITAMIN B-12) 1000 MCG tablet Take 1 tablet by mouth Daily.      dofetilide (Tikosyn) 250 MCG capsule Take 1 capsule by mouth Every 12 (Twelve) Hours. 180 capsule 3    hydrocortisone 2.5 % ointment APPLY THIN LAYER OF OINTMENT EXTERNALLY TO AFFECTED AREA TWICE DAILY. SAFE TO USE ON FACE      IRON CR PO Take 1 tablet by mouth Daily.      levothyroxine (SYNTHROID, LEVOTHROID) 88 MCG tablet Take 1 tablet by mouth Daily. 90 tablet 0    metoprolol succinate XL (TOPROL-XL) 25 MG 24 hr tablet Take 1 tablet by mouth Daily for 360 days. 90 tablet 3    midodrine (PROAMATINE) 5 MG tablet Take 1 tablet by mouth 2 (Two) Times a Day Before Meals. 90 tablet 0    montelukast (SINGULAIR) 10 MG tablet Take 1 tablet by mouth Every Night. 90 tablet 0    multivitamin (MULTI VITAMIN PO) Take 1 tablet by mouth Daily.      mupirocin (BACTROBAN) 2 % ointment APPLY SMALL AMOUNT OF OINTMENT TOPICALLY TO AFFECTED AREA TWICE DAILY      pantoprazole (PROTONIX) 40 MG EC tablet Take 1 tablet by mouth Every Morning.      rivaroxaban (XARELTO) 15 MG tablet Take 1 tablet by mouth Daily With Dinner. Indications: Atrial Fibrillation 30 tablet 2    spironolactone (ALDACTONE) 25 MG tablet Take 1/2 (one-half) tablet by mouth once daily 45 tablet 2    triamcinolone (KENALOG) 0.1 % cream APPLY CREAM EXTERNALLY TWICE DAILY TO ECZEMA FOR UP TO 2 WEEKS PER MONTH AS NEEDED      Vitamin D,  Cholecalciferol, 25 MCG (1000 UT) tablet Take 1 tablet by mouth Daily.       No current facility-administered medications on file prior to visit.       The following portions of the patient's history were reviewed and updated as appropriate: allergies, current medications, past family history, past medical history, past social history, past surgical history, and problem list.    Review of Systems   Musculoskeletal:  Positive for back pain and gait problem.   Neurological:  Negative for light-headedness.   Psychiatric/Behavioral:  Negative for dysphoric mood and depressed mood.        Objective   Physical Exam  Vitals reviewed.   Constitutional:       General: She is not in acute distress.     Appearance: She is well-developed. She is obese. She is not ill-appearing or toxic-appearing.   HENT:      Head: Normocephalic and atraumatic.      Right Ear: Tympanic membrane, ear canal and external ear normal.      Left Ear: Tympanic membrane, ear canal and external ear normal.      Nose: Nose normal.      Mouth/Throat:      Mouth: Mucous membranes are moist.      Pharynx: Posterior oropharyngeal erythema present.   Eyes:      Extraocular Movements: Extraocular movements intact.      Conjunctiva/sclera: Conjunctivae normal.      Pupils: Pupils are equal, round, and reactive to light.   Cardiovascular:      Rate and Rhythm: Normal rate and regular rhythm.      Heart sounds: Normal heart sounds.   Pulmonary:      Effort: Pulmonary effort is normal.      Breath sounds: Normal breath sounds.   Abdominal:      General: Bowel sounds are normal. There is no distension.      Palpations: Abdomen is soft. There is no mass.      Tenderness: There is no abdominal tenderness.   Musculoskeletal:         General: Tenderness present. No signs of injury.      Cervical back: Neck supple.      Right lower leg: Edema present.      Left lower leg: Edema present.      Comments: Tender right SI joint   Skin:     General: Skin is warm.    Neurological:      General: No focal deficit present.      Mental Status: She is alert and oriented to person, place, and time.      Motor: Weakness present.      Gait: Gait abnormal.   Psychiatric:         Mood and Affect: Mood normal.         Behavior: Behavior normal.       Physical Exam  Heart exhibits a good rate and rhythm.    Vital Signs  Blood pressure measures 140/75 with a pulse rate of 60.    PHQ-9 Total Score:    Results  Imaging  LS spine x-ray showed a little slippage of L5. CT of abdomen and pelvis showed advanced degenerative changes of both shoulder joints.         Assessment & Plan   Diagnoses and all orders for this visit:    1. Class 3 drug-induced obesity with serious comorbidity and body mass index (BMI) of 45.0 to 49.9 in adult (Primary)    2. Paroxysmal atrial fibrillation    3. Light headedness    4. Primary hypertension    5. Hyperglycemia    6. Depression, unspecified depression type    7. Coronary artery disease involving native heart without angina pectoris, unspecified vessel or lesion type    8. Acute right-sided low back pain without sciatica  -     XR Sacroiliac Joints 3+ View; Future  -     Ambulatory Referral to Physical Therapy for Evaluation & Treatment    Other orders  -     COVID-19 (Pfizer) 12yrs+ (COMIRNATY)  -     Tirzepatide-Weight Management (ZEPBOUND) 2.5 MG/0.5ML solution auto-injector; Inject 0.5 mL under the skin into the appropriate area as directed 1 (One) Time Per Week.  Dispense: 2 mL; Refill: 0  -     Tirzepatide-Weight Management (ZEPBOUND) 5 MG/0.5ML solution auto-injector; Inject 0.5 mL under the skin into the appropriate area as directed 1 (One) Time Per Week.  Dispense: 2 mL; Refill: 1  -     IMAGING SCANNED      Assessment & Plan  1. Paroxysmal Atrial Fibrillation.  Her pulse rate was slightly elevated during the episode, but her blood pressure readings were within acceptable limits. She is advised to seek immediate medical attention at the emergency room  if she experiences left arm pain again.    2. Severe Obesity.  A trial of Zepbound 0.25 mg weekly injection will be initiated to assess her tolerance. She is advised to always have two sources of sugar on hand. A prescription for Zepbound 0.25 mg weekly injection will be printed. If she cannot tolerate Zepbound, she will need to focus on dietary changes and increasing physical activity. A referral for physical therapy has been made to address hip pain that limits her ability to walk. An x-ray of the sacroiliac joint will be ordered prior to the commencement of physical therapy.    3. Primary Hypertension.  Her blood pressure readings were not concerning during the visit. She is advised to monitor her blood pressure at home and report any significant changes.    4. Hyperglycemia.  She does not have high blood sugar currently. Monitoring will continue.    5. Depression.  Continuation of current management. No changes in medication were discussed.    6. Coronary Artery Disease.  She is advised to lose weight to help manage her heart condition. No changes in current management were discussed.    7. Class III Severe Obesity.  She is advised to lose weight to help manage her comorbidities. The trial of Zepbound is part of this management plan.    8. Degenerative Joint Disease.  Referral to physical therapy to address hip pain and improve mobility. An x-ray of the sacroiliac joint will be ordered.    Follow-up  Patient is scheduled for a follow-up visit in 1 month.    Patient Instructions   There are no preventive care reminders to display for this patient.        Patient or patient representative verbalized consent for the use of Ambient Listening during the visit with  Leonor Adam MD for chart documentation. 10/18/2024  16:58 EDT

## 2024-10-21 PROCEDURE — 91320 SARSCV2 VAC 30MCG TRS-SUC IM: CPT | Performed by: PREVENTIVE MEDICINE

## 2024-10-21 PROCEDURE — 90480 ADMN SARSCOV2 VAC 1/ONLY CMP: CPT | Performed by: PREVENTIVE MEDICINE

## 2024-10-24 ENCOUNTER — PRIOR AUTHORIZATION (OUTPATIENT)
Dept: FAMILY MEDICINE CLINIC | Facility: CLINIC | Age: 79
End: 2024-10-24
Payer: MEDICARE

## 2024-10-24 ENCOUNTER — TELEPHONE (OUTPATIENT)
Dept: FAMILY MEDICINE CLINIC | Facility: CLINIC | Age: 79
End: 2024-10-24
Payer: MEDICARE

## 2024-10-24 NOTE — TELEPHONE ENCOUNTER
Caller: Faby Le    Relationship: Self    Best call back number: [phone]     What test/procedure requested:      When is it needed:      Where is the test/procedure going to be performed:      Additional information or concerns: PATIENT CALLED STATED THAT Health system PHARMACY WILL NEED A PRIOR AUTH FOR THE MEDICATION     Tirzepatide-Weight Management (ZEPBOUND) 2.5 MG/0.5ML solution auto-injector    AND CAN DR MICHEL PLEASE CONTACT HER INSURANCE FOR PRIOR AUTH AND THEN SEND TO PHARMACY SO THEY CAN FILL MEDICATION.

## 2024-10-28 ENCOUNTER — TELEPHONE (OUTPATIENT)
Dept: FAMILY MEDICINE CLINIC | Facility: CLINIC | Age: 79
End: 2024-10-28
Payer: MEDICARE

## 2024-10-28 RX ORDER — MIDODRINE HYDROCHLORIDE 5 MG/1
5 TABLET ORAL
Qty: 90 TABLET | Refills: 0 | Status: SHIPPED | OUTPATIENT
Start: 2024-10-28

## 2024-10-28 NOTE — TELEPHONE ENCOUNTER
Rx Refill Note  Requested Prescriptions     Pending Prescriptions Disp Refills   • midodrine (PROAMATINE) 5 MG tablet [Pharmacy Med Name: Midodrine HCl 5 MG Oral Tablet] 90 tablet 0     Sig: TAKE 1 TABLET BY MOUTH TWICE DAILY BEFORE MEAL(S)      Last office visit with prescribing clinician: 10/18/2024   Last telemedicine visit with prescribing clinician: Visit date not found   Next office visit with prescribing clinician: 11/19/2024       Would you like a call back once the refill request has been completed: [] Yes [] No    If the office needs to give you a call back, can they leave a voicemail: [] Yes [] No    Cindy Del Angel MA  10/28/24, 10:27 EDT

## 2024-10-28 NOTE — TELEPHONE ENCOUNTER
Caller: Faby Le    Relationship: Self    Best call back number: [phone]    What form or medical record are you requesting: [employer] INTAKE FORM    Who is requesting this form or medical record from you: [employer]    How would you like to receive the form or medical records (pick-up, mail, fax): FAX  If fax, what is the fax number: FAX NUMBER WILL BE ON FORMS    Timeframe paperwork needed: ASAP    Additional notes: PATIENT WANTED TO NOTIFY THAT [employer] WILL BE SENDING OVER INTAKE FORMS TO BE FILLED OUT AND RETURNED BY DR MICHEL.

## 2024-10-30 ENCOUNTER — TELEPHONE (OUTPATIENT)
Dept: FAMILY MEDICINE CLINIC | Facility: CLINIC | Age: 79
End: 2024-10-30
Payer: MEDICARE

## 2024-10-30 NOTE — TELEPHONE ENCOUNTER
Patient can check with her insurance to see if they will cover any of the other weight loss medications.  If not she can buy the Zepbound directly from the company for $400 a month please let me know if I need to send it into the company for her to get.

## 2024-10-30 NOTE — TELEPHONE ENCOUNTER
Caller: RAMÓN HUNAG    Relationship: Emergency Contact    Best call back number: [phone]     What is the best time to reach you: ANY    Who are you requesting to speak with (clinical staff, provider,  specific staff member): CLINICAL    Do you know the name of the person who called: RAMÓN    What was the call regarding: PRIOR AUTHORIZATION NEEDED FOR Tirzepatide-Weight Management (ZEPBOUND) 2.5 MG/0.5ML solution auto-injector.    Is it okay if the provider responds through MyChart:

## 2024-11-04 RX ORDER — ALLOPURINOL 300 MG/1
300 TABLET ORAL DAILY
Qty: 90 TABLET | Refills: 0 | Status: SHIPPED | OUTPATIENT
Start: 2024-11-04

## 2024-11-08 ENCOUNTER — OFFICE VISIT (OUTPATIENT)
Dept: CARDIOLOGY | Facility: CLINIC | Age: 79
End: 2024-11-08
Payer: MEDICARE

## 2024-11-08 VITALS
HEIGHT: 63 IN | OXYGEN SATURATION: 99 % | SYSTOLIC BLOOD PRESSURE: 127 MMHG | BODY MASS INDEX: 48.12 KG/M2 | DIASTOLIC BLOOD PRESSURE: 58 MMHG | HEART RATE: 63 BPM | WEIGHT: 271.6 LBS

## 2024-11-08 DIAGNOSIS — Z95.0 PRESENCE OF CARDIAC PACEMAKER: ICD-10-CM

## 2024-11-08 DIAGNOSIS — E78.2 MIXED HYPERLIPIDEMIA: ICD-10-CM

## 2024-11-08 DIAGNOSIS — I95.1 ORTHOSTATIC HYPOTENSION: ICD-10-CM

## 2024-11-08 DIAGNOSIS — I48.0 PAROXYSMAL ATRIAL FIBRILLATION: ICD-10-CM

## 2024-11-08 DIAGNOSIS — I10 PRIMARY HYPERTENSION: ICD-10-CM

## 2024-11-08 DIAGNOSIS — I49.5 SICK SINUS SYNDROME: Primary | ICD-10-CM

## 2024-11-08 PROCEDURE — 3074F SYST BP LT 130 MM HG: CPT | Performed by: INTERNAL MEDICINE

## 2024-11-08 PROCEDURE — 3078F DIAST BP <80 MM HG: CPT | Performed by: INTERNAL MEDICINE

## 2024-11-08 PROCEDURE — 99214 OFFICE O/P EST MOD 30 MIN: CPT | Performed by: INTERNAL MEDICINE

## 2024-11-08 PROCEDURE — 93000 ELECTROCARDIOGRAM COMPLETE: CPT | Performed by: INTERNAL MEDICINE

## 2024-11-08 PROCEDURE — 1159F MED LIST DOCD IN RCRD: CPT | Performed by: INTERNAL MEDICINE

## 2024-11-08 PROCEDURE — 1160F RVW MEDS BY RX/DR IN RCRD: CPT | Performed by: INTERNAL MEDICINE

## 2024-11-08 NOTE — PROGRESS NOTES
C--sick sinus syndrome and pacemaker in situ      Sub  79-year-old pleasant patient on Tikosyn for atrial arrhythmia and sick sinus syndrome with pacemaker comes in for follow-up.  Patient is doing really well and denies any new symptoms.    My previous history attached below for reference       patient underwent EP study and ablation results are attached below --study was performed and March 2024    Patient had   moderate to severe left atrial enlargement    Patient had a large left superior pulmonary vein and a large left inferior pulmonary vein and  a  large right inferior pulmonary vein and right superior pulmonary vein  Pulmonary vein isolation with cryo ablation done--post completion of cryoablation, mapping revealed a small area of reconnection on anterior ridge of the left supra pulmonary vein and the left atrial appendage and this area was carefully ablated and repeat mapping revealed complete antral isolation.  No retrograde conduction at 600 ms post pulmonary vein antral isolation  AV bandar ERP was 500/250  No preexcitation  No induction of SVT  Rapid pacing did induce atypical flutter with constant wobbling in the posterior wall adjoining the inferior pulmonary veins was ablated with a linear ablation connecting the inferior pulmonary veins.  Repeat rapid pacing could reinduce different types of flutters which were constant wobbling and the flutters for terminating while the coronary sinus catheter was moved into the middle CS to proximal CS suggestive of endocardial epicardial bridge.  No further induction of atrial fibrillation despite aggressive pacing.  Appropriately functioning dual-chamber pacemaker    Patient has dual-chamber pacemaker in situ and history of TIA in the past and has a history of hypertension sleep apnea and lower extremity edema.  Cardiac catheterization in the past revealed nonobstructive disease with normal EF.  Patient has history of iron deficiency anemia in the  past.        Past Medical History:   Diagnosis Date   • Ankle pain    • Arthritis    • Coronary artery disease    • Depression    • Gout    • Hyperglycemia    • Hyperlipidemia    • Hypertension    • Low back pain    • Sleep apnea    • Vitamin D deficiency      Past Surgical History:   Procedure Laterality Date   • ANKLE FUSION      2017-left   • CARDIAC CATHETERIZATION     • CARDIAC CATHETERIZATION Right 8/29/2022    Procedure: Left Heart Cath;  Surgeon: Ratna Zavala MD;  Location: [facility] CATH INVASIVE LOCATION;  Service: Cardiovascular;  Laterality: Right;   • CARDIAC ELECTROPHYSIOLOGY PROCEDURE Left 7/3/2023    Procedure: Pacemaker DC new Mobile Notified;  Surgeon: Cr Herrera MD;  Location: [facility] CATH INVASIVE LOCATION;  Service: Cardiovascular;  Laterality: Left;   • CARDIAC ELECTROPHYSIOLOGY PROCEDURE Right 3/5/2024    Procedure: Ablation atrial fibrillation, Cryo Slalonde and Wayne Igor notified 02/09/24;  Surgeon: Cr Herrera MD;  Location: [facility] CATH INVASIVE LOCATION;  Service: Cardiovascular;  Laterality: Right;   • CARDIAC ELECTROPHYSIOLOGY PROCEDURE N/A 3/5/2024    Procedure: Cardioversion;  Surgeon: Cr Herrera MD;  Location: [facility] CATH INVASIVE LOCATION;  Service: Cardiovascular;  Laterality: N/A;   • CARPAL TUNNEL RELEASE     • CATARACT EXTRACTION     • CUBITAL TUNNEL RELEASE     • HYSTERECTOMY     • REPLACEMENT TOTAL KNEE      left 2008   • ROTATOR CUFF REPAIR     • TOTAL KNEE ARTHROPLASTY Right 5/4/2023    Procedure: TOTAL KNEE ARTHROPLASTY WITH CORI ROBOT;  Surgeon: Chino Herrera II, MD;  Location: [facility] MAIN OR;  Service: Robotics - Ortho;  Laterality: Right;       Physical Exam    General:      well developed, well nourished, in no acute distress.    Head:      normocephalic and atraumatic.    Eyes:      PERRL/EOM intact, conjunctivae and sclerae clear without nystagmus.    Neck:      no  thyromegaly, trachea central with normal respiratory  effort  Lungs:      clear bilaterally to auscultation.    Heart:       regular rate and rhythm, S1, S2 without murmurs, rubs, or gallops  Skin:      intact without lesions or rashes.    Psych:      alert and cooperative; normal mood and affect; normal attention span and concentration.                    Assessment plan    Recent labs of 1.24 creatinine.  Potassium 4.3.  Hemoglobin 11.3 and platelets are normal  Sick sinus syndrome with pacemaker in situ and pacemaker home monitoring reviewed with normal function with less than 1% AF burden.  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  Hypothyroidism on levothyroxine--recent TSH normal  Hyperlipidemia on atorvastatin  Iron deficiency anemia without any bleeding issues on iron supplementation  Prior history of TIA on Xarelto  Medications reviewed and follow-up appointments made        ECG 12 Lead    Date/Time: 11/8/2024 9:53 AM  Performed by: Cr Herrera MD    Authorized by: Cr Herrera MD  Comparison: compared with previous ECG   Similar to previous ECG  Comparison to previous ECG: Sinus rhythm with intermittent appropriate atrial pacing      Electronically signed by Cr Herrera MD, 11/08/24, 9:52 AM EST.

## 2024-11-08 NOTE — LETTER
November 8, 2024     Leonor Adam MD  [address] [facility] [place] [address]    Patient: Faby Le   YOB: 1945   Date of Visit: 11/8/2024     Dear Leonor Adam MD:       Thank you for referring Faby Le to me for evaluation. Below are the relevant portions of my assessment and plan of care.    If you have questions, please do not hesitate to call me. I look forward to following Faby along with you.         Sincerely,        Cr Herrera MD        CC: No Recipients    Cr Herrera MD  11/08/24 0953  Sign when Signing Visit  C--sick sinus syndrome and pacemaker in situ      Sub  79-year-old pleasant patient on Tikosyn for atrial arrhythmia and sick sinus syndrome with pacemaker comes in for follow-up.  Patient is doing really well and denies any new symptoms.    My previous history attached below for reference       patient underwent EP study and ablation results are attached below --study was performed and March 2024    Patient had   moderate to severe left atrial enlargement    Patient had a large left superior pulmonary vein and a large left inferior pulmonary vein and  a  large right inferior pulmonary vein and right superior pulmonary vein  Pulmonary vein isolation with cryo ablation done--post completion of cryoablation, mapping revealed a small area of reconnection on anterior ridge of the left supra pulmonary vein and the left atrial appendage and this area was carefully ablated and repeat mapping revealed complete antral isolation.  No retrograde conduction at 600 ms post pulmonary vein antral isolation  AV bandar ERP was 500/250  No preexcitation  No induction of SVT  Rapid pacing did induce atypical flutter with constant wobbling in the posterior wall adjoining the inferior pulmonary veins was ablated with a linear ablation connecting the inferior pulmonary veins.  Repeat rapid pacing could reinduce different types of flutters which were constant wobbling and the  flutters for terminating while the coronary sinus catheter was moved into the middle CS to proximal CS suggestive of endocardial epicardial bridge.  No further induction of atrial fibrillation despite aggressive pacing.  Appropriately functioning dual-chamber pacemaker    Patient has dual-chamber pacemaker in situ and history of TIA in the past and has a history of hypertension sleep apnea and lower extremity edema.  Cardiac catheterization in the past revealed nonobstructive disease with normal EF.  Patient has history of iron deficiency anemia in the past.        Past Medical History:   Diagnosis Date   • Ankle pain    • Arthritis    • Coronary artery disease    • Depression    • Gout    • Hyperglycemia    • Hyperlipidemia    • Hypertension    • Low back pain    • Sleep apnea    • Vitamin D deficiency      Past Surgical History:   Procedure Laterality Date   • ANKLE FUSION      2017-left   • CARDIAC CATHETERIZATION     • CARDIAC CATHETERIZATION Right 8/29/2022    Procedure: Left Heart Cath;  Surgeon: Ratna Zavala MD;  Location: [facility] CATH INVASIVE LOCATION;  Service: Cardiovascular;  Laterality: Right;   • CARDIAC ELECTROPHYSIOLOGY PROCEDURE Left 7/3/2023    Procedure: Pacemaker DC new [place] Notified;  Surgeon: Cr Herrera MD;  Location: [facility] CATH INVASIVE LOCATION;  Service: Cardiovascular;  Laterality: Left;   • CARDIAC ELECTROPHYSIOLOGY PROCEDURE Right 3/5/2024    Procedure: Ablation atrial fibrillation, Cryo Marcialonde and Wayne Igor notified 02/09/24;  Surgeon: Cr Herrera MD;  Location: [facility] CATH INVASIVE LOCATION;  Service: Cardiovascular;  Laterality: Right;   • CARDIAC ELECTROPHYSIOLOGY PROCEDURE N/A 3/5/2024    Procedure: Cardioversion;  Surgeon: Cr Herrera MD;  Location: [facility] CATH INVASIVE LOCATION;  Service: Cardiovascular;  Laterality: N/A;   • CARPAL TUNNEL RELEASE     • CATARACT EXTRACTION     • CUBITAL TUNNEL RELEASE     • HYSTERECTOMY     • REPLACEMENT  TOTAL KNEE      left 2008   • ROTATOR CUFF REPAIR     • TOTAL KNEE ARTHROPLASTY Right 5/4/2023    Procedure: TOTAL KNEE ARTHROPLASTY WITH CORI ROBOT;  Surgeon: Chino Herrera II, MD;  Location: [facility] OR;  Service: Robotics - Ortho;  Laterality: Right;       Physical Exam    General:      well developed, well nourished, in no acute distress.    Head:      normocephalic and atraumatic.    Eyes:      PERRL/EOM intact, conjunctivae and sclerae clear without nystagmus.    Neck:      no  thyromegaly, trachea central with normal respiratory effort  Lungs:      clear bilaterally to auscultation.    Heart:       regular rate and rhythm, S1, S2 without murmurs, rubs, or gallops  Skin:      intact without lesions or rashes.    Psych:      alert and cooperative; normal mood and affect; normal attention span and concentration.                    Assessment plan    Recent labs of 1.24 creatinine.  Potassium 4.3.  Hemoglobin 11.3 and platelets are normal  Sick sinus syndrome with pacemaker in situ and pacemaker home monitoring reviewed with normal function with less than 1% AF burden.  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  Hypothyroidism on levothyroxine--recent TSH normal  Hyperlipidemia on atorvastatin  Iron deficiency anemia without any bleeding issues on iron supplementation  Prior history of TIA on Xarelto  Medications reviewed and follow-up appointments made        ECG 12 Lead    Date/Time: 11/8/2024 9:53 AM  Performed by: Cr Herrera MD    Authorized by: Cr Herrera MD  Comparison: compared with previous ECG   Similar to previous ECG  Comparison to previous ECG: Sinus rhythm with intermittent appropriate atrial pacing      Electronically signed by Cr Herrera MD, 11/08/24, 9:52 AM EST.

## 2024-11-13 ENCOUNTER — TELEPHONE (OUTPATIENT)
Dept: FAMILY MEDICINE CLINIC | Facility: CLINIC | Age: 79
End: 2024-11-13
Payer: MEDICARE

## 2024-11-13 ENCOUNTER — TELEPHONE (OUTPATIENT)
Dept: FAMILY MEDICINE CLINIC | Facility: CLINIC | Age: 79
End: 2024-11-13

## 2024-11-13 NOTE — TELEPHONE ENCOUNTER
We have the paperword for [facility]    Has Faby had a TB test?  Does she drink alcohol?  Does she have a living will?

## 2024-11-13 NOTE — TELEPHONE ENCOUNTER
Spoke to patient she does not drink,  no idea on test and does not have living will have faxed paper work to [facility]

## 2024-11-13 NOTE — TELEPHONE ENCOUNTER
Caller: Faby Le    Relationship: Self    Best call back number: [phone]    What orders are you requesting (i.e. lab or imaging): NEW POWER LIFT CHAIR     In what timeframe would the patient need to come in: ASAP    Where will you receive your lab/imaging services: [facility]     Additional notes: PREFERS A GREEN ONE, IF POSSIBLE.   MEDIUM SIZED.

## 2024-12-09 ENCOUNTER — OFFICE (AMBULATORY)
Dept: URBAN - METROPOLITAN AREA CLINIC 64 | Facility: CLINIC | Age: 79
End: 2024-12-09
Payer: COMMERCIAL

## 2024-12-09 ENCOUNTER — OFFICE (AMBULATORY)
Age: 79
End: 2024-12-09
Payer: COMMERCIAL

## 2024-12-09 VITALS
WEIGHT: 274 LBS | HEIGHT: 63 IN | SYSTOLIC BLOOD PRESSURE: 136 MMHG | DIASTOLIC BLOOD PRESSURE: 76 MMHG | SYSTOLIC BLOOD PRESSURE: 136 MMHG | SYSTOLIC BLOOD PRESSURE: 143 MMHG | WEIGHT: 274 LBS | SYSTOLIC BLOOD PRESSURE: 136 MMHG | HEART RATE: 70 BPM | SYSTOLIC BLOOD PRESSURE: 136 MMHG | DIASTOLIC BLOOD PRESSURE: 76 MMHG | WEIGHT: 274 LBS | DIASTOLIC BLOOD PRESSURE: 76 MMHG | DIASTOLIC BLOOD PRESSURE: 72 MMHG | HEIGHT: 63 IN | SYSTOLIC BLOOD PRESSURE: 136 MMHG | WEIGHT: 274 LBS | HEIGHT: 63 IN | SYSTOLIC BLOOD PRESSURE: 143 MMHG | DIASTOLIC BLOOD PRESSURE: 76 MMHG | DIASTOLIC BLOOD PRESSURE: 76 MMHG | HEIGHT: 63 IN | HEIGHT: 63 IN | DIASTOLIC BLOOD PRESSURE: 76 MMHG | SYSTOLIC BLOOD PRESSURE: 143 MMHG | HEART RATE: 70 BPM | HEIGHT: 63 IN | DIASTOLIC BLOOD PRESSURE: 72 MMHG | HEIGHT: 63 IN | SYSTOLIC BLOOD PRESSURE: 143 MMHG | DIASTOLIC BLOOD PRESSURE: 76 MMHG | SYSTOLIC BLOOD PRESSURE: 143 MMHG | HEART RATE: 70 BPM | HEART RATE: 70 BPM | WEIGHT: 274 LBS | SYSTOLIC BLOOD PRESSURE: 143 MMHG | HEART RATE: 70 BPM | DIASTOLIC BLOOD PRESSURE: 72 MMHG | WEIGHT: 274 LBS | WEIGHT: 274 LBS | SYSTOLIC BLOOD PRESSURE: 136 MMHG | SYSTOLIC BLOOD PRESSURE: 143 MMHG | SYSTOLIC BLOOD PRESSURE: 136 MMHG | DIASTOLIC BLOOD PRESSURE: 72 MMHG | DIASTOLIC BLOOD PRESSURE: 72 MMHG | DIASTOLIC BLOOD PRESSURE: 72 MMHG | HEART RATE: 70 BPM | HEART RATE: 70 BPM | DIASTOLIC BLOOD PRESSURE: 72 MMHG

## 2024-12-09 DIAGNOSIS — D64.9 ANEMIA, UNSPECIFIED: ICD-10-CM

## 2024-12-09 DIAGNOSIS — K59.00 CONSTIPATION, UNSPECIFIED: ICD-10-CM

## 2024-12-09 DIAGNOSIS — K30 FUNCTIONAL DYSPEPSIA: ICD-10-CM

## 2024-12-09 PROCEDURE — 99213 OFFICE O/P EST LOW 20 MIN: CPT | Performed by: NURSE PRACTITIONER

## 2024-12-11 DIAGNOSIS — E03.9 HYPOTHYROIDISM, UNSPECIFIED TYPE: ICD-10-CM

## 2024-12-11 RX ORDER — LEVOTHYROXINE SODIUM 88 UG/1
88 TABLET ORAL DAILY
Qty: 90 TABLET | Refills: 0 | Status: SHIPPED | OUTPATIENT
Start: 2024-12-11

## 2025-01-20 ENCOUNTER — OFFICE VISIT (OUTPATIENT)
Age: 80
End: 2025-01-20
Payer: MEDICARE

## 2025-01-20 VITALS — BODY MASS INDEX: 48.55 KG/M2 | WEIGHT: 274 LBS | OXYGEN SATURATION: 98 % | HEIGHT: 63 IN | HEART RATE: 64 BPM

## 2025-01-20 DIAGNOSIS — Z98.1 HISTORY OF ANKLE FUSION: ICD-10-CM

## 2025-01-20 DIAGNOSIS — M77.42 METATARSALGIA, LEFT FOOT: ICD-10-CM

## 2025-01-20 DIAGNOSIS — M79.672 LEFT FOOT PAIN: Primary | ICD-10-CM

## 2025-01-20 DIAGNOSIS — M19.072 ARTHRITIS OF LEFT FOOT: ICD-10-CM

## 2025-01-20 DIAGNOSIS — M20.42 HAMMER TOE OF LEFT FOOT: ICD-10-CM

## 2025-01-20 DIAGNOSIS — E66.01 MORBID OBESITY WITH BMI OF 45.0-49.9, ADULT: ICD-10-CM

## 2025-01-21 NOTE — PROGRESS NOTES
01/20/2025  Foot and Ankle Surgery - Established Patient/Follow-up  Provider: Dr. Donta Kelley DPM  Location: [facility]    Subjective:  Faby Le is a 79 y.o. female.     Chief Complaint   Patient presents with    Left Foot - Follow-up     New problem- 4th and 5th toe pain    Follow-Up     Leonor Adam MD-10/18/24       History of Present Illness  The patient presents for evaluation of left foot pain.    She reports experiencing discomfort in her 4th and 5th toes, describing the sensation as akin to a deep bruise. The pain is not localized to the toes but extends to the entire foot, particularly at the corner. She also experiences occasional cramping. The pain is so severe that it impedes her ability to walk on certain days. She has been utilizing shoe inserts for approximately one year and owns two pairs of shoes. She recently relocated to an assisted living facility on the third floor, which requires significant walking to reach the dining area. She occasionally performs stretching exercises but is not currently engaged in physical therapy or low-impact exercise. Prolonged walking exacerbates the pain, which she likens to stepping on a sharp knife upon standing after sitting.    Her cardiologist has advised weight loss, but she has been unsuccessful in achieving this independently. She previously lost a substantial amount of weight through the use of injections but had to discontinue due to cardiac complications, leading to weight regain. Her cardiologist has emphasized the necessity of weight loss for her cardiac health. She was advised to resume the injections, but insurance coverage was denied twice. She has since moved to a new home and informed the nurse practitioner about the insurance issue. Despite increased activity due to her move to assisted living, she continues to gain weight.      Allergies   Allergen Reactions    Celecoxib Itching and Swelling     swelling       Current  Outpatient Medications on File Prior to Visit   Medication Sig Dispense Refill    allopurinol (ZYLOPRIM) 300 MG tablet Take 1 tablet by mouth once daily 90 tablet 0    atorvastatin (LIPITOR) 20 MG tablet Take 1 tablet by mouth once daily 270 tablet 0    Azelastine HCl 137 MCG/SPRAY solution 2 sprays by Alternating Nares route Daily As Needed.      Blood Pressure Monitoring (Adult Blood Pressure Cuff Lg) kit Use 1 kit Daily. 1 each 0    cetirizine (zyrTEC) 10 MG tablet Take 1 tablet by mouth Daily.      cyanocobalamin (VITAMIN B-12) 1000 MCG tablet Take 1 tablet by mouth Daily.      dofetilide (Tikosyn) 250 MCG capsule Take 1 capsule by mouth Every 12 (Twelve) Hours. 180 capsule 3    hydrocortisone 2.5 % ointment APPLY THIN LAYER OF OINTMENT EXTERNALLY TO AFFECTED AREA TWICE DAILY. SAFE TO USE ON FACE      IRON CR PO Take 1 tablet by mouth Daily.      levothyroxine (SYNTHROID, LEVOTHROID) 88 MCG tablet Take 1 tablet by mouth once daily 90 tablet 0    metoprolol succinate XL (TOPROL-XL) 25 MG 24 hr tablet Take 1 tablet by mouth Daily for 360 days. 90 tablet 3    midodrine (PROAMATINE) 5 MG tablet TAKE 1 TABLET BY MOUTH TWICE DAILY BEFORE MEAL(S) 90 tablet 0    montelukast (SINGULAIR) 10 MG tablet Take 1 tablet by mouth Every Night. 90 tablet 0    multivitamin (MULTI VITAMIN PO) Take 1 tablet by mouth Daily.      mupirocin (BACTROBAN) 2 % ointment APPLY SMALL AMOUNT OF OINTMENT TOPICALLY TO AFFECTED AREA TWICE DAILY      pantoprazole (PROTONIX) 40 MG EC tablet Take 1 tablet by mouth Every Morning.      rivaroxaban (XARELTO) 15 MG tablet Take 1 tablet by mouth Daily With Dinner. Indications: Atrial Fibrillation 30 tablet 2    spironolactone (ALDACTONE) 25 MG tablet Take 1/2 (one-half) tablet by mouth once daily 45 tablet 2    triamcinolone (KENALOG) 0.1 % cream APPLY CREAM EXTERNALLY TWICE DAILY TO ECZEMA FOR UP TO 2 WEEKS PER MONTH AS NEEDED      Vitamin D, Cholecalciferol, 25 MCG (1000 UT) tablet Take 1 tablet by  "mouth Daily.       No current facility-administered medications on file prior to visit.       Objective   Pulse 64   Ht 160 cm (63\")   Wt 124 kg (274 lb)   LMP  (LMP Unknown)   SpO2 98%   BMI 48.54 kg/m²     Foot/Ankle Exam  Physical Exam  GENERAL  Appearance:  obese  Orientation:  AAOx3  Affect:  appropriate     VASCULAR      Right Foot Vascularity   Normal vascular exam    Dorsalis pedis:  2+  Posterior tibial:  2+  Skin temperature:  warm  Edema grading:  None  CFT:  < 3 seconds  Pedal hair growth:  Present  Varicosities:  none      Left Foot Vascularity   Normal vascular exam    Dorsalis pedis:  2+  Posterior tibial:  2+  Skin temperature:  warm  Edema grading:  None  CFT:  < 3 seconds  Pedal hair growth:  Present  Varicosities:  none     NEUROLOGIC      Right Foot Neurologic   Light touch sensation: normal  Hot/Cold sensation: normal  Achilles reflex:  2+      Left Foot Neurologic   Light touch sensation: normal  Hot/Cold sensation:  normal  Achilles reflex:  2+     MUSCULOSKELETAL      Right Foot Musculoskeletal   Arch:  Normal      Left Foot Musculoskeletal   Arch:  Normal     MUSCLE STRENGTH      Right Foot Muscle Strength   Normal strength    Foot dorsiflexion:  5  Foot plantar flexion:  5  Foot inversion:  5  Foot eversion:  5      Left Foot Muscle Strength   Normal strength    Foot dorsiflexion:  5  Foot plantar flexion:  5  Foot inversion:  5  Foot eversion:  5     DERMATOLOGIC       Right Foot Dermatologic   Skin  Right foot skin is intact.       Left Foot Dermatologic   Skin  Left foot skin is intact.      TESTS      Right Foot Tests   Anterior drawer: negative  Varus tilt: negative      Left Foot Tests   Anterior drawer: negative  Varus tilt: negative     Right foot additional comments: 11/08/2023  Continued rigidity involving ankles and feet. Discomfort with palpation to the arch and forefoot regions of both feet, left greater than right.     02/08/2024  Continued discomfort involving the feet. " Soft tissue rigidity. No progressive deformity or instability.     Left foot additional comments: Prominent rigidity noted to the left rear foot secondary to previous tibiotalar calcaneal fusion. Moderate hypermobility involving the midfoot with mild crepitus. No isolated discomfort with palpation involving the forefoot or midfoot today.     03/13/2023  Continued discomfort involving the plantar lateral aspect of the forefoot. Moderate pes planus foot structure with soft tissue rigidity. Range of motion is limited to the rear foot secondary to previous fusion.     06/13/2023  No progressive deformity or instability. Continued discomfort involving the medial aspect of the foot and ankles. Normal range of motion, muscle strength testing.     11/08/2023  Continued rigidity involving ankles and feet. Discomfort with palpation to the arch and forefoot regions of both feet, left greater than right.     02/08/2024  Continued discomfort involving the feet. Soft tissue rigidity. No progressive deformity or instability.    1/20/25: Continued pain involving the left foot at the level of the fourth and fifth digits.  Semirigid nature of the toes.  No open wounds or signs of infection.  Discomfort involving the lateral aspect of the forefoot as well.  Rigidity noted to the midfoot and rear foot secondary to previous fusion and arthritis.        Results  Imaging  X-ray of the left foot shows no fractures or bone abnormalities, but issues with soft tissue and weight distribution.    Assessment & Plan   Diagnoses and all orders for this visit:    1. Left foot pain (Primary)  -     XR Foot 3+ View Left    2. Hammer toe of left foot    3. Metatarsalgia, left foot    4. Arthritis of left foot    5. History of ankle fusion    6. Morbid obesity with BMI of 45.0-49.9, adult  -     Ambulatory Referral to General Surgery      Assessment & Plan    The discomfort in her left foot is primarily due to the rigidity in the posterior aspect of  her leg and the progressive arthritis affecting the rest of the foot. This results in her foot constantly compensating, with the toes attempting to  the floor to maintain balance and forward movement. Consequently, her toes tend to curl downwards, becoming stiff and locked in position, which can manifest as cramping. The majority of her weight appears to be distributed on the lateral side of her foot. X-ray results from today show no fractures or bone abnormalities, but rather issues with the soft tissue and weight distribution. She was advised to replace her shoe inserts every 6 months for optimal support. The use of a crest pad was suggested as an alternative treatment option. She was also encouraged to engage in low-impact exercises such as biking, water therapy, swimming, and water aerobics, and to avoid walking due to her lower extremity issues. She was instructed to perform toe bending exercises multiple times daily, use a tennis ball to roll under her foot, and individually straighten each toe to stretch the tendons and soft tissues in the affected area. If her symptoms persist, surgical intervention may be considered to address the issue.    A referral to Dr. Henley, a bariatric surgeon, was made for further evaluation and potential weight loss measures. She was advised to consult with a nutritionist and dietitian to ensure proper dietary habits and physical activity levels.Reviewed proper basic stretching and manual therapy exercises along with appropriate shoes and activity.  Discussed proper use and/or avoidance of OTC anti-inflammatories.  Patient is to call with any additional issues or concerns.  Greater than 20 minutes was spent before, during, and after evaluation for patient care.    Follow-up  The patient will follow up in 4 to 6 weeks.             Patient or patient representative verbalized consent for the use of Ambient Listening during the visit with  STEPHANIE Kelley DPM for chart  documentation. 1/21/2025  06:38 EST    STEPHANIE Kelley DPM

## 2025-01-22 ENCOUNTER — TELEPHONE (OUTPATIENT)
Dept: BARIATRICS/WEIGHT MGMT | Facility: CLINIC | Age: 80
End: 2025-01-22
Payer: MEDICARE

## 2025-01-22 NOTE — TELEPHONE ENCOUNTER
Called and spoke with incoming referral. Interested in [facility] program and requested we mail new patient packet.

## 2025-03-05 ENCOUNTER — OFFICE VISIT (OUTPATIENT)
Age: 80
End: 2025-03-05
Payer: MEDICARE

## 2025-03-05 VITALS — HEIGHT: 63 IN | WEIGHT: 274 LBS | RESPIRATION RATE: 20 BRPM | BODY MASS INDEX: 48.55 KG/M2

## 2025-03-05 DIAGNOSIS — M20.42 HAMMER TOE OF LEFT FOOT: ICD-10-CM

## 2025-03-05 DIAGNOSIS — M19.072 ARTHRITIS OF LEFT FOOT: ICD-10-CM

## 2025-03-05 DIAGNOSIS — Z98.1 HISTORY OF ANKLE FUSION: ICD-10-CM

## 2025-03-05 DIAGNOSIS — M77.42 METATARSALGIA, LEFT FOOT: ICD-10-CM

## 2025-03-05 DIAGNOSIS — E66.01 MORBID OBESITY WITH BMI OF 45.0-49.9, ADULT: ICD-10-CM

## 2025-03-05 DIAGNOSIS — M79.672 LEFT FOOT PAIN: Primary | ICD-10-CM

## 2025-03-06 NOTE — PROGRESS NOTES
03/05/2025  Foot and Ankle Surgery - Established Patient/Follow-up  Provider: Dr. Donta Kelley DPM  Location: [facility]    Subjective:  Faby Le is a 80 y.o. female.     Chief Complaint   Patient presents with    Left Foot - Follow-up, Pain     90 % better    Follow-up     DEBORA JENKINS MD  10/18/2024       History of Present Illness  The patient presents for evaluation of foot pain.    She was last seen in the clinic approximately 6 weeks ago and was scheduled for a follow-up visit a few weeks prior, which was postponed due to inclement weather conditions. She reports a significant improvement in her condition, estimating a 90 percent recovery. She attributes the remaining 10 percent of her symptoms to the natural aging process.    Additionally, she mentions experiencing severe shoulder pain, particularly on the left side, and expresses interest in consulting with Dr. Dillon regarding this issue.      Allergies   Allergen Reactions    Celecoxib Itching and Swelling     swelling       Current Outpatient Medications on File Prior to Visit   Medication Sig Dispense Refill    allopurinol (ZYLOPRIM) 300 MG tablet Take 1 tablet by mouth once daily 90 tablet 0    atorvastatin (LIPITOR) 20 MG tablet Take 1 tablet by mouth once daily 270 tablet 0    Azelastine HCl 137 MCG/SPRAY solution 2 sprays by Alternating Nares route Daily As Needed.      Blood Pressure Monitoring (Adult Blood Pressure Cuff Lg) kit Use 1 kit Daily. 1 each 0    cetirizine (zyrTEC) 10 MG tablet Take 1 tablet by mouth Daily.      cyanocobalamin (VITAMIN B-12) 1000 MCG tablet Take 1 tablet by mouth Daily.      dofetilide (Tikosyn) 250 MCG capsule Take 1 capsule by mouth Every 12 (Twelve) Hours. 180 capsule 3    hydrocortisone 2.5 % ointment APPLY THIN LAYER OF OINTMENT EXTERNALLY TO AFFECTED AREA TWICE DAILY. SAFE TO USE ON FACE      IRON CR PO Take 1 tablet by mouth Daily.      levothyroxine (SYNTHROID, LEVOTHROID) 88 MCG tablet Take 1  "tablet by mouth once daily 90 tablet 0    metoprolol succinate XL (TOPROL-XL) 25 MG 24 hr tablet Take 1 tablet by mouth Daily for 360 days. 90 tablet 3    midodrine (PROAMATINE) 5 MG tablet TAKE 1 TABLET BY MOUTH TWICE DAILY BEFORE MEAL(S) 90 tablet 0    montelukast (SINGULAIR) 10 MG tablet Take 1 tablet by mouth Every Night. 90 tablet 0    multivitamin (MULTI VITAMIN PO) Take 1 tablet by mouth Daily.      mupirocin (BACTROBAN) 2 % ointment APPLY SMALL AMOUNT OF OINTMENT TOPICALLY TO AFFECTED AREA TWICE DAILY      pantoprazole (PROTONIX) 40 MG EC tablet Take 1 tablet by mouth Every Morning.      rivaroxaban (XARELTO) 15 MG tablet Take 1 tablet by mouth Daily With Dinner. Indications: Atrial Fibrillation 30 tablet 2    spironolactone (ALDACTONE) 25 MG tablet Take 1/2 (one-half) tablet by mouth once daily 45 tablet 2    triamcinolone (KENALOG) 0.1 % cream APPLY CREAM EXTERNALLY TWICE DAILY TO ECZEMA FOR UP TO 2 WEEKS PER MONTH AS NEEDED      Vitamin D, Cholecalciferol, 25 MCG (1000 UT) tablet Take 1 tablet by mouth Daily.       No current facility-administered medications on file prior to visit.       Objective   Resp 20   Ht 160 cm (63\")   Wt 124 kg (274 lb)   LMP  (LMP Unknown)   BMI 48.54 kg/m²     Foot/Ankle Exam  Physical Exam  GENERAL  Appearance:  obese  Orientation:  AAOx3  Affect:  appropriate     VASCULAR      Right Foot Vascularity   Normal vascular exam    Dorsalis pedis:  2+  Posterior tibial:  2+  Skin temperature:  warm  Edema grading:  None  CFT:  < 3 seconds  Pedal hair growth:  Present  Varicosities:  none      Left Foot Vascularity   Normal vascular exam    Dorsalis pedis:  2+  Posterior tibial:  2+  Skin temperature:  warm  Edema grading:  None  CFT:  < 3 seconds  Pedal hair growth:  Present  Varicosities:  none     NEUROLOGIC      Right Foot Neurologic   Light touch sensation: normal  Hot/Cold sensation: normal  Achilles reflex:  2+      Left Foot Neurologic   Light touch sensation: " normal  Hot/Cold sensation:  normal  Achilles reflex:  2+     MUSCULOSKELETAL      Right Foot Musculoskeletal   Arch:  Normal      Left Foot Musculoskeletal   Arch:  Normal     MUSCLE STRENGTH      Right Foot Muscle Strength   Normal strength    Foot dorsiflexion:  5  Foot plantar flexion:  5  Foot inversion:  5  Foot eversion:  5      Left Foot Muscle Strength   Normal strength    Foot dorsiflexion:  5  Foot plantar flexion:  5  Foot inversion:  5  Foot eversion:  5     DERMATOLOGIC       Right Foot Dermatologic   Skin  Right foot skin is intact.       Left Foot Dermatologic   Skin  Left foot skin is intact.      TESTS      Right Foot Tests   Anterior drawer: negative  Varus tilt: negative      Left Foot Tests   Anterior drawer: negative  Varus tilt: negative     Right foot additional comments: 11/08/2023  Continued rigidity involving ankles and feet. Discomfort with palpation to the arch and forefoot regions of both feet, left greater than right.     02/08/2024  Continued discomfort involving the feet. Soft tissue rigidity. No progressive deformity or instability.     Left foot additional comments: Prominent rigidity noted to the left rear foot secondary to previous tibiotalar calcaneal fusion. Moderate hypermobility involving the midfoot with mild crepitus. No isolated discomfort with palpation involving the forefoot or midfoot today.     03/13/2023  Continued discomfort involving the plantar lateral aspect of the forefoot. Moderate pes planus foot structure with soft tissue rigidity. Range of motion is limited to the rear foot secondary to previous fusion.     06/13/2023  No progressive deformity or instability. Continued discomfort involving the medial aspect of the foot and ankles. Normal range of motion, muscle strength testing.     11/08/2023  Continued rigidity involving ankles and feet. Discomfort with palpation to the arch and forefoot regions of both feet, left greater than right.      02/08/2024  Continued discomfort involving the feet. Soft tissue rigidity. No progressive deformity or instability.     1/20/25: Continued pain involving the left foot at the level of the fourth and fifth digits.  Semirigid nature of the toes.  No open wounds or signs of infection.  Discomfort involving the lateral aspect of the forefoot as well.  Rigidity noted to the midfoot and rear foot secondary to previous fusion and arthritis.    3/5/25: Continued improvement.  No progressive deformity or instability.  No new issues or concerns.      Results      Assessment & Plan   Diagnoses and all orders for this visit:    1. Left foot pain (Primary)    2. Hammer toe of left foot    3. Metatarsalgia, left foot    4. Arthritis of left foot    5. History of ankle fusion    6. Morbid obesity with BMI of 45.0-49.9, adult      Assessment & Plan  .  Her condition has shown significant improvement, with a 90% reduction in symptoms. She is advised to persist with her current regimen of stretching exercises and low-impact activities. The use of appropriate footwear and inserts is emphasized for continued relief. She is instructed to contact the clinic should any further complications arise.  Patient has opted to see me on an as-needed basis at this time.  Greater than 20 minutes was spent before, during, and after evaluation for patient care.    Patient does have shoulder pain and I have asked that she discuss with the  about getting an appointment with Dr. Mackenzie.              Patient or patient representative verbalized consent for the use of Ambient Listening during the visit with  STEPHANIE Kelley DPM for chart documentation. 3/6/2025  07:41 EST    STEPHANIE Kelley DPM

## 2025-03-17 ENCOUNTER — PATIENT ROUNDING (BHMG ONLY) (OUTPATIENT)
Dept: ORTHOPEDIC SURGERY | Facility: CLINIC | Age: 80
End: 2025-03-17
Payer: MEDICARE

## 2025-03-17 ENCOUNTER — OFFICE VISIT (OUTPATIENT)
Dept: ORTHOPEDIC SURGERY | Facility: CLINIC | Age: 80
End: 2025-03-17
Payer: MEDICARE

## 2025-03-17 ENCOUNTER — TRANSCRIBE ORDERS (OUTPATIENT)
Dept: ADMINISTRATIVE | Facility: HOSPITAL | Age: 80
End: 2025-03-17
Payer: MEDICARE

## 2025-03-17 ENCOUNTER — LAB (OUTPATIENT)
Dept: LAB | Facility: HOSPITAL | Age: 80
End: 2025-03-17
Payer: MEDICARE

## 2025-03-17 ENCOUNTER — HOSPITAL ENCOUNTER (OUTPATIENT)
Facility: HOSPITAL | Age: 80
Setting detail: SURGERY ADMIT
End: 2025-03-17
Attending: ORTHOPAEDIC SURGERY | Admitting: ORTHOPAEDIC SURGERY
Payer: MEDICARE

## 2025-03-17 ENCOUNTER — PREP FOR SURGERY (OUTPATIENT)
Dept: OTHER | Facility: HOSPITAL | Age: 80
End: 2025-03-17
Payer: MEDICARE

## 2025-03-17 VITALS — HEART RATE: 69 BPM | BODY MASS INDEX: 48.94 KG/M2 | WEIGHT: 276.2 LBS | HEIGHT: 63 IN

## 2025-03-17 DIAGNOSIS — I48.0 PAROXYSMAL ATRIAL FIBRILLATION: ICD-10-CM

## 2025-03-17 DIAGNOSIS — M19.012 OSTEOARTHRITIS OF LEFT GLENOHUMERAL JOINT: Primary | ICD-10-CM

## 2025-03-17 DIAGNOSIS — M19.012 OSTEOARTHRITIS OF LEFT GLENOHUMERAL JOINT: ICD-10-CM

## 2025-03-17 DIAGNOSIS — R79.9 ABNORMAL FINDING OF BLOOD CHEMISTRY, UNSPECIFIED: ICD-10-CM

## 2025-03-17 DIAGNOSIS — N18.32 CHRONIC KIDNEY DISEASE (CKD) STAGE G3B/A1, MODERATELY DECREASED GLOMERULAR FILTRATION RATE (GFR) BETWEEN 30-44 ML/MIN/1.73 SQUARE METER AND ALBUMINURIA CREATININE RATIO LESS THAN 30 MG/G (CMS/H*: Primary | ICD-10-CM

## 2025-03-17 DIAGNOSIS — R94.5 ABNORMAL RESULTS OF LIVER FUNCTION STUDIES: ICD-10-CM

## 2025-03-17 DIAGNOSIS — I95.9 HYPOTENSION, UNSPECIFIED HYPOTENSION TYPE: ICD-10-CM

## 2025-03-17 DIAGNOSIS — N18.32 CHRONIC KIDNEY DISEASE (CKD) STAGE G3B/A1, MODERATELY DECREASED GLOMERULAR FILTRATION RATE (GFR) BETWEEN 30-44 ML/MIN/1.73 SQUARE METER AND ALBUMINURIA CREATININE RATIO LESS THAN 30 MG/G (CMS/H*: ICD-10-CM

## 2025-03-17 DIAGNOSIS — M25.512 ACUTE PAIN OF LEFT SHOULDER: Primary | ICD-10-CM

## 2025-03-17 DIAGNOSIS — D63.1 ERYTHROPOIETIN DEFICIENCY ANEMIA: ICD-10-CM

## 2025-03-17 DIAGNOSIS — R79.1 ABNORMAL COAGULATION PROFILE: ICD-10-CM

## 2025-03-17 LAB
ALBUMIN SERPL-MCNC: 3.8 G/DL (ref 3.5–5.2)
ANION GAP SERPL CALCULATED.3IONS-SCNC: 7.8 MMOL/L (ref 5–15)
BUN SERPL-MCNC: 21 MG/DL (ref 8–23)
BUN/CREAT SERPL: 16.3 (ref 7–25)
CALCIUM SPEC-SCNC: 10.2 MG/DL (ref 8.6–10.5)
CHLORIDE SERPL-SCNC: 105 MMOL/L (ref 98–107)
CO2 SERPL-SCNC: 26.2 MMOL/L (ref 22–29)
CREAT SERPL-MCNC: 1.29 MG/DL (ref 0.57–1)
DEPRECATED RDW RBC AUTO: 48.9 FL (ref 37–54)
EGFRCR SERPLBLD CKD-EPI 2021: 42.6 ML/MIN/1.73
ERYTHROCYTE [DISTWIDTH] IN BLOOD BY AUTOMATED COUNT: 13 % (ref 12.3–15.4)
GLUCOSE SERPL-MCNC: 94 MG/DL (ref 65–99)
HCT VFR BLD AUTO: 32.8 % (ref 34–46.6)
HGB BLD-MCNC: 11 G/DL (ref 12–15.9)
MCH RBC QN AUTO: 34 PG (ref 26.6–33)
MCHC RBC AUTO-ENTMCNC: 33.5 G/DL (ref 31.5–35.7)
MCV RBC AUTO: 101.2 FL (ref 79–97)
PHOSPHATE SERPL-MCNC: 2.7 MG/DL (ref 2.5–4.5)
PLATELET # BLD AUTO: 187 10*3/MM3 (ref 140–450)
PMV BLD AUTO: 9.5 FL (ref 6–12)
POTASSIUM SERPL-SCNC: 4.8 MMOL/L (ref 3.5–5.2)
RBC # BLD AUTO: 3.24 10*6/MM3 (ref 3.77–5.28)
SODIUM SERPL-SCNC: 139 MMOL/L (ref 136–145)
WBC NRBC COR # BLD AUTO: 5.9 10*3/MM3 (ref 3.4–10.8)

## 2025-03-17 PROCEDURE — 97760 ORTHOTIC MGMT&TRAING 1ST ENC: CPT | Performed by: ORTHOPAEDIC SURGERY

## 2025-03-17 PROCEDURE — 85027 COMPLETE CBC AUTOMATED: CPT

## 2025-03-17 PROCEDURE — 99214 OFFICE O/P EST MOD 30 MIN: CPT | Performed by: ORTHOPAEDIC SURGERY

## 2025-03-17 PROCEDURE — 80069 RENAL FUNCTION PANEL: CPT

## 2025-03-17 PROCEDURE — 36415 COLL VENOUS BLD VENIPUNCTURE: CPT

## 2025-03-17 RX ORDER — TRANEXAMIC ACID 10 MG/ML
1000 INJECTION, SOLUTION INTRAVENOUS ONCE
OUTPATIENT
Start: 2025-03-17 | End: 2025-03-17

## 2025-03-17 RX ORDER — OXYCODONE HCL 10 MG/1
10 TABLET, FILM COATED, EXTENDED RELEASE ORAL ONCE
Refills: 0 | OUTPATIENT
Start: 2025-03-17 | End: 2025-03-17

## 2025-03-17 RX ORDER — PREGABALIN 75 MG/1
150 CAPSULE ORAL ONCE
OUTPATIENT
Start: 2025-03-17 | End: 2025-03-17

## 2025-03-17 NOTE — PROGRESS NOTES
Patient ID: Faby Le is a 80 y.o. female.    Chief Complaint:    Chief Complaint   Patient presents with    Left Shoulder - Initial Evaluation, Pain     Pain 8       HPI:  This is a 80-year-old female here with longstanding left shoulder pain.  She denies any prior surgery to the shoulder has near complete loss of motion with pain deep in the shoulder difficulty using it for personal care and sleeping at night.  She last had a steroid injection over a year ago but stopped getting them because they were not working  Past Medical History:   Diagnosis Date    Ankle pain     Arthritis     Coronary artery disease     Depression     Gout     Hyperglycemia     Hyperlipidemia     Hypertension     Low back pain     Sleep apnea     Vitamin D deficiency        Past Surgical History:   Procedure Laterality Date    ANKLE FUSION      2017-left    CARDIAC CATHETERIZATION      CARDIAC CATHETERIZATION Right 8/29/2022    Procedure: Left Heart Cath;  Surgeon: Ratna Zavala MD;  Location: [facility] CATH INVASIVE LOCATION;  Service: Cardiovascular;  Laterality: Right;    CARDIAC ELECTROPHYSIOLOGY PROCEDURE Left 7/3/2023    Procedure: Pacemaker DC new [place] Notified;  Surgeon: Cr Herrera MD;  Location: [facility] CATH INVASIVE LOCATION;  Service: Cardiovascular;  Laterality: Left;    CARDIAC ELECTROPHYSIOLOGY PROCEDURE Right 3/5/2024    Procedure: Ablation atrial fibrillation, [employer] Ibrahima and Wayne Costa notified 02/09/24;  Surgeon: Cr Herrera MD;  Location: [facility] CATH INVASIVE LOCATION;  Service: Cardiovascular;  Laterality: Right;    CARDIAC ELECTROPHYSIOLOGY PROCEDURE N/A 3/5/2024    Procedure: Cardioversion;  Surgeon: Cr Herrera MD;  Location: [facility] CATH INVASIVE LOCATION;  Service: Cardiovascular;  Laterality: N/A;    CARPAL TUNNEL RELEASE      CATARACT EXTRACTION      CUBITAL TUNNEL RELEASE      ENDOSCOPY N/A 5/28/2024    Procedure: ESOPHAGOGASTRODUODENOSCOPY WITH COLD FORCEP BIOPSY X1  "AREA;  Surgeon: Josh Loomis MD;  Location: [facility] ENDOSCOPY;  Service: Gastroenterology;  Laterality: N/A;  Post- ESOPHAGITIS, GASTRITIS, HIATAL HERNIA    HYSTERECTOMY      REPLACEMENT TOTAL KNEE      left 2008    ROTATOR CUFF REPAIR      TOTAL KNEE ARTHROPLASTY Right 5/4/2023    Procedure: TOTAL KNEE ARTHROPLASTY WITH CORI ROBOT;  Surgeon: Chino Herrera II, MD;  Location: [facility] MAIN OR;  Service: Robotics - Ortho;  Laterality: Right;       Family History   Problem Relation Age of Onset    Hypertension Mother     Stroke Father     Arthritis Brother     Cancer Brother           Social History     Occupational History    Not on file   Tobacco Use    Smoking status: Never     Passive exposure: Never    Smokeless tobacco: Never   Vaping Use    Vaping status: Never Used   Substance and Sexual Activity    Alcohol use: No    Drug use: No    Sexual activity: Defer      Review of Systems   Cardiovascular:  Negative for chest pain.   Musculoskeletal:  Positive for arthralgias.       Objective:    Pulse 69   Ht 160 cm (63\")   Wt 125 kg (276 lb 3.2 oz)   LMP  (LMP Unknown)   BMI 48.93 kg/m²     Physical Examination:  Left shoulder demonstrates intact skin diffuse pain over the bicep groove and impingement area passive elevation 70 abduction 20 external rotation 10 internal rotation of left hip active elevation is to 60 degrees  Belly press and liftoff are 4/5 and 3/5.  Sensory and motor exam are intact all distributions. Radial pulse is palpable and capillary refill is less than two seconds to all digits.    Imaging:    left Shoulder X-Ray  Indication: Shoulder pain denies trauma or prior surgery  AP Y and Lateral views  Findings: End-stage degenerative joint disease complete loss of acromiohumeral distance mild acromial thinning peers to be some posterior glenoid wear and retroversion   no bony lesion  Soft tissues normal  decreased joint spaces  Hardware appropriately positioned not " applicable      no prior studies available for comparison.  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 Issues of scapular notching and component revision were discussed. Questions were answered and addressed.  Postop sling dispensed  Greater than 15 minutes was spent demonstrating proper fit and use of the device and signs to monitor for complications    Procedures         Disclaimer: Part of this note may be an electronic transcription/translation of spoken language to printed text using the Dragon Dictation System

## 2025-04-02 ENCOUNTER — TELEPHONE (OUTPATIENT)
Dept: CARDIOLOGY | Facility: CLINIC | Age: 80
End: 2025-04-02
Payer: MEDICARE

## 2025-04-02 NOTE — TELEPHONE ENCOUNTER
Spoke with pt regarding home monitor. I walked her through how to send jocelyne. Pt sent jocelyne remote, will let her know if I do not receive one.

## 2025-04-22 ENCOUNTER — APPOINTMENT (OUTPATIENT)
Dept: GENERAL RADIOLOGY | Facility: HOSPITAL | Age: 80
End: 2025-04-22
Payer: MEDICARE

## 2025-04-22 ENCOUNTER — HOSPITAL ENCOUNTER (EMERGENCY)
Facility: HOSPITAL | Age: 80
Discharge: HOME | End: 2025-04-22
Admitting: EMERGENCY MEDICINE
Payer: MEDICARE

## 2025-04-22 VITALS
DIASTOLIC BLOOD PRESSURE: 77 MMHG | HEART RATE: 68 BPM | WEIGHT: 275 LBS | HEIGHT: 63 IN | SYSTOLIC BLOOD PRESSURE: 156 MMHG | BODY MASS INDEX: 48.73 KG/M2 | RESPIRATION RATE: 16 BRPM | OXYGEN SATURATION: 99 % | TEMPERATURE: 97.8 F

## 2025-04-22 DIAGNOSIS — M25.561 ACUTE PAIN OF RIGHT KNEE: Primary | ICD-10-CM

## 2025-04-22 PROCEDURE — 73562 X-RAY EXAM OF KNEE 3: CPT

## 2025-04-22 PROCEDURE — 99283 EMERGENCY DEPT VISIT LOW MDM: CPT

## 2025-04-22 RX ORDER — HYDROCODONE BITARTRATE AND ACETAMINOPHEN 5; 325 MG/1; MG/1
1 TABLET ORAL EVERY 6 HOURS PRN
Qty: 12 TABLET | Refills: 0 | Status: SHIPPED | OUTPATIENT
Start: 2025-04-22

## 2025-04-22 RX ORDER — LIDOCAINE 4 G/G
1 PATCH TOPICAL
Status: DISCONTINUED | OUTPATIENT
Start: 2025-04-22 | End: 2025-04-22 | Stop reason: HOSPADM

## 2025-04-22 RX ORDER — HYDROCODONE BITARTRATE AND ACETAMINOPHEN 5; 325 MG/1; MG/1
1 TABLET ORAL ONCE AS NEEDED
Refills: 0 | Status: COMPLETED | OUTPATIENT
Start: 2025-04-22 | End: 2025-04-22

## 2025-04-22 RX ADMIN — HYDROCODONE BITARTRATE AND ACETAMINOPHEN 1 TABLET: 5; 325 TABLET ORAL at 17:28

## 2025-04-22 RX ADMIN — LIDOCAINE PAIN RELIEF 1 PATCH: 560 PATCH TOPICAL at 15:35

## 2025-04-22 NOTE — ED PROVIDER NOTES
Subjective   Chief Complaint   Patient presents with    Knee Pain     History of Present Illness  Patient is a 80-year-old female presents emergency department for evaluation of right knee pain.  Patient reports that she has previously had a knee replacement, she reports she has intermittently had pain since that time, she reports this morning she got up was using her walker, she ambulated quite a distance to eat, she came back when she was able to go get up after that she had significant pain with weightbearing no fevers.  No trauma injury or fall.  Review of Systems    Past Medical History:   Diagnosis Date    Ankle pain     Arthritis     Coronary artery disease     Depression     Gout     Hyperglycemia     Hyperlipidemia     Hypertension     Low back pain     Sleep apnea     Vitamin D deficiency        Allergies   Allergen Reactions    Celecoxib Itching and Swelling     swelling       Past Surgical History:   Procedure Laterality Date    ANKLE FUSION      2017-left    CARDIAC CATHETERIZATION      CARDIAC CATHETERIZATION Right 8/29/2022    Procedure: Left Heart Cath;  Surgeon: Ratna Zavala MD;  Location: [facility] CATH INVASIVE LOCATION;  Service: Cardiovascular;  Laterality: Right;    CARDIAC ELECTROPHYSIOLOGY PROCEDURE Left 7/3/2023    Procedure: Pacemaker DC new [place] Notified;  Surgeon: Cr Herrera MD;  Location: [facility] CATH INVASIVE LOCATION;  Service: Cardiovascular;  Laterality: Left;    CARDIAC ELECTROPHYSIOLOGY PROCEDURE Right 3/5/2024    Procedure: Ablation atrial fibrillation, Cryo Ibrahima and Wayne Costa notified 02/09/24;  Surgeon: rC Herrera MD;  Location: [facility] CATH INVASIVE LOCATION;  Service: Cardiovascular;  Laterality: Right;    CARDIAC ELECTROPHYSIOLOGY PROCEDURE N/A 3/5/2024    Procedure: Cardioversion;  Surgeon: Cr Herrera MD;  Location: [facility] CATH INVASIVE LOCATION;  Service: Cardiovascular;  Laterality: N/A;    CARPAL TUNNEL RELEASE      CATARACT  EXTRACTION      CUBITAL TUNNEL RELEASE      ENDOSCOPY N/A 5/28/2024    Procedure: ESOPHAGOGASTRODUODENOSCOPY WITH COLD FORCEP BIOPSY X1 AREA;  Surgeon: Josh Loomis MD;  Location: [facility] ENDOSCOPY;  Service: Gastroenterology;  Laterality: N/A;  Post- ESOPHAGITIS, GASTRITIS, HIATAL HERNIA    HYSTERECTOMY      REPLACEMENT TOTAL KNEE      left 2008    ROTATOR CUFF REPAIR      TOTAL KNEE ARTHROPLASTY Right 5/4/2023    Procedure: TOTAL KNEE ARTHROPLASTY WITH CORI ROBOT;  Surgeon: Chino Herrera II, MD;  Location: [facility] MAIN OR;  Service: Robotics - Ortho;  Laterality: Right;       Family History   Problem Relation Age of Onset    Hypertension Mother     Stroke Father     Arthritis Brother     Cancer Brother        Social History     Socioeconomic History    Marital status:    Tobacco Use    Smoking status: Never     Passive exposure: Never    Smokeless tobacco: Never   Vaping Use    Vaping status: Never Used   Substance and Sexual Activity    Alcohol use: No    Drug use: No    Sexual activity: Defer           Objective   Physical Exam  Vitals and nursing note reviewed.   Constitutional:       Appearance: Normal appearance. She is not toxic-appearing.   Cardiovascular:      Rate and Rhythm: Normal rate and regular rhythm.      Heart sounds: Normal heart sounds.   Musculoskeletal:      Cervical back: Normal range of motion and neck supple.      Comments: Patient is now diffuse tenderness to the right knee.  No erythema or swelling appreciated.  DP pulse intact.  Cap refill brisk.  No signs of septic arthritis or joint effusion   Skin:     General: Skin is warm and dry.      Capillary Refill: Capillary refill takes less than 2 seconds.      Findings: No erythema or rash.   Neurological:      General: No focal deficit present.      Mental Status: She is alert and oriented to person, place, and time.         Procedures           ED Course      XR Knee 3 View Right  Result Date:  4/22/2025  Impression: Total knee arthroplasty. Moderate knee joint effusion. Ossific joint body. No acute osseous findings. Electronically Signed: Leon Alejandra MD  4/22/2025 4:30 PM EDT  Workstation ID: [id number]                                                     Medical Decision Making  Problems Addressed:  Acute pain of right knee: complicated acute illness or injury    Amount and/or Complexity of Data Reviewed  Radiology: ordered.    Risk  Prescription drug management.    Appropriate PPE worn during patient interactions.    X-ray imaging of the right knee was obtained, interpreted per ED attending physician Dr. Stark, radiology interpretation as above, joint effusion noted.  Hardware intact.    Differential diagnoses considered for patient chief complaint and presentation, this list is not all inclusive of diagnoses considered: Joint effusion, arthritis, fracture.  She was given pain medication.  She also given Lidoderm patch.  Patient was able to get up and ambulate with walker which is her baseline, she reports she feels better.  Will be discharged home and advised to follow-up with Dr. Hererra given her continued pain and joint effusion.  She has no signs of septic arthritis on exam.  Considertion was given for admission, however patient has reassuring exam and workup in the ed and appears appropriate for discharge home and continued outpatient care.     I did discuss pain medication with the patient.  I will give her a short course of pain medication, we discussed side effects including dizziness, sleepiness, increased risk of falls, she verbalizes understanding of this, but feels Tylenol is not sufficing for her pain, she is unfortunately able to take NSAIDs secondary to her renal disease and she is on anticoagulation.    Patient INSPECT report queried and reviewed.  I discussed with the patient the risk and benefits associated with opiate/narcotic pain medication today.  Based on patient's exam findings  and assessment, I do feel it appropriate to prescribe a short course of opiate/ narcotic pain medication from the emergency department.  The patient was advised of the risks associated with such medication, including risk of dependency.  Patient was advised of medication administration instructions, appropriate use, potential side effects and adverse reactions.  Acknowledged and verbalized understanding, and agrees to treatment.    We discussed my findings, plan of care, discharge instructions, the importance of follow up with their PCP/ and or specialist for repeat evaluation and to discuss any abnormal findings in labs or imaging that warrant further outpatient evaluation. We discussed that although a definitive diagnosis is not always found in the ED, it is believed emergent conditions have been ruled out, and patient is safe for discharge at this time.  We discussed return precautions for the emergency department.  Patient verbalizes understandings, and agrees with current plan of care.      Note Disclaimer: At [facility], we believe that sharing information builds trust and better relationships. You are receiving this note because you recently visited [facility]. It is possible you will see health information before a provider has talked with you about it. This kind of information can be easy to misunderstand. To help you fully understand what it means for your health, we urge you to discuss this note with your provider  Note dictated utilizing Dragon Dictation.          Final diagnoses:   Acute pain of right knee       ED Disposition  ED Disposition       ED Disposition   Discharge    Condition   Stable    Comment   --               Rosina Venegas  [address] [facility] [address] [phone]          [facility] EMERGENCY DEPARTMENT  [address] [facility] [address] [phone]             Medication List        New Prescriptions      HYDROcodone-acetaminophen  5-325 MG per tablet  Commonly known as: NORCO  Take 1 tablet by mouth Every 6 (Six) Hours As Needed for Moderate Pain.               Where to Get Your Medications        These medications were sent to [facility] [address] [place] [address]      Hours: Monday to Friday 7 AM to 7 PM Phone: [phone]   HYDROcodone-acetaminophen 5-325 MG per tablet            Judy Schneider, CAROL  04/22/25 [id number]

## 2025-04-22 NOTE — DISCHARGE INSTRUCTIONS
Please follow up with Dr. Herrera for further evaluation  Return to ed for new or worsening symptoms  Take stool softener while taking medication for pain.  Use caution with pain medication, can make you sleepy and dizzy.

## 2025-04-22 NOTE — ED NOTES
Patient ambulated with walker per provider order, patient tolerated well, patient returned safely back to [facility]. Provider notified

## 2025-04-23 ENCOUNTER — HOSPITAL ENCOUNTER (OUTPATIENT)
Dept: CT IMAGING | Facility: HOSPITAL | Age: 80
Discharge: HOME | End: 2025-04-23
Admitting: ORTHOPAEDIC SURGERY
Payer: MEDICARE

## 2025-04-23 DIAGNOSIS — M19.012 OSTEOARTHRITIS OF LEFT GLENOHUMERAL JOINT: ICD-10-CM

## 2025-04-23 PROCEDURE — 73200 CT UPPER EXTREMITY W/O DYE: CPT

## 2025-04-25 ENCOUNTER — TRANSCRIBE ORDERS (OUTPATIENT)
Dept: ADMINISTRATIVE | Facility: HOSPITAL | Age: 80
End: 2025-04-25
Payer: MEDICARE

## 2025-04-25 DIAGNOSIS — M25.561 CHRONIC PAIN OF RIGHT KNEE: Primary | ICD-10-CM

## 2025-04-25 DIAGNOSIS — G89.29 CHRONIC PAIN OF RIGHT KNEE: Primary | ICD-10-CM

## 2025-05-01 ENCOUNTER — OFFICE VISIT (OUTPATIENT)
Dept: CARDIOLOGY | Facility: CLINIC | Age: 80
End: 2025-05-01
Payer: MEDICARE

## 2025-05-01 VITALS
HEIGHT: 63 IN | BODY MASS INDEX: 47.06 KG/M2 | OXYGEN SATURATION: 98 % | DIASTOLIC BLOOD PRESSURE: 74 MMHG | WEIGHT: 265.6 LBS | SYSTOLIC BLOOD PRESSURE: 134 MMHG | HEART RATE: 64 BPM

## 2025-05-01 DIAGNOSIS — I25.10 CORONARY ARTERY DISEASE INVOLVING NATIVE HEART WITHOUT ANGINA PECTORIS, UNSPECIFIED VESSEL OR LESION TYPE: ICD-10-CM

## 2025-05-01 DIAGNOSIS — I48.0 PAROXYSMAL ATRIAL FIBRILLATION: ICD-10-CM

## 2025-05-01 DIAGNOSIS — I95.1 ORTHOSTATIC HYPOTENSION: ICD-10-CM

## 2025-05-01 DIAGNOSIS — E78.2 MIXED HYPERLIPIDEMIA: ICD-10-CM

## 2025-05-01 DIAGNOSIS — I10 PRIMARY HYPERTENSION: ICD-10-CM

## 2025-05-01 DIAGNOSIS — I50.22 CHRONIC HFREF (HEART FAILURE WITH REDUCED EJECTION FRACTION): Primary | ICD-10-CM

## 2025-05-01 RX ORDER — TIRZEPATIDE 2.5 MG/.5ML
5 INJECTION, SOLUTION SUBCUTANEOUS WEEKLY
COMMUNITY
Start: 2025-02-11

## 2025-05-01 RX ORDER — FERROUS SULFATE 325(65) MG
325 TABLET ORAL
COMMUNITY

## 2025-05-01 RX ORDER — DOCUSATE SODIUM 100 MG/1
100 CAPSULE, LIQUID FILLED ORAL 2 TIMES DAILY
COMMUNITY

## 2025-05-01 NOTE — PROGRESS NOTES
Cardiology Office Visit      Encounter Date:  05/01/2025    Patient ID:   Faby Le is a 80 y.o. female.    Reason For Followup:  Coronary artery disease  Shortness of breath  Sick sinus syndrome    Brief Clinical History:  Dear Dr. Adam, Leonor Ordaz MD    I had the pleasure of seeing Faby Le today. As you are well aware, this is a 80 y.o. female  presented to the office with symptoms of lower extremity edema and some shortness of breath        cardiac catheterization showed moderate disease involving the diagonal and mild-to-moderate disease involving circumflex      Denies any further episodes of dizziness syncope presyncope  No exertional chest pain  Shortness of breath is improved  No new cardiac symptoms  Being evaluated by orthopedics for possible shoulder surgery and also patient complaining of significant right knee pain requiring extensive evaluation workup recently    Assessment & Plan    Impressions:  Morbid obesity/Body mass index is 48.54 kg/m².  Bilateral lower extremity edema  Shortness of breath  Dyspnea on exertion  Obstructive sleep apnea  Coronary artery disease  Diastolic dysfunction  Hypertension  Normalization of LV systolic function  Normal LV systolic function in 2018  Moderate obstructive coronary artery disease involving the diagonal branch of the LAD and mild to moderate obstructive coronary artery disease involving the left circumflex artery in 2018  Paroxysmal atrial fibrillation  Patient is currently in sinus rhythm with intermittent PAC burden  Elevated chads vascular score-4  Coagulopathy on Xarelto  History of nonischemic cardiomyopathy  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  Primary hypertension  Obstructive sleep apnea  Recent cardiac catheterization in 2022 with a mild obstructive disease involving the LAD and a moderate obstructive disease involving the ostium of the diagonal branch with normal LV systolic function  Recent carotid Doppler with  "mild obstructive carotid stenosis  Cardiomyopathy with recovery of LV systolic function  Chronic renal insufficiency   Sick sinus syndrome status post permanent pacemaker placement  No clear correlation of patient's symptoms of dizziness with these episodes of bradycardia or pauses  Syncope and loss of consciousness  Loop recorder interrogation with episodes of pauses that were more than 4 seconds  Tachybradycardia syndrome  Status post permanent pacemaker placement  Status post ablation therapy for atrial fibrillation      Recommendations:  Continue current medical therapy with rivaroxaban 20 mg p.o. once a day Toprol-XL 25 mg p.o. once a day Lipitor 20 mg p.o. once a day aldactone 25 mg p.o. once a day  Tikosyn to 50 mcg p.o. twice a day Toprol-XL 25 mg p.o. once a day  Recent medical records reviewed and discussed with patient  Recommend continued weight loss  Close monitoring of renal function and hemoglobin A1c with primary care physician  Discussed with the primary care physician about reconsidering restarting medication for weight loss  Workup labs medications reviewed and discussed patient  Orthostatic symptoms are relatively stable  Prior workup labs medications reviewed and discussed with patient  If patient needs any kind of surgical procedure from orthopedic standpoint patient is low risk to undergo the planned surgical procedure  OK to hold anticoagulant therapy Xarelto for 2 days before surgical procedure if needed  Follow-up in office in 6 months        Vitals:  Vitals:    05/01/25 0951   BP: 134/74   BP Location: Left arm   Patient Position: Sitting   Cuff Size: Adult   Pulse: 64   SpO2: 98%   Weight: 120 kg (265 lb 9.6 oz)   Height: 160 cm (63\")       Physical Exam:    General: Alert, cooperative, no distress, appears stated age  Head:  Normocephalic, atraumatic, mucous membranes moist  Eyes:  Conjunctiva/corneas clear, EOM's intact     Neck:  Supple,  no adenopathy;      Lungs: Clear to " auscultation bilaterally, no wheezes rhonchi rales are noted  Chest wall: No tenderness  Heart::  Regular rate and rhythm, S1 and S2 normal, no murmur, rub or gallop  Abdomen: Soft, non-tender, nondistended bowel sounds active  Extremities: No cyanosis, clubbing, or edema  Pulses: 2+ and symmetric all extremities  Skin:  No rashes or lesions  Neuro/psych: A&O x3. CN II through XII are grossly intact with appropriate affect              Lab Results   Component Value Date    GLUCOSE 94 03/17/2025    BUN 21 03/17/2025    CREATININE 1.29 (H) 03/17/2025    EGFR 42.6 (L) 03/17/2025    BCR 16.3 03/17/2025    K 4.8 03/17/2025    CO2 26.2 03/17/2025    CALCIUM 10.2 03/17/2025    ALBUMIN 3.8 03/17/2025    BILITOT 0.5 10/03/2024    AST 19 10/03/2024    ALT 18 10/03/2024     Results for orders placed during the hospital encounter of 06/11/24    Adult Transthoracic Echo Complete W/ Cont if Necessary Per Protocol    Interpretation Summary    Left ventricular systolic function is normal. Left ventricular ejection fraction appears to be 56 - 60%.    Left ventricular diastolic function was normal.    The left atrial cavity is moderately dilated.    The right atrial cavity is mildly  dilated.     Results for orders placed during the hospital encounter of 08/29/22    Cardiac Catheterization/Vascular Study    Narrative  Table formatting from the original result was not included.  Cardiac Catheterization Operative Report    Faby Le  [id number]  8/29/2022  @PCP@    She underwent cardiac catheterization.    Indications for the procedure include: abnormal stress test.    Procedure Details:  The risks, benefits, complications, treatment options, and expected outcomes were discussed with the patient. The patient and/or family concurred with the proposed plan, giving informed consent.    After informed consent the patient was brought to the cath lab after appropriate IV hydration was begun and oral premedication was given. She was  further sedated with fentanyl. She was prepped and draped in the usual manner. Using the modified Seldinger access technique, a 6 Haitian sheath was placed in the femoral artery. A left heart catheterization with coronary arteriography was performed. Findings are discussed below.    After the procedure was completed, sedation was stopped and the sheaths and catheters were all removed. Hemostasis was achieved per established hospital protocols.    Conscious sedation:  Conscious sedation was performed according to protocol.  I supervised and directed an independent trained observer with the assistance of monitoring the patient's level of consciousness and physiologic status throughout the procedure.  Intraoperative service time was 60 minutes.    Findings:    Hemodynamics Central arctic pressure systolic 150 and diastolic 56 with a mean pressure of 82 mmHg  LV end-diastolic pressure of 12 mmHg  There was no gradient across the aortic valve on the pullback of the pigtail catheter  Left Main  left main is a large-caliber vessel angiographically normal bifurcates into left into descending and left circumflex arteries  RCA  large-caliber dominant vessel  Right coronary artery is angiographically normal right coronary artery bifurcates into a large caliber PDA branch and a moderate caliber PLV branch that are angiographically normal  LAD  large-caliber vessel  LAD has mid focal angiographic 30% stenosis that is heavily calcified at the bifurcation with the diagonal and septal branch  Moderate size diagonal branch that has ostial angiographic of 40% stenosis  Circ  large-caliber vessel angiographically normal  First marginal branch of the circumflex has proximal angiographic 30 to 40% stenosis    LV  normal LV systolic function normal wall motion estimate LV ejection fraction of 55%  Coronary Dominance  right coronary artery    Estimated Blood Loss:  Minimal    Specimens: None    Complications:  None; patient tolerated the  procedure well.    Disposition: PACU - hemodynamically stable.    Condition: stable    Impressions:  Mild obstructive coronary artery disease involving the LAD mild to moderate obstructive coronary artery disease involving the ostium of the diagonal branch mild obstructive coronary artery disease involving the proximal first marginal branch  Normal LV systolic function  Normal wall motion  Estimate LV ejection fraction of 55%  Normal left-sided filling pressures    Recommendations:  Medical management for obstructive coronary artery disease  Test results reviewed and discussed with patient and family     Lab Results   Component Value Date    CHOL 125 05/07/2024    TRIG 49 05/07/2024    HDL 67 (H) 05/07/2024    LDL 47 05/07/2024      Results for orders placed during the hospital encounter of 08/12/22    Stress Test With Myocardial Perfusion One Day    Interpretation Summary  · Left ventricular ejection fraction is normal. (Calculated EF = 68%).  · Findings consistent with an equivocal ECG stress test.    Abnormal stress  Submaximal study, no changes at submaximal stress on stress ECG  No arrhythmias seen  Nuclear imaging demonstrates decreased counts at rest in the mid inferior to apical wall involving the apex, summed rest score of 16  Stress imaging demonstrates significant worsening perfusion in the mid to apical inferior wall, EF 68%  There is significant GI and liver uptake cannot rule out diaphragmatic attenuation    Abnormal study  Correlate with clinical risk factors  Suggestive of inferior reversibility and ischemia   Results for orders placed during the hospital encounter of 03/02/23    Loop Recorder Implant - Treatment Room    Interpretation Summary    Conclusion: Successful implantation.           Objective:          Allergies:  Allergies   Allergen Reactions    Celecoxib Itching and Swelling     swelling       Medication Review:     Current Outpatient Medications:     allopurinol (ZYLOPRIM) 300 MG  tablet, Take 1 tablet by mouth once daily, Disp: 90 tablet, Rfl: 0    atorvastatin (LIPITOR) 20 MG tablet, Take 1 tablet by mouth once daily, Disp: 270 tablet, Rfl: 0    Azelastine HCl 137 MCG/SPRAY solution, 2 sprays by Alternating Nares route Daily As Needed., Disp: , Rfl:     Blood Pressure Monitoring (Adult Blood Pressure Cuff Lg) kit, Use 1 kit Daily., Disp: 1 each, Rfl: 0    cetirizine (zyrTEC) 10 MG tablet, Take 1 tablet by mouth Daily., Disp: , Rfl:     cyanocobalamin (VITAMIN B-12) 1000 MCG tablet, Take 1 tablet by mouth Daily., Disp: , Rfl:     docusate sodium (COLACE) 100 MG capsule, Take 1 capsule by mouth 2 (Two) Times a Day., Disp: , Rfl:     dofetilide (Tikosyn) 250 MCG capsule, Take 1 capsule by mouth Every 12 (Twelve) Hours., Disp: 180 capsule, Rfl: 3    ferrous sulfate 325 (65 FE) MG tablet, Take 1 tablet by mouth Daily With Breakfast., Disp: , Rfl:     HYDROcodone-acetaminophen (NORCO) 5-325 MG per tablet, Take 1 tablet by mouth Every 6 (Six) Hours As Needed for Moderate Pain., Disp: 12 tablet, Rfl: 0    hydrocortisone 2.5 % ointment, APPLY THIN LAYER OF OINTMENT EXTERNALLY TO AFFECTED AREA TWICE DAILY. SAFE TO USE ON FACE, Disp: , Rfl:     levothyroxine (SYNTHROID, LEVOTHROID) 88 MCG tablet, Take 1 tablet by mouth once daily, Disp: 90 tablet, Rfl: 0    metoprolol succinate XL (TOPROL-XL) 25 MG 24 hr tablet, Take 1 tablet by mouth Daily for 360 days., Disp: 90 tablet, Rfl: 3    midodrine (PROAMATINE) 5 MG tablet, TAKE 1 TABLET BY MOUTH TWICE DAILY BEFORE MEAL(S), Disp: 90 tablet, Rfl: 0    montelukast (SINGULAIR) 10 MG tablet, Take 1 tablet by mouth Every Night., Disp: 90 tablet, Rfl: 0    Mounjaro 2.5 MG/0.5ML solution auto-injector, Inject 5 mg into the appropriate muscle as directed by prescriber 1 (One) Time Per Week., Disp: , Rfl:     multivitamin (MULTI VITAMIN PO), Take 1 tablet by mouth Daily., Disp: , Rfl:     mupirocin (BACTROBAN) 2 % ointment, APPLY SMALL AMOUNT OF OINTMENT TOPICALLY TO  AFFECTED AREA TWICE DAILY, Disp: , Rfl:     pantoprazole (PROTONIX) 40 MG EC tablet, Take 1 tablet by mouth Every Morning., Disp: , Rfl:     rivaroxaban (XARELTO) 15 MG tablet, Take 1 tablet by mouth Daily With Dinner. Indications: Atrial Fibrillation, Disp: 30 tablet, Rfl: 2    spironolactone (ALDACTONE) 25 MG tablet, Take 1/2 (one-half) tablet by mouth once daily, Disp: 45 tablet, Rfl: 2    triamcinolone (KENALOG) 0.1 % cream, APPLY CREAM EXTERNALLY TWICE DAILY TO ECZEMA FOR UP TO 2 WEEKS PER MONTH AS NEEDED, Disp: , Rfl:     Vitamin D, Cholecalciferol, 25 MCG (1000 UT) tablet, Take 1 tablet by mouth Daily., Disp: , Rfl:     IRON CR PO, Take 1 tablet by mouth Daily., Disp: , Rfl:     Family History:  Family History   Problem Relation Age of Onset    Hypertension Mother     Stroke Father     Arthritis Brother     Cancer Brother        Past Medical History:  Past Medical History:   Diagnosis Date    Ankle pain     Arthritis     Coronary artery disease     Depression     Gout     Hyperglycemia     Hyperlipidemia     Hypertension     Low back pain     Sleep apnea     Vitamin D deficiency        Past surgical History:  Past Surgical History:   Procedure Laterality Date    ANKLE FUSION      2017-left    CARDIAC CATHETERIZATION      CARDIAC CATHETERIZATION Right 8/29/2022    Procedure: Left Heart Cath;  Surgeon: Ratna Zavala MD;  Location: [facility] CATH INVASIVE LOCATION;  Service: Cardiovascular;  Laterality: Right;    CARDIAC ELECTROPHYSIOLOGY PROCEDURE Left 7/3/2023    Procedure: Pacemaker DC new [place] Notified;  Surgeon: Cr Herrera MD;  Location: [facility] CATH INVASIVE LOCATION;  Service: Cardiovascular;  Laterality: Left;    CARDIAC ELECTROPHYSIOLOGY PROCEDURE Right 3/5/2024    Procedure: Ablation atrial fibrillation, [employer] Ibrah[facility] and Wayne Costa notified 02/09/24;  Surgeon: Cr Herrera MD;  Location: [facility] CATH INVASIVE LOCATION;  Service: Cardiovascular;  Laterality: Right;    CARDIAC  ELECTROPHYSIOLOGY PROCEDURE N/A 3/5/2024    Procedure: Cardioversion;  Surgeon: Cr Herrera MD;  Location: [facility] CATH INVASIVE LOCATION;  Service: Cardiovascular;  Laterality: N/A;    CARPAL TUNNEL RELEASE      CATARACT EXTRACTION      CUBITAL TUNNEL RELEASE      ENDOSCOPY N/A 5/28/2024    Procedure: ESOPHAGOGASTRODUODENOSCOPY WITH COLD FORCEP BIOPSY X1 AREA;  Surgeon: Josh Loomis MD;  Location: [facility] ENDOSCOPY;  Service: Gastroenterology;  Laterality: N/A;  Post- ESOPHAGITIS, GASTRITIS, HIATAL HERNIA    HYSTERECTOMY      REPLACEMENT TOTAL KNEE      left 2008    ROTATOR CUFF REPAIR      TOTAL KNEE ARTHROPLASTY Right 5/4/2023    Procedure: TOTAL KNEE ARTHROPLASTY WITH CORI ROBOT;  Surgeon: Chino Herrera II, MD;  Location: [facility] MAIN OR;  Service: Robotics - Ortho;  Laterality: Right;       Social History:  Social History     Socioeconomic History    Marital status:    Tobacco Use    Smoking status: Never     Passive exposure: Never    Smokeless tobacco: Never   Vaping Use    Vaping status: Never Used   Substance and Sexual Activity    Alcohol use: No    Drug use: No    Sexual activity: Defer       Review of Systems:  The following systems were reviewed as they relate to the cardiovascular system: Constitutional, Eyes, ENT, Cardiovascular, Respiratory, Gastrointestinal, Integumentary, Neurological, Psychiatric, Hematologic, Endocrine, Musculoskeletal, and Genitourinary. The pertinent cardiovascular findings are reported above with all other cardiovascular points within those systems being negative.    Diagnostic Study Review:     Current Electrocardiogram:    ECG 12 Lead    Date/Time: 5/1/2025 10:10 AM  Performed by: Ratna Zavala MD    Authorized by: Ratna Zavala MD  Comparison: compared with previous ECG   Similar to previous ECG  Rhythm: sinus rhythm  Ectopy: atrial premature contractions  Rate: normal  BPM: 64  Conduction: conduction normal  ST  Segments: ST segments normal  T Waves: T waves normal  QRS axis: normal    Clinical impression: normal ECG                NOTE: The following portions of the patient's history were reviewed and updated this visit as appropriate: allergies, current medications, past family history, past medical history, past social history, past surgical history and problem list.

## 2025-05-06 ENCOUNTER — HOSPITAL ENCOUNTER (OUTPATIENT)
Dept: NUCLEAR MEDICINE | Facility: HOSPITAL | Age: 80
Discharge: HOME | End: 2025-05-06
Payer: MEDICARE

## 2025-05-06 DIAGNOSIS — M25.561 CHRONIC PAIN OF RIGHT KNEE: ICD-10-CM

## 2025-05-06 DIAGNOSIS — G89.29 CHRONIC PAIN OF RIGHT KNEE: ICD-10-CM

## 2025-05-06 PROCEDURE — A9503 TC99M MEDRONATE: HCPCS | Performed by: ORTHOPAEDIC SURGERY

## 2025-05-06 PROCEDURE — 78315 BONE IMAGING 3 PHASE: CPT

## 2025-05-06 PROCEDURE — 34310000005 TECHNETIUM MEDRONATE KIT: Performed by: ORTHOPAEDIC SURGERY

## 2025-05-06 RX ORDER — TC 99M MEDRONATE 20 MG/10ML
26.1 INJECTION, POWDER, LYOPHILIZED, FOR SOLUTION INTRAVENOUS
Status: COMPLETED | OUTPATIENT
Start: 2025-05-06 | End: 2025-05-06

## 2025-05-06 RX ADMIN — TC 99M MEDRONATE 26.1 MILLICURIE: 20 INJECTION, POWDER, LYOPHILIZED, FOR SOLUTION INTRAVENOUS at 10:25

## 2025-05-09 ENCOUNTER — LAB (OUTPATIENT)
Dept: LAB | Facility: HOSPITAL | Age: 80
End: 2025-05-09
Payer: MEDICARE

## 2025-05-09 ENCOUNTER — PRE-ADMISSION TESTING (OUTPATIENT)
Dept: PREADMISSION TESTING | Facility: HOSPITAL | Age: 80
End: 2025-05-09
Payer: MEDICARE

## 2025-05-09 ENCOUNTER — HOSPITAL ENCOUNTER (OUTPATIENT)
Dept: CARDIOLOGY | Facility: HOSPITAL | Age: 80
Discharge: HOME | End: 2025-05-09
Payer: MEDICARE

## 2025-05-09 ENCOUNTER — TRANSCRIBE ORDERS (OUTPATIENT)
Dept: ADMINISTRATIVE | Facility: HOSPITAL | Age: 80
End: 2025-05-09
Payer: MEDICARE

## 2025-05-09 VITALS
RESPIRATION RATE: 18 BRPM | TEMPERATURE: 97.5 F | HEIGHT: 63 IN | SYSTOLIC BLOOD PRESSURE: 138 MMHG | OXYGEN SATURATION: 99 % | DIASTOLIC BLOOD PRESSURE: 81 MMHG | BODY MASS INDEX: 46.35 KG/M2 | HEART RATE: 60 BPM | WEIGHT: 261.6 LBS

## 2025-05-09 DIAGNOSIS — M19.012 OSTEOARTHRITIS OF LEFT GLENOHUMERAL JOINT: ICD-10-CM

## 2025-05-09 DIAGNOSIS — M25.561 RIGHT KNEE PAIN, UNSPECIFIED CHRONICITY: ICD-10-CM

## 2025-05-09 DIAGNOSIS — R79.9 ABNORMAL FINDING OF BLOOD CHEMISTRY, UNSPECIFIED: ICD-10-CM

## 2025-05-09 DIAGNOSIS — R79.1 ABNORMAL COAGULATION PROFILE: ICD-10-CM

## 2025-05-09 DIAGNOSIS — M25.561 RIGHT KNEE PAIN, UNSPECIFIED CHRONICITY: Primary | ICD-10-CM

## 2025-05-09 DIAGNOSIS — R94.5 ABNORMAL RESULTS OF LIVER FUNCTION STUDIES: ICD-10-CM

## 2025-05-09 LAB
ABO GROUP BLD: NORMAL
ANION GAP SERPL CALCULATED.3IONS-SCNC: 10.2 MMOL/L (ref 5–15)
APTT PPP: 47.9 SECONDS (ref 22.7–35.4)
BASOPHILS # BLD AUTO: 0.07 10*3/MM3 (ref 0–0.2)
BASOPHILS NFR BLD AUTO: 1.2 % (ref 0–1.5)
BLD GP AB SCN SERPL QL: NEGATIVE
BUN SERPL-MCNC: 22 MG/DL (ref 8–23)
BUN/CREAT SERPL: 15.2 (ref 7–25)
CALCIUM SPEC-SCNC: 10.4 MG/DL (ref 8.6–10.5)
CHLORIDE SERPL-SCNC: 103 MMOL/L (ref 98–107)
CO2 SERPL-SCNC: 24.8 MMOL/L (ref 22–29)
CREAT SERPL-MCNC: 1.45 MG/DL (ref 0.57–1)
CRP SERPL-MCNC: 0.85 MG/DL (ref 0–0.5)
DEPRECATED RDW RBC AUTO: 53.4 FL (ref 37–54)
EGFRCR SERPLBLD CKD-EPI 2021: 37 ML/MIN/1.73
EOSINOPHIL # BLD AUTO: 0.18 10*3/MM3 (ref 0–0.4)
EOSINOPHIL NFR BLD AUTO: 3 % (ref 0.3–6.2)
ERYTHROCYTE [DISTWIDTH] IN BLOOD BY AUTOMATED COUNT: 14 % (ref 12.3–15.4)
ERYTHROCYTE [SEDIMENTATION RATE] IN BLOOD: 15 MM/HR (ref 0–30)
GLUCOSE SERPL-MCNC: 94 MG/DL (ref 65–99)
HBA1C MFR BLD: 5.32 % (ref 4.8–5.6)
HCT VFR BLD AUTO: 32.5 % (ref 34–46.6)
HGB BLD-MCNC: 10.5 G/DL (ref 12–15.9)
IMM GRANULOCYTES # BLD AUTO: 0.02 10*3/MM3 (ref 0–0.05)
IMM GRANULOCYTES NFR BLD AUTO: 0.3 % (ref 0–0.5)
INR PPP: 1.98 (ref 0.9–1.1)
LYMPHOCYTES # BLD AUTO: 1.47 10*3/MM3 (ref 0.7–3.1)
LYMPHOCYTES NFR BLD AUTO: 24.6 % (ref 19.6–45.3)
MCH RBC QN AUTO: 33.5 PG (ref 26.6–33)
MCHC RBC AUTO-ENTMCNC: 32.3 G/DL (ref 31.5–35.7)
MCV RBC AUTO: 103.8 FL (ref 79–97)
MONOCYTES # BLD AUTO: 0.46 10*3/MM3 (ref 0.1–0.9)
MONOCYTES NFR BLD AUTO: 7.7 % (ref 5–12)
MRSA DNA SPEC QL NAA+PROBE: NORMAL
NEUTROPHILS NFR BLD AUTO: 3.77 10*3/MM3 (ref 1.7–7)
NEUTROPHILS NFR BLD AUTO: 63.2 % (ref 42.7–76)
NRBC BLD AUTO-RTO: 0 /100 WBC (ref 0–0.2)
PLATELET # BLD AUTO: 211 10*3/MM3 (ref 140–450)
PMV BLD AUTO: 9.2 FL (ref 6–12)
POTASSIUM SERPL-SCNC: 4.7 MMOL/L (ref 3.5–5.2)
PROTHROMBIN TIME: 22.6 SECONDS (ref 11.7–14.2)
RBC # BLD AUTO: 3.13 10*6/MM3 (ref 3.77–5.28)
RH BLD: NEGATIVE
SODIUM SERPL-SCNC: 138 MMOL/L (ref 136–145)
T&S EXPIRATION DATE: NORMAL
WBC NRBC COR # BLD AUTO: 5.97 10*3/MM3 (ref 3.4–10.8)

## 2025-05-09 PROCEDURE — 83036 HEMOGLOBIN GLYCOSYLATED A1C: CPT

## 2025-05-09 PROCEDURE — 85730 THROMBOPLASTIN TIME PARTIAL: CPT

## 2025-05-09 PROCEDURE — 86850 RBC ANTIBODY SCREEN: CPT

## 2025-05-09 PROCEDURE — 80048 BASIC METABOLIC PNL TOTAL CA: CPT

## 2025-05-09 PROCEDURE — 85025 COMPLETE CBC W/AUTO DIFF WBC: CPT

## 2025-05-09 PROCEDURE — 85652 RBC SED RATE AUTOMATED: CPT

## 2025-05-09 PROCEDURE — 86900 BLOOD TYPING SEROLOGIC ABO: CPT

## 2025-05-09 PROCEDURE — 36415 COLL VENOUS BLD VENIPUNCTURE: CPT

## 2025-05-09 PROCEDURE — 86901 BLOOD TYPING SEROLOGIC RH(D): CPT

## 2025-05-09 PROCEDURE — 86140 C-REACTIVE PROTEIN: CPT

## 2025-05-09 PROCEDURE — 85610 PROTHROMBIN TIME: CPT

## 2025-05-09 PROCEDURE — 87641 MR-STAPH DNA AMP PROBE: CPT

## 2025-05-09 PROCEDURE — 93005 ELECTROCARDIOGRAM TRACING: CPT | Performed by: ORTHOPAEDIC SURGERY

## 2025-05-12 LAB
QT INTERVAL: 437 MS
QTC INTERVAL: 462 MS

## 2025-05-14 RX ORDER — LEVOTHYROXINE SODIUM 88 UG/1
88 TABLET ORAL DAILY
Status: CANCELLED | OUTPATIENT
Start: 2025-05-14

## 2025-05-14 RX ORDER — OXYCODONE HYDROCHLORIDE 5 MG/1
5 TABLET ORAL EVERY 4 HOURS PRN
Refills: 0 | Status: CANCELLED | OUTPATIENT
Start: 2025-05-14 | End: 2025-05-19

## 2025-05-14 RX ORDER — OXYCODONE HYDROCHLORIDE 5 MG/1
10 TABLET ORAL EVERY 4 HOURS PRN
Refills: 0 | Status: CANCELLED | OUTPATIENT
Start: 2025-05-14 | End: 2025-05-19

## 2025-05-14 RX ORDER — SPIRONOLACTONE 25 MG/1
12.5 TABLET ORAL DAILY
Status: CANCELLED | OUTPATIENT
Start: 2025-05-14

## 2025-05-14 RX ORDER — DOCUSATE SODIUM 100 MG/1
100 CAPSULE, LIQUID FILLED ORAL 2 TIMES DAILY PRN
Status: CANCELLED | OUTPATIENT
Start: 2025-05-14

## 2025-05-14 RX ORDER — METOPROLOL SUCCINATE 25 MG/1
25 TABLET, EXTENDED RELEASE ORAL
Status: CANCELLED | OUTPATIENT
Start: 2025-05-14

## 2025-05-14 RX ORDER — ACETAMINOPHEN 500 MG
1000 TABLET ORAL EVERY 6 HOURS
Status: CANCELLED | OUTPATIENT
Start: 2025-05-14

## 2025-05-14 RX ORDER — ONDANSETRON 2 MG/ML
4 INJECTION INTRAMUSCULAR; INTRAVENOUS EVERY 6 HOURS PRN
Status: CANCELLED | OUTPATIENT
Start: 2025-05-14

## 2025-05-14 RX ORDER — ONDANSETRON 4 MG/1
4 TABLET, ORALLY DISINTEGRATING ORAL EVERY 6 HOURS PRN
Status: CANCELLED | OUTPATIENT
Start: 2025-05-14

## 2025-05-14 RX ORDER — NALOXONE HCL 0.4 MG/ML
0.1 VIAL (ML) INJECTION
Status: CANCELLED | OUTPATIENT
Start: 2025-05-14

## 2025-05-15 ENCOUNTER — APPOINTMENT (OUTPATIENT)
Dept: GENERAL RADIOLOGY | Facility: HOSPITAL | Age: 80
End: 2025-05-15
Payer: MEDICARE

## 2025-05-15 ENCOUNTER — HOSPITAL ENCOUNTER (OUTPATIENT)
Facility: HOSPITAL | Age: 80
Setting detail: SURGERY ADMIT
Discharge: STILL ADMITTED OR ADMINISTRATIVE | End: 2025-05-15
Attending: ORTHOPAEDIC SURGERY | Admitting: ORTHOPAEDIC SURGERY
Payer: MEDICARE

## 2025-05-15 ENCOUNTER — HOSPITAL ENCOUNTER (OUTPATIENT)
Facility: HOSPITAL | Age: 80
LOS: 1 days | Discharge: HOME | End: 2025-05-16
Attending: FAMILY MEDICINE | Admitting: STUDENT IN AN ORGANIZED HEALTH CARE EDUCATION/TRAINING PROGRAM
Payer: MEDICARE

## 2025-05-15 VITALS
SYSTOLIC BLOOD PRESSURE: 103 MMHG | RESPIRATION RATE: 17 BRPM | BODY MASS INDEX: 48.58 KG/M2 | OXYGEN SATURATION: 100 % | HEIGHT: 62 IN | WEIGHT: 264 LBS | TEMPERATURE: 97.4 F | DIASTOLIC BLOOD PRESSURE: 64 MMHG | HEART RATE: 136 BPM

## 2025-05-15 PROBLEM — I48.91 ATRIAL FIBRILLATION WITH RAPID VENTRICULAR RESPONSE: Status: ACTIVE | Noted: 2025-05-15

## 2025-05-15 LAB
ALBUMIN SERPL-MCNC: 3.9 G/DL (ref 3.5–5.2)
ALBUMIN/GLOB SERPL: 1.5 G/DL
ALP SERPL-CCNC: 118 U/L (ref 39–117)
ALT SERPL W P-5'-P-CCNC: 15 U/L (ref 1–33)
ANION GAP SERPL CALCULATED.3IONS-SCNC: 8.5 MMOL/L (ref 5–15)
APTT PPP: 37.4 SECONDS (ref 22.7–35.4)
AST SERPL-CCNC: 24 U/L (ref 1–32)
BILIRUB SERPL-MCNC: 0.5 MG/DL (ref 0–1.2)
BUN SERPL-MCNC: 28 MG/DL (ref 8–23)
BUN/CREAT SERPL: 18.9 (ref 7–25)
CALCIUM SPEC-SCNC: 9.7 MG/DL (ref 8.6–10.5)
CHLORIDE SERPL-SCNC: 105 MMOL/L (ref 98–107)
CO2 SERPL-SCNC: 22.5 MMOL/L (ref 22–29)
CREAT SERPL-MCNC: 1.48 MG/DL (ref 0.57–1)
CRP SERPL-MCNC: 0.74 MG/DL (ref 0–0.5)
DEPRECATED RDW RBC AUTO: 55.9 FL (ref 37–54)
EGFRCR SERPLBLD CKD-EPI 2021: 36.1 ML/MIN/1.73
ERYTHROCYTE [DISTWIDTH] IN BLOOD BY AUTOMATED COUNT: 14.6 % (ref 12.3–15.4)
ERYTHROCYTE [SEDIMENTATION RATE] IN BLOOD: 9 MM/HR (ref 0–30)
GLOBULIN UR ELPH-MCNC: 2.6 GM/DL
GLUCOSE SERPL-MCNC: 110 MG/DL (ref 65–99)
HCT VFR BLD AUTO: 32.5 % (ref 34–46.6)
HGB BLD-MCNC: 10.5 G/DL (ref 12–15.9)
INR PPP: 1.43 (ref 0.9–1.1)
MAGNESIUM SERPL-MCNC: 1.7 MG/DL (ref 1.6–2.4)
MCH RBC QN AUTO: 34 PG (ref 26.6–33)
MCHC RBC AUTO-ENTMCNC: 32.3 G/DL (ref 31.5–35.7)
MCV RBC AUTO: 105.2 FL (ref 79–97)
NT-PROBNP SERPL-MCNC: 7466 PG/ML (ref 0–1800)
PLATELET # BLD AUTO: 184 10*3/MM3 (ref 140–450)
PMV BLD AUTO: 10.5 FL (ref 6–12)
POTASSIUM SERPL-SCNC: 4.1 MMOL/L (ref 3.5–5.2)
PROT SERPL-MCNC: 6.5 G/DL (ref 6–8.5)
PROTHROMBIN TIME: 17.4 SECONDS (ref 11.7–14.2)
QT INTERVAL: 336 MS
QTC INTERVAL: 531 MS
RBC # BLD AUTO: 3.09 10*6/MM3 (ref 3.77–5.28)
SODIUM SERPL-SCNC: 136 MMOL/L (ref 136–145)
TROPONIN T SERPL HS-MCNC: 52 NG/L
TROPONIN T SERPL HS-MCNC: 58 NG/L
TSH SERPL DL<=0.05 MIU/L-ACNC: 1.85 UIU/ML (ref 0.27–4.2)
TSH SERPL DL<=0.05 MIU/L-ACNC: 2.42 UIU/ML (ref 0.27–4.2)
WBC NRBC COR # BLD AUTO: 5.72 10*3/MM3 (ref 3.4–10.8)

## 2025-05-15 PROCEDURE — 93005 ELECTROCARDIOGRAM TRACING: CPT | Performed by: ANESTHESIOLOGY

## 2025-05-15 PROCEDURE — 96375 TX/PRO/DX INJ NEW DRUG ADDON: CPT

## 2025-05-15 PROCEDURE — 93010 ELECTROCARDIOGRAM REPORT: CPT | Performed by: INTERNAL MEDICINE

## 2025-05-15 PROCEDURE — 83735 ASSAY OF MAGNESIUM: CPT | Performed by: NURSE PRACTITIONER

## 2025-05-15 PROCEDURE — 86140 C-REACTIVE PROTEIN: CPT | Performed by: ORTHOPAEDIC SURGERY

## 2025-05-15 PROCEDURE — 85027 COMPLETE CBC AUTOMATED: CPT

## 2025-05-15 PROCEDURE — 25010000002 DIGOXIN PER 500 MCG

## 2025-05-15 PROCEDURE — 94799 UNLISTED PULMONARY SVC/PX: CPT

## 2025-05-15 PROCEDURE — G0463 HOSPITAL OUTPT CLINIC VISIT: HCPCS | Performed by: ORTHOPAEDIC SURGERY

## 2025-05-15 PROCEDURE — 85610 PROTHROMBIN TIME: CPT | Performed by: ORTHOPAEDIC SURGERY

## 2025-05-15 PROCEDURE — 84443 ASSAY THYROID STIM HORMONE: CPT

## 2025-05-15 PROCEDURE — 96374 THER/PROPH/DIAG INJ IV PUSH: CPT

## 2025-05-15 PROCEDURE — 99223 1ST HOSP IP/OBS HIGH 75: CPT | Performed by: INTERNAL MEDICINE

## 2025-05-15 PROCEDURE — 80053 COMPREHEN METABOLIC PANEL: CPT | Performed by: ORTHOPAEDIC SURGERY

## 2025-05-15 PROCEDURE — 85730 THROMBOPLASTIN TIME PARTIAL: CPT

## 2025-05-15 PROCEDURE — 84484 ASSAY OF TROPONIN QUANT: CPT

## 2025-05-15 PROCEDURE — 83880 ASSAY OF NATRIURETIC PEPTIDE: CPT

## 2025-05-15 PROCEDURE — 73560 X-RAY EXAM OF KNEE 1 OR 2: CPT

## 2025-05-15 PROCEDURE — 84443 ASSAY THYROID STIM HORMONE: CPT | Performed by: NURSE PRACTITIONER

## 2025-05-15 PROCEDURE — 71045 X-RAY EXAM CHEST 1 VIEW: CPT

## 2025-05-15 PROCEDURE — 25810000003 LACTATED RINGERS PER 1000 ML

## 2025-05-15 PROCEDURE — 85652 RBC SED RATE AUTOMATED: CPT | Performed by: ORTHOPAEDIC SURGERY

## 2025-05-15 RX ORDER — SODIUM CHLORIDE 0.9 % (FLUSH) 0.9 %
10 SYRINGE (ML) INJECTION AS NEEDED
Status: DISCONTINUED | OUTPATIENT
Start: 2025-05-15 | End: 2025-05-16 | Stop reason: HOSPADM

## 2025-05-15 RX ORDER — MULTIVITAMIN WITH IRON
1000 TABLET ORAL DAILY
Status: DISCONTINUED | OUTPATIENT
Start: 2025-05-16 | End: 2025-05-16 | Stop reason: HOSPADM

## 2025-05-15 RX ORDER — DOFETILIDE 0.12 MG/1
125 CAPSULE ORAL ONCE
Status: COMPLETED | OUTPATIENT
Start: 2025-05-15 | End: 2025-05-15

## 2025-05-15 RX ORDER — HYDROMORPHONE HYDROCHLORIDE 1 MG/ML
0.5 INJECTION, SOLUTION INTRAMUSCULAR; INTRAVENOUS; SUBCUTANEOUS
Refills: 0 | Status: DISCONTINUED | OUTPATIENT
Start: 2025-05-15 | End: 2025-05-16 | Stop reason: HOSPADM

## 2025-05-15 RX ORDER — DIGOXIN 0.25 MG/ML
500 INJECTION INTRAMUSCULAR; INTRAVENOUS ONCE
Status: COMPLETED | OUTPATIENT
Start: 2025-05-15 | End: 2025-05-15

## 2025-05-15 RX ORDER — METOPROLOL SUCCINATE 25 MG/1
25 TABLET, EXTENDED RELEASE ORAL
Status: DISCONTINUED | OUTPATIENT
Start: 2025-05-16 | End: 2025-05-16 | Stop reason: HOSPADM

## 2025-05-15 RX ORDER — LEVOTHYROXINE SODIUM 88 UG/1
88 TABLET ORAL
Status: DISCONTINUED | OUTPATIENT
Start: 2025-05-16 | End: 2025-05-16 | Stop reason: HOSPADM

## 2025-05-15 RX ORDER — CHOLECALCIFEROL (VITAMIN D3) 25 MCG
1000 TABLET ORAL DAILY
Status: DISCONTINUED | OUTPATIENT
Start: 2025-05-16 | End: 2025-05-16 | Stop reason: HOSPADM

## 2025-05-15 RX ORDER — BISACODYL 5 MG/1
5 TABLET, DELAYED RELEASE ORAL DAILY PRN
Status: DISCONTINUED | OUTPATIENT
Start: 2025-05-15 | End: 2025-05-16 | Stop reason: HOSPADM

## 2025-05-15 RX ORDER — CETIRIZINE HYDROCHLORIDE 10 MG/1
10 TABLET ORAL DAILY
Status: DISCONTINUED | OUTPATIENT
Start: 2025-05-15 | End: 2025-05-16 | Stop reason: HOSPADM

## 2025-05-15 RX ORDER — SODIUM CHLORIDE 0.9 % (FLUSH) 0.9 %
10 SYRINGE (ML) INJECTION EVERY 12 HOURS SCHEDULED
Status: DISCONTINUED | OUTPATIENT
Start: 2025-05-15 | End: 2025-05-16 | Stop reason: HOSPADM

## 2025-05-15 RX ORDER — BISACODYL 10 MG
10 SUPPOSITORY, RECTAL RECTAL DAILY PRN
Status: DISCONTINUED | OUTPATIENT
Start: 2025-05-15 | End: 2025-05-16 | Stop reason: HOSPADM

## 2025-05-15 RX ORDER — DIPHENOXYLATE HYDROCHLORIDE AND ATROPINE SULFATE 2.5; .025 MG/1; MG/1
1 TABLET ORAL DAILY
Status: DISCONTINUED | OUTPATIENT
Start: 2025-05-16 | End: 2025-05-16 | Stop reason: HOSPADM

## 2025-05-15 RX ORDER — HYDROCODONE BITARTRATE AND ACETAMINOPHEN 5; 325 MG/1; MG/1
1 TABLET ORAL EVERY 6 HOURS PRN
Refills: 0 | Status: DISCONTINUED | OUTPATIENT
Start: 2025-05-15 | End: 2025-05-16 | Stop reason: HOSPADM

## 2025-05-15 RX ORDER — NALOXONE HCL 0.4 MG/ML
0.4 VIAL (ML) INJECTION
Status: DISCONTINUED | OUTPATIENT
Start: 2025-05-15 | End: 2025-05-16 | Stop reason: HOSPADM

## 2025-05-15 RX ORDER — MONTELUKAST SODIUM 10 MG/1
10 TABLET ORAL NIGHTLY
Status: DISCONTINUED | OUTPATIENT
Start: 2025-05-15 | End: 2025-05-16 | Stop reason: HOSPADM

## 2025-05-15 RX ORDER — METOPROLOL TARTRATE 1 MG/ML
2.5 INJECTION, SOLUTION INTRAVENOUS
Status: DISCONTINUED | OUTPATIENT
Start: 2025-05-15 | End: 2025-05-15

## 2025-05-15 RX ORDER — POLYETHYLENE GLYCOL 3350 17 G/17G
17 POWDER, FOR SOLUTION ORAL DAILY PRN
Status: DISCONTINUED | OUTPATIENT
Start: 2025-05-15 | End: 2025-05-16 | Stop reason: HOSPADM

## 2025-05-15 RX ORDER — DILTIAZEM HCL/D5W 125 MG/125
5-15 PLASTIC BAG, INJECTION (ML) INTRAVENOUS
Status: DISCONTINUED | OUTPATIENT
Start: 2025-05-15 | End: 2025-05-15 | Stop reason: ALTCHOICE

## 2025-05-15 RX ORDER — ONDANSETRON 2 MG/ML
4 INJECTION INTRAMUSCULAR; INTRAVENOUS EVERY 6 HOURS PRN
Status: DISCONTINUED | OUTPATIENT
Start: 2025-05-15 | End: 2025-05-16 | Stop reason: HOSPADM

## 2025-05-15 RX ORDER — DIGOXIN 0.25 MG/ML
250 INJECTION INTRAMUSCULAR; INTRAVENOUS EVERY 6 HOURS
Status: DISCONTINUED | OUTPATIENT
Start: 2025-05-15 | End: 2025-05-15

## 2025-05-15 RX ORDER — SODIUM CHLORIDE, SODIUM LACTATE, POTASSIUM CHLORIDE, CALCIUM CHLORIDE 600; 310; 30; 20 MG/100ML; MG/100ML; MG/100ML; MG/100ML
50 INJECTION, SOLUTION INTRAVENOUS CONTINUOUS
Status: DISCONTINUED | OUTPATIENT
Start: 2025-05-15 | End: 2025-05-16 | Stop reason: HOSPADM

## 2025-05-15 RX ORDER — FERROUS SULFATE 325(65) MG
325 TABLET ORAL
Status: DISCONTINUED | OUTPATIENT
Start: 2025-05-16 | End: 2025-05-16 | Stop reason: HOSPADM

## 2025-05-15 RX ORDER — METOPROLOL TARTRATE 1 MG/ML
5 INJECTION, SOLUTION INTRAVENOUS ONCE
Status: COMPLETED | OUTPATIENT
Start: 2025-05-15 | End: 2025-05-15

## 2025-05-15 RX ORDER — SPIRONOLACTONE 25 MG/1
12.5 TABLET ORAL DAILY
Status: DISCONTINUED | OUTPATIENT
Start: 2025-05-16 | End: 2025-05-16 | Stop reason: HOSPADM

## 2025-05-15 RX ORDER — MIDODRINE HYDROCHLORIDE 5 MG/1
5 TABLET ORAL
Status: DISCONTINUED | OUTPATIENT
Start: 2025-05-15 | End: 2025-05-16 | Stop reason: HOSPADM

## 2025-05-15 RX ORDER — NITROGLYCERIN 0.4 MG/1
0.4 TABLET SUBLINGUAL
Status: DISCONTINUED | OUTPATIENT
Start: 2025-05-15 | End: 2025-05-16 | Stop reason: HOSPADM

## 2025-05-15 RX ORDER — SODIUM CHLORIDE 9 MG/ML
40 INJECTION, SOLUTION INTRAVENOUS AS NEEDED
Status: DISCONTINUED | OUTPATIENT
Start: 2025-05-15 | End: 2025-05-16 | Stop reason: HOSPADM

## 2025-05-15 RX ORDER — PANTOPRAZOLE SODIUM 40 MG/1
40 TABLET, DELAYED RELEASE ORAL EVERY MORNING
Status: DISCONTINUED | OUTPATIENT
Start: 2025-05-16 | End: 2025-05-16 | Stop reason: HOSPADM

## 2025-05-15 RX ORDER — ATORVASTATIN CALCIUM 20 MG/1
20 TABLET, FILM COATED ORAL DAILY
Status: DISCONTINUED | OUTPATIENT
Start: 2025-05-15 | End: 2025-05-16 | Stop reason: HOSPADM

## 2025-05-15 RX ORDER — HEPARIN SODIUM 10000 [USP'U]/100ML
8.33 INJECTION, SOLUTION INTRAVENOUS
Status: DISCONTINUED | OUTPATIENT
Start: 2025-05-15 | End: 2025-05-15 | Stop reason: ALTCHOICE

## 2025-05-15 RX ORDER — AMOXICILLIN 250 MG
2 CAPSULE ORAL 2 TIMES DAILY
Status: DISCONTINUED | OUTPATIENT
Start: 2025-05-15 | End: 2025-05-16 | Stop reason: HOSPADM

## 2025-05-15 RX ADMIN — MONTELUKAST 10 MG: 10 TABLET, FILM COATED ORAL at 21:24

## 2025-05-15 RX ADMIN — DIGOXIN 500 MCG: 0.25 INJECTION INTRAMUSCULAR; INTRAVENOUS at 15:55

## 2025-05-15 RX ADMIN — ATORVASTATIN CALCIUM 20 MG: 20 TABLET, FILM COATED ORAL at 18:34

## 2025-05-15 RX ADMIN — METOROPROLOL TARTRATE 5 MG: 5 INJECTION, SOLUTION INTRAVENOUS at 15:21

## 2025-05-15 RX ADMIN — MIDODRINE HYDROCHLORIDE 5 MG: 5 TABLET ORAL at 18:34

## 2025-05-15 RX ADMIN — SODIUM CHLORIDE, POTASSIUM CHLORIDE, SODIUM LACTATE AND CALCIUM CHLORIDE 50 ML/HR: 600; 310; 30; 20 INJECTION, SOLUTION INTRAVENOUS at 17:36

## 2025-05-15 RX ADMIN — RIVAROXABAN 15 MG: 15 TABLET, FILM COATED ORAL at 19:50

## 2025-05-15 RX ADMIN — SENNOSIDES AND DOCUSATE SODIUM 2 TABLET: 50; 8.6 TABLET ORAL at 21:24

## 2025-05-15 RX ADMIN — DOFETILIDE 125 MCG: 0.12 CAPSULE ORAL at 21:24

## 2025-05-15 RX ADMIN — CETIRIZINE HYDROCHLORIDE 10 MG: 10 TABLET, FILM COATED ORAL at 18:34

## 2025-05-15 NOTE — H&P
[facility] Medicine Services  History & Physical    Patient Name: Faby Le  : 1945  MRN: [id number]  Primary Care Physician:  Leonor Adam MD  Date of admission: 5/15/2025  Date and Time of Service: 5/15/2025 at 1430    Subjective      Chief Complaint: Afib RVR    History of Present Illness: Faby Le is a 80 y.o. female with a CMH of paroxysmal atrial fibrillation on Xarelto, nonobstructive CAD, SSS with dual-chamber pacemaker, HFpEF (EF 55-60% ), HTN, HLD, arthritis, ROSA on CPAP who presented to [facility] on 5/15/2025 for elective partial R knee revision surgery with Dr. Herrera and was found to be afib w/ RVR.  Knee surgery was canceled and she was admitted for further evaluation and treatment by medicine with cardiology consult. She was given 5mg IV metoprolol x 1 and 500mcg of IV digoxin x 1. Pt denies any current complaints. She is hungry and would like to eat as she has been npo for surgery.  Patient reports compliance with her medications and states she has not taken her Xarelto for 2 days prior to the surgery.  She does admit that several days ago she experienced some mild chest pain radiating into her left arm but it quickly dissipated. Patient is routinely seen in the office by Dr. Zavala.  She reports when she woke up this morning she felt nervous about the surgery and felt like her heart may be racing. She is known to have a history of paroxysmal atrial fibrillation and is on Tikosyn.  Patient denies any current chest pain, shortness of breath, abdominal pain, nausea, vomiting, dysuria.      Diastolic dysfunction was not present.  There was mild MR.      patient had comprehensive EP study with cryoablation of the pulmonary veins with pulmonary vein isolation.  She had additional linear radiofrequency ablation along the left atrial posterior wall for atypical left atrial flutter.     In 2022 patient underwent cardiac catheterization which showed  normal left main, normal RCA, 30% stenosis in the LAD at the bifurcation with the diagonal and septal branch.  Diagonal branch had 40% disease, left circumflex 30 to 40%.  EF was 55%.      Review of Systems 10 point review of systems neg except for above     Personal History     Past Medical History:   Diagnosis Date    Ankle pain     Arthritis     Atrial fibrillation     Coronary artery disease     Depression     Disease of thyroid gland     Gout     Hyperglycemia     Hyperlipidemia     Hypertension     Low back pain     Sleep apnea     Vitamin D deficiency        Past Surgical History:   Procedure Laterality Date    ANKLE FUSION      2017-left    CARDIAC CATHETERIZATION      CARDIAC CATHETERIZATION Right 8/29/2022    Procedure: Left Heart Cath;  Surgeon: Ratna Zavala MD;  Location: [facility] CATH INVASIVE LOCATION;  Service: Cardiovascular;  Laterality: Right;    CARDIAC ELECTROPHYSIOLOGY PROCEDURE Left 7/3/2023    Procedure: Pacemaker DC new [place] Notified;  Surgeon: Cr Herrera MD;  Location: [facility] CATH INVASIVE LOCATION;  Service: Cardiovascular;  Laterality: Left;    CARDIAC ELECTROPHYSIOLOGY PROCEDURE Right 3/5/2024    Procedure: Ablation atrial fibrillation, [employer] and Wayne Costa notified 02/09/24;  Surgeon: Cr Herrera MD;  Location: [facility] CATH INVASIVE LOCATION;  Service: Cardiovascular;  Laterality: Right;    CARDIAC ELECTROPHYSIOLOGY PROCEDURE N/A 3/5/2024    Procedure: Cardioversion;  Surgeon: Cr Herrera MD;  Location: [facility] CATH INVASIVE LOCATION;  Service: Cardiovascular;  Laterality: N/A;    CARPAL TUNNEL RELEASE      CATARACT EXTRACTION      CUBITAL TUNNEL RELEASE      ENDOSCOPY N/A 5/28/2024    Procedure: ESOPHAGOGASTRODUODENOSCOPY WITH COLD FORCEP BIOPSY X1 AREA;  Surgeon: Josh Loomis MD;  Location: [facility] ENDOSCOPY;  Service: Gastroenterology;  Laterality: N/A;  Post- ESOPHAGITIS, GASTRITIS, HIATAL HERNIA    HYSTERECTOMY       REPLACEMENT TOTAL KNEE      left 2008    ROTATOR CUFF REPAIR      TOTAL KNEE ARTHROPLASTY Right 5/4/2023    Procedure: TOTAL KNEE ARTHROPLASTY WITH CORI ROBOT;  Surgeon: Chino Herrera II, MD;  Location: [facility] MAIN OR;  Service: Robotics - Ortho;  Laterality: Right;       Family History: family history includes Arthritis in her brother; Cancer in her brother; Hypertension in her mother; Stroke in her father. Otherwise pertinent FHx was reviewed and not pertinent to current issue.    Social History:  reports that she has never smoked. She has never been exposed to tobacco smoke. She has never used smokeless tobacco. She reports that she does not drink alcohol and does not use drugs.    Home Medications:  Prior to Admission Medications       Prescriptions Last Dose Informant Patient Reported? Taking?    allopurinol (ZYLOPRIM) 300 MG tablet   No No    Take 1 tablet by mouth once daily    atorvastatin (LIPITOR) 20 MG tablet   No No    Take 1 tablet by mouth once daily    Azelastine HCl 137 MCG/SPRAY solution  Self Yes No    2 sprays by Alternating Nares route Daily As Needed.    Blood Pressure Monitoring (Adult Blood Pressure Cuff Lg) kit   No No    Use 1 kit Daily.    cetirizine (zyrTEC) 10 MG tablet  Self Yes No    Take 1 tablet by mouth Daily.    cyanocobalamin (VITAMIN B-12) 1000 MCG tablet  Self Yes No    Take 1 tablet by mouth Daily.    dofetilide (Tikosyn) 250 MCG capsule   No No    Take 1 capsule by mouth Every 12 (Twelve) Hours.    hydrocortisone 2.5 % ointment   Yes No    APPLY THIN LAYER OF OINTMENT EXTERNALLY TO AFFECTED AREA TWICE DAILY. SAFE TO USE ON FACE    metoprolol succinate XL (TOPROL-XL) 25 MG 24 hr tablet   No No    Take 1 tablet by mouth Daily for 360 days.    midodrine (PROAMATINE) 5 MG tablet   No No    TAKE 1 TABLET BY MOUTH TWICE DAILY BEFORE MEAL(S)    montelukast (SINGULAIR) 10 MG tablet   No No    Take 1 tablet by mouth Every Night.    multivitamin (MULTI VITAMIN PO)   Yes No     Take 1 tablet by mouth Daily.    mupirocin (BACTROBAN) 2 % ointment   Yes No    APPLY SMALL AMOUNT OF OINTMENT TOPICALLY TO AFFECTED AREA TWICE DAILY    pantoprazole (PROTONIX) 40 MG EC tablet   Yes No    Take 1 tablet by mouth Every Morning.    rivaroxaban (XARELTO) 15 MG tablet   No No    Take 1 tablet by mouth Daily With Dinner. Indications: Atrial Fibrillation    spironolactone (ALDACTONE) 25 MG tablet   No No    Take 1/2 (one-half) tablet by mouth once daily    triamcinolone (KENALOG) 0.1 % cream   Yes No    APPLY CREAM EXTERNALLY TWICE DAILY TO ECZEMA FOR UP TO 2 WEEKS PER MONTH AS NEEDED    Vitamin D, Cholecalciferol, 25 MCG (1000 UT) tablet  Self Yes No    Take 1 tablet by mouth Daily.    docusate sodium (COLACE) 100 MG capsule  Self Yes No    Take 1 capsule by mouth 2 (Two) Times a Day.    ferrous sulfate 325 (65 FE) MG tablet  Self Yes No    Take 1 tablet by mouth Daily With Breakfast.    HYDROcodone-acetaminophen (NORCO) 5-325 MG per tablet   No No    Take 1 tablet by mouth Every 6 (Six) Hours As Needed for Moderate Pain.    levothyroxine (SYNTHROID, LEVOTHROID) 88 MCG tablet   No No    Take 1 tablet by mouth once daily    Mounjaro 2.5 MG/0.5ML solution auto-injector   Yes No    Inject 5 mg into the appropriate muscle as directed by prescriber 1 (One) Time Per Week.              Allergies:  Allergies   Allergen Reactions    Celecoxib Itching and Swelling     swelling       Objective      Vitals:   Temp:  [97.4 °F (36.3 °C)] 97.4 °F (36.3 °C)  Heart Rate:  [116-153] 136  Resp:  [17] 17  BP: (103-131)/(64-68) 131/68  There is no height or weight on file to calculate BMI.  Physical Exam  Vitals reviewed.   Constitutional:       Appearance: Normal appearance. She is obese.   HENT:      Head: Normocephalic and atraumatic.   Eyes:      Extraocular Movements: Extraocular movements intact.      Pupils: Pupils are equal, round, and reactive to light.   Cardiovascular:      Rate and Rhythm: Tachycardia present.  Rhythm irregular.      Pulses: Normal pulses.      Heart sounds: Normal heart sounds.   Pulmonary:      Effort: Pulmonary effort is normal.      Breath sounds: Normal breath sounds.   Abdominal:      General: Abdomen is flat. Bowel sounds are normal.      Palpations: Abdomen is soft.   Musculoskeletal:         General: Normal range of motion.      Cervical back: Normal range of motion.   Skin:     General: Skin is warm and dry.      Capillary Refill: Capillary refill takes 2 to 3 seconds.   Neurological:      General: No focal deficit present.      Mental Status: She is alert and oriented to person, place, and time. Mental status is at baseline.   Psychiatric:         Mood and Affect: Mood normal.         Behavior: Behavior normal.         Diagnostic Data:  Lab Results (last 24 hours)       Procedure Component Value Units Date/Time    BNP [[id number]]  (Abnormal) Collected: 05/15/25 1143    Specimen: Blood Updated: 05/15/25 1553     proBNP 7,466.0 pg/mL     Narrative:      This assay is used as an aid in the diagnosis of individuals suspected of having heart failure. It can be used as an aid in the diagnosis of acute decompensated heart failure (ADHF) in patients presenting with signs and symptoms of ADHF to the emergency department (ED). In addition, NT-proBNP of <300 pg/mL indicates ADHF is not likely.    Age Range Result Interpretation  NT-proBNP Concentration (pg/mL:      <50             Positive            >450                   Gray                 300-450                    Negative             <300    50-75           Positive            >900                  Gray                300-900                  Negative            <300      >75             Positive            >1800                  Gray                300-1800                  Negative            <300    TSH Rfx On Abnormal To Free T4 [[id number]] Collected: 05/15/25 1143    Specimen: Blood Updated: 05/15/25 1550    CBC (No Diff) [[id number]]   (Abnormal) Collected: 05/15/25 1143    Specimen: Blood Updated: 05/15/25 1539     WBC 5.72 10*3/mm3      RBC 3.09 10*6/mm3      Hemoglobin 10.5 g/dL      Hematocrit 32.5 %      .2 fL      MCH 34.0 pg      MCHC 32.3 g/dL      RDW 14.6 %      RDW-SD 55.9 fl      MPV 10.5 fL      Platelets 184 10*3/mm3              Imaging Results (Last 24 Hours)       ** No results found for the last 24 hours. **              Assessment & Plan        This is a 80 y.o. female with:    Active and Resolved Problems  Active Hospital Problems    Diagnosis  POA    **Atrial fibrillation with rapid ventricular response [I48.91]  Yes      Resolved Hospital Problems   No resolved problems to display.       Impression: Afib with RVR  Paroxysmal atrial fibrillation on Xarelto  nonobstructive CAD  SSS with dual-chamber pacemaker  HFpEF (EF 55-60% 2024)  HTN  HLD  Arthritis  ROSA on CPAP    Plan: Admit to [facility]. ED records, labs, and imaging reviewed.     Afib with RVR  Paroxysmal atrial fibrillation on Xarelto  nonobstructive CAD  SSS with dual-chamber pacemaker  HFpEF (EF 55-60% 2024)  HTN  - Cards consult placed  - Has had previous ablation for A-fib and apical atrial flutter 2024  - Evaluate patient overnight, hemodynamically stable, heart rate currently 120  - s/p 500mcg dig and 5mg IV metoprolol  - Restart home medications including Xarelto as surgery will be scheduled at a later date  - Patient with chronic hypotension takes midodrine at home, BP currently 120/60  - [employer] pacemaker, rep was contacted by cards  - proBNP elevated 7466 today, Historically preserved LV function.  Previous echo June 2024 showed an EF of 55 to 60%.  Diastolic dysfunction was not present.  There is mild mitral regurg  - Chest x-ray pending  - Repeat echocardiogram  - troponin mildly elevated as expected, trend    HLD: Continue home statin    Arthritis: Right partial knee revision cancelled today, reschedule with Dr. Herrera    ROSA on CPAP: Continue  nocturnal CPAP, patient brought from home    VTE Prophylaxis:  Pharmacologic & mechanical VTE prophylaxis orders are present.        The patient desires to be as follows:    CODE STATUS:    Code Status (Patient has no pulse and is not breathing): CPR (Attempt to Resuscitate)  Medical Interventions (Patient has pulse or is breathing): Full Support  Level Of Support Discussed With: Patient        Daughter Lyudmila, who can be contacted at [phone], is the designated person to make medical decisions on the patient's behalf if She is incapable of doing so. This was clarified with patient and/or next of kin on 5/15/2025 during the course of this H&P.    Admission Status:  I believe this patient meets inpatient status.    Expected Length of Stay: 3 days    PDMP and Medication Dispenses via Sidebar reviewed and consistent with patient reported medications.    I discussed the patient's findings and my recommendations with patient.      Signature:     This document has been electronically signed by Milka Moreno PA-C on May 15, 2025 16:05 EDT   [facility] Hospitalist Team

## 2025-05-15 NOTE — PLAN OF CARE
Goal Outcome Evaluation:                    Pt. VSS at this time. Sarah rescheduled for a later date. Home meds restarted, plan to d/c 5-16-24

## 2025-05-15 NOTE — CONSULTS
Referring Provider: Jt Lim DO    Reason for Consultation:      A-fib with RVR      Patient Care Team:  Leonor Adam MD as PCP - General (Family Medicine)  Maggi Horn MD as Consulting Physician (Pain Medicine)  Ratna Zavala MD as Consulting Physician (Cardiology)  Nora Foley APRN as Nurse Practitioner (Gastroenterology)  Gianluca Walton MD as Consulting Physician (Pulmonary Disease)  Nathanael Murillo MD as Consulting Physician (Allergy and Immunology)  Delfin Thorpe MD as Surgeon (Orthopedic Surgery)  Josh Loomis MD as Consulting Physician (Gastroenterology)  Bo Ruggiero MD as Consulting Physician (Urology)      SUBJECTIVE     Chief Complaint: Palpitations/tachycardia    History of present illness:  Faby Le is a 80 y.o. female with a history of Paroxysmal atrial fibrillation who presented to [facility] this morning for elective knee surgery and was found to be tachycardic.  Knee surgery was canceled and she was admitted for further evaluation and treatment.She was given IV metoprolol x 1 and 500 of IV digoxin x 1    Patient is routinely seen in the office by Dr. Zavala.  She reports when she woke up this morning she felt nervous about the surgery and felt like her heart may be racing.  She has been off of Xarelto for anticipated knee surgery    She is known to have a history of paroxysmal atrial fibrillation and is on Tikosyn.  Anticoagulated with Xarelto.  She has nonobstructive coronary artery disease, sick sinus syndrome with dual-chamber pacemaker.  Diastolic dysfunction with chronic heart failure with ejection fractionMost recent cardiac testing includes echocardiogram in June 2024 EF 55 to 60%.  Diastolic dysfunction was not present.  There was mild MR.    2024 patient had comprehensive EP study with cryoablation of the pulmonary veins with pulmonary vein isolation.  She had additional linear radiofrequency  ablation along the left atrial posterior wall for atypical left atrial flutter.    In August 2022 patient underwent cardiac catheterization which showed normal left main, normal RCA, 30% stenosis in the LAD at the bifurcation with the diagonal and septal branch.  Diagonal branch had 40% disease, left circumflex 30 to 40%.  EF was 55%    Review of systems:    Constitutional: C/o  weakness, fatigue, fever, rigors, chills   Eyes: No vision changes, eye pain   ENT/oropharynx: No difficulty swallowing, sore throat, epistaxis, changes in hearing   Cardiovascular: No chest pain, chest tightness,c/o  palpitations, paroxysmal nocturnal dyspnea, orthopnea, diaphoresis, dizziness / syncopal episode   Respiratory: Reports mild shortness of breath, dyspnea on exertion, denies cough, wheezing, hemoptysis   Gastrointestinal: No abdominal pain, nausea, vomiting, diarrhea, bloody stools   Genitourinary: No hematuria, dysuria   Neurological: No headache, tremors, numbness, one-sided weakness    Musculoskeletal: No cramps, myalgias,c/o  joint pain, joint swelling   Integument: No rash, edema        Personal History:      Past Medical History:   Diagnosis Date    Ankle pain     Arthritis     Atrial fibrillation     Coronary artery disease     Depression     Disease of thyroid gland     Gout     Hyperglycemia     Hyperlipidemia     Hypertension     Low back pain     Sleep apnea     Vitamin D deficiency        Past Surgical History:   Procedure Laterality Date    ANKLE FUSION      2017-left    CARDIAC CATHETERIZATION      CARDIAC CATHETERIZATION Right 8/29/2022    Procedure: Left Heart Cath;  Surgeon: Ratna Zavala MD;  Location: [facility] CATH INVASIVE LOCATION;  Service: Cardiovascular;  Laterality: Right;    CARDIAC ELECTROPHYSIOLOGY PROCEDURE Left 7/3/2023    Procedure: Pacemaker DC new [place] Notified;  Surgeon: Cr Herrera MD;  Location: [facility] CATH INVASIVE LOCATION;  Service: Cardiovascular;  Laterality: Left;     CARDIAC ELECTROPHYSIOLOGY PROCEDURE Right 3/5/2024    Procedure: Ablation atrial fibrillation, Cryo Marcialonddaniel and Wayne Igor notified 02/09/24;  Surgeon: Cr Herrera MD;  Location: [facility] CATH INVASIVE LOCATION;  Service: Cardiovascular;  Laterality: Right;    CARDIAC ELECTROPHYSIOLOGY PROCEDURE N/A 3/5/2024    Procedure: Cardioversion;  Surgeon: Cr Herrera MD;  Location: [facility] CATH INVASIVE LOCATION;  Service: Cardiovascular;  Laterality: N/A;    CARPAL TUNNEL RELEASE      CATARACT EXTRACTION      CUBITAL TUNNEL RELEASE      ENDOSCOPY N/A 5/28/2024    Procedure: ESOPHAGOGASTRODUODENOSCOPY WITH COLD FORCEP BIOPSY X1 AREA;  Surgeon: Josh Loomis MD;  Location: [facility] ENDOSCOPY;  Service: Gastroenterology;  Laterality: N/A;  Post- ESOPHAGITIS, GASTRITIS, HIATAL HERNIA    HYSTERECTOMY      REPLACEMENT TOTAL KNEE      left 2008    ROTATOR CUFF REPAIR      TOTAL KNEE ARTHROPLASTY Right 5/4/2023    Procedure: TOTAL KNEE ARTHROPLASTY WITH CORI ROBOT;  Surgeon: Chino Herrera II, MD;  Location: [facility] MAIN OR;  Service: Robotics - Ortho;  Laterality: Right;       Family History   Problem Relation Age of Onset    Hypertension Mother     Stroke Father     Arthritis Brother     Cancer Brother        Social History     Tobacco Use    Smoking status: Never     Passive exposure: Never    Smokeless tobacco: Never   Vaping Use    Vaping status: Never Used   Substance Use Topics    Alcohol use: No    Drug use: No        Home meds:  Prior to Admission medications    Medication Sig Start Date End Date Taking? Authorizing Provider   allopurinol (ZYLOPRIM) 300 MG tablet Take 1 tablet by mouth once daily 11/4/24   Leonor Adam MD   atorvastatin (LIPITOR) 20 MG tablet Take 1 tablet by mouth once daily 9/30/24   Leonor Adam MD   Azelastine HCl 137 MCG/SPRAY solution 2 sprays by Alternating Nares route Daily As Needed. 6/19/23   Provider, MD Franklin   Blood Pressure  Monitoring (Adult Blood Pressure Cuff Lg) kit Use 1 kit Daily. 10/3/24   Leonor Adam MD   cetirizine (zyrTEC) 10 MG tablet Take 1 tablet by mouth Daily. 8/28/18   Franklin Cid MD   cyanocobalamin (VITAMIN B-12) 1000 MCG tablet Take 1 tablet by mouth Daily. 12/20/18   Frnaklin Cid MD   docusate sodium (COLACE) 100 MG capsule Take 1 capsule by mouth 2 (Two) Times a Day.    Franklin Cid MD   dofetilide (Tikosyn) 250 MCG capsule Take 1 capsule by mouth Every 12 (Twelve) Hours. 10/14/24   Ratna Zavala MD   ferrous sulfate 325 (65 FE) MG tablet Take 1 tablet by mouth Daily With Breakfast.    Franklin Cid MD   HYDROcodone-acetaminophen (NORCO) 5-325 MG per tablet Take 1 tablet by mouth Every 6 (Six) Hours As Needed for Moderate Pain. 4/22/25   Judy Schneider APRN   hydrocortisone 2.5 % ointment APPLY THIN LAYER OF OINTMENT EXTERNALLY TO AFFECTED AREA TWICE DAILY. SAFE TO USE ON FACE 8/22/24   Franklin Cid MD   levothyroxine (SYNTHROID, LEVOTHROID) 88 MCG tablet Take 1 tablet by mouth once daily 12/11/24   Leonor Adam MD   metoprolol succinate XL (TOPROL-XL) 25 MG 24 hr tablet Take 1 tablet by mouth Daily for 360 days. 10/14/24 10/9/25  Ratna Zavala MD   midodrine (PROAMATINE) 5 MG tablet TAKE 1 TABLET BY MOUTH TWICE DAILY BEFORE MEAL(S) 10/28/24   Leonor Adam MD   montelukast (SINGULAIR) 10 MG tablet Take 1 tablet by mouth Every Night. 6/25/24   Leonor Adam MD   Mounjaro 2.5 MG/0.5ML solution auto-injector Inject 5 mg into the appropriate muscle as directed by prescriber 1 (One) Time Per Week. 2/11/25   Franklin Cid MD   multivitamin (MULTI VITAMIN PO) Take 1 tablet by mouth Daily.    Franklin Cid MD   mupirocin (BACTROBAN) 2 % ointment APPLY SMALL AMOUNT OF OINTMENT TOPICALLY TO AFFECTED AREA TWICE DAILY 8/22/24   Provider, MD Franklin   pantoprazole (PROTONIX) 40 MG EC tablet Take 1  tablet by mouth Every Morning. 5/28/24   Franklin Cid MD   rivaroxaban (XARELTO) 15 MG tablet Take 1 tablet by mouth Daily With Dinner. Indications: Atrial Fibrillation 9/23/24   Leonor Adam MD   spironolactone (ALDACTONE) 25 MG tablet Take 1/2 (one-half) tablet by mouth once daily 10/15/24   Ratna Zavala MD   triamcinolone (KENALOG) 0.1 % cream APPLY CREAM EXTERNALLY TWICE DAILY TO ECZEMA FOR UP TO 2 WEEKS PER MONTH AS NEEDED 8/22/24   Franklin Cid MD   Vitamin D, Cholecalciferol, 25 MCG (1000 UT) tablet Take 1 tablet by mouth Daily. 6/16/16   Franklin Cid MD       Allergies:     Celecoxib    Scheduled Meds:atorvastatin, 20 mg, Oral, Daily  cetirizine, 10 mg, Oral, Daily  [START ON 5/16/2025] cholecalciferol, 1,000 Units, Oral, Daily  [START ON 5/16/2025] ferrous sulfate, 325 mg, Oral, Daily With Breakfast  [START ON 5/16/2025] levothyroxine, 88 mcg, Oral, Q AM  midodrine, 5 mg, Oral, TID AC  montelukast, 10 mg, Oral, Nightly  [START ON 5/16/2025] multivitamin, 1 tablet, Oral, Daily  [START ON 5/16/2025] pantoprazole, 40 mg, Oral, Critical access hospital  pharmacy, 1 each, Not Applicable, Once  senna-docusate sodium, 2 tablet, Oral, BID  sodium chloride, 10 mL, Intravenous, Q12H  [START ON 5/16/2025] cyanocobalamin, 1,000 mcg, Oral, Daily      Continuous Infusions:dilTIAZem, 5-15 mg/hr  heparin, 8.33 Units/kg/hr  lactated ringers, 50 mL/hr      PRN Meds:  senna-docusate sodium **AND** polyethylene glycol **AND** bisacodyl **AND** bisacodyl    Calcium Replacement - Follow Nurse / BPA Driven Protocol    heparin    heparin    HYDROcodone-acetaminophen    HYDROmorphone **AND** naloxone    Magnesium Cardiology Dose Replacement - Follow Nurse / BPA Driven Protocol    nitroglycerin    ondansetron    Phosphorus Replacement - Follow Nurse / BPA Driven Protocol    Potassium Replacement - Follow Nurse / BPA Driven Protocol    sodium chloride    sodium chloride      OBJECTIVE    Vital Signs  Vitals:     05/15/25 1521 05/15/25 1605   BP: 131/68 124/78   Pulse: (!) 142 120   Resp:  17   Temp:  97.4 °F (36.3 °C)   TempSrc:  Oral   SpO2:  95%           No intake or output data in the 24 hours ending 05/15/25 1629     Telemetry:  narrow complex tachycardia; appears to be narrow complex    Physical Exam:  The patient is alert, oriented and in no distress.  Vital signs as noted above.  Head and neck revealed no carotid bruits or jugular venous distention.    Lungs clear.    Heart: Irreg Irregular rhythm   Abdomen: Soft and nontender.   Skin: Warm and dry.  Musculoskeletal system is grossly normal.  CNS grossly normal.       Results Review:  I have personally reviewed the results from the time of this admission to 5/15/2025 16:29 EDT and agree with these findings:  []  Laboratory  []  Microbiology  []  Radiology  []  EKG/Telemetry   []  Cardiology/Vascular   []  Pathology  []  Old records  []  Other:    Most notable findings include:     Lab Results (last 24 hours)       Procedure Component Value Units Date/Time    Magnesium [[id number]] Collected: 05/15/25 1618    Specimen: Blood Updated: 05/15/25 1628    TSH [[id number]] Collected: 05/15/25 1618    Specimen: Blood Updated: 05/15/25 1628    aPTT [[id number]] Collected: 05/15/25 1618    Specimen: Blood Updated: 05/15/25 1623    High Sensitivity Troponin T [[id number]] Collected: 05/15/25 1618    Specimen: Blood Updated: 05/15/25 1623    TSH Rfx On Abnormal To Free T4 [[id number]]  (Normal) Collected: 05/15/25 1143    Specimen: Blood Updated: 05/15/25 1620     TSH 2.420 uIU/mL     BNP [[id number]]  (Abnormal) Collected: 05/15/25 1143    Specimen: Blood Updated: 05/15/25 1553     proBNP 7,466.0 pg/mL     Narrative:      This assay is used as an aid in the diagnosis of individuals suspected of having heart failure. It can be used as an aid in the diagnosis of acute decompensated heart failure (ADHF) in patients presenting with signs and symptoms of ADHF to the emergency  department (ED). In addition, NT-proBNP of <300 pg/mL indicates ADHF is not likely.    Age Range Result Interpretation  NT-proBNP Concentration (pg/mL:      <50             Positive            >450                   Gray                 300-450                    Negative             <300    50-75           Positive            >900                  Gray                300-900                  Negative            <300      >75             Positive            >1800                  Gray                300-1800                  Negative            <300    CBC (No Diff) [[id number]]  (Abnormal) Collected: 05/15/25 1143    Specimen: Blood Updated: 05/15/25 1539     WBC 5.72 10*3/mm3      RBC 3.09 10*6/mm3      Hemoglobin 10.5 g/dL      Hematocrit 32.5 %      .2 fL      MCH 34.0 pg      MCHC 32.3 g/dL      RDW 14.6 %      RDW-SD 55.9 fl      MPV 10.5 fL      Platelets 184 10*3/mm3             Imaging Results (Last 24 Hours)       ** No results found for the last 24 hours. **            LAB RESULTS (LAST 7 DAYS)    CBC  Results from last 7 days   Lab Units 05/15/25  1143 05/09/25  1103   WBC 10*3/mm3 5.72 5.97   RBC 10*6/mm3 3.09* 3.13*   HEMOGLOBIN g/dL 10.5* 10.5*   HEMATOCRIT % 32.5* 32.5*   MCV fL 105.2* 103.8*   PLATELETS 10*3/mm3 184 211       BMP  Results from last 7 days   Lab Units 05/15/25  1143 05/09/25  1103   SODIUM mmol/L 136 138   POTASSIUM mmol/L 4.1 4.7   CHLORIDE mmol/L 105 103   CO2 mmol/L 22.5 24.8   BUN mg/dL 28* 22   CREATININE mg/dL 1.48* 1.45*   GLUCOSE mg/dL 110* 94       CMP   Results from last 7 days   Lab Units 05/15/25  1143 05/09/25  1103   SODIUM mmol/L 136 138   POTASSIUM mmol/L 4.1 4.7   CHLORIDE mmol/L 105 103   CO2 mmol/L 22.5 24.8   BUN mg/dL 28* 22   CREATININE mg/dL 1.48* 1.45*   GLUCOSE mg/dL 110* 94   ALBUMIN g/dL 3.9  --    BILIRUBIN mg/dL 0.5  --    ALK PHOS U/L 118*  --    AST (SGOT) U/L 24  --    ALT (SGPT) U/L 15  --        BNP        TROPONIN        CoAg  Results from  last 7 days   Lab Units 05/15/25  1143 05/09/25  1103   INR  1.43* 1.98*   APTT seconds  --  47.9*       Creatinine Clearance  Estimated Creatinine Clearance: 38.6 mL/min (A) (by C-G formula based on SCr of 1.48 mg/dL (H)).    ABG          Radiology  No radiology results for the last day      EKG  I personally viewed and interpreted the patient's EKG/Telemetry data:  No orders to display         Echocardiogram:    Results for orders placed during the hospital encounter of 06/11/24    Adult Transthoracic Echo Complete W/ Cont if Necessary Per Protocol    Interpretation Summary    Left ventricular systolic function is normal. Left ventricular ejection fraction appears to be 56 - 60%.    Left ventricular diastolic function was normal.    The left atrial cavity is moderately dilated.    The right atrial cavity is mildly  dilated.        Stress Test:  Results for orders placed during the hospital encounter of 08/12/22    Stress Test With Myocardial Perfusion One Day    Interpretation Summary  · Left ventricular ejection fraction is normal. (Calculated EF = 68%).  · Findings consistent with an equivocal ECG stress test.    Abnormal stress  Submaximal study, no changes at submaximal stress on stress ECG  No arrhythmias seen  Nuclear imaging demonstrates decreased counts at rest in the mid inferior to apical wall involving the apex, summed rest score of 16  Stress imaging demonstrates significant worsening perfusion in the mid to apical inferior wall, EF 68%  There is significant GI and liver uptake cannot rule out diaphragmatic attenuation    Abnormal study  Correlate with clinical risk factors  Suggestive of inferior reversibility and ischemia        Cardiac Catheterization:  Results for orders placed during the hospital encounter of 08/29/22    Cardiac Catheterization/Vascular Study    Narrative  Table formatting from the original result was not included.  Cardiac Catheterization Operative Report    Faby GOMEZ  Rey  [id number]  8/29/2022  @PCP@    She underwent cardiac catheterization.    Indications for the procedure include: abnormal stress test.    Procedure Details:  The risks, benefits, complications, treatment options, and expected outcomes were discussed with the patient. The patient and/or family concurred with the proposed plan, giving informed consent.    After informed consent the patient was brought to the cath lab after appropriate IV hydration was begun and oral premedication was given. She was further sedated with fentanyl. She was prepped and draped in the usual manner. Using the modified Seldinger access technique, a 6 [place] sheath was placed in the femoral artery. A left heart catheterization with coronary arteriography was performed. Findings are discussed below.    After the procedure was completed, sedation was stopped and the sheaths and catheters were all removed. Hemostasis was achieved per established hospital protocols.    Conscious sedation:  Conscious sedation was performed according to protocol.  I supervised and directed an independent trained observer with the assistance of monitoring the patient's level of consciousness and physiologic status throughout the procedure.  Intraoperative service time was 60 minutes.    Findings:    Hemodynamics Central arctic pressure systolic 150 and diastolic 56 with a mean pressure of 82 mmHg  LV end-diastolic pressure of 12 mmHg  There was no gradient across the aortic valve on the pullback of the pigtail catheter  Left Main  left main is a large-caliber vessel angiographically normal bifurcates into left into descending and left circumflex arteries  RCA  large-caliber dominant vessel  Right coronary artery is angiographically normal right coronary artery bifurcates into a large caliber PDA branch and a moderate caliber PLV branch that are angiographically normal  LAD  large-caliber vessel  LAD has mid focal angiographic 30% stenosis that is heavily  calcified at the bifurcation with the diagonal and septal branch  Moderate size diagonal branch that has ostial angiographic of 40% stenosis  Circ  large-caliber vessel angiographically normal  First marginal branch of the circumflex has proximal angiographic 30 to 40% stenosis    LV  normal LV systolic function normal wall motion estimate LV ejection fraction of 55%  Coronary Dominance  right coronary artery    Estimated Blood Loss:  Minimal    Specimens: None    Complications:  None; patient tolerated the procedure well.    Disposition: PACU - hemodynamically stable.    Condition: stable    Impressions:  Mild obstructive coronary artery disease involving the LAD mild to moderate obstructive coronary artery disease involving the ostium of the diagonal branch mild obstructive coronary artery disease involving the proximal first marginal branch  Normal LV systolic function  Normal wall motion  Estimate LV ejection fraction of 55%  Normal left-sided filling pressures    Recommendations:  Medical management for obstructive coronary artery disease  Test results reviewed and discussed with patient and family        Other:  [id number] SCORE   [id number] Score: 8 (5/15/2025  4:28 PM)        ASSESSMENT & PLAN:    Principal Problem:    Atrial fibrillation with rapid ventricular response      Narrow complex tachycardia  When heart rate slows down appears to be atrial tachycardia versus flutter  Ventricular rates ranging from 90s to 150  Blood pressure currently 120  Patient complaining of some feelings of palpitations and shortness of breath  Patient has history of paroxysmal atrial fibrillation, flutter  Has had previous ablation for A-fib and atypical atrial flutter  Home medications reported to be Tikosyn 250 mcg twice daily  On xarelto at home for a/c; Heparin gtt to be started    Dyspnea with exertion  proBNP elevated 7466  Historically preserved LV function  Chest x-ray pending  Repeat  echocardiogram    Nonobstructive coronary artery disease  Denies chest pain    Chronic hypotension  Takes midodrine at home.  Blood pressure currently 120/60    Sick sinus syndrome  [employer] pacemaker  Rep was contacted who reports they will check device in the morning    Degenerative joint disease  Scheduled for elective knee surgery today which has now been canceled      Additional recommendations per Dr. Norris  Discussed with Dr. Herrera.  Start IV diltiazem drip, hold additional dig loading, resume home Tikosyn dose  Will check magnesium level, TSH  Repeat echocardiogram will be obtained.     Gerda Adam, APRN  05/15/25  16:29 EDT      Cardiology attending:  Seen and examined.  Chart and labs reviewed. Independent interpretations of cardiac testing was performed. History and exam findings are verified with above changes noted.  Assessment and plan notated by APC after being formulated by attending consultant.  Note that greater than 50% of the time spent in care of the patient was provided by attending consultant.    Patient denies any chest pain pressure heaviness or tightness.  She presented for an elective right knee revision.  She was hooked up to the monitor and found to be in rapid atrial fibrillation.  Surgery was canceled and she was sent up to 2F for management.  She was given Lopressor without any improvement.  We gave her IV digoxin and her heart rate has come down nicely.  Case was discussed with cardiac electrophysiology.  Plan will be to reinitiate her oral antiarrhythmic-Tikosyn.  If surgery is going to be canceled, we will go ahead and resume all of her oral medications including anticoagulation.  If however surgery is planned for tomorrow, we will keep Cardizem on call for the OR and get patient through the surgery with this modality and then get her back on oral medications thereafter.  Will decrease dose of Tikosyn to 125 mcg given renal clearance.  Will monitor  patient closely for toxicities associated with IV rate controlling medicines and potential EKG abnormalities.    Case discussed with orthopedics.  Plan is to cancel patient's surgery and reschedule for another time.  As such, we will resume her home medications and observe.    Physical Exam:    General: Alert, cooperative, no distress, appears stated age  Head:  Normocephalic, atraumatic, mucous membranes moist  Eyes:  Conjunctivae/corneas clear, EOM's intact     Neck:  Supple,  no bruit  Lungs:            Clear to auscultation bilaterally, no wheezes rhonchi rales are noted  Chest wall: No tenderness  Heart::  Irregular rate and rhythm, S1 and S2 normal, 1/6 holosystolic murmur.  No rub or gallop  Abdomen: Soft, nontender, nondistended bowel sounds active  Extremities: No cyanosis, clubbing, or edema  Pulses:            2+ and symmetric all extremities  Skin:  No rashes or lesions  Neuro/psych: A&O x3. CN II through XII are grossly intact with appropriate affect

## 2025-05-16 ENCOUNTER — READMISSION MANAGEMENT (OUTPATIENT)
Dept: CALL CENTER | Facility: HOSPITAL | Age: 80
End: 2025-05-16
Payer: MEDICARE

## 2025-05-16 ENCOUNTER — APPOINTMENT (OUTPATIENT)
Dept: CARDIOLOGY | Facility: HOSPITAL | Age: 80
End: 2025-05-16
Payer: MEDICARE

## 2025-05-16 VITALS
RESPIRATION RATE: 12 BRPM | OXYGEN SATURATION: 100 % | BODY MASS INDEX: 48.68 KG/M2 | WEIGHT: 264.55 LBS | HEART RATE: 69 BPM | DIASTOLIC BLOOD PRESSURE: 93 MMHG | SYSTOLIC BLOOD PRESSURE: 130 MMHG | HEIGHT: 62 IN | TEMPERATURE: 98.3 F

## 2025-05-16 PROBLEM — I48.91 ATRIAL FIBRILLATION: Status: ACTIVE | Noted: 2025-05-16

## 2025-05-16 LAB
ANION GAP SERPL CALCULATED.3IONS-SCNC: 5.2 MMOL/L (ref 5–15)
AORTIC DIMENSIONLESS INDEX: 0.73 (DI)
AV MEAN PRESS GRAD SYS DOP V1V2: 4 MMHG
AV VMAX SYS DOP: 130 CM/SEC
BH CV ECHO LEFT VENTRICLE GLOBAL LONGITUDINAL STRAIN: -15.6 %
BH CV ECHO MEAS - AO MAX PG: 6.8 MMHG
BH CV ECHO MEAS - AO V2 VTI: 31.2 CM
BH CV ECHO MEAS - AVA(I,D): 2.08 CM2
BH CV ECHO MEAS - EDV(CUBED): 166.4 ML
BH CV ECHO MEAS - EDV(MOD-SP4): 139 ML
BH CV ECHO MEAS - EF(MOD-SP4): 55.5 %
BH CV ECHO MEAS - ESV(CUBED): 54.9 ML
BH CV ECHO MEAS - ESV(MOD-SP4): 61.8 ML
BH CV ECHO MEAS - FS: 30.9 %
BH CV ECHO MEAS - IVS/LVPW: 1 CM
BH CV ECHO MEAS - IVSD: 0.8 CM
BH CV ECHO MEAS - LA DIMENSION: 3.9 CM
BH CV ECHO MEAS - LAT PEAK E' VEL: 10 CM/SEC
BH CV ECHO MEAS - LV DIASTOLIC VOL/BSA (35-75): 64.6 CM2
BH CV ECHO MEAS - LV MASS(C)D: 160 GRAMS
BH CV ECHO MEAS - LV MAX PG: 3.8 MMHG
BH CV ECHO MEAS - LV MEAN PG: 2 MMHG
BH CV ECHO MEAS - LV SYSTOLIC VOL/BSA (12-30): 28.7 CM2
BH CV ECHO MEAS - LV V1 MAX: 98 CM/SEC
BH CV ECHO MEAS - LV V1 VTI: 22.9 CM
BH CV ECHO MEAS - LVIDD: 5.5 CM
BH CV ECHO MEAS - LVIDS: 3.8 CM
BH CV ECHO MEAS - LVOT AREA: 2.8 CM2
BH CV ECHO MEAS - LVOT DIAM: 1.9 CM
BH CV ECHO MEAS - LVPWD: 0.8 CM
BH CV ECHO MEAS - MED PEAK E' VEL: 10.7 CM/SEC
BH CV ECHO MEAS - MV A DUR: 0.13 SEC
BH CV ECHO MEAS - MV A MAX VEL: 50.8 CM/SEC
BH CV ECHO MEAS - MV DEC SLOPE: 651 CM/SEC2
BH CV ECHO MEAS - MV DEC TIME: 0.19 SEC
BH CV ECHO MEAS - MV E MAX VEL: 104 CM/SEC
BH CV ECHO MEAS - MV E/A: 2.05
BH CV ECHO MEAS - MV MAX PG: 4.7 MMHG
BH CV ECHO MEAS - MV MEAN PG: 2 MMHG
BH CV ECHO MEAS - MV P1/2T: 48.1 MSEC
BH CV ECHO MEAS - MV V2 VTI: 25.8 CM
BH CV ECHO MEAS - MVA(P1/2T): 4.6 CM2
BH CV ECHO MEAS - MVA(VTI): 2.5 CM2
BH CV ECHO MEAS - PA ACC TIME: 0.1 SEC
BH CV ECHO MEAS - PA V2 MAX: 85.5 CM/SEC
BH CV ECHO MEAS - RV MAX PG: 1.93 MMHG
BH CV ECHO MEAS - RV V1 MAX: 69.5 CM/SEC
BH CV ECHO MEAS - RV V1 VTI: 16.3 CM
BH CV ECHO MEAS - SV(LVOT): 64.9 ML
BH CV ECHO MEAS - SV(MOD-SP4): 77.2 ML
BH CV ECHO MEAS - SVI(LVOT): 30.2 ML/M2
BH CV ECHO MEAS - SVI(MOD-SP4): 35.9 ML/M2
BH CV ECHO MEAS - TAPSE (>1.6): 1.97 CM
BH CV ECHO MEASUREMENTS AVERAGE E/E' RATIO: 10.05
BH CV XLRA - RV BASE: 3.5 CM
BH CV XLRA - RV LENGTH: 6.1 CM
BH CV XLRA - RV MID: 2 CM
BH CV XLRA - TDI S': 10 CM/SEC
BUN SERPL-MCNC: 24 MG/DL (ref 8–23)
BUN/CREAT SERPL: 18.6 (ref 7–25)
CALCIUM SPEC-SCNC: 9.1 MG/DL (ref 8.6–10.5)
CHLORIDE SERPL-SCNC: 109 MMOL/L (ref 98–107)
CO2 SERPL-SCNC: 22.8 MMOL/L (ref 22–29)
CREAT SERPL-MCNC: 1.29 MG/DL (ref 0.57–1)
DEPRECATED RDW RBC AUTO: 55.1 FL (ref 37–54)
EGFRCR SERPLBLD CKD-EPI 2021: 42.6 ML/MIN/1.73
ERYTHROCYTE [DISTWIDTH] IN BLOOD BY AUTOMATED COUNT: 14.5 % (ref 12.3–15.4)
GLUCOSE SERPL-MCNC: 91 MG/DL (ref 65–99)
HCT VFR BLD AUTO: 30.6 % (ref 34–46.6)
HGB BLD-MCNC: 9.8 G/DL (ref 12–15.9)
LEFT ATRIUM VOLUME INDEX: 30.8 ML/M2
MAGNESIUM SERPL-MCNC: 1.8 MG/DL (ref 1.6–2.4)
MCH RBC QN AUTO: 33.8 PG (ref 26.6–33)
MCHC RBC AUTO-ENTMCNC: 32 G/DL (ref 31.5–35.7)
MCV RBC AUTO: 105.5 FL (ref 79–97)
PHOSPHATE SERPL-MCNC: 3.3 MG/DL (ref 2.5–4.5)
PLATELET # BLD AUTO: 182 10*3/MM3 (ref 140–450)
PMV BLD AUTO: 10.2 FL (ref 6–12)
POTASSIUM SERPL-SCNC: 4.3 MMOL/L (ref 3.5–5.2)
RBC # BLD AUTO: 2.9 10*6/MM3 (ref 3.77–5.28)
SINUS: 3.3 CM
SODIUM SERPL-SCNC: 137 MMOL/L (ref 136–145)
WBC NRBC COR # BLD AUTO: 4.45 10*3/MM3 (ref 3.4–10.8)

## 2025-05-16 PROCEDURE — 25010000002 SULFUR HEXAFLUORIDE MICROSPH 60.7-25 MG RECONSTITUTED SUSPENSION: Performed by: FAMILY MEDICINE

## 2025-05-16 PROCEDURE — 84100 ASSAY OF PHOSPHORUS: CPT

## 2025-05-16 PROCEDURE — 63710000001 DOFETILIDE 250 MCG CAPSULE: Performed by: NURSE PRACTITIONER

## 2025-05-16 PROCEDURE — 93005 ELECTROCARDIOGRAM TRACING: CPT | Performed by: STUDENT IN AN ORGANIZED HEALTH CARE EDUCATION/TRAINING PROGRAM

## 2025-05-16 PROCEDURE — 25010000002 MAGNESIUM SULFATE 4 GM/100ML SOLUTION: Performed by: STUDENT IN AN ORGANIZED HEALTH CARE EDUCATION/TRAINING PROGRAM

## 2025-05-16 PROCEDURE — 80048 BASIC METABOLIC PNL TOTAL CA: CPT

## 2025-05-16 PROCEDURE — G0378 HOSPITAL OBSERVATION PER HR: HCPCS

## 2025-05-16 PROCEDURE — 99214 OFFICE O/P EST MOD 30 MIN: CPT | Performed by: NURSE PRACTITIONER

## 2025-05-16 PROCEDURE — 93356 MYOCRD STRAIN IMG SPCKL TRCK: CPT

## 2025-05-16 PROCEDURE — 83735 ASSAY OF MAGNESIUM: CPT

## 2025-05-16 PROCEDURE — A9270 NON-COVERED ITEM OR SERVICE: HCPCS | Performed by: NURSE PRACTITIONER

## 2025-05-16 PROCEDURE — 85027 COMPLETE CBC AUTOMATED: CPT

## 2025-05-16 PROCEDURE — 93356 MYOCRD STRAIN IMG SPCKL TRCK: CPT | Performed by: INTERNAL MEDICINE

## 2025-05-16 PROCEDURE — 93010 ELECTROCARDIOGRAM REPORT: CPT | Performed by: INTERNAL MEDICINE

## 2025-05-16 PROCEDURE — 93306 TTE W/DOPPLER COMPLETE: CPT

## 2025-05-16 PROCEDURE — 93306 TTE W/DOPPLER COMPLETE: CPT | Performed by: INTERNAL MEDICINE

## 2025-05-16 RX ORDER — AZELASTINE 1 MG/ML
2 SPRAY, METERED NASAL DAILY
COMMUNITY

## 2025-05-16 RX ORDER — MULTIPLE VITAMINS W/ MINERALS TAB 9MG-400MCG
1 TAB ORAL DAILY
COMMUNITY

## 2025-05-16 RX ORDER — ACETAMINOPHEN 325 MG/1
650 TABLET ORAL EVERY 6 HOURS PRN
COMMUNITY

## 2025-05-16 RX ORDER — DIPHENOXYLATE HYDROCHLORIDE AND ATROPINE SULFATE 2.5; .025 MG/1; MG/1
1 TABLET ORAL DAILY
Qty: 30 TABLET | Refills: 0 | Status: SHIPPED | OUTPATIENT
Start: 2025-05-16

## 2025-05-16 RX ORDER — DOFETILIDE 0.25 MG/1
250 CAPSULE ORAL EVERY 12 HOURS SCHEDULED
Status: DISCONTINUED | OUTPATIENT
Start: 2025-05-16 | End: 2025-05-16 | Stop reason: HOSPADM

## 2025-05-16 RX ORDER — MAGNESIUM SULFATE HEPTAHYDRATE 40 MG/ML
4 INJECTION, SOLUTION INTRAVENOUS ONCE
Status: COMPLETED | OUTPATIENT
Start: 2025-05-16 | End: 2025-05-16

## 2025-05-16 RX ADMIN — SPIRONOLACTONE 12.5 MG: 25 TABLET ORAL at 08:40

## 2025-05-16 RX ADMIN — LEVOTHYROXINE SODIUM 88 MCG: 88 TABLET ORAL at 07:00

## 2025-05-16 RX ADMIN — Medication 10 ML: at 08:43

## 2025-05-16 RX ADMIN — METOPROLOL SUCCINATE 25 MG: 25 TABLET, EXTENDED RELEASE ORAL at 08:42

## 2025-05-16 RX ADMIN — FERROUS SULFATE TAB 325 MG (65 MG ELEMENTAL FE) 325 MG: 325 (65 FE) TAB at 07:39

## 2025-05-16 RX ADMIN — MAGNESIUM SULFATE IN WATER FOR 4 G: 40 INJECTION INTRAVENOUS at 07:15

## 2025-05-16 RX ADMIN — ATORVASTATIN CALCIUM 20 MG: 20 TABLET, FILM COATED ORAL at 08:40

## 2025-05-16 RX ADMIN — Medication 1000 UNITS: at 08:40

## 2025-05-16 RX ADMIN — CETIRIZINE HYDROCHLORIDE 10 MG: 10 TABLET, FILM COATED ORAL at 08:40

## 2025-05-16 RX ADMIN — SENNOSIDES AND DOCUSATE SODIUM 2 TABLET: 50; 8.6 TABLET ORAL at 08:40

## 2025-05-16 RX ADMIN — Medication 1000 MCG: at 08:40

## 2025-05-16 RX ADMIN — THERA TABS 1 TABLET: TAB at 08:40

## 2025-05-16 RX ADMIN — DOFETILIDE 250 MCG: 0.25 CAPSULE ORAL at 14:40

## 2025-05-16 RX ADMIN — Medication 10 ML: at 02:14

## 2025-05-16 RX ADMIN — MIDODRINE HYDROCHLORIDE 5 MG: 5 TABLET ORAL at 11:57

## 2025-05-16 RX ADMIN — MIDODRINE HYDROCHLORIDE 5 MG: 5 TABLET ORAL at 07:39

## 2025-05-16 RX ADMIN — SULFUR HEXAFLUORIDE 2 ML: KIT at 02:25

## 2025-05-16 RX ADMIN — PANTOPRAZOLE SODIUM 40 MG: 40 TABLET, DELAYED RELEASE ORAL at 07:39

## 2025-05-16 NOTE — PROGRESS NOTES
[facility] CARDIOLOGY  [facility]        LOS:  LOS: 1 day   Patient Name: Faby Le  Age/Sex: 80 y.o. female  : 1945  MRN: [id number]    Day of Service: 25   Length of Stay: 1  Encounter Provider: CAROL Miranda  Place of Service: [facility] CARDIOLOGY  Patient Care Team:  Leonor Adam MD as PCP - General (Family Medicine)  Maggi Horn MD as Consulting Physician (Pain Medicine)  Ratna Zavala MD as Consulting Physician (Cardiology)  Nora Foley APRN as Nurse Practitioner (Gastroenterology)  Gianluca Walton MD as Consulting Physician (Pulmonary Disease)  Nathanael Murillo MD as Consulting Physician (Allergy and Immunology)  Delfin Thorpe MD as Surgeon (Orthopedic Surgery)  Josh Loomis MD as Consulting Physician (Gastroenterology)  Bo Ruggiero MD as Consulting Physician (Urology)    Subjective:     Chief Complaint:  F/U AF    Subjective:   Patient has no CV complaints today      Current Medications:   Scheduled Meds:atorvastatin, 20 mg, Oral, Daily  cetirizine, 10 mg, Oral, Daily  cholecalciferol, 1,000 Units, Oral, Daily  dofetilide, 250 mcg, Oral, Q12H  ferrous sulfate, 325 mg, Oral, Daily With Breakfast  levothyroxine, 88 mcg, Oral, Q AM  metoprolol succinate XL, 25 mg, Oral, Q24H  midodrine, 5 mg, Oral, TID AC  montelukast, 10 mg, Oral, Nightly  multivitamin, 1 tablet, Oral, Daily  pantoprazole, 40 mg, Oral, QAM  rivaroxaban, 15 mg, Oral, Daily With Dinner  senna-docusate sodium, 2 tablet, Oral, BID  sodium chloride, 10 mL, Intravenous, Q12H  spironolactone, 12.5 mg, Oral, Daily  cyanocobalamin, 1,000 mcg, Oral, Daily      Continuous Infusions:lactated ringers, 50 mL/hr, Last Rate: 50 mL/hr (25 0715)        Allergies:  Allergies   Allergen Reactions    Celecoxib Itching and Swelling     swelling       ROS    Objective:     Temp:  [97 °F (36.1 °C)-98.3 °F  "(36.8 °C)] 98.3 °F (36.8 °C)  Heart Rate:  [] 69  Resp:  [12-19] 12  BP: (121-154)/(53-93) 130/93     Intake/Output Summary (Last 24 hours) at 5/16/2025 1518  Last data filed at 5/16/2025 0717  Gross per 24 hour   Intake 480 ml   Output 1500 ml   Net -1020 ml     Body mass index is 48.39 kg/m².      05/16/25 0358   Weight: 120 kg (264 lb 8.8 oz)         Physical Exam:  Neuro:  CV:  Resp:  GI:  Ext:  Tele: AAOx3, no gross deficits  S1S2 RRR, 3/6 systolic murmur  Nonlabored, CTA  BS+, abd soft  Pedal pulses palp, non-pitting BLE edema  AP                                                   Lab Review:   Results from last 7 days   Lab Units 05/16/25  0358 05/15/25  1143   SODIUM mmol/L 137 136   POTASSIUM mmol/L 4.3 4.1   CHLORIDE mmol/L 109* 105   CO2 mmol/L 22.8 22.5   BUN mg/dL 24* 28*   CREATININE mg/dL 1.29* 1.48*   GLUCOSE mg/dL 91 110*   CALCIUM mg/dL 9.1 9.7   AST (SGOT) U/L  --  24   ALT (SGPT) U/L  --  15     Results from last 7 days   Lab Units 05/15/25  1954 05/15/25  1618   HSTROP T ng/L 52* 58*     Results from last 7 days   Lab Units 05/16/25  0358 05/15/25  1143   WBC 10*3/mm3 4.45 5.72   HEMOGLOBIN g/dL 9.8* 10.5*   HEMATOCRIT % 30.6* 32.5*   PLATELETS 10*3/mm3 182 184     Results from last 7 days   Lab Units 05/15/25  1618 05/15/25  1143   INR   --  1.43*   APTT seconds 37.4*  --      Results from last 7 days   Lab Units 05/16/25 0358 05/15/25  1618   MAGNESIUM mg/dL 1.8 1.7           Invalid input(s): \"LDLCALC\"  Results from last 7 days   Lab Units 05/15/25  1143   PROBNP pg/mL 7,466.0*     Results from last 7 days   Lab Units 05/15/25  1618 05/15/25  1143   TSH uIU/mL 1.850 2.420       Recent Radiology:  Imaging Results (Most Recent)       None            ECHOCARDIOGRAM:    Results for orders placed during the hospital encounter of 05/15/25    Adult Transthoracic Echo Complete W/ Cont if Necessary Per Protocol    Interpretation Summary    Left ventricular ejection fraction appears to be 56 - " 60%.    Left ventricular diastolic function is consistent with (grade III w/high LAP) reversible restrictive pattern.    Left ventricular peak systolic GLS is abnormal at -15%.        I reviewed the patient's new clinical results.    EKG:      Assessment:       Atrial fibrillation with rapid ventricular response    Atrial fibrillation    1) PAF   - s/p IV dig and IV lopressor  - s/p ablation 3/2024  - QT ok; restart home tikosyn  - continue on xarelto for anticoagulation  - mild CAD per cath in 2022  - repeat 2D echo 5/16 showed EF 56-60% with grade 3 diastolic dysfunction and mild MR.    2) chronic diastolic heart failure  -2D echo 6/2024 showed EF 56-60% with mild MR  - patient appears compensated     3) SSS s/p BSc dual chamber PPM 7/2023     4) hx TIA     5) HTN     6) HLD     7) ROSA     8) CKD stage 3     9) hypothyroidism    10) osteoarthritis  - knee surgery postponed    Plan:   Tele shows AP.  2D echo shows no significant change from prior study.  Resume home tikosyn and continue beta blocker.  Appears CV stable for discharge home.  F/U with Dr. Herrera in 2 weeks.         Electronically signed by CAROL Miranda, 05/16/25, 3:27 PM EDT.

## 2025-05-16 NOTE — NURSING NOTE
Attempted to contact [facility] to give report. Called at 1355 & 1415, no one answered either time.

## 2025-05-16 NOTE — NURSING NOTE
Attempted to call [facility] again. Got voicemail on memory care unit. Voicemail does not have a name in outgoing message. Did not leave message.

## 2025-05-16 NOTE — PLAN OF CARE
Goal Outcome Evaluation:  Plan of Care Reviewed With: patient        Progress: improving          Alert and oriented x4, back to sinus rhythm, bed alarm on, no new complaints, no overnight events, heart rate stable, no pain. Assist to bedside commode. Patient started back on xarelto. Knee surgery canceled.

## 2025-05-16 NOTE — CASE MANAGEMENT/SOCIAL WORK
Continued Stay Note  [facility]     Patient Name: Faby Le  MRN: [id number]  Today's Date: 5/16/2025    Admit Date: 5/15/2025        Discharge Plan       Row Name 05/16/25 1237       Plan    Plan Comments DC Barriers: Cr 1.29, 2L O2, s/p HERBERT, knee sx cancelled yesterday, IVFs, off cardizem gtt, possible DC today             Annamaria Pritchard RN     [facility]  Office: [phone]  Cell: [phone]  Fax # [phone]

## 2025-05-16 NOTE — CASE MANAGEMENT/SOCIAL WORK
Discharge Planning Assessment  [facility]     Patient Name: Faby Le  MRN: [id number]  Today's Date: 5/16/2025    Admit Date: 5/15/2025    Plan: Return to [facility]   Discharge Needs Assessment       Row Name 05/16/25 1341       Living Environment    People in Home facility resident    Name(s) of People in Home [facility]    Current Living Arrangements assisted living facility    Potentially Unsafe Housing Conditions none    In the past 12 months has the electric, gas, oil, or water company threatened to shut off services in your home? No    Primary Care Provided by self    Provides Primary Care For no one    Family Caregiver if Needed child(marlon), adult    Family Caregiver Names nain Mcmullen    Quality of Family Relationships helpful;involved;supportive    Able to Return to Prior Arrangements yes       Resource/Environmental Concerns    Resource/Environmental Concerns none    Transportation Concerns none       Transportation Needs    In the past 12 months, has lack of transportation kept you from medical appointments or from getting medications? no    In the past 12 months, has lack of transportation kept you from meetings, work, or from getting things needed for daily living? No       Food Insecurity    Within the past 12 months, you worried that your food would run out before you got the money to buy more. Never true    Within the past 12 months, the food you bought just didn't last and you didn't have money to get more. Never true       Transition Planning    Patient/Family Anticipates Transition to other (see comments)  [facility]    Patient/Family Anticipated Services at Transition none    Transportation Anticipated family or friend will provide       Discharge Needs Assessment    Readmission Within the Last 30 Days no previous admission in last 30 days    Equipment Currently Used at Home walker, rolling;bp cuff;cpap;grab bar;wheelchair;cane, quad;commode    Concerns to be Addressed no discharge needs  identified;denies needs/concerns at this time    Do you want help finding or keeping work or a job? Patient declined    Do you want help with school or training? For example, starting or completing job training or getting a high school diploma, GED or equivalent Patient declined    Anticipated Changes Related to Illness none    Equipment Needed After Discharge none                   Discharge Plan       Row Name 05/16/25 1341       Plan    Plan Return to [facility]    Plan Comments CM met with patient at bedside to discuss discharge planning. Patient already has discharge orders and plans to return to [facility]. She denies any current needs and has a walker she uses there. She said PT is not working with her again until after her knee surgery. Family will transport back to [facility]. PCP and pharmacy confirmed.      Row Name 05/16/25 1237       Plan    Plan Comments DC Barriers: Cr 1.29, 2L O2, s/p HERBERT, knee sx cancelled yesterday, IVFs, off cardizem gtt, possible DC today                Demographic Summary       Row Name 05/16/25 1340       General Information    Admission Type observation    Arrived From emergency department    Referral Source admission list    Reason for Consult care coordination/care conference;discharge planning    Preferred Language English       Contact Information    Permission Granted to Share Info With                    Functional Status       Row Name 05/16/25 1340       Functional Status    Usual Activity Tolerance moderate    Current Activity Tolerance moderate       Functional Status, IADL    Medications independent    Meal Preparation independent    Housekeeping independent    Laundry independent    Shopping independent       Mental Status Summary    Recent Changes in Mental Status/Cognitive Functioning no changes             Annamaria Pritchard RN      [facility]  Office: [phone]  Cell: [phone]  Fax # [phone]

## 2025-05-16 NOTE — OUTREACH NOTE
Prep Survey      Flowsheet Row Responses   [facility] patient discharged from? [place]   Is LACE score < 7 ? No   Eligibility [facility]   Date of Admission 05/15/25   Date of Discharge 05/16/25   Discharge diagnosis Atrial fibrillation with rapid ventricular response   Does the patient have one of the following disease processes/diagnoses(primary or secondary)? Other   [facility]   Prep survey completed? Yes            Em CABEZAS - Registered Nurse

## 2025-05-19 ENCOUNTER — TELEPHONE (OUTPATIENT)
Dept: CARDIOLOGY | Facility: CLINIC | Age: 80
End: 2025-05-19
Payer: MEDICARE

## 2025-05-19 ENCOUNTER — TRANSITIONAL CARE MANAGEMENT TELEPHONE ENCOUNTER (OUTPATIENT)
Dept: CALL CENTER | Facility: HOSPITAL | Age: 80
End: 2025-05-19
Payer: MEDICARE

## 2025-05-19 LAB
QT INTERVAL: 458 MS
QTC INTERVAL: 458 MS

## 2025-05-19 NOTE — OUTREACH NOTE
Call Center TCM Note      Flowsheet Row Responses   [facility] patient discharged from? [place]   Does the patient have one of the following disease processes/diagnoses(primary or secondary)? Other   TCM attempt successful? Yes   Call start time 1129   Call end time 1129   General alerts for this patient [facility]   Discharge diagnosis Atrial fibrillation with rapid ventricular response   Person spoke with today (if not patient) and relationship Patient   Meds reviewed with patient/caregiver? Yes   Prescription comments No changes to your medication   Comments 5/30/2025  2:00 PM Arrive by 1:45 PM HOSPITAL FOLLOW UP 30 min [facility] PRIMARY CARE Leonor Adam MD   Does the patient have an appointment with their PCP within 7-14 days of discharge? No   Nursing Interventions Assisted patient with making appointment per protocol   Has home health visited the patient within 72 hours of discharge? N/A   Psychosocial issues? No   Did the patient receive a copy of their discharge instructions? Yes   Nursing interventions Reviewed instructions with patient   What is the patient's perception of their health status since discharge? Improving   Is the patient/caregiver able to teach back signs and symptoms related to disease process for when to call PCP? Yes   Is the patient/caregiver able to teach back signs and symptoms related to disease process for when to call 911? Yes   Is the patient/caregiver able to teach back the hierarchy of who to call/visit for symptoms/problems? PCP, Specialist, Home health nurse, Urgent Care, ED, 911 Yes   TCM call completed? Yes   Wrap up additional comments Patient reports doing well no concerns or questions noted.   Call end time 1129   Would this patient benefit from a Referral to Amb Social Work? No   Is the patient interested in additional calls from an ambulatory ? No            Em CABEZAS - Registered Nurse    5/19/2025, 11:30  TRINITY CABEZAS - Registered Nurse

## 2025-05-19 NOTE — TELEPHONE ENCOUNTER
Caller: RAMÓN HUANG     Relationship: DAUGHTER     Best call back number: [phone]    What is your medical concern? PT'S DAUGHTER IS CALLING TO SEE IF PT NEEDS TO HAVE AN ABLATION BEFORE HAVING HER KNEE SURGERY? DAUGHTER WOULD LIKE A CALL BACK

## 2025-05-20 NOTE — TELEPHONE ENCOUNTER
Caller: RAMÓN HUANG    Relationship: Emergency Contact    Best call back number: [phone]    What was the call regarding: PT SAID SHE RECEIVED A MISSED CALL FROM US BUT DIDN'T SEE A VM. PLEASE CALL PT

## 2025-05-21 ENCOUNTER — TELEPHONE (OUTPATIENT)
Dept: CARDIOLOGY | Facility: CLINIC | Age: 80
End: 2025-05-21
Payer: MEDICARE

## 2025-05-21 NOTE — TELEPHONE ENCOUNTER
Caller: Faby Le    Relationship: Self    Best call back number: [phone]     What was the call regarding: PT WAS MEANT TO HAVE A SHOULDER SURGERY WITH DR LAWS BUT THEY CANCELLED IT WHEN PT WAS IN PREOP AS HER HR WAS TOO HIGH - PT ASKING WHAT NEXT STEPS ARE AS SHE IS STILL NEEDING SHOULDER SURGERY AND THEN KNEE SURGERY - PT UNSURE IF SURGEON REACHED OUT TO DR KEY BUT PT NOTES THERE WAS DISCUSSION OF AN ABLATION AND SHE IS ASKING IF THAT NEEDS SCHEDULING -

## 2025-05-23 ENCOUNTER — OFFICE VISIT (OUTPATIENT)
Dept: CARDIOLOGY | Facility: CLINIC | Age: 80
End: 2025-05-23
Payer: MEDICARE

## 2025-05-23 VITALS
DIASTOLIC BLOOD PRESSURE: 70 MMHG | BODY MASS INDEX: 48.03 KG/M2 | HEIGHT: 62 IN | SYSTOLIC BLOOD PRESSURE: 120 MMHG | HEART RATE: 67 BPM | OXYGEN SATURATION: 97 % | WEIGHT: 261 LBS

## 2025-05-23 DIAGNOSIS — I10 PRIMARY HYPERTENSION: ICD-10-CM

## 2025-05-23 DIAGNOSIS — I95.1 ORTHOSTATIC HYPOTENSION: ICD-10-CM

## 2025-05-23 DIAGNOSIS — G45.9 TIA (TRANSIENT ISCHEMIC ATTACK): ICD-10-CM

## 2025-05-23 DIAGNOSIS — E78.2 MIXED HYPERLIPIDEMIA: ICD-10-CM

## 2025-05-23 DIAGNOSIS — Z95.0 PRESENCE OF CARDIAC PACEMAKER: ICD-10-CM

## 2025-05-23 DIAGNOSIS — I49.5 SICK SINUS SYNDROME: ICD-10-CM

## 2025-05-23 DIAGNOSIS — I48.0 PAROXYSMAL ATRIAL FIBRILLATION: Primary | ICD-10-CM

## 2025-05-23 NOTE — LETTER
May 23, 2025     Leonor Adam MD  [address] [place] [address]    Patient: Faby Le   YOB: 1945   Date of Visit: 5/23/2025     Dear Leonor Adam MD:       Thank you for referring Faby Le to me for evaluation. Below are the relevant portions of my assessment and plan of care.    If you have questions, please do not hesitate to call me. I look forward to following Faby along with you.         Sincerely,        Cr Herrera MD        CC: No Recipients    Cr Herrera MD  05/23/25 0906  Sign when Signing Visit  C--sick sinus syndrome and pacemaker in situ      Sub  80-year-old pleasant patient was recently evaluated in the hospital for breakthrough arrhythmia with spontaneous conversion and comes in for follow-up.  Patient has known history of atrial arrhythmia and sick sinus syndrome with pacemaker in situ.  Currently on Tikosyn for suppression of arrhythmia      My previous history attached below for reference       patient underwent EP study and ablation results are attached below --study was performed and March 2024    Patient had   moderate to severe left atrial enlargement    Patient had a large left superior pulmonary vein and a large left inferior pulmonary vein and  a  large right inferior pulmonary vein and right superior pulmonary vein  Pulmonary vein isolation with cryo ablation done--post completion of cryoablation, mapping revealed a small area of reconnection on anterior ridge of the left supra pulmonary vein and the left atrial appendage and this area was carefully ablated and repeat mapping revealed complete antral isolation.  No retrograde conduction at 600 ms post pulmonary vein antral isolation  AV bandar ERP was 500/250  No preexcitation  No induction of SVT  Rapid pacing did induce atypical flutter with constant wobbling in the posterior wall adjoining the inferior pulmonary veins was ablated with a linear ablation connecting the inferior  pulmonary veins.  Repeat rapid pacing could reinduce different types of flutters which were constant wobbling and the flutters for terminating while the coronary sinus catheter was moved into the middle CS to proximal CS suggestive of endocardial epicardial bridge.  No further induction of atrial fibrillation despite aggressive pacing.  Appropriately functioning dual-chamber pacemaker    Patient has dual-chamber pacemaker in situ and history of TIA in the past and has a history of hypertension sleep apnea and lower extremity edema.  Cardiac catheterization in the past revealed nonobstructive disease with normal EF.  Patient has history of iron deficiency anemia in the past.        Past Medical History:   Diagnosis Date   • Ankle pain    • Arthritis    • Coronary artery disease    • Depression    • Gout    • Hyperglycemia    • Hyperlipidemia    • Hypertension    • Low back pain    • Sleep apnea    • Vitamin D deficiency      Past Surgical History:   Procedure Laterality Date   • ANKLE FUSION      2017-left   • CARDIAC CATHETERIZATION     • CARDIAC CATHETERIZATION Right 8/29/2022    Procedure: Left Heart Cath;  Surgeon: Ratna Zavala MD;  Location: [facility] CATH INVASIVE LOCATION;  Service: Cardiovascular;  Laterality: Right;   • CARDIAC ELECTROPHYSIOLOGY PROCEDURE Left 7/3/2023    Procedure: Pacemaker DC new [place] Notified;  Surgeon: Cr Herrera MD;  Location: [facility] CATH INVASIVE LOCATION;  Service: Cardiovascular;  Laterality: Left;   • CARDIAC ELECTROPHYSIOLOGY PROCEDURE Right 3/5/2024    Procedure: Ablation atrial fibrillation, Cryo Ibrahima and Wayne Costa notified 02/09/24;  Surgeon: Cr Herrera MD;  Location: [facility] CATH INVASIVE LOCATION;  Service: Cardiovascular;  Laterality: Right;   • CARDIAC ELECTROPHYSIOLOGY PROCEDURE N/A 3/5/2024    Procedure: Cardioversion;  Surgeon: Cr Herrera MD;  Location: [facility] CATH INVASIVE LOCATION;  Service: Cardiovascular;  Laterality: N/A;    • CARPAL TUNNEL RELEASE     • CATARACT EXTRACTION     • CUBITAL TUNNEL RELEASE     • HYSTERECTOMY     • REPLACEMENT TOTAL KNEE      left 2008   • ROTATOR CUFF REPAIR     • TOTAL KNEE ARTHROPLASTY Right 5/4/2023    Procedure: TOTAL KNEE ARTHROPLASTY WITH CORI ROBOT;  Surgeon: Chino Herrera II, MD;  Location: [facility] MAIN OR;  Service: Robotics - Ortho;  Laterality: Right;         Physical Exam    General:      well developed, well nourished, in no acute distress.    Head:      normocephalic and atraumatic.    Eyes:      PERRL/EOM intact, conjunctivae and sclerae clear without nystagmus.    Neck:      no  thyromegaly, trachea central with normal respiratory effort  Lungs:      clear bilaterally to auscultation.    Heart:       regular rate and rhythm, S1, S2 without murmurs, rubs, or gallops  Skin:      intact without lesions or rashes.    Psych:      alert and cooperative; normal mood and affect; normal attention span and concentration.                      Assessment plan    Recent potassium 4.3, creatinine of 1.29  Recent hospital notes reviewed  Breakthrough arrhythmia back to sinus rhythm currently on Tikosyn, QTc interval of 469 ms  Hyperlipidemia on atorvastatin  Hypothyroidism on levothyroxine  Diabetes on Mounjaro  History of iron deficiency anemia on ferrous sulfate  Prior history of atrial fibrillation and TIA currently on Xarelto  Prior EP study and atrial arrhythmia ablation with clinical recurrence on Tikosyn in sinus rhythm  Medications reviewed and follow-up appointments made  Hypertension controlled with Aldactone and metoprolol  Orthostatic hypotension on midodrine    Patient was given the option of atrial tachycardia ablation versus undergoing surgery for cripplingly arthritis.  After discussing options, I recommend patient undergo knee surgery and to continue her Tikosyn and beta-blockers perioperatively and if she has any breakthrough arrhythmia, can easily be controlled by IV Cardizem  or digoxin  Patient is cleared for knee surgery  Discussed with patient and family  Dual-chamber pacemaker interrogated with no breakthrough arrhythmia since discharge  We will reevaluate after knee surgery and decide on atrial tachycardia ablation      ECG 12 Lead    Date/Time: 5/23/2025 9:02 AM  Performed by: Cr Herrera MD    Authorized by: Cr Herrera MD  Comparison: compared with previous ECG   Similar to previous ECG  Rhythm: sinus rhythm  Rate: normal  Conduction: conduction normal  QRS axis: normal         Electronically signed by Cr Herrera MD, 05/23/25, 9:02 AM EDT.

## 2025-05-23 NOTE — LETTER
May 23, 2025     Chino Herrera II, MD  [address] [facility] [place] [address]    Patient: Faby Le   YOB: 1945   Date of Visit: 5/23/2025     Dear Chino Herrera II, MD:       Thank you for referring Faby Le to me for evaluation. Below are the relevant portions of my assessment and plan of care.    If you have questions, please do not hesitate to call me. I look forward to following Faby along with you.         Sincerely,        Cr Herrera MD        CC: No Recipients    Cr Herrera MD  05/23/25 0906  Sign when Signing Visit  C--sick sinus syndrome and pacemaker in situ      Sub  80-year-old pleasant patient was recently evaluated in the hospital for breakthrough arrhythmia with spontaneous conversion and comes in for follow-up.  Patient has known history of atrial arrhythmia and sick sinus syndrome with pacemaker in situ.  Currently on Tikosyn for suppression of arrhythmia      My previous history attached below for reference       patient underwent EP study and ablation results are attached below --study was performed and March 2024    Patient had   moderate to severe left atrial enlargement    Patient had a large left superior pulmonary vein and a large left inferior pulmonary vein and  a  large right inferior pulmonary vein and right superior pulmonary vein  Pulmonary vein isolation with cryo ablation done--post completion of cryoablation, mapping revealed a small area of reconnection on anterior ridge of the left supra pulmonary vein and the left atrial appendage and this area was carefully ablated and repeat mapping revealed complete antral isolation.  No retrograde conduction at 600 ms post pulmonary vein antral isolation  AV bandar ERP was 500/250  No preexcitation  No induction of SVT  Rapid pacing did induce atypical flutter with constant wobbling in the posterior wall adjoining the inferior pulmonary veins was ablated with a linear ablation connecting  the inferior pulmonary veins.  Repeat rapid pacing could reinduce different types of flutters which were constant wobbling and the flutters for terminating while the coronary sinus catheter was moved into the middle CS to proximal CS suggestive of endocardial epicardial bridge.  No further induction of atrial fibrillation despite aggressive pacing.  Appropriately functioning dual-chamber pacemaker    Patient has dual-chamber pacemaker in situ and history of TIA in the past and has a history of hypertension sleep apnea and lower extremity edema.  Cardiac catheterization in the past revealed nonobstructive disease with normal EF.  Patient has history of iron deficiency anemia in the past.        Past Medical History:   Diagnosis Date   • Ankle pain    • Arthritis    • Coronary artery disease    • Depression    • Gout    • Hyperglycemia    • Hyperlipidemia    • Hypertension    • Low back pain    • Sleep apnea    • Vitamin D deficiency      Past Surgical History:   Procedure Laterality Date   • ANKLE FUSION      2017-left   • CARDIAC CATHETERIZATION     • CARDIAC CATHETERIZATION Right 8/29/2022    Procedure: Left Heart Cath;  Surgeon: Ratna Zavala MD;  Location: [facility] CATH INVASIVE LOCATION;  Service: Cardiovascular;  Laterality: Right;   • CARDIAC ELECTROPHYSIOLOGY PROCEDURE Left 7/3/2023    Procedure: Pacemaker DC new [place] Notified;  Surgeon: Cr Herrera MD;  Location: [facility] CATH INVASIVE LOCATION;  Service: Cardiovascular;  Laterality: Left;   • CARDIAC ELECTROPHYSIOLOGY PROCEDURE Right 3/5/2024    Procedure: Ablation atrial fibrillation, Cryo [place] and Wayne Costa notified 02/09/24;  Surgeon: Cr Herrera MD;  Location: [facility] CATH INVASIVE LOCATION;  Service: Cardiovascular;  Laterality: Right;   • CARDIAC ELECTROPHYSIOLOGY PROCEDURE N/A 3/5/2024    Procedure: Cardioversion;  Surgeon: Cr Herrera MD;  Location: [facility] CATH INVASIVE LOCATION;  Service: Cardiovascular;   Laterality: N/A;   • CARPAL TUNNEL RELEASE     • CATARACT EXTRACTION     • CUBITAL TUNNEL RELEASE     • HYSTERECTOMY     • REPLACEMENT TOTAL KNEE      left 2008   • ROTATOR CUFF REPAIR     • TOTAL KNEE ARTHROPLASTY Right 5/4/2023    Procedure: TOTAL KNEE ARTHROPLASTY WITH CORI ROBOT;  Surgeon: Chino Herrera II, MD;  Location: [facility] OR;  Service: Robotics - Ortho;  Laterality: Right;         Physical Exam    General:      well developed, well nourished, in no acute distress.    Head:      normocephalic and atraumatic.    Eyes:      PERRL/EOM intact, conjunctivae and sclerae clear without nystagmus.    Neck:      no  thyromegaly, trachea central with normal respiratory effort  Lungs:      clear bilaterally to auscultation.    Heart:       regular rate and rhythm, S1, S2 without murmurs, rubs, or gallops  Skin:      intact without lesions or rashes.    Psych:      alert and cooperative; normal mood and affect; normal attention span and concentration.                      Assessment plan    Recent potassium 4.3, creatinine of 1.29  Recent hospital notes reviewed  Breakthrough arrhythmia back to sinus rhythm currently on Tikosyn, QTc interval of 469 ms  Hyperlipidemia on atorvastatin  Hypothyroidism on levothyroxine  Diabetes on Mounjaro  History of iron deficiency anemia on ferrous sulfate  Prior history of atrial fibrillation and TIA currently on Xarelto  Prior EP study and atrial arrhythmia ablation with clinical recurrence on Tikosyn in sinus rhythm  Medications reviewed and follow-up appointments made  Hypertension controlled with Aldactone and metoprolol  Orthostatic hypotension on midodrine    Patient was given the option of atrial tachycardia ablation versus undergoing surgery for cripplingly arthritis.  After discussing options, I recommend patient undergo knee surgery and to continue her Tikosyn and beta-blockers perioperatively and if she has any breakthrough arrhythmia, can easily be  controlled by IV Cardizem or digoxin  Patient is cleared for knee surgery  Discussed with patient and family  Dual-chamber pacemaker interrogated with no breakthrough arrhythmia since discharge  We will reevaluate after knee surgery and decide on atrial tachycardia ablation      ECG 12 Lead    Date/Time: 5/23/2025 9:02 AM  Performed by: Cr Herrera MD    Authorized by: Cr Herrera MD  Comparison: compared with previous ECG   Similar to previous ECG  Rhythm: sinus rhythm  Rate: normal  Conduction: conduction normal  QRS axis: normal         Electronically signed by Cr Herrera MD, 05/23/25, 9:02 AM EDT.

## 2025-05-23 NOTE — PROGRESS NOTES
C--sick sinus syndrome and pacemaker in situ      Sub  80-year-old pleasant patient was recently evaluated in the hospital for breakthrough arrhythmia with spontaneous conversion and comes in for follow-up.  Patient has known history of atrial arrhythmia and sick sinus syndrome with pacemaker in situ.  Currently on Tikosyn for suppression of arrhythmia      My previous history attached below for reference       patient underwent EP study and ablation results are attached below --study was performed and March 2024    Patient had   moderate to severe left atrial enlargement    Patient had a large left superior pulmonary vein and a large left inferior pulmonary vein and  a  large right inferior pulmonary vein and right superior pulmonary vein  Pulmonary vein isolation with cryo ablation done--post completion of cryoablation, mapping revealed a small area of reconnection on anterior ridge of the left supra pulmonary vein and the left atrial appendage and this area was carefully ablated and repeat mapping revealed complete antral isolation.  No retrograde conduction at 600 ms post pulmonary vein antral isolation  AV bandar ERP was 500/250  No preexcitation  No induction of SVT  Rapid pacing did induce atypical flutter with constant wobbling in the posterior wall adjoining the inferior pulmonary veins was ablated with a linear ablation connecting the inferior pulmonary veins.  Repeat rapid pacing could reinduce different types of flutters which were constant wobbling and the flutters for terminating while the coronary sinus catheter was moved into the middle CS to proximal CS suggestive of endocardial epicardial bridge.  No further induction of atrial fibrillation despite aggressive pacing.  Appropriately functioning dual-chamber pacemaker    Patient has dual-chamber pacemaker in situ and history of TIA in the past and has a history of hypertension sleep apnea and lower extremity edema.  Cardiac catheterization in the past  revealed nonobstructive disease with normal EF.  Patient has history of iron deficiency anemia in the past.        Past Medical History:   Diagnosis Date    Ankle pain     Arthritis     Coronary artery disease     Depression     Gout     Hyperglycemia     Hyperlipidemia     Hypertension     Low back pain     Sleep apnea     Vitamin D deficiency      Past Surgical History:   Procedure Laterality Date    ANKLE FUSION      2017-left    CARDIAC CATHETERIZATION      CARDIAC CATHETERIZATION Right 8/29/2022    Procedure: Left Heart Cath;  Surgeon: Ratna Zavala MD;  Location: [facility] CATH INVASIVE LOCATION;  Service: Cardiovascular;  Laterality: Right;    CARDIAC ELECTROPHYSIOLOGY PROCEDURE Left 7/3/2023    Procedure: Pacemaker DC new [place] Notified;  Surgeon: Cr Herrera MD;  Location: [facility] CATH INVASIVE LOCATION;  Service: Cardiovascular;  Laterality: Left;    CARDIAC ELECTROPHYSIOLOGY PROCEDURE Right 3/5/2024    Procedure: Ablation atrial fibrillation, Cryo [place] and Wayne Igor notified 02/09/24;  Surgeon: Cr Herrera MD;  Location: [facility] CATH INVASIVE LOCATION;  Service: Cardiovascular;  Laterality: Right;    CARDIAC ELECTROPHYSIOLOGY PROCEDURE N/A 3/5/2024    Procedure: Cardioversion;  Surgeon: Cr Herrera MD;  Location: [facility] CATH INVASIVE LOCATION;  Service: Cardiovascular;  Laterality: N/A;    CARPAL TUNNEL RELEASE      CATARACT EXTRACTION      CUBITAL TUNNEL RELEASE      HYSTERECTOMY      REPLACEMENT TOTAL KNEE      left 2008    ROTATOR CUFF REPAIR      TOTAL KNEE ARTHROPLASTY Right 5/4/2023    Procedure: TOTAL KNEE ARTHROPLASTY WITH CORI ROBOT;  Surgeon: Chino Herrera II, MD;  Location: [facility] MAIN OR;  Service: Robotics - Ortho;  Laterality: Right;         Physical Exam    General:      well developed, well nourished, in no acute distress.    Head:      normocephalic and atraumatic.    Eyes:      PERRL/EOM intact, conjunctivae and sclerae clear without  nystagmus.    Neck:      no  thyromegaly, trachea central with normal respiratory effort  Lungs:      clear bilaterally to auscultation.    Heart:       regular rate and rhythm, S1, S2 without murmurs, rubs, or gallops  Skin:      intact without lesions or rashes.    Psych:      alert and cooperative; normal mood and affect; normal attention span and concentration.                      Assessment plan    Recent potassium 4.3, creatinine of 1.29  Recent hospital notes reviewed  Breakthrough arrhythmia back to sinus rhythm currently on Tikosyn, QTc interval of 469 ms  Hyperlipidemia on atorvastatin  Hypothyroidism on levothyroxine  Diabetes on Mounjaro  History of iron deficiency anemia on ferrous sulfate  Prior history of atrial fibrillation and TIA currently on Xarelto  Prior EP study and atrial arrhythmia ablation with clinical recurrence on Tikosyn in sinus rhythm  Medications reviewed and follow-up appointments made  Hypertension controlled with Aldactone and metoprolol  Orthostatic hypotension on midodrine    Patient was given the option of atrial tachycardia ablation versus undergoing surgery for cripplingly arthritis.  After discussing options, I recommend patient undergo knee surgery and to continue her Tikosyn and beta-blockers perioperatively and if she has any breakthrough arrhythmia, can easily be controlled by IV Cardizem or digoxin  Patient is cleared for knee surgery  Discussed with patient and family  Dual-chamber pacemaker interrogated with no breakthrough arrhythmia since discharge  We will reevaluate after knee surgery and decide on atrial tachycardia ablation      ECG 12 Lead    Date/Time: 5/23/2025 9:02 AM  Performed by: Cr Herrera MD    Authorized by: Cr Herrera MD  Comparison: compared with previous ECG   Similar to previous ECG  Rhythm: sinus rhythm  Rate: normal  Conduction: conduction normal  QRS axis: normal         Electronically signed by Cr Herrera MD,  05/23/25, 9:02 AM EDT.

## 2025-05-27 PROBLEM — N20.0 KIDNEY STONES: Status: ACTIVE | Noted: 2025-05-27

## 2025-05-27 PROBLEM — Z79.01 LONG TERM (CURRENT) USE OF ANTICOAGULANTS: Status: ACTIVE | Noted: 2025-05-27

## 2025-05-27 PROBLEM — B33.8 RSV (RESPIRATORY SYNCYTIAL VIRUS INFECTION): Status: ACTIVE | Noted: 2025-05-27

## 2025-05-27 PROBLEM — B33.8 RSV (RESPIRATORY SYNCYTIAL VIRUS INFECTION): Status: RESOLVED | Noted: 2025-05-27 | Resolved: 2025-05-27

## 2025-05-27 PROBLEM — E61.1 IRON DEFICIENCY: Status: ACTIVE | Noted: 2025-05-27

## 2025-05-27 NOTE — PROGRESS NOTES
Subjective   The ABCs of the Annual Wellness Visit  Medicare Wellness Visit      Faby Le is a 80 y.o. patient who presents for a Medicare Wellness Visit.    The following portions of the patient's history were reviewed and   updated as appropriate: allergies, current medications, past family history, past medical history, past social history, past surgical history, and problem list.    Compared to one year ago, the patient's physical   health is worse.  Compared to one year ago, the patient's mental   health is the same.    Recent Hospitalizations:  This patient has had a [facility] admission record on file within the last 365 days.  Current Medical Providers:  Patient Care Team:  Leonor Adam MD as PCP - General (Family Medicine)  Maggi Horn MD as Consulting Physician (Pain Medicine)  Ratna Zavala MD as Consulting Physician (Cardiology)  Nora Foley APRN as Nurse Practitioner (Gastroenterology)  Gianluca Walton MD as Consulting Physician (Pulmonary Disease)  Nathanael Murillo MD as Consulting Physician (Allergy and Immunology)  Delfin Thorpe MD as Surgeon (Orthopedic Surgery)  Josh Loomis MD as Consulting Physician (Gastroenterology)  Bo Ruggiero MD as Consulting Physician (Urology)    Outpatient Medications Prior to Visit   Medication Sig Dispense Refill    acetaminophen (TYLENOL) 325 MG tablet Take 2 tablets by mouth Every 6 (Six) Hours As Needed for Mild Pain.      allopurinol (ZYLOPRIM) 300 MG tablet Take 1 tablet by mouth once daily 90 tablet 0    atorvastatin (LIPITOR) 20 MG tablet Take 1 tablet by mouth once daily 270 tablet 0    azelastine (ASTELIN) 0.1 % nasal spray Administer 2 sprays into the nostril(s) as directed by provider Daily. Use in each nostril as directed      cetirizine (zyrTEC) 10 MG tablet Take 1 tablet by mouth Daily.      cyanocobalamin (VITAMIN B-12) 1000 MCG tablet Take 1 tablet by mouth Daily.      docusate sodium  (COLACE) 100 MG capsule Take 1 capsule by mouth 2 (Two) Times a Day.      dofetilide (Tikosyn) 250 MCG capsule Take 1 capsule by mouth Every 12 (Twelve) Hours. 180 capsule 3    ferrous sulfate 325 (65 FE) MG tablet Take 1 tablet by mouth Daily With Breakfast.      levothyroxine (SYNTHROID, LEVOTHROID) 88 MCG tablet Take 1 tablet by mouth once daily 90 tablet 0    metoprolol succinate XL (TOPROL-XL) 25 MG 24 hr tablet Take 1 tablet by mouth Daily for 360 days. 90 tablet 3    midodrine (PROAMATINE) 5 MG tablet TAKE 1 TABLET BY MOUTH TWICE DAILY BEFORE MEAL(S) 90 tablet 0    montelukast (SINGULAIR) 10 MG tablet Take 1 tablet by mouth Every Night. 90 tablet 0    Mounjaro 2.5 MG/0.5ML solution auto-injector Inject 5 mg into the appropriate muscle as directed by prescriber 1 (One) Time Per Week.      multivitamin (MULTI VITAMIN PO) Take 1 tablet by mouth Daily. 30 tablet 0    multivitamin with minerals tablet tablet Take 1 tablet by mouth Daily.      mupirocin (BACTROBAN) 2 % ointment APPLY SMALL AMOUNT OF OINTMENT TOPICALLY TO AFFECTED AREA TWICE DAILY      pantoprazole (PROTONIX) 40 MG EC tablet Take 1 tablet by mouth Every Morning.      rivaroxaban (XARELTO) 15 MG tablet Take 1 tablet by mouth Daily With Dinner. Indications: Atrial Fibrillation 30 tablet 2    spironolactone (ALDACTONE) 25 MG tablet Take 1/2 (one-half) tablet by mouth once daily 45 tablet 2    triamcinolone (KENALOG) 0.1 % cream APPLY CREAM EXTERNALLY TWICE DAILY TO ECZEMA FOR UP TO 2 WEEKS PER MONTH AS NEEDED      Vitamin D, Cholecalciferol, 25 MCG (1000 UT) tablet Take 1 tablet by mouth Daily.       No facility-administered medications prior to visit.     No opioid medication identified on active medication list. I have reviewed chart for other potential  high risk medication/s and harmful drug interactions in the elderly.      Aspirin is not on active medication list.  Aspirin use is not indicated based on review of current medical condition/s. Risk  of harm outweighs potential benefits.  .    Patient Active Problem List   Diagnosis    Abnormal complete blood count    Ankle pain    Arthralgia of left elbow    Arthritis    B12 deficiency    Coronary artery disease    Depression    Gallstones    Gout    Hyperglycemia    Hyperlipidemia    Hypertension    Hypothyroid    Low back pain    Mobility poor    Post-menopausal    Sleep apnea    Vitamin D deficiency    Chronic kidney disease, stage 3 (moderate)    Oropharyngeal dysphagia    Class 3 drug-induced obesity with serious comorbidity and body mass index (BMI) of 45.0 to 49.9 in adult    Chronic pain of right knee    Light headedness    Paroxysmal atrial fibrillation    Chronic left shoulder pain    Status post knee replacement    Syncope    Presence of cardiac pacemaker    Chronic diastolic heart failure    Paresthesia of left foot    TIA (transient ischemic attack)    Tachy-jojo syndrome    Anemia    Dizziness    Acute CHF    Acute exacerbation of CHF (congestive heart failure)    Chronic HFrEF (heart failure with reduced ejection fraction)    Endometrial carcinoma    Symptomatic hypotension    Orthostatic hypotension    Atrial fibrillation with RVR    Osteoarthritis of knee    Osteoarthritis of left glenohumeral joint    Atrial fibrillation with rapid ventricular response    Atrial fibrillation    Internal and external hemorrhoids without complication    Benign neoplasm of colon    Kidney stones    Long term (current) use of anticoagulants    Iron deficiency     Advance Care Planning Advance Directive is not on file.  ACP discussion was held with the patient during this visit. Patient does not have an advance directive, information provided.            Objective   Vitals:    05/30/25 1418 05/30/25 1424 05/30/25 1426   BP: 164/76 129/62 178/76   BP Location: Left arm Right arm Left arm   Patient Position: Sitting Standing Sitting   Cuff Size: Large Adult Large Adult Large Adult   Pulse: 60     Temp: 97.5 °F (36.4  "°C)     TempSrc: Infrared     SpO2: 97%     Weight: 119 kg (263 lb 6.4 oz)     Height: 157.5 cm (62.01\")         Estimated body mass index is 48.16 kg/m² as calculated from the following:    Height as of this encounter: 157.5 cm (62.01\").    Weight as of this encounter: 119 kg (263 lb 6.4 oz).                Does the patient have evidence of cognitive impairment? No  Lab Results   Component Value Date    HGBA1C 5.07 2025                                                                                                Health  Risk Assessment    Smoking Status:  Social History     Tobacco Use   Smoking Status Never    Passive exposure: Never   Smokeless Tobacco Never     Alcohol Consumption:  Social History     Substance and Sexual Activity   Alcohol Use No       Fall Risk Screen  STEADI Fall Risk Assessment has not been completed.    Depression Screening   Little interest or pleasure in doing things? Not at all   Feeling down, depressed, or hopeless? Not at all   PHQ-2 Total Score 0      Health Habits and Functional and Cognitive Screenin/2/2024    12:54 PM   Functional & Cognitive Status   Do you have difficulty preparing food and eating? No   Do you have difficulty bathing yourself, getting dressed or grooming yourself? No   Do you have difficulty using the toilet? No   Do you have difficulty moving around from place to place? No   Do you have trouble with steps or getting out of a bed or a chair? Yes   Current Diet Other   Dental Exam Up to date   Eye Exam Up to date   Exercise (times per week) 0 times per week   Current Exercises Include No Regular Exercise   Do you need help using the phone?  No   Are you deaf or do you have serious difficulty hearing?  No   Do you need help to go to places out of walking distance? Yes   Do you need help shopping? No   Do you need help preparing meals?  No   Do you need help with housework?  No   Do you need help with laundry? No   Do you need help taking your " medications? No   Do you need help managing money? No   Do you ever drive or ride in a car without wearing a seat belt? Yes   Have you felt unusual stress, anger or loneliness in the last month? No   Who do you live with? Child   If you need help, do you have trouble finding someone available to you? No   Have you been bothered in the last four weeks by sexual problems? No   Do you have difficulty concentrating, remembering or making decisions? Yes           Age-appropriate Screening Schedule:  Refer to the list below for future screening recommendations based on patient's age, sex and/or medical conditions. Orders for these recommended tests are listed in the plan section. The patient has been provided with a written plan.    Health Maintenance List  Health Maintenance   Topic Date Due    LIPID PANEL  05/07/2025    MAMMOGRAM  07/03/2025    COLORECTAL CANCER SCREENING  07/26/2025    INFLUENZA VACCINE  07/01/2025    DXA SCAN  11/09/2025    ANNUAL WELLNESS VISIT  05/30/2026    PAP SMEAR  09/28/2026    TDAP/TD VACCINES (3 - Td or Tdap) 01/23/2028    HEPATITIS C SCREENING  Completed    COVID-19 Vaccine  Completed    RSV Vaccine - Adults  Completed    Pneumococcal Vaccine 50+  Completed    ZOSTER VACCINE  Completed                                                                                                                                                CMS Preventative Services Quick Reference  Risk Factors Identified During Encounter  Fall Risk-High or Moderate: Discussed Fall Prevention in the home    The above risks/problems have been discussed with the patient.  Pertinent information has been shared with the patient in the After Visit Summary.  An After Visit Summary and PPPS were made available to the patient.    Follow Up:   Next Medicare Wellness visit to be scheduled in 1 year.         Additional E&M Note during same encounter follows:  Patient has additional, significant, and separately identifiable  condition(s)/problem(s) that require work above and beyond the Medicare Wellness Visit     Chief Complaint  Primary Care Follow-Up (Hospital follow up/) and Medicare Wellness-subsequent    Subjective    HPI  Faby is also being seen today for an annual adult preventative physical exam. , Faby is also being seen today for additional medical problem/s., and rapid heart beat right knee arthritis.         The patient is a 80-year-old female who presents today for her annual and age-specific physical, screening for colon cancer, screening mammogram, B12 deficiency, chronic diastolic heart failure class III, drug-induced higher body weight, coronary artery disease, depression, endometrial carcinoma, hyperglycemia, hyperlipidemia, hypertension, hypothyroidism, oropharyngeal dysphagia, paroxysmal atrial fibrillation, presence of cardiac pacemaker, obstructive sleep apnea, syncope, vitamin D deficiency, postmenopausal iron deficiency, kidney stones, and osteoarthritis of the knee.    She experienced a rapid heartbeat during a hospital visit 2 weeks ago, with a recorded heart rate of 160. This led to the postponement of her scheduled right knee replacement surgery. She was kept overnight in the hospital, and the nurse practitioner discussed the possibility of an ablation. However, the decision was made to manage her heart rate with medication and proceed with the knee surgery, with the option of ablation later if needed.    She reports no changes in hearing or vision since her last visit. There are no issues with swallowing or digestion. She does not have any hemoptysis or heavy wheezing. She did experience a fluttering sensation in her chest during the hospital visit but reports no chest pain or sweating associated with it. Bowel movements are regular with the use of stool softeners. Urine is passing normally, and there is no unusual vaginal discharge.    She has been using a wheelchair frequently due to knee pain. She  "uses a CPAP machine at night for her obstructive sleep apnea and reports no recent episodes of syncope. She has been taking vitamin D supplements and has had no more kidney stones. She has been using Bengay on her hip for pain relief.    She reports that her arthritis has slightly worsened over the past year. She has been experiencing severe sciatic nerve pain and uses a railing for support when climbing stairs. Tylenol is used for pain management.    She has been taking Mounjaro for weight management and reports that her blood pressure is usually well controlled at home. She has been taking vitamin B12 supplements.    She has not had a checkup for uterine cancer in the last 5 years. She had a hysterectomy in 2020.    PAST SURGICAL HISTORY:  - Hysterectomy: 2020  Review of Systems   Cardiovascular:  Positive for palpitations and leg swelling. Negative for chest pain.   Musculoskeletal:  Positive for arthralgias, back pain, gait problem and joint swelling.          Objective   Vital Signs:  /76 (BP Location: Left arm, Patient Position: Sitting, Cuff Size: Large Adult)   Pulse 60   Temp 97.5 °F (36.4 °C) (Infrared)   Ht 157.5 cm (62.01\")   Wt 119 kg (263 lb 6.4 oz)   SpO2 97%   BMI 48.16 kg/m²   Physical Exam  Vitals reviewed.   Constitutional:       General: She is not in acute distress.     Appearance: She is well-developed. She is obese. She is not ill-appearing or toxic-appearing.   HENT:      Head: Normocephalic and atraumatic.      Right Ear: Tympanic membrane, ear canal and external ear normal.      Left Ear: Tympanic membrane, ear canal and external ear normal.      Nose: Nose normal.      Mouth/Throat:      Mouth: Mucous membranes are moist.      Pharynx: No posterior oropharyngeal erythema.   Eyes:      Extraocular Movements: Extraocular movements intact.      Conjunctiva/sclera: Conjunctivae normal.      Pupils: Pupils are equal, round, and reactive to light.   Neck:      Vascular: No carotid " bruit.   Cardiovascular:      Rate and Rhythm: Normal rate and regular rhythm.      Heart sounds: Normal heart sounds.   Pulmonary:      Effort: Pulmonary effort is normal.      Breath sounds: Normal breath sounds.   Abdominal:      General: Bowel sounds are normal. There is no distension.      Palpations: Abdomen is soft. There is no mass.      Tenderness: There is no abdominal tenderness. There is no right CVA tenderness or left CVA tenderness.   Musculoskeletal:         General: Swelling and tenderness present.      Cervical back: Neck supple. No tenderness.      Right lower leg: Edema present.      Left lower leg: Edema present.   Lymphadenopathy:      Cervical: No cervical adenopathy.   Skin:     General: Skin is warm.   Neurological:      General: No focal deficit present.      Mental Status: She is alert and oriented to person, place, and time.   Psychiatric:         Mood and Affect: Mood normal.         Behavior: Behavior normal.           Mouth/Throat: Mucous membranes moist, no erythema, no exudate  Neck: Supple, no abnormalities  Respiratory: Clear to auscultation, no wheezing, rales or rhonchi  Cardiovascular: Regular rate and rhythm, no murmurs, rubs, or gallops  Extremities: No edema, no cyanosis  Genitourinary: Normal external appearance, no masses, lesions, or discharge            Results             Assessment and Plan        1. Annual wellness visit.  - Physical health has shown slight improvement compared to the previous year, while mental health remains stable.  - Blood pressure is slightly elevated today, although it is generally well-managed at home.  - Referral for a colonoscopy has been initiated. Mammogram and DEXA scan have been ordered at [facility].  - COVID-19 vaccine will be administered today. CBC and CMP will be conducted in a few weeks.    2. Chronic diastolic heart failure, class III.  - Recent episode of rapid heartbeat with a heart rate of 160 bpm led to the postponement of knee  surgery.  - Medication was given to manage heart rate, and knee surgery was advised to proceed.  - Dr. Garcia is monitoring her heart condition.  - If necessary, an ablation will be considered later.    3. Osteoarthritis of the knee.  - Reports worsening arthritis in the right knee, causing significant pain and limiting mobility.  - Advised to consider pain management options for arthritis.  - Scheduled for knee replacement surgery once heart condition is stable.    4. Vitamin B12 deficiency.  - Currently taking vitamin B12 supplements.  - No further issues reported with B12 levels.  - Continue current supplementation regimen.    5. Endometrial carcinoma.  - Had a hysterectomy in 2020 and has not had a follow-up in the last 5 years.  - Referral to a gynecologist has been made for further evaluation.  - Last gynecological visit was on 09/2024, no immediate follow-up was indicated at that time.  - Scheduled for a follow-up in 09/2026.         Follow Up   No follow-ups on file.  Patient was given instructions and counseling regarding her condition or for health maintenance advice. Please see specific information pulled into the AVS if appropriate.  Patient or patient representative verbalized consent for the use of Ambient Listening during the visit with  Leonor Adam MD for chart documentation. 5/30/2025  17:05 EDT

## 2025-05-27 NOTE — PATIENT INSTRUCTIONS
Health Maintenance Due   Topic Date Due    COVID-19 Vaccine (5 - 2024-25 season) 04/21/2025    ANNUAL WELLNESS VISIT  05/02/2025    LIPID PANEL  05/07/2025    MAMMOGRAM  07/03/2025    COLORECTAL CANCER SCREENING  07/26/2025    12 hour fast for labs 2 weeks  Check blood pressure cuff for accuracy and send 10 blood pressures over 2 weeks.  Watch sodium, alcohol and weight

## 2025-05-28 ENCOUNTER — HOSPITAL ENCOUNTER (OUTPATIENT)
Dept: GENERAL RADIOLOGY | Facility: HOSPITAL | Age: 80
Discharge: HOME | End: 2025-05-28
Payer: MEDICARE

## 2025-05-28 ENCOUNTER — READMISSION MANAGEMENT (OUTPATIENT)
Dept: CALL CENTER | Facility: HOSPITAL | Age: 80
End: 2025-05-28
Payer: MEDICARE

## 2025-05-28 ENCOUNTER — LAB (OUTPATIENT)
Dept: LAB | Facility: HOSPITAL | Age: 80
End: 2025-05-28
Payer: MEDICARE

## 2025-05-28 DIAGNOSIS — E03.9 HYPOTHYROIDISM, UNSPECIFIED TYPE: ICD-10-CM

## 2025-05-28 DIAGNOSIS — N20.0 KIDNEY STONES: ICD-10-CM

## 2025-05-28 DIAGNOSIS — E61.1 IRON DEFICIENCY: ICD-10-CM

## 2025-05-28 DIAGNOSIS — F32.A DEPRESSION, UNSPECIFIED DEPRESSION TYPE: ICD-10-CM

## 2025-05-28 LAB
ABO GROUP BLD: NORMAL
ALBUMIN SERPL-MCNC: 4.2 G/DL (ref 3.5–5.2)
ALBUMIN/GLOB SERPL: 1.6 G/DL
ALP SERPL-CCNC: 118 U/L (ref 39–117)
ALT SERPL W P-5'-P-CCNC: 20 U/L (ref 1–33)
ANION GAP SERPL CALCULATED.3IONS-SCNC: 10 MMOL/L (ref 5–15)
AST SERPL-CCNC: 21 U/L (ref 1–32)
BASOPHILS # BLD AUTO: 0.06 10*3/MM3 (ref 0–0.2)
BASOPHILS NFR BLD AUTO: 1.2 % (ref 0–1.5)
BILIRUB SERPL-MCNC: 0.7 MG/DL (ref 0–1.2)
BLD GP AB SCN SERPL QL: NEGATIVE
BUN SERPL-MCNC: 19.7 MG/DL (ref 8–23)
BUN/CREAT SERPL: 17.3 (ref 7–25)
CALCIUM SPEC-SCNC: 10.2 MG/DL (ref 8.6–10.5)
CHLORIDE SERPL-SCNC: 107 MMOL/L (ref 98–107)
CO2 SERPL-SCNC: 26 MMOL/L (ref 22–29)
CREAT SERPL-MCNC: 1.14 MG/DL (ref 0.57–1)
DEPRECATED RDW RBC AUTO: 54.9 FL (ref 37–54)
EGFRCR SERPLBLD CKD-EPI 2021: 49.4 ML/MIN/1.73
EOSINOPHIL # BLD AUTO: 0.13 10*3/MM3 (ref 0–0.4)
EOSINOPHIL NFR BLD AUTO: 2.6 % (ref 0.3–6.2)
ERYTHROCYTE [DISTWIDTH] IN BLOOD BY AUTOMATED COUNT: 14.4 % (ref 12.3–15.4)
GLOBULIN UR ELPH-MCNC: 2.6 GM/DL
GLUCOSE SERPL-MCNC: 94 MG/DL (ref 65–99)
HBA1C MFR BLD: 5.07 % (ref 4.8–5.6)
HCT VFR BLD AUTO: 33.3 % (ref 34–46.6)
HGB BLD-MCNC: 10.6 G/DL (ref 12–15.9)
IMM GRANULOCYTES # BLD AUTO: 0.01 10*3/MM3 (ref 0–0.05)
IMM GRANULOCYTES NFR BLD AUTO: 0.2 % (ref 0–0.5)
INR PPP: 1.74 (ref 0.9–1.1)
LYMPHOCYTES # BLD AUTO: 1.47 10*3/MM3 (ref 0.7–3.1)
LYMPHOCYTES NFR BLD AUTO: 29 % (ref 19.6–45.3)
MCH RBC QN AUTO: 33.2 PG (ref 26.6–33)
MCHC RBC AUTO-ENTMCNC: 31.8 G/DL (ref 31.5–35.7)
MCV RBC AUTO: 104.4 FL (ref 79–97)
MONOCYTES # BLD AUTO: 0.43 10*3/MM3 (ref 0.1–0.9)
MONOCYTES NFR BLD AUTO: 8.5 % (ref 5–12)
NEUTROPHILS NFR BLD AUTO: 2.97 10*3/MM3 (ref 1.7–7)
NEUTROPHILS NFR BLD AUTO: 58.5 % (ref 42.7–76)
NRBC BLD AUTO-RTO: 0 /100 WBC (ref 0–0.2)
PLATELET # BLD AUTO: 193 10*3/MM3 (ref 140–450)
PMV BLD AUTO: 9.8 FL (ref 6–12)
POTASSIUM SERPL-SCNC: 4.6 MMOL/L (ref 3.5–5.2)
PROT SERPL-MCNC: 6.8 G/DL (ref 6–8.5)
PROTHROMBIN TIME: 20.4 SECONDS (ref 11.7–14.2)
RBC # BLD AUTO: 3.19 10*6/MM3 (ref 3.77–5.28)
RH BLD: NEGATIVE
SODIUM SERPL-SCNC: 143 MMOL/L (ref 136–145)
T&S EXPIRATION DATE: NORMAL
WBC NRBC COR # BLD AUTO: 5.07 10*3/MM3 (ref 3.4–10.8)

## 2025-05-28 PROCEDURE — 36415 COLL VENOUS BLD VENIPUNCTURE: CPT

## 2025-05-28 PROCEDURE — 83540 ASSAY OF IRON: CPT

## 2025-05-28 PROCEDURE — 84443 ASSAY THYROID STIM HORMONE: CPT

## 2025-05-28 PROCEDURE — 80053 COMPREHEN METABOLIC PANEL: CPT

## 2025-05-28 PROCEDURE — 84550 ASSAY OF BLOOD/URIC ACID: CPT

## 2025-05-28 PROCEDURE — 83735 ASSAY OF MAGNESIUM: CPT

## 2025-05-28 PROCEDURE — 86900 BLOOD TYPING SEROLOGIC ABO: CPT

## 2025-05-28 PROCEDURE — 85610 PROTHROMBIN TIME: CPT

## 2025-05-28 PROCEDURE — 84466 ASSAY OF TRANSFERRIN: CPT

## 2025-05-28 PROCEDURE — 83036 HEMOGLOBIN GLYCOSYLATED A1C: CPT

## 2025-05-28 PROCEDURE — 85025 COMPLETE CBC W/AUTO DIFF WBC: CPT

## 2025-05-28 PROCEDURE — 86901 BLOOD TYPING SEROLOGIC RH(D): CPT

## 2025-05-28 PROCEDURE — 71046 X-RAY EXAM CHEST 2 VIEWS: CPT

## 2025-05-28 PROCEDURE — 86850 RBC ANTIBODY SCREEN: CPT

## 2025-05-28 NOTE — OUTREACH NOTE
Medical Week 2 Survey      Flowsheet Row Responses   [facility] patient discharged from? [facility]   Does the patient have one of the following disease processes/diagnoses(primary or secondary)? Other   Week 2 attempt successful? No   Unsuccessful attempts Attempt 1   oke Katelyn Buenrostro Registered Nurse

## 2025-05-30 ENCOUNTER — RESULTS FOLLOW-UP (OUTPATIENT)
Dept: FAMILY MEDICINE CLINIC | Facility: CLINIC | Age: 80
End: 2025-05-30

## 2025-05-30 ENCOUNTER — OFFICE VISIT (OUTPATIENT)
Dept: FAMILY MEDICINE CLINIC | Facility: CLINIC | Age: 80
End: 2025-05-30
Payer: MEDICARE

## 2025-05-30 VITALS
WEIGHT: 263.4 LBS | HEIGHT: 62 IN | DIASTOLIC BLOOD PRESSURE: 76 MMHG | OXYGEN SATURATION: 97 % | HEART RATE: 60 BPM | BODY MASS INDEX: 48.47 KG/M2 | TEMPERATURE: 97.5 F | SYSTOLIC BLOOD PRESSURE: 178 MMHG

## 2025-05-30 DIAGNOSIS — I50.32 CHRONIC DIASTOLIC HEART FAILURE: ICD-10-CM

## 2025-05-30 DIAGNOSIS — Z78.0 POST-MENOPAUSAL: ICD-10-CM

## 2025-05-30 DIAGNOSIS — F32.A DEPRESSION, UNSPECIFIED DEPRESSION TYPE: ICD-10-CM

## 2025-05-30 DIAGNOSIS — M17.0 PRIMARY OSTEOARTHRITIS OF BOTH KNEES: ICD-10-CM

## 2025-05-30 DIAGNOSIS — Z00.01 ENCOUNTER FOR ANNUAL GENERAL MEDICAL EXAMINATION WITH ABNORMAL FINDINGS IN ADULT: Primary | ICD-10-CM

## 2025-05-30 DIAGNOSIS — Z95.0 PRESENCE OF CARDIAC PACEMAKER: ICD-10-CM

## 2025-05-30 DIAGNOSIS — E55.9 VITAMIN D DEFICIENCY: ICD-10-CM

## 2025-05-30 DIAGNOSIS — R55 SYNCOPE, UNSPECIFIED SYNCOPE TYPE: ICD-10-CM

## 2025-05-30 DIAGNOSIS — G47.33 OBSTRUCTIVE SLEEP APNEA SYNDROME: ICD-10-CM

## 2025-05-30 DIAGNOSIS — I48.0 PAROXYSMAL ATRIAL FIBRILLATION: ICD-10-CM

## 2025-05-30 DIAGNOSIS — E53.8 B12 DEFICIENCY: ICD-10-CM

## 2025-05-30 DIAGNOSIS — E66.1 CLASS 3 DRUG-INDUCED OBESITY WITH SERIOUS COMORBIDITY AND BODY MASS INDEX (BMI) OF 45.0 TO 49.9 IN ADULT: ICD-10-CM

## 2025-05-30 DIAGNOSIS — E03.9 HYPOTHYROIDISM, UNSPECIFIED TYPE: ICD-10-CM

## 2025-05-30 DIAGNOSIS — E61.1 IRON DEFICIENCY: ICD-10-CM

## 2025-05-30 DIAGNOSIS — Z12.11 SCREENING FOR COLON CANCER: ICD-10-CM

## 2025-05-30 DIAGNOSIS — R13.12 OROPHARYNGEAL DYSPHAGIA: ICD-10-CM

## 2025-05-30 DIAGNOSIS — Z12.31 ENCOUNTER FOR SCREENING MAMMOGRAM FOR MALIGNANT NEOPLASM OF BREAST: ICD-10-CM

## 2025-05-30 DIAGNOSIS — E66.813 CLASS 3 DRUG-INDUCED OBESITY WITH SERIOUS COMORBIDITY AND BODY MASS INDEX (BMI) OF 45.0 TO 49.9 IN ADULT: ICD-10-CM

## 2025-05-30 DIAGNOSIS — I10 PRIMARY HYPERTENSION: ICD-10-CM

## 2025-05-30 DIAGNOSIS — R73.9 HYPERGLYCEMIA: ICD-10-CM

## 2025-05-30 DIAGNOSIS — C54.1 ENDOMETRIAL CARCINOMA: ICD-10-CM

## 2025-05-30 DIAGNOSIS — E78.2 MIXED HYPERLIPIDEMIA: ICD-10-CM

## 2025-05-30 DIAGNOSIS — N20.0 KIDNEY STONES: ICD-10-CM

## 2025-05-30 DIAGNOSIS — I25.10 CORONARY ARTERY DISEASE INVOLVING NATIVE HEART WITHOUT ANGINA PECTORIS, UNSPECIFIED VESSEL OR LESION TYPE: ICD-10-CM

## 2025-05-30 LAB
IRON 24H UR-MRATE: 93 MCG/DL (ref 37–145)
IRON SATN MFR SERPL: 34 % (ref 20–50)
MAGNESIUM SERPL-MCNC: 1.7 MG/DL (ref 1.6–2.4)
TIBC SERPL-MCNC: 276 MCG/DL (ref 298–536)
TRANSFERRIN SERPL-MCNC: 185 MG/DL (ref 200–360)
TSH SERPL DL<=0.05 MIU/L-ACNC: 2.78 UIU/ML (ref 0.27–4.2)
URATE SERPL-MCNC: 4.4 MG/DL (ref 2.4–5.7)

## 2025-05-30 NOTE — PAT
Dtr Lyudmila called- requested to go over medication instructions for mother prior to sx.  Pt confused.  Reinforced instructions of all medications, dtr verbalized understanding

## 2025-06-02 ENCOUNTER — ANESTHESIA EVENT (OUTPATIENT)
Dept: PERIOP | Facility: HOSPITAL | Age: 80
End: 2025-06-02
Payer: MEDICARE

## 2025-06-03 ENCOUNTER — TELEPHONE (OUTPATIENT)
Dept: FAMILY MEDICINE CLINIC | Facility: CLINIC | Age: 80
End: 2025-06-03
Payer: MEDICARE

## 2025-06-03 NOTE — TELEPHONE ENCOUNTER
[facility] CALLED AND WAS WANTING TO KNOW IF YOU WOULD HELP MARA GET A WHEELCHAIR. THEY SENT US OVER THE FORM FOR IT AND I PUT IT IN THE MA BOX

## 2025-06-05 ENCOUNTER — APPOINTMENT (OUTPATIENT)
Dept: GENERAL RADIOLOGY | Facility: HOSPITAL | Age: 80
End: 2025-06-05
Payer: MEDICARE

## 2025-06-05 ENCOUNTER — ANESTHESIA EVENT CONVERTED (OUTPATIENT)
Dept: ANESTHESIOLOGY | Facility: HOSPITAL | Age: 80
End: 2025-06-05
Payer: MEDICARE

## 2025-06-05 ENCOUNTER — ANESTHESIA (OUTPATIENT)
Dept: PERIOP | Facility: HOSPITAL | Age: 80
End: 2025-06-05
Payer: MEDICARE

## 2025-06-05 ENCOUNTER — HOSPITAL ENCOUNTER (INPATIENT)
Facility: HOSPITAL | Age: 80
LOS: 6 days | Discharge: NURSING, REHAB OR LONG-TERM CARE | End: 2025-06-11
Attending: ORTHOPAEDIC SURGERY | Admitting: STUDENT IN AN ORGANIZED HEALTH CARE EDUCATION/TRAINING PROGRAM
Payer: MEDICARE

## 2025-06-05 ENCOUNTER — TELEPHONE (OUTPATIENT)
Dept: FAMILY MEDICINE CLINIC | Facility: CLINIC | Age: 80
End: 2025-06-05
Payer: MEDICARE

## 2025-06-05 PROBLEM — Z96.659 S/P REVISION OF TOTAL KNEE: Status: ACTIVE | Noted: 2025-06-05

## 2025-06-05 PROBLEM — G89.18 POSTOPERATIVE PAIN OF KNEE: Status: ACTIVE | Noted: 2025-06-05

## 2025-06-05 PROBLEM — M25.569 POSTOPERATIVE PAIN OF KNEE: Status: ACTIVE | Noted: 2025-06-05

## 2025-06-05 LAB
INR PPP: 1.11 (ref 0.9–1.1)
PROTHROMBIN TIME: 14.2 SECONDS (ref 11.7–14.2)

## 2025-06-05 PROCEDURE — C1776 JOINT DEVICE (IMPLANTABLE): HCPCS | Performed by: ORTHOPAEDIC SURGERY

## 2025-06-05 PROCEDURE — 27487 REVISE/REPLACE KNEE JOINT: CPT | Performed by: SPECIALIST/TECHNOLOGIST, OTHER

## 2025-06-05 PROCEDURE — 25010000002 DEXAMETHASONE PER 1 MG: Performed by: ANESTHESIOLOGY

## 2025-06-05 PROCEDURE — 0SRC069 REPLACEMENT OF RIGHT KNEE JOINT WITH OXIDIZED ZIRCONIUM ON POLYETHYLENE SYNTHETIC SUBSTITUTE, CEMENTED, OPEN APPROACH: ICD-10-PCS | Performed by: ORTHOPAEDIC SURGERY

## 2025-06-05 PROCEDURE — 25010000002 VANCOMYCIN 1 G RECONSTITUTED SOLUTION: Performed by: ORTHOPAEDIC SURGERY

## 2025-06-05 PROCEDURE — 25010000002 ONDANSETRON PER 1 MG: Performed by: NURSE ANESTHETIST, CERTIFIED REGISTERED

## 2025-06-05 PROCEDURE — 85610 PROTHROMBIN TIME: CPT | Performed by: ORTHOPAEDIC SURGERY

## 2025-06-05 PROCEDURE — 25010000002 BUPIVACAINE IN DEXTROSE 0.75-8.25 % SOLUTION: Performed by: ANESTHESIOLOGY

## 2025-06-05 PROCEDURE — 25810000003 SODIUM CHLORIDE 0.9 % SOLUTION: Performed by: ORTHOPAEDIC SURGERY

## 2025-06-05 PROCEDURE — 25010000002 PHENYLEPHRINE 10 MG/ML SOLUTION 5 ML VIAL: Performed by: NURSE ANESTHETIST, CERTIFIED REGISTERED

## 2025-06-05 PROCEDURE — 25810000003 SODIUM CHLORIDE 0.9 % SOLUTION: Performed by: NURSE ANESTHETIST, CERTIFIED REGISTERED

## 2025-06-05 PROCEDURE — 25010000002 MAGNESIUM SULFATE PER 500 MG OF MAGNESIUM: Performed by: NURSE ANESTHETIST, CERTIFIED REGISTERED

## 2025-06-05 PROCEDURE — 0SPC0JZ REMOVAL OF SYNTHETIC SUBSTITUTE FROM RIGHT KNEE JOINT, OPEN APPROACH: ICD-10-PCS | Performed by: ORTHOPAEDIC SURGERY

## 2025-06-05 PROCEDURE — 25010000002 CEFAZOLIN PER 500 MG: Performed by: ORTHOPAEDIC SURGERY

## 2025-06-05 PROCEDURE — 25010000002 LIDOCAINE PF 2% 2 % SOLUTION: Performed by: NURSE ANESTHETIST, CERTIFIED REGISTERED

## 2025-06-05 PROCEDURE — 73560 X-RAY EXAM OF KNEE 1 OR 2: CPT

## 2025-06-05 PROCEDURE — 8E0Y0CZ ROBOTIC ASSISTED PROCEDURE OF LOWER EXTREMITY, OPEN APPROACH: ICD-10-PCS | Performed by: ORTHOPAEDIC SURGERY

## 2025-06-05 PROCEDURE — C1713 ANCHOR/SCREW BN/BN,TIS/BN: HCPCS | Performed by: ORTHOPAEDIC SURGERY

## 2025-06-05 PROCEDURE — 25010000002 PROPOFOL 10 MG/ML EMULSION: Performed by: NURSE ANESTHETIST, CERTIFIED REGISTERED

## 2025-06-05 PROCEDURE — 25010000002 MIDAZOLAM PER 1 MG: Performed by: ANESTHESIOLOGY

## 2025-06-05 PROCEDURE — 25810000003 SODIUM CHLORIDE 0.9 % SOLUTION 250 ML FLEX CONT: Performed by: NURSE ANESTHETIST, CERTIFIED REGISTERED

## 2025-06-05 PROCEDURE — 25010000002 ROPIVACAINE PER 1 MG: Performed by: ANESTHESIOLOGY

## 2025-06-05 DEVICE — LEGION POSTERIOR STABILIZED HIGH                                    FLEX HIGHLY CROSS LINKED                                    POLYETHYLENE SIZE 3-4 11MM
Type: IMPLANTABLE DEVICE | Site: KNEE | Status: FUNCTIONAL
Brand: LEGION

## 2025-06-05 DEVICE — LEGION REVISION TIBIAL BASEPLATE                                    SIZE 3 RIGHT
Type: IMPLANTABLE DEVICE | Site: KNEE | Status: FUNCTIONAL
Brand: LEGION

## 2025-06-05 DEVICE — CMT BONE PALACOS R HI/VISC 1X40: Type: IMPLANTABLE DEVICE | Site: KNEE | Status: FUNCTIONAL

## 2025-06-05 DEVICE — LEGION SCREW-ON DISTAL FEMORAL                                    WEDGE SIZE 4 10MM
Type: IMPLANTABLE DEVICE | Site: KNEE | Status: FUNCTIONAL
Brand: LEGION

## 2025-06-05 DEVICE — LEGION PRESSFIT STEM 10MM X 120MM
Type: IMPLANTABLE DEVICE | Site: KNEE | Status: FUNCTIONAL
Brand: LEGION

## 2025-06-05 DEVICE — LEGION REVISION / HINGE FULL                                    TIBIAL WEDGE SIZE 3-4 15MM
Type: IMPLANTABLE DEVICE | Site: KNEE | Status: FUNCTIONAL
Brand: LEGION

## 2025-06-05 DEVICE — LEGION OXINIUM CONSTRAINED FEMORAL                                    SIZE 4 RIGHT
Type: IMPLANTABLE DEVICE | Site: KNEE | Status: FUNCTIONAL
Brand: LEGION

## 2025-06-05 DEVICE — DEV WND/CLS CONTRL TISS STRATAFIX SYMM PDS PLS CTX 60CM VIL: Type: IMPLANTABLE DEVICE | Site: KNEE | Status: FUNCTIONAL

## 2025-06-05 DEVICE — BONE PREPARATION KIT
Type: IMPLANTABLE DEVICE | Site: KNEE | Status: FUNCTIONAL
Brand: BIOPREP

## 2025-06-05 DEVICE — LEGION PRESSFIT STEM 12MM X 120MM
Type: IMPLANTABLE DEVICE | Site: KNEE | Status: FUNCTIONAL
Brand: LEGION

## 2025-06-05 DEVICE — DEV CONTRL TISS STRATAFIX SPIRAL MNCRYL UD 3/0 PLS 30CM: Type: IMPLANTABLE DEVICE | Site: KNEE | Status: FUNCTIONAL

## 2025-06-05 DEVICE — LEGION TIB CONE ID 18 SHORT
Type: IMPLANTABLE DEVICE | Site: KNEE | Status: FUNCTIONAL
Brand: LEGION

## 2025-06-05 RX ORDER — EPHEDRINE SULFATE 5 MG/ML
INJECTION INTRAVENOUS AS NEEDED
Status: DISCONTINUED | OUTPATIENT
Start: 2025-06-05 | End: 2025-06-05 | Stop reason: SURG

## 2025-06-05 RX ORDER — ONDANSETRON 2 MG/ML
4 INJECTION INTRAMUSCULAR; INTRAVENOUS ONCE AS NEEDED
Status: DISCONTINUED | OUTPATIENT
Start: 2025-06-05 | End: 2025-06-05 | Stop reason: HOSPADM

## 2025-06-05 RX ORDER — LEVOTHYROXINE SODIUM 88 UG/1
88 TABLET ORAL DAILY
Status: DISCONTINUED | OUTPATIENT
Start: 2025-06-05 | End: 2025-06-11 | Stop reason: HOSPADM

## 2025-06-05 RX ORDER — DOCUSATE SODIUM 100 MG/1
100 CAPSULE, LIQUID FILLED ORAL 2 TIMES DAILY PRN
Status: DISCONTINUED | OUTPATIENT
Start: 2025-06-05 | End: 2025-06-11 | Stop reason: HOSPADM

## 2025-06-05 RX ORDER — ONDANSETRON 2 MG/ML
4 INJECTION INTRAMUSCULAR; INTRAVENOUS EVERY 6 HOURS PRN
Status: DISCONTINUED | OUTPATIENT
Start: 2025-06-05 | End: 2025-06-11 | Stop reason: HOSPADM

## 2025-06-05 RX ORDER — OXYCODONE HYDROCHLORIDE 5 MG/1
10 TABLET ORAL EVERY 4 HOURS PRN
Refills: 0 | Status: DISCONTINUED | OUTPATIENT
Start: 2025-06-05 | End: 2025-06-05 | Stop reason: HOSPADM

## 2025-06-05 RX ORDER — MIDAZOLAM HYDROCHLORIDE 1 MG/ML
INJECTION, SOLUTION INTRAMUSCULAR; INTRAVENOUS
Status: COMPLETED | OUTPATIENT
Start: 2025-06-05 | End: 2025-06-05

## 2025-06-05 RX ORDER — CEFAZOLIN SODIUM 1 G/3ML
INJECTION, POWDER, FOR SOLUTION INTRAMUSCULAR; INTRAVENOUS AS NEEDED
Status: DISCONTINUED | OUTPATIENT
Start: 2025-06-05 | End: 2025-06-05 | Stop reason: HOSPADM

## 2025-06-05 RX ORDER — LABETALOL HYDROCHLORIDE 5 MG/ML
5 INJECTION, SOLUTION INTRAVENOUS
Status: DISCONTINUED | OUTPATIENT
Start: 2025-06-05 | End: 2025-06-05 | Stop reason: HOSPADM

## 2025-06-05 RX ORDER — OXYCODONE HYDROCHLORIDE 5 MG/1
5 TABLET ORAL EVERY 4 HOURS PRN
Status: ACTIVE | OUTPATIENT
Start: 2025-06-05 | End: 2025-06-10

## 2025-06-05 RX ORDER — SODIUM CHLORIDE 9 MG/ML
20 INJECTION, SOLUTION INTRAVENOUS ONCE
Status: COMPLETED | OUTPATIENT
Start: 2025-06-05 | End: 2025-06-05

## 2025-06-05 RX ORDER — DEXAMETHASONE SODIUM PHOSPHATE 4 MG/ML
INJECTION, SOLUTION INTRA-ARTICULAR; INTRALESIONAL; INTRAMUSCULAR; INTRAVENOUS; SOFT TISSUE
Status: COMPLETED | OUTPATIENT
Start: 2025-06-05 | End: 2025-06-05

## 2025-06-05 RX ORDER — TRANEXAMIC ACID 10 MG/ML
1000 INJECTION, SOLUTION INTRAVENOUS ONCE
Status: COMPLETED | OUTPATIENT
Start: 2025-06-05 | End: 2025-06-05

## 2025-06-05 RX ORDER — DEXAMETHASONE SODIUM PHOSPHATE 4 MG/ML
INJECTION, SOLUTION INTRA-ARTICULAR; INTRALESIONAL; INTRAMUSCULAR; INTRAVENOUS; SOFT TISSUE AS NEEDED
Status: DISCONTINUED | OUTPATIENT
Start: 2025-06-05 | End: 2025-06-05 | Stop reason: SURG

## 2025-06-05 RX ORDER — ACETAMINOPHEN 500 MG
1000 TABLET ORAL ONCE
Status: COMPLETED | OUTPATIENT
Start: 2025-06-05 | End: 2025-06-05

## 2025-06-05 RX ORDER — VANCOMYCIN HYDROCHLORIDE 1 G/20ML
INJECTION, POWDER, LYOPHILIZED, FOR SOLUTION INTRAVENOUS
Status: DISPENSED
Start: 2025-06-05 | End: 2025-06-05

## 2025-06-05 RX ORDER — ONDANSETRON 4 MG/1
4 TABLET, FILM COATED ORAL EVERY 6 HOURS PRN
Qty: 30 TABLET | Refills: 0 | Status: SHIPPED | OUTPATIENT
Start: 2025-06-05

## 2025-06-05 RX ORDER — SODIUM CHLORIDE 0.9 % (FLUSH) 0.9 %
10 SYRINGE (ML) INJECTION AS NEEDED
Status: DISCONTINUED | OUTPATIENT
Start: 2025-06-05 | End: 2025-06-05 | Stop reason: HOSPADM

## 2025-06-05 RX ORDER — ACETAMINOPHEN 500 MG
1000 TABLET ORAL EVERY 6 HOURS
Status: DISCONTINUED | OUTPATIENT
Start: 2025-06-05 | End: 2025-06-11 | Stop reason: HOSPADM

## 2025-06-05 RX ORDER — PROPOFOL 10 MG/ML
VIAL (ML) INTRAVENOUS AS NEEDED
Status: DISCONTINUED | OUTPATIENT
Start: 2025-06-05 | End: 2025-06-05 | Stop reason: SURG

## 2025-06-05 RX ORDER — SPIRONOLACTONE 25 MG/1
12.5 TABLET ORAL DAILY
Status: DISCONTINUED | OUTPATIENT
Start: 2025-06-06 | End: 2025-06-07

## 2025-06-05 RX ORDER — ONDANSETRON 4 MG/1
4 TABLET, ORALLY DISINTEGRATING ORAL EVERY 6 HOURS PRN
Status: DISCONTINUED | OUTPATIENT
Start: 2025-06-05 | End: 2025-06-11 | Stop reason: HOSPADM

## 2025-06-05 RX ORDER — METOPROLOL SUCCINATE 25 MG/1
25 TABLET, EXTENDED RELEASE ORAL
Status: DISCONTINUED | OUTPATIENT
Start: 2025-06-06 | End: 2025-06-11 | Stop reason: HOSPADM

## 2025-06-05 RX ORDER — OXYCODONE AND ACETAMINOPHEN 5; 325 MG/1; MG/1
1 TABLET ORAL EVERY 4 HOURS PRN
Qty: 50 TABLET | Refills: 0 | Status: SHIPPED | OUTPATIENT
Start: 2025-06-05

## 2025-06-05 RX ORDER — ACETAMINOPHEN 325 MG/1
650 TABLET ORAL ONCE AS NEEDED
Status: DISCONTINUED | OUTPATIENT
Start: 2025-06-05 | End: 2025-06-05 | Stop reason: HOSPADM

## 2025-06-05 RX ORDER — FENTANYL CITRATE 50 UG/ML
50 INJECTION, SOLUTION INTRAMUSCULAR; INTRAVENOUS
Status: DISCONTINUED | OUTPATIENT
Start: 2025-06-05 | End: 2025-06-05 | Stop reason: HOSPADM

## 2025-06-05 RX ORDER — HYDROMORPHONE HYDROCHLORIDE 1 MG/ML
0.25 INJECTION, SOLUTION INTRAMUSCULAR; INTRAVENOUS; SUBCUTANEOUS
Status: DISCONTINUED | OUTPATIENT
Start: 2025-06-05 | End: 2025-06-05 | Stop reason: HOSPADM

## 2025-06-05 RX ORDER — MIDAZOLAM HYDROCHLORIDE 1 MG/ML
1 INJECTION, SOLUTION INTRAMUSCULAR; INTRAVENOUS
Status: DISCONTINUED | OUTPATIENT
Start: 2025-06-05 | End: 2025-06-05 | Stop reason: HOSPADM

## 2025-06-05 RX ORDER — HYDRALAZINE HYDROCHLORIDE 20 MG/ML
5 INJECTION INTRAMUSCULAR; INTRAVENOUS
Status: DISCONTINUED | OUTPATIENT
Start: 2025-06-05 | End: 2025-06-05 | Stop reason: HOSPADM

## 2025-06-05 RX ORDER — NALOXONE HCL 0.4 MG/ML
0.4 VIAL (ML) INJECTION AS NEEDED
Status: DISCONTINUED | OUTPATIENT
Start: 2025-06-05 | End: 2025-06-05 | Stop reason: HOSPADM

## 2025-06-05 RX ORDER — LIDOCAINE HYDROCHLORIDE 10 MG/ML
0.5 INJECTION, SOLUTION EPIDURAL; INFILTRATION; INTRACAUDAL; PERINEURAL ONCE AS NEEDED
Status: DISCONTINUED | OUTPATIENT
Start: 2025-06-05 | End: 2025-06-05 | Stop reason: HOSPADM

## 2025-06-05 RX ORDER — MELOXICAM 15 MG/1
15 TABLET ORAL DAILY
Status: DISCONTINUED | OUTPATIENT
Start: 2025-06-05 | End: 2025-06-07

## 2025-06-05 RX ORDER — LIDOCAINE HYDROCHLORIDE 20 MG/ML
INJECTION, SOLUTION EPIDURAL; INFILTRATION; INTRACAUDAL; PERINEURAL AS NEEDED
Status: DISCONTINUED | OUTPATIENT
Start: 2025-06-05 | End: 2025-06-05 | Stop reason: SURG

## 2025-06-05 RX ORDER — PREGABALIN 75 MG/1
150 CAPSULE ORAL ONCE
Status: COMPLETED | OUTPATIENT
Start: 2025-06-05 | End: 2025-06-05

## 2025-06-05 RX ORDER — OXYCODONE HYDROCHLORIDE 5 MG/1
5 TABLET ORAL ONCE
Refills: 0 | Status: COMPLETED | OUTPATIENT
Start: 2025-06-05 | End: 2025-06-05

## 2025-06-05 RX ORDER — ROPIVACAINE HYDROCHLORIDE 5 MG/ML
INJECTION, SOLUTION EPIDURAL; INFILTRATION; PERINEURAL
Status: COMPLETED | OUTPATIENT
Start: 2025-06-05 | End: 2025-06-05

## 2025-06-05 RX ORDER — IPRATROPIUM BROMIDE AND ALBUTEROL SULFATE 2.5; .5 MG/3ML; MG/3ML
3 SOLUTION RESPIRATORY (INHALATION) ONCE AS NEEDED
Status: DISCONTINUED | OUTPATIENT
Start: 2025-06-05 | End: 2025-06-05 | Stop reason: HOSPADM

## 2025-06-05 RX ORDER — CEFAZOLIN SODIUM 1 G/3ML
INJECTION, POWDER, FOR SOLUTION INTRAMUSCULAR; INTRAVENOUS
Status: DISPENSED
Start: 2025-06-05 | End: 2025-06-05

## 2025-06-05 RX ORDER — ONDANSETRON 2 MG/ML
INJECTION INTRAMUSCULAR; INTRAVENOUS AS NEEDED
Status: DISCONTINUED | OUTPATIENT
Start: 2025-06-05 | End: 2025-06-05 | Stop reason: SURG

## 2025-06-05 RX ORDER — ACETAMINOPHEN 650 MG/1
650 SUPPOSITORY RECTAL EVERY 4 HOURS PRN
Status: DISCONTINUED | OUTPATIENT
Start: 2025-06-05 | End: 2025-06-05 | Stop reason: HOSPADM

## 2025-06-05 RX ORDER — TRANEXAMIC ACID 100 MG/ML
INJECTION, SOLUTION INTRAVENOUS AS NEEDED
Status: DISCONTINUED | OUTPATIENT
Start: 2025-06-05 | End: 2025-06-05 | Stop reason: HOSPADM

## 2025-06-05 RX ORDER — FLUMAZENIL 0.1 MG/ML
0.2 INJECTION INTRAVENOUS AS NEEDED
Status: DISCONTINUED | OUTPATIENT
Start: 2025-06-05 | End: 2025-06-05 | Stop reason: HOSPADM

## 2025-06-05 RX ORDER — BUPIVACAINE HYDROCHLORIDE 7.5 MG/ML
INJECTION, SOLUTION INTRASPINAL
Status: COMPLETED | OUTPATIENT
Start: 2025-06-05 | End: 2025-06-05

## 2025-06-05 RX ORDER — MAGNESIUM SULFATE HEPTAHYDRATE 500 MG/ML
INJECTION, SOLUTION INTRAMUSCULAR; INTRAVENOUS AS NEEDED
Status: DISCONTINUED | OUTPATIENT
Start: 2025-06-05 | End: 2025-06-05 | Stop reason: SURG

## 2025-06-05 RX ORDER — DIPHENHYDRAMINE HYDROCHLORIDE 50 MG/ML
12.5 INJECTION, SOLUTION INTRAMUSCULAR; INTRAVENOUS
Status: DISCONTINUED | OUTPATIENT
Start: 2025-06-05 | End: 2025-06-05 | Stop reason: HOSPADM

## 2025-06-05 RX ORDER — NALOXONE HCL 0.4 MG/ML
0.1 VIAL (ML) INJECTION
Status: DISCONTINUED | OUTPATIENT
Start: 2025-06-05 | End: 2025-06-11 | Stop reason: HOSPADM

## 2025-06-05 RX ORDER — SODIUM CHLORIDE 9 MG/ML
INJECTION, SOLUTION INTRAVENOUS CONTINUOUS PRN
Status: DISCONTINUED | OUTPATIENT
Start: 2025-06-05 | End: 2025-06-05 | Stop reason: SURG

## 2025-06-05 RX ORDER — OXYCODONE HYDROCHLORIDE 5 MG/1
10 TABLET ORAL EVERY 4 HOURS PRN
Status: ACTIVE | OUTPATIENT
Start: 2025-06-05 | End: 2025-06-10

## 2025-06-05 RX ORDER — OXYCODONE HYDROCHLORIDE 5 MG/1
5 TABLET ORAL ONCE AS NEEDED
Refills: 0 | Status: DISCONTINUED | OUTPATIENT
Start: 2025-06-05 | End: 2025-06-05 | Stop reason: HOSPADM

## 2025-06-05 RX ORDER — VANCOMYCIN HYDROCHLORIDE 1 G/20ML
INJECTION, POWDER, LYOPHILIZED, FOR SOLUTION INTRAVENOUS AS NEEDED
Status: DISCONTINUED | OUTPATIENT
Start: 2025-06-05 | End: 2025-06-05 | Stop reason: HOSPADM

## 2025-06-05 RX ADMIN — EPHEDRINE SULFATE 5 MG: 5 INJECTION INTRAVENOUS at 13:35

## 2025-06-05 RX ADMIN — CEFAZOLIN 2000 MG: 2 INJECTION, POWDER, FOR SOLUTION INTRAMUSCULAR; INTRAVENOUS at 19:56

## 2025-06-05 RX ADMIN — PHENYLEPHRINE HYDROCHLORIDE 0.5 MCG/KG/MIN: 10 INJECTION INTRAVENOUS at 12:08

## 2025-06-05 RX ADMIN — DEXAMETHASONE SODIUM PHOSPHATE 4 MG: 4 INJECTION, SOLUTION INTRAMUSCULAR; INTRAVENOUS at 11:13

## 2025-06-05 RX ADMIN — ACETAMINOPHEN 1000 MG: 500 TABLET ORAL at 17:53

## 2025-06-05 RX ADMIN — MELOXICAM 15 MG: 15 TABLET ORAL at 17:53

## 2025-06-05 RX ADMIN — ROPIVACAINE HYDROCHLORIDE 30 ML: 5 INJECTION EPIDURAL; INFILTRATION; PERINEURAL at 11:13

## 2025-06-05 RX ADMIN — EPHEDRINE SULFATE 5 MG: 5 INJECTION INTRAVENOUS at 13:37

## 2025-06-05 RX ADMIN — CEFAZOLIN 2000 MG: 2 INJECTION, POWDER, FOR SOLUTION INTRAMUSCULAR; INTRAVENOUS at 11:58

## 2025-06-05 RX ADMIN — PROPOFOL 125 MCG/KG/MIN: 10 INJECTION, EMULSION INTRAVENOUS at 12:08

## 2025-06-05 RX ADMIN — PROPOFOL 60 MG: 10 INJECTION, EMULSION INTRAVENOUS at 12:06

## 2025-06-05 RX ADMIN — MAGNESIUM SULFATE HEPTAHYDRATE 1 G: 500 INJECTION, SOLUTION INTRAMUSCULAR; INTRAVENOUS at 13:08

## 2025-06-05 RX ADMIN — TRANEXAMIC ACID 1000 MG: 10 INJECTION, SOLUTION INTRAVENOUS at 12:09

## 2025-06-05 RX ADMIN — EPHEDRINE SULFATE 5 MG: 5 INJECTION INTRAVENOUS at 13:40

## 2025-06-05 RX ADMIN — ACETAMINOPHEN 1000 MG: 500 TABLET, FILM COATED ORAL at 09:43

## 2025-06-05 RX ADMIN — MIDAZOLAM 2 MG: 1 INJECTION INTRAMUSCULAR; INTRAVENOUS at 11:00

## 2025-06-05 RX ADMIN — PREGABALIN 150 MG: 75 CAPSULE ORAL at 09:43

## 2025-06-05 RX ADMIN — LIDOCAINE HYDROCHLORIDE 60 MG: 20 INJECTION, SOLUTION EPIDURAL; INFILTRATION; INTRACAUDAL; PERINEURAL at 12:06

## 2025-06-05 RX ADMIN — SODIUM CHLORIDE: 9 INJECTION, SOLUTION INTRAVENOUS at 11:57

## 2025-06-05 RX ADMIN — DEXAMETHASONE SODIUM PHOSPHATE 8 MG: 4 INJECTION, SOLUTION INTRA-ARTICULAR; INTRALESIONAL; INTRAMUSCULAR; INTRAVENOUS; SOFT TISSUE at 12:30

## 2025-06-05 RX ADMIN — BUPIVACAINE HYDROCHLORIDE IN DEXTROSE 1.4 ML: 7.5 INJECTION, SOLUTION SUBARACHNOID at 11:08

## 2025-06-05 RX ADMIN — OXYCODONE HYDROCHLORIDE 5 MG: 5 TABLET ORAL at 09:42

## 2025-06-05 RX ADMIN — ONDANSETRON 4 MG: 2 INJECTION, SOLUTION INTRAMUSCULAR; INTRAVENOUS at 13:08

## 2025-06-05 RX ADMIN — SODIUM CHLORIDE 20 ML/HR: 900 INJECTION, SOLUTION INTRAVENOUS at 09:24

## 2025-06-05 RX ADMIN — ACETAMINOPHEN 1000 MG: 500 TABLET ORAL at 23:27

## 2025-06-05 NOTE — ANESTHESIA PROCEDURE NOTES
Peripheral Block    Pre-sedation assessment completed: 6/5/2025 11:08 AM    Patient reassessed immediately prior to procedure    Patient location during procedure: pre-op  Start time: 6/5/2025 11:08 AM  Stop time: 6/5/2025 11:12 AM  Reason for block: at surgeon's request and post-op pain management  Performed by  Anesthesiologist: César Rowley MD  Preanesthetic Checklist  Completed: patient identified, IV checked, site marked, risks and benefits discussed, surgical consent, monitors and equipment checked, pre-op evaluation and timeout performed  Prep:  Pt Position: supine  Sterile barriers:cap, gloves, mask and washed/disinfected hands  Prep: ChloraPrep  Patient monitoring: blood pressure monitoring, continuous pulse oximetry and EKG  Procedure    Sedation: yes    Guidance:ultrasound guided    ULTRASOUND INTERPRETATION.  Using ultrasound guidance a 20 G gauge needle was placed in close proximity to the nerve, at which point, under ultrasound guidance anesthetic was injected in the area of the nerve and spread of the anesthesia was seen on ultrasound in close proximity thereto.  There were no abnormalities seen on ultrasound; a digital image was taken; and the patient tolerated the procedure with no complications. Images:still images obtained, printed/placed on chart    Laterality:right  Block Type:adductor canal block  Injection Technique:single-shot  Needle Type:echogenic  Needle Gauge:20 G  Resistance on Injection: none  Sedation medications used: midazolam (VERSED) injection - Intravenous   2 mg - 6/5/2025 11:00:00 AM  Medications Used: dexamethasone (DECADRON) injection - Injection   4 mg - 6/5/2025 11:13:00 AM  ropivacaine (NAROPIN) 0.5 % injection - Perineural   30 mL - 6/5/2025 11:13:00 AM      Post Assessment  Injection Assessment: negative aspiration for heme, no paresthesia on injection and incremental injection  Patient Tolerance:comfortable throughout block  Complications:no  Performed by:  César Rowley MD

## 2025-06-05 NOTE — ANESTHESIA POSTPROCEDURE EVALUATION
Patient: Faby Le    Procedure Summary       Date: 06/05/25 Room / Location: [facility] OR 03 / [facility] MAIN OR    Anesthesia Start: 1201 Anesthesia Stop: 1356    Procedure: TOTAL KNEE ARTHROPLASTY REVISION WITH CORI ROBOT (Right: Knee) Diagnosis:       Postoperative pain of knee      (Postoperative pain of knee [G89.18, M25.569])    Surgeons: Chino Herrera II, MD Provider: César Rowley MD    Anesthesia Type: spinal, MAC, regional ASA Status: 3            Anesthesia Type: spinal, MAC, regional    Vitals  Vitals Value Taken Time   /32 06/05/25 14:05   Temp 97.5 °F (36.4 °C) 06/05/25 13:51   Pulse 71 06/05/25 14:06   Resp 14 06/05/25 14:01   SpO2 96 % 06/05/25 14:06   Vitals shown include unfiled device data.        Post Anesthesia Care and Evaluation    Patient location during evaluation: PACU  Patient participation: complete - patient participated  Level of consciousness: awake  Pain scale: See nurse's notes for pain score.  Pain management: adequate    Airway patency: patent  Anesthetic complications: No anesthetic complications  PONV Status: none  Cardiovascular status: acceptable  Respiratory status: acceptable and spontaneous ventilation  Hydration status: acceptable    Comments: Patient seen and examined postoperatively; vital signs stable; SpO2 greater than or equal to 90%; cardiopulmonary status stable; nausea/vomiting adequately controlled; pain adequately controlled; no apparent anesthesia complications; patient discharged from anesthesia care when discharge criteria were met

## 2025-06-05 NOTE — H&P
Orthopaedic Surgery  History & Physical  Dr. JACKELINE Herrera II  [phone]    HPI:  Patient is a 80 y.o. Not  or  female who presents with a failed and loose total knee.    MEDICAL HISTORY  Past Medical History:   Diagnosis Date    Ankle pain     Arthritis     Atrial fibrillation     Coronary artery disease     Depression     Disease of thyroid gland     Gout     Hyperglycemia     Hyperlipidemia     Hypertension     Low back pain     Sleep apnea     Vitamin D deficiency      Past Surgical History:   Procedure Laterality Date    ANKLE FUSION      2017-left    CARDIAC CATHETERIZATION      CARDIAC CATHETERIZATION Right 8/29/2022    Procedure: Left Heart Cath;  Surgeon: Ratna Zavala MD;  Location: [facility] CATH INVASIVE LOCATION;  Service: Cardiovascular;  Laterality: Right;    CARDIAC ELECTROPHYSIOLOGY PROCEDURE Left 7/3/2023    Procedure: Pacemaker DC new Valatie Notified;  Surgeon: Cr Herrera MD;  Location: [facility] CATH INVASIVE LOCATION;  Service: Cardiovascular;  Laterality: Left;    CARDIAC ELECTROPHYSIOLOGY PROCEDURE Right 3/5/2024    Procedure: Ablation atrial fibrillation, Cryo Slalonde and Wayne Igor notified 02/09/24;  Surgeon: Cr Herrera MD;  Location: [facility] CATH INVASIVE LOCATION;  Service: Cardiovascular;  Laterality: Right;    CARDIAC ELECTROPHYSIOLOGY PROCEDURE N/A 3/5/2024    Procedure: Cardioversion;  Surgeon: Cr Herrera MD;  Location: [facility] CATH INVASIVE LOCATION;  Service: Cardiovascular;  Laterality: N/A;    CARPAL TUNNEL RELEASE      CATARACT EXTRACTION      CUBITAL TUNNEL RELEASE      ENDOSCOPY N/A 5/28/2024    Procedure: ESOPHAGOGASTRODUODENOSCOPY WITH COLD FORCEP BIOPSY X1 AREA;  Surgeon: Josh Loomis MD;  Location: [facility] ENDOSCOPY;  Service: Gastroenterology;  Laterality: N/A;  Post- ESOPHAGITIS, GASTRITIS, HIATAL HERNIA    HYSTERECTOMY      REPLACEMENT TOTAL KNEE      left 2008    ROTATOR CUFF REPAIR      TOTAL KNEE  ARTHROPLASTY Right 5/4/2023    Procedure: TOTAL KNEE ARTHROPLASTY WITH CORI ROBOT;  Surgeon: Chino Herrera II, MD;  Location: [facility] MAIN OR;  Service: Robotics - Ortho;  Laterality: Right;     Prior to Admission medications    Medication Sig Start Date End Date Taking? Authorizing Provider   acetaminophen (TYLENOL) 325 MG tablet Take 2 tablets by mouth Every 6 (Six) Hours As Needed for Mild Pain.   Yes Franklin Cid MD   allopurinol (ZYLOPRIM) 300 MG tablet Take 1 tablet by mouth once daily 11/4/24  Yes Leonor Adam MD   atorvastatin (LIPITOR) 20 MG tablet Take 1 tablet by mouth once daily 9/30/24  Yes Leonor Adam MD   cetirizine (zyrTEC) 10 MG tablet Take 1 tablet by mouth Daily As Needed for Allergies. 8/28/18  Yes Franklin Cid MD   cyanocobalamin (VITAMIN B-12) 1000 MCG tablet Take 1 tablet by mouth Daily. 12/20/18  Yes Franklin Cid MD   docusate sodium (COLACE) 100 MG capsule Take 1 capsule by mouth 2 (Two) Times a Day.   Yes Franklin Cid MD   dofetilide (Tikosyn) 250 MCG capsule Take 1 capsule by mouth Every 12 (Twelve) Hours. 10/14/24  Yes Ratna Zavala MD   ferrous sulfate 325 (65 FE) MG tablet Take 1 tablet by mouth Daily With Breakfast.   Yes Franklin Cid MD   levothyroxine (SYNTHROID, LEVOTHROID) 88 MCG tablet Take 1 tablet by mouth once daily 12/11/24  Yes Leoonr Adam MD   metoprolol succinate XL (TOPROL-XL) 25 MG 24 hr tablet Take 1 tablet by mouth Daily for 360 days. 10/14/24 10/9/25 Yes Ratna Zavala MD   midodrine (PROAMATINE) 5 MG tablet TAKE 1 TABLET BY MOUTH TWICE DAILY BEFORE MEAL(S) 10/28/24  Yes Leonor Adam MD   montelukast (SINGULAIR) 10 MG tablet Take 1 tablet by mouth Every Night. 6/25/24  Yes Leonor Adam MD   Mounjaro 2.5 MG/0.5ML solution auto-injector Inject 5 mg into the appropriate muscle as directed by prescriber 1 (One) Time Per Week. 2/11/25  Yes Provider,  MD Franklin   multivitamin (MULTI VITAMIN PO) Take 1 tablet by mouth Daily. 5/16/25  Yes Jerome Barbosa MD   mupirocin (BACTROBAN) 2 % ointment APPLY SMALL AMOUNT OF OINTMENT TOPICALLY TO AFFECTED AREA TWICE DAILY 8/22/24  Yes Franklin Cid MD   pantoprazole (PROTONIX) 40 MG EC tablet Take 1 tablet by mouth Every Morning. 5/28/24  Yes Franklin Cid MD   rivaroxaban (XARELTO) 15 MG tablet Take 1 tablet by mouth Daily With Dinner. Indications: Atrial Fibrillation 9/23/24  Yes Leonor Adam MD   spironolactone (ALDACTONE) 25 MG tablet Take 1/2 (one-half) tablet by mouth once daily 10/15/24  Yes Ratna Zavala MD   triamcinolone (KENALOG) 0.1 % cream APPLY CREAM EXTERNALLY TWICE DAILY TO ECZEMA FOR UP TO 2 WEEKS PER MONTH AS NEEDED 8/22/24  Yes Franklin Cid MD   Vitamin D, Cholecalciferol, 25 MCG (1000 UT) tablet Take 1 tablet by mouth Daily. 6/16/16  Yes Franklin Cid MD   azelastine (ASTELIN) 0.1 % nasal spray Administer 2 sprays into the nostril(s) as directed by provider Daily. Use in each nostril as directed    Franklin Cid MD   multivitamin with minerals tablet tablet Take 1 tablet by mouth Daily.    ProviderFranklin MD     Allergies   Allergen Reactions    Celecoxib Itching and Swelling     swelling     Most Recent Immunizations   Administered Date(s) Administered    Arexvy (RSV, Adults 60+ yrs) 12/07/2023    COVID-19 (PFIZER) 12YRS+ (COMIRNATY) 05/30/2025    COVID-19 (PFIZER) Purple Cap Monovalent 12/29/2021    FLUAD TRI 65YR+ 11/07/2013    Flu Vaccine Intradermal Quad 18-64YR 11/07/2013    Fluad Quad 65+ 11/18/2022    Fluzone  >6mos 12/02/2014    Fluzone High-Dose 65+YRS 10/03/2024    Fluzone High-Dose 65+yrs 10/02/2023    Fluzone Quad >6mos (Multi-dose) 12/02/2014    Hep A / Hep B 06/17/2024    Influenza, Unspecified 11/01/2024    Pneumococcal Conjugate 13-Valent (PCV13) 06/16/2016    Pneumococcal Conjugate 20-Valent (PCV20) 10/02/2023     "Pneumococcal Polysaccharide (PPSV23) 01/23/2018    Shingrix 12/06/2019    Tdap 01/23/2018, 01/23/2018    Zostavax 12/06/2019     Social History     Tobacco Use    Smoking status: Never     Passive exposure: Never    Smokeless tobacco: Never   Substance Use Topics    Alcohol use: No      Social History     Substance and Sexual Activity   Drug Use No       REVIEW OF SYSTEMS:  Head: negative for headache  Respiratory: negative for shortness of breath.   Cardiovascular: negative for chest pain.   Gastrointestinal: negative abdominal pain.   Neurological: negative for LOC  Psychiatric/Behavioral: negative for memory loss.   All other systems reviewed and are negative    VITALS: LMP  (LMP Unknown)  There is no height or weight on file to calculate BMI.    PHYSICAL EXAM:   CONSTITUTIONAL: A&Ox3, No acute distress  LUNGS: Equal chest rise, no shortness of air  CARDIOVASCULAR: palpable peripheral pulses  SKIN: no skin lesions in the area examined  LYMPH: no lymphadenopathy in the area examined  Right knee incision well-healed.  Good range of motion with no significant instability    RADIOLOGY REVIEW:   XR Chest PA & Lateral  Result Date: 6/3/2025  Impression: No acute process. Electronically Signed: Denzel Dominguez MD  6/3/2025 3:35 PM EDT  Workstation ID: [id number]      LABS:   Results for the past 24 hours:   Recent Results (from the past 24 hours)   Protime-INR    Collection Time: 06/05/25  9:13 AM    Specimen: Arm, Right; Blood   Result Value Ref Range    Protime 14.2 11.7 - 14.2 Seconds    INR 1.11 (H) 0.90 - 1.10       IMPRESSION:  Patient is a 80 y.o. Not  or  female with right failed total knee    PLAN:   Admited to: Chino Herrera II, MD  Plan: Right total knee arthroplasty revision today    R \"Rodrigo\" Javier LOCK MD  Orthopaedic Surgery  [facility]  [phone] - [place] Office  [phone] - [place] Office    "

## 2025-06-05 NOTE — ANESTHESIA PREPROCEDURE EVALUATION
Anesthesia Evaluation     NPO Solid Status: > 8 hours  NPO Liquid Status: > 2 hours           Airway   Mallampati: I  TM distance: >3 FB  Neck ROM: full  No difficulty expected  Dental - normal exam   (+) edentulous    Pulmonary - normal exam   (+) ,sleep apnea on CPAP  Cardiovascular - normal exam    (+) pacemaker pacemaker, hypertension well controlled, CAD, dysrhythmias Atrial Fib, Bradycardia, CHF , hyperlipidemia    ROS comment: ·  Left ventricular ejection fraction appears to be 56 - 60%.  ·  Left ventricular diastolic function is consistent with (grade III w/high LAP) reversible restrictive pattern.  ·  Left ventricular peak systolic GLS is abnormal at -15%.      Neuro/Psych  (+) TIA, dizziness/light headedness, syncope, psychiatric history  GI/Hepatic/Renal/Endo    (+) morbid obesity, renal disease- CRI, thyroid problem hypothyroidism    Musculoskeletal     Abdominal  - normal exam    Bowel sounds: normal.   Substance History      OB/GYN          Other   arthritis,   history of cancer remission                  Anesthesia Plan    ASA 3     spinal, MAC and regional   total IV anesthesia  intravenous induction     Anesthetic plan, risks, benefits, and alternatives have been provided, discussed and informed consent has been obtained with: patient.  Pre-procedure education provided  Plan discussed with CRNA.    CODE STATUS:          CBC

## 2025-06-05 NOTE — PLAN OF CARE
Goal Outcome Evaluation:              Outcome Evaluation: New patient from surgery

## 2025-06-05 NOTE — ANESTHESIA PROCEDURE NOTES
Spinal Block    Pre-sedation assessment completed: 6/5/2025 11:00 AM    Patient reassessed immediately prior to procedure    Patient location during procedure: pre-op  Start Time: 6/5/2025 11:00 AM  Stop Time: 6/5/2025 11:08 AM  Indication:at surgeon's request  Performed By  Anesthesiologist: César Rowley MD  Preanesthetic Checklist  Completed: patient identified, IV checked, site marked, risks and benefits discussed, surgical consent, monitors and equipment checked, pre-op evaluation and timeout performed  Spinal Block Prep:  Patient Position:sitting  Sterile Tech:cap, gloves, mask and sterile barriers  Prep:Chloraprep  Patient Monitoring:blood pressure monitoring, continuous pulse oximetry and EKG    Spinal Block Procedure  Approach:midline  Guidance:landmark technique and palpation technique  Location:L3-L4  Needle Type:Sprotte  Needle Gauge:27 G  Placement of Spinal needle event:cerebrospinal fluid aspirated  Paresthesia: noMedications: bupivacaine in dextrose (MARCAINE SPINAL) 0.75-8.25 % injection - Intrathecal   1.4 mL - 6/5/2025 11:08:00 AM   Post Assessment  Patient Tolerance:patient tolerated the procedure well with no apparent complications  Complications no

## 2025-06-05 NOTE — TELEPHONE ENCOUNTER
Hub to relay    Called pt to see if she ever received a wheel chair. Or if she wanted one. [email or web address] was suggesting she get one.

## 2025-06-05 NOTE — OP NOTE
Total Knee Robotic Revision Operative Note  Dr. JACKELINE Herrera II  [phone]    PATIENT NAME: Faby Le  MRN: [id number]  : 1945 AGE: 80 y.o. GENDER: female  DATE OF OPERATION: 2025  PREOPERATIVE DIAGNOSIS: Loose Implants: During workup this patient was found to have loose total knee implants.   POSTOPERATIVE DIAGNOSIS: Same  OPERATION PERFORMED: Right Total Knee Arthroplasty Revision  SURGEON: Chino Herrera MD  Circulator: Madison Marsh RN  Scrub Person: Cabrera De La Rosa; Chhaya Walker  Vendor Representative: Kyree Rosas  Assistant: Kaushal Morris CSA  ANESTHESIA: Spinal with Block  ASSISTANT: Kaushal Morris. This case would not have been possible without another set of skilled surgical hands for retraction, use of instrumentation, and general assistance.  This assistance was vital to the success of the case.   ESTIMATED BLOOD LOSS: 200cc  SPONGE AND NEEDLE COUNT: Correct  INDICATIONS: Loose Implants: This patient was noted to have a painful and loose total knee implant that failed conservative treatment. The patient was indicated for an elective revision total knee. A discussion of operative versus nonoperative treatment was had with the patient and they failed conservative management. They elected to undergo total knee arthroplasty revision. The risks of surgery were discussed and included the risk of anesthesia, infection, damage to neurovascular structures, implant loosening/failure, fracture, hardware prominence, continued pain, early failure, the need for further procedures, medical complications, and others. No guarantees were made. The patient wished to proceed with surgery and a surgical consent was signed. The patient's pain is becoming disabling, despite extensive conservative care including NSAIDs, therapy, and injections.    COMPONENTS:   Lesion Oxinium femoral component size 4 with multiple wedges and a 12 x 120 stem  Legion revision tibial baseplate size 3 with a 15 mm wedge and a  10 x 120 stem  11 mm high flex articular insert  18 mm tibial cone    PERTINENT FINDINGS: None    DETAILS OF PROCEDURE:  The patient was met in the preoperative area. The site was marked. The consent and H&P were reviewed. The patient was then wheeled back to the operative suite and transferred to the operative table. The patient underwent anesthesia. A tourniquet was placed on the upper thigh.  A bump was placed under the operative hip. The Trejo baseplate was secured to the table. Surgical alcohol was used to thoroughly clean the entire operative extremity.     The leg was then prepped in the normal sterile fashion, which included ChloraPrep, multiple layers of sterile drapes, and surgical space suits for the entire operative team. The Trejo boot was applied to the foot after adequate padding. An Ioban dressing was applied to the knee after the surgical incision was marked.  New outer gloves were used by all sterile surgical team members after final draping. After a surgical timeout in which administration of preoperative antibiotics as well as 1g of tranexamic acid (if not contraindicated) and the surgical site were confirmed, the tourniquet was put up.     2 tibial and 2 femoral pins were placed for the robot apparatus and the robotic arrays were attached.    In flexion, a midline knee incision was utilized through the old scar from the prior surgery.  Dissection was carried down to the capsule.  Medial and lateral flaps were created to allow adequate exposure.  Next a medial parapatellar arthrotomy was made.       The patella was exposed and noted to be in good condition.  There is no obvious loosening.  I then turned my attention to the robot.  The knee was mapped and planned in standard fashion.  Afterwards, the polyethylene was removed.  I then used narrow osteotomes to loosen the femoral component which came out without too much difficulty.  There was good bone preservation.  I then turned my  "attention to the tibia.  In similar fashion, I used osteotomes circumferentially around the tibial component to loosen it and was then extracted.  At that point, I turned my attention back to the robot.  The knee was mapped again to determine bony defects.  We then measured augments that it was noted that there would be a distal augment medially and laterally on the femur.  A tibial augment was also necessary.  We then went back to the robotic screen and planned for all necessary augments and then used a bur to remove the bone to make room for the augments.  Afterwards, the bur was used to prepare the canals and the knee was trialed.  Real implants were then opened and assembled and the first batch of cement was mixed.  A canal restrictor was utilized in both the tibia and femur.  The tibia was cemented first and afterwards a second batch of cement was mixed and the femur was cemented.  A trial poly  was then inserted and the knee was brought into extension and the tourniquet was taken down.  Adequate hemostasis was achieved.  The knee was injected with analgesic cocktail.  After all cement was hardened, the knee was trialed 1 final time and real polyethylene was opened and inserted.  Due to some oozing of the soft tissues, I did elect to leave the drain.    The knee was then closed in layers and a sterile dressing was applied    The patient was awoken from anesthesia, moved to the rTyler and taken to the recovery room in stable condition. Sponge and needle count were correct. There were no complications. Patient tolerated the procedure well.    R \"Rodrigo\" Javier LOCK MD  Orthopaedic Surgery  [facility]  [phone]                "

## 2025-06-06 PROBLEM — I95.1 ORTHOSTASIS: Status: ACTIVE | Noted: 2025-06-06

## 2025-06-06 LAB
ANION GAP SERPL CALCULATED.3IONS-SCNC: 11.9 MMOL/L (ref 5–15)
BASOPHILS # BLD AUTO: 0.02 10*3/MM3 (ref 0–0.2)
BASOPHILS # BLD AUTO: 0.02 10*3/MM3 (ref 0–0.2)
BASOPHILS NFR BLD AUTO: 0.1 % (ref 0–1.5)
BASOPHILS NFR BLD AUTO: 0.2 % (ref 0–1.5)
BUN SERPL-MCNC: 28.8 MG/DL (ref 8–23)
BUN/CREAT SERPL: 16.6 (ref 7–25)
CALCIUM SPEC-SCNC: 9.2 MG/DL (ref 8.6–10.5)
CHLORIDE SERPL-SCNC: 99 MMOL/L (ref 98–107)
CO2 SERPL-SCNC: 21.1 MMOL/L (ref 22–29)
CREAT SERPL-MCNC: 1.74 MG/DL (ref 0.57–1)
DEPRECATED RDW RBC AUTO: 53.9 FL (ref 37–54)
DEPRECATED RDW RBC AUTO: 54 FL (ref 37–54)
EGFRCR SERPLBLD CKD-EPI 2021: 29.7 ML/MIN/1.73
EOSINOPHIL # BLD AUTO: 0 10*3/MM3 (ref 0–0.4)
EOSINOPHIL # BLD AUTO: 0.03 10*3/MM3 (ref 0–0.4)
EOSINOPHIL NFR BLD AUTO: 0 % (ref 0.3–6.2)
EOSINOPHIL NFR BLD AUTO: 0.3 % (ref 0.3–6.2)
ERYTHROCYTE [DISTWIDTH] IN BLOOD BY AUTOMATED COUNT: 14.2 % (ref 12.3–15.4)
ERYTHROCYTE [DISTWIDTH] IN BLOOD BY AUTOMATED COUNT: 14.3 % (ref 12.3–15.4)
GEN 5 1HR TROPONIN T REFLEX: 10 NG/L
GLUCOSE SERPL-MCNC: 132 MG/DL (ref 65–99)
HCT VFR BLD AUTO: 22.6 % (ref 34–46.6)
HCT VFR BLD AUTO: 24.7 % (ref 34–46.6)
HGB BLD-MCNC: 7.5 G/DL (ref 12–15.9)
HGB BLD-MCNC: 8.2 G/DL (ref 12–15.9)
IMM GRANULOCYTES # BLD AUTO: 0.03 10*3/MM3 (ref 0–0.05)
IMM GRANULOCYTES # BLD AUTO: 0.13 10*3/MM3 (ref 0–0.05)
IMM GRANULOCYTES NFR BLD AUTO: 0.3 % (ref 0–0.5)
IMM GRANULOCYTES NFR BLD AUTO: 0.8 % (ref 0–0.5)
LYMPHOCYTES # BLD AUTO: 1.44 10*3/MM3 (ref 0.7–3.1)
LYMPHOCYTES # BLD AUTO: 1.88 10*3/MM3 (ref 0.7–3.1)
LYMPHOCYTES NFR BLD AUTO: 16.1 % (ref 19.6–45.3)
LYMPHOCYTES NFR BLD AUTO: 8.5 % (ref 19.6–45.3)
MCH RBC QN AUTO: 34.2 PG (ref 26.6–33)
MCH RBC QN AUTO: 34.2 PG (ref 26.6–33)
MCHC RBC AUTO-ENTMCNC: 33.2 G/DL (ref 31.5–35.7)
MCHC RBC AUTO-ENTMCNC: 33.2 G/DL (ref 31.5–35.7)
MCV RBC AUTO: 102.9 FL (ref 79–97)
MCV RBC AUTO: 103.2 FL (ref 79–97)
MONOCYTES # BLD AUTO: 0.69 10*3/MM3 (ref 0.1–0.9)
MONOCYTES # BLD AUTO: 0.9 10*3/MM3 (ref 0.1–0.9)
MONOCYTES NFR BLD AUTO: 5.3 % (ref 5–12)
MONOCYTES NFR BLD AUTO: 5.9 % (ref 5–12)
NEUTROPHILS NFR BLD AUTO: 14.42 10*3/MM3 (ref 1.7–7)
NEUTROPHILS NFR BLD AUTO: 77.2 % (ref 42.7–76)
NEUTROPHILS NFR BLD AUTO: 85.3 % (ref 42.7–76)
NEUTROPHILS NFR BLD AUTO: 9.05 10*3/MM3 (ref 1.7–7)
NRBC BLD AUTO-RTO: 0 /100 WBC (ref 0–0.2)
NRBC BLD AUTO-RTO: 0 /100 WBC (ref 0–0.2)
NT-PROBNP SERPL-MCNC: 538 PG/ML (ref 0–1800)
PLATELET # BLD AUTO: 140 10*3/MM3 (ref 140–450)
PLATELET # BLD AUTO: 167 10*3/MM3 (ref 140–450)
PMV BLD AUTO: 10 FL (ref 6–12)
PMV BLD AUTO: 10 FL (ref 6–12)
POTASSIUM SERPL-SCNC: 4.7 MMOL/L (ref 3.5–5.2)
PROCALCITONIN SERPL-MCNC: 0.06 NG/ML (ref 0–0.25)
QT INTERVAL: 453 MS
QTC INTERVAL: 453 MS
RBC # BLD AUTO: 2.19 10*6/MM3 (ref 3.77–5.28)
RBC # BLD AUTO: 2.4 10*6/MM3 (ref 3.77–5.28)
SODIUM SERPL-SCNC: 132 MMOL/L (ref 136–145)
TROPONIN T NUMERIC DELTA: -1 NG/L
TROPONIN T SERPL HS-MCNC: 11 NG/L
WBC NRBC COR # BLD AUTO: 11.7 10*3/MM3 (ref 3.4–10.8)
WBC NRBC COR # BLD AUTO: 16.91 10*3/MM3 (ref 3.4–10.8)

## 2025-06-06 PROCEDURE — 93005 ELECTROCARDIOGRAM TRACING: CPT

## 2025-06-06 PROCEDURE — 84484 ASSAY OF TROPONIN QUANT: CPT

## 2025-06-06 PROCEDURE — 80048 BASIC METABOLIC PNL TOTAL CA: CPT

## 2025-06-06 PROCEDURE — 93010 ELECTROCARDIOGRAM REPORT: CPT | Performed by: INTERNAL MEDICINE

## 2025-06-06 PROCEDURE — 97162 PT EVAL MOD COMPLEX 30 MIN: CPT | Performed by: PHYSICAL THERAPIST

## 2025-06-06 PROCEDURE — 25810000003 SODIUM CHLORIDE 0.9 % SOLUTION

## 2025-06-06 PROCEDURE — 83880 ASSAY OF NATRIURETIC PEPTIDE: CPT

## 2025-06-06 PROCEDURE — 97165 OT EVAL LOW COMPLEX 30 MIN: CPT

## 2025-06-06 PROCEDURE — 84145 PROCALCITONIN (PCT): CPT

## 2025-06-06 PROCEDURE — 25010000002 CEFAZOLIN PER 500 MG: Performed by: ORTHOPAEDIC SURGERY

## 2025-06-06 PROCEDURE — 85025 COMPLETE CBC W/AUTO DIFF WBC: CPT

## 2025-06-06 RX ADMIN — ACETAMINOPHEN 1000 MG: 500 TABLET ORAL at 13:20

## 2025-06-06 RX ADMIN — RIVAROXABAN 15 MG: 15 TABLET, FILM COATED ORAL at 17:42

## 2025-06-06 RX ADMIN — SODIUM CHLORIDE 1000 ML: 900 INJECTION, SOLUTION INTRAVENOUS at 14:48

## 2025-06-06 RX ADMIN — SPIRONOLACTONE 12.5 MG: 25 TABLET ORAL at 09:43

## 2025-06-06 RX ADMIN — METOPROLOL SUCCINATE 25 MG: 25 TABLET, EXTENDED RELEASE ORAL at 09:43

## 2025-06-06 RX ADMIN — ACETAMINOPHEN 1000 MG: 500 TABLET ORAL at 17:42

## 2025-06-06 RX ADMIN — MELOXICAM 15 MG: 15 TABLET ORAL at 09:43

## 2025-06-06 RX ADMIN — LEVOTHYROXINE SODIUM 88 MCG: 88 TABLET ORAL at 05:06

## 2025-06-06 RX ADMIN — CEFAZOLIN 2000 MG: 2 INJECTION, POWDER, FOR SOLUTION INTRAMUSCULAR; INTRAVENOUS at 02:26

## 2025-06-06 RX ADMIN — ACETAMINOPHEN 1000 MG: 500 TABLET ORAL at 05:06

## 2025-06-06 NOTE — DISCHARGE PLACEMENT REQUEST
"Mara Huang (80 y.o. Female)       Date of Birth   1945    Social Security Number       Address   [address] [place] [address] [place] [facility]    Home Phone   [phone]    MRN   [id number]       Mu-[employer]   Worship    Marital Status                               Admission Date   6/5/2025    Admission Type   Elective    Admitting Provider   Ryan Duke MD    Attending Provider   Ryan Duke MD    Department, Room/Bed   [facility] SURGICAL INPATIENT, 4128/1       Discharge Date       Discharge Disposition       Discharge Destination                                 Attending Provider: Ryan Duke MD    Allergies: Celecoxib    Isolation: None   Infection: MRSA No Isolation this Admit (05/04/23)   Code Status: CPR    Ht: 160 cm (63\")   Wt: 118 kg (261 lb)    Admission Cmt: None   Principal Problem: Postoperative pain of knee [G89.18,M25.569]                   Active Insurance as of 6/5/2025       Primary Coverage       Payor Plan Insurance Group Employer/Plan Group    [facility] MEDICARE [facility] MEDICARE ADVANTAGE SNP PPO [id number]       Payor Plan Address Payor Plan Phone Number Payor Plan Fax Number Effective Dates    [address] [address]   1/1/2023 - None Entered    [facility] [id number]         Subscriber Name Subscriber Birth Date Member ID       MARA HUANG 1945 [id number]               Secondary Coverage       Payor Plan Insurance Group Employer/Plan Group    [facility] COMMUNITY PLAN OF [place] - [facility] COMMUNITY PLAN PATHWAYS IN        Payor Plan Address Payor Plan Phone Number Payor Plan Fax Number Effective Dates    [address]   7/1/2024 - None Entered    [facility] [address]         Subscriber Name Subscriber Birth Date Member ID       MARA HUANG 1945 [id number]                     Emergency Contacts        (Rel.) Home Phone Work Phone Mobile Phone    RAMÓN HUANG (Daughter) -- -- [phone]    " JANICE VALERIO (Daughter) [phone] -- [phone]    FRANCK MARTINEZ (Daughter) [phone] -- [phone]    ROSA MARRERO (Daughter) [phone] -- [phone]

## 2025-06-06 NOTE — PLAN OF CARE
Goal Outcome Evaluation:  Plan of Care Reviewed With: patient           Outcome Evaluation: Patient is a 79 y/o F who was admitted to [facility] on 6/5/25 for a R total knee revision.  She is currently POD #1.  PMHx includes Atrial fibrillation, Coronary artery disease, Disease of thyroid gland, Gout, and HTN.  At baseline pt lives in [place].  She is independent with ADLs and ambulates with a rollator.  She receives assist with laundry and cleaning.  During today's evaluation pt was sitting up in her recliner.  She was able to perform transfers with CGA and ambulate with FWW and CGA for 35 feet.  Pt's progress was limited this date by low BP and symptomatic.  Once pt was returned to recliner she looked as though she might pass out.  Pt was then placed into elevated and head reclined position in chair.  Pt improved symptoms at that time.  At this time, PT is recommending SNF at discharge due to her pain, mobility deficits, balance deficits.  PT will continue to follow pt in acute setting and progress as tolerated.    Anticipated Discharge Disposition (PT): skilled nursing facility

## 2025-06-06 NOTE — THERAPY EVALUATION
Patient Name: Faby Le  : 1945    MRN: [id number]                              Today's Date: 2025       Admit Date: 2025    Visit Dx: No diagnosis found.  Patient Active Problem List   Diagnosis    Abnormal complete blood count    Ankle pain    Arthralgia of left elbow    Arthritis    B12 deficiency    Coronary artery disease    Depression    Gallstones    Gout    Hyperglycemia    Hyperlipidemia    Hypertension    Hypothyroid    Low back pain    Mobility poor    Post-menopausal    Sleep apnea    Vitamin D deficiency    Chronic kidney disease, stage 3 (moderate)    Oropharyngeal dysphagia    Class 3 drug-induced obesity with serious comorbidity and body mass index (BMI) of 45.0 to 49.9 in adult    Chronic pain of right knee    Light headedness    Paroxysmal atrial fibrillation    Chronic left shoulder pain    Status post knee replacement    Syncope    Presence of cardiac pacemaker    Chronic diastolic heart failure    Paresthesia of left foot    TIA (transient ischemic attack)    Tachy-jojo syndrome    Anemia    Dizziness    Acute CHF    Acute exacerbation of CHF (congestive heart failure)    Chronic HFrEF (heart failure with reduced ejection fraction)    Endometrial carcinoma    Symptomatic hypotension    Orthostatic hypotension    Atrial fibrillation with RVR    Osteoarthritis of knee    Osteoarthritis of left glenohumeral joint    Atrial fibrillation with rapid ventricular response    Atrial fibrillation    Internal and external hemorrhoids without complication    Benign neoplasm of colon    Kidney stones    Long term (current) use of anticoagulants    Iron deficiency    Postoperative pain of knee    S/P revision of total knee    Orthostasis     Past Medical History:   Diagnosis Date    Ankle pain     Arthritis     Atrial fibrillation     Coronary artery disease     Depression     Disease of thyroid gland     Gout     Hyperglycemia     Hyperlipidemia     Hypertension     Low back pain      Sleep apnea     Vitamin D deficiency      Past Surgical History:   Procedure Laterality Date    ANKLE FUSION      2017-left    CARDIAC CATHETERIZATION      CARDIAC CATHETERIZATION Right 08/29/2022    Procedure: Left Heart Cath;  Surgeon: Ratna aZvala MD;  Location: [facility] CATH INVASIVE LOCATION;  Service: Cardiovascular;  Laterality: Right;    CARDIAC ELECTROPHYSIOLOGY PROCEDURE Left 07/03/2023    Procedure: Pacemaker DC new [place] Notified;  Surgeon: Cr Herrera MD;  Location: [facility] CATH INVASIVE LOCATION;  Service: Cardiovascular;  Laterality: Left;    CARDIAC ELECTROPHYSIOLOGY PROCEDURE Right 03/05/2024    Procedure: Ablation atrial fibrillation, Cryo Slalonde and Wayne Igor notified 02/09/24;  Surgeon: Cr Herrera MD;  Location: [facility] CATH INVASIVE LOCATION;  Service: Cardiovascular;  Laterality: Right;    CARDIAC ELECTROPHYSIOLOGY PROCEDURE N/A 03/05/2024    Procedure: Cardioversion;  Surgeon: Cr Herrera MD;  Location: [facility] CATH INVASIVE LOCATION;  Service: Cardiovascular;  Laterality: N/A;    CARPAL TUNNEL RELEASE Right     CATARACT EXTRACTION      CUBITAL TUNNEL RELEASE Right     ENDOSCOPY N/A 05/28/2024    Procedure: ESOPHAGOGASTRODUODENOSCOPY WITH COLD FORCEP BIOPSY X1 AREA;  Surgeon: Josh Loomis MD;  Location: [facility] ENDOSCOPY;  Service: Gastroenterology;  Laterality: N/A;  Post- ESOPHAGITIS, GASTRITIS, HIATAL HERNIA    HYSTERECTOMY      REPLACEMENT TOTAL KNEE      left 2008    ROTATOR CUFF REPAIR Right     TOTAL KNEE ARTHROPLASTY Right 05/04/2023    Procedure: TOTAL KNEE ARTHROPLASTY WITH CORI ROBOT;  Surgeon: Chino Herrera II, MD;  Location: [facility] MAIN OR;  Service: Robotics - Ortho;  Laterality: Right;    TOTAL KNEE ARTHROPLASTY REVISION Right 6/5/2025    Procedure: TOTAL KNEE ARTHROPLASTY REVISION WITH CORI ROBOT;  Surgeon: Chino Herrera II, MD;  Location: [facility] MAIN OR;  Service: Robotics - Ortho;  Laterality: Right;       General Information       [facility] Name 06/06/25 1639          OT Time and Intention    Document Type evaluation  -MP     Mode of Treatment occupational therapy  -MP     Patient Effort good  -[facility] Name 06/06/25 1639          General Information    Patient Profile Reviewed yes  -MP     Prior Level of Function independent:  -MP     Existing Precautions/Restrictions fall;orthostatic hypotension  -[facility] Name 06/06/25 1639          Living Environment    Current Living Arrangements assisted living facility  -[facility] Name 06/06/25 1639          Cognition    Orientation Status (Cognition) oriented x 4  -[facility] Name 06/06/25 1639          Safety Issues/Impairments Affecting Functional Mobility    Impairments Affecting Function (Mobility) balance;endurance/activity tolerance;pain;strength  -MP               User Key  (r) = Recorded By, (t) = Taken By, (c) = Cosigned By      Initials Name Provider Type    Kevin Bejarano OT Occupational Therapist                     Mobility/ADL's       [facility] Name 06/06/25 1640          Bed Mobility    Bed Mobility bed mobility (all) activities  -MP       Row Name 06/06/25 1640          Sit-Stand Transfer    Sit-Stand [place] (Transfers) contact guard  -     Assistive Device (Sit-Stand Transfers) walker, front-wheeled  -[facility] Name 06/06/25 1640          Activities of Daily Living    BADL Assessment/Intervention lower body dressing  -[facility] Name 06/06/25 1640          Mobility    Extremity Weight-bearing Status right lower extremity  -     Right Lower Extremity (Weight-bearing Status) weight-bearing as tolerated (WBAT)  -[facility] Name 06/06/25 1640          Lower Body Dressing Assessment/Training    [place] Level (Lower Body Dressing) lower body dressing skills;maximum assist (25% patient effort)  -MP               User Key  (r) = Recorded By, (t) = Taken By, (c) = Cosigned By      Initials Name Provider Type    KADE Zafar  IMANI Brown Occupational Therapist                   Obj/Interventions       Row Name 06/06/25 1640          Range of Motion Comprehensive    Comment, General Range of Motion L shoulder AROM impacted 50-75%, RUE WFL  -MP       Row Name 06/06/25 1640          Strength Comprehensive (MMT)    Comment, General Manual Muscle Testing (MMT) Assessment BUE 4-/5  -MP               User Key  (r) = Recorded By, (t) = Taken By, (c) = Cosigned By      Initials Name Provider Type    Kevin Bejarano OT Occupational Therapist                   Goals/Plan       Row Name 06/06/25 1643          Bed Mobility Goal 1 (OT)    Activity/Assistive Device (Bed Mobility Goal 1, OT) bed mobility activities, all  -MP     [place] Level/Cues Needed (Bed Mobility Goal 1, OT) set-up required;supervision required  -MP     Time Frame (Bed Mobility Goal 1, OT) long term goal (LTG);2 weeks  -MP       Row Name 06/06/25 1643          Transfer Goal 1 (OT)    Activity/Assistive Device (Transfer Goal 1, OT) sit-to-stand/stand-to-sit;toilet  -MP     [place] Level/Cues Needed (Transfer Goal 1, OT) set-up required;supervision required  -MP     Time Frame (Transfer Goal 1, OT) long term goal (LTG);2 weeks  -MP       Row Name 06/06/25 1643          Dressing Goal 1 (OT)    Activity/Device (Dressing Goal 1, OT) lower body dressing  -MP     [place]/Cues Needed (Dressing Goal 1, OT) minimum assist (75% or more patient effort)  -MP     Time Frame (Dressing Goal 1, OT) long term goal (LTG);2 weeks  -MP               User Key  (r) = Recorded By, (t) = Taken By, (c) = Cosigned By      Initials Name Provider Type    Kevin Bejarano OT Occupational Therapist                   Clinical Impression       Row Name 06/06/25 1641          Pain Assessment    Pretreatment Pain Rating 5/10  -MP     Posttreatment Pain Rating 6/10  -MP     Pain Location knee  -MP     Pain Side/Orientation right  -MP       Row Name 06/06/25 1641          Plan of Care Review     Plan of Care Reviewed With patient  -MP     Progress no change  -MP     Outcome Evaluation Patient is a 81 y/o F who was admitted to [facility] on 6/5/25 for a R total knee revision. She is currently POD #1. PMHx includes Atrial fibrillation, Coronary artery disease, Disease of thyroid gland, Gout, and HTN. At baseline pt lives in halfway and maintains ADL independence. Pt. requires increased assist for functional transfers this date during PT eval, orthostatic hypotension w/ MAP in the 50s, blood pressures remain low this eval. Pt. requires max A for all LB ADLs this date due to decreased AROM and increased pain. Pt. not safe to d/c back to [facility] and will require SNF placement to address aforementioned deficits.  -MP       Row Name 06/06/25 1641          Therapy Assessment/Plan (OT)    Rehab Potential (OT) good  -MP     Criteria for Skilled Therapeutic Interventions Met (OT) yes;meets criteria;skilled treatment is necessary  -MP     Therapy Frequency (OT) 5 times/wk  -MP       Row Name 06/06/25 1641          Therapy Plan Review/Discharge Plan (OT)    Anticipated Discharge Disposition (OT) skilled nursing facility  -MP       Row Name 06/06/25 1641          Vital Signs    Pre Patient Position Sitting  -MP     Intra Patient Position Sitting  -MP     Post Patient Position Sitting  -MP       Row Name 06/06/25 1641          Positioning and Restraints    Pre-Treatment Position sitting in chair/recliner  -MP     Post Treatment Position chair  -MP     In Chair sitting;call light within reach;encouraged to call for assist;exit alarm on  -MP               User Key  (r) = Recorded By, (t) = Taken By, (c) = Cosigned By      Initials Name Provider Type    Kevin Bejarano, IMANI Occupational Therapist                   Outcome Measures       Row Name 06/06/25 1435 06/06/25 0738       How much help from another person do you currently need...    Turning from your back to your side while in flat bed without using bedrails? 4  -EJ 4  -MM     Moving from lying on back to sitting on the side of a flat bed without bedrails? 3  -EJ 4  -MM    Moving to and from a bed to a chair (including a wheelchair)? 3  -EJ 3  -MM    Standing up from a chair using your arms (e.g., wheelchair, bedside chair)? 3  -EJ 3  -MM    Climbing 3-5 steps with a railing? 3  -EJ 3  -MM    To walk in hospital room? 3  -EJ 3  -MM    AM-PAC 6 Clicks Score (PT) 19  -EJ 20  -MM    Highest Level of Mobility Goal Walk 10 Steps or More-6  -EJ Walk 10 Steps or More-6  -MM      Row Name 06/06/25 1435          Functional Assessment    Outcome Measure Options AM-PAC 6 Clicks Basic Mobility (PT)  -EJ               User Key  (r) = Recorded By, (t) = Taken By, (c) = Cosigned By      Initials Name Provider Type    EJ Sondra Suarez, PT Physical Therapist    Jsoh Whittington, RN Registered Nurse                    Occupational Therapy Education       Title: PT OT SLP Therapies (In Progress)       Topic: Occupational Therapy (In Progress)       Point: Body mechanics (Done)       Learning Progress Summary            Patient Acceptance, E,TB, VU by  at 6/6/2025 1643                                      User Key       Initials Effective Dates Name Provider Type Discipline     06/16/21 -  Kevin Zafar OT Occupational Therapist OT                  OT Recommendation and Plan  Therapy Frequency (OT): 5 times/wk  Plan of Care Review  Plan of Care Reviewed With: patient  Progress: no change  Outcome Evaluation: Patient is a 81 y/o F who was admitted to [facility] on 6/5/25 for a R total knee revision. She is currently POD #1. PMHx includes Atrial fibrillation, Coronary artery disease, Disease of thyroid gland, Gout, and HTN. At baseline pt lives in [facility] and maintains ADL independence. Pt. requires increased assist for functional transfers this date during PT eval, orthostatic hypotension w/ MAP in the 50s, blood pressures remain low this eval. Pt. requires max A for all LB ADLs this date due to decreased  AROM and increased pain. Pt. not safe to d/c back to [facility] and will require SNF placement to address aforementioned deficits.     Time Calculation:         Time Calculation- OT       Row Name 06/06/25 1643             Time Calculation- OT    OT Start Time 1415  -MP      OT Stop Time 1430  -MP      OT Time Calculation (min) 15 min  -      Total Timed Code Minutes- OT 0 minute(s)  -MP      OT Received On 06/06/25  -      OT - Next Appointment 06/09/25  -      OT Goal Re-Cert Due Date 06/20/25  -                User Key  (r) = Recorded By, (t) = Taken By, (c) = Cosigned By      Initials Name Provider Type    MP Kevin Zafar, IMANI Occupational Therapist                  Therapy Charges for Today       Code Description Service Date Service Provider Modifiers Qty    [id number] HC OT EVAL LOW COMPLEXITY 3 6/6/2025 Kevin Zafar OT GO 1                 Kevin Zafar OT  6/6/2025

## 2025-06-06 NOTE — DISCHARGE SUMMARY
Orthopaedic Discharge Summary  [facility]  [phone]    NAME: Faby Le PCP: Leonor Adam MD   :  MRN: [id number]  [id number] LOS:  ADMIT: 1 days  2025   AGE/SEX: 80 y.o. female DC:  today             Admitting Diagnosis: Postoperative pain of knee [G89.18, M25.569]  S/P revision of total knee [Z96.659]    Surgery Performed: TOTAL KNEE ARTHROPLASTY REVISION WITH CORI ROBOT    Discharge Medications:         Discharge Medications        New Medications        Instructions Start Date   ondansetron 4 MG tablet  Commonly known as: Zofran   4 mg, Oral, Every 6 Hours PRN      oxyCODONE-acetaminophen 5-325 MG per tablet  Commonly known as: PERCOCET   1 tablet, Oral, Every 4 Hours PRN             Continue These Medications        Instructions Start Date   acetaminophen 325 MG tablet  Commonly known as: TYLENOL   650 mg, Every 6 Hours PRN      allopurinol 300 MG tablet  Commonly known as: ZYLOPRIM   300 mg, Oral, Daily      atorvastatin 20 MG tablet  Commonly known as: LIPITOR   20 mg, Oral, Daily      azelastine 0.1 % nasal spray  Commonly known as: ASTELIN   2 sprays, Daily      cetirizine 10 MG tablet  Commonly known as: zyrTEC   1 tablet, Oral, Daily PRN      cholecalciferol 25 MCG (1000 UT) tablet  Commonly known as: VITAMIN D3   1 tablet, Daily      cyanocobalamin 1000 MCG tablet  Commonly known as: VITAMIN B-12   1 tablet, Daily      docusate sodium 100 MG capsule  Commonly known as: COLACE   100 mg, 2 Times Daily      dofetilide 250 MCG capsule  Commonly known as: Tikosyn   250 mcg, Oral, Every 12 Hours      ferrous sulfate 325 (65 FE) MG tablet   325 mg, Daily With Breakfast      levothyroxine 88 MCG tablet  Commonly known as: SYNTHROID, LEVOTHROID   88 mcg, Oral, Daily      metoprolol succinate XL 25 MG 24 hr tablet  Commonly known as: TOPROL-XL   25 mg, Oral, Every 24 Hours Scheduled      midodrine 5 MG tablet  Commonly known as: PROAMATINE   5 mg, Oral, 2 Times Daily Before  Meals      montelukast 10 MG tablet  Commonly known as: SINGULAIR   10 mg, Oral, Nightly      Mounjaro 2.5 MG/0.5ML solution auto-injector  Generic drug: Tirzepatide   5 mg, Weekly      multivitamin tablet tablet   1 tablet, Oral, Daily      multivitamin with minerals tablet tablet   1 tablet, Daily      mupirocin 2 % ointment  Commonly known as: BACTROBAN   APPLY SMALL AMOUNT OF OINTMENT TOPICALLY TO AFFECTED AREA TWICE DAILY      pantoprazole 40 MG EC tablet  Commonly known as: PROTONIX   40 mg, Every Morning      rivaroxaban 15 MG tablet  Commonly known as: XARELTO   15 mg, Oral, Daily With Dinner      spironolactone 25 MG tablet  Commonly known as: ALDACTONE   12.5 mg, Oral, Daily      triamcinolone 0.1 % cream  Commonly known as: KENALOG   APPLY CREAM EXTERNALLY TWICE DAILY TO ECZEMA FOR UP TO 2 WEEKS PER MONTH AS NEEDED               Vitals:     Vitals:    06/05/25 2009 06/05/25 2350 06/05/25 2357 06/06/25 0304   BP:  103/41 109/51 116/50   BP Location:  Left arm  Left arm   Patient Position:  Lying  Lying   Pulse:   69    Resp:       Temp: 97.4 °F (36.3 °C) 97.4 °F (36.3 °C)     TempSrc: Oral Oral     SpO2:   98%    Weight:       Height:           Labs:      Admission on 06/05/2025   Component Date Value Ref Range Status    Protime 06/05/2025 14.2  11.7 - 14.2 Seconds Final    INR 06/05/2025 1.11 (H)  0.90 - 1.10 Final        XR Knee 1 or 2 View Right  Result Date: 6/5/2025  Narrative: XR KNEE 1 OR 2 VW RIGHT Date of Exam: 6/5/2025 2:58 PM EDT Indication: Post-Op Knee Arthoplasty Comparison: Right knee radiographs dated 5/15/2025 Findings: There are postsurgical changes of right total knee revision. There are anterior cutaneous staples and a surgical drain within the suprapatellar space.     Impression: Impression:    Immediate postoperative changes status post right total knee revision with anterior soft tissue swelling. Electronically Signed: Bry Mendosa MD  6/5/2025 3:27 PM EDT  Workstation ID:  [id number]    Peripheral Block  Result Date: 6/5/2025  Narrative: César Rowley MD     6/5/2025 11:15 AM Peripheral Block Pre-sedation assessment completed: 6/5/2025 11:08 AM Patient reassessed immediately prior to procedure Patient location during procedure: pre-op Start time: 6/5/2025 11:08 AM Stop time: 6/5/2025 11:12 AM Reason for block: at surgeon's request and post-op pain management Performed by Anesthesiologist: César Rowley MD Preanesthetic Checklist Completed: patient identified, IV checked, site marked, risks and benefits discussed, surgical consent, monitors and equipment checked, pre-op evaluation and timeout performed Prep: Pt Position: supine Sterile barriers:cap, gloves, mask and washed/disinfected hands Prep: ChloraPrep Patient monitoring: blood pressure monitoring, continuous pulse oximetry and EKG Procedure Sedation: yes Guidance:ultrasound guided ULTRASOUND INTERPRETATION.  Using ultrasound guidance a 20 G gauge needle was placed in close proximity to the nerve, at which point, under ultrasound guidance anesthetic was injected in the area of the nerve and spread of the anesthesia was seen on ultrasound in close proximity thereto.  There were no abnormalities seen on ultrasound; a digital image was taken; and the patient tolerated the procedure with no complications. Images:still images obtained, printed/placed on chart Laterality:right Block Type:adductor canal block Injection Technique:single-shot Needle Type:echogenic Needle Gauge:20 G Resistance on Injection: none Sedation medications used: midazolam (VERSED) injection - Intravenous  2 mg - 6/5/2025 11:00:00 AM Medications Used: dexamethasone (DECADRON) injection - Injection  4 mg - 6/5/2025 11:13:00 AM ropivacaine (NAROPIN) 0.5 % injection - Perineural  30 mL - 6/5/2025 11:13:00 AM Post Assessment Injection Assessment: negative aspiration for heme, no paresthesia on injection and incremental injection Patient Tolerance:comfortable  throughout block Complications:no Performed by: César Rowley MD     XR Chest PA & Lateral  Result Date: 6/3/2025  Narrative: XR CHEST PA AND LATERAL Date of Exam: 5/28/2025 11:41 AM EDT Indication: preop Comparison: 5/15/2025 Findings: Heart size within the limits for technique. Left-sided AICD noted. Clear lungs. No pneumothorax or effusion. Osseous structures appear intact.     Impression: Impression: No acute process. Electronically Signed: Denzel Dominguez MD  6/3/2025 3:35 PM EDT  Workstation ID: [id number]    XR Knee 1 or 2 View Right  Result Date: 5/20/2025  Narrative: XR KNEE 1 OR 2 VW RIGHT Date of Exam: 5/15/2025 10:55 AM EDT Indication: pre op Comparison: Right knee radiograph 4/22/2025 Findings: Total right knee prosthesis in anatomic alignment. No evidence of hardware fracture or signs of loosening. No visualized displaced periprosthetic fracture. Probable small joint effusion less conspicuous from prior. Reticular subcutaneous edema. Peripheral vascular calcifications.     Impression: Impression: Radiographically uncomplicated right knee prosthesis in anatomic alignment. Small decreasing joint effusion since prior comparison. Electronically Signed: Donta Jaramillo MD  5/20/2025 3:43 PM EDT  Workstation ID: [id number]    XR Chest 1 View  Result Date: 5/20/2025  Narrative: XR CHEST 1 VW Date of Exam: 5/15/2025 10:55 AM EDT Indication: pre op Comparison: 9/28/2024 Findings: Left-sided AICD noted. Heart size normal. Mild scarring at the lung bases unchanged. No pneumothorax. No new consolidation or effusion.     Impression: Impression: No acute process. Electronically Signed: Denzel Dominguez MD  5/20/2025 2:30 PM EDT  Workstation ID: [id number]    Adult Transthoracic Echo Complete W/ Cont if Necessary Per Protocol  Result Date: 5/16/2025  Narrative:   Left ventricular ejection fraction appears to be 56 - 60%.   Left ventricular diastolic function is consistent with (grade III w/high LAP) reversible restrictive  "pattern.   Left ventricular peak systolic GLS is abnormal at -15%.       Hospital Course:   80 y.o. female was admitted to [facility] to services of Chino Herrera II, MD with Postoperative pain of knee [G89.18, M25.569]  S/P revision of total knee [Z96.659] on 6/5/2025 and underwent TOTAL KNEE ARTHROPLASTY REVISION WITH CORI ROBOT. Post-operatively the patient transferred to the floor where the patient underwent mobilization therapy. Opioids were titrated to achieve appropriate pain management to allow for participation in mobilization exercises. Vital signs and laboratory values are now within safe parameters for discharge. The dressings and/or incision is intact without signs or symptoms of active infection. Operative extremity neurovascular status remains intact as compared to the preoperative exam. Appropriate education re: incision care, activity levels, medications, and follow up visits was completed and all questions were answered. The patient is now deemed stable for discharge.    HOME: The patient progressed well with physical therapy. There were cleared for discharge to home. The patietn was sent home in good condition}.       R \"Rodrigo\" Javier LOCK MD  Orthopaedic Surgery  [facility]  [phone]                                               "

## 2025-06-06 NOTE — PLAN OF CARE
Goal Outcome Evaluation:   Pt did fine throughout the night. Pt is an assit x2 w walker the first time getting up. Pt hemovac remains in place. Pt possible d/c today. Pt able to make needs known, call light is within reach, and plan of care ongoing

## 2025-06-06 NOTE — PLAN OF CARE
Problem: Adult Inpatient Plan of Care  Goal: Plan of Care Review  Outcome: Progressing  Flowsheets (Taken 6/6/2025 1055)  Progress: improving  Outcome Evaluation: Needs IP rehab. CM aware. Safety measures in place. Call light within reach. Patient able to make needs known. Plan of care ongoing.  Plan of Care Reviewed With: patient  Goal: Patient-Specific Goal (Individualized)  Outcome: Progressing  Goal: Absence of Hospital-Acquired Illness or Injury  Outcome: Progressing  Goal: Optimal Comfort and Wellbeing  Outcome: Progressing  Goal: Readiness for Transition of Care  Outcome: Progressing   Goal Outcome Evaluation:  Plan of Care Reviewed With: patient        Progress: improving  Outcome Evaluation: Needs IP rehab. CM aware. Safety measures in place. Call light within reach. Patient able to make needs known. Plan of care ongoing.

## 2025-06-06 NOTE — PLAN OF CARE
Goal Outcome Evaluation:  Plan of Care Reviewed With: patient        Progress: no change  Outcome Evaluation: Patient is a 79 y/o F who was admitted to [facility] on 6/5/25 for a R total knee revision. She is currently POD #1. PMHx includes Atrial fibrillation, Coronary artery disease, Disease of thyroid gland, Gout, and HTN. At baseline pt lives in [facility] and maintains ADL independence. Pt. requires increased assist for functional transfers this date during PT eval, orthostatic hypotension w/ MAP in the 50s, blood pressures remain low this eval. Pt. requires max A for all LB ADLs this date due to decreased AROM and increased pain. Pt. not safe to d/c back to [facility] and will require SNF placement to address aforementioned deficits.    Anticipated Discharge Disposition (OT): skilled nursing facility

## 2025-06-06 NOTE — NURSING NOTE
Hospitalist consult pending for medical management.  Cleared by surgical services for return home, but with PT evaluation and treatment plan, patient positive for orthostatics--note pending.  Reported as low as MAP=42.  PT recommending SNF placement, patient came from senior living originally.  May need further cardiac related workup for orthostatics.

## 2025-06-06 NOTE — TELEPHONE ENCOUNTER
Name: Faby Le      Relationship: Self      Best Callback Number: [phone]       HUB PROVIDED THE RELAY MESSAGE FROM THE OFFICE      PATIENT: HAS FURTHER QUESTIONS AND WOULD LIKE A CALL BACK AT THE FOLLOWING PHONE NUMBER     ADDITIONAL INFORMATION: PATIENT STATES THAT SHE DIDN'T GET ONE AND WOULD LIKE TO GET AN ORDER SENT.

## 2025-06-06 NOTE — CASE MANAGEMENT/SOCIAL WORK
Continued Stay Note   Geovanni     Patient Name: Faby Le  MRN: [id number]  Today's Date: 6/6/2025    Admit Date: 6/5/2025    Plan: DC PLAN: Awaiting SNF choices. (From [facility] assisted [facility]- PT orders faxed per ortho office)       Discharge Plan       Row Name 06/06/25 1458       Plan    Plan DC PLAN: Awaiting SNF choices. (From [facility] assisted [facility]- PT orders faxed per ortho office)        Patient/Family in Agreement with Plan yes    Plan Comments PT recomends SNF. List delivered, patient agreeable, requesting to talk it over with her daughter to give choices. Will continue to follow.                      Expected Discharge Date and Time       Expected Discharge Date Expected Discharge Time    Jun 6, 2025           Lis Carey RN   Case Management  [phone]

## 2025-06-06 NOTE — THERAPY EVALUATION
Patient Name: Faby Le  : 1945    MRN: [id number]                              Today's Date: 2025       Admit Date: 2025    Visit Dx: No diagnosis found.  Patient Active Problem List   Diagnosis    Abnormal complete blood count    Ankle pain    Arthralgia of left elbow    Arthritis    B12 deficiency    Coronary artery disease    Depression    Gallstones    Gout    Hyperglycemia    Hyperlipidemia    Hypertension    Hypothyroid    Low back pain    Mobility poor    Post-menopausal    Sleep apnea    Vitamin D deficiency    Chronic kidney disease, stage 3 (moderate)    Oropharyngeal dysphagia    Class 3 drug-induced obesity with serious comorbidity and body mass index (BMI) of 45.0 to 49.9 in adult    Chronic pain of right knee    Light headedness    Paroxysmal atrial fibrillation    Chronic left shoulder pain    Status post knee replacement    Syncope    Presence of cardiac pacemaker    Chronic diastolic heart failure    Paresthesia of left foot    TIA (transient ischemic attack)    Tachy-jojo syndrome    Anemia    Dizziness    Acute CHF    Acute exacerbation of CHF (congestive heart failure)    Chronic HFrEF (heart failure with reduced ejection fraction)    Endometrial carcinoma    Symptomatic hypotension    Orthostatic hypotension    Atrial fibrillation with RVR    Osteoarthritis of knee    Osteoarthritis of left glenohumeral joint    Atrial fibrillation with rapid ventricular response    Atrial fibrillation    Internal and external hemorrhoids without complication    Benign neoplasm of colon    Kidney stones    Long term (current) use of anticoagulants    Iron deficiency    Postoperative pain of knee    S/P revision of total knee    Orthostasis     Past Medical History:   Diagnosis Date    Ankle pain     Arthritis     Atrial fibrillation     Coronary artery disease     Depression     Disease of thyroid gland     Gout     Hyperglycemia     Hyperlipidemia     Hypertension     Low back pain      Sleep apnea     Vitamin D deficiency      Past Surgical History:   Procedure Laterality Date    ANKLE FUSION      2017-left    CARDIAC CATHETERIZATION      CARDIAC CATHETERIZATION Right 08/29/2022    Procedure: Left Heart Cath;  Surgeon: Ratna Zavala MD;  Location: [facility] CATH INVASIVE LOCATION;  Service: Cardiovascular;  Laterality: Right;    CARDIAC ELECTROPHYSIOLOGY PROCEDURE Left 07/03/2023    Procedure: Pacemaker DC new [place] Notified;  Surgeon: Cr Herrera MD;  Location: [facility] CATH INVASIVE LOCATION;  Service: Cardiovascular;  Laterality: Left;    CARDIAC ELECTROPHYSIOLOGY PROCEDURE Right 03/05/2024    Procedure: Ablation atrial fibrillation, Cryo Slalonde and Wayne Igor notified 02/09/24;  Surgeon: Cr Herrera MD;  Location: [facility] CATH INVASIVE LOCATION;  Service: Cardiovascular;  Laterality: Right;    CARDIAC ELECTROPHYSIOLOGY PROCEDURE N/A 03/05/2024    Procedure: Cardioversion;  Surgeon: Cr Herrera MD;  Location: [facility] CATH INVASIVE LOCATION;  Service: Cardiovascular;  Laterality: N/A;    CARPAL TUNNEL RELEASE Right     CATARACT EXTRACTION      CUBITAL TUNNEL RELEASE Right     ENDOSCOPY N/A 05/28/2024    Procedure: ESOPHAGOGASTRODUODENOSCOPY WITH COLD FORCEP BIOPSY X1 AREA;  Surgeon: Josh Loomis MD;  Location: [facility] ENDOSCOPY;  Service: Gastroenterology;  Laterality: N/A;  Post- ESOPHAGITIS, GASTRITIS, HIATAL HERNIA    HYSTERECTOMY      REPLACEMENT TOTAL KNEE      left 2008    ROTATOR CUFF REPAIR Right     TOTAL KNEE ARTHROPLASTY Right 05/04/2023    Procedure: TOTAL KNEE ARTHROPLASTY WITH CORI ROBOT;  Surgeon: Chino Herrera II, MD;  Location: [facility] MAIN OR;  Service: Robotics - Ortho;  Laterality: Right;      General Information       Row Name 06/06/25 1411          Physical Therapy Time and Intention    Document Type evaluation  -EJ     Mode of Treatment physical therapy  -EJ       Row Name 06/06/25 1411          General Information     Patient Profile Reviewed yes  -EJ     Prior Level of Function independent:;dressing;bathing;all household mobility;ADL's  Pt independent with ADLs.  Receives assist with laundry and cleaning.  Pt owns RW, Rollator, cane, and power w/c.  -EJ     Existing Precautions/Restrictions fall;orthostatic hypotension  -EJ     Barriers to Rehab medically complex  -EJ       Row Name 06/06/25 1411          Living Environment    Current Living Arrangements assisted living facility  Pt is from [facility].  -EJ     People in Home facility resident  -EJ       Row Name 06/06/25 1411          Home Main Entrance    Number of Stairs, Main Entrance none  -EJ       Row Name 06/06/25 1411          Stairs Within Home, Primary    Number of Stairs, Within Home, Primary none  -EJ       Row Name 06/06/25 1411          Cognition    Orientation Status (Cognition) oriented x 4  -EJ       Row Name 06/06/25 1411          Safety Issues/Impairments Affecting Functional Mobility    Impairments Affecting Function (Mobility) balance;endurance/activity tolerance;pain;strength  -EJ               User Key  (r) = Recorded By, (t) = Taken By, (c) = Cosigned By      Initials Name Provider Type    EJ Sondra Suarez, PT Physical Therapist                   Mobility       Row Name 06/06/25 1416          Bed Mobility    Bed Mobility bed mobility (all) activities  -EJ     All Activities, [place] (Bed Mobility) not tested  Pt up in chair.  -EJ       Row Name 06/06/25 1416          Sit-Stand Transfer    Sit-Stand [place] (Transfers) contact guard  -EJ     Assistive Device (Sit-Stand Transfers) walker, front-wheeled  -EJ       Row Name 06/06/25 1416          Gait/Stairs (Locomotion)    [place] Level (Gait) contact guard  -EJ     Assistive Device (Gait) walker, front-wheeled  -EJ     Patient was able to Ambulate yes  -EJ     Distance in Feet (Gait) 35  -EJ     Deviations/Abnormal Patterns (Gait) stride length decreased;weight shifting decreased  -EJ      Right Sided Gait Deviations decreased knee extension  -EJ       [facility] Name 06/06/25 1416          Mobility    Extremity Weight-bearing Status right lower extremity  -EJ     Right Lower Extremity (Weight-bearing Status) weight-bearing as tolerated (WBAT)  -EJ               User Key  (r) = Recorded By, (t) = Taken By, (c) = Cosigned By      Initials Name Provider Type     Sondra Suarez, PT Physical Therapist                   Obj/Interventions       Row Name 06/06/25 1417          Range of Motion Comprehensive    Comment, General Range of Motion R knee AROM 5-0-80 via obs.  LLE knee AROM WFL.  -[facility] Name 06/06/25 1417          Strength Comprehensive (MMT)    Comment, General Manual Muscle Testing (MMT) Assessment LLE strength grossly 4/5.  RLE grossly 4-/5 with exception of knee flexion/ext 3-/5.  Quad set poor+ to fair- RLE.  -EJ       [facility] Name 06/06/25 1417          Motor Skills    Motor Skills functional endurance  -EJ     Functional Endurance Fair-  -EJ       Row Name 06/06/25 1417          Balance    Balance Assessment sitting static balance;sitting dynamic balance;standing static balance;standing dynamic balance  -EJ     Static Sitting Balance independent  -EJ     Dynamic Sitting Balance independent  -EJ     Position, Sitting Balance unsupported  -EJ     Static Standing Balance contact guard  -EJ     Dynamic Standing Balance contact guard  -EJ     Position/Device Used, Standing Balance supported;walker, front-wheeled  -[facility] Name 06/06/25 1417          Sensory Assessment (Somatosensory)    Sensory Assessment (Somatosensory) sensation intact  -               User Key  (r) = Recorded By, (t) = Taken By, (c) = Cosigned By      Initials Name Provider Type     Sondra Suarez, PT Physical Therapist                   Goals/Plan       [facility] Name 06/06/25 1433          Bed Mobility Goal 1 (PT)    Activity/Assistive Device (Bed Mobility Goal 1, PT) bed mobility activities, all  -EJ     [place]  Level/Cues Needed (Bed Mobility Goal 1, PT) standby assist  -EJ     Time Frame (Bed Mobility Goal 1, PT) long term goal (LTG);2 weeks  -EJ       Row Name 06/06/25 1433          Transfer Goal 1 (PT)    Activity/Assistive Device (Transfer Goal 1, PT) transfers, all;walker, rolling  -EJ     [place] Level/Cues Needed (Transfer Goal 1, PT) standby assist  -EJ     Time Frame (Transfer Goal 1, PT) long term goal (LTG);2 weeks  -EJ       Row Name 06/06/25 1433          Gait Training Goal 1 (PT)    Activity/Assistive Device (Gait Training Goal 1, PT) gait (walking locomotion);walker, rolling  -EJ     [place] Level (Gait Training Goal 1, PT) standby assist  -EJ     Distance (Gait Training Goal 1, PT) 150 feet  -EJ     Time Frame (Gait Training Goal 1, PT) long term goal (LTG);2 weeks  -EJ       Row Name 06/06/25 1433          Patient Education Goal (PT)    Activity (Patient Education Goal, PT) Pt to be independent with her HEP to improve her knee ROM and quad activiation.  -EJ     [place]/Cues/Accuracy (Memory Goal 2, PT) demonstrates adequately;verbalizes understanding  -EJ     Time Frame (Patient Education Goal, PT) long term goal (LTG);2 weeks  -EJ       Row Name 06/06/25 1433          Therapy Assessment/Plan (PT)    Planned Therapy Interventions (PT) balance training;bed mobility training;gait training;home exercise program;neuromuscular re-education;patient/family education;strengthening;transfer training  -EJ               User Key  (r) = Recorded By, (t) = Taken By, (c) = Cosigned By      Initials Name Provider Type    EJ Sondra Suarez, PT Physical Therapist                   Clinical Impression       Row Name 06/06/25 1419          Pain    Pretreatment Pain Rating 5/10  -EJ     Posttreatment Pain Rating 6/10  -EJ     Pain Location knee  -EJ     Pain Side/Orientation right  -EJ     Pain Management Interventions activity modification encouraged;exercise or physical activity utilized;positioning  techniques utilized;movement retraining implemented  -EJ     Response to Pain Interventions activity level improved;activity participation with increased pain  -EJ       Row Name 06/06/25 1419          Plan of Care Review    Plan of Care Reviewed With patient  -EJ     Outcome Evaluation Patient is a 81 y/o F who was admitted to [facility] on 6/5/25 for a R total knee revision.  She is currently POD #1.  PMHx includes Atrial fibrillation, Coronary artery disease, Disease of thyroid gland, Gout, and HTN.  At baseline pt lives in senior care.  She is independent with ADLs and ambulates with a rollator.  She receives assist with laundry and cleaning.  During today's evaluation pt was sitting up in her recliner.  She was able to perform transfers with CGA and ambulate with FWW and CGA for 35 feet.  Pt's progress was limited this date by low BP and symptomatic.  Once pt was returned to recliner she looked as though she might pass out.  Pt was then placed into elevated and head reclined position in chair.  Pt improved symptoms at that time.  At this time, PT is recommending SNF at discharge due to her pain, mobility deficits, balance deficits.  PT will continue to follow pt in acute setting and progress as tolerated.  -EJ       Row Name 06/06/25 1419          Therapy Assessment/Plan (PT)    Criteria for Skilled Interventions Met (PT) yes;skilled treatment is necessary  -EJ     Therapy Frequency (PT) 6 times/wk  -EJ     Predicted Duration of Therapy Intervention (PT) until discharge  -EJ       Row Name 06/06/25 1419          Vital Signs    Pre Systolic BP Rehab 106  -EJ     Pre Treatment Diastolic BP 54  -EJ     Intra Systolic BP Rehab 85  -EJ     Intra Treatment Diastolic BP 33  (MAP 52)  -EJ     Post Systolic BP Rehab 108  -EJ     Post Treatment Diastolic BP 45  (58)  -EJ       Row Name 06/06/25 1419          Positioning and Restraints    Pre-Treatment Position sitting in chair/recliner  -EJ     Post Treatment Position chair  -EJ     In  Chair notified nsg;reclined;call light within reach;encouraged to call for assist;exit alarm on  -EJ               User Key  (r) = Recorded By, (t) = Taken By, (c) = Cosigned By      Initials Name Provider Type    Sondra Bolton, PT Physical Therapist                   Outcome Measures       Row Name 06/06/25 [facility] 06/06/25 0738       How much help from another person do you currently need...    Turning from your back to your side while in flat bed without using bedrails? 4  -EJ 4  -MM    Moving from lying on back to sitting on the side of a flat bed without bedrails? 3  -EJ 4  -MM    Moving to and from a bed to a chair (including a wheelchair)? 3  -EJ 3  -MM    Standing up from a chair using your arms (e.g., wheelchair, bedside chair)? 3  -EJ 3  -MM    Climbing 3-5 steps with a railing? 3  -EJ 3  -MM    To walk in hospital room? 3  -EJ 3  -MM    AM-PAC 6 Clicks Score (PT) 19  -EJ 20  -MM    Highest Level of Mobility Goal Walk 10 Steps or More-6  -EJ Walk 10 Steps or More-6  -MM      Row Name 06/06/25 1435          Functional Assessment    Outcome Measure Options AM-PAC 6 Clicks Basic Mobility (PT)  -EJ               User Key  (r) = Recorded By, (t) = Taken By, (c) = Cosigned By      Initials Name Provider Type    Sondra Bolton, PT Physical Therapist    Josh Whittington, RN Registered Nurse                                 Physical Therapy Education       Title: PT OT SLP Therapies (Done)       Topic: Physical Therapy (Done)       Point: Mobility training (Done)       Learning Progress Summary            Patient Acceptance, E, VU by OTTO at 6/6/2025 1435                      Point: Home exercise program (Done)       Learning Progress Summary            Patient Acceptance, E, VU by OTTO at 6/6/2025 1435                      Point: Body mechanics (Done)       Learning Progress Summary            Patient Acceptance, E, VU by OTTO at 6/6/2025 1435                      Point: Precautions (Done)       Learning Progress  Summary            Patient Acceptance, E, VU by  at 6/6/2025 1435                                      User Key       Initials Effective Dates Name Provider Type Discipline     06/03/24 -  Sondra Suarez, PT Physical Therapist PT                  PT Recommendation and Plan  Planned Therapy Interventions (PT): balance training, bed mobility training, gait training, home exercise program, neuromuscular re-education, patient/family education, strengthening, transfer training  Outcome Evaluation: Patient is a 79 y/o F who was admitted to [facility] on 6/5/25 for a R total knee revision.  She is currently POD #1.  PMHx includes Atrial fibrillation, Coronary artery disease, Disease of thyroid gland, Gout, and HTN.  [facility]  She is independent with ADLs and ambulates with a rollator.  She receives assist with laundry and cleaning.  During today's evaluation pt was sitting up in her recliner.  She was able to perform transfers with CGA and ambulate with FWW and CGA for 35 feet.  Pt's progress was limited this date by low BP and symptomatic.  Once pt was returned to recliner she looked as though she might pass out.  Pt was then placed into elevated and head reclined position in chair.  Pt improved symptoms at that time.  At this time, PT is recommending SNF at discharge due to her pain, mobility deficits, balance deficits.  PT will continue to follow pt in acute setting and progress as tolerated.     Time Calculation:         PT Charges       Row Name 06/06/25 1436             Time Calculation    Start Time 1010  -EJ      Stop Time 1039  -EJ      Time Calculation (min) 29 min  -EJ      PT Received On 06/06/25  -EJ      PT - Next Appointment 06/07/25  -      PT Goal Re-Cert Due Date 06/20/25  -         Time Calculation- PT    Total Timed Code Minutes- PT 0 minute(s)  -EJ                User Key  (r) = Recorded By, (t) = Taken By, (c) = Cosigned By      Initials Name Provider Type     Sondra Suarez,  PT Physical Therapist                  Therapy Charges for Today       Code Description Service Date Service Provider Modifiers Qty    [id number] HC PT EVAL MOD COMPLEXITY 4 6/6/2025 Sondra Suarez, PT GP 1            PT G-Codes  Outcome Measure Options: AM-PAC 6 Clicks Basic Mobility (PT)  AM-PAC 6 Clicks Score (PT): 19  PT Discharge Summary  Anticipated Discharge Disposition (PT): skilled nursing facility    Sondra Suarez, PT  6/6/2025

## 2025-06-06 NOTE — H&P
[facility] Medicine Services  History & Physical    Patient Name: Faby Le  : 1945  MRN: [id number]  Primary Care Physician:  Leonor Adam MD  Date of admission: 2025  Date and Time of Service: 2025 at 1330    Subjective      Chief Complaint: Low blood pressure    History of Present Illness: Faby Le is a 80 y.o. female with a CMH of HFpEF, SSS s/p PPM, A-fib on Xarelto, CAD, HTN, HLD, ROSA on CPAP, osteoarthritis who presented to [facility] on 2025 for elective right total knee arthroplasty revision with Dr. Herrera.  Procedure noted to have no complications.  Orthopedic surgery were planning to discharge patient home today.  However patient was noted to be hypotensive.  Orthostatics were attempted in which patient's BP noted to be as low as 90/39.  Patient denies associated weakness, fatigue, dizziness, lightheadedness, chest pain, shortness of breath, palpitations.  She does endorse no BM since surgery.  She also states she had palpitations approximately 3 weeks ago prior to her original date for knee surgery in which she was evaluated by Dr. Herrera with future plans for an ablation.  Patient understands PTs recommendations for rehab and is amenable.  Case management consulted for placement.  Hospitalist service to assume primary care of patient while she remains hospitalized.    Review of Systems   Constitutional:  Negative for chills, fatigue and fever.   Respiratory:  Negative for shortness of breath.    Cardiovascular:  Negative for chest pain and palpitations.   Gastrointestinal:  Negative for abdominal pain, nausea and vomiting.   Neurological:  Negative for dizziness and light-headedness.       Personal History     Past Medical History:   Diagnosis Date    Ankle pain     Arthritis     Atrial fibrillation     Coronary artery disease     Depression     Disease of thyroid gland     Gout     Hyperglycemia     Hyperlipidemia     Hypertension     Low back  pain     Sleep apnea     Vitamin D deficiency        Past Surgical History:   Procedure Laterality Date    ANKLE FUSION      2017-left    CARDIAC CATHETERIZATION      CARDIAC CATHETERIZATION Right 08/29/2022    Procedure: Left Heart Cath;  Surgeon: Ratna Zavala MD;  Location: [facility] CATH INVASIVE LOCATION;  Service: Cardiovascular;  Laterality: Right;    CARDIAC ELECTROPHYSIOLOGY PROCEDURE Left 07/03/2023    Procedure: Pacemaker DC new [place] Notified;  Surgeon: Cr Herrera MD;  Location: [facility] CATH INVASIVE LOCATION;  Service: Cardiovascular;  Laterality: Left;    CARDIAC ELECTROPHYSIOLOGY PROCEDURE Right 03/05/2024    Procedure: Ablation atrial fibrillation, Cryo Slalonde and Wayne Igor notified 02/09/24;  Surgeon: Cr Herrera MD;  Location: [facility] CATH INVASIVE LOCATION;  Service: Cardiovascular;  Laterality: Right;    CARDIAC ELECTROPHYSIOLOGY PROCEDURE N/A 03/05/2024    Procedure: Cardioversion;  Surgeon: Cr Herrera MD;  Location: [facility] CATH INVASIVE LOCATION;  Service: Cardiovascular;  Laterality: N/A;    CARPAL TUNNEL RELEASE Right     CATARACT EXTRACTION      CUBITAL TUNNEL RELEASE Right     ENDOSCOPY N/A 05/28/2024    Procedure: ESOPHAGOGASTRODUODENOSCOPY WITH COLD FORCEP BIOPSY X1 AREA;  Surgeon: Josh Loomis MD;  Location: [facility] ENDOSCOPY;  Service: Gastroenterology;  Laterality: N/A;  Post- ESOPHAGITIS, GASTRITIS, HIATAL HERNIA    HYSTERECTOMY      REPLACEMENT TOTAL KNEE      left 2008    ROTATOR CUFF REPAIR Right     TOTAL KNEE ARTHROPLASTY Right 05/04/2023    Procedure: TOTAL KNEE ARTHROPLASTY WITH CORI ROBOT;  Surgeon: Chino Herrera II, MD;  Location: [facility] MAIN OR;  Service: Robotics - Ortho;  Laterality: Right;       Family History: family history includes Arthritis in her brother; Cancer in her brother; Hypertension in her mother; Stroke in her father. Otherwise pertinent FHx was reviewed and not pertinent to current  issue.    Social History:  reports that she has never smoked. She has never been exposed to tobacco smoke. She has never used smokeless tobacco. She reports that she does not drink alcohol and does not use drugs.    Home Medications:  Prior to Admission Medications       Prescriptions Last Dose Informant Patient Reported? Taking?    acetaminophen (TYLENOL) 325 MG tablet Past Week  Yes Yes    Take 2 tablets by mouth Every 6 (Six) Hours As Needed for Mild Pain.    allopurinol (ZYLOPRIM) 300 MG tablet 6/4/2025  No Yes    Take 1 tablet by mouth once daily    atorvastatin (LIPITOR) 20 MG tablet 6/4/2025  No Yes    Take 1 tablet by mouth once daily    azelastine (ASTELIN) 0.1 % nasal spray Unknown  Yes No    Administer 2 sprays into the nostril(s) as directed by provider Daily. Use in each nostril as directed    cetirizine (zyrTEC) 10 MG tablet Past Week Self Yes Yes    Take 1 tablet by mouth Daily As Needed for Allergies.    cyanocobalamin (VITAMIN B-12) 1000 MCG tablet 5/29/2025 Self Yes Yes    Take 1 tablet by mouth Daily.    docusate sodium (COLACE) 100 MG capsule Past Week Self Yes Yes    Take 1 capsule by mouth 2 (Two) Times a Day.    dofetilide (Tikosyn) 250 MCG capsule 6/5/2025  No Yes    Take 1 capsule by mouth Every 12 (Twelve) Hours.    ferrous sulfate 325 (65 FE) MG tablet Past Week Self Yes Yes    Take 1 tablet by mouth Daily With Breakfast.    levothyroxine (SYNTHROID, LEVOTHROID) 88 MCG tablet 6/4/2025  No Yes    Take 1 tablet by mouth once daily    metoprolol succinate XL (TOPROL-XL) 25 MG 24 hr tablet 6/5/2025  No Yes    Take 1 tablet by mouth Daily for 360 days.    midodrine (PROAMATINE) 5 MG tablet 6/5/2025  No Yes    TAKE 1 TABLET BY MOUTH TWICE DAILY BEFORE MEAL(S)    montelukast (SINGULAIR) 10 MG tablet 6/4/2025  No Yes    Take 1 tablet by mouth Every Night.    Mounjaro 2.5 MG/0.5ML solution auto-injector 5/22/2025  Yes Yes    Inject 5 mg into the appropriate muscle as directed by prescriber 1 (One)  Time Per Week.    multivitamin (MULTI VITAMIN PO) 5/22/2025  No Yes    Take 1 tablet by mouth Daily.    multivitamin with minerals tablet tablet 5/22/2025  Yes No    Take 1 tablet by mouth Daily.    mupirocin (BACTROBAN) 2 % ointment   Yes Yes    APPLY SMALL AMOUNT OF OINTMENT TOPICALLY TO AFFECTED AREA TWICE DAILY    pantoprazole (PROTONIX) 40 MG EC tablet 6/5/2025  Yes Yes    Take 1 tablet by mouth Every Morning.    rivaroxaban (XARELTO) 15 MG tablet 6/2/2025  No Yes    Take 1 tablet by mouth Daily With Dinner. Indications: Atrial Fibrillation    spironolactone (ALDACTONE) 25 MG tablet 6/5/2025  No Yes    Take 1/2 (one-half) tablet by mouth once daily    triamcinolone (KENALOG) 0.1 % cream   Yes Yes    APPLY CREAM EXTERNALLY TWICE DAILY TO ECZEMA FOR UP TO 2 WEEKS PER MONTH AS NEEDED    Vitamin D, Cholecalciferol, 25 MCG (1000 UT) tablet 5/22/2025 Self Yes Yes    Take 1 tablet by mouth Daily.              Allergies:  Allergies   Allergen Reactions    Celecoxib Itching and Swelling     swelling       Objective      Vitals:   Temp:  [97.2 °F (36.2 °C)-97.5 °F (36.4 °C)] 97.4 °F (36.3 °C)  Heart Rate:  [66-73] 69  Resp:  [10-14] 14  BP: ()/(36-57) 106/45  Body mass index is 46.23 kg/m².    Physical Exam  General: 79 yo F, Alert and oriented x4, well nourished, no acute distress.  HENT: Normocephalic, normal hearing, moist oral mucosa, no scleral icterus.  Neck: Supple, nontender, no carotid bruits, no JVD, no LAD.  Lungs: Clear to auscultation, nonlabored respiration.  Heart: RRR, no murmur, gallop or edema.  Abdomen: Soft, nontender, nondistended, + bowel sounds.  Musculoskeletal: Bandage in place over right knee.  Normal range of motion and strength, no tenderness or swelling.  Skin: Skin is warm, dry and pink, no rashes or lesions.  Psychiatric: Cooperative, appropriate mood and affect.      Diagnostic Data:  Lab Results (last 24 hours)       ** No results found for the last 24 hours. **             Imaging  Results (Last 24 Hours)       Procedure Component Value Units Date/Time    XR Knee 1 or 2 View Right [[id number]] Collected: 06/05/25 1524     Updated: 06/05/25 1530    Narrative:      XR KNEE 1 OR 2 VW RIGHT    Date of Exam: 6/5/2025 2:58 PM EDT    Indication: Post-Op Knee Arthoplasty    Comparison: Right knee radiographs dated 5/15/2025    Findings:  There are postsurgical changes of right total knee revision. There are anterior cutaneous staples and a surgical drain within the suprapatellar space.      Impression:      Impression:     Immediate postoperative changes status post right total knee revision with anterior soft tissue swelling.        Electronically Signed: Bry Mendosa MD    6/5/2025 3:27 PM EDT    Workstation ID: [id number]              Assessment & Plan        This is a 80 y.o. female with:    Active and Resolved Problems  Active Hospital Problems    Diagnosis  POA    **Postoperative pain of knee [G89.18, M25.569]  Yes    Orthostasis [I95.1]  Yes    S/P revision of total knee [Z96.659]  Not Applicable      Resolved Hospital Problems   No resolved problems to display.         Hypotension with history of hypertension  Coronary artery disease  SSS s/p pacemaker  Chronic diastolic congestive heart failure  Paroxysmal A-fib  Hyperlipidemia  BP soft, improving, appears hypovolemic  Orthostatics positive   5/16/2025 TTE with a EF of 56 to 60%, grade 3 diastolic dysfunction  CBC, BMP, troponin, BNP pending  1 L bolus ordered over 2 hours   Continue Tikosyn   Xarelto has been resumed, continue   Rec'd dose of Metoprolol this am, consider holding tomorrow if still hypotensive  Fall precautions   Telemetry  Consider cardiology consult if blood pressure does not improve with IV fluids    Osteoarthritis of right knee  S/p right TKA revision POD 1  Incentive spirometry  As needed analgesics  Orthopedics signed off   PT recommend SNF, awaiting placement     ROSA on CPAP: Continue nocturnal CPAP      VTE  Prophylaxis:  Pharmacologic & mechanical VTE prophylaxis orders are present.        The patient desires to be as follows:    CODE STATUS:    Code Status (Patient has no pulse and is not breathing): CPR (Attempt to Resuscitate)  Medical Interventions (Patient has pulse or is breathing): Full  Level Of Support Discussed With: Patient        Lyudmila Le, who can be contacted at [phone], is the designated person to make medical decisions on the patient's behalf if She is incapable of doing so. This was clarified with patient and/or next of kin on 6/5/2025 during the course of this H&P.    Admission Status:  I believe this patient meets inpatient status.    Expected Length of Stay: 2 to 3 days    PDMP and Medication Dispenses via Sidebar reviewed and consistent with patient reported medications.    I discussed the patient's findings and my recommendations with patient.      Signature:     This document has been electronically signed by Daniel Gutierrez PA-C on June 6, 2025 14:00 EDT   [facility]

## 2025-06-06 NOTE — DISCHARGE INSTRUCTIONS
Total Knee  Discharge Instructions  Dr. JACKELINE Pena” Javier II  [phone]    INCISION CARE  Wash your hands prior to dressing changes  JENSEN Wound VAC: Postoperatively you had a JENSEN Wound Vac placed on the incision. This was placed under sterile conditions in the operating room. It remains in place for 7 days postoperatively. After 7 days, the entire dressing must be removed, including all of the sticky adhesive. The dressing and battery pack provide gentle suction to the incision and provide several benefits over a traditional dressing:  It maintains the sterile environment of the OR and reduces the risk of infection  The suction removes unwanted buildup of blood/hematoma under the skin to reduce swelling  The suction also promotes fresh blood supply to the skin and soft tissue to speed up healing  The postoperative scar is reduced in size  Showering is permitted immediately after surgery, but the battery pack must be protected or removed during the shower.   After 7 days the JENSEN Wound Vac is removed. If there is no drainage, no dressing is required. If there is some scant drainage a dry bandage can be applied and changed daily until seen in the office or until the drainage stops.   No creams or ointments to the incisions until 4 weeks post op.  Do not touch or pick at the incision  Check incision every day and notify surgeon immediately if any of the following signs or symptoms are seen:  Increase in redness  Increase in swelling around the incision and of the entire extremity  Increase in pain  NEW drainage or oozing from the incision  Pulling apart of the edges of the incision  Increase in overall body temperature (greater than 100.4 degrees)  Zip-Line: your incision was closed with a state of the art device.   Is a non-invasive and easy to use wound closure device that replaces sutures, staples and glue for surgical incisions  It minimizes scarring and eliminating “railroad” marks that come with staples or  sutures  It makes removal as atraumatic as peeling off a bandage  Can be removed at home or by a physical therapist or nurse at 14 days postoperatively  Sutures: For the robot pins you will  typically have 4 sutures.  2 of the sutures will be below the incision and 2 of the sutures will be above the incision.  These may either be taken out by home health at 10 to 14 days or if they are still in place when you come to your first postoperative visit, they will be taken out in the office    ACTIVITIES  Exercises:  Physical therapy will begin immediately while in the hospital. Patients going to a nursing home will get therapy as part of their care at the SNU/SNF facility. Patients going home may also have a therapist come to the house to help them mobilize until they can safely get to an outpatient therapy facility.  Elevate the affected leg most of the day during the first week post operatively. Caution must be taken to avoid pillow placement directly under the heel of the leg, as this can cause pressure ulcers even with a soft pillow. All pillows and blankets should be placed underneath of the thigh and calf so that the heel is free-floating.  Use cold packs for 20-30 minutes approximately 5 times per day.  You should perform the daily stretching and strengthening exercises as taught by the therapist as often as possible. This can be done many times a day.  Full weight bearing is allowed after surgery. It will be sore/painful to put weight on the leg, but this will help the bone to heal and prevent complications such as pneumonia, bed sores and blood clots. Mobilization is vital to the recovery process.  Activities of Daily Living:  No tub baths, hot tubs, or swimming pools for 4 weeks.  May shower and let water run over the incision immediately after surgery. The battery pack of the JENSEN Wound Vac must be protected or removed while in the shower. After the JENSEN is removed 7 days after surgery showering is permitted  as long as there is no drainage from the wound.     Restrictions  Weight: It is ok to allow full weight bearing after surgery. Weight on the leg actually quickens the recovery process. While it will be sore/painful to put weight on the leg, it is safe to do so. Hip replacement after hip fracture has a much slower recover process. It can take months to heal fully from a hip fracture and patients even make some slow benefits up to a year afterwards.   Driving: Many patients have questions about when it is safe to return to driving. The answer is that this is extremely variable. It depends on the extent of the surgery, as well as how quickly you heal. Certainly left leg surgeries make returning to driving easier while right leg surgeries require more extensive rehabilitation before driving can be safe. Until you can press down on the brake hard, and are off of all narcotics, driving is not permitted. Your surgeon cannot “clear” you to return to driving, only you can make the decision when you feel it is safe.    Medications  Anticoagulants: After upper extremity surgery most patients do not require an anticoagulant unless you have another injury that will be keeping you from mobilizing. Lower extremity surgery typically does require use of an anticoagulation medicine.   IF YOU HAD LOWER EXTREMITY SURGERY AND ARE NOT DISCHARGED HOME WITH ANY ANTICOAGULANT MEDICINE YOU SHOULD TAKE ASPIRIN 325mg DAILY FOR 30 DAYS POSTOPERATIVELY.  If you are discharged home with an anticoagulant such as Aspirin, Xarelto, Eliquis, Coumadin, or Lovenox, follow these simple instructions:   Notify surgeon immediately if any yovana bleeding is noted in the urine, stool, emesis, or from the nose or the incision. Blood in the stool will often appear as black rather than red. Blood in urine may appear as pink. Blood in emesis may appear as brown/black like coffee grounds.  You will need to apply pressure for longer periods of time to any cuts or  abrasions to stop bleeding  Avoid alcohol while taking anticoagulants  Most anticoagulants are to be taken for 30 days postoperatively. After this time, you may stop using them unless instructed otherwise.   If you were already taking an anticoagulant (commonly Aspirin, Coumadin, or Plavix) you will likely be resuming your normal dose postoperatively and will be continuing that medication at the discretion of the prescribing physician.  Stool Softeners: You will be at greater risk of constipation after surgery due to being less mobile and the pain medications.  Take stool softeners as needed. Over the counter Colace 100 mg 1-2 capsules twice daily can be taken.  If stools become too loose or too frequent, please decreases the dosage or stop the stool softener.  If constipation occurs despite use of stool softeners, you are to continue the stool softeners and add a laxative (Milk of Magnesia 1 ounce daily as needed)  Drink plenty of fluids, and eat fruits and vegetables during your recovery time. Getting up and mobilizing will help the bowels to recover their regular function, as will weaning off of all narcotics when the pain becomes tolerable.  Pain Medications: Utilized after surgery are narcotics. This is some general information about these medications.  CLASSIFICATION: Pain medications are called Opioids and are narcotics  LEGALITIES: It is illegal to share narcotics with others  DRIVING: it is illegal to drive while under the influence of narcotics. Doing so is a DUI.  POTENTIAL SIDE EFFECTS: nausea, vomiting, itching, dizziness, drowsiness, dry mouth, constipation, and difficulty urinating.  POTENTIAL ADVERSE EFFECTS:  Opioid tolerance can develop with use of pain medications and this simply means that it requires more and more of the medication to control pain. However, this is seen more in patients that use opioids for longer periods of time.  Opioid dependence can develop with use of Opioids. People with  opioid dependence will experience withdrawal symptoms upon cessation of the medication.  Opioid addiction can develop with use of Opioids. The incidence of this is very unlikely in patients who take the medications as ordered and stop the medications as instructed.  Opioid overdose can be dangerous, but is unlikely when the medication is taken as ordered and stopped when ordered. It is important not to mix opioids with alcohol as this can lead to over sedation and respiratory difficulty.  DOSAGE:  After the initial surgical pain begins to resolve, you may begin to decrease the pain medication. By the end of a few weeks, you should be off of pain medications.  Refills will not be given by the office during evening hours, on weekends, or after 6 weeks post-op. You are responsible for weaning off of pain medication. You can increase the time between narcotic pills, taking one every 4 then 6 then 8 hours and so on.  To seek refills on pain medications during the initial 6-week post-operative period, you must call the office to request the refill. The office will then notify you when to  the prescription. DO NOT wait until you are out of the medication to request a refill. Prescriptions will not be filled over the weekend and depending on the schedule, it may take a couple days for the prescription to be available. Someone will have to pick the prescription in person at the office.    FOLLOW-UP VISITS  You will need to follow up in the office with your surgeon in 3 weeks, or as instructed elsewhere in your discharge paperwork. Please call this number [phone] to schedule this appointment. If you are going to an SNF/SNU facility, they will arrange for you to follow up in the office.  If you have any concerns or suspected complications prior to your follow up visit, please call the office. Do not wait until your appointment time if you suspect complications. These will need to be addressed in the office  promptly.      Chino Herrera II, MD  Orthopaedic Surgery  [facility]

## 2025-06-06 NOTE — CASE MANAGEMENT/SOCIAL WORK
Discharge Planning Assessment   Geovanni     Patient Name: Faby Le  MRN: [id number]  Today's Date: 6/6/2025    Admit Date: 6/5/2025    Plan: DC PLAN: From [facility]. (PT orders faxed per ortho office)       Discharge Needs Assessment       Row Name 06/06/25 0959       Living Environment    People in Home other (see comments)    Unique Family Situation [facility]    Current Living Arrangements extended care facility    Potentially Unsafe Housing Conditions none    In the past 12 months has the electric, gas, oil, or water company threatened to shut off services in your home? No    Provides Primary Care For no one    Family Caregiver if Needed other (see comments)    Quality of Family Relationships helpful;involved;supportive    Able to Return to Prior Arrangements yes       Resource/Environmental Concerns    Resource/Environmental Concerns none    Transportation Concerns none       Transportation Needs    In the past 12 months, has lack of transportation kept you from medical appointments or from getting medications? no    In the past 12 months, has lack of transportation kept you from meetings, work, or from getting things needed for daily living? No       Food Insecurity    Within the past 12 months, you worried that your food would run out before you got the money to buy more. Never true    Within the past 12 months, the food you bought just didn't last and you didn't have money to get more. Never true       Transition Planning    Patient/Family Anticipates Transition to long-term care facility    Patient/Family Anticipated Services at Transition none    Transportation Anticipated health plan transportation;family or friend will provide       Discharge Needs Assessment    Readmission Within the Last 30 Days no previous admission in last 30 days    Equipment Currently Used at Home wheelchair, motorized;cpap;walker, rolling;cane, straight    Anticipated Changes Related to  Illness none                   Discharge Plan       Row Name 06/06/25 0959       Plan    Plan DC PLAN: From [facility]. (PT orders faxed per ortho office)    Patient/Family in Agreement with Plan yes    Plan Comments CM met with patient at bedside, from [facility]. Independent with ADL's uses a cane, walker, wheelchair. PCP is [facility], Pharmacy is through assisted living. Agreeable to Meds to Beds. Able to afford medications, denies any issues with food or utilities. Denies any HHC or SNF. Denies any concerns about return home. Anticipate discharge today                    Expected Discharge Date and Time       Expected Discharge Date Expected Discharge Time    Jun 6, 2025            Demographic Summary       Row Name 06/06/25 0959       General Information    Admission Type inpatient    Arrived From emergency department    Required Notices Provided Important Message from Medicare    Referral Source admission list    Reason for Consult discharge planning    Preferred Language English       Contact Information    Permission Granted to Share Info With     Contact Information Obtained for                    Functional Status       Row Name 06/06/25 0959       Functional Status    Usual Activity Tolerance excellent    Current Activity Tolerance excellent       Physical Activity    On average, how many days per week do you engage in moderate to strenuous exercise (like a brisk walk)? 0 days    On average, how many minutes do you engage in exercise at this level? 0 min    Number of minutes of exercise per week 0       Assessment of Health Literacy    How often do you have someone help you read hospital materials? Sometimes    How often do you have problems learning about your medical condition because of difficulty understanding written information? Sometimes    How often do you have a problem understanding what is told to you about your medical condition?  Sometimes    How confident are you filling out medical forms by yourself? Somewhat    Health Literacy Moderate       Functional Status, IADL    Medications independent    Meal Preparation independent    Housekeeping independent    Laundry independent    Shopping independent       Mental Status    General Appearance WDL WDL       Mental Status Summary    Recent Changes in Mental Status/Cognitive Functioning no changes                            Patient Forms       Row Name 06/06/25 0850       Patient Forms    Important Message from Medicare (IMM) Delivered  IMM 6/5/2025 per registration    Delivered to Patient    Method of delivery In person                  Lis Carey RN   Case Management  [phone]

## 2025-06-07 LAB
ANION GAP SERPL CALCULATED.3IONS-SCNC: 7.9 MMOL/L (ref 5–15)
BUN SERPL-MCNC: 32.6 MG/DL (ref 8–23)
BUN/CREAT SERPL: 17.9 (ref 7–25)
CALCIUM SPEC-SCNC: 8.8 MG/DL (ref 8.6–10.5)
CHLORIDE SERPL-SCNC: 102 MMOL/L (ref 98–107)
CO2 SERPL-SCNC: 22.1 MMOL/L (ref 22–29)
CREAT SERPL-MCNC: 1.82 MG/DL (ref 0.57–1)
EGFRCR SERPLBLD CKD-EPI 2021: 28.2 ML/MIN/1.73
GLUCOSE SERPL-MCNC: 112 MG/DL (ref 65–99)
POTASSIUM SERPL-SCNC: 4.7 MMOL/L (ref 3.5–5.2)
SODIUM SERPL-SCNC: 132 MMOL/L (ref 136–145)

## 2025-06-07 PROCEDURE — 97110 THERAPEUTIC EXERCISES: CPT

## 2025-06-07 PROCEDURE — 97530 THERAPEUTIC ACTIVITIES: CPT

## 2025-06-07 PROCEDURE — 97116 GAIT TRAINING THERAPY: CPT

## 2025-06-07 PROCEDURE — 25810000003 SODIUM CHLORIDE 0.9 % SOLUTION: Performed by: INTERNAL MEDICINE

## 2025-06-07 RX ORDER — SODIUM CHLORIDE 9 MG/ML
100 INJECTION, SOLUTION INTRAVENOUS CONTINUOUS
Status: DISPENSED | OUTPATIENT
Start: 2025-06-07 | End: 2025-06-08

## 2025-06-07 RX ORDER — MIDODRINE HYDROCHLORIDE 5 MG/1
5 TABLET ORAL
Status: DISCONTINUED | OUTPATIENT
Start: 2025-06-07 | End: 2025-06-11 | Stop reason: HOSPADM

## 2025-06-07 RX ADMIN — MELOXICAM 15 MG: 15 TABLET ORAL at 08:01

## 2025-06-07 RX ADMIN — MIDODRINE HYDROCHLORIDE 5 MG: 5 TABLET ORAL at 17:25

## 2025-06-07 RX ADMIN — ACETAMINOPHEN 1000 MG: 500 TABLET ORAL at 06:35

## 2025-06-07 RX ADMIN — LEVOTHYROXINE SODIUM 88 MCG: 88 TABLET ORAL at 06:35

## 2025-06-07 RX ADMIN — RIVAROXABAN 15 MG: 15 TABLET, FILM COATED ORAL at 17:25

## 2025-06-07 RX ADMIN — ACETAMINOPHEN 1000 MG: 500 TABLET ORAL at 12:38

## 2025-06-07 RX ADMIN — SODIUM CHLORIDE 250 ML: 9 INJECTION, SOLUTION INTRAVENOUS at 08:12

## 2025-06-07 RX ADMIN — SODIUM CHLORIDE 100 ML/HR: 900 INJECTION, SOLUTION INTRAVENOUS at 14:59

## 2025-06-07 RX ADMIN — ACETAMINOPHEN 1000 MG: 500 TABLET ORAL at 17:25

## 2025-06-07 NOTE — PLAN OF CARE
Goal Outcome Evaluation:         A&ox4. Hypotensive, meds adjusted. Up in chair x1 assist. Pnd snf choices. Plan of care ongoing.

## 2025-06-07 NOTE — PROGRESS NOTES
[facility] Medicine Services       Patient Name: Faby Le  : 1945  MRN: [id number]  Primary Care Physician:  Leonor Adam MD  Date of admission: 2025  Date and Time of Service: 2025 at 1330     Subjective       Chief Complaint: Low blood pressure     History of Present Illness: Faby Le is a 80 y.o. female with a CMH of HFpEF, SSS s/p PPM, A-fib on Xarelto, CAD, HTN, HLD, ROSA on CPAP, osteoarthritis who presented to [facility] on 2025 for elective right total knee arthroplasty revision with Dr. Herrera.  Procedure noted to have no complications.  Orthopedic surgery were planning to discharge patient home today.  However patient was noted to be hypotensive.  Orthostatics were attempted in which patient's BP noted to be as low as 90/39.  Patient denies associated weakness, fatigue, dizziness, lightheadedness, chest pain, shortness of breath, palpitations.  She does endorse no BM since surgery.  She also states she had palpitations approximately 3 weeks ago prior to her original date for knee surgery in which she was evaluated by Dr. Herrera with future plans for an ablation.  Patient understands PTs recommendations for rehab and is amenable.  Case management consulted for placement.  Hospitalist service to assume primary care of patient while she remains hospitalized.     Review of Systems   Constitutional:  Negative for chills, fatigue and fever.   Respiratory:  Negative for shortness of breath.    Cardiovascular:  Negative for chest pain and palpitations.   Gastrointestinal:  Negative for abdominal pain, nausea and vomiting.   Neurological:  Negative for dizziness and light-headedness.       25  Patient yesterday  She has been feeling weak and dizzy for few days  Creatinine is up to 1.8  Status post knee arthroplasty  Status post revision of the total knee  Postop day #2  Hemoglobin drop noted with last hemoglobin 7.5 mostly postop  Worsening  creatinine  Will give IV bolus normal saline follow CBC and chemistry profile in the morning  Nephrology consult for evaluation of worsening renal insufficiency  Check stool for occult blood  Keep patient on IV fluids at normal saline 100 an hour  Personal History      Medical History        Past Medical History:   Diagnosis Date    Ankle pain      Arthritis      Atrial fibrillation      Coronary artery disease      Depression      Disease of thyroid gland      Gout      Hyperglycemia      Hyperlipidemia      Hypertension      Low back pain      Sleep apnea      Vitamin D deficiency              Surgical History         Past Surgical History:   Procedure Laterality Date    ANKLE FUSION         2017-left    CARDIAC CATHETERIZATION        CARDIAC CATHETERIZATION Right 08/29/2022     Procedure: Left Heart Cath;  Surgeon: Ratna Zavala MD;  Location: [facility] CATH INVASIVE LOCATION;  Service: Cardiovascular;  Laterality: Right;    CARDIAC ELECTROPHYSIOLOGY PROCEDURE Left 07/03/2023     Procedure: Pacemaker DC new [place] Notified;  Surgeon: Cr Herrera MD;  Location: [facility] CATH INVASIVE LOCATION;  Service: Cardiovascular;  Laterality: Left;    CARDIAC ELECTROPHYSIOLOGY PROCEDURE Right 03/05/2024     Procedure: Ablation atrial fibrillation, Cryo Slalonde and Wayne Igor notified 02/09/24;  Surgeon: Cr Herrera MD;  Location: [facility] CATH INVASIVE LOCATION;  Service: Cardiovascular;  Laterality: Right;    CARDIAC ELECTROPHYSIOLOGY PROCEDURE N/A 03/05/2024     Procedure: Cardioversion;  Surgeon: Cr Herrera MD;  Location: [facility] CATH INVASIVE LOCATION;  Service: Cardiovascular;  Laterality: N/A;    CARPAL TUNNEL RELEASE Right      CATARACT EXTRACTION        CUBITAL TUNNEL RELEASE Right      ENDOSCOPY N/A 05/28/2024     Procedure: ESOPHAGOGASTRODUODENOSCOPY WITH COLD FORCEP BIOPSY X1 AREA;  Surgeon: Josh Loomis MD;  Location: [facility] ENDOSCOPY;  Service: Gastroenterology;   Laterality: N/A;  Post- ESOPHAGITIS, GASTRITIS, HIATAL HERNIA    HYSTERECTOMY        REPLACEMENT TOTAL KNEE         left 2008    ROTATOR CUFF REPAIR Right      TOTAL KNEE ARTHROPLASTY Right 05/04/2023     Procedure: TOTAL KNEE ARTHROPLASTY WITH CORI ROBOT;  Surgeon: Chino Herrera II, MD;  Location: [facility] MAIN OR;  Service: Robotics - Ortho;  Laterality: Right;            Family History: family history includes Arthritis in her brother; Cancer in her brother; Hypertension in her mother; Stroke in her father. Otherwise pertinent FHx was reviewed and not pertinent to current issue.     Social History:  reports that she has never smoked. She has never been exposed to tobacco smoke. She has never used smokeless tobacco. She reports that she does not drink alcohol and does not use drugs.     Home Medications:  Prior to Admission Medications         Prescriptions Last Dose Informant Patient Reported? Taking?     acetaminophen (TYLENOL) 325 MG tablet Past Week   Yes Yes     Take 2 tablets by mouth Every 6 (Six) Hours As Needed for Mild Pain.     allopurinol (ZYLOPRIM) 300 MG tablet 6/4/2025   No Yes     Take 1 tablet by mouth once daily     atorvastatin (LIPITOR) 20 MG tablet 6/4/2025   No Yes     Take 1 tablet by mouth once daily     azelastine (ASTELIN) 0.1 % nasal spray Unknown   Yes No     Administer 2 sprays into the nostril(s) as directed by provider Daily. Use in each nostril as directed     cetirizine (zyrTEC) 10 MG tablet Past Week Self Yes Yes     Take 1 tablet by mouth Daily As Needed for Allergies.     cyanocobalamin (VITAMIN B-12) 1000 MCG tablet 5/29/2025 Self Yes Yes     Take 1 tablet by mouth Daily.     docusate sodium (COLACE) 100 MG capsule Past Week Self Yes Yes     Take 1 capsule by mouth 2 (Two) Times a Day.     dofetilide (Tikosyn) 250 MCG capsule 6/5/2025   No Yes     Take 1 capsule by mouth Every 12 (Twelve) Hours.     ferrous sulfate 325 (65 FE) MG tablet Past Week Self Yes Yes     Take  1 tablet by mouth Daily With Breakfast.     levothyroxine (SYNTHROID, LEVOTHROID) 88 MCG tablet 6/4/2025   No Yes     Take 1 tablet by mouth once daily     metoprolol succinate XL (TOPROL-XL) 25 MG 24 hr tablet 6/5/2025   No Yes     Take 1 tablet by mouth Daily for 360 days.     midodrine (PROAMATINE) 5 MG tablet 6/5/2025   No Yes     TAKE 1 TABLET BY MOUTH TWICE DAILY BEFORE MEAL(S)     montelukast (SINGULAIR) 10 MG tablet 6/4/2025   No Yes     Take 1 tablet by mouth Every Night.     Mounjaro 2.5 MG/0.5ML solution auto-injector 5/22/2025   Yes Yes     Inject 5 mg into the appropriate muscle as directed by prescriber 1 (One) Time Per Week.     multivitamin (MULTI VITAMIN PO) 5/22/2025   No Yes     Take 1 tablet by mouth Daily.     multivitamin with minerals tablet tablet 5/22/2025   Yes No     Take 1 tablet by mouth Daily.     mupirocin (BACTROBAN) 2 % ointment     Yes Yes     APPLY SMALL AMOUNT OF OINTMENT TOPICALLY TO AFFECTED AREA TWICE DAILY     pantoprazole (PROTONIX) 40 MG EC tablet 6/5/2025   Yes Yes     Take 1 tablet by mouth Every Morning.     rivaroxaban (XARELTO) 15 MG tablet 6/2/2025   No Yes     Take 1 tablet by mouth Daily With Dinner. Indications: Atrial Fibrillation     spironolactone (ALDACTONE) 25 MG tablet 6/5/2025   No Yes     Take 1/2 (one-half) tablet by mouth once daily     triamcinolone (KENALOG) 0.1 % cream     Yes Yes     APPLY CREAM EXTERNALLY TWICE DAILY TO ECZEMA FOR UP TO 2 WEEKS PER MONTH AS NEEDED     Vitamin D, Cholecalciferol, 25 MCG (1000 UT) tablet 5/22/2025 Self Yes Yes     Take 1 tablet by mouth Daily.                   Allergies:  Allergies         Allergies   Allergen Reactions    Celecoxib Itching and Swelling       swelling            Objective       Vitals:   Temp:  [97.2 °F (36.2 °C)-97.5 °F (36.4 °C)] 97.4 °F (36.3 °C)  Heart Rate:  [66-73] 69  Resp:  [10-14] 14  BP: ()/(36-57) 106/45  Body mass index is 46.23 kg/m².     Physical Exam  General: 79 yo F, Alert and  oriented x4, well nourished, no acute distress.  HENT: Normocephalic, normal hearing, moist oral mucosa, no scleral icterus.  Neck: Supple, nontender, no carotid bruits, no JVD, no LAD.  Lungs: Clear to auscultation, nonlabored respiration.  Heart: RRR, no murmur, gallop or edema.  Abdomen: Soft, nontender, nondistended, + bowel sounds.  Musculoskeletal: Bandage in place over right knee.  Normal range of motion and strength, no tenderness or swelling.  Skin: Skin is warm, dry and pink, no rashes or lesions.  Psychiatric: Cooperative, appropriate mood and affect.        Diagnostic Data:  Lab Results (last 24 hours)         ** No results found for the last 24 hours. **                           Imaging Results (Last 24 Hours)         Procedure Component Value Units Date/Time     XR Knee 1 or 2 View Right [[id number]] Collected: 06/05/25 1524       Updated: 06/05/25 1530     Narrative:       XR KNEE 1 OR 2 VW RIGHT     Date of Exam: 6/5/2025 2:58 PM EDT     Indication: Post-Op Knee Arthoplasty     Comparison: Right knee radiographs dated 5/15/2025     Findings:  There are postsurgical changes of right total knee revision. There are anterior cutaneous staples and a surgical drain within the suprapatellar space.        Impression:       Impression:     Immediate postoperative changes status post right total knee revision with anterior soft tissue swelling.           Electronically Signed: Bry Mendosa MD    6/5/2025 3:27 PM EDT    Workstation ID: [id number]                   Assessment & Plan          This is a 80 y.o. female with:     Active and Resolved Problems        Active Hospital Problems     Diagnosis   POA    **Postoperative pain of knee [G89.18, M25.569]   Yes    Orthostasis [I95.1]   Yes    S/P revision of total knee [Z96.659]   Not Applicable       Resolved Hospital Problems   No resolved problems to display.            Hypotension with history of hypertension  Coronary artery disease  SSS s/p  pacemaker  Chronic diastolic congestive heart failure  Paroxysmal A-fib  Hyperlipidemia  BP soft, improving, appears hypovolemic  Orthostatics positive   5/16/2025 TTE with a EF of 56 to 60%, grade 3 diastolic dysfunction  CBC, BMP, troponin, BNP pending  1 L bolus ordered over 2 hours   Continue Tikosyn   Xarelto has been resumed, continue   Rec'd dose of Metoprolol this am, consider holding tomorrow if still hypotensive  Fall precautions   Telemetry  Consider cardiology consult if blood pressure does not improve with IV fluids     Osteoarthritis of right knee  S/p right TKA revision POD 1  Incentive spirometry  As needed analgesics  Orthopedics signed off   PT recommend SNF, awaiting placement      ROSA on CPAP: Continue nocturnal CPAP        VTE Prophylaxis:  Pharmacologic & mechanical VTE prophylaxis orders are present.           The patient desires to be as follows:     CODE STATUS:    Code Status (Patient has no pulse and is not breathing): CPR (Attempt to Resuscitate)  Medical Interventions (Patient has pulse or is breathing): Full  Level Of Support Discussed With: Patient           Lyudmila Le, who can be contacted at [phone], is the designated person to make medical decisions on the patient's behalf if She is incapable of doing so. This was clarified with patient and/or next of kin on 6/5/2025 during the course of this H&P.     Admission Status:  I believe this patient meets inpatient status.     Expected Length of Stay: 2 to 3 days     PDMP and Medication Dispenses via Sidebar reviewed and consistent with patient reported medications.     I discussed the patient's findings and my recommendations with patient.

## 2025-06-07 NOTE — CONSULTS
[facility] CONSULT NOTE    Referring Provider: Jong Minor MD   Reason for Consultation: Renal insufficiency    History of present illness: Patient is 80 years old female with chronic kidney disease stage IIIa, hypertension, CHF, A-fib, obstructive sleep apnea, hyperlipidemia, underwent elective right total knee arthroplasty.  Postoperatively patient blood pressure dropped.  Patient received IV fluid bolus.  Blood pressure better this morning.  She denies any chest pain, shortness of breath, nausea, vomiting, hematuria or dysuria.  Patient creatinine increased to 1.8 Mg/DL which prompted a consult.    History  Past Medical History:   Diagnosis Date    Ankle pain     Arthritis     Atrial fibrillation     Coronary artery disease     Depression     Disease of thyroid gland     Gout     Hyperglycemia     Hyperlipidemia     Hypertension     Low back pain     Sleep apnea     Vitamin D deficiency      Past Surgical History:   Procedure Laterality Date    ANKLE FUSION      2017-left    CARDIAC CATHETERIZATION      CARDIAC CATHETERIZATION Right 08/29/2022    Procedure: Left Heart Cath;  Surgeon: Ratna Zavala MD;  Location: [facility] CATH INVASIVE LOCATION;  Service: Cardiovascular;  Laterality: Right;    CARDIAC ELECTROPHYSIOLOGY PROCEDURE Left 07/03/2023    Procedure: Pacemaker DC new [place] Notified;  Surgeon: Cr Herrera MD;  Location: [facility] CATH INVASIVE LOCATION;  Service: Cardiovascular;  Laterality: Left;    CARDIAC ELECTROPHYSIOLOGY PROCEDURE Right 03/05/2024    Procedure: Ablation atrial fibrillation, [employer] and [facility] notified 02/09/24;  Surgeon: Cr Herrera MD;  Location: [facility] CATH INVASIVE LOCATION;  Service: Cardiovascular;  Laterality: Right;    CARDIAC ELECTROPHYSIOLOGY PROCEDURE N/A 03/05/2024    Procedure: Cardioversion;  Surgeon: Cr Herrera MD;  Location: [facility] CATH INVASIVE LOCATION;  Service: Cardiovascular;  Laterality: N/A;    CARPAL TUNNEL  RELEASE Right     CATARACT EXTRACTION      CUBITAL TUNNEL RELEASE Right     ENDOSCOPY N/A 05/28/2024    Procedure: ESOPHAGOGASTRODUODENOSCOPY WITH COLD FORCEP BIOPSY X1 AREA;  Surgeon: Josh Loomis MD;  Location: [facility] ENDOSCOPY;  Service: Gastroenterology;  Laterality: N/A;  Post- ESOPHAGITIS, GASTRITIS, HIATAL HERNIA    HYSTERECTOMY      REPLACEMENT TOTAL KNEE      left 2008    ROTATOR CUFF REPAIR Right     TOTAL KNEE ARTHROPLASTY Right 05/04/2023    Procedure: TOTAL KNEE ARTHROPLASTY WITH CORI ROBOT;  Surgeon: Chino Herrera II, MD;  Location: [facility] MAIN OR;  Service: Robotics - Ortho;  Laterality: Right;    TOTAL KNEE ARTHROPLASTY REVISION Right 6/5/2025    Procedure: TOTAL KNEE ARTHROPLASTY REVISION WITH CORI ROBOT;  Surgeon: Chino Herrera II, MD;  Location: [facility] MAIN OR;  Service: Robotics - Ortho;  Laterality: Right;     Social History     Tobacco Use    Smoking status: Never     Passive exposure: Never    Smokeless tobacco: Never   Vaping Use    Vaping status: Never Used   Substance Use Topics    Alcohol use: No    Drug use: No     Family History   Problem Relation Age of Onset    Hypertension Mother     Stroke Father     Arthritis Brother     Cancer Brother        Review of Systems  ROS  As per HPI  Objective     Vital Signs  Temp:  [97.7 °F (36.5 °C)-97.8 °F (36.6 °C)] 97.7 °F (36.5 °C)  Heart Rate:  [67] 67  Resp:  [16-17] 17  BP: (103-124)/(40-52) 103/40    I/O this shift:  In: 240 [P.O.:240]  Out: -   I/O last 3 completed shifts:  In: 360 [P.O.:360]  Out: 95 [Drains:95]    Physical Exam:  Physical Exam    General Appearance: alert, oriented x 3, no acute distress   Skin: warm and dry  HEENT: oral mucosa normal, nonicteric sclera  Neck: supple, no JVD  Lungs: CTA  Heart: RRR, normal S1 and S2  Abdomen: soft, nontender, nondistended  : no palpable bladder  Extremities: Dressing present right knee  Neuro: normal speech and mental status     Results Review:   I  reviewed the patient's new clinical results.    Lab Results   Component Value Date    CALCIUM 8.8 06/06/2025    PHOS 3.3 05/16/2025     Results from last 7 days   Lab Units 06/06/25  2345 06/06/25  1447   SODIUM mmol/L 132* 132*   POTASSIUM mmol/L 4.7 4.7   CHLORIDE mmol/L 102 99   CO2 mmol/L 22.1 21.1*   BUN mg/dL 32.6* 28.8*   CREATININE mg/dL 1.82* 1.74*   GLUCOSE mg/dL 112* 132*   CALCIUM mg/dL 8.8 9.2   WBC 10*3/mm3 11.70* 16.91*   HEMOGLOBIN g/dL 7.5* 8.2*   PLATELETS 10*3/mm3 140 167     Lab Results   Component Value Date    TROPONINT 10 06/06/2025     Estimated Creatinine Clearance: 31.6 mL/min (A) (by C-G formula based on SCr of 1.82 mg/dL (H)).  Lab Results   Component Value Date    URICACID 4.4 05/28/2025       Brief Urine Lab Results  (Last result in the past 365 days)        Color   Clarity   Blood   Leuk Est   Nitrite   Protein   CREAT   Urine HCG        10/03/24 0931 Yellow   Clear   Negative   Moderate (2+)   Negative   Negative                   Prior to Admission medications    Medication Sig Start Date End Date Taking? Authorizing Provider   acetaminophen (TYLENOL) 325 MG tablet Take 2 tablets by mouth Every 6 (Six) Hours As Needed for Mild Pain.   Yes Franklin Cid MD   allopurinol (ZYLOPRIM) 300 MG tablet Take 1 tablet by mouth once daily 11/4/24  Yes Leonor Adam MD   atorvastatin (LIPITOR) 20 MG tablet Take 1 tablet by mouth once daily 9/30/24  Yes Leonor Adam MD   cetirizine (zyrTEC) 10 MG tablet Take 1 tablet by mouth Daily As Needed for Allergies. 8/28/18  Yes Franklin Cid MD   cyanocobalamin (VITAMIN B-12) 1000 MCG tablet Take 1 tablet by mouth Daily. 12/20/18  Yes Franklin Cid MD   docusate sodium (COLACE) 100 MG capsule Take 1 capsule by mouth 2 (Two) Times a Day.   Yes Franklin Cid MD   dofetilide (Tikosyn) 250 MCG capsule Take 1 capsule by mouth Every 12 (Twelve) Hours. 10/14/24  Yes Ratna Zavala MD   ferrous sulfate 325  (65 FE) MG tablet Take 1 tablet by mouth Daily With Breakfast.   Yes Franklin Cid MD   levothyroxine (SYNTHROID, LEVOTHROID) 88 MCG tablet Take 1 tablet by mouth once daily 12/11/24  Yes Leonor Adam MD   metoprolol succinate XL (TOPROL-XL) 25 MG 24 hr tablet Take 1 tablet by mouth Daily for 360 days. 10/14/24 10/9/25 Yes Ratna Zavala MD   midodrine (PROAMATINE) 5 MG tablet TAKE 1 TABLET BY MOUTH TWICE DAILY BEFORE MEAL(S) 10/28/24  Yes Leonor Adam MD   montelukast (SINGULAIR) 10 MG tablet Take 1 tablet by mouth Every Night. 6/25/24  Yes Leonor Adam MD   Mounjaro 2.5 MG/0.5ML solution auto-injector Inject 5 mg into the appropriate muscle as directed by prescriber 1 (One) Time Per Week. 2/11/25  Yes Franklin Cid MD   multivitamin (MULTI VITAMIN PO) Take 1 tablet by mouth Daily. 5/16/25  Yes Jerome Barbosa MD   mupirocin (BACTROBAN) 2 % ointment APPLY SMALL AMOUNT OF OINTMENT TOPICALLY TO AFFECTED AREA TWICE DAILY 8/22/24  Yes Franklin Cid MD   pantoprazole (PROTONIX) 40 MG EC tablet Take 1 tablet by mouth Every Morning. 5/28/24  Yes Franklin Cid MD   rivaroxaban (XARELTO) 15 MG tablet Take 1 tablet by mouth Daily With Dinner. Indications: Atrial Fibrillation 9/23/24  Yes Leonor Adam MD   spironolactone (ALDACTONE) 25 MG tablet Take 1/2 (one-half) tablet by mouth once daily 10/15/24  Yes Ratna Zavala MD   triamcinolone (KENALOG) 0.1 % cream APPLY CREAM EXTERNALLY TWICE DAILY TO ECZEMA FOR UP TO 2 WEEKS PER MONTH AS NEEDED 8/22/24  Yes Franklin Cid MD   Vitamin D, Cholecalciferol, 25 MCG (1000 UT) tablet Take 1 tablet by mouth Daily. 6/16/16  Yes Franklin Cid MD   azelastine (ASTELIN) 0.1 % nasal spray Administer 2 sprays into the nostril(s) as directed by provider Daily. Use in each nostril as directed    Provider, MD Franklin   multivitamin with minerals tablet tablet Take 1 tablet by mouth Daily.     Provider, MD Franklin   ondansetron (Zofran) 4 MG tablet Take 1 tablet by mouth Every 6 (Six) Hours As Needed for Nausea or Vomiting. 6/5/25   Chino Herrera II, MD   oxyCODONE-acetaminophen (PERCOCET) 5-325 MG per tablet Take 1 tablet by mouth Every 4 (Four) Hours As Needed for Severe Pain. 6/5/25   Chino Herrera II, MD       acetaminophen, 1,000 mg, Oral, Q6H  levothyroxine, 88 mcg, Oral, Daily  metoprolol succinate XL, 25 mg, Oral, Q24H  rivaroxaban, 15 mg, Oral, Daily With Dinner           Assessment & Plan       Acute kidney injury on chronic kidney disease stage IIIa.  Baseline creatinine around 1.1 Mg/DL.  Creatinine increased to 1.8 this morning.  Most likely prerenal etiology with hypotension.  Nonoliguric.  Creatinine seems to be plateauing  Hypotension.  Blood pressure decreased to 90s systolic postoperatively.  Status post IV fluid bolus.  Blood pressure still low but improving  CHF.  Diastolic.  Chronic  Paroxysmal A-fib    Plan:  Hold spironolactone  Start low-dose midodrine  IV hydration  Follow urine sodium and urinalysis  Repeat labs in a.m.    I discussed the patients findings and my recommendations with patient and nursing staff    Ethan Navarro MD  06/07/25  13:00 EDT

## 2025-06-07 NOTE — PLAN OF CARE
Assessment: Faby Le presents with functional mobility impairments which indicate the need for skilled intervention. Tolerating session today without incident. BP was low and RN started fluids. Pt plans on dc to rehab.Will continue to follow and progress as tolerated.

## 2025-06-07 NOTE — THERAPY TREATMENT NOTE
"Subjective: Pt agreeable to therapeutic plan of care. Pt didn't want to walk further due to breakfast being delivered    Objective:     Precautions - falls, R TKrev, WBAT, monitor BP    Bed mobility - SBA  Transfers - CGA and with rolling walker  Ambulation - 5 feet CGA and with rolling walker    Therapeutic Exercise - 10 Reps B LE AROM lying supine    Vitals:  BP supine-103/40, sitting-110/43    Pain:   Location: c/o stiffness more than pain with RLE  Intervention for pain: Repositioned, RN notified, Increased Activity, and Therapeutic Presence    Education: Provided education on the importance of mobility in the acute care setting, Verbal/Tactile Cues, Transfer Training, Gait Training, WB'ing status, and Post-Op Precautions    Assessment: Faby Le presents with functional mobility impairments which indicate the need for skilled intervention. Tolerating session today without incident. BP was low and RN started fluids. Pt plans on dc to rehab.Will continue to follow and progress as tolerated.     Plan/Recommendations:   If medically appropriate, Moderate Intensity Therapy recommended post-acute care. This is recommended as therapy feels the patient would require 3-4 days per week and wouldn't tolerate \"3 hour daily\" rehab intensity. SNF would be the preferred choice. If the patient does not agree to SNF, arrange HH or OP depending on home bound status. If patient is medically complex, consider LTACH. Pt requires no DME at discharge.     Pt desires Skilled Rehab placement at discharge. Pt cooperative; agreeable to therapeutic recommendations and plan of care.         Basic Mobility 6-click:  Rollin = Total, A lot = 2, A little = 3; 4 = None  Supine>Sit:   1 = Total, A lot = 2, A little = 3; 4 = None   Sit>Stand with arms:  1 = Total, A lot = 2, A little = 3; 4 = None  Bed>Chair:   1 = Total, A lot = 2, A little = 3; 4 = None  Ambulate in room:  1 = Total, A lot = 2, A little = 3; 4 = None  3-5 Steps " with railin = Total, A lot = 2, A little = 3; 4 = None  Score: 18    Post-Tx Position: Up in Chair, Alarms activated, and Call light and personal items within reach  PPE: gloves    Therapy Charges for Today       Code Description Service Date Service Provider Modifiers Qty    53994959338  PT THER PROC EA 15 MIN 2025 Ashley Yang, PTA GP 1    [id number]  PT THERAPEUTIC ACT EA 15 MIN 2025 Ashley Yang, PTA GP 1    [id number] HC GAIT TRAINING EA 15 MIN 2025 Ashley Yang, PTA GP 1           PT Charges       Row Name 25 1614             Time Calculation    Start Time 0758  -      Stop Time 0825  -      Time Calculation (min) 27 min  -      PT Received On 25  -      PT - Next Appointment 25  -         Time Calculation- PT    Total Timed Code Minutes- PT 27 minute(s)  -                User Key  (r) = Recorded By, (t) = Taken By, (c) = Cosigned By      Initials Name Provider Type     Ashley Yang PTA Physical Therapist Assistant

## 2025-06-07 NOTE — PLAN OF CARE
Goal Outcome Evaluation:         Denied any need for pain medication. Skin is warm touch. Moves extremities. Skin is dry and intact, dressing in place. CPAP at bedtime. Plan of care is ongoing,

## 2025-06-08 LAB
ALBUMIN SERPL-MCNC: 3.2 G/DL (ref 3.5–5.2)
ANION GAP SERPL CALCULATED.3IONS-SCNC: 7.3 MMOL/L (ref 5–15)
BACTERIA UR QL AUTO: ABNORMAL /HPF
BASOPHILS # BLD AUTO: 0.04 10*3/MM3 (ref 0–0.2)
BASOPHILS NFR BLD AUTO: 0.6 % (ref 0–1.5)
BILIRUB UR QL STRIP: NEGATIVE
BUN SERPL-MCNC: 33.3 MG/DL (ref 8–23)
BUN/CREAT SERPL: 23.8 (ref 7–25)
CALCIUM SPEC-SCNC: 8.7 MG/DL (ref 8.6–10.5)
CHLORIDE SERPL-SCNC: 107 MMOL/L (ref 98–107)
CLARITY UR: CLEAR
CO2 SERPL-SCNC: 20.7 MMOL/L (ref 22–29)
COLOR UR: YELLOW
CREAT SERPL-MCNC: 1.4 MG/DL (ref 0.57–1)
DEPRECATED RDW RBC AUTO: 57.4 FL (ref 37–54)
EGFRCR SERPLBLD CKD-EPI 2021: 38.6 ML/MIN/1.73
EOSINOPHIL # BLD AUTO: 0.14 10*3/MM3 (ref 0–0.4)
EOSINOPHIL NFR BLD AUTO: 2.1 % (ref 0.3–6.2)
ERYTHROCYTE [DISTWIDTH] IN BLOOD BY AUTOMATED COUNT: 14.7 % (ref 12.3–15.4)
GLUCOSE SERPL-MCNC: 95 MG/DL (ref 65–99)
GLUCOSE UR STRIP-MCNC: NEGATIVE MG/DL
HCT VFR BLD AUTO: 23.2 % (ref 34–46.6)
HGB BLD-MCNC: 7.3 G/DL (ref 12–15.9)
HGB UR QL STRIP.AUTO: NEGATIVE
HYALINE CASTS UR QL AUTO: ABNORMAL /LPF
IMM GRANULOCYTES # BLD AUTO: 0.02 10*3/MM3 (ref 0–0.05)
IMM GRANULOCYTES NFR BLD AUTO: 0.3 % (ref 0–0.5)
KETONES UR QL STRIP: NEGATIVE
LEUKOCYTE ESTERASE UR QL STRIP.AUTO: ABNORMAL
LYMPHOCYTES # BLD AUTO: 2.13 10*3/MM3 (ref 0.7–3.1)
LYMPHOCYTES NFR BLD AUTO: 32.1 % (ref 19.6–45.3)
MCH RBC QN AUTO: 33.6 PG (ref 26.6–33)
MCHC RBC AUTO-ENTMCNC: 31.5 G/DL (ref 31.5–35.7)
MCV RBC AUTO: 106.9 FL (ref 79–97)
MONOCYTES # BLD AUTO: 0.57 10*3/MM3 (ref 0.1–0.9)
MONOCYTES NFR BLD AUTO: 8.6 % (ref 5–12)
NEUTROPHILS NFR BLD AUTO: 3.73 10*3/MM3 (ref 1.7–7)
NEUTROPHILS NFR BLD AUTO: 56.3 % (ref 42.7–76)
NITRITE UR QL STRIP: NEGATIVE
NRBC BLD AUTO-RTO: 0 /100 WBC (ref 0–0.2)
PH UR STRIP.AUTO: 5.5 [PH] (ref 5–8)
PHOSPHATE SERPL-MCNC: 3.1 MG/DL (ref 2.5–4.5)
PLATELET # BLD AUTO: 148 10*3/MM3 (ref 140–450)
PMV BLD AUTO: 10.3 FL (ref 6–12)
POTASSIUM SERPL-SCNC: 5 MMOL/L (ref 3.5–5.2)
PROT UR QL STRIP: NEGATIVE
RBC # BLD AUTO: 2.17 10*6/MM3 (ref 3.77–5.28)
RBC # UR STRIP: ABNORMAL /HPF
REF LAB TEST METHOD: ABNORMAL
SODIUM SERPL-SCNC: 135 MMOL/L (ref 136–145)
SP GR UR STRIP: <=1.005 (ref 1–1.03)
SQUAMOUS #/AREA URNS HPF: ABNORMAL /HPF
UROBILINOGEN UR QL STRIP: ABNORMAL
WBC # UR STRIP: ABNORMAL /HPF
WBC NRBC COR # BLD AUTO: 6.63 10*3/MM3 (ref 3.4–10.8)

## 2025-06-08 PROCEDURE — 80069 RENAL FUNCTION PANEL: CPT | Performed by: INTERNAL MEDICINE

## 2025-06-08 PROCEDURE — 25810000003 SODIUM CHLORIDE 0.9 % SOLUTION: Performed by: INTERNAL MEDICINE

## 2025-06-08 PROCEDURE — 81001 URINALYSIS AUTO W/SCOPE: CPT | Performed by: INTERNAL MEDICINE

## 2025-06-08 PROCEDURE — 85025 COMPLETE CBC W/AUTO DIFF WBC: CPT

## 2025-06-08 RX ADMIN — ACETAMINOPHEN 1000 MG: 500 TABLET ORAL at 23:17

## 2025-06-08 RX ADMIN — MIDODRINE HYDROCHLORIDE 5 MG: 5 TABLET ORAL at 08:32

## 2025-06-08 RX ADMIN — MIDODRINE HYDROCHLORIDE 5 MG: 5 TABLET ORAL at 12:26

## 2025-06-08 RX ADMIN — RIVAROXABAN 15 MG: 15 TABLET, FILM COATED ORAL at 17:03

## 2025-06-08 RX ADMIN — MIDODRINE HYDROCHLORIDE 5 MG: 5 TABLET ORAL at 17:03

## 2025-06-08 RX ADMIN — METOPROLOL SUCCINATE 25 MG: 25 TABLET, EXTENDED RELEASE ORAL at 08:32

## 2025-06-08 RX ADMIN — DOCUSATE SODIUM 100 MG: 100 CAPSULE, LIQUID FILLED ORAL at 08:46

## 2025-06-08 RX ADMIN — ACETAMINOPHEN 1000 MG: 500 TABLET ORAL at 12:26

## 2025-06-08 RX ADMIN — ACETAMINOPHEN 1000 MG: 500 TABLET ORAL at 17:03

## 2025-06-08 RX ADMIN — LEVOTHYROXINE SODIUM 88 MCG: 88 TABLET ORAL at 05:48

## 2025-06-08 RX ADMIN — ACETAMINOPHEN 1000 MG: 500 TABLET ORAL at 00:26

## 2025-06-08 RX ADMIN — SODIUM CHLORIDE 100 ML/HR: 900 INJECTION, SOLUTION INTRAVENOUS at 00:24

## 2025-06-08 NOTE — PLAN OF CARE
Goal Outcome Evaluation:         C/o mild pain on incision site, pain med offered, patient states tylenol is sufficient. Up to bedside commode with assist, voided well. Maintenance fluid infusing. Dressing on right leg is dry and intact. Amy on both feet. Plan of care is ongoing.

## 2025-06-08 NOTE — PROGRESS NOTES
[facility] Progress Note      Patient Name: Faby Le  : 1945  MRN: [id number]  Primary Care Physician:  Leonor Adam MD  Date of admission: 2025    Subjective     Interval History:     Patient resting comfortably, feeling better  Denies any chest pain, shortness of breath, nausea or vomiting    Review of Systems:   As noted above    Objective     Vitals:   Temp:  [97.3 °F (36.3 °C)-97.8 °F (36.6 °C)] 97.5 °F (36.4 °C)  Heart Rate:  [63-79] 71  Resp:  [15-18] 18  BP: (110-130)/(47-76) 127/70    Intake/Output Summary (Last 24 hours) at 2025 1044  Last data filed at 2025 0500  Gross per 24 hour   Intake 1720 ml   Output 1575 ml   Net 145 ml       Physical Exam:      General Appearance: alert, oriented x 3, no acute distress   Skin: warm and dry  HEENT: oral mucosa normal, nonicteric sclera  Neck: supple, no JVD  Lungs: CTA  Heart: RRR, normal S1 and S2  Abdomen: soft, nontender, nondistended  : no palpable bladder  Extremities: Dressing present right knee  Neuro: normal speech and mental status     Scheduled Meds:     acetaminophen, 1,000 mg, Oral, Q6H  levothyroxine, 88 mcg, Oral, Daily  metoprolol succinate XL, 25 mg, Oral, Q24H  midodrine, 5 mg, Oral, TID AC  rivaroxaban, 15 mg, Oral, Daily With Dinner      IV Meds:   sodium chloride, 100 mL/hr, Last Rate: 100 mL/hr (25 0024)        Results Reviewed:   I have personally reviewed the results from the time of this admission to 2025 10:44 EDT     Results from last 7 days   Lab Units 25  0146 25  2345 25  1447   SODIUM mmol/L 135* 132* 132*   POTASSIUM mmol/L 5.0 4.7 4.7   CHLORIDE mmol/L 107 102 99   CO2 mmol/L 20.7* 22.1 21.1*   BUN mg/dL 33.3* 32.6* 28.8*   CREATININE mg/dL 1.40* 1.82* 1.74*   CALCIUM mg/dL 8.7 8.8 9.2   GLUCOSE mg/dL 95 112* 132*     Estimated Creatinine Clearance: 41.1 mL/min (A) (by C-G formula based on SCr of 1.4 mg/dL (H)).  Results from last 7 days    Lab Units 06/08/25  0146   PHOSPHORUS mg/dL 3.1         Results from last 7 days   Lab Units 06/08/25  0146 06/06/25  2345 06/06/25  1447   WBC 10*3/mm3 6.63 11.70* 16.91*   HEMOGLOBIN g/dL 7.3* 7.5* 8.2*   PLATELETS 10*3/mm3 148 140 167     Results from last 7 days   Lab Units 06/05/25  0913   INR  1.11*       Assessment / Plan     ASSESSMENT:    Acute kidney injury on chronic kidney disease stage IIIa.  Baseline creatinine around 1.1 Mg/DL.  Prerenal etiology.  Renal function improving.  Creatinine 1.4 this morning from peak of 1.8 Mg/DL.  Electrolytes okay.    Hypotension.  Blood pressure better.  Status post IV fluid bolus.  On oral midodrine  CHF.  Diastolic.  Chronic  Paroxysmal A-fib    PLAN:  Discontinue IV fluids now  Continue oral midodrine  Restart low diuretics tomorrow if creatinine continue to improve  Surveillance labs    Ethan Navarro MD  06/08/25  10:44 EDT    [facility]  [phone]

## 2025-06-08 NOTE — PLAN OF CARE
Goal Outcome Evaluation:  Plan of Care Reviewed With: patient        Progress: improving  Outcome Evaluation: Patient up with assist x 1 and walker to [facility], sat up in chair most of the shift. Patient without complaints of pain this shift, ice packs in use, JENSEN in place. VSS, no concerns at this time.

## 2025-06-08 NOTE — PROGRESS NOTES
[facility] Medicine Services       Patient Name: Faby Le  : 1945  MRN: [id number]  Primary Care Physician:  Leonor Adam MD  Date of admission: 2025  Date and Time of Service: 2025 at 1330     Subjective       Chief Complaint: Low blood pressure     History of Present Illness: Faby Le is a 80 y.o. female with a CMH of HFpEF, SSS s/p PPM, A-fib on Xarelto, CAD, HTN, HLD, ROSA on CPAP, osteoarthritis who presented to [facility] on 2025 for elective right total knee arthroplasty revision with Dr. Herrera.  Procedure noted to have no complications.  Orthopedic surgery were planning to discharge patient home today.  However patient was noted to be hypotensive.  Orthostatics were attempted in which patient's BP noted to be as low as 90/39.  Patient denies associated weakness, fatigue, dizziness, lightheadedness, chest pain, shortness of breath, palpitations.  She does endorse no BM since surgery.  She also states she had palpitations approximately 3 weeks ago prior to her original date for knee surgery in which she was evaluated by Dr. Herrera with future plans for an ablation.  Patient understands PTs recommendations for rehab and is amenable.  Case management consulted for placement.  Hospitalist service to assume primary care of patient while she remains hospitalized.     Review of Systems   Constitutional:  Negative for chills, fatigue and fever.   Respiratory:  Negative for shortness of breath.    Cardiovascular:  Negative for chest pain and palpitations.   Gastrointestinal:  Negative for abdominal pain, nausea and vomiting.   Neurological:  Negative for dizziness and light-headedness.       25  Patient yesterday  She has been feeling weak and dizzy for few days  Creatinine is up to 1.8  Status post knee arthroplasty  Status post revision of the total knee  Postop day #2  Hemoglobin drop noted with last hemoglobin 7.5 mostly postop  Worsening  creatinine  Will give IV bolus normal saline follow CBC and chemistry profile in the morning  Nephrology consult for evaluation of worsening renal insufficiency  Check stool for occult blood  Keep patient on IV fluids at normal saline 100 an hour      6/8/25  Patient seen by nephrology consult appreciated  Creatinine is down to 1.4 which is baseline  Complaining of pain in the right knee  Patient is postop day #3  Globin 7.3 today compared to 7.5 on 6/6 which is mostly stable  Pain control  Discharge planning skilled nursing facility when bed is available      Personal History      Medical History        Past Medical History:   Diagnosis Date    Ankle pain      Arthritis      Atrial fibrillation      Coronary artery disease      Depression      Disease of thyroid gland      Gout      Hyperglycemia      Hyperlipidemia      Hypertension      Low back pain      Sleep apnea      Vitamin D deficiency              Surgical History         Past Surgical History:   Procedure Laterality Date    ANKLE FUSION         2017-left    CARDIAC CATHETERIZATION        CARDIAC CATHETERIZATION Right 08/29/2022     Procedure: Left Heart Cath;  Surgeon: Ratna Zavala MD;  Location: [facility] CATH INVASIVE LOCATION;  Service: Cardiovascular;  Laterality: Right;    CARDIAC ELECTROPHYSIOLOGY PROCEDURE Left 07/03/2023     Procedure: Pacemaker DC new [place] Notified;  Surgeon: Cr Herrera MD;  Location: [facility] CATH INVASIVE LOCATION;  Service: Cardiovascular;  Laterality: Left;    CARDIAC ELECTROPHYSIOLOGY PROCEDURE Right 03/05/2024     Procedure: Ablation atrial fibrillation, Cryo Ibrahima and Wayne Costa notified 02/09/24;  Surgeon: Cr Herrera MD;  Location: [facility] CATH INVASIVE LOCATION;  Service: Cardiovascular;  Laterality: Right;    CARDIAC ELECTROPHYSIOLOGY PROCEDURE N/A 03/05/2024     Procedure: Cardioversion;  Surgeon: Cr Herrera MD;  Location: [facility] CATH INVASIVE LOCATION;  Service: Cardiovascular;   Laterality: N/A;    CARPAL TUNNEL RELEASE Right      CATARACT EXTRACTION        CUBITAL TUNNEL RELEASE Right      ENDOSCOPY N/A 05/28/2024     Procedure: ESOPHAGOGASTRODUODENOSCOPY WITH COLD FORCEP BIOPSY X1 AREA;  Surgeon: Josh Loomsi MD;  Location: [facility] ENDOSCOPY;  Service: Gastroenterology;  Laterality: N/A;  Post- ESOPHAGITIS, GASTRITIS, HIATAL HERNIA    HYSTERECTOMY        REPLACEMENT TOTAL KNEE         left 2008    ROTATOR CUFF REPAIR Right      TOTAL KNEE ARTHROPLASTY Right 05/04/2023     Procedure: TOTAL KNEE ARTHROPLASTY WITH CORI ROBOT;  Surgeon: Chino Herrera II, MD;  Location: [facility] MAIN OR;  Service: Robotics - Ortho;  Laterality: Right;            Family History: family history includes Arthritis in her brother; Cancer in her brother; Hypertension in her mother; Stroke in her father. Otherwise pertinent FHx was reviewed and not pertinent to current issue.     Social History:  reports that she has never smoked. She has never been exposed to tobacco smoke. She has never used smokeless tobacco. She reports that she does not drink alcohol and does not use drugs.     Home Medications:  Prior to Admission Medications         Prescriptions Last Dose Informant Patient Reported? Taking?     acetaminophen (TYLENOL) 325 MG tablet Past Week   Yes Yes     Take 2 tablets by mouth Every 6 (Six) Hours As Needed for Mild Pain.     allopurinol (ZYLOPRIM) 300 MG tablet 6/4/2025   No Yes     Take 1 tablet by mouth once daily     atorvastatin (LIPITOR) 20 MG tablet 6/4/2025   No Yes     Take 1 tablet by mouth once daily     azelastine (ASTELIN) 0.1 % nasal spray Unknown   Yes No     Administer 2 sprays into the nostril(s) as directed by provider Daily. Use in each nostril as directed     cetirizine (zyrTEC) 10 MG tablet Past Week Self Yes Yes     Take 1 tablet by mouth Daily As Needed for Allergies.     cyanocobalamin (VITAMIN B-12) 1000 MCG tablet 5/29/2025 Self Yes Yes     Take 1 tablet by  mouth Daily.     docusate sodium (COLACE) 100 MG capsule Past Week Self Yes Yes     Take 1 capsule by mouth 2 (Two) Times a Day.     dofetilide (Tikosyn) 250 MCG capsule 6/5/2025   No Yes     Take 1 capsule by mouth Every 12 (Twelve) Hours.     ferrous sulfate 325 (65 FE) MG tablet Past Week Self Yes Yes     Take 1 tablet by mouth Daily With Breakfast.     levothyroxine (SYNTHROID, LEVOTHROID) 88 MCG tablet 6/4/2025   No Yes     Take 1 tablet by mouth once daily     metoprolol succinate XL (TOPROL-XL) 25 MG 24 hr tablet 6/5/2025   No Yes     Take 1 tablet by mouth Daily for 360 days.     midodrine (PROAMATINE) 5 MG tablet 6/5/2025   No Yes     TAKE 1 TABLET BY MOUTH TWICE DAILY BEFORE MEAL(S)     montelukast (SINGULAIR) 10 MG tablet 6/4/2025   No Yes     Take 1 tablet by mouth Every Night.     Mounjaro 2.5 MG/0.5ML solution auto-injector 5/22/2025   Yes Yes     Inject 5 mg into the appropriate muscle as directed by prescriber 1 (One) Time Per Week.     multivitamin (MULTI VITAMIN PO) 5/22/2025   No Yes     Take 1 tablet by mouth Daily.     multivitamin with minerals tablet tablet 5/22/2025   Yes No     Take 1 tablet by mouth Daily.     mupirocin (BACTROBAN) 2 % ointment     Yes Yes     APPLY SMALL AMOUNT OF OINTMENT TOPICALLY TO AFFECTED AREA TWICE DAILY     pantoprazole (PROTONIX) 40 MG EC tablet 6/5/2025   Yes Yes     Take 1 tablet by mouth Every Morning.     rivaroxaban (XARELTO) 15 MG tablet 6/2/2025   No Yes     Take 1 tablet by mouth Daily With Dinner. Indications: Atrial Fibrillation     spironolactone (ALDACTONE) 25 MG tablet 6/5/2025   No Yes     Take 1/2 (one-half) tablet by mouth once daily     triamcinolone (KENALOG) 0.1 % cream     Yes Yes     APPLY CREAM EXTERNALLY TWICE DAILY TO ECZEMA FOR UP TO 2 WEEKS PER MONTH AS NEEDED     Vitamin D, Cholecalciferol, 25 MCG (1000 UT) tablet 5/22/2025 Self Yes Yes     Take 1 tablet by mouth Daily.                   Allergies:  Allergies         Allergies   Allergen  Reactions    Celecoxib Itching and Swelling       swelling            Objective       Vitals:   Temp:  [97.2 °F (36.2 °C)-97.5 °F (36.4 °C)] 97.4 °F (36.3 °C)  Heart Rate:  [66-73] 69  Resp:  [10-14] 14  BP: ()/(36-57) 106/45  Body mass index is 46.23 kg/m².     Physical Exam  General: 81 yo F, Alert and oriented x4, well nourished, no acute distress.  HENT: Normocephalic, normal hearing, moist oral mucosa, no scleral icterus.  Neck: Supple, nontender, no carotid bruits, no JVD, no LAD.  Lungs: Clear to auscultation, nonlabored respiration.  Heart: RRR, no murmur, gallop or edema.  Abdomen: Soft, nontender, nondistended, + bowel sounds.  Musculoskeletal: Bandage in place over right knee.  Normal range of motion and strength, no tenderness or swelling.  Skin: Skin is warm, dry and pink, no rashes or lesions.  Psychiatric: Cooperative, appropriate mood and affect.        Diagnostic Data:  Lab Results (last 24 hours)         ** No results found for the last 24 hours. **                           Imaging Results (Last 24 Hours)         Procedure Component Value Units Date/Time     XR Knee 1 or 2 View Right [[id number]] Collected: 06/05/25 1524       Updated: 06/05/25 1530     Narrative:       XR KNEE 1 OR 2 VW RIGHT     Date of Exam: 6/5/2025 2:58 PM EDT     Indication: Post-Op Knee Arthoplasty     Comparison: Right knee radiographs dated 5/15/2025     Findings:  There are postsurgical changes of right total knee revision. There are anterior cutaneous staples and a surgical drain within the suprapatellar space.        Impression:       Impression:     Immediate postoperative changes status post right total knee revision with anterior soft tissue swelling.           Electronically Signed: Bry Mendosa MD    6/5/2025 3:27 PM EDT    Workstation ID: [id number]                   Assessment & Plan          This is a 80 y.o. female with:     Active and Resolved Problems        Active Hospital Problems     Diagnosis    POA    **Postoperative pain of knee [G89.18, M25.569]   Yes    Orthostasis [I95.1]   Yes    S/P revision of total knee [Z96.659]   Not Applicable       Resolved Hospital Problems   No resolved problems to display.            Hypotension with history of hypertension  Coronary artery disease  SSS s/p pacemaker  Chronic diastolic congestive heart failure  Paroxysmal A-fib  Hyperlipidemia  BP soft, improving, appears hypovolemic  Orthostatics positive   5/16/2025 TTE with a EF of 56 to 60%, grade 3 diastolic dysfunction  CBC, BMP, troponin, BNP pending  1 L bolus ordered over 2 hours   Continue Tikosyn   Xarelto has been resumed, continue   Rec'd dose of Metoprolol this am, consider holding tomorrow if still hypotensive  Fall precautions   Telemetry  Consider cardiology consult if blood pressure does not improve with IV fluids     Osteoarthritis of right knee  S/p right TKA revision POD 1  Incentive spirometry  As needed analgesics  Orthopedics signed off   PT recommend SNF, awaiting placement      ROSA on CPAP: Continue nocturnal CPAP        VTE Prophylaxis:  Pharmacologic & mechanical VTE prophylaxis orders are present.           The patient desires to be as follows:     CODE STATUS:    Code Status (Patient has no pulse and is not breathing): CPR (Attempt to Resuscitate)  Medical Interventions (Patient has pulse or is breathing): Full  Level Of Support Discussed With: Patient           Lyudmila Le, who can be contacted at [phone], is the designated person to make medical decisions on the patient's behalf if She is incapable of doing so. This was clarified with patient and/or next of kin on 6/5/2025 during the course of this H&P.     Admission Status:  I believe this patient meets inpatient status.     Expected Length of Stay: 2 to 3 days     PDMP and Medication Dispenses via Sidebar reviewed and consistent with patient reported medications.     I discussed the patient's findings and my recommendations with  patient.

## 2025-06-09 LAB
ALBUMIN SERPL-MCNC: 3.3 G/DL (ref 3.5–5.2)
ANION GAP SERPL CALCULATED.3IONS-SCNC: 5.8 MMOL/L (ref 5–15)
BASOPHILS # BLD AUTO: 0.05 10*3/MM3 (ref 0–0.2)
BASOPHILS NFR BLD AUTO: 0.8 % (ref 0–1.5)
BUN SERPL-MCNC: 23.2 MG/DL (ref 8–23)
BUN/CREAT SERPL: 17.7 (ref 7–25)
CALCIUM SPEC-SCNC: 9.3 MG/DL (ref 8.6–10.5)
CHLORIDE SERPL-SCNC: 109 MMOL/L (ref 98–107)
CO2 SERPL-SCNC: 23.2 MMOL/L (ref 22–29)
CREAT SERPL-MCNC: 1.31 MG/DL (ref 0.57–1)
DEPRECATED RDW RBC AUTO: 57.1 FL (ref 37–54)
EGFRCR SERPLBLD CKD-EPI 2021: 41.8 ML/MIN/1.73
EOSINOPHIL # BLD AUTO: 0.21 10*3/MM3 (ref 0–0.4)
EOSINOPHIL NFR BLD AUTO: 3.3 % (ref 0.3–6.2)
ERYTHROCYTE [DISTWIDTH] IN BLOOD BY AUTOMATED COUNT: 14.6 % (ref 12.3–15.4)
GLUCOSE SERPL-MCNC: 130 MG/DL (ref 65–99)
HCT VFR BLD AUTO: 24 % (ref 34–46.6)
HGB BLD-MCNC: 7.7 G/DL (ref 12–15.9)
IMM GRANULOCYTES # BLD AUTO: 0.03 10*3/MM3 (ref 0–0.05)
IMM GRANULOCYTES NFR BLD AUTO: 0.5 % (ref 0–0.5)
LYMPHOCYTES # BLD AUTO: 2.11 10*3/MM3 (ref 0.7–3.1)
LYMPHOCYTES NFR BLD AUTO: 32.7 % (ref 19.6–45.3)
MCH RBC QN AUTO: 33.8 PG (ref 26.6–33)
MCHC RBC AUTO-ENTMCNC: 32.1 G/DL (ref 31.5–35.7)
MCV RBC AUTO: 105.3 FL (ref 79–97)
MONOCYTES # BLD AUTO: 0.62 10*3/MM3 (ref 0.1–0.9)
MONOCYTES NFR BLD AUTO: 9.6 % (ref 5–12)
NEUTROPHILS NFR BLD AUTO: 3.44 10*3/MM3 (ref 1.7–7)
NEUTROPHILS NFR BLD AUTO: 53.1 % (ref 42.7–76)
NRBC BLD AUTO-RTO: 0 /100 WBC (ref 0–0.2)
PHOSPHATE SERPL-MCNC: 3 MG/DL (ref 2.5–4.5)
PLATELET # BLD AUTO: 170 10*3/MM3 (ref 140–450)
PMV BLD AUTO: 10.1 FL (ref 6–12)
POTASSIUM SERPL-SCNC: 4.9 MMOL/L (ref 3.5–5.2)
RBC # BLD AUTO: 2.28 10*6/MM3 (ref 3.77–5.28)
SODIUM SERPL-SCNC: 138 MMOL/L (ref 136–145)
WBC NRBC COR # BLD AUTO: 6.46 10*3/MM3 (ref 3.4–10.8)

## 2025-06-09 PROCEDURE — 97110 THERAPEUTIC EXERCISES: CPT

## 2025-06-09 PROCEDURE — 85025 COMPLETE CBC W/AUTO DIFF WBC: CPT | Performed by: INTERNAL MEDICINE

## 2025-06-09 PROCEDURE — 97530 THERAPEUTIC ACTIVITIES: CPT

## 2025-06-09 PROCEDURE — 97116 GAIT TRAINING THERAPY: CPT

## 2025-06-09 PROCEDURE — 80069 RENAL FUNCTION PANEL: CPT | Performed by: HOSPITALIST

## 2025-06-09 RX ADMIN — ACETAMINOPHEN 1000 MG: 500 TABLET ORAL at 05:21

## 2025-06-09 RX ADMIN — LEVOTHYROXINE SODIUM 88 MCG: 88 TABLET ORAL at 05:21

## 2025-06-09 RX ADMIN — MIDODRINE HYDROCHLORIDE 5 MG: 5 TABLET ORAL at 17:18

## 2025-06-09 RX ADMIN — MIDODRINE HYDROCHLORIDE 5 MG: 5 TABLET ORAL at 07:58

## 2025-06-09 RX ADMIN — ACETAMINOPHEN 1000 MG: 500 TABLET ORAL at 17:18

## 2025-06-09 RX ADMIN — METOPROLOL SUCCINATE 25 MG: 25 TABLET, EXTENDED RELEASE ORAL at 07:58

## 2025-06-09 RX ADMIN — MIDODRINE HYDROCHLORIDE 5 MG: 5 TABLET ORAL at 11:22

## 2025-06-09 RX ADMIN — RIVAROXABAN 15 MG: 15 TABLET, FILM COATED ORAL at 17:18

## 2025-06-09 NOTE — PLAN OF CARE
"  Faby Le presents with functional mobility impairments which indicate the need for skilled intervention. Tolerating session today without incident. Pt progressing well.  Pt is very motivated and has very good potential to recover her mobility.  Will continue to follow and progress as tolerated.     Plan/Recommendations:   If medically appropriate, Moderate Intensity Therapy recommended post-acute care. This is recommended as therapy feels the patient would require 3-4 days per week and wouldn't tolerate \"3 hour daily\" rehab intensity. SNF would be the preferred choice. If the patient does not agree to SNF, arrange HH or OP depending on home bound status. If patient is medically complex, consider LTACH. Pt requires no DME at discharge.     Pt desires Skilled Rehab placement at discharge. Pt cooperative; agreeable to therapeutic recommendations and plan of care.                                           "

## 2025-06-09 NOTE — CASE MANAGEMENT/SOCIAL WORK
Continued Stay Note  KOFI Galarza     Patient Name: Faby Le  MRN: [id number]  Today's Date: 6/9/2025    Admit Date: 6/5/2025    Plan: DC PLAN: [facility] 2. [facility] pending referrals. Will need Precert/Will need PASRR. (From [facility] assisted Living)       Discharge Plan       Row Name 06/09/25 1152       Plan    Plan DC PLAN: 1.[facility] 2. [facility] pending referrals. Will need Precert/Will need PASRR. (From [facility] assisted [facility])    Patient/Family in Agreement with Plan yes    Plan Comments went and talked with patient and daughter. Reviewed SNF list. DCP report sent to 1. [facility] 2. [facility]. Messages sent to liasions, awaiting responses.                      Expected Discharge Date and Time       Expected Discharge Date Expected Discharge Time    [place] 10, 2025           Lis Carey RN   Case Management  [phone]

## 2025-06-09 NOTE — PROGRESS NOTES
[facility] Medicine Services       Patient Name: Faby Le  : 1945  MRN: [id number]  Primary Care Physician:  Leonor Adam MD  Date of admission: 2025  Date and Time of Service: 2025 at 1330     Subjective       Chief Complaint: Low blood pressure     History of Present Illness: Faby Le is a 80 y.o. female with a CMH of HFpEF, SSS s/p PPM, A-fib on Xarelto, CAD, HTN, HLD, ROSA on CPAP, osteoarthritis who presented to [facility] on 2025 for elective right total knee arthroplasty revision with Dr. Herrera.  Procedure noted to have no complications.  Orthopedic surgery were planning to discharge patient home today.  However patient was noted to be hypotensive.  Orthostatics were attempted in which patient's BP noted to be as low as 90/39.  Patient denies associated weakness, fatigue, dizziness, lightheadedness, chest pain, shortness of breath, palpitations.  She does endorse no BM since surgery.  She also states she had palpitations approximately 3 weeks ago prior to her original date for knee surgery in which she was evaluated by Dr. Herrera with future plans for an ablation.  Patient understands PTs recommendations for rehab and is amenable.  Case management consulted for placement.  Hospitalist service to assume primary care of patient while she remains hospitalized.     Review of Systems   Constitutional:  Negative for chills, fatigue and fever.   Respiratory:  Negative for shortness of breath.    Cardiovascular:  Negative for chest pain and palpitations.   Gastrointestinal:  Negative for abdominal pain, nausea and vomiting.   Neurological:  Negative for dizziness and light-headedness.       25  Patient yesterday  She has been feeling weak and dizzy for few days  Creatinine is up to 1.8  Status post knee arthroplasty  Status post revision of the total knee  Postop day #2  Hemoglobin drop noted with last hemoglobin 7.5 mostly postop  Worsening  creatinine  Will give IV bolus normal saline follow CBC and chemistry profile in the morning  Nephrology consult for evaluation of worsening renal insufficiency  Check stool for occult blood  Keep patient on IV fluids at normal saline 100 an hour      6/8/25  Patient seen by nephrology consult appreciated  Creatinine is down to 1.4 which is baseline  Complaining of pain in the right knee  Patient is postop day #3  Globin 7.3 today compared to 7.5 on 6/6 which is mostly stable  Pain control  Discharge planning skilled nursing facility when bed is available    6/9/25  Patient doing fine  Using bedside commode  Patient wants to go to [facility] rehab  Discharge planning once arrangement is made  Function stable  Hemoglobin is 7.7 stable from yesterday 7.3 yesterday  IV fluid has been discontinued patient is on oral midodrine  Patient will be resumed on low diuretic dose as per nephrology today  Repeat BMP today          Personal History      Medical History        Past Medical History:   Diagnosis Date    Ankle pain      Arthritis      Atrial fibrillation      Coronary artery disease      Depression      Disease of thyroid gland      Gout      Hyperglycemia      Hyperlipidemia      Hypertension      Low back pain      Sleep apnea      Vitamin D deficiency              Surgical History         Past Surgical History:   Procedure Laterality Date    ANKLE FUSION         2017-left    CARDIAC CATHETERIZATION        CARDIAC CATHETERIZATION Right 08/29/2022     Procedure: Left Heart Cath;  Surgeon: Ratna Zavala MD;  Location: [facility] CATH INVASIVE LOCATION;  Service: Cardiovascular;  Laterality: Right;    CARDIAC ELECTROPHYSIOLOGY PROCEDURE Left 07/03/2023     Procedure: Pacemaker DC new [place] Notified;  Surgeon: Cr Herrera MD;  Location: [facility] CATH INVASIVE LOCATION;  Service: Cardiovascular;  Laterality: Left;    CARDIAC ELECTROPHYSIOLOGY PROCEDURE Right 03/05/2024     Procedure: Ablation atrial fibrillation,  Jaycob Alexandra and Wayne Igor notified 02/09/24;  Surgeon: Cr Herrera MD;  Location: [facility] CATH INVASIVE LOCATION;  Service: Cardiovascular;  Laterality: Right;    CARDIAC ELECTROPHYSIOLOGY PROCEDURE N/A 03/05/2024     Procedure: Cardioversion;  Surgeon: Cr Herrera MD;  Location: [facility] CATH INVASIVE LOCATION;  Service: Cardiovascular;  Laterality: N/A;    CARPAL TUNNEL RELEASE Right      CATARACT EXTRACTION        CUBITAL TUNNEL RELEASE Right      ENDOSCOPY N/A 05/28/2024     Procedure: ESOPHAGOGASTRODUODENOSCOPY WITH COLD FORCEP BIOPSY X1 AREA;  Surgeon: Josh Loomis MD;  Location: [facility] ENDOSCOPY;  Service: Gastroenterology;  Laterality: N/A;  Post- ESOPHAGITIS, GASTRITIS, HIATAL HERNIA    HYSTERECTOMY        REPLACEMENT TOTAL KNEE         left 2008    ROTATOR CUFF REPAIR Right      TOTAL KNEE ARTHROPLASTY Right 05/04/2023     Procedure: TOTAL KNEE ARTHROPLASTY WITH CORI ROBOT;  Surgeon: Chino Herrera II, MD;  Location: [facility] MAIN OR;  Service: Robotics - Ortho;  Laterality: Right;            Family History: family history includes Arthritis in her brother; Cancer in her brother; Hypertension in her mother; Stroke in her father. Otherwise pertinent FHx was reviewed and not pertinent to current issue.     Social History:  reports that she has never smoked. She has never been exposed to tobacco smoke. She has never used smokeless tobacco. She reports that she does not drink alcohol and does not use drugs.     Home Medications:  Prior to Admission Medications         Prescriptions Last Dose Informant Patient Reported? Taking?     acetaminophen (TYLENOL) 325 MG tablet Past Week   Yes Yes     Take 2 tablets by mouth Every 6 (Six) Hours As Needed for Mild Pain.     allopurinol (ZYLOPRIM) 300 MG tablet 6/4/2025   No Yes     Take 1 tablet by mouth once daily     atorvastatin (LIPITOR) 20 MG tablet 6/4/2025   No Yes     Take 1 tablet by mouth once daily     azelastine  (ASTELIN) 0.1 % nasal spray Unknown   Yes No     Administer 2 sprays into the nostril(s) as directed by provider Daily. Use in each nostril as directed     cetirizine (zyrTEC) 10 MG tablet Past Week Self Yes Yes     Take 1 tablet by mouth Daily As Needed for Allergies.     cyanocobalamin (VITAMIN B-12) 1000 MCG tablet 5/29/2025 Self Yes Yes     Take 1 tablet by mouth Daily.     docusate sodium (COLACE) 100 MG capsule Past Week Self Yes Yes     Take 1 capsule by mouth 2 (Two) Times a Day.     dofetilide (Tikosyn) 250 MCG capsule 6/5/2025   No Yes     Take 1 capsule by mouth Every 12 (Twelve) Hours.     ferrous sulfate 325 (65 FE) MG tablet Past Week Self Yes Yes     Take 1 tablet by mouth Daily With Breakfast.     levothyroxine (SYNTHROID, LEVOTHROID) 88 MCG tablet 6/4/2025   No Yes     Take 1 tablet by mouth once daily     metoprolol succinate XL (TOPROL-XL) 25 MG 24 hr tablet 6/5/2025   No Yes     Take 1 tablet by mouth Daily for 360 days.     midodrine (PROAMATINE) 5 MG tablet 6/5/2025   No Yes     TAKE 1 TABLET BY MOUTH TWICE DAILY BEFORE MEAL(S)     montelukast (SINGULAIR) 10 MG tablet 6/4/2025   No Yes     Take 1 tablet by mouth Every Night.     Mounjaro 2.5 MG/0.5ML solution auto-injector 5/22/2025   Yes Yes     Inject 5 mg into the appropriate muscle as directed by prescriber 1 (One) Time Per Week.     multivitamin (MULTI VITAMIN PO) 5/22/2025   No Yes     Take 1 tablet by mouth Daily.     multivitamin with minerals tablet tablet 5/22/2025   Yes No     Take 1 tablet by mouth Daily.     mupirocin (BACTROBAN) 2 % ointment     Yes Yes     APPLY SMALL AMOUNT OF OINTMENT TOPICALLY TO AFFECTED AREA TWICE DAILY     pantoprazole (PROTONIX) 40 MG EC tablet 6/5/2025   Yes Yes     Take 1 tablet by mouth Every Morning.     rivaroxaban (XARELTO) 15 MG tablet 6/2/2025   No Yes     Take 1 tablet by mouth Daily With Dinner. Indications: Atrial Fibrillation     spironolactone (ALDACTONE) 25 MG tablet 6/5/2025   No Yes      Take 1/2 (one-half) tablet by mouth once daily     triamcinolone (KENALOG) 0.1 % cream     Yes Yes     APPLY CREAM EXTERNALLY TWICE DAILY TO ECZEMA FOR UP TO 2 WEEKS PER MONTH AS NEEDED     Vitamin D, Cholecalciferol, 25 MCG (1000 UT) tablet 5/22/2025 Self Yes Yes     Take 1 tablet by mouth Daily.                   Allergies:  Allergies         Allergies   Allergen Reactions    Celecoxib Itching and Swelling       swelling            Objective       Vitals:   Temp:  [97.2 °F (36.2 °C)-97.5 °F (36.4 °C)] 97.4 °F (36.3 °C)  Heart Rate:  [66-73] 69  Resp:  [10-14] 14  BP: ()/(36-57) 106/45  Body mass index is 46.23 kg/m².     Physical Exam  General: 81 yo F, Alert and oriented x4, well nourished, no acute distress.  HENT: Normocephalic, normal hearing, moist oral mucosa, no scleral icterus.  Neck: Supple, nontender, no carotid bruits, no JVD, no LAD.  Lungs: Clear to auscultation, nonlabored respiration.  Heart: RRR, no murmur, gallop or edema.  Abdomen: Soft, nontender, nondistended, + bowel sounds.  Musculoskeletal: Bandage in place over right knee.  Normal range of motion and strength, no tenderness or swelling.  Skin: Skin is warm, dry and pink, no rashes or lesions.  Psychiatric: Cooperative, appropriate mood and affect.        Diagnostic Data:  Lab Results (last 24 hours)         ** No results found for the last 24 hours. **                           Imaging Results (Last 24 Hours)         Procedure Component Value Units Date/Time     XR Knee 1 or 2 View Right [[id number]] Collected: 06/05/25 1524       Updated: 06/05/25 1530     Narrative:       XR KNEE 1 OR 2 VW RIGHT     Date of Exam: 6/5/2025 2:58 PM EDT     Indication: Post-Op Knee Arthoplasty     Comparison: Right knee radiographs dated 5/15/2025     Findings:  There are postsurgical changes of right total knee revision. There are anterior cutaneous staples and a surgical drain within the suprapatellar space.        Impression:       Impression:      Immediate postoperative changes status post right total knee revision with anterior soft tissue swelling.           Electronically Signed: Bry Mendosa MD    6/5/2025 3:27 PM EDT    Workstation ID: [id number]                   Assessment & Plan          This is a 80 y.o. female with:     Active and Resolved Problems        Active Hospital Problems     Diagnosis   POA    **Postoperative pain of knee [G89.18, M25.569]   Yes    Orthostasis [I95.1]   Yes    S/P revision of total knee [Z96.659]   Not Applicable       Resolved Hospital Problems   No resolved problems to display.            Hypotension with history of hypertension  Coronary artery disease  SSS s/p pacemaker  Chronic diastolic congestive heart failure  Paroxysmal A-fib  Hyperlipidemia  BP soft, improving, appears hypovolemic  Orthostatics positive   5/16/2025 TTE with a EF of 56 to 60%, grade 3 diastolic dysfunction  CBC, BMP, troponin, BNP pending  1 L bolus ordered over 2 hours   Continue Tikosyn   Xarelto has been resumed, continue   Rec'd dose of Metoprolol this am, consider holding tomorrow if still hypotensive  Fall precautions   Telemetry  Consider cardiology consult if blood pressure does not improve with IV fluids     Osteoarthritis of right knee  S/p right TKA revision POD 1  Incentive spirometry  As needed analgesics  Orthopedics signed off   PT recommend SNF, awaiting placement      ROSA on CPAP: Continue nocturnal CPAP        VTE Prophylaxis:  Pharmacologic & mechanical VTE prophylaxis orders are present.           The patient desires to be as follows:     CODE STATUS:    Code Status (Patient has no pulse and is not breathing): CPR (Attempt to Resuscitate)  Medical Interventions (Patient has pulse or is breathing): Full  Level Of Support Discussed With: Patient           Lyudmila Le, who can be contacted at [phone], is the designated person to make medical decisions on the patient's behalf if She is incapable of doing so. This was clarified  with patient and/or next of kin on 6/5/2025 during the course of this H&P.     Admission Status:  I believe this patient meets inpatient status.     Expected Length of Stay: 2 to 3 days     PDMP and Medication Dispenses via Sidebar reviewed and consistent with patient reported medications.     I discussed the patient's findings and my recommendations with patient.

## 2025-06-09 NOTE — PROGRESS NOTES
[facility] Progress Note      Patient Name: Faby Le  : 1945  MRN: [id number]  Primary Care Physician:  Leonor Adam MD  Date of admission: 2025    Subjective     Interval History:     Patient resting comfortably, feeling better  Denies any chest pain, shortness of breath, nausea o patient is no new issues noted today.  Afebrile.  Denies any nausea no vomiting.  No fever no chills    Review of Systems:   As noted above    Objective     Vitals:   Temp:  [97.9 °F (36.6 °C)-98.4 °F (36.9 °C)] 97.9 °F (36.6 °C)  Heart Rate:  [61-66] 61  Resp:  [16-19] 19  BP: (123-139)/(39-78) 131/74    Intake/Output Summary (Last 24 hours) at 2025 1716  Last data filed at 2025 1557  Gross per 24 hour   Intake 1200 ml   Output 1200 ml   Net 0 ml       Physical Exam:      General Appearance: alert, oriented x 3, no acute distress   Skin: warm and dry  HEENT: oral mucosa normal, nonicteric sclera  Neck: supple, no JVD  Lungs: CTA  Heart: RRR, normal S1 and S2  Abdomen: soft, nontender, nondistended  : no palpable bladder  Extremities: Dressing present right knee  Neuro: normal speech and mental status     Scheduled Meds:     acetaminophen, 1,000 mg, Oral, Q6H  levothyroxine, 88 mcg, Oral, Daily  metoprolol succinate XL, 25 mg, Oral, Q24H  midodrine, 5 mg, Oral, TID AC  rivaroxaban, 15 mg, Oral, Daily With Dinner      IV Meds:          Results Reviewed:   I have personally reviewed the results from the time of this admission to 2025 17:16 EDT     Results from last 7 days   Lab Units 25  1204 25  0146 25  2345   SODIUM mmol/L 138 135* 132*   POTASSIUM mmol/L 4.9 5.0 4.7   CHLORIDE mmol/L 109* 107 102   CO2 mmol/L 23.2 20.7* 22.1   BUN mg/dL 23.2* 33.3* 32.6*   CREATININE mg/dL 1.31* 1.40* 1.82*   CALCIUM mg/dL 9.3 8.7 8.8   GLUCOSE mg/dL 130* 95 112*     Estimated Creatinine Clearance: 43.9 mL/min (A) (by C-G formula based on SCr of 1.31 mg/dL  (H)).  Results from last 7 days   Lab Units 06/09/25  1204 06/08/25  0146   PHOSPHORUS mg/dL 3.0 3.1         Results from last 7 days   Lab Units 06/09/25  0004 06/08/25  0146 06/06/25  2345 06/06/25  1447   WBC 10*3/mm3 6.46 6.63 11.70* 16.91*   HEMOGLOBIN g/dL 7.7* 7.3* 7.5* 8.2*   PLATELETS 10*3/mm3 170 148 140 167     Results from last 7 days   Lab Units 06/05/25  0913   INR  1.11*       Assessment / Plan     ASSESSMENT:    Acute kidney injury on chronic kidney disease stage IIIa.  Baseline creatinine around 1.1 Mg/DL.  Prerenal etiology.  Renal function improving.  Creatinine 1.4 this morning from peak of 1.8 Mg/DL.  Electrolytes okay.    Hypotension.  Blood pressure better.  Status post IV fluid bolus.  On oral midodrine  CHF.  Diastolic.  Chronic  Paroxysmal A-fib    PLAN:  Kidney function is currently improving.  Continue current therapy for now  Surveillance labs    Omar Vale MD  06/09/25  17:16 EDT    [facility]  [phone]

## 2025-06-09 NOTE — PLAN OF CARE
Problem: Adult Inpatient Plan of Care  Goal: Plan of Care Review  Outcome: Progressing  Flowsheets (Taken 6/9/2025 0816)  Plan of Care Reviewed With: patient  Goal: Patient-Specific Goal (Individualized)  Outcome: Progressing  Goal: Absence of Hospital-Acquired Illness or Injury  Outcome: Progressing  Intervention: Identify and Manage Fall Risk  Recent Flowsheet Documentation  Taken 6/9/2025 0732 by Ting Juan RN  Safety Promotion/Fall Prevention:   activity supervised   assistive device/personal items within reach   clutter free environment maintained   fall prevention program maintained   nonskid shoes/slippers when out of bed   room organization consistent   safety round/check completed  Intervention: Prevent Skin Injury  Recent Flowsheet Documentation  Taken 6/9/2025 0732 by Ting Juan RN  Body Position:   position changed independently   supine   weight shifting  Skin Protection: pulse oximeter probe site changed  Intervention: Prevent and Manage VTE (Venous Thromboembolism) Risk  Recent Flowsheet Documentation  Taken 6/9/2025 0732 by Ting Juan RN  VTE Prevention/Management:   bilateral   SCDs (sequential compression devices) off  Intervention: Prevent Infection  Recent Flowsheet Documentation  Taken 6/9/2025 0732 by Ting Juan RN  Infection Prevention:   environmental surveillance performed   equipment surfaces disinfected   hand hygiene promoted  Goal: Optimal Comfort and Wellbeing  Outcome: Progressing  Intervention: Provide Person-Centered Care  Recent Flowsheet Documentation  Taken 6/9/2025 0732 by Ting Juan RN  Trust Relationship/Rapport:   care explained   choices provided   emotional support provided   empathic listening provided   questions answered   questions encouraged   reassurance provided   thoughts/feelings acknowledged  Goal: Readiness for Transition of Care  Outcome: Progressing     Problem: Comorbidity Management  Goal: Maintenance of Heart Failure Symptom  Control  Outcome: Progressing  Intervention: Maintain Heart Failure Management  Recent Flowsheet Documentation  Taken 6/9/2025 0732 by Ting Juan RN  Medication Review/Management: medications reviewed  Goal: Blood Pressure in Desired Range  Outcome: Progressing  Intervention: Maintain Blood Pressure Management  Recent Flowsheet Documentation  Taken 6/9/2025 0732 by Ting Juan RN  Medication Review/Management: medications reviewed     Problem: Pain Acute  Goal: Optimal Pain Control and Function  Outcome: Progressing  Intervention: Optimize Psychosocial Wellbeing  Recent Flowsheet Documentation  Taken 6/9/2025 0732 by Ting Juan RN  Supportive Measures:   active listening utilized   verbalization of feelings encouraged  Diversional Activities: television  Intervention: Prevent or Manage Pain  Recent Flowsheet Documentation  Taken 6/9/2025 0732 by Ting Juan RN  Medication Review/Management: medications reviewed     Problem: Fall Injury Risk  Goal: Absence of Fall and Fall-Related Injury  Outcome: Progressing  Intervention: Identify and Manage Contributors  Recent Flowsheet Documentation  Taken 6/9/2025 0732 by Ting Juan RN  Medication Review/Management: medications reviewed  Self-Care Promotion:   BADL personal objects within reach   independence encouraged  Intervention: Promote Injury-Free Environment  Recent Flowsheet Documentation  Taken 6/9/2025 0732 by Ting Juan RN  Safety Promotion/Fall Prevention:   activity supervised   assistive device/personal items within reach   clutter free environment maintained   fall prevention program maintained   nonskid shoes/slippers when out of bed   room organization consistent   safety round/check completed   Goal Outcome Evaluation:  Plan of Care Reviewed With: patient                 Patient a/ox4, assist x 1 to [facility], ambulate in hallway 4 times a day as tolerated, waiting on rehab placement, monitor stool for blood, reviewed plan of care with  patient, call light in reach, remains high falls precautions, ice pack to right knee as tolerated. Restart low diuretic if creatinine improves. Patient remains high falls precautions   Plan is for SNF placement  Reason for admission: Right knee revision  Significant PMH: Afib, PACEMAKER, CAD, HTN, HLD, ROSA  Significant 24 hour events: 6/5 Right total knee revision  6/7 blister to right buttocks

## 2025-06-09 NOTE — PLAN OF CARE
Goal Outcome Evaluation:  Plan of Care Reviewed With: patient        Progress: no change  Outcome Evaluation: Pt VSS, Pt is able to make needs known, Pt ia a 1 assist with the walker to the BSC, JENSEN dressing in place, ice packs on, Call light in place, Plan on going

## 2025-06-09 NOTE — THERAPY TREATMENT NOTE
"Subjective: Pt agreeable to therapeutic plan of care.    Objective:     Precautions - Right total knee revision WBAT    Bed mobility - N/A or Not attempted.   Pt already up in the chair  Transfers - CGA and with rolling walker (rollator)  Ambulation - 22 feet CGA and with rolling walker    Therapeutic Exercise - 15 Reps R Lower Extremity AROM supported sitting / chair (reclined and sitting upright)    ROM right knee:  5 degrees from full extension to ~80 degrees.     Vitals: WNL    Pain: 3 VAS Location: right knee  Intervention for pain: Repositioned, RN provided medication, Increased Activity, and Therapeutic Presence  ice pack to right knee post PT tx session    Education: Provided education on the importance of mobility in the acute care setting, Verbal/Tactile Cues, Transfer Training, and Gait Training    Assessment: Faby Le presents with functional mobility impairments which indicate the need for skilled intervention. Tolerating session today without incident. Pt progressing well.  Pt is very motivated and has very good potential to recover her mobility.  Will continue to follow and progress as tolerated.     Plan/Recommendations:   If medically appropriate, Moderate Intensity Therapy recommended post-acute care. This is recommended as therapy feels the patient would require 3-4 days per week and wouldn't tolerate \"3 hour daily\" rehab intensity. SNF would be the preferred choice. If the patient does not agree to SNF, arrange HH or OP depending on home bound status. If patient is medically complex, consider LTACH. Pt requires no DME at discharge.     Pt desires Skilled Rehab placement at discharge. Pt cooperative; agreeable to therapeutic recommendations and plan of care.     Basic Mobility 6-click:  Rollin = Total, A lot = 2, A little = 3; 4 = None  Supine>Sit:   1 = Total, A lot = 2, A little = 3; 4 = None   Sit>Stand with arms:  1 = Total, A lot = 2, A little = 3; 4 = None  Bed>Chair:   1 = " Total, A lot = 2, A little = 3; 4 = None  Ambulate in room:  1 = Total, A lot = 2, A little = 3; 4 = None  3-5 Steps with railin = Total, A lot = 2, A little = 3; 4 = None  Score: 16    Modified [place]: N/A = No pre-op stroke/TIA    Post-Tx Position: Up in Chair, Alarms activated, and Call light and personal items within reach  PPE: gloves    Therapy Charges for Today       Code Description Service Date Service Provider Modifiers Qty    04649809350 HC GAIT TRAINING EA 15 MIN 2025 Dimple Mcgrath, CORI GP 1    20405794115 HC PT THERAPEUTIC ACT EA 15 MIN 2025 Dimple Mcgrath, CORI GP 1    [id number]  PT THER PROC EA 15 MIN 2025 Dimple Mcgrath, PTA GP 1           PT Charges       Row Name 25 0951             Time Calculation    Start Time 0923  -SC      Stop Time 0949  -SC      Time Calculation (min) 26 min  -SC      PT Received On 25  -SC      PT - Next Appointment 06/10/25  -SC         Time Calculation- PT    Total Timed Code Minutes- PT 26 minute(s)  -SC                User Key  (r) = Recorded By, (t) = Taken By, (c) = Cosigned By      Initials Name Provider Type    Dimple Willis PTA Physical Therapist Assistant

## 2025-06-10 LAB
ALBUMIN SERPL-MCNC: 3.2 G/DL (ref 3.5–5.2)
ALBUMIN SERPL-MCNC: 3.4 G/DL (ref 3.5–5.2)
ANION GAP SERPL CALCULATED.3IONS-SCNC: 8.3 MMOL/L (ref 5–15)
ANION GAP SERPL CALCULATED.3IONS-SCNC: 8.3 MMOL/L (ref 5–15)
BASOPHILS # BLD AUTO: 0.05 10*3/MM3 (ref 0–0.2)
BASOPHILS NFR BLD AUTO: 0.7 % (ref 0–1.5)
BUN SERPL-MCNC: 19.9 MG/DL (ref 8–23)
BUN SERPL-MCNC: 23.7 MG/DL (ref 8–23)
BUN/CREAT SERPL: 17 (ref 7–25)
BUN/CREAT SERPL: 18.5 (ref 7–25)
CALCIUM SPEC-SCNC: 9.1 MG/DL (ref 8.6–10.5)
CALCIUM SPEC-SCNC: 9.3 MG/DL (ref 8.6–10.5)
CHLORIDE SERPL-SCNC: 105 MMOL/L (ref 98–107)
CHLORIDE SERPL-SCNC: 106 MMOL/L (ref 98–107)
CO2 SERPL-SCNC: 21.7 MMOL/L (ref 22–29)
CO2 SERPL-SCNC: 21.7 MMOL/L (ref 22–29)
CREAT SERPL-MCNC: 1.17 MG/DL (ref 0.57–1)
CREAT SERPL-MCNC: 1.28 MG/DL (ref 0.57–1)
DEPRECATED RDW RBC AUTO: 56.8 FL (ref 37–54)
EGFRCR SERPLBLD CKD-EPI 2021: 43 ML/MIN/1.73
EGFRCR SERPLBLD CKD-EPI 2021: 47.9 ML/MIN/1.73
EOSINOPHIL # BLD AUTO: 0.26 10*3/MM3 (ref 0–0.4)
EOSINOPHIL NFR BLD AUTO: 3.6 % (ref 0.3–6.2)
ERYTHROCYTE [DISTWIDTH] IN BLOOD BY AUTOMATED COUNT: 14.6 % (ref 12.3–15.4)
GLUCOSE SERPL-MCNC: 109 MG/DL (ref 65–99)
GLUCOSE SERPL-MCNC: 142 MG/DL (ref 65–99)
HCT VFR BLD AUTO: 25.2 % (ref 34–46.6)
HGB BLD-MCNC: 8.1 G/DL (ref 12–15.9)
IMM GRANULOCYTES # BLD AUTO: 0.03 10*3/MM3 (ref 0–0.05)
IMM GRANULOCYTES NFR BLD AUTO: 0.4 % (ref 0–0.5)
LYMPHOCYTES # BLD AUTO: 2.56 10*3/MM3 (ref 0.7–3.1)
LYMPHOCYTES NFR BLD AUTO: 35.1 % (ref 19.6–45.3)
MCH RBC QN AUTO: 34.2 PG (ref 26.6–33)
MCHC RBC AUTO-ENTMCNC: 32.1 G/DL (ref 31.5–35.7)
MCV RBC AUTO: 106.3 FL (ref 79–97)
MONOCYTES # BLD AUTO: 0.62 10*3/MM3 (ref 0.1–0.9)
MONOCYTES NFR BLD AUTO: 8.5 % (ref 5–12)
NEUTROPHILS NFR BLD AUTO: 3.77 10*3/MM3 (ref 1.7–7)
NEUTROPHILS NFR BLD AUTO: 51.7 % (ref 42.7–76)
NRBC BLD AUTO-RTO: 0 /100 WBC (ref 0–0.2)
PHOSPHATE SERPL-MCNC: 3 MG/DL (ref 2.5–4.5)
PHOSPHATE SERPL-MCNC: 3.2 MG/DL (ref 2.5–4.5)
PLATELET # BLD AUTO: 181 10*3/MM3 (ref 140–450)
PMV BLD AUTO: 9.8 FL (ref 6–12)
POTASSIUM SERPL-SCNC: 4.8 MMOL/L (ref 3.5–5.2)
POTASSIUM SERPL-SCNC: 4.9 MMOL/L (ref 3.5–5.2)
RBC # BLD AUTO: 2.37 10*6/MM3 (ref 3.77–5.28)
SODIUM SERPL-SCNC: 135 MMOL/L (ref 136–145)
SODIUM SERPL-SCNC: 136 MMOL/L (ref 136–145)
WBC NRBC COR # BLD AUTO: 7.29 10*3/MM3 (ref 3.4–10.8)

## 2025-06-10 PROCEDURE — 80069 RENAL FUNCTION PANEL: CPT | Performed by: HOSPITALIST

## 2025-06-10 PROCEDURE — 97530 THERAPEUTIC ACTIVITIES: CPT | Performed by: OCCUPATIONAL THERAPIST

## 2025-06-10 PROCEDURE — 97530 THERAPEUTIC ACTIVITIES: CPT

## 2025-06-10 PROCEDURE — 97535 SELF CARE MNGMENT TRAINING: CPT | Performed by: OCCUPATIONAL THERAPIST

## 2025-06-10 PROCEDURE — 97110 THERAPEUTIC EXERCISES: CPT

## 2025-06-10 PROCEDURE — 97116 GAIT TRAINING THERAPY: CPT

## 2025-06-10 PROCEDURE — 97112 NEUROMUSCULAR REEDUCATION: CPT | Performed by: OCCUPATIONAL THERAPIST

## 2025-06-10 PROCEDURE — 85025 COMPLETE CBC W/AUTO DIFF WBC: CPT | Performed by: INTERNAL MEDICINE

## 2025-06-10 RX ADMIN — LEVOTHYROXINE SODIUM 88 MCG: 88 TABLET ORAL at 05:38

## 2025-06-10 RX ADMIN — ACETAMINOPHEN 1000 MG: 500 TABLET ORAL at 23:54

## 2025-06-10 RX ADMIN — METOPROLOL SUCCINATE 25 MG: 25 TABLET, EXTENDED RELEASE ORAL at 08:48

## 2025-06-10 RX ADMIN — ACETAMINOPHEN 1000 MG: 500 TABLET ORAL at 00:04

## 2025-06-10 RX ADMIN — MIDODRINE HYDROCHLORIDE 5 MG: 5 TABLET ORAL at 08:48

## 2025-06-10 RX ADMIN — ACETAMINOPHEN 1000 MG: 500 TABLET ORAL at 11:27

## 2025-06-10 RX ADMIN — MIDODRINE HYDROCHLORIDE 5 MG: 5 TABLET ORAL at 16:22

## 2025-06-10 RX ADMIN — ACETAMINOPHEN 1000 MG: 500 TABLET ORAL at 05:37

## 2025-06-10 RX ADMIN — ACETAMINOPHEN 1000 MG: 500 TABLET ORAL at 17:26

## 2025-06-10 RX ADMIN — MIDODRINE HYDROCHLORIDE 5 MG: 5 TABLET ORAL at 11:28

## 2025-06-10 RX ADMIN — RIVAROXABAN 15 MG: 15 TABLET, FILM COATED ORAL at 16:22

## 2025-06-10 NOTE — PROGRESS NOTES
[facility] Medicine Services       Patient Name: Faby Le  : 1945  MRN: [id number]  Primary Care Physician:  Leonor Adam MD  Date of admission: 2025  Date and Time of Service: 2025 at 1330     Subjective       Chief Complaint: Low blood pressure     History of Present Illness: Faby Le is a 80 y.o. female with a CMH of HFpEF, SSS s/p PPM, A-fib on Xarelto, CAD, HTN, HLD, ROSA on CPAP, osteoarthritis who presented to [facility] on 2025 for elective right total knee arthroplasty revision with Dr. Herrera.  Procedure noted to have no complications.  Orthopedic surgery were planning to discharge patient home today.  However patient was noted to be hypotensive.  Orthostatics were attempted in which patient's BP noted to be as low as 90/39.  Patient denies associated weakness, fatigue, dizziness, lightheadedness, chest pain, shortness of breath, palpitations.  She does endorse no BM since surgery.  She also states she had palpitations approximately 3 weeks ago prior to her original date for knee surgery in which she was evaluated by Dr. Herrera with future plans for an ablation.  Patient understands PTs recommendations for rehab and is amenable.  Case management consulted for placement.  Hospitalist service to assume primary care of patient while she remains hospitalized.     Review of Systems   Constitutional:  Negative for chills, fatigue and fever.   Respiratory:  Negative for shortness of breath.    Cardiovascular:  Negative for chest pain and palpitations.   Gastrointestinal:  Negative for abdominal pain, nausea and vomiting.   Neurological:  Negative for dizziness and light-headedness.       25  Patient yesterday  She has been feeling weak and dizzy for few days  Creatinine is up to 1.8  Status post knee arthroplasty  Status post revision of the total knee  Postop day #2  Hemoglobin drop noted with last hemoglobin 7.5 mostly postop  Worsening  creatinine  Will give IV bolus normal saline follow CBC and chemistry profile in the morning  Nephrology consult for evaluation of worsening renal insufficiency  Check stool for occult blood  Keep patient on IV fluids at normal saline 100 an hour      6/8/25  Patient seen by nephrology consult appreciated  Creatinine is down to 1.4 which is baseline  Complaining of pain in the right knee  Patient is postop day #3  Globin 7.3 today compared to 7.5 on 6/6 which is mostly stable  Pain control  Discharge planning skilled nursing facility when bed is available    6/9/25  Patient doing fine  Using bedside commode  Patient wants to go to [facility] rehab  Discharge planning once arrangement is made  Function stable  Hemoglobin is 7.7 stable from yesterday 7.3 yesterday  IV fluid has been discontinued patient is on oral midodrine  Patient will be resumed on low diuretic dose as per nephrology today  Repeat BMP today    6/10/25  Awaiting discharge planning skilled nursing facility  No acute distress hemodynamically stable  Creatinine stable at 1.28 with hemoglobin at 8.1  Discharge to skilled nursing facility once bed is available      Personal History      Medical History        Past Medical History:   Diagnosis Date    Ankle pain      Arthritis      Atrial fibrillation      Coronary artery disease      Depression      Disease of thyroid gland      Gout      Hyperglycemia      Hyperlipidemia      Hypertension      Low back pain      Sleep apnea      Vitamin D deficiency              Surgical History         Past Surgical History:   Procedure Laterality Date    ANKLE FUSION         2017-left    CARDIAC CATHETERIZATION        CARDIAC CATHETERIZATION Right 08/29/2022     Procedure: Left Heart Cath;  Surgeon: Ratna Zavala MD;  Location: [facility] CATH INVASIVE LOCATION;  Service: Cardiovascular;  Laterality: Right;    CARDIAC ELECTROPHYSIOLOGY PROCEDURE Left 07/03/2023     Procedure: Pacemaker DC new [place] Notified;  Surgeon:  Cr Herrera MD;  Location: [facility] CATH INVASIVE LOCATION;  Service: Cardiovascular;  Laterality: Left;    CARDIAC ELECTROPHYSIOLOGY PROCEDURE Right 03/05/2024     Procedure: Ablation atrial fibrillation, [employer] and Wayne [place] notified 02/09/24;  Surgeon: Cr Herrera MD;  Location: [facility] CATH INVASIVE LOCATION;  Service: Cardiovascular;  Laterality: Right;    CARDIAC ELECTROPHYSIOLOGY PROCEDURE N/A 03/05/2024     Procedure: Cardioversion;  Surgeon: Cr Herrera MD;  Location: [facility] CATH INVASIVE LOCATION;  Service: Cardiovascular;  Laterality: N/A;    CARPAL TUNNEL RELEASE Right      CATARACT EXTRACTION        CUBITAL TUNNEL RELEASE Right      ENDOSCOPY N/A 05/28/2024     Procedure: ESOPHAGOGASTRODUODENOSCOPY WITH COLD FORCEP BIOPSY X1 AREA;  Surgeon: Josh Loomis MD;  Location: [facility] ENDOSCOPY;  Service: Gastroenterology;  Laterality: N/A;  Post- ESOPHAGITIS, GASTRITIS, HIATAL HERNIA    HYSTERECTOMY        REPLACEMENT TOTAL KNEE         left 2008    ROTATOR CUFF REPAIR Right      TOTAL KNEE ARTHROPLASTY Right 05/04/2023     Procedure: TOTAL KNEE ARTHROPLASTY WITH CORI ROBOT;  Surgeon: Chino Herrera II, MD;  Location: [facility] MAIN OR;  Service: Robotics - Ortho;  Laterality: Right;            Family History: family history includes Arthritis in her brother; Cancer in her brother; Hypertension in her mother; Stroke in her father. Otherwise pertinent FHx was reviewed and not pertinent to current issue.     Social History:  reports that she has never smoked. She has never been exposed to tobacco smoke. She has never used smokeless tobacco. She reports that she does not drink alcohol and does not use drugs.     Home Medications:  Prior to Admission Medications         Prescriptions Last Dose Informant Patient Reported? Taking?     acetaminophen (TYLENOL) 325 MG tablet Past Week   Yes Yes     Take 2 tablets by mouth Every 6 (Six) Hours As Needed for Mild Pain.      allopurinol (ZYLOPRIM) 300 MG tablet 6/4/2025   No Yes     Take 1 tablet by mouth once daily     atorvastatin (LIPITOR) 20 MG tablet 6/4/2025   No Yes     Take 1 tablet by mouth once daily     azelastine (ASTELIN) 0.1 % nasal spray Unknown   Yes No     Administer 2 sprays into the nostril(s) as directed by provider Daily. Use in each nostril as directed     cetirizine (zyrTEC) 10 MG tablet Past Week Self Yes Yes     Take 1 tablet by mouth Daily As Needed for Allergies.     cyanocobalamin (VITAMIN B-12) 1000 MCG tablet 5/29/2025 Self Yes Yes     Take 1 tablet by mouth Daily.     docusate sodium (COLACE) 100 MG capsule Past Week Self Yes Yes     Take 1 capsule by mouth 2 (Two) Times a Day.     dofetilide (Tikosyn) 250 MCG capsule 6/5/2025   No Yes     Take 1 capsule by mouth Every 12 (Twelve) Hours.     ferrous sulfate 325 (65 FE) MG tablet Past Week Self Yes Yes     Take 1 tablet by mouth Daily With Breakfast.     levothyroxine (SYNTHROID, LEVOTHROID) 88 MCG tablet 6/4/2025   No Yes     Take 1 tablet by mouth once daily     metoprolol succinate XL (TOPROL-XL) 25 MG 24 hr tablet 6/5/2025   No Yes     Take 1 tablet by mouth Daily for 360 days.     midodrine (PROAMATINE) 5 MG tablet 6/5/2025   No Yes     TAKE 1 TABLET BY MOUTH TWICE DAILY BEFORE MEAL(S)     montelukast (SINGULAIR) 10 MG tablet 6/4/2025   No Yes     Take 1 tablet by mouth Every Night.     Mounjaro 2.5 MG/0.5ML solution auto-injector 5/22/2025   Yes Yes     Inject 5 mg into the appropriate muscle as directed by prescriber 1 (One) Time Per Week.     multivitamin (MULTI VITAMIN PO) 5/22/2025   No Yes     Take 1 tablet by mouth Daily.     multivitamin with minerals tablet tablet 5/22/2025   Yes No     Take 1 tablet by mouth Daily.     mupirocin (BACTROBAN) 2 % ointment     Yes Yes     APPLY SMALL AMOUNT OF OINTMENT TOPICALLY TO AFFECTED AREA TWICE DAILY     pantoprazole (PROTONIX) 40 MG EC tablet 6/5/2025   Yes Yes     Take 1 tablet by mouth Every Morning.      rivaroxaban (XARELTO) 15 MG tablet 6/2/2025   No Yes     Take 1 tablet by mouth Daily With Dinner. Indications: Atrial Fibrillation     spironolactone (ALDACTONE) 25 MG tablet 6/5/2025   No Yes     Take 1/2 (one-half) tablet by mouth once daily     triamcinolone (KENALOG) 0.1 % cream     Yes Yes     APPLY CREAM EXTERNALLY TWICE DAILY TO ECZEMA FOR UP TO 2 WEEKS PER MONTH AS NEEDED     Vitamin D, Cholecalciferol, 25 MCG (1000 UT) tablet 5/22/2025 Self Yes Yes     Take 1 tablet by mouth Daily.                   Allergies:  Allergies         Allergies   Allergen Reactions    Celecoxib Itching and Swelling       swelling            Objective       Vitals:   Temp:  [97.2 °F (36.2 °C)-97.5 °F (36.4 °C)] 97.4 °F (36.3 °C)  Heart Rate:  [66-73] 69  Resp:  [10-14] 14  BP: ()/(36-57) 106/45  Body mass index is 46.23 kg/m².     Physical Exam  General: 81 yo F, Alert and oriented x4, well nourished, no acute distress.  HENT: Normocephalic, normal hearing, moist oral mucosa, no scleral icterus.  Neck: Supple, nontender, no carotid bruits, no JVD, no LAD.  Lungs: Clear to auscultation, nonlabored respiration.  Heart: RRR, no murmur, gallop or edema.  Abdomen: Soft, nontender, nondistended, + bowel sounds.  Musculoskeletal: Bandage in place over right knee.  Normal range of motion and strength, no tenderness or swelling.  Skin: Skin is warm, dry and pink, no rashes or lesions.  Psychiatric: Cooperative, appropriate mood and affect.        Diagnostic Data:  Lab Results (last 24 hours)         ** No results found for the last 24 hours. **                           Imaging Results (Last 24 Hours)         Procedure Component Value Units Date/Time     XR Knee 1 or 2 View Right [[id number]] Collected: 06/05/25 1524       Updated: 06/05/25 1530     Narrative:       XR KNEE 1 OR 2 VW RIGHT     Date of Exam: 6/5/2025 2:58 PM EDT     Indication: Post-Op Knee Arthoplasty     Comparison: Right knee radiographs dated 5/15/2025      Findings:  There are postsurgical changes of right total knee revision. There are anterior cutaneous staples and a surgical drain within the suprapatellar space.        Impression:       Impression:     Immediate postoperative changes status post right total knee revision with anterior soft tissue swelling.           Electronically Signed: Bry Mendosa MD    6/5/2025 3:27 PM EDT    Workstation ID: [id number]                   Assessment & Plan          This is a 80 y.o. female with:     Active and Resolved Problems        Active Hospital Problems     Diagnosis   POA    **Postoperative pain of knee [G89.18, M25.569]   Yes    Orthostasis [I95.1]   Yes    S/P revision of total knee [Z96.659]   Not Applicable       Resolved Hospital Problems   No resolved problems to display.            Hypotension with history of hypertension  Coronary artery disease  SSS s/p pacemaker  Chronic diastolic congestive heart failure  Paroxysmal A-fib  Hyperlipidemia  BP soft, improving, appears hypovolemic  Orthostatics positive   5/16/2025 TTE with a EF of 56 to 60%, grade 3 diastolic dysfunction  CBC, BMP, troponin, BNP pending  1 L bolus ordered over 2 hours   Continue Tikosyn   Xarelto has been resumed, continue   Rec'd dose of Metoprolol this am, consider holding tomorrow if still hypotensive  Fall precautions   Telemetry  Consider cardiology consult if blood pressure does not improve with IV fluids     Osteoarthritis of right knee  S/p right TKA revision POD 1  Incentive spirometry  As needed analgesics  Orthopedics signed off   PT recommend SNF, awaiting placement      ROSA on CPAP: Continue nocturnal CPAP        VTE Prophylaxis:  Pharmacologic & mechanical VTE prophylaxis orders are present.           The patient desires to be as follows:     CODE STATUS:    Code Status (Patient has no pulse and is not breathing): CPR (Attempt to Resuscitate)  Medical Interventions (Patient has pulse or is breathing): Full  Level Of Support  Discussed With: Patient           Lyudmila Le, who can be contacted at [phone], is the designated person to make medical decisions on the patient's behalf if She is incapable of doing so. This was clarified with patient and/or next of kin on 6/5/2025 during the course of this H&P.     Admission Status:  I believe this patient meets inpatient status.     Expected Length of Stay: 2 to 3 days     PDMP and Medication Dispenses via Sidebar reviewed and consistent with patient reported medications.     I discussed the patient's findings and my recommendations with patient.

## 2025-06-10 NOTE — PLAN OF CARE
"Assessment: Faby Le presents with functional mobility impairments which indicate the need for skilled intervention. Tolerating session today without incident. Will continue to follow and progress as tolerated.     Plan/Recommendations:   If medically appropriate, Moderate Intensity Therapy recommended post-acute care. This is recommended as therapy feels the patient would require 3-4 days per week and wouldn't tolerate \"3 hour daily\" rehab intensity. SNF would be the preferred choice. If the patient does not agree to SNF, arrange HH or OP depending on home bound status. If patient is medically complex, consider LTACH. Pt requires no DME at discharge.     Pt desires Skilled Rehab placement at discharge. Pt cooperative; agreeable to therapeutic recommendations and plan of care.     "

## 2025-06-10 NOTE — THERAPY TREATMENT NOTE
"Subjective: Pt agreeable to therapeutic plan of care.    Objective:     Precautions - RLE WBAT    Bed mobility - N/A or Not attempted.    Transfers - STS CGA using RW    Ambulation - 2x25 feet CGA using RW. Antalgic gait pattern with decreased rajeev.     Therapeutic Exercise - 15 Reps R Lower Extremity AAROM supported sitting / chair  ROM: 5-70 deg     Vitals: WNL    Pain: 5 VAS Location: R knee  Intervention for pain: Repositioned, Increased Activity, and Therapeutic Presence    Education: Provided education on the importance of mobility in the acute care setting, Verbal/Tactile Cues, Transfer Training, and Gait Training    Assessment: Faby Le presents with functional mobility impairments which indicate the need for skilled intervention. Tolerating session today without incident. Will continue to follow and progress as tolerated.     Plan/Recommendations:   If medically appropriate, Moderate Intensity Therapy recommended post-acute care. This is recommended as therapy feels the patient would require 3-4 days per week and wouldn't tolerate \"3 hour daily\" rehab intensity. SNF would be the preferred choice. If the patient does not agree to SNF, arrange HH or OP depending on home bound status. If patient is medically complex, consider LTACH. Pt requires no DME at discharge.     Pt desires Skilled Rehab placement at discharge. Pt cooperative; agreeable to therapeutic recommendations and plan of care.     Post-Tx Position: Up in Chair, Alarms activated, and Call light and personal items within reach  PPE: gloves    Therapy Charges for Today       Code Description Service Date Service Provider Modifiers Qty    78664092206 HC PT THERAPEUTIC ACT EA 15 MIN 6/10/2025 Sylvester Llanos, PTA GP 1    64530386899 HC GAIT TRAINING EA 15 MIN 6/10/2025 Sylvester Llanos, PTA GP 1    14974893352 HC PT THER PROC EA 15 MIN 6/10/2025 Sylvester Llanos, PTA GP 1           PT Charges       Row Name 06/10/25 " 1428             Time Calculation    Start Time 0943  -UN      Stop Time 1007  -UN      Time Calculation (min) 24 min  -UN      PT Received On 06/10/25  -UN      PT - Next Appointment 06/11/25  -UN         Time Calculation- PT    Total Timed Code Minutes- PT 24 minute(s)  -UN                User Key  (r) = Recorded By, (t) = Taken By, (c) = Cosigned By      Initials Name Provider Type    UN Sylvester Llanos, PTA Physical Therapist Assistant

## 2025-06-10 NOTE — DISCHARGE PLACEMENT REQUEST
"Mara Le (80 y.o. Female)       Date of Birth   1945    Social Security Number       Address   [address]  [place] [facility]    Home Phone   [phone]    MRN   [id number]       Latter-day   Christianity    Marital Status                               Admission Date   6/5/2025    Admission Type   Elective    Admitting Provider   Ryan Duke MD    Attending Provider   Jong Minor MD    Department, Room/Bed   [facility] SURGICAL INPATIENT, 4128/1       Discharge Date       Discharge Disposition       Discharge Destination                                 Attending Provider: Jong Minor MD    Allergies: Celecoxib    Isolation: None   Infection: MRSA No Isolation this Admit (05/04/23)   Code Status: CPR    Ht: 160 cm (62.99\")   Wt: 118 kg (260 lb 2.3 oz)    Admission Cmt: None   Principal Problem: Postoperative pain of knee [G89.18,M25.569]                   Active Insurance as of 6/5/2025       Primary Coverage       Payor Plan Insurance Group Employer/Plan Group    [facility] MEDICARE [facility] MEDICARE ADVANTAGE SNP PPO [id number]       Payor Plan Address Payor Plan Phone Number Payor Plan Fax Number Effective Dates    [address] [address]   1/1/2023 - None Entered    [facility] [id number]         Subscriber Name Subscriber Birth Date Member ID       MARA LE 1945 [id number]               Secondary Coverage       Payor Plan Insurance Group Employer/Plan Group    [facility] COMMUNITY PLAN OF IN [place] [employer] CARE CONNECT [facility] COMMUNITY PLAN PATHWAYS IN        Payor Plan Address Payor Plan Phone Number Payor Plan Fax Number Effective Dates    [address]   7/1/2024 - None Entered    [facility] [id number]         Subscriber Name Subscriber Birth Date Member ID       MARA LE 1945 [id number]                     Emergency Contacts        (Rel.) Home Phone Work Phone Mobile Phone    RAMÓN LE (Daughter) -- -- " [phone]    JANICE VALERIO (Daughter) [phone] -- [phone]    FRANCK MARTINEZ (Daughter) [phone] -- [phone]    ROSA MARRERO (Daughter) [phone] -- [phone]                 History & Physical        Daniel Gutierrez PA-C at 25 1400       Attestation signed by Ryan Duke MD at 25 2153      I have reviewed this documentation and agree.                          [facility] Medicine Services  History & Physical    Patient Name: Faby Le  : 1945  MRN: [id number]  Primary Care Physician:  Leonor Adam MD  Date of admission: 2025  Date and Time of Service: 2025 at 1330    Subjective      Chief Complaint: Low blood pressure    History of Present Illness: Faby Le is a 80 y.o. female with a CMH of HFpEF, SSS s/p PPM, A-fib on Xarelto, CAD, HTN, HLD, ROSA on CPAP, osteoarthritis who presented to [facility] on 2025 for elective right total knee arthroplasty revision with Dr. Herrera.  Procedure noted to have no complications.  Orthopedic surgery were planning to discharge patient home today.  However patient was noted to be hypotensive.  Orthostatics were attempted in which patient's BP noted to be as low as 90/39.  Patient denies associated weakness, fatigue, dizziness, lightheadedness, chest pain, shortness of breath, palpitations.  She does endorse no BM since surgery.  She also states she had palpitations approximately 3 weeks ago prior to her original date for knee surgery in which she was evaluated by Dr. Herrera with future plans for an ablation.  Patient understands PTs recommendations for rehab and is amenable.  Case management consulted for placement.  Hospitalist service to assume primary care of patient while she remains hospitalized.    Review of Systems   Constitutional:  Negative for chills, fatigue and fever.   Respiratory:  Negative for shortness of breath.    Cardiovascular:  Negative for chest pain and  palpitations.   Gastrointestinal:  Negative for abdominal pain, nausea and vomiting.   Neurological:  Negative for dizziness and light-headedness.       Personal History     Past Medical History:   Diagnosis Date    Ankle pain     Arthritis     Atrial fibrillation     Coronary artery disease     Depression     Disease of thyroid gland     Gout     Hyperglycemia     Hyperlipidemia     Hypertension     Low back pain     Sleep apnea     Vitamin D deficiency        Past Surgical History:   Procedure Laterality Date    ANKLE FUSION      2017-left    CARDIAC CATHETERIZATION      CARDIAC CATHETERIZATION Right 08/29/2022    Procedure: Left Heart Cath;  Surgeon: Ratna Zavala MD;  Location: [facility] CATH INVASIVE LOC[facility];  Service: Cardiovascular;  Laterality: Right;    CARDIAC ELECTROPHYSIOLOGY PROCEDURE Left 07/03/2023    Procedure: Pacemaker DC new [place] Notified;  Surgeon: Cr Herrera MD;  Location: [facility] CATH INVASIVE LOCATION;  Service: Cardiovascular;  Laterality: Left;    CARDIAC ELECTROPHYSIOLOGY PROCEDURE Right 03/05/2024    Procedure: Ablation atrial fibrillation, Cryo Slalonde and Wayne Igor notified 02/09/24;  Surgeon: Cr Herrera MD;  Location: [facility] CATH INVASIVE LOCATION;  Service: Cardiovascular;  Laterality: Right;    CARDIAC ELECTROPHYSIOLOGY PROCEDURE N/A 03/05/2024    Procedure: Cardioversion;  Surgeon: Cr Herrera MD;  Location: [facility] CATH INVASIVE LOCATION;  Service: Cardiovascular;  Laterality: N/A;    CARPAL TUNNEL RELEASE Right     CATARACT EXTRACTION      CUBITAL TUNNEL RELEASE Right     ENDOSCOPY N/A 05/28/2024    Procedure: ESOPHAGOGASTRODUODENOSCOPY WITH COLD FORCEP BIOPSY X1 AREA;  Surgeon: Josh Loomis MD;  Location: [facility] ENDOSCOPY;  Service: Gastroenterology;  Laterality: N/A;  Post- ESOPHAGITIS, GASTRITIS, HIATAL HERNIA    HYSTERECTOMY      REPLACEMENT TOTAL KNEE      left 2008    ROTATOR CUFF REPAIR Right     TOTAL KNEE ARTHROPLASTY  Right 05/04/2023    Procedure: TOTAL KNEE ARTHROPLASTY WITH CORI ROBOT;  Surgeon: Chino Herrera II, MD;  Location: [facility] MAIN OR;  Service: Robotics - Ortho;  Laterality: Right;       Family History: family history includes Arthritis in her brother; Cancer in her brother; Hypertension in her mother; Stroke in her father. Otherwise pertinent FHx was reviewed and not pertinent to current issue.    Social History:  reports that she has never smoked. She has never been exposed to tobacco smoke. She has never used smokeless tobacco. She reports that she does not drink alcohol and does not use drugs.    Home Medications:  Prior to Admission Medications       Prescriptions Last Dose Informant Patient Reported? Taking?    acetaminophen (TYLENOL) 325 MG tablet Past Week  Yes Yes    Take 2 tablets by mouth Every 6 (Six) Hours As Needed for Mild Pain.    allopurinol (ZYLOPRIM) 300 MG tablet 6/4/2025  No Yes    Take 1 tablet by mouth once daily    atorvastatin (LIPITOR) 20 MG tablet 6/4/2025  No Yes    Take 1 tablet by mouth once daily    azelastine (ASTELIN) 0.1 % nasal spray Unknown  Yes No    Administer 2 sprays into the nostril(s) as directed by provider Daily. Use in each nostril as directed    cetirizine (zyrTEC) 10 MG tablet Past Week Self Yes Yes    Take 1 tablet by mouth Daily As Needed for Allergies.    cyanocobalamin (VITAMIN B-12) 1000 MCG tablet 5/29/2025 Self Yes Yes    Take 1 tablet by mouth Daily.    docusate sodium (COLACE) 100 MG capsule Past Week Self Yes Yes    Take 1 capsule by mouth 2 (Two) Times a Day.    dofetilide (Tikosyn) 250 MCG capsule 6/5/2025  No Yes    Take 1 capsule by mouth Every 12 (Twelve) Hours.    ferrous sulfate 325 (65 FE) MG tablet Past Week Self Yes Yes    Take 1 tablet by mouth Daily With Breakfast.    levothyroxine (SYNTHROID, LEVOTHROID) 88 MCG tablet 6/4/2025  No Yes    Take 1 tablet by mouth once daily    metoprolol succinate XL (TOPROL-XL) 25 MG 24 hr tablet 6/5/2025   No Yes    Take 1 tablet by mouth Daily for 360 days.    midodrine (PROAMATINE) 5 MG tablet 6/5/2025  No Yes    TAKE 1 TABLET BY MOUTH TWICE DAILY BEFORE MEAL(S)    montelukast (SINGULAIR) 10 MG tablet 6/4/2025  No Yes    Take 1 tablet by mouth Every Night.    Mounjaro 2.5 MG/0.5ML solution auto-injector 5/22/2025  Yes Yes    Inject 5 mg into the appropriate muscle as directed by prescriber 1 (One) Time Per Week.    multivitamin (MULTI VITAMIN PO) 5/22/2025  No Yes    Take 1 tablet by mouth Daily.    multivitamin with minerals tablet tablet 5/22/2025  Yes No    Take 1 tablet by mouth Daily.    mupirocin (BACTROBAN) 2 % ointment   Yes Yes    APPLY SMALL AMOUNT OF OINTMENT TOPICALLY TO AFFECTED AREA TWICE DAILY    pantoprazole (PROTONIX) 40 MG EC tablet 6/5/2025  Yes Yes    Take 1 tablet by mouth Every Morning.    rivaroxaban (XARELTO) 15 MG tablet 6/2/2025  No Yes    Take 1 tablet by mouth Daily With Dinner. Indications: Atrial Fibrillation    spironolactone (ALDACTONE) 25 MG tablet 6/5/2025  No Yes    Take 1/2 (one-half) tablet by mouth once daily    triamcinolone (KENALOG) 0.1 % cream   Yes Yes    APPLY CREAM EXTERNALLY TWICE DAILY TO ECZEMA FOR UP TO 2 WEEKS PER MONTH AS NEEDED    Vitamin D, Cholecalciferol, 25 MCG (1000 UT) tablet 5/22/2025 Self Yes Yes    Take 1 tablet by mouth Daily.              Allergies:  Allergies   Allergen Reactions    Celecoxib Itching and Swelling     swelling       Objective      Vitals:   Temp:  [97.2 °F (36.2 °C)-97.5 °F (36.4 °C)] 97.4 °F (36.3 °C)  Heart Rate:  [66-73] 69  Resp:  [10-14] 14  BP: ()/(36-57) 106/45  Body mass index is 46.23 kg/m².    Physical Exam  General: 81 yo F, Alert and oriented x4, well nourished, no acute distress.  HENT: Normocephalic, normal hearing, moist oral mucosa, no scleral icterus.  Neck: Supple, nontender, no carotid bruits, no JVD, no LAD.  Lungs: Clear to auscultation, nonlabored respiration.  Heart: RRR, no murmur, gallop or edema.  Abdomen:  Soft, nontender, nondistended, + bowel sounds.  Musculoskeletal: Bandage in place over right knee.  Normal range of motion and strength, no tenderness or swelling.  Skin: Skin is warm, dry and pink, no rashes or lesions.  Psychiatric: Cooperative, appropriate mood and affect.      Diagnostic Data:  Lab Results (last 24 hours)       ** No results found for the last 24 hours. **             Imaging Results (Last 24 Hours)       Procedure Component Value Units Date/Time    XR Knee 1 or 2 View Right [[id number]] Collected: 06/05/25 1524     Updated: 06/05/25 1530    Narrative:      XR KNEE 1 OR 2 VW RIGHT    Date of Exam: 6/5/2025 2:58 PM EDT    Indication: Post-Op Knee Arthoplasty    Comparison: Right knee radiographs dated 5/15/2025    Findings:  There are postsurgical changes of right total knee revision. There are anterior cutaneous staples and a surgical drain within the suprapatellar space.      Impression:      Impression:     Immediate postoperative changes status post right total knee revision with anterior soft tissue swelling.        Electronically Signed: Bry Mendosa MD    6/5/2025 3:27 PM EDT    Workstation ID: [id number]              Assessment & Plan        This is a 80 y.o. female with:    Active and Resolved Problems  Active Hospital Problems    Diagnosis  POA    **Postoperative pain of knee [G89.18, M25.569]  Yes    Orthostasis [I95.1]  Yes    S/P revision of total knee [Z96.659]  Not Applicable      Resolved Hospital Problems   No resolved problems to display.         Hypotension with history of hypertension  Coronary artery disease  SSS s/p pacemaker  Chronic diastolic congestive heart failure  Paroxysmal A-fib  Hyperlipidemia  BP soft, improving, appears hypovolemic  Orthostatics positive   5/16/2025 TTE with a EF of 56 to 60%, grade 3 diastolic dysfunction  CBC, BMP, troponin, BNP pending  1 L bolus ordered over 2 hours   Continue Tikosyn   Xarelto has been resumed, continue   Rec'd dose of  Metoprolol this am, consider holding tomorrow if still hypotensive  Fall precautions   Telemetry  Consider cardiology consult if blood pressure does not improve with IV fluids    Osteoarthritis of right knee  S/p right TKA revision POD 1  Incentive spirometry  As needed analgesics  Orthopedics signed off   PT recommend SNF, awaiting placement     ROSA on CPAP: Continue nocturnal CPAP      VTE Prophylaxis:  Pharmacologic & mechanical VTE prophylaxis orders are present.        The patient desires to be as follows:    CODE STATUS:    Code Status (Patient has no pulse and is not breathing): CPR (Attempt to Resuscitate)  Medical Interventions (Patient has pulse or is breathing): Full  Level Of Support Discussed With: Patient        Lyudmila Le, who can be contacted at [phone], is the designated person to make medical decisions on the patient's behalf if She is incapable of doing so. This was clarified with patient and/or next of kin on 6/5/2025 during the course of this H&P.    Admission Status:  I believe this patient meets inpatient status.    Expected Length of Stay: 2 to 3 days    PDMP and Medication Dispenses via Sidebar reviewed and consistent with patient reported medications.    I discussed the patient's findings and my recommendations with patient.      Signature:     This document has been electronically signed by Daniel Gutierrez PA-C on June 6, 2025 14:00 EDT   [facility] [place] Hospitalist Team     Electronically signed by Ryan Duke MD at 06/06/25 5454       Chino Herrera II, MD at 06/05/25 7484            Orthopaedic Surgery  History & Physical  Dr. JACKELINE Herrera II  [phone]    HPI:  Patient is a 80 y.o. Not  or  female who presents with a failed and loose total knee.    MEDICAL HISTORY  Past Medical History:   Diagnosis Date    Ankle pain     Arthritis     Atrial fibrillation     Coronary artery disease     Depression     Disease of thyroid gland     Gout      Hyperglycemia     Hyperlipidemia     Hypertension     Low back pain     Sleep apnea     Vitamin D deficiency      Past Surgical History:   Procedure Laterality Date    ANKLE FUSION      2017-left    CARDIAC CATHETERIZATION      CARDIAC CATHETERIZATION Right 8/29/2022    Procedure: Left Heart Cath;  Surgeon: Ratna Zavala MD;  Location: [facility] CATH INVASIVE LOCATION;  Service: Cardiovascular;  Laterality: Right;    CARDIAC ELECTROPHYSIOLOGY PROCEDURE Left 7/3/2023    Procedure: Pacemaker DC new [place] Notified;  Surgeon: Cr Herrera MD;  Location: [facility] CATH INVASIVE LOCATION;  Service: Cardiovascular;  Laterality: Left;    CARDIAC ELECTROPHYSIOLOGY PROCEDURE Right 3/5/2024    Procedure: Ablation atrial fibrillation, Cryo Slalonde and Wayne Igor notified 02/09/24;  Surgeon: Cr Herrera MD;  Location: [facility] CATH INVASIVE LOCATION;  Service: Cardiovascular;  Laterality: Right;    CARDIAC ELECTROPHYSIOLOGY PROCEDURE N/A 3/5/2024    Procedure: Cardioversion;  Surgeon: Cr Herrera MD;  Location: [facility] CATH INVASIVE LOCATION;  Service: Cardiovascular;  Laterality: N/A;    CARPAL TUNNEL RELEASE      CATARACT EXTRACTION      CUBITAL TUNNEL RELEASE      ENDOSCOPY N/A 5/28/2024    Procedure: ESOPHAGOGASTRODUODENOSCOPY WITH COLD FORCEP BIOPSY X1 AREA;  Surgeon: Josh Loomis MD;  Location: [facility] ENDOSCOPY;  Service: Gastroenterology;  Laterality: N/A;  Post- ESOPHAGITIS, GASTRITIS, HIATAL HERNIA    HYSTERECTOMY      REPLACEMENT TOTAL KNEE      left 2008    ROTATOR CUFF REPAIR      TOTAL KNEE ARTHROPLASTY Right 5/4/2023    Procedure: TOTAL KNEE ARTHROPLASTY WITH CORI ROBOT;  Surgeon: Chino Herrera II, MD;  Location: [facility] MAIN OR;  Service: Robotics - Ortho;  Laterality: Right;     Prior to Admission medications    Medication Sig Start Date End Date Taking? Authorizing Provider   acetaminophen (TYLENOL) 325 MG tablet Take 2 tablets by mouth Every 6 (Six) Hours As  Needed for Mild Pain.   Yes Franklin Cid MD   allopurinol (ZYLOPRIM) 300 MG tablet Take 1 tablet by mouth once daily 11/4/24  Yes Leonor Adam MD   atorvastatin (LIPITOR) 20 MG tablet Take 1 tablet by mouth once daily 9/30/24  Yes Leonor Adam MD   cetirizine (zyrTEC) 10 MG tablet Take 1 tablet by mouth Daily As Needed for Allergies. 8/28/18  Yes Franklin Cid MD   cyanocobalamin (VITAMIN B-12) 1000 MCG tablet Take 1 tablet by mouth Daily. 12/20/18  Yes Franklin Cid MD   docusate sodium (COLACE) 100 MG capsule Take 1 capsule by mouth 2 (Two) Times a Day.   Yes Franklin Cid MD   dofetilide (Tikosyn) 250 MCG capsule Take 1 capsule by mouth Every 12 (Twelve) Hours. 10/14/24  Yes Ratna Zavala MD   ferrous sulfate 325 (65 FE) MG tablet Take 1 tablet by mouth Daily With Breakfast.   Yes Franklin Cid MD   levothyroxine (SYNTHROID, LEVOTHROID) 88 MCG tablet Take 1 tablet by mouth once daily 12/11/24  Yes Leonor Adam MD   metoprolol succinate XL (TOPROL-XL) 25 MG 24 hr tablet Take 1 tablet by mouth Daily for 360 days. 10/14/24 10/9/25 Yes Ratna Zavala MD   midodrine (PROAMATINE) 5 MG tablet TAKE 1 TABLET BY MOUTH TWICE DAILY BEFORE MEAL(S) 10/28/24  Yes Leonor Adam MD   montelukast (SINGULAIR) 10 MG tablet Take 1 tablet by mouth Every Night. 6/25/24  Yes Leonor Adam MD   Mounjaro 2.5 MG/0.5ML solution auto-injector Inject 5 mg into the appropriate muscle as directed by prescriber 1 (One) Time Per Week. 2/11/25  Yes Franklin Cid MD   multivitamin (MULTI VITAMIN PO) Take 1 tablet by mouth Daily. 5/16/25  Yes Jerome Barbosa MD   mupirocin (BACTROBAN) 2 % ointment APPLY SMALL AMOUNT OF OINTMENT TOPICALLY TO AFFECTED AREA TWICE DAILY 8/22/24  Yes Provider, Franklin, MD   pantoprazole (PROTONIX) 40 MG EC tablet Take 1 tablet by mouth Every Morning. 5/28/24  Yes Provider, Franklin, MD   rivaroxaban  (XARELTO) 15 MG tablet Take 1 tablet by mouth Daily With Dinner. Indications: Atrial Fibrillation 9/23/24  Yes Leonor Adam MD   spironolactone (ALDACTONE) 25 MG tablet Take 1/2 (one-half) tablet by mouth once daily 10/15/24  Yes Ratna Zavala MD   triamcinolone (KENALOG) 0.1 % cream APPLY CREAM EXTERNALLY TWICE DAILY TO ECZEMA FOR UP TO 2 WEEKS PER MONTH AS NEEDED 8/22/24  Yes Provider, MD Franklin   Vitamin D, Cholecalciferol, 25 MCG (1000 UT) tablet Take 1 tablet by mouth Daily. 6/16/16  Yes ProviderFranklin MD   azelastine (ASTELIN) 0.1 % nasal spray Administer 2 sprays into the nostril(s) as directed by provider Daily. Use in each nostril as directed    ProviderFranklin MD   multivitamin with minerals tablet tablet Take 1 tablet by mouth Daily.    Provider, MD Franklin     Allergies   Allergen Reactions    Celecoxib Itching and Swelling     swelling     Most Recent Immunizations   Administered Date(s) Administered    Arexvy (RSV, Adults 60+ yrs) 12/07/2023    COVID-19 (PFIZER) 12YRS+ (COMIRNATY) 05/30/2025    COVID-19 (PFIZER) Purple Cap Monovalent 12/29/2021    FLUAD TRI 65YR+ 11/07/2013    Flu Vaccine Intradermal Quad 18-64YR 11/07/2013    Fluad Quad 65+ 11/18/2022    Fluzone  >6mos 12/02/2014    Fluzone High-Dose 65+YRS 10/03/2024    Fluzone High-Dose 65+yrs 10/02/2023    Fluzone Quad >6mos (Multi-dose) 12/02/2014    Hep A / Hep B 06/17/2024    Influenza, Unspecified 11/01/2024    Pneumococcal Conjugate 13-Valent (PCV13) 06/16/2016    Pneumococcal Conjugate 20-Valent (PCV20) 10/02/2023    Pneumococcal Polysaccharide (PPSV23) 01/23/2018    Shingrix 12/06/2019    Tdap 01/23/2018, 01/23/2018    Zostavax 12/06/2019     Social History     Tobacco Use    Smoking status: Never     Passive exposure: Never    Smokeless tobacco: Never   Substance Use Topics    Alcohol use: No      Social History     Substance and Sexual Activity   Drug Use No       REVIEW OF SYSTEMS:  Head: negative  "for headache  Respiratory: negative for shortness of breath.   Cardiovascular: negative for chest pain.   Gastrointestinal: negative abdominal pain.   Neurological: negative for LOC  Psychiatric/Behavioral: negative for memory loss.   All other systems reviewed and are negative    VITALS: LMP  (LMP Unknown)  There is no height or weight on file to calculate BMI.    PHYSICAL EXAM:   CONSTITUTIONAL: A&Ox3, No acute distress  LUNGS: Equal chest rise, no shortness of air  CARDIOVASCULAR: palpable peripheral pulses  SKIN: no skin lesions in the area examined  LYMPH: no lymphadenopathy in the area examined  Right knee incision well-healed.  Good range of motion with no significant instability    RADIOLOGY REVIEW:   XR Chest PA & Lateral  Result Date: 6/3/2025  Impression: No acute process. Electronically Signed: Denzel Dominguez MD  6/3/2025 3:35 PM EDT  Workstation ID: [id number]      LABS:   Results for the past 24 hours:   Recent Results (from the past 24 hours)   Protime-INR    Collection Time: 06/05/25  9:13 AM    Specimen: Arm, Right; Blood   Result Value Ref Range    Protime 14.2 11.7 - 14.2 Seconds    INR 1.11 (H) 0.90 - 1.10       IMPRESSION:  Patient is a 80 y.o. Not  or  female with right failed total knee    PLAN:   Admited to: Chino Herrera II, MD  Plan: Right total knee arthroplasty revision today    R \"Rodrigo\" Javier LOCK MD  Orthopaedic Surgery  [facility]  [phone] - [place] Office  [phone] - [place] Office      Electronically signed by Chino Hrerera II, MD at 06/05/25 0936       H&P signed by [employer], [employer] at 06/04/25 1220         [Media Unavailable] Scan on 6/4/2025 0924 by [employer], [employer]: H&P, LOC, 05/12/2025          Electronically signed by [employer] On[employer], [employer] at 06/04/25 1220          Operative/Procedure Notes (all)        Chino Herrera II, MD at 06/05/25 1220  Version 1 of 1           Total Knee Robotic Revision " Operative Note  Dr. JACKELINE Herrera II  [phone]    PATIENT NAME: Faby Le  MRN: [id number]  : 1945 AGE: 80 y.o. GENDER: female  DATE OF OPERATION: 2025  PREOPERATIVE DIAGNOSIS: Loose Implants: During workup this patient was found to have loose total knee implants.   POSTOPERATIVE DIAGNOSIS: Same  OPERATION PERFORMED: Right Total Knee Arthroplasty Revision  SURGEON: Chino Herrera MD  Circulator: Madison Marsh RN  Scrub Person: Cabrera De La Rosa; Chhaya Walker  Vendor Representative: Kyree Rosas  Assistant: Kaushal Morris CSA  ANESTHESIA: Spinal with Block  ASSISTANT: Kaushal Morris. This case would not have been possible without another set of skilled surgical hands for retraction, use of instrumentation, and general assistance.  This assistance was vital to the success of the case.   ESTIMATED BLOOD LOSS: 200cc  SPONGE AND NEEDLE COUNT: Correct  INDICATIONS: Loose Implants: This patient was noted to have a painful and loose total knee implant that failed conservative treatment. The patient was indicated for an elective revision total knee. A discussion of operative versus nonoperative treatment was had with the patient and they failed conservative management. They elected to undergo total knee arthroplasty revision. The risks of surgery were discussed and included the risk of anesthesia, infection, damage to neurovascular structures, implant loosening/failure, fracture, hardware prominence, continued pain, early failure, the need for further procedures, medical complications, and others. No guarantees were made. The patient wished to proceed with surgery and a surgical consent was signed. The patient's pain is becoming disabling, despite extensive conservative care including NSAIDs, therapy, and injections.    COMPONENTS:   Lesion Oxinium femoral component size 4 with multiple wedges and a 12 x 120 stem  Legion revision tibial baseplate size 3 with a 15 mm wedge and a 10 x 120 stem  11 mm high flex  articular insert  18 mm tibial cone    PERTINENT FINDINGS: None    DETAILS OF PROCEDURE:  The patient was met in the preoperative area. The site was marked. The consent and H&P were reviewed. The patient was then wheeled back to the operative suite and transferred to the operative table. The patient underwent anesthesia. A tourniquet was placed on the upper thigh.  A bump was placed under the operative hip. The Trejo baseplate was secured to the table. Surgical alcohol was used to thoroughly clean the entire operative extremity.     The leg was then prepped in the normal sterile fashion, which included ChloraPrep, multiple layers of sterile drapes, and surgical space suits for the entire operative team. The Trejo boot was applied to the foot after adequate padding. An Ioban dressing was applied to the knee after the surgical incision was marked.  New outer gloves were used by all sterile surgical team members after final draping. After a surgical timeout in which administration of preoperative antibiotics as well as 1g of tranexamic acid (if not contraindicated) and the surgical site were confirmed, the tourniquet was put up.     2 tibial and 2 femoral pins were placed for the robot apparatus and the robotic arrays were attached.    In flexion, a midline knee incision was utilized through the old scar from the prior surgery.  Dissection was carried down to the capsule.  Medial and lateral flaps were created to allow adequate exposure.  Next a medial parapatellar arthrotomy was made.       The patella was exposed and noted to be in good condition.  There is no obvious loosening.  I then turned my attention to the robot.  The knee was mapped and planned in standard fashion.  Afterwards, the polyethylene was removed.  I then used narrow osteotomes to loosen the femoral component which came out without too much difficulty.  There was good bone preservation.  I then turned my attention to the tibia.  In similar  "fashion, I used osteotomes circumferentially around the tibial component to loosen it and was then extracted.  At that point, I turned my attention back to the robot.  The knee was mapped again to determine bony defects.  We then measured augments that it was noted that there would be a distal augment medially and laterally on the femur.  A tibial augment was also necessary.  We then went back to the robotic screen and planned for all necessary augments and then used a bur to remove the bone to make room for the augments.  Afterwards, the bur was used to prepare the canals and the knee was trialed.  Real implants were then opened and assembled and the first batch of cement was mixed.  A canal restrictor was utilized in both the tibia and femur.  The tibia was cemented first and afterwards a second batch of cement was mixed and the femur was cemented.  A trial poly  was then inserted and the knee was brought into extension and the tourniquet was taken down.  Adequate hemostasis was achieved.  The knee was injected with analgesic cocktail.  After all cement was hardened, the knee was trialed 1 final time and real polyethylene was opened and inserted.  Due to some oozing of the soft tissues, I did elect to leave the drain.    The knee was then closed in layers and a sterile dressing was applied    The patient was awoken from anesthesia, moved to the [facility] and taken to the recovery room in stable condition. Sponge and needle count were correct. There were no complications. Patient tolerated the procedure well.    R \"Rodrigo\" Javier LOCK MD  Orthopaedic Surgery  [facility]  [phone]                  Electronically signed by Chino Herrera II, MD at 25 1335          Physician Progress Notes (last 48 hours)        Omar Vale MD at 06/10/25 1005              [facility] Progress Note      Patient Name: Faby Le  : 1945  MRN: [id number]  Primary " Care Physician:  Leonor Adam MD  Date of admission: 6/5/2025    Subjective     Interval History:     Patient resting comfortably, feeling better  Has no new issues noted today.  Afebrile.  Denies any nausea and vomiting no fever no chills    Review of Systems:   As noted above    Objective     Vitals:   Temp:  [97.5 °F (36.4 °C)-97.9 °F (36.6 °C)] 97.5 °F (36.4 °C)  Heart Rate:  [60-64] 60  Resp:  [16-19] 19  BP: (123-164)/(39-74) 144/53    Intake/Output Summary (Last 24 hours) at 6/10/2025 1005  Last data filed at 6/10/2025 0800  Gross per 24 hour   Intake 960 ml   Output 650 ml   Net 310 ml       Physical Exam:      General Appearance: alert, oriented x 3, no acute distress   Skin: warm and dry  HEENT: oral mucosa normal, nonicteric sclera  Neck: supple, no JVD  Lungs: CTA  Heart: RRR, normal S1 and S2  Abdomen: soft, nontender, nondistended  : no palpable bladder  Extremities: Dressing present right knee  Neuro: normal speech and mental status     Scheduled Meds:     acetaminophen, 1,000 mg, Oral, Q6H  levothyroxine, 88 mcg, Oral, Daily  metoprolol succinate XL, 25 mg, Oral, Q24H  midodrine, 5 mg, Oral, TID AC  rivaroxaban, 15 mg, Oral, Daily With Dinner      IV Meds:          Results Reviewed:   I have personally reviewed the results from the time of this admission to 6/10/2025 10:05 EDT     Results from last 7 days   Lab Units 06/10/25  0032 06/09/25  1204 06/08/25  0146   SODIUM mmol/L 135* 138 135*   POTASSIUM mmol/L 4.9 4.9 5.0   CHLORIDE mmol/L 105 109* 107   CO2 mmol/L 21.7* 23.2 20.7*   BUN mg/dL 23.7* 23.2* 33.3*   CREATININE mg/dL 1.28* 1.31* 1.40*   CALCIUM mg/dL 9.3 9.3 8.7   GLUCOSE mg/dL 109* 130* 95     Estimated Creatinine Clearance: 44.9 mL/min (A) (by C-G formula based on SCr of 1.28 mg/dL (H)).  Results from last 7 days   Lab Units 06/10/25  0032 06/09/25  1204 06/08/25  0146   PHOSPHORUS mg/dL 3.0 3.0 3.1         Results from last 7 days   Lab Units 06/10/25  0032 06/09/25  0004  25  0146 25  2345 25  1447   WBC 10*3/mm3 7.29 6.46 6.63 11.70* 16.91*   HEMOGLOBIN g/dL 8.1* 7.7* 7.3* 7.5* 8.2*   PLATELETS 10*3/mm3 181 170 148 140 167     Results from last 7 days   Lab Units 25  0913   INR  1.11*       Assessment / Plan     ASSESSMENT:    Acute kidney injury on chronic kidney disease stage IIIa.  Baseline creatinine around 1.1 Mg/DL.  Prerenal etiology.  Renal function improving.  peak of 1.8 Mg/DL.  Electrolytes okay.    Hypotension.  Blood pressure better.  Status post IV fluid bolus.  On oral midodrine  CHF.  Diastolic.  Chronic  Paroxysmal A-fib    PLAN:  Kidney function continues to improve and  we will continue current therapy  Surveillance labs      Omar Vale MD  06/10/25  10:05 EDT    [facility]  [phone]               Electronically signed by Omar Vale MD at 06/10/25 1006       Jong Minor MD at 06/10/25 0951               [facility] Medicine Services       Patient Name: Faby Le  : 1945  MRN: [id number]  Primary Care Physician:  Leonor Adam MD  Date of admission: 2025  Date and Time of Service: 2025 at 1330     Subjective       Chief Complaint: Low blood pressure     History of Present Illness: Faby Le is a 80 y.o. female with a CMH of HFpEF, SSS s/p PPM, A-fib on Xarelto, CAD, HTN, HLD, ROSA on CPAP, osteoarthritis who presented to [facility] on 2025 for elective right total knee arthroplasty revision with Dr. Herrera.  Procedure noted to have no complications.  Orthopedic surgery were planning to discharge patient home today.  However patient was noted to be hypotensive.  Orthostatics were attempted in which patient's BP noted to be as low as 90/39.  Patient denies associated weakness, fatigue, dizziness, lightheadedness, chest pain, shortness of breath, palpitations.  She does endorse no BM since surgery.  She also states she had palpitations  approximately 3 weeks ago prior to her original date for knee surgery in which she was evaluated by Dr. Herrera with future plans for an ablation.  Patient understands PTs recommendations for rehab and is amenable.  Case management consulted for placement.  Hospitalist service to assume primary care of patient while she remains hospitalized.     Review of Systems   Constitutional:  Negative for chills, fatigue and fever.   Respiratory:  Negative for shortness of breath.    Cardiovascular:  Negative for chest pain and palpitations.   Gastrointestinal:  Negative for abdominal pain, nausea and vomiting.   Neurological:  Negative for dizziness and light-headedness.       6/7/25  Patient yesterday  She has been feeling weak and dizzy for few days  Creatinine is up to 1.8  Status post knee arthroplasty  Status post revision of the total knee  Postop day #2  Hemoglobin drop noted with last hemoglobin 7.5 mostly postop  Worsening creatinine  Will give IV bolus normal saline follow CBC and chemistry profile in the morning  Nephrology consult for evaluation of worsening renal insufficiency  Check stool for occult blood  Keep patient on IV fluids at normal saline 100 an hour      6/8/25  Patient seen by nephrology consult appreciated  Creatinine is down to 1.4 which is baseline  Complaining of pain in the right knee  Patient is postop day #3  Globin 7.3 today compared to 7.5 on 6/6 which is mostly stable  Pain control  Discharge planning skilled nursing facility when bed is available    6/9/25  Patient doing fine  Using bedside commode  Patient wants to go to [facility] re[facility]  Discharge planning once arrangement is made  Function stable  Hemoglobin is 7.7 stable from yesterday 7.3 yesterday  IV fluid has been discontinued patient is on oral midodrine  Patient will be resumed on low diuretic dose as per nephrology today  Repeat BMP today    6/10/25  Awaiting discharge planning skilled nursing facility  No acute distress  hemodynamically stable  Creatinine stable at 1.28 with hemoglobin at 8.1  Discharge to skilled nursing facility once bed is available      Personal History      Medical History        Past Medical History:   Diagnosis Date    Ankle pain      Arthritis      Atrial fibrillation      Coronary artery disease      Depression      Disease of thyroid gland      Gout      Hyperglycemia      Hyperlipidemia      Hypertension      Low back pain      Sleep apnea      Vitamin D deficiency              Surgical History         Past Surgical History:   Procedure Laterality Date    ANKLE FUSION         2017-left    CARDIAC CATHETERIZATION        CARDIAC CATHETERIZATION Right 08/29/2022     Procedure: Left Heart Cath;  Surgeon: Ratna Zavala MD;  Location: [facility] CATH INVASIVE LOCATION;  Service: Cardiovascular;  Laterality: Right;    CARDIAC ELECTROPHYSIOLOGY PROCEDURE Left 07/03/2023     Procedure: Pacemaker DC new [place] Notified;  Surgeon: Cr Herrera MD;  Location: [facility] CATH INVASIVE LOCATION;  Service: Cardiovascular;  Laterality: Left;    CARDIAC ELECTROPHYSIOLOGY PROCEDURE Right 03/05/2024     Procedure: Ablation atrial fibrillation, Cryo Slalonde and Wayne Igor notified 02/09/24;  Surgeon: Cr Herrera MD;  Location: [facility] CATH INVASIVE LOCATION;  Service: Cardiovascular;  Laterality: Right;    CARDIAC ELECTROPHYSIOLOGY PROCEDURE N/A 03/05/2024     Procedure: Cardioversion;  Surgeon: Cr Herrera MD;  Location: [facility] CATH INVASIVE LOCATION;  Service: Cardiovascular;  Laterality: N/A;    CARPAL TUNNEL RELEASE Right      CATARACT EXTRACTION        CUBITAL TUNNEL RELEASE Right      ENDOSCOPY N/A 05/28/2024     Procedure: ESOPHAGOGASTRODUODENOSCOPY WITH COLD FORCEP BIOPSY X1 AREA;  Surgeon: Josh Loomis MD;  Location: [facility] ENDOSCOPY;  Service: Gastroenterology;  Laterality: N/A;  Post- ESOPHAGITIS, GASTRITIS, HIATAL HERNIA    HYSTERECTOMY        REPLACEMENT TOTAL KNEE          left 2008    ROTATOR CUFF REPAIR Right      TOTAL KNEE ARTHROPLASTY Right 05/04/2023     Procedure: TOTAL KNEE ARTHROPLASTY WITH CORI ROBOT;  Surgeon: Chino Herrera II, MD;  Location: [facility] MAIN OR;  Service: Robotics - Ortho;  Laterality: Right;            Family History: family history includes Arthritis in her brother; Cancer in her brother; Hypertension in her mother; Stroke in her father. Otherwise pertinent FHx was reviewed and not pertinent to current issue.     Social History:  reports that she has never smoked. She has never been exposed to tobacco smoke. She has never used smokeless tobacco. She reports that she does not drink alcohol and does not use drugs.     Home Medications:  Prior to Admission Medications         Prescriptions Last Dose Informant Patient Reported? Taking?     acetaminophen (TYLENOL) 325 MG tablet Past Week   Yes Yes     Take 2 tablets by mouth Every 6 (Six) Hours As Needed for Mild Pain.     allopurinol (ZYLOPRIM) 300 MG tablet 6/4/2025   No Yes     Take 1 tablet by mouth once daily     atorvastatin (LIPITOR) 20 MG tablet 6/4/2025   No Yes     Take 1 tablet by mouth once daily     azelastine (ASTELIN) 0.1 % nasal spray Unknown   Yes No     Administer 2 sprays into the nostril(s) as directed by provider Daily. Use in each nostril as directed     cetirizine (zyrTEC) 10 MG tablet Past Week Self Yes Yes     Take 1 tablet by mouth Daily As Needed for Allergies.     cyanocobalamin (VITAMIN B-12) 1000 MCG tablet 5/29/2025 Self Yes Yes     Take 1 tablet by mouth Daily.     docusate sodium (COLACE) 100 MG capsule Past Week Self Yes Yes     Take 1 capsule by mouth 2 (Two) Times a Day.     dofetilide (Tikosyn) 250 MCG capsule 6/5/2025   No Yes     Take 1 capsule by mouth Every 12 (Twelve) Hours.     ferrous sulfate 325 (65 FE) MG tablet Past Week Self Yes Yes     Take 1 tablet by mouth Daily With Breakfast.     levothyroxine (SYNTHROID, LEVOTHROID) 88 MCG tablet 6/4/2025   No  Yes     Take 1 tablet by mouth once daily     metoprolol succinate XL (TOPROL-XL) 25 MG 24 hr tablet 6/5/2025   No Yes     Take 1 tablet by mouth Daily for 360 days.     midodrine (PROAMATINE) 5 MG tablet 6/5/2025   No Yes     TAKE 1 TABLET BY MOUTH TWICE DAILY BEFORE MEAL(S)     montelukast (SINGULAIR) 10 MG tablet 6/4/2025   No Yes     Take 1 tablet by mouth Every Night.     Mounjaro 2.5 MG/0.5ML solution auto-injector 5/22/2025   Yes Yes     Inject 5 mg into the appropriate muscle as directed by prescriber 1 (One) Time Per Week.     multivitamin (MULTI VITAMIN PO) 5/22/2025   No Yes     Take 1 tablet by mouth Daily.     multivitamin with minerals tablet tablet 5/22/2025   Yes No     Take 1 tablet by mouth Daily.     mupirocin (BACTROBAN) 2 % ointment     Yes Yes     APPLY SMALL AMOUNT OF OINTMENT TOPICALLY TO AFFECTED AREA TWICE DAILY     pantoprazole (PROTONIX) 40 MG EC tablet 6/5/2025   Yes Yes     Take 1 tablet by mouth Every Morning.     rivaroxaban (XARELTO) 15 MG tablet 6/2/2025   No Yes     Take 1 tablet by mouth Daily With Dinner. Indications: Atrial Fibrillation     spironolactone (ALDACTONE) 25 MG tablet 6/5/2025   No Yes     Take 1/2 (one-half) tablet by mouth once daily     triamcinolone (KENALOG) 0.1 % cream     Yes Yes     APPLY CREAM EXTERNALLY TWICE DAILY TO ECZEMA FOR UP TO 2 WEEKS PER MONTH AS NEEDED     Vitamin D, Cholecalciferol, 25 MCG (1000 UT) tablet 5/22/2025 Self Yes Yes     Take 1 tablet by mouth Daily.                   Allergies:  Allergies         Allergies   Allergen Reactions    Celecoxib Itching and Swelling       swelling            Objective       Vitals:   Temp:  [97.2 °F (36.2 °C)-97.5 °F (36.4 °C)] 97.4 °F (36.3 °C)  Heart Rate:  [66-73] 69  Resp:  [10-14] 14  BP: ()/(36-57) 106/45  Body mass index is 46.23 kg/m².     Physical Exam  General: 79 yo F, Alert and oriented x4, well nourished, no acute distress.  HENT: Normocephalic, normal hearing, moist oral mucosa, no  scleral icterus.  Neck: Supple, nontender, no carotid bruits, no JVD, no LAD.  Lungs: Clear to auscultation, nonlabored respiration.  Heart: RRR, no murmur, gallop or edema.  Abdomen: Soft, nontender, nondistended, + bowel sounds.  Musculoskeletal: Bandage in place over right knee.  Normal range of motion and strength, no tenderness or swelling.  Skin: Skin is warm, dry and pink, no rashes or lesions.  Psychiatric: Cooperative, appropriate mood and affect.        Diagnostic Data:  Lab Results (last 24 hours)         ** No results found for the last 24 hours. **                           Imaging Results (Last 24 Hours)         Procedure Component Value Units Date/Time     XR Knee 1 or 2 View Right [[id number]] Collected: 06/05/25 1524       Updated: 06/05/25 1530     Narrative:       XR KNEE 1 OR 2 VW RIGHT     Date of Exam: 6/5/2025 2:58 PM EDT     Indication: Post-Op Knee Arthoplasty     Comparison: Right knee radiographs dated 5/15/2025     Findings:  There are postsurgical changes of right total knee revision. There are anterior cutaneous staples and a surgical drain within the suprapatellar space.        Impression:       Impression:     Immediate postoperative changes status post right total knee revision with anterior soft tissue swelling.           Electronically Signed: Bry Mendosa MD    6/5/2025 3:27 PM EDT    Workstation ID: [id number]                   Assessment & Plan          This is a 80 y.o. female with:     Active and Resolved Problems        Active Hospital Problems     Diagnosis   POA    **Postoperative pain of knee [G89.18, M25.569]   Yes    Orthostasis [I95.1]   Yes    S/P revision of total knee [Z96.659]   Not Applicable       Resolved Hospital Problems   No resolved problems to display.            Hypotension with history of hypertension  Coronary artery disease  SSS s/p pacemaker  Chronic diastolic congestive heart failure  Paroxysmal A-fib  Hyperlipidemia  BP soft, improving, appears  hypovolemic  Orthostatics positive   2025 TTE with a EF of 56 to 60%, grade 3 diastolic dysfunction  CBC, BMP, troponin, BNP pending  1 L bolus ordered over 2 hours   Continue Tikosyn   Xarelto has been resumed, continue   Rec'd dose of Metoprolol this am, consider holding tomorrow if still hypotensive  Fall precautions   Telemetry  Consider cardiology consult if blood pressure does not improve with IV fluids     Osteoarthritis of right knee  S/p right TKA revision POD 1  Incentive spirometry  As needed analgesics  Orthopedics signed off   PT recommend SNF, awaiting placement      ROSA on CPAP: Continue nocturnal CPAP        VTE Prophylaxis:  Pharmacologic & mechanical VTE prophylaxis orders are present.           The patient desires to be as follows:     CODE STATUS:    Code Status (Patient has no pulse and is not breathing): CPR (Attempt to Resuscitate)  Medical Interventions (Patient has pulse or is breathing): Full  Level Of Support Discussed With: Patient           Lyudmila Le, who can be contacted at [phone], is the designated person to make medical decisions on the patient's behalf if She is incapable of doing so. This was clarified with patient and/or next of kin on 2025 during the course of this H&P.     Admission Status:  I believe this patient meets inpatient status.     Expected Length of Stay: 2 to 3 days     PDMP and Medication Dispenses via Sidebar reviewed and consistent with patient reported medications.     I discussed the patient's findings and my recommendations with patient.          Electronically signed by Jong Minor MD at 06/10/25 0944       Omar Vale MD at 25 2099              [facility] Progress Note      Patient Name: Faby Le  : 1945  MRN: [id number]  Primary Care Physician:  Leonor Adam MD  Date of admission: 2025    Subjective     Interval History:     Patient resting comfortably, feeling better  Denies  any chest pain, shortness of breath, nausea o patient is no new issues noted today.  Afebrile.  Denies any nausea no vomiting.  No fever no chills    Review of Systems:   As noted above    Objective     Vitals:   Temp:  [97.9 °F (36.6 °C)-98.4 °F (36.9 °C)] 97.9 °F (36.6 °C)  Heart Rate:  [61-66] 61  Resp:  [16-19] 19  BP: (123-139)/(39-78) 131/74    Intake/Output Summary (Last 24 hours) at 6/9/2025 1716  Last data filed at 6/9/2025 1557  Gross per 24 hour   Intake 1200 ml   Output 1200 ml   Net 0 ml       Physical Exam:      General Appearance: alert, oriented x 3, no acute distress   Skin: warm and dry  HEENT: oral mucosa normal, nonicteric sclera  Neck: supple, no JVD  Lungs: CTA  Heart: RRR, normal S1 and S2  Abdomen: soft, nontender, nondistended  : no palpable bladder  Extremities: Dressing present right knee  Neuro: normal speech and mental status     Scheduled Meds:     acetaminophen, 1,000 mg, Oral, Q6H  levothyroxine, 88 mcg, Oral, Daily  metoprolol succinate XL, 25 mg, Oral, Q24H  midodrine, 5 mg, Oral, TID AC  rivaroxaban, 15 mg, Oral, Daily With Dinner      IV Meds:          Results Reviewed:   I have personally reviewed the results from the time of this admission to 6/9/2025 17:16 EDT     Results from last 7 days   Lab Units 06/09/25  1204 06/08/25  0146 06/06/25  2345   SODIUM mmol/L 138 135* 132*   POTASSIUM mmol/L 4.9 5.0 4.7   CHLORIDE mmol/L 109* 107 102   CO2 mmol/L 23.2 20.7* 22.1   BUN mg/dL 23.2* 33.3* 32.6*   CREATININE mg/dL 1.31* 1.40* 1.82*   CALCIUM mg/dL 9.3 8.7 8.8   GLUCOSE mg/dL 130* 95 112*     Estimated Creatinine Clearance: 43.9 mL/min (A) (by C-G formula based on SCr of 1.31 mg/dL (H)).  Results from last 7 days   Lab Units 06/09/25  1204 06/08/25  0146   PHOSPHORUS mg/dL 3.0 3.1         Results from last 7 days   Lab Units 06/09/25  0004 06/08/25  0146 06/06/25  2345 06/06/25  1447   WBC 10*3/mm3 6.46 6.63 11.70* 16.91*   HEMOGLOBIN g/dL 7.7* 7.3* 7.5* 8.2*   PLATELETS 10*3/mm3  170 148 140 167     Results from last 7 days   Lab Units 25  0913   INR  1.11*       Assessment / Plan     ASSESSMENT:    Acute kidney injury on chronic kidney disease stage IIIa.  Baseline creatinine around 1.1 Mg/DL.  Prerenal etiology.  Renal function improving.  Creatinine 1.4 this morning from peak of 1.8 Mg/DL.  Electrolytes okay.    Hypotension.  Blood pressure better.  Status post IV fluid bolus.  On oral midodrine  CHF.  Diastolic.  Chronic  Paroxysmal A-fib    PLAN:  Kidney function is currently improving.  Continue current therapy for now  Surveillance labs    Omar Vale MD  25  17:16 EDT    [facility]  [phone]               Electronically signed by Omar Vale MD at 25 2511       Jong Minor MD at 25 [facility]7               [facility] Medicine Services       Patient Name: Faby Le  : 1945  MRN: [id number]  Primary Care Physician:  Leonor Adam MD  Date of admission: 2025  Date and Time of Service: 2025 at 1330     Subjective       Chief Complaint: Low blood pressure     History of Present Illness: Faby Le is a 80 y.o. female with a CMH of HFpEF, SSS s/p PPM, A-fib on Xarelto, CAD, HTN, HLD, ROSA on CPAP, osteoarthritis who presented to [facility] on 2025 for elective right total knee arthroplasty revision with Dr. Herrera.  Procedure noted to have no complications.  Orthopedic surgery were planning to discharge patient home today.  However patient was noted to be hypotensive.  Orthostatics were attempted in which patient's BP noted to be as low as 90/39.  Patient denies associated weakness, fatigue, dizziness, lightheadedness, chest pain, shortness of breath, palpitations.  She does endorse no BM since surgery.  She also states she had palpitations approximately 3 weeks ago prior to her original date for knee surgery in which she was evaluated by Dr. Herrera with future plans for an  ablation.  Patient understands PTs recommendations for rehab and is amenable.  Case management consulted for placement.  Hospitalist service to assume primary care of patient while she remains hospitalized.     Review of Systems   Constitutional:  Negative for chills, fatigue and fever.   Respiratory:  Negative for shortness of breath.    Cardiovascular:  Negative for chest pain and palpitations.   Gastrointestinal:  Negative for abdominal pain, nausea and vomiting.   Neurological:  Negative for dizziness and light-headedness.       6/7/25  Patient yesterday  She has been feeling weak and dizzy for few days  Creatinine is up to 1.8  Status post knee arthroplasty  Status post revision of the total knee  Postop day #2  Hemoglobin drop noted with last hemoglobin 7.5 mostly postop  Worsening creatinine  Will give IV bolus normal saline follow CBC and chemistry profile in the morning  Nephrology consult for evaluation of worsening renal insufficiency  Check stool for occult blood  Keep patient on IV fluids at normal saline 100 an hour      6/8/25  Patient seen by nephrology consult appreciated  Creatinine is down to 1.4 which is baseline  Complaining of pain in the right knee  Patient is postop day #3  Globin 7.3 today compared to 7.5 on 6/6 which is mostly stable  Pain control  Discharge planning skilled nursing facility when bed is available    6/9/25  Patient doing fine  Using bedside commode  Patient wants to go to [facility] rehab  Discharge planning once arrangement is made  Function stable  Hemoglobin is 7.7 stable from yesterday 7.3 yesterday  IV fluid has been discontinued patient is on oral midodrine  Patient will be resumed on low diuretic dose as per nephrology today  Repeat BMP today          Personal History      Medical History        Past Medical History:   Diagnosis Date    Ankle pain      Arthritis      Atrial fibrillation      Coronary artery disease      Depression      Disease of thyroid gland      Gout       Hyperglycemia      Hyperlipidemia      Hypertension      Low back pain      Sleep apnea      Vitamin D deficiency              Surgical History         Past Surgical History:   Procedure Laterality Date    ANKLE FUSION         2017-left    CARDIAC CATHETERIZATION        CARDIAC CATHETERIZATION Right 08/29/2022     Procedure: Left Heart Cath;  Surgeon: Ratna Zavala MD;  Location: [facility] CATH INVASIVE LOCATION;  Service: Cardiovascular;  Laterality: Right;    CARDIAC ELECTROPHYSIOLOGY PROCEDURE Left 07/03/2023     Procedure: Pacemaker DC new [place] Notified;  Surgeon: Cr Herrera MD;  Location: [facility] CATH INVASIVE LOCATION;  Service: Cardiovascular;  Laterality: Left;    CARDIAC ELECTROPHYSIOLOGY PROCEDURE Right 03/05/2024     Procedure: Ablation atrial fibrillation, Cryo Slalonde and Wayne Igor notified 02/09/24;  Surgeon: Cr Herrera MD;  Location: [facility] CATH INVASIVE LOCATION;  Service: Cardiovascular;  Laterality: Right;    CARDIAC ELECTROPHYSIOLOGY PROCEDURE N/A 03/05/2024     Procedure: Cardioversion;  Surgeon: Cr Herrera MD;  Location: [facility] CATH INVASIVE LOCATION;  Service: Cardiovascular;  Laterality: N/A;    CARPAL TUNNEL RELEASE Right      CATARACT EXTRACTION        CUBITAL TUNNEL RELEASE Right      ENDOSCOPY N/A 05/28/2024     Procedure: ESOPHAGOGASTRODUODENOSCOPY WITH COLD FORCEP BIOPSY X1 AREA;  Surgeon: Josh Loomis MD;  Location: [facility] ENDOSCOPY;  Service: Gastroenterology;  Laterality: N/A;  Post- ESOPHAGITIS, GASTRITIS, HIATAL HERNIA    HYSTERECTOMY        REPLACEMENT TOTAL KNEE         left 2008    ROTATOR CUFF REPAIR Right      TOTAL KNEE ARTHROPLASTY Right 05/04/2023     Procedure: TOTAL KNEE ARTHROPLASTY WITH CORI ROBOT;  Surgeon: Chino Herrera II, MD;  Location: [facility] MAIN OR;  Service: Robotics - Ortho;  Laterality: Right;            Family History: family history includes Arthritis in her brother; Cancer in her brother;  Hypertension in her mother; Stroke in her father. Otherwise pertinent FHx was reviewed and not pertinent to current issue.     Social History:  reports that she has never smoked. She has never been exposed to tobacco smoke. She has never used smokeless tobacco. She reports that she does not drink alcohol and does not use drugs.     Home Medications:  Prior to Admission Medications         Prescriptions Last Dose Informant Patient Reported? Taking?     acetaminophen (TYLENOL) 325 MG tablet Past Week   Yes Yes     Take 2 tablets by mouth Every 6 (Six) Hours As Needed for Mild Pain.     allopurinol (ZYLOPRIM) 300 MG tablet 6/4/2025   No Yes     Take 1 tablet by mouth once daily     atorvastatin (LIPITOR) 20 MG tablet 6/4/2025   No Yes     Take 1 tablet by mouth once daily     azelastine (ASTELIN) 0.1 % nasal spray Unknown   Yes No     Administer 2 sprays into the nostril(s) as directed by provider Daily. Use in each nostril as directed     cetirizine (zyrTEC) 10 MG tablet Past Week Self Yes Yes     Take 1 tablet by mouth Daily As Needed for Allergies.     cyanocobalamin (VITAMIN B-12) 1000 MCG tablet 5/29/2025 Self Yes Yes     Take 1 tablet by mouth Daily.     docusate sodium (COLACE) 100 MG capsule Past Week Self Yes Yes     Take 1 capsule by mouth 2 (Two) Times a Day.     dofetilide (Tikosyn) 250 MCG capsule 6/5/2025   No Yes     Take 1 capsule by mouth Every 12 (Twelve) Hours.     ferrous sulfate 325 (65 FE) MG tablet Past Week Self Yes Yes     Take 1 tablet by mouth Daily With Breakfast.     levothyroxine (SYNTHROID, LEVOTHROID) 88 MCG tablet 6/4/2025   No Yes     Take 1 tablet by mouth once daily     metoprolol succinate XL (TOPROL-XL) 25 MG 24 hr tablet 6/5/2025   No Yes     Take 1 tablet by mouth Daily for 360 days.     midodrine (PROAMATINE) 5 MG tablet 6/5/2025   No Yes     TAKE 1 TABLET BY MOUTH TWICE DAILY BEFORE MEAL(S)     montelukast (SINGULAIR) 10 MG tablet 6/4/2025   No Yes     Take 1 tablet by mouth  Every Night.     Mounjaro 2.5 MG/0.5ML solution auto-injector 5/22/2025   Yes Yes     Inject 5 mg into the appropriate muscle as directed by prescriber 1 (One) Time Per Week.     multivitamin (MULTI VITAMIN PO) 5/22/2025   No Yes     Take 1 tablet by mouth Daily.     multivitamin with minerals tablet tablet 5/22/2025   Yes No     Take 1 tablet by mouth Daily.     mupirocin (BACTROBAN) 2 % ointment     Yes Yes     APPLY SMALL AMOUNT OF OINTMENT TOPICALLY TO AFFECTED AREA TWICE DAILY     pantoprazole (PROTONIX) 40 MG EC tablet 6/5/2025   Yes Yes     Take 1 tablet by mouth Every Morning.     rivaroxaban (XARELTO) 15 MG tablet 6/2/2025   No Yes     Take 1 tablet by mouth Daily With Dinner. Indications: Atrial Fibrillation     spironolactone (ALDACTONE) 25 MG tablet 6/5/2025   No Yes     Take 1/2 (one-half) tablet by mouth once daily     triamcinolone (KENALOG) 0.1 % cream     Yes Yes     APPLY CREAM EXTERNALLY TWICE DAILY TO ECZEMA FOR UP TO 2 WEEKS PER MONTH AS NEEDED     Vitamin D, Cholecalciferol, 25 MCG (1000 UT) tablet 5/22/2025 Self Yes Yes     Take 1 tablet by mouth Daily.                   Allergies:  Allergies         Allergies   Allergen Reactions    Celecoxib Itching and Swelling       swelling            Objective       Vitals:   Temp:  [97.2 °F (36.2 °C)-97.5 °F (36.4 °C)] 97.4 °F (36.3 °C)  Heart Rate:  [66-73] 69  Resp:  [10-14] 14  BP: ()/(36-57) 106/45  Body mass index is 46.23 kg/m².     Physical Exam  General: 81 yo F, Alert and oriented x4, well nourished, no acute distress.  HENT: Normocephalic, normal hearing, moist oral mucosa, no scleral icterus.  Neck: Supple, nontender, no carotid bruits, no JVD, no LAD.  Lungs: Clear to auscultation, nonlabored respiration.  Heart: RRR, no murmur, gallop or edema.  Abdomen: Soft, nontender, nondistended, + bowel sounds.  Musculoskeletal: Bandage in place over right knee.  Normal range of motion and strength, no tenderness or swelling.  Skin: Skin is  warm, dry and pink, no rashes or lesions.  Psychiatric: Cooperative, appropriate mood and affect.        Diagnostic Data:  Lab Results (last 24 hours)         ** No results found for the last 24 hours. **                           Imaging Results (Last 24 Hours)         Procedure Component Value Units Date/Time     XR Knee 1 or 2 View Right [[id number]] Collected: 06/05/25 1524       Updated: 06/05/25 1530     Narrative:       XR KNEE 1 OR 2 VW RIGHT     Date of Exam: 6/5/2025 2:58 PM EDT     Indication: Post-Op Knee Arthoplasty     Comparison: Right knee radiographs dated 5/15/2025     Findings:  There are postsurgical changes of right total knee revision. There are anterior cutaneous staples and a surgical drain within the suprapatellar space.        Impression:       Impression:     Immediate postoperative changes status post right total knee revision with anterior soft tissue swelling.           Electronically Signed: Bry Mendosa MD    6/5/2025 3:27 PM EDT    Workstation ID: [id number]                   Assessment & Plan          This is a 80 y.o. female with:     Active and Resolved Problems        Active Hospital Problems     Diagnosis   POA    **Postoperative pain of knee [G89.18, M25.569]   Yes    Orthostasis [I95.1]   Yes    S/P revision of total knee [Z96.659]   Not Applicable       Resolved Hospital Problems   No resolved problems to display.            Hypotension with history of hypertension  Coronary artery disease  SSS s/p pacemaker  Chronic diastolic congestive heart failure  Paroxysmal A-fib  Hyperlipidemia  BP soft, improving, appears hypovolemic  Orthostatics positive   5/16/2025 TTE with a EF of 56 to 60%, grade 3 diastolic dysfunction  CBC, BMP, troponin, BNP pending  1 L bolus ordered over 2 hours   Continue Tikosyn   Xarelto has been resumed, continue   Rec'd dose of Metoprolol this am, consider holding tomorrow if still hypotensive  Fall precautions   Telemetry  Consider cardiology consult  if blood pressure does not improve with IV fluids     Osteoarthritis of right knee  S/p right TKA revision POD 1  Incentive spirometry  As needed analgesics  Orthopedics signed off   PT recommend SNF, awaiting placement      ROSA on CPAP: Continue nocturnal CPAP        VTE Prophylaxis:  Pharmacologic & mechanical VTE prophylaxis orders are present.           The patient desires to be as follows:     CODE STATUS:    Code Status (Patient has no pulse and is not breathing): CPR (Attempt to Resuscitate)  Medical Interventions (Patient has pulse or is breathing): Full  Level Of Support Discussed With: Patient           Lyudmila Le, who can be contacted at [phone], is the designated person to make medical decisions on the patient's behalf if She is incapable of doing so. This was clarified with patient and/or next of kin on 2025 during the course of this H&P.     Admission Status:  I believe this patient meets inpatient status.     Expected Length of Stay: 2 to 3 days     PDMP and Medication Dispenses via Sidebar reviewed and consistent with patient reported medications.     I discussed the patient's findings and my recommendations with patient.          Electronically signed by Jong Minor MD at 25 1100       Consult Notes (last 48 hours)  Notes from 25 1124 through 06/10/25 1124   No notes of this type exist for this encounter.       Nutrition Notes (most recent note)    No notes exist for this encounter.       Speech Language Pathology Notes (most recent note)    No notes exist for this encounter.       Sondra Suarez, PT   Physical Therapist  Specialty:  Physical Therapy  Therapy Evaluation     Signed  Date of Service:  25  Creation Time:  25     Signed        Expand All Collapse All  Patient Name: Faby Le                        : 1945                        MRN: [id number]                              Today's Date: 2025                                      Admit Date: 6/5/2025               Visit Dx:   Visit Diagnosis   No diagnosis found.     Problem List       Patient Active Problem List   Diagnosis    Abnormal complete blood count    Ankle pain    Arthralgia of left elbow    Arthritis    B12 deficiency    Coronary artery disease    Depression    Gallstones    Gout    Hyperglycemia    Hyperlipidemia    Hypertension    Hypothyroid    Low back pain    Mobility poor    Post-menopausal    Sleep apnea    Vitamin D deficiency    Chronic kidney disease, stage 3 (moderate)    Oropharyngeal dysphagia    Class 3 drug-induced obesity with serious comorbidity and body mass index (BMI) of 45.0 to 49.9 in adult    Chronic pain of right knee    Light headedness    Paroxysmal atrial fibrillation    Chronic left shoulder pain    Status post knee replacement    Syncope    Presence of cardiac pacemaker    Chronic diastolic heart failure    Paresthesia of left foot    TIA (transient ischemic attack)    Tachy-jojo syndrome    Anemia    Dizziness    Acute CHF    Acute exacerbation of CHF (congestive heart failure)    Chronic HFrEF (heart failure with reduced ejection fraction)    Endometrial carcinoma    Symptomatic hypotension    Orthostatic hypotension    Atrial fibrillation with RVR    Osteoarthritis of knee    Osteoarthritis of left glenohumeral joint    Atrial fibrillation with rapid ventricular response    Atrial fibrillation    Internal and external hemorrhoids without complication    Benign neoplasm of colon    Kidney stones    Long term (current) use of anticoagulants    Iron deficiency    Postoperative pain of knee    S/P revision of total knee    Orthostasis         Medical History        Past Medical History:   Diagnosis Date    Ankle pain      Arthritis      Atrial fibrillation      Coronary artery disease      Depression      Disease of thyroid gland      Gout      Hyperglycemia      Hyperlipidemia      Hypertension      Low back pain      Sleep apnea      Vitamin D  deficiency           Surgical History         Past Surgical History:   Procedure Laterality Date    ANKLE FUSION         2017-left    CARDIAC CATHETERIZATION        CARDIAC CATHETERIZATION Right 08/29/2022     Procedure: Left Heart Cath;  Surgeon: Ratna Zavala MD;  Location: [facility] CATH INVASIVE LOCATION;  Service: Cardiovascular;  Laterality: Right;    CARDIAC ELECTROPHYSIOLOGY PROCEDURE Left 07/03/2023     Procedure: Pacemaker DC new [place] Notified;  Surgeon: Cr Herrera MD;  Location: [facility] CATH INVASIVE LOCATION;  Service: Cardiovascular;  Laterality: Left;    CARDIAC ELECTROPHYSIOLOGY PROCEDURE Right 03/05/2024     Procedure: Ablation atrial fibrillation, Cryo Slalonde and Wayne Igor notified 02/09/24;  Surgeon: Cr Herrera MD;  Location: [facility] CATH INVASIVE LOCATION;  Service: Cardiovascular;  Laterality: Right;    CARDIAC ELECTROPHYSIOLOGY PROCEDURE N/A 03/05/2024     Procedure: Cardioversion;  Surgeon: Cr Herrera MD;  Location: [facility] CATH INVASIVE LOCATION;  Service: Cardiovascular;  Laterality: N/A;    CARPAL TUNNEL RELEASE Right      CATARACT EXTRACTION        CUBITAL TUNNEL RELEASE Right      ENDOSCOPY N/A 05/28/2024     Procedure: ESOPHAGOGASTRODUODENOSCOPY WITH COLD FORCEP BIOPSY X1 AREA;  Surgeon: Josh Loomis MD;  Location: [facility] ENDOSCOPY;  Service: Gastroenterology;  Laterality: N/A;  Post- ESOPHAGITIS, GASTRITIS, HIATAL HERNIA    HYSTERECTOMY        REPLACEMENT TOTAL KNEE         left 2008    ROTATOR CUFF REPAIR Right      TOTAL KNEE ARTHROPLASTY Right 05/04/2023     Procedure: TOTAL KNEE ARTHROPLASTY WITH CORI ROBOT;  Surgeon: Chino Herrera II, MD;  Location: [facility] MAIN OR;  Service: Robotics - Ortho;  Laterality: Right;           General Information         Row Name 06/06/25 1411                 Physical Therapy Time and Intention     Document Type evaluation  -EJ       Mode of Treatment physical therapy  -EJ          Row Name  06/06/25 1411                 General Information     Patient Profile Reviewed yes  -EJ       Prior Level of Function independent:;dressing;bathing;all household mobility;ADL's  Pt independent with ADLs.  Receives assist with laundry and cleaning.  Pt owns RW, Rollator, cane, and power w/c.  -EJ       Existing Precautions/Restrictions fall;orthostatic hypotension  -EJ       Barriers to Rehab medically complex  -          Row Name 06/06/25 1411                 Living Environment     Current Living Arrangements assisted living facility  Pt is from [facility].  -EJ       People in Home facility resident  -EJ          Row Name 06/06/25 1411                 Home Main Entrance     Number of Stairs, Main Entrance none  -EJ          Row Name 06/06/25 1411                 Stairs Within Home, Primary     Number of Stairs, Within Home, Primary none  -          Row Name 06/06/25 1411                 Cognition     Orientation Status (Cognition) oriented x 4  -          Row Name 06/06/25 1411                 Safety Issues/Impairments Affecting Functional Mobility     Impairments Affecting Function (Mobility) balance;endurance/activity tolerance;pain;strength  -EJ                       User Key  (r) = Recorded By, (t) = Taken By, (c) = Cosigned By        Initials Name Provider Type     EJ Sondra Suarez, PT Physical Therapist                            Mobility         Row Name 06/06/25 1416                 Bed Mobility     Bed Mobility bed mobility (all) activities  -       All Activities, [place] (Bed Mobility) not tested  Pt up in chair.  -          Row Name 06/06/25 1416                 Sit-Stand Transfer     Sit-Stand [place] (Transfers) contact guard  -       Assistive Device (Sit-Stand Transfers) walker, front-wheeled  -          Row Name 06/06/25 1416                 Gait/Stairs (Locomotion)     [place] Level (Gait) contact guard  -       Assistive Device (Gait) walker, front-wheeled   -EJ       Patient was able to Ambulate yes  -EJ       Distance in Feet (Gait) 35  -EJ       Deviations/Abnormal Patterns (Gait) stride length decreased;weight shifting decreased  -EJ       Right Sided Gait Deviations decreased knee extension  -[facility] Name 06/06/25 1416                 Mobility     Extremity Weight-bearing Status right lower extremity  -EJ       Right Lower Extremity (Weight-bearing Status) weight-bearing as tolerated (WBAT)  -                       User Key  (r) = Recorded By, (t) = Taken By, (c) = Cosigned By        Initials Name Provider Type     EJ Sondra Suarez, PT Physical Therapist                            Obj/Interventions         [facility] Name 06/06/25 1417                 Range of Motion Comprehensive     Comment, General Range of Motion R knee AROM 5-0-80 via obs.  LLE knee AROM WFL.  -[facility] Name 06/06/25 1417                 Strength Comprehensive (MMT)     Comment, General Manual Muscle Testing (MMT) Assessment LLE strength grossly 4/5.  RLE grossly 4-/5 with exception of knee flexion/ext 3-/5.  Quad set poor+ to fair- RLE.  -[facility] Name 06/06/25 1417                 Motor Skills     Motor Skills functional endurance  -EJ       Functional Endurance Fair-  -EJ          Row Name 06/06/25 1417                 Balance     Balance Assessment sitting static balance;sitting dynamic balance;standing static balance;standing dynamic balance  -EJ       Static Sitting Balance independent  -EJ       Dynamic Sitting Balance independent  -EJ       Position, Sitting Balance unsupported  -EJ       Static Standing Balance contact guard  -EJ       Dynamic Standing Balance contact guard  -EJ       Position/Device Used, Standing Balance supported;walker, front-wheeled  -[facility] Name 06/06/25 1417                 Sensory Assessment (Somatosensory)     Sensory Assessment (Somatosensory) sensation intact  -                       User Key  (r) = Recorded By, (t) = Taken By,  (c) = Cosigned By        Initials Name Provider Type     EJ Sondra Suarez D, PT Physical Therapist                            Goals/Plan         Row Name 06/06/25 1433                 Bed Mobility Goal 1 (PT)     Activity/Assistive Device (Bed Mobility Goal 1, PT) bed mobility activities, all  -EJ       [place] Level/Cues Needed (Bed Mobility Goal 1, PT) standby assist  -EJ       Time Frame (Bed Mobility Goal 1, PT) long term goal (LTG);2 weeks  -EJ          Row Name 06/06/25 1433                 Transfer Goal 1 (PT)     Activity/Assistive Device (Transfer Goal 1, PT) transfers, all;walker, rolling  -EJ       [place] Level/Cues Needed (Transfer Goal 1, PT) standby assist  -EJ       Time Frame (Transfer Goal 1, PT) long term goal (LTG);2 weeks  -EJ          Row Name 06/06/25 1433                 Gait Training Goal 1 (PT)     Activity/Assistive Device (Gait Training Goal 1, PT) gait (walking locomotion);walker, rolling  -EJ       [place] Level (Gait Training Goal 1, PT) standby assist  -EJ       Distance (Gait Training Goal 1, PT) 150 feet  -EJ       Time Frame (Gait Training Goal 1, PT) long term goal (LTG);2 weeks  -EJ          Row Name 06/06/25 1433                 Patient Education Goal (PT)     Activity (Patient Education Goal, PT) Pt to be independent with her HEP to improve her knee ROM and quad activiation.  -EJ       [place]/Cues/Accuracy (Memory Goal 2, PT) demonstrates adequately;verbalizes understanding  -EJ       Time Frame (Patient Education Goal, PT) long term goal (LTG);2 weeks  -EJ          Row Name 06/06/25 1433                 Therapy Assessment/Plan (PT)     Planned Therapy Interventions (PT) balance training;bed mobility training;gait training;home exercise program;neuromuscular re-education;patient/family education;strengthening;transfer training  -EJ                    User Key  (r) = Recorded By, (t) = Taken By, (c) = Cosigned By        Initials Name Provider Type     EJ  Sondra Suarez, PT Physical Therapist                         Clinical Impression         Row Name 06/06/25 1419                 Pain     Pretreatment Pain Rating 5/10  -EJ       Posttreatment Pain Rating 6/10  -EJ       Pain Location knee  -EJ       Pain Side/Orientation right  -EJ       Pain Management Interventions activity modification encouraged;exercise or physical activity utilized;positioning techniques utilized;movement retraining implemented  -EJ       Response to Pain Interventions activity level improved;activity participation with increased pain  -EJ          Row Name 06/06/25 1419                 Plan of Care Review     Plan of Care Reviewed With patient  -EJ       Outcome Evaluation Patient is a 81 y/o F who was admitted to [facility] on 6/5/25 for a R total knee revision.  She is currently POD #1.  PMHx includes Atrial fibrillation, Coronary artery disease, Disease of thyroid gland, Gout, and HTN.  At baseline pt lives in shelter.  She is independent with ADLs and ambulates with a rollator.  She receives assist with laundry and cleaning.  During today's evaluation pt was sitting up in her recliner.  She was able to perform transfers with CGA and ambulate with FWW and CGA for 35 feet.  Pt's progress was limited this date by low BP and symptomatic.  Once pt was returned to recliner she looked as though she might pass out.  Pt was then placed into elevated and head reclined position in chair.  Pt improved symptoms at that time.  At this time, PT is recommending SNF at discharge due to her pain, mobility deficits, balance deficits.  PT will continue to follow pt in acute setting and progress as tolerated.  -          Row Name 06/06/25 1419                 Therapy Assessment/Plan (PT)     Criteria for Skilled Interventions Met (PT) yes;skilled treatment is necessary  -EJ       Therapy Frequency (PT) 6 times/wk  -EJ       Predicted Duration of Therapy Intervention (PT) until discharge  -          Row Name  06/06/25 1419                 Vital Signs     Pre Systolic BP Rehab 106  -EJ       Pre Treatment Diastolic BP 54  -EJ       Intra Systolic BP Rehab 85  -EJ       Intra Treatment Diastolic BP 33  (MAP 52)  -EJ       Post Systolic BP Rehab 108  -EJ       Post Treatment Diastolic BP 45  (58)  -EJ          Row Name 06/06/25 1419                 Positioning and Restraints     Pre-Treatment Position sitting in chair/recliner  -EJ       Post Treatment Position chair  -EJ       In Chair notified nsg;reclined;call light within reach;encouraged to call for assist;exit alarm on  -EJ                       User Key  (r) = Recorded By, (t) = Taken By, (c) = Cosigned By        Initials Name Provider Type     Sondra Bolton, PT Physical Therapist                            Outcome Measures         Row Name 06/06/25 1435 06/06/25 0738            How much help from another person do you currently need...     Turning from your back to your side while in flat bed without using bedrails? 4  -EJ 4  -MM     Moving from lying on back to sitting on the side of a flat bed without bedrails? 3  -EJ 4  -MM     Moving to and from a bed to a chair (including a wheelchair)? 3  -EJ 3  -MM     Standing up from a chair using your arms (e.g., wheelchair, bedside chair)? 3  -EJ 3  -MM     Climbing 3-5 steps with a railing? 3  -EJ 3  -MM     To walk in hospital room? 3  -EJ 3  -MM     AM-PAC 6 Clicks Score (PT) 19  -EJ 20  -MM     Highest Level of Mobility Goal Walk 10 Steps or More-6  -EJ Walk 10 Steps or More-6  -MM        Row Name 06/06/25 1435                 Functional Assessment     Outcome Measure Options AM-PAC 6 Clicks Basic Mobility (PT)  -EJ                       User Key  (r) = Recorded By, (t) = Taken By, (c) = Cosigned By        Initials Name Provider Type     Sondra Bolton, PT Physical Therapist     Josh Whittington, RN Registered Nurse                          Physical Therapy Education            Title: PT OT SLP Therapies  (Done)         Topic: Physical Therapy (Done)         Point: Mobility training (Done)         Learning Progress Summary             Patient Acceptance, E, VU by OTTO at 6/6/2025 1435                            Point: Home exercise program (Done)         Learning Progress Summary             Patient Acceptance, E, VU by EJ at 6/6/2025 1435                            Point: Body mechanics (Done)         Learning Progress Summary             Patient Acceptance, E, VU by OTTO at 6/6/2025 1435                            Point: Precautions (Done)         Learning Progress Summary             Patient Acceptance, E, VU by OTTO at 6/6/2025 1435                                                User Key         Initials Effective Dates Name Provider Type Discipline     OTTO 06/03/24 -  Sondra Suarez, PT Physical Therapist PT                          PT Recommendation and Plan  Planned Therapy Interventions (PT): balance training, bed mobility training, gait training, home exercise program, neuromuscular re-education, patient/family education, strengthening, transfer training  Outcome Evaluation: Patient is a 79 y/o F who was admitted to [facility] on 6/5/25 for a R total knee revision.  She is currently POD #1.  PMHx includes Atrial fibrillation, Coronary artery disease, Disease of thyroid gland, Gout, and HTN.  At baseline [place] in [facility].  She is independent with ADLs and ambulates with a rollator.  She receives assist with laundry and cleaning.  During today's evaluation pt was sitting up in her recliner.  She was able to perform transfers with CGA and ambulate with FWW and CGA for 35 feet.  Pt's progress was limited this date by low BP and symptomatic.  Once pt was returned to recliner she looked as though she might pass out.  Pt was then placed into elevated and head reclined position in chair.  Pt improved symptoms at that time.  At this time, PT is recommending SNF at discharge due to her pain, mobility deficits, balance deficits.  PT  will continue to follow pt in acute setting and progress as tolerated.      Time Calculation:        PT Charges         Row Name 25 1436                       Time Calculation     Start Time 1010  -EJ         Stop Time 1039  -EJ         Time Calculation (min) 29 min  -EJ         PT Received On 25  -EJ         PT - Next Appointment 25  -EJ         PT Goal Re-Cert Due Date 25  -EJ                 Time Calculation- PT     Total Timed Code Minutes- PT 0 minute(s)  -EJ                      User Key  (r) = Recorded By, (t) = Taken By, (c) = Cosigned By        Initials Name Provider Type     EJ Sondra Suarez, PT Physical Therapist                       Therapy Charges for Today         Code Description Service Date Service Provider Modifiers Qty     04355418880 HC PT EVAL MOD COMPLEXITY 4 2025 Sondra Suarez, PT GP 1                PT G-Codes  Outcome Measure Options: AM-PAC 6 Clicks Basic Mobility (PT)  AM-PAC 6 Clicks Score (PT): 19  PT Discharge Summary  Anticipated Discharge Disposition (PT): skilled nursing facility     Sondra Suarez, MATEUS                 2025                                      Kevin Zafar OT   Occupational Therapist  Specialty:  Occupational Therapy  Therapy Evaluation     Signed  Date of Service:  25  Creation Time:  25     Signed        Expand All Collapse All  Patient Name: Faby Le                        : 1945                        MRN: [id number]                              Today's Date: 2025                                     Admit Date: 2025               Visit Dx:   Visit Diagnosis   No diagnosis found.     Problem List       Patient Active Problem List   Diagnosis    Abnormal complete blood count    Ankle pain    Arthralgia of left elbow    Arthritis    B12 deficiency    Coronary artery disease    Depression    Gallstones    Gout    Hyperglycemia    Hyperlipidemia    Hypertension    Hypothyroid    Low  back pain    Mobility poor    Post-menopausal    Sleep apnea    Vitamin D deficiency    Chronic kidney disease, stage 3 (moderate)    Oropharyngeal dysphagia    Class 3 drug-induced obesity with serious comorbidity and body mass index (BMI) of 45.0 to 49.9 in adult    Chronic pain of right knee    Light headedness    Paroxysmal atrial fibrillation    Chronic left shoulder pain    Status post knee replacement    Syncope    Presence of cardiac pacemaker    Chronic diastolic heart failure    Paresthesia of left foot    TIA (transient ischemic attack)    Tachy-jojo syndrome    Anemia    Dizziness    Acute CHF    Acute exacerbation of CHF (congestive heart failure)    Chronic HFrEF (heart failure with reduced ejection fraction)    Endometrial carcinoma    Symptomatic hypotension    Orthostatic hypotension    Atrial fibrillation with RVR    Osteoarthritis of knee    Osteoarthritis of left glenohumeral joint    Atrial fibrillation with rapid ventricular response    Atrial fibrillation    Internal and external hemorrhoids without complication    Benign neoplasm of colon    Kidney stones    Long term (current) use of anticoagulants    Iron deficiency    Postoperative pain of knee    S/P revision of total knee    Orthostasis         Medical History        Past Medical History:   Diagnosis Date    Ankle pain      Arthritis      Atrial fibrillation      Coronary artery disease      Depression      Disease of thyroid gland      Gout      Hyperglycemia      Hyperlipidemia      Hypertension      Low back pain      Sleep apnea      Vitamin D deficiency           Surgical History         Past Surgical History:   Procedure Laterality Date    ANKLE FUSION         2017-left    CARDIAC CATHETERIZATION        CARDIAC CATHETERIZATION Right 08/29/2022     Procedure: Left Heart Cath;  Surgeon: Ratna Zavala MD;  Location: [facility] CATH INVASIVE LOCATION;  Service: Cardiovascular;  Laterality: Right;    CARDIAC ELECTROPHYSIOLOGY  PROCEDURE Left 07/03/2023     Procedure: Pacemaker DC new [place] Notified;  Surgeon: Cr Herrera MD;  Location: [facility] CATH INVASIVE LOCATION;  Service: Cardiovascular;  Laterality: Left;    CARDIAC ELECTROPHYSIOLOGY PROCEDURE Right 03/05/2024     Procedure: Ablation atrial fibrillation, Cryo Marcialonde and Wayne Igor notified 02/09/24;  Surgeon: Cr Herrera MD;  Location: [facility] CATH INVASIVE LOCATION;  Service: Cardiovascular;  Laterality: Right;    CARDIAC ELECTROPHYSIOLOGY PROCEDURE N/A 03/05/2024     Procedure: Cardioversion;  Surgeon: Cr Herrera MD;  Location: [facility] CATH INVASIVE LOCATION;  Service: Cardiovascular;  Laterality: N/A;    CARPAL TUNNEL RELEASE Right      CATARACT EXTRACTION        CUBITAL TUNNEL RELEASE Right      ENDOSCOPY N/A 05/28/2024     Procedure: ESOPHAGOGASTRODUODENOSCOPY WITH COLD FORCEP BIOPSY X1 AREA;  Surgeon: Josh Loomis MD;  Location: [facility] ENDOSCOPY;  Service: Gastroenterology;  Laterality: N/A;  Post- ESOPHAGITIS, GASTRITIS, HIATAL HERNIA    HYSTERECTOMY        REPLACEMENT TOTAL KNEE         left 2008    ROTATOR CUFF REPAIR Right      TOTAL KNEE ARTHROPLASTY Right 05/04/2023     Procedure: TOTAL KNEE ARTHROPLASTY WITH CORI ROBOT;  Surgeon: Chino Herrera II, MD;  Location: [facility] MAIN OR;  Service: Robotics - Ortho;  Laterality: Right;    TOTAL KNEE ARTHROPLASTY REVISION Right 6/5/2025     Procedure: TOTAL KNEE ARTHROPLASTY REVISION WITH CORI ROBOT;  Surgeon: Chino Herrera II, MD;  Location: [facility] MAIN OR;  Service: Robotics - Ortho;  Laterality: Right;           General Information         Row Name 06/06/25 1639                 OT Time and Intention     Document Type evaluation  -MP       Mode of Treatment occupational therapy  -MP       Patient Effort good  -MP          Row Name 06/06/25 163                 General Information     Patient Profile Reviewed yes  -MP       Prior Level of Function independent:  -MP        Existing Precautions/Restrictions fall;orthostatic hypotension  -          Row Name 06/06/25 1639                 Living Environment     Current Living Arrangements assisted living facility  -          Row Name 06/06/25 1639                 Cognition     Orientation Status (Cognition) oriented x 4  -          Row Name 06/06/25 1639                 Safety Issues/Impairments Affecting Functional Mobility     Impairments Affecting Function (Mobility) balance;endurance/activity tolerance;pain;strength  -                       User Key  (r) = Recorded By, (t) = Taken By, (c) = Cosigned By        Initials Name Provider Type     Kevin Bejarano OT Occupational Therapist                                     Mobility/ADL's         Row Name 06/06/25 1640                 Bed Mobility     Bed Mobility bed mobility (all) activities  -[facility] Name 06/06/25 1640                 Sit-Stand Transfer     Sit-Stand [place] (Transfers) contact guard  -       Assistive Device (Sit-Stand Transfers) walker, front-wheeled  -          Row Name 06/06/25 1640                 Activities of Daily Living     BADL Assessment/Intervention lower body dressing  -          Row Name 06/06/25 1640                 Mobility     Extremity Weight-bearing Status right lower extremity  -       Right Lower Extremity (Weight-bearing Status) weight-bearing as tolerated (WBAT)  -          Row Name 06/06/25 1640                 Lower Body Dressing Assessment/Training     [place] Level (Lower Body Dressing) lower body dressing skills;maximum assist (25% patient effort)  -                       User Key  (r) = Recorded By, (t) = Taken By, (c) = Cosigned By        Initials Name Provider Type     Kevin Bejarano OT Occupational Therapist                            Obj/Interventions         Row Name 06/06/25 1640                 Range of Motion Comprehensive     Comment, General Range of Motion L shoulder AROM  impacted 50-75%, RUE WFL  -MP          Row Name 06/06/25 1640                 Strength Comprehensive (MMT)     Comment, General Manual Muscle Testing (MMT) Assessment BUE 4-/5  -MP                       User Key  (r) = Recorded By, (t) = Taken By, (c) = Cosigned By        Initials Name Provider Type     Kevin Bejarano OT Occupational Therapist                            Goals/Plan         Row Name 06/06/25 1643                 Bed Mobility Goal 1 (OT)     Activity/Assistive Device (Bed Mobility Goal 1, OT) bed mobility activities, all  -MP       [place] Level/Cues Needed (Bed Mobility Goal 1, OT) set-up required;supervision required  -MP       Time Frame (Bed Mobility Goal 1, OT) long term goal (LTG);2 weeks  -MP          Row Name 06/06/25 1643                 Transfer Goal 1 (OT)     Activity/Assistive Device (Transfer Goal 1, OT) sit-to-stand/stand-to-sit;toilet  -MP       [place] Level/Cues Needed (Transfer Goal 1, OT) set-up required;supervision required  -MP       Time Frame (Transfer Goal 1, OT) long term goal (LTG);2 weeks  -MP          Row Name 06/06/25 1643                 Dressing Goal 1 (OT)     Activity/Device (Dressing Goal 1, OT) lower body dressing  -MP       [place]/Cues Needed (Dressing Goal 1, OT) minimum assist (75% or more patient effort)  -MP       Time Frame (Dressing Goal 1, OT) long term goal (LTG);2 weeks  -MP                    User Key  (r) = Recorded By, (t) = Taken By, (c) = Cosigned By        Initials Name Provider Type     Kevin Bejarano OT Occupational Therapist                         Clinical Impression         Row Name 06/06/25 1641                 Pain Assessment     Pretreatment Pain Rating 5/10  -MP       Posttreatment Pain Rating 6/10  -MP       Pain Location knee  -MP       Pain Side/Orientation right  -MP          Row Name 06/06/25 1641                 Plan of Care Review     Plan of Care Reviewed With patient  -MP       Progress no change  -MP        Outcome Evaluation Patient is a 79 y/o F who was admitted to [facility] on 6/5/25 for a R total knee revision. She is currently POD #1. PMHx includes Atrial fibrillation, Coronary artery disease, Disease of thyroid gland, Gout, and HTN. At baseline pt lives in [place] and maintains ADL independence. Pt. requires increased assist for functional transfers this date during PT eval, orthostatic hypotension w/ MAP in the 50s, blood pressures remain low this eval. Pt. requires max A for all LB ADLs this date due to decreased AROM and increased pain. Pt. not safe to d/c back to [facility] and will require SNF placement to address aforementioned deficits.  -MP          Row Name 06/06/25 1641                 Therapy Assessment/Plan (OT)     Rehab Potential (OT) good  -MP       Criteria for Skilled Therapeutic Interventions Met (OT) yes;meets criteria;skilled treatment is necessary  -MP       Therapy Frequency (OT) 5 times/wk  -MP          Row Name 06/06/25 1641                 Therapy Plan Review/Discharge Plan (OT)     Anticipated Discharge Disposition (OT) skilled nursing facility  -MP          Row Name 06/06/25 1641                 Vital Signs     Pre Patient Position Sitting  -MP       Intra Patient Position Sitting  -MP       Post Patient Position Sitting  -MP          Row Name 06/06/25 1641                 Positioning and Restraints     Pre-Treatment Position sitting in chair/recliner  -MP       Post Treatment Position chair  -MP       In Chair sitting;call light within reach;encouraged to call for assist;exit alarm on  -MP                       User Key  (r) = Recorded By, (t) = Taken By, (c) = Cosigned By        Initials Name Provider Type     Kevin Bejarano, OT Occupational Therapist                            Outcome Measures         Row Name 06/06/25 1435 06/06/25 0738            How much help from another person do you currently need...     Turning from your back to your side while in flat bed without using bedrails? 4   -EJ 4  -MM     Moving from lying on back to sitting on the side of a flat bed without bedrails? 3  -EJ 4  -MM     Moving to and from a bed to a chair (including a wheelchair)? 3  -EJ 3  -MM     Standing up from a chair using your arms (e.g., wheelchair, bedside chair)? 3  -EJ 3  -MM     Climbing 3-5 steps with a railing? 3  -EJ 3  -MM     To walk in hospital room? 3  -EJ 3  -MM     AM-PAC 6 Clicks Score (PT) 19  -EJ 20  -MM     Highest Level of Mobility Goal Walk 10 Steps or More-6  -EJ Walk 10 Steps or More-6  -MM        Row Name 06/06/25 1435                 Functional Assessment     Outcome Measure Options AM-PAC 6 Clicks Basic Mobility (PT)  -EJ                       User Key  (r) = Recorded By, (t) = Taken By, (c) = Cosigned By        Initials Name Provider Type     EJ Sondra Suarez, PT Physical Therapist     MM Josh Regalado, RN Registered Nurse                             Occupational Therapy Education            Title: PT OT SLP Therapies (In Progress)         Topic: Occupational Therapy (In Progress)         Point: Body mechanics (Done)         Learning Progress Summary             Patient Acceptance, E,TB, VU by  at 6/6/2025 1643                                                User Key         Initials Effective Dates Name Provider Type Discipline      06/16/21 -  Kevin Zafar OT Occupational Therapist OT                          OT Recommendation and Plan  Therapy Frequency (OT): 5 times/wk  Plan of Care Review  Plan of Care Reviewed With: patient  Progress: no change  Outcome Evaluation: Patient is a 79 y/o F who was admitted to [facility] on 6/5/25 for a R total knee revision. She is currently POD #1. PMHx includes Atrial fibrillation, Coronary artery disease, Disease of thyroid gland, Gout, and HTN. At baseline pt lives in [facility] and maintains ADL independence. Pt. requires increased assist for functional transfers this date during PT eval, orthostatic hypotension w/ MAP in the 50s, blood pressures  remain low this eval. Pt. requires max A for all LB ADLs this date due to decreased AROM and increased pain. Pt. not safe to d/c back to [facility] and will require SNF placement to address aforementioned deficits.      Time Calculation:        Time Calculation- OT         Row Name 06/06/25 1643                       Time Calculation- OT     OT Start Time 1415  -MP         OT Stop Time 1430  -MP         OT Time Calculation (min) 15 min  -MP         Total Timed Code Minutes- OT 0 minute(s)  -MP         OT Received On 06/06/25  -MP         OT - Next Appointment 06/09/25  -         OT Goal Re-Cert Due Date 06/20/25  -                      User Key  (r) = Recorded By, (t) = Taken By, (c) = Cosigned By        Initials Name Provider Type     Kevin Bejarano OT Occupational Therapist                       Therapy Charges for Today         Code Description Service Date Service Provider Modifiers Qty     73504774779 HC OT EVAL LOW COMPLEXITY 3 6/6/2025 Kevin Zafar OT GO 1                   Kevin Zafar OT                     6/6/2025                Dimple Mcgrath PTA   Physical Therapist Assistant  Specialty:  Physical Therapy  Therapy Treatment Note     Signed  Date of Service:  06/09/25 0951  Creation Time:  06/09/25 0951     Signed        Subjective: Pt agreeable to therapeutic plan of care.     Objective:      Precautions - Right total knee revision WBAT     Bed mobility - N/A or Not attempted.   Pt already up in the chair  Transfers - CGA and with rolling walker (rollator)  Ambulation - 22 feet CGA and with rolling walker     Therapeutic Exercise - 15 Reps R Lower Extremity AROM supported sitting / chair (reclined and sitting upright)     ROM right knee:  5 degrees from full extension to ~80 degrees.      Vitals: WNL     Pain: 3 VAS Location: right knee  Intervention for pain: Repositioned, RN provided medication, Increased Activity, and Therapeutic Presence  ice pack to right knee post PT tx  "session     Education: Provided education on the importance of mobility in the acute care setting, Verbal/Tactile Cues, Transfer Training, and Gait Training     Assessment: Faby Le presents with functional mobility impairments which indicate the need for skilled intervention. Tolerating session today without incident. Pt progressing well.  Pt is very motivated and has very good potential to recover her mobility.  Will continue to follow and progress as tolerated.      Plan/Recommendations:   If medically appropriate, Moderate Intensity Therapy recommended post-acute care. This is recommended as therapy feels the patient would require 3-4 days per week and wouldn't tolerate \"3 hour daily\" rehab intensity. SNF would be the preferred choice. If the patient does not agree to SNF, arrange HH or OP depending on home bound status. If patient is medically complex, consider LTACH. Pt requires no DME at discharge.      Pt desires Skilled Rehab placement at discharge. Pt cooperative; agreeable to therapeutic recommendations and plan of care.      Basic Mobility 6-click:  Rollin = Total, A lot = 2, A little = 3; 4 = None  Supine>Sit:                      1 = Total, A lot = 2, A little = 3; 4 = None   Sit>Stand with arms:       1 = Total, A lot = 2, A little = 3; 4 = None  Bed>Chair:                      1 = Total, A lot = 2, A little = 3; 4 = None  Ambulate in room:           1 = Total, A lot = 2, A little = 3; 4 = None  3-5 Steps with railin = Total, A lot = 2, A little = 3; 4 = None  Score: 16     Modified Kerri: N/A = No pre-op stroke/TIA     Post-Tx Position: Up in Chair, Alarms activated, and Call light and personal items within reach  PPE: gloves     Therapy Charges for Today         Code Description Service Date Service Provider Modifiers Qty     75709547469 HC GAIT TRAINING EA 15 MIN 2025 Dimple Mcgrath, [facility] GP 1     [id number]  PT THERAPEUTIC ACT EA 15 MIN " 6/9/2025 Dimple Mcgrath PTA GP 1     99298330392 HC PT THER PROC EA 15 MIN 6/9/2025 Dimple Mcgrath PTA GP 1               PT Charges         Row Name 06/09/25 0951                       Time Calculation     Start Time 0923  -SC         Stop Time 0949  -SC         Time Calculation (min) 26 min  -SC         PT Received On 06/09/25  -SC         PT - Next Appointment 06/10/25  -SC                 Time Calculation- PT     Total Timed Code Minutes- PT 26 minute(s)  -SC                      User Key  (r) = Recorded By, (t) = Taken By, (c) = Cosigned By        Initials Name Provider Type     SC Dimple Mcgrath PTA Physical Therapist Assistant

## 2025-06-10 NOTE — CASE MANAGEMENT/SOCIAL WORK
Continued Stay Note  KOFI Galarza     Patient Name: Faby Le  MRN: [id number]  Today's Date: 6/10/2025    Admit Date: 6/5/2025    Plan: DC PLAN: [facility] accepted. Pending Prec[facility] (started 6/10) PASRR approved. May need transport at DC.       Discharge Plan       Row Name 06/10/25 1320       Plan    Plan DC PLAN: [facility] accepted. Pending Precert (started 6/10) PASRR approved. May need transport at DC.        Patient/Family in Agreement with Plan yes    Plan Comments Recieved message from [facility], can accept patient. Recieved message from [facility], still no bed available. CMA notified of [facility] acceptance and requested precert to be started, verbalized understanding.                    Expected Discharge Date and Time       Expected Discharge Date Expected Discharge Time    [place] 10, 2025           Lis Carey RN    Case Management  [phone]

## 2025-06-10 NOTE — THERAPY TREATMENT NOTE
"Subjective: Pt agreeable to therapeutic plan of care.  Cognition: oriented to Person, Place, Time, and Situation    Objective:     Precautions - falls, right total knee revision = WBAT.     Bed Mobility: N/A or Not attempted.   Functional Transfers: CGA with rollator.      Balance: sitting dynamic = supervision; standing static = SBA, dynamic = CGA for grooming/bathing ADLs.    Functional Ambulation: CGA with rollator     Grooming & oral care: SBA  ADL Position: unsupported standing and at sink  ADL Comments: fair+ standing tolerance.     Bathing: UB = supervision sitting at sink for UB bathing & SBA for LB lenny-hygiene   ADL Position: supported standing and unsupported sitting  ADL Comments:     Vitals: WNL.. No s/s of orthostatic hypotension this date.     Pain: 2 VAS Location: RLE  Interventions for pain: Increased Activity and Therapeutic Presence  Education: Provided education on the importance of mobility in the acute care setting, Verbal/Tactile Cues, ADL training, and Transfer Training    Assessment: Faby Le presents with ADL impairments affecting function including balance and endurance / activity tolerance. Pt demo improving standing balance, ADL transfers & participating with sink side ADLs. She continues to have dec standing tolerance & is requring inc assist for LB dressing ADLs. Demonstrated functioning below baseline abilities indicate the need for continued skilled intervention while inpatient. Tolerating session today without incident. Will continue to follow and progress as tolerated.     Plan/Recommendations:   Moderate Intensity Therapy recommended post-acute care. This is recommended as therapy feels the patient would require 3-4 days per week and wouldn't tolerate \"3 hour daily\" rehab intensity. SNF would be the preferred choice. If the patient does not agree to SNF, arrange HH or OP depending on home bound status. If patient is medically complex, consider LTACH.. Pt requires no DME at " discharge.     Pt desires Skilled Rehab placement at discharge. Pt cooperative; agreeable to therapeutic recommendations and plan of care.     Modified Kerri: N/A = No pre-op stroke/TIA    Post-Tx Position: Up in Chair, Alarms activated, and Call light and personal items within reach  PPE: gloves       Time Calculation- OT       Row Name 06/10/25 1504             Time Calculation- OT    OT Start Time 0808  -DT      OT Stop Time 0838  -DT      OT Time Calculation (min) 30 min  -DT      Total Timed Code Minutes- OT 30 minute(s)  -DT      OT Received On 06/10/25  -DT      OT - Next Appointment 06/11/25  -DT                User Key  (r) = Recorded By, (t) = Taken By, (c) = Cosigned By      Initials Name Provider Type    Jackie Lombardo, OT Occupational Therapist

## 2025-06-10 NOTE — PLAN OF CARE
Problem: Adult Inpatient Plan of Care  Goal: Plan of Care Review  Outcome: Progressing  Flowsheets (Taken 6/10/2025 0921)  Plan of Care Reviewed With: patient  Goal: Patient-Specific Goal (Individualized)  Outcome: Progressing  Goal: Absence of Hospital-Acquired Illness or Injury  Outcome: Progressing  Intervention: Identify and Manage Fall Risk  Recent Flowsheet Documentation  Taken 6/10/2025 0800 by Ting Juan RN  Safety Promotion/Fall Prevention:   activity supervised   assistive device/personal items within reach   clutter free environment maintained   fall prevention program maintained   nonskid shoes/slippers when out of bed   room organization consistent   safety round/check completed  Intervention: Prevent Skin Injury  Recent Flowsheet Documentation  Taken 6/10/2025 0800 by Ting Juan RN  Body Position:   position changed independently   weight shifting  Skin Protection: pulse oximeter probe site changed  Intervention: Prevent and Manage VTE (Venous Thromboembolism) Risk  Recent Flowsheet Documentation  Taken 6/10/2025 0800 by Ting Juan RN  VTE Prevention/Management:   bilateral   foot pump device on  Intervention: Prevent Infection  Recent Flowsheet Documentation  Taken 6/10/2025 0800 by Ting Juan RN  Infection Prevention:   environmental surveillance performed   equipment surfaces disinfected   hand hygiene promoted  Goal: Optimal Comfort and Wellbeing  Outcome: Progressing  Intervention: Provide Person-Centered Care  Recent Flowsheet Documentation  Taken 6/10/2025 0800 by Ting Juan RN  Trust Relationship/Rapport:   care explained   choices provided   emotional support provided   empathic listening provided   questions answered   questions encouraged   reassurance provided   thoughts/feelings acknowledged  Goal: Readiness for Transition of Care  Outcome: Progressing     Problem: Fall Injury Risk  Goal: Absence of Fall and Fall-Related Injury  Outcome:  Progressing  Intervention: Identify and Manage Contributors  Recent Flowsheet Documentation  Taken 6/10/2025 0800 by Ting Juan RN  Medication Review/Management: medications reviewed  Self-Care Promotion:   independence encouraged   BADL personal objects within reach  Intervention: Promote Injury-Free Environment  Recent Flowsheet Documentation  Taken 6/10/2025 0800 by Ting Juan RN  Safety Promotion/Fall Prevention:   activity supervised   assistive device/personal items within reach   clutter free environment maintained   fall prevention program maintained   nonskid shoes/slippers when out of bed   room organization consistent   safety round/check completed     Problem: Comorbidity Management  Goal: Maintenance of Heart Failure Symptom Control  Outcome: Progressing  Intervention: Maintain Heart Failure Management  Recent Flowsheet Documentation  Taken 6/10/2025 0800 by Ting Juan RN  Medication Review/Management: medications reviewed  Goal: Blood Pressure in Desired Range  Outcome: Progressing  Intervention: Maintain Blood Pressure Management  Recent Flowsheet Documentation  Taken 6/10/2025 0800 by Ting Juan RN  Medication Review/Management: medications reviewed     Problem: Pain Acute  Goal: Optimal Pain Control and Function  Outcome: Progressing  Intervention: Optimize Psychosocial Wellbeing  Recent Flowsheet Documentation  Taken 6/10/2025 0800 by Ting Juan RN  Supportive Measures:   active listening utilized   verbalization of feelings encouraged  Diversional Activities: television  Intervention: Prevent or Manage Pain  Recent Flowsheet Documentation  Taken 6/10/2025 0800 by Ting Juan RN  Medication Review/Management: medications reviewed     Problem: Sepsis/Septic Shock  Goal: Optimal Coping  Outcome: Progressing  Intervention: Support Patient and Family Response  Recent Flowsheet Documentation  Taken 6/10/2025 0800 by Ting Juan RN  Supportive Measures:   active listening  utilized   verbalization of feelings encouraged  Family/Support System Care:   self-care encouraged   support provided  Goal: Absence of Bleeding  Outcome: Progressing  Goal: Blood Glucose Level Within Target Range  Outcome: Progressing  Goal: Absence of Infection Signs and Symptoms  Outcome: Progressing  Intervention: Initiate Sepsis Management  Recent Flowsheet Documentation  Taken 6/10/2025 0800 by Ting Juan, RN  Infection Prevention:   environmental surveillance performed   equipment surfaces disinfected   hand hygiene promoted  Intervention: Promote Recovery  Recent Flowsheet Documentation  Taken 6/10/2025 0800 by Ting Juan, RN  Activity Management:   ambulated to bathroom   up in chair  Goal: Optimal Nutrition Delivery  Outcome: Progressing   Goal Outcome Evaluation:  Plan of Care Reviewed With: patient      Patient a/ox4, up to bs using walker and standby assist, ice pack to right knee as tolerated, reviewed plan of care with patient ,remains high falls precautions, call light in reach, clean and dry, waiting on approval to [facility],    Ice pack to right knee as tolerated   [place] pre cert started today      Reason for admission: Right knee revision  Significant PMH: Afib, PACEMAKER, CAD, HTN, HLD, ROSA  Significant 24 hour events: 6/5 Right total knee revision  6/7 blister to right buttocks  6/8 continue midodrine, stop IVFs  Significant Assessment Findings:    Social: from home, SNF at discharge  Additional Info:  Mobility Plan: Assist x 1 with walker to BSC

## 2025-06-10 NOTE — CASE MANAGEMENT/SOCIAL WORK
Continued Stay Note  KOFI Galarza     Patient Name: Faby Le  MRN: [id number]  Today's Date: 6/10/2025    Admit Date: 6/5/2025    Plan: DC PLAN: [facility]- accepted. Pending Precert (started 6/10) PASRR approved. May need transport at DC.       Discharge Plan       Row Name 06/10/25 1502       Plan    Plan DC PLAN: [facility]- accepted. Pending Precert (started 6/10) PASRR approved. May need transport at DC.        Patient/Family in Agreement with Plan yes    Plan Comments Recieved call from daughter, wishes for 1st choice to be [facility]. DCP report sent and message sent to liasion who states can accept patient. Reached out to [facility] to request precert be changed.      Row Name 06/10/25 1320       Plan    Plan     Patient/Family in Agreement with Plan     Plan Comments                          Expected Discharge Date and Time       Expected Discharge Date Expected Discharge Time    [place] 10, 2025           Lis Carey RN    Case Management  [phone]

## 2025-06-10 NOTE — PLAN OF CARE
"Goal Outcome Evaluation:   Assessment: Faby Le presents with ADL impairments affecting function including balance and endurance / activity tolerance. Pt demo improving standing balance, ADL transfers & participating with sink side ADLs. She continues to have dec standing tolerance & is requring inc assist for LB dressing ADLs. Demonstrated functioning below baseline abilities indicate the need for continued skilled intervention while inpatient. Tolerating session today without incident. Will continue to follow and progress as tolerated.     Plan/Recommendations:   Moderate Intensity Therapy recommended post-acute care. This is recommended as therapy feels the patient would require 3-4 days per week and wouldn't tolerate \"3 hour daily\" rehab intensity. SNF would be the preferred choice. If the patient does not agree to SNF, arrange HH or OP depending on home bound status. If patient is medically complex, consider LTACH.. Pt requires no DME at discharge.     Pt desires Skilled Rehab placement at discharge. Pt cooperative; agreeable to therapeutic recommendations and plan of care.                                          "

## 2025-06-10 NOTE — PROGRESS NOTES
[facility] Progress Note      Patient Name: Faby Le  : 1945  MRN: [id number]  Primary Care Physician:  Leonor Adam MD  Date of admission: 2025    Subjective     Interval History:     Patient resting comfortably, feeling better  Has no new issues noted today.  Afebrile.  Denies any nausea and vomiting no fever no chills    Review of Systems:   As noted above    Objective     Vitals:   Temp:  [97.5 °F (36.4 °C)-97.9 °F (36.6 °C)] 97.5 °F (36.4 °C)  Heart Rate:  [60-64] 60  Resp:  [16-19] 19  BP: (123-164)/(39-74) 144/53    Intake/Output Summary (Last 24 hours) at 6/10/2025 1005  Last data filed at 6/10/2025 0800  Gross per 24 hour   Intake 960 ml   Output 650 ml   Net 310 ml       Physical Exam:      General Appearance: alert, oriented x 3, no acute distress   Skin: warm and dry  HEENT: oral mucosa normal, nonicteric sclera  Neck: supple, no JVD  Lungs: CTA  Heart: RRR, normal S1 and S2  Abdomen: soft, nontender, nondistended  : no palpable bladder  Extremities: Dressing present right knee  Neuro: normal speech and mental status     Scheduled Meds:     acetaminophen, 1,000 mg, Oral, Q6H  levothyroxine, 88 mcg, Oral, Daily  metoprolol succinate XL, 25 mg, Oral, Q24H  midodrine, 5 mg, Oral, TID AC  rivaroxaban, 15 mg, Oral, Daily With Dinner      IV Meds:          Results Reviewed:   I have personally reviewed the results from the time of this admission to 6/10/2025 10:05 EDT     Results from last 7 days   Lab Units 06/10/25  0032 25  1204 25  0146   SODIUM mmol/L 135* 138 135*   POTASSIUM mmol/L 4.9 4.9 5.0   CHLORIDE mmol/L 105 109* 107   CO2 mmol/L 21.7* 23.2 20.7*   BUN mg/dL 23.7* 23.2* 33.3*   CREATININE mg/dL 1.28* 1.31* 1.40*   CALCIUM mg/dL 9.3 9.3 8.7   GLUCOSE mg/dL 109* 130* 95     Estimated Creatinine Clearance: 44.9 mL/min (A) (by C-G formula based on SCr of 1.28 mg/dL (H)).  Results from last 7 days   Lab Units 06/10/25  0032  06/09/25  1204 06/08/25  0146   PHOSPHORUS mg/dL 3.0 3.0 3.1         Results from last 7 days   Lab Units 06/10/25  0032 06/09/25  0004 06/08/25  0146 06/06/25  2345 06/06/25  1447   WBC 10*3/mm3 7.29 6.46 6.63 11.70* 16.91*   HEMOGLOBIN g/dL 8.1* 7.7* 7.3* 7.5* 8.2*   PLATELETS 10*3/mm3 181 170 148 140 167     Results from last 7 days   Lab Units 06/05/25  0913   INR  1.11*       Assessment / Plan     ASSESSMENT:    Acute kidney injury on chronic kidney disease stage IIIa.  Baseline creatinine around 1.1 Mg/DL.  Prerenal etiology.  Renal function improving.  peak of 1.8 Mg/DL.  Electrolytes okay.    Hypotension.  Blood pressure better.  Status post IV fluid bolus.  On oral midodrine  CHF.  Diastolic.  Chronic  Paroxysmal A-fib    PLAN:  Kidney function continues to improve and  we will continue current therapy  Surveillance labs      Omar Vale MD  06/10/25  10:05 EDT    [facility]  [phone]

## 2025-06-10 NOTE — SIGNIFICANT NOTE
Case Management/Social Work    Patient Name:  Faby Le  YOB: 1945  MRN: [id number]  Admit Date:  6/5/2025           06/10/25 1437   Post Acute Pre-Cert Documentation   Date Post Acute Pre-Cert Completed 06/10/25   All Clinicals Submitted? Yes   Response Received from Insurance? Approval   Authorization Number: [id number]   Post Acute Pre-Cert Initiated Comment CME made aware that patient wishes to change servicing facility for SNF precert. CME called [facility] ([phone]) and spoke with Albina, who created new auth for [facility]. Portal auth ID [id number]. SNF precert approved. Valid 6/10-6/12. CM made aware.           Electronically signed by:  CHERELLE Osborne  06/10/25 14:49 EDT    Dimitry León  Case Management Extender  [address] [address]  P: [phone]  F: [phone]

## 2025-06-11 VITALS
DIASTOLIC BLOOD PRESSURE: 68 MMHG | HEIGHT: 63 IN | WEIGHT: 260.14 LBS | SYSTOLIC BLOOD PRESSURE: 132 MMHG | BODY MASS INDEX: 46.09 KG/M2 | RESPIRATION RATE: 16 BRPM | OXYGEN SATURATION: 100 % | HEART RATE: 72 BPM | TEMPERATURE: 98 F

## 2025-06-11 LAB
BASOPHILS # BLD AUTO: 0.04 10*3/MM3 (ref 0–0.2)
BASOPHILS NFR BLD AUTO: 0.7 % (ref 0–1.5)
DEPRECATED RDW RBC AUTO: 55.8 FL (ref 37–54)
EOSINOPHIL # BLD AUTO: 0.26 10*3/MM3 (ref 0–0.4)
EOSINOPHIL NFR BLD AUTO: 4.9 % (ref 0.3–6.2)
ERYTHROCYTE [DISTWIDTH] IN BLOOD BY AUTOMATED COUNT: 14.5 % (ref 12.3–15.4)
HCT VFR BLD AUTO: 22.2 % (ref 34–46.6)
HGB BLD-MCNC: 7.2 G/DL (ref 12–15.9)
IMM GRANULOCYTES # BLD AUTO: 0.03 10*3/MM3 (ref 0–0.05)
IMM GRANULOCYTES NFR BLD AUTO: 0.6 % (ref 0–0.5)
LYMPHOCYTES # BLD AUTO: 1.85 10*3/MM3 (ref 0.7–3.1)
LYMPHOCYTES NFR BLD AUTO: 34.6 % (ref 19.6–45.3)
MCH RBC QN AUTO: 34.3 PG (ref 26.6–33)
MCHC RBC AUTO-ENTMCNC: 32.4 G/DL (ref 31.5–35.7)
MCV RBC AUTO: 105.7 FL (ref 79–97)
MONOCYTES # BLD AUTO: 0.52 10*3/MM3 (ref 0.1–0.9)
MONOCYTES NFR BLD AUTO: 9.7 % (ref 5–12)
NEUTROPHILS NFR BLD AUTO: 2.64 10*3/MM3 (ref 1.7–7)
NEUTROPHILS NFR BLD AUTO: 49.5 % (ref 42.7–76)
NRBC BLD AUTO-RTO: 0 /100 WBC (ref 0–0.2)
PLATELET # BLD AUTO: 163 10*3/MM3 (ref 140–450)
PMV BLD AUTO: 9.4 FL (ref 6–12)
RBC # BLD AUTO: 2.1 10*6/MM3 (ref 3.77–5.28)
WBC NRBC COR # BLD AUTO: 5.34 10*3/MM3 (ref 3.4–10.8)

## 2025-06-11 PROCEDURE — 85025 COMPLETE CBC W/AUTO DIFF WBC: CPT | Performed by: INTERNAL MEDICINE

## 2025-06-11 RX ADMIN — LEVOTHYROXINE SODIUM 88 MCG: 88 TABLET ORAL at 05:03

## 2025-06-11 RX ADMIN — ACETAMINOPHEN 1000 MG: 500 TABLET ORAL at 05:03

## 2025-06-11 RX ADMIN — MIDODRINE HYDROCHLORIDE 5 MG: 5 TABLET ORAL at 07:54

## 2025-06-11 RX ADMIN — METOPROLOL SUCCINATE 25 MG: 25 TABLET, EXTENDED RELEASE ORAL at 07:54

## 2025-06-11 NOTE — DISCHARGE SUMMARY
"             [facility] Medicine Services  Discharge Summary    Date of Service: 2025  Patient Name: Faby Le  : 1945  MRN: [id number]    Date of Admission: 2025  Discharge Diagnosis: Postoperative pain of knee  S/p TOTAL KNEE ARTHROPLASTY REVISION WITH CORI ROBOT   MARIAM on CKD  Hypotension with history of hypertension  Coronary artery disease  SSS s/p pacemaker  Chronic diastolic congestive heart failure  Paroxysmal A-fib  Hyperlipidemia  Date of Discharge: 2025  Primary Care Physician: Leonor Adam MD      Presenting Problem:   Postoperative pain of knee [G89.18, M25.569]  S/P revision of total knee [Z96.659]  Orthostasis [I95.1]    Active and Resolved Hospital Problems:  Active Hospital Problems    Diagnosis POA    **Postoperative pain of knee [G89.18, M25.569] Yes    Orthostasis [I95.1] Yes    S/P revision of total knee [Z96.659] Not Applicable      Resolved Hospital Problems   No resolved problems to display.         Hospital Course     HPI:    \"Faby Le is a 80 y.o. female with a CMH of HFpEF, SSS s/p PPM, A-fib on Xarelto, CAD, HTN, HLD, ROSA on CPAP, osteoarthritis who presented to [facility] on 2025 for elective right total knee arthroplasty revision with Dr. Herrera.  Procedure noted to have no complications.  Orthopedic surgery were planning to discharge patient home today.  However patient was noted to be hypotensive.  Orthostatics were attempted in which patient's BP noted to be as low as 90/39.  Patient denies associated weakness, fatigue, dizziness, lightheadedness, chest pain, shortness of breath, palpitations.  She does endorse no BM since surgery.  She also states she had palpitations approximately 3 weeks ago prior to her original date for knee surgery in which she was evaluated by Dr. Herrera with future plans for an ablation.  Patient understands PTs recommendations for rehab and is amenable.  Case management consulted for placement.  " "Hospitalist service to assume primary care of patient while she remains hospitalized. \"    Hospital Course:  A 80-year-old female with a history of HFpEF, SSS with pacemaker, atrial fibrillation on Xarelto, CAD, hypertension, hyperlipidemia, ROSA on CPAP, and osteoarthritis underwent an elective right total knee arthroplasty revision with CORI robotic assistance on 6/5/2025 under the care of Dr. Chino Herrera II without complications. Postoperatively, she was transferred to the floor where mobilization therapy was initiated, and opioids were carefully titrated to optimize pain control and facilitate participation in physical therapy. She developed hypotension with positive orthostatics, worsening renal function, and a hemoglobin drop, which prompted IV fluid resuscitation, nephrology consultation, and evaluation for bleeding including stool occult blood testing. Her creatinine improved to near baseline, hemoglobin stabilized around 7.2-8.1 g/dL, and she was managed with midodrine and low-dose diuretics per nephrology recommendations. Cardiovascular status remained stable with EF 56-60% and grade 3 diastolic dysfunction noted on prior echocardiogram; metoprolol dosing was adjusted in response to hypotension, and telemetry monitoring continued. The operative extremity’s neurovascular status remained intact with intact dressings and no signs of infection. The patient received education on incision care, activity restrictions, medications, and follow-up plans, with all questions addressed. She progressed with physical therapy using a bedside commode and was deemed stable for discharge, with discharge planning ongoing for placement in a skilled nursing facility for rehabilitation.     DISCHARGE Follow Up Recommendations for labs and diagnostics:   Follow up with primary care provider within 3 days of this discharge.   Follow-up with orthopedic and cardiology as an outpatient.  Further anemia workup as an " outpatient.  Monitor CBC and BMP as an outpatient.        Day of Discharge     Vital Signs:  Temp:  [97.5 °F (36.4 °C)-98 °F (36.7 °C)] 97.5 °F (36.4 °C)  Heart Rate:  [63-75] 75  Resp:  [16-19] 16  BP: (115-142)/(38-65) 142/65          Pertinent  and/or Most Recent Results     LAB RESULTS:      Lab 06/11/25  0120 06/10/25  0032 06/09/25  0004 06/08/25  0146 06/06/25  2345 06/06/25  1447 06/06/25  1447 06/05/25  0913   WBC 5.34 7.29 6.46 6.63 11.70*   < > 16.91*  --    HEMOGLOBIN 7.2* 8.1* 7.7* 7.3* 7.5*   < > 8.2*  --    HEMATOCRIT 22.2* 25.2* 24.0* 23.2* 22.6*   < > 24.7*  --    PLATELETS 163 181 170 148 140   < > 167  --    NEUTROS ABS 2.64 3.77 3.44 3.73 9.05*   < > 14.42*  --    IMMATURE GRANS (ABS) 0.03 0.03 0.03 0.02 0.03   < > 0.13*  --    LYMPHS ABS 1.85 2.56 2.11 2.13 1.88   < > 1.44  --    MONOS ABS 0.52 0.62 0.62 0.57 0.69   < > 0.90  --    EOS ABS 0.26 0.26 0.21 0.14 0.03   < > 0.00  --    .7* 106.3* 105.3* 106.9* 103.2*   < > 102.9*  --    PROCALCITONIN  --   --   --   --   --   --  0.06  --    PROTIME  --   --   --   --   --   --   --  14.2    < > = values in this interval not displayed.         Lab 06/10/25  2236 06/10/25  0032 06/09/25  1204 06/08/25  0146 06/06/25  2345   SODIUM 136 135* 138 135* 132*   POTASSIUM 4.8 4.9 4.9 5.0 4.7   CHLORIDE 106 105 109* 107 102   CO2 21.7* 21.7* 23.2 20.7* 22.1   ANION GAP 8.3 8.3 5.8 7.3 7.9   BUN 19.9 23.7* 23.2* 33.3* 32.6*   CREATININE 1.17* 1.28* 1.31* 1.40* 1.82*   EGFR 47.9* 43.0* 41.8* 38.6* 28.2*   GLUCOSE 142* 109* 130* 95 112*   CALCIUM 9.1 9.3 9.3 8.7 8.8   PHOSPHORUS 3.2 3.0 3.0 3.1  --          Lab 06/10/25  2236 06/10/25  0032 06/09/25  1204 06/08/25  0146   ALBUMIN 3.2* 3.4* 3.3* 3.2*         Lab 06/06/25  1644 06/06/25  1447 06/05/25  0913   PROBNP  --  538.0  --    HSTROP T 10 11  --    PROTIME  --   --  14.2   INR  --   --  1.11*                 Brief Urine Lab Results  (Last result in the past 365 days)        Color   Clarity   Blood    Leuk Est   Nitrite   Protein   CREAT   Urine HCG        06/08/25 0158 Yellow   Clear   Negative   Moderate (2+)   Negative   Negative                 Microbiology Results (last 10 days)       ** No results found for the last 240 hours. **            XR Knee 1 or 2 View Right  Result Date: 6/5/2025  Impression: Impression:    Immediate postoperative changes status post right total knee revision with anterior soft tissue swelling. Electronically Signed: Bry Mendosa MD  6/5/2025 3:27 PM EDT  Workstation ID: [id number]    XR Chest PA & Lateral  Result Date: 6/3/2025  Impression: Impression: No acute process. Electronically Signed: Denzel Dominguez MD  6/3/2025 3:35 PM EDT  Workstation ID: [id number]    XR Knee 1 or 2 View Right  Result Date: 5/20/2025  Impression: Impression: Radiographically uncomplicated right knee prosthesis in anatomic alignment. Small decreasing joint effusion since prior comparison. Electronically Signed: Donta Jaramillo MD  5/20/2025 3:43 PM EDT  Workstation ID: [id number]    XR Chest 1 View  Result Date: 5/20/2025  Impression: Impression: No acute process. Electronically Signed: Denzel Dominguez MD  5/20/2025 2:30 PM EDT  Workstation ID: [id number]      Results for orders placed during the hospital encounter of 02/15/24    Duplex Carotid Ultrasound CAR    Interpretation Summary    Right internal carotid artery demonstrates a less than 50% stenosis.    Left internal carotid artery demonstrates a less than 50% stenosis.      Results for orders placed during the hospital encounter of 02/15/24    Duplex Carotid Ultrasound CAR    Interpretation Summary    Right internal carotid artery demonstrates a less than 50% stenosis.    Left internal carotid artery demonstrates a less than 50% stenosis.      Results for orders placed during the hospital encounter of 05/15/25    Adult Transthoracic Echo Complete W/ Cont if Necessary Per Protocol    Interpretation Summary    Left ventricular ejection fraction appears to be  56 - 60%.    Left ventricular diastolic function is consistent with (grade III w/high LAP) reversible restrictive pattern.    Left ventricular peak systolic GLS is abnormal at -15%.      Labs Pending at Discharge:  Pending Results       Procedure [Order ID] Specimen - Date/Time    Basic Metabolic Panel [[id number]]     Specimen: Blood     CBC (No Diff) [[id number]]     Specimen: Blood     Occult Blood, Fecal By Immunoassay - Stool, Per Rectum [[id number]]     Specimen: Stool from Per Rectum     Sodium, Urine, Random - Urine, Clean Catch [[id number]]     Specimen: Urine, Clean Catch             Procedures Performed  Procedure(s):  TOTAL KNEE ARTHROPLASTY REVISION WITH CORI ROBOT         Consults:   Consults       Date and Time Order Name Status Description    6/7/2025 10:36 AM Inpatient Nephrology Consult Completed     5/15/2025 11:22 AM Inpatient Cardiology Consult Completed               Discharge Details        Discharge Medications        New Medications        Instructions Start Date   ondansetron 4 MG tablet  Commonly known as: Zofran   4 mg, Oral, Every 6 Hours PRN      oxyCODONE-acetaminophen 5-325 MG per tablet  Commonly known as: PERCOCET   1 tablet, Oral, Every 4 Hours PRN             Continue These Medications        Instructions Start Date   acetaminophen 325 MG tablet  Commonly known as: TYLENOL   650 mg, Every 6 Hours PRN      allopurinol 300 MG tablet  Commonly known as: ZYLOPRIM   300 mg, Oral, Daily      atorvastatin 20 MG tablet  Commonly known as: LIPITOR   20 mg, Oral, Daily      azelastine 0.1 % nasal spray  Commonly known as: ASTELIN   2 sprays, Daily      cetirizine 10 MG tablet  Commonly known as: zyrTEC   1 tablet, Oral, Daily PRN      cholecalciferol 25 MCG (1000 UT) tablet  Commonly known as: VITAMIN D3   1 tablet, Daily      cyanocobalamin 1000 MCG tablet  Commonly known as: VITAMIN B-12   1 tablet, Daily      docusate sodium 100 MG capsule  Commonly known as: COLACE   100 mg, 2 Times Daily       dofetilide 250 MCG capsule  Commonly known as: Tikosyn   250 mcg, Oral, Every 12 Hours      ferrous sulfate 325 (65 FE) MG tablet   325 mg, Daily With Breakfast      levothyroxine 88 MCG tablet  Commonly known as: SYNTHROID, LEVOTHROID   88 mcg, Oral, Daily      metoprolol succinate XL 25 MG 24 hr tablet  Commonly known as: TOPROL-XL   25 mg, Oral, Every 24 Hours Scheduled      midodrine 5 MG tablet  Commonly known as: PROAMATINE   5 mg, Oral, 2 Times Daily Before Meals      montelukast 10 MG tablet  Commonly known as: SINGULAIR   10 mg, Oral, Nightly      Mounjaro 2.5 MG/0.5ML solution auto-injector  Generic drug: Tirzepatide   5 mg, Weekly      multivitamin tablet tablet   1 tablet, Oral, Daily      multivitamin with minerals tablet tablet   1 tablet, Daily      mupirocin 2 % ointment  Commonly known as: BACTROBAN   APPLY SMALL AMOUNT OF OINTMENT TOPICALLY TO AFFECTED AREA TWICE DAILY      pantoprazole 40 MG EC tablet  Commonly known as: PROTONIX   40 mg, Every Morning      rivaroxaban 15 MG tablet  Commonly known as: XARELTO   15 mg, Oral, Daily With Dinner      triamcinolone 0.1 % cream  Commonly known as: KENALOG   APPLY CREAM EXTERNALLY TWICE DAILY TO ECZEMA FOR UP TO 2 WEEKS PER MONTH AS NEEDED             Stop These Medications      spironolactone 25 MG tablet  Commonly known as: ALDACTONE              Allergies   Allergen Reactions    Celecoxib Itching and Swelling     swelling         Discharge Disposition:   Rehab Facility or Unit (DC - External)    Diet:  Hospital:  Diet Order   Procedures    Diet: Regular/House; Fluid Consistency: Thin (IDDSI 0)         Discharge Activity:   Activity Instructions    Activity as instructed by Dr. Herrera and therapy             CODE STATUS:  Code Status and Medical Interventions: CPR (Attempt to Resuscitate); Full   Ordered at: 06/05/25 1767     Code Status (Patient has no pulse and is not breathing):    CPR (Attempt to Resuscitate)     Medical Interventions (Patient  has pulse or is breathing):    Full     Level Of Support Discussed With:    Patient         Future Appointments   Date Time Provider Department Center   8/25/2025  9:30 AM Cr Herrera MD [facility]   10/28/2025 11:15 AM Ratna Zavala MD [facility]       Additional Instructions for the Follow-ups that You Need to Schedule       MRSA Screen, PCR (Inpatient) - Swab, Nares    May 27, 2025 (Approximate)      Release to patient: Routine Release                Time spent on Discharge including face to face service:  >35 minutes    Signature: Electronically signed by Reinaldo Lozano MD, 06/11/25, 13:05 EDT.  [facility] Hospitalist Team

## 2025-06-11 NOTE — PLAN OF CARE
Problem: Adult Inpatient Plan of Care  Goal: Plan of Care Review  6/11/2025 1005 by Ting Juan RN  Outcome: Met  Flowsheets (Taken 6/11/2025 1005)  Plan of Care Reviewed With: patient  6/11/2025 0857 by Ting Juan RN  Outcome: Progressing  Flowsheets (Taken 6/11/2025 0857)  Plan of Care Reviewed With: patient  Goal: Patient-Specific Goal (Individualized)  6/11/2025 1005 by Ting Juan RN  Outcome: Met  6/11/2025 0857 by Ting Juan RN  Outcome: Progressing  Goal: Absence of Hospital-Acquired Illness or Injury  6/11/2025 1005 by Ting Juan RN  Outcome: Met  6/11/2025 0857 by Ting Juan RN  Outcome: Progressing  Intervention: Identify and Manage Fall Risk  Recent Flowsheet Documentation  Taken 6/11/2025 0813 by Ting Juan RN  Safety Promotion/Fall Prevention:   activity supervised   assistive device/personal items within reach   clutter free environment maintained   fall prevention program maintained   nonskid shoes/slippers when out of bed   room organization consistent   safety round/check completed  Intervention: Prevent Skin Injury  Recent Flowsheet Documentation  Taken 6/11/2025 0813 by Ting Juan RN  Body Position:   position changed independently   weight shifting  Skin Protection: pulse oximeter probe site changed  Intervention: Prevent and Manage VTE (Venous Thromboembolism) Risk  Recent Flowsheet Documentation  Taken 6/11/2025 0813 by Ting Juan RN  VTE Prevention/Management:   bilateral   foot pump device on  Intervention: Prevent Infection  Recent Flowsheet Documentation  Taken 6/11/2025 0813 by Ting Juan RN  Infection Prevention:   environmental surveillance performed   equipment surfaces disinfected   hand hygiene promoted  Goal: Optimal Comfort and Wellbeing  6/11/2025 1005 by Ting Juan RN  Outcome: Met  6/11/2025 0857 by Ting Juan RN  Outcome: Progressing  Intervention: Provide Person-Centered Care  Recent Flowsheet Documentation  Taken  6/11/2025 0813 by Ting Juan RN  Trust Relationship/Rapport:   care explained   choices provided   emotional support provided   empathic listening provided   questions answered   questions encouraged   reassurance provided   thoughts/feelings acknowledged  Goal: Readiness for Transition of Care  6/11/2025 1005 by Ting Juan RN  Outcome: Met  6/11/2025 0857 by Ting Juan RN  Outcome: Progressing     Problem: Fall Injury Risk  Goal: Absence of Fall and Fall-Related Injury  6/11/2025 1005 by Ting Juan RN  Outcome: Met  6/11/2025 0857 by Ting Juan RN  Outcome: Progressing  Intervention: Identify and Manage Contributors  Recent Flowsheet Documentation  Taken 6/11/2025 0813 by Ting Juan RN  Medication Review/Management: medications reviewed  Self-Care Promotion:   independence encouraged   BADL personal objects within reach  Intervention: Promote Injury-Free Environment  Recent Flowsheet Documentation  Taken 6/11/2025 0813 by Ting Juan RN  Safety Promotion/Fall Prevention:   activity supervised   assistive device/personal items within reach   clutter free environment maintained   fall prevention program maintained   nonskid shoes/slippers when out of bed   room organization consistent   safety round/check completed     Problem: Comorbidity Management  Goal: Maintenance of Heart Failure Symptom Control  6/11/2025 1005 by Ting Juan RN  Outcome: Met  6/11/2025 0857 by Ting Juan RN  Outcome: Progressing  Intervention: Maintain Heart Failure Management  Recent Flowsheet Documentation  Taken 6/11/2025 0813 by Ting Juan RN  Medication Review/Management: medications reviewed  Goal: Blood Pressure in Desired Range  6/11/2025 1005 by Ting Juan RN  Outcome: Met  6/11/2025 0857 by Ting Juan RN  Outcome: Progressing  Intervention: Maintain Blood Pressure Management  Recent Flowsheet Documentation  Taken 6/11/2025 0813 by Ting Juan RN  Medication  Review/Management: medications reviewed     Problem: Pain Acute  Goal: Optimal Pain Control and Function  6/11/2025 1005 by Ting Juan RN  Outcome: Met  6/11/2025 0857 by Ting Juan RN  Outcome: Progressing  Intervention: Optimize Psychosocial Wellbeing  Recent Flowsheet Documentation  Taken 6/11/2025 0813 by Ting Juan RN  Supportive Measures:   active listening utilized   verbalization of feelings encouraged  Diversional Activities: television  Intervention: Prevent or Manage Pain  Recent Flowsheet Documentation  Taken 6/11/2025 0813 by Ting Juan RN  Medication Review/Management: medications reviewed     Problem: Sepsis/Septic Shock  Goal: Optimal Coping  6/11/2025 1005 by Ting Juan RN  Outcome: Met  6/11/2025 0857 by Ting Juan RN  Outcome: Progressing  Intervention: Support Patient and Family Response  Recent Flowsheet Documentation  Taken 6/11/2025 0813 by Ting Juan RN  Supportive Measures:   active listening utilized   verbalization of feelings encouraged  Family/Support System Care:   self-care encouraged   support provided  Goal: Absence of Bleeding  6/11/2025 1005 by Ting Juan RN  Outcome: Met  6/11/2025 0857 by Ting Juan RN  Outcome: Progressing  Goal: Blood Glucose Level Within Target Range  6/11/2025 1005 by Ting Juan RN  Outcome: Met  6/11/2025 0857 by Ting Juan RN  Outcome: Progressing  Goal: Absence of Infection Signs and Symptoms  6/11/2025 1005 by Ting Juan RN  Outcome: Met  6/11/2025 0857 by Ting Juan RN  Outcome: Progressing  Intervention: Initiate Sepsis Management  Recent Flowsheet Documentation  Taken 6/11/2025 0813 by Ting Juan RN  Infection Prevention:   environmental surveillance performed   equipment surfaces disinfected   hand hygiene promoted  Intervention: Promote Recovery  Recent Flowsheet Documentation  Taken 6/11/2025 0813 by Ting Juan RN  Activity Management: up in chair  Goal: Optimal Nutrition  Delivery  6/11/2025 1005 by Ting Juan, RN  Outcome: Met  6/11/2025 0857 by Ting Juan, RN  Outcome: Progressing   Goal Outcome Evaluation:  Plan of Care Reviewed With: patient                   Patient to d/c to [facility], will need  van for transport. Report called to [place] patient going to room 403 a, [phone]

## 2025-06-11 NOTE — NURSING NOTE
Spoke with vcitor m at [facility] transport, wheelchair van will be here for transport when available.

## 2025-06-11 NOTE — SIGNIFICANT NOTE
06/11/25 1305   OTHER   Discipline occupational therapist   Rehab Time/Intention   Session Not Performed other (see comments)  (Pt expected to d/c this date, OT will follow-up as appropriate)   Therapy Assessment/Plan (PT)   Criteria for Skilled Interventions Met (PT) yes;meets criteria;skilled treatment is necessary   Recommendation   OT - Next Appointment 06/12/25

## 2025-06-11 NOTE — PROGRESS NOTES
[facility] Progress Note      Patient Name: Faby Le  : 1945  MRN: [id number]  Primary Care Physician:  Leonor Adam MD  Date of admission: 2025    Subjective     Interval History:     Patient resting comfortably, feeling better  Has no new complaints today.  Denies chest pain or shortness of breath  Review of Systems:   As noted above    Objective     Vitals:   Temp:  [97.5 °F (36.4 °C)-98 °F (36.7 °C)] 98 °F (36.7 °C)  Heart Rate:  [63-75] 72  Resp:  [16] 16  BP: (115-142)/(38-68) 132/68    Intake/Output Summary (Last 24 hours) at 2025 1752  Last data filed at 2025 1400  Gross per 24 hour   Intake 1560 ml   Output 950 ml   Net 610 ml       Physical Exam:      General Appearance: alert, oriented x 3, no acute distress   Skin: warm and dry  HEENT: oral mucosa normal, nonicteric sclera  Neck: supple, no JVD  Lungs: CTA  Heart: RRR, normal S1 and S2  Abdomen: soft, nontender, nondistended  : no palpable bladder  Extremities: Dressing present right knee  Neuro: normal speech and mental status     Scheduled Meds:     acetaminophen, 1,000 mg, Oral, Q6H  levothyroxine, 88 mcg, Oral, Daily  metoprolol succinate XL, 25 mg, Oral, Q24H  midodrine, 5 mg, Oral, TID AC  rivaroxaban, 15 mg, Oral, Daily With Dinner      IV Meds:          Results Reviewed:   I have personally reviewed the results from the time of this admission to 2025 17:52 EDT     Results from last 7 days   Lab Units 06/10/25  2236 06/10/25  0032 25  1204   SODIUM mmol/L 136 135* 138   POTASSIUM mmol/L 4.8 4.9 4.9   CHLORIDE mmol/L 106 105 109*   CO2 mmol/L 21.7* 21.7* 23.2   BUN mg/dL 19.9 23.7* 23.2*   CREATININE mg/dL 1.17* 1.28* 1.31*   CALCIUM mg/dL 9.1 9.3 9.3   GLUCOSE mg/dL 142* 109* 130*     Estimated Creatinine Clearance: 49.2 mL/min (A) (by C-G formula based on SCr of 1.17 mg/dL (H)).  Results from last 7 days   Lab Units 06/10/25  2236 06/10/25  0032 25  1204    PHOSPHORUS mg/dL 3.2 3.0 3.0         Results from last 7 days   Lab Units 06/11/25  0120 06/10/25  0032 06/09/25  0004 06/08/25  0146 06/06/25  2345   WBC 10*3/mm3 5.34 7.29 6.46 6.63 11.70*   HEMOGLOBIN g/dL 7.2* 8.1* 7.7* 7.3* 7.5*   PLATELETS 10*3/mm3 163 181 170 148 140     Results from last 7 days   Lab Units 06/05/25  0913   INR  1.11*       Assessment / Plan     ASSESSMENT:    Acute kidney injury on chronic kidney disease stage IIIa.  Baseline creatinine around 1.1 Mg/DL.  Prerenal etiology.  Renal function improving.  peak of 1.8 Mg/DL.  Electrolytes okay.    Hypotension.  Blood pressure better.  Status post IV fluid bolus.  On oral midodrine  CHF.  Diastolic.  Chronic  Paroxysmal A-fib    PLAN:  Kidney function continues to improve  Okay to discharge to rehab today.  Arrange follow-up in nephrology clinic in 1 to 2 weeks if discharged      Omar Vale MD  06/11/25  17:52 EDT    [facility]  [phone]

## 2025-06-11 NOTE — PLAN OF CARE
Problem: Adult Inpatient Plan of Care  Goal: Plan of Care Review  Outcome: Progressing  Flowsheets (Taken 6/11/2025 0857)  Plan of Care Reviewed With: patient  Goal: Patient-Specific Goal (Individualized)  Outcome: Progressing  Goal: Absence of Hospital-Acquired Illness or Injury  Outcome: Progressing  Intervention: Identify and Manage Fall Risk  Recent Flowsheet Documentation  Taken 6/11/2025 0813 by Ting Juan RN  Safety Promotion/Fall Prevention:   activity supervised   assistive device/personal items within reach   clutter free environment maintained   fall prevention program maintained   nonskid shoes/slippers when out of bed   room organization consistent   safety round/check completed  Intervention: Prevent Skin Injury  Recent Flowsheet Documentation  Taken 6/11/2025 0813 by Ting Juan RN  Body Position:   position changed independently   weight shifting  Skin Protection: pulse oximeter probe site changed  Intervention: Prevent and Manage VTE (Venous Thromboembolism) Risk  Recent Flowsheet Documentation  Taken 6/11/2025 0813 by Ting Juan RN  VTE Prevention/Management:   bilateral   foot pump device on  Intervention: Prevent Infection  Recent Flowsheet Documentation  Taken 6/11/2025 0813 by Ting Juan RN  Infection Prevention:   environmental surveillance performed   equipment surfaces disinfected   hand hygiene promoted  Goal: Optimal Comfort and Wellbeing  Outcome: Progressing  Intervention: Provide Person-Centered Care  Recent Flowsheet Documentation  Taken 6/11/2025 0813 by Ting Juan RN  Trust Relationship/Rapport:   care explained   choices provided   emotional support provided   empathic listening provided   questions answered   questions encouraged   reassurance provided   thoughts/feelings acknowledged  Goal: Readiness for Transition of Care  Outcome: Progressing     Problem: Fall Injury Risk  Goal: Absence of Fall and Fall-Related Injury  Outcome:  Progressing  Intervention: Identify and Manage Contributors  Recent Flowsheet Documentation  Taken 6/11/2025 0813 by Ting Juan RN  Medication Review/Management: medications reviewed  Self-Care Promotion:   independence encouraged   BADL personal objects within reach  Intervention: Promote Injury-Free Environment  Recent Flowsheet Documentation  Taken 6/11/2025 0813 by Ting Juan RN  Safety Promotion/Fall Prevention:   activity supervised   assistive device/personal items within reach   clutter free environment maintained   fall prevention program maintained   nonskid shoes/slippers when out of bed   room organization consistent   safety round/check completed     Problem: Comorbidity Management  Goal: Maintenance of Heart Failure Symptom Control  Outcome: Progressing  Intervention: Maintain Heart Failure Management  Recent Flowsheet Documentation  Taken 6/11/2025 0813 by Ting Juan RN  Medication Review/Management: medications reviewed  Goal: Blood Pressure in Desired Range  Outcome: Progressing  Intervention: Maintain Blood Pressure Management  Recent Flowsheet Documentation  Taken 6/11/2025 0813 by Ting Juan RN  Medication Review/Management: medications reviewed     Problem: Pain Acute  Goal: Optimal Pain Control and Function  Outcome: Progressing  Intervention: Optimize Psychosocial Wellbeing  Recent Flowsheet Documentation  Taken 6/11/2025 0813 by Ting Juan RN  Supportive Measures:   active listening utilized   verbalization of feelings encouraged  Diversional Activities: television  Intervention: Prevent or Manage Pain  Recent Flowsheet Documentation  Taken 6/11/2025 0813 by Ting Juan RN  Medication Review/Management: medications reviewed     Problem: Sepsis/Septic Shock  Goal: Optimal Coping  Outcome: Progressing  Intervention: Support Patient and Family Response  Recent Flowsheet Documentation  Taken 6/11/2025 0813 by Ting Juan RN  Supportive Measures:   active listening  utilized   verbalization of feelings encouraged  Family/Support System Care:   self-care encouraged   support provided  Goal: Absence of Bleeding  Outcome: Progressing  Goal: Blood Glucose Level Within Target Range  Outcome: Progressing  Goal: Absence of Infection Signs and Symptoms  Outcome: Progressing  Intervention: Initiate Sepsis Management  Recent Flowsheet Documentation  Taken 6/11/2025 0813 by Ting Juan, RN  Infection Prevention:   environmental surveillance performed   equipment surfaces disinfected   hand hygiene promoted  Intervention: Promote Recovery  Recent Flowsheet Documentation  Taken 6/11/2025 0813 by Ting Juan, RN  Activity Management: up in chair  Goal: Optimal Nutrition Delivery  Outcome: Progressing   Goal Outcome Evaluation:  Plan of Care Reviewed With: patient         Patient a/ox4, assist x 1 to [facility], reviewed plan of care with patient, call light in reach, remains high falls precaution . Waiting on snf placement          Reason for admission: Right knee revision  Significant PMH: Afib, PACEMAKER, CAD, HTN, HLD, ROSA  Significant 24 hour events: 6/5 Right total knee revision  6/7 blister to right buttocks  6/8 continue midodrine, stop IVFs  Significant Assessment Findings:    Social: from home, SNF at discharge  Additional Info:  Mobility Plan[place] [facility]

## 2025-06-11 NOTE — CASE MANAGEMENT/SOCIAL WORK
Continued Stay Note  KOFI Galarza     Patient Name: Faby Le  MRN: [id number]  Today's Date: 6/11/2025    Admit Date: 6/5/2025    Plan: DC PLAN: [facility] accepted Prec[facility] Approved(6/10-6/12) PASRR approved. May need transport at DC     Discharge Plan       Row Name 06/11/25 1055       Plan    Plan DC PLAN: [facility] accepted Precert Approved(6/10-6/12) PASRR approved. May need transport at DC        Patient/Family in Agreement with Plan yes    Plan Comments MD and bedside nurse notified precert approved. Verified with [facility] that bed available today 6/11. Pharmacy updated in [email or web address]. Possible DC today. May need transport at DC.                      Expected Discharge Date and Time       Expected Discharge Date Expected Discharge Time    Jun 12, 2025           Lis Carey RN   Case Management   [phone]

## 2025-06-11 NOTE — NURSING NOTE
[facility] sent their transport van to  patient and transport to facility, patient In wheelchair,  patient in no distress to time of departure and all belongings were sent with patient, packet given to  to give to nurse

## 2025-07-09 ENCOUNTER — APPOINTMENT (OUTPATIENT)
Dept: GENERAL RADIOLOGY | Facility: HOSPITAL | Age: 80
DRG: 273 | End: 2025-07-09
Payer: MEDICARE

## 2025-07-09 ENCOUNTER — HOSPITAL ENCOUNTER (INPATIENT)
Facility: HOSPITAL | Age: 80
LOS: 8 days | Discharge: NURSING, REHAB OR LONG-TERM CARE | DRG: 273 | End: 2025-07-17
Attending: EMERGENCY MEDICINE | Admitting: PEDIATRICS
Payer: MEDICARE

## 2025-07-09 ENCOUNTER — APPOINTMENT (OUTPATIENT)
Dept: CT IMAGING | Facility: HOSPITAL | Age: 80
DRG: 273 | End: 2025-07-09
Payer: MEDICARE

## 2025-07-09 DIAGNOSIS — E87.70 HYPERVOLEMIA, UNSPECIFIED HYPERVOLEMIA TYPE: ICD-10-CM

## 2025-07-09 DIAGNOSIS — I48.91 ATRIAL FIBRILLATION WITH RVR: Primary | ICD-10-CM

## 2025-07-09 LAB
ALBUMIN SERPL-MCNC: 4 G/DL (ref 3.5–5.2)
ALBUMIN/GLOB SERPL: 1.7 G/DL
ALP SERPL-CCNC: 121 U/L (ref 39–117)
ALT SERPL W P-5'-P-CCNC: 13 U/L (ref 1–33)
ANION GAP SERPL CALCULATED.3IONS-SCNC: 12.4 MMOL/L (ref 5–15)
AST SERPL-CCNC: 20 U/L (ref 1–32)
BASOPHILS # BLD AUTO: 0.06 10*3/MM3 (ref 0–0.2)
BASOPHILS NFR BLD AUTO: 1.1 % (ref 0–1.5)
BILIRUB SERPL-MCNC: 0.4 MG/DL (ref 0–1.2)
BUN SERPL-MCNC: 20.1 MG/DL (ref 8–23)
BUN/CREAT SERPL: 13 (ref 7–25)
CALCIUM SPEC-SCNC: 9.8 MG/DL (ref 8.6–10.5)
CHLORIDE SERPL-SCNC: 104 MMOL/L (ref 98–107)
CO2 SERPL-SCNC: 21.6 MMOL/L (ref 22–29)
CREAT SERPL-MCNC: 1.55 MG/DL (ref 0.57–1)
D DIMER PPP FEU-MCNC: 2.04 MCGFEU/ML (ref 0–0.78)
DEPRECATED RDW RBC AUTO: 54.4 FL (ref 37–54)
EGFRCR SERPLBLD CKD-EPI 2021: 34.2 ML/MIN/1.73
EOSINOPHIL # BLD AUTO: 0.1 10*3/MM3 (ref 0–0.4)
EOSINOPHIL NFR BLD AUTO: 1.8 % (ref 0.3–6.2)
ERYTHROCYTE [DISTWIDTH] IN BLOOD BY AUTOMATED COUNT: 14.6 % (ref 12.3–15.4)
GEN 5 1HR TROPONIN T REFLEX: 14 NG/L
GLOBULIN UR ELPH-MCNC: 2.3 GM/DL
GLUCOSE SERPL-MCNC: 111 MG/DL (ref 65–99)
HCT VFR BLD AUTO: 28.6 % (ref 34–46.6)
HGB BLD-MCNC: 8.9 G/DL (ref 12–15.9)
HOLD SPECIMEN: NORMAL
IMM GRANULOCYTES # BLD AUTO: 0.01 10*3/MM3 (ref 0–0.05)
IMM GRANULOCYTES NFR BLD AUTO: 0.2 % (ref 0–0.5)
LYMPHOCYTES # BLD AUTO: 1.45 10*3/MM3 (ref 0.7–3.1)
LYMPHOCYTES NFR BLD AUTO: 26.2 % (ref 19.6–45.3)
MCH RBC QN AUTO: 32.6 PG (ref 26.6–33)
MCHC RBC AUTO-ENTMCNC: 31.1 G/DL (ref 31.5–35.7)
MCV RBC AUTO: 104.8 FL (ref 79–97)
MONOCYTES # BLD AUTO: 0.5 10*3/MM3 (ref 0.1–0.9)
MONOCYTES NFR BLD AUTO: 9 % (ref 5–12)
NEUTROPHILS NFR BLD AUTO: 3.42 10*3/MM3 (ref 1.7–7)
NEUTROPHILS NFR BLD AUTO: 61.7 % (ref 42.7–76)
NRBC BLD AUTO-RTO: 0 /100 WBC (ref 0–0.2)
NT-PROBNP SERPL-MCNC: 4837 PG/ML (ref 0–1800)
PLATELET # BLD AUTO: 202 10*3/MM3 (ref 140–450)
PMV BLD AUTO: 9.5 FL (ref 6–12)
POTASSIUM SERPL-SCNC: 4.2 MMOL/L (ref 3.5–5.2)
PROT SERPL-MCNC: 6.3 G/DL (ref 6–8.5)
RBC # BLD AUTO: 2.73 10*6/MM3 (ref 3.77–5.28)
SODIUM SERPL-SCNC: 138 MMOL/L (ref 136–145)
TROPONIN T % DELTA: 0
TROPONIN T NUMERIC DELTA: 0 NG/L
TROPONIN T SERPL HS-MCNC: 14 NG/L
WBC NRBC COR # BLD AUTO: 5.54 10*3/MM3 (ref 3.4–10.8)

## 2025-07-09 PROCEDURE — 93005 ELECTROCARDIOGRAM TRACING: CPT | Performed by: EMERGENCY MEDICINE

## 2025-07-09 PROCEDURE — 83880 ASSAY OF NATRIURETIC PEPTIDE: CPT | Performed by: EMERGENCY MEDICINE

## 2025-07-09 PROCEDURE — 93010 ELECTROCARDIOGRAM REPORT: CPT | Performed by: INTERNAL MEDICINE

## 2025-07-09 PROCEDURE — 94799 UNLISTED PULMONARY SVC/PX: CPT

## 2025-07-09 PROCEDURE — 84484 ASSAY OF TROPONIN QUANT: CPT | Performed by: EMERGENCY MEDICINE

## 2025-07-09 PROCEDURE — 71275 CT ANGIOGRAPHY CHEST: CPT

## 2025-07-09 PROCEDURE — 36415 COLL VENOUS BLD VENIPUNCTURE: CPT

## 2025-07-09 PROCEDURE — 85379 FIBRIN DEGRADATION QUANT: CPT | Performed by: EMERGENCY MEDICINE

## 2025-07-09 PROCEDURE — 99285 EMERGENCY DEPT VISIT HI MDM: CPT

## 2025-07-09 PROCEDURE — 25010000002 METOPROLOL TARTRATE 5 MG/5ML SOLUTION: Performed by: EMERGENCY MEDICINE

## 2025-07-09 PROCEDURE — 25510000001 IOPAMIDOL PER 1 ML: Performed by: EMERGENCY MEDICINE

## 2025-07-09 PROCEDURE — 93005 ELECTROCARDIOGRAM TRACING: CPT | Performed by: HOSPITALIST

## 2025-07-09 PROCEDURE — 85025 COMPLETE CBC W/AUTO DIFF WBC: CPT | Performed by: EMERGENCY MEDICINE

## 2025-07-09 PROCEDURE — 25010000002 ENOXAPARIN PER 10 MG

## 2025-07-09 PROCEDURE — 71045 X-RAY EXAM CHEST 1 VIEW: CPT

## 2025-07-09 PROCEDURE — 94660 CPAP INITIATION&MGMT: CPT

## 2025-07-09 PROCEDURE — 25010000002 FUROSEMIDE PER 20 MG: Performed by: EMERGENCY MEDICINE

## 2025-07-09 PROCEDURE — 80053 COMPREHEN METABOLIC PANEL: CPT | Performed by: EMERGENCY MEDICINE

## 2025-07-09 RX ORDER — BISACODYL 5 MG/1
5 TABLET, DELAYED RELEASE ORAL DAILY PRN
Status: DISCONTINUED | OUTPATIENT
Start: 2025-07-09 | End: 2025-07-17 | Stop reason: HOSPADM

## 2025-07-09 RX ORDER — NITROGLYCERIN 0.4 MG/1
0.4 TABLET SUBLINGUAL
Status: DISCONTINUED | OUTPATIENT
Start: 2025-07-09 | End: 2025-07-17 | Stop reason: HOSPADM

## 2025-07-09 RX ORDER — POLYETHYLENE GLYCOL 3350 17 G/17G
17 POWDER, FOR SOLUTION ORAL DAILY PRN
Status: DISCONTINUED | OUTPATIENT
Start: 2025-07-09 | End: 2025-07-17 | Stop reason: HOSPADM

## 2025-07-09 RX ORDER — FUROSEMIDE 10 MG/ML
20 INJECTION INTRAMUSCULAR; INTRAVENOUS ONCE
Status: COMPLETED | OUTPATIENT
Start: 2025-07-09 | End: 2025-07-09

## 2025-07-09 RX ORDER — BISACODYL 10 MG
10 SUPPOSITORY, RECTAL RECTAL DAILY PRN
Status: DISCONTINUED | OUTPATIENT
Start: 2025-07-09 | End: 2025-07-17 | Stop reason: HOSPADM

## 2025-07-09 RX ORDER — IPRATROPIUM BROMIDE AND ALBUTEROL SULFATE 2.5; .5 MG/3ML; MG/3ML
3 SOLUTION RESPIRATORY (INHALATION)
Status: DISCONTINUED | OUTPATIENT
Start: 2025-07-09 | End: 2025-07-09

## 2025-07-09 RX ORDER — ACETAMINOPHEN 325 MG/1
650 TABLET ORAL EVERY 4 HOURS PRN
Status: DISCONTINUED | OUTPATIENT
Start: 2025-07-09 | End: 2025-07-17 | Stop reason: HOSPADM

## 2025-07-09 RX ORDER — ACETAMINOPHEN 650 MG/1
650 SUPPOSITORY RECTAL EVERY 4 HOURS PRN
Status: DISCONTINUED | OUTPATIENT
Start: 2025-07-09 | End: 2025-07-17 | Stop reason: HOSPADM

## 2025-07-09 RX ORDER — SODIUM CHLORIDE 0.9 % (FLUSH) 0.9 %
10 SYRINGE (ML) INJECTION EVERY 12 HOURS SCHEDULED
Status: DISCONTINUED | OUTPATIENT
Start: 2025-07-09 | End: 2025-07-17 | Stop reason: HOSPADM

## 2025-07-09 RX ORDER — AMOXICILLIN 250 MG
2 CAPSULE ORAL 2 TIMES DAILY PRN
Status: DISCONTINUED | OUTPATIENT
Start: 2025-07-09 | End: 2025-07-17 | Stop reason: HOSPADM

## 2025-07-09 RX ORDER — SODIUM CHLORIDE 0.9 % (FLUSH) 0.9 %
10 SYRINGE (ML) INJECTION AS NEEDED
Status: DISCONTINUED | OUTPATIENT
Start: 2025-07-09 | End: 2025-07-17 | Stop reason: HOSPADM

## 2025-07-09 RX ORDER — ENOXAPARIN SODIUM 150 MG/ML
1 INJECTION SUBCUTANEOUS EVERY 12 HOURS
Status: DISCONTINUED | OUTPATIENT
Start: 2025-07-09 | End: 2025-07-11

## 2025-07-09 RX ORDER — ACETAMINOPHEN 160 MG/5ML
650 SOLUTION ORAL EVERY 4 HOURS PRN
Status: DISCONTINUED | OUTPATIENT
Start: 2025-07-09 | End: 2025-07-17 | Stop reason: HOSPADM

## 2025-07-09 RX ORDER — DILTIAZEM HCL/D5W 125 MG/125
5-15 PLASTIC BAG, INJECTION (ML) INTRAVENOUS
Status: DISCONTINUED | OUTPATIENT
Start: 2025-07-09 | End: 2025-07-10

## 2025-07-09 RX ORDER — IPRATROPIUM BROMIDE AND ALBUTEROL SULFATE 2.5; .5 MG/3ML; MG/3ML
3 SOLUTION RESPIRATORY (INHALATION) EVERY 6 HOURS PRN
Status: DISCONTINUED | OUTPATIENT
Start: 2025-07-09 | End: 2025-07-17 | Stop reason: HOSPADM

## 2025-07-09 RX ORDER — IOPAMIDOL 755 MG/ML
100 INJECTION, SOLUTION INTRAVASCULAR
Status: COMPLETED | OUTPATIENT
Start: 2025-07-09 | End: 2025-07-09

## 2025-07-09 RX ORDER — METOPROLOL TARTRATE 1 MG/ML
5 INJECTION, SOLUTION INTRAVENOUS ONCE
Status: COMPLETED | OUTPATIENT
Start: 2025-07-09 | End: 2025-07-09

## 2025-07-09 RX ORDER — SODIUM CHLORIDE 9 MG/ML
40 INJECTION, SOLUTION INTRAVENOUS AS NEEDED
Status: DISCONTINUED | OUTPATIENT
Start: 2025-07-09 | End: 2025-07-17 | Stop reason: HOSPADM

## 2025-07-09 RX ADMIN — FUROSEMIDE 20 MG: 10 INJECTION, SOLUTION INTRAMUSCULAR; INTRAVENOUS at 19:13

## 2025-07-09 RX ADMIN — Medication 10 ML: at 21:00

## 2025-07-09 RX ADMIN — ENOXAPARIN SODIUM 120 MG: 150 INJECTION SUBCUTANEOUS at 21:41

## 2025-07-09 RX ADMIN — IOPAMIDOL 100 ML: 755 INJECTION, SOLUTION INTRAVENOUS at 16:52

## 2025-07-09 RX ADMIN — METOROPROLOL TARTRATE 5 MG: 5 INJECTION, SOLUTION INTRAVENOUS at 15:05

## 2025-07-09 RX ADMIN — Medication 5 MG/HR: at 19:13

## 2025-07-09 NOTE — H&P
[facility] Medicine Services  History & Physical    Patient Name: Faby Le  : 1945  MRN: [id number]  Primary Care Physician:  Leonor Adam MD  Date of admission: 2025  Date and Time of Service: 2025 at 1735    Subjective      Chief Complaint: generalized weakness, shortness of breath, elevated heart rate    History of Present Illness: Faby Le is a 80 y.o. female with a CMH of paroxysmal atrial fibrillation on Xarelto, nonobstructive CAD, SSS with dual-chamber pacemaker, HFpEF, hypertension, hyperlipidemia, ROSA on CPAP, CKD stage III, hypothyroidism, gout, arthritis who presented to [facility] on 2025 with generalized weakness, shortness of breath, elevated heart rate.  Patient reported the generalized weakness started about a week ago.  Patient reported her blood pressure and heart rate have been high for the past several days to week as well on home readings.  Patient also endorses shortness of breath with exertion.  Patient denied any chest pain.  Patient reported she was in the hospital recently and an ablation was discussed with her cardiologist but not scheduled from that visit.  Per chart review, patient was admitted to [facility] from 2025 to 2025 for an elective total right knee arthroplasty revision.  During that admission, patient had hypotension, worsening renal function, and drop in hemoglobin which were all managed and patient was discharged to [facility]. Patient has a scheduled follow up with MD Herrera on 2025.    In the ED: patient initially with low blood pressure, improved with fluid bolus; afebrile. Patient not requiring supplemental oxygen. Pertinent labs include D-dimer 2.04, proBNP 4,837.0, HS trop 14 -> 14, Cr 1.55, Hb 8.9. EKG showed atrial fib/flutter with nonspecific ST changes, rate 140, QTC of 523 ms. CXR showed low lung volumes with vascular redistribution without overt pulmonary edema. CTA chest showed no  evidence of pulmonary embolism; no acute finding within limitation of respiratory motion artifacts.  Patient received lasix IV 20 mg. Hospitalist team to admit for further management.      Review of Systems   Constitutional:  Negative for chills and fever.   Respiratory:  Positive for shortness of breath.    Cardiovascular:  Positive for leg swelling. Negative for chest pain.   Gastrointestinal:  Negative for constipation, diarrhea, nausea and vomiting.   Genitourinary:  Negative for dysuria and hematuria.   Neurological:  Positive for weakness.       Personal History     Past Medical History:   Diagnosis Date    Ankle pain     Arthritis     Atrial fibrillation     Coronary artery disease     Depression     Disease of thyroid gland     Gout     Hyperglycemia     Hyperlipidemia     Hypertension     Low back pain     Sleep apnea     Vitamin D deficiency        Past Surgical History:   Procedure Laterality Date    ANKLE FUSION      2017-left    CARDIAC CATHETERIZATION      CARDIAC CATHETERIZATION Right 08/29/2022    Procedure: Left Heart Cath;  Surgeon: Ratna Zavala MD;  Location: [facility] CATH INVASIVE LOCATION;  Service: Cardiovascular;  Laterality: Right;    CARDIAC ELECTROPHYSIOLOGY PROCEDURE Left 07/03/2023    Procedure: Pacemaker DC new [place] Notified;  Surgeon: Cr Herrera MD;  Location: [facility] CATH INVASIVE LOCATION;  Service: Cardiovascular;  Laterality: Left;    CARDIAC ELECTROPHYSIOLOGY PROCEDURE Right 03/05/2024    Procedure: Ablation atrial fibrillation, [employer] Ibrah[employer] and Wayne Costa notified 02/09/24;  Surgeon: Cr Herrera MD;  Location: [facility] CATH INVASIVE LOCATION;  Service: Cardiovascular;  Laterality: Right;    CARDIAC ELECTROPHYSIOLOGY PROCEDURE N/A 03/05/2024    Procedure: Cardioversion;  Surgeon: Cr Herrera MD;  Location: [facility] CATH INVASIVE LOCATION;  Service: Cardiovascular;  Laterality: N/A;    CARPAL TUNNEL RELEASE Right     CATARACT EXTRACTION       CUBITAL TUNNEL RELEASE Right     ENDOSCOPY N/A 05/28/2024    Procedure: ESOPHAGOGASTRODUODENOSCOPY WITH COLD FORCEP BIOPSY X1 AREA;  Surgeon: Josh Loomis MD;  Location: [facility] ENDOSCOPY;  Service: Gastroenterology;  Laterality: N/A;  Post- ESOPHAGITIS, GASTRITIS, HIATAL HERNIA    HYSTERECTOMY      REPLACEMENT TOTAL KNEE      left 2008    ROTATOR CUFF REPAIR Right     TOTAL KNEE ARTHROPLASTY Right 05/04/2023    Procedure: TOTAL KNEE ARTHROPLASTY WITH CORI ROBOT;  Surgeon: Chino Herrera II, MD;  Location: [facility] MAIN OR;  Service: Robotics - Ortho;  Laterality: Right;    TOTAL KNEE ARTHROPLASTY REVISION Right 6/5/2025    Procedure: TOTAL KNEE ARTHROPLASTY REVISION WITH CORI ROBOT;  Surgeon: Chino Herrera II, MD;  Location: [facility] MAIN OR;  Service: Robotics - Ortho;  Laterality: Right;       Family History: family history includes Arthritis in her brother; Cancer in her brother; Hypertension in her mother; Stroke in her father. Otherwise pertinent FHx was reviewed and not pertinent to current issue.    Social History:  reports that she has never smoked. She has never been exposed to tobacco smoke. She has never used smokeless tobacco. She reports that she does not drink alcohol and does not use drugs.    Home Medications:  Prior to Admission Medications       Prescriptions Last Dose Informant Patient Reported? Taking?    acetaminophen (TYLENOL) 325 MG tablet   Yes No    Take 2 tablets by mouth Every 6 (Six) Hours As Needed for Mild Pain.    allopurinol (ZYLOPRIM) 300 MG tablet   No No    Take 1 tablet by mouth once daily    atorvastatin (LIPITOR) 20 MG tablet   No No    Take 1 tablet by mouth once daily    azelastine (ASTELIN) 0.1 % nasal spray   Yes No    Administer 2 sprays into the nostril(s) as directed by provider Daily. Use in each nostril as directed    cetirizine (zyrTEC) 10 MG tablet  Self Yes No    Take 1 tablet by mouth Daily As Needed for Allergies.    cyanocobalamin  (VITAMIN B-12) 1000 MCG tablet  Self Yes No    Take 1 tablet by mouth Daily.    docusate sodium (COLACE) 100 MG capsule  Self Yes No    Take 1 capsule by mouth 2 (Two) Times a Day.    dofetilide (Tikosyn) 250 MCG capsule   No No    Take 1 capsule by mouth Every 12 (Twelve) Hours.    ferrous sulfate 325 (65 FE) MG tablet  Self Yes No    Take 1 tablet by mouth Daily With Breakfast.    levothyroxine (SYNTHROID, LEVOTHROID) 88 MCG tablet   No No    Take 1 tablet by mouth once daily    metoprolol succinate XL (TOPROL-XL) 25 MG 24 hr tablet   No No    Take 1 tablet by mouth Daily for 360 days.    midodrine (PROAMATINE) 5 MG tablet   No No    TAKE 1 TABLET BY MOUTH TWICE DAILY BEFORE MEAL(S)    montelukast (SINGULAIR) 10 MG tablet   No No    Take 1 tablet by mouth Every Night.    Mounjaro 2.5 MG/0.5ML solution auto-injector   Yes No    Inject 5 mg into the appropriate muscle as directed by prescriber 1 (One) Time Per Week.    multivitamin (MULTI VITAMIN PO)   No No    Take 1 tablet by mouth Daily.    multivitamin with minerals tablet tablet   Yes No    Take 1 tablet by mouth Daily.    mupirocin (BACTROBAN) 2 % ointment   Yes No    APPLY SMALL AMOUNT OF OINTMENT TOPICALLY TO AFFECTED AREA TWICE DAILY    ondansetron (Zofran) 4 MG tablet   No No    Take 1 tablet by mouth Every 6 (Six) Hours As Needed for Nausea or Vomiting.    oxyCODONE-acetaminophen (PERCOCET) 5-325 MG per tablet   No No    Take 1 tablet by mouth Every 4 (Four) Hours As Needed for Severe Pain.    pantoprazole (PROTONIX) 40 MG EC tablet   Yes No    Take 1 tablet by mouth Every Morning.    rivaroxaban (XARELTO) 15 MG tablet   No No    Take 1 tablet by mouth Daily With Dinner. Indications: Atrial Fibrillation    triamcinolone (KENALOG) 0.1 % cream   Yes No    APPLY CREAM EXTERNALLY TWICE DAILY TO ECZEMA FOR UP TO 2 WEEKS PER MONTH AS NEEDED    Vitamin D, Cholecalciferol, 25 MCG (1000 UT) tablet  Self Yes No    Take 1 tablet by mouth Daily.               Allergies:  Allergies   Allergen Reactions    Celecoxib Itching and Swelling     swelling       Objective      Vitals:   Temp:  [97.6 °F (36.4 °C)] 97.6 °F (36.4 °C)  Heart Rate:  [] 85  Resp:  [20] 20  BP: (106-151)/(53-89) 138/87  Body mass index is 45.17 kg/m².  Physical Exam  Constitutional:       Appearance: She is obese.   HENT:      Head: Normocephalic and atraumatic.      Nose: Nose normal.      Mouth/Throat:      Mouth: Mucous membranes are moist.      Pharynx: Oropharynx is clear.   Eyes:      Extraocular Movements: Extraocular movements intact.      Conjunctiva/sclera: Conjunctivae normal.      Pupils: Pupils are equal, round, and reactive to light.   Cardiovascular:      Rate and Rhythm: Tachycardia present. Rhythm irregular.      Pulses: Normal pulses.      Heart sounds: Normal heart sounds.   Pulmonary:      Effort: Pulmonary effort is normal.      Breath sounds: Normal breath sounds.   Abdominal:      General: Bowel sounds are normal.      Palpations: Abdomen is soft.   Musculoskeletal:      Right lower le+ Pitting Edema present.      Left lower le+ Pitting Edema present.   Skin:     General: Skin is warm and dry.   Neurological:      General: No focal deficit present.      Mental Status: She is alert and oriented to person, place, and time.   Psychiatric:         Mood and Affect: Mood normal.         Behavior: Behavior normal.         Thought Content: Thought content normal.         Judgment: Judgment normal.         Diagnostic Data:  Lab Results (last 24 hours)       Procedure Component Value Units Date/Time    High Sensitivity Troponin T 1Hr [[id number]]  (Abnormal) Collected: 25 1605    Specimen: Blood from Arm, Right Updated: 25 1630     HS Troponin T 14 ng/L      Troponin T Numeric Delta 0 ng/L      Troponin T % Delta 0    Narrative:      High Sensitive Troponin T Reference Range:  <14.0 ng/L- Negative Female for AMI  <22.0 ng/L- Negative Male for AMI  >=14 -  Abnormal Female indicating possible myocardial injury.  >=22 - Abnormal Male indicating possible myocardial injury.   Clinicians would have to utilize clinical acumen, EKG, Troponin, and serial changes to determine if it is an Acute Myocardial Infarction or myocardial injury due to an underlying chronic condition.         Comprehensive Metabolic Panel [[id number]]  (Abnormal) Collected: 07/09/25 1504    Specimen: Blood from Arm, Right Updated: 07/09/25 1535     Glucose 111 mg/dL      BUN 20.1 mg/dL      Creatinine 1.55 mg/dL      Sodium 138 mmol/L      Potassium 4.2 mmol/L      Chloride 104 mmol/L      CO2 21.6 mmol/L      Calcium 9.8 mg/dL      Total Protein 6.3 g/dL      Albumin 4.0 g/dL      ALT (SGPT) 13 U/L      AST (SGOT) 20 U/L      Alkaline Phosphatase 121 U/L      Total Bilirubin 0.4 mg/dL      Globulin 2.3 gm/dL      A/G Ratio 1.7 g/dL      BUN/Creatinine Ratio 13.0     Anion Gap 12.4 mmol/L      eGFR 34.2 mL/min/1.73     Narrative:      GFR Categories in Chronic Kidney Disease (CKD)              GFR Category          GFR (mL/min/1.73)    Interpretation  G1                    90 or greater        Normal or high (1)  G2                    60-89                Mild decrease (1)  G3a                   45-59                Mild to moderate decrease  G3b                   30-44                Moderate to severe decrease  G4                    15-29                Severe decrease  G5                    14 or less           Kidney failure    (1)In the absence of evidence of kidney disease, neither GFR category G1 or G2 fulfill the criteria for CKD.    eGFR calculation 2021 CKD-EPI creatinine equation, which does not include race as a factor    High Sensitivity Troponin T [[id number]]  (Abnormal) Collected: 07/09/25 1504    Specimen: Blood from Arm, Right Updated: 07/09/25 1535     HS Troponin T 14 ng/L     Narrative:      High Sensitive Troponin T Reference Range:  <14.0 ng/L- Negative Female for AMI  <22.0 ng/L-  Negative Male for AMI  >=14 - Abnormal Female indicating possible myocardial injury.  >=22 - Abnormal Male indicating possible myocardial injury.   Clinicians would have to utilize clinical acumen, EKG, Troponin, and serial changes to determine if it is an Acute Myocardial Infarction or myocardial injury due to an underlying chronic condition.         BNP [[id number]]  (Abnormal) Collected: 07/09/25 1504    Specimen: Blood from Arm, Right Updated: 07/09/25 1535     proBNP 4,837.0 pg/mL     Narrative:      This assay is used as an aid in the diagnosis of individuals suspected of having heart failure. It can be used as an aid in the diagnosis of acute decompensated heart failure (ADHF) in patients presenting with signs and symptoms of ADHF to the emergency department (ED). In addition, NT-proBNP of <300 pg/mL indicates ADHF is not likely.    Age Range Result Interpretation  NT-proBNP Concentration (pg/mL:      <50             Positive            >450                   Gray                 300-450                    Negative             <300    50-75           Positive            >900                  Gray                300-900                  Negative            <300      >75             Positive            >1800                  Gray                300-1800                  Negative            <300    D-dimer, Quantitative [[id number]]  (Abnormal) Collected: 07/09/25 1504    Specimen: Blood from Arm, Right Updated: 07/09/25 1525     D-Dimer, Quantitative 2.04 MCGFEU/mL     Narrative:      According to the assay 's published package insert, a normal (<0.50 MCGFEU/mL) D-dimer result in conjunction with a non-high clinical probability assessment, excludes deep vein thrombosis (DVT) and pulmonary embolism (PE) with high sensitivity.    D-dimer values increase with age and this can make VTE exclusion of an older population difficult. To address this, the [employer], based on best available  "evidence and recent guidelines, recommends that clinicians use age-adjusted D-dimer thresholds in patients greater than 50 years of age with: a) a low probability of PE who do not meet all Pulmonary Embolism Rule Out Criteria, or b) in those with intermediate probability of PE.   The formula for an age-adjusted D-dimer cut-off is \"age/100\".  For example, a 60 year old patient would have an age-adjusted cut-off of 0.60 MCGFEU/mL and an 80 year old 0.80 MCGFEU/mL.    Extra Tubes [[id number]] Collected: 07/09/25 1504    Specimen: Blood, Venous Line Updated: 07/09/25 1515    Narrative:      The following orders were created for panel order Extra Tubes.  Procedure                               Abnormality         Status                     ---------                               -----------         ------                     Gold Top - SST[[id number]]                                   Final result                 Please view results for these tests on the individual orders.    Gold Top - SST [[id number]] Collected: 07/09/25 1504    Specimen: Blood Updated: 07/09/25 1515     Extra Tube Hold for add-ons.     Comment: Auto resulted.       CBC & Differential [[id number]]  (Abnormal) Collected: 07/09/25 1504    Specimen: Blood from Arm, Right Updated: 07/09/25 1514    Narrative:      The following orders were created for panel order CBC & Differential.  Procedure                               Abnormality         Status                     ---------                               -----------         ------                     CBC Auto Differential[[id number]]        Abnormal            Final result                 Please view results for these tests on the individual orders.    CBC Auto Differential [[id number]]  (Abnormal) Collected: 07/09/25 1504    Specimen: Blood from Arm, Right Updated: 07/09/25 1514     WBC 5.54 10*3/mm3      RBC 2.73 10*6/mm3      Hemoglobin 8.9 g/dL      Hematocrit 28.6 %      .8 fL      MCH 32.6 pg  "     MCHC 31.1 g/dL      RDW 14.6 %      RDW-SD 54.4 fl      MPV 9.5 fL      Platelets 202 10*3/mm3      Neutrophil % 61.7 %      Lymphocyte % 26.2 %      Monocyte % 9.0 %      Eosinophil % 1.8 %      Basophil % 1.1 %      Immature Grans % 0.2 %      Neutrophils, Absolute 3.42 10*3/mm3      Lymphocytes, Absolute 1.45 10*3/mm3      Monocytes, Absolute 0.50 10*3/mm3      Eosinophils, Absolute 0.10 10*3/mm3      Basophils, Absolute 0.06 10*3/mm3      Immature Grans, Absolute 0.01 10*3/mm3      nRBC 0.0 /100 WBC              Imaging Results (Last 24 Hours)       Procedure Component Value Units Date/Time    CT Angiogram Chest Pulmonary Embolism [[id number]] Collected: 07/09/25 1653     Updated: 07/09/25 1701    Narrative:      CT ANGIOGRAM CHEST PULMONARY EMBOLISM    Date of Exam: 7/9/2025 4:00 PM EDT    Indication: Pulmonary Embolism.    Comparison: Chest radiograph 7/9/2025. Chest CT [address]95.    Technique: Axial CT images were obtained of the chest after the uneventful intravenous administration of iodinated contrast utilizing pulmonary embolism protocol.  In addition, a 3-D volume rendered image was created for interpretation.  Sagittal and   coronal reconstructions were performed.  Automated exposure control and iterative reconstruction methods were used.      Findings:    Thyroid and thoracic inlet: No significant abnormality.    Lymph nodes: No pathologic appearing lymph nodes by imaging criteria. Calcified mediastinal and hilar nodes, likely sequelae of prior granulomatous disease.    Cardiovascular: Cardiomegaly. No pericardial effusion. Aorta and main pulmonary artery diameters are within normal range.. There are coronary artery calcifications. Aortic atherosclerosis. Left chest wall pacemaker with leads terminating in the right   atrium and right ventricle. No evidence of pulmonary embolism to the subsegmental level. Degraded evaluation of the subsegmental pulmonary arteries secondary to respiratory motion  artifacts.    Esophagus: No significant abnormality.    Lung parenchyma: Expiratory phase imaging with associated respiratory motion artifacts and scattered atelectasis. Within this limitation: No suspicious appearing opacities are seen.    Airways: Patent trachea and mainstem bronchi.    Pleura: No pleural effusion or pneumothorax.    Chest wall and osseous structures: Degenerative changes of the imaged spine. No acute osseous abnormality.    Included abdomen: Cholelithiasis. Renal cysts. Bilateral renal cortical atrophy.      Impression:      Impression:  No evidence of pulmonary embolism to the subsegmental level. Respiratory motion artifacts limits evaluation of the subsegmental pulmonary arteries.    No acute intrathoracic finding within limitation of respiratory motion artifacts.        Electronically Signed: Pablo Ceja MD    7/9/2025 4:59 PM EDT    Workstation ID: [id number]    XR Chest 1 View [[id number]] Collected: 07/09/25 1535     Updated: 07/09/25 1538    Narrative:      XR CHEST 1 VW    Date of Exam: 7/9/2025 3:15 PM EDT    Indication: sob    Comparison: 5/28/2025 radiographs    Findings:  Left chest wall cardiac device in unchanged position. Unchanged cardiomediastinal silhouette. Low lung volumes. Vascular redistribution without overt pulmonary edema. No pleural effusion or pneumothorax. No acute bone abnormality.      Impression:      Impression:  Low lung volumes with vascular redistribution without overt pulmonary edema.        Electronically Signed: Cabrera Valenzuela MD    7/9/2025 3:36 PM EDT    Workstation ID: [id number]              Assessment & Plan        This is a 80 y.o. female with:    Active and Resolved Problems  Active Hospital Problems    Diagnosis  POA    **Atrial fibrillation with RVR [I48.91]  Yes      Resolved Hospital Problems   No resolved problems to display.       AFIB with RVR  Acute decompensated HFpEF   Elevated troponin, proBNP  Hypotension with history of hypertension  SSS  status post pacemaker  CAD, nonobstructive  Hyperlipidemia  - BP soft, did receive fluid bolus and lasix IV in ED; hold metoprolol for now  - ECHO on 5/16/25: LVEF 56-60%, LVDF consistent with (grade III w/high LAP) reversible restrictive pattern  - CTA chest: negative for PE, no acute findings with limitation of respiratory motion artifacts  - Will start diltiazem drip for HR > 120  - Hold Xarelto for possible ablation, will start lovenox BID  - Will defer further diuretics to cardiology due to hypotension  - PRN duonebs  - Titrate O2 to keep O2 sat>92%  - Replace electrolytes per protocol  - Low Na diet, fluid restriction <2L/day; NPO at MN for potential procedure  - Continuous cardiac monitor, pulse ox  - Strict I/Os, daily weight  - Heart failure pathway initiated  - Cardiology consulted, appreciate input  - EP cardiology consult if condition warrants    MARIAM on CKD stage III  - Cr 1.55 on admission; baseline 1.17 on 6/10/25  - Monitor and trend labs  - Strict I/Os, daily weight  - Consider Nephrology consult if creatinine fails to improve     Hypothyroidism  - Continue home dose of levothyroxine  - TSH: 2.780 on 5/28/2025     ROSA on CPAP  - CPAP ordered for HS, PRN     Gout  - Continue allopurinol    Home medications for chronic conditions will be restarted as appropriate when verified by pharmacy.     VTE Prophylaxis:  No VTE prophylaxis order currently exists.        The patient desires to be as follows:    CODE STATUS:    Code Status (Patient has no pulse and is not breathing): CPR (Attempt to Resuscitate)  Medical Interventions (Patient has pulse or is breathing): Full Support        Lyudmila Le, daughter, who can be contacted at [phone], is the designated person to make medical decisions on the patient's behalf if She is incapable of doing so. This was clarified with patient and/or next of kin on 7/9/2025 during the course of this H&P.    Admission Status:  I believe this patient meets inpatient  status.    Expected Length of Stay: Greater than 2 midnights    PDMP and Medication Dispenses via Sidebar reviewed and consistent with patient reported medications.    I discussed the patient's findings and my recommendations with patient.      Signature:     This document has been electronically signed by CAROL Velez on July 9, 2025 17:52 EDT   [facility] Hospitalist Team

## 2025-07-09 NOTE — Clinical Note
Hemostasis started on the right femoral vein. Figure 8 suturing was used in achieving hemostasis. Closure device deployed in the vessel. Hemostasis achieved successfully. CHEST DISCOMFORT/CHEST PAIN/DIZZINESS/NAUSEA/PALPITATIONS/SYNCOPE

## 2025-07-09 NOTE — Clinical Note
A 18 fr sheath was successfully inserted with ultrasound guidance into the right femoral vein. Sheath insertion not delayed.

## 2025-07-09 NOTE — ED PROVIDER NOTES
Subjective   History of Present Illness  Patient is a female with a history of atrial fibrillation, pacemaker placement, and recent knee replacement surgery (one month ago), who presents with complaints of persistent hypotension and tachycardia. She reports that since last Friday, she has experienced episodes of dizziness and weakness, with her blood pressure consistently measuring below 100 systolic and as low as 80s/40s, sometimes requiring manual measurement due to monitor limitations. She notes that her heart rate was recorded at 150 bpm during physical therapy today, prompting her transfer to the emergency department. She denies chest painor worsening leg swelling, though she reports mild swelling in her leg post-knee replacement, which improves with elevation. She has a history of atrial fibrillation with prior ablation and is currently on Xarelto.   Review of Systems  See HPI.  Past Medical History:   Diagnosis Date    Ankle pain     Arthritis     Atrial fibrillation     Coronary artery disease     Depression     Disease of thyroid gland     Gout     Hyperglycemia     Hyperlipidemia     Hypertension     Low back pain     Sleep apnea     Vitamin D deficiency        Allergies   Allergen Reactions    Celecoxib Itching and Swelling     swelling       Past Surgical History:   Procedure Laterality Date    ANKLE FUSION      2017-left    CARDIAC CATHETERIZATION      CARDIAC CATHETERIZATION Right 08/29/2022    Procedure: Left Heart Cath;  Surgeon: Ratna Zavala MD;  Location: [facility] CATH INVASIVE LOCATION;  Service: Cardiovascular;  Laterality: Right;    CARDIAC ELECTROPHYSIOLOGY PROCEDURE Left 07/03/2023    Procedure: Pacemaker DC new [place] Notified;  Surgeon: Cr Herrera MD;  Location: [facility] CATH INVASIVE LOCATION;  Service: Cardiovascular;  Laterality: Left;    CARDIAC ELECTROPHYSIOLOGY PROCEDURE Right 03/05/2024    Procedure: Ablation atrial fibrillation, Cryo Vickie Costa  notified 02/09/24;  Surgeon: Cr Herrera MD;  Location: [facility] CATH INVASIVE LOCATION;  Service: Cardiovascular;  Laterality: Right;    CARDIAC ELECTROPHYSIOLOGY PROCEDURE N/A 03/05/2024    Procedure: Cardioversion;  Surgeon: Cr Herrera MD;  Location: [facility] CATH INVASIVE LOCATION;  Service: Cardiovascular;  Laterality: N/A;    CARPAL TUNNEL RELEASE Right     CATARACT EXTRACTION      CUBITAL TUNNEL RELEASE Right     ENDOSCOPY N/A 05/28/2024    Procedure: ESOPHAGOGASTRODUODENOSCOPY WITH COLD FORCEP BIOPSY X1 AREA;  Surgeon: Josh Loomis MD;  Location: [facility] ENDOSCOPY;  Service: Gastroenterology;  Laterality: N/A;  Post- ESOPHAGITIS, GASTRITIS, HIATAL HERNIA    HYSTERECTOMY      REPLACEMENT TOTAL KNEE      left 2008    ROTATOR CUFF REPAIR Right     TOTAL KNEE ARTHROPLASTY Right 05/04/2023    Procedure: TOTAL KNEE ARTHROPLASTY WITH CORI ROBOT;  Surgeon: Chino Herrera II, MD;  Location: [facility] MAIN OR;  Service: Robotics - Ortho;  Laterality: Right;    TOTAL KNEE ARTHROPLASTY REVISION Right 6/5/2025    Procedure: TOTAL KNEE ARTHROPLASTY REVISION WITH CORI ROBOT;  Surgeon: Chino Herrera II, MD;  Location: [facility] MAIN OR;  Service: Robotics - Ortho;  Laterality: Right;       Family History   Problem Relation Age of Onset    Hypertension Mother     Stroke Father     Arthritis Brother     Cancer Brother        Social History     Socioeconomic History    Marital status:    Tobacco Use    Smoking status: Never     Passive exposure: Never    Smokeless tobacco: Never   Vaping Use    Vaping status: Never Used   Substance and Sexual Activity    Alcohol use: No    Drug use: No    Sexual activity: Defer           Objective   Physical Exam  Constitutional:  No acute distress.  Head:  Atraumatic.  Normocephalic.   Eyes:  No scleral icterus. Normal conjunctivae  ENT:  Moist mucosa.  No nasal discharge present.  Cardiovascular:  Well perfused.  Equal pulses.  Irregularly  "irregular rhythm and tachycardic rate.  Normal capillary refill.    Pulmonary/Chest:  No respiratory distress.  Airway patent.  No tachypnea.  No accessory muscle usage.  Mildly diminished breath sounds bilaterally.  Abdominal:  Nondistended. Nontender.   Extremities: Mild peripheral edema.  No Deformity  Skin:  Warm, dry  Neurological:  Alert, awake, and appropriate.  Normal speech.      Procedures           ED Course      BP 93/57   Pulse 73   Temp 97.5 °F (36.4 °C) (Oral)   Resp 17   Ht 160 cm (63\")   Wt 116 kg (255 lb)   LMP  (LMP Unknown)   SpO2 99%   BMI 45.17 kg/m²   Labs Reviewed   COMPREHENSIVE METABOLIC PANEL - Abnormal; Notable for the following components:       Result Value    Glucose 111 (*)     Creatinine 1.55 (*)     CO2 21.6 (*)     Alkaline Phosphatase 121 (*)     eGFR 34.2 (*)     All other components within normal limits    Narrative:     GFR Categories in Chronic Kidney Disease (CKD)              GFR Category          GFR (mL/min/1.73)    Interpretation  G1                    90 or greater        Normal or high (1)  G2                    60-89                Mild decrease (1)  G3a                   45-59                Mild to moderate decrease  G3b                   30-44                Moderate to severe decrease  G4                    15-29                Severe decrease  G5                    14 or less           Kidney failure    (1)In the absence of evidence of kidney disease, neither GFR category G1 or G2 fulfill the criteria for CKD.    eGFR calculation 2021 CKD-EPI creatinine equation, which does not include race as a factor   TROPONIN - Abnormal; Notable for the following components:    HS Troponin T 14 (*)     All other components within normal limits    Narrative:     High Sensitive Troponin T Reference Range:  <14.0 ng/L- Negative Female for AMI  <22.0 ng/L- Negative Male for AMI  >=14 - Abnormal Female indicating possible myocardial injury.  >=22 - Abnormal Male indicating " "possible myocardial injury.   Clinicians would have to utilize clinical acumen, EKG, Troponin, and serial changes to determine if it is an Acute Myocardial Infarction or myocardial injury due to an underlying chronic condition.        D-DIMER, QUANTITATIVE - Abnormal; Notable for the following components:    D-Dimer, Quantitative 2.04 (*)     All other components within normal limits    Narrative:     According to the assay 's published package insert, a normal (<0.50 MCGFEU/mL) D-dimer result in conjunction with a non-high clinical probability assessment, excludes deep vein thrombosis (DVT) and pulmonary embolism (PE) with high sensitivity.    D-dimer values increase with age and this can make VTE exclusion of an older population difficult. To address this, the [employer], based on best available evidence and recent guidelines, recommends that clinicians use age-adjusted D-dimer thresholds in patients greater than 50 years of age with: a) a low probability of PE who do not meet all Pulmonary Embolism Rule Out Criteria, or b) in those with intermediate probability of PE.   The formula for an age-adjusted D-dimer cut-off is \"age/100\".  For example, a 60 year old patient would have an age-adjusted cut-off of 0.60 MCGFEU/mL and an 80 year old 0.80 MCGFEU/mL.   BNP (IN-HOUSE) - Abnormal; Notable for the following components:    proBNP 4,837.0 (*)     All other components within normal limits    Narrative:     This assay is used as an aid in the diagnosis of individuals suspected of having heart failure. It can be used as an aid in the diagnosis of acute decompensated heart failure (ADHF) in patients presenting with signs and symptoms of ADHF to the emergency department (ED). In addition, NT-proBNP of <300 pg/mL indicates ADHF is not likely.    Age Range Result Interpretation  NT-proBNP Concentration (pg/mL:      <50             Positive            >450                   [place]               "   300-450                    Negative             <300    50-75           Positive            >900                  [place]                300-900                  Negative            <300      >75             Positive            >1800                  Gray                300-1800                  Negative            <300   CBC WITH AUTO DIFFERENTIAL - Abnormal; Notable for the following components:    RBC 2.73 (*)     Hemoglobin 8.9 (*)     Hematocrit 28.6 (*)     .8 (*)     MCHC 31.1 (*)     RDW-SD 54.4 (*)     All other components within normal limits   HIGH SENSITIVITIY TROPONIN T 1HR - Abnormal; Notable for the following components:    HS Troponin T 14 (*)     All other components within normal limits    Narrative:     High Sensitive Troponin T Reference Range:  <14.0 ng/L- Negative Female for AMI  <22.0 ng/L- Negative Male for AMI  >=14 - Abnormal Female indicating possible myocardial injury.  >=22 - Abnormal Male indicating possible myocardial injury.   Clinicians would have to utilize clinical acumen, EKG, Troponin, and serial changes to determine if it is an Acute Myocardial Infarction or myocardial injury due to an underlying chronic condition.        BASIC METABOLIC PANEL   MAGNESIUM   PHOSPHORUS   CBC WITH AUTO DIFFERENTIAL   CBC AND DIFFERENTIAL    Narrative:     The following orders were created for panel order CBC & Differential.  Procedure                               Abnormality         Status                     ---------                               -----------         ------                     CBC Auto Differential[[id number]]        Abnormal            Final result                 Please view results for these tests on the individual orders.   EXTRA TUBES    Narrative:     The following orders were created for panel order Extra Tubes.  Procedure                               Abnormality         Status                     ---------                               -----------         ------                      Gold Top - SST[[id number]]                                   Final result                 Please view results for these tests on the individual orders.   GOLD TOP - SST   CBC AND DIFFERENTIAL    Narrative:     The following orders were created for panel order CBC & Differential.  Procedure                               Abnormality         Status                     ---------                               -----------         ------                     CBC Auto Differential[[id number]]                                                         Please view results for these tests on the individual orders.     Medications   sodium chloride 0.9 % flush 10 mL (has no administration in time range)   sodium chloride 0.9 % flush 10 mL (10 mL Intravenous Given 7/9/25 2100)   sodium chloride 0.9 % infusion 40 mL (has no administration in time range)   nitroglycerin (NITROSTAT) SL tablet 0.4 mg (has no administration in time range)   Potassium Replacement - Follow Nurse / BPA Driven Protocol (has no administration in time range)   Magnesium Standard Dose Replacement - Follow Nurse / BPA Driven Protocol (has no administration in time range)   Phosphorus Replacement - Follow Nurse / BPA Driven Protocol (has no administration in time range)   Calcium Replacement - Follow Nurse / BPA Driven Protocol (has no administration in time range)   acetaminophen (TYLENOL) tablet 650 mg (has no administration in time range)     Or   acetaminophen (TYLENOL) 160 MG/5ML oral solution 650 mg (has no administration in time range)     Or   acetaminophen (TYLENOL) suppository 650 mg (has no administration in time range)   sennosides-docusate (PERICOLACE) 8.6-50 MG per tablet 2 tablet (has no administration in time range)     And   polyethylene glycol (MIRALAX) packet 17 g (has no administration in time range)     And   bisacodyl (DULCOLAX) EC tablet 5 mg (has no administration in time range)     And   bisacodyl (DULCOLAX) suppository 10  mg (has no administration in time range)   melatonin tablet 5 mg (has no administration in time range)   ipratropium-albuterol (DUO-NEB) nebulizer solution 3 mL (has no administration in time range)   dilTIAZem (CARDIZEM) 125 mg in 125 mL D5W infusion (0 mg/hr Intravenous Hold 7/9/25 2228)   enoxaparin sodium (LOVENOX) syringe 120 mg (120 mg Subcutaneous Given 7/9/25 2141)   metoprolol tartrate (LOPRESSOR) injection 5 mg (5 mg Intravenous Given 7/9/25 1505)   iopamidol (ISOVUE-370) 76 % injection 100 mL (100 mL Intravenous Given 7/9/25 1652)   furosemide (LASIX) injection 20 mg (20 mg Intravenous Given 7/9/25 1913)     CT Angiogram Chest Pulmonary Embolism   Final Result   Impression:   No evidence of pulmonary embolism to the subsegmental level. Respiratory motion artifacts limits evaluation of the subsegmental pulmonary arteries.      No acute intrathoracic finding within limitation of respiratory motion artifacts.            Electronically Signed: Pablo Ceja MD     7/9/2025 4:59 PM EDT     Workstation ID: [id number]      XR Chest 1 View   Final Result   Impression:   Low lung volumes with vascular redistribution without overt pulmonary edema.            Electronically Signed: Cabrera Valenzuela MD     7/9/2025 3:36 PM EDT     Workstation ID: [id number]                                                         Medical Decision Making  Problems Addressed:  Atrial fibrillation with RVR: complicated acute illness or injury  Hypervolemia, unspecified hypervolemia type: complicated acute illness or injury    Amount and/or Complexity of Data Reviewed  Labs: ordered.  Radiology: ordered.  ECG/medicine tests: ordered.    Risk  Prescription drug management.  Decision regarding hospitalization.    EKG interpretation: 2:52 PM, rate 140, atrial fibrillation versus atrial flutter, nonspecific ST changes, normal MO interval.    My interpretation of CTA chest is negative for pulmonary embolism but somewhat concerning for pulmonary  edema.    Troponin flat but patient with significantly elevated proBNP.  Renal function close to baseline.  Given 1 dose of metoprolol here and heart rate improved significantly but whenever patient gets up at all heart rate back to 130s..  With elevated BNP and concern for edema on CTA of chest given 1 small dose of Lasix here.  Given some borderline blood pressures here going slow with diuresis.  Admitted to hospitalist service for further treatment of atrial fibrillation with rapid ventricular response.    Final diagnoses:   Atrial fibrillation with RVR   Hypervolemia, unspecified hypervolemia type       ED Disposition  ED Disposition       ED Disposition   Decision to Admit    Condition   --    Comment   Level of Care: Progressive Care [20]   Admitting Physician: MARIAH REESE [[id number]]   Patient Class: Inpatient [101]   Bed Request Comments: upgrade to PCU - starting diltiazem drip                 No follow-up provider specified.       Medication List      No changes were made to your prescriptions during this visit.            Vern Gandhi MD  07/09/25 0906

## 2025-07-09 NOTE — Clinical Note
Level of Care: Telemetry [5]   Admitting Physician: MARIAH REESE [[id number]]   Attending Physician: MARIAH REESE [[id number]]

## 2025-07-09 NOTE — ED NOTES
Pt c/o generalized weakness. Pt states that her weakness started about a week ago.  Pt states that her blood pressure was high and her heart rate was high at home whenever she would take it.  Pt states that she does get short of breath whenever she is walking.  Pt denies chest pain.

## 2025-07-10 LAB
ANION GAP SERPL CALCULATED.3IONS-SCNC: 9.9 MMOL/L (ref 5–15)
BASOPHILS # BLD MANUAL: 0.05 10*3/MM3 (ref 0–0.2)
BASOPHILS NFR BLD MANUAL: 1 % (ref 0–1.5)
BUN SERPL-MCNC: 18.5 MG/DL (ref 8–23)
BUN/CREAT SERPL: 13.1 (ref 7–25)
CALCIUM SPEC-SCNC: 9.7 MG/DL (ref 8.6–10.5)
CHLORIDE SERPL-SCNC: 106 MMOL/L (ref 98–107)
CO2 SERPL-SCNC: 24.1 MMOL/L (ref 22–29)
CREAT SERPL-MCNC: 1.41 MG/DL (ref 0.57–1)
DEPRECATED RDW RBC AUTO: 54.2 FL (ref 37–54)
DEVICE COMMENT: NORMAL
EGFRCR SERPLBLD CKD-EPI 2021: 38.3 ML/MIN/1.73
EOSINOPHIL # BLD MANUAL: 0.09 10*3/MM3 (ref 0–0.4)
EOSINOPHIL NFR BLD MANUAL: 2 % (ref 0.3–6.2)
ERYTHROCYTE [DISTWIDTH] IN BLOOD BY AUTOMATED COUNT: 14.7 % (ref 12.3–15.4)
GLUCOSE BLDC GLUCOMTR-MCNC: 102 MG/DL (ref 70–105)
GLUCOSE BLDC GLUCOMTR-MCNC: 103 MG/DL (ref 70–105)
GLUCOSE BLDC GLUCOMTR-MCNC: 95 MG/DL (ref 70–105)
GLUCOSE BLDC GLUCOMTR-MCNC: 99 MG/DL (ref 70–105)
GLUCOSE SERPL-MCNC: 87 MG/DL (ref 65–99)
HCT VFR BLD AUTO: 27.6 % (ref 34–46.6)
HGB BLD-MCNC: 8.9 G/DL (ref 12–15.9)
LYMPHOCYTES # BLD MANUAL: 1.78 10*3/MM3 (ref 0.7–3.1)
LYMPHOCYTES NFR BLD MANUAL: 5 % (ref 5–12)
MAGNESIUM SERPL-MCNC: 1.8 MG/DL (ref 1.6–2.4)
MCH RBC QN AUTO: 33.1 PG (ref 26.6–33)
MCHC RBC AUTO-ENTMCNC: 32.2 G/DL (ref 31.5–35.7)
MCV RBC AUTO: 102.6 FL (ref 79–97)
MONOCYTES # BLD: 0.23 10*3/MM3 (ref 0.1–0.9)
NEUTROPHILS # BLD AUTO: 2.42 10*3/MM3 (ref 1.7–7)
NEUTROPHILS NFR BLD MANUAL: 53 % (ref 42.7–76)
OVALOCYTES BLD QL SMEAR: NORMAL
PHOSPHATE SERPL-MCNC: 3.5 MG/DL (ref 2.5–4.5)
PLAT MORPH BLD: NORMAL
PLATELET # BLD AUTO: 200 10*3/MM3 (ref 140–450)
PMV BLD AUTO: 9.9 FL (ref 6–12)
POIKILOCYTOSIS BLD QL SMEAR: NORMAL
POTASSIUM SERPL-SCNC: 4.1 MMOL/L (ref 3.5–5.2)
QT INTERVAL: 337 MS
QT INTERVAL: 342 MS
QT INTERVAL: 348 MS
QT INTERVAL: 413 MS
QTC INTERVAL: 424 MS
QTC INTERVAL: 458 MS
QTC INTERVAL: 498 MS
QTC INTERVAL: 523 MS
RBC # BLD AUTO: 2.69 10*6/MM3 (ref 3.77–5.28)
SCAN SLIDE: NORMAL
SODIUM SERPL-SCNC: 140 MMOL/L (ref 136–145)
VARIANT LYMPHS NFR BLD MANUAL: 2 % (ref 0–5)
VARIANT LYMPHS NFR BLD MANUAL: 37 % (ref 19.6–45.3)
WBC MORPH BLD: NORMAL
WBC NRBC COR # BLD AUTO: 4.57 10*3/MM3 (ref 3.4–10.8)

## 2025-07-10 PROCEDURE — 80048 BASIC METABOLIC PNL TOTAL CA: CPT

## 2025-07-10 PROCEDURE — 25810000003 SODIUM CHLORIDE 0.9 % SOLUTION: Performed by: STUDENT IN AN ORGANIZED HEALTH CARE EDUCATION/TRAINING PROGRAM

## 2025-07-10 PROCEDURE — 93010 ELECTROCARDIOGRAM REPORT: CPT | Performed by: INTERNAL MEDICINE

## 2025-07-10 PROCEDURE — 25010000002 ENOXAPARIN PER 10 MG

## 2025-07-10 PROCEDURE — 85025 COMPLETE CBC W/AUTO DIFF WBC: CPT

## 2025-07-10 PROCEDURE — 83735 ASSAY OF MAGNESIUM: CPT

## 2025-07-10 PROCEDURE — 93005 ELECTROCARDIOGRAM TRACING: CPT | Performed by: STUDENT IN AN ORGANIZED HEALTH CARE EDUCATION/TRAINING PROGRAM

## 2025-07-10 PROCEDURE — 94799 UNLISTED PULMONARY SVC/PX: CPT

## 2025-07-10 PROCEDURE — 84100 ASSAY OF PHOSPHORUS: CPT

## 2025-07-10 PROCEDURE — 99222 1ST HOSP IP/OBS MODERATE 55: CPT | Performed by: INTERNAL MEDICINE

## 2025-07-10 PROCEDURE — 25010000002 DIGOXIN PER 500 MCG: Performed by: NURSE PRACTITIONER

## 2025-07-10 PROCEDURE — 82948 REAGENT STRIP/BLOOD GLUCOSE: CPT

## 2025-07-10 PROCEDURE — 87481 CANDIDA DNA AMP PROBE: CPT | Performed by: HOSPITALIST

## 2025-07-10 PROCEDURE — 94660 CPAP INITIATION&MGMT: CPT

## 2025-07-10 PROCEDURE — 85007 BL SMEAR W/DIFF WBC COUNT: CPT

## 2025-07-10 PROCEDURE — 93005 ELECTROCARDIOGRAM TRACING: CPT | Performed by: NURSE PRACTITIONER

## 2025-07-10 RX ORDER — CETIRIZINE HYDROCHLORIDE 10 MG/1
10 TABLET ORAL DAILY PRN
Status: DISCONTINUED | OUTPATIENT
Start: 2025-07-10 | End: 2025-07-17 | Stop reason: HOSPADM

## 2025-07-10 RX ORDER — MIDODRINE HYDROCHLORIDE 5 MG/1
5 TABLET ORAL
Status: DISCONTINUED | OUTPATIENT
Start: 2025-07-10 | End: 2025-07-16

## 2025-07-10 RX ORDER — DIGOXIN 0.25 MG/ML
250 INJECTION INTRAMUSCULAR; INTRAVENOUS ONCE
Status: COMPLETED | OUTPATIENT
Start: 2025-07-10 | End: 2025-07-10

## 2025-07-10 RX ORDER — LEVOTHYROXINE SODIUM 88 UG/1
88 TABLET ORAL DAILY
Status: DISCONTINUED | OUTPATIENT
Start: 2025-07-11 | End: 2025-07-17 | Stop reason: HOSPADM

## 2025-07-10 RX ORDER — DIGOXIN 0.25 MG/ML
250 INJECTION INTRAMUSCULAR; INTRAVENOUS EVERY 6 HOURS
Status: COMPLETED | OUTPATIENT
Start: 2025-07-10 | End: 2025-07-10

## 2025-07-10 RX ORDER — ATORVASTATIN CALCIUM 20 MG/1
20 TABLET, FILM COATED ORAL DAILY
Status: DISCONTINUED | OUTPATIENT
Start: 2025-07-10 | End: 2025-07-17 | Stop reason: HOSPADM

## 2025-07-10 RX ADMIN — SODIUM CHLORIDE 500 ML: 0.9 INJECTION, SOLUTION INTRAVENOUS at 14:00

## 2025-07-10 RX ADMIN — MIDODRINE HYDROCHLORIDE 5 MG: 5 TABLET ORAL at 16:36

## 2025-07-10 RX ADMIN — DIGOXIN 250 MCG: 0.25 INJECTION INTRAMUSCULAR; INTRAVENOUS at 14:50

## 2025-07-10 RX ADMIN — ENOXAPARIN SODIUM 120 MG: 150 INJECTION SUBCUTANEOUS at 08:40

## 2025-07-10 RX ADMIN — Medication 10 ML: at 08:52

## 2025-07-10 RX ADMIN — Medication 10 ML: at 20:50

## 2025-07-10 RX ADMIN — ENOXAPARIN SODIUM 120 MG: 150 INJECTION SUBCUTANEOUS at 20:08

## 2025-07-10 RX ADMIN — ATORVASTATIN CALCIUM 20 MG: 20 TABLET, FILM COATED ORAL at 20:07

## 2025-07-10 RX ADMIN — MIDODRINE HYDROCHLORIDE 5 MG: 5 TABLET ORAL at 09:40

## 2025-07-10 RX ADMIN — DIGOXIN 250 MCG: 0.25 INJECTION INTRAMUSCULAR; INTRAVENOUS at 08:41

## 2025-07-10 NOTE — CONSULTS
Cardiology Consult Note      REQUESTING PHYSICIAN    Jerome Barbosa MD    PATIENT IDENTIFICATION  Name: Faby Le  Age: 80 y.o.  Sex: female  :  1945  MRN: [id number]             REASON FOR CONSULTATION:  80-year-old female known to our practice with known history of nonobstructive coronary artery disease per prior heart cath.  Additional past medical history of sick sinus syndrome status post [employer] dual-chamber permanent pacemaker implant, history of paroxysmal atrial fibrillation/flutter status post prior ablation 2024, history of hypertension, dyslipidemia, sleep apnea, vitamin D deficiency, orthostatic hypotension on midodrine, history of prior TIA, coagulopathy secondary to Xarelto, hypothyroidism, diabetes mellitus 2.      CC:  Weakness  Shortness of breath  Elevated heart rate    HISTORY OF PRESENT ILLNESS:   Patient presented to the emergency department at [facility] 2025 with symptoms as above that began approximately week ago.  Her blood pressure and heart rate have been elevated for the past several days per home readings.  She reports no chest discomfort.  She underwent right total knee arthroplasty revision 2025.  She was discharged to rehab facility.  In the ED, EKG confirmed elevated heart rate at 140 bpm-likely flutter.  She did convert to sinus rhythm late yesterday evening with first-degree AV block, but subsequently went back into A-fib RVR this morning.  She received 20 mg IV Lasix.  CT chest with no evidence of PE or pulmonary vascular congestion.  Xarelto was put on hold and she was started on treatment dose Lovenox.  The patient had intermittent low blood pressures down to 93 systolic and diltiazem was discontinued.  She was given 1 dose digoxin 250 mcg.  Upon my evaluation, she is lying supine in bed and reports no complaints of chest discomfort, shortness of breath.  She stated that she did have some mild lightheadedness and mild dyspnea on  "exertion at home.  She denies any near syncopal or syncopal episodes.  She has had no lower extremity edema.        REVIEW OF SYSTEMS:  Pertinent items are noted in HPI, all other systems reviewed and negative    OBJECTIVE   High-sensitivity troponin 14, 14  proBNP 4837  Creatinine 1.41 (1.55)  Hemoglobin 8.9      ASSESSMENT  Narrow complex tachycardia-fib/flutter  Recent left knee surgery  Elevated D-dimer with no evidence of PE on imaging  History of paroxysmal atrial fibs/flutter status post prior ablation March 2024  Sick sinus syndrome status post permanent pacemaker implant  History of nonobstructive coronary artery disease  Coagulopathy secondary to Xarelto  Hypothyroidism  History of prior TIA  History of hypotension on midodrine    PLAN  Will give additional dose of digoxin 250 mcg 6 hours after first dose.  Plan for HERBERT guided cardioversion tomorrow with Dr. Norris  N.p.o. after midnight      CHF Guideline Directed Medical Therapy  Beta Blocker:   ARNI/ACE/ARB:   SGLT 2 inhibitors:   Diuretics:   Aldosterone Antagonist:   Vasodilators & Nitrates:     Vital Signs  Visit Vitals  BP 93/64 (BP Location: Left arm, Patient Position: Lying)   Pulse (!) 139   Temp 97.4 °F (36.3 °C) (Oral)   Resp 15   Ht 160 cm (63\")   Wt 115 kg (252 lb 13.9 oz)   LMP  (LMP Unknown)   SpO2 98%   BMI 44.79 kg/m²     Oxygen Therapy  SpO2: 98 %  Pulse Oximetry Type: Continuous  Device (Oxygen Therapy): room air  Oxygen Concentration (%): 21  CPAP (cm H2O): 8  Flowsheet Rows      Flowsheet Row First Filed Value   Admission Height 160 cm (63\") Documented at 07/09/2025 1439   Admission Weight 116 kg (255 lb) Documented at 07/09/2025 1439          Intake & Output (last 3 days)         07/07 0701  07/08 0700 07/08 0701  07/09 0700 07/09 0701  07/10 0700 07/10 0701  07/11 0700    P.O.   240     Total Intake(mL/kg)   240 (2.1)     Urine (mL/kg/hr)   700 300 (2.3)    Total Output   700 300    Net   -460 -300            Urine Unmeasured " Occurrence   1 x           Lines, Drains & Airways       Active LDAs       Name Placement date Placement time Site Days    Peripheral IV 07/09/25 1505 20 G Right Antecubital 07/09/25  1505  Antecubital  less than 1                    MEDICAL HISTORY    Past Medical History:   Diagnosis Date    Ankle pain     Arthritis     Atrial fibrillation     Coronary artery disease     Depression     Disease of thyroid gland     Gout     Hyperglycemia     Hyperlipidemia     Hypertension     Low back pain     Sleep apnea     Vitamin D deficiency         SURGICAL HISTORY    Past Surgical History:   Procedure Laterality Date    ANKLE FUSION      2017-left    CARDIAC CATHETERIZATION      CARDIAC CATHETERIZATION Right 08/29/2022    Procedure: Left Heart Cath;  Surgeon: Ratna Zavala MD;  Location: [facility] CATH INVASIVE LOCATION;  Service: Cardiovascular;  Laterality: Right;    CARDIAC ELECTROPHYSIOLOGY PROCEDURE Left 07/03/2023    Procedure: Pacemaker DC new [place] Notified;  Surgeon: Cr Herrera MD;  Location: [facility] CATH INVASIVE LOCATION;  Service: Cardiovascular;  Laterality: Left;    CARDIAC ELECTROPHYSIOLOGY PROCEDURE Right 03/05/2024    Procedure: Ablation atrial fibrillation, Cryo Slalonde and Wayne Igor notified 02/09/24;  Surgeon: Cr Herrera MD;  Location: [facility] CATH INVASIVE LOCATION;  Service: Cardiovascular;  Laterality: Right;    CARDIAC ELECTROPHYSIOLOGY PROCEDURE N/A 03/05/2024    Procedure: Cardioversion;  Surgeon: Cr Herrera MD;  Location: [facility] CATH INVASIVE LOCATION;  Service: Cardiovascular;  Laterality: N/A;    CARPAL TUNNEL RELEASE Right     CATARACT EXTRACTION      CUBITAL TUNNEL RELEASE Right     ENDOSCOPY N/A 05/28/2024    Procedure: ESOPHAGOGASTRODUODENOSCOPY WITH COLD FORCEP BIOPSY X1 AREA;  Surgeon: Josh Loomis MD;  Location: [facility] ENDOSCOPY;  Service: Gastroenterology;  Laterality: N/A;  Post- ESOPHAGITIS, GASTRITIS, HIATAL HERNIA    HYSTERECTOMY    "   REPLACEMENT TOTAL KNEE      left 2008    ROTATOR CUFF REPAIR Right     TOTAL KNEE ARTHROPLASTY Right 05/04/2023    Procedure: TOTAL KNEE ARTHROPLASTY WITH CORI ROBOT;  Surgeon: Chino Herrera II, MD;  Location: [facility] MAIN OR;  Service: Robotics - Ortho;  Laterality: Right;    TOTAL KNEE ARTHROPLASTY REVISION Right 6/5/2025    Procedure: TOTAL KNEE ARTHROPLASTY REVISION WITH CORI ROBOT;  Surgeon: Chino Herrera II, MD;  Location: [facility] MAIN OR;  Service: Robotics - Ortho;  Laterality: Right;        FAMILY HISTORY    Family History   Problem Relation Age of Onset    Hypertension Mother     Stroke Father     Arthritis Brother     Cancer Brother        SOCIAL HISTORY    Social History     Tobacco Use    Smoking status: Never     Passive exposure: Never    Smokeless tobacco: Never   Substance Use Topics    Alcohol use: No        ALLERGIES    Allergies   Allergen Reactions    Celecoxib Itching and Swelling     swelling              BP 93/64 (BP Location: Left arm, Patient Position: Lying)   Pulse (!) 139   Temp 97.4 °F (36.3 °C) (Oral)   Resp 15   Ht 160 cm (63\")   Wt 115 kg (252 lb 13.9 oz)   LMP  (LMP Unknown)   SpO2 98%   BMI 44.79 kg/m²   Intake/Output last 3 shifts:  I/O last 3 completed shifts:  In: 240 [P.O.:240]  Out: 700 [Urine:700]  Intake/Output this shift:  I/O this shift:  In: -   Out: 300 [Urine:300]    PHYSICAL EXAM:    General: Alert, cooperative, no distress, appears stated age  Head:  Normocephalic, atraumatic, mucous membranes moist  Eyes:  Conjunctivae/corneas clear, EOM's intact     Neck:  Supple,  no adenopathy; no JVD or bruit  Lungs: Clear to auscultation bilaterally, no wheezes, rhonchi or rales are noted  Chest wall: No tenderness  Heart::  Regular rate and irregular rhythm, S1 and S2 normal, no murmur, rub or gallop  Abdomen: Soft, nontender, nondistended, bowel sounds active  Extremities: No cyanosis, clubbing, or edema   Pulses: 2+ and symmetric all " extremities  Skin:  No rashes or lesions  Neuro/psych: A&O x3. CN II through XII are grossly intact with appropriate affect      Scheduled Meds:      enoxaparin sodium, 1 mg/kg, Subcutaneous, Q12H  sodium chloride, 10 mL, Intravenous, Q12H        Continuous Infusions:    dilTIAZem, 5-15 mg/hr, Last Rate: Stopped (07/09/25 2228)        PRN Meds:      acetaminophen **OR** acetaminophen **OR** acetaminophen    senna-docusate sodium **AND** polyethylene glycol **AND** bisacodyl **AND** bisacodyl    Calcium Replacement - Follow Nurse / BPA Driven Protocol    ipratropium-albuterol    Magnesium Standard Dose Replacement - Follow Nurse / BPA Driven Protocol    melatonin    nitroglycerin    Phosphorus Replacement - Follow Nurse / BPA Driven Protocol    Potassium Replacement - Follow Nurse / BPA Driven Protocol    [COMPLETED] Insert Peripheral IV **AND** sodium chloride    sodium chloride        Results Review:     I reviewed the patient's new clinical results.    CBC    Results from last 7 days   Lab Units 07/10/25  0540 07/09/25  1504   WBC 10*3/mm3 4.57 5.54   HEMOGLOBIN g/dL 8.9* 8.9*   PLATELETS 10*3/mm3 200 202     Cr Clearance Estimated Creatinine Clearance: 40.2 mL/min (A) (by C-G formula based on SCr of 1.41 mg/dL (H)).  Coag     HbA1C   Lab Results   Component Value Date    HGBA1C 5.07 05/28/2025    HGBA1C 5.32 05/09/2025    HGBA1C 5.30 01/04/2024     Blood Glucose   Glucose   Date/Time Value Ref Range Status   07/10/2025 0734 95 70 - 105 mg/dL Final     Comment:     Serial Number: [id number]:  [id number]     Infection     CMP   Results from last 7 days   Lab Units 07/10/25  0540 07/09/25  1504   SODIUM mmol/L 140 138   POTASSIUM mmol/L 4.1 4.2   CHLORIDE mmol/L 106 104   CO2 mmol/L 24.1 21.6*   BUN mg/dL 18.5 20.1   CREATININE mg/dL 1.41* 1.55*   GLUCOSE mg/dL 87 111*   ALBUMIN g/dL  --  4.0   BILIRUBIN mg/dL  --  0.4   ALK PHOS U/L  --  121*   AST (SGOT) U/L  --  20   ALT (SGPT) U/L  --  13     ABG      UA    "   MIGUEL  No results found for: \"POCMETH\", \"POCAMPHET\", \"POCBARBITUR\", \"POCBENZO\", \"POCCOCAINE\", \"POCOPIATES\", \"POCOXYCODO\", \"POCPHENCYC\", \"POCPROPOXY\", \"POCTHC\", \"POCTRICYC\"  Lysis Labs   Results from last 7 days   Lab Units 07/10/25  0540 07/09/25  1504   HEMOGLOBIN g/dL 8.9* 8.9*   PLATELETS 10*3/mm3 200 202   CREATININE mg/dL 1.41* 1.55*     Radiology(recent) CT Angiogram Chest Pulmonary Embolism  Result Date: 7/9/2025  Impression: No evidence of pulmonary embolism to the subsegmental level. Respiratory motion artifacts limits evaluation of the subsegmental pulmonary arteries. No acute intrathoracic finding within limitation of respiratory motion artifacts. Electronically Signed: Pablo Ceja MD  7/9/2025 4:59 PM EDT  Workstation ID: [id number]    XR Chest 1 View  Result Date: 7/9/2025  Impression: Low lung volumes with vascular redistribution without overt pulmonary edema. Electronically Signed: Cabrera Valenzuela MD  7/9/2025 3:36 PM EDT  Workstation ID: [id number]        Results from last 7 days   Lab Units 07/09/25  1605   HSTROP T ng/L 14*       X-rays, labs reviewed personally by physician.    ECG/EMG Results (most recent)       Procedure Component Value Units Date/Time    Telemetry Scan [[id number]] Resulted: 07/09/25 1454     Updated: 07/09/25 1507    Telemetry Scan [[id number]] Resulted: 07/09/25 2052     Updated: 07/09/25 2119    ECG 12 Lead Rhythm Change [[id number]] Collected: 07/09/25 2306     Updated: 07/09/25 2337     QT Interval 413 ms      QTC Interval 458 ms     Narrative:      HEART RATE=74  bpm  RR Tqkletxl=701  ms  MS Jhyfxiec=045  ms  P Horizontal Axis=67  deg  P Front Axis=32  deg  QRSD Krcselnf=654  ms  QT Bzxjwysw=624  ms  FVxY=550  ms  QRS Axis=-3  deg  T Wave Axis=8  deg  - ABNORMAL ECG -  Sinus rhythm  Prolonged MS interval  Date and Time of Study:2025-07-09 23:06:39    Telemetry Scan [[id number]] Resulted: 07/10/25 0008     Updated: 07/10/25 0407    Telemetry Scan [[id number]] Resulted: 07/10/25 " 0410     Updated: 07/10/25 0520    ECG 12 Lead Rhythm Change [[id number]] Collected: 07/09/25 1452     Updated: 07/10/25 0653     QT Interval 342 ms      QTC Interval 523 ms     Narrative:      HEART NZQH=464  bpm  RR Gcvxkalg=220  ms  FL Dvxvmtqf=076  ms  P Horizontal Axis=103  deg  P Front Axis=97  deg  QRSD Qrjvdbjd=513  ms  QT Xcfcqzot=841  ms  KErK=970  ms  QRS Axis=-10  deg  T Wave Axis=78  deg  - ABNORMAL ECG -  Sinus tachycardia  Nonspecific  T abnormalities, lateral leads  Prolonged QT interval  When compared with ECG of 06-Jun-2025 15:35:03,  Significant rate increase  Significant repolarization change  Electronically Signed By: Vern Gandhi (LAKESHA) 2025-07-10 06:52:56  Date and Time of Study:2025-07-09 14:52:29    ECG 12 Lead Tachycardia [[id number]] Collected: 07/10/25 0730     Updated: 07/10/25 0733     QT Interval 348 ms      QTC Interval 498 ms     Narrative:      HEART WQPR=481  bpm  RR Hvixpglc=183  ms  FL Interval=  ms  P Horizontal Axis=  deg  P Front Axis=  deg  QRSD Rgltxeht=087  ms  QT Lxcdzfrv=285  ms  CIuY=166  ms  QRS Axis=-11  deg  T Wave Axis=65  deg  - ABNORMAL ECG -  Atrial fibrillation  Date and Time of Study:2025-07-10 07:30:24    Telemetry Scan [[id number]] Resulted: 07/10/25 0750     Updated: 07/10/25 0806              Medication Review:   I have reviewed the patient's current medication list  Scheduled Meds:enoxaparin sodium, 1 mg/kg, Subcutaneous, Q12H  sodium chloride, 10 mL, Intravenous, Q12H      Continuous Infusions:dilTIAZem, 5-15 mg/hr, Last Rate: Stopped (07/09/25 2228)      PRN Meds:.  acetaminophen **OR** acetaminophen **OR** acetaminophen    senna-docusate sodium **AND** polyethylene glycol **AND** bisacodyl **AND** bisacodyl    Calcium Replacement - Follow Nurse / BPA Driven Protocol    ipratropium-albuterol    Magnesium Standard Dose Replacement - Follow Nurse / BPA Driven Protocol    melatonin    nitroglycerin    Phosphorus Replacement - Follow Nurse / BPA Driven  Protocol    Potassium Replacement - Follow Nurse / BPA Driven Protocol    [COMPLETED] Insert Peripheral IV **AND** sodium chloride    sodium chloride    Imaging:  Imaging Results (Last 72 Hours)       Procedure Component Value Units Date/Time    CT Angiogram Chest Pulmonary Embolism [[id number]] Collected: 07/09/25 1653     Updated: 07/09/25 1701    Narrative:      CT ANGIOGRAM CHEST PULMONARY EMBOLISM    Date of Exam: 7/9/2025 4:00 PM EDT    Indication: Pulmonary Embolism.    Comparison: Chest radiograph 7/9/2025. Chest CT [address].    Technique: Axial CT images were obtained of the chest after the uneventful intravenous administration of iodinated contrast utilizing pulmonary embolism protocol.  In addition, a 3-D volume rendered image was created for interpretation.  Sagittal and   coronal reconstructions were performed.  Automated exposure control and iterative reconstruction methods were used.      Findings:    Thyroid and thoracic inlet: No significant abnormality.    Lymph nodes: No pathologic appearing lymph nodes by imaging criteria. Calcified mediastinal and hilar nodes, likely sequelae of prior granulomatous disease.    Cardiovascular: Cardiomegaly. No pericardial effusion. Aorta and main pulmonary artery diameters are within normal range.. There are coronary artery calcifications. Aortic atherosclerosis. Left chest wall pacemaker with leads terminating in the right   atrium and right ventricle. No evidence of pulmonary embolism to the subsegmental level. Degraded evaluation of the subsegmental pulmonary arteries secondary to respiratory motion artifacts.    Esophagus: No significant abnormality.    Lung parenchyma: Expiratory phase imaging with associated respiratory motion artifacts and scattered atelectasis. Within this limitation: No suspicious appearing opacities are seen.    Airways: Patent trachea and mainstem bronchi.    Pleura: No pleural effusion or pneumothorax.    Chest wall and osseous  "structures: Degenerative changes of the imaged spine. No acute osseous abnormality.    Included abdomen: Cholelithiasis. Renal cysts. Bilateral renal cortical atrophy.      Impression:      Impression:  No evidence of pulmonary embolism to the subsegmental level. Respiratory motion artifacts limits evaluation of the subsegmental pulmonary arteries.    No acute intrathoracic finding within limitation of respiratory motion artifacts.        Electronically Signed: Pablo Ceja MD    7/9/2025 4:59 PM EDT    Workstation ID: [id number]    XR Chest 1 View [[id number]] Collected: 07/09/25 1535     Updated: 07/09/25 1538    Narrative:      XR CHEST 1 VW    Date of Exam: 7/9/2025 3:15 PM EDT    Indication: sob    Comparison: 5/28/2025 radiographs    Findings:  Left chest wall cardiac device in unchanged position. Unchanged cardiomediastinal silhouette. Low lung volumes. Vascular redistribution without overt pulmonary edema. No pleural effusion or pneumothorax. No acute bone abnormality.      Impression:      Impression:  Low lung volumes with vascular redistribution without overt pulmonary edema.        Electronically Signed: Cabrera Valenzuela MD    7/9/2025 3:36 PM EDT    Workstation ID: [id number]              CAROL Jimenez  07/10/25  08:09 EDT       EMR Dragon/Transcription:   \"Dictated utilizing Dragon dictation\".                 Electronically signed by CAROL Jimenez, 07/10/25, 8:09 AM EDT.  Copied text in this note has been reviewed by me and is accurate as of 07/10/25.    Cardiology attending:  Seen and examined.  Chart and labs reviewed. Independent interpretations of cardiac testing was performed. History and exam findings are verified with above changes noted.  Assessment and plan notated by APC after being formulated by attending consultant.  Note that greater than 50% of the time spent in care of the patient was provided by attending consultant.    Patient reports feeling well currently.  She does " get short of breath.  Heart rate remains elevated.  She is in rapid atrial fibrillation.  She does note that electrophysiology was concerned that she may need repeat ablation.  Patient has had orthopedic surgery recently.  She also had been taking GLP-1 medication but she has not had this in over a week.  Blood pressure is soft.  We discussed options.  Rate control is going to be difficult given her soft blood pressure.  Will plan for HERBERT cardioversion tomorrow.    Physical Exam:    General: Alert, cooperative, no distress, appears stated age  Head:  Normocephalic, atraumatic, mucous membranes moist  Eyes:  Conjunctivae/corneas clear, EOM's intact     Neck:  Supple,  no bruit  Lungs:            Clear to auscultation bilaterally, no wheezes rhonchi rales are noted  Chest wall: No tenderness  Heart::  Irregular rate and rhythm, tachycardic.  S1 and S2 normal, 1/6 holosystolic murmur.  No rub or gallop  Abdomen: Soft, nontender, nondistended bowel sounds active  Extremities: No cyanosis, clubbing, or edema  Pulses:            2+ and symmetric all extremities  Skin:  No rashes or lesions  Neuro/psych: A&O x3. CN II through XII are grossly intact with appropriate affect

## 2025-07-10 NOTE — PROGRESS NOTES
[facility] MEDICINE SERVICE  DAILY PROGRESS NOTE    NAME: Faby Le  : 1945  MRN: [id number]      LOS: 1 day     PROVIDER OF SERVICE: Jerome Barbosa MD    Chief Complaint: Atrial fibrillation with RVR    Subjective:     Interval History:  History taken from: patient    Awake, communicative.        Review of Systems:   Review of Systems    Objective:     Vital Signs  Temp:  [97.4 °F (36.3 °C)-97.8 °F (36.6 °C)] 97.7 °F (36.5 °C)  Heart Rate:  [] 75  Resp:  [11-18] 17  BP: ()/(27-87) 140/60   Body mass index is 45.06 kg/m².    Physical Exam  Physical Exam  Constitutional:       Appearance: Normal appearance.   Cardiovascular:      Rate and Rhythm: Tachycardia present. Rhythm irregular.      Pulses: Normal pulses.      Heart sounds: Normal heart sounds.   Pulmonary:      Effort: Pulmonary effort is normal.      Breath sounds: Normal breath sounds.   Abdominal:      General: Abdomen is flat.      Palpations: Abdomen is soft.   Neurological:      Mental Status: She is alert.            Diagnostic Data    Results from last 7 days   Lab Units 07/10/25  0540 25  1504   WBC 10*3/mm3 4.57 5.54   HEMOGLOBIN g/dL 8.9* 8.9*   HEMATOCRIT % 27.6* 28.6*   PLATELETS 10*3/mm3 200 202   GLUCOSE mg/dL 87 111*   CREATININE mg/dL 1.41* 1.55*   BUN mg/dL 18.5 20.1   SODIUM mmol/L 140 138   POTASSIUM mmol/L 4.1 4.2   AST (SGOT) U/L  --  20   ALT (SGPT) U/L  --  13   ALK PHOS U/L  --  121*   BILIRUBIN mg/dL  --  0.4   ANION GAP mmol/L 9.9 12.4       CT Angiogram Chest Pulmonary Embolism  Result Date: 2025  Impression: No evidence of pulmonary embolism to the subsegmental level. Respiratory motion artifacts limits evaluation of the subsegmental pulmonary arteries. No acute intrathoracic finding within limitation of respiratory motion artifacts. Electronically Signed: Pablo Ceja MD  2025 4:59 PM EDT  Workstation ID: [id number]    XR Chest 1 View  Result Date: 2025  Impression: Low lung  volumes with vascular redistribution without overt pulmonary edema. Electronically Signed: Cabrera Valenzuela MD  7/9/2025 3:36 PM EDT  Workstation ID: [id number]        I reviewed the patient's new clinical results.    Assessment/Plan:     Active and Resolved Problems  Active Hospital Problems    Diagnosis  POA    **Atrial fibrillation with RVR [I48.91]  Yes      Resolved Hospital Problems   No resolved problems to display.       AFIB with RVR  Acute decompensated HFpEF - improved  Hypotension with history of hypertension  SSS status post pacemaker  CAD, nonobstructive  Hyperlipidemia  - Hypotension earlier this morning which responded to small bolus of IV fluid.  - ECHO on 5/16/25: LVEF 56-60%, LVDF consistent with (grade III w/high LAP) reversible restrictive pattern  - CTA chest: negative for PE, no acute findings with limitation of respiratory motion artifacts  - Will start diltiazem drip for HR > 120  - Lovenox AC for procedures.  - HERBERT CV in AM  - PRN duonebs  - Titrate O2 to keep O2 sat>92%  - Heart failure pathway initiated  - Cardiology following.     CKD stage III  - Cr 1.55 on admission; baseline 1.17 on 6/10/25  - Monitor and trend labs  - Strict I/Os, daily weight  - Consider Nephrology consult if creatinine fails to improve      Hypothyroidism  - Continue home dose of levothyroxine  - TSH: 2.780 on 5/28/2025     ROSA on CPAP  - CPAP ordered for HS, PRN      Gout  - Continue allopurinol    VTE Prophylaxis:  Pharmacologic VTE prophylaxis orders are present.             Disposition Planning:     Barriers to Discharge: AF RVR  Anticipated Date of Discharge: 7/12/2025  Place of Discharge: Home      Time: 35 minutes     Code Status and Medical Interventions: CPR (Attempt to Resuscitate); Full Support   Ordered at: 07/09/25 1750     Code Status (Patient has no pulse and is not breathing):    CPR (Attempt to Resuscitate)     Medical Interventions (Patient has pulse or is breathing):    Full Support       Signature:  Electronically signed by Jerome Barbosa MD, 07/10/25, 18:37 EDT.  [facility]

## 2025-07-10 NOTE — CASE MANAGEMENT/SOCIAL WORK
Case Management Readmission Assessment Note    Case Management Readmission Assessment (all recorded)       Readmission Interview       Row Name 07/10/25 1345             Readmission Indications    Is the patient and/or family able to complete the readmission assessment questions? Yes      Is this hospitalization related to the prior hospital diagnosis? No        Row Name 07/10/25 1345             Recommendation for rehospitalization    Did you speak with your physician prior to coming to the hospital No        Row Name 07/10/25 1345             Follow-up Appointments    Do you have a PCP? Yes  Dr. Sykes      Did you have an appointment with PCP after your hospitalization? Yes      When was your appointment scheduled? 06/23/25      Did you go to appointment? No  Did not have transportation      Did you have an appointment with a Specialist? Yes      When was your appointment scheduled? 08/25/25      Did you go to appointment? No      Are you current with the Pulmonary Clinic? No      Are you current with the CHF Clinic? No        Row Name 07/10/25 1345             Medications    Did you have newly prescribed medications at discharge? Yes      Did you understand the reasons for your medications at discharge and how to take them? Yes      Did you understand the side effects of your medications? Yes      Are you taking all of you prescribed medications? Yes      What pharmacy was used to fill prescription(s)? [facility]      Were medications picked up? Yes        Row Name 07/10/25 1345             Discharge Instructions    Did you understand your discharge instructions? Yes      Did your family/caregiver hear your instructions? Yes      Were you told to eat a special diet? No      Did you adhere to the diet? No      Were you given a number of someone to call if you had questions or concerns? Yes        Row Name 07/10/25 1345             Index discharge location/services    Where did you go upon  discharge? Assisted Living      Do you have supportive family or friends in the home? Yes      Which Assisted Living Facility were your admitted from? [facility]        Row Name 07/10/25 1345             Discharge Readiness    On a scale of 1-5 (5 being well prepared), how ready were you for discharge 3      Recommendation based on interview Education on diagnosis/self management        Row Name 07/10/25 1345             Palliative Care/Hospice    Are you current with Palliative Care? No      Are you current with Hospice Care? No        Row Name 07/10/25 1345 07/1945          Advance Directives (For Healthcare)    Pre-existing AND/MOST/POLST Order No No     Advance Directive Status -- Patient does not have advance directive     Have you reviewed your Advance Directive and is it valid for this stay? No Not applicable     Literature Provided on Advance Directives No No     Patient Requests Assistance on Advance Directives -- Patient Declined       Row Name 07/10/25 1345             Readmission Assessment Final Comments    Final Comments PREVIOUS ADMISSION: [facility] of HFpEF, SSS s/p PPM, A-fib on Xarelto, CAD, HTN, HLD, ROSA on CPAP, osteoarthritis who presented to [facility] on 6/5/2025 for elective right total knee arthroplasty revision with Dr. Herrera. 6/11/25- Discharged to [facility].  CURRENT ADMISSION: 7/9/25 ED with weakness, shortness of breath, hypotension She has a history of atrial fibrillation with prior ablation and is currently on Xarelto. Cardiology and EP consult.

## 2025-07-10 NOTE — PLAN OF CARE
Problem: Comorbidity Management  Goal: Blood Pressure in Desired Range  Outcome: Progressing  Intervention: Maintain Blood Pressure Management  Recent Flowsheet Documentation  Taken 7/10/2025 1230 by Zoey Bill RN  Medication Review/Management: medications reviewed     Problem: Adult Inpatient Plan of Care  Goal: Plan of Care Review  Outcome: Progressing  Goal: Patient-Specific Goal (Individualized)  Outcome: Progressing  Goal: Absence of Hospital-Acquired Illness or Injury  Outcome: Progressing  Intervention: Identify and Manage Fall Risk  Recent Flowsheet Documentation  Taken 7/10/2025 1645 by Zoey Bill RN  Safety Promotion/Fall Prevention:   assistive device/personal items within reach   clutter free environment maintained   safety round/check completed   room organization consistent   activity supervised   fall prevention program maintained   nonskid shoes/slippers when out of bed  Taken 7/10/2025 1400 by Zoey Bill RN  Safety Promotion/Fall Prevention: safety round/check completed  Taken 7/10/2025 1230 by Zoey Bill RN  Safety Promotion/Fall Prevention:   activity supervised   assistive device/personal items within reach   clutter free environment maintained   fall prevention program maintained   nonskid shoes/slippers when out of bed   safety round/check completed   room organization consistent  Taken 7/10/2025 1000 by Zoey Bill RN  Safety Promotion/Fall Prevention:   activity supervised   assistive device/personal items within reach   clutter free environment maintained   fall prevention program maintained   nonskid shoes/slippers when out of bed   safety round/check completed   room organization consistent  Taken 7/10/2025 0715 by Zoey Bill, RN  Safety Promotion/Fall Prevention:   activity supervised   assistive device/personal items within reach   clutter free environment maintained   fall prevention program maintained   nonskid shoes/slippers when out of bed   room  organization consistent   safety round/check completed  Intervention: Prevent Skin Injury  Recent Flowsheet Documentation  Taken 7/10/2025 1645 by Zoey Bill RN  Body Position: weight shifting  Taken 7/10/2025 1230 by Zoey Bill RN  Body Position: weight shifting  Taken 7/10/2025 1103 by Zoey Bill RN  Body Position: legs elevated  Taken 7/10/2025 1000 by Zoey Bill RN  Body Position: position changed independently  Taken 7/10/2025 0715 by Zoey Bill RN  Body Position: weight shifting  Skin Protection: transparent dressing maintained  Intervention: Prevent and Manage VTE (Venous Thromboembolism) Risk  Recent Flowsheet Documentation  Taken 7/10/2025 1230 by Zoey Bill RN  VTE Prevention/Management:   bilateral   SCDs (sequential compression devices) off   patient refused intervention  Intervention: Prevent Infection  Recent Flowsheet Documentation  Taken 7/10/2025 1645 by Zoey Bill RN  Infection Prevention:   environmental surveillance performed   equipment surfaces disinfected   hand hygiene promoted   personal protective equipment utilized   single patient room provided  Taken 7/10/2025 1230 by Zoey Bill RN  Infection Prevention:   environmental surveillance performed   equipment surfaces disinfected   hand hygiene promoted   personal protective equipment utilized   single patient room provided  Taken 7/10/2025 1000 by Zoey Bill RN  Infection Prevention:   environmental surveillance performed   equipment surfaces disinfected   hand hygiene promoted   personal protective equipment utilized   single patient room provided  Taken 7/10/2025 0715 by Zoey Bill RN  Infection Prevention:   equipment surfaces disinfected   environmental surveillance performed   hand hygiene promoted   personal protective equipment utilized   single patient room provided  Goal: Optimal Comfort and Wellbeing  Outcome: Progressing  Intervention: Monitor Pain and Promote  Comfort  Recent Flowsheet Documentation  Taken 7/10/2025 1230 by Zoey Bill RN  Pain Management Interventions: care clustered  Taken 7/10/2025 0715 by Zoey Bill RN  Pain Management Interventions: no interventions per patient request  Intervention: Provide Person-Centered Care  Recent Flowsheet Documentation  Taken 7/10/2025 1645 by Zoey Bill RN  Trust Relationship/Rapport:   care explained   questions answered  Taken 7/10/2025 1230 by Zoey Bill RN  Trust Relationship/Rapport:   care explained   questions answered  Taken 7/10/2025 0715 by Zoey Bill RN  Trust Relationship/Rapport:   care explained   questions answered  Goal: Readiness for Transition of Care  Outcome: Progressing   Goal Outcome Evaluation:            Patient in & out of A-fib with rate as high as 150s, hypotensive at times as well. After fluid bolus, midodrine and digoxin, patient's vital signs have improved. Currently NSR but will become tachy with activity. Patient states she feels weak but no other symptoms. NPO at midnight for HERBERT with cardioversion. Consent signed & in the chart

## 2025-07-10 NOTE — CASE MANAGEMENT/SOCIAL WORK
Discharge Planning Assessment   Geovanni     Patient Name: Faby Le  MRN: [id number]  Today's Date: 7/10/2025    Admit Date: 7/9/2025    Plan: JULIANA PLAN; [facility]. PT/OT marie pending.       Discharge Needs Assessment       Row Name 07/10/25 1404       Living Environment    People in Home other (see comments)    Unique Family Situation [facility]    Current Living Arrangements extended care facility    Potentially Unsafe Housing Conditions none    In the past 12 months has the electric, gas, oil, or water company threatened to shut off services in your home? No    Provides Primary Care For no one    Family Caregiver if Needed other (see comments)    Quality of Family Relationships helpful;involved;supportive    Able to Return to Prior Arrangements yes       Resource/Environmental Concerns    Resource/Environmental Concerns none    Transportation Concerns none       Transportation Needs    In the past 12 months, has lack of transportation kept you from medical appointments or from getting medications? no    In the past 12 months, has lack of transportation kept you from meetings, work, or from getting things needed for daily living? No       Food Insecurity    Within the past 12 months, you worried that your food would run out before you got the money to buy more. Never true    Within the past 12 months, the food you bought just didn't last and you didn't have money to get more. Never true       Transition Planning    Patient/Family Anticipates Transition to long-term care facility    Patient/Family Anticipated Services at Transition none    Transportation Anticipated health plan transportation;family or friend will provide       Discharge Needs Assessment    Readmission Within the Last 30 Days no previous admission in last 30 days    Equipment Currently Used at Home walker, rolling;walker, standard;wheelchair, motorized;cpap;bath bench;grab bar;bp cuff    Anticipated Changes  Related to Illness none                   Discharge Plan       Row Name 07/10/25 1406       Plan    Patient/Family in Agreement with Plan yes    Plan Comments CM met with patient at bedside, from [facility]. Independent with ADL's uses walker and electic chair. PCP is [facility], Pharmacy is [facility]. Agreeable to Meds to Beds. Able to afford medications, denies any issues with food or utilities. Does report some transportation issues,  all of her children work, has to schedule appointments around that. Readmission completed.      Row Name 07/10/25 1405       Plan    Plan DC PLAN; [facility]. PT/OT eval pending.    Patient/Family in Agreement with Plan yes                  Continued Care and Services - Admitted Since 7/9/2025    No active coordination exists.       Selected Continued Care - Prior Encounters Includes continued care and service providers with selected services from prior encounters from 4/10/2025 to 7/10/2025       Expected Discharge Date and Time       Expected Discharge Date Expected Discharge Time    Jul 11, 2025            Demographic Summary       Row Name 07/10/25 1404       General Information    Admission Type inpatient    Arrived From emergency department    Required Notices Provided Important Message from Medicare    Referral Source admission list    Reason for Consult discharge planning    Preferred Language English       Contact Information    Permission Granted to Share Info With     Contact Information Obtained for                    Functional Status       Row Name 07/10/25 1404       Functional Status    Usual Activity Tolerance excellent    Current Activity Tolerance excellent       Physical Activity    On average, how many days per week do you engage in moderate to strenuous exercise (like a brisk walk)? 0 days    On average, how many minutes do you engage in exercise at this level? 0 min    Number of minutes of exercise per  week 0       Assessment of Health Literacy    How often do you have someone help you read hospital materials? Sometimes    How often do you have problems learning about your medical condition because of difficulty understanding written information? Sometimes    How often do you have a problem understanding what is told to you about your medical condition? Sometimes    How confident are you filling out medical forms by yourself? Somewhat    Health Literacy Moderate       Functional Status, IADL    Medications independent    Meal Preparation independent    Housekeeping independent    Laundry independent    Shopping independent       Mental Status    General Appearance WDL WDL       Mental Status Summary    Recent Changes in Mental Status/Cognitive Functioning no changes                  Met with patient in room wearing PPE:     Maintained distance greater than six feet and spent less than 15 minutes in the room.    Lis Carey RN   Case Management  [phone]

## 2025-07-10 NOTE — PLAN OF CARE
Problem: Adult Inpatient Plan of Care  Goal: Absence of Hospital-Acquired Illness or Injury  Intervention: Identify and Manage Fall Risk  Recent Flowsheet Documentation  Taken 7/10/2025 0418 by Miguel Ly RN  Safety Promotion/Fall Prevention:   safety round/check completed   room organization consistent   nonskid shoes/slippers when out of bed   fall prevention program maintained   clutter free environment maintained   assistive device/personal items within reach  Taken 7/10/2025 0200 by Miguel Ly RN  Safety Promotion/Fall Prevention:   safety round/check completed   room organization consistent   nonskid shoes/slippers when out of bed   fall prevention program maintained   clutter free environment maintained   assistive device/personal items within reach  Taken 7/10/2025 0030 by Miguel Ly RN  Safety Promotion/Fall Prevention:   safety round/check completed   room organization consistent   nonskid shoes/slippers when out of bed   fall prevention program maintained   clutter free environment maintained   assistive device/personal items within reach  Taken 7/9/2025 2200 by Miguel Ly RN  Safety Promotion/Fall Prevention:   safety round/check completed   room organization consistent   nonskid shoes/slippers when out of bed   fall prevention program maintained   assistive device/personal items within reach   clutter free environment maintained  Taken 7/9/2025 2007 by Miguel Ly RN  Safety Promotion/Fall Prevention:   safety round/check completed   room organization consistent   nonskid shoes/slippers when out of bed   fall prevention program maintained   clutter free environment maintained   assistive device/personal items within reach  Intervention: Prevent Skin Injury  Recent Flowsheet Documentation  Taken 7/10/2025 0418 by Miguel Ly RN  Body Position: position changed independently  Taken 7/10/2025 0030 by Miguel Ly RN  Body Position: position changed  independently  Taken 7/9/2025 2007 by Miguel Ly RN  Body Position: position changed independently  Intervention: Prevent and Manage VTE (Venous Thromboembolism) Risk  Recent Flowsheet Documentation  Taken 7/9/2025 2007 by Miguel Ly RN  VTE Prevention/Management: SCDs (sequential compression devices) off  Goal: Optimal Comfort and Wellbeing  Intervention: Provide Person-Centered Care  Recent Flowsheet Documentation  Taken 7/9/2025 2007 by Miguel Ly RN  Trust Relationship/Rapport: care explained  Goal: Readiness for Transition of Care  Intervention: Mutually Develop Transition Plan  Recent Flowsheet Documentation  Taken 7/9/2025 1950 by Miguel Ly RN  Transportation Anticipated: car, drives self  Patient/Family Anticipated Services at Transition: none  Patient/Family Anticipates Transition to: long-term care facility  Taken 7/9/2025 1947 by Miguel Ly RN  Equipment Currently Used at Home:   walker, rolling   walker, standard   wheelchair, motorized   cpap   bath bench   grab bar   bp cuff     Problem: Comorbidity Management  Goal: Blood Pressure in Desired Range  Intervention: Maintain Blood Pressure Management  Recent Flowsheet Documentation  Taken 7/10/2025 0418 by Miguel Ly RN  Medication Review/Management: medications reviewed  Taken 7/10/2025 0030 by Miguel Ly RN  Medication Review/Management: medications reviewed  Taken 7/9/2025 2007 by Miguel Ly RN  Medication Review/Management: medications reviewed   Goal Outcome Evaluation:      Patient is receiving IV Lasix. Patient was started on a Cardizem drip. Patient's systolic pressure dropped under 100. The Cardizem was stopped. EKG was obtained and the patient was in normal sinus. The on call provider was notified. Cardizem was discontinued. Patient has become tachycardic again. Patient has been pleasant, calm, and cooperative with care.

## 2025-07-11 ENCOUNTER — ANESTHESIA EVENT (OUTPATIENT)
Dept: CARDIOLOGY | Facility: HOSPITAL | Age: 80
End: 2025-07-11
Payer: MEDICARE

## 2025-07-11 ENCOUNTER — ANESTHESIA (OUTPATIENT)
Dept: CARDIOLOGY | Facility: HOSPITAL | Age: 80
End: 2025-07-11
Payer: MEDICARE

## 2025-07-11 ENCOUNTER — APPOINTMENT (OUTPATIENT)
Dept: CARDIOLOGY | Facility: HOSPITAL | Age: 80
DRG: 273 | End: 2025-07-11
Payer: MEDICARE

## 2025-07-11 LAB
ANION GAP SERPL CALCULATED.3IONS-SCNC: 8.4 MMOL/L (ref 5–15)
BASOPHILS # BLD AUTO: 0.04 10*3/MM3 (ref 0–0.2)
BASOPHILS NFR BLD AUTO: 1 % (ref 0–1.5)
BH CV ECHO SHUNT ASSESSMENT PERFORMED (HIDDEN SCRIPTING): 1
BUN SERPL-MCNC: 21.2 MG/DL (ref 8–23)
BUN/CREAT SERPL: 16.6 (ref 7–25)
C AURIS DNA SPEC QL NAA+NON-PROBE: NOT DETECTED
CALCIUM SPEC-SCNC: 9.6 MG/DL (ref 8.6–10.5)
CHLORIDE SERPL-SCNC: 105 MMOL/L (ref 98–107)
CO2 SERPL-SCNC: 23.6 MMOL/L (ref 22–29)
CREAT SERPL-MCNC: 1.28 MG/DL (ref 0.57–1)
DEPRECATED RDW RBC AUTO: 54.8 FL (ref 37–54)
DEVICE COMMENT: ABNORMAL
DEVICE COMMENT: NORMAL
EGFRCR SERPLBLD CKD-EPI 2021: 43 ML/MIN/1.73
EOSINOPHIL # BLD AUTO: 0.2 10*3/MM3 (ref 0–0.4)
EOSINOPHIL NFR BLD AUTO: 5 % (ref 0.3–6.2)
ERYTHROCYTE [DISTWIDTH] IN BLOOD BY AUTOMATED COUNT: 14.7 % (ref 12.3–15.4)
GLUCOSE BLDC GLUCOMTR-MCNC: 106 MG/DL (ref 70–105)
GLUCOSE BLDC GLUCOMTR-MCNC: 108 MG/DL (ref 70–105)
GLUCOSE BLDC GLUCOMTR-MCNC: 117 MG/DL (ref 70–105)
GLUCOSE BLDC GLUCOMTR-MCNC: 91 MG/DL (ref 70–105)
GLUCOSE SERPL-MCNC: 94 MG/DL (ref 65–99)
HCT VFR BLD AUTO: 27.7 % (ref 34–46.6)
HGB BLD-MCNC: 8.6 G/DL (ref 12–15.9)
IMM GRANULOCYTES # BLD AUTO: 0.01 10*3/MM3 (ref 0–0.05)
IMM GRANULOCYTES NFR BLD AUTO: 0.2 % (ref 0–0.5)
LYMPHOCYTES # BLD AUTO: 1.39 10*3/MM3 (ref 0.7–3.1)
LYMPHOCYTES NFR BLD AUTO: 34.4 % (ref 19.6–45.3)
MAGNESIUM SERPL-MCNC: 1.7 MG/DL (ref 1.6–2.4)
MCH RBC QN AUTO: 32.2 PG (ref 26.6–33)
MCHC RBC AUTO-ENTMCNC: 31 G/DL (ref 31.5–35.7)
MCV RBC AUTO: 103.7 FL (ref 79–97)
MONOCYTES # BLD AUTO: 0.41 10*3/MM3 (ref 0.1–0.9)
MONOCYTES NFR BLD AUTO: 10.1 % (ref 5–12)
NEUTROPHILS NFR BLD AUTO: 1.99 10*3/MM3 (ref 1.7–7)
NEUTROPHILS NFR BLD AUTO: 49.3 % (ref 42.7–76)
NRBC BLD AUTO-RTO: 0 /100 WBC (ref 0–0.2)
PHOSPHATE SERPL-MCNC: 3 MG/DL (ref 2.5–4.5)
PLATELET # BLD AUTO: 184 10*3/MM3 (ref 140–450)
PMV BLD AUTO: 10 FL (ref 6–12)
POTASSIUM SERPL-SCNC: 4.1 MMOL/L (ref 3.5–5.2)
RBC # BLD AUTO: 2.67 10*6/MM3 (ref 3.77–5.28)
SODIUM SERPL-SCNC: 137 MMOL/L (ref 136–145)
WBC NRBC COR # BLD AUTO: 4.04 10*3/MM3 (ref 3.4–10.8)

## 2025-07-11 PROCEDURE — 92960 CARDIOVERSION ELECTRIC EXT: CPT | Performed by: INTERNAL MEDICINE

## 2025-07-11 PROCEDURE — 82948 REAGENT STRIP/BLOOD GLUCOSE: CPT

## 2025-07-11 PROCEDURE — 80048 BASIC METABOLIC PNL TOTAL CA: CPT

## 2025-07-11 PROCEDURE — 93320 DOPPLER ECHO COMPLETE: CPT | Performed by: INTERNAL MEDICINE

## 2025-07-11 PROCEDURE — 25010000002 PHENYLEPHRINE 10 MG/ML SOLUTION

## 2025-07-11 PROCEDURE — 93312 ECHO TRANSESOPHAGEAL: CPT | Performed by: INTERNAL MEDICINE

## 2025-07-11 PROCEDURE — 84100 ASSAY OF PHOSPHORUS: CPT

## 2025-07-11 PROCEDURE — B246ZZZ ULTRASONOGRAPHY OF RIGHT AND LEFT HEART: ICD-10-PCS | Performed by: INTERNAL MEDICINE

## 2025-07-11 PROCEDURE — 92960 CARDIOVERSION ELECTRIC EXT: CPT

## 2025-07-11 PROCEDURE — 93005 ELECTROCARDIOGRAM TRACING: CPT | Performed by: INTERNAL MEDICINE

## 2025-07-11 PROCEDURE — 83735 ASSAY OF MAGNESIUM: CPT

## 2025-07-11 PROCEDURE — 93320 DOPPLER ECHO COMPLETE: CPT

## 2025-07-11 PROCEDURE — 97162 PT EVAL MOD COMPLEX 30 MIN: CPT

## 2025-07-11 PROCEDURE — 25010000002 PROPOFOL 10 MG/ML EMULSION

## 2025-07-11 PROCEDURE — 85025 COMPLETE CBC W/AUTO DIFF WBC: CPT

## 2025-07-11 PROCEDURE — 93010 ELECTROCARDIOGRAM REPORT: CPT | Performed by: INTERNAL MEDICINE

## 2025-07-11 PROCEDURE — 93325 DOPPLER ECHO COLOR FLOW MAPG: CPT

## 2025-07-11 PROCEDURE — 25810000003 SODIUM CHLORIDE 0.9 % SOLUTION

## 2025-07-11 PROCEDURE — 5A2204Z RESTORATION OF CARDIAC RHYTHM, SINGLE: ICD-10-PCS | Performed by: INTERNAL MEDICINE

## 2025-07-11 PROCEDURE — 99222 1ST HOSP IP/OBS MODERATE 55: CPT | Performed by: INTERNAL MEDICINE

## 2025-07-11 PROCEDURE — 25010000002 LIDOCAINE PF 2% 2 % SOLUTION

## 2025-07-11 PROCEDURE — 93325 DOPPLER ECHO COLOR FLOW MAPG: CPT | Performed by: INTERNAL MEDICINE

## 2025-07-11 RX ORDER — DOFETILIDE 0.25 MG/1
250 CAPSULE ORAL EVERY 12 HOURS
Status: DISCONTINUED | OUTPATIENT
Start: 2025-07-11 | End: 2025-07-17 | Stop reason: HOSPADM

## 2025-07-11 RX ORDER — SODIUM CHLORIDE 9 MG/ML
INJECTION, SOLUTION INTRAVENOUS CONTINUOUS PRN
Status: DISCONTINUED | OUTPATIENT
Start: 2025-07-11 | End: 2025-07-11 | Stop reason: SURG

## 2025-07-11 RX ORDER — PHENYLEPHRINE HYDROCHLORIDE 10 MG/ML
INJECTION INTRAVENOUS AS NEEDED
Status: DISCONTINUED | OUTPATIENT
Start: 2025-07-11 | End: 2025-07-11 | Stop reason: SURG

## 2025-07-11 RX ORDER — LIDOCAINE HYDROCHLORIDE 20 MG/ML
INJECTION, SOLUTION EPIDURAL; INFILTRATION; INTRACAUDAL; PERINEURAL AS NEEDED
Status: DISCONTINUED | OUTPATIENT
Start: 2025-07-11 | End: 2025-07-11 | Stop reason: SURG

## 2025-07-11 RX ORDER — PROPOFOL 10 MG/ML
VIAL (ML) INTRAVENOUS AS NEEDED
Status: DISCONTINUED | OUTPATIENT
Start: 2025-07-11 | End: 2025-07-11 | Stop reason: SURG

## 2025-07-11 RX ADMIN — PHENYLEPHRINE HYDROCHLORIDE 200 MCG: 10 INJECTION INTRAVENOUS at 09:50

## 2025-07-11 RX ADMIN — Medication 10 ML: at 07:31

## 2025-07-11 RX ADMIN — Medication 10 ML: at 21:10

## 2025-07-11 RX ADMIN — PROPOFOL 60 MG: 10 INJECTION, EMULSION INTRAVENOUS at 09:44

## 2025-07-11 RX ADMIN — PHENYLEPHRINE HYDROCHLORIDE 100 MCG: 10 INJECTION INTRAVENOUS at 09:49

## 2025-07-11 RX ADMIN — ATORVASTATIN CALCIUM 20 MG: 20 TABLET, FILM COATED ORAL at 12:00

## 2025-07-11 RX ADMIN — LIDOCAINE HYDROCHLORIDE 100 MG: 20 INJECTION, SOLUTION EPIDURAL; INFILTRATION; INTRACAUDAL; PERINEURAL at 09:44

## 2025-07-11 RX ADMIN — PHENYLEPHRINE HYDROCHLORIDE 200 MCG: 10 INJECTION INTRAVENOUS at 09:59

## 2025-07-11 RX ADMIN — RIVAROXABAN 15 MG: 15 TABLET, FILM COATED ORAL at 16:44

## 2025-07-11 RX ADMIN — MIDODRINE HYDROCHLORIDE 5 MG: 5 TABLET ORAL at 12:00

## 2025-07-11 RX ADMIN — DOFETILIDE 250 MCG: 0.25 CAPSULE ORAL at 21:09

## 2025-07-11 RX ADMIN — PROPOFOL 100 MCG/KG/MIN: 10 INJECTION, EMULSION INTRAVENOUS at 09:45

## 2025-07-11 RX ADMIN — SODIUM CHLORIDE: 9 INJECTION, SOLUTION INTRAVENOUS at 09:41

## 2025-07-11 NOTE — SIGNIFICANT NOTE
07/11/25 0917   OTHER   Discipline physical therapist   Rehab Time/Intention   Session Not Performed other (see comments)  (pending HERBERT with cardioversion; will check back as able)   Therapy Assessment/Plan (PT)   Criteria for Skilled Interventions Met (PT) yes;meets criteria;skilled treatment is necessary   Recommendation   PT - Next Appointment 07/12/25

## 2025-07-11 NOTE — PLAN OF CARE
Goal Outcome Evaluation:  Plan of Care Reviewed With: patient        Progress: no change  Outcome Evaluation: HERBERT/cardioversion attempted this AM, but went back into a-fib very shortly after procedure. Scheduled for ablation next week. Amb. with SBA & use of RWX. PT to eval. Does have some c/o dizziness upon standing. B/P cont. to be soft. Remains on midodrine.

## 2025-07-11 NOTE — ANESTHESIA POSTPROCEDURE EVALUATION
Patient: Faby Le    Procedure Summary       Date: 07/11/25 Room / Location: [facility]    Anesthesia Start: 0941 Anesthesia Stop: 1006    Procedures:       ADULT TRANSESOPHAGEAL ECHO (HERBERT) W/ CONT IF NECESSARY PER PROTOCOL      CARDIOVERSION EXTERNAL IN CARDIOLOGY DEPARTMENT Diagnosis:       (Guidance for Cardiac Intervention)      ()      (atrial fibrillation)    Scheduled Providers: Daniel Norris DO Provider: Ramon Le MD    Anesthesia Type: MAC ASA Status: 3            Anesthesia Type: MAC    Vitals  Vitals Value Taken Time   /43 07/11/25 12:01   Temp     Pulse 110 07/11/25 12:36   Resp 12 07/11/25 10:18   SpO2 99 % 07/11/25 12:02   Vitals shown include unfiled device data.        Post Anesthesia Care and Evaluation    Patient location during evaluation: PACU  Patient participation: complete - patient participated  Level of consciousness: awake  Pain scale: See nurse's notes for pain score.  Pain management: adequate    Airway patency: patent  Anesthetic complications: No anesthetic complications  PONV Status: none  Cardiovascular status: acceptable  Respiratory status: acceptable and spontaneous ventilation  Hydration status: acceptable    Comments: Patient seen and examined postoperatively; vital signs stable; SpO2 greater than or equal to 90%; cardiopulmonary status stable; nausea/vomiting adequately controlled; pain adequately controlled; no apparent anesthesia complications; patient discharged from anesthesia care when discharge criteria were met

## 2025-07-11 NOTE — THERAPY EVALUATION
Patient Name: Faby Le  : 1945    MRN: [id number]                              Today's Date: 2025       Admit Date: 2025    Visit Dx:     ICD-10-CM ICD-9-CM   1. Atrial fibrillation with RVR  I48.91 427.31   2. Hypervolemia, unspecified hypervolemia type  E87.70 276.69     Patient Active Problem List   Diagnosis    Abnormal complete blood count    Ankle pain    Arthralgia of left elbow    Arthritis    B12 deficiency    Coronary artery disease    Depression    Gallstones    Gout    Hyperglycemia    Hyperlipidemia    Hypertension    Hypothyroid    Low back pain    Mobility poor    Post-menopausal    Sleep apnea    Vitamin D deficiency    Chronic kidney disease, stage 3 (moderate)    Oropharyngeal dysphagia    Class 3 drug-induced obesity with serious comorbidity and body mass index (BMI) of 45.0 to 49.9 in adult    Chronic pain of right knee    Light headedness    Paroxysmal atrial fibrillation    Chronic left shoulder pain    Status post knee replacement    Syncope    Presence of cardiac pacemaker    Chronic diastolic heart failure    Paresthesia of left foot    TIA (transient ischemic attack)    Tachy-jojo syndrome    Anemia    Dizziness    Acute CHF    Acute exacerbation of CHF (congestive heart failure)    Chronic HFrEF (heart failure with reduced ejection fraction)    Endometrial carcinoma    Symptomatic hypotension    Orthostatic hypotension    Atrial fibrillation with RVR    Osteoarthritis of knee    Osteoarthritis of left glenohumeral joint    Atrial fibrillation with rapid ventricular response    Atrial fibrillation    Internal and external hemorrhoids without complication    Benign neoplasm of colon    Kidney stones    Long term (current) use of anticoagulants    Iron deficiency    Postoperative pain of knee    S/P revision of total knee    Orthostasis     Past Medical History:   Diagnosis Date    Ankle pain     Arthritis     Atrial fibrillation     Coronary artery disease      Depression     Disease of thyroid gland     Gout     Hyperglycemia     Hyperlipidemia     Hypertension     Low back pain     Sleep apnea     Vitamin D deficiency      Past Surgical History:   Procedure Laterality Date    ANKLE FUSION      2017-left    CARDIAC CATHETERIZATION      CARDIAC CATHETERIZATION Right 08/29/2022    Procedure: Left Heart Cath;  Surgeon: Ratna Zavala MD;  Location: [facility] CATH INVASIVE LOCATION;  Service: Cardiovascular;  Laterality: Right;    CARDIAC ELECTROPHYSIOLOGY PROCEDURE Left 07/03/2023    Procedure: Pacemaker DC new [place] Notified;  Surgeon: Cr Herrera MD;  Location: [facility] CATH INVASIVE LOCATION;  Service: Cardiovascular;  Laterality: Left;    CARDIAC ELECTROPHYSIOLOGY PROCEDURE Right 03/05/2024    Procedure: Ablation atrial fibrillation, Cryo Slalonde and Wayne Igor notified 02/09/24;  Surgeon: Cr Herrera MD;  Location: [facility] CATH INVASIVE LOCATION;  Service: Cardiovascular;  Laterality: Right;    CARDIAC ELECTROPHYSIOLOGY PROCEDURE N/A 03/05/2024    Procedure: Cardioversion;  Surgeon: Cr Herrera MD;  Location: [facility] CATH INVASIVE LOCATION;  Service: Cardiovascular;  Laterality: N/A;    CARPAL TUNNEL RELEASE Right     CATARACT EXTRACTION      CUBITAL TUNNEL RELEASE Right     ENDOSCOPY N/A 05/28/2024    Procedure: ESOPHAGOGASTRODUODENOSCOPY WITH COLD FORCEP BIOPSY X1 AREA;  Surgeon: Josh Loomis MD;  Location: [facility] ENDOSCOPY;  Service: Gastroenterology;  Laterality: N/A;  Post- ESOPHAGITIS, GASTRITIS, HIATAL HERNIA    HYSTERECTOMY      REPLACEMENT TOTAL KNEE      left 2008    ROTATOR CUFF REPAIR Right     TOTAL KNEE ARTHROPLASTY Right 05/04/2023    Procedure: TOTAL KNEE ARTHROPLASTY WITH CORI ROBOT;  Surgeon: Chino Herrera II, MD;  Location: [facility] MAIN OR;  Service: Robotics - Ortho;  Laterality: Right;    TOTAL KNEE ARTHROPLASTY REVISION Right 6/5/2025    Procedure: TOTAL KNEE ARTHROPLASTY REVISION WITH CORI  ROBOT;  Surgeon: Chino Herrera II, MD;  Location: [facility] MAIN OR;  Service: Robotics - Ortho;  Laterality: Right;      General Information       Row Name 07/11/25 1230          Physical Therapy Time and Intention    Document Type evaluation  -RR     Mode of Treatment physical therapy  -RR       Row Name 07/11/25 1230          General Information    Patient Profile Reviewed yes  -RR     Prior Level of Function independent:;all household mobility;gait;transfer;ADL's;dressing;bathing  uses RW for mobility, owns power mobility for use outside of apartment, - falls  -RR     Existing Precautions/Restrictions fall;cardiac  tachycardic, unsuccessful cardioversion this AM, planned for ablation next week  -RR     Barriers to Rehab medically complex;previous functional deficit  -RR       Row Name 07/11/25 1230          Living Environment    Current Living Arrangements assisted living facility  [facility] and rehab after knee replacement but returned to [facility] prior to this hospitalization  -RR     People in Home facility resident  -RR       Row Name 07/11/25 1230          Home Main Entrance    Number of Stairs, Main Entrance none  -RR       Row Name 07/11/25 1230          Stairs Within Home, Primary    Number of Stairs, Within Home, Primary none  -RR       Row Name 07/11/25 1230          Cognition    Orientation Status (Cognition) oriented x 4  -RR       Row Name 07/11/25 1247          Safety Issues/Impairments Affecting Functional Mobility    Impairments Affecting Function (Mobility) endurance/activity tolerance  -RR               User Key  (r) = Recorded By, (t) = Taken By, (c) = Cosigned By      Initials Name Provider Type    RR Ana Spann, MATEUS Physical Therapist                   Mobility       Row Name 07/11/25 1248          Bed Mobility    Comment, (Bed Mobility) found up in chair and left up in chair  -RR       Row Name 07/11/25 1248          Bed-Chair Transfer    Bed-Chair [place] (Transfers)  supervision  -RR     Assistive Device (Bed-Chair Transfers) walker, front-wheeled  -RR       Row Name 07/11/25 1248          Sit-Stand Transfer    Sit-Stand [place] (Transfers) supervision  -RR     Assistive Device (Sit-Stand Transfers) walker, front-wheeled  -RR       Row Name 07/11/25 1248          Gait/Stairs (Locomotion)    [place] Level (Gait) supervision  -RR     Assistive Device (Gait) walker, front-wheeled  -RR     Patient was able to Ambulate yes  -RR     Distance in Feet (Gait) 10  limited due to HR elevation  -RR     Deviations/Abnormal Patterns (Gait) base of support, wide  -RR     [place] Level (Stairs) not tested  -RR               User Key  (r) = Recorded By, (t) = Taken By, (c) = Cosigned By      Initials Name Provider Type    RR Ana Spann PT Physical Therapist                   Obj/Interventions       Row Name 07/11/25 1249          Range of Motion Comprehensive    Comment, General Range of Motion R knee 0 to>90*  -RR       Row Name 07/11/25 1249          Balance    Balance Assessment sitting static balance;sitting dynamic balance;standing static balance;standing dynamic balance  -RR     Static Sitting Balance independent  -RR     Dynamic Sitting Balance independent  -RR     Position, Sitting Balance unsupported  -RR     Static Standing Balance modified independence  -RR     Dynamic Standing Balance supervision  -RR     Position/Device Used, Standing Balance supported  -RR       Row Name 07/11/25 1249          Sensory Assessment (Somatosensory)    Sensory Assessment (Somatosensory) sensation intact  -RR               User Key  (r) = Recorded By, (t) = Taken By, (c) = Cosigned By      Initials Name Provider Type    Ana Thurston PT Physical Therapist                   Goals/Plan       Row Name 07/11/25 1259          Bed Mobility Goal 1 (PT)    Activity/Assistive Device (Bed Mobility Goal 1, PT) bed mobility activities, all  -RR     [place] Level/Cues Needed (Bed  Mobility Goal 1, PT) modified independence  -RR     Time Frame (Bed Mobility Goal 1, PT) long term goal (LTG);2 weeks  -RR       Row Name 07/11/25 1259          Transfer Goal 1 (PT)    Activity/Assistive Device (Transfer Goal 1, PT) transfers, all  -RR     [place] Level/Cues Needed (Transfer Goal 1, PT) modified independence  -RR     Time Frame (Transfer Goal 1, PT) long term goal (LTG);2 weeks  -RR       Row Name 07/11/25 1252          Gait Training Goal 1 (PT)    Activity/Assistive Device (Gait Training Goal 1, PT) gait (walking locomotion);walker, rolling  -RR     [place] Level (Gait Training Goal 1, PT) modified independence  -RR     Distance (Gait Training Goal 1, PT) 75'  -RR     Time Frame (Gait Training Goal 1, PT) long term goal (LTG);2 weeks  -RR       Row Name 07/11/25 1255          Therapy Assessment/Plan (PT)    Planned Therapy Interventions (PT) balance training;bed mobility training;gait training;home exercise program;transfer training;strengthening;patient/family education;neuromuscular re-education;postural re-education  -RR               User Key  (r) = Recorded By, (t) = Taken By, (c) = Cosigned By      Initials Name Provider Type    RR Ana Spann, PT Physical Therapist                   Clinical Impression       Row Name 07/11/25 1234          Pain    Pretreatment Pain Rating 0/10 - no pain  -RR     Posttreatment Pain Rating 0/10 - no pain  -RR       Row Name 07/11/25 1234          Plan of Care Review    Plan of Care Reviewed With patient  -RR     Outcome Evaluation Faby Le is a 80 y.o. female with a [facility] of paroxysmal atrial fibrillation on Xarelto, nonobstructive CAD, SSS with dual-chamber pacemaker, HFpEF, hypertension, hyperlipidemia, ROSA on CPAP, CKD stage III, hypothyroidism, gout, arthritis who presented to [facility] on 7/9/2025 with generalized weakness, shortness of breath, elevated heart rate.  Per chart review, patient was admitted to [facility] from 6/5/2025  to 6/11/2025 for an elective total right knee arthroplasty revision; dc to SNF but had returned to residential. CTA chest showed no evidence of pulmonary embolism.  Patient is cleared for therapy by nursing and she is found up in chair pleasant and agreeable A&Ox4.  She had a HERBERT with cardioversion this AM but was unsuccessful and an ablation is planned for next week.  In her nomral state, she is independent within her [facility]ZZY with use of RW; utilizes power mobility outside apartment, no falls.  She is [place] with ADLs and self care.  Upon evaluation, she requires no more than SBA-CGA for standing, functional transfers, dynamic standing, and short distance gait with RW.  Gait distance limited due to tachycardia which resolves with seated rest.  In her current mobility state she will be appropriate for return to [facility], will follow while IP as she is high risk for mobility decompensation while IP.  PT will follow to address LE ROM, strength, balance, and progressive mobility training, as well as to advise in discharge disposition as it relates to her mobility status.  -RR       Row Name 07/11/25 1234          Therapy Assessment/Plan (PT)    Patient/Family Therapy Goals Statement (PT) return to [facility]  -RR     Rehab Potential (PT) good  -RR     Criteria for Skilled Interventions Met (PT) yes;meets criteria;skilled treatment is necessary  -RR     Therapy Frequency (PT) 5 times/wk  -RR     Predicted Duration of Therapy Intervention (PT) dc  -RR       Row Name 07/11/25 1234          Vital Signs    Pre Systolic BP Rehab 111  -RR     Pre Treatment Diastolic BP 43  -RR     Intra Systolic BP Rehab 159  -RR     Intra Treatment Diastolic BP 94  -RR     Pretreatment Heart Rate (beats/min) 75  -RR     Intratreatment Heart Rate (beats/min) 138  -RR     Posttreatment Heart Rate (beats/min) 73  -RR       Row Name 07/11/25 1234          Positioning and Restraints    Post Treatment Position chair  -RR               User Key  (r) = Recorded By, (t) =  Taken By, (c) = Cosigned By      Initials Name Provider Type    RR Ana Spann, PT Physical Therapist                   Outcome Measures       Row Name 07/11/25 1259 07/11/25 0800       How much help from another person do you currently need...    Turning from your back to your side while in flat bed without using bedrails? 4  -RR 4  -JW    Moving from lying on back to sitting on the side of a flat bed without bedrails? 4  -RR 4  -JW    Moving to and from a bed to a chair (including a wheelchair)? 3  -RR 3  -JW    Standing up from a chair using your arms (e.g., wheelchair, bedside chair)? 3  -RR 3  -JW    Climbing 3-5 steps with a railing? 3  -RR 2  -JW    To walk in hospital room? 3  -RR 3  -JW    AM-PAC 6 Clicks Score (PT) 20  -RR 19  -JW              User Key  (r) = Recorded By, (t) = Taken By, (c) = Cosigned By      Initials Name Provider Type     Saturnino Leiva, RN Registered Nurse    Ana Thurston, PT Physical Therapist                      Patient instructed in seated: AP, LAQ, QS, heel slides x10 reps R LE           Physical Therapy Education       Title: PT OT SLP Therapies (Done)       Topic: Physical Therapy (Done)       Point: Mobility training (Done)       Learning Progress Summary            Patient Acceptance, E,TB, VU by  at 7/11/2025 1259                      Point: Home exercise program (Done)       Learning Progress Summary            Patient Acceptance, E,TB, VU by  at 7/11/2025 1259                                      User Key       Initials Effective Dates Name Provider Type Discipline     07/01/24 -  Ana Spann PT Physical Therapist PT                  PT Recommendation and Plan  Planned Therapy Interventions (PT): balance training, bed mobility training, gait training, home exercise program, transfer training, strengthening, patient/family education, neuromuscular re-education, postural re-education  Outcome Evaluation: Faby Le is a 80 y.o. female with a [facility]  of paroxysmal atrial fibrillation on Xarelto, nonobstructive CAD, SSS with dual-chamber pacemaker, HFpEF, hypertension, hyperlipidemia, ROSA on CPAP, CKD stage III, hypothyroidism, gout, arthritis who presented to [facility] on 7/9/2025 with generalized weakness, shortness of breath, elevated heart rate.  Per chart review, patient was admitted to [facility] from 6/5/2025 to 6/11/2025 for an elective total right knee arthroplasty revision; dc to SNF but had returned to [facility]. CTA chest showed no evidence of pulmonary embolism.  Patient is cleared for therapy by nursing and she is found up in chair pleasant and agreeable A&Ox4.  She had a HERBERT with cardioversion this AM but was unsuccessful and an ablation is planned for next week.  In her nomral state, she is independent within her longterm with use of RW; utilizes power mobility outside apartment, no falls.  She is [place] with ADLs and self care.  Upon evaluation, she requires no more than SBA-CGA for standing, functional transfers, dynamic standing, and short distance gait with RW.  Gait distance limited due to tachycardia which resolves with seated rest.  In her current mobility state she will be appropriate for return to [facility], will follow while IP as she is high risk for mobility decompensation while IP.  PT will follow to address LE ROM, strength, balance, and progressive mobility training, as well as to advise in discharge disposition as it relates to her mobility status.     Time Calculation:         PT Charges       Row Name 07/11/25 1301 07/11/25 0917          Time Calculation    Start Time 1228  -RR --     Stop Time 1245  -RR --     Time Calculation (min) 17 min  -RR --     PT Received On 07/11/25  -RR --     PT - Next Appointment 07/12/25  -RR 07/12/25  -RR     PT Goal Re-Cert Due Date 07/25/25  -RR --               User Key  (r) = Recorded By, (t) = Taken By, (c) = Cosigned By      Initials Name Provider Type    Ana Thurston, PT Physical Therapist                   Therapy Charges for Today       Code Description Service Date Service Provider Modifiers Qty    96303979934 HC PT EVAL MOD COMPLEXITY 4 7/11/2025 Ana Spann, PT GP 1            PT G-Codes  AM-PAC 6 Clicks Score (PT): 20  PT Discharge Summary  Anticipated Discharge Disposition (PT): assisted living, home with home health    Ana Spann, PT  7/11/2025

## 2025-07-11 NOTE — CONSULTS
HP      Name: Faby Le ADMIT: 2025   : 1945  PCP: Leonor Adam MD    MRN: [id number] LOS: 2 days   AGE/SEX: 80 y.o. female  ROOM: 273/B     Chief Complaint   Patient presents with    Weakness - Generalized     Pt reports intermittent dizziness with fatigue and general weakness since the weekend, elevated HR today, SOA with ambulation. Denies any CP.       Subjective        History of present illness  Faby Le is a 80-year-old female patient who has atrial fibrillation and atypical atrial flutter, has a dual-chamber pacemaker, she had complex arrhythmia ablation with Dr. Herrera on 3/5/2024.  Patient had recurrence of arrhythmia in 2024 and Tikosyn was started at 1250 mcg every 12 hours.  Patient self converted to sinus rhythm with Tikosyn.  Patient had been in sinus rhythm up until a few days ago and she presented to the hospital in A-fib with rapid ventricular rates.  Cardioversion was performed on 2025 but briefly after arrhythmia recurred.  EP was consulted for further recommendations.    Past Medical History:   Diagnosis Date    Ankle pain     Arthritis     Atrial fibrillation     Coronary artery disease     Depression     Disease of thyroid gland     Gout     Hyperglycemia     Hyperlipidemia     Hypertension     Low back pain     Sleep apnea     Vitamin D deficiency      Past Surgical History:   Procedure Laterality Date    ANKLE FUSION      -left    CARDIAC CATHETERIZATION      CARDIAC CATHETERIZATION Right 2022    Procedure: Left Heart Cath;  Surgeon: Ratna Zavala MD;  Location: [facility] CATH INVASIVE LOCATION;  Service: Cardiovascular;  Laterality: Right;    CARDIAC ELECTROPHYSIOLOGY PROCEDURE Left 2023    Procedure: Pacemaker DC new [place] Notified;  Surgeon: Cr Herrera MD;  Location: [facility] CATH INVASIVE LOCATION;  Service: Cardiovascular;  Laterality: Left;    CARDIAC ELECTROPHYSIOLOGY PROCEDURE Right 2024    Procedure:  Ablation atrial fibrillation, Cryo Ibrahima and Wayne Costa notified 02/09/24;  Surgeon: Cr Herrera MD;  Location: [facility] CATH INVASIVE LOCATION;  Service: Cardiovascular;  Laterality: Right;    CARDIAC ELECTROPHYSIOLOGY PROCEDURE N/A 03/05/2024    Procedure: Cardioversion;  Surgeon: Cr Herrera MD;  Location: [facility] CATH INVASIVE LOCATION;  Service: Cardiovascular;  Laterality: N/A;    CARPAL TUNNEL RELEASE Right     CATARACT EXTRACTION      CUBITAL TUNNEL RELEASE Right     ENDOSCOPY N/A 05/28/2024    Procedure: ESOPHAGOGASTRODUODENOSCOPY WITH COLD FORCEP BIOPSY X1 AREA;  Surgeon: Josh Loomis MD;  Location: [facility] ENDOSCOPY;  Service: Gastroenterology;  Laterality: N/A;  Post- ESOPHAGITIS, GASTRITIS, HIATAL HERNIA    HYSTERECTOMY      REPLACEMENT TOTAL KNEE      left 2008    ROTATOR CUFF REPAIR Right     TOTAL KNEE ARTHROPLASTY Right 05/04/2023    Procedure: TOTAL KNEE ARTHROPLASTY WITH CORI ROBOT;  Surgeon: Chino Herrera II, MD;  Location: [facility] MAIN OR;  Service: Robotics - Ortho;  Laterality: Right;    TOTAL KNEE ARTHROPLASTY REVISION Right 6/5/2025    Procedure: TOTAL KNEE ARTHROPLASTY REVISION WITH CORI ROBOT;  Surgeon: Chino Herrera II, MD;  Location: [facility] MAIN OR;  Service: Robotics - Ortho;  Laterality: Right;     Family History   Problem Relation Age of Onset    Hypertension Mother     Stroke Father     Arthritis Brother     Cancer Brother      Social History     Tobacco Use    Smoking status: Never     Passive exposure: Never    Smokeless tobacco: Never   Vaping Use    Vaping status: Never Used   Substance Use Topics    Alcohol use: No    Drug use: No     Medications Prior to Admission   Medication Sig Dispense Refill Last Dose/Taking    acetaminophen (TYLENOL) 325 MG tablet Take 2 tablets by mouth Every 6 (Six) Hours As Needed for Mild Pain.       allopurinol (ZYLOPRIM) 300 MG tablet Take 1 tablet by mouth once daily 90 tablet 0     atorvastatin  (LIPITOR) 20 MG tablet Take 1 tablet by mouth once daily 270 tablet 0     azelastine (ASTELIN) 0.1 % nasal spray Administer 2 sprays into the nostril(s) as directed by provider Daily. Use in each nostril as directed       cetirizine (zyrTEC) 10 MG tablet Take 1 tablet by mouth Daily As Needed for Allergies.       cyanocobalamin (VITAMIN B-12) 1000 MCG tablet Take 1 tablet by mouth Daily.       docusate sodium (COLACE) 100 MG capsule Take 1 capsule by mouth 2 (Two) Times a Day.       dofetilide (Tikosyn) 250 MCG capsule Take 1 capsule by mouth Every 12 (Twelve) Hours. 180 capsule 3     ferrous sulfate 325 (65 FE) MG tablet Take 1 tablet by mouth Daily With Breakfast.       levothyroxine (SYNTHROID, LEVOTHROID) 88 MCG tablet Take 1 tablet by mouth once daily 90 tablet 0     metoprolol succinate XL (TOPROL-XL) 25 MG 24 hr tablet Take 1 tablet by mouth Daily for 360 days. 90 tablet 3     midodrine (PROAMATINE) 5 MG tablet TAKE 1 TABLET BY MOUTH TWICE DAILY BEFORE MEAL(S) 90 tablet 0     montelukast (SINGULAIR) 10 MG tablet Take 1 tablet by mouth Every Night. 90 tablet 0     Mounjaro 2.5 MG/0.5ML solution auto-injector Inject 5 mg into the appropriate muscle as directed by prescriber 1 (One) Time Per Week.       multivitamin (MULTI VITAMIN PO) Take 1 tablet by mouth Daily. 30 tablet 0     multivitamin with minerals tablet tablet Take 1 tablet by mouth Daily.       mupirocin (BACTROBAN) 2 % ointment APPLY SMALL AMOUNT OF OINTMENT TOPICALLY TO AFFECTED AREA TWICE DAILY       ondansetron (Zofran) 4 MG tablet Take 1 tablet by mouth Every 6 (Six) Hours As Needed for Nausea or Vomiting. 30 tablet 0     oxyCODONE-acetaminophen (PERCOCET) 5-325 MG per tablet Take 1 tablet by mouth Every 4 (Four) Hours As Needed for Severe Pain. 50 tablet 0     pantoprazole (PROTONIX) 40 MG EC tablet Take 1 tablet by mouth Every Morning.       rivaroxaban (XARELTO) 15 MG tablet Take 1 tablet by mouth Daily With Dinner. Indications: Atrial  Fibrillation 30 tablet 2     triamcinolone (KENALOG) 0.1 % cream APPLY CREAM EXTERNALLY TWICE DAILY TO ECZEMA FOR UP TO 2 WEEKS PER MONTH AS NEEDED       Vitamin D, Cholecalciferol, 25 MCG (1000 UT) tablet Take 1 tablet by mouth Daily.        Allergies:  Celecoxib    Review of systems    Constitutional: Negative.    Respiratory and cardiovascular: As detailed in HPI section.  Gastrointestinal: Negative for constipation, nausea and vomiting negative for abdominal distention, abdominal pain and diarrhea.   Genitourinary: Negative for difficulty urinating and flank pain.   Musculoskeletal: Negative for arthralgias, joint swelling and myalgias.   Skin: Negative for color change, rash and wound.   Neurological: Negative for dizziness, syncope, weakness and headaches.   Hematological: Negative for adenopathy.   Psychiatric/Behavioral: Negative for confusion.   All other systems reviewed and are negative.       Physical Exam  VITALS REVIEWED    General:      well developed, in no acute distress.    Head:      normocephalic and atraumatic.    Eyes:      PERRL/EOM intact, conjunctiva and sclera clear with out nystagmus.    Neck:      no masses, thyromegaly,  trachea central with normal respiratory effort and PMI displaced laterally  Lungs:      Clear to auscultation bilaterally  Heart:       Irregular rhythm  Msk:      no deformity or scoliosis noted of thoracic or lumbar spine.    Pulses:      pulses normal in all 4 extremities.    Extremities:       No extremity edema  Neurologic:      no focal deficits.   alert oriented x3  Skin:      intact without lesions or rashes.    Psych:      alert and cooperative; normal mood and affect; normal attention span and concentration.      Result Review :               Pertinent cardiac workup    EKG 7/10/2025 atypical atrial flutter with variable block      Assessment and Plan         Atrial fibrillation with RVR      Faby Le is a 80-year-old female patient, has sick sinus  syndrome, dual-chamber pacemaker, has A-fib and atrial flutter, onset seems to be around August 2022.  Patient underwent ablation with Dr. Herrera on 3/5/2024 with cryoablation of pulmonary veins, also left atrial posterior wall.  She had recurrence in June 2024 and was placed on Tikosyn, had self converted to sinus rhythm up until a few days ago.    The arrhythmia seems to be atypical atrial flutter.  Cardioversion was performed on 7/11/2025 but briefly after, arrhythmia recurred again.  I performed a device interrogation after the cardioversion and she was in atrial flutter.    I talked to the patient and her family about management options.  I do not think performing another cardioversion would be helpful, increasing Tikosyn to 500 mg can be considered but I think that it would not be all that advisable in her case.    Therefore the 2 reasonable options are redo arrhythmia ablation versus upgrading her pacemaker to a BiV device and AV node ablation.  Patient chose to perform another arrhythmia ablation.    She would like to stay in the hospital until the ablation is done since her heart rate is elevated and she is symptomatic.  We can do this next Wednesday.  Risk indications and benefits for A-fib/flutter ablation were discussed at length with her and she is agreeable.    The meantime please continue Tikosyn and also resume her home anticoagulation with Xarelto.    Please do not administer Mounjaro while she is in the hospital as that will halt anesthesia procedures.        No follow-ups on file.  Patient was given instructions and counseling regarding her condition or for health maintenance advice. Please see specific information pulled into the AVS if appropriate.        Electronically signed by Pacheco Aleman MD, 07/11/25, 11:39 AM EDT.

## 2025-07-11 NOTE — ANESTHESIA PREPROCEDURE EVALUATION
Anesthesia Evaluation     Patient summary reviewed and Nursing notes reviewed   NPO Solid Status: > 8 hours  NPO Liquid Status: > 8 hours           Airway   Mallampati: II  TM distance: >3 FB  Neck ROM: full  No difficulty expected  Dental    (+) poor dentition    Pulmonary    (+) ,sleep apnea  Cardiovascular     (+) hypertension, CAD, dysrhythmias Atrial Fib, CHF , hyperlipidemia      Neuro/Psych  (+) TIA, syncope, psychiatric history  GI/Hepatic/Renal/Endo    (+) morbid obesity, renal disease-, thyroid problem     Musculoskeletal     Abdominal    Substance History      OB/GYN          Other   arthritis,   history of cancer    ROS/Med Hx Other: ·  Left ventricular ejection fraction appears to be 56 - 60%.  ·  Left ventricular diastolic function is consistent with (grade III w/high LAP) reversible restrictive pattern.  ·  Left ventricular peak systolic GLS is abnormal at -15%.                   Anesthesia Plan    ASA 3     MAC     intravenous induction     Anesthetic plan, risks, benefits, and alternatives have been provided, discussed and informed consent has been obtained with: patient.    Plan discussed with CRNA.    CODE STATUS:    Code Status (Patient has no pulse and is not breathing): CPR (Attempt to Resuscitate)  Medical Interventions (Patient has pulse or is breathing): Full Support

## 2025-07-11 NOTE — PLAN OF CARE
Goal Outcome Evaluation:  Plan of Care Reviewed With: patient           Outcome Evaluation: Faby Le is a 80 y.o. female with a CMH of paroxysmal atrial fibrillation on Xarelto, nonobstructive CAD, SSS with dual-chamber pacemaker, HFpEF, hypertension, hyperlipidemia, ROSA on CPAP, CKD stage III, hypothyroidism, gout, arthritis who presented to [facility] on 7/9/2025 with generalized weakness, shortness of breath, elevated heart rate.  Per chart review, patient was admitted to [facility] from 6/5/2025 to 6/11/2025 for an elective total right knee arthroplasty revision; dc to SNF but had returned to long-term. CTA chest showed no evidence of pulmonary embolism.  Patient is cleared for therapy by nursing and she is found up in chair pleasant and agreeable A&Ox4.  She had a HERBERT with cardioversion this AM but was unsuccessful and an ablation is planned for next week.  In her nomral state, she is independent within her JAZZY with use of RW; utilizes power mobility outside apartment, no falls.  She is Kerry with ADLs and self care.  Upon evaluation, she requires no more than SBA-CGA for standing, functional transfers, dynamic standing, and short distance gait with RW.  Gait distance limited due to tachycardia which resolves with seated rest.  In her current mobility state she will be appropriate for return to long-term, will follow while IP as she is high risk for mobility decompensation while IP.  PT will follow to address LE ROM, strength, balance, and progressive mobility training, as well as to advise in discharge disposition as it relates to her mobility status.    Anticipated Discharge Disposition (PT): assisted living, home with home health

## 2025-07-11 NOTE — PLAN OF CARE
Problem: Adult Inpatient Plan of Care  Goal: Absence of Hospital-Acquired Illness or Injury  Intervention: Identify and Manage Fall Risk  Recent Flowsheet Documentation  Taken 7/11/2025 0420 by Miguel Ly RN  Safety Promotion/Fall Prevention:   safety round/check completed   room organization consistent   nonskid shoes/slippers when out of bed   fall prevention program maintained   assistive device/personal items within reach   clutter free environment maintained  Taken 7/11/2025 0200 by Miguel Ly RN  Safety Promotion/Fall Prevention:   safety round/check completed   room organization consistent   nonskid shoes/slippers when out of bed   fall prevention program maintained   clutter free environment maintained   assistive device/personal items within reach  Taken 7/11/2025 0000 by Miguel Ly RN  Safety Promotion/Fall Prevention:   safety round/check completed   room organization consistent   nonskid shoes/slippers when out of bed   fall prevention program maintained   clutter free environment maintained   assistive device/personal items within reach  Taken 7/10/2025 2200 by Miguel Ly RN  Safety Promotion/Fall Prevention:   safety round/check completed   room organization consistent   nonskid shoes/slippers when out of bed   fall prevention program maintained   clutter free environment maintained   assistive device/personal items within reach  Taken 7/10/2025 2000 by Miguel Ly RN  Safety Promotion/Fall Prevention:   safety round/check completed   room organization consistent   nonskid shoes/slippers when out of bed   fall prevention program maintained   clutter free environment maintained   assistive device/personal items within reach  Intervention: Prevent Skin Injury  Recent Flowsheet Documentation  Taken 7/11/2025 0420 by Miguel Ly RN  Body Position: position changed independently  Taken 7/11/2025 0000 by Miguel Ly RN  Body Position: position changed  independently  Taken 7/10/2025 2000 by Miguel Ly RN  Body Position: position changed independently  Intervention: Prevent Infection  Recent Flowsheet Documentation  Taken 7/11/2025 0420 by Miguel Ly RN  Infection Prevention:   environmental surveillance performed   hand hygiene promoted   personal protective equipment utilized   rest/sleep promoted   single patient room provided  Taken 7/11/2025 0000 by Miguel Ly RN  Infection Prevention:   environmental surveillance performed   hand hygiene promoted   personal protective equipment utilized   rest/sleep promoted   single patient room provided  Taken 7/10/2025 2000 by Miguel Ly RN  Infection Prevention:   environmental surveillance performed   hand hygiene promoted   personal protective equipment utilized   rest/sleep promoted   single patient room provided  Goal: Optimal Comfort and Wellbeing  Intervention: Provide Person-Centered Care  Recent Flowsheet Documentation  Taken 7/10/2025 2000 by Miguel Ly RN  Trust Relationship/Rapport: care explained   Goal Outcome Evaluation:      Patient is scheduled to have a HERBERT with cardioversion today. Patient has been in Normal sinus rhythm for most of the night. Patient has been calm and cooperative with care.

## 2025-07-11 NOTE — PROGRESS NOTES
[facility] MEDICINE SERVICE  DAILY PROGRESS NOTE    NAME: Faby Le  : 1945  MRN: [id number]      LOS: 2 days     PROVIDER OF SERVICE: Shameka Yang MD    Chief Complaint: Atrial fibrillation with RVR    Subjective:     Interval History:  History taken from: patient    Resting comfortably, voices no concerns at this time.        Review of Systems:   Review of Systems    Objective:     Vital Signs  Temp:  [96.8 °F (36 °C)-98.2 °F (36.8 °C)] 96.8 °F (36 °C)  Heart Rate:  [] 90  Resp:  [12-20] 17  BP: ()/(38-82) 107/39   Body mass index is 46.67 kg/m².    Physical Exam  Physical Exam  Constitutional:       Appearance: Normal appearance.   Cardiovascular:      Rate and Rhythm: Tachycardia present. Rhythm irregular.      Pulses: Normal pulses.      Heart sounds: Normal heart sounds.   Pulmonary:      Effort: Pulmonary effort is normal.      Breath sounds: Normal breath sounds.   Abdominal:      General: Abdomen is flat.      Palpations: Abdomen is soft.   Neurological:      Mental Status: She is alert.            Diagnostic Data    Results from last 7 days   Lab Units 25  0520 07/10/25  0540 25  1504   WBC 10*3/mm3 4.04   < > 5.54   HEMOGLOBIN g/dL 8.6*   < > 8.9*   HEMATOCRIT % 27.7*   < > 28.6*   PLATELETS 10*3/mm3 184   < > 202   GLUCOSE mg/dL 94   < > 111*   CREATININE mg/dL 1.28*   < > 1.55*   BUN mg/dL 21.2   < > 20.1   SODIUM mmol/L 137   < > 138   POTASSIUM mmol/L 4.1   < > 4.2   AST (SGOT) U/L  --   --  20   ALT (SGPT) U/L  --   --  13   ALK PHOS U/L  --   --  121*   BILIRUBIN mg/dL  --   --  0.4   ANION GAP mmol/L 8.4   < > 12.4    < > = values in this interval not displayed.       CT Angiogram Chest Pulmonary Embolism  Result Date: 2025  Impression: No evidence of pulmonary embolism to the subsegmental level. Respiratory motion artifacts limits evaluation of the subsegmental pulmonary arteries. No acute intrathoracic finding within limitation of respiratory  motion artifacts. Electronically Signed: Pablo Ceja MD  7/9/2025 4:59 PM EDT  Workstation ID: [id number]    XR Chest 1 View  Result Date: 7/9/2025  Impression: Low lung volumes with vascular redistribution without overt pulmonary edema. Electronically Signed: Cabrera Valenzuela MD  7/9/2025 3:36 PM EDT  Workstation ID: [id number]        I reviewed the patient's new clinical results.    Assessment/Plan:     Active and Resolved Problems  Active Hospital Problems    Diagnosis  POA    **Atrial fibrillation with RVR [I48.91]  Yes      Resolved Hospital Problems   No resolved problems to display.       AFIB with RVR  Acute decompensated HFpEF - improved  Hypotension with history of hypertension  SSS status post pacemaker  CAD, nonobstructive  Hyperlipidemia  - Hypotension earlier this morning which responded to small bolus of IV fluid.  - ECHO on 5/16/25: LVEF 56-60%, LVDF consistent with (grade III w/high LAP) reversible restrictive pattern  - CTA chest: negative for PE, no acute findings with limitation of respiratory motion artifacts  - Will start diltiazem drip for HR > 120  - Lovenox AC for procedures.  - HERBERT CV in AM  - PRN duonebs  - Titrate O2 to keep O2 sat>92%  - Heart failure pathway initiated  - Cardiology following.  Patient waiting for ablation     CKD stage III  - Cr 1.55 on admission; baseline 1.17 on 6/10/25  - Monitor and trend labs  - Strict I/Os, daily weight  - Consider Nephrology consult if creatinine fails to improve      Hypothyroidism  - Continue home dose of levothyroxine  - TSH: 2.780 on 5/28/2025     ROSA on CPAP  - CPAP ordered for HS, PRN      Gout  - Continue allopurinol    VTE Prophylaxis:  Pharmacologic VTE prophylaxis orders are present.             Disposition Planning:     Barriers to Discharge: AF RVR  Anticipated Date of Discharge: 7/12/2025  Place of Discharge: Home      Time: 35 minutes     Code Status and Medical Interventions: CPR (Attempt to Resuscitate); Full Support   Ordered at:  07/09/25 1750     Code Status (Patient has no pulse and is not breathing):    CPR (Attempt to Resuscitate)     Medical Interventions (Patient has pulse or is breathing):    Full Support       Signature: Electronically signed by Shameka Yang MD, 07/11/25, 13:29 EDT.  [facility] Hospitalist Team

## 2025-07-12 ENCOUNTER — TELEPHONE (OUTPATIENT)
Dept: FAMILY MEDICINE CLINIC | Facility: CLINIC | Age: 80
End: 2025-07-12
Payer: MEDICARE

## 2025-07-12 LAB
ANION GAP SERPL CALCULATED.3IONS-SCNC: 9.8 MMOL/L (ref 5–15)
BASOPHILS # BLD AUTO: 0.03 10*3/MM3 (ref 0–0.2)
BASOPHILS NFR BLD AUTO: 0.8 % (ref 0–1.5)
BUN SERPL-MCNC: 18.2 MG/DL (ref 8–23)
BUN/CREAT SERPL: 14.4 (ref 7–25)
CALCIUM SPEC-SCNC: 9.4 MG/DL (ref 8.6–10.5)
CHLORIDE SERPL-SCNC: 104 MMOL/L (ref 98–107)
CO2 SERPL-SCNC: 23.2 MMOL/L (ref 22–29)
CREAT SERPL-MCNC: 1.26 MG/DL (ref 0.57–1)
DEPRECATED RDW RBC AUTO: 55.2 FL (ref 37–54)
EGFRCR SERPLBLD CKD-EPI 2021: 43.8 ML/MIN/1.73
EOSINOPHIL # BLD AUTO: 0.22 10*3/MM3 (ref 0–0.4)
EOSINOPHIL NFR BLD AUTO: 5.8 % (ref 0.3–6.2)
ERYTHROCYTE [DISTWIDTH] IN BLOOD BY AUTOMATED COUNT: 14.6 % (ref 12.3–15.4)
GLUCOSE BLDC GLUCOMTR-MCNC: 85 MG/DL (ref 70–105)
GLUCOSE BLDC GLUCOMTR-MCNC: 92 MG/DL (ref 70–105)
GLUCOSE BLDC GLUCOMTR-MCNC: 97 MG/DL (ref 70–105)
GLUCOSE BLDC GLUCOMTR-MCNC: 99 MG/DL (ref 70–105)
GLUCOSE SERPL-MCNC: 86 MG/DL (ref 65–99)
HCT VFR BLD AUTO: 27.6 % (ref 34–46.6)
HGB BLD-MCNC: 8.4 G/DL (ref 12–15.9)
IMM GRANULOCYTES # BLD AUTO: 0.01 10*3/MM3 (ref 0–0.05)
IMM GRANULOCYTES NFR BLD AUTO: 0.3 % (ref 0–0.5)
LYMPHOCYTES # BLD AUTO: 1.36 10*3/MM3 (ref 0.7–3.1)
LYMPHOCYTES NFR BLD AUTO: 35.7 % (ref 19.6–45.3)
MAGNESIUM SERPL-MCNC: 1.7 MG/DL (ref 1.6–2.4)
MCH RBC QN AUTO: 31.6 PG (ref 26.6–33)
MCHC RBC AUTO-ENTMCNC: 30.4 G/DL (ref 31.5–35.7)
MCV RBC AUTO: 103.8 FL (ref 79–97)
MONOCYTES # BLD AUTO: 0.43 10*3/MM3 (ref 0.1–0.9)
MONOCYTES NFR BLD AUTO: 11.3 % (ref 5–12)
NEUTROPHILS NFR BLD AUTO: 1.76 10*3/MM3 (ref 1.7–7)
NEUTROPHILS NFR BLD AUTO: 46.1 % (ref 42.7–76)
NRBC BLD AUTO-RTO: 0 /100 WBC (ref 0–0.2)
PHOSPHATE SERPL-MCNC: 2.9 MG/DL (ref 2.5–4.5)
PLATELET # BLD AUTO: 204 10*3/MM3 (ref 140–450)
PMV BLD AUTO: 10 FL (ref 6–12)
POTASSIUM SERPL-SCNC: 4.3 MMOL/L (ref 3.5–5.2)
QT INTERVAL: 372 MS
QTC INTERVAL: 415 MS
RBC # BLD AUTO: 2.66 10*6/MM3 (ref 3.77–5.28)
SODIUM SERPL-SCNC: 137 MMOL/L (ref 136–145)
WBC NRBC COR # BLD AUTO: 3.81 10*3/MM3 (ref 3.4–10.8)

## 2025-07-12 PROCEDURE — 93005 ELECTROCARDIOGRAM TRACING: CPT | Performed by: INTERNAL MEDICINE

## 2025-07-12 PROCEDURE — 82948 REAGENT STRIP/BLOOD GLUCOSE: CPT

## 2025-07-12 PROCEDURE — 93010 ELECTROCARDIOGRAM REPORT: CPT | Performed by: INTERNAL MEDICINE

## 2025-07-12 RX ADMIN — ATORVASTATIN CALCIUM 20 MG: 20 TABLET, FILM COATED ORAL at 08:54

## 2025-07-12 RX ADMIN — DOFETILIDE 250 MCG: 0.25 CAPSULE ORAL at 20:09

## 2025-07-12 RX ADMIN — MIDODRINE HYDROCHLORIDE 5 MG: 5 TABLET ORAL at 08:53

## 2025-07-12 RX ADMIN — SENNOSIDES AND DOCUSATE SODIUM 2 TABLET: 50; 8.6 TABLET ORAL at 20:09

## 2025-07-12 RX ADMIN — DOFETILIDE 250 MCG: 0.25 CAPSULE ORAL at 08:54

## 2025-07-12 RX ADMIN — LEVOTHYROXINE SODIUM 88 MCG: 88 TABLET ORAL at 05:36

## 2025-07-12 RX ADMIN — MIDODRINE HYDROCHLORIDE 5 MG: 5 TABLET ORAL at 17:38

## 2025-07-12 RX ADMIN — Medication 10 ML: at 20:09

## 2025-07-12 RX ADMIN — RIVAROXABAN 15 MG: 15 TABLET, FILM COATED ORAL at 17:38

## 2025-07-12 RX ADMIN — Medication 10 ML: at 08:54

## 2025-07-12 NOTE — PROGRESS NOTES
Stop by to check on patient, she was asleep.  Heart rate is stable.  Tikosyn and Xarelto are resumed.    She is on the schedule for ablation next Wednesday.

## 2025-07-12 NOTE — PROGRESS NOTES
[facility] MEDICINE SERVICE  DAILY PROGRESS NOTE    NAME: Faby Le  : 1945  MRN: [id number]      LOS: 3 days     PROVIDER OF SERVICE: Shameka Yang MD    Chief Complaint: Atrial fibrillation with RVR    Subjective:     Interval History:  History taken from: patient    Resting comfortably, voices no concerns at this time.        Review of Systems:   Review of Systems    Objective:     Vital Signs  Temp:  [96.6 °F (35.9 °C)-98.2 °F (36.8 °C)] 97.2 °F (36.2 °C)  Heart Rate:  [74-90] 75  Resp:  [12-17] 16  BP: (107-164)/(39-71) 131/71   Body mass index is 45.85 kg/m².    Physical Exam  Physical Exam  Constitutional:       Appearance: Normal appearance.   Cardiovascular:      Rate and Rhythm: Tachycardia present. Rhythm irregular.      Pulses: Normal pulses.      Heart sounds: Normal heart sounds.   Pulmonary:      Effort: Pulmonary effort is normal.      Breath sounds: Normal breath sounds.   Abdominal:      General: Abdomen is flat.      Palpations: Abdomen is soft.   Neurological:      Mental Status: She is alert.            Diagnostic Data    Results from last 7 days   Lab Units 25  2331 07/10/25  0540 25  1504   WBC 10*3/mm3 3.81   < > 5.54   HEMOGLOBIN g/dL 8.4*   < > 8.9*   HEMATOCRIT % 27.6*   < > 28.6*   PLATELETS 10*3/mm3 204   < > 202   GLUCOSE mg/dL 86   < > 111*   CREATININE mg/dL 1.26*   < > 1.55*   BUN mg/dL 18.2   < > 20.1   SODIUM mmol/L 137   < > 138   POTASSIUM mmol/L 4.3   < > 4.2   AST (SGOT) U/L  --   --  20   ALT (SGPT) U/L  --   --  13   ALK PHOS U/L  --   --  121*   BILIRUBIN mg/dL  --   --  0.4   ANION GAP mmol/L 9.8   < > 12.4    < > = values in this interval not displayed.       No radiology results for the last day        I reviewed the patient's new clinical results.    Assessment/Plan:     Active and Resolved Problems  Active Hospital Problems    Diagnosis  POA    **Atrial fibrillation with RVR [I48.91]  Yes      Resolved Hospital Problems   No resolved  problems to display.       AFIB with RVR  Acute decompensated HFpEF - improved  Hypotension with history of hypertension  SSS status post pacemaker  CAD, nonobstructive  Hyperlipidemia  - Hypotension earlier this morning which responded to small bolus of IV fluid.  - ECHO on 5/16/25: LVEF 56-60%, LVDF consistent with (grade III w/high LAP) reversible restrictive pattern  - CTA chest: negative for PE, no acute findings with limitation of respiratory motion artifacts  - Lovenox AC for procedures.  - HERBERT  - PRN duonebs  - Titrate O2 to keep O2 sat>92%  - Heart failure pathway initiated  - Cardiology following.  Patient waiting for ablation     CKD stage III  - Cr 1.55 on admission; baseline 1.17 on 6/10/25  - Monitor and trend labs  - Strict I/Os, daily weight  - Consider Nephrology consult if creatinine fails to improve      Hypothyroidism  - Continue home dose of levothyroxine  - TSH: 2.780 on 5/28/2025     ROSA on CPAP  - CPAP ordered for HS, PRN      Gout  - Continue allopurinol    VTE Prophylaxis:  Pharmacologic VTE prophylaxis orders are present.             Disposition Planning:     Barriers to Discharge: AF RVR, ablation  Anticipated Date of Discharge: 7/16/2025  Place of Discharge: Home      Time: 35 minutes     Code Status and Medical Interventions: CPR (Attempt to Resuscitate); Full Support   Ordered at: 07/09/25 1750     Code Status (Patient has no pulse and is not breathing):    CPR (Attempt to Resuscitate)     Medical Interventions (Patient has pulse or is breathing):    Full Support       Signature: Electronically signed by Shameka Yang MD, 07/12/25, 12:39 EDT.  [facility] Hospitalist Team

## 2025-07-12 NOTE — TELEPHONE ENCOUNTER
Please call patient and make sure she is doing better from her hospitalization at [facility].  Make sure that she is aware of the ablation scheduled for Wednesday.  We would be glad to see her if she has any difficulties in the meantime.

## 2025-07-12 NOTE — PLAN OF CARE
Goal Outcome Evaluation: patient alert and oriented x4.  Room air. Assist x1 with a walker.  Denies chest pain or shortness of breath.  Up in chair today.  VS stable.  Call light in reach.  Needs met.

## 2025-07-12 NOTE — SIGNIFICANT NOTE
07/12/25 1609   OTHER   Discipline physical therapy assistant   Rehab Time/Intention   Session Not Performed unable to treat, medical status change  (RN asked to hold PT today due to HR incr wiht minimal exertion and is to have ablation next Wed 7/16)   Recommendation   PT - Next Appointment 07/14/25

## 2025-07-12 NOTE — PLAN OF CARE
Problem: Adult Inpatient Plan of Care  Goal: Plan of Care Review  Outcome: Progressing  Goal: Patient-Specific Goal (Individualized)  Outcome: Progressing  Goal: Absence of Hospital-Acquired Illness or Injury  Outcome: Progressing  Intervention: Identify and Manage Fall Risk  Recent Flowsheet Documentation  Taken 7/12/2025 0400 by Chelsy Clark RN  Safety Promotion/Fall Prevention:   assistive device/personal items within reach   clutter free environment maintained   fall prevention program maintained   safety round/check completed   room organization consistent  Taken 7/12/2025 0200 by Chelsy Clark RN  Safety Promotion/Fall Prevention:   assistive device/personal items within reach   clutter free environment maintained   fall prevention program maintained   room organization consistent   safety round/check completed  Taken 7/12/2025 0007 by Chelsy Clark RN  Safety Promotion/Fall Prevention:   assistive device/personal items within reach   clutter free environment maintained   fall prevention program maintained   room organization consistent   safety round/check completed  Taken 7/11/2025 2200 by Chelsy Clark RN  Safety Promotion/Fall Prevention:   assistive device/personal items within reach   clutter free environment maintained   fall prevention program maintained   room organization consistent   safety round/check completed  Taken 7/11/2025 2000 by Chelsy Clark RN  Safety Promotion/Fall Prevention:   assistive device/personal items within reach   clutter free environment maintained   fall prevention program maintained   room organization consistent   safety round/check completed  Intervention: Prevent Skin Injury  Recent Flowsheet Documentation  Taken 7/11/2025 2000 by Chelsy Clark RN  Skin Protection: pulse oximeter probe site changed  Intervention: Prevent and Manage VTE (Venous Thromboembolism) Risk  Recent Flowsheet Documentation  Taken 7/11/2025 2000 by Chelsy Clark RN  VTE Prevention/Management: patient refused  intervention  Intervention: Prevent Infection  Recent Flowsheet Documentation  Taken 7/12/2025 0400 by Chelsy Clark RN  Infection Prevention:   equipment surfaces disinfected   hand hygiene promoted   personal protective equipment utilized   rest/sleep promoted  Taken 7/12/2025 0200 by Chelsy Clark RN  Infection Prevention:   hand hygiene promoted   personal protective equipment utilized   rest/sleep promoted   single patient room provided  Taken 7/12/2025 0007 by Chelsy Clark RN  Infection Prevention:   hand hygiene promoted   personal protective equipment utilized   single patient room provided   rest/sleep promoted  Taken 7/11/2025 2200 by Chelsy Clark RN  Infection Prevention:   hand hygiene promoted   personal protective equipment utilized   rest/sleep promoted   single patient room provided  Taken 7/11/2025 2000 by Chelsy Clark RN  Infection Prevention:   equipment surfaces disinfected   hand hygiene promoted   personal protective equipment utilized   rest/sleep promoted  Goal: Optimal Comfort and Wellbeing  Outcome: Progressing  Intervention: Provide Person-Centered Care  Recent Flowsheet Documentation  Taken 7/12/2025 0400 by Chelsy Clark RN  Trust Relationship/Rapport:   care explained   questions answered   questions encouraged  Taken 7/12/2025 0007 by Chelsy Clark RN  Trust Relationship/Rapport:   care explained   questions answered   questions encouraged  Taken 7/11/2025 2000 by Chelsy Clark RN  Trust Relationship/Rapport:   care explained   questions answered   questions encouraged  Goal: Readiness for Transition of Care  Outcome: Progressing   Goal Outcome Evaluation:

## 2025-07-13 LAB
GLUCOSE BLDC GLUCOMTR-MCNC: 104 MG/DL (ref 70–105)
GLUCOSE BLDC GLUCOMTR-MCNC: 92 MG/DL (ref 70–105)

## 2025-07-13 PROCEDURE — 93005 ELECTROCARDIOGRAM TRACING: CPT

## 2025-07-13 PROCEDURE — 93010 ELECTROCARDIOGRAM REPORT: CPT | Performed by: INTERNAL MEDICINE

## 2025-07-13 PROCEDURE — 94799 UNLISTED PULMONARY SVC/PX: CPT

## 2025-07-13 PROCEDURE — 99231 SBSQ HOSP IP/OBS SF/LOW 25: CPT | Performed by: INTERNAL MEDICINE

## 2025-07-13 PROCEDURE — 82948 REAGENT STRIP/BLOOD GLUCOSE: CPT

## 2025-07-13 RX ADMIN — Medication 10 ML: at 21:39

## 2025-07-13 RX ADMIN — RIVAROXABAN 15 MG: 15 TABLET, FILM COATED ORAL at 17:21

## 2025-07-13 RX ADMIN — DOFETILIDE 250 MCG: 0.25 CAPSULE ORAL at 21:39

## 2025-07-13 RX ADMIN — DOFETILIDE 250 MCG: 0.25 CAPSULE ORAL at 08:28

## 2025-07-13 RX ADMIN — MIDODRINE HYDROCHLORIDE 5 MG: 5 TABLET ORAL at 17:21

## 2025-07-13 RX ADMIN — Medication 10 ML: at 08:29

## 2025-07-13 RX ADMIN — LEVOTHYROXINE SODIUM 88 MCG: 88 TABLET ORAL at 05:05

## 2025-07-13 RX ADMIN — MIDODRINE HYDROCHLORIDE 5 MG: 5 TABLET ORAL at 08:28

## 2025-07-13 RX ADMIN — ATORVASTATIN CALCIUM 20 MG: 20 TABLET, FILM COATED ORAL at 08:28

## 2025-07-13 NOTE — PROGRESS NOTES
Reason for follow-up: Atrial flutter     Patient Care Team:  Leonor Adam MD as PCP - General (Family Medicine)  Maggi Horn MD as Consulting Physician (Pain Medicine)  Ratna Zavala MD as Consulting Physician (Cardiology)  Nora Mendez APRN as Nurse Practitioner (Gastroenterology)  Gianluca Walton MD as Consulting Physician (Pulmonary Disease)  Nathanael Murillo MD as Consulting Physician (Allergy and Immunology)  Delfin Thorpe MD as Surgeon (Orthopedic Surgery)  Josh Loomis MD as Consulting Physician (Gastroenterology)  Bo Ruggiero MD as Consulting Physician (Urology)    Subjective .   Faby GOMEZ Rey is doing fair today     ROS    Celecoxib    Scheduled Meds:atorvastatin, 20 mg, Oral, Daily  dofetilide, 250 mcg, Oral, Q12H  levothyroxine, 88 mcg, Oral, Daily  midodrine, 5 mg, Oral, BID AC  rivaroxaban, 15 mg, Oral, Daily With Dinner  sodium chloride, 10 mL, Intravenous, Q12H      Continuous Infusions:   PRN Meds:.  acetaminophen **OR** acetaminophen **OR** acetaminophen    senna-docusate sodium **AND** polyethylene glycol **AND** bisacodyl **AND** bisacodyl    Calcium Replacement - Follow Nurse / BPA Driven Protocol    cetirizine    ipratropium-albuterol    Magnesium Standard Dose Replacement - Follow Nurse / BPA Driven Protocol    melatonin    nitroglycerin    Phosphorus Replacement - Follow Nurse / BPA Driven Protocol    Potassium Replacement - Follow Nurse / BPA Driven Protocol    [COMPLETED] Insert Peripheral IV **AND** sodium chloride    sodium chloride      VITAL SIGNS  Vitals:    07/13/25 0500 07/13/25 0716 07/13/25 1119 07/13/25 1519   BP:  149/67 150/52 121/56   BP Location:  Right leg     Patient Position:       Pulse:  104 73 73   Resp:  14 22 20   Temp:  97.5 °F (36.4 °C) 97.7 °F (36.5 °C) 97.4 °F (36.3 °C)   TempSrc:  Axillary     SpO2:  98% 99% 99%   Weight: 116 kg (256 lb 9.9 oz)      Height:           Flowsheet Rows      Flowsheet Row  "First Filed Value   Admission Height 160 cm (63\") Documented at 07/09/2025 1439   Admission Weight 116 kg (255 lb) Documented at 07/09/2025 1439               Physical Exam  VITALS REVIEWED    General:      well developed, in no acute distress.    Head:      normocephalic and atraumatic.    Eyes:      PERRL/EOM intact, conjunctiva and sclera clear with out nystagmus.    Neck:      no masses, thyromegaly,  trachea central with normal respiratory effort and PMI displaced laterally  Lungs:      Clear  Heart:       Regular rhythm  Msk:      no deformity or scoliosis noted of thoracic or lumbar spine.    Pulses:      pulses normal in all 4 extremities.    Extremities:       No lower extremity edema  Neurologic:      no focal deficits.   alert oriented x3  Skin:      intact without lesions or rashes.    Psych:      alert and cooperative; normal mood and affect; normal attention span and concentration.          LAB RESULTS (LAST 7 DAYS)    CBC  Results from last 7 days   Lab Units 07/11/25  2331 07/11/25  0520 07/10/25  0540 07/09/25  1504   WBC 10*3/mm3 3.81 4.04 4.57 5.54   RBC 10*6/mm3 2.66* 2.67* 2.69* 2.73*   HEMOGLOBIN g/dL 8.4* 8.6* 8.9* 8.9*   HEMATOCRIT % 27.6* 27.7* 27.6* 28.6*   MCV fL 103.8* 103.7* 102.6* 104.8*   PLATELETS 10*3/mm3 204 184 200 202       BMP  Results from last 7 days   Lab Units 07/11/25  2331 07/11/25  0520 07/10/25  0540 07/09/25  1504   SODIUM mmol/L 137 137 140 138   POTASSIUM mmol/L 4.3 4.1 4.1 4.2   CHLORIDE mmol/L 104 105 106 104   CO2 mmol/L 23.2 23.6 24.1 21.6*   BUN mg/dL 18.2 21.2 18.5 20.1   CREATININE mg/dL 1.26* 1.28* 1.41* 1.55*   GLUCOSE mg/dL 86 94 87 111*   MAGNESIUM mg/dL 1.7 1.7 1.8  --    PHOSPHORUS mg/dL 2.9 3.0 3.5  --        CMP   Results from last 7 days   Lab Units 07/11/25 2331 07/11/25  0520 07/10/25  0540 07/09/25  1504   SODIUM mmol/L 137 137 140 138   POTASSIUM mmol/L 4.3 4.1 4.1 4.2   CHLORIDE mmol/L 104 105 106 104   CO2 mmol/L 23.2 23.6 24.1 21.6*   BUN mg/dL " 18.2 21.2 18.5 20.1   CREATININE mg/dL 1.26* 1.28* 1.41* 1.55*   GLUCOSE mg/dL 86 94 87 111*   ALBUMIN g/dL  --   --   --  4.0   BILIRUBIN mg/dL  --   --   --  0.4   ALK PHOS U/L  --   --   --  121*   AST (SGOT) U/L  --   --   --  20   ALT (SGPT) U/L  --   --   --  13         BNP        TROPONIN  Results from last 7 days   Lab Units 07/09/25  1605   HSTROP T ng/L 14*       CoAg        Creatinine Clearance  Estimated Creatinine Clearance: 45.2 mL/min (A) (by C-G formula based on SCr of 1.26 mg/dL (H)).    ABG          EKG    I personally reviewed the patient's EKG/Telemetry data: Atrial flutter      Assessment & Plan       Atrial fibrillation with RVR      Faby Le is a 80-year-old female patient, has sick sinus syndrome, dual-chamber pacemaker, has A-fib and atrial flutter, onset seems to be around August 2022.  Patient underwent ablation with Dr. Herrera on 3/5/2024 with cryoablation of pulmonary veins, also left atrial posterior wall.  She had recurrence in June 2024 and was placed on Tikosyn, had self converted to sinus rhythm up until a few days ago.     The arrhythmia seems to be atypical atrial flutter.  Cardioversion was performed on 7/11/2025 but briefly after, arrhythmia recurred again.  I performed a device interrogation after the cardioversion and she was in atrial flutter.     I talked to the patient and her family about management options.  I do not think performing another cardioversion would be helpful, increasing Tikosyn to 500 mg can be considered but I think that it would not be all that advisable in her case.     Therefore the 2 reasonable options are redo arrhythmia ablation versus upgrading her pacemaker to a BiV device and AV node ablation.  Patient chose to perform another arrhythmia ablation.     She would like to stay in the hospital until the ablation is done since her heart rate is elevated and she is symptomatic.  We can do this next Wednesday.  Risk indications and benefits for  A-fib/flutter ablation were discussed at length with her and she is agreeable.     The meantime please continue Tikosyn and also resume her home anticoagulation with Xarelto.     Please do not administer Mounjaro while she is in the hospital as that will halt anesthesia procedures.     7/13/2025    Atrial flutter, rate controlled, plan same as original consult note.    I discussed the patients findings and my recommendations with patient and agrees with outlined plan.    Pachceo Aleman MD  07/13/25  15:55 EDT        Electronically signed by Pacheco Aleman MD, 07/13/25, 4:00 PM EDT.

## 2025-07-13 NOTE — PLAN OF CARE
Problem: Adult Inpatient Plan of Care  Goal: Plan of Care Review  Outcome: Progressing  Goal: Patient-Specific Goal (Individualized)  Outcome: Progressing  Goal: Absence of Hospital-Acquired Illness or Injury  Outcome: Progressing  Intervention: Identify and Manage Fall Risk  Recent Flowsheet Documentation  Taken 7/13/2025 0400 by Chelsy Clark RN  Safety Promotion/Fall Prevention:   assistive device/personal items within reach   clutter free environment maintained   fall prevention program maintained   room organization consistent   safety round/check completed  Taken 7/13/2025 0200 by Chelsy Clark RN  Safety Promotion/Fall Prevention:   clutter free environment maintained   assistive device/personal items within reach   fall prevention program maintained   room organization consistent   safety round/check completed  Taken 7/13/2025 0002 by Chelsy Clark RN  Safety Promotion/Fall Prevention:   assistive device/personal items within reach   clutter free environment maintained   fall prevention program maintained   safety round/check completed   room organization consistent  Taken 7/12/2025 2200 by Chelsy Clark RN  Safety Promotion/Fall Prevention:   assistive device/personal items within reach   clutter free environment maintained   fall prevention program maintained   room organization consistent   safety round/check completed  Taken 7/12/2025 2003 by Chelsy Clark RN  Safety Promotion/Fall Prevention:   assistive device/personal items within reach   clutter free environment maintained   fall prevention program maintained   room organization consistent   safety round/check completed  Taken 7/12/2025 1944 by Chelsy Clark RN  Safety Promotion/Fall Prevention:   assistive device/personal items within reach   clutter free environment maintained   fall prevention program maintained   safety round/check completed   room organization consistent  Intervention: Prevent Skin Injury  Recent Flowsheet Documentation  Taken 7/12/2025 1944 by Chelsy Clark  RN  Body Position: position changed independently  Skin Protection: pulse oximeter probe site changed  Intervention: Prevent and Manage VTE (Venous Thromboembolism) Risk  Recent Flowsheet Documentation  Taken 7/12/2025 1944 by Chelsy Clark RN  VTE Prevention/Management: patient refused intervention  Intervention: Prevent Infection  Recent Flowsheet Documentation  Taken 7/13/2025 0400 by Chelsy Clark RN  Infection Prevention:   hand hygiene promoted   personal protective equipment utilized   single patient room provided   rest/sleep promoted  Taken 7/13/2025 0200 by Chelsy Clark RN  Infection Prevention:   personal protective equipment utilized   hand hygiene promoted   rest/sleep promoted   single patient room provided  Taken 7/13/2025 0002 by Chelsy Clark RN  Infection Prevention:   hand hygiene promoted   personal protective equipment utilized   rest/sleep promoted   single patient room provided  Taken 7/12/2025 2200 by Chelsy Clark RN  Infection Prevention:   hand hygiene promoted   personal protective equipment utilized   rest/sleep promoted   single patient room provided  Taken 7/12/2025 2003 by Chelsy Clark RN  Infection Prevention:   hand hygiene promoted   personal protective equipment utilized   rest/sleep promoted   single patient room provided  Taken 7/12/2025 1944 by Chelsy Clark RN  Infection Prevention:   hand hygiene promoted   personal protective equipment utilized   rest/sleep promoted   single patient room provided  Goal: Optimal Comfort and Wellbeing  Outcome: Progressing  Intervention: Provide Person-Centered Care  Recent Flowsheet Documentation  Taken 7/13/2025 0400 by Chelsy Clark RN  Trust Relationship/Rapport:   care explained   questions answered   questions encouraged  Taken 7/13/2025 0002 by Chelsy Clark RN  Trust Relationship/Rapport:   care explained   questions answered   questions encouraged  Taken 7/12/2025 1944 by Chelsy Clark RN  Trust Relationship/Rapport:   care explained   questions answered   questions  encouraged  Goal: Readiness for Transition of Care  Outcome: Progressing   Goal Outcome Evaluation:

## 2025-07-13 NOTE — PLAN OF CARE
Goal Outcome Evaluation:               Pt's HR  fluctuating with activities such as using bathroom and walking. Denies palpitation or sob.

## 2025-07-13 NOTE — PROGRESS NOTES
[facility] MEDICINE SERVICE  DAILY PROGRESS NOTE    NAME: Faby Le  : 1945  MRN: [id number]      LOS: 4 days     PROVIDER OF SERVICE: Shameka Yang MD    Chief Complaint: Atrial fibrillation with RVR    Subjective:     Interval History:  History taken from: patient    Resting comfortably, voices no concerns at this time.        Review of Systems:   Review of Systems    Objective:     Vital Signs  Temp:  [97.4 °F (36.3 °C)-97.7 °F (36.5 °C)] 97.7 °F (36.5 °C)  Heart Rate:  [] 73  Resp:  [12-] 22  BP: (129-159)/(52-79) 150/52   Body mass index is 45.46 kg/m².    Physical Exam  Physical Exam  Constitutional:       Appearance: Normal appearance.   Cardiovascular:      Rate and Rhythm: Tachycardia present. Rhythm irregular.      Pulses: Normal pulses.      Heart sounds: Normal heart sounds.   Pulmonary:      Effort: Pulmonary effort is normal.      Breath sounds: Normal breath sounds.   Abdominal:      General: Abdomen is flat.      Palpations: Abdomen is soft.   Neurological:      Mental Status: She is alert.            Diagnostic Data    Results from last 7 days   Lab Units 25  2331 07/10/25  0540 25  1504   WBC 10*3/mm3 3.81   < > 5.54   HEMOGLOBIN g/dL 8.4*   < > 8.9*   HEMATOCRIT % 27.6*   < > 28.6*   PLATELETS 10*3/mm3 204   < > 202   GLUCOSE mg/dL 86   < > 111*   CREATININE mg/dL 1.26*   < > 1.55*   BUN mg/dL 18.2   < > 20.1   SODIUM mmol/L 137   < > 138   POTASSIUM mmol/L 4.3   < > 4.2   AST (SGOT) U/L  --   --  20   ALT (SGPT) U/L  --   --  13   ALK PHOS U/L  --   --  121*   BILIRUBIN mg/dL  --   --  0.4   ANION GAP mmol/L 9.8   < > 12.4    < > = values in this interval not displayed.       No radiology results for the last day        I reviewed the patient's new clinical results.    Assessment/Plan:     Active and Resolved Problems  Active Hospital Problems    Diagnosis  POA    **Atrial fibrillation with RVR [I48.91]  Yes      Resolved Hospital Problems   No resolved  problems to display.       AFIB with RVR  Acute decompensated HFpEF - improved  Hypotension with history of hypertension  SSS status post pacemaker  CAD, nonobstructive  Hyperlipidemia  - Hypotension earlier this morning which responded to small bolus of IV fluid.  - ECHO on 5/16/25: LVEF 56-60%, LVDF consistent with (grade III w/high LAP) reversible restrictive pattern  - CTA chest: negative for PE, no acute findings with limitation of respiratory motion artifacts  - Lovenox AC for procedures.  - HERBERT  - PRN duonebs  - Titrate O2 to keep O2 sat>92%  - Heart failure pathway initiated  - Cardiology following.  Patient waiting for ablation     CKD stage III  - Cr 1.55 on admission; baseline 1.17 on 6/10/25  - Monitor and trend labs  - Strict I/Os, daily weight  - Consider Nephrology consult if creatinine fails to improve      Hypothyroidism  - Continue home dose of levothyroxine  - TSH: 2.780 on 5/28/2025     ROSA on CPAP  - CPAP ordered for HS, PRN      Gout  - Continue allopurinol    VTE Prophylaxis:  Pharmacologic VTE prophylaxis orders are present.             Disposition Planning:     Barriers to Discharge: AF RVR, ablation  Anticipated Date of Discharge: 7/16/2025  Place of Discharge: Home      Time: 35 minutes     Code Status and Medical Interventions: CPR (Attempt to Resuscitate); Full Support   Ordered at: 07/09/25 1750     Code Status (Patient has no pulse and is not breathing):    CPR (Attempt to Resuscitate)     Medical Interventions (Patient has pulse or is breathing):    Full Support       Signature: Electronically signed by Shameka Yang MD, 07/13/25, 12:39 EDT.  [facility] Hospitalist Team

## 2025-07-14 LAB
ANION GAP SERPL CALCULATED.3IONS-SCNC: 10.4 MMOL/L (ref 5–15)
BASOPHILS # BLD AUTO: 0.04 10*3/MM3 (ref 0–0.2)
BASOPHILS NFR BLD AUTO: 0.9 % (ref 0–1.5)
BUN SERPL-MCNC: 17.6 MG/DL (ref 8–23)
BUN/CREAT SERPL: 13.2 (ref 7–25)
CALCIUM SPEC-SCNC: 9.7 MG/DL (ref 8.6–10.5)
CHLORIDE SERPL-SCNC: 104 MMOL/L (ref 98–107)
CO2 SERPL-SCNC: 24.6 MMOL/L (ref 22–29)
CREAT SERPL-MCNC: 1.33 MG/DL (ref 0.57–1)
DEPRECATED RDW RBC AUTO: 56.4 FL (ref 37–54)
EGFRCR SERPLBLD CKD-EPI 2021: 41 ML/MIN/1.73
EOSINOPHIL # BLD AUTO: 0.15 10*3/MM3 (ref 0–0.4)
EOSINOPHIL NFR BLD AUTO: 3.3 % (ref 0.3–6.2)
ERYTHROCYTE [DISTWIDTH] IN BLOOD BY AUTOMATED COUNT: 14.7 % (ref 12.3–15.4)
GLUCOSE SERPL-MCNC: 139 MG/DL (ref 65–99)
HCT VFR BLD AUTO: 30.4 % (ref 34–46.6)
HGB BLD-MCNC: 9.5 G/DL (ref 12–15.9)
IMM GRANULOCYTES # BLD AUTO: 0.01 10*3/MM3 (ref 0–0.05)
IMM GRANULOCYTES NFR BLD AUTO: 0.2 % (ref 0–0.5)
LYMPHOCYTES # BLD AUTO: 1.17 10*3/MM3 (ref 0.7–3.1)
LYMPHOCYTES NFR BLD AUTO: 25.7 % (ref 19.6–45.3)
MCH RBC QN AUTO: 32.3 PG (ref 26.6–33)
MCHC RBC AUTO-ENTMCNC: 31.3 G/DL (ref 31.5–35.7)
MCV RBC AUTO: 103.4 FL (ref 79–97)
MONOCYTES # BLD AUTO: 0.36 10*3/MM3 (ref 0.1–0.9)
MONOCYTES NFR BLD AUTO: 7.9 % (ref 5–12)
NEUTROPHILS NFR BLD AUTO: 2.83 10*3/MM3 (ref 1.7–7)
NEUTROPHILS NFR BLD AUTO: 62 % (ref 42.7–76)
NRBC BLD AUTO-RTO: 0 /100 WBC (ref 0–0.2)
PLATELET # BLD AUTO: 204 10*3/MM3 (ref 140–450)
PMV BLD AUTO: 9.8 FL (ref 6–12)
POTASSIUM SERPL-SCNC: 4.1 MMOL/L (ref 3.5–5.2)
QT INTERVAL: 382 MS
QTC INTERVAL: 422 MS
RBC # BLD AUTO: 2.94 10*6/MM3 (ref 3.77–5.28)
SODIUM SERPL-SCNC: 139 MMOL/L (ref 136–145)
WBC NRBC COR # BLD AUTO: 4.56 10*3/MM3 (ref 3.4–10.8)

## 2025-07-14 PROCEDURE — 97116 GAIT TRAINING THERAPY: CPT

## 2025-07-14 PROCEDURE — 97110 THERAPEUTIC EXERCISES: CPT

## 2025-07-14 PROCEDURE — 25010000002 DIGOXIN PER 500 MCG: Performed by: INTERNAL MEDICINE

## 2025-07-14 PROCEDURE — 85025 COMPLETE CBC W/AUTO DIFF WBC: CPT | Performed by: INTERNAL MEDICINE

## 2025-07-14 PROCEDURE — 99233 SBSQ HOSP IP/OBS HIGH 50: CPT | Performed by: INTERNAL MEDICINE

## 2025-07-14 PROCEDURE — 80048 BASIC METABOLIC PNL TOTAL CA: CPT | Performed by: INTERNAL MEDICINE

## 2025-07-14 RX ORDER — DIGOXIN 0.25 MG/ML
250 INJECTION INTRAMUSCULAR; INTRAVENOUS ONCE
Status: COMPLETED | OUTPATIENT
Start: 2025-07-14 | End: 2025-07-14

## 2025-07-14 RX ADMIN — DOFETILIDE 250 MCG: 0.25 CAPSULE ORAL at 09:04

## 2025-07-14 RX ADMIN — MIDODRINE HYDROCHLORIDE 5 MG: 5 TABLET ORAL at 17:46

## 2025-07-14 RX ADMIN — Medication 10 ML: at 09:09

## 2025-07-14 RX ADMIN — Medication 10 ML: at 20:23

## 2025-07-14 RX ADMIN — DOFETILIDE 250 MCG: 0.25 CAPSULE ORAL at 20:23

## 2025-07-14 RX ADMIN — RIVAROXABAN 15 MG: 15 TABLET, FILM COATED ORAL at 17:46

## 2025-07-14 RX ADMIN — ATORVASTATIN CALCIUM 20 MG: 20 TABLET, FILM COATED ORAL at 09:04

## 2025-07-14 RX ADMIN — DIGOXIN 250 MCG: 250 INJECTION, SOLUTION INTRAMUSCULAR; INTRAVENOUS; PARENTERAL at 11:12

## 2025-07-14 RX ADMIN — MIDODRINE HYDROCHLORIDE 5 MG: 5 TABLET ORAL at 09:04

## 2025-07-14 RX ADMIN — LEVOTHYROXINE SODIUM 88 MCG: 88 TABLET ORAL at 06:48

## 2025-07-14 NOTE — PROGRESS NOTES
[facility] MEDICINE SERVICE  DAILY PROGRESS NOTE    NAME: Faby Le  : 1945  MRN: [id number]      LOS: 5 days     PROVIDER OF SERVICE: Frandy Arauz MD    Chief Complaint: Atrial fibrillation with RVR    Subjective:     Interval History:  History taken from: patient and family    The patient was seen at bedside this morning.  She had been going into intermittent episodes of atrial fibrillation RVR with rates in the 150s associated with hypotension and dizziness.  The patient denies experiencing shortness of breath, chest pain, nausea, vomiting, headache.        Review of Systems:   As above otherwise negative    Objective:     Vital Signs  Temp:  [97.4 °F (36.3 °C)-98 °F (36.7 °C)] 97.7 °F (36.5 °C)  Heart Rate:  [] 70  Resp:  [10-20] 10  BP: ()/(38-71) 125/53   Body mass index is 45.46 kg/m².    Physical Exam  Constitutional:       Appearance: Normal appearance.   Cardiovascular:      Rate and Rhythm: Tachycardia present. Rhythm irregular.      Pulses: Normal pulses.      Heart sounds: Normal heart sounds.   Pulmonary:      Effort: Pulmonary effort is normal.      Breath sounds: Normal breath sounds.   Abdominal:      General: Abdomen is flat.      Palpations: Abdomen is soft.   Neurological:      Mental Status: She is alert.        Diagnostic Data    Results from last 7 days   Lab Units 25  1348 07/10/25  0540 25  1504   WBC 10*3/mm3 4.56   < > 5.54   HEMOGLOBIN g/dL 9.5*   < > 8.9*   HEMATOCRIT % 30.4*   < > 28.6*   PLATELETS 10*3/mm3 204   < > 202   GLUCOSE mg/dL 139*   < > 111*   CREATININE mg/dL 1.33*   < > 1.55*   BUN mg/dL 17.6   < > 20.1   SODIUM mmol/L 139   < > 138   POTASSIUM mmol/L 4.1   < > 4.2   AST (SGOT) U/L  --   --  20   ALT (SGPT) U/L  --   --  13   ALK PHOS U/L  --   --  121*   BILIRUBIN mg/dL  --   --  0.4   ANION GAP mmol/L 10.4   < > 12.4    < > = values in this interval not displayed.       No radiology results for the last day      I reviewed  the patient's new clinical results.    Assessment/Plan:     Active and Resolved Problems  Active Hospital Problems    Diagnosis  POA    **Atrial fibrillation with RVR [I48.91]  Yes      Resolved Hospital Problems   No resolved problems to display.       AFIB with RVR/atrial flutter  Acute decompensated HFpEF - improved  Hypotension with history of hypertension  CAD, nonobstructive  Hyperlipidemia  - ECHO on 5/16/25: LVEF 56-60%, LVDF consistent with (grade III w/high LAP) reversible restrictive pattern  - CTA chest: negative for PE, no acute findings with limitation of respiratory motion artifacts  [facility]  - HERBERT  - PRN duonebs  - Titrate O2 to keep O2 sat>92%  - Heart failure pathway initiated  - Cardiology and cardiac EP following.  The patient has been started on Tikosyn 50 mcg p.o. twice daily.  Due to recurrent paroxysmal's of atrial flutter with hypotension digoxin to 50 mcg p.o. daily was added  -Continuing Xarelto 15 mg p.o. daily  -Plan for ablation on Wednesday     CKD stage III  - Cr baseline 1.17 on 6/10/25  -Creatinine 1.33  - Monitor and trend labs  - Strict I/Os, daily weight  - Consider Nephrology consult if creatinine fails to improve      Hypothyroidism  - Continue home dose of levothyroxine  - TSH: 2.780 on 5/28/2025     ROSA on CPAP  - CPAP ordered for HS, PRN      Gout  - Continue allopurinol    VTE Prophylaxis:  Pharmacologic VTE prophylaxis orders are present.             Disposition Planning:     Barriers to Discharge: Awaiting ablation 7/16/2025  Anticipated Date of Discharge: 7/17/25      Time: 35 minutes     Code Status and Medical Interventions: CPR (Attempt to Resuscitate); Full Support   Ordered at: 07/09/25 1750     Code Status (Patient has no pulse and is not breathing):    CPR (Attempt to Resuscitate)     Medical Interventions (Patient has pulse or is breathing):    Full Support       Signature: Electronically signed by Frandy Arauz MD, 07/14/25, 16:22  EDT.  [facility] Hospitalist Team

## 2025-07-14 NOTE — PLAN OF CARE
Goal Outcome Evaluation:        HR > 120 with exertion - maintains < 100 at rest.  Blood pressure good.  Patient denies pain or shortness of air.  Ablation scheduled for Wednesday.

## 2025-07-14 NOTE — DISCHARGE PLACEMENT REQUEST
"Mara Le (80 y.o. Female)       Date of Birth   1945    Social Security Number       Address   [address]  [place] [facility]    Home Phone   [phone]    MRN   [id number]       Rastafarian   Evangelical    Marital Status                               Admission Date   7/9/2025    Admission Type   Emergency    Admitting Provider   Fredy Prado MD    Attending Provider   Frandy Arauz MD    Department, Room/Bed   [facility] 2D, 273/B       Discharge Date       Discharge Disposition       Discharge Destination                                 Attending Provider: Frandy Arauz MD    Allergies: Celecoxib    Isolation: None   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 [id number]         Subscriber Name Subscriber Birth Date Member ID       MARA LE 1945 [id number]               Secondary Coverage       Payor Plan Insurance Group Employer/Plan Group    [facility] COMMUNITY PLAN OF [place] - [employer] CONNECT [facility] COMMUNITY PLAN PATHWAYS [place]        Payor Plan Address Payor Plan Phone Number Payor Plan Fax Number Effective Dates    [address]   7/1/2024 - None Entered    [facility] [id number]         Subscriber Name Subscriber Birth Date Member ID       MARA LE 1945 [id number]                     Emergency Contacts        (Rel.) Home Phone Work Phone Mobile Phone    RAMÓN LE (Daughter) -- -- [phone]    JANICE VALERIO (Daughter) " [phone] -- [phone]    FRANCK MARTINEZ (Daughter) [phone] -- [phone]    ROSA MARRERO (Daughter) [phone] -- [phone]

## 2025-07-14 NOTE — SIGNIFICANT NOTE
07/14/25 0948   Rehab Time/Intention   Session Not Performed   (nursing requests therapist to check back latera due to elevated HR and hypotension.  Will check back as able.)   Therapy Assessment/Plan (PT)   Criteria for Skilled Interventions Met (PT) yes;meets criteria;skilled treatment is necessary   Recommendation   PT - Next Appointment 07/15/25

## 2025-07-14 NOTE — PLAN OF CARE
Goal Outcome Evaluation:  Plan of Care Reviewed With: patient      Assessment: Faby Le presents with functional mobility impairments which indicate the need for skilled intervention.  Patient demos progress with gait distances; HR initially steady with mobility however demos tachycardia with fatigue.  Demos improved gait distances and requires CGA-SBA; anticipate she will be safe to return home once medically appropriate. Tolerating session today without incident. Will continue to follow and progress as tolerated.     Anticipated Discharge Disposition (PT): assisted living, home with home health

## 2025-07-14 NOTE — PROGRESS NOTES
Reason for follow-up: Atrial flutter     Patient Care Team:  Leonor Adam MD as PCP - General (Family Medicine)  Maggi Horn MD as Consulting Physician (Pain Medicine)  Ratna Zavala MD as Consulting Physician (Cardiology)  Nora Mendez APRN as Nurse Practitioner (Gastroenterology)  Gianluca Walton MD as Consulting Physician (Pulmonary Disease)  Nathanael Murillo MD as Consulting Physician (Allergy and Immunology)  Delfin Thorpe MD as Surgeon (Orthopedic Surgery)  Josh Loomis MD as Consulting Physician (Gastroenterology)  Bo Ruggiero MD as Consulting Physician (Urology)            Faby Le is a 80-year-old female patient, has sick sinus syndrome, dual-chamber pacemaker, has A-fib and atrial flutter, onset seems to be around August 2022.  Patient underwent ablation with Dr. Herrera on 3/5/2024 with cryoablation of pulmonary veins, also left atrial posterior wall.  She had recurrence in June 2024 and was placed on Tikosyn, had self converted to sinus rhythm up until a few days ago.     The arrhythmia seems to be atypical atrial flutter.  Cardioversion was performed on 7/11/2025 but briefly after, arrhythmia recurred again.  I performed a device interrogation after the cardioversion and she was in atrial flutter.     I talked to the patient and her family about management options.  I do not think performing another cardioversion would be helpful, increasing Tikosyn to 500 mg can be considered but I think that it would not be all that advisable in her case.     Therefore the 2 reasonable options are redo arrhythmia ablation versus upgrading her pacemaker to a BiV device and AV node ablation.  Patient chose to perform another arrhythmia ablation.     She would like to stay in the hospital until the ablation is done since her heart rate is elevated and she is symptomatic.  We can do this next Wednesday.  Risk indications and benefits for A-fib/flutter  ablation were discussed at length with her and she is agreeable.     The meantime please continue Tikosyn and also resume her home anticoagulation with Xarelto.     Please do not administer Mounjaro while she is in the hospital as that will halt anesthesia procedures.           Recurrent symptomatic atrial arrhythmias  Failed medical therapy  Patient is scheduled for ablation therapy on Wednesday  Patient is symptomatic and she likes to stay in the hospital and get the procedure done on Wednesday  Risk benefits and alternatives reviewed and discussed with patient  History of prior ablation therapy  Tmax is 98 pulse rate is 73-1 51 respirations are 12 blood pressure is 64/38 to 123/71 sats are 100%  No labs are available today  Check CBC and BMP today  Current cardiac medications include Lipitor 20 mg p.o. once a day patient is on Tikosyn to 50 mcg p.o. twice a day patient is on midodrine 5 mg p.o. twice a day patient is on anticoagulation therapy with Xarelto 15 mg p.o. once a day      Atrial arrhythmias.  Medical therapy failed cardioversion currently patient is on Tikosyn  Patient is on anticoagulation therapy with the Xarelto  Patient is scheduled for ablation therapy on Wednesday  Risk benefits and alternatives reviewed and discussed with patient  Hypertension  Hyperlipidemia  Hypothyroidism  History of prior TIA  Hypotension requiring midodrine  Paroxysmal atrial arrhythmias and a prior history of ablation therapy                  Subjective .   Faby Le is doing fair today  Denies any new cardiac symptoms      Review of Systems   Constitutional: Negative for chills, decreased appetite and malaise/fatigue.   HENT:  Negative for congestion and nosebleeds.    Eyes:  Negative for blurred vision and double vision.   Cardiovascular:  Negative for chest pain, dyspnea on exertion, irregular heartbeat, leg swelling, near-syncope, orthopnea, palpitations, paroxysmal nocturnal dyspnea and syncope.   Respiratory:   "Negative for cough and shortness of breath.    Hematologic/Lymphatic: Negative for adenopathy. Does not bruise/bleed easily.   Skin:  Negative for color change and rash.   Musculoskeletal:  Negative for back pain and joint pain.   Gastrointestinal:  Negative for bloating, abdominal pain, hematemesis and hematochezia.   Genitourinary:  Negative for flank pain and hematuria.   Neurological:  Negative for dizziness and focal weakness.   Psychiatric/Behavioral:  Negative for altered mental status. The patient does not have insomnia.        Celecoxib    Scheduled Meds:atorvastatin, 20 mg, Oral, Daily  dofetilide, 250 mcg, Oral, Q12H  levothyroxine, 88 mcg, Oral, Daily  midodrine, 5 mg, Oral, BID AC  rivaroxaban, 15 mg, Oral, Daily With Dinner  sodium chloride, 10 mL, Intravenous, Q12H      Continuous Infusions:   PRN Meds:.  acetaminophen **OR** acetaminophen **OR** acetaminophen    senna-docusate sodium **AND** polyethylene glycol **AND** bisacodyl **AND** bisacodyl    Calcium Replacement - Follow Nurse / BPA Driven Protocol    cetirizine    ipratropium-albuterol    Magnesium Standard Dose Replacement - Follow Nurse / BPA Driven Protocol    melatonin    nitroglycerin    Phosphorus Replacement - Follow Nurse / BPA Driven Protocol    Potassium Replacement - Follow Nurse / BPA Driven Protocol    [COMPLETED] Insert Peripheral IV **AND** sodium chloride    sodium chloride      VITAL SIGNS  Vitals:    07/14/25 0926 07/14/25 0944 07/14/25 0948 07/14/25 1100   BP: 123/71   119/71   BP Location:    Right arm   Patient Position:    Sitting   Pulse: 99 78 84 73   Resp:    11   Temp:    97.4 °F (36.3 °C)   TempSrc:    Oral   SpO2: 100%   100%   Weight:       Height:           Flowsheet Rows      Flowsheet Row First Filed Value   Admission Height 160 cm (63\") Documented at 07/09/2025 1439   Admission Weight 116 kg (255 lb) Documented at 07/09/2025 1439               Physical Exam  VITALS REVIEWED    General:      well developed, in " no acute distress.    Head:      normocephalic and atraumatic.    Eyes:      PERRL/EOM intact, conjunctiva and sclera clear with out nystagmus.    Neck:      no masses, thyromegaly,  trachea central with normal respiratory effort and PMI displaced laterally  Lungs:      Clear  Heart:       Regular rhythm  Msk:      no deformity or scoliosis noted of thoracic or lumbar spine.    Pulses:      pulses normal in all 4 extremities.    Extremities:       No lower extremity edema  Neurologic:      no focal deficits.   alert oriented x3  Skin:      intact without lesions or rashes.    Psych:      alert and cooperative; normal mood and affect; normal attention span and concentration.          LAB RESULTS (LAST 7 DAYS)    CBC  Results from last 7 days   Lab Units 07/11/25  2331 07/11/25  0520 07/10/25  0540 07/09/25  1504   WBC 10*3/mm3 3.81 4.04 4.57 5.54   RBC 10*6/mm3 2.66* 2.67* 2.69* 2.73*   HEMOGLOBIN g/dL 8.4* 8.6* 8.9* 8.9*   HEMATOCRIT % 27.6* 27.7* 27.6* 28.6*   MCV fL 103.8* 103.7* 102.6* 104.8*   PLATELETS 10*3/mm3 204 184 200 202       BMP  Results from last 7 days   Lab Units 07/11/25  2331 07/11/25  0520 07/10/25  0540 07/09/25  1504   SODIUM mmol/L 137 137 140 138   POTASSIUM mmol/L 4.3 4.1 4.1 4.2   CHLORIDE mmol/L 104 105 106 104   CO2 mmol/L 23.2 23.6 24.1 21.6*   BUN mg/dL 18.2 21.2 18.5 20.1   CREATININE mg/dL 1.26* 1.28* 1.41* 1.55*   GLUCOSE mg/dL 86 94 87 111*   MAGNESIUM mg/dL 1.7 1.7 1.8  --    PHOSPHORUS mg/dL 2.9 3.0 3.5  --        CMP   Results from last 7 days   Lab Units 07/11/25  2331 07/11/25  0520 07/10/25  0540 07/09/25  1504   SODIUM mmol/L 137 137 140 138   POTASSIUM mmol/L 4.3 4.1 4.1 4.2   CHLORIDE mmol/L 104 105 106 104   CO2 mmol/L 23.2 23.6 24.1 21.6*   BUN mg/dL 18.2 21.2 18.5 20.1   CREATININE mg/dL 1.26* 1.28* 1.41* 1.55*   GLUCOSE mg/dL 86 94 87 111*   ALBUMIN g/dL  --   --   --  4.0   BILIRUBIN mg/dL  --   --   --  0.4   ALK PHOS U/L  --   --   --  121*   AST (SGOT) U/L  --   --    --  20   ALT (SGPT) U/L  --   --   --  13         BNP        TROPONIN  Results from last 7 days   Lab Units 07/09/25  1605   HSTROP T ng/L 14*       CoAg        Creatinine Clearance  Estimated Creatinine Clearance: 45.2 mL/min (A) (by C-G formula based on SCr of 1.26 mg/dL (H)).    ABG          EKG    I personally reviewed the patient's EKG/Telemetry data: Atrial flutter      Assessment & Plan       Atrial fibrillation with RVR        Ratna Zavala MD  07/14/25  12:35 EDT

## 2025-07-14 NOTE — THERAPY TREATMENT NOTE
"Subjective: Pt agreeable to therapeutic plan of care.  Patient is cleared for therapy by nursing.  Patient is found up in chair; pleasant and agreeable.    Objective:     Precautions - falls, tachy    Bed mobility - up in chair  Transfers - SBA- CGA and with rolling walker  Ambulation - 50 & 110 feet SBA- CGA with RW, cure for posture and pacing; HR 84bpm post ambulation    Therapeutic Exercise -seated: AP, LAQ, heel slides, QS,  standing HS curls x15-20 reps    Vitals: Tachycardic  122/77mmHG, 73bpm, 100%  HR 73-142bpm RN notified BP resolves <100 with seated rest      Pain: 0     Education: Provided education on the importance of mobility in the acute care setting, Transfer Training, and Gait Training    Assessment: Faby Le presents with functional mobility impairments which indicate the need for skilled intervention.  Patient demos progress with gait distances; HR initially steady with mobility however demos tachycardia with fatigue.  Demos improved gait distances and requires CGA-SBA; anticipate she will be safe to return home once medically appropriate. Tolerating session today without incident. Will continue to follow and progress as tolerated.     Plan/Recommendations:   If medically appropriate, Low Intensity Therapy recommended post-acute care - This is recommended as therapy feels this patient would require 2-3 visits per week. OP or HH would be the best option depending on patient's home bound status. Consider, if the patient has other  \"skilled\" needs such as wounds, IV antibiotics, etc. Combined with \"low intensity\" could also equate to a SNF. If patient is medically complex, consider LTAC. Pt requires no DME at discharge.     Pt desires Home, Home with Home Health, and Home with family assist at discharge. Pt cooperative; agreeable to therapeutic recommendations and plan of care.         Basic Mobility 6-click:  Rollin = Total, A lot = 2, A little = 3; 4 = None  Supine>Sit:   1 = " Total, A lot = 2, A little = 3; 4 = None   Sit>Stand with arms:  1 = Total, A lot = 2, A little = 3; 4 = None  Bed>Chair:   1 = Total, A lot = 2, A little = 3; 4 = None  Ambulate in room:  1 = Total, A lot = 2, A little = 3; 4 = None  3-5 Steps with railin = Total, A lot = 2, A little = 3; 4 = None  Score: 18    Modified Cherry: N/A = No pre-op stroke/TIA    Post-Tx Position: Up in Chair, Alarms activated, and Call light and personal items within reach  PPE: gloves and gown    Therapy Charges for Today       Code Description Service Date Service Provider Modifiers Qty    86692069303 HC GAIT TRAINING EA 15 MIN 2025 Ana Spann, PT GP 1    [id number]  PT THER PROC EA 15 MIN 2025 Ana Spann, PT GP 1           PT Charges       Row Name 25 1541 25 0948          Time Calculation    Start Time 1513  -RR --     Stop Time 1533  -RR --     Time Calculation (min) 20 min  -RR --     PT Received On 25  -RR --     PT - Next Appointment 07/15/25  -RR 07/15/25  -RR        Time Calculation- PT    Total Timed Code Minutes- PT 20 minute(s)  -RR --               User Key  (r) = Recorded By, (t) = Taken By, (c) = Cosigned By      Initials Name Provider Type    RR Ana Spann, PT Physical Therapist

## 2025-07-15 ENCOUNTER — ANESTHESIA EVENT (OUTPATIENT)
Dept: CARDIOLOGY | Facility: HOSPITAL | Age: 80
End: 2025-07-15
Payer: MEDICARE

## 2025-07-15 LAB
ANION GAP SERPL CALCULATED.3IONS-SCNC: 13.8 MMOL/L (ref 5–15)
BASOPHILS # BLD AUTO: 0.05 10*3/MM3 (ref 0–0.2)
BASOPHILS NFR BLD AUTO: 0.9 % (ref 0–1.5)
BUN SERPL-MCNC: 19.4 MG/DL (ref 8–23)
BUN/CREAT SERPL: 17 (ref 7–25)
CALCIUM SPEC-SCNC: 9.4 MG/DL (ref 8.6–10.5)
CHLORIDE SERPL-SCNC: 105 MMOL/L (ref 98–107)
CO2 SERPL-SCNC: 20.2 MMOL/L (ref 22–29)
CREAT SERPL-MCNC: 1.14 MG/DL (ref 0.57–1)
DEPRECATED RDW RBC AUTO: 55.6 FL (ref 37–54)
EGFRCR SERPLBLD CKD-EPI 2021: 49.4 ML/MIN/1.73
EOSINOPHIL # BLD AUTO: 0.21 10*3/MM3 (ref 0–0.4)
EOSINOPHIL NFR BLD AUTO: 3.6 % (ref 0.3–6.2)
ERYTHROCYTE [DISTWIDTH] IN BLOOD BY AUTOMATED COUNT: 14.6 % (ref 12.3–15.4)
GLUCOSE SERPL-MCNC: 92 MG/DL (ref 65–99)
HCT VFR BLD AUTO: 30.6 % (ref 34–46.6)
HGB BLD-MCNC: 9.7 G/DL (ref 12–15.9)
IMM GRANULOCYTES # BLD AUTO: 0.01 10*3/MM3 (ref 0–0.05)
IMM GRANULOCYTES NFR BLD AUTO: 0.2 % (ref 0–0.5)
LYMPHOCYTES # BLD AUTO: 1.61 10*3/MM3 (ref 0.7–3.1)
LYMPHOCYTES NFR BLD AUTO: 27.5 % (ref 19.6–45.3)
MCH RBC QN AUTO: 33 PG (ref 26.6–33)
MCHC RBC AUTO-ENTMCNC: 31.7 G/DL (ref 31.5–35.7)
MCV RBC AUTO: 104.1 FL (ref 79–97)
MONOCYTES # BLD AUTO: 0.54 10*3/MM3 (ref 0.1–0.9)
MONOCYTES NFR BLD AUTO: 9.2 % (ref 5–12)
NEUTROPHILS NFR BLD AUTO: 3.44 10*3/MM3 (ref 1.7–7)
NEUTROPHILS NFR BLD AUTO: 58.6 % (ref 42.7–76)
NRBC BLD AUTO-RTO: 0 /100 WBC (ref 0–0.2)
PLATELET # BLD AUTO: 189 10*3/MM3 (ref 140–450)
PMV BLD AUTO: 9.7 FL (ref 6–12)
POTASSIUM SERPL-SCNC: 4.1 MMOL/L (ref 3.5–5.2)
QT INTERVAL: 387 MS
QTC INTERVAL: 424 MS
RBC # BLD AUTO: 2.94 10*6/MM3 (ref 3.77–5.28)
SODIUM SERPL-SCNC: 139 MMOL/L (ref 136–145)
WBC NRBC COR # BLD AUTO: 5.86 10*3/MM3 (ref 3.4–10.8)

## 2025-07-15 PROCEDURE — 97110 THERAPEUTIC EXERCISES: CPT

## 2025-07-15 PROCEDURE — 99232 SBSQ HOSP IP/OBS MODERATE 35: CPT | Performed by: INTERNAL MEDICINE

## 2025-07-15 PROCEDURE — 97116 GAIT TRAINING THERAPY: CPT

## 2025-07-15 PROCEDURE — 99232 SBSQ HOSP IP/OBS MODERATE 35: CPT | Performed by: NURSE PRACTITIONER

## 2025-07-15 PROCEDURE — 80048 BASIC METABOLIC PNL TOTAL CA: CPT | Performed by: PEDIATRICS

## 2025-07-15 PROCEDURE — 85025 COMPLETE CBC W/AUTO DIFF WBC: CPT | Performed by: PEDIATRICS

## 2025-07-15 RX ADMIN — MIDODRINE HYDROCHLORIDE 5 MG: 5 TABLET ORAL at 17:19

## 2025-07-15 RX ADMIN — DOFETILIDE 250 MCG: 0.25 CAPSULE ORAL at 20:56

## 2025-07-15 RX ADMIN — DOFETILIDE 250 MCG: 0.25 CAPSULE ORAL at 08:14

## 2025-07-15 RX ADMIN — RIVAROXABAN 15 MG: 15 TABLET, FILM COATED ORAL at 17:19

## 2025-07-15 RX ADMIN — ATORVASTATIN CALCIUM 20 MG: 20 TABLET, FILM COATED ORAL at 08:15

## 2025-07-15 RX ADMIN — LEVOTHYROXINE SODIUM 88 MCG: 88 TABLET ORAL at 06:18

## 2025-07-15 RX ADMIN — Medication 10 ML: at 20:56

## 2025-07-15 RX ADMIN — Medication 10 ML: at 08:15

## 2025-07-15 RX ADMIN — MIDODRINE HYDROCHLORIDE 5 MG: 5 TABLET ORAL at 08:14

## 2025-07-15 NOTE — THERAPY TREATMENT NOTE
"Subjective: Pt agreeable to therapeutic plan of care.  Patient is cleared for therapy by nursing and she is found up in chair; reports she is ready for her surgery tomorrow    Objective:     Precautions - falls, tachicardic    Bed mobility - up in chiar  Transfers - SBA  Ambulation - 120 feet SBA with RW, cues for pacing and posture    Therapeutic Exercise - seated: heel slides, LAQ red TB resistance, HS curls red TB resistance, standing TKE red TB    Vitals: Tachycardic  133/59mmHG, 100%, 72bpm-133bpm (symptomatic dizziness in standing with HR 133bpm, recovered with seated rest and HR recovery to 70s    Pain: 0     Education: Provided education on the importance of mobility in the acute care setting, Transfer Training, and Gait Training    Assessment: Faby Le presents with functional mobility impairments which indicate the need for skilled intervention. HR elevated with standing acts; recovers with seated rest.  Patient continue to demonstrate functional mobility with close supervision; continue to recommend return to [facility] with HHPT once medically appropriate.  Planned for ablation tomorrow AM.  Tolerating session today without incident. Will continue to follow and progress as tolerated.     Plan/Recommendations:   If medically appropriate, Low Intensity Therapy recommended post-acute care - This is recommended as therapy feels this patient would require 2-3 visits per week. OP or HH would be the best option depending on patient's home bound status. Consider, if the patient has other  \"skilled\" needs such as wounds, IV antibiotics, etc. Combined with \"low intensity\" could also equate to a SNF. If patient is medically complex, consider LTAC. Pt requires no DME at discharge.     Pt desires Home with Home Health and Home Health at discharge. Pt cooperative; agreeable to therapeutic recommendations and plan of care.         Basic Mobility 6-click:  Rollin = Total, A lot = 2, A little = 3; 4 = " None  Supine>Sit:   1 = Total, A lot = 2, A little = 3; 4 = None   Sit>Stand with arms:  1 = Total, A lot = 2, A little = 3; 4 = None  Bed>Chair:   1 = Total, A lot = 2, A little = 3; 4 = None  Ambulate in room:  1 = Total, A lot = 2, A little = 3; 4 = None  3-5 Steps with railin = Total, A lot = 2, A little = 3; 4 = None  Score: 18    Modified Kerri: N/A = No pre-op stroke/TIA    Post-Tx Position: Up in Chair and Call light and personal items within reach; as found  PPE: gloves    Therapy Charges for Today       Code Description Service Date Service Provider Modifiers Qty    56532529983 HC GAIT TRAINING EA 15 MIN 2025 Ana Spann, PT GP 1    34269857869 HC PT THER PROC EA 15 MIN 2025 Ana Spann, PT GP 1    [id number] HC GAIT TRAINING EA 15 MIN 7/15/2025 Ana Spann, PT GP 1    29822527028 HC PT THER PROC EA 15 MIN 7/15/2025 Ana Spann, PT GP 1           PT Charges       Row Name 07/15/25 1431             Time Calculation    Start Time 1407  -RR      Stop Time 1430  -RR      Time Calculation (min) 23 min  -RR      PT Received On 07/15/25  -RR      PT - Next Appointment 25  -RR         Time Calculation- PT    Total Timed Code Minutes- PT 23 minute(s)  -RR                User Key  (r) = Recorded By, (t) = Taken By, (c) = Cosigned By      Initials Name Provider Type    RR Ana Spann PT Physical Therapist

## 2025-07-15 NOTE — PLAN OF CARE
Goal Outcome Evaluation:  Plan of Care Reviewed With: patient         Assessment: Faby Le presents with functional mobility impairments which indicate the need for skilled intervention. HR elevated with standing acts; recovers with seated rest.  Patient continue to demonstrate functional mobility with close supervision; continue to recommend return to [facility] with HHPT once medically appropriate.  Planned for ablation tomorrow AM.  Tolerating session today without incident. Will continue to follow and progress as tolerated.     Anticipated Discharge Disposition (PT): assisted living, home with home health

## 2025-07-15 NOTE — PROGRESS NOTES
Reason for follow-up: Atrial flutter     Patient Care Team:  Leonor Adam MD as PCP - General (Family Medicine)  Maggi Horn MD as Consulting Physician (Pain Medicine)  Ratna Zavala MD as Consulting Physician (Cardiology)  Nora Mendez APRN as Nurse Practitioner (Gastroenterology)  Gianluca Walton MD as Consulting Physician (Pulmonary Disease)  Nathanael Murillo MD as Consulting Physician (Allergy and Immunology)  Delfin Thorpe MD as Surgeon (Orthopedic Surgery)  Josh Loomis MD as Consulting Physician (Gastroenterology)  Bo Ruggiero MD as Consulting Physician (Urology)    Subjective .   Faby Le is doing fair today. No complaints         Celecoxib    Scheduled Meds:atorvastatin, 20 mg, Oral, Daily  dofetilide, 250 mcg, Oral, Q12H  levothyroxine, 88 mcg, Oral, Daily  midodrine, 5 mg, Oral, BID AC  rivaroxaban, 15 mg, Oral, Daily With Dinner  sodium chloride, 10 mL, Intravenous, Q12H      Continuous Infusions:   PRN Meds:.  acetaminophen **OR** acetaminophen **OR** acetaminophen    senna-docusate sodium **AND** polyethylene glycol **AND** bisacodyl **AND** bisacodyl    Calcium Replacement - Follow Nurse / BPA Driven Protocol    cetirizine    ipratropium-albuterol    Magnesium Standard Dose Replacement - Follow Nurse / BPA Driven Protocol    melatonin    nitroglycerin    Phosphorus Replacement - Follow Nurse / BPA Driven Protocol    Potassium Replacement - Follow Nurse / BPA Driven Protocol    [COMPLETED] Insert Peripheral IV **AND** sodium chloride    sodium chloride      VITAL SIGNS  Vitals:    07/15/25 0010 07/15/25 0343 07/15/25 0400 07/15/25 0814   BP:  147/48  116/53   BP Location:  Right arm     Patient Position:  Lying     Pulse: 74 71 71 72   Resp: 12 12 14    Temp:  96.9 °F (36.1 °C)     TempSrc:  Temporal     SpO2: 97% 96% 96%    Weight:  122 kg (268 lb 4.8 oz)     Height:           Flowsheet Rows      Flowsheet Row First Filed Value  "  Admission Height 160 cm (63\") Documented at 07/09/2025 1439   Admission Weight 116 kg (255 lb) Documented at 07/09/2025 1439               Physical Exam  VITALS REVIEWED    General:      well developed, in no acute distress.    Head:      normocephalic and atraumatic.    Eyes:      PERRL/EOM intact, conjunctiva and sclera clear with out nystagmus.    Neck:      no masses, thyromegaly,  trachea central with normal respiratory effort and PMI displaced laterally  Lungs:      Clear  Heart:       Regular rhythm  Msk:      no deformity or scoliosis noted of thoracic or lumbar spine.    Pulses:      pulses normal in all 4 extremities.    Extremities:       No lower extremity edema  Neurologic:      no focal deficits.   alert oriented x3  Skin:      intact without lesions or rashes.    Psych:      alert and cooperative; normal mood and affect; normal attention span and concentration.          LAB RESULTS (LAST 7 DAYS)    CBC  Results from last 7 days   Lab Units 07/15/25  0209 07/14/25  1348 07/11/25  2331 07/11/25  0520 07/10/25  0540 07/09/25  1504   WBC 10*3/mm3 5.86 4.56 3.81 4.04 4.57 5.54   RBC 10*6/mm3 2.94* 2.94* 2.66* 2.67* 2.69* 2.73*   HEMOGLOBIN g/dL 9.7* 9.5* 8.4* 8.6* 8.9* 8.9*   HEMATOCRIT % 30.6* 30.4* 27.6* 27.7* 27.6* 28.6*   MCV fL 104.1* 103.4* 103.8* 103.7* 102.6* 104.8*   PLATELETS 10*3/mm3 189 204 204 184 200 202       BMP  Results from last 7 days   Lab Units 07/15/25  0209 07/14/25  1348 07/11/25  2331 07/11/25  0520 07/10/25  0540 07/09/25  1504   SODIUM mmol/L 139 139 137 137 140 138   POTASSIUM mmol/L 4.1 4.1 4.3 4.1 4.1 4.2   CHLORIDE mmol/L 105 104 104 105 106 104   CO2 mmol/L 20.2* 24.6 23.2 23.6 24.1 21.6*   BUN mg/dL 19.4 17.6 18.2 21.2 18.5 20.1   CREATININE mg/dL 1.14* 1.33* 1.26* 1.28* 1.41* 1.55*   GLUCOSE mg/dL 92 139* 86 94 87 111*   MAGNESIUM mg/dL  --   --  1.7 1.7 1.8  --    PHOSPHORUS mg/dL  --   --  2.9 3.0 3.5  --        CMP   Results from last 7 days   Lab Units 07/15/25  0209 " 07/14/25  1348 07/11/25  2331 07/11/25  0520 07/10/25  0540 07/09/25  1504   SODIUM mmol/L 139 139 137 137 140 138   POTASSIUM mmol/L 4.1 4.1 4.3 4.1 4.1 4.2   CHLORIDE mmol/L 105 104 104 105 106 104   CO2 mmol/L 20.2* 24.6 23.2 23.6 24.1 21.6*   BUN mg/dL 19.4 17.6 18.2 21.2 18.5 20.1   CREATININE mg/dL 1.14* 1.33* 1.26* 1.28* 1.41* 1.55*   GLUCOSE mg/dL 92 139* 86 94 87 111*   ALBUMIN g/dL  --   --   --   --   --  4.0   BILIRUBIN mg/dL  --   --   --   --   --  0.4   ALK PHOS U/L  --   --   --   --   --  121*   AST (SGOT) U/L  --   --   --   --   --  20   ALT (SGPT) U/L  --   --   --   --   --  13         BNP        TROPONIN  Results from last 7 days   Lab Units 07/09/25  1605   HSTROP T ng/L 14*       CoAg        Creatinine Clearance  Estimated Creatinine Clearance: 51.5 mL/min (A) (by C-G formula based on SCr of 1.14 mg/dL (H)).    ABG          EKG    I personally reviewed the patient's EKG/Telemetry data: Atrial flutter      Assessment & Plan       Atrial fibrillation with RVR      Faby Le is a 80-year-old female patient, has sick sinus syndrome, dual-chamber pacemaker, has A-fib and atrial flutter, onset seems to be around August 2022.  Patient underwent ablation with Dr. Herrera on 3/5/2024 with cryoablation of pulmonary veins, also left atrial posterior wall.  She had recurrence in June 2024 and was placed on Tikosyn, had self converted to sinus rhythm up until a few days ago.     The arrhythmia seems to be atypical atrial flutter.  Cardioversion was performed on 7/11/2025 but briefly after, arrhythmia recurred again.  I performed a device interrogation after the cardioversion and she was in atrial flutter.     I talked to the patient and her family about management options.  I do not think performing another cardioversion would be helpful, increasing Tikosyn to 500 mg can be considered but I think that it would not be all that advisable in her case.     Therefore the 2 reasonable options are redo  arrhythmia ablation versus upgrading her pacemaker to a BiV device and AV node ablation.  Patient chose to perform another arrhythmia ablation.     She would like to stay in the hospital until the ablation is done since her heart rate is elevated and she is symptomatic.  We can do this next Wednesday.  Risk indications and benefits for A-fib/flutter ablation were discussed at length with her and she is agreeable.     The meantime please continue Tikosyn and also resume her home anticoagulation with Xarelto.     Please do not administer Mounjaro while she is in the hospital as that will halt anesthesia procedures.     7/13/2025    Atrial flutter, rate controlled, plan same as original consult note.    7/15/2025    Remains in atrial flutter, with rate control. Scheduled for ablation on Wednesday. Continue current therapy.    I discussed the patients findings and my recommendations with patient and agrees with outlined plan.    CAROL Ward  07/15/25  10:10 EDT    Electronically signed by CAROL Ward, 07/15/25, 10:12 AM EDT.      Electronically signed by Pacheco Aleman MD, 07/13/25, 4:00 PM EDT.

## 2025-07-15 NOTE — ANESTHESIA PREPROCEDURE EVALUATION
Anesthesia Evaluation     Patient summary reviewed and Nursing notes reviewed   no history of anesthetic complications:   NPO Solid Status: > 8 hours  NPO Liquid Status: > 2 hours           Airway   Dental      Pulmonary    (+) ,sleep apnea  Cardiovascular     ECG reviewed  PT is on anticoagulation therapy  Patient on routine beta blocker    (+) pacemaker pacemaker, hypertension, valvular problems/murmurs MR and TI, CAD, dysrhythmias Paroxysmal Atrial Fib, CHF Diastolic >=55%, hyperlipidemia      Neuro/Psych  (+) TIA, dizziness/light headedness, syncope, numbness, psychiatric history Depression  GI/Hepatic/Renal/Endo    (+) morbid obesity, GERD, renal disease- CRI, thyroid problem hypothyroidism    Musculoskeletal     (+) arthralgias, back pain, chronic pain  Abdominal    Substance History      OB/GYN          Other   arthritis, blood dyscrasia anemia,   history of cancer    ROS/Med Hx Other: Additional History:  Allergies, orthostatic hypotension, hypotension, tachy-jojo syndrome, gout, dysphagia, poor mobility, endometrial carcinoma, paresthesia    Afib/RVR    Echo:      Echocardiogram Findings    Left Ventricle Left ventricular systolic function is normal. Left ventricular ejection fraction appears to be 61 - 65%.     Normal left ventricular cavity size and wall thickness noted.  Right Ventricle Normal right ventricular cavity size and systolic function noted. Electronic lead present in the ventricle.  Left Atrium The left atrial cavity is severely dilated. No evidence of left atrial thrombus or mass present. There is no spontaneous echo contrast present. No evidence of cardiac transplantation present. Left atrial appendage was found to be multilobar in nature. Left atrial appendage morphology best described as chicken wing. The left atrial appendage was visualized through multiple planes. Doppler interrogation shows normal flow within the left atrial appendage. No evidence of a left atrial appendage thrombus  was present.  The interatrial septum does not appear to be redundant. No interatrial septal aneurysm present. No lipomatous hypertrophy of the interatrial septum present. No evidence of a patent foramen ovale. No evidence of an atrial septal defect present.  Saline test results are negative.  Right Atrium The right atrial cavity is moderately dilated.  Aortic Valve The aortic valve is structurally normal with no regurgitation or stenosis present.  Mitral Valve The mitral valve is normal in structure. Trace to mild mitral valve regurgitation is present.  Tricuspid Valve The tricuspid valve is grossly normal in structure. Mild tricuspid valve regurgitation is present.  Pulmonic Valve The pulmonic valve is grossly normal in structure. There is trace pulmonic valve regurgitation present.  Greater Vessels No dilation of the aortic root is present.  Pericardium There is no evidence of pericardial effusion. .        Echocardiogram Findings    Left Ventricle Left ventricular systolic function is normal. Estimated left ventricular EF = 60% Left ventricular ejection fraction appears to be 56 - 60%.     Normal left ventricular cavity size noted. Left ventricular wall thickness is consistent with mild concentric hypertrophy. Left ventricular diastolic function is consistent with (grade Ia w/high LAP) impaired relaxation. No evidence of left ventricular thrombus or mass present.  Right Ventricle Normal right ventricular cavity size, wall thickness and systolic function noted.  Left Atrium The left atrial cavity is moderate to severely dilated. No evidence of a left atrial thrombus present. There is no spontaneous echo contrast present. No evidence of a patent foramen ovale.  Right Atrium The right atrial cavity is mildly dilated.  Aortic Valve No aortic valve regurgitation or stenosis is present. The aortic valve appears trileaflet.  Mitral Valve The mitral valve is normal in structure. Mild mitral valve regurgitation is present  with a centrally-directed jet noted.  Tricuspid Valve The tricuspid valve is normal in structure. Trace tricuspid valve regurgitation is present. Estimated right ventricular systolic pressure from tricuspid regurgitation is normal (<35 mmHg).  Pulmonic Valve The pulmonic valve is structurally normal.  Pericardium There is no evidence of pericardial effusion. .        Stress Test:  · Left ventricular ejection fraction is normal. (Calculated EF = 68%).  · Findings consistent with an equivocal ECG stress test.     Abnormal stress  Submaximal study, no changes at submaximal stress on stress ECG  No arrhythmias seen  Nuclear imaging demonstrates decreased counts at rest in the mid inferior to apical wall involving the apex, summed rest score of 16  Stress imaging demonstrates significant worsening perfusion in the mid to apical inferior wall, EF 68%  There is significant GI and liver uptake cannot rule out diaphragmatic attenuation     Abnormal study  Correlate with clinical risk factors  Suggestive of inferior reversibility and ischemia      Cath:  Findings:     Hemodynamics Central arctic pressure systolic 150 and diastolic 56 with a mean pressure of 82 mmHg  LV end-diastolic pressure of 12 mmHg  There was no gradient across the aortic valve on the pullback of the pigtail catheter  Left Main  left main is a large-caliber vessel angiographically normal bifurcates into left into descending and left circumflex arteries  RCA  large-caliber dominant vessel  Right coronary artery is angiographically normal right coronary artery bifurcates into a large caliber PDA branch and a moderate caliber PLV branch that are angiographically normal  LAD  large-caliber vessel  LAD has mid focal angiographic 30% stenosis that is heavily calcified at the bifurcation with the diagonal and septal branch  Moderate size diagonal branch that has ostial angiographic of 40% stenosis  Circ  large-caliber vessel angiographically normal  First  marginal branch of the circumflex has proximal angiographic 30 to 40% stenosis      LV  normal LV systolic function normal wall motion estimate LV ejection fraction of 55%  Coronary Dominance  right coronary artery     Estimated Blood Loss:  Minimal     Specimens: None         Complications:  None; patient tolerated the procedure well.           Disposition: PACU - hemodynamically stable.           Condition: stable     Impressions:  Mild obstructive coronary artery disease involving the LAD mild to moderate obstructive coronary artery disease involving the ostium of the diagonal branch mild obstructive coronary artery disease involving the proximal first marginal branch  Normal LV systolic function  Normal wall motion  Estimate LV ejection fraction of 55%  Normal left-sided filling pressures     Recommendations:  Medical management for obstructive coronary artery disease  Test results reviewed and discussed with patient and family      PSH:     HYSTERECTOMY ROTATOR CUFF REPAIR  CATARACT EXTRACTION CARPAL TUNNEL RELEASE  CUBITAL TUNNEL RELEASE CARDIAC CATHETERIZATION  REPLACEMENT TOTAL KNEE ANKLE FUSION  CARDIAC CATHETERIZATION TOTAL KNEE ARTHROPLASTY  CARDIAC ELECTROPHYSIOLOGY PROCEDURE CARDIAC ELECTROPHYSIOLOGY PROCEDURE  CARDIAC ELECTROPHYSIOLOGY PROCEDURE ENDOSCOPY  TOTAL KNEE ARTHROPLASTY REVISION                 Anesthesia Plan    ASA 3     general     (Patient identified; pre-operative vital signs, all relevant labs/studies, complete medical/surgical/anesthetic history, full medication list, full allergy list, and NPO status obtained/reviewed; physical assessment performed; anesthetic options, side effects, potential complications, risks, and benefits discussed; questions answered; written anesthesia consent obtained; patient cleared for procedure; anesthesia machine and equipment checked and functioning    Patient off metoprolol; IV metoprolol for rate control; will proceed when HR better  controlled)  intravenous induction     Anesthetic plan, risks, benefits, and alternatives have been provided, discussed and informed consent has been obtained with: patient.    Plan discussed with CRNA and CAA.    CODE STATUS:    Code Status (Patient has no pulse and is not breathing): CPR (Attempt to Resuscitate)  Medical Interventions (Patient has pulse or is breathing): Full Support

## 2025-07-15 NOTE — PROGRESS NOTES
[facility] MEDICINE SERVICE  DAILY PROGRESS NOTE    NAME: Faby Le  : 1945  MRN: [id number]      LOS: 6 days     PROVIDER OF SERVICE: Frandy Arauz MD    Chief Complaint: Atrial fibrillation with RVR    Subjective:     Interval History:  History taken from: patient and family    The patient was seen at bedside this morning.  There were no acute events reported overnight the patient denies experiencing significant dizziness with ambulation.  She has been tolerating p.o. intake without issues.  The patient denies experiencing shortness of breath, chest pain, nausea, vomiting, headache.        Review of Systems:   As above otherwise negative    Objective:     Vital Signs  Temp:  [96.9 °F (36.1 °C)-97.7 °F (36.5 °C)] 97.2 °F (36.2 °C)  Heart Rate:  [70-90] 72  Resp:  [10-14] 14  BP: (116-147)/(48-87) 140/87   Body mass index is 47.53 kg/m².    Physical Exam  Constitutional:       Appearance: Normal appearance.   Cardiovascular:      Rate and Rhythm: Normal rate and irregularly irregular rhythm      Heart sounds: Normal heart sounds.   Pulmonary:      Effort: Pulmonary effort is normal.      Breath sounds: Normal breath sounds.   Abdominal:      General: Abdomen is flat.      Palpations: Abdomen is soft.   Neurological:      Mental Status: She is alert.        Diagnostic Data    Results from last 7 days   Lab Units 07/15/25  0209 07/10/25  0540 25  1504   WBC 10*3/mm3 5.86   < > 5.54   HEMOGLOBIN g/dL 9.7*   < > 8.9*   HEMATOCRIT % 30.6*   < > 28.6*   PLATELETS 10*3/mm3 189   < > 202   GLUCOSE mg/dL 92   < > 111*   CREATININE mg/dL 1.14*   < > 1.55*   BUN mg/dL 19.4   < > 20.1   SODIUM mmol/L 139   < > 138   POTASSIUM mmol/L 4.1   < > 4.2   AST (SGOT) U/L  --   --  20   ALT (SGPT) U/L  --   --  13   ALK PHOS U/L  --   --  121*   BILIRUBIN mg/dL  --   --  0.4   ANION GAP mmol/L 13.8   < > 12.4    < > = values in this interval not displayed.       No radiology results for the last day      I  reviewed the patient's new clinical results.    Assessment/Plan:     Active and Resolved Problems  Active Hospital Problems    Diagnosis  POA    **Atrial fibrillation with RVR [I48.91]  Yes      Resolved Hospital Problems   No resolved problems to display.       AFIB with RVR/atrial flutter  Acute decompensated HFpEF - improved  Hypotension with history of hypertension  CAD, nonobstructive  Hyperlipidemia  - ECHO on 5/16/25: LVEF 56-60%, LVDF consistent with (grade III w/high LAP) reversible restrictive pattern  - CTA chest: negative for PE, no acute findings with limitation of respiratory motion artifacts  - Lovenox AC for procedures.  - PRN duonebs  - Titrate O2 to keep O2 sat>92%  - Heart failure pathway initiated  - Cardiology and cardiac EP following.  The patient has been started on Tikosyn 250 mcg p.o. twice daily.    -Continuing Xarelto 15 mg p.o. daily  -Plan for ablation on Wednesday     CKD stage III  - Cr baseline 1.17 on 6/10/25  -Creatinine 1.14  - Monitor and trend labs  - Strict I/Os, daily weight  - Consider Nephrology consult if creatinine fails to improve      Hypothyroidism  - Continue home dose of levothyroxine  - TSH: 2.780 on 5/28/2025     ROSA on CPAP  - CPAP ordered for HS, PRN      Gout  - Continue allopurinol    VTE Prophylaxis:  Pharmacologic VTE prophylaxis orders are present.             Disposition Planning:     Barriers to Discharge: Awaiting ablation 7/16/2025  Anticipated Date of Discharge: 7/17/25      Time: 35 minutes     Code Status and Medical Interventions: CPR (Attempt to Resuscitate); Full Support   Ordered at: 07/09/25 1750     Code Status (Patient has no pulse and is not breathing):    CPR (Attempt to Resuscitate)     Medical Interventions (Patient has pulse or is breathing):    Full Support       Signature: Electronically signed by Frandy Arauz MD, 07/15/25, 16:05 EDT.  [facility] Hospitalist Team

## 2025-07-15 NOTE — PLAN OF CARE
Goal Outcome Evaluation:  Plan of Care Reviewed With: patient        Progress: no change  Outcome Evaluation: pt had no complaints during the shift, HR elevates to low 100's when walking to bathroom then drops back down to 70s when resting in chair or bed, rested well during th night, used home cpap machine

## 2025-07-15 NOTE — PROGRESS NOTES
Reason for follow-up: Atrial flutter     Patient Care Team:  Leonor Adam MD as PCP - General (Family Medicine)  Maggi Horn MD as Consulting Physician (Pain Medicine)  Ratna Zavala MD as Consulting Physician (Cardiology)  Nora Mendez APRN as Nurse Practitioner (Gastroenterology)  Gianluca Walton MD as Consulting Physician (Pulmonary Disease)  Nathanael Murillo MD as Consulting Physician (Allergy and Immunology)  Delfin Thorpe MD as Surgeon (Orthopedic Surgery)  Josh Loomis MD as Consulting Physician (Gastroenterology)  Bo Ruggiero MD as Consulting Physician (Urology)        Cardiology assessment and plan    Faby Le is a 80-year-old female patient, has sick sinus syndrome, dual-chamber pacemaker, has A-fib and atrial flutter, onset seems to be around August 2022.  Patient underwent ablation with Dr. Herrera on 3/5/2024 with cryoablation of pulmonary veins, also left atrial posterior wall.  She had recurrence in June 2024 and was placed on Tikosyn, had self converted to sinus rhythm up until a few days ago.     Recurrent symptomatic atrial arrhythmias  Failed cardioversion  Failed medical therapy  Patient is scheduled for ablation therapy tomorrow  Symptomatic atrial arrhythmias  Hypertension  Hyperlipidemia  Nonobstructive coronary artery disease  Diastolic dysfunction  Elevated Og vascular score  History of nonischemic cardiomyopathy with recovery of LV systolic function  Sick sinus syndrome status post permanent pacemaker placement    Denies any new cardiac symptoms  Patient is currently on anticoagulation therapy  Plans for ablation therapy tomorrow  Risk-benefit is not alternatives reviewed and discussed with patient  Tmax is 97.8 pulse is 70-1 50 respirations are 14 blood pressure is 140/86 sats are 96%  Sodium is 139 potassium is 4.1 creatinine is 1.14 hemoglobin is 9.7  Patient current medications include Lipitor 20 mg p.o. daily  patient is on Tikosyn to 50 mcg p.o. every 12 hours  Patient is on anticoagulation therapy with Xarelto 15 mg p.o. once a day  Patient is currently clinically stable    Diagnosis and treatment options and risk benefits and alternatives reviewed and discussed with patient  Continue current medical management                    Subjective .   Faby Le is doing fair today  Denies any new cardiac symptoms      Review of Systems   Constitutional: Negative for chills, decreased appetite and malaise/fatigue.   HENT:  Negative for congestion and nosebleeds.    Eyes:  Negative for blurred vision and double vision.   Cardiovascular:  Negative for chest pain, dyspnea on exertion, irregular heartbeat, leg swelling, near-syncope, orthopnea, palpitations, paroxysmal nocturnal dyspnea and syncope.   Respiratory:  Negative for cough and shortness of breath.    Hematologic/Lymphatic: Negative for adenopathy. Does not bruise/bleed easily.   Skin:  Negative for color change and rash.   Musculoskeletal:  Negative for back pain and joint pain.   Gastrointestinal:  Negative for bloating, abdominal pain, hematemesis and hematochezia.   Genitourinary:  Negative for flank pain and hematuria.   Neurological:  Negative for dizziness and focal weakness.   Psychiatric/Behavioral:  Negative for altered mental status. The patient does not have insomnia.        Celecoxib    Scheduled Meds:atorvastatin, 20 mg, Oral, Daily  dofetilide, 250 mcg, Oral, Q12H  levothyroxine, 88 mcg, Oral, Daily  midodrine, 5 mg, Oral, BID AC  rivaroxaban, 15 mg, Oral, Daily With Dinner  sodium chloride, 10 mL, Intravenous, Q12H      Continuous Infusions:   PRN Meds:.  acetaminophen **OR** acetaminophen **OR** acetaminophen    senna-docusate sodium **AND** polyethylene glycol **AND** bisacodyl **AND** bisacodyl    Calcium Replacement - Follow Nurse / BPA Driven Protocol    cetirizine    ipratropium-albuterol    Magnesium Standard Dose Replacement - Follow Nurse / BPA  "Driven Protocol    melatonin    nitroglycerin    Phosphorus Replacement - Follow Nurse / BPA Driven Protocol    Potassium Replacement - Follow Nurse / BPA Driven Protocol    [COMPLETED] Insert Peripheral IV **AND** sodium chloride    sodium chloride      VITAL SIGNS  Vitals:    07/15/25 0343 07/15/25 0400 07/15/25 0814 07/15/25 1100   BP: 147/48  116/53 140/87   BP Location: Right arm   Right arm   Patient Position: Lying   Sitting   Pulse: 71 71 72    Resp: 12 14  14   Temp: 96.9 °F (36.1 °C)   97.2 °F (36.2 °C)   TempSrc: Temporal   Temporal   SpO2: 96% 96%     Weight: 122 kg (268 lb 4.8 oz)      Height:           Flowsheet Rows      Flowsheet Row First Filed Value   Admission Height 160 cm (63\") Documented at 07/09/2025 1439   Admission Weight 116 kg (255 lb) Documented at 07/09/2025 1439               Physical Exam  VITALS REVIEWED    General:      well developed, in no acute distress.    Head:      normocephalic and atraumatic.    Eyes:      PERRL/EOM intact, conjunctiva and sclera clear with out nystagmus.    Neck:      no masses, thyromegaly,  trachea central with normal respiratory effort and PMI displaced laterally  Lungs:      Clear  Heart:       Regular rhythm  Msk:      no deformity or scoliosis noted of thoracic or lumbar spine.    Pulses:      pulses normal in all 4 extremities.    Extremities:       No lower extremity edema  Neurologic:      no focal deficits.   alert oriented x3  Skin:      intact without lesions or rashes.    Psych:      alert and cooperative; normal mood and affect; normal attention span and concentration.          LAB RESULTS (LAST 7 DAYS)    CBC  Results from last 7 days   Lab Units 07/15/25  0209 07/14/25  1348 07/11/25  2331 07/11/25  0520 07/10/25  0540 07/09/25  1504   WBC 10*3/mm3 5.86 4.56 3.81 4.04 4.57 5.54   RBC 10*6/mm3 2.94* 2.94* 2.66* 2.67* 2.69* 2.73*   HEMOGLOBIN g/dL 9.7* 9.5* 8.4* 8.6* 8.9* 8.9*   HEMATOCRIT % 30.6* 30.4* 27.6* 27.7* 27.6* 28.6*   MCV fL 104.1* " 103.4* 103.8* 103.7* 102.6* 104.8*   PLATELETS 10*3/mm3 189 204 204 184 200 202       BMP  Results from last 7 days   Lab Units 07/15/25  0209 07/14/25  1348 07/11/25  2331 07/11/25  0520 07/10/25  0540 07/09/25  1504   SODIUM mmol/L 139 139 137 137 140 138   POTASSIUM mmol/L 4.1 4.1 4.3 4.1 4.1 4.2   CHLORIDE mmol/L 105 104 104 105 106 104   CO2 mmol/L 20.2* 24.6 23.2 23.6 24.1 21.6*   BUN mg/dL 19.4 17.6 18.2 21.2 18.5 20.1   CREATININE mg/dL 1.14* 1.33* 1.26* 1.28* 1.41* 1.55*   GLUCOSE mg/dL 92 139* 86 94 87 111*   MAGNESIUM mg/dL  --   --  1.7 1.7 1.8  --    PHOSPHORUS mg/dL  --   --  2.9 3.0 3.5  --        CMP   Results from last 7 days   Lab Units 07/15/25  0209 07/14/25  1348 07/11/25  2331 07/11/25  0520 07/10/25  0540 07/09/25  1504   SODIUM mmol/L 139 139 137 137 140 138   POTASSIUM mmol/L 4.1 4.1 4.3 4.1 4.1 4.2   CHLORIDE mmol/L 105 104 104 105 106 104   CO2 mmol/L 20.2* 24.6 23.2 23.6 24.1 21.6*   BUN mg/dL 19.4 17.6 18.2 21.2 18.5 20.1   CREATININE mg/dL 1.14* 1.33* 1.26* 1.28* 1.41* 1.55*   GLUCOSE mg/dL 92 139* 86 94 87 111*   ALBUMIN g/dL  --   --   --   --   --  4.0   BILIRUBIN mg/dL  --   --   --   --   --  0.4   ALK PHOS U/L  --   --   --   --   --  121*   AST (SGOT) U/L  --   --   --   --   --  20   ALT (SGPT) U/L  --   --   --   --   --  13         BNP        TROPONIN  Results from last 7 days   Lab Units 07/09/25  1605   HSTROP T ng/L 14*       CoAg        Creatinine Clearance  Estimated Creatinine Clearance: 51.5 mL/min (A) (by C-G formula based on SCr of 1.14 mg/dL (H)).    ABG          EKG    I personally reviewed the patient's EKG/Telemetry data: Atrial flutter      Assessment & Plan       Atrial fibrillation with RVR        Ratna Zavala MD  07/15/25  12:13 EDT

## 2025-07-16 ENCOUNTER — ANESTHESIA (OUTPATIENT)
Dept: CARDIOLOGY | Facility: HOSPITAL | Age: 80
End: 2025-07-16
Payer: MEDICARE

## 2025-07-16 LAB
ACT BLD: 354 SECONDS (ref 89–137)
ACT BLD: 371 SECONDS (ref 89–137)
ACT BLD: 383 SECONDS (ref 89–137)
ANION GAP SERPL CALCULATED.3IONS-SCNC: 12.4 MMOL/L (ref 5–15)
BUN SERPL-MCNC: 20.6 MG/DL (ref 8–23)
BUN/CREAT SERPL: 18.2 (ref 7–25)
CALCIUM SPEC-SCNC: 9.6 MG/DL (ref 8.6–10.5)
CHLORIDE SERPL-SCNC: 105 MMOL/L (ref 98–107)
CO2 SERPL-SCNC: 21.6 MMOL/L (ref 22–29)
CREAT SERPL-MCNC: 1.13 MG/DL (ref 0.57–1)
DEPRECATED RDW RBC AUTO: 55.2 FL (ref 37–54)
EGFRCR SERPLBLD CKD-EPI 2021: 49.9 ML/MIN/1.73
ERYTHROCYTE [DISTWIDTH] IN BLOOD BY AUTOMATED COUNT: 14.6 % (ref 12.3–15.4)
GLUCOSE BLDC GLUCOMTR-MCNC: 95 MG/DL (ref 70–105)
GLUCOSE SERPL-MCNC: 103 MG/DL (ref 65–99)
HCT VFR BLD AUTO: 29.5 % (ref 34–46.6)
HGB BLD-MCNC: 9.4 G/DL (ref 12–15.9)
MCH RBC QN AUTO: 32.6 PG (ref 26.6–33)
MCHC RBC AUTO-ENTMCNC: 31.9 G/DL (ref 31.5–35.7)
MCV RBC AUTO: 102.4 FL (ref 79–97)
PLATELET # BLD AUTO: 190 10*3/MM3 (ref 140–450)
PMV BLD AUTO: 10.4 FL (ref 6–12)
POTASSIUM SERPL-SCNC: 4.5 MMOL/L (ref 3.5–5.2)
RBC # BLD AUTO: 2.88 10*6/MM3 (ref 3.77–5.28)
SODIUM SERPL-SCNC: 139 MMOL/L (ref 136–145)
WBC NRBC COR # BLD AUTO: 7.74 10*3/MM3 (ref 3.4–10.8)

## 2025-07-16 PROCEDURE — 25010000002 FENTANYL CITRATE (PF) 100 MCG/2ML SOLUTION

## 2025-07-16 PROCEDURE — C1769 GUIDE WIRE: HCPCS | Performed by: INTERNAL MEDICINE

## 2025-07-16 PROCEDURE — 82948 REAGENT STRIP/BLOOD GLUCOSE: CPT

## 2025-07-16 PROCEDURE — 93462 L HRT CATH TRNSPTL PUNCTURE: CPT | Performed by: INTERNAL MEDICINE

## 2025-07-16 PROCEDURE — 02583ZF DESTRUCTION OF CONDUCTION MECHANISM USING IRREVERSIBLE ELECTROPORATION, PERCUTANEOUS APPROACH: ICD-10-PCS | Performed by: INTERNAL MEDICINE

## 2025-07-16 PROCEDURE — C1766 INTRO/SHEATH,STRBLE,NON-PEEL: HCPCS | Performed by: INTERNAL MEDICINE

## 2025-07-16 PROCEDURE — 4A023FZ MEASUREMENT OF CARDIAC RHYTHM, PERCUTANEOUS APPROACH: ICD-10-PCS | Performed by: INTERNAL MEDICINE

## 2025-07-16 PROCEDURE — 25010000002 PHENYLEPHRINE 10 MG/ML SOLUTION 5 ML VIAL: Performed by: NURSE ANESTHETIST, CERTIFIED REGISTERED

## 2025-07-16 PROCEDURE — 25810000003 SODIUM CHLORIDE 0.9 % SOLUTION 250 ML FLEX CONT: Performed by: NURSE ANESTHETIST, CERTIFIED REGISTERED

## 2025-07-16 PROCEDURE — 94799 UNLISTED PULMONARY SVC/PX: CPT

## 2025-07-16 PROCEDURE — 25010000002 GLYCOPYRROLATE 1 MG/5ML SOLUTION: Performed by: NURSE ANESTHETIST, CERTIFIED REGISTERED

## 2025-07-16 PROCEDURE — 93662 INTRACARDIAC ECG (ICE): CPT | Performed by: INTERNAL MEDICINE

## 2025-07-16 PROCEDURE — C1730 CATH, EP, 19 OR FEW ELECT: HCPCS | Performed by: INTERNAL MEDICINE

## 2025-07-16 PROCEDURE — 25010000002 HEPARIN (PORCINE) PER 1000 UNITS: Performed by: NURSE ANESTHETIST, CERTIFIED REGISTERED

## 2025-07-16 PROCEDURE — 93655 ICAR CATH ABLTJ DSCRT ARRHYT: CPT | Performed by: INTERNAL MEDICINE

## 2025-07-16 PROCEDURE — 85027 COMPLETE CBC AUTOMATED: CPT | Performed by: NURSE PRACTITIONER

## 2025-07-16 PROCEDURE — C1894 INTRO/SHEATH, NON-LASER: HCPCS | Performed by: INTERNAL MEDICINE

## 2025-07-16 PROCEDURE — 99232 SBSQ HOSP IP/OBS MODERATE 35: CPT | Performed by: INTERNAL MEDICINE

## 2025-07-16 PROCEDURE — 85347 COAGULATION TIME ACTIVATED: CPT

## 2025-07-16 PROCEDURE — 02K83ZZ MAP CONDUCTION MECHANISM, PERCUTANEOUS APPROACH: ICD-10-PCS | Performed by: INTERNAL MEDICINE

## 2025-07-16 PROCEDURE — 93653 COMPRE EP EVAL TX SVT: CPT | Performed by: INTERNAL MEDICINE

## 2025-07-16 PROCEDURE — 25810000003 SODIUM CHLORIDE 0.9 % SOLUTION: Performed by: NURSE ANESTHETIST, CERTIFIED REGISTERED

## 2025-07-16 PROCEDURE — C1733 CATH, EP, OTHR THAN COOL-TIP: HCPCS | Performed by: INTERNAL MEDICINE

## 2025-07-16 PROCEDURE — 4A0234Z MEASUREMENT OF CARDIAC ELECTRICAL ACTIVITY, PERCUTANEOUS APPROACH: ICD-10-PCS | Performed by: INTERNAL MEDICINE

## 2025-07-16 PROCEDURE — 25010000002 SUGAMMADEX 200 MG/2ML SOLUTION

## 2025-07-16 PROCEDURE — 25010000002 LIDOCAINE PF 2% 2 % SOLUTION: Performed by: NURSE ANESTHETIST, CERTIFIED REGISTERED

## 2025-07-16 PROCEDURE — 25010000002 PROPOFOL 10 MG/ML EMULSION: Performed by: NURSE ANESTHETIST, CERTIFIED REGISTERED

## 2025-07-16 PROCEDURE — C1759 CATH, INTRA ECHOCARDIOGRAPHY: HCPCS | Performed by: INTERNAL MEDICINE

## 2025-07-16 PROCEDURE — C1732 CATH, EP, DIAG/ABL, 3D/VECT: HCPCS | Performed by: INTERNAL MEDICINE

## 2025-07-16 PROCEDURE — 25010000002 HEPARIN (PORCINE) 25000-0.45 UT/250ML-% SOLUTION: Performed by: NURSE ANESTHETIST, CERTIFIED REGISTERED

## 2025-07-16 PROCEDURE — 80048 BASIC METABOLIC PNL TOTAL CA: CPT | Performed by: NURSE PRACTITIONER

## 2025-07-16 PROCEDURE — 25010000002 PROTAMINE SULFATE PER 10 MG

## 2025-07-16 RX ORDER — PHENYLEPHRINE HCL IN 0.9% NACL 1 MG/10 ML
SYRINGE (ML) INTRAVENOUS AS NEEDED
Status: DISCONTINUED | OUTPATIENT
Start: 2025-07-16 | End: 2025-07-16 | Stop reason: SURG

## 2025-07-16 RX ORDER — LABETALOL HYDROCHLORIDE 5 MG/ML
5 INJECTION, SOLUTION INTRAVENOUS
Status: DISCONTINUED | OUTPATIENT
Start: 2025-07-16 | End: 2025-07-16 | Stop reason: HOSPADM

## 2025-07-16 RX ORDER — LABETALOL HYDROCHLORIDE 5 MG/ML
5 INJECTION, SOLUTION INTRAVENOUS
Status: DISCONTINUED | OUTPATIENT
Start: 2025-07-16 | End: 2025-07-17 | Stop reason: HOSPADM

## 2025-07-16 RX ORDER — IPRATROPIUM BROMIDE AND ALBUTEROL SULFATE 2.5; .5 MG/3ML; MG/3ML
3 SOLUTION RESPIRATORY (INHALATION) ONCE AS NEEDED
Status: DISCONTINUED | OUTPATIENT
Start: 2025-07-16 | End: 2025-07-16 | Stop reason: HOSPADM

## 2025-07-16 RX ORDER — HYDRALAZINE HYDROCHLORIDE 20 MG/ML
5 INJECTION INTRAMUSCULAR; INTRAVENOUS
Status: DISCONTINUED | OUTPATIENT
Start: 2025-07-16 | End: 2025-07-16 | Stop reason: HOSPADM

## 2025-07-16 RX ORDER — PROTAMINE SULFATE 10 MG/ML
INJECTION, SOLUTION INTRAVENOUS AS NEEDED
Status: DISCONTINUED | OUTPATIENT
Start: 2025-07-16 | End: 2025-07-16 | Stop reason: SURG

## 2025-07-16 RX ORDER — FENTANYL CITRATE 50 UG/ML
INJECTION, SOLUTION INTRAMUSCULAR; INTRAVENOUS AS NEEDED
Status: DISCONTINUED | OUTPATIENT
Start: 2025-07-16 | End: 2025-07-16 | Stop reason: SURG

## 2025-07-16 RX ORDER — ROCURONIUM BROMIDE 10 MG/ML
INJECTION, SOLUTION INTRAVENOUS AS NEEDED
Status: DISCONTINUED | OUTPATIENT
Start: 2025-07-16 | End: 2025-07-16 | Stop reason: SURG

## 2025-07-16 RX ORDER — SODIUM CHLORIDE 9 MG/ML
9 INJECTION, SOLUTION INTRAVENOUS CONTINUOUS PRN
Status: DISCONTINUED | OUTPATIENT
Start: 2025-07-16 | End: 2025-07-16 | Stop reason: HOSPADM

## 2025-07-16 RX ORDER — PROPOFOL 10 MG/ML
VIAL (ML) INTRAVENOUS AS NEEDED
Status: DISCONTINUED | OUTPATIENT
Start: 2025-07-16 | End: 2025-07-16 | Stop reason: SURG

## 2025-07-16 RX ORDER — EPHEDRINE SULFATE 5 MG/ML
5 INJECTION INTRAVENOUS ONCE AS NEEDED
Status: DISCONTINUED | OUTPATIENT
Start: 2025-07-16 | End: 2025-07-16 | Stop reason: HOSPADM

## 2025-07-16 RX ORDER — HEPARIN SODIUM 1000 [USP'U]/ML
INJECTION, SOLUTION INTRAVENOUS; SUBCUTANEOUS AS NEEDED
Status: DISCONTINUED | OUTPATIENT
Start: 2025-07-16 | End: 2025-07-16 | Stop reason: SURG

## 2025-07-16 RX ORDER — SODIUM CHLORIDE 9 MG/ML
INJECTION, SOLUTION INTRAVENOUS CONTINUOUS PRN
Status: DISCONTINUED | OUTPATIENT
Start: 2025-07-16 | End: 2025-07-16 | Stop reason: SURG

## 2025-07-16 RX ORDER — HEPARIN SODIUM 10000 [USP'U]/100ML
INJECTION, SOLUTION INTRAVENOUS CONTINUOUS PRN
Status: DISCONTINUED | OUTPATIENT
Start: 2025-07-16 | End: 2025-07-16 | Stop reason: SURG

## 2025-07-16 RX ORDER — FENTANYL CITRATE 50 UG/ML
25 INJECTION, SOLUTION INTRAMUSCULAR; INTRAVENOUS
Status: DISCONTINUED | OUTPATIENT
Start: 2025-07-16 | End: 2025-07-16 | Stop reason: HOSPADM

## 2025-07-16 RX ORDER — ONDANSETRON 2 MG/ML
4 INJECTION INTRAMUSCULAR; INTRAVENOUS ONCE AS NEEDED
Status: DISCONTINUED | OUTPATIENT
Start: 2025-07-16 | End: 2025-07-16 | Stop reason: HOSPADM

## 2025-07-16 RX ORDER — ONDANSETRON 2 MG/ML
4 INJECTION INTRAMUSCULAR; INTRAVENOUS ONCE AS NEEDED
Status: DISCONTINUED | OUTPATIENT
Start: 2025-07-16 | End: 2025-07-17 | Stop reason: HOSPADM

## 2025-07-16 RX ORDER — SODIUM CHLORIDE 0.9 % (FLUSH) 0.9 %
10 SYRINGE (ML) INJECTION EVERY 12 HOURS SCHEDULED
Status: DISCONTINUED | OUTPATIENT
Start: 2025-07-16 | End: 2025-07-16 | Stop reason: HOSPADM

## 2025-07-16 RX ORDER — SODIUM CHLORIDE 0.9 % (FLUSH) 0.9 %
10 SYRINGE (ML) INJECTION AS NEEDED
Status: DISCONTINUED | OUTPATIENT
Start: 2025-07-16 | End: 2025-07-16 | Stop reason: HOSPADM

## 2025-07-16 RX ORDER — DIPHENHYDRAMINE HYDROCHLORIDE 50 MG/ML
12.5 INJECTION, SOLUTION INTRAMUSCULAR; INTRAVENOUS
Status: DISCONTINUED | OUTPATIENT
Start: 2025-07-16 | End: 2025-07-17 | Stop reason: HOSPADM

## 2025-07-16 RX ORDER — ALBUTEROL SULFATE 0.83 MG/ML
2.5 SOLUTION RESPIRATORY (INHALATION) ONCE AS NEEDED
Status: DISCONTINUED | OUTPATIENT
Start: 2025-07-16 | End: 2025-07-17 | Stop reason: HOSPADM

## 2025-07-16 RX ORDER — GLYCOPYRROLATE 0.2 MG/ML
INJECTION INTRAMUSCULAR; INTRAVENOUS AS NEEDED
Status: DISCONTINUED | OUTPATIENT
Start: 2025-07-16 | End: 2025-07-16 | Stop reason: SURG

## 2025-07-16 RX ADMIN — DOFETILIDE 250 MCG: 0.25 CAPSULE ORAL at 20:55

## 2025-07-16 RX ADMIN — SODIUM CHLORIDE: 9 INJECTION, SOLUTION INTRAVENOUS at 10:09

## 2025-07-16 RX ADMIN — LEVOTHYROXINE SODIUM 88 MCG: 88 TABLET ORAL at 14:06

## 2025-07-16 RX ADMIN — PROPOFOL 150 MG: 10 INJECTION, EMULSION INTRAVENOUS at 08:14

## 2025-07-16 RX ADMIN — SUGAMMADEX 200 MG: 100 INJECTION, SOLUTION INTRAVENOUS at 10:16

## 2025-07-16 RX ADMIN — Medication 10 ML: at 09:00

## 2025-07-16 RX ADMIN — PHENYLEPHRINE HYDROCHLORIDE 0.5 MCG/KG/MIN: 10 INJECTION INTRAVENOUS at 08:53

## 2025-07-16 RX ADMIN — PHENOL 1 SPRAY: 1.5 LIQUID ORAL at 14:06

## 2025-07-16 RX ADMIN — PROTAMINE SULFATE 50 MG: 10 INJECTION, SOLUTION INTRAVENOUS at 10:15

## 2025-07-16 RX ADMIN — DOFETILIDE 250 MCG: 0.25 CAPSULE ORAL at 12:58

## 2025-07-16 RX ADMIN — FENTANYL CITRATE 50 MCG: 50 INJECTION, SOLUTION INTRAMUSCULAR; INTRAVENOUS at 09:58

## 2025-07-16 RX ADMIN — SODIUM CHLORIDE: 9 INJECTION, SOLUTION INTRAVENOUS at 08:08

## 2025-07-16 RX ADMIN — Medication 200 MCG: at 08:27

## 2025-07-16 RX ADMIN — GLYCOPYRROLATE 0.2 MCG: 0.2 INJECTION INTRAMUSCULAR; INTRAVENOUS at 08:39

## 2025-07-16 RX ADMIN — Medication 10 ML: at 20:55

## 2025-07-16 RX ADMIN — Medication 200 MCG: at 08:58

## 2025-07-16 RX ADMIN — HEPARIN SODIUM 18 UNITS/KG/HR: 10000 INJECTION, SOLUTION INTRAVENOUS at 08:37

## 2025-07-16 RX ADMIN — MIDODRINE HYDROCHLORIDE 5 MG: 5 TABLET ORAL at 14:06

## 2025-07-16 RX ADMIN — ROCURONIUM BROMIDE 10 MG: 10 INJECTION INTRAVENOUS at 09:26

## 2025-07-16 RX ADMIN — ROCURONIUM BROMIDE 10 MG: 10 INJECTION INTRAVENOUS at 09:59

## 2025-07-16 RX ADMIN — Medication 200 MCG: at 08:21

## 2025-07-16 RX ADMIN — HEPARIN SODIUM 13000 UNITS: 1000 INJECTION, SOLUTION INTRAVENOUS; SUBCUTANEOUS at 08:37

## 2025-07-16 RX ADMIN — LIDOCAINE HYDROCHLORIDE 50 MG: 20 INJECTION, SOLUTION EPIDURAL; INFILTRATION; INTRACAUDAL; PERINEURAL at 08:14

## 2025-07-16 RX ADMIN — ROCURONIUM BROMIDE 50 MG: 10 INJECTION INTRAVENOUS at 08:14

## 2025-07-16 RX ADMIN — Medication 200 MCG: at 08:51

## 2025-07-16 RX ADMIN — ACETAMINOPHEN 650 MG: 325 TABLET, FILM COATED ORAL at 17:00

## 2025-07-16 NOTE — PLAN OF CARE
Problem: Adult Inpatient Plan of Care  Goal: Absence of Hospital-Acquired Illness or Injury  Intervention: Identify and Manage Fall Risk  Recent Flowsheet Documentation  Taken 7/16/2025 1630 by Alda Gaspar RN  Safety Promotion/Fall Prevention:   assistive device/personal items within reach   clutter free environment maintained   fall prevention program maintained   lighting adjusted   nonskid shoes/slippers when out of bed   room organization consistent   safety round/check completed  Taken 7/16/2025 1413 by Alda Gaspar RN  Safety Promotion/Fall Prevention:   assistive device/personal items within reach   clutter free environment maintained   fall prevention program maintained   lighting adjusted   nonskid shoes/slippers when out of bed   room organization consistent   safety round/check completed  Taken 7/16/2025 1230 by Alda Gaspar RN  Safety Promotion/Fall Prevention:   assistive device/personal items within reach   clutter free environment maintained   fall prevention program maintained   lighting adjusted   nonskid shoes/slippers when out of bed   room organization consistent   safety round/check completed  Taken 7/16/2025 1100 by Alda Gaspar RN  Safety Promotion/Fall Prevention: patient off unit  Taken 7/16/2025 1000 by Alda Gaspar RN  Safety Promotion/Fall Prevention: patient off unit  Taken 7/16/2025 0900 by Alda Gaspar RN  Safety Promotion/Fall Prevention: patient off unit  Taken 7/16/2025 0800 by Alda Gaspar RN  Safety Promotion/Fall Prevention: patient off unit  Taken 7/16/2025 0701 by Alda Gaspar RN  Safety Promotion/Fall Prevention: patient off unit  Intervention: Prevent Skin Injury  Recent Flowsheet Documentation  Taken 7/16/2025 1630 by Alda Gaspar RN  Body Position: weight shifting  Taken 7/16/2025 1413 by Alda Gaspar RN  Body Position: weight shifting  Taken 7/16/2025 1230 by Alda Gaspar RN  Body Position: weight shifting  Intervention: Prevent Infection  Recent Flowsheet  Documentation  Taken 7/16/2025 1630 by Alda Gaspar RN  Infection Prevention:   personal protective equipment utilized   hand hygiene promoted  Taken 7/16/2025 1413 by Alda Gaspar RN  Infection Prevention:   hand hygiene promoted   personal protective equipment utilized  Taken 7/16/2025 1230 by Alda Gaspar RN  Infection Prevention:   hand hygiene promoted   personal protective equipment utilized  Goal: Optimal Comfort and Wellbeing  Intervention: Provide Person-Centered Care  Recent Flowsheet Documentation  Taken 7/16/2025 1230 by Alda Gaspar RN  Trust Relationship/Rapport: care explained     Problem: Comorbidity Management  Goal: Blood Pressure in Desired Range  Intervention: Maintain Blood Pressure Management  Recent Flowsheet Documentation  Taken 7/16/2025 1630 by Alda Gaspar RN  Medication Review/Management: medications reviewed  Taken 7/16/2025 1413 by Alda Gaspar RN  Medication Review/Management: medications reviewed  Taken 7/16/2025 1230 by Alda Gaspar RN  Medication Review/Management: medications reviewed     Problem: Fall Injury Risk  Goal: Absence of Fall and Fall-Related Injury  Intervention: Identify and Manage Contributors  Recent Flowsheet Documentation  Taken 7/16/2025 1630 by Alda Gaspar RN  Medication Review/Management: medications reviewed  Taken 7/16/2025 1413 by Alda Gaspar RN  Medication Review/Management: medications reviewed  Taken 7/16/2025 1230 by Alda Gaspar RN  Medication Review/Management: medications reviewed  Intervention: Promote Injury-Free Environment  Recent Flowsheet Documentation  Taken 7/16/2025 1630 by Alda Gaspar RN  Safety Promotion/Fall Prevention:   assistive device/personal items within reach   clutter free environment maintained   fall prevention program maintained   lighting adjusted   nonskid shoes/slippers when out of bed   room organization consistent   safety round/check completed  Taken 7/16/2025 1413 by Alda Gaspar RN  Safety Promotion/Fall  Prevention:   assistive device/personal items within reach   clutter free environment maintained   fall prevention program maintained   lighting adjusted   nonskid shoes/slippers when out of bed   room organization consistent   safety round/check completed  Taken 7/16/2025 1230 by Alda Gaspar RN  Safety Promotion/Fall Prevention:   assistive device/personal items within reach   clutter free environment maintained   fall prevention program maintained   lighting adjusted   nonskid shoes/slippers when out of bed   room organization consistent   safety round/check completed  Taken 7/16/2025 1100 by Alda Gaspar RN  Safety Promotion/Fall Prevention: patient off unit  Taken 7/16/2025 1000 by Alda Gaspar RN  Safety Promotion/Fall Prevention: patient off unit  Taken 7/16/2025 0900 by Alda Gaspar RN  Safety Promotion/Fall Prevention: patient off unit  Taken 7/16/2025 0800 by Alda Gaspar RN  Safety Promotion/Fall Prevention: patient off unit  Taken 7/16/2025 0701 by Alda Gaspar RN  Safety Promotion/Fall Prevention: patient off unit   Goal Outcome Evaluation:      Pt had ablation this shift. Xarelto held per cardio. Pt was on bedrest until 1630. Site assessed, no areas of concern. Pt A&O, C/o L shoulder pain. PRN meds given per MAR. Pt resting at this time. Call light in reach. Plan of care ongoing.

## 2025-07-16 NOTE — ANESTHESIA PROCEDURE NOTES
Airway  Reason: elective    Date/Time: 7/16/2025 8:15 AM  End Time:7/16/2025 8:17 AM  Airway not difficult    General Information and Staff    Patient location during procedure: OR  Anesthesiologist: Maurice Packer MD  CRNA/CAA: Krissy Borjas CRNA    Indications and Patient Condition  Indications for airway management: airway protection    Preoxygenated: yes  MILS maintained throughout    Mask difficulty assessment: 1 - vent by mask    Final Airway Details    Final airway type: endotracheal airway      Successful airway: ETT  Cuffed: yes   Successful intubation technique: direct laryngoscopy  Adjuncts used in placement: intubating stylet  Endotracheal tube insertion site: oral  Blade: Frances  Blade size: 3  ETT size (mm): 7.0  Cormack-Lehane Classification: grade I - full view of glottis  Placement verified by: capnometry and palpation of cuff   Measured from: lips  ETT/EBT  to lips (cm): 21  Number of attempts at approach: 1  Assessment: lips, teeth, and gum same as pre-op and atraumatic intubation    Additional Comments  ETT- MOP

## 2025-07-16 NOTE — PROGRESS NOTES
[facility] MEDICINE SERVICE  DAILY PROGRESS NOTE    NAME: Faby Le  : 1945  MRN: [id number]      LOS: 7 days     PROVIDER OF SERVICE: Frandy Arauz MD    Chief Complaint: Atrial fibrillation with RVR    Subjective:     Interval History:  History taken from: patient and family    The patient was seen following the ablation procedure.  Patient is awake alert oriented, multiple family members (her daughters) were at the bedside.  She denied having any acute complaints. The patient denies experiencing shortness of breath, chest pain, nausea, vomiting, headache.        Review of Systems:   As above otherwise negative    Objective:     Vital Signs  Temp:  [97.3 °F (36.3 °C)-98.4 °F (36.9 °C)] 97.9 °F (36.6 °C)  Heart Rate:  [64-89] 82  Resp:  [11-21] 21  BP: (114-161)/(38-97) 132/85   Body mass index is 46.98 kg/m².    Physical Exam  Constitutional:       Appearance: Normal appearance.   Cardiovascular:      Rate and Rhythm: Normal rate and irregularly irregular rhythm      Heart sounds: Normal heart sounds.   Pulmonary:      Effort: Pulmonary effort is normal.      Breath sounds: Normal breath sounds.   Abdominal:      General: Abdomen is flat.      Palpations: Abdomen is soft.   Neurological:      Mental Status: She is alert.        Diagnostic Data    Results from last 7 days   Lab Units 25  1717 25  1258   WBC 10*3/mm3  --  7.74   HEMOGLOBIN g/dL  --  9.4*   HEMATOCRIT %  --  29.5*   PLATELETS 10*3/mm3  --  190   GLUCOSE mg/dL 103*  --    CREATININE mg/dL 1.13*  --    BUN mg/dL 20.6  --    SODIUM mmol/L 139  --    POTASSIUM mmol/L 4.5  --    ANION GAP mmol/L 12.4  --        No radiology results for the last day      I reviewed the patient's new clinical results.    Assessment/Plan:     Active and Resolved Problems  Active Hospital Problems    Diagnosis  POA    **Atrial fibrillation with RVR [I48.91]  Yes      Resolved Hospital Problems   No resolved problems to display.       AFIB  with RVR/atrial flutter  Acute decompensated HFpEF - improved  Hypotension with history of hypertension  CAD, nonobstructive  Hyperlipidemia  - ECHO on 5/16/25: LVEF 56-60%, LVDF consistent with (grade III w/high LAP) reversible restrictive pattern  - CTA chest: negative for PE, no acute findings with limitation of respiratory motion artifacts  [facility]  - PRN duonebs  - Titrate O2 to keep O2 sat>92%  - Heart failure pathway initiated  - Cardiology and cardiac EP following.  The patient has been started on Tikosyn 250 mcg p.o. twice daily.    -Continuing Xarelto 15 mg p.o. daily  - Patient underwent successful ablation 7/16/2025     CKD stage III  - Cr baseline 1.17 on 6/10/25  -Creatinine 1.14  - Monitor and trend labs  - Strict I/Os, daily weight  - Consider Nephrology consult if creatinine fails to improve      Hypothyroidism  - Continue home dose of levothyroxine  - TSH: 2.780 on 5/28/2025     ROSA on CPAP  - CPAP ordered for HS, PRN      Gout  - Continue allopurinol    VTE Prophylaxis:  Pharmacologic VTE prophylaxis orders are present.             Disposition Planning:     Barriers to Discharge: ablation 7/16/2025  Anticipated Date of Discharge: 7/17/25      Time: 28 minutes     Code Status and Medical Interventions: CPR (Attempt to Resuscitate); Full Support   Ordered at: 07/09/25 1750     Code Status (Patient has no pulse and is not breathing):    CPR (Attempt to Resuscitate)     Medical Interventions (Patient has pulse or is breathing):    Full Support       Signature: Electronically signed by Frandy Arauz MD, 07/16/25, 18:30 EDT.  [facility] Hospitalist Team

## 2025-07-16 NOTE — PROGRESS NOTES
Reason for follow-up: Atrial flutter     Patient Care Team:  Leonor Adam MD as PCP - General (Family Medicine)  Maggi Horn MD as Consulting Physician (Pain Medicine)  Ratna Zavala MD as Consulting Physician (Cardiology)  Nora Mendez APRN as Nurse Practitioner (Gastroenterology)  Gianluca Walton MD as Consulting Physician (Pulmonary Disease)  Nathanael Murillo MD as Consulting Physician (Allergy and Immunology)  Delfin Thorpe MD as Surgeon (Orthopedic Surgery)  Josh Loomis MD as Consulting Physician (Gastroenterology)  Bo Ruggiero MD as Consulting Physician (Urology)        Cardiology assessment and plan    Recurrent symptomatic atrial arrhythmias  Failed cardioversion  Failed medical therapy  Successful A-fib/atypical atrial flutter ablation with pulsed field ablation of LA posterior wall and focal areas of arrhythmia. Radiofrequency ablation of cavotricuspid isthmus.   Symptomatic atrial arrhythmias  Hypertension  Hyperlipidemia  Nonobstructive coronary artery disease  Diastolic dysfunction  Elevated Og vascular score  History of nonischemic cardiomyopathy with recovery of LV systolic function  Sick sinus syndrome status post permanent pacemaker placement  Denies any new cardiac symptoms  Tmax is 98.4 pulse is 64-82 respirations are 20 blood pressure is 132/85 sats are 100%  Hemoglobin is 9.4  Sodium is 139 potassium is 4.1 creatinine is 1.14  Patient is on Lipitor 20 mg p.o. once a day patient is on Tikosyn to 50 mcg p.o. twice a day patient is on midodrine 5 mg p.o. twice daily patient is on Xarelto 15 mg p.o. once a day  Diagnosis and treatment options and risk benefits and alternatives reviewed and discussed with patient  She denies any current complaints clinically feels better and lying in the bed  Groin site looks good with no hematoma or bleeding  Continue current medical therapy continue close monitoring and follow-up  Plans to continue  Tikosyn as per EP postprocedure note  Likely discharge home tomorrow  Further recommendation based on patient course                    Subjective .   Faby Le is doing fair today  Denies any new cardiac symptoms      Review of Systems   Constitutional: Negative for chills, decreased appetite and malaise/fatigue.   HENT:  Negative for congestion and nosebleeds.    Eyes:  Negative for blurred vision and double vision.   Cardiovascular:  Negative for chest pain, dyspnea on exertion, irregular heartbeat, leg swelling, near-syncope, orthopnea, palpitations, paroxysmal nocturnal dyspnea and syncope.   Respiratory:  Negative for cough and shortness of breath.    Hematologic/Lymphatic: Negative for adenopathy. Does not bruise/bleed easily.   Skin:  Negative for color change and rash.   Musculoskeletal:  Negative for back pain and joint pain.   Gastrointestinal:  Negative for bloating, abdominal pain, hematemesis and hematochezia.   Genitourinary:  Negative for flank pain and hematuria.   Neurological:  Negative for dizziness and focal weakness.   Psychiatric/Behavioral:  Negative for altered mental status. The patient does not have insomnia.        Celecoxib    Scheduled Meds:atorvastatin, 20 mg, Oral, Daily  dofetilide, 250 mcg, Oral, Q12H  levothyroxine, 88 mcg, Oral, Daily  midodrine, 5 mg, Oral, BID AC  rivaroxaban, 15 mg, Oral, Daily With Dinner  sodium chloride, 10 mL, Intravenous, Q12H      Continuous Infusions:   PRN Meds:.  acetaminophen **OR** acetaminophen **OR** acetaminophen    albuterol    senna-docusate sodium **AND** polyethylene glycol **AND** bisacodyl **AND** bisacodyl    Calcium Replacement - Follow Nurse / BPA Driven Protocol    cetirizine    diphenhydrAMINE    ipratropium-albuterol    labetalol    Magnesium Standard Dose Replacement - Follow Nurse / BPA Driven Protocol    melatonin    nitroglycerin    ondansetron    phenol    Phosphorus Replacement - Follow Nurse / BPA Driven Protocol    Potassium  "Replacement - Follow Nurse / BPA Driven Protocol    [COMPLETED] Insert Peripheral IV **AND** [Transfer Hold] sodium chloride    sodium chloride      VITAL SIGNS  Vitals:    07/16/25 1115 07/16/25 1133 07/16/25 1213 07/16/25 1406   BP:  118/46 124/44 130/47   BP Location:   Left arm    Patient Position:   Lying    Pulse: 68 70  77   Resp: 11  11    Temp:   97.3 °F (36.3 °C)    TempSrc:   Oral    SpO2: 99% 99%     Weight:       Height:           Flowsheet Rows      Flowsheet Row First Filed Value   Admission Height 160 cm (63\") Documented at 07/09/2025 1439   Admission Weight 116 kg (255 lb) Documented at 07/09/2025 1439               Physical Exam  VITALS REVIEWED    General:      well developed, in no acute distress.    Head:      normocephalic and atraumatic.    Eyes:      PERRL/EOM intact, conjunctiva and sclera clear with out nystagmus.    Neck:      no masses, thyromegaly,  trachea central with normal respiratory effort and PMI displaced laterally  Lungs:      Clear  Heart:       Regular rhythm  Msk:      no deformity or scoliosis noted of thoracic or lumbar spine.    Pulses:      pulses normal in all 4 extremities.    Extremities:       No lower extremity edema  Neurologic:      no focal deficits.   alert oriented x3  Skin:      intact without lesions or rashes.    Psych:      alert and cooperative; normal mood and affect; normal attention span and concentration.          LAB RESULTS (LAST 7 DAYS)    CBC  Results from last 7 days   Lab Units 07/16/25  1258 07/15/25  0209 07/14/25  1348 07/11/25  2331 07/11/25  0520 07/10/25  0540   WBC 10*3/mm3 7.74 5.86 4.56 3.81 4.04 4.57   RBC 10*6/mm3 2.88* 2.94* 2.94* 2.66* 2.67* 2.69*   HEMOGLOBIN g/dL 9.4* 9.7* 9.5* 8.4* 8.6* 8.9*   HEMATOCRIT % 29.5* 30.6* 30.4* 27.6* 27.7* 27.6*   MCV fL 102.4* 104.1* 103.4* 103.8* 103.7* 102.6*   PLATELETS 10*3/mm3 190 189 204 204 184 200       BMP  Results from last 7 days   Lab Units 07/15/25  0209 07/14/25  1348 07/11/25  2331 " 07/11/25  0520 07/10/25  0540   SODIUM mmol/L 139 139 137 137 140   POTASSIUM mmol/L 4.1 4.1 4.3 4.1 4.1   CHLORIDE mmol/L 105 104 104 105 106   CO2 mmol/L 20.2* 24.6 23.2 23.6 24.1   BUN mg/dL 19.4 17.6 18.2 21.2 18.5   CREATININE mg/dL 1.14* 1.33* 1.26* 1.28* 1.41*   GLUCOSE mg/dL 92 139* 86 94 87   MAGNESIUM mg/dL  --   --  1.7 1.7 1.8   PHOSPHORUS mg/dL  --   --  2.9 3.0 3.5       CMP   Results from last 7 days   Lab Units 07/15/25  0209 07/14/25  1348 07/11/25  2331 07/11/25  0520 07/10/25  0540   SODIUM mmol/L 139 139 137 137 140   POTASSIUM mmol/L 4.1 4.1 4.3 4.1 4.1   CHLORIDE mmol/L 105 104 104 105 106   CO2 mmol/L 20.2* 24.6 23.2 23.6 24.1   BUN mg/dL 19.4 17.6 18.2 21.2 18.5   CREATININE mg/dL 1.14* 1.33* 1.26* 1.28* 1.41*   GLUCOSE mg/dL 92 139* 86 94 87         BNP        TROPONIN  Results from last 7 days   Lab Units 07/09/25  1605   HSTROP T ng/L 14*       CoAg        Creatinine Clearance  Estimated Creatinine Clearance: 51 mL/min (A) (by C-G formula based on SCr of 1.14 mg/dL (H)).    ABG          EKG    I personally reviewed the patient's EKG/Telemetry data: Atrial flutter      Assessment & Plan       Atrial fibrillation with RVR        Ratna Zavala MD  07/16/25  15:56 EDT

## 2025-07-16 NOTE — ANESTHESIA POSTPROCEDURE EVALUATION
Patient: Faby Le    Procedure Summary       Date: 07/16/25 Room / Location: [place] CATH LAB 3 / [facility] CATH INVASIVE LOCATION    Anesthesia Start: 0806 Anesthesia Stop: 1030    Procedure: Ablation atrial fibrillation - PFA (Right: Groin) Diagnosis:       Atrial fibrillation with RVR      (atrial fibrillation)    Providers: Pacheco Aleman MD Provider: Maurice Packer MD    Anesthesia Type: general ASA Status: 3            Anesthesia Type: general    Vitals  Vitals Value Taken Time   /46 07/16/25 11:33   Temp     Pulse 70 07/16/25 11:33   Resp 11 07/16/25 11:15   SpO2 99 % 07/16/25 11:33           Post Anesthesia Care and Evaluation    Patient location during evaluation: PACU  Patient participation: complete - patient participated  Level of consciousness: awake  Pain scale: See nurse's notes for pain score.  Pain management: adequate    Airway patency: patent  Anesthetic complications: No anesthetic complications  PONV Status: none  Cardiovascular status: acceptable  Respiratory status: acceptable and spontaneous ventilation  Hydration status: acceptable    Comments: Patient seen and examined postoperatively; vital signs stable; SpO2 greater than or equal to 90%; cardiopulmonary status stable; nausea/vomiting adequately controlled; pain adequately controlled; no apparent anesthesia complications; patient discharged from anesthesia care when discharge criteria were met

## 2025-07-16 NOTE — CASE MANAGEMENT/SOCIAL WORK
Continued Stay Note  KOFI Galarza     Patient Name: Faby Le  MRN: [id number]  Today's Date: 7/16/2025    Admit Date: 7/9/2025    Plan: [facility]. Current with [facility] (DEJAN entered) PT/OT eval pending.   Discharge Plan       Row Name 07/16/25 1514       Plan    Plan [facility]. Current with [facility] (DJEAN entered) PT/OT eval pending.    Plan Comments CM made aware patient is current with [facility] per liavictor manuel Dunn. CM placed referral in Epic basket and placed DEJAN order.           Kris Begum RN     Cell number [phone]  Office number [phone]

## 2025-07-16 NOTE — PLAN OF CARE
Problem: Adult Inpatient Plan of Care  Goal: Absence of Hospital-Acquired Illness or Injury  Intervention: Identify and Manage Fall Risk  Recent Flowsheet Documentation  Taken 7/16/2025 0200 by Amairani Das RN  Safety Promotion/Fall Prevention:   safety round/check completed   nonskid shoes/slippers when out of bed   lighting adjusted  Taken 7/16/2025 0000 by Amairani Das RN  Safety Promotion/Fall Prevention:   safety round/check completed   nonskid shoes/slippers when out of bed   clutter free environment maintained  Taken 7/15/2025 2200 by Amairani Das RN  Safety Promotion/Fall Prevention: safety round/check completed  Taken 7/15/2025 2000 by Amairani Das RN  Safety Promotion/Fall Prevention:   safety round/check completed   lighting adjusted   nonskid shoes/slippers when out of bed     Problem: Adult Inpatient Plan of Care  Goal: Absence of Hospital-Acquired Illness or Injury  Outcome: Progressing  Intervention: Identify and Manage Fall Risk  Recent Flowsheet Documentation  Taken 7/16/2025 0200 by Amairani Das RN  Safety Promotion/Fall Prevention:   safety round/check completed   nonskid shoes/slippers when out of bed   lighting adjusted  Taken 7/16/2025 0000 by Amairani Das RN  Safety Promotion/Fall Prevention:   safety round/check completed   nonskid shoes/slippers when out of bed   clutter free environment maintained  Taken 7/15/2025 2200 by Amairani Das RN  Safety Promotion/Fall Prevention: safety round/check completed  Taken 7/15/2025 2000 by Amairani Das RN  Safety Promotion/Fall Prevention:   safety round/check completed   lighting adjusted   nonskid shoes/slippers when out of bed  Intervention: Prevent Skin Injury  Recent Flowsheet Documentation  Taken 7/15/2025 2000 by Amairani Das RN  Body Position: position changed independently  Intervention: Prevent Infection  Recent Flowsheet Documentation  Taken 7/16/2025 0200 by Amairani Das RN  Infection Prevention:   single patient room  provided   rest/sleep promoted  Taken 7/16/2025 0000 by Amairani Das RN  Infection Prevention:   rest/sleep promoted   single patient room provided  Taken 7/15/2025 2200 by Amairani Das RN  Infection Prevention:   rest/sleep promoted   single patient room provided  Taken 7/15/2025 2000 by Amairani Das RN  Infection Prevention:   single patient room provided   rest/sleep promoted   environmental surveillance performed     Problem: Adult Inpatient Plan of Care  Goal: Absence of Hospital-Acquired Illness or Injury  Intervention: Prevent Infection  Recent Flowsheet Documentation  Taken 7/16/2025 0200 by Amairani Das RN  Infection Prevention:   single patient room provided   rest/sleep promoted  Taken 7/16/2025 0000 by Amairani Das RN  Infection Prevention:   rest/sleep promoted   single patient room provided  Taken 7/15/2025 2200 by Amairani Das RN  Infection Prevention:   rest/sleep promoted   single patient room provided  Taken 7/15/2025 2000 by Amairani Das RN  Infection Prevention:   single patient room provided   rest/sleep promoted   environmental surveillance performed     Problem: Adult Inpatient Plan of Care  Goal: Absence of Hospital-Acquired Illness or Injury  Intervention: Prevent Skin Injury  Recent Flowsheet Documentation  Taken 7/15/2025 2000 by Amairani Das RN  Body Position: position changed independently     Problem: Adult Inpatient Plan of Care  Goal: Optimal Comfort and Wellbeing  Outcome: Progressing  Intervention: Provide Person-Centered Care  Recent Flowsheet Documentation  Taken 7/15/2025 2000 by Amairani Das RN  Trust Relationship/Rapport:   care explained   questions answered   questions encouraged   Goal Outcome Evaluation:

## 2025-07-16 NOTE — DISCHARGE PLACEMENT REQUEST
"Mara Le (80 y.o. Female)       Date of Birth   1945    Social Security Number       Address   [address]  [place] [facility]    Home Phone   [phone]    MRN   [id number]       Gnosticist   [employer]ar[employer]    Marital Status                               Admission Date   7/9/2025    Admission Type   Emergency    Admitting Provider   Frandy Arauz MD    Attending Provider   Frandy Arauz MD    Department, Room/Bed   [facility] 2D, 273/B       Discharge Date       Discharge Disposition       Discharge Destination                                 Attending Provider: Frandy Arauz MD    Allergies: Celecoxib    Isolation: None   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)   Code Status: CPR    Ht: 160 cm (63\")   Wt: 120 kg (265 lb 3.4 oz)    Admission Cmt: None   Principal Problem: Atrial fibrillation with RVR [I48.91]                   Active Insurance as of 7/9/2025       Primary Coverage       Payor Plan Insurance Group Employer/Plan Group    [facility] MEDICARE [facility] MEDICARE ADVANTAGE SNP PPO [id number]       Payor Plan Address Payor Plan Phone Number Payor Plan Fax Number Effective Dates    [address] [address]   1/1/2023 - None Entered    [facility] [id number]         Subscriber Name Subscriber Birth Date Member ID       MARA LE 1945 [id number]               Secondary Coverage       Payor Plan Insurance Group Employer/Plan Group    [facility] COMMUNITY [place]AN [place] - [employer] [facility] COMMUNITY PLAN PATHWAYS IN        Payor Plan Address Payor Plan Phone Number Payor Plan Fax Number Effective Dates    [address]   7/1/2024 - None Entered    [facility] [id number]         Subscriber Name Subscriber Birth Date Member ID       MARA LE 1945 [id number]                     Emergency Contacts        (Rel.) Home Phone Work Phone Mobile Phone    RAMÓN LE (Daughter) -- -- [phone]    JANICE VALERIO (Daughter) " [phone] -- [phone]    FRANCK MARTINEZ (Daughter) [phone] -- [phone]    ROSA MARRERO (Daughter) [phone] -- [phone]

## 2025-07-17 VITALS
DIASTOLIC BLOOD PRESSURE: 40 MMHG | BODY MASS INDEX: 46.25 KG/M2 | SYSTOLIC BLOOD PRESSURE: 111 MMHG | WEIGHT: 261.02 LBS | TEMPERATURE: 98.1 F | OXYGEN SATURATION: 92 % | RESPIRATION RATE: 17 BRPM | HEART RATE: 63 BPM | HEIGHT: 63 IN

## 2025-07-17 LAB
ANION GAP SERPL CALCULATED.3IONS-SCNC: 9.4 MMOL/L (ref 5–15)
BASOPHILS # BLD AUTO: 0.03 10*3/MM3 (ref 0–0.2)
BASOPHILS NFR BLD AUTO: 0.5 % (ref 0–1.5)
BUN SERPL-MCNC: 22.4 MG/DL (ref 8–23)
BUN/CREAT SERPL: 18.2 (ref 7–25)
CALCIUM SPEC-SCNC: 9.3 MG/DL (ref 8.6–10.5)
CHLORIDE SERPL-SCNC: 108 MMOL/L (ref 98–107)
CO2 SERPL-SCNC: 22.6 MMOL/L (ref 22–29)
CREAT SERPL-MCNC: 1.23 MG/DL (ref 0.57–1)
DEPRECATED RDW RBC AUTO: 56 FL (ref 37–54)
EGFRCR SERPLBLD CKD-EPI 2021: 45.1 ML/MIN/1.73
EOSINOPHIL # BLD AUTO: 0.16 10*3/MM3 (ref 0–0.4)
EOSINOPHIL NFR BLD AUTO: 2.5 % (ref 0.3–6.2)
ERYTHROCYTE [DISTWIDTH] IN BLOOD BY AUTOMATED COUNT: 14.7 % (ref 12.3–15.4)
GLUCOSE SERPL-MCNC: 92 MG/DL (ref 65–99)
HCT VFR BLD AUTO: 27.5 % (ref 34–46.6)
HGB BLD-MCNC: 8.6 G/DL (ref 12–15.9)
IMM GRANULOCYTES # BLD AUTO: 0.01 10*3/MM3 (ref 0–0.05)
IMM GRANULOCYTES NFR BLD AUTO: 0.2 % (ref 0–0.5)
LYMPHOCYTES # BLD AUTO: 1.36 10*3/MM3 (ref 0.7–3.1)
LYMPHOCYTES NFR BLD AUTO: 21 % (ref 19.6–45.3)
MCH RBC QN AUTO: 32.3 PG (ref 26.6–33)
MCHC RBC AUTO-ENTMCNC: 31.3 G/DL (ref 31.5–35.7)
MCV RBC AUTO: 103.4 FL (ref 79–97)
MONOCYTES # BLD AUTO: 0.58 10*3/MM3 (ref 0.1–0.9)
MONOCYTES NFR BLD AUTO: 8.9 % (ref 5–12)
NEUTROPHILS NFR BLD AUTO: 4.35 10*3/MM3 (ref 1.7–7)
NEUTROPHILS NFR BLD AUTO: 66.9 % (ref 42.7–76)
NRBC BLD AUTO-RTO: 0 /100 WBC (ref 0–0.2)
PLATELET # BLD AUTO: 168 10*3/MM3 (ref 140–450)
PMV BLD AUTO: 9.4 FL (ref 6–12)
POTASSIUM SERPL-SCNC: 4.1 MMOL/L (ref 3.5–5.2)
RBC # BLD AUTO: 2.66 10*6/MM3 (ref 3.77–5.28)
SODIUM SERPL-SCNC: 140 MMOL/L (ref 136–145)
WBC NRBC COR # BLD AUTO: 6.49 10*3/MM3 (ref 3.4–10.8)

## 2025-07-17 PROCEDURE — 99233 SBSQ HOSP IP/OBS HIGH 50: CPT | Performed by: INTERNAL MEDICINE

## 2025-07-17 PROCEDURE — 97116 GAIT TRAINING THERAPY: CPT

## 2025-07-17 PROCEDURE — 85025 COMPLETE CBC W/AUTO DIFF WBC: CPT | Performed by: PEDIATRICS

## 2025-07-17 PROCEDURE — 80048 BASIC METABOLIC PNL TOTAL CA: CPT | Performed by: PEDIATRICS

## 2025-07-17 PROCEDURE — 94799 UNLISTED PULMONARY SVC/PX: CPT

## 2025-07-17 PROCEDURE — 93005 ELECTROCARDIOGRAM TRACING: CPT | Performed by: NURSE PRACTITIONER

## 2025-07-17 PROCEDURE — 97530 THERAPEUTIC ACTIVITIES: CPT

## 2025-07-17 RX ORDER — METOPROLOL SUCCINATE 25 MG/1
25 TABLET, EXTENDED RELEASE ORAL
Status: DISCONTINUED | OUTPATIENT
Start: 2025-07-17 | End: 2025-07-17 | Stop reason: HOSPADM

## 2025-07-17 RX ORDER — DOFETILIDE 0.25 MG/1
250 CAPSULE ORAL EVERY 12 HOURS
Qty: 60 CAPSULE | Refills: 0 | Status: SHIPPED | OUTPATIENT
Start: 2025-07-17

## 2025-07-17 RX ORDER — METOPROLOL SUCCINATE 25 MG/1
25 TABLET, EXTENDED RELEASE ORAL
Qty: 30 TABLET | Refills: 0 | Status: SHIPPED | OUTPATIENT
Start: 2025-07-18

## 2025-07-17 RX ORDER — LEVOTHYROXINE SODIUM 88 UG/1
88 TABLET ORAL DAILY
Qty: 30 TABLET | Refills: 0 | Status: SHIPPED | OUTPATIENT
Start: 2025-07-18

## 2025-07-17 RX ADMIN — Medication 10 ML: at 08:08

## 2025-07-17 RX ADMIN — METOPROLOL SUCCINATE 25 MG: 25 TABLET, EXTENDED RELEASE ORAL at 10:30

## 2025-07-17 RX ADMIN — SENNOSIDES AND DOCUSATE SODIUM 2 TABLET: 50; 8.6 TABLET ORAL at 08:14

## 2025-07-17 RX ADMIN — LEVOTHYROXINE SODIUM 88 MCG: 88 TABLET ORAL at 05:21

## 2025-07-17 RX ADMIN — ATORVASTATIN CALCIUM 20 MG: 20 TABLET, FILM COATED ORAL at 08:07

## 2025-07-17 RX ADMIN — DOFETILIDE 250 MCG: 0.25 CAPSULE ORAL at 08:07

## 2025-07-17 NOTE — CASE MANAGEMENT/SOCIAL WORK
Case Management Discharge Note                Selected Continued Care - Discharged on 7/17/2025 Admission date: 7/9/2025 - Discharge disposition: Intermediate Care       Destination    No services have been selected for the patient.                Durable Medical Equipment    No services have been selected for the patient.                Dialysis/Infusion    No services have been selected for the patient.                Home Medical Care Coordination complete.      Service Provider Services Address Phone Fax Patient Preferred    [facility] [address] [facility] [place] [address] [phone] --              Therapy    No services have been selected for the patient.                Community Resources    No services have been selected for the patient.                Community & DME    No services have been selected for the patient.                    Selected Continued Care - Prior Encounters Includes continued care and service providers with selected services from prior encounters from 4/10/2025 to 7/17/2025      Discharged on 6/11/2025 Admission date: 6/5/2025 - Discharge disposition: Skilled Nursing Facility (DC - External)      Destination       Service Provider Services Address Phone Fax Patient Preferred    [facility] [address] [facility] [place] [address] [phone] --                          Transportation Services  Transportation: Private Transportation  Private: Car    Final Discharge Disposition Code: 06 - home with home health care

## 2025-07-17 NOTE — PROGRESS NOTES
Reason for follow-up: Atypical atrial flutter     Patient Care Team:  Leonor Adam MD as PCP - General (Family Medicine)  Maggi Horn MD as Consulting Physician (Pain Medicine)  Ratna Zavala MD as Consulting Physician (Cardiology)  Nora Mendez APRN as Nurse Practitioner (Gastroenterology)  Gianluca Walton MD as Consulting Physician (Pulmonary Disease)  Nathanael Murillo MD as Consulting Physician (Allergy and Immunology)  Delfin Thorpe MD as Surgeon (Orthopedic Surgery)  Josh Loomis MD as Consulting Physician (Gastroenterology)  Bo Ruggiero MD as Consulting Physician (Urology)    Subjective .   Faby GOMEZ Rey doing well today     ROS    Celecoxib    Scheduled Meds:atorvastatin, 20 mg, Oral, Daily  dofetilide, 250 mcg, Oral, Q12H  levothyroxine, 88 mcg, Oral, Daily  rivaroxaban, 15 mg, Oral, Daily With Dinner  sodium chloride, 10 mL, Intravenous, Q12H      Continuous Infusions:   PRN Meds:.  acetaminophen **OR** acetaminophen **OR** acetaminophen    albuterol    senna-docusate sodium **AND** polyethylene glycol **AND** bisacodyl **AND** bisacodyl    Calcium Replacement - Follow Nurse / BPA Driven Protocol    cetirizine    diphenhydrAMINE    ipratropium-albuterol    labetalol    Magnesium Standard Dose Replacement - Follow Nurse / BPA Driven Protocol    melatonin    nitroglycerin    ondansetron    phenol    Phosphorus Replacement - Follow Nurse / BPA Driven Protocol    Potassium Replacement - Follow Nurse / BPA Driven Protocol    [COMPLETED] Insert Peripheral IV **AND** [Transfer Hold] sodium chloride    sodium chloride      VITAL SIGNS  Vitals:    07/17/25 0403 07/17/25 0500 07/17/25 0608 07/17/25 0807   BP: (!) 113/33   121/43   BP Location: Left arm   Left arm   Patient Position: Lying   Lying   Pulse: 64 67 65 69   Resp: 11   14   Temp: 98 °F (36.7 °C)   98.1 °F (36.7 °C)   TempSrc: Temporal   Oral   SpO2: 96% 95% 95% 98%   Weight:  118 kg (261 lb 0.4  "oz) 118 kg (261 lb 0.4 oz)    Height:           Flowsheet Rows      Flowsheet Row First Filed Value   Admission Height 160 cm (63\") Documented at 07/09/2025 1439   Admission Weight 116 kg (255 lb) Documented at 07/09/2025 1439               Physical Exam  VITALS REVIEWED    General:      well developed, in no acute distress.    Head:      normocephalic and atraumatic.    Eyes:      PERRL/EOM intact, conjunctiva and sclera clear with out nystagmus.    Neck:      no masses, thyromegaly,  trachea central with normal respiratory effort and PMI displaced laterally  Lungs:      Clear  Heart:       Regular rate and rhythm regular rate and rhythm  Msk:      no deformity or scoliosis noted of thoracic or lumbar spine.    Pulses:      pulses normal in all 4 extremities.    Extremities:       No lower extremity edema or  Neurologic:      no focal deficits.   alert oriented x3  Skin:      intact without lesions or rashes.    Psych:      alert and cooperative; normal mood and affect; normal attention span and concentration.          LAB RESULTS (LAST 7 DAYS)    CBC  Results from last 7 days   Lab Units 07/17/25  0517 07/16/25  1258 07/15/25  0209 07/14/25  1348 07/11/25  2331 07/11/25  0520   WBC 10*3/mm3 6.49 7.74 5.86 4.56 3.81 4.04   RBC 10*6/mm3 2.66* 2.88* 2.94* 2.94* 2.66* 2.67*   HEMOGLOBIN g/dL 8.6* 9.4* 9.7* 9.5* 8.4* 8.6*   HEMATOCRIT % 27.5* 29.5* 30.6* 30.4* 27.6* 27.7*   MCV fL 103.4* 102.4* 104.1* 103.4* 103.8* 103.7*   PLATELETS 10*3/mm3 168 190 189 204 204 184       BMP  Results from last 7 days   Lab Units 07/17/25  0517 07/16/25  1717 07/15/25  0209 07/14/25  1348 07/11/25  2331 07/11/25  0520   SODIUM mmol/L 140 139 139 139 137 137   POTASSIUM mmol/L 4.1 4.5 4.1 4.1 4.3 4.1   CHLORIDE mmol/L 108* 105 105 104 104 105   CO2 mmol/L 22.6 21.6* 20.2* 24.6 23.2 23.6   BUN mg/dL 22.4 20.6 19.4 17.6 18.2 21.2   CREATININE mg/dL 1.23* 1.13* 1.14* 1.33* 1.26* 1.28*   GLUCOSE mg/dL 92 103* 92 139* 86 94   MAGNESIUM mg/dL "  --   --   --   --  1.7 1.7   PHOSPHORUS mg/dL  --   --   --   --  2.9 3.0       CMP   Results from last 7 days   Lab Units 07/17/25  0517 07/16/25  1717 07/15/25  0209 07/14/25  1348 07/11/25  2331 07/11/25  0520   SODIUM mmol/L 140 139 139 139 137 137   POTASSIUM mmol/L 4.1 4.5 4.1 4.1 4.3 4.1   CHLORIDE mmol/L 108* 105 105 104 104 105   CO2 mmol/L 22.6 21.6* 20.2* 24.6 23.2 23.6   BUN mg/dL 22.4 20.6 19.4 17.6 18.2 21.2   CREATININE mg/dL 1.23* 1.13* 1.14* 1.33* 1.26* 1.28*   GLUCOSE mg/dL 92 103* 92 139* 86 94         BNP        TROPONIN        CoAg        Creatinine Clearance  Estimated Creatinine Clearance: 46.8 mL/min (A) (by C-G formula based on SCr of 1.23 mg/dL (H)).    ABG          EKG    I personally reviewed the patient's EKG/Telemetry data: Sinus rhythm      Assessment & Plan       Atrial fibrillation with RVR      Faby Thomson a 80-year-old female patient, has sick sinus syndrome, dual-chamber pacemaker, has A-fib and atrial flutter, onset seems to be around August 2022.  Patient underwent ablation with Dr. Herrera on 3/5/2024 with cryoablation of pulmonary veins, also left atrial posterior wall.  She had recurrence in June 2024 and was placed on Tikosyn, had self converted to sinus rhythm up until a few days ago.     The arrhythmia seems to be atypical atrial flutter.  Cardioversion was performed on 7/11/2025 but briefly after, arrhythmia recurred again.  I performed a device interrogation after the cardioversion and she was in atrial flutter.    Patient had preferred to have ablation done while she is here.  Ablation was performed on 7/16/2025 for left atrial tachycardia, see report for details.    Okay for discharge from my standpoint.  Patient will continue to follow-up with Dr. Herrera.    Continue her cardiac medications including    Tikosyn 250 mcg every 12 hours  Xarelto 15 mg once a day  Toprol-XL 25 mg once a day    I discussed the patients findings and my recommendations with patient  and agrees with outlined plan.    Pacheco Aleman MD  07/17/25  09:51 EDT      Electronically signed by Pacheco Aleman MD, 07/17/25, 9:55 AM EDT.

## 2025-07-17 NOTE — PLAN OF CARE
Goal Outcome Evaluation:  Plan of Care Reviewed With: patient           Outcome Evaluation: Pt has slept throughout the night, no c/o pain or discomfort at this time. Ablation completed, pt expected to discharge back to [facility] in AM. Will continue to monitor.

## 2025-07-17 NOTE — DISCHARGE SUMMARY
"[facility] Medicine Services  Discharge Summary    Date of Service: 2025  Patient Name: Faby Le  : 1945  MRN: [id number]    Date of Admission: 2025  Discharge Diagnosis: Atrial fibrillation with RVR  Date of Discharge: 2025  Primary Care Physician: Leonor Adam MD      Presenting Problem:   Atrial fibrillation with RVR [I48.91]  Hypervolemia, unspecified hypervolemia type [E87.70]    Active and Resolved Hospital Problems:  Active Hospital Problems    Diagnosis POA    **Atrial fibrillation with RVR [I48.91] Yes      Resolved Hospital Problems   No resolved problems to display.         Hospital Course     HPI:    \"Faby Le is a 80 y.o. female with a CMH of paroxysmal atrial fibrillation on Xarelto, nonobstructive CAD, SSS with dual-chamber pacemaker, HFpEF, hypertension, hyperlipidemia, ROSA on CPAP, CKD stage III, hypothyroidism, gout, arthritis who presented to [facility] on 2025 with generalized weakness, shortness of breath, elevated heart rate.  Patient reported the generalized weakness started about a week ago.  Patient reported her blood pressure and heart rate have been high for the past several days to week as well on home readings.  Patient also endorses shortness of breath with exertion.  Patient denied any chest pain.  Patient reported she was in the hospital recently and an ablation was discussed with her cardiologist but not scheduled from that visit.  Per chart review, patient was admitted to [facility] from 2025 to 2025 for an elective total right knee arthroplasty revision.  During that admission, patient had hypotension, worsening renal function, and drop in hemoglobin which were all managed and patient was discharged to [facility]. Patient has a scheduled follow up with MD Herrera on 2025.     In the ED: patient initially with low blood pressure, improved with fluid bolus; afebrile. Patient not requiring " "supplemental oxygen. Pertinent labs include D-dimer 2.04, proBNP 4,837.0, HS trop 14 -> 14, Cr 1.55, Hb 8.9. EKG showed atrial fib/flutter with nonspecific ST changes, rate 140, QTC of 523 ms. CXR showed low lung volumes with vascular redistribution without overt pulmonary edema. CTA chest showed no evidence of pulmonary embolism; no acute finding within limitation of respiratory motion artifacts.  Patient received lasix IV 20 mg. Hospitalist team to admit for further management.  \"    Hospital Course:  The patient was admitted for management of atrial flutter leading to acute decompensated HFpEF and hypotension and subsequently MARIAM on CKD.  She was initially started on diltiazem drip.  Cardiology was consulted as well as cardiac EP.  The patient underwent unsuccessful cardioversion 7/11/2025.  She was then started on Tikosyn and underwent a successful ablation 7/16/2025.  Today the patient reports complete resolution of her symptoms.  She was ambulating with physical therapy at the time of my initial assessment and denied experiencing any dizziness shortness of breath, chest pain, palpitations.  MARIAM resolved.  The findings and the plan were discussed with the patient at length.  She was instructed to seek immediate medical attention if her symptoms recur or any other concerns arise.  The patient verbalized understanding.  All questions and concerns were addressed.        DISCHARGE Follow Up Recommendations for labs and diagnostics: A-fib RVR on status post ablation, hypertension/hypotension, MARIAM on CKD, HFpEF, CAD        Day of Discharge     Vital Signs:  Temp:  [96.9 °F (36.1 °C)-98.1 °F (36.7 °C)] 98.1 °F (36.7 °C)  Heart Rate:  [64-82] 69  Resp:  [11-21] 14  BP: (113-138)/(33-97) 121/43    Physical Exam:  Constitutional:       Appearance: Normal appearance.   Cardiovascular:      Rate and Rhythm: Regular rate and rhythm     Heart sounds: Normal heart sounds.   Pulmonary:      Effort: Pulmonary effort is normal. "      Breath sounds: Normal breath sounds.   Abdominal:      General: Abdomen is flat.      Palpations: Abdomen is soft.   Neurological:      Mental Status: She is alert.                     Pertinent  and/or Most Recent Results     LAB RESULTS:      Lab 07/17/25  0517 07/16/25  1258 07/15/25  0209 07/14/25  1348 07/11/25  2331 07/11/25  0520   WBC 6.49 7.74 5.86 4.56 3.81 4.04   HEMOGLOBIN 8.6* 9.4* 9.7* 9.5* 8.4* 8.6*   HEMATOCRIT 27.5* 29.5* 30.6* 30.4* 27.6* 27.7*   PLATELETS 168 190 189 204 204 184   NEUTROS ABS 4.35  --  3.44 2.83 1.76 1.99   IMMATURE GRANS (ABS) 0.01  --  0.01 0.01 0.01 0.01   LYMPHS ABS 1.36  --  1.61 1.17 1.36 1.39   MONOS ABS 0.58  --  0.54 0.36 0.43 0.41   EOS ABS 0.16  --  0.21 0.15 0.22 0.20   .4* 102.4* 104.1* 103.4* 103.8* 103.7*         Lab 07/17/25  0517 07/16/25  1717 07/15/25  0209 07/14/25  1348 07/11/25  2331 07/11/25  0520   SODIUM 140 139 139 139 137 137   POTASSIUM 4.1 4.5 4.1 4.1 4.3 4.1   CHLORIDE 108* 105 105 104 104 105   CO2 22.6 21.6* 20.2* 24.6 23.2 23.6   ANION GAP 9.4 12.4 13.8 10.4 9.8 8.4   BUN 22.4 20.6 19.4 17.6 18.2 21.2   CREATININE 1.23* 1.13* 1.14* 1.33* 1.26* 1.28*   EGFR 45.1* 49.9* 49.4* 41.0* 43.8* 43.0*   GLUCOSE 92 103* 92 139* 86 94   CALCIUM 9.3 9.6 9.4 9.7 9.4 9.6   MAGNESIUM  --   --   --   --  1.7 1.7   PHOSPHORUS  --   --   --   --  2.9 3.0                         Brief Urine Lab Results  (Last result in the past 365 days)        Color   Clarity   Blood   Leuk Est   Nitrite   Protein   CREAT   Urine HCG        06/08/25 0158 Yellow   Clear   Negative   Moderate (2+)   Negative   Negative                 Microbiology Results (last 10 days)       Procedure Component Value - Date/Time    CANDIDA AURIS PCR - Swab, Axilla Right, Axilla Left and Groin [[id number]]  (Normal) Collected: 07/10/25 0543    Lab Status: Final result Specimen: Swab from Axilla Right, Axilla Left and Groin Updated: 07/11/25 1414     CANDIDA AURIS PCR Not Detected             CT Angiogram Chest Pulmonary Embolism  Result Date: 7/9/2025  Impression: Impression: No evidence of pulmonary embolism to the subsegmental level. Respiratory motion artifacts limits evaluation of the subsegmental pulmonary arteries. No acute intrathoracic finding within limitation of respiratory motion artifacts. Electronically Signed: Pablo Ceja MD  7/9/2025 4:59 PM EDT  Workstation ID: [id number]    XR Chest 1 View  Result Date: 7/9/2025  Impression: Impression: Low lung volumes with vascular redistribution without overt pulmonary edema. Electronically Signed: Cabrera Valenzuela MD  7/9/2025 3:36 PM EDT  Workstation ID: [id number]      Results for orders placed during the hospital encounter of 02/15/24    Duplex Carotid Ultrasound CAR 02/15/2024  1:39 PM    Interpretation Summary    Right internal carotid artery demonstrates a less than 50% stenosis.    Left internal carotid artery demonstrates a less than 50% stenosis.      Results for orders placed during the hospital encounter of 02/15/24    Duplex Carotid Ultrasound CAR 02/15/2024  1:39 PM    Interpretation Summary    Right internal carotid artery demonstrates a less than 50% stenosis.    Left internal carotid artery demonstrates a less than 50% stenosis.      Results for orders placed during the hospital encounter of 07/09/25    Adult Transesophageal Echo (HERBERT) W/ Cont if Necessary Per Protocol 07/11/2025  4:15 PM    Interpretation Summary    Left ventricular systolic function is normal. Left ventricular ejection fraction appears to be 61 - 65%.    The left atrial cavity is severely dilated.    Saline test results are negative.    The right atrial cavity is moderately dilated.      Labs Pending at Discharge:  Pending Results       None            Procedures Performed  Procedure(s):  Ablation atrial fibrillation - PFA         Consults:   Consults       Date and Time Order Name Status Description    7/9/2025  5:50 PM Inpatient Cardiology Consult Completed      6/7/2025 10:36 AM Inpatient Nephrology Consult Completed               Discharge Details        Discharge Medications        Continue These Medications        Instructions Start Date   acetaminophen 325 MG tablet  Commonly known as: TYLENOL   650 mg, Every 6 Hours PRN      allopurinol 300 MG tablet  Commonly known as: ZYLOPRIM   300 mg, Oral, Daily      atorvastatin 20 MG tablet  Commonly known as: LIPITOR   20 mg, Oral, Daily      azelastine 0.1 % nasal spray  Commonly known as: ASTELIN   2 sprays, Daily      cetirizine 10 MG tablet  Commonly known as: zyrTEC   1 tablet, Oral, Daily PRN      cholecalciferol 25 MCG (1000 UT) tablet  Commonly known as: VITAMIN D3   1 tablet, Daily      cyanocobalamin 1000 MCG tablet  Commonly known as: VITAMIN B-12   1 tablet, Daily      docusate sodium 100 MG capsule  Commonly known as: COLACE   100 mg, 2 Times Daily      dofetilide 250 MCG capsule  Commonly known as: TIKOSYN   250 mcg, Oral, Every 12 Hours      ferrous sulfate 325 (65 FE) MG tablet   325 mg, Daily With Breakfast      levothyroxine 88 MCG tablet  Commonly known as: SYNTHROID, LEVOTHROID   88 mcg, Oral, Daily   Start Date: July 18, 2025     metoprolol succinate XL 25 MG 24 hr tablet  Commonly known as: TOPROL-XL   25 mg, Oral, Every 24 Hours Scheduled   Start Date: July 18, 2025     midodrine 5 MG tablet  Commonly known as: PROAMATINE   5 mg, Oral, 2 Times Daily Before Meals      montelukast 10 MG tablet  Commonly known as: SINGULAIR   10 mg, Oral, Nightly      Mounjaro 2.5 MG/0.5ML solution auto-injector  Generic drug: Tirzepatide   5 mg, Weekly      multivitamin tablet tablet   1 tablet, Oral, Daily      multivitamin with minerals tablet tablet   1 tablet, Daily      mupirocin 2 % ointment  Commonly known as: BACTROBAN   APPLY SMALL AMOUNT OF OINTMENT TOPICALLY TO AFFECTED AREA TWICE DAILY      ondansetron 4 MG tablet  Commonly known as: Zofran   4 mg, Oral, Every 6 Hours PRN      oxyCODONE-acetaminophen 5-325  MG per tablet  Commonly known as: PERCOCET   1 tablet, Oral, Every 4 Hours PRN      pantoprazole 40 MG EC tablet  Commonly known as: PROTONIX   40 mg, Every Morning      rivaroxaban 15 MG tablet  Commonly known as: XARELTO   15 mg, Oral, Daily With Dinner      triamcinolone 0.1 % cream  Commonly known as: KENALOG   APPLY CREAM EXTERNALLY TWICE DAILY TO ECZEMA FOR UP TO 2 WEEKS PER MONTH AS NEEDED               Allergies   Allergen Reactions    Celecoxib Itching and Swelling     swelling         Discharge Disposition: Assisted living  Intermediate Care     Diet:  Hospital:  Diet Order   Procedures    Diet: Cardiac; Healthy Heart (2-3 Na+); Fluid Consistency: Thin (IDDSI 0)         Discharge Activity:   Ambulate with assistance as tolerated      CODE STATUS:  Code Status and Medical Interventions: CPR (Attempt to Resuscitate); Full Support   Ordered at: 07/09/25 1750     Code Status (Patient has no pulse and is not breathing):    CPR (Attempt to Resuscitate)     Medical Interventions (Patient has pulse or is breathing):    Full Support         Future Appointments   Date Time Provider Department Center   8/25/2025  9:30 AM Cr Herrera MD [facility]   10/28/2025 11:15 AM Ratna Zavala MD [facility]       Additional Instructions for the Follow-ups that You Need to Schedule       Ambulatory Referral to Home Health   As directed      Face to Face Visit Date: 7/16/2025   Follow-up provider for Plan of Care?: I treated the patient in an acute care facility and will not continue treatment after discharge.   Follow-up provider: SHAWN MICHEL [[id number]]   Reason/Clinical Findings: DEJAN order   Describe mobility limitations that make leaving home difficult: DEJAN order   Nursing/Therapeutic Services Requested: Physical Therapy   PT orders: Strengthening   Frequency: Other                Time spent on Discharge including face to face service:  30 minutes    Signature: Electronically signed by  Frandy Arauz MD, 07/17/25, 11:45 EDT.  [facility] Hospitalist Team

## 2025-07-17 NOTE — PLAN OF CARE
Pt presents with functional mobility impairments which indicate the need for skilled intervention. Tolerating session today without incident. Pt on room air and telemetry this date.  SBA for transfers with use of RW and ambulated 100' with RW with SBA.  HR remained WNL throughout session.  Will continue to follow and progress as tolerated.

## 2025-07-18 LAB
QT INTERVAL: 420 MS
QTC INTERVAL: 453 MS

## 2025-07-29 ENCOUNTER — OFFICE VISIT (OUTPATIENT)
Dept: CARDIOLOGY | Facility: CLINIC | Age: 80
End: 2025-07-29
Payer: MEDICARE

## 2025-07-29 VITALS
BODY MASS INDEX: 45.54 KG/M2 | SYSTOLIC BLOOD PRESSURE: 142 MMHG | HEART RATE: 67 BPM | HEIGHT: 63 IN | DIASTOLIC BLOOD PRESSURE: 77 MMHG | WEIGHT: 257 LBS | OXYGEN SATURATION: 98 %

## 2025-07-29 DIAGNOSIS — Z95.0 PRESENCE OF CARDIAC PACEMAKER: Primary | ICD-10-CM

## 2025-07-29 DIAGNOSIS — Z79.01 LONG TERM (CURRENT) USE OF ANTICOAGULANTS: ICD-10-CM

## 2025-07-29 DIAGNOSIS — I48.0 PAROXYSMAL ATRIAL FIBRILLATION: ICD-10-CM

## 2025-07-29 DIAGNOSIS — E78.2 MIXED HYPERLIPIDEMIA: ICD-10-CM

## 2025-07-29 DIAGNOSIS — I10 PRIMARY HYPERTENSION: ICD-10-CM

## 2025-07-29 PROCEDURE — 1160F RVW MEDS BY RX/DR IN RCRD: CPT | Performed by: NURSE PRACTITIONER

## 2025-07-29 PROCEDURE — 3077F SYST BP >= 140 MM HG: CPT | Performed by: NURSE PRACTITIONER

## 2025-07-29 PROCEDURE — 3078F DIAST BP <80 MM HG: CPT | Performed by: NURSE PRACTITIONER

## 2025-07-29 PROCEDURE — 99214 OFFICE O/P EST MOD 30 MIN: CPT | Performed by: NURSE PRACTITIONER

## 2025-07-29 PROCEDURE — 93000 ELECTROCARDIOGRAM COMPLETE: CPT | Performed by: NURSE PRACTITIONER

## 2025-07-29 PROCEDURE — 1159F MED LIST DOCD IN RCRD: CPT | Performed by: NURSE PRACTITIONER

## 2025-07-29 NOTE — PROGRESS NOTES
[facility] CARDIOLOGY      REASON FOR FOLLOW-UP:            Chief Complaint   Patient presents with    Heart Problem         Dear Leonor Adam MD        History of Present Illness   80-year-old female known to our practice with known history of nonobstructive coronary artery disease per prior heart cath. Additional past medical history of sick sinus syndrome status post [employer] dual-chamber permanent pacemaker implant, history of paroxysmal atrial fibrillation/flutter status post prior ablation March 2024, history of hypertension, dyslipidemia, sleep apnea, vitamin D deficiency, orthostatic hypotension on midodrine, history of prior TIA, coagulopathy secondary to Xarelto, hypothyroidism, diabetes mellitus 2.     Patient presented to the emergency department at [facility] 7/9/2025 with symptoms as above that began approximately week ago.  Her blood pressure and heart rate have been elevated for the past several days per home readings.  She reported no chest discomfort.  She underwent right total knee arthroplasty revision 6/5/2025.  She was discharged to rehab facility.  In the ED, EKG confirmed elevated heart rate at 140 bpm-likely flutter.  She did convert to sinus rhythm late yesterday evening with first-degree AV block, but subsequently went back into A-fib RVR this morning.  She received 20 mg IV Lasix.  CT chest with no evidence of PE or pulmonary vascular congestion.  Xarelto was put on hold and she was started on treatment dose Lovenox.  The patient had intermittent low blood pressures down to 93 systolic and diltiazem was discontinued.  She was given 1 dose digoxin 250 mcg.  She subsequently underwent HERBERT guided cardioversion on 7/11/2025 to sinus rhythm.  HERBERT showed EF of 61-65%, severe LAE, negative saline test, moderate PAYTON.  She presents today in follow-up from that procedure.    Today, the patient reports that she feels well.  She denies any  complaints of chest pain, pressure or tightness.  She has had no palpitations, shortness of breath at rest.  Her mobility is quite limited due to joint pain and obesity.  She has chronic mild edema to both lower extremities.  She denies dizziness, lightheadedness, near syncopal or syncopal episodes.  She does present a blood pressure log today with excellent readings.      ASSESSMENT:  Narrow complex tachycardia-fib/flutter status post ablation  Recent left knee surgery  Elevated D-dimer with no evidence of PE on imaging  History of paroxysmal atrial fibs/flutter status post prior ablation March 2024  Sick sinus syndrome status post permanent pacemaker implant  History of nonobstructive coronary artery disease  Coagulopathy secondary to Xarelto  Hypothyroidism  History of prior TIA  History of hypotension on midodrine    PLAN:  Continue current CV plan of care to include statin, Tikosyn, BB, midodrine, Xarelto  Monitor for any abnormal bleeding  Risk factor modification including heart healthy diet, activity as tolerated, healthy weight  Follow-up with Dr. Herrera as scheduled        CHF Guideline Directed Medical Therapy  Beta Blocker:   ARNI/ACE/ARB:   SGLT 2 inhibitors:   Diuretics:   Aldosterone Antagonist:   Vasodilators & Nitrates:       Diagnoses and all orders for this visit:    1. Presence of cardiac pacemaker (Primary)    2. Long term (current) use of anticoagulants    3. Mixed hyperlipidemia    4. Primary hypertension    5. Paroxysmal atrial fibrillation  Overview:  Long history of coronary artery disease and cardiomyopathy presented today with dizziness lightheadedness but no chest pain or shortness of breath EKG showed new atrial fibrillation.  Should be noted that the patient was to get her thyroid repeated in June and did fail to do so.      Other orders  -     ECG 12 Lead          The following portions of the patient's history were reviewed and updated as appropriate: allergies, current  medications, past family history, past medical history, past social history, past surgical history, and problem list.    REVIEW OF SYSTEMS:    Review of Systems   Cardiovascular:  Positive for leg swelling.   Musculoskeletal:  Positive for joint pain.   All other systems reviewed and are negative.      Vitals:    07/29/25 1058   BP: 142/77   Pulse: 67   SpO2: 98%         PHYSICAL EXAM:    General: Alert, cooperative, no distress, appears stated age  Head:  Normocephalic, atraumatic, mucous membranes moist  Eyes:  Conjunctiva/corneas clear, EOM's intact     Neck:  Supple,  no JVD or bruit     Lungs: Clear to auscultation bilaterally, no wheezes rhonchi rales are noted  Chest wall: No tenderness  Musculoskeletal:   Ambulates with assistance of a cane  Heart::  Regular rate and rhythm, S1 and S2 normal, no murmur, rub or gallop  Abdomen: Soft, non-tender, nondistended, bowel sounds active, no abdominal bruit  Extremities: No cyanosis, clubbing, or edema   Pulses: 2+ and symmetric all extremities  Skin:  No rashes or lesions  Neuro/psych: A&O x3. CN II through XII are grossly intact with appropriate affect        Past Medical History:   Diagnosis Date    Ankle pain     Arthritis     Atrial fibrillation     Coronary artery disease     Depression     Disease of thyroid gland     Gout     Hyperglycemia     Hyperlipidemia     Hypertension     Low back pain     Sleep apnea     Vitamin D deficiency        Past Surgical History:   Procedure Laterality Date    ANKLE FUSION      2017-left    CARDIAC CATHETERIZATION      CARDIAC CATHETERIZATION Right 08/29/2022    Procedure: Left Heart Cath;  Surgeon: Ratna Zavala MD;  Location: [facility] CATH INVASIVE LOCATION;  Service: Cardiovascular;  Laterality: Right;    CARDIAC ELECTROPHYSIOLOGY PROCEDURE Left 07/03/2023    Procedure: Pacemaker DC new [place] Notified;  Surgeon: Cr Herrera MD;  Location: [facility] CATH INVASIVE LOCATION;  Service: Cardiovascular;  Laterality:  Left;    CARDIAC ELECTROPHYSIOLOGY PROCEDURE Right 03/05/2024    Procedure: Ablation atrial fibrillation, Cryo Marcialonde and Wayne Igor notified 02/09/24;  Surgeon: Cr Herrera MD;  Location: [facility] CATH INVASIVE LOCATION;  Service: Cardiovascular;  Laterality: Right;    CARDIAC ELECTROPHYSIOLOGY PROCEDURE N/A 03/05/2024    Procedure: Cardioversion;  Surgeon: Cr Herrera MD;  Location: [facility] CATH INVASIVE LOCATION;  Service: Cardiovascular;  Laterality: N/A;    CARDIAC ELECTROPHYSIOLOGY PROCEDURE Right 7/16/2025    Procedure: Ablation atrial fibrillation - PFA;  Surgeon: Pacheco Aleman MD;  Location: [facility] CATH INVASIVE LOCATION;  Service: Cardiovascular;  Laterality: Right;    CARPAL TUNNEL RELEASE Right     CATARACT EXTRACTION      CUBITAL TUNNEL RELEASE Right     ENDOSCOPY N/A 05/28/2024    Procedure: ESOPHAGOGASTRODUODENOSCOPY WITH COLD FORCEP BIOPSY X1 AREA;  Surgeon: Josh Loomis MD;  Location: [facility] ENDOSCOPY;  Service: Gastroenterology;  Laterality: N/A;  Post- ESOPHAGITIS, GASTRITIS, HIATAL HERNIA    HYSTERECTOMY      REPLACEMENT TOTAL KNEE      left 2008    ROTATOR CUFF REPAIR Right     TOTAL KNEE ARTHROPLASTY Right 05/04/2023    Procedure: TOTAL KNEE ARTHROPLASTY WITH CORI ROBOT;  Surgeon: Chino Herrera II, MD;  Location: [facility] MAIN OR;  Service: Robotics - Ortho;  Laterality: Right;    TOTAL KNEE ARTHROPLASTY REVISION Right 6/5/2025    Procedure: TOTAL KNEE ARTHROPLASTY REVISION WITH CORI ROBOT;  Surgeon: Chino Herrera II, MD;  Location: [facility] MAIN OR;  Service: Robotics - Ortho;  Laterality: Right;         Current Outpatient Medications:     acetaminophen (TYLENOL) 325 MG tablet, Take 2 tablets by mouth Every 6 (Six) Hours As Needed for Mild Pain., Disp: , Rfl:     allopurinol (ZYLOPRIM) 300 MG tablet, Take 1 tablet by mouth once daily, Disp: 90 tablet, Rfl: 0    atorvastatin (LIPITOR) 20 MG tablet, Take 1 tablet by mouth once daily, Disp:  270 tablet, Rfl: 0    azelastine (ASTELIN) 0.1 % nasal spray, Administer 2 sprays into the nostril(s) as directed by provider Daily. Use in each nostril as directed, Disp: , Rfl:     cetirizine (zyrTEC) 10 MG tablet, Take 1 tablet by mouth Daily As Needed for Allergies., Disp: , Rfl:     cyanocobalamin (VITAMIN B-12) 1000 MCG tablet, Take 1 tablet by mouth Daily., Disp: , Rfl:     docusate sodium (COLACE) 100 MG capsule, Take 1 capsule by mouth 2 (Two) Times a Day., Disp: , Rfl:     dofetilide (TIKOSYN) 250 MCG capsule, Take 1 capsule by mouth Every 12 (Twelve) Hours., Disp: 60 capsule, Rfl: 0    ferrous sulfate 325 (65 FE) MG tablet, Take 1 tablet by mouth Daily With Breakfast., Disp: , Rfl:     levothyroxine (SYNTHROID, LEVOTHROID) 88 MCG tablet, Take 1 tablet by mouth Daily., Disp: 30 tablet, Rfl: 0    metoprolol succinate XL (TOPROL-XL) 25 MG 24 hr tablet, Take 1 tablet by mouth Daily., Disp: 30 tablet, Rfl: 0    midodrine (PROAMATINE) 5 MG tablet, TAKE 1 TABLET BY MOUTH TWICE DAILY BEFORE MEAL(S), Disp: 90 tablet, Rfl: 0    montelukast (SINGULAIR) 10 MG tablet, Take 1 tablet by mouth Every Night., Disp: 90 tablet, Rfl: 0    Mounjaro 2.5 MG/0.5ML solution auto-injector, Inject 5 mg into the appropriate muscle as directed by prescriber 1 (One) Time Per Week., Disp: , Rfl:     multivitamin (MULTI VITAMIN PO), Take 1 tablet by mouth Daily., Disp: 30 tablet, Rfl: 0    multivitamin with minerals tablet tablet, Take 1 tablet by mouth Daily., Disp: , Rfl:     mupirocin (BACTROBAN) 2 % ointment, APPLY SMALL AMOUNT OF OINTMENT TOPICALLY TO AFFECTED AREA TWICE DAILY, Disp: , Rfl:     ondansetron (Zofran) 4 MG tablet, Take 1 tablet by mouth Every 6 (Six) Hours As Needed for Nausea or Vomiting., Disp: 30 tablet, Rfl: 0    oxyCODONE-acetaminophen (PERCOCET) 5-325 MG per tablet, Take 1 tablet by mouth Every 4 (Four) Hours As Needed for Severe Pain., Disp: 50 tablet, Rfl: 0    pantoprazole (PROTONIX) 40 MG EC tablet, Take 1  "tablet by mouth Every Morning., Disp: , Rfl:     rivaroxaban (XARELTO) 15 MG tablet, Take 1 tablet by mouth Daily With Dinner. Indications: Atrial Fibrillation, Disp: 30 tablet, Rfl: 0    triamcinolone (KENALOG) 0.1 % cream, APPLY CREAM EXTERNALLY TWICE DAILY TO ECZEMA FOR UP TO 2 WEEKS PER MONTH AS NEEDED, Disp: , Rfl:     Vitamin D, Cholecalciferol, 25 MCG (1000 UT) tablet, Take 1 tablet by mouth Daily., Disp: , Rfl:     Allergies   Allergen Reactions    Celecoxib Itching and Swelling     swelling       Family History   Problem Relation Age of Onset    Hypertension Mother     Stroke Father     Arthritis Brother     Cancer Brother        Social History     Tobacco Use    Smoking status: Never     Passive exposure: Never    Smokeless tobacco: Never   Substance Use Topics    Alcohol use: No           Current Electrocardiogram:    ECG 12 Lead    Date/Time: 7/29/2025 3:53 PM  Performed by: Poppy Pinedo APRN    Authorized by: Poppy Pinedo APRN  Comparison: compared with previous ECG from 7/18/2025  Similar to previous ECG  Rhythm: sinus rhythm  BPM: 67              EMR Dragon/Transcription:   \"Dictated utilizing Dragon dictation\".     Copied text in this note has been reviewed by me and is accurate as of 07/29/25.    "

## 2025-08-06 LAB — BH CV ECHO SHUNT ASSESSMENT PERFORMED (HIDDEN SCRIPTING): 1

## 2025-08-11 RX ORDER — METOPROLOL SUCCINATE 25 MG/1
25 TABLET, EXTENDED RELEASE ORAL
Qty: 30 TABLET | Refills: 0 | Status: SHIPPED | OUTPATIENT
Start: 2025-08-11

## 2025-08-11 RX ORDER — DOFETILIDE 0.25 MG/1
250 CAPSULE ORAL EVERY 12 HOURS
Qty: 60 CAPSULE | Refills: 0 | OUTPATIENT
Start: 2025-08-11

## 2025-08-11 RX ORDER — LEVOTHYROXINE SODIUM 88 UG/1
88 TABLET ORAL DAILY
Qty: 30 TABLET | Refills: 0 | Status: SHIPPED | OUTPATIENT
Start: 2025-08-11

## 2025-08-13 ENCOUNTER — HOSPITAL ENCOUNTER (OUTPATIENT)
Dept: BONE DENSITY | Facility: HOSPITAL | Age: 80
Discharge: HOME | End: 2025-08-13
Admitting: PREVENTIVE MEDICINE
Payer: MEDICARE

## 2025-08-13 PROCEDURE — 77080 DXA BONE DENSITY AXIAL: CPT

## 2025-08-21 ENCOUNTER — LAB (OUTPATIENT)
Dept: FAMILY MEDICINE CLINIC | Facility: CLINIC | Age: 80
End: 2025-08-21
Payer: MEDICARE

## 2025-08-21 DIAGNOSIS — N20.0 KIDNEY STONES: Primary | ICD-10-CM

## 2025-08-23 ENCOUNTER — RESULTS FOLLOW-UP (OUTPATIENT)
Dept: FAMILY MEDICINE CLINIC | Facility: CLINIC | Age: 80
End: 2025-08-23
Payer: MEDICARE

## 2025-08-23 LAB — BACTERIA SPEC AEROBE CULT: NORMAL

## 2025-08-25 ENCOUNTER — OFFICE VISIT (OUTPATIENT)
Dept: CARDIOLOGY | Facility: CLINIC | Age: 80
End: 2025-08-25
Payer: MEDICARE

## 2025-08-25 ENCOUNTER — CLINICAL SUPPORT NO REQUIREMENTS (OUTPATIENT)
Dept: CARDIOLOGY | Facility: CLINIC | Age: 80
End: 2025-08-25
Payer: MEDICARE

## 2025-08-25 VITALS
SYSTOLIC BLOOD PRESSURE: 118 MMHG | WEIGHT: 252 LBS | DIASTOLIC BLOOD PRESSURE: 74 MMHG | HEIGHT: 63 IN | BODY MASS INDEX: 44.65 KG/M2 | HEART RATE: 62 BPM | OXYGEN SATURATION: 98 %

## 2025-08-25 DIAGNOSIS — I95.1 ORTHOSTATIC HYPOTENSION: ICD-10-CM

## 2025-08-25 DIAGNOSIS — I49.5 SICK SINUS SYNDROME: ICD-10-CM

## 2025-08-25 DIAGNOSIS — G45.9 TIA (TRANSIENT ISCHEMIC ATTACK): ICD-10-CM

## 2025-08-25 DIAGNOSIS — Z95.0 PRESENCE OF CARDIAC PACEMAKER: ICD-10-CM

## 2025-08-25 DIAGNOSIS — E78.2 MIXED HYPERLIPIDEMIA: ICD-10-CM

## 2025-08-25 DIAGNOSIS — I48.0 PAROXYSMAL ATRIAL FIBRILLATION: Primary | ICD-10-CM

## 2025-08-27 ENCOUNTER — TELEPHONE (OUTPATIENT)
Dept: FAMILY MEDICINE CLINIC | Facility: CLINIC | Age: 80
End: 2025-08-27
Payer: MEDICARE

## 2025-08-27 DIAGNOSIS — Z96.659 STATUS POST KNEE REPLACEMENT, UNSPECIFIED LATERALITY: Primary | ICD-10-CM

## 2025-08-27 LAB
MC_CV_MDC_IDC_RATE_1: 160
MC_CV_MDC_IDC_ZONE_ID: 1
MC_CV_MDC_IDC_ZONE_ID: 2
MC_CV_MDC_IDC_ZONE_ID: 3
MDC_IDC_MSMT_BATTERY_REMAINING_LONGEVITY: 132 MO
MDC_IDC_MSMT_BATTERY_STATUS: NORMAL
MDC_IDC_MSMT_LEADCHNL_RA_IMPEDANCE_VALUE: 702
MDC_IDC_MSMT_LEADCHNL_RA_PACING_THRESHOLD_AMPLITUDE: 0.5
MDC_IDC_MSMT_LEADCHNL_RA_PACING_THRESHOLD_PULSEWIDTH: 0.4
MDC_IDC_MSMT_LEADCHNL_RA_SENSING_INTR_AMPL: 4.8
MDC_IDC_MSMT_LEADCHNL_RV_IMPEDANCE_VALUE: 732
MDC_IDC_MSMT_LEADCHNL_RV_PACING_THRESHOLD_AMPLITUDE: 1.3
MDC_IDC_MSMT_LEADCHNL_RV_PACING_THRESHOLD_PULSEWIDTH: 0.4
MDC_IDC_MSMT_LEADCHNL_RV_SENSING_INTR_AMPL: 24.3
MDC_IDC_PG_IMPLANT_DTM: NORMAL
MDC_IDC_PG_MFG: NORMAL
MDC_IDC_PG_MODEL: NORMAL
MDC_IDC_PG_SERIAL: NORMAL
MDC_IDC_PG_TYPE: NORMAL
MDC_IDC_SESS_DTM: NORMAL
MDC_IDC_SET_BRADY_AT_MODE_SWITCH_RATE: 140
MDC_IDC_SET_BRADY_LOWRATE: 60
MDC_IDC_SET_BRADY_MAX_SENSOR_RATE: 130
MDC_IDC_SET_BRADY_MAX_TRACKING_RATE: 130
MDC_IDC_SET_BRADY_MODE: NORMAL
MDC_IDC_SET_BRADY_PAV_DELAY: 260
MDC_IDC_SET_BRADY_SAV_DELAY: 260
MDC_IDC_SET_LEADCHNL_RA_PACING_AMPLITUDE: 2
MDC_IDC_SET_LEADCHNL_RA_PACING_PULSEWIDTH: 0.4
MDC_IDC_SET_LEADCHNL_RA_SENSING_SENSITIVITY: 0.25
MDC_IDC_SET_LEADCHNL_RV_PACING_AMPLITUDE: 2
MDC_IDC_SET_LEADCHNL_RV_PACING_PULSEWIDTH: 0.4
MDC_IDC_SET_LEADCHNL_RV_SENSING_SENSITIVITY: 0.6
MDC_IDC_SET_ZONE_STATUS: NORMAL
MDC_IDC_SET_ZONE_TYPE: NORMAL
MDC_IDC_STAT_AT_BURDEN_PERCENT: 11
MDC_IDC_STAT_BRADY_RA_PERCENT_PACED: 38
MDC_IDC_STAT_BRADY_RV_PERCENT_PACED: 1

## (undated) DEVICE — ELECTRD DEFIB M/FUNC PROPADZ RADIOL 2PK

## (undated) DEVICE — INTRO PERFORMER CHECKFLO/LG RAD/BND NO/GW 18F .038IN 30CM

## (undated) DEVICE — SUT ETHIB 2 CV V37 MS/4 30IN MX69G

## (undated) DEVICE — TBG IV DRIP CHAMBER MACRO SGL 72IN

## (undated) DEVICE — WRAP KNEE COLD THERAPY

## (undated) DEVICE — PINNACLE INTRODUCER SHEATH: Brand: PINNACLE

## (undated) DEVICE — STERILE PATIENT PROTECTIVE PAD FOR IMP® KNEE POSITIONERS & COHESIVE WRAP (10 / CASE): Brand: DE MAYO KNEE POSITIONER®

## (undated) DEVICE — PENCL SMOKE/EVAC MEGADYNE TELESCP 15FT

## (undated) DEVICE — 1 X VERSACROSS CONNECT TRANSSEPTAL DILATOR;  1 X VERSACROSS RF WIRE (INCLUDING 1 X CONNECTOR CABLE (SINGLE USE)): Brand: VERSACROSS CONNECT ACCESS SOLUTION FOR FARADRIVE

## (undated) DEVICE — CUFF TOURNI 1BLADDER 1PRT 34IN STRL

## (undated) DEVICE — PULSED FIELD ABLATION CATHETER: Brand: FARAWAVE™

## (undated) DEVICE — KT SURG TURNOVER 050

## (undated) DEVICE — MIS 3.2MM X 65MM RIMMED PIN STERILE: Brand: HARMONY

## (undated) DEVICE — SYR LUERLOK 20CC

## (undated) DEVICE — HANDPIECE SET WITH COAXIAL MULTI-ORIFICE TIP AND SUCTION TUBE: Brand: INTERPULSE

## (undated) DEVICE — CORI KNEE TENSIONER CARTRIDGE: Brand: CORI

## (undated) DEVICE — SHEATH FLXCATH STEER 12FR

## (undated) DEVICE — CATH DIAG IMPULSE FL4 6F 100CM

## (undated) DEVICE — SUT ETHLN 2/0 PS 18IN 585H

## (undated) DEVICE — KT DYEVERT PLS EZ W/SMART SYR FOR HI VISC CONTRST DISP

## (undated) DEVICE — ZIPPERED TOGA, 2X LARGE: Brand: FLYTE

## (undated) DEVICE — SOL IRR H2O BO 1000ML STRL

## (undated) DEVICE — APPL CHLORAPREP HI/LITE 26ML ORNG

## (undated) DEVICE — Device: Brand: REFERENCE PATCH CARTO 3

## (undated) DEVICE — PROVE COVER: Brand: UNBRANDED

## (undated) DEVICE — INTENDED TO SUPPORT AND MAINTAIN THE POSITION OF AN ANESTHETIZED PATIENT DURING SURGERY: Brand: HERMANTOR XL PINK KNEE POSITIONING PAD

## (undated) DEVICE — SYR LUERLOK 30CC

## (undated) DEVICE — THE STERILE CAMERA HANDLE COVER IS FOR USE WITH THE STERIS SURGICAL LIGHTING AND VISUALIZATION SYSTEMS.

## (undated) DEVICE — GLV SURG SENSICARE PI ORTHO SZ8.5 LF STRL

## (undated) DEVICE — DRSNG BURN ACTICOAT FLEX 7 1X24IN

## (undated) DEVICE — UNDERGLV SURG BIOGEL INDICAT PI SZ8.5 BLU

## (undated) DEVICE — THE STERILE LIGHT HANDLE COVER IS USED WITH STERIS SURGICAL LIGHTING AND VISUALIZATION SYSTEMS.

## (undated) DEVICE — NEEDLE, QUINCKE, 20GX3.5": Brand: MEDLINE

## (undated) DEVICE — NON RIMMED SPEED PIN 65MM STERILE

## (undated) DEVICE — Device: Brand: RFP-100A CONNECTOR CABLE

## (undated) DEVICE — STEERABLE SHEATH CLEAR: Brand: FARADRIVE™

## (undated) DEVICE — SOL NACL 0.9PCT 1000ML

## (undated) DEVICE — PAD E/S GRND SGL/FOIL 9FT/CORD DISP

## (undated) DEVICE — ADHS LIQ MASTISOL 2/3ML

## (undated) DEVICE — CONTAINER,SPECIMEN,OR STERILE,4OZ: Brand: MEDLINE

## (undated) DEVICE — INTRO STEER AGILIS NXT MED/CURL 8.5F

## (undated) DEVICE — DRAPE SHEET ULTRAGARD: Brand: MEDLINE

## (undated) DEVICE — ADHS SKIN PREMIERPRO EXOFIN TOPICAL HI/VISC .5ML

## (undated) DEVICE — BLANKT WARM UNDER/BDY FUL/ACC A/ 90X206CM

## (undated) DEVICE — DUAL CUT SAGITTAL BLADE

## (undated) DEVICE — SOL IRRIG NACL 1000ML

## (undated) DEVICE — CATH ABL ACHIEVE MP 3.3F 20MM 165CM

## (undated) DEVICE — CATHETER CONNECTION CABLE: Brand: FARASTAR™

## (undated) DEVICE — CABL CONN CATH EP UMB 48IN

## (undated) DEVICE — PK TRY HEART CATH 50

## (undated) DEVICE — CABL CATH ABLAT ACHIEVE 196CM 1P/U

## (undated) DEVICE — ZIP 24 SURGICAL SKIN CLOSURE DEVICE, PSA: Brand: ZIP 24 SURGICAL SKIN CLOSURE DEVICE

## (undated) DEVICE — TOWEL,OR,DSP,ST,WHITE,DLX,4/PK,20PK/CS: Brand: MEDLINE

## (undated) DEVICE — CATH DIAG IMPULSE FR4 6F 100CM

## (undated) DEVICE — CATH ABL ARCTIC FRNT ADV 10.5F3.5X28MM

## (undated) DEVICE — CVR HNDL LT SURG ACCSSRY BLU STRL

## (undated) DEVICE — BI-DIRECTIONAL NAVIGATION CATHETER, NAV, D-F: Brand: QDOT MICRO

## (undated) DEVICE — COVER,MAYO STAND,STERILE: Brand: MEDLINE

## (undated) DEVICE — OCTA,PERSEID,2-2-2-2-2,D-CURVE: Brand: OCTARAY MAPPING CATHETER

## (undated) DEVICE — SOL ISO/ALC RUB 70PCT 4OZ

## (undated) DEVICE — Device: Brand: WEBSTER CS

## (undated) DEVICE — PREF.GUIDING SHEATH W/MULT.CRV: Brand: PREFACE

## (undated) DEVICE — NAVIO FLAT MARKERS: Brand: NAVIO

## (undated) DEVICE — Device

## (undated) DEVICE — UNDERGLV SURG BIOGEL/PI PF SYNTH SURG SZ8.5 BLU 50/BX

## (undated) DEVICE — Device: Brand: CARTO 3

## (undated) DEVICE — ST INTRO W/GW J/TIP .038IN 16F 13CM

## (undated) DEVICE — LN INJ CONTRST FLXCIL HP F/M LL 1200PSI72

## (undated) DEVICE — PK TOTL KN 50

## (undated) DEVICE — Device: Brand: NRG TRANSSEPTAL NEEDLE

## (undated) DEVICE — GLV SURG SENSICARE PI ORTHO PF SZ7 LF STRL

## (undated) DEVICE — GW XCHG AMPLTZ XSTIF PTFE CRV .035IN 3X180CM

## (undated) DEVICE — INTERFACE CABLE: Brand: CARTO 3 SYSTEM ECO INTERFACE CABLE

## (undated) DEVICE — ST ACC MICROPUNCTURE STFF/CANN PLAT/TP 4F 21G 40CM

## (undated) DEVICE — REAL INTELLIGENCE 5  MM                                    CYLINDRICAL BUR: Brand: REAL INTELLIGENCE

## (undated) DEVICE — DISPOSABLE TOURNIQUET CUFF SINGLE BLADDER, SINGLE PORT AND QUICK CONNECT CONNECTOR: Brand: COLOR CUFF

## (undated) DEVICE — 450 ML BOTTLE OF 0.05% CHLORHEXIDINE GLUCONATE IN 99.95% STERILE WATER FOR IRRIGATION, USP AND APPLICATOR.: Brand: IRRISEPT ANTIMICROBIAL WOUND LAVAGE

## (undated) DEVICE — MICRO HVTSA, 0.5G AND HVTSA SOURCEMARK PRODUCT CODE M1206 AND M1206-01: Brand: EXOFIN MICRO HVTSA, 0.5G

## (undated) DEVICE — ANTIBACTERIAL UNDYED BRAIDED (POLYGLACTIN 910), SYNTHETIC ABSORBABLE SUTURE: Brand: COATED VICRYL

## (undated) DEVICE — SOUNDSTAR ECO 8F G CATHETER: Brand: SOUNDSTAR

## (undated) DEVICE — ZIP 16 SURGICAL SKIN CLOSURE DEVICE, PSA: Brand: ZIP 16 SURGICAL SKIN CLOSURE DEVICE

## (undated) DEVICE — TBG PRESS/MONITOR FIX M/F LL A/ 24IN STRL

## (undated) DEVICE — SUT ABS VICRLY/PLS COAT CR 2/0 CP/2/REV/CUT/NDL 8X18IN UD

## (undated) DEVICE — Device: Brand: TORAYGUIDE GUIDEWIRE

## (undated) DEVICE — Device: Brand: SMARTABLATE

## (undated) DEVICE — CATH DIAG IMPULSE PIG .056 6F 110CM

## (undated) DEVICE — CABL CONN CATH EP COAXL UMB 72IN

## (undated) DEVICE — SOL IRR NACL 0.9PCT 3000ML

## (undated) DEVICE — IRRIGATOR BULB ASEPTO 60CC STRL

## (undated) DEVICE — PICO 7 10CM X 30CM: Brand: PICO™ 7

## (undated) DEVICE — SYR LUERLOK 50ML

## (undated) DEVICE — PAD,ABDOMINAL,5"X9",STERILE,LF,1/PK: Brand: MEDLINE INDUSTRIES, INC.

## (undated) DEVICE — TBG PENCL TELESCP MEGADYNE SMOKE EVAC 15FT

## (undated) DEVICE — GW PTFE EMERALD HEPCOAT FC J TIP STD .035 3MM 150CM

## (undated) DEVICE — CEMENT MIXING SYSTEM WITH MIS FEMORAL BREAKAWAY NOZZLE: Brand: REVOLUTION

## (undated) DEVICE — UNDRGLV SURG BIOGEL PUNCTUREINDICATION SZ7 PF STRL

## (undated) DEVICE — PK ENDO GI 50

## (undated) DEVICE — PROXIMATE RH ROTATING HEAD SKIN STAPLERS (35 WIDE) CONTAINS 35 STAINLESS STEEL STAPLES: Brand: PROXIMATE

## (undated) DEVICE — SINGLE-USE BIOPSY FORCEPS: Brand: RADIAL JAW 4

## (undated) DEVICE — BITEBLOCK ENDO W/STRAP 60F A/ LF DISP

## (undated) DEVICE — CEMENT MIXING SYSTEM WITH FEMORAL BREAKWAY NOZZLE: Brand: REVOLUTION